# Patient Record
Sex: MALE | Race: WHITE | NOT HISPANIC OR LATINO | Employment: OTHER | URBAN - METROPOLITAN AREA
[De-identification: names, ages, dates, MRNs, and addresses within clinical notes are randomized per-mention and may not be internally consistent; named-entity substitution may affect disease eponyms.]

---

## 2017-01-03 ENCOUNTER — OFFICE VISIT (OUTPATIENT)
Dept: INTERNAL MEDICINE | Facility: CLINIC | Age: 72
End: 2017-01-03
Payer: MEDICARE

## 2017-01-03 VITALS
DIASTOLIC BLOOD PRESSURE: 80 MMHG | BODY MASS INDEX: 31.75 KG/M2 | HEIGHT: 74 IN | OXYGEN SATURATION: 97 % | TEMPERATURE: 98 F | HEART RATE: 48 BPM | SYSTOLIC BLOOD PRESSURE: 132 MMHG | WEIGHT: 247.38 LBS

## 2017-01-03 DIAGNOSIS — M10.9 GOUT, UNSPECIFIED CAUSE, UNSPECIFIED CHRONICITY, UNSPECIFIED SITE: Primary | ICD-10-CM

## 2017-01-03 DIAGNOSIS — R00.1 BRADYCARDIA: ICD-10-CM

## 2017-01-03 PROCEDURE — 99213 OFFICE O/P EST LOW 20 MIN: CPT | Mod: S$PBB,,, | Performed by: FAMILY MEDICINE

## 2017-01-03 PROCEDURE — 99213 OFFICE O/P EST LOW 20 MIN: CPT | Mod: PBBFAC,PO | Performed by: FAMILY MEDICINE

## 2017-01-03 PROCEDURE — 99999 PR PBB SHADOW E&M-EST. PATIENT-LVL III: CPT | Mod: PBBFAC,,, | Performed by: FAMILY MEDICINE

## 2017-01-03 RX ORDER — INDOMETHACIN 50 MG/1
50 CAPSULE ORAL 3 TIMES DAILY PRN
Qty: 30 CAPSULE | Refills: 0 | Status: SHIPPED | OUTPATIENT
Start: 2017-01-03 | End: 2018-06-06 | Stop reason: SDUPTHER

## 2017-01-03 NOTE — MR AVS SNAPSHOT
[facility]   [place]  [place] [address]  Phone: [phone]  Fax: [phone]                  Shannan Norman   1/3/2017 7:00 PM   Office Visit    Description:  Male : 1945   Provider:  Kathie Sauceda MD   Department:  [facility]           Reason for Visit     Pain           Diagnoses this Visit        Comments    Gout, unspecified cause, unspecified chronicity, unspecified site    -  Primary            To Do List           Future Appointments        Provider Department Dept Phone    1/3/2017 7:00 PM Kathie Sauceda MD [facility] [phone]      Goals (5 Years of Data)     None       These Medications        Disp Refills Start End    indomethacin (INDOCIN) 50 MG capsule 30 capsule 0 1/3/2017     Take 1 capsule (50 mg total) by mouth 3 (three) times daily as needed. - Oral    Pharmacy: [facility] [address] [place] [address] [place] [address] AT [facility] Ph #: [phone]         [facility]     [facility] -  Assistance  Registered nurses in the [facility] provide clinical advisement, health education, appointment booking, and other advisory services.  Call for this free service at [phone].             Medications           Message regarding Medications     Verify the changes and/or additions to your medication regime listed below are the same as discussed with your clinician today.  If any of these changes or additions are incorrect, please notify your healthcare provider.        START taking these NEW medications        Refills    indomethacin (INDOCIN) 50 MG capsule 0    Sig: Take 1 capsule (50 mg total) by mouth 3 (three) times daily as needed.    Class: Normal    Route: Oral           Verify that the below list of medications is an accurate representation of the medications you are currently taking.  If none reported, the list may be  "blank. If incorrect, please contact your healthcare provider. Carry this list with you in case of emergency.           Current Medications     ascorbic acid (VITAMIN C) 500 MG tablet Take 500 mg by mouth once daily.    aspirin (ECOTRIN) 81 MG EC tablet Take 81 mg by mouth once daily.    atorvastatin (LIPITOR) 80 MG tablet Take 1 tablet (80 mg total) by mouth once daily.    benazepril (LOTENSIN) 40 MG tablet Take 1 tablet (40 mg total) by mouth once daily.    chlorthalidone (HYGROTEN) 25 MG Tab Take 1 tablet (25 mg total) by mouth once daily.    clotrimazole-betamethasone 1-0.05% (LOTRISONE) cream Apply topically 2 (two) times daily.    econazole nitrate 1 % cream Apply topically 2 (two) times daily. Apply to affected toenail and surrounding skin twice daily.    GLUCOSAMINE HCL/CHONDR VASQUEZ A NA (GLUCOSAMINE-CHONDROITIN) 750-600 mg Tab Take 2 tablets by mouth Daily.    meloxicam (MOBIC) 15 MG tablet Take 1 tablet (15 mg total) by mouth once daily.    multivitamin capsule Take 1 capsule by mouth once daily.    RESTASIS 0.05 % ophthalmic emulsion INSTILL 1 DROP IN BOTH EYES TWICE DAILY    indomethacin (INDOCIN) 50 MG capsule Take 1 capsule (50 mg total) by mouth 3 (three) times daily as needed.           Clinical Reference Information           Vital Signs - Last Recorded  Most recent update: 1/3/2017  6:27 PM by Amna Schafer LPN    BP Pulse Temp Ht Wt SpO2    132/80 (BP Location: Right arm, Patient Position: Sitting, BP Method: Manual) (!) 48 97.7 °F (36.5 °C) (Oral) 6' 2" (1.88 m) 112.2 kg (247 lb 5.7 oz) 97%    BMI                31.76 kg/m2          Blood Pressure          Most Recent Value    BP  132/80      Allergies as of 1/3/2017     No Known Allergies      Immunizations Administered on Date of Encounter - 1/3/2017     None      Instructions      Treating Gout Attacks  Gout is a disease that affects the joints. It is caused by excess uric acid in your blood stream that may lead to crystals forming in your " joints. Left untreated, it can lead to painful foot and joint deformities and even kidney problems. But, by treating gout early, you can relieve pain and help prevent future problems. Gout can usually be treated with medication and proper diet. In severe cases, surgery may be needed.  Gout attacks are painful and often happen more than once. Taking medications may reduce pain and prevent attacks in the future. There are also some things you can do at home to relieve symptoms.    Medications for gout  Your healthcare provider may prescribe a daily medication to reduce levels of uric acid. Reducing your uric acid levels may help prevent gout attacks. Allopurinol is one commonly used medication taken daily to reduce uric acid levels. Other medications can help relieve pain and swelling during an acute attack. Medicines such as NSAIDs (nonsteroidal anti-inflammatory medicines), steroids, and colchicine may be prescribed for intermittent use to relieve an acute gout attack. Be sure to take your medication as directed.  What you can do  Below are some things you can do at home to relieve gout symptoms. Your healthcare provider may have other tips.  · Rest the painful joint as much as you can.  · Raise the painful joint so it is at a level higher than your heart.  · Use ice for 10 minutes every 1-2 hours as possible.  How can I prevent gout?  With a little effort, you may be able to prevent gout attacks in the future. Here are some things you can do:  · Avoid foods high in purines  ¨ Certain meats (red meat, processed meat, turkey)  ¨ Organ meats (kidney, liver, sweetbread)  ¨ Shellfish (lobster, crab, shrimp, scallop, mussel)  ¨ Certain fish (anchovy, sardine, herring, mackerel)  · Take any medications prescribed by your healthcare provider.  · Lose weight if you need to.  · Reduce high fructose corn syrup in meals and drinks.  · Reduce or eliminate consumption of alcohol, particularly beer, but also red wine and  spirits.  · Control blood pressure, diabetes, and cholesterol.  · Drink plenty of water to help flush uric acid from your body.  © 9903-0138 The "[employer]. [address] [place] [address], [place] [address]. All rights reserved. This information is not intended as a substitute for professional medical care. Always follow your healthcare professional's instructions.        Gout Diet  Gout is a painful condition caused by an excess of uric acid, a waste product made by the body. Uric acid forms crystals that collect in the joints. The immune response to these crystals brings on symptoms of joint pain and swelling. This is called a gout attack. Often, medications and diet changes are combined to manage gout. Below are some guidelines for changing your diet to help you manage gout and prevent attacks. Your health care provider will help you determine the best eating plan for you.     Eating to manage gout  Weight loss for those who are overweight may help reduce gout attacks.  Eat less of these foods  Eating too many foods containing purines may raise the levels of uric acid in your body. This raises your risk for a gout attack. Try to limit these foods and drinks:  · Alcohol, such as beer and red wine. You may be told to avoid alcohol completely.  · Soft drinks that contain sugar or high fructose corn syrup  · Certain fish, including anchovies, sardines, fish eggs, and herring  · Shellfish  · Certain meats, such as red meat, hot dogs, luncheon meats, and turkey  · Organ meats, such as liver, kidneys, and sweetbreads  · Legumes, such as dried beans and peas  · Other high fat foods such as gravy, whole milk, and high fat cheeses  · Vegetables such as asparagus, cauliflower, spinach, and mushrooms used to be thought to contribute to an increased risk for a gout attack, but recent studies show that high purine vegetables don't increase the risk for a gout attack.  Eat more of these foods  Other foods may be helpful for  people with gout. Add some of these foods to your diet:  · Cherries contain chemicals that may lower uric acid.  · Omega fatty acids. These are found in some fatty fish such as salmon, certain oils (flax, olive, or nut), and nuts themselves. Omega fatty acids may help prevent inflammation due to gout.  · Dairy products that are low-fat or fat-free, such as cheese and yogurt  · Complex carbohydrate foods, including whole grains, brown rice, oats, and beans  · Coffee, in moderation  · Water, approximately 64 ounces per day  Follow-up care  Follow up with your healthcare provider as advised.  When to seek medical advice  Call your healthcare provider right away if any of these occur:  · Return of gout symptoms, usually at night:  · Severe pain, swelling, and heat in a joint, especially the base of the big toe  · Affected joint is hard to move  · Skin of the affected joint is purple or red  · Fever of 100.4°F (38°C) or higher  · Pain that doesn't get better even with prescribed medicine   © 2884-9065 [employer]. [address] [place] [address]. All rights reserved. This information is not intended as a substitute for professional medical care. Always follow your healthcare professional's instructions.        Eating to Prevent Gout  Gout is a painful form of arthritis caused by an excess of uric acid. This is a waste product made by the body. It builds up in the body and forms crystals that collect in the joints, bringing on a gout attack. Alcohol and certain foods can trigger a gout attack. Below are some guidelines for changing your diet to help you manage gout. Your healthcare provider can work with you to determine the best eating plan for you. Know that diet is only one part of managing gout. Take your medicines as prescribed and follow the other guidelines your healthcare provider has given you.  Foods to limit  Eating too many foods containing purines may increase the levels of uric acid  in your body and increase your risk for a gout attack. It may be best to limit these high-purine foods:  · Alcohol (beer, red wine). You may be told to avoid alcohol completely.  · Certain fish (anchovies, sardines, fish roes, herring, tuna, mussels, codfish, scallops, trout, and sanjiv)  · Certain meats (red meat, processed meat, clemons, turkey, wild game, and goose)  · Sauces and gravies made with meat  · Organ meats (such as liver, kidneys, sweetbreads, and tripe)  · Legumes (such as dried beans, peas)  · Mushrooms, spinach, asparagus, and cauliflower  · Yeast and yeast extract supplements  Foods to try  Some foods may be helpful for people with gout. You may want to try adding some of the following foods to your diet:  · Dark berries: These include blueberries, blackberries, and cherries. These berries contain chemicals that may lower uric acid.  · Tofu: Tofu, which is made from soy, is a good source of protein. Studies have shown that it may be a better choice than meat for people with gout.  · Omega fatty acids: These acids are found in fatty fish (such as salmon), certain oils (such as flax, olive, or nut oils), or nuts. They may help prevent inflammation due to gout.  The following guidelines are recommended by the American Medical Association for people with gout. Your diet should be:  · High in fiber, whole grains, fruits, and vegetables.  · Low in protein (15% of calories should come from protein. Choose lean sources such as soy, lean meats, and poultry).  · Low in fat (no more than 30% of calories should come from fat, with only 10% coming from animal fat).   © 1079-8551 [employer]. [address] [place] [address], [place] [address]. All rights reserved. This information is not intended as a substitute for professional medical care. Always follow your healthcare professional's instructions.

## 2017-01-04 NOTE — PATIENT INSTRUCTIONS
Treating Gout Attacks  Gout is a disease that affects the joints. It is caused by excess uric acid in your blood stream that may lead to crystals forming in your joints. Left untreated, it can lead to painful foot and joint deformities and even kidney problems. But, by treating gout early, you can relieve pain and help prevent future problems. Gout can usually be treated with medication and proper diet. In severe cases, surgery may be needed.  Gout attacks are painful and often happen more than once. Taking medications may reduce pain and prevent attacks in the future. There are also some things you can do at home to relieve symptoms.    Medications for gout  Your healthcare provider may prescribe a daily medication to reduce levels of uric acid. Reducing your uric acid levels may help prevent gout attacks. Allopurinol is one commonly used medication taken daily to reduce uric acid levels. Other medications can help relieve pain and swelling during an acute attack. Medicines such as NSAIDs (nonsteroidal anti-inflammatory medicines), steroids, and colchicine may be prescribed for intermittent use to relieve an acute gout attack. Be sure to take your medication as directed.  What you can do  Below are some things you can do at home to relieve gout symptoms. Your healthcare provider may have other tips.  · Rest the painful joint as much as you can.  · Raise the painful joint so it is at a level higher than your heart.  · Use ice for 10 minutes every 1-2 hours as possible.  How can I prevent gout?  With a little effort, you may be able to prevent gout attacks in the future. Here are some things you can do:  · Avoid foods high in purines  ¨ Certain meats (red meat, processed meat, turkey)  ¨ Organ meats (kidney, liver, sweetbread)  ¨ Shellfish (lobster, crab, shrimp, scallop, mussel)  ¨ Certain fish (anchovy, sardine, herring, mackerel)  · Take any medications prescribed by your healthcare provider.  · Lose weight if  you need to.  · Reduce high fructose corn syrup in meals and drinks.  · Reduce or eliminate consumption of alcohol, particularly beer, but also red wine and spirits.  · Control blood pressure, diabetes, and cholesterol.  · Drink plenty of water to help flush uric acid from your body.  © 6901-8993 [employer]. [address] [place] [address], [place] [address]. All rights reserved. This information is not intended as a substitute for professional medical care. Always follow your healthcare professional's instructions.        Gout Diet  Gout is a painful condition caused by an excess of uric acid, a waste product made by the body. Uric acid forms crystals that collect in the joints. The immune response to these crystals brings on symptoms of joint pain and swelling. This is called a gout attack. Often, medications and diet changes are combined to manage gout. Below are some guidelines for changing your diet to help you manage gout and prevent attacks. Your health care provider will help you determine the best eating plan for you.     Eating to manage gout  Weight loss for those who are overweight may help reduce gout attacks.  Eat less of these foods  Eating too many foods containing purines may raise the levels of uric acid in your body. This raises your risk for a gout attack. Try to limit these foods and drinks:  · Alcohol, such as beer and red wine. You may be told to avoid alcohol completely.  · Soft drinks that contain sugar or high fructose corn syrup  · Certain fish, including anchovies, sardines, fish eggs, and herring  · Shellfish  · Certain meats, such as red meat, hot dogs, luncheon meats, and turkey  · Organ meats, such as liver, kidneys, and sweetbreads  · Legumes, such as dried beans and peas  · Other high fat foods such as gravy, whole milk, and high fat cheeses  · Vegetables such as asparagus, cauliflower, spinach, and mushrooms used to be thought to contribute to an increased risk for a gout  attack, but recent studies show that high purine vegetables don't increase the risk for a gout attack.  Eat more of these foods  Other foods may be helpful for people with gout. Add some of these foods to your diet:  · Cherries contain chemicals that may lower uric acid.  · Omega fatty acids. These are found in some fatty fish such as salmon, certain oils (flax, olive, or nut), and nuts themselves. Omega fatty acids may help prevent inflammation due to gout.  · Dairy products that are low-fat or fat-free, such as cheese and yogurt  · Complex carbohydrate foods, including whole grains, brown rice, oats, and beans  · Coffee, in moderation  · Water, approximately 64 ounces per day  Follow-up care  Follow up with your healthcare provider as advised.  When to seek medical advice  Call your healthcare provider right away if any of these occur:  · Return of gout symptoms, usually at night:  · Severe pain, swelling, and heat in a joint, especially the base of the big toe  · Affected joint is hard to move  · Skin of the affected joint is purple or red  · Fever of 100.4°F (38°C) or higher  · Pain that doesn't get better even with prescribed medicine   © 8815-0958 [employer]. [address] [place] [address], [place] [address]. All rights reserved. This information is not intended as a substitute for professional medical care. Always follow your healthcare professional's instructions.        Eating to Prevent Gout  Gout is a painful form of arthritis caused by an excess of uric acid. This is a waste product made by the body. It builds up in the body and forms crystals that collect in the joints, bringing on a gout attack. Alcohol and certain foods can trigger a gout attack. Below are some guidelines for changing your diet to help you manage gout. Your healthcare provider can work with you to determine the best eating plan for you. Know that diet is only one part of managing gout. Take your medicines as prescribed and  follow the other guidelines your healthcare provider has given you.  Foods to limit  Eating too many foods containing purines may increase the levels of uric acid in your body and increase your risk for a gout attack. It may be best to limit these high-purine foods:  · Alcohol (beer, red wine). You may be told to avoid alcohol completely.  · Certain fish (anchovies, sardines, fish roes, herring, tuna, mussels, codfish, scallops, trout, and sanjiv)  · Certain meats (red meat, processed meat, clemons, turkey, wild game, and goose)  · Sauces and gravies made with meat  · Organ meats (such as liver, kidneys, sweetbreads, and tripe)  · Legumes (such as dried beans, peas)  · Mushrooms, spinach, asparagus, and cauliflower  · Yeast and yeast extract supplements  Foods to try  Some foods may be helpful for people with gout. You may want to try adding some of the following foods to your diet:  · Dark berries: These include blueberries, blackberries, and cherries. These berries contain chemicals that may lower uric acid.  · Tofu: Tofu, which is made from soy, is a good source of protein. Studies have shown that it may be a better choice than meat for people with gout.  · Omega fatty acids: These acids are found in fatty fish (such as salmon), certain oils (such as flax, olive, or nut oils), or nuts. They may help prevent inflammation due to gout.  The following guidelines are recommended by the American Medical Association for people with gout. Your diet should be:  · High in fiber, whole grains, fruits, and vegetables.  · Low in protein (15% of calories should come from protein. Choose lean sources such as soy, lean meats, and poultry).  · Low in fat (no more than 30% of calories should come from fat, with only 10% coming from animal fat).   © 2087-4385 [employer] [facility]. [address] [place] [address], [place] [address]. All rights reserved. This information is not intended as a substitute for professional medical care.  Always follow your healthcare professional's instructions.

## 2017-01-04 NOTE — PROGRESS NOTES
"Subjective:      Patient ID: Shannan Norman is a 71 y.o. male.    Chief Complaint: Pain (right foot)    HPI Comments: Last night he noticed pain in right  first toe. The pain is there when there is something touching on the toe and ibuprofen today has helped.  He did a lot of pork and turkey/duck prior to onset. He has been drinking about a quart of water daily. He has also been drinking green tea and coffee. He does drink red wine about 10 oz nightly and sometimes it can be more. He is able to walk.     Review of Systems   Constitutional: Negative.    Respiratory: Negative.    Cardiovascular: Negative.      I personally reviewed Past Medical History, Past Surgical history,  Past Social History and Family History    Objective:     Visit Vitals    /80 (BP Location: Right arm, Patient Position: Sitting, BP Method: Manual)    Pulse (!) 48    Temp 97.7 °F (36.5 °C) (Oral)    Ht 6' 2" (1.88 m)    Wt 112.2 kg (247 lb 5.7 oz)    SpO2 97%    BMI 31.76 kg/m2       Physical Exam   Constitutional: He appears well-developed and well-nourished. No distress.   Cardiovascular: Normal rate, regular rhythm, normal heart sounds and intact distal pulses.  Exam reveals no gallop and no friction rub.    No murmur heard.  Pulses:       Dorsalis pedis pulses are 2+ on the right side, and 2+ on the left side.        Posterior tibial pulses are 2+ on the right side, and 2+ on the left side.   Pulmonary/Chest: Breath sounds normal. No respiratory distress. He has no wheezes. He has no rales. He exhibits no tenderness.   Musculoskeletal:        Right foot: There is normal range of motion and no deformity.        Left foot: There is normal range of motion and no deformity.   Right toe: 1 mtp TTP, warmth   Feet:   Right Foot:   Protective Sensation: 6 sites tested. 6 sites sensed.   Left Foot:   Protective Sensation: 6 sites tested. 6 sites sensed.   Skin: He is not diaphoretic.       Shannan was seen today for pain.    Diagnoses and " all orders for this visit:    Gout, unspecified cause, unspecified chronicity, unspecified site  -handout given on diet modification he will call if no improvement of symptoms     Bradycardia  -patient is asymptomatic, denies headaches, dizziness or lightheadedness  -he ranges in the 48s-50s  -no intervention at this time       -     indomethacin (INDOCIN) 50 MG capsule; Take 1 capsule (50 mg total) by mouth 3 (three) times daily as needed.

## 2017-01-05 DIAGNOSIS — Z23 NEED FOR INFLUENZA VACCINATION: Primary | ICD-10-CM

## 2017-01-05 NOTE — PROGRESS NOTES
Patient tolerated vaccine(s) injection without difficulty.  Instructed to wait 15 minutes.  No side effects noted.  VIS statement given.  Exited department in stable condition.

## 2017-01-09 ENCOUNTER — PATIENT MESSAGE (OUTPATIENT)
Dept: FAMILY MEDICINE | Facility: CLINIC | Age: 72
End: 2017-01-09

## 2017-02-23 ENCOUNTER — OFFICE VISIT (OUTPATIENT)
Dept: FAMILY MEDICINE | Facility: CLINIC | Age: 72
End: 2017-02-23
Payer: MEDICARE

## 2017-02-23 VITALS
DIASTOLIC BLOOD PRESSURE: 78 MMHG | SYSTOLIC BLOOD PRESSURE: 130 MMHG | HEART RATE: 66 BPM | WEIGHT: 250.25 LBS | HEIGHT: 74 IN | BODY MASS INDEX: 32.12 KG/M2

## 2017-02-23 DIAGNOSIS — J40 BRONCHITIS: Primary | ICD-10-CM

## 2017-02-23 PROCEDURE — 99213 OFFICE O/P EST LOW 20 MIN: CPT | Mod: S$PBB,,, | Performed by: NURSE PRACTITIONER

## 2017-02-23 PROCEDURE — 99999 PR PBB SHADOW E&M-EST. PATIENT-LVL III: CPT | Mod: PBBFAC,,, | Performed by: NURSE PRACTITIONER

## 2017-02-23 PROCEDURE — 99213 OFFICE O/P EST LOW 20 MIN: CPT | Mod: PBBFAC,PO | Performed by: NURSE PRACTITIONER

## 2017-02-23 RX ORDER — DOXYCYCLINE 100 MG/1
100 CAPSULE ORAL EVERY 12 HOURS
Qty: 20 CAPSULE | Refills: 0 | Status: SHIPPED | OUTPATIENT
Start: 2017-02-23 | End: 2017-03-05

## 2017-02-23 RX ORDER — GUAIFENESIN 600 MG/1
1200 TABLET, EXTENDED RELEASE ORAL 2 TIMES DAILY
Qty: 40 TABLET | Refills: 0 | COMMUNITY
Start: 2017-02-23 | End: 2017-03-05

## 2017-02-23 RX ORDER — BENZONATATE 200 MG/1
200 CAPSULE ORAL 2 TIMES DAILY PRN
Qty: 60 CAPSULE | Refills: 0 | Status: SHIPPED | OUTPATIENT
Start: 2017-02-23 | End: 2017-03-25

## 2017-02-23 NOTE — MR AVS SNAPSHOT
[facility]   [address]  [place] [address]  Phone: [phone]  Fax: [phone]                  Shannan [place]   2017 7:00 PM   Office Visit    Description:  Male : 1945   Provider:  Corazon Isbell NP   Department:  [facility]           Reason for Visit     Cough     Chest Congestion           Diagnoses this Visit        Comments    Bronchitis    -  Primary            To Do List           Future Appointments        Provider Department Dept Phone    2017 7:00 PM Corazon Isbell NP [facility] [phone]    3/15/2017 2:00 PM Onofre Paulson MD [facility] [phone]      Goals (5 Years of Data)     None      Follow-Up and Disposition     Return if symptoms worsen or fail to improve.       These Medications        Disp Refills Start End    doxycycline (MONODOX) 100 MG capsule 20 capsule 0 2017 3/5/2017    Take 1 capsule (100 mg total) by mouth every 12 (twelve) hours. - Oral    Pharmacy: [facility] [address] [place] [address] [address] AT [facility] Ph #: [phone]       benzonatate (TESSALON) 200 MG capsule 60 capsule 0 2017 3/25/2017    Take 1 capsule (200 mg total) by mouth 2 (two) times daily as needed. - Oral    Pharmacy: [facility] [address] [place] [address] [address] AT [facility] Ph #: [phone]         PURCHASE these Medications (No prescription required)        Start End    guaifenesin (MUCINEX) 600 mg 12 hr tablet 2017 3/5/2017    Sig - Route: Take 2 tablets (1,200 mg total) by mouth 2 (two) times daily. - Oral    Class: OTC      [facility]     [facility] On Call Nurse Care Line -  Assistance  Registered nurses in the [facility] On Call Center provide clinical advisement, health education, appointment booking, and other advisory services.  Call for this free service at [phone].             Medications            Message regarding Medications     Verify the changes and/or additions to your medication regime listed below are the same as discussed with your clinician today.  If any of these changes or additions are incorrect, please notify your healthcare provider.        START taking these NEW medications        Refills    doxycycline (MONODOX) 100 MG capsule 0    Sig: Take 1 capsule (100 mg total) by mouth every 12 (twelve) hours.    Class: Normal    Route: Oral    guaifenesin (MUCINEX) 600 mg 12 hr tablet 0    Sig: Take 2 tablets (1,200 mg total) by mouth 2 (two) times daily.    Class: OTC    Route: Oral    benzonatate (TESSALON) 200 MG capsule 0    Sig: Take 1 capsule (200 mg total) by mouth 2 (two) times daily as needed.    Class: Normal    Route: Oral           Verify that the below list of medications is an accurate representation of the medications you are currently taking.  If none reported, the list may be blank. If incorrect, please contact your healthcare provider. Carry this list with you in case of emergency.           Current Medications     ascorbic acid (VITAMIN C) 500 MG tablet Take 500 mg by mouth once daily.    aspirin (ECOTRIN) 81 MG EC tablet Take 81 mg by mouth once daily.    atorvastatin (LIPITOR) 80 MG tablet Take 1 tablet (80 mg total) by mouth once daily.    benazepril (LOTENSIN) 40 MG tablet Take 1 tablet (40 mg total) by mouth once daily.    chlorthalidone (HYGROTEN) 25 MG Tab Take 1 tablet (25 mg total) by mouth once daily.    clotrimazole-betamethasone 1-0.05% (LOTRISONE) cream Apply topically 2 (two) times daily.    econazole nitrate 1 % cream Apply topically 2 (two) times daily. Apply to affected toenail and surrounding skin twice daily.    GLUCOSAMINE HCL/CHONDR VASQUEZ A NA (GLUCOSAMINE-CHONDROITIN) 750-600 mg Tab Take 2 tablets by mouth Daily.    indomethacin (INDOCIN) 50 MG capsule Take 1 capsule (50 mg total) by mouth 3 (three) times daily as needed.    meloxicam (MOBIC) 15 MG tablet  "Take 1 tablet (15 mg total) by mouth once daily.    multivitamin capsule Take 1 capsule by mouth once daily.    RESTASIS 0.05 % ophthalmic emulsion INSTILL 1 DROP IN BOTH EYES TWICE DAILY    benzonatate (TESSALON) 200 MG capsule Take 1 capsule (200 mg total) by mouth 2 (two) times daily as needed.    doxycycline (MONODOX) 100 MG capsule Take 1 capsule (100 mg total) by mouth every 12 (twelve) hours.    guaifenesin (MUCINEX) 600 mg 12 hr tablet Take 2 tablets (1,200 mg total) by mouth 2 (two) times daily.           Clinical Reference Information           Your Vitals Were     BP Pulse Height Weight BMI    130/78 (BP Location: Left arm, Patient Position: Sitting, BP Method: Manual) 66 6' 2" (1.88 m) 113.5 kg (250 lb 3.6 oz) 32.13 kg/m2      Blood Pressure          Most Recent Value    BP  130/78      Allergies as of 2/23/2017     No Known Allergies      Immunizations Administered on Date of Encounter - 2/23/2017     None      Language Assistance Services     ATTENTION: Language assistance services are available, free of charge. Please call [phone].      ATENCIÓN: Si habla alondra, tiene a garvin disposición servicios gratuitos de asistencia lingüística. Llame al [phone].     MARCOS Ý: N?u b?n nói Ti?ng Vi?t, có các d?ch v? h? tr? ngôn ng? mi?n phí dành cho b?n. G?i s? [phone].         [facility] complies with applicable Federal civil rights laws and does not discriminate on the basis of race, color, national origin, age, disability, or sex.        "

## 2017-02-23 NOTE — PROGRESS NOTES
Subjective:       Patient ID: Shannan Norman is a 71 y.o. male.    Chief Complaint: Cough and Chest Congestion    Cough   This is a new problem. Episode onset: over 2 weeks. The problem has been gradually worsening. The cough is productive of sputum (green sputum+++++++++++). Associated symptoms include myalgias and wheezing. Pertinent negatives include no chest pain, chills, eye redness, fever, headaches, postnasal drip, rash, rhinorrhea, sore throat or shortness of breath. Treatments tried: cough syrup Otc. The treatment provided no relief. There is no history of environmental allergies.     Review of Systems   Constitutional: Positive for fatigue. Negative for activity change, appetite change, chills, fever and unexpected weight change.   HENT: Positive for congestion. Negative for facial swelling, postnasal drip, rhinorrhea, sinus pressure, sore throat and trouble swallowing.         Nose bleeds for the past 6 months  Was better, but started to bleed again when he started with this cold. Is being followed by ENT at [facility] / Dr. Arroyo   Eyes: Negative for pain, redness and visual disturbance.   Respiratory: Positive for cough, chest tightness and wheezing. Negative for shortness of breath.    Cardiovascular: Negative.  Negative for chest pain, palpitations and leg swelling.   Gastrointestinal: Negative for abdominal pain, diarrhea, nausea and vomiting.   Genitourinary: Negative for dysuria, flank pain and urgency.   Musculoskeletal: Positive for myalgias. Negative for gait problem, neck pain and neck stiffness.   Skin: Negative for rash.   Allergic/Immunologic: Negative for environmental allergies, food allergies and immunocompromised state.   Neurological: Negative for dizziness, weakness, light-headedness and headaches.   Psychiatric/Behavioral: Negative for agitation and confusion. The patient is not nervous/anxious.        Objective:       Visit Vitals    /78 (BP Location: Left arm, Patient Position:  "Sitting, BP Method: Manual)    Pulse 66    Ht 6' 2" (1.88 m)    Wt 113.5 kg (250 lb 3.6 oz)    BMI 32.13 kg/m2     Past Medical History   Diagnosis Date    Atrial flutter 2011     ablation    Cataract     Diabetes mellitus     Dry eye syndrome     Gout, unspecified     High cholesterol     History of shingles     Hypertension        Physical Exam   Constitutional: He is oriented to person, place, and time. He appears well-developed and well-nourished.   HENT:   Head: Normocephalic.   Nose: Mucosal edema present.       Mouth/Throat: Posterior oropharyngeal erythema present. No oropharyngeal exudate.       Eyes: Conjunctivae and EOM are normal. Pupils are equal, round, and reactive to light. No scleral icterus.   Neck: Normal range of motion. Neck supple.   Cardiovascular: Normal rate, regular rhythm and normal heart sounds.    Pulmonary/Chest: Effort normal. He has wheezes.   Abdominal: Soft. Normal appearance and bowel sounds are normal. He exhibits no distension and no mass. There is no splenomegaly or hepatomegaly. There is no tenderness. There is no rebound, no guarding, no CVA tenderness, no tenderness at McBurney's point and negative Mcconnell's sign.   Musculoskeletal: Normal range of motion. He exhibits no edema.   Lymphadenopathy:     He has no cervical adenopathy.   Neurological: He is alert and oriented to person, place, and time. He exhibits normal muscle tone. Coordination normal.   Skin: Skin is warm and dry.   Psychiatric: He has a normal mood and affect. His behavior is normal.   Vitals reviewed.      Assessment:       1. Bronchitis        Plan:     Shannan was seen today for cough and chest congestion.    Diagnoses and all orders for this visit:    Bronchitis  -     doxycycline (MONODOX) 100 MG capsule; Take 1 capsule (100 mg total) by mouth every 12 (twelve) hours.  -     guaifenesin (MUCINEX) 600 mg 12 hr tablet; Take 2 tablets (1,200 mg total) by mouth 2 (two) times daily.  -     benzonatate " (TESSALON) 200 MG capsule; Take 1 capsule (200 mg total) by mouth 2 (two) times daily as needed.    F/u with ENT c/o nose bleeds as scheduled    F/U withh Dr. Paulson if symptoms persist or worsen.

## 2017-03-15 ENCOUNTER — OFFICE VISIT (OUTPATIENT)
Dept: FAMILY MEDICINE | Facility: CLINIC | Age: 72
End: 2017-03-15
Payer: MEDICARE

## 2017-03-15 ENCOUNTER — LAB VISIT (OUTPATIENT)
Dept: LAB | Facility: HOSPITAL | Age: 72
End: 2017-03-15
Attending: FAMILY MEDICINE
Payer: MEDICARE

## 2017-03-15 VITALS
HEART RATE: 60 BPM | BODY MASS INDEX: 31.83 KG/M2 | DIASTOLIC BLOOD PRESSURE: 69 MMHG | OXYGEN SATURATION: 96 % | HEIGHT: 74 IN | SYSTOLIC BLOOD PRESSURE: 121 MMHG | WEIGHT: 248 LBS

## 2017-03-15 DIAGNOSIS — R35.0 URINARY FREQUENCY: ICD-10-CM

## 2017-03-15 DIAGNOSIS — E08.21 DIABETES MELLITUS DUE TO UNDERLYING CONDITION WITH DIABETIC NEPHROPATHY, WITHOUT LONG-TERM CURRENT USE OF INSULIN: Primary | ICD-10-CM

## 2017-03-15 DIAGNOSIS — E08.21 DIABETES MELLITUS DUE TO UNDERLYING CONDITION WITH DIABETIC NEPHROPATHY, WITHOUT LONG-TERM CURRENT USE OF INSULIN: ICD-10-CM

## 2017-03-15 DIAGNOSIS — N40.1 BENIGN PROSTATIC HYPERPLASIA WITH LOWER URINARY TRACT SYMPTOMS, UNSPECIFIED MORPHOLOGY: ICD-10-CM

## 2017-03-15 PROCEDURE — 83036 HEMOGLOBIN GLYCOSYLATED A1C: CPT

## 2017-03-15 PROCEDURE — 36415 COLL VENOUS BLD VENIPUNCTURE: CPT

## 2017-03-15 PROCEDURE — 99213 OFFICE O/P EST LOW 20 MIN: CPT | Mod: PBBFAC,PO | Performed by: FAMILY MEDICINE

## 2017-03-15 PROCEDURE — 99214 OFFICE O/P EST MOD 30 MIN: CPT | Mod: S$PBB,,, | Performed by: FAMILY MEDICINE

## 2017-03-15 PROCEDURE — 99999 PR PBB SHADOW E&M-EST. PATIENT-LVL III: CPT | Mod: PBBFAC,,, | Performed by: FAMILY MEDICINE

## 2017-03-15 RX ORDER — TAMSULOSIN HYDROCHLORIDE 0.4 MG/1
0.4 CAPSULE ORAL DAILY
Qty: 30 CAPSULE | Refills: 11 | Status: SHIPPED | OUTPATIENT
Start: 2017-03-15 | End: 2018-03-07 | Stop reason: SDUPTHER

## 2017-03-15 NOTE — MR AVS SNAPSHOT
[address]  [place] [address]  Phone: [phone]  Fax: [phone]                  Shannan Norman   3/15/2017 2:00 PM   Office Visit    Description:  Male : 1945   Provider:  Onofre Paulson MD   Department:  [facility]           Reason for Visit     Urinary Frequency           Diagnoses this Visit        Comments    Diabetes mellitus due to underlying condition with diabetic nephropathy, without long-term current use of insulin    -  Primary     Urinary frequency         Benign prostatic hyperplasia with lower urinary tract symptoms, unspecified morphology                To Do List           Goals (5 Years of Data)     None      Follow-Up and Disposition     Return in about 4 months (around 7/15/2017).       These Medications        Disp Refills Start End    tamsulosin (FLOMAX) 0.4 mg Cp24 30 capsule 11 3/15/2017 3/15/2018    Take 1 capsule (0.4 mg total) by mouth once daily. - Oral    Pharmacy: [facility] [address] [place] [address] [address] [place] AT [facility] #: [phone]         [facility]     [facility] Nurse Care Line -  Assistance  Registered nurses in the [facility] Center provide clinical advisement, health education, appointment booking, and other advisory services.  Call for this free service at [phone].             Medications           Message regarding Medications     Verify the changes and/or additions to your medication regime listed below are the same as discussed with your clinician today.  If any of these changes or additions are incorrect, please notify your healthcare provider.        START taking these NEW medications        Refills    tamsulosin (FLOMAX) 0.4 mg Cp24 11    Sig: Take 1 capsule (0.4 mg total) by mouth once daily.    Class: Normal    Route: Oral           Verify that the below list of medications is an accurate representation of the  "medications you are currently taking.  If none reported, the list may be blank. If incorrect, please contact your healthcare provider. Carry this list with you in case of emergency.           Current Medications     ascorbic acid (VITAMIN C) 500 MG tablet Take 1,000 mg by mouth once daily.     aspirin (ECOTRIN) 81 MG EC tablet Take 81 mg by mouth once daily.    atorvastatin (LIPITOR) 80 MG tablet Take 1 tablet (80 mg total) by mouth once daily.    benazepril (LOTENSIN) 40 MG tablet Take 1 tablet (40 mg total) by mouth once daily.    chlorthalidone (HYGROTEN) 25 MG Tab Take 1 tablet (25 mg total) by mouth once daily.    clotrimazole-betamethasone 1-0.05% (LOTRISONE) cream Apply topically 2 (two) times daily.    GLUCOSAMINE HCL/CHONDR VASQUEZ A NA (GLUCOSAMINE-CHONDROITIN) 750-600 mg Tab Take 2 tablets by mouth Daily.    indomethacin (INDOCIN) 50 MG capsule Take 1 capsule (50 mg total) by mouth 3 (three) times daily as needed.    LORATADINE ORAL Take 10 mg by mouth as needed.    meloxicam (MOBIC) 15 MG tablet Take 1 tablet (15 mg total) by mouth once daily.    multivitamin capsule Take 1 capsule by mouth once daily.    RESTASIS 0.05 % ophthalmic emulsion INSTILL 1 DROP IN BOTH EYES TWICE DAILY    benzonatate (TESSALON) 200 MG capsule Take 1 capsule (200 mg total) by mouth 2 (two) times daily as needed.    econazole nitrate 1 % cream Apply topically 2 (two) times daily. Apply to affected toenail and surrounding skin twice daily.    tamsulosin (FLOMAX) 0.4 mg Cp24 Take 1 capsule (0.4 mg total) by mouth once daily.           Clinical Reference Information           Your Vitals Were     BP Pulse Height Weight SpO2 BMI    121/69 60 6' 2" (1.88 m) 112.5 kg (248 lb 0.3 oz) 96% 31.84 kg/m2      Blood Pressure          Most Recent Value    BP  121/69      Allergies as of 3/15/2017     No Known Allergies      Immunizations Administered on Date of Encounter - 3/15/2017     None      Orders Placed During Today's Visit     Future " Labs/Procedures Expected by Expires    Hemoglobin A1c  3/15/2017 5/14/2018    Urinalysis  3/15/2017 5/14/2018    Urine culture  3/15/2017 5/14/2018      Language Assistance Services     ATTENTION: Language assistance services are available, free of charge. Please call [phone].      ATENCIÓN: Si habla español, tiene a garvin disposición servicios gratuitos de asistencia lingüística. Llame al [phone].     CHÚ Ý: N?u b?n nói Ti?ng Vi?t, có các d?ch v? h? tr? ngôn ng? mi?n phí dành cho b?n. G?i s? [phone].         [facility] complies with applicable Federal civil rights laws and does not discriminate on the basis of race, color, national origin, age, disability, or sex.

## 2017-03-15 NOTE — PROGRESS NOTES
Subjective:       Patient ID: Shannan Norman is a 71 y.o. male.    Chief Complaint: Urinary Frequency    HPI Comments: 71 yr old pleasant white male with DM II controlled, HTN, HLD, obesity, presents today for a follow up and c/o urine frequency and BPH symptoms.      Frequency and BPH symptoms - onset many years ago and he was maintaining with lifestyle modifications and decreasing fluid intake. He has frequency, nocturia, hesitancy, dribbling urine - no dysuria      DM II - controlled - diet control -  HGBA1C                   6.3 (H)             07/08/2016          - on ACE and ASA - UTD with eye and foot screen        History as below - reviewed         Diabetes   He presents for his follow-up diabetic visit. He has type 2 diabetes mellitus. His disease course has been stable. Pertinent negatives for hypoglycemia include no confusion, dizziness, headaches, hunger, mood changes, nervousness/anxiousness, seizures, sleepiness, speech difficulty, sweats or tremors. Pertinent negatives for diabetes include no chest pain, no foot ulcerations, no polydipsia, no polyuria, no visual change, no weakness and no weight loss. Symptoms are stable. Pertinent negatives for diabetic complications include no CVA, PVD or retinopathy. Risk factors for coronary artery disease include diabetes mellitus, dyslipidemia, hypertension and male sex. He is compliant with treatment all of the time. He is following a generally healthy diet. Meal planning includes avoidance of concentrated sweets. He rarely participates in exercise. An ACE inhibitor/angiotensin II receptor blocker is being taken. He does not see a podiatrist.Eye exam is current.   Hypertension   This is a chronic problem. The current episode started more than 1 month ago. The problem has been gradually improving since onset. The problem is controlled. Pertinent negatives include no chest pain, headaches, malaise/fatigue, neck pain, palpitations, peripheral edema, PND or  sweats. There are no associated agents to hypertension. Risk factors for coronary artery disease include diabetes mellitus, dyslipidemia, male gender and obesity. Past treatments include ACE inhibitors. The current treatment provides significant improvement. There are no compliance problems.  There is no history of angina, CAD/MI, CVA, left ventricular hypertrophy, PVD, renovascular disease, retinopathy or a thyroid problem. There is no history of chronic renal disease, coarctation of the aorta, hypercortisolism, hyperparathyroidism or pheochromocytoma.   Hyperlipidemia   This is a chronic problem. The current episode started more than 1 year ago. The problem is controlled. Recent lipid tests were reviewed and are normal. Exacerbating diseases include diabetes and obesity. He has no history of chronic renal disease. Pertinent negatives include no chest pain, leg pain or myalgias. The current treatment provides significant improvement of lipids. There are no compliance problems.  Risk factors for coronary artery disease include diabetes mellitus, dyslipidemia, hypertension, male sex and obesity.   Benign Prostatic Hypertrophy   This is a chronic problem. The current episode started more than 1 year ago. The problem has been gradually worsening since onset. Irritative symptoms include frequency, nocturia and urgency. Obstructive symptoms include dribbling, incomplete emptying, an intermittent stream, a slower stream and straining. Pertinent negatives include no vomiting. AUA score is 8-19. He is sexually active. Nothing aggravates the symptoms. Past treatments include nothing. The treatment provided no relief.     Review of Systems   Constitutional: Negative.  Negative for activity change, diaphoresis, malaise/fatigue, unexpected weight change and weight loss.   HENT: Negative.  Negative for congestion, ear pain, mouth sores, rhinorrhea and voice change.    Eyes: Negative.  Negative for pain, discharge and visual  disturbance.   Respiratory: Negative.  Negative for apnea, cough and wheezing.    Cardiovascular: Negative.  Negative for chest pain, palpitations and PND.   Gastrointestinal: Negative.  Negative for abdominal distention, anal bleeding, diarrhea and vomiting.   Endocrine: Negative.  Negative for cold intolerance, polydipsia and polyuria.   Genitourinary: Positive for frequency, incomplete emptying, nocturia and urgency. Negative for decreased urine volume, difficulty urinating, discharge and scrotal swelling.   Musculoskeletal: Negative.  Negative for back pain, myalgias, neck pain and neck stiffness.   Skin: Negative.  Negative for color change and rash.   Allergic/Immunologic: Negative.  Negative for environmental allergies and immunocompromised state.   Neurological: Negative.  Negative for dizziness, tremors, seizures, speech difficulty, weakness, light-headedness and headaches.   Hematological: Negative.    Psychiatric/Behavioral: Negative.  Negative for agitation, confusion, dysphoric mood and suicidal ideas. The patient is not nervous/anxious.        PMH/PSH/FH/SH/MED/ALLERGY reviewed    Objective:       Vitals:    03/15/17 1409   BP: 121/69   Pulse: 60       Physical Exam   Constitutional: He is oriented to person, place, and time. He appears well-developed and well-nourished.   HENT:   Head: Normocephalic and atraumatic.   Right Ear: External ear normal.   Left Ear: External ear normal.   Nose: Nose normal.   Mouth/Throat: Oropharynx is clear and moist. No oropharyngeal exudate.   Eyes: Conjunctivae and EOM are normal. Pupils are equal, round, and reactive to light. Right eye exhibits no discharge. Left eye exhibits no discharge. No scleral icterus.   Neck: Normal range of motion. Neck supple. No JVD present. No tracheal deviation present. No thyromegaly present.   Cardiovascular: Normal rate, regular rhythm, normal heart sounds and intact distal pulses.  Exam reveals no gallop and no friction rub.    No  murmur heard.  Pulmonary/Chest: Effort normal and breath sounds normal. No stridor. No respiratory distress. He has no wheezes. He has no rales. He exhibits no tenderness.   Abdominal: Soft. Bowel sounds are normal. He exhibits no distension and no mass. There is no tenderness. There is no rebound and no guarding. No hernia.   Musculoskeletal: Normal range of motion. He exhibits no edema or tenderness.   Lymphadenopathy:     He has no cervical adenopathy.   Neurological: He is alert and oriented to person, place, and time. He has normal reflexes. He displays normal reflexes. No cranial nerve deficit. He exhibits normal muscle tone. Coordination normal.   Skin: Skin is warm and dry. No rash noted. No erythema. No pallor.   Psychiatric: He has a normal mood and affect. His behavior is normal. Judgment and thought content normal.       Assessment:       1. Diabetes mellitus due to underlying condition with diabetic nephropathy, without long-term current use of insulin    2. Urinary frequency    3. Benign prostatic hyperplasia with lower urinary tract symptoms, unspecified morphology        Plan:       Shannan was seen today for urinary frequency.    Diagnoses and all orders for this visit:    Diabetes mellitus due to underlying condition with diabetic nephropathy, without long-term current use of insulin  -     Urinalysis; Future  -     Hemoglobin A1c; Future    Urinary frequency  -     Urinalysis; Future  -     Urine culture; Future  -     Hemoglobin A1c; Future    Benign prostatic hyperplasia with lower urinary tract symptoms, unspecified morphology  -     tamsulosin (FLOMAX) 0.4 mg Cp24; Take 1 capsule (0.4 mg total) by mouth once daily.      DM II  -controlled  -labs    Urine frequency/BPH symptoms  -UA and culture  -trial of Flomax. Side effects of medications have been discussed and patient agreed to proceed with treatment and understands the risks and benefits.    Spent adequate time in obtaining history and  explaining differentials    40 minutes spent during this visit of which greater than 50% devoted to face-face counseling and coordination of care regarding diagnosis and management plan    Return in about 4 months (around 7/15/2017).

## 2017-03-16 LAB
ESTIMATED AVG GLUCOSE: 143 MG/DL
HBA1C MFR BLD HPLC: 6.6 %

## 2017-03-27 ENCOUNTER — OFFICE VISIT (OUTPATIENT)
Dept: OPTOMETRY | Facility: CLINIC | Age: 72
End: 2017-03-27
Payer: MEDICARE

## 2017-03-27 DIAGNOSIS — H25.13 NUCLEAR SCLEROSIS, BILATERAL: ICD-10-CM

## 2017-03-27 DIAGNOSIS — E11.9 TYPE 2 DIABETES MELLITUS WITHOUT RETINOPATHY: Primary | ICD-10-CM

## 2017-03-27 PROCEDURE — 99212 OFFICE O/P EST SF 10 MIN: CPT | Mod: PBBFAC,PO | Performed by: OPTOMETRIST

## 2017-03-27 PROCEDURE — 99999 PR PBB SHADOW E&M-EST. PATIENT-LVL II: CPT | Mod: PBBFAC,,, | Performed by: OPTOMETRIST

## 2017-03-27 PROCEDURE — 92014 COMPRE OPH EXAM EST PT 1/>: CPT | Mod: S$PBB,,, | Performed by: OPTOMETRIST

## 2017-03-27 NOTE — PROGRESS NOTES
HPI     Diabetic eye exam  Wears prescription reading glasses about 9 y.o. Seem   to be working fine. Here for diabetic eye exam. Eyes feel dry using   restasis 2x day. Allergic to horses uses optic A. Blood sugar 109 this Am.   A1C 2 wks ago 6.6       Last edited by Andrés Borrero, OD on 3/27/2017 10:00 AM.     ROS     Negative for: Constitutional, Gastrointestinal, Neurological, Skin,   Genitourinary, Musculoskeletal, HENT, Endocrine, Cardiovascular, Eyes,   Respiratory, Psychiatric, Allergic/Imm, Heme/Lymph    Last edited by Andrés Borrero, OD on 3/27/2017  9:42 AM. (History)        Assessment /Plan     For exam results, see Encounter Report.    Type 2 diabetes mellitus without retinopathy    Nuclear sclerosis, bilateral      1. No diabetic retinopathy, no csme. Return in 1 year for dilated eye exam.  2. Educated pt on presence of cataracts and effects on vision. No surgery at this time. Recheck in one year.

## 2017-04-10 RX ORDER — MELOXICAM 15 MG/1
TABLET ORAL
Qty: 90 TABLET | Refills: 0 | Status: SHIPPED | OUTPATIENT
Start: 2017-04-10 | End: 2017-08-13 | Stop reason: SDUPTHER

## 2017-04-10 RX ORDER — ATORVASTATIN CALCIUM 80 MG/1
TABLET, FILM COATED ORAL
Qty: 90 TABLET | Refills: 0 | Status: SHIPPED | OUTPATIENT
Start: 2017-04-10 | End: 2017-04-12 | Stop reason: SDUPTHER

## 2017-04-10 RX ORDER — BENAZEPRIL HYDROCHLORIDE 40 MG/1
TABLET ORAL
Qty: 90 TABLET | Refills: 0 | Status: SHIPPED | OUTPATIENT
Start: 2017-04-10 | End: 2017-04-12 | Stop reason: SDUPTHER

## 2017-04-12 ENCOUNTER — TELEPHONE (OUTPATIENT)
Dept: FAMILY MEDICINE | Facility: CLINIC | Age: 72
End: 2017-04-12

## 2017-04-12 ENCOUNTER — PATIENT MESSAGE (OUTPATIENT)
Dept: FAMILY MEDICINE | Facility: CLINIC | Age: 72
End: 2017-04-12

## 2017-04-12 DIAGNOSIS — I10 ESSENTIAL HYPERTENSION: ICD-10-CM

## 2017-04-12 DIAGNOSIS — E78.5 HYPERLIPIDEMIA, UNSPECIFIED HYPERLIPIDEMIA TYPE: ICD-10-CM

## 2017-04-12 DIAGNOSIS — N40.0 BENIGN PROSTATIC HYPERPLASIA, PRESENCE OF LOWER URINARY TRACT SYMPTOMS UNSPECIFIED, UNSPECIFIED MORPHOLOGY: Primary | ICD-10-CM

## 2017-04-12 RX ORDER — CHLORTHALIDONE 25 MG/1
25 TABLET ORAL DAILY
Qty: 90 TABLET | Refills: 3 | Status: SHIPPED | OUTPATIENT
Start: 2017-04-12 | End: 2018-02-28 | Stop reason: SDUPTHER

## 2017-04-12 RX ORDER — ATORVASTATIN CALCIUM 80 MG/1
80 TABLET, FILM COATED ORAL DAILY
Qty: 90 TABLET | Refills: 3 | Status: SHIPPED | OUTPATIENT
Start: 2017-04-12 | End: 2018-03-07 | Stop reason: SDUPTHER

## 2017-04-12 RX ORDER — BENAZEPRIL HYDROCHLORIDE 40 MG/1
40 TABLET ORAL DAILY
Qty: 90 TABLET | Refills: 3 | Status: SHIPPED | OUTPATIENT
Start: 2017-04-12 | End: 2018-02-28 | Stop reason: SDUPTHER

## 2017-04-12 RX ORDER — FINASTERIDE 5 MG/1
5 TABLET, FILM COATED ORAL DAILY
Qty: 30 TABLET | Refills: 2 | Status: SHIPPED | OUTPATIENT
Start: 2017-04-12 | End: 2017-07-14 | Stop reason: SDUPTHER

## 2017-07-14 DIAGNOSIS — N40.0 BENIGN PROSTATIC HYPERPLASIA: ICD-10-CM

## 2017-07-14 RX ORDER — FINASTERIDE 5 MG/1
TABLET, FILM COATED ORAL
Qty: 30 TABLET | Refills: 0 | Status: SHIPPED | OUTPATIENT
Start: 2017-07-14 | End: 2017-08-07 | Stop reason: SDUPTHER

## 2017-08-04 ENCOUNTER — PATIENT MESSAGE (OUTPATIENT)
Dept: FAMILY MEDICINE | Facility: CLINIC | Age: 72
End: 2017-08-04

## 2017-08-04 ENCOUNTER — OFFICE VISIT (OUTPATIENT)
Dept: FAMILY MEDICINE | Facility: CLINIC | Age: 72
End: 2017-08-04
Attending: FAMILY MEDICINE
Payer: MEDICARE

## 2017-08-04 VITALS
WEIGHT: 240.75 LBS | BODY MASS INDEX: 30.9 KG/M2 | HEIGHT: 74 IN | SYSTOLIC BLOOD PRESSURE: 130 MMHG | DIASTOLIC BLOOD PRESSURE: 66 MMHG | OXYGEN SATURATION: 95 % | HEART RATE: 54 BPM

## 2017-08-04 DIAGNOSIS — E66.9 OBESITY (BMI 30.0-34.9): ICD-10-CM

## 2017-08-04 DIAGNOSIS — E08.21 DIABETES MELLITUS DUE TO UNDERLYING CONDITION WITH DIABETIC NEPHROPATHY, WITHOUT LONG-TERM CURRENT USE OF INSULIN: ICD-10-CM

## 2017-08-04 DIAGNOSIS — E11.9 TYPE 2 DIABETES MELLITUS WITHOUT RETINOPATHY: ICD-10-CM

## 2017-08-04 DIAGNOSIS — I49.5 SSS (SICK SINUS SYNDROME): ICD-10-CM

## 2017-08-04 DIAGNOSIS — I10 ESSENTIAL HYPERTENSION: ICD-10-CM

## 2017-08-04 DIAGNOSIS — Z00.00 ROUTINE GENERAL MEDICAL EXAMINATION AT HEALTH CARE FACILITY: Primary | ICD-10-CM

## 2017-08-04 DIAGNOSIS — Z12.5 ENCOUNTER FOR SCREENING FOR MALIGNANT NEOPLASM OF PROSTATE: ICD-10-CM

## 2017-08-04 DIAGNOSIS — N40.0 BENIGN PROSTATIC HYPERPLASIA, PRESENCE OF LOWER URINARY TRACT SYMPTOMS UNSPECIFIED: ICD-10-CM

## 2017-08-04 DIAGNOSIS — E78.01 HYPERLIPIDEMIA TYPE II: ICD-10-CM

## 2017-08-04 PROCEDURE — 99213 OFFICE O/P EST LOW 20 MIN: CPT | Mod: PBBFAC,PO | Performed by: FAMILY MEDICINE

## 2017-08-04 PROCEDURE — 99999 PR PBB SHADOW E&M-EST. PATIENT-LVL III: CPT | Mod: PBBFAC,,, | Performed by: FAMILY MEDICINE

## 2017-08-04 PROCEDURE — 99397 PER PM REEVAL EST PAT 65+ YR: CPT | Mod: S$PBB,,, | Performed by: FAMILY MEDICINE

## 2017-08-04 NOTE — PROGRESS NOTES
Subjective:       Patient ID: Shannan Norman is a 72 y.o. male.    Chief Complaint: Annual Exam and Diabetes    72 yr old pleasant white male with DM II controlled, HTN, HLD, obesity, presents today for his annual wellness check, lab work and follow up and c/o urine frequency and BPH symptoms.      Frequency and BPH symptoms - onset many years ago and he was maintaining with lifestyle modifications and decreasing fluid intake. He has frequency, nocturia, hesitancy, dribbling urine - no dysuria - tried flomax and proscar and nothing helps       DM II - controlled - diet control - HGBA1C                   6.6 (H)             03/15/2017                  - on ACE and ASA - UTD with eye and foot screen      HTN - controlled - oN ACE - compliant - no side effects      HLd - controlled - on statin - compliant - no side effects      History as below - reviewed and no changes    HM  -labs due  -PSA due  -adacel UTD            Diabetes   He presents for his follow-up diabetic visit. He has type 2 diabetes mellitus. His disease course has been stable. Pertinent negatives for hypoglycemia include no confusion, dizziness, headaches, hunger, mood changes, nervousness/anxiousness, seizures, sleepiness, speech difficulty, sweats or tremors. Pertinent negatives for diabetes include no chest pain, no foot ulcerations, no polydipsia, no polyuria, no visual change, no weakness and no weight loss. Symptoms are stable. Pertinent negatives for diabetic complications include no CVA, PVD or retinopathy. Risk factors for coronary artery disease include diabetes mellitus, dyslipidemia, hypertension and male sex. He is compliant with treatment all of the time. He is following a generally healthy diet. Meal planning includes avoidance of concentrated sweets. He rarely participates in exercise. An ACE inhibitor/angiotensin II receptor blocker is being taken. He does not see a podiatrist.Eye exam is current.   Hypertension   This is a chronic  problem. The current episode started more than 1 month ago. The problem has been gradually improving since onset. The problem is controlled. Pertinent negatives include no chest pain, headaches, malaise/fatigue, neck pain, palpitations, peripheral edema, PND or sweats. There are no associated agents to hypertension. Risk factors for coronary artery disease include diabetes mellitus, dyslipidemia, male gender and obesity. Past treatments include ACE inhibitors. The current treatment provides significant improvement. There are no compliance problems.  There is no history of angina, CAD/MI, CVA, left ventricular hypertrophy, PVD, renovascular disease, retinopathy or a thyroid problem. There is no history of chronic renal disease, coarctation of the aorta, hypercortisolism, hyperparathyroidism or pheochromocytoma.   Hyperlipidemia   This is a chronic problem. The current episode started more than 1 year ago. The problem is controlled. Recent lipid tests were reviewed and are normal. Exacerbating diseases include diabetes and obesity. He has no history of chronic renal disease. Pertinent negatives include no chest pain, leg pain or myalgias. The current treatment provides significant improvement of lipids. There are no compliance problems.  Risk factors for coronary artery disease include diabetes mellitus, dyslipidemia, hypertension, male sex and obesity.   Benign Prostatic Hypertrophy   This is a chronic problem. The current episode started more than 1 year ago. The problem has been gradually worsening since onset. Irritative symptoms include frequency, nocturia and urgency. Obstructive symptoms include dribbling, incomplete emptying, an intermittent stream, a slower stream and straining. Pertinent negatives include no vomiting. AUA score is 8-19. He is sexually active. Nothing aggravates the symptoms. Past treatments include nothing. The treatment provided no relief.     Review of Systems   Constitutional: Negative.   Negative for activity change, diaphoresis, malaise/fatigue, unexpected weight change and weight loss.   HENT: Negative.  Negative for congestion, ear pain, mouth sores, rhinorrhea and voice change.    Eyes: Negative.  Negative for pain, discharge and visual disturbance.   Respiratory: Negative.  Negative for apnea, cough and wheezing.    Cardiovascular: Negative.  Negative for chest pain, palpitations and PND.   Gastrointestinal: Negative.  Negative for abdominal distention, anal bleeding, diarrhea and vomiting.   Endocrine: Negative.  Negative for cold intolerance, polydipsia and polyuria.   Genitourinary: Positive for frequency, incomplete emptying, nocturia and urgency. Negative for decreased urine volume, difficulty urinating, discharge and scrotal swelling.   Musculoskeletal: Negative.  Negative for back pain, myalgias, neck pain and neck stiffness.   Skin: Negative.  Negative for color change and rash.   Allergic/Immunologic: Negative.  Negative for environmental allergies and immunocompromised state.   Neurological: Negative.  Negative for dizziness, tremors, seizures, speech difficulty, weakness, light-headedness and headaches.   Hematological: Negative.    Psychiatric/Behavioral: Negative.  Negative for agitation, confusion, dysphoric mood and suicidal ideas. The patient is not nervous/anxious.        PMH/PSH/FH/SH/MED/ALLERGY reviewed    Objective:       Vitals:    08/04/17 0839   BP: 130/66   Pulse: (!) 54       Physical Exam   Constitutional: He is oriented to person, place, and time. He appears well-developed and well-nourished.   HENT:   Head: Normocephalic and atraumatic.   Right Ear: External ear normal.   Left Ear: External ear normal.   Nose: Nose normal.   Mouth/Throat: Oropharynx is clear and moist. No oropharyngeal exudate.   Eyes: Conjunctivae and EOM are normal. Pupils are equal, round, and reactive to light. Right eye exhibits no discharge. Left eye exhibits no discharge. No scleral icterus.    Neck: Normal range of motion. Neck supple. No JVD present. No tracheal deviation present. No thyromegaly present.   Cardiovascular: Normal rate, regular rhythm, normal heart sounds and intact distal pulses.  Exam reveals no gallop and no friction rub.    No murmur heard.  Pulmonary/Chest: Effort normal and breath sounds normal. No stridor. No respiratory distress. He has no wheezes. He has no rales. He exhibits no tenderness.   Abdominal: Soft. Bowel sounds are normal. He exhibits no distension and no mass. There is no tenderness. There is no rebound and no guarding. No hernia.   Musculoskeletal: Normal range of motion. He exhibits no edema or tenderness.   Lymphadenopathy:     He has no cervical adenopathy.   Neurological: He is alert and oriented to person, place, and time. He has normal reflexes. He displays normal reflexes. No cranial nerve deficit. He exhibits normal muscle tone. Coordination normal.   Skin: Skin is warm and dry. No rash noted. No erythema. No pallor.   Psychiatric: He has a normal mood and affect. His behavior is normal. Judgment and thought content normal.       Assessment:       1. Routine general medical examination at health care facility    2. Hyperlipidemia type II    3. Diabetes mellitus due to underlying condition with diabetic nephropathy, without long-term current use of insulin    4. Type 2 diabetes mellitus without retinopathy    5. Obesity (BMI 30.0-34.9)    6. Essential hypertension    7. SSS (sick sinus syndrome)    8. Encounter for screening for malignant neoplasm of prostate        Plan:       Shannan was seen today for annual exam and diabetes.    Diagnoses and all orders for this visit:    Routine general medical examination at [facility] care facility  -     CBC auto differential; Future  -     Comprehensive metabolic panel; Future  -     Lipid panel; Future  -     PSA, Screening; Future    Hyperlipidemia type II  -     CBC auto differential; Future  -     Comprehensive metabolic  panel; Future  -     Lipid panel; Future    Diabetes mellitus due to underlying condition with diabetic nephropathy, without long-term current use of insulin  -     CBC auto differential; Future  -     Comprehensive metabolic panel; Future  -     Lipid panel; Future  -     Hemoglobin A1c; Future  -     Microalbumin/creatinine urine ratio; Future    Type 2 diabetes mellitus without retinopathy  -     CBC auto differential; Future  -     Comprehensive metabolic panel; Future  -     Lipid panel; Future  -     Hemoglobin A1c; Future  -     Microalbumin/creatinine urine ratio; Future    Obesity (BMI 30.0-34.9)    Essential hypertension  -     CBC auto differential; Future  -     Comprehensive metabolic panel; Future  -     Lipid panel; Future    SSS (sick sinus syndrome)    Encounter for screening for malignant neoplasm of prostate  -     PSA, Screening; Future    Benign prostatic hyperplasia, presence of lower urinary tract symptoms unspecified  -     Ambulatory referral to Urology      wellness check  -normal exam  -labs    DM II  -controlled  -labs    HTN  -controlled    HLd  -stable    BPH  -uncontrolled  -refer urology    Spent adequate time in obtaining history and explaining differentials    40 minutes spent during this visit of which greater than 50% devoted to face-face counseling and coordination of care regarding diagnosis and management plan    Return in about 6 months (around 2/4/2018), or if symptoms worsen or fail to improve.

## 2017-08-05 ENCOUNTER — LAB VISIT (OUTPATIENT)
Dept: LAB | Facility: HOSPITAL | Age: 72
End: 2017-08-05
Attending: FAMILY MEDICINE
Payer: MEDICARE

## 2017-08-05 DIAGNOSIS — E78.01 HYPERLIPIDEMIA TYPE II: ICD-10-CM

## 2017-08-05 DIAGNOSIS — I10 ESSENTIAL HYPERTENSION: ICD-10-CM

## 2017-08-05 DIAGNOSIS — E08.21 DIABETES MELLITUS DUE TO UNDERLYING CONDITION WITH DIABETIC NEPHROPATHY, WITHOUT LONG-TERM CURRENT USE OF INSULIN: ICD-10-CM

## 2017-08-05 DIAGNOSIS — Z00.00 ROUTINE GENERAL MEDICAL EXAMINATION AT HEALTH CARE FACILITY: ICD-10-CM

## 2017-08-05 DIAGNOSIS — E11.9 TYPE 2 DIABETES MELLITUS WITHOUT RETINOPATHY: ICD-10-CM

## 2017-08-05 DIAGNOSIS — Z12.5 ENCOUNTER FOR SCREENING FOR MALIGNANT NEOPLASM OF PROSTATE: ICD-10-CM

## 2017-08-05 LAB
ALBUMIN SERPL BCP-MCNC: 3.9 G/DL
ALP SERPL-CCNC: 78 U/L
ALT SERPL W/O P-5'-P-CCNC: 21 U/L
ANION GAP SERPL CALC-SCNC: 9 MMOL/L
AST SERPL-CCNC: 18 U/L
BASOPHILS # BLD AUTO: 0.04 K/UL
BASOPHILS NFR BLD: 0.7 %
BILIRUB SERPL-MCNC: 0.9 MG/DL
BUN SERPL-MCNC: 21 MG/DL
CALCIUM SERPL-MCNC: 9.6 MG/DL
CHLORIDE SERPL-SCNC: 103 MMOL/L
CHOLEST/HDLC SERPL: 2.7 {RATIO}
CO2 SERPL-SCNC: 26 MMOL/L
COMPLEXED PSA SERPL-MCNC: 0.43 NG/ML
CREAT SERPL-MCNC: 1.3 MG/DL
DIFFERENTIAL METHOD: ABNORMAL
EOSINOPHIL # BLD AUTO: 0.2 K/UL
EOSINOPHIL NFR BLD: 3.6 %
ERYTHROCYTE [DISTWIDTH] IN BLOOD BY AUTOMATED COUNT: 13.5 %
EST. GFR  (AFRICAN AMERICAN): >60 ML/MIN/1.73 M^2
EST. GFR  (NON AFRICAN AMERICAN): 54.1 ML/MIN/1.73 M^2
ESTIMATED AVG GLUCOSE: 128 MG/DL
GLUCOSE SERPL-MCNC: 103 MG/DL
HBA1C MFR BLD HPLC: 6.1 %
HCT VFR BLD AUTO: 49.2 %
HDL/CHOLESTEROL RATIO: 36.7 %
HDLC SERPL-MCNC: 139 MG/DL
HDLC SERPL-MCNC: 51 MG/DL
HGB BLD-MCNC: 16.3 G/DL
LDLC SERPL CALC-MCNC: 66 MG/DL
LYMPHOCYTES # BLD AUTO: 1.8 K/UL
LYMPHOCYTES NFR BLD: 30.9 %
MCH RBC QN AUTO: 31.5 PG
MCHC RBC AUTO-ENTMCNC: 33.1 G/DL
MCV RBC AUTO: 95 FL
MONOCYTES # BLD AUTO: 0.7 K/UL
MONOCYTES NFR BLD: 11.2 %
NEUTROPHILS # BLD AUTO: 3.1 K/UL
NEUTROPHILS NFR BLD: 52.9 %
NONHDLC SERPL-MCNC: 88 MG/DL
PLATELET # BLD AUTO: 159 K/UL
PMV BLD AUTO: 12.4 FL
POTASSIUM SERPL-SCNC: 4.6 MMOL/L
PROT SERPL-MCNC: 6.7 G/DL
RBC # BLD AUTO: 5.18 M/UL
SODIUM SERPL-SCNC: 138 MMOL/L
TRIGL SERPL-MCNC: 110 MG/DL
WBC # BLD AUTO: 5.79 K/UL

## 2017-08-05 PROCEDURE — 36415 COLL VENOUS BLD VENIPUNCTURE: CPT | Mod: PO

## 2017-08-05 PROCEDURE — 84153 ASSAY OF PSA TOTAL: CPT

## 2017-08-05 PROCEDURE — 85025 COMPLETE CBC W/AUTO DIFF WBC: CPT

## 2017-08-05 PROCEDURE — 80053 COMPREHEN METABOLIC PANEL: CPT

## 2017-08-05 PROCEDURE — 83036 HEMOGLOBIN GLYCOSYLATED A1C: CPT

## 2017-08-05 PROCEDURE — 80061 LIPID PANEL: CPT

## 2017-08-07 DIAGNOSIS — I10 ESSENTIAL HYPERTENSION: ICD-10-CM

## 2017-08-07 DIAGNOSIS — N40.0 BENIGN PROSTATIC HYPERPLASIA: ICD-10-CM

## 2017-08-07 RX ORDER — CHLORTHALIDONE 25 MG/1
25 TABLET ORAL DAILY
Qty: 90 TABLET | Refills: 3 | Status: CANCELLED | OUTPATIENT
Start: 2017-08-07 | End: 2018-08-07

## 2017-08-08 ENCOUNTER — TELEPHONE (OUTPATIENT)
Dept: INTERNAL MEDICINE | Facility: CLINIC | Age: 72
End: 2017-08-08

## 2017-08-08 DIAGNOSIS — N40.0 BENIGN PROSTATIC HYPERPLASIA: ICD-10-CM

## 2017-08-08 RX ORDER — FINASTERIDE 5 MG/1
TABLET, FILM COATED ORAL
Qty: 30 TABLET | Refills: 0 | Status: SHIPPED | OUTPATIENT
Start: 2017-08-08 | End: 2017-08-08 | Stop reason: SDUPTHER

## 2017-08-08 RX ORDER — FINASTERIDE 5 MG/1
TABLET, FILM COATED ORAL
Qty: 90 TABLET | Refills: 0 | Status: SHIPPED | OUTPATIENT
Start: 2017-08-08 | End: 2017-11-14 | Stop reason: SDUPTHER

## 2017-08-08 NOTE — TELEPHONE ENCOUNTER
----- Message from Candi Medina sent at 8/8/2017  9:38 AM CDT -----  Contact: hillary  X_  1st Request  _  2nd Request  _  3rd Request    Who:Hillary Lake's     Why: Hillary Lake's states she would like a call back with refill authorization on the patients RX meloxicam (MOBIC) 15 MG tablet.... Please contact to further discuss and advise     What Number to Call Back: [phone]    When to Expect a call back: (Before the end of the day)   -- if call after 3:00 call back will be tomorrow.

## 2017-08-11 ENCOUNTER — OFFICE VISIT (OUTPATIENT)
Dept: UROLOGY | Facility: CLINIC | Age: 72
End: 2017-08-11
Attending: FAMILY MEDICINE
Payer: MEDICARE

## 2017-08-11 VITALS
HEIGHT: 74 IN | DIASTOLIC BLOOD PRESSURE: 64 MMHG | SYSTOLIC BLOOD PRESSURE: 118 MMHG | WEIGHT: 240 LBS | BODY MASS INDEX: 30.8 KG/M2 | HEART RATE: 51 BPM

## 2017-08-11 DIAGNOSIS — R39.15 URGENCY OF URINATION: ICD-10-CM

## 2017-08-11 DIAGNOSIS — N40.1 BPH NOS W UR OBS/LUTS: Primary | ICD-10-CM

## 2017-08-11 DIAGNOSIS — N32.81 OAB (OVERACTIVE BLADDER): ICD-10-CM

## 2017-08-11 LAB — POC RESIDUAL URINE VOLUME: 70 ML (ref 0–100)

## 2017-08-11 PROCEDURE — 51798 US URINE CAPACITY MEASURE: CPT | Mod: PBBFAC,PO | Performed by: UROLOGY

## 2017-08-11 PROCEDURE — 99204 OFFICE O/P NEW MOD 45 MIN: CPT | Mod: S$PBB,,, | Performed by: UROLOGY

## 2017-08-11 PROCEDURE — 3074F SYST BP LT 130 MM HG: CPT | Mod: ,,, | Performed by: UROLOGY

## 2017-08-11 PROCEDURE — 3078F DIAST BP <80 MM HG: CPT | Mod: ,,, | Performed by: UROLOGY

## 2017-08-11 PROCEDURE — 99999 PR PBB SHADOW E&M-EST. PATIENT-LVL III: CPT | Mod: PBBFAC,,, | Performed by: UROLOGY

## 2017-08-11 PROCEDURE — 1126F AMNT PAIN NOTED NONE PRSNT: CPT | Mod: ,,, | Performed by: UROLOGY

## 2017-08-11 PROCEDURE — 99213 OFFICE O/P EST LOW 20 MIN: CPT | Mod: PBBFAC,PO | Performed by: UROLOGY

## 2017-08-11 PROCEDURE — 1159F MED LIST DOCD IN RCRD: CPT | Mod: ,,, | Performed by: UROLOGY

## 2017-08-11 PROCEDURE — 87086 URINE CULTURE/COLONY COUNT: CPT

## 2017-08-11 NOTE — PROGRESS NOTES
HPI:  Shannan Norman is a 72 y.o. year old male that  presents with No chief complaint on file.  .  This patient referred by his PCP for BPH.  Patient has been on finasteride and tamsulosin for approximately 4 months.  His chief complaint is urinary urgency with nocturia ×3-4.  He has a strong urinary stream with no dysuria gross hematuria or straining to void.  He just voided prior to coming to the office and bladder scan postvoid residual in in the office is okay at 70 cc.    There is no family history of prostate cancer and the patient has never had urologic surgery other than a vasectomy.  He gives no history kidney stones and there is no family history of urological cancer    Chart review shows no from his PCP from this month being a well male exam with high blood pressure and diabetes.  PSA this month was 0.43 with GFR 54.  In March of this year urine culture was negative and there are no urological related x-rays      Past Medical History:   Diagnosis Date    Atrial flutter 2011    ablation    Cataract     Diabetes mellitus     Dry eye syndrome     Gout, unspecified     High cholesterol     History of shingles     Hypertension      Social History     Social History    Marital status:      Spouse name: N/A    Number of children: N/A    Years of education: N/A     Occupational History     [employer]     Social History Main Topics    Smoking status: Never Smoker    Smokeless tobacco: Never Used    Alcohol use 4.2 oz/week     7 Glasses of wine per week    Drug use: No    Sexual activity: Yes     Partners: Female     Other Topics Concern    Not on file     Social History Narrative    No narrative on file     Past Surgical History:   Procedure Laterality Date    ABLATION OF DYSRHYTHMIC FOCUS      GANGLION CYST EXCISION      left neck    HERNIA REPAIR  2013    umbilical    TONSILLECTOMY      varicose veins      several sx  bilateral    VASECTOMY       Family History   Problem  "Relation Age of Onset    Diabetes Mother     COPD Father     No Known Problems Sister     Heart attack Brother     Heart disease Brother     No Known Problems Maternal Aunt     No Known Problems Maternal Uncle     No Known Problems Paternal Aunt     No Known Problems Paternal Uncle     No Known Problems Maternal Grandmother     No Known Problems Maternal Grandfather     No Known Problems Paternal Grandmother     No Known Problems Paternal Grandfather     Amblyopia Neg Hx     Blindness Neg Hx     Cancer Neg Hx     Cataracts Neg Hx     Glaucoma Neg Hx     Hypertension Neg Hx     Macular degeneration Neg Hx     Retinal detachment Neg Hx     Strabismus Neg Hx     Stroke Neg Hx     Thyroid disease Neg Hx     Prostate cancer Neg Hx     Kidney disease Neg Hx            Review of Systems  The patient has no chest pains.  The patient has no shortness of breath  Patient wears        glasses.  All other review of systems are negative.      Physical Exam:  /64   Pulse (!) 51   Ht 6' 2" (1.88 m)   Wt 108.9 kg (240 lb)   BMI 30.81 kg/m²   General appearance: alert, cooperative, no distress  Constitutional:Oriented to person, place, and time.appears well-developed and well-nourished.   HEENT: Normocephalic, atraumatic, neck symmetrical, no nasal discharge   Eyes: conjunctivae/corneas clear, PERRL, EOM's intact  Lungs: clear to auscultation bilaterally, no dullness to percussion bilaterally  Heart: regular rate and rhythm without rub; no displacement of the PMI   Abdomen: soft, non-tender; bowel sounds normoactive; no organomegaly  :Penis/perineum without lesions, scrotum without rash/cysts, epididymis nontender bilaterally, urethral meatus in normal location normal size, no penile plaques palpated, prostate:     Smooth enlarged and benign feeling                  seminal vesicles not palpated.  No rectal masses, sphincter tone normal.  Testes equal in size without masses  Extremities: " extremities symmetric; no clubbing, cyanosis, or edema  Integument: Skin color, texture, turgor normal; no rashes; hair distrubution normal  Neurologic: Alert and oriented X 3, normal strength, normal coordination and gait  Psychiatric: no pressured speech; normal affect; no evidence of impaired cognition     LABS:    Complete Blood Count  Lab Results   Component Value Date    RBC 5.18 08/05/2017    HGB 16.3 08/05/2017    HCT 49.2 08/05/2017    MCV 95 08/05/2017    MCH 31.5 (H) 08/05/2017    MCHC 33.1 08/05/2017    RDW 13.5 08/05/2017     08/05/2017    MPV 12.4 08/05/2017    GRAN 3.1 08/05/2017    GRAN 52.9 08/05/2017    LYMPH 1.8 08/05/2017    LYMPH 30.9 08/05/2017    MONO 0.7 08/05/2017    MONO 11.2 08/05/2017    EOS 0.2 08/05/2017    BASO 0.04 08/05/2017    EOSINOPHIL 3.6 08/05/2017    BASOPHIL 0.7 08/05/2017    DIFFMETHOD Automated 08/05/2017       Comprehensive Metabolic Panel  Lab Results   Component Value Date     08/05/2017    BUN 21 08/05/2017    CREATININE 1.3 08/05/2017     08/05/2017    K 4.6 08/05/2017     08/05/2017    PROT 6.7 08/05/2017    ALBUMIN 3.9 08/05/2017    BILITOT 0.9 08/05/2017    AST 18 08/05/2017    ALKPHOS 78 08/05/2017    CO2 26 08/05/2017    ALT 21 08/05/2017    ANIONGAP 9 08/05/2017    EGFRNONAA 54.1 (A) 08/05/2017    ESTGFRAFRICA >60.0 08/05/2017       PSA  Lab Results   Component Value Date    PSA 0.43 08/05/2017         Assessment:    ICD-10-CM ICD-9-CM    1. BPH NOS w ur obs/LUTS N40.1 600.91    2. OAB (overactive bladder) N32.81 596.51    3. Urgency of urination R39.15 788.63 Urine culture     The primary encounter diagnosis was BPH NOS w ur obs/LUTS. Diagnoses of OAB (overactive bladder) and Urgency of urination were also pertinent to this visit.      Plan: 1 and 2 and 3.  BPH and overactive bladder and urinary urgency.  Plan.  I discussed with the patient that it takes a full 6 months to get maximum shrinkage from the finasteride.  I recommend that he  continue both the tamsulosin and finasteride for that time and reevaluate whether or not the Flomax can be stopped.  I discussed risks and benefits of adding an anticholinergic to help with his urinary urgency and nocturia.  Since he has a strong stream, I think this would be reasonable.  Patient states that he wants to avoid taking more medications and would rather wait a full 6 months to let the finasteride work at that time assess whether not to add another medication.  We will therefore see him back in 2 months for reevaluation  Orders Placed This Encounter   Procedures    Urine culture           Berny Treadwell MD    PLEASE NOTE:  Please be advised that portions of this note were dictated using voice recognition software and may contain dictation related errors in spelling/grammar/appropriate pronouns/syntax or other errors that might have not been found and or corrected on text review.

## 2017-08-11 NOTE — LETTER
August 11, 2017      Onofre Paulson MD  [address]  [address]  [facility] [address]           [place] [facility]  [address] [facility] [address]  Phone: [phone]          Patient: Shannan Norman   MR Number: [id number]   YOB: 1945   Date of Visit: 8/11/2017       Dear Dr. Onofre Paulson:    Thank you for referring Shannan Norman to me for evaluation. Attached you will find relevant portions of my assessment and plan of care.    If you have questions, please do not hesitate to call me. I look forward to following Shannan Norman along with you.    Sincerely,    Berny Treadwell MD    Enclosure  CC:  No Recipients    If you would like to receive this communication electronically, please contact [email or web address] or [phone] to request more information on [employer] Link access.    For providers and/or their staff who would like to refer a patient to [facility], please contact us through our one-stop-shop provider referral line, [facility], at [phone].    If you feel you have received this communication in error or would no longer like to receive these types of communications, please e-mail [email or web address]

## 2017-08-12 LAB — BACTERIA UR CULT: NO GROWTH

## 2017-08-13 ENCOUNTER — PATIENT MESSAGE (OUTPATIENT)
Dept: FAMILY MEDICINE | Facility: CLINIC | Age: 72
End: 2017-08-13

## 2017-08-13 RX ORDER — MELOXICAM 15 MG/1
15 TABLET ORAL DAILY
Qty: 90 TABLET | Refills: 0 | Status: SHIPPED | OUTPATIENT
Start: 2017-08-13 | End: 2017-11-14 | Stop reason: SDUPTHER

## 2017-08-25 ENCOUNTER — PATIENT MESSAGE (OUTPATIENT)
Dept: FAMILY MEDICINE | Facility: CLINIC | Age: 72
End: 2017-08-25

## 2017-08-27 ENCOUNTER — PATIENT MESSAGE (OUTPATIENT)
Dept: FAMILY MEDICINE | Facility: CLINIC | Age: 72
End: 2017-08-27

## 2017-08-27 DIAGNOSIS — M25.562 CHRONIC PAIN OF LEFT KNEE: Primary | ICD-10-CM

## 2017-08-27 DIAGNOSIS — M17.12 PRIMARY OSTEOARTHRITIS OF LEFT KNEE: ICD-10-CM

## 2017-08-27 DIAGNOSIS — G89.29 CHRONIC PAIN OF LEFT KNEE: Primary | ICD-10-CM

## 2017-08-29 ENCOUNTER — PATIENT MESSAGE (OUTPATIENT)
Dept: ADMINISTRATIVE | Facility: OTHER | Age: 72
End: 2017-08-29

## 2017-10-10 ENCOUNTER — OFFICE VISIT (OUTPATIENT)
Dept: UROLOGY | Facility: CLINIC | Age: 72
End: 2017-10-10
Payer: MEDICARE

## 2017-10-10 VITALS
BODY MASS INDEX: 30.8 KG/M2 | SYSTOLIC BLOOD PRESSURE: 114 MMHG | WEIGHT: 240 LBS | HEIGHT: 74 IN | HEART RATE: 51 BPM | DIASTOLIC BLOOD PRESSURE: 66 MMHG

## 2017-10-10 DIAGNOSIS — N40.1 BPH WITH OBSTRUCTION/LOWER URINARY TRACT SYMPTOMS: Primary | ICD-10-CM

## 2017-10-10 DIAGNOSIS — N13.8 BPH WITH OBSTRUCTION/LOWER URINARY TRACT SYMPTOMS: Primary | ICD-10-CM

## 2017-10-10 DIAGNOSIS — N32.81 OAB (OVERACTIVE BLADDER): ICD-10-CM

## 2017-10-10 LAB
BILIRUB SERPL-MCNC: ABNORMAL MG/DL
BLOOD URINE, POC: ABNORMAL
COLOR, POC UA: YELLOW
GLUCOSE UR QL STRIP: ABNORMAL
KETONES UR QL STRIP: ABNORMAL
LEUKOCYTE ESTERASE URINE, POC: ABNORMAL
NITRITE, POC UA: ABNORMAL
PH, POC UA: 7
PROTEIN, POC: ABNORMAL
SPECIFIC GRAVITY, POC UA: 1
UROBILINOGEN, POC UA: ABNORMAL

## 2017-10-10 PROCEDURE — 99999 PR PBB SHADOW E&M-EST. PATIENT-LVL III: CPT | Mod: PBBFAC,,, | Performed by: UROLOGY

## 2017-10-10 PROCEDURE — 99214 OFFICE O/P EST MOD 30 MIN: CPT | Mod: S$PBB,,, | Performed by: UROLOGY

## 2017-10-10 PROCEDURE — 99213 OFFICE O/P EST LOW 20 MIN: CPT | Mod: PBBFAC,PO | Performed by: UROLOGY

## 2017-10-10 PROCEDURE — 81001 URINALYSIS AUTO W/SCOPE: CPT | Mod: PBBFAC,PO | Performed by: UROLOGY

## 2017-10-10 RX ORDER — OXYBUTYNIN CHLORIDE 5 MG/1
5 TABLET ORAL 3 TIMES DAILY
Qty: 270 TABLET | Refills: 3 | Status: SHIPPED | OUTPATIENT
Start: 2017-10-10 | End: 2019-01-02

## 2017-10-10 NOTE — PROGRESS NOTES
"This patient was last seen by me August 11, 2017 for evaluation of BPH at which time he was continued on his tamsulosin and also the finasteride which had recently been started by his PCP    Now comes in follow-up and has noticed no real effect with the addition of the finasteride.      Bothers him the most in terms of his voiding pattern is occasional urgency with urge incontinence of a few drops of urine.  The rates his urinary stream is strong.  He has nocturia ×3 with no straining to void and he has no dysuria    Physical exam reveals reveals a well-developed well-nourished patient  in no acute distress.  Patient is alert and oriented ×3 with normal mood and affect.  Respiratory effort is normal and there is no peripheral edema.  Skin is normal to inspection and palpation.Penis/perineum without lesions, scrotum without rash/cysts, epididymis nontender bilaterally, urethral meatus in normal location normal size, no penile plaques palpated, prostate:       Smooth enlarged and benign feeling                seminal vesicles not palpated.  No rectal masses, sphincter tone normal.  Testes equal in size without masses    /66   Pulse (!) 51   Ht 6' 2" (1.88 m)   Wt 108.9 kg (240 lb)   BMI 30.81 kg/m²   Review of Systems  General ROS: negative for chills, fever or weight loss  Respiratory ROS: no cough, shortness of breath, or wheezing  Cardiovascular ROS: no chest pain or dyspnea on exertion  Musculoskeletal ROS: negative for gait disturbance or muscular weakness    Family History   Problem Relation Age of Onset    Diabetes Mother     COPD Father     No Known Problems Sister     Heart attack Brother     Heart disease Brother     No Known Problems Maternal Aunt     No Known Problems Maternal Uncle     No Known Problems Paternal Aunt     No Known Problems Paternal Uncle     No Known Problems Maternal Grandmother     No Known Problems Maternal Grandfather     No Known Problems Paternal Grandmother  "    No Known Problems Paternal Grandfather     Amblyopia Neg Hx     Blindness Neg Hx     Cancer Neg Hx     Cataracts Neg Hx     Glaucoma Neg Hx     Hypertension Neg Hx     Macular degeneration Neg Hx     Retinal detachment Neg Hx     Strabismus Neg Hx     Stroke Neg Hx     Thyroid disease Neg Hx     Prostate cancer Neg Hx     Kidney disease Neg Hx      Past Medical History:   Diagnosis Date    Atrial flutter 2011    ablation    Cataract     Diabetes mellitus     Dry eye syndrome     Gout, unspecified     High cholesterol     History of shingles     Hypertension      Family History   Problem Relation Age of Onset    Diabetes Mother     COPD Father     No Known Problems Sister     Heart attack Brother     Heart disease Brother     No Known Problems Maternal Aunt     No Known Problems Maternal Uncle     No Known Problems Paternal Aunt     No Known Problems Paternal Uncle     No Known Problems Maternal Grandmother     No Known Problems Maternal Grandfather     No Known Problems Paternal Grandmother     No Known Problems Paternal Grandfather     Amblyopia Neg Hx     Blindness Neg Hx     Cancer Neg Hx     Cataracts Neg Hx     Glaucoma Neg Hx     Hypertension Neg Hx     Macular degeneration Neg Hx     Retinal detachment Neg Hx     Strabismus Neg Hx     Stroke Neg Hx     Thyroid disease Neg Hx     Prostate cancer Neg Hx     Kidney disease Neg Hx      Social History   Substance Use Topics    Smoking status: Never Smoker    Smokeless tobacco: Never Used    Alcohol use 4.2 oz/week     7 Glasses of wine per week       Impression:      ICD-10-CM ICD-9-CM    1. BPH with obstruction/lower urinary tract symptoms N40.1 600.01 POCT urinalysis, dipstick or tablet reag    N13.8 599.69    2. OAB (overactive bladder) N32.81 596.51        Plan:    #1 and #2.  EPH overactive bladder.  Recommend that he continue the tamsulosin and also the finasteride.  I discussed significant take  finasteride up to 6 months to full shrinkage of prostate.  I again discussed at length in detail risks and benefits of addition of an anticholinergic to help with his urgency.  Patient voices understanding and wants to give it a try.Prescription for oxybutynin 5 mg is given to the patient.  Possible side effects including dry mouth, constipation, and difficulty voiding were discussed.  Patient will titrate to up to a maximum 5 mg 3 times a day and adjust downward as needed according to side effects.  Patient voices understanding area follow-up 6 months or sooner if needed    PLEASE NOTE:  Please be advised that portions of this note were dictated using voice recognition software and may contain dictation related errors in spelling/grammar/appropriate pronouns/syntax or other errors that might have not been found and or corrected on text review.

## 2017-10-16 ENCOUNTER — PATIENT MESSAGE (OUTPATIENT)
Dept: FAMILY MEDICINE | Facility: CLINIC | Age: 72
End: 2017-10-16

## 2017-11-08 ENCOUNTER — TELEPHONE (OUTPATIENT)
Dept: FAMILY MEDICINE | Facility: CLINIC | Age: 72
End: 2017-11-08

## 2017-11-08 ENCOUNTER — CLINICAL SUPPORT (OUTPATIENT)
Dept: FAMILY MEDICINE | Facility: CLINIC | Age: 72
End: 2017-11-08
Payer: MEDICARE

## 2017-11-08 DIAGNOSIS — M25.561 CHRONIC PAIN OF BOTH KNEES: ICD-10-CM

## 2017-11-08 DIAGNOSIS — M25.562 CHRONIC PAIN OF BOTH KNEES: ICD-10-CM

## 2017-11-08 DIAGNOSIS — G89.29 CHRONIC PAIN OF BOTH KNEES: ICD-10-CM

## 2017-11-08 DIAGNOSIS — Z23 IMMUNIZATION DUE: Primary | ICD-10-CM

## 2017-11-08 PROCEDURE — G0008 ADMIN INFLUENZA VIRUS VAC: HCPCS | Mod: PBBFAC,PO

## 2017-11-14 DIAGNOSIS — N40.0 BENIGN PROSTATIC HYPERPLASIA: ICD-10-CM

## 2017-11-14 RX ORDER — MELOXICAM 15 MG/1
TABLET ORAL
Qty: 90 TABLET | Refills: 0 | Status: SHIPPED | OUTPATIENT
Start: 2017-11-14 | End: 2018-02-02 | Stop reason: SDUPTHER

## 2017-11-14 RX ORDER — FINASTERIDE 5 MG/1
TABLET, FILM COATED ORAL
Qty: 90 TABLET | Refills: 0 | Status: SHIPPED | OUTPATIENT
Start: 2017-11-14 | End: 2018-01-15 | Stop reason: SDUPTHER

## 2017-12-08 DIAGNOSIS — M25.561 ACUTE PAIN OF BOTH KNEES: Primary | ICD-10-CM

## 2017-12-08 DIAGNOSIS — M25.562 ACUTE PAIN OF BOTH KNEES: Primary | ICD-10-CM

## 2017-12-11 ENCOUNTER — HOSPITAL ENCOUNTER (OUTPATIENT)
Dept: RADIOLOGY | Facility: HOSPITAL | Age: 72
Discharge: HOME | End: 2017-12-11
Attending: PHYSICIAN ASSISTANT
Payer: MEDICARE

## 2017-12-11 ENCOUNTER — OFFICE VISIT (OUTPATIENT)
Dept: DERMATOLOGY | Facility: CLINIC | Age: 72
End: 2017-12-11
Payer: MEDICARE

## 2017-12-11 ENCOUNTER — OFFICE VISIT (OUTPATIENT)
Dept: ORTHOPEDICS | Facility: CLINIC | Age: 72
End: 2017-12-11
Payer: MEDICARE

## 2017-12-11 DIAGNOSIS — L82.1 SEBORRHEIC KERATOSES: ICD-10-CM

## 2017-12-11 DIAGNOSIS — M25.561 ACUTE PAIN OF BOTH KNEES: ICD-10-CM

## 2017-12-11 DIAGNOSIS — D18.00 ANGIOMA: ICD-10-CM

## 2017-12-11 DIAGNOSIS — M25.562 ACUTE PAIN OF BOTH KNEES: ICD-10-CM

## 2017-12-11 DIAGNOSIS — L57.0 ACTINIC KERATOSIS: ICD-10-CM

## 2017-12-11 DIAGNOSIS — M17.11 OSTEOARTHRITIS OF RIGHT KNEE, UNSPECIFIED OSTEOARTHRITIS TYPE: Primary | ICD-10-CM

## 2017-12-11 DIAGNOSIS — D22.9 MULTIPLE BENIGN NEVI: Primary | ICD-10-CM

## 2017-12-11 DIAGNOSIS — S83.241A TEAR OF MEDIAL MENISCUS OF RIGHT KNEE, UNSPECIFIED TEAR TYPE, UNSPECIFIED WHETHER OLD OR CURRENT TEAR, INITIAL ENCOUNTER: ICD-10-CM

## 2017-12-11 PROCEDURE — 73562 X-RAY EXAM OF KNEE 3: CPT | Mod: TC,50

## 2017-12-11 PROCEDURE — 73562 X-RAY EXAM OF KNEE 3: CPT | Mod: 26,50,, | Performed by: RADIOLOGY

## 2017-12-11 PROCEDURE — 99212 OFFICE O/P EST SF 10 MIN: CPT | Mod: PBBFAC,25 | Performed by: DERMATOLOGY

## 2017-12-11 PROCEDURE — 99213 OFFICE O/P EST LOW 20 MIN: CPT | Mod: PBBFAC,25,27 | Performed by: PHYSICIAN ASSISTANT

## 2017-12-11 PROCEDURE — 99202 OFFICE O/P NEW SF 15 MIN: CPT | Mod: 25,S$PBB,, | Performed by: DERMATOLOGY

## 2017-12-11 PROCEDURE — 99999 PR PBB SHADOW E&M-EST. PATIENT-LVL II: CPT | Mod: PBBFAC,,, | Performed by: DERMATOLOGY

## 2017-12-11 PROCEDURE — 99203 OFFICE O/P NEW LOW 30 MIN: CPT | Mod: S$PBB,,, | Performed by: PHYSICIAN ASSISTANT

## 2017-12-11 PROCEDURE — 99999 PR PBB SHADOW E&M-EST. PATIENT-LVL III: CPT | Mod: PBBFAC,,, | Performed by: PHYSICIAN ASSISTANT

## 2017-12-11 PROCEDURE — 17000 DESTRUCT PREMALG LESION: CPT | Mod: PBBFAC | Performed by: DERMATOLOGY

## 2017-12-11 PROCEDURE — 17000 DESTRUCT PREMALG LESION: CPT | Mod: S$PBB,,, | Performed by: DERMATOLOGY

## 2017-12-11 NOTE — LETTER
December 11, 2017      Onofre Paulson MD  [address]  [address]  [facility] [address]           [facility]  [address]  [place] [address]  Phone: [phone]  Fax: [phone]          Patient: Shannan Norman   MR Number: [id number]   YOB: 1945   Date of Visit: 12/11/2017       Dear Dr. Onofre Paulson:    Thank you for referring Shannan Norman to me for evaluation. Attached you will find relevant portions of my assessment and plan of care.    If you have questions, please do not hesitate to call me. I look forward to following Shannan Norman along with you.    Sincerely,    Maura Winter MD    Enclosure  CC:  No Recipients    If you would like to receive this communication electronically, please contact [email or web address] or [phone] to request more information on [employer] Link access.    For providers and/or their staff who would like to refer a patient to [facility], please contact us through our one-stop-shop provider referral line, [facility], at [phone].    If you feel you have received this communication in error or would no longer like to receive these types of communications, please e-mail [email or web address]

## 2017-12-11 NOTE — LETTER
December 13, 2017      Onofre Paulson MD  [address]  [address]  [facility] [address]           [facility]  [address], 5th [address]  [facility] [address]  Phone: [phone]          Patient: Shannan Norman   MR Number: [id number]   YOB: 1945   Date of Visit: 12/11/2017       Dear Dr. Onofre Paulson:    Thank you for referring Shannan Norman to me for evaluation. Attached you will find relevant portions of my assessment and plan of care.    If you have questions, please do not hesitate to call me. I look forward to following Shannan Norman along with you.    Sincerely,    Keely Soliz PA-C    Enclosure  CC:  No Recipients    If you would like to receive this communication electronically, please contact [email or web address] or [phone] to request more information on [employer] Link access.    For providers and/or their staff who would like to refer a patient to [facility], please contact us through our one-stop-shop provider referral line, [facility], at [phone].    If you feel you have received this communication in error or would no longer like to receive these types of communications, please e-mail [email or web address]

## 2017-12-11 NOTE — PROGRESS NOTES
Subjective:       Patient ID:  Shannan Norman is a 72 y.o. male who presents for   Chief Complaint   Patient presents with    Skin Check     ubse     HPI  71 yo M presents for upper body skin check.  He has not noticed any new or changing spots on himself since his last skin check  Denies personal or family h/o skin cancer    Past Medical History:   Diagnosis Date    Atrial flutter 2011    ablation    Cataract     Diabetes mellitus     Dry eye syndrome     Gout, unspecified     High cholesterol     History of shingles     Hypertension     Seizures      Review of Systems   Constitutional: Negative for fever, chills and fatigue.   Skin: Positive for activity-related sunscreen use and wears hat. Negative for daily sunscreen use and recent sunburn.   Hematologic/Lymphatic: Does not bruise/bleed easily.        Objective:    Physical Exam   Constitutional: He appears well-developed and well-nourished. No distress.   Neurological: He is alert and oriented to person, place, and time. He is not disoriented.   Psychiatric: He has a normal mood and affect.   Skin:   Areas Examined (abnormalities noted in diagram):   Scalp / Hair Palpated and Inspected  Head / Face Inspection Performed  Neck Inspection Performed  Chest / Axilla Inspection Performed  Abdomen Inspection Performed  Back Inspection Performed  RUE Inspected  LUE Inspection Performed                   Diagram Legend     Erythematous scaling macule/papule c/w actinic keratosis       Vascular papule c/w angioma      Pigmented verrucoid papule/plaque c/w seborrheic keratosis      Yellow umbilicated papule c/w sebaceous hyperplasia      Irregularly shaped tan macule c/w lentigo     1-2 mm smooth white papules consistent with Milia      Movable subcutaneous cyst with punctum c/w epidermal inclusion cyst      Subcutaneous movable cyst c/w pilar cyst      Firm pink to brown papule c/w dermatofibroma      Pedunculated fleshy papule(s) c/w skin tag(s)      Evenly  pigmented macule c/w junctional nevus     Mildly variegated pigmented, slightly irregular-bordered macule c/w mildly atypical nevus      Flesh colored to evenly pigmented papule c/w intradermal nevus       Pink pearly papule/plaque c/w basal cell carcinoma      Erythematous hyperkeratotic cursted plaque c/w SCC      Surgical scar with no sign of skin cancer recurrence      Open and closed comedones      Inflammatory papules and pustules      Verrucoid papule consistent consistent with wart     Erythematous eczematous patches and plaques     Dystrophic onycholytic nail with subungual debris c/w onychomycosis     Umbilicated papule    Erythematous-base heme-crusted tan verrucoid plaque consistent with inflamed seborrheic keratosis     Erythematous Silvery Scaling Plaque c/w Psoriasis     See annotation      Assessment / Plan:        Multiple benign nevi  Reassurance that his nevi appear benign with regular and consistent pigment pattern on dermoscopy  Discussed daily sun protection while outdoors    Seborrheic keratoses  These are benign inherited growths without a malignant potential. Reassurance given to patient. No treatment is necessary.     Angioma  This is a benign vascular lesion. Reassurance given. No treatment required.     Actinic keratosis  Cryosurgery Procedure Note    Verbal consent from the patient is obtained and the patient is aware of the precancerous quality and need for treatment of these lesions. Liquid nitrogen cryosurgery is applied to the 1 actinic keratosis, as detailed in the physical exam, to produce a freeze injury. The patient is aware that blisters may form and is instructed on wound care with gentle cleansing and use of vaseline ointment to keep moist until healed. The patient is supplied a handout on cryosurgery and is instructed to call if lesions do not completely resolve.           Return in about 1 year (around 12/11/2018).

## 2017-12-13 NOTE — PROGRESS NOTES
Subjective:      Patient ID: Shannan Norman is a 72 y.o. male.    Chief Complaint: No chief complaint on file.    HPI    Patient is a 72 year old male who presents to clinic with chief complaint of right knee pain. Patient stated that his pain was constant and associated with swelling. At that time he was seen by an orthopedist at [facility] who diagnosed his with a medial meniscal tear. Patient has treated it conservatively. He stated that the pain has now become intermittent, but he does get a locking and catching in the knee. His pain is increased when he goes to bed at night. He has taken glucosamine chondroitin over many years, but stated that it is not helping as much.     Review of Systems   Constitution: Negative for chills and fever.   Cardiovascular: Negative for chest pain.   Respiratory: Negative for cough and shortness of breath.    Skin: Negative for color change, dry skin, itching, nail changes, poor wound healing and rash.   Musculoskeletal:        Right knee pain   Neurological: Negative for dizziness.   Psychiatric/Behavioral: Negative for altered mental status. The patient is not nervous/anxious.    All other systems reviewed and are negative.        Objective:            General    Constitutional: He is oriented to person, place, and time. He appears well-developed and well-nourished. No distress.   HENT:   Head: Atraumatic.   Eyes: Conjunctivae are normal.   Cardiovascular: Normal rate.    Pulmonary/Chest: Effort normal.   Neurological: He is alert and oriented to person, place, and time.   Psychiatric: He has a normal mood and affect. His behavior is normal.           Right Knee Exam     Inspection   Swelling: absent  Deformity: deformity  Bruising: absent    Tenderness   The patient is tender to palpation of the medial joint line.    Range of Motion   The patient has normal right knee ROM.    Tests   Meniscus   Luisito:  Medial - positive Lateral - negative  Ligament Examination Lachman:  normal (-1 to 2mm) PCL-Posterior Drawer: normal (0 to 2mm)     MCL - Valgus: normal (0 to 2mm)  LCL - Varus: normal    Other   Sensation: normal    Muscle Strength   Right Lower Extremity   Quadriceps:  5/5   Hamstrin/5     RADS: no fracture or dislocation, degenerative changes noted.     Assessment:       Encounter Diagnoses   Name Primary?    Osteoarthritis of right knee, unspecified osteoarthritis type Yes    Tear of medial meniscus of right knee, unspecified tear type, unspecified whether old or current tear, initial encounter           Plan:       Discussed treatment options with patient including oral medication , injection, PT and further images with possible surgical intervention. At this time patient would like to rest ice NSAID and do at home PT. Exercise sheet given. Patient declined out patient formal PT, he will call of needed. He is to return to clinic as needed.

## 2017-12-18 ENCOUNTER — OFFICE VISIT (OUTPATIENT)
Dept: CARDIOLOGY | Facility: CLINIC | Age: 72
End: 2017-12-18
Payer: MEDICARE

## 2017-12-18 VITALS
SYSTOLIC BLOOD PRESSURE: 126 MMHG | BODY MASS INDEX: 31.69 KG/M2 | HEIGHT: 72 IN | HEART RATE: 40 BPM | DIASTOLIC BLOOD PRESSURE: 76 MMHG | WEIGHT: 234 LBS

## 2017-12-18 DIAGNOSIS — I83.10 VARICOSE VEINS OF LOWER EXTREMITY WITH INFLAMMATION, UNSPECIFIED LATERALITY: ICD-10-CM

## 2017-12-18 DIAGNOSIS — I10 ESSENTIAL HYPERTENSION: Primary | ICD-10-CM

## 2017-12-18 DIAGNOSIS — E78.01 HYPERLIPIDEMIA TYPE II: ICD-10-CM

## 2017-12-18 DIAGNOSIS — I49.49 JUNCTIONAL ESCAPE RHYTHM: ICD-10-CM

## 2017-12-18 DIAGNOSIS — I49.5 SSS (SICK SINUS SYNDROME): ICD-10-CM

## 2017-12-18 DIAGNOSIS — I48.92 ATRIAL FLUTTER, UNSPECIFIED TYPE: ICD-10-CM

## 2017-12-18 DIAGNOSIS — Q21.12 PFO (PATENT FORAMEN OVALE): ICD-10-CM

## 2017-12-18 PROCEDURE — 93005 ELECTROCARDIOGRAM TRACING: CPT | Mod: PBBFAC,PO | Performed by: INTERNAL MEDICINE

## 2017-12-18 PROCEDURE — 93010 ELECTROCARDIOGRAM REPORT: CPT | Mod: S$GLB,,, | Performed by: INTERNAL MEDICINE

## 2017-12-18 PROCEDURE — 99999 PR PBB SHADOW E&M-EST. PATIENT-LVL III: CPT | Mod: PBBFAC,,, | Performed by: INTERNAL MEDICINE

## 2017-12-18 PROCEDURE — 99214 OFFICE O/P EST MOD 30 MIN: CPT | Mod: S$PBB,,, | Performed by: INTERNAL MEDICINE

## 2017-12-18 PROCEDURE — 99213 OFFICE O/P EST LOW 20 MIN: CPT | Mod: PBBFAC,PO | Performed by: INTERNAL MEDICINE

## 2017-12-18 NOTE — PATIENT INSTRUCTIONS
"  Discharge Instructions: Taking Blood Pressure Medications  You have been diagnosed with high blood pressure (hypertension). Diet and exercise help control high blood pressure. Many people also need the help of one or more medicines. Here are the main types of blood pressure medicines and how they work.  Diuretics  Diuretics are sometimes called "water pills" because they work in the kidney and flush excess water and sodium (salt) from the body. These are one of the most common and  important medicines for the management of blood pressure.  Beta-blockers  Beta-blockers reduce nerve impulses to the heart and blood vessels. This makes the heart beat slower and with less force. Your blood pressure drops, and your heart does not have to work as hard, which may improve pumping function.  ACE inhibitors  ACE inhibitors cause the vessels to relax. This helps the blood flow better and lowers blood pressure.  Angiotensin antagonists  Angiotensin antagonists shield blood vessels from a hormone that causes the blood vessels to get stiff and narrow. Your vessels become wider, and your blood pressure goes down.  Calcium channel blockers (CCBs)  CCBs keep calcium from entering the muscle cells of the heart and blood vessels. This causes blood vessels to relax, and your blood pressure goes down.  Alpha-blockers  Alpha-blockers reduce nerve impulses to blood vessels. This allows blood to pass easily, causing blood pressure to go down.  Alpha-beta blockers  Alpha-beta blockers work the same way as alpha-blockers but also slow your heartbeat. As a result, less blood is pumped through your blood vessels and your blood pressure goes down.  Vasodilators  Vasodilators directly open blood vessels by relaxing the muscle in the vessel walls, causing blood pressure to go down.  Date Last Reviewed: 4/27/2016  © 8817-1677 [employer]. [address] [place] [address], [place] [address]. All rights reserved. This information is not " intended as a substitute for professional medical care. Always follow your healthcare professional's instructions.        Bradycardia    When your heart rate is slow, less than 60 beats per minute, it is called bradycardia. Bradycardia can be normal, caused by medicines, or a sign of a disease. The slow heart rate may not be constant; it can come and go. It is a concern when it is very low, or you have symptoms.  Signs and symptoms  The following are signs and symptoms of bradycardia:  · Heart rate less than 60 per minute  · Dizziness or feeling lightheaded  · Weakness  · Trouble breathing  · Fainting  · Sleepiness  · More trouble exercising than usual because of fatigue  · Confusion or trouble concentrating  Causes  There are many causes of bradycardia. Some can be related to your heart, but some may be related to other factors.  Non-heart-related causes:  · Advanced age  · Side effect of certain medicines (such as beta-blockers, calcium channel blockers, digitalis, antiarrhythmic medicines like amiodarone, clonidine, lithium)  · Medical conditions such as hypoglycemia (low blood sugar), hypothyroidism (low thyroid), electrolyte disorder,  hypothermia, sleep apnea  · Athletes, especially long-distance runners, may have a slow heart rate. This can be normal.  · Sleep apnea  · Brain injury such as stroke or bleeding inside the brain  Heart-related causes:  · Coronary artery disease (angina or prior heart attack, also known as acute myocardial infarction, or AMI)  · Heart valve disease  · Heart muscle disease (cardiomyopathy)  · Congestive heart failure  · Sick sinus syndrome, which is when your heart's natural pacemaker is no longer working properly  · Diseases that infiltrate the heart such as sarcoid  · Heart infections  Sometimes the cause for the arrhythmia cannot be found.  Bradycardia that causes symptoms is sometimes reversible, and can be treated with medicines. When more severe bradycardia persists, a  pacemaker is generally recommended. When the bradycardia does not cause symptoms, your doctor may decide to evaluate it in his or her office.  Home care  The following will help you care for yourself at home:  · Resume your usual activities when you are feeling back to normal.  · If you develop any of the symptoms below during exertion, then you should not exert yourself until evaluated further by your doctor.  · Work with your doctor on any needed lifestyle changes, such as changing your diet, stopping smoking if you are a smoker, and a planned exercise program.  Follow-up care  Follow up with your doctor, or as advised.  Call 911  Call 911 if any of the following occur:  · Chest pain  · Trouble breathing  · Slow heart rate with dizziness or lightheadedness  · Fainting or loss of consciousness  · Chest, shoulder, arm, neck, or back pain  · Slow heart rate (under 50 beats per minute) if associated with symptoms  When to seek medical advice  Get prompt medical attention if any of the following occur:  · Occasional weakness, dizziness, or lightheadedness  Date Last Reviewed: 4/25/2016 © 2000-2017 [employer]. [address] [place] [id number]. All rights reserved. This information is not intended as a substitute for professional medical care. Always follow your healthcare professional's instructions.

## 2017-12-18 NOTE — PROGRESS NOTES
Subjective:   Patient ID:  Shannan Norman is a 72 y.o. male who presents for follow  of Hypertension; SSS; and Abnormal ECG      HPI:       Shannan Norman 72 y.o. male is here follow up and feeling well without any new complaints. He is s/p atrial flutter RFA in 3/2010 by Dr. Copeland. He has known history of sinus bradycardia with HR in th 40-50. He denies any symptoms. He exercises 3 to 4 times weekly without any limitations. Normal EF with diastolic dysfunction, biAE, PASP 25 mmHg, and trivial AI and mild TR on echo in 10/2016.     He has varicose veins s/p surgical stripping and EVLTs by Dr. Lund. He saw Dr. Toth but did not follow up with the recommendation of either stockings or EVLTs. No h/o DVTs      ECG today: ectopic rhythm with HR 39-40        Patient Active Problem List    Diagnosis Date Noted    Benign prostatic hyperplasia 2017    Nonrheumatic aortic valve insufficiency 2016     Trivial      SSS (sick sinus syndrome) 10/24/2016    Junctional escape rhythm 2016    Obesity (BMI 30.0-34.9) 2016    Type 2 diabetes mellitus without retinopathy 10/13/2015    Nuclear sclerosis of both eyes 10/13/2015    Left epiretinal membrane 10/13/2015    Varicose veins of lower extremities with inflammation 2015         S/p bilateral GSV EVLT [id number]        Post-operative state 02/10/2014     PROCEDURES 2014:   1. ECU right side stabilization with retinacular sling.   2. Synovectomy, right wrist.   3. Splint application.        Tendon injury 2014    Umbilical hernia 2013    PFO (patent foramen ovale) 2013     On asa 325mg. No hx of CVA        HTN (hypertension) 2013    Hyperlipidemia type II 2013    Gout, arthritis 2013    Atrial flutter 2013     EF 60%, s/p RF ablation in  (Dr. Grady Copeland)      DM (diabetes mellitus) 2013           Right Arm BP - Sittin/74  Left Arm BP - Sitting:  126/76        LABS  BNP      LAST HbA1c  Lab Results   Component Value Date    HGBA1C 6.1 (H) 08/05/2017       Lipid panel  Lab Results   Component Value Date    CHOL 139 08/05/2017    CHOL 133 07/08/2016    CHOL 160 05/25/2015     Lab Results   Component Value Date    HDL 51 08/05/2017    HDL 48 07/08/2016    HDL 58 05/25/2015     Lab Results   Component Value Date    LDLCALC 66.0 08/05/2017    LDLCALC 74.2 07/08/2016    LDLCALC 87.8 05/25/2015     Lab Results   Component Value Date    TRIG 110 08/05/2017    TRIG 54 07/08/2016    TRIG 71 05/25/2015     Lab Results   Component Value Date    CHOLHDL 36.7 08/05/2017    CHOLHDL 36.1 07/08/2016    CHOLHDL 36.3 05/25/2015            Review of Systems   Constitution: Negative for diaphoresis, weakness, night sweats, weight gain and weight loss.   HENT: Negative for congestion.    Eyes: Negative for blurred vision, discharge and double vision.   Cardiovascular: Negative for chest pain, claudication, cyanosis, dyspnea on exertion, irregular heartbeat, leg swelling, near-syncope, orthopnea, palpitations, paroxysmal nocturnal dyspnea and syncope.   Respiratory: Negative for cough, shortness of breath and wheezing.    Endocrine: Negative for cold intolerance, heat intolerance and polyphagia.   Hematologic/Lymphatic: Negative for adenopathy and bleeding problem. Does not bruise/bleed easily.   Skin: Negative for dry skin and nail changes.   Musculoskeletal: Negative for arthritis, back pain, falls, joint pain, myalgias and neck pain.   Gastrointestinal: Negative for bloating, abdominal pain, change in bowel habit and constipation.   Genitourinary: Negative for bladder incontinence, dysuria, flank pain, genital sores and missed menses.   Neurological: Negative for aphonia, brief paralysis, difficulty with concentration and dizziness.   Psychiatric/Behavioral: Negative for altered mental status and memory loss. The patient does not have insomnia.    Allergic/Immunologic: Negative  for environmental allergies.       Objective:   Physical Exam   Constitutional: He is oriented to person, place, and time. He appears well-developed and well-nourished. He is not intubated.   HENT:   Head: Normocephalic and atraumatic.   Right Ear: External ear normal.   Left Ear: External ear normal.   Mouth/Throat: Oropharynx is clear and moist.   Eyes: Conjunctivae and EOM are normal. Pupils are equal, round, and reactive to light. Right eye exhibits no discharge. Left eye exhibits no discharge. No scleral icterus.   Neck: Normal range of motion. Neck supple. Normal carotid pulses, no hepatojugular reflux and no JVD present. Carotid bruit is not present. No tracheal deviation present. No thyromegaly present.   Cardiovascular: Regular rhythm, S1 normal and S2 normal.   No extrasystoles are present. Bradycardia present.  PMI is not displaced.  Exam reveals no gallop, no S3, no distant heart sounds, no friction rub and no midsystolic click.    No murmur heard.  Pulses:       Carotid pulses are 2+ on the right side, and 2+ on the left side.       Radial pulses are 2+ on the right side, and 2+ on the left side.        Femoral pulses are 2+ on the right side, and 2+ on the left side.       Popliteal pulses are 2+ on the right side, and 2+ on the left side.        Dorsalis pedis pulses are 2+ on the right side, and 2+ on the left side.        Posterior tibial pulses are 2+ on the right side, and 2+ on the left side.   Pulmonary/Chest: Effort normal and breath sounds normal. No accessory muscle usage or stridor. No apnea, no tachypnea and no bradypnea. He is not intubated. No respiratory distress. He has no decreased breath sounds. He has no wheezes. He has no rales. He exhibits no tenderness and no bony tenderness.   Abdominal: He exhibits no distension, no pulsatile liver, no abdominal bruit, no ascites, no pulsatile midline mass and no mass. There is no tenderness. There is no rebound and no guarding.    Musculoskeletal: Normal range of motion. He exhibits no edema or tenderness.   Lymphadenopathy:     He has no cervical adenopathy.   Neurological: He is alert and oriented to person, place, and time. He has normal reflexes. No cranial nerve deficit. Coordination normal.   Skin: Skin is warm. No rash noted. No erythema. No pallor.   Indurated varicose veins bilaterally      No ulcers      Non tender     Psychiatric: He has a normal mood and affect. His behavior is normal. Judgment and thought content normal.       Assessment:     1. Essential hypertension    2. Hyperlipidemia type II    3. Atrial flutter, unspecified type    4. PFO (patent foramen ovale)    5. Varicose veins of lower extremity with inflammation, unspecified laterality    6. SSS (sick sinus syndrome)    7. Junctional escape rhythm            In the past he was in sinus bradycardia, junctional rhythm, and today ectopic rhythm  He is asymptomatic. He has seen both Dr. Sanchez and Dr. Rojas.         Plan:         Continue with close monitoring of SSS   Asymptomatic    Follow with Dr. Sanchez with holter monitor        Stockings 20-30 mmHg for venous insufficiency  If he is ever interested he can have EVLTs, sclerotherapy, and/or phlebectomy in the future       Continue with current medical plan and lifestyle changes.  Return sooner for concerns or questions. If symptoms persist go to the ED  I have reviewed all pertinent data on this patient       I have reviewed the patient's medical history in detail and updated the computerized patient record.    Orders Placed This Encounter   Procedures    IN OFFICE EKG 12-LEAD (to Muse)     Order Specific Question:   Diagnosis     Answer:   Bradycardia [[id number]]       Follow up as scheduled. Return sooner for concerns or questions  If no intervention indicated he will continue to follow up yearly              He expressed verbal understanding and agreed with the plan        Patient's Medications   New Prescriptions     No medications on file   Previous Medications    ASCORBIC ACID (VITAMIN C) 500 MG TABLET    Take 1,000 mg by mouth once daily.     ASPIRIN (ECOTRIN) 81 MG EC TABLET    Take 81 mg by mouth once daily.    ATORVASTATIN (LIPITOR) 80 MG TABLET    Take 1 tablet (80 mg total) by mouth once daily.    BENAZEPRIL (LOTENSIN) 40 MG TABLET    Take 1 tablet (40 mg total) by mouth once daily.    CHLORTHALIDONE (HYGROTEN) 25 MG TAB    Take 1 tablet (25 mg total) by mouth once daily.    CLOTRIMAZOLE-BETAMETHASONE 1-0.05% (LOTRISONE) CREAM    Apply topically 2 (two) times daily.    ECONAZOLE NITRATE 1 % CREAM    Apply topically 2 (two) times daily. Apply to affected toenail and surrounding skin twice daily.    FINASTERIDE (PROSCAR) 5 MG TABLET    TAKE 1 TABLET BY MOUTH EVERY DAY    GLUCOSAMINE HCL/CHONDR VASQUEZ A NA (GLUCOSAMINE-CHONDROITIN) 750-600 MG TAB    Take 2 tablets by mouth Daily.    INDOMETHACIN (INDOCIN) 50 MG CAPSULE    Take 1 capsule (50 mg total) by mouth 3 (three) times daily as needed.    LORATADINE ORAL    Take 10 mg by mouth as needed.    MELOXICAM (MOBIC) 15 MG TABLET    TAKE 1 TABLET(15 MG) BY MOUTH EVERY DAY    MULTIVITAMIN CAPSULE    Take 1 capsule by mouth once daily.    OXYBUTYNIN (DITROPAN) 5 MG TAB    Take 1 tablet (5 mg total) by mouth 3 (three) times daily.    RESTASIS 0.05 % OPHTHALMIC EMULSION    INSTILL 1 DROP IN BOTH EYES TWICE DAILY    TAMSULOSIN (FLOMAX) 0.4 MG CP24    Take 1 capsule (0.4 mg total) by mouth once daily.   Modified Medications    No medications on file   Discontinued Medications    No medications on file

## 2018-01-05 DIAGNOSIS — N40.0 BENIGN PROSTATIC HYPERPLASIA: ICD-10-CM

## 2018-01-05 RX ORDER — FINASTERIDE 5 MG/1
TABLET, FILM COATED ORAL
Qty: 30 TABLET | Refills: 2 | Status: SHIPPED | OUTPATIENT
Start: 2018-01-05 | End: 2018-03-29 | Stop reason: SDUPTHER

## 2018-01-15 ENCOUNTER — OFFICE VISIT (OUTPATIENT)
Dept: CARDIOLOGY | Facility: CLINIC | Age: 73
End: 2018-01-15
Payer: MEDICARE

## 2018-01-15 VITALS
BODY MASS INDEX: 31.56 KG/M2 | HEART RATE: 55 BPM | SYSTOLIC BLOOD PRESSURE: 133 MMHG | DIASTOLIC BLOOD PRESSURE: 71 MMHG | HEIGHT: 72 IN | WEIGHT: 233 LBS

## 2018-01-15 DIAGNOSIS — E78.01 HYPERLIPIDEMIA TYPE II: ICD-10-CM

## 2018-01-15 DIAGNOSIS — I48.92 ATRIAL FLUTTER, UNSPECIFIED TYPE: ICD-10-CM

## 2018-01-15 DIAGNOSIS — I10 ESSENTIAL HYPERTENSION: Primary | ICD-10-CM

## 2018-01-15 DIAGNOSIS — I49.5 SSS (SICK SINUS SYNDROME): ICD-10-CM

## 2018-01-15 DIAGNOSIS — I49.49 JUNCTIONAL ESCAPE RHYTHM: ICD-10-CM

## 2018-01-15 PROCEDURE — 93010 ELECTROCARDIOGRAM REPORT: CPT | Mod: S$PBB,,, | Performed by: INTERNAL MEDICINE

## 2018-01-15 PROCEDURE — 99999 PR PBB SHADOW E&M-EST. PATIENT-LVL III: CPT | Mod: PBBFAC,,, | Performed by: INTERNAL MEDICINE

## 2018-01-15 PROCEDURE — 99214 OFFICE O/P EST MOD 30 MIN: CPT | Mod: S$PBB,,, | Performed by: INTERNAL MEDICINE

## 2018-01-15 PROCEDURE — 99213 OFFICE O/P EST LOW 20 MIN: CPT | Mod: PBBFAC,PO | Performed by: INTERNAL MEDICINE

## 2018-01-15 PROCEDURE — 93005 ELECTROCARDIOGRAM TRACING: CPT | Mod: PBBFAC,PO | Performed by: INTERNAL MEDICINE

## 2018-01-15 NOTE — PROGRESS NOTES
"Subjective:    Patient ID:  Shannan Norman is a 72 y.o. male who presents for evaluation of bradycardia    HPI   72 y.o. 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 Of note, HV then 65 ms.  long hx SB  DM HTN    Feels well. Exercises 4-5 days/week, but at "4 out of 10" intensity. Bikes 6 miles  On exercise bike, his HR rarely reaches 100 bpm.   We previously placed a 48-hour Holter to follow HR curve. This showed SR/SB, with HR range 29-99 bpm (including during exertion). Had some junctional rhythm, including HR during usual waking hours (though may have been taking a nap) at 37 bpm.     Since last seen, feels about the same... except on occasion, while sitting quietly, he gets episodes of LH. Doesn't happen during exertion or other activity.    6/2012: 60% LVEF  9/12/16 ecg: junctional at 48 bpm. Previously, chronic SB in 40s, for years.  12/18/18: SR at 39 bpm.    My interpretation of today's ECG is junctional rhythm at 55 bpm.    Review of Systems   Constitution: Negative. Negative for weakness and malaise/fatigue.   HENT: Negative.  Negative for ear pain and tinnitus.    Eyes: Negative for blurred vision.   Cardiovascular: Negative.  Negative for chest pain, dyspnea on exertion, near-syncope, palpitations and syncope.   Respiratory: Negative.  Negative for shortness of breath.    Endocrine: Negative.  Negative for polyuria.   Hematologic/Lymphatic: Does not bruise/bleed easily.   Skin: Negative.  Negative for rash.   Musculoskeletal: Negative.  Negative for joint pain and muscle weakness.   Gastrointestinal: Negative.  Negative for abdominal pain and change in bowel habit.   Genitourinary: Negative for frequency.   Neurological: Negative.  Negative for dizziness.   Psychiatric/Behavioral: Negative.  Negative for depression. The patient is not nervous/anxious.    Allergic/Immunologic: Negative for environmental allergies.        Objective:    Physical Exam   Constitutional: He is oriented to person, place, and time. " He appears well-developed and well-nourished.   HENT:   Head: Normocephalic and atraumatic.   Eyes: Conjunctivae, EOM and lids are normal. No scleral icterus.   Neck: Normal range of motion. No JVD present. No tracheal deviation present. No thyromegaly present.   Cardiovascular: Regular rhythm, normal heart sounds and intact distal pulses.   No extrasystoles are present. Bradycardia present.  PMI is not displaced.  Exam reveals no gallop and no friction rub.    No murmur heard.  Pulses:       Radial pulses are 2+ on the right side, and 2+ on the left side.   Pulmonary/Chest: Effort normal and breath sounds normal. No accessory muscle usage. No tachypnea. No respiratory distress. He has no wheezes. He has no rales.   Abdominal: Soft. Bowel sounds are normal. He exhibits no distension. There is no hepatosplenomegaly. There is no tenderness.   Musculoskeletal: Normal range of motion. He exhibits no edema.   Neurological: He is alert and oriented to person, place, and time. He has normal reflexes. He exhibits normal muscle tone.   Skin: Skin is warm and dry. No rash noted.   Psychiatric: He has a normal mood and affect. His behavior is normal.   Nursing note and vitals reviewed.        Assessment:       1. Essential hypertension    2. SSS (sick sinus syndrome)    3. Hyperlipidemia type II    4. Junctional escape rhythm         Plan:       Chronic sinus bradycardia, including junctional rhythm with profound (<40 bpm) bradycardia (though unclear whether awake or napping at that time).  Discussed that if he were able to mount a higher HR he may have better exertion tolerance. Overall he feels OK, but clearly there's some aspect of SSS. I think he may have significant symptomatic benefit from PPM implantation.  New finding/complaint is episodic LH while at rest, which may indicate profound bradycardia and which may also be alleviated with PPM.    He'll discuss with Dr Onofre Paulson in the near future, +/- Dr Licona as  well.  We'll schedule a 48-hour Holter; he requests to do it in 2 wk. f/u thereafter.    Alternatively: Following those discussions, he'd call/write to schedule PPM placement.     I spent about a half hour discussing the risks and benefits of PPM placement. Our discussion of risks included (but was not limited to) the possibility of infection, death, stroke, MI, pneumothorax, embolism, cardiac perforation, cardiac tamponade, renal injury, and bleeding.

## 2018-01-19 ENCOUNTER — PATIENT MESSAGE (OUTPATIENT)
Dept: ELECTROPHYSIOLOGY | Facility: CLINIC | Age: 73
End: 2018-01-19

## 2018-01-19 DIAGNOSIS — I48.92 ATRIAL FLUTTER, UNSPECIFIED TYPE: Primary | ICD-10-CM

## 2018-01-23 ENCOUNTER — TELEPHONE (OUTPATIENT)
Dept: FAMILY MEDICINE | Facility: CLINIC | Age: 73
End: 2018-01-23

## 2018-01-23 RX ORDER — CICLOPIROX 80 MG/ML
SOLUTION TOPICAL NIGHTLY
Qty: 6.6 ML | Refills: 2 | Status: SHIPPED | OUTPATIENT
Start: 2018-01-23 | End: 2018-07-30

## 2018-01-26 ENCOUNTER — PATIENT MESSAGE (OUTPATIENT)
Dept: ELECTROPHYSIOLOGY | Facility: CLINIC | Age: 73
End: 2018-01-26

## 2018-01-26 DIAGNOSIS — I49.5 SSS (SICK SINUS SYNDROME): Primary | ICD-10-CM

## 2018-01-26 DIAGNOSIS — I49.9 CARDIAC ARRHYTHMIA, UNSPECIFIED CARDIAC ARRHYTHMIA TYPE: ICD-10-CM

## 2018-01-26 NOTE — PROGRESS NOTES
Post-Procedure Patient Discharge Instructions  Pacemaker/Defibrillator  Wound Care   If you are discharged with a standard dressing over the incision, you may remove the dressing after 24 hours.    If you are discharged with an AQUACEL dressing, you should keep it on until your follow-up appointment in 1-2 weeks.   If there are Steri-strips (strips of tape) over your incision, leave them on until your follow-up appointment. They may begin to fall off on their own, which is normal. If there is Dermabond (clear glue) over your incision, do not scrub it off. It acts as a barrier and will eventually disappear.   You will be discharged with 5 days of oral antibiotics. Please take the full prescription until it is gone.   Do not get the incision wet for 48 hours following the procedure. You may sponge bath during this period, working around the incision. After 48 hours, you may shower, but you should still try to keep this area as dry as possible, and avoid direct water contact to the incision (allow the water to hit back of your shoulder rather than directly on the incision). Gently pat the incision dry if it does get wet.   You may take regular showers after 2 weeks, unless otherwise indicated at your follow-up visit.   Do not submerge the incision in a tub, pool, or body of water for 6 weeks.   Avoid using deodorants, powders, creams, lotions, etc. on your incision for 6 weeks.   If you notice unusual swelling, redness, drainage, have more device site pain, chest pain, shortness of breath, or have a fever greater than 100 degrees, call our device clinic immediately: [phone] or [phone] during normal office hours. You may call [phone] after-hours or on weekends and ask for the electrophysiologist on call.  Activity    If you only had a battery/generator change performed, there are no postoperative activity restrictions.    If this is your first device or if you had new wires added to your  existing device, then you will be discharged in a sling which you should wear continuously for 48 hours. After that, the sling should remain off during the day but should be worn at night for another 2-4 weeks (depending on how active a sleeper you are).   Do not raise your device-side arm above your shoulder for 6 weeks. Do not lift more than 5-10 lbs with your device-side arm for 6 weeks.    If you were driving prior to the procedure, you may resume driving after your first follow-up appointment (1-2 weeks). If you have a history of passing out or a history of certain arrhythmias, there may be driving restrictions unrelated to the procedure. Please clarify with your physician if this is the case.   No heavy activity with the affected arm for 6 weeks (eg. tennis, golf, bowling, aerobics, mowing the lawn, etc.).   Avoid rough contact at the device site for 6 weeks.   You may participate in sexual activity unless otherwise instructed.   You may return to work within 3-5 days unless told otherwise, provided you adhere to the above activity restrictions.  Long-Term Instructions   Keep your pacemaker or defibrillator identification card with you at all times.   If you have a defibrillator and you get shocked by the device: If you receive one shock and you feel ok, you may call [phone] or [phone] during normal office hours. You may call [phone] after-hours. If you receive one shock and you do not feel well, call Emergency Medical Services. They will take you to the nearest emergency room.   If you have a defibrillator and you get more than one shock from the device or multiple shocks in a short period of time: Call Emergency Medical Services. They will take you to the nearest emergency room.   Appliances: You may operate any electrical device in your home, including microwaves.   Security Systems: Electromagnetic security systems can be located in the workplace, courthouses, or other  high-security areas. Exposure to this type of security system has been shown to cause interference in some cases. Interference may be related to the duration of exposure and/or the distance between the device and the security device. You should be aware of the location of security systems, move through them at a normal pace, and avoid leaning or standing too close.   Metal Detectors at Airports: Metal detectors at airports can potentially interfere with pacemakers or defibrillators, although this is unlikely. Metal detectors will likely be triggered by your device and therefore at places such as airports  it will be important for you to carry your identification card for the pacemaker/defibrillator. Airport personnel will likely prefer to do a manual search.   Cellular Phones: It is unlikely that using a cellular phone will interfere with your device. It should be used with the hand opposite to the side where your device was implanted. The phone should not be carried in the shirt pocket on the same side as the device.   Specific Work Conditions: Patients who work near high-voltage lines, transmitting towers, large motors, welding equipment, or powerful magnets should discuss their specific situation with their physician. In general, remain at least two feet from external electrical equipment, verify that the equipment is properly grounded, and wear insulated gloves when using electrical devices. Leave the immediate location if lightheadedness or other symptoms develop.   MRI: Some pacemakers and defibrillators are safe in MRI scanners, while others are not. Please consult with your physician to see if you have an MRI-compatible device.   Surgery: Should you require surgery in the future, some electrosurgical devices can interfere with your device function. You should discuss this with your surgeon before any operation.   Radiation Therapy: If you ever require radiation therapy in the future, care must be taken  to avoid irradiating the device.  Long-Term Follow-Up   Your device has the ability to transmit device information from home to the doctors office using a home monitoring system.   This remote system takes the place of a doctors visit. Your device will be checked from home every 3-6 months. Every 6-12 months, you will be asked to come into the office for an in-office check.   Your device should last in the range of 6-12 years. This depends on many factors including how often it paces the heart.   When the battery is low, a generator change will be performed. This is a same-day procedure with no post-op activity restrictions, unless one of the pacemaker or defibrillator leads needs to be replaced at that time, or a new lead is added to your existing system.

## 2018-01-26 NOTE — PROGRESS NOTES
EXTRACTION/ IMPLANTABLE DEVICE EDUCATION CHECKLIST    2-02-18  PRE - PROCEDURE LABS HAVE BEEN ORDERED FOR YOU @ [facility]  BE SURE TO ARRIVE AT YOUR SCHEDULED TIME FOR THIS LAB WORK!  (YOU DO NOT HAVE TO FAST FOR THIS LABWORK!!!!)    2-06-18 @ 6 AM - PACEMAKER  REPORT TO CARDIOLOGY WAITING ROOM ON 3RD FLOOR OF HOSPITAL (DO NOT REPORT TO CLINIC)  Directions for Reporting to Cardiology Waiting Area in the Hospital  If you park in the Parking Garage:  Take elevators to the 2nd floor  Walk up ramp and turn right by Gold Elevators  Take elevator to the 3rd floor  Upon exiting the elevator, turn away from the clinic areas  Walk long jacques around to front of hospital to area with windows overlooking [facility]  Check in at Reception Desk  OR  If family is dropping you off:  Have them drop you off at the front of the Hospital  (Near the ER, where all the flags are hung).  Take the E elevators to the 3rd floor.  Check in at the Reception Desk in the waiting room.        WASH YOUR CHEST WITH HIBICLENS OR AN ANTIBACTERIAL SOAP (SUCH AS DIAL) ON THE NIGHT BEFORE AND THE MORNING OF YOUR PROCEDURE.    DO NOT EAT OR DRINK ANYTHING AFTER: 12 MN ON THE NIGHT BEFORE YOUR PROCEDURE    MEDICATIONS  YOU MAY TAKE OTHER USUAL MORNING MEDICATIONS WITH A SIP OF WATER    YOU WILL BE SPENDING THE NIGHT AFTER YOUR PROCEDURE  YOU WILL NEED SOMEONE TO DRIVE YOU HOME THE DAY AFTER YOUR PROCEDURE    YOUR PAIN DURING YOUR PROCEDURE WILL BE MANAGED BY THE ANESTHESIA TEAM    THE ABOVE INSTRUCTIONS WERE GIVEN TO THE PATIENT VERBALLY AND THEY VERBALIZED UNDERSTANDING. THEY DO NOT REQUIRE ANY SPECIAL NEEDS AND DO NOT HAVE ANY LEARNING BARRIERS.     Any need to reschedule or cancel procedures, or any questions regarding your procedures should be addressed directly with the Arrhythmia Department Nurses at the following phone number: [phone]

## 2018-02-02 ENCOUNTER — LAB VISIT (OUTPATIENT)
Dept: LAB | Facility: HOSPITAL | Age: 73
End: 2018-02-02
Attending: INTERNAL MEDICINE
Payer: MEDICARE

## 2018-02-02 DIAGNOSIS — I49.5 SSS (SICK SINUS SYNDROME): ICD-10-CM

## 2018-02-02 DIAGNOSIS — I49.9 CARDIAC ARRHYTHMIA, UNSPECIFIED CARDIAC ARRHYTHMIA TYPE: ICD-10-CM

## 2018-02-02 LAB
ANION GAP SERPL CALC-SCNC: 6 MMOL/L
APTT BLDCRRT: 25.4 SEC
BASOPHILS # BLD AUTO: 0.03 K/UL
BASOPHILS NFR BLD: 0.6 %
BUN SERPL-MCNC: 31 MG/DL
CALCIUM SERPL-MCNC: 9.6 MG/DL
CHLORIDE SERPL-SCNC: 100 MMOL/L
CO2 SERPL-SCNC: 29 MMOL/L
CREAT SERPL-MCNC: 1.3 MG/DL
DIFFERENTIAL METHOD: ABNORMAL
EOSINOPHIL # BLD AUTO: 0.2 K/UL
EOSINOPHIL NFR BLD: 3.4 %
ERYTHROCYTE [DISTWIDTH] IN BLOOD BY AUTOMATED COUNT: 13.1 %
EST. GFR  (AFRICAN AMERICAN): >60 ML/MIN/1.73 M^2
EST. GFR  (NON AFRICAN AMERICAN): 54.1 ML/MIN/1.73 M^2
GLUCOSE SERPL-MCNC: 92 MG/DL
HCT VFR BLD AUTO: 48.1 %
HGB BLD-MCNC: 15.5 G/DL
IMM GRANULOCYTES # BLD AUTO: 0.02 K/UL
IMM GRANULOCYTES NFR BLD AUTO: 0.4 %
INR PPP: 1.1
LYMPHOCYTES # BLD AUTO: 1.4 K/UL
LYMPHOCYTES NFR BLD: 26.9 %
MCH RBC QN AUTO: 31.1 PG
MCHC RBC AUTO-ENTMCNC: 32.2 G/DL
MCV RBC AUTO: 97 FL
MONOCYTES # BLD AUTO: 0.6 K/UL
MONOCYTES NFR BLD: 11.2 %
NEUTROPHILS # BLD AUTO: 3 K/UL
NEUTROPHILS NFR BLD: 57.5 %
NRBC BLD-RTO: 0 /100 WBC
PLATELET # BLD AUTO: 133 K/UL
PMV BLD AUTO: 12.5 FL
POTASSIUM SERPL-SCNC: 4.4 MMOL/L
PROTHROMBIN TIME: 11.3 SEC
RBC # BLD AUTO: 4.98 M/UL
SODIUM SERPL-SCNC: 135 MMOL/L
WBC # BLD AUTO: 5.28 K/UL

## 2018-02-02 PROCEDURE — 85025 COMPLETE CBC W/AUTO DIFF WBC: CPT

## 2018-02-02 PROCEDURE — 85730 THROMBOPLASTIN TIME PARTIAL: CPT

## 2018-02-02 PROCEDURE — 80048 BASIC METABOLIC PNL TOTAL CA: CPT

## 2018-02-02 PROCEDURE — 36415 COLL VENOUS BLD VENIPUNCTURE: CPT | Mod: PO

## 2018-02-02 PROCEDURE — 85610 PROTHROMBIN TIME: CPT

## 2018-02-03 RX ORDER — MELOXICAM 15 MG/1
TABLET ORAL
Qty: 90 TABLET | Refills: 0 | Status: SHIPPED | OUTPATIENT
Start: 2018-02-03 | End: 2018-08-06 | Stop reason: SDUPTHER

## 2018-02-05 ENCOUNTER — TELEPHONE (OUTPATIENT)
Dept: ELECTROPHYSIOLOGY | Facility: CLINIC | Age: 73
End: 2018-02-05

## 2018-02-05 RX ORDER — SODIUM CHLORIDE 9 MG/ML
INJECTION, SOLUTION INTRAVENOUS CONTINUOUS
Status: CANCELLED | OUTPATIENT
Start: 2018-02-05

## 2018-02-05 RX ORDER — CEFAZOLIN SODIUM 1 G/3ML
2 INJECTION, POWDER, FOR SOLUTION INTRAMUSCULAR; INTRAVENOUS
Status: CANCELLED | OUTPATIENT
Start: 2018-02-05

## 2018-02-05 NOTE — HPI
"72 y.o. 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 Of note, HV then 65 ms.  long hx SB  DM HTN     Feels well. Exercises 4-5 days/week, but at "4 out of 10" intensity. Bikes 6 miles  On exercise bike, his HR rarely reaches 100 bpm.   We previously placed a 48-hour Holter to follow HR curve. This showed SR/SB, with HR range 29-99 bpm (including during exertion). Had some junctional rhythm, including HR during usual waking hours (though may have been taking a nap) at 37 bpm.      Since last seen, feels about the same... except on occasion, while sitting quietly, he gets episodes of LH. Doesn't happen during exertion or other activity.  "

## 2018-02-05 NOTE — TELEPHONE ENCOUNTER
Contacted Pt to confirm procedure for tomorrow. Reviewed instructions and time with Pt. Pt voiced understanding and stated he would be here.

## 2018-02-06 ENCOUNTER — ANESTHESIA EVENT (OUTPATIENT)
Dept: MEDSURG UNIT | Facility: HOSPITAL | Age: 73
End: 2018-02-06
Payer: MEDICARE

## 2018-02-06 ENCOUNTER — HOSPITAL ENCOUNTER (OUTPATIENT)
Facility: HOSPITAL | Age: 73
Discharge: HOME | End: 2018-02-07
Attending: INTERNAL MEDICINE | Admitting: INTERNAL MEDICINE
Payer: MEDICARE

## 2018-02-06 ENCOUNTER — ANESTHESIA (OUTPATIENT)
Dept: MEDSURG UNIT | Facility: HOSPITAL | Age: 73
End: 2018-02-06
Payer: MEDICARE

## 2018-02-06 DIAGNOSIS — I49.5 SSS (SICK SINUS SYNDROME): ICD-10-CM

## 2018-02-06 DIAGNOSIS — R00.1 JUNCTIONAL BRADYCARDIA: ICD-10-CM

## 2018-02-06 DIAGNOSIS — I42.8 NICM (NONISCHEMIC CARDIOMYOPATHY): Primary | ICD-10-CM

## 2018-02-06 DIAGNOSIS — I10 ESSENTIAL (PRIMARY) HYPERTENSION: ICD-10-CM

## 2018-02-06 LAB
POCT GLUCOSE: 125 MG/DL (ref 70–110)
POCT GLUCOSE: 131 MG/DL (ref 70–110)
POCT GLUCOSE: 83 MG/DL (ref 70–110)

## 2018-02-06 PROCEDURE — 25000003 PHARM REV CODE 250: Performed by: NURSE ANESTHETIST, CERTIFIED REGISTERED

## 2018-02-06 PROCEDURE — 93005 ELECTROCARDIOGRAM TRACING: CPT

## 2018-02-06 PROCEDURE — 93010 ELECTROCARDIOGRAM REPORT: CPT | Mod: 76,,, | Performed by: INTERNAL MEDICINE

## 2018-02-06 PROCEDURE — 37000008 HC ANESTHESIA 1ST 15 MINUTES: Performed by: INTERNAL MEDICINE

## 2018-02-06 PROCEDURE — 63600175 PHARM REV CODE 636 W HCPCS

## 2018-02-06 PROCEDURE — D9220A PRA ANESTHESIA: Mod: CRNA,,, | Performed by: NURSE ANESTHETIST, CERTIFIED REGISTERED

## 2018-02-06 PROCEDURE — 63600175 PHARM REV CODE 636 W HCPCS: Performed by: NURSE PRACTITIONER

## 2018-02-06 PROCEDURE — 82962 GLUCOSE BLOOD TEST: CPT | Performed by: INTERNAL MEDICINE

## 2018-02-06 PROCEDURE — 33208 INSRT HEART PM ATRIAL & VENT: CPT | Mod: KX,,, | Performed by: INTERNAL MEDICINE

## 2018-02-06 PROCEDURE — 25000003 PHARM REV CODE 250: Performed by: NURSE PRACTITIONER

## 2018-02-06 PROCEDURE — 93010 ELECTROCARDIOGRAM REPORT: CPT | Mod: ,,, | Performed by: INTERNAL MEDICINE

## 2018-02-06 PROCEDURE — D9220A PRA ANESTHESIA: Mod: ANES,,, | Performed by: ANESTHESIOLOGY

## 2018-02-06 PROCEDURE — 82962 GLUCOSE BLOOD TEST: CPT | Mod: 91

## 2018-02-06 PROCEDURE — 37000009 HC ANESTHESIA EA ADD 15 MINS: Performed by: INTERNAL MEDICINE

## 2018-02-06 PROCEDURE — 63600175 PHARM REV CODE 636 W HCPCS: Performed by: NURSE ANESTHETIST, CERTIFIED REGISTERED

## 2018-02-06 PROCEDURE — 99220 PR INITIAL OBSERVATION CARE,LEVL III: CPT | Mod: ,,, | Performed by: INTERNAL MEDICINE

## 2018-02-06 PROCEDURE — 25000003 PHARM REV CODE 250

## 2018-02-06 PROCEDURE — C1898 LEAD, PMKR, OTHER THAN TRANS: HCPCS

## 2018-02-06 RX ORDER — FINASTERIDE 5 MG/1
5 TABLET, FILM COATED ORAL NIGHTLY
Status: DISCONTINUED | OUTPATIENT
Start: 2018-02-06 | End: 2018-02-07 | Stop reason: HOSPADM

## 2018-02-06 RX ORDER — ACETAMINOPHEN 325 MG/1
650 TABLET ORAL EVERY 6 HOURS PRN
Status: DISCONTINUED | OUTPATIENT
Start: 2018-02-06 | End: 2018-02-07 | Stop reason: HOSPADM

## 2018-02-06 RX ORDER — OXYBUTYNIN CHLORIDE 5 MG/1
5 TABLET ORAL NIGHTLY
Status: DISCONTINUED | OUTPATIENT
Start: 2018-02-06 | End: 2018-02-07 | Stop reason: HOSPADM

## 2018-02-06 RX ORDER — MIDAZOLAM HYDROCHLORIDE 1 MG/ML
INJECTION, SOLUTION INTRAMUSCULAR; INTRAVENOUS
Status: DISCONTINUED | OUTPATIENT
Start: 2018-02-06 | End: 2018-02-06

## 2018-02-06 RX ORDER — FENTANYL CITRATE 50 UG/ML
INJECTION, SOLUTION INTRAMUSCULAR; INTRAVENOUS
Status: DISCONTINUED | OUTPATIENT
Start: 2018-02-06 | End: 2018-02-06

## 2018-02-06 RX ORDER — ATORVASTATIN CALCIUM 20 MG/1
80 TABLET, FILM COATED ORAL NIGHTLY
Status: DISCONTINUED | OUTPATIENT
Start: 2018-02-06 | End: 2018-02-07 | Stop reason: HOSPADM

## 2018-02-06 RX ORDER — CEFAZOLIN SODIUM 1 G/3ML
2 INJECTION, POWDER, FOR SOLUTION INTRAMUSCULAR; INTRAVENOUS
Status: DISCONTINUED | OUTPATIENT
Start: 2018-02-06 | End: 2018-02-06

## 2018-02-06 RX ORDER — TAMSULOSIN HYDROCHLORIDE 0.4 MG/1
0.4 CAPSULE ORAL NIGHTLY
Status: DISCONTINUED | OUTPATIENT
Start: 2018-02-06 | End: 2018-02-07 | Stop reason: HOSPADM

## 2018-02-06 RX ORDER — HYDROCODONE BITARTRATE AND ACETAMINOPHEN 5; 325 MG/1; MG/1
1 TABLET ORAL EVERY 4 HOURS PRN
Status: DISCONTINUED | OUTPATIENT
Start: 2018-02-06 | End: 2018-02-07 | Stop reason: HOSPADM

## 2018-02-06 RX ORDER — ASPIRIN 81 MG/1
81 TABLET ORAL NIGHTLY
Status: DISCONTINUED | OUTPATIENT
Start: 2018-02-06 | End: 2018-02-07 | Stop reason: HOSPADM

## 2018-02-06 RX ORDER — BENAZEPRIL HYDROCHLORIDE 10 MG/1
40 TABLET ORAL NIGHTLY
Status: DISCONTINUED | OUTPATIENT
Start: 2018-02-06 | End: 2018-02-07 | Stop reason: HOSPADM

## 2018-02-06 RX ORDER — SODIUM CHLORIDE 0.9 % (FLUSH) 0.9 %
3 SYRINGE (ML) INJECTION
Status: DISCONTINUED | OUTPATIENT
Start: 2018-02-06 | End: 2018-02-07 | Stop reason: HOSPADM

## 2018-02-06 RX ORDER — CEFAZOLIN SODIUM 1 G/3ML
2 INJECTION, POWDER, FOR SOLUTION INTRAMUSCULAR; INTRAVENOUS
Status: COMPLETED | OUTPATIENT
Start: 2018-02-06 | End: 2018-02-06

## 2018-02-06 RX ORDER — SODIUM CHLORIDE 9 MG/ML
INJECTION, SOLUTION INTRAVENOUS CONTINUOUS
Status: DISCONTINUED | OUTPATIENT
Start: 2018-02-06 | End: 2018-02-07 | Stop reason: HOSPADM

## 2018-02-06 RX ADMIN — CHLORTHALIDONE 12.5 MG: 25 TABLET ORAL at 09:02

## 2018-02-06 RX ADMIN — ATORVASTATIN CALCIUM 80 MG: 20 TABLET, FILM COATED ORAL at 09:02

## 2018-02-06 RX ADMIN — OXYBUTYNIN CHLORIDE 5 MG: 5 TABLET ORAL at 09:02

## 2018-02-06 RX ADMIN — ACETAMINOPHEN 650 MG: 325 TABLET, FILM COATED ORAL at 09:02

## 2018-02-06 RX ADMIN — TAMSULOSIN HYDROCHLORIDE 0.4 MG: 0.4 CAPSULE ORAL at 09:02

## 2018-02-06 RX ADMIN — FENTANYL CITRATE 25 MCG: 50 INJECTION, SOLUTION INTRAMUSCULAR; INTRAVENOUS at 08:02

## 2018-02-06 RX ADMIN — ACETAMINOPHEN 650 MG: 325 TABLET, FILM COATED ORAL at 02:02

## 2018-02-06 RX ADMIN — CEFAZOLIN 2 G: 1 INJECTION, POWDER, FOR SOLUTION INTRAMUSCULAR; INTRAVENOUS at 02:02

## 2018-02-06 RX ADMIN — BENAZEPRIL HYDROCHLORIDE 40 MG: 10 TABLET, FILM COATED ORAL at 09:02

## 2018-02-06 RX ADMIN — ASPIRIN 81 MG: 81 TABLET, COATED ORAL at 09:02

## 2018-02-06 RX ADMIN — MIDAZOLAM 2 MG: 1 INJECTION INTRAMUSCULAR; INTRAVENOUS at 07:02

## 2018-02-06 RX ADMIN — SODIUM CHLORIDE, SODIUM GLUCONATE, SODIUM ACETATE, POTASSIUM CHLORIDE, MAGNESIUM CHLORIDE, SODIUM PHOSPHATE, DIBASIC, AND POTASSIUM PHOSPHATE: .53; .5; .37; .037; .03; .012; .00082 INJECTION, SOLUTION INTRAVENOUS at 08:02

## 2018-02-06 RX ADMIN — CEFAZOLIN 2 G: 1 INJECTION, POWDER, FOR SOLUTION INTRAMUSCULAR; INTRAVENOUS at 11:02

## 2018-02-06 RX ADMIN — FINASTERIDE 5 MG: 5 TABLET, FILM COATED ORAL at 09:02

## 2018-02-06 RX ADMIN — FENTANYL CITRATE 50 MCG: 50 INJECTION, SOLUTION INTRAMUSCULAR; INTRAVENOUS at 07:02

## 2018-02-06 RX ADMIN — SODIUM CHLORIDE: 0.9 INJECTION, SOLUTION INTRAVENOUS at 07:02

## 2018-02-06 NOTE — INTERVAL H&P NOTE
The patient has been examined and the H&P has been reviewed:    I concur with the findings and no changes have occurred since H&P was written. Pt. Presents for PPM placement.  Denies fever, chills, SOB, CP, or cough.   EKG: Junctional rhythm; 42 bpm  Labs: wnl except GFR: 54; BUN/Crt: 31/1.3    Plan PPM implantation   Anesthesia for sedation     Prior to procedure, Dr. Sanchez and NP discussed with patient; wife; and son the alternatives, benefits and risks of the procedure including pain, infection, bleeding, injury to lung causing pneumothorax requiring tube placement, injury to heart valves, puncture of the heart leading to pericardial effusion or tamponade requiring tube drainage, heart attack, stroke and death.The patient voices understanding and all questions have been answered. No further questions/concerns voiced at this time.      Malaika Le, MN, APRN, FNP-BC  Nurse Practitioner  Cardiac Electrophysiology    Active Hospital Problems    Diagnosis  POA    SSS (sick sinus syndrome) [I49.5]  Yes      Resolved Hospital Problems    Diagnosis Date Resolved POA   No resolved problems to display.

## 2018-02-06 NOTE — NURSING TRANSFER
Nursing Transfer Note      2/6/2018     Transfer To: 315    Transfer via stretcher    Transfer with cardiac monitoring    Transported by rn    Medicines sent: none    Chart send with patient: Yes    Notified: son    Patient reassessed at: see [facility] (date, lorenza

## 2018-02-06 NOTE — ANESTHESIA POSTPROCEDURE EVALUATION
Anesthesia Post Evaluation    Patient: Shannan Norman    Procedure(s) Performed: Procedure(s) (LRB):  INSERTION-PACEMAKER-DUAL (Left)    Final Anesthesia Type: MAC  Patient location during evaluation: PACU  Patient participation: Yes- Able to Participate  Level of consciousness: awake and alert  Post-procedure vital signs: reviewed and stable  Pain management: adequate  Airway patency: patent  PONV status at discharge: No PONV  Anesthetic complications: no      Cardiovascular status: blood pressure returned to baseline  Respiratory status: unassisted  Hydration status: euvolemic  Follow-up not needed.        Visit Vitals  BP (!) 146/85 (BP Location: Right arm, Patient Position: Lying)   Pulse 60   Temp 35.9 °C (96.7 °F) (Oral)   Resp 18   SpO2 96%       Pain/Delmar Score: Pain Assessment Performed: Yes (2/6/2018 10:30 AM)  Presence of Pain: denies (2/6/2018 10:30 AM)  Delmar Score: 10 (2/6/2018 10:30 AM)

## 2018-02-06 NOTE — PROGRESS NOTES
Patient admitted to recovery see [facility] for complete assessment pacu bcg's maintained safety measures verified patient instructed on pain scale and patient verbalized understanding. ekg done and placed in patient's chart. Also called patient's son and updated on patient location with the permission of patient. Also called for patient's chest xray.

## 2018-02-06 NOTE — TRANSFER OF CARE
Anesthesia Transfer of Care Note    Patient: Shannan Norman    Procedure(s) Performed: Procedure(s) (LRB):  INSERTION-PACEMAKER-DUAL (Left)    Patient location: PACU    Anesthesia Type: MAC    Transport from OR: Transported from OR on room air with adequate spontaneous ventilation    Post pain: adequate analgesia    Post assessment: no apparent anesthetic complications and tolerated procedure well    Post vital signs: stable    Level of consciousness: awake, alert and oriented    Nausea/Vomiting: no nausea/vomiting    Complications: none    Transfer of care protocol was followed      Last vitals: There were no vitals taken for this visit.

## 2018-02-06 NOTE — PLAN OF CARE
Problem: Patient Care Overview  Goal: Plan of Care Review  Outcome: Ongoing (interventions implemented as appropriate)  Received report from Charleen. Patient s/p PPM, AAOx3. VSS, no c/o pain or discomfort at this time, resp even and unlabored. Mepilex/pressure dressing to Left upper chest wall is CDI, sling in place. No active bleeding. No hematoma noted. Post procedure protocol reviewed with patient and patient's family. Understanding verbalized. Family members at bedside. Nurse call bell within reach. Will continue to monitor per post procedure protocol.

## 2018-02-07 VITALS
HEART RATE: 59 BPM | RESPIRATION RATE: 18 BRPM | DIASTOLIC BLOOD PRESSURE: 78 MMHG | TEMPERATURE: 98 F | SYSTOLIC BLOOD PRESSURE: 132 MMHG | OXYGEN SATURATION: 97 %

## 2018-02-07 LAB — POCT GLUCOSE: 113 MG/DL (ref 70–110)

## 2018-02-07 PROCEDURE — 82962 GLUCOSE BLOOD TEST: CPT

## 2018-02-07 PROCEDURE — 93005 ELECTROCARDIOGRAM TRACING: CPT

## 2018-02-07 PROCEDURE — 93010 ELECTROCARDIOGRAM REPORT: CPT | Mod: ,,, | Performed by: INTERNAL MEDICINE

## 2018-02-07 PROCEDURE — 25000003 PHARM REV CODE 250: Performed by: NURSE PRACTITIONER

## 2018-02-07 RX ORDER — CEPHALEXIN 500 MG/1
500 CAPSULE ORAL EVERY 12 HOURS
Qty: 10 CAPSULE | Refills: 0 | Status: SHIPPED | OUTPATIENT
Start: 2018-02-07 | End: 2018-02-28

## 2018-02-07 RX ORDER — CEPHALEXIN 500 MG/1
500 CAPSULE ORAL EVERY 12 HOURS
Status: DISCONTINUED | OUTPATIENT
Start: 2018-02-07 | End: 2018-02-07 | Stop reason: HOSPADM

## 2018-02-07 RX ADMIN — CEPHALEXIN 500 MG: 500 CAPSULE ORAL at 09:02

## 2018-02-07 NOTE — PLAN OF CARE
Problem: Patient Care Overview  Goal: Plan of Care Review  Outcome: Ongoing (interventions implemented as appropriate)  L Chest dressing remained CDI, no bleeding or hematoma noted overnight.  Pt ambulated without difficulty.  Will cont to monitor.

## 2018-02-07 NOTE — PROGRESS NOTES
Pt is AAOx3 and in no apparent distress.  L chest dressing c/d/i.  Helped pt get dressed and applied sling and swaddle while demonstrating application to wife and son.  Provided a copy of discharge instructions.  Teaching performed.  Pt verbalized understanding and denied any questions.  PIV d/c catheter tip intact.  2x2 applied and no active bleeding noted.  Pt refused wheelchair and is walking out with family for transport home.

## 2018-02-08 NOTE — DISCHARGE SUMMARY
"[facility]  Cardiac Electrophysiology  Discharge Summary      Patient Name: Shannan Norman  MRN: [id number]  Admission Date: 2/6/2018  Hospital Length of Stay: 0 days  Discharge Date and Time: 2/7/2018  9:46 AM  Attending Physician: SHERYL Sanchez MD   Discharging Provider: Malaika Le NP  Primary Care Physician: Onofre Paulson MD    HPI:   72 y.o. 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 Of note, HV then 65 ms.  long hx SB  DM HTN     Feels well. Exercises 4-5 days/week, but at "4 out of 10" intensity. Bikes 6 miles  On exercise bike, his HR rarely reaches 100 bpm.   We previously placed a 48-hour Holter to follow HR curve. This showed SR/SB, with HR range 29-99 bpm (including during exertion). Had some junctional rhythm, including HR during usual waking hours (though may have been taking a nap) at 37 bpm.      Since last seen, feels about the same... except on occasion, while sitting quietly, he gets episodes of LH. Doesn't happen during exertion or other activity.    Procedure(s) (LRB):  INSERTION-PACEMAKER-DUAL (Left)     Hospital Course: S/p PPM placement on 2/6/18.  Tolerated procedure. Without complaints. No acute issues overnight.  Surgical incision open to air clean and dry. Telemetry reviewed. No bleeding. No hematoma at site. Tolerating diet, ambulating, pain well controlled. D/c instructions provided to patient, wife, and son regarding post op care including specific arm restrictions and duration of sling usage.  Patient and family verbalized understanding.    Consults: Anesthesia    Significant Diagnostic Studies: Labs: wnl except GFR: 54; BUN/Crt: 31/1.3    Pending Diagnostic Studies:     None          Final Active Diagnoses:    Diagnosis Date Noted POA    PRINCIPAL PROBLEM:  SSS (sick sinus syndrome) [I49.5] 10/24/2016 Yes    Junctional bradycardia [R00.1] 02/06/2018 Yes    NICM (nonischemic cardiomyopathy) [I42.8] 02/06/2018 Yes      Problems Resolved During this Admission:    " Diagnosis Date Noted Date Resolved POA       Discharged Condition: good    Disposition: Home or Self Care    Follow Up:  Follow-up Information     PACEMAKER In 1 week.    Specialty:  Cardiology  Why:  For wound re-check           Hany aSnchez MD In 3 months.    Specialties:  Cardiology, Electrophysiology  Why:  post PPM placement  Contact information:  [address]  [facility] [id number]  [phone]                 Patient Instructions:     Diet Cardiac   Order Comments: Resume pre procedure diet     Ice to affected area   Order Comments: X 24-48 hours as tolerated     Lifting restrictions   Order Comments: No lifting more than 5-10 pounds x 4 weeks     Other restrictions (specify):   Order Comments: Wear sling/swath to Left arm continuously x 48 hours; then wear sling and swath at bedtime only x 6 weeks  Do not lift/extend Left arm x 6 weeks  Do not lift shoulder or raise Left arm above head x 6 weeks     No driving until:   Order Comments: No driving x 2 weeks.  May drive after 2 weeks if you do not need to use the left arm to steer or make turns.     Call MD for:  temperature >100.4     Call MD for:  persistent nausea and vomiting or diarrhea     Call MD for:  severe uncontrolled pain     Call MD for:  redness, tenderness, or signs of infection (pain, swelling, redness, odor or green/yellow discharge around incision site)     Call MD for:  difficulty breathing or increased cough     Call MD for:  severe persistent headache     Call MD for:  worsening rash     Call MD for:  persistent dizziness, light-headedness, or visual disturbances     Call MD for:  increased confusion or weakness     Call MD for:   Order Comments: For any concerns     No dressing needed   Order Comments: May shower tonight with back to water spray.  No tub baths or soaking wound in water x 6 weeks.     Leave dressing on - Keep it clean, dry, and intact until clinic visit   Order Comments: Clinic visit in 1 week for wound check        Medications:  Reconciled Home Medications:   Discharge Medication List as of 2/7/2018  9:27 AM      START taking these medications    Details   cephALEXin (KEFLEX) 500 MG capsule Take 1 capsule (500 mg total) by mouth every 12 (twelve) hours., Starting Wed 2/7/2018, Normal         CONTINUE these medications which have NOT CHANGED    Details   ascorbic acid (VITAMIN C) 500 MG tablet Take 1,000 mg by mouth nightly. , Historical Med      aspirin (ECOTRIN) 81 MG EC tablet Take 81 mg by mouth nightly. , Historical Med      atorvastatin (LIPITOR) 80 MG tablet Take 1 tablet (80 mg total) by mouth once daily., Starting Wed 4/12/2017, Normal      benazepril (LOTENSIN) 40 MG tablet Take 1 tablet (40 mg total) by mouth once daily., Starting Wed 4/12/2017, Normal      chlorthalidone (HYGROTEN) 25 MG Tab Take 1 tablet (25 mg total) by mouth once daily., Starting Wed 4/12/2017, Until Thu 4/12/2018, Normal      finasteride (PROSCAR) 5 mg tablet TAKE 1 TABLET(5 MG) BY MOUTH EVERY DAY, Normal      GLUCOSAMINE HCL/CHONDR VASQUEZ A NA (GLUCOSAMINE-CHONDROITIN) 750-600 mg Tab Take 2 tablets by mouth Daily., Until Discontinued, Historical Med      meloxicam (MOBIC) 15 MG tablet TAKE 1 TABLET(15 MG) BY MOUTH EVERY DAY, Normal      multivitamin capsule Take 1 capsule by mouth nightly. , Historical Med      RESTASIS 0.05 % ophthalmic emulsion INSTILL 1 DROP IN BOTH EYES TWICE DAILY, Normal      tamsulosin (FLOMAX) 0.4 mg Cp24 Take 1 capsule (0.4 mg total) by mouth once daily., Starting 3/15/2017, Until Thu 3/15/18, Normal      ciclopirox (PENLAC) 8 % Soln Apply topically nightly., Starting Tue 1/23/2018, Normal      clotrimazole-betamethasone 1-0.05% (LOTRISONE) cream Apply topically 2 (two) times daily., Starting 2/4/2016, Until Discontinued, Normal      econazole nitrate 1 % cream Apply topically 2 (two) times daily. Apply to affected toenail and surrounding skin twice daily., Starting 5/20/2013, Until Discontinued, Normal       indomethacin (INDOCIN) 50 MG capsule Take 1 capsule (50 mg total) by mouth 3 (three) times daily as needed., Starting 1/3/2017, Until Discontinued, Normal      LORATADINE ORAL Take 10 mg by mouth as needed., Until Discontinued, Historical Med      oxybutynin (DITROPAN) 5 MG Tab Take 1 tablet (5 mg total) by mouth 3 (three) times daily., Starting Tue 10/10/2017, Until Mon 1/15/2018, Normal           Malaika Le NP  Cardiac Electrophysiology  [facility]

## 2018-02-14 ENCOUNTER — PATIENT MESSAGE (OUTPATIENT)
Dept: ELECTROPHYSIOLOGY | Facility: CLINIC | Age: 73
End: 2018-02-14

## 2018-02-20 ENCOUNTER — CLINICAL SUPPORT (OUTPATIENT)
Dept: ELECTROPHYSIOLOGY | Facility: CLINIC | Age: 73
End: 2018-02-20
Attending: INTERNAL MEDICINE
Payer: MEDICARE

## 2018-02-20 ENCOUNTER — RESEARCH ENCOUNTER (OUTPATIENT)
Dept: RESEARCH | Facility: HOSPITAL | Age: 73
End: 2018-02-20

## 2018-02-20 DIAGNOSIS — I49.9 CARDIAC ARRHYTHMIA, UNSPECIFIED CARDIAC ARRHYTHMIA TYPE: ICD-10-CM

## 2018-02-20 DIAGNOSIS — I49.5 SSS (SICK SINUS SYNDROME): ICD-10-CM

## 2018-02-20 PROCEDURE — 93280 PM DEVICE PROGR EVAL DUAL: CPT | Mod: PBBFAC | Performed by: INTERNAL MEDICINE

## 2018-02-20 NOTE — PROGRESS NOTES
Date Consent signed: 2/20/2018    Sponsor: [employer]    Study Title/IRB Number: [id number]    Principle Investigator: Ankush Sánchez MD    Present for Discussion: Patient & CRC     Is LAR Consenting for Subject: No    Prior to the Informed Consent (IC) being signed, or any study protocol required data collection, testing, procedure, or intervention being performed, the following was done and/or discussed:   Patient was given a copy of the IC for review    Purpose of the study and qualifications to participate    Study design, Follow up schedule, and tests or procedures done at each visit   Confidentiality and HIPAA Authorization for Release of Medical Records for the research trial/ subject's rights/research related injury   Risk, Benefits, Alternative Treatments, Compensation and Costs   Participation in the research trial is voluntary and patient may withdraw at anytime   Contact information for study related questions    Patient verbalizes understanding of the above: Yes  Contact information for CRC and PI given to patient: Yes  Patient able to adequately summarize: the purpose of the study, the risks associated with the study, and all procedures, testing, and follow-ups associated with the study: Yes    He signed the informed consent form for the Luis [facility] MRI research study with an IRB approval date of 05/10/2017.  Each page of the consent form was reviewed with him and all questions answered satisfactorily. He signed the consent form and received a copy of same. The original consent was scanned into electronic medical records (EPIC) and filed into the subject's research study binder.

## 2018-02-28 ENCOUNTER — HOSPITAL ENCOUNTER (OUTPATIENT)
Dept: CARDIOLOGY | Facility: CLINIC | Age: 73
Discharge: HOME | End: 2018-02-28
Payer: MEDICARE

## 2018-02-28 ENCOUNTER — PATIENT MESSAGE (OUTPATIENT)
Dept: ELECTROPHYSIOLOGY | Facility: CLINIC | Age: 73
End: 2018-02-28

## 2018-02-28 ENCOUNTER — OFFICE VISIT (OUTPATIENT)
Dept: ELECTROPHYSIOLOGY | Facility: CLINIC | Age: 73
End: 2018-02-28
Payer: MEDICARE

## 2018-02-28 VITALS
SYSTOLIC BLOOD PRESSURE: 127 MMHG | DIASTOLIC BLOOD PRESSURE: 78 MMHG | HEIGHT: 72 IN | WEIGHT: 231 LBS | BODY MASS INDEX: 31.29 KG/M2 | HEART RATE: 65 BPM

## 2018-02-28 DIAGNOSIS — I49.5 SSS (SICK SINUS SYNDROME): ICD-10-CM

## 2018-02-28 DIAGNOSIS — I48.92 ATRIAL FLUTTER, UNSPECIFIED TYPE: ICD-10-CM

## 2018-02-28 DIAGNOSIS — E11.9 TYPE 2 DIABETES MELLITUS WITHOUT RETINOPATHY: ICD-10-CM

## 2018-02-28 DIAGNOSIS — I10 ESSENTIAL HYPERTENSION: ICD-10-CM

## 2018-02-28 DIAGNOSIS — I49.49 JUNCTIONAL ESCAPE RHYTHM: ICD-10-CM

## 2018-02-28 DIAGNOSIS — E78.01 HYPERLIPIDEMIA TYPE II: Primary | ICD-10-CM

## 2018-02-28 PROCEDURE — 93005 ELECTROCARDIOGRAM TRACING: CPT | Mod: PBBFAC | Performed by: INTERNAL MEDICINE

## 2018-02-28 PROCEDURE — 99999 PR PBB SHADOW E&M-EST. PATIENT-LVL III: CPT | Mod: PBBFAC,,, | Performed by: INTERNAL MEDICINE

## 2018-02-28 PROCEDURE — 99024 POSTOP FOLLOW-UP VISIT: CPT | Mod: S$PBB,,, | Performed by: INTERNAL MEDICINE

## 2018-02-28 PROCEDURE — 99213 OFFICE O/P EST LOW 20 MIN: CPT | Mod: PBBFAC,25 | Performed by: INTERNAL MEDICINE

## 2018-02-28 PROCEDURE — 93010 ELECTROCARDIOGRAM REPORT: CPT | Mod: S$PBB,,, | Performed by: INTERNAL MEDICINE

## 2018-02-28 RX ORDER — BENAZEPRIL HYDROCHLORIDE 20 MG/1
40 TABLET ORAL DAILY
Qty: 90 TABLET | Refills: 3 | Status: SHIPPED | OUTPATIENT
Start: 2018-02-28 | End: 2018-03-19

## 2018-02-28 RX ORDER — CHLORTHALIDONE 25 MG/1
25 TABLET ORAL DAILY
Qty: 90 TABLET | Refills: 3 | Status: SHIPPED | OUTPATIENT
Start: 2018-02-28 | End: 2019-02-05

## 2018-02-28 NOTE — PROGRESS NOTES
"Subjective:    Patient ID:  Shannan Norman is a 72 y.o. male who presents for evaluation of bradycardia    HPI   72 y.o. 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 Of note, HV then 65 ms.  long hx SB  DM   HTN on meds   SSS, s/p PPM    Feels well. Exercises 4-5 days/week, but at "4 out of 10" intensity. Bikes 6 miles  On exercise bike, his HR rarely reaches 100 bpm.   We previously placed a 48-hour Holter to follow HR curve. This showed SR/SB, with HR range 29-99 bpm (including during exertion). Had some junctional rhythm, including HR during usual waking hours (though may have been taking a nap) at 37 bpm.     I placed a PPM 2/2018. Feeling well. Healed well.  He's leaving on a trip for 2-4 months; wanted to stop in today.  PPM at wound check: AP 79%,  2.9%.    6/2012: 60% LVEF  9/12/16 ecg: junctional at 48 bpm. Previously, chronic SB in 40s, for years.  12/18/18: SR at 39 bpm.    My interpretation of today's ECG is APVS at 65 bpm    Review of Systems   Constitution: Negative. Negative for weakness and malaise/fatigue.   HENT: Negative.  Negative for ear pain and tinnitus.    Eyes: Negative for blurred vision.   Cardiovascular: Negative.  Negative for chest pain, dyspnea on exertion, near-syncope, palpitations and syncope.   Respiratory: Negative.  Negative for shortness of breath.    Endocrine: Negative.  Negative for polyuria.   Hematologic/Lymphatic: Does not bruise/bleed easily.   Skin: Negative.  Negative for rash.   Musculoskeletal: Negative.  Negative for joint pain and muscle weakness.   Gastrointestinal: Negative.  Negative for abdominal pain and change in bowel habit.   Genitourinary: Negative for frequency.   Neurological: Negative.  Negative for dizziness.   Psychiatric/Behavioral: Negative.  Negative for depression. The patient is not nervous/anxious.    Allergic/Immunologic: Negative for environmental allergies.        Objective:    Physical Exam   Constitutional: He is oriented to person, place, and " time. He appears well-developed and well-nourished.   HENT:   Head: Normocephalic and atraumatic.   Eyes: Conjunctivae, EOM and lids are normal. No scleral icterus.   Neck: Normal range of motion. No JVD present. No tracheal deviation present. No thyromegaly present.   Cardiovascular: Regular rhythm, normal heart sounds and intact distal pulses.   No extrasystoles are present. PMI is not displaced.  Exam reveals no gallop and no friction rub.    No murmur heard.  Pulses:       Radial pulses are 2+ on the right side, and 2+ on the left side.   Pulmonary/Chest: Effort normal and breath sounds normal. No accessory muscle usage. No tachypnea. No respiratory distress. He has no wheezes. He has no rales.   Abdominal: Soft. Bowel sounds are normal. He exhibits no distension. There is no hepatosplenomegaly. There is no tenderness.   Musculoskeletal: Normal range of motion. He exhibits no edema.   Neurological: He is alert and oriented to person, place, and time. He has normal reflexes. He exhibits normal muscle tone.   Skin: Skin is warm and dry. No rash noted.   Psychiatric: He has a normal mood and affect. His behavior is normal.   Nursing note and vitals reviewed.        Assessment:       1. Hyperlipidemia type II    2. Essential hypertension    3. Atrial flutter, unspecified type    4. SSS (sick sinus syndrome)    5. Junctional escape rhythm    6. Type 2 diabetes mellitus without retinopathy         Plan:       Some LH on rising. Therefore decrease ARB, increase chlorthalidone.  Continue f/u in device clinic.  Return in 1 year with echo, or earlier prn.                Subjective:    Patient ID:  Shannan Norman is a 72 y.o. male who presents for follow-up of Atrial Flutter      HPI    ROS     Objective:    Physical Exam      Assessment:       No diagnosis found.     Plan:

## 2018-03-01 ENCOUNTER — OFFICE VISIT (OUTPATIENT)
Dept: UROLOGY | Facility: CLINIC | Age: 73
End: 2018-03-01
Payer: MEDICARE

## 2018-03-01 VITALS
BODY MASS INDEX: 31.29 KG/M2 | DIASTOLIC BLOOD PRESSURE: 78 MMHG | WEIGHT: 231 LBS | HEART RATE: 66 BPM | SYSTOLIC BLOOD PRESSURE: 134 MMHG | HEIGHT: 72 IN

## 2018-03-01 DIAGNOSIS — N32.81 OAB (OVERACTIVE BLADDER): ICD-10-CM

## 2018-03-01 DIAGNOSIS — N40.0 BENIGN PROSTATIC HYPERPLASIA, UNSPECIFIED WHETHER LOWER URINARY TRACT SYMPTOMS PRESENT: Primary | ICD-10-CM

## 2018-03-01 PROCEDURE — 99999 PR PBB SHADOW E&M-EST. PATIENT-LVL III: CPT | Mod: PBBFAC,,, | Performed by: UROLOGY

## 2018-03-01 PROCEDURE — 99213 OFFICE O/P EST LOW 20 MIN: CPT | Mod: PBBFAC,PO | Performed by: UROLOGY

## 2018-03-01 PROCEDURE — 99214 OFFICE O/P EST MOD 30 MIN: CPT | Mod: S$PBB,,, | Performed by: UROLOGY

## 2018-03-01 PROCEDURE — 81002 URINALYSIS NONAUTO W/O SCOPE: CPT | Mod: PBBFAC,PO | Performed by: UROLOGY

## 2018-03-01 NOTE — PROGRESS NOTES
This patient was last seen by me October last year follow-up for BPH and overactive bladder.  At that time oxybutynin was added to his finasteride and tamsulosin    Patient now comes in follow-up stating he takes 1 oxybutynin today but that he still has some urgency especially when driving    He has some decreased force of stream with nocturia ×3 and no dysuria    Physical exam reveals reveals a well-developed well-nourished patient  in no acute distress.  Patient is alert and oriented ×3 with normal mood and affect.  Respiratory effort is normal and there is no peripheral edema.  Skin is normal to inspection and palpation.Penis/perineum without lesions, scrotum without rash/cysts, epididymis nontender bilaterally, urethral meatus in normal location normal size, no penile plaques palpated, prostate:      Smooth enlarged and benign feeling                 seminal vesicles not palpated.  No rectal masses, sphincter tone normal.  Testes equal in size without masses    /78   Pulse 66   Ht 6' (1.829 m)   Wt 104.8 kg (231 lb)   BMI 31.33 kg/m²   Review of Systems  General ROS: negative for chills, fever or weight loss  Respiratory ROS: no cough, shortness of breath, or wheezing  Cardiovascular ROS: no chest pain or dyspnea on exertion  Musculoskeletal ROS: negative for gait disturbance or muscular weakness    Family History   Problem Relation Age of Onset    Diabetes Mother     COPD Father     No Known Problems Sister     Heart attack Brother     Heart disease Brother     No Known Problems Maternal Aunt     No Known Problems Maternal Uncle     No Known Problems Paternal Aunt     No Known Problems Paternal Uncle     No Known Problems Maternal Grandmother     No Known Problems Maternal Grandfather     No Known Problems Paternal Grandmother     No Known Problems Paternal Grandfather     Amblyopia Neg Hx     Blindness Neg Hx     Cancer Neg Hx     Cataracts Neg Hx     Glaucoma Neg Hx      Hypertension Neg Hx     Macular degeneration Neg Hx     Retinal detachment Neg Hx     Strabismus Neg Hx     Stroke Neg Hx     Thyroid disease Neg Hx     Prostate cancer Neg Hx     Kidney disease Neg Hx     Melanoma Neg Hx      Past Medical History:   Diagnosis Date    Atrial flutter 2011    ablation    Cataract     Diabetes mellitus     Dry eye syndrome     Gout, unspecified     High cholesterol     History of shingles     Hypertension     Seizures      Family History   Problem Relation Age of Onset    Diabetes Mother     COPD Father     No Known Problems Sister     Heart attack Brother     Heart disease Brother     No Known Problems Maternal Aunt     No Known Problems Maternal Uncle     No Known Problems Paternal Aunt     No Known Problems Paternal Uncle     No Known Problems Maternal Grandmother     No Known Problems Maternal Grandfather     No Known Problems Paternal Grandmother     No Known Problems Paternal Grandfather     Amblyopia Neg Hx     Blindness Neg Hx     Cancer Neg Hx     Cataracts Neg Hx     Glaucoma Neg Hx     Hypertension Neg Hx     Macular degeneration Neg Hx     Retinal detachment Neg Hx     Strabismus Neg Hx     Stroke Neg Hx     Thyroid disease Neg Hx     Prostate cancer Neg Hx     Kidney disease Neg Hx     Melanoma Neg Hx      Social History   Substance Use Topics    Smoking status: Never Smoker    Smokeless tobacco: Never Used    Alcohol use 4.2 oz/week     7 Glasses of wine per week       Impression:      ICD-10-CM ICD-9-CM    1. Benign prostatic hyperplasia, unspecified whether lower urinary tract symptoms present N40.0 600.00 POCT URINE DIPSTICK WITHOUT MICROSCOPE   2. OAB (overactive bladder) N32.81 596.51        Plan:    #1 and 2.  BPH and overactive bladder.  Plan.  I recommend to the patient that he try increasing the oxybutynin she try to help with his urgency.  Patient warned about urinary retention.  Discussed that he should continue  with the finasteride and tamsulosin.  I also discussed surgical intervention for voiding symptoms.  At this point patient wants to continue medical therapy.    Patient will follow-up with me in approximate 6 months when he returns from an upcoming trip    PLEASE NOTE:  Please be advised that portions of this note were dictated using voice recognition software and may contain dictation related errors in spelling/grammar/appropriate pronouns/syntax or other errors that might have not been found and or corrected on text review.

## 2018-03-02 LAB
BILIRUB SERPL-MCNC: NORMAL MG/DL
BLOOD URINE, POC: NORMAL
COLOR, POC UA: YELLOW
GLUCOSE UR QL STRIP: NORMAL
KETONES UR QL STRIP: NORMAL
LEUKOCYTE ESTERASE URINE, POC: NORMAL
NITRITE, POC UA: NORMAL
PH, POC UA: 5
PROTEIN, POC: NORMAL
SPECIFIC GRAVITY, POC UA: 1.02
UROBILINOGEN, POC UA: NORMAL

## 2018-03-05 ENCOUNTER — OFFICE VISIT (OUTPATIENT)
Dept: OPTOMETRY | Facility: CLINIC | Age: 73
End: 2018-03-05
Payer: MEDICARE

## 2018-03-05 DIAGNOSIS — H25.13 NUCLEAR SCLEROSIS, BILATERAL: ICD-10-CM

## 2018-03-05 DIAGNOSIS — H04.123 BILATERAL DRY EYES: ICD-10-CM

## 2018-03-05 DIAGNOSIS — E11.9 TYPE 2 DIABETES MELLITUS WITHOUT RETINOPATHY: Primary | ICD-10-CM

## 2018-03-05 PROCEDURE — 99999 PR PBB SHADOW E&M-EST. PATIENT-LVL II: CPT | Mod: PBBFAC,,, | Performed by: OPTOMETRIST

## 2018-03-05 PROCEDURE — 92014 COMPRE OPH EXAM EST PT 1/>: CPT | Mod: S$PBB,,, | Performed by: OPTOMETRIST

## 2018-03-05 PROCEDURE — 99212 OFFICE O/P EST SF 10 MIN: CPT | Mod: PBBFAC,PO | Performed by: OPTOMETRIST

## 2018-03-05 RX ORDER — CYCLOSPORINE 0.5 MG/ML
1 EMULSION OPHTHALMIC 2 TIMES DAILY
Qty: 60 VIAL | Refills: 12 | Status: SHIPPED | OUTPATIENT
Start: 2018-03-05 | End: 2018-12-18 | Stop reason: SDUPTHER

## 2018-03-05 NOTE — PROGRESS NOTES
NICO KNOTT 03/2017  Diabetic  this morning.  Hasn't noticed any vision   changes.  Has prescription reading glasses, only wears occassionally.   Patient defers refraction today.Using restasis BID OU.    Hemoglobin A1C       Date                     Value               Ref Range             Status                08/05/2017               6.1 (H)             4.0 - 5.6 %           Final               03/15/2017               6.6 (H)             4.5 - 6.2 %           Final             07/08/2016               6.3 (H)             4.5 - 6.2 %           Final            Last edited by Bhavna Giron on 3/5/2018  9:03 AM. (History)            Assessment /Plan     For exam results, see Encounter Report.    Type 2 diabetes mellitus without retinopathy    Nuclear sclerosis, bilateral    Bilateral dry eyes  -     cycloSPORINE (RESTASIS) 0.05 % ophthalmic emulsion; Place 0.4 mLs (1 drop total) into both eyes 2 (two) times daily.  Dispense: 60 vial; Refill: 12      1. No diabetic retinopathy, no csme. Return in 1 year for dilated eye exam.  2. Educated pt on presence of cataracts and effects on vision. No surgery at this time. Recheck in one year.  3. Cont Restasis bid OU.

## 2018-03-07 ENCOUNTER — OFFICE VISIT (OUTPATIENT)
Dept: FAMILY MEDICINE | Facility: CLINIC | Age: 73
End: 2018-03-07
Attending: FAMILY MEDICINE
Payer: MEDICARE

## 2018-03-07 ENCOUNTER — LAB VISIT (OUTPATIENT)
Dept: LAB | Facility: HOSPITAL | Age: 73
End: 2018-03-07
Attending: FAMILY MEDICINE
Payer: MEDICARE

## 2018-03-07 VITALS
WEIGHT: 230.81 LBS | HEART RATE: 60 BPM | DIASTOLIC BLOOD PRESSURE: 72 MMHG | OXYGEN SATURATION: 96 % | TEMPERATURE: 98 F | SYSTOLIC BLOOD PRESSURE: 120 MMHG | HEIGHT: 72 IN | BODY MASS INDEX: 31.26 KG/M2

## 2018-03-07 DIAGNOSIS — I15.2 HYPERTENSION ASSOCIATED WITH DIABETES: ICD-10-CM

## 2018-03-07 DIAGNOSIS — E08.21 DIABETES MELLITUS DUE TO UNDERLYING CONDITION WITH DIABETIC NEPHROPATHY, WITHOUT LONG-TERM CURRENT USE OF INSULIN: Primary | ICD-10-CM

## 2018-03-07 DIAGNOSIS — E11.9 TYPE 2 DIABETES MELLITUS WITHOUT RETINOPATHY: ICD-10-CM

## 2018-03-07 DIAGNOSIS — E78.5 DYSLIPIDEMIA ASSOCIATED WITH TYPE 2 DIABETES MELLITUS: ICD-10-CM

## 2018-03-07 DIAGNOSIS — E66.9 OBESITY (BMI 30.0-34.9): ICD-10-CM

## 2018-03-07 DIAGNOSIS — E78.5 HYPERLIPIDEMIA, UNSPECIFIED HYPERLIPIDEMIA TYPE: ICD-10-CM

## 2018-03-07 DIAGNOSIS — B35.4 TINEA CORPORIS: ICD-10-CM

## 2018-03-07 DIAGNOSIS — E11.59 HYPERTENSION ASSOCIATED WITH DIABETES: ICD-10-CM

## 2018-03-07 DIAGNOSIS — I49.5 SSS (SICK SINUS SYNDROME): ICD-10-CM

## 2018-03-07 DIAGNOSIS — N40.0 BENIGN PROSTATIC HYPERPLASIA, UNSPECIFIED WHETHER LOWER URINARY TRACT SYMPTOMS PRESENT: ICD-10-CM

## 2018-03-07 DIAGNOSIS — I42.8 NICM (NONISCHEMIC CARDIOMYOPATHY): ICD-10-CM

## 2018-03-07 DIAGNOSIS — E11.69 DYSLIPIDEMIA ASSOCIATED WITH TYPE 2 DIABETES MELLITUS: ICD-10-CM

## 2018-03-07 DIAGNOSIS — E08.21 DIABETES MELLITUS DUE TO UNDERLYING CONDITION WITH DIABETIC NEPHROPATHY, WITHOUT LONG-TERM CURRENT USE OF INSULIN: ICD-10-CM

## 2018-03-07 DIAGNOSIS — I48.92 ATRIAL FLUTTER, UNSPECIFIED TYPE: ICD-10-CM

## 2018-03-07 LAB
ALBUMIN SERPL BCP-MCNC: 4.2 G/DL
ALP SERPL-CCNC: 86 U/L
ALT SERPL W/O P-5'-P-CCNC: 21 U/L
ANION GAP SERPL CALC-SCNC: 9 MMOL/L
AST SERPL-CCNC: 19 U/L
BILIRUB SERPL-MCNC: 0.6 MG/DL
BUN SERPL-MCNC: 24 MG/DL
CALCIUM SERPL-MCNC: 9.8 MG/DL
CHLORIDE SERPL-SCNC: 102 MMOL/L
CO2 SERPL-SCNC: 28 MMOL/L
CREAT SERPL-MCNC: 1.7 MG/DL
EST. GFR  (AFRICAN AMERICAN): 45 ML/MIN/1.73 M^2
EST. GFR  (NON AFRICAN AMERICAN): 39 ML/MIN/1.73 M^2
ESTIMATED AVG GLUCOSE: 120 MG/DL
GLUCOSE SERPL-MCNC: 96 MG/DL
HBA1C MFR BLD HPLC: 5.8 %
POTASSIUM SERPL-SCNC: 4.1 MMOL/L
PROT SERPL-MCNC: 7.1 G/DL
SODIUM SERPL-SCNC: 139 MMOL/L

## 2018-03-07 PROCEDURE — 36415 COLL VENOUS BLD VENIPUNCTURE: CPT

## 2018-03-07 PROCEDURE — 80053 COMPREHEN METABOLIC PANEL: CPT

## 2018-03-07 PROCEDURE — 99999 PR PBB SHADOW E&M-EST. PATIENT-LVL III: CPT | Mod: PBBFAC,,, | Performed by: FAMILY MEDICINE

## 2018-03-07 PROCEDURE — 99214 OFFICE O/P EST MOD 30 MIN: CPT | Mod: S$PBB,,, | Performed by: FAMILY MEDICINE

## 2018-03-07 PROCEDURE — 99213 OFFICE O/P EST LOW 20 MIN: CPT | Mod: PBBFAC,PO | Performed by: FAMILY MEDICINE

## 2018-03-07 PROCEDURE — 83036 HEMOGLOBIN GLYCOSYLATED A1C: CPT

## 2018-03-07 RX ORDER — ATORVASTATIN CALCIUM 80 MG/1
80 TABLET, FILM COATED ORAL NIGHTLY
Qty: 90 TABLET | Refills: 3 | Status: SHIPPED | OUTPATIENT
Start: 2018-03-07 | End: 2018-04-30 | Stop reason: SDUPTHER

## 2018-03-07 RX ORDER — TAMSULOSIN HYDROCHLORIDE 0.4 MG/1
0.4 CAPSULE ORAL NIGHTLY
Qty: 30 CAPSULE | Refills: 11 | Status: SHIPPED | OUTPATIENT
Start: 2018-03-07 | End: 2018-03-29 | Stop reason: SDUPTHER

## 2018-03-07 RX ORDER — CLOTRIMAZOLE AND BETAMETHASONE DIPROPIONATE 10; .64 MG/G; MG/G
CREAM TOPICAL
Qty: 45 G | Refills: 2 | Status: SHIPPED | OUTPATIENT
Start: 2018-03-07 | End: 2021-06-18

## 2018-03-07 NOTE — PROGRESS NOTES
Subjective:       Patient ID: Shannan Norman is a 72 y.o. male.    Chief Complaint: Follow-up (6 month)    72 yr old pleasant white male with DM II controlled, HTN, HLD, obesity, presents today for his 6 month follow up and emd refills. No new concerns today.    BPH - much better  - no dysuria - tried flomax and proscar and nothing helps       DM II - controlled -  diet control - A1C                   6.1 (H)             08/05/2017            - on ACE and ASA - UTD with eye and foot screen      HTN - controlled - oN ACE - compliant - no side effects      HLd - controlled - on statin - compliant - no side effects - LDLCALC                  66.0                08/05/2017                History as below - reviewed and no changes    HM  -labs due  -PSA due  -adacel UTD            Diabetes   He presents for his follow-up diabetic visit. He has type 2 diabetes mellitus. His disease course has been stable. Pertinent negatives for hypoglycemia include no confusion, dizziness, headaches, hunger, mood changes, nervousness/anxiousness, seizures, sleepiness, speech difficulty, sweats or tremors. Pertinent negatives for diabetes include no chest pain, no foot ulcerations, no polydipsia, no polyuria, no visual change, no weakness and no weight loss. Symptoms are stable. Pertinent negatives for diabetic complications include no CVA, PVD or retinopathy. Risk factors for coronary artery disease include diabetes mellitus, dyslipidemia, hypertension and male sex. He is compliant with treatment all of the time. He is following a generally healthy diet. Meal planning includes avoidance of concentrated sweets. He rarely participates in exercise. An ACE inhibitor/angiotensin II receptor blocker is being taken. He does not see a podiatrist.Eye exam is current.   Hypertension   This is a chronic problem. The current episode started more than 1 month ago. The problem has been gradually improving since onset. The problem is controlled.  Pertinent negatives include no chest pain, headaches, malaise/fatigue, neck pain, palpitations, peripheral edema, PND or sweats. There are no associated agents to hypertension. Risk factors for coronary artery disease include diabetes mellitus, dyslipidemia, male gender and obesity. Past treatments include ACE inhibitors. The current treatment provides significant improvement. There are no compliance problems.  There is no history of angina, CAD/MI, CVA, left ventricular hypertrophy, PVD, renovascular disease or retinopathy. There is no history of chronic renal disease, coarctation of the aorta, hypercortisolism, hyperparathyroidism, pheochromocytoma or a thyroid problem.   Hyperlipidemia   This is a chronic problem. The current episode started more than 1 year ago. The problem is controlled. Recent lipid tests were reviewed and are normal. Exacerbating diseases include diabetes and obesity. He has no history of chronic renal disease. Pertinent negatives include no chest pain, leg pain or myalgias. The current treatment provides significant improvement of lipids. There are no compliance problems.  Risk factors for coronary artery disease include diabetes mellitus, dyslipidemia, hypertension, male sex and obesity.   Benign Prostatic Hypertrophy   This is a chronic problem. The current episode started more than 1 year ago. The problem has been gradually worsening since onset. Irritative symptoms include nocturia. Irritative symptoms do not include frequency or urgency. Obstructive symptoms include dribbling, incomplete emptying, an intermittent stream, a slower stream and straining. Pertinent negatives include no vomiting. AUA score is 8-19. He is sexually active. Nothing aggravates the symptoms. Past treatments include tamsulosin. The treatment provided significant relief.     Review of Systems   Constitutional: Negative.  Negative for activity change, diaphoresis, malaise/fatigue, unexpected weight change and weight  loss.   HENT: Negative.  Negative for congestion, ear pain, mouth sores, rhinorrhea and voice change.    Eyes: Negative.  Negative for pain, discharge and visual disturbance.   Respiratory: Negative.  Negative for apnea, cough and wheezing.    Cardiovascular: Negative.  Negative for chest pain, palpitations and PND.   Gastrointestinal: Negative.  Negative for abdominal distention, anal bleeding, diarrhea and vomiting.   Endocrine: Negative.  Negative for cold intolerance, polydipsia and polyuria.   Genitourinary: Positive for incomplete emptying and nocturia. Negative for decreased urine volume, difficulty urinating, discharge, frequency, scrotal swelling and urgency.   Musculoskeletal: Negative.  Negative for back pain, myalgias, neck pain and neck stiffness.   Skin: Negative.  Negative for color change and rash.   Allergic/Immunologic: Negative.  Negative for environmental allergies and immunocompromised state.   Neurological: Negative.  Negative for dizziness, tremors, seizures, speech difficulty, weakness, light-headedness and headaches.   Hematological: Negative.    Psychiatric/Behavioral: Negative.  Negative for agitation, confusion, dysphoric mood and suicidal ideas. The patient is not nervous/anxious.        PMH/PSH/FH/SH/MED/ALLERGY reviewed    Objective:       Vitals:    03/07/18 1334   BP: 120/72   Pulse: 60   Temp: 97.6 °F (36.4 °C)       Physical Exam   Constitutional: He is oriented to person, place, and time. He appears well-developed and well-nourished.   HENT:   Head: Normocephalic and atraumatic.   Right Ear: External ear normal.   Left Ear: External ear normal.   Nose: Nose normal.   Mouth/Throat: Oropharynx is clear and moist. No oropharyngeal exudate.   Eyes: Conjunctivae and EOM are normal. Pupils are equal, round, and reactive to light. Right eye exhibits no discharge. Left eye exhibits no discharge. No scleral icterus.   Neck: Normal range of motion. Neck supple. No JVD present. No tracheal  deviation present. No thyromegaly present.   Cardiovascular: Normal rate, regular rhythm, normal heart sounds and intact distal pulses.  Exam reveals no gallop and no friction rub.    No murmur heard.  Pulmonary/Chest: Effort normal and breath sounds normal. No stridor. No respiratory distress. He has no wheezes. He has no rales. He exhibits no tenderness.   Abdominal: Soft. Bowel sounds are normal. He exhibits no distension and no mass. There is no tenderness. There is no rebound and no guarding. No hernia.   Musculoskeletal: Normal range of motion. He exhibits no edema or tenderness.   Lymphadenopathy:     He has no cervical adenopathy.   Neurological: He is alert and oriented to person, place, and time. He has normal reflexes. He displays normal reflexes. No cranial nerve deficit. He exhibits normal muscle tone. Coordination normal.   Skin: Skin is warm and dry. No rash noted. No erythema. No pallor.   Psychiatric: He has a normal mood and affect. His behavior is normal. Judgment and thought content normal.        Assessment:       1. Diabetes mellitus due to underlying condition with diabetic nephropathy, without long-term current use of insulin    2. Type 2 diabetes mellitus without retinopathy    3. SSS (sick sinus syndrome)    4. Obesity (BMI 30.0-34.9)    5. NICM (nonischemic cardiomyopathy)    6. Benign prostatic hyperplasia, unspecified whether lower urinary tract symptoms present    7. Atrial flutter, unspecified type    8. Dyslipidemia associated with type 2 diabetes mellitus    9. Hypertension associated with diabetes        Plan:       Shannan was seen today for follow-up.    Diagnoses and all orders for this visit:    Diabetes mellitus due to underlying condition with diabetic nephropathy, without long-term current use of insulin  -     Comprehensive metabolic panel; Future  -     Hemoglobin A1c; Future    Type 2 diabetes mellitus without retinopathy  -     Comprehensive metabolic panel; Future  -      Hemoglobin A1c; Future    SSS (sick sinus syndrome)    Obesity (BMI 30.0-34.9)    NICM (nonischemic cardiomyopathy)    Benign prostatic hyperplasia, unspecified whether lower urinary tract symptoms present  -     tamsulosin (FLOMAX) 0.4 mg Cp24; Take 1 capsule (0.4 mg total) by mouth nightly.    Atrial flutter, unspecified type    Dyslipidemia associated with type 2 diabetes mellitus  -     Comprehensive metabolic panel; Future  -     atorvastatin (LIPITOR) 80 MG tablet; Take 1 tablet (80 mg total) by mouth nightly.    Hypertension associated with diabetes  -     Comprehensive metabolic panel; Future    Hyperlipidemia, unspecified hyperlipidemia type    Tinea corporis  -     clotrimazole-betamethasone 1-0.05% (LOTRISONE) cream; Apply topically as needed.      DM II  -controlled  -labs    HTN  -controlled    HLd  -stable    BPH  -uncontrolled  -refer urology    Spent adequate time in obtaining history and explaining differentials    40 minutes spent during this visit of which greater than 50% devoted to face-face counseling and coordination of care regarding diagnosis and management plan    Follow-up in about 5 months (around 8/7/2018), or if symptoms worsen or fail to improve.

## 2018-03-16 ENCOUNTER — PATIENT MESSAGE (OUTPATIENT)
Dept: ELECTROPHYSIOLOGY | Facility: CLINIC | Age: 73
End: 2018-03-16

## 2018-03-19 DIAGNOSIS — I42.8 NICM (NONISCHEMIC CARDIOMYOPATHY): Primary | ICD-10-CM

## 2018-03-19 RX ORDER — BENAZEPRIL HYDROCHLORIDE 20 MG/1
20 TABLET ORAL DAILY
Qty: 90 TABLET | Refills: 3
Start: 2018-03-19 | End: 2019-02-11 | Stop reason: SDUPTHER

## 2018-03-28 ENCOUNTER — PATIENT MESSAGE (OUTPATIENT)
Dept: UROLOGY | Facility: CLINIC | Age: 73
End: 2018-03-28

## 2018-03-29 DIAGNOSIS — N40.0 BENIGN PROSTATIC HYPERPLASIA, UNSPECIFIED WHETHER LOWER URINARY TRACT SYMPTOMS PRESENT: ICD-10-CM

## 2018-03-29 DIAGNOSIS — N40.1 BENIGN PROSTATIC HYPERPLASIA (BPH) WITH STRAINING ON URINATION: ICD-10-CM

## 2018-03-29 DIAGNOSIS — R39.16 BENIGN PROSTATIC HYPERPLASIA (BPH) WITH STRAINING ON URINATION: ICD-10-CM

## 2018-03-29 RX ORDER — TAMSULOSIN HYDROCHLORIDE 0.4 MG/1
0.4 CAPSULE ORAL NIGHTLY
Qty: 90 CAPSULE | Refills: 3 | Status: SHIPPED | OUTPATIENT
Start: 2018-03-29 | End: 2018-05-25 | Stop reason: SDUPTHER

## 2018-03-29 RX ORDER — FINASTERIDE 5 MG/1
TABLET, FILM COATED ORAL
Qty: 90 TABLET | Refills: 3 | Status: SHIPPED | OUTPATIENT
Start: 2018-03-29 | End: 2019-02-11 | Stop reason: SDUPTHER

## 2018-04-26 ENCOUNTER — PATIENT MESSAGE (OUTPATIENT)
Dept: UROLOGY | Facility: CLINIC | Age: 73
End: 2018-04-26

## 2018-04-26 DIAGNOSIS — N40.0 BENIGN PROSTATIC HYPERPLASIA, UNSPECIFIED WHETHER LOWER URINARY TRACT SYMPTOMS PRESENT: ICD-10-CM

## 2018-04-26 RX ORDER — TAMSULOSIN HYDROCHLORIDE 0.4 MG/1
0.4 CAPSULE ORAL NIGHTLY
Qty: 90 CAPSULE | Refills: 0 | Status: CANCELLED | OUTPATIENT
Start: 2018-04-26 | End: 2019-04-26

## 2018-04-30 ENCOUNTER — PATIENT MESSAGE (OUTPATIENT)
Dept: FAMILY MEDICINE | Facility: CLINIC | Age: 73
End: 2018-04-30

## 2018-04-30 DIAGNOSIS — E11.69 DYSLIPIDEMIA ASSOCIATED WITH TYPE 2 DIABETES MELLITUS: ICD-10-CM

## 2018-04-30 DIAGNOSIS — E78.5 DYSLIPIDEMIA ASSOCIATED WITH TYPE 2 DIABETES MELLITUS: ICD-10-CM

## 2018-04-30 RX ORDER — ATORVASTATIN CALCIUM 80 MG/1
80 TABLET, FILM COATED ORAL NIGHTLY
Qty: 90 TABLET | Refills: 0 | Status: SHIPPED | OUTPATIENT
Start: 2018-04-30 | End: 2019-02-05

## 2018-04-30 RX ORDER — TAMSULOSIN HYDROCHLORIDE 0.4 MG/1
0.4 CAPSULE ORAL
Qty: 90 CAPSULE | Refills: 3 | Status: SHIPPED | OUTPATIENT
Start: 2018-04-30 | End: 2019-01-02

## 2018-04-30 NOTE — TELEPHONE ENCOUNTER
----- Message from Neda Blake sent at 4/30/2018  8:33 AM CDT -----  Contact: [phone]/ [facility]  Pharmacy would like to speak with you about refilling lipitor 80 mg. Please advise.

## 2018-05-04 ENCOUNTER — TELEPHONE (OUTPATIENT)
Dept: FAMILY MEDICINE | Facility: CLINIC | Age: 73
End: 2018-05-04

## 2018-05-04 NOTE — TELEPHONE ENCOUNTER
----- Message from Mario Segura sent at 5/4/2018  9:56 AM CDT -----  Contact: [phone]/self  Pt states he's returning your call  Please call and advise

## 2018-05-04 NOTE — TELEPHONE ENCOUNTER
Spoke to pt and states that he was returning a phone call, but did not know who called him. Informed pt that there was no notes in the system regarding an initial call.

## 2018-05-13 ENCOUNTER — PATIENT MESSAGE (OUTPATIENT)
Dept: FAMILY MEDICINE | Facility: CLINIC | Age: 73
End: 2018-05-13

## 2018-05-14 ENCOUNTER — TELEPHONE (OUTPATIENT)
Dept: FAMILY MEDICINE | Facility: CLINIC | Age: 73
End: 2018-05-14

## 2018-05-14 NOTE — TELEPHONE ENCOUNTER
Spoke to pt and states not to submit a medication request. Pt spoke to [facility] in [place] and informed him that it was too early for a refill.

## 2018-05-14 NOTE — TELEPHONE ENCOUNTER
----- Message from Nidhi Maya sent at 5/14/2018 12:51 PM CDT -----  Contact: [phone]/self  Patient called in returning your call. [facility]  Was only for 30 pills. Would appreciate a new rx sent to [place].  Please advise.

## 2018-05-14 NOTE — TELEPHONE ENCOUNTER
Spoke to pt and states that he did not receive his prescription from [facility] on 3/29. Pt can not remember if he picked up the prescription for the Tamsulosin in [place] on 4/30. Pt states he will check for the prescription and send another [facility] message.

## 2018-05-17 ENCOUNTER — TELEPHONE (OUTPATIENT)
Dept: ELECTROPHYSIOLOGY | Facility: CLINIC | Age: 73
End: 2018-05-17

## 2018-05-17 NOTE — TELEPHONE ENCOUNTER
Episode reviewed with Dr. Sanchez (AFl vs AF).  Patient was called and is currently travelling in [place].  He was advised to go to an urgent care facility.  Dr. Sanchez recommends starting Eliquis and maintaining heart rate control.  Patient was given the phone/fax number to our clinic and is on his way to urgent care.  GABBY Ferreira RN.      Remote Alert -High V rates during AT/AF, long AT/AF episode   Received: Today   Message Contents   Carine Sanchez MD   Cc: Rubén Sanchez,     Hx AFl, no OAC.  PPM implanted 2/6/18 and remote alert today for high V rates during AT/AF and long AT/AF burden (29% since 5/15/18).  5/16/18 was the first occurrence of AFl since implant, longest episode 7 hours 42 mins and currently ongoing as of 5/17/18 at 2:00 AM.  V rates during AMS 62.4% > 110 bpm.     Spoke with Mr. Norman and he reports being lightheaded and having pain back last night.  He feels good now.  Please advise.     Thanks,   Carine

## 2018-05-17 NOTE — TELEPHONE ENCOUNTER
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  Pt feels well. He's camping in [place].  He'll seek care at a local hospital, to include anticoagulation (Eliquis recommended) and rate control as tolerated by BP, as well as likely DCCV if remains in AF/AFL. Pt understands all of the above. We're providing clinic phone number and fax number (for ECG transmission) as well.      ----- Message from Carine Ferreira sent at 5/17/2018  9:58 AM CDT -----  Regarding: Remote Alert -High V rates during AT/AF, long AT/AF episode  Dr. Sanchez,    Hx AFl, no OAC.  PPM implanted 2/6/18 and remote alert today for high V rates during AT/AF and long AT/AF burden (29% since 5/15/18).  5/16/18 was the first occurrence of AFl since implant, longest episode 7 hours 42 mins and currently ongoing as of 5/17/18 at 2:00 AM.  V rates during AMS 62.4% > 110 bpm.    Spoke with Mr. Norman and he reports being lightheaded and having pain back last night.  He feels good now.  Please advise.    Thanks,  Carine

## 2018-05-18 ENCOUNTER — TELEPHONE (OUTPATIENT)
Dept: FAMILY MEDICINE | Facility: CLINIC | Age: 73
End: 2018-05-18

## 2018-05-18 NOTE — TELEPHONE ENCOUNTER
Spoke to pt's wife and states that he had surgery today and that the pt called prior to surgery. Wife stated that she will give pt the message and have pt call back.

## 2018-05-18 NOTE — TELEPHONE ENCOUNTER
----- Message from Jeannine Kraus sent at 5/18/2018 10:39 AM CDT -----  Contact: [phone]/self  Patient would like to be seen sooner for hospital follow up.  Please advise.

## 2018-05-18 NOTE — TELEPHONE ENCOUNTER
Spoke to pt and stated to disregard the call. Too early to fill his Finasteride. P luis call back when it's time to refill.

## 2018-05-19 ENCOUNTER — PATIENT MESSAGE (OUTPATIENT)
Dept: ELECTROPHYSIOLOGY | Facility: CLINIC | Age: 73
End: 2018-05-19

## 2018-05-21 NOTE — TELEPHONE ENCOUNTER
Med changes after coronary procedure with Dr Baker and Dr Cespedes      Atorvastatin reduce to 40mg  Chlorthalidone reduce to 12.5mg  Add--diltiazem  MG  Stop meloxicam for two months  add ruvarixabab 20 mg for two months

## 2018-05-25 ENCOUNTER — PATIENT MESSAGE (OUTPATIENT)
Dept: FAMILY MEDICINE | Facility: CLINIC | Age: 73
End: 2018-05-25

## 2018-05-25 DIAGNOSIS — N40.0 BENIGN PROSTATIC HYPERPLASIA, UNSPECIFIED WHETHER LOWER URINARY TRACT SYMPTOMS PRESENT: ICD-10-CM

## 2018-05-25 RX ORDER — TAMSULOSIN HYDROCHLORIDE 0.4 MG/1
0.4 CAPSULE ORAL NIGHTLY
Qty: 90 CAPSULE | Refills: 0 | Status: SHIPPED | OUTPATIENT
Start: 2018-05-25 | End: 2018-07-30 | Stop reason: SDUPTHER

## 2018-05-25 NOTE — TELEPHONE ENCOUNTER
----- Message from Jeannine Kraus sent at 5/25/2018  7:46 AM CDT -----  Contact: [phone]/self  Patient requesting to speak with you regarding getting a refill of tamsulosin (FLOMAX) 0.4 mg Cp24 for 90 day supply. Please advise.

## 2018-05-29 ENCOUNTER — PATIENT MESSAGE (OUTPATIENT)
Dept: CARDIOLOGY | Facility: CLINIC | Age: 73
End: 2018-05-29

## 2018-06-06 ENCOUNTER — TELEPHONE (OUTPATIENT)
Dept: FAMILY MEDICINE | Facility: CLINIC | Age: 73
End: 2018-06-06

## 2018-06-06 RX ORDER — INDOMETHACIN 50 MG/1
50 CAPSULE ORAL 3 TIMES DAILY PRN
Qty: 30 CAPSULE | Refills: 0 | Status: SHIPPED | OUTPATIENT
Start: 2018-06-06 | End: 2018-08-01

## 2018-06-06 NOTE — TELEPHONE ENCOUNTER
Spoke to pt and states that he has Gout in his big toe on right foot. Pt requesting a refill on his Indomethacin to [facility] in [place]. Pls advise.

## 2018-06-06 NOTE — TELEPHONE ENCOUNTER
----- Message from Giselle Landrum sent at 6/6/2018  8:41 AM CDT -----  No. [phone]    Patient is in [place] today.  He is having a gout flare up.   Patient would like Indomethacin called in.    [facility]  No. [phone]     Before you call in medication, please call patient.

## 2018-07-30 ENCOUNTER — OFFICE VISIT (OUTPATIENT)
Dept: CARDIOLOGY | Facility: CLINIC | Age: 73
End: 2018-07-30
Payer: MEDICARE

## 2018-07-30 ENCOUNTER — PATIENT MESSAGE (OUTPATIENT)
Dept: CARDIOLOGY | Facility: CLINIC | Age: 73
End: 2018-07-30

## 2018-07-30 VITALS
HEART RATE: 60 BPM | WEIGHT: 231 LBS | SYSTOLIC BLOOD PRESSURE: 126 MMHG | HEIGHT: 72 IN | BODY MASS INDEX: 31.29 KG/M2 | DIASTOLIC BLOOD PRESSURE: 66 MMHG

## 2018-07-30 DIAGNOSIS — I42.8 NICM (NONISCHEMIC CARDIOMYOPATHY): ICD-10-CM

## 2018-07-30 DIAGNOSIS — E11.9 TYPE 2 DIABETES MELLITUS WITHOUT RETINOPATHY: ICD-10-CM

## 2018-07-30 DIAGNOSIS — I10 ESSENTIAL HYPERTENSION: Primary | ICD-10-CM

## 2018-07-30 DIAGNOSIS — I49.9 CARDIAC ARRHYTHMIA, UNSPECIFIED CARDIAC ARRHYTHMIA TYPE: ICD-10-CM

## 2018-07-30 DIAGNOSIS — I49.5 SSS (SICK SINUS SYNDROME): ICD-10-CM

## 2018-07-30 DIAGNOSIS — I48.92 ATRIAL FLUTTER, UNSPECIFIED TYPE: ICD-10-CM

## 2018-07-30 PROCEDURE — 99213 OFFICE O/P EST LOW 20 MIN: CPT | Mod: PBBFAC,PO,25 | Performed by: INTERNAL MEDICINE

## 2018-07-30 PROCEDURE — 93005 ELECTROCARDIOGRAM TRACING: CPT | Mod: PBBFAC,PO | Performed by: INTERNAL MEDICINE

## 2018-07-30 PROCEDURE — 93010 ELECTROCARDIOGRAM REPORT: CPT | Mod: S$PBB,,, | Performed by: INTERNAL MEDICINE

## 2018-07-30 PROCEDURE — 99999 PR PBB SHADOW E&M-EST. PATIENT-LVL III: CPT | Mod: PBBFAC,,, | Performed by: INTERNAL MEDICINE

## 2018-07-30 PROCEDURE — 99215 OFFICE O/P EST HI 40 MIN: CPT | Mod: S$PBB,,, | Performed by: INTERNAL MEDICINE

## 2018-07-30 NOTE — PROGRESS NOTES
"HPI    73 y.o. 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 Of note, HV then 65 ms.  long hx SB  DM   HTN on meds   SSS, s/p PPM    Feels well. Exercises 4-5 days/week, but at "4 out of 10" intensity. Bikes 6 miles  On exercise bike, his HR rarely reaches 100 bpm. We previously placed a 48-hour Holter to follow HR curve. This showed SR/SB, with HR range 29-99 bpm (including during exertion). Had some junctional rhythm, including HR during usual waking hours (though may have been taking a nap) at 37 bpm.   I placed a PPM 2/2018. Feeling well. Healed well.    During an extended trip, was camping in [place] when developed tachypalpitations. Seen at ER; ECG (I reviewed) c/w typical AFL. He was transferred to a [facility] and underwent ISABELLE/DCCV, and was given xarelto x 2 months.  No AMS on PPM since then.    6/2012: 60% LVEF  9/12/16 ecg: junctional at 48 bpm. Previously, chronic SB in 40s, for years.  12/18/18: SR at 39 bpm.    My interpretation of today's ECG is NSR 62 bpm  Review of Systems   Constitution: Negative. Negative for weakness and malaise/fatigue.   HENT: Negative.  Negative for ear pain and tinnitus.    Eyes: Negative for blurred vision.   Cardiovascular: Negative.  Negative for chest pain, dyspnea on exertion, near-syncope, palpitations and syncope.   Respiratory: Negative.  Negative for shortness of breath.    Endocrine: Negative.  Negative for polyuria.   Hematologic/Lymphatic: Does not bruise/bleed easily.   Skin: Negative.  Negative for rash.   Musculoskeletal: Negative.  Negative for joint pain and muscle weakness.   Gastrointestinal: Negative.  Negative for abdominal pain and change in bowel habit.   Genitourinary: Negative for frequency.   Neurological: Negative.  Negative for dizziness.   Psychiatric/Behavioral: Negative.  Negative for depression. The patient is not nervous/anxious.    Allergic/Immunologic: Negative for environmental allergies.        Objective:    Physical Exam "   Constitutional: He is oriented to person, place, and time. He appears well-developed and well-nourished.   HENT:   Head: Normocephalic and atraumatic.   Eyes: Conjunctivae, EOM and lids are normal. No scleral icterus.   Neck: Normal range of motion. No JVD present. No tracheal deviation present. No thyromegaly present.   Cardiovascular: Normal rate, regular rhythm, normal heart sounds and intact distal pulses.   No extrasystoles are present. PMI is not displaced.  Exam reveals no gallop and no friction rub.    No murmur heard.  Pulses:       Radial pulses are 2+ on the right side, and 2+ on the left side.   Pulmonary/Chest: Effort normal and breath sounds normal. No accessory muscle usage. No tachypnea. No respiratory distress. He has no wheezes. He has no rales.   Abdominal: Soft. Bowel sounds are normal. He exhibits no distension. There is no hepatosplenomegaly. There is no tenderness.   Musculoskeletal: Normal range of motion. He exhibits no edema.   Neurological: He is alert and oriented to person, place, and time. He has normal reflexes. He exhibits normal muscle tone.   Skin: Skin is warm and dry. No rash noted.   Psychiatric: He has a normal mood and affect. His behavior is normal.   Nursing note and vitals reviewed.        Assessment:       1. Essential hypertension    2. Atrial flutter, unspecified type    3. SSS (sick sinus syndrome)    4. Cardiac arrhythmia, unspecified cardiac arrhythmia type     5. NICM (nonischemic cardiomyopathy)    6. Type 2 diabetes mellitus without retinopathy      Subjective:    Patient ID:  Shannan Norman is a 73 y.o. male who presents for evaluation of bradycardia                  Subjective:    Patient ID:  Shannan Norman is a 73 y.o. male who presents for follow-up of Pacemaker Check      HPI    ROS     Objective:    Physical Exam      Assessment:       1. Essential hypertension    2. Atrial flutter, unspecified type    3. SSS (sick sinus syndrome)    4. Cardiac  arrhythmia, unspecified cardiac arrhythmia type     5. NICM (nonischemic cardiomyopathy)    6. 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 Review of ablation report from 2008 shows that there was difficulty achieving block at the CTI, including having to use cryoablation for stability due to respiratory variation.  Eliquis (except for before ablation).  Return to EP lab to check for CTI block and to try to stimulate other arrhythmias. Ablate prn.  Will use anesthesia to help to facilitate stability.     I spent about a half hour discussing the nature of EP study and ablation, including possible transseptal puncture. We discused risks and benefits at length. Our discussion included, but was not limited to the risk of death, infection, bleeding, stroke, MI, cardiac perforation, embolism, cardiac tamponade, skin burns, and other organic injury including the possibility for need for surgery or pacemaker implantation.  I discussed with patient risks, indications, benefits, and alternatives of the planned procedure. All questions were answered. Patient understands and wishes to proceed.

## 2018-08-01 ENCOUNTER — OFFICE VISIT (OUTPATIENT)
Dept: FAMILY MEDICINE | Facility: CLINIC | Age: 73
End: 2018-08-01
Attending: FAMILY MEDICINE
Payer: MEDICARE

## 2018-08-01 ENCOUNTER — TELEPHONE (OUTPATIENT)
Dept: ELECTROPHYSIOLOGY | Facility: CLINIC | Age: 73
End: 2018-08-01

## 2018-08-01 VITALS
OXYGEN SATURATION: 96 % | SYSTOLIC BLOOD PRESSURE: 106 MMHG | BODY MASS INDEX: 31.08 KG/M2 | HEART RATE: 60 BPM | DIASTOLIC BLOOD PRESSURE: 64 MMHG | HEIGHT: 72 IN | WEIGHT: 229.5 LBS

## 2018-08-01 DIAGNOSIS — E11.9 TYPE 2 DIABETES MELLITUS WITHOUT RETINOPATHY: ICD-10-CM

## 2018-08-01 DIAGNOSIS — N40.0 BENIGN PROSTATIC HYPERPLASIA, UNSPECIFIED WHETHER LOWER URINARY TRACT SYMPTOMS PRESENT: ICD-10-CM

## 2018-08-01 DIAGNOSIS — I15.2 HYPERTENSION ASSOCIATED WITH DIABETES: Primary | ICD-10-CM

## 2018-08-01 DIAGNOSIS — N28.9 RENAL INSUFFICIENCY: ICD-10-CM

## 2018-08-01 DIAGNOSIS — I42.8 NICM (NONISCHEMIC CARDIOMYOPATHY): ICD-10-CM

## 2018-08-01 DIAGNOSIS — E11.69 DYSLIPIDEMIA ASSOCIATED WITH TYPE 2 DIABETES MELLITUS: ICD-10-CM

## 2018-08-01 DIAGNOSIS — E11.59 HYPERTENSION ASSOCIATED WITH DIABETES: Primary | ICD-10-CM

## 2018-08-01 DIAGNOSIS — I83.11 VARICOSE VEINS OF BOTH LOWER EXTREMITIES WITH INFLAMMATION: ICD-10-CM

## 2018-08-01 DIAGNOSIS — I83.12 VARICOSE VEINS OF BOTH LOWER EXTREMITIES WITH INFLAMMATION: ICD-10-CM

## 2018-08-01 DIAGNOSIS — I49.5 SSS (SICK SINUS SYNDROME): ICD-10-CM

## 2018-08-01 DIAGNOSIS — E66.9 OBESITY (BMI 30.0-34.9): ICD-10-CM

## 2018-08-01 DIAGNOSIS — E78.5 DYSLIPIDEMIA ASSOCIATED WITH TYPE 2 DIABETES MELLITUS: ICD-10-CM

## 2018-08-01 DIAGNOSIS — I48.92 ATRIAL FLUTTER, UNSPECIFIED TYPE: ICD-10-CM

## 2018-08-01 PROCEDURE — 99213 OFFICE O/P EST LOW 20 MIN: CPT | Mod: PBBFAC,PO | Performed by: FAMILY MEDICINE

## 2018-08-01 PROCEDURE — 99214 OFFICE O/P EST MOD 30 MIN: CPT | Mod: S$PBB,,, | Performed by: FAMILY MEDICINE

## 2018-08-01 PROCEDURE — 99999 PR PBB SHADOW E&M-EST. PATIENT-LVL III: CPT | Mod: PBBFAC,,, | Performed by: FAMILY MEDICINE

## 2018-08-01 RX ORDER — DILTIAZEM HYDROCHLORIDE 120 MG/1
120 CAPSULE, COATED, EXTENDED RELEASE ORAL DAILY
Status: ON HOLD | COMMUNITY
End: 2018-09-12 | Stop reason: HOSPADM

## 2018-08-01 NOTE — PROGRESS NOTES
Subjective:       Patient ID: Shannan Norman is a 73 y.o. male.    Chief Complaint: Follow-up (3 month )    73 yr old pleasant white male with DM II controlled, HTN, HLD, obesity, presents today for his 6 month follow up and med refills. No new concerns today.    BPH - much better  - no dysuria - tried flomax and proscar and nothing helps       DM II - controlled -  diet control -A1C                   5.8 (H)             03/07/2018                    - on ACE and ASA - UTD with eye and foot screen      HTN - controlled - oN ACE - compliant - no side effects      HLD - controlled - on statin - compliant - no side effects - LDLCALC                  66.0                08/05/2017                          History as below - reviewed and no changes    HM  -labs due  -PSA UTD  -adacel UTD            Diabetes   He presents for his follow-up diabetic visit. He has type 2 diabetes mellitus. His disease course has been stable. Pertinent negatives for hypoglycemia include no confusion, dizziness, headaches, hunger, mood changes, nervousness/anxiousness, seizures, sleepiness, speech difficulty, sweats or tremors. Pertinent negatives for diabetes include no chest pain, no foot ulcerations, no polydipsia, no polyuria, no visual change, no weakness and no weight loss. Symptoms are stable. Pertinent negatives for diabetic complications include no CVA, PVD or retinopathy. Risk factors for coronary artery disease include diabetes mellitus, dyslipidemia, hypertension and male sex. He is compliant with treatment all of the time. He is following a generally healthy diet. Meal planning includes avoidance of concentrated sweets. He rarely participates in exercise. An ACE inhibitor/angiotensin II receptor blocker is being taken. He does not see a podiatrist.Eye exam is current.   Hypertension   This is a chronic problem. The current episode started more than 1 month ago. The problem has been gradually improving since onset. The problem is  controlled. Pertinent negatives include no chest pain, headaches, malaise/fatigue, neck pain, palpitations, peripheral edema, PND or sweats. There are no associated agents to hypertension. Risk factors for coronary artery disease include diabetes mellitus, dyslipidemia, male gender and obesity. Past treatments include ACE inhibitors. The current treatment provides significant improvement. There are no compliance problems.  There is no history of angina, CAD/MI, CVA, left ventricular hypertrophy, PVD or retinopathy. There is no history of chronic renal disease, coarctation of the aorta, hypercortisolism, hyperparathyroidism, pheochromocytoma, renovascular disease or a thyroid problem.   Hyperlipidemia   This is a chronic problem. The current episode started more than 1 year ago. The problem is controlled. Recent lipid tests were reviewed and are normal. Exacerbating diseases include diabetes and obesity. He has no history of chronic renal disease. Pertinent negatives include no chest pain, leg pain or myalgias. The current treatment provides significant improvement of lipids. There are no compliance problems.  Risk factors for coronary artery disease include diabetes mellitus, dyslipidemia, hypertension, male sex and obesity.   Benign Prostatic Hypertrophy   This is a chronic problem. The current episode started more than 1 year ago. The problem has been gradually worsening since onset. Irritative symptoms include nocturia. Irritative symptoms do not include frequency or urgency. Obstructive symptoms include dribbling, incomplete emptying, an intermittent stream, a slower stream and straining. Pertinent negatives include no hematuria or vomiting. AUA score is 8-19. He is sexually active. Nothing aggravates the symptoms. Past treatments include tamsulosin. The treatment provided significant relief.     Review of Systems   Constitutional: Negative.  Negative for activity change, diaphoresis, malaise/fatigue, unexpected  weight change and weight loss.   HENT: Negative.  Negative for congestion, ear pain, hearing loss, mouth sores, rhinorrhea, trouble swallowing and voice change.    Eyes: Negative.  Negative for pain, discharge and visual disturbance.   Respiratory: Negative.  Negative for apnea, cough, chest tightness and wheezing.    Cardiovascular: Negative.  Negative for chest pain, palpitations and PND.   Gastrointestinal: Negative.  Negative for abdominal distention, anal bleeding, blood in stool, constipation, diarrhea and vomiting.   Endocrine: Negative.  Negative for cold intolerance, polydipsia and polyuria.   Genitourinary: Positive for incomplete emptying and nocturia. Negative for decreased urine volume, difficulty urinating, discharge, frequency, hematuria, scrotal swelling and urgency.   Musculoskeletal: Positive for arthralgias. Negative for back pain, joint swelling, myalgias, neck pain and neck stiffness.   Skin: Negative.  Negative for color change and rash.   Allergic/Immunologic: Negative.  Negative for environmental allergies and immunocompromised state.   Neurological: Negative.  Negative for dizziness, tremors, seizures, speech difficulty, weakness, light-headedness and headaches.   Hematological: Negative.    Psychiatric/Behavioral: Negative.  Negative for agitation, confusion, dysphoric mood and suicidal ideas. The patient is not nervous/anxious.        PMH/PSH/FH/SH/MED/ALLERGY reviewed    Objective:       Vitals:    08/01/18 1337   BP: 106/64   Pulse: 60       Physical Exam   Constitutional: He is oriented to person, place, and time. He appears well-developed and well-nourished.   HENT:   Head: Normocephalic and atraumatic.   Right Ear: External ear normal.   Left Ear: External ear normal.   Nose: Nose normal.   Mouth/Throat: Oropharynx is clear and moist. No oropharyngeal exudate.   Eyes: Conjunctivae and EOM are normal. Pupils are equal, round, and reactive to light. Right eye exhibits no discharge. Left  eye exhibits no discharge. No scleral icterus.   Neck: Normal range of motion. Neck supple. No JVD present. No tracheal deviation present. No thyromegaly present.   Cardiovascular: Normal rate, regular rhythm, normal heart sounds and intact distal pulses.  Exam reveals no gallop and no friction rub.    No murmur heard.  Pulmonary/Chest: Effort normal and breath sounds normal. No stridor. No respiratory distress. He has no wheezes. He has no rales. He exhibits no tenderness.   Abdominal: Soft. Bowel sounds are normal. He exhibits no distension and no mass. There is no tenderness. There is no rebound and no guarding. No hernia.   Musculoskeletal: Normal range of motion. He exhibits no edema or tenderness.   Lymphadenopathy:     He has no cervical adenopathy.   Neurological: He is alert and oriented to person, place, and time. He has normal reflexes. He displays normal reflexes. No cranial nerve deficit. He exhibits normal muscle tone. Coordination normal.   Skin: Skin is warm and dry. No rash noted. No erythema. No pallor.   Psychiatric: He has a normal mood and affect. His behavior is normal. Judgment and thought content normal.       Assessment:       1. Hypertension associated with diabetes    2. Dyslipidemia associated with type 2 diabetes mellitus    3. Benign prostatic hyperplasia, unspecified whether lower urinary tract symptoms present    4. Atrial flutter, unspecified type    5. NICM (nonischemic cardiomyopathy)    6. Obesity (BMI 30.0-34.9)    7. SSS (sick sinus syndrome)    8. Type 2 diabetes mellitus without retinopathy    9. Varicose veins of both lower extremities with inflammation    10. Renal insufficiency        Plan:       Shannan was seen today for follow-up.    Diagnoses and all orders for this visit:    Hypertension associated with diabetes  -     Urinalysis; Future  -     Comprehensive metabolic panel; Future  -     Lipid panel; Future    Dyslipidemia associated with type 2 diabetes mellitus  -      Comprehensive metabolic panel; Future  -     Lipid panel; Future    Benign prostatic hyperplasia, unspecified whether lower urinary tract symptoms present    Atrial flutter, unspecified type    NICM (nonischemic cardiomyopathy)    Obesity (BMI 30.0-34.9)    SSS (sick sinus syndrome)    Type 2 diabetes mellitus without retinopathy  -     Urinalysis; Future  -     Microalbumin/creatinine urine ratio; Future  -     Comprehensive metabolic panel; Future  -     Hemoglobin A1c; Future    Varicose veins of both lower extremities with inflammation    Renal insufficiency  -     Urinalysis; Future  -     Microalbumin/creatinine urine ratio; Future  -     PTH, intact; Future  -     PHOSPHORUS; Future    DM II  -controlled  -labs    HTN  -controlled    HLD  -stable    BPH  -controlled    SSS  -on pacemaker      Spent adequate time in obtaining history and explaining differentials    40 minutes spent during this visit of which greater than 50% devoted to face-face counseling and coordination of care regarding diagnosis and management plan    Follow-up in about 6 months (around 2/1/2019), or if symptoms worsen or fail to improve.

## 2018-08-01 NOTE — TELEPHONE ENCOUNTER
Called Pt to schedule AFL ablation. Pt wanted to know what days Dr Sanchez is in the lab. Advised mostly Tuesday and Thursday. Pt voiced understanding and stated he would discuss with family and call back in the morning.

## 2018-08-02 ENCOUNTER — LAB VISIT (OUTPATIENT)
Dept: LAB | Facility: HOSPITAL | Age: 73
End: 2018-08-02
Attending: FAMILY MEDICINE
Payer: MEDICARE

## 2018-08-02 ENCOUNTER — PATIENT MESSAGE (OUTPATIENT)
Dept: FAMILY MEDICINE | Facility: CLINIC | Age: 73
End: 2018-08-02

## 2018-08-02 ENCOUNTER — TELEPHONE (OUTPATIENT)
Dept: FAMILY MEDICINE | Facility: CLINIC | Age: 73
End: 2018-08-02

## 2018-08-02 ENCOUNTER — PATIENT MESSAGE (OUTPATIENT)
Dept: CARDIOLOGY | Facility: CLINIC | Age: 73
End: 2018-08-02

## 2018-08-02 DIAGNOSIS — N28.9 RENAL INSUFFICIENCY: ICD-10-CM

## 2018-08-02 DIAGNOSIS — E11.59 HYPERTENSION ASSOCIATED WITH DIABETES: ICD-10-CM

## 2018-08-02 DIAGNOSIS — I15.2 HYPERTENSION ASSOCIATED WITH DIABETES: ICD-10-CM

## 2018-08-02 DIAGNOSIS — E11.9 TYPE 2 DIABETES MELLITUS WITHOUT RETINOPATHY: ICD-10-CM

## 2018-08-02 LAB
ALBUMIN/CREAT UR: 220.2 UG/MG
BACTERIA #/AREA URNS HPF: NORMAL /HPF
BILIRUB UR QL STRIP: NEGATIVE
CLARITY UR: CLEAR
COLOR UR: YELLOW
CREAT UR-MCNC: 163 MG/DL
GLUCOSE UR QL STRIP: NEGATIVE
HGB UR QL STRIP: NEGATIVE
HYALINE CASTS #/AREA URNS LPF: 0 /LPF
KETONES UR QL STRIP: NEGATIVE
LEUKOCYTE ESTERASE UR QL STRIP: NEGATIVE
MICROALBUMIN UR DL<=1MG/L-MCNC: 359 UG/ML
MICROSCOPIC COMMENT: NORMAL
NITRITE UR QL STRIP: NEGATIVE
PH UR STRIP: 6 [PH] (ref 5–8)
PROT UR QL STRIP: ABNORMAL
RBC #/AREA URNS HPF: 0 /HPF (ref 0–4)
SP GR UR STRIP: 1.02 (ref 1–1.03)
SQUAMOUS #/AREA URNS HPF: 0 /HPF
URN SPEC COLLECT METH UR: ABNORMAL
UROBILINOGEN UR STRIP-ACNC: NEGATIVE EU/DL
WBC #/AREA URNS HPF: 1 /HPF (ref 0–5)
YEAST URNS QL MICRO: NORMAL

## 2018-08-02 PROCEDURE — 82043 UR ALBUMIN QUANTITATIVE: CPT

## 2018-08-02 PROCEDURE — 81000 URINALYSIS NONAUTO W/SCOPE: CPT

## 2018-08-03 ENCOUNTER — TELEPHONE (OUTPATIENT)
Dept: ELECTROPHYSIOLOGY | Facility: CLINIC | Age: 73
End: 2018-08-03

## 2018-08-03 NOTE — TELEPHONE ENCOUNTER
Returned call to Pt. He was calling to ask about certain dates that are available to schedule procedure. He needs his children to fly in to take care of his wife so he can have procedure. Will call back on Monday with date.      ----- Message from Chau Kiser MA sent at 8/3/2018  2:14 PM CDT -----  Contact: Patient      ----- Message -----  From: Portia Willis  Sent: 8/3/2018   2:09 PM  To: Daniel Cooper, The Pt is calling to schedule his ablation. Please call him back @ [phone]. Thanks, Portia

## 2018-08-06 ENCOUNTER — TELEPHONE (OUTPATIENT)
Dept: ELECTROPHYSIOLOGY | Facility: CLINIC | Age: 73
End: 2018-08-06

## 2018-08-06 RX ORDER — MELOXICAM 15 MG/1
TABLET ORAL
Qty: 90 TABLET | Refills: 0 | Status: SHIPPED | OUTPATIENT
Start: 2018-08-06 | End: 2019-02-05

## 2018-08-06 RX ORDER — MELOXICAM 15 MG/1
TABLET ORAL
Qty: 90 TABLET | Refills: 0 | Status: ON HOLD | OUTPATIENT
Start: 2018-08-06 | End: 2018-11-16 | Stop reason: SDUPTHER

## 2018-08-06 NOTE — TELEPHONE ENCOUNTER
Returned call to Pt procedure scheduled.    ----- Message from Emiliana Savage MA sent at 8/6/2018  4:24 PM CDT -----  Contact: Patient  Allison pt returning your call.        ----- Message -----  From: Portia Willis  Sent: 8/6/2018   4:19 PM  To: Daniel Cooper, The Pt would like to schedule his procedure on 9/11 if possible. Please call him back @ [phone]. Thanks, Portia

## 2018-08-07 ENCOUNTER — PATIENT MESSAGE (OUTPATIENT)
Dept: FAMILY MEDICINE | Facility: CLINIC | Age: 73
End: 2018-08-07

## 2018-08-07 RX ORDER — CICLOPIROX 80 MG/ML
SOLUTION TOPICAL NIGHTLY
COMMUNITY
End: 2018-12-05

## 2018-08-08 ENCOUNTER — PATIENT MESSAGE (OUTPATIENT)
Dept: ELECTROPHYSIOLOGY | Facility: CLINIC | Age: 73
End: 2018-08-08

## 2018-08-08 DIAGNOSIS — I48.92 ATRIAL FLUTTER, UNSPECIFIED TYPE: Primary | ICD-10-CM

## 2018-08-08 DIAGNOSIS — I49.9 CARDIAC ARRHYTHMIA, UNSPECIFIED CARDIAC ARRHYTHMIA TYPE: ICD-10-CM

## 2018-08-08 NOTE — PROGRESS NOTES
ABLATION EDUCATION CHECKLIST    PRE-PROCEDURE TESTIN/07/18 @ 10:30 AM Laura WORK   Pre-Procedure labs have been ordered for you at:  [facility]   · Be sure to arrive at your scheduled time. YOU DO NOT HAVE TO FAST FOR THIS LAB WORK!    DAY OF PROCEDURE:    18 @ 5:45 AM CARDIAC ABLATION  Report to Cardiology Waiting Room on 3rd floor of the Hospital    · Do not eat or drink anything after: 12 mn on the night before your procedure  · Please do not wear makeup (especially mascara) when arriving for your procedure    Medications:   · HOLD ELIQUIS THE NIGHT PRIOR AND THE MORNING OF THE PROCEDURE. LAST DOSE MORNING OF 9/10/18  · You may take ALL other morning medications with a sip of water      Directions to the Cardiology Waiting Room  If you park in the Parking Garage:  Take elevators to the 2nd floor  Walk up ramp and turn right by Gold Elevators  Take elevator to the 3rd floor  Upon exiting the elevator, turn away from the clinic areas  Walk long jacques around to front of hospital to area with windows overlooking [facility]  Check in at Reception Desk  OR  If family is dropping you off:  Have them drop you off at the front of the Hospital  (Near the ER, where all the flags are hung).  Take the E elevators to the 3rd floor.  Check in at the Reception Desk in the waiting room.      · You will be spending the night after your procedure.  · You will need someone to drive you home the day after your procedure.  · Your pain during your procedure will be managed by the anesthesia team.     Any need to reschedule or cancel procedures, or any questions regarding your procedures should be addressed directly with the Arrhythmia Department Nurses at the following phone number: [phone]

## 2018-08-13 ENCOUNTER — PATIENT MESSAGE (OUTPATIENT)
Dept: MEDSURG UNIT | Facility: HOSPITAL | Age: 73
End: 2018-08-13

## 2018-09-07 ENCOUNTER — APPOINTMENT (OUTPATIENT)
Dept: LAB | Facility: HOSPITAL | Age: 73
End: 2018-09-07
Attending: INTERNAL MEDICINE
Payer: MEDICARE

## 2018-09-10 ENCOUNTER — TELEPHONE (OUTPATIENT)
Dept: ELECTROPHYSIOLOGY | Facility: CLINIC | Age: 73
End: 2018-09-10

## 2018-09-10 NOTE — TELEPHONE ENCOUNTER
Contacted Pt to confirm procedure for tomm. Spoke with Pt and reviewed pre op instructions including: arrival time of 5:45 am, NPO after MN and to hold eliquis tonight and allam morning. Pt voiced understanding and stated he would be here.

## 2018-09-11 ENCOUNTER — RESEARCH ENCOUNTER (OUTPATIENT)
Dept: RESEARCH | Facility: HOSPITAL | Age: 73
End: 2018-09-11

## 2018-09-11 ENCOUNTER — ANESTHESIA (OUTPATIENT)
Dept: MEDSURG UNIT | Facility: HOSPITAL | Age: 73
End: 2018-09-11
Payer: MEDICARE

## 2018-09-11 ENCOUNTER — ANESTHESIA EVENT (OUTPATIENT)
Dept: MEDSURG UNIT | Facility: HOSPITAL | Age: 73
End: 2018-09-11
Payer: MEDICARE

## 2018-09-11 ENCOUNTER — HOSPITAL ENCOUNTER (OUTPATIENT)
Facility: HOSPITAL | Age: 73
Discharge: HOME | End: 2018-09-12
Attending: INTERNAL MEDICINE | Admitting: INTERNAL MEDICINE
Payer: MEDICARE

## 2018-09-11 DIAGNOSIS — I48.92 ATRIAL FLUTTER: ICD-10-CM

## 2018-09-11 DIAGNOSIS — I10 ESSENTIAL (PRIMARY) HYPERTENSION: ICD-10-CM

## 2018-09-11 DIAGNOSIS — I48.92 ATRIAL FLUTTER, UNSPECIFIED TYPE: Primary | ICD-10-CM

## 2018-09-11 DIAGNOSIS — I48.3 TYPICAL ATRIAL FLUTTER: ICD-10-CM

## 2018-09-11 LAB
APTT BLDCRRT: 24.8 SEC
INR PPP: 1.1
POCT GLUCOSE: 108 MG/DL (ref 70–110)
POCT GLUCOSE: 117 MG/DL (ref 70–110)
POCT GLUCOSE: 127 MG/DL (ref 70–110)
PROTHROMBIN TIME: 11.1 SEC

## 2018-09-11 PROCEDURE — 93653 COMPRE EP EVAL TX SVT: CPT | Mod: ,,, | Performed by: INTERNAL MEDICINE

## 2018-09-11 PROCEDURE — 82962 GLUCOSE BLOOD TEST: CPT | Mod: 91

## 2018-09-11 PROCEDURE — 25000003 PHARM REV CODE 250: Performed by: NURSE PRACTITIONER

## 2018-09-11 PROCEDURE — 25000003 PHARM REV CODE 250

## 2018-09-11 PROCEDURE — 85730 THROMBOPLASTIN TIME PARTIAL: CPT

## 2018-09-11 PROCEDURE — 93005 ELECTROCARDIOGRAM TRACING: CPT | Mod: 59

## 2018-09-11 PROCEDURE — 93653 COMPRE EP EVAL TX SVT: CPT

## 2018-09-11 PROCEDURE — 82962 GLUCOSE BLOOD TEST: CPT | Mod: 91 | Performed by: INTERNAL MEDICINE

## 2018-09-11 PROCEDURE — 37000008 HC ANESTHESIA 1ST 15 MINUTES: Performed by: INTERNAL MEDICINE

## 2018-09-11 PROCEDURE — 63600175 PHARM REV CODE 636 W HCPCS: Performed by: NURSE ANESTHETIST, CERTIFIED REGISTERED

## 2018-09-11 PROCEDURE — 99220 PR INITIAL OBSERVATION CARE,LEVL III: CPT | Mod: ,,, | Performed by: INTERNAL MEDICINE

## 2018-09-11 PROCEDURE — 25000003 PHARM REV CODE 250: Performed by: INTERNAL MEDICINE

## 2018-09-11 PROCEDURE — 63600175 PHARM REV CODE 636 W HCPCS: Performed by: NURSE PRACTITIONER

## 2018-09-11 PROCEDURE — 37000009 HC ANESTHESIA EA ADD 15 MINS: Performed by: INTERNAL MEDICINE

## 2018-09-11 PROCEDURE — D9220A PRA ANESTHESIA: Mod: ANES,,, | Performed by: ANESTHESIOLOGY

## 2018-09-11 PROCEDURE — 63600175 PHARM REV CODE 636 W HCPCS

## 2018-09-11 PROCEDURE — 93010 ELECTROCARDIOGRAM REPORT: CPT | Mod: 76,59,, | Performed by: INTERNAL MEDICINE

## 2018-09-11 PROCEDURE — 27100006 EP LAB PROCEDURE

## 2018-09-11 PROCEDURE — 93286 PERI-PX EVAL PM/LDLS PM IP: CPT | Mod: 26,,, | Performed by: INTERNAL MEDICINE

## 2018-09-11 PROCEDURE — 93621 COMP EP EVL L PAC&REC C SINS: CPT | Mod: 26,,, | Performed by: INTERNAL MEDICINE

## 2018-09-11 PROCEDURE — 85610 PROTHROMBIN TIME: CPT

## 2018-09-11 PROCEDURE — D9220A PRA ANESTHESIA: Mod: CRNA,,, | Performed by: NURSE ANESTHETIST, CERTIFIED REGISTERED

## 2018-09-11 PROCEDURE — 93613 INTRACARDIAC EPHYS 3D MAPG: CPT | Mod: ,,, | Performed by: INTERNAL MEDICINE

## 2018-09-11 PROCEDURE — 93010 ELECTROCARDIOGRAM REPORT: CPT | Mod: 59,,, | Performed by: INTERNAL MEDICINE

## 2018-09-11 RX ORDER — FINASTERIDE 5 MG/1
5 TABLET, FILM COATED ORAL DAILY
Status: DISCONTINUED | OUTPATIENT
Start: 2018-09-11 | End: 2018-09-11

## 2018-09-11 RX ORDER — TAMSULOSIN HYDROCHLORIDE 0.4 MG/1
0.4 CAPSULE ORAL NIGHTLY
Status: DISCONTINUED | OUTPATIENT
Start: 2018-09-11 | End: 2018-09-12 | Stop reason: HOSPADM

## 2018-09-11 RX ORDER — FENTANYL CITRATE 50 UG/ML
25 INJECTION, SOLUTION INTRAMUSCULAR; INTRAVENOUS EVERY 5 MIN PRN
Status: DISCONTINUED | OUTPATIENT
Start: 2018-09-11 | End: 2018-09-12 | Stop reason: HOSPADM

## 2018-09-11 RX ORDER — PROPOFOL 10 MG/ML
VIAL (ML) INTRAVENOUS CONTINUOUS PRN
Status: DISCONTINUED | OUTPATIENT
Start: 2018-09-11 | End: 2018-09-11

## 2018-09-11 RX ORDER — FENTANYL CITRATE 50 UG/ML
INJECTION, SOLUTION INTRAMUSCULAR; INTRAVENOUS
Status: DISCONTINUED | OUTPATIENT
Start: 2018-09-11 | End: 2018-09-11

## 2018-09-11 RX ORDER — OXYBUTYNIN CHLORIDE 5 MG/1
5 TABLET ORAL NIGHTLY
Status: DISCONTINUED | OUTPATIENT
Start: 2018-09-11 | End: 2018-09-12 | Stop reason: HOSPADM

## 2018-09-11 RX ORDER — SODIUM CHLORIDE 9 MG/ML
INJECTION, SOLUTION INTRAVENOUS CONTINUOUS
Status: DISCONTINUED | OUTPATIENT
Start: 2018-09-11 | End: 2021-09-30

## 2018-09-11 RX ORDER — FINASTERIDE 5 MG/1
5 TABLET, FILM COATED ORAL NIGHTLY
Status: DISCONTINUED | OUTPATIENT
Start: 2018-09-11 | End: 2018-09-12 | Stop reason: HOSPADM

## 2018-09-11 RX ORDER — TAMSULOSIN HYDROCHLORIDE 0.4 MG/1
0.4 CAPSULE ORAL
Status: DISCONTINUED | OUTPATIENT
Start: 2018-09-11 | End: 2018-09-11

## 2018-09-11 RX ORDER — MIDAZOLAM HYDROCHLORIDE 1 MG/ML
INJECTION, SOLUTION INTRAMUSCULAR; INTRAVENOUS
Status: DISCONTINUED | OUTPATIENT
Start: 2018-09-11 | End: 2018-09-11

## 2018-09-11 RX ORDER — OXYBUTYNIN CHLORIDE 5 MG/1
5 TABLET ORAL 3 TIMES DAILY
Status: DISCONTINUED | OUTPATIENT
Start: 2018-09-11 | End: 2018-09-11

## 2018-09-11 RX ORDER — CLOTRIMAZOLE AND BETAMETHASONE DIPROPIONATE 10; .64 MG/G; MG/G
CREAM TOPICAL
Status: DISCONTINUED | OUTPATIENT
Start: 2018-09-11 | End: 2018-09-12 | Stop reason: HOSPADM

## 2018-09-11 RX ORDER — PROPOFOL 10 MG/ML
VIAL (ML) INTRAVENOUS
Status: DISCONTINUED | OUTPATIENT
Start: 2018-09-11 | End: 2018-09-11

## 2018-09-11 RX ORDER — DIPHENHYDRAMINE HYDROCHLORIDE 50 MG/ML
25 INJECTION INTRAMUSCULAR; INTRAVENOUS EVERY 6 HOURS PRN
Status: DISCONTINUED | OUTPATIENT
Start: 2018-09-11 | End: 2018-09-12 | Stop reason: HOSPADM

## 2018-09-11 RX ORDER — ECONAZOLE NITRATE 10 MG/G
CREAM TOPICAL 2 TIMES DAILY PRN
Status: DISCONTINUED | OUTPATIENT
Start: 2018-09-11 | End: 2018-09-12 | Stop reason: HOSPADM

## 2018-09-11 RX ORDER — CEFAZOLIN SODIUM 1 G/3ML
2 INJECTION, POWDER, FOR SOLUTION INTRAMUSCULAR; INTRAVENOUS
Status: COMPLETED | OUTPATIENT
Start: 2018-09-11 | End: 2018-09-11

## 2018-09-11 RX ADMIN — OXYBUTYNIN CHLORIDE 5 MG: 5 TABLET ORAL at 09:09

## 2018-09-11 RX ADMIN — SODIUM CHLORIDE: 0.9 INJECTION, SOLUTION INTRAVENOUS at 06:09

## 2018-09-11 RX ADMIN — FINASTERIDE 5 MG: 5 TABLET, FILM COATED ORAL at 09:09

## 2018-09-11 RX ADMIN — PROPOFOL 20 MG: 10 INJECTION, EMULSION INTRAVENOUS at 07:09

## 2018-09-11 RX ADMIN — MIDAZOLAM 2 MG: 1 INJECTION INTRAMUSCULAR; INTRAVENOUS at 07:09

## 2018-09-11 RX ADMIN — FENTANYL CITRATE 25 MCG: 50 INJECTION, SOLUTION INTRAMUSCULAR; INTRAVENOUS at 09:09

## 2018-09-11 RX ADMIN — APIXABAN 5 MG: 2.5 TABLET, FILM COATED ORAL at 03:09

## 2018-09-11 RX ADMIN — CEFAZOLIN 2 G: 330 INJECTION, POWDER, FOR SOLUTION INTRAMUSCULAR; INTRAVENOUS at 07:09

## 2018-09-11 RX ADMIN — TAMSULOSIN HYDROCHLORIDE 0.4 MG: 0.4 CAPSULE ORAL at 09:09

## 2018-09-11 RX ADMIN — PROPOFOL 75 MCG/KG/MIN: 10 INJECTION, EMULSION INTRAVENOUS at 07:09

## 2018-09-11 RX ADMIN — PROPOFOL 30 MG: 10 INJECTION, EMULSION INTRAVENOUS at 07:09

## 2018-09-11 NOTE — PLAN OF CARE
Vss. Atrial paced rhythm.  At times sb noted when pacer not sensing.  Pt denies pain. Tolerating po intake in ep pacu.  bowen groins with gauze/trans film noted. Palpable pulses noted.  See flowsheet for full assessment. Pt's son updated by ep pacu rn, given new room number.

## 2018-09-11 NOTE — HPI
Pt is 73 year old gentleman who is here for repeat RFA for CTI flutter in need of a ISABELLE prior     He has a hx of hypertension, diabetes, SSS s/p PPM AFL RFA in 2010 who had recurrence of flutter here for ablation today. Last echo 2016 biatrial enlargement, normal ef. On eliquis.

## 2018-09-11 NOTE — ANESTHESIA PREPROCEDURE EVALUATION
09/11/2018  Shannan Norman is a 73 y.o., male.  Patient Active Problem List   Diagnosis    HTN (hypertension)    Hyperlipidemia type II    Gout, arthritis    Atrial flutter    DM (diabetes mellitus)    PFO (patent foramen ovale)    Umbilical hernia    Tendon injury    Post-operative state    Varicose veins of lower extremities with inflammation    Type 2 diabetes mellitus without retinopathy    Nuclear sclerosis of both eyes    Left epiretinal membrane    Obesity (BMI 30.0-34.9)    Junctional escape rhythm    SSS (sick sinus syndrome)    Nonrheumatic aortic valve insufficiency    Benign prostatic hyperplasia    Junctional bradycardia    NICM (nonischemic cardiomyopathy)    Dyslipidemia associated with type 2 diabetes mellitus    Hypertension associated with diabetes         Anesthesia Evaluation         Review of Systems      Physical Exam  General:  Well nourished    Airway/Jaw/Neck:  Airway Findings: Mouth Opening: Normal Tongue: Normal  General Airway Assessment: Adult  Mallampati: II  Improves to II with phonation.  TM Distance: Normal, at least 6 cm      Dental:  Dental Findings: In tact   Chest/Lungs:  Chest/Lungs Findings: Clear to auscultation     Heart/Vascular:  Heart Findings: Rate: Normal  Rhythm: Regular Rhythm  Sounds: Normal        Mental Status:  Mental Status Findings:  Cooperative, Alert and Oriented         Anesthesia Plan  Type of Anesthesia, risks & benefits discussed:  Anesthesia Type:  general  Patient's Preference: General  Intra-op Monitoring Plan: standard ASA monitors  Intra-op Monitoring Plan Comments: Standard ASA monitors.   Post Op Pain Control Plan: per primary service following discharge from PACU  Post Op Pain Control Plan Comments: Per primary service.     Induction:   IV  Beta Blocker:  Patient is not currently on a Beta-Blocker (No further  documentation required).       Informed Consent: Patient understands risks and agrees with Anesthesia plan.  Questions answered. Anesthesia consent signed with patient.  ASA Score: 3     Day of Surgery Review of History & Physical:    H&P update referred to the surgeon.     Anesthesia Plan Notes: Chart reviewed, patient interviewed and examined.  The plan for general anesthesia was explained.  Questions were answered and the consent was signed.  Marija BENITEZ         Ready For Surgery From Anesthesia Perspective.

## 2018-09-11 NOTE — CONSULTS
[facility]  Cardiology  Consult Note    Patient Name: Shannan Norman  MRN: [id number]  Admission Date: 9/11/2018  Hospital Length of Stay: 0 days  Code Status: Prior   Attending Provider: Hany Sanchez MD   Consulting Provider: Rod Pimentel MD  Primary Care Physician: Onofre Paulson MD  Principal Problem:<principal problem not specified>    Patient information was obtained from patient and ER records.     Consults  Subjective:     Chief Complaint:  AFL ablation     HPI:   Pt is 73 year old gentleman who is here for repeat RFA for CTI flutter in need of a ISABELLE prior     He has a hx of hypertension, diabetes, SSS s/p PPM AFL RFA in 2010 who had recurrence of flutter here for ablation today. Last echo 2016 biatrial enlargement, normal ef. On eliquis.     Past Medical History:   Diagnosis Date    Arthritis     Atrial flutter 2011    ablation    Cataract     Diabetes mellitus     Dry eye syndrome     Gout, unspecified     High cholesterol     History of shingles     Hypertension     Seizures        Past Surgical History:   Procedure Laterality Date    ABLATION OF DYSRHYTHMIC FOCUS      GANGLION CYST EXCISION      left neck    HERNIA REPAIR  2013    umbilical    INSERTION-PACEMAKER-DUAL Left 2/6/2018    Performed by Hany Sanchez MD at [facility] CATH LAB    REPAIR, HERNIA, UMBILICAL, AGE 5 YEARS OR OLDER N/A 9/19/2013    Performed by Grady Broderick MD at [facility] OR 2ND FLR    REPAIR, TENDON, HAND Right 1/27/2014    Performed by [facility] at [facility] OR    TONSILLECTOMY      varicose veins      several sx  bilateral    VASECTOMY         Review of patient's allergies indicates:  No Known Allergies    No current facility-administered medications on file prior to encounter.      Current Outpatient Medications on File Prior to Encounter   Medication Sig    apixaban 5 mg Tab Take 1 tablet (5 mg total) by mouth 2 (two) times daily.    ascorbic acid (VITAMIN C) 500 MG tablet  Take 1,000 mg by mouth nightly.     atorvastatin (LIPITOR) 80 MG tablet Take 1 tablet (80 mg total) by mouth nightly. (Patient taking differently: Take 40 mg by mouth nightly. )    benazepril (LOTENSIN) 20 MG tablet Take 1 tablet (20 mg total) by mouth once daily.    chlorthalidone (HYGROTEN) 25 MG Tab Take 1 tablet (25 mg total) by mouth once daily. (Patient taking differently: Take 25 mg by mouth once daily. Pt takes 12.5mg QD)    cycloSPORINE (RESTASIS) 0.05 % ophthalmic emulsion Place 0.4 mLs (1 drop total) into both eyes 2 (two) times daily.    finasteride (PROSCAR) 5 mg tablet TAKE 1 TABLET(5 MG) BY MOUTH EVERY DAY    GLUCOSAMINE HCL/CHONDR VASQUEZ A NA (GLUCOSAMINE-CHONDROITIN) 750-600 mg Tab Take 2 tablets by mouth Daily.    multivitamin capsule Take 1 capsule by mouth nightly.     oxybutynin (DITROPAN) 5 MG Tab Take 1 tablet (5 mg total) by mouth 3 (three) times daily. (Patient taking differently: Take 5 mg by mouth 3 (three) times daily. Patient only takes once daily in PM.)    tamsulosin (FLOMAX) 0.4 mg Cp24 Take 1 capsule (0.4 mg total) by mouth after dinner.    clotrimazole-betamethasone 1-0.05% (LOTRISONE) cream Apply topically as needed.    diltiaZEM (CARDIZEM CD) 120 MG Cp24 Take 120 mg by mouth once daily.     econazole nitrate 1 % cream Apply topically 2 (two) times daily. Apply to affected toenail and surrounding skin twice daily.    meloxicam (MOBIC) 15 MG tablet TAKE 1 TABLET(15 MG) BY MOUTH EVERY DAY    meloxicam (MOBIC) 15 MG tablet TAKE 1 TABLET(15 MG) BY MOUTH EVERY DAY    rivaroxaban (XARELTO) 20 mg Tab Take 20 mg by mouth daily with dinner or evening meal.     Family History     Problem Relation (Age of Onset)    COPD Father    Diabetes Mother    Heart attack Brother    Heart disease Brother    No Known Problems Sister, Maternal Aunt, Maternal Uncle, Paternal Aunt, Paternal Uncle, Maternal Grandmother, Maternal Grandfather, Paternal Grandmother, Paternal Grandfather         Tobacco Use    Smoking status: Never Smoker    Smokeless tobacco: Never Used   Substance and Sexual Activity    Alcohol use: Yes     Alcohol/week: 4.2 oz     Types: 7 Glasses of wine per week    Drug use: No    Sexual activity: Yes     Partners: Female     Review of Systems   All other systems reviewed and are negative.    Objective:     Vital Signs (Most Recent):  Temp: 98 °F (36.7 °C) (09/11/18 0626)  Pulse: 60 (09/11/18 0626)  Resp: 20 (09/11/18 0626)  BP: 128/74 (09/11/18 0630)  SpO2: 96 % (09/11/18 0626) Vital Signs (24h Range):  Temp:  [98 °F (36.7 °C)] 98 °F (36.7 °C)  Pulse:  [60] 60  Resp:  [20] 20  SpO2:  [96 %] 96 %  BP: (121-128)/(72-74) 128/74     Weight: 101.2 kg (223 lb)  Body mass index is 30.24 kg/m².    SpO2: 96 %  O2 Device (Oxygen Therapy): room air    No intake or output data in the 24 hours ending 09/11/18 0706    Lines/Drains/Airways     Airway                 Airway - Non-Surgical Nasal Cannula -- days          Epidural Line                 Perineural Analgesia/Anesthesia Assessment (using dermatomes) Epidural 01/27/14 1156 1687 days                Physical Exam  GEN: Alert and oriented in NAD  NECK: no JVD appreciated   CVS: RRR, s1/s2, no MRG  PULM: CTAB no rales  ABD: NT/ND BS +  Extremities: warm and dry, palpable pulses, no edema  NEURO: Alert and oriented x 3  PSYCH: appropriate affect.         Significant Labs:   Recent Lab Results       09/11/18  0610      aPTT 24.8  Comment:  aPTT therapeutic range = 39-69 seconds     Coumadin Monitoring INR 1.1  Comment:  Coumadin Therapy:  2.0 - 3.0 for INR for all indicators except mechanical heart valves  and antiphospholipid syndromes which should use 2.5 - 3.5.       Protime 11.1           Significant Imaging: EKG: Paced    Assessment and Plan:     Atrial flutter      Assessment & Plan:     PLAN:  1. No ISABELLE as is in Sinus today.    -The risks, benefits & alternatives of the procedure were explained to the patient.    -The risks of  transesophageal echo include but are not limited to:  Dental trauma, esophageal trauma/perforation, bleeding, laryngospasm/brochospasm, aspiration, sore throat/hoarseness, & dislodgement of the endotracheal tube/nasogastric tube (where applicable).    -The risks of moderate sedation include hypotension, respiratory depression, arrhythmias, bronchospasm, & death.    -Informed consent was obtained & the patient is agreeable to proceed with the procedure.    I will discuss with the attending physician. Attending addendum is to follow.     Further recommendations per attending addendum    Rod Pimentel MD  Cardiology Fellow                  VTE Risk Mitigation (From admission, onward)    None          Thank you for your consult.     Rod Pimentel MD  Cardiology   [facility]

## 2018-09-11 NOTE — PROGRESS NOTES
Study: Luis M MRI  Sponsor: [employer]  Follow-up Visit: 6 month f/u  Date of Visit: 9/11/2018     Patient wishes to continue in study: Yes  All study protocol required CRFs completed: Yes     Pt was seen in SSCU for device interrogation post ablation procedure. No protocol related adverse events reported.  All questions answered to his satisfaction and he will contact research staff if he has any questions or concerns. Next follow up visit is in 6 months.

## 2018-09-11 NOTE — PROGRESS NOTES
Patient ambulated in jacques with staff. No complaints. bilat groin sites cdi, soft. Will monitor.

## 2018-09-11 NOTE — PLAN OF CARE
Problem: Patient Care Overview  Goal: Plan of Care Review  Outcome: Ongoing (interventions implemented as appropriate)  Report received from JACKSON Waddell. Patient s/p RFA. bilat groin sites cdi, soft. + pulses. Post procedure protocol discussed with patient. Call light In reach. Will monitor.

## 2018-09-11 NOTE — NURSING TRANSFER
Nursing Transfer Note      9/11/2018     Transfer To: ep pacu 3 to sscu 315    Transfer via stretcher    Transfer with cardiac monitoring tele box 315    Transported by cody mendoza rn    Medicines sent: none    Chart send with patient: Yes    Notified: son    Patient reassessed at: 9/11/18 1145 next due at 1215    Upon arrival to floor: cardiac monitor applied, patient oriented to room, call bell in reach and bed in lowest position

## 2018-09-11 NOTE — ANESTHESIA POSTPROCEDURE EVALUATION
Anesthesia Post Evaluation    Patient: Shannan Norman    Procedure(s) Performed: Procedure(s) (LRB):  ABLATION (N/A)  ECHOCARDIOGRAM,TRANSESOPHAGEAL (N/A)    Final Anesthesia Type: general  Patient location during evaluation: PACU  Patient participation: Yes- Able to Participate  Level of consciousness: awake and alert  Post-procedure vital signs: reviewed and stable  Pain management: adequate  Airway patency: patent  PONV status at discharge: No PONV  Anesthetic complications: no      Cardiovascular status: hemodynamically stable  Respiratory status: unassisted  Hydration status: euvolemic  Follow-up not needed.        Visit Vitals  /71 (BP Location: Left arm, Patient Position: Lying)   Pulse 60   Temp 36.8 °C (98.3 °F) (Oral)   Resp 18   Ht 6' (1.829 m)   Wt 101.2 kg (223 lb)   SpO2 95%   BMI 30.24 kg/m²       Pain/Delmar Score: Pain Assessment Performed: Yes (9/11/2018 12:00 PM)  Presence of Pain: denies (9/11/2018 12:30 PM)  Pain Rating Prior to Med Admin: 0 (9/11/2018 11:32 AM)  Delmar Score: 9 (9/11/2018 11:45 AM)

## 2018-09-11 NOTE — BRIEF OP NOTE
Patient is s/p CTI Flutter ablation.  Patient will need the following:   Tolerated procedure well. No complication noted.  Post op care per protocol.  Will monitor in recovery on tele overnight  Hold diltiazem resume apixiban 6 hr after hemoostasis.   Cont to follow

## 2018-09-11 NOTE — H&P
[facility]  Cardiac Electrophysiology  History and Physical     Admission Date: 9/11/2018  Code Status: Prior   Attending Provider: Hany Sanchez MD   Principal Problem:<principal problem not specified>    Subjective:     Chief Complaint:  Palpitation      HPI:   73 y.o. 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 Of note, HV then 65 ms. S/p ISABELLE DCCV   long hx SB  DM   HTN on meds   SSS, s/p PPM    He has been having palpitation. Likely recurrence of AFL.Review of ablation report from 2008 shows that there was difficulty achieving block at the CTI, including having to use cryoablation for stability due to respiratory variation.  He is here for a EPS and flutter ablation.           Past Medical History:   Diagnosis Date    Arthritis     Atrial flutter 2011    ablation    Cataract     Diabetes mellitus     Dry eye syndrome     Gout, unspecified     High cholesterol     History of shingles     Hypertension     Seizures        Past Surgical History:   Procedure Laterality Date    ABLATION OF DYSRHYTHMIC FOCUS      GANGLION CYST EXCISION      left neck    HERNIA REPAIR  2013    umbilical    INSERTION-PACEMAKER-DUAL Left 2/6/2018    Performed by [facility] MD at [facility] CATH LAB    REPAIR, HERNIA, UMBILICAL, AGE 5 YEARS OR OLDER N/A 9/19/2013    Performed by Grady Broderick MD at [facility] OR 2ND FLR    REPAIR, TENDON, HAND Right 1/27/2014    [facility] OR    TONSILLECTOMY      varicose veins      several sx  bilateral    VASECTOMY         Review of patient's allergies indicates:  No Known Allergies    No current facility-administered medications on file prior to encounter.      Current Outpatient Medications on File Prior to Encounter   Medication Sig    apixaban 5 mg Tab Take 1 tablet (5 mg total) by mouth 2 (two) times daily.    ascorbic acid (VITAMIN C) 500 MG tablet Take 1,000 mg by mouth nightly.     atorvastatin (LIPITOR) 80 MG tablet  Take 1 tablet (80 mg total) by mouth nightly. (Patient taking differently: Take 40 mg by mouth nightly. )    benazepril (LOTENSIN) 20 MG tablet Take 1 tablet (20 mg total) by mouth once daily.    chlorthalidone (HYGROTEN) 25 MG Tab Take 1 tablet (25 mg total) by mouth once daily. (Patient taking differently: Take 25 mg by mouth once daily. Pt takes 12.5mg QD)    cycloSPORINE (RESTASIS) 0.05 % ophthalmic emulsion Place 0.4 mLs (1 drop total) into both eyes 2 (two) times daily.    finasteride (PROSCAR) 5 mg tablet TAKE 1 TABLET(5 MG) BY MOUTH EVERY DAY    GLUCOSAMINE HCL/CHONDR VASQUEZ A NA (GLUCOSAMINE-CHONDROITIN) 750-600 mg Tab Take 2 tablets by mouth Daily.    multivitamin capsule Take 1 capsule by mouth nightly.     oxybutynin (DITROPAN) 5 MG Tab Take 1 tablet (5 mg total) by mouth 3 (three) times daily. (Patient taking differently: Take 5 mg by mouth 3 (three) times daily. Patient only takes once daily in PM.)    tamsulosin (FLOMAX) 0.4 mg Cp24 Take 1 capsule (0.4 mg total) by mouth after dinner.    clotrimazole-betamethasone 1-0.05% (LOTRISONE) cream Apply topically as needed.    diltiaZEM (CARDIZEM CD) 120 MG Cp24 Take 120 mg by mouth once daily.     econazole nitrate 1 % cream Apply topically 2 (two) times daily. Apply to affected toenail and surrounding skin twice daily.    meloxicam (MOBIC) 15 MG tablet TAKE 1 TABLET(15 MG) BY MOUTH EVERY DAY    meloxicam (MOBIC) 15 MG tablet TAKE 1 TABLET(15 MG) BY MOUTH EVERY DAY    rivaroxaban (XARELTO) 20 mg Tab Take 20 mg by mouth daily with dinner or evening meal.     Family History     Problem Relation (Age of Onset)    COPD Father    Diabetes Mother    Heart attack Brother    Heart disease Brother    No Known Problems Sister, Maternal Aunt, Maternal Uncle, Paternal Aunt, Paternal Uncle, Maternal Grandmother, Maternal Grandfather, Paternal Grandmother, Paternal Grandfather        Tobacco Use    Smoking status: Never Smoker    Smokeless tobacco: Never Used    Substance and Sexual Activity    Alcohol use: Yes     Alcohol/week: 4.2 oz     Types: 7 Glasses of wine per week    Drug use: No    Sexual activity: Yes     Partners: Female     ROS   Constitution: Negative. Negative for weakness and malaise/fatigue.   HENT: Negative.  Negative for ear pain and tinnitus.    Eyes: Negative for blurred vision.   Cardiovascular: Negative.  Negative for chest pain, dyspnea on exertion, near-syncope, palpitations and syncope.   Respiratory: Negative.  Negative for shortness of breath.    Endocrine: Negative.  Negative for polyuria.   Hematologic/Lymphatic: Does not bruise/bleed easily.   Skin: Negative.  Negative for rash.   Musculoskeletal: Negative.  Negative for joint pain and muscle weakness.   Gastrointestinal: Negative.  Negative for abdominal pain and change in bowel habit.   Genitourinary: Negative for frequency.   Neurological: Negative.  Negative for dizziness.   Psychiatric/Behavioral: Negative.  Negative for depression. The patient is not nervous/anxious.    Allergic/Immunologic: Negative for environmental allergies.       Objective:     Vital Signs (Most Recent):  Temp: 98 °F (36.7 °C) (09/11/18 0626)  Pulse: 60 (09/11/18 0626)  Resp: 20 (09/11/18 0626)  BP: 128/74 (09/11/18 0630)  SpO2: 96 % (09/11/18 0626) Vital Signs (24h Range):  Temp:  [98 °F (36.7 °C)] 98 °F (36.7 °C)  Pulse:  [60] 60  Resp:  [20] 20  SpO2:  [96 %] 96 %  BP: (121-128)/(72-74) 128/74       Weight: 101.2 kg (223 lb)  Body mass index is 30.24 kg/m².    SpO2: 96 %  O2 Device (Oxygen Therapy): room air    Physical Exam  Constitutional: He is oriented to person, place, and time. He appears well-developed and well-nourished.   HENT:   Head: Normocephalic and atraumatic.   Eyes: Conjunctivae, EOM and lids are normal. No scleral icterus.   Neck: Normal range of motion. No JVD present. No tracheal deviation present. No thyromegaly present.   Cardiovascular: Normal rate, regular rhythm, normal heart sounds  and intact distal pulses.   No extrasystoles are present. PMI is not displaced.  Exam reveals no gallop and no friction rub.    No murmur heard.  Pulmonary/Chest: Effort normal and breath sounds normal. No accessory muscle usage. No tachypnea. No respiratory distress. He has no wheezes. He has no rales.   Abdominal: Soft. Bowel sounds are normal. He exhibits no distension. There is no hepatosplenomegaly. There is no tenderness.   Musculoskeletal: Normal range of motion. He exhibits no edema.   Neurological: He is alert and oriented to person, place, and time. H  Skin: Skin is warm and dry. No rash noted.   Psychiatric: He has a normal mood and affect. His behavior is normal.       Significant Labs: BMP: No results for input(s): GLU, NA, K, CL, CO2, BUN, CREATININE, CALCIUM, MG in the last 48 hours., CMP: No results for input(s): NA, K, CL, CO2, GLU, BUN, CREATININE, CALCIUM, PROT, ALBUMIN, BILITOT, ALKPHOS, AST, ALT, ANIONGAP, ESTGFRAFRICA, EGFRNONAA in the last 48 hours., CBC: No results for input(s): WBC, HGB, HCT, PLT in the last 48 hours. and INR: No results for input(s): INR, PROTIME in the last 48 hours.      Assessment and Plan:     Active Diagnoses:    Diagnosis Date Noted POA    Atrial flutter [I48.92] 04/18/2013 Yes      Problems Resolved During this Admission:     We discussed the procedural details, risks and benefits of EPS + ablation with the patient and family.     In addition to the procedural details, we also discussed that while unlikely, catheter ablation is associated with several potential complications including, but not limited to, infection, bleeding , vascular complications, myocardial infarction, stroke, pericardial effusion/tamponade and, rarely, death. In case of atypical flutter findings we may have to go transseptal and do left sided ablation. Risks and benefits of that procedure discussed separately.   Patient appeared to understand the whole discussion and verbalized that all  questions were answered to satisfaction. After deliberating over the options, explanation, and risks/ benefits of the ablation procedure, patient agreed to proceed with the procedure.     John Corcoran MD  Cardiac Electrophysiology  [facility]

## 2018-09-11 NOTE — SUBJECTIVE & OBJECTIVE
Past Medical History:   Diagnosis Date    Arthritis     Atrial flutter 2011    ablation    Cataract     Diabetes mellitus     Dry eye syndrome     Gout, unspecified     High cholesterol     History of shingles     Hypertension     Seizures        Past Surgical History:   Procedure Laterality Date    ABLATION OF DYSRHYTHMIC FOCUS      GANGLION CYST EXCISION      left neck    HERNIA REPAIR  2013    umbilical    INSERTION-PACEMAKER-DUAL Left 2/6/2018    Performed by [facility] Sanchez MD at [facility] CATH LAB    REPAIR, HERNIA, UMBILICAL, AGE 5 YEARS OR OLDER N/A 9/19/2013    Performed by Grady Broderick MD at [facility] OR 2ND FLR    REPAIR, TENDON, HAND Right 1/27/2014    Performed by Isabel [facility]el MD at [facility] OR    TONSILLECTOMY      varicose veins      several sx  bilateral    VASECTOMY         Review of patient's allergies indicates:  No Known Allergies    No current facility-administered medications on file prior to encounter.      Current Outpatient Medications on File Prior to Encounter   Medication Sig    apixaban 5 mg Tab Take 1 tablet (5 mg total) by mouth 2 (two) times daily.    ascorbic acid (VITAMIN C) 500 MG tablet Take 1,000 mg by mouth nightly.     atorvastatin (LIPITOR) 80 MG tablet Take 1 tablet (80 mg total) by mouth nightly. (Patient taking differently: Take 40 mg by mouth nightly. )    benazepril (LOTENSIN) 20 MG tablet Take 1 tablet (20 mg total) by mouth once daily.    chlorthalidone (HYGROTEN) 25 MG Tab Take 1 tablet (25 mg total) by mouth once daily. (Patient taking differently: Take 25 mg by mouth once daily. Pt takes 12.5mg QD)    cycloSPORINE (RESTASIS) 0.05 % ophthalmic emulsion Place 0.4 mLs (1 drop total) into both eyes 2 (two) times daily.    finasteride (PROSCAR) 5 mg tablet TAKE 1 TABLET(5 MG) BY MOUTH EVERY DAY    GLUCOSAMINE HCL/CHONDR VASQUEZ A NA (GLUCOSAMINE-CHONDROITIN) 750-600 mg Tab Take 2 tablets by mouth Daily.    multivitamin capsule Take 1 capsule by  mouth nightly.     oxybutynin (DITROPAN) 5 MG Tab Take 1 tablet (5 mg total) by mouth 3 (three) times daily. (Patient taking differently: Take 5 mg by mouth 3 (three) times daily. Patient only takes once daily in PM.)    tamsulosin (FLOMAX) 0.4 mg Cp24 Take 1 capsule (0.4 mg total) by mouth after dinner.    clotrimazole-betamethasone 1-0.05% (LOTRISONE) cream Apply topically as needed.    diltiaZEM (CARDIZEM CD) 120 MG Cp24 Take 120 mg by mouth once daily.     econazole nitrate 1 % cream Apply topically 2 (two) times daily. Apply to affected toenail and surrounding skin twice daily.    meloxicam (MOBIC) 15 MG tablet TAKE 1 TABLET(15 MG) BY MOUTH EVERY DAY    meloxicam (MOBIC) 15 MG tablet TAKE 1 TABLET(15 MG) BY MOUTH EVERY DAY    rivaroxaban (XARELTO) 20 mg Tab Take 20 mg by mouth daily with dinner or evening meal.     Family History     Problem Relation (Age of Onset)    COPD Father    Diabetes Mother    Heart attack Brother    Heart disease Brother    No Known Problems Sister, Maternal Aunt, Maternal Uncle, Paternal Aunt, Paternal Uncle, Maternal Grandmother, Maternal Grandfather, Paternal Grandmother, Paternal Grandfather        Tobacco Use    Smoking status: Never Smoker    Smokeless tobacco: Never Used   Substance and Sexual Activity    Alcohol use: Yes     Alcohol/week: 4.2 oz     Types: 7 Glasses of wine per week    Drug use: No    Sexual activity: Yes     Partners: Female     Review of Systems   All other systems reviewed and are negative.    Objective:     Vital Signs (Most Recent):  Temp: 98 °F (36.7 °C) (09/11/18 0626)  Pulse: 60 (09/11/18 0626)  Resp: 20 (09/11/18 0626)  BP: 128/74 (09/11/18 0630)  SpO2: 96 % (09/11/18 0626) Vital Signs (24h Range):  Temp:  [98 °F (36.7 °C)] 98 °F (36.7 °C)  Pulse:  [60] 60  Resp:  [20] 20  SpO2:  [96 %] 96 %  BP: (121-128)/(72-74) 128/74     Weight: 101.2 kg (223 lb)  Body mass index is 30.24 kg/m².    SpO2: 96 %  O2 Device (Oxygen Therapy): room air    No  intake or output data in the 24 hours ending 09/11/18 0706    Lines/Drains/Airways     Airway                 Airway - Non-Surgical Nasal Cannula -- days          Epidural Line                 Perineural Analgesia/Anesthesia Assessment (using dermatomes) Epidural 01/27/14 1156 1687 days                Physical Exam  GEN: Alert and oriented in NAD  NECK: no JVD appreciated   CVS: RRR, s1/s2, no MRG  PULM: CTAB no rales  ABD: NT/ND BS +  Extremities: warm and dry, palpable pulses, no edema  NEURO: Alert and oriented x 3  PSYCH: appropriate affect.         Significant Labs:   Recent Lab Results       09/11/18  0610      aPTT 24.8  Comment:  aPTT therapeutic range = 39-69 seconds     Coumadin Monitoring INR 1.1  Comment:  Coumadin Therapy:  2.0 - 3.0 for INR for all indicators except mechanical heart valves  and antiphospholipid syndromes which should use 2.5 - 3.5.       Protime 11.1           Significant Imaging: EKG: Paced

## 2018-09-11 NOTE — ASSESSMENT & PLAN NOTE
Assessment & Plan:     PLAN:  1. No ISABELLE as is in Sinus today.    -The risks, benefits & alternatives of the procedure were explained to the patient.    -The risks of transesophageal echo include but are not limited to:  Dental trauma, esophageal trauma/perforation, bleeding, laryngospasm/brochospasm, aspiration, sore throat/hoarseness, & dislodgement of the endotracheal tube/nasogastric tube (where applicable).    -The risks of moderate sedation include hypotension, respiratory depression, arrhythmias, bronchospasm, & death.    -Informed consent was obtained & the patient is agreeable to proceed with the procedure.    I will discuss with the attending physician. Attending addendum is to follow.     Further recommendations per attending addendum    Rod Pimentel MD  Cardiology Fellow

## 2018-09-12 VITALS
WEIGHT: 223 LBS | HEIGHT: 72 IN | SYSTOLIC BLOOD PRESSURE: 149 MMHG | TEMPERATURE: 98 F | BODY MASS INDEX: 30.2 KG/M2 | RESPIRATION RATE: 18 BRPM | HEART RATE: 59 BPM | DIASTOLIC BLOOD PRESSURE: 74 MMHG | OXYGEN SATURATION: 95 %

## 2018-09-12 LAB — POCT GLUCOSE: 108 MG/DL (ref 70–110)

## 2018-09-12 PROCEDURE — 25000003 PHARM REV CODE 250: Performed by: INTERNAL MEDICINE

## 2018-09-12 PROCEDURE — 82962 GLUCOSE BLOOD TEST: CPT | Mod: 91

## 2018-09-12 RX ADMIN — APIXABAN 5 MG: 2.5 TABLET, FILM COATED ORAL at 09:09

## 2018-09-12 NOTE — PLAN OF CARE
Problem: Patient Care Overview  Goal: Plan of Care Review  Outcome: Ongoing (interventions implemented as appropriate)  Bilateral groin sites remain CDI, no bleeding or hematoma noted.  Will cont to monitor.

## 2018-09-12 NOTE — NURSING
Discharge instructions and medlist given.  Patient verbalized understanding.  Denies need for escort or wheelchair off unit.  Off unit walking to meet son outside front of hospital who will drive him home.

## 2018-09-12 NOTE — DISCHARGE SUMMARY
[facility]  Discharge Summary      Admit Date: 9/11/2018    Discharge Date and Time:  09/12/2018 8:21 AM    Attending Physician: Hany Sanchez MD     Reason for Admission: Atrial flutter ablation.     Procedures Performed: Atrial flutter ablation.     Hospital Course   Patient is s/p CTI Flutter ablation.    No new complains overnight .   Tolerated procedure well. No complication noted.  Stop  Diltiazem(has not taken in last few wks) and cont  apixiban for stroke px.  Apart from that no changes to home meds.     Final Diagnoses:    Principal Problem: <principal problem not specified>   Secondary Diagnoses:   Active Hospital Problems    Diagnosis  POA    Atrial flutter [I48.92]  Yes     EF 60%, s/p RF ablation in 2010 (Dr. Grady Copeland)        Resolved Hospital Problems   No resolved problems to display.       Discharged Condition: stable    Disposition: Home or Self Care    Follow Up/Patient Instructions:     Medications:  Reconciled Home Medications:      Medication List      CHANGE how you take these medications    atorvastatin 80 MG tablet  Commonly known as:  LIPITOR  Take 1 tablet (80 mg total) by mouth nightly.  What changed:  how much to take     chlorthalidone 25 MG Tab  Commonly known as:  HYGROTEN  Take 1 tablet (25 mg total) by mouth once daily.  What changed:  additional instructions     meloxicam 15 MG tablet  Commonly known as:  MOBIC  TAKE 1 TABLET(15 MG) BY MOUTH EVERY DAY  What changed:  Another medication with the same name was removed. Continue taking this medication, and follow the directions you see here.     oxybutynin 5 MG Tab  Commonly known as:  DITROPAN  Take 1 tablet (5 mg total) by mouth 3 (three) times daily.  What changed:  additional instructions        CONTINUE taking these medications    apixaban 5 mg Tab  Take 1 tablet (5 mg total) by mouth 2 (two) times daily.     benazepril 20 MG tablet  Commonly known as:  LOTENSIN  Take 1 tablet (20 mg total) by mouth  once daily.     ciclopirox 8 % Soln  Commonly known as:  PENLAC  Apply topically nightly.     clotrimazole-betamethasone 1-0.05% cream  Commonly known as:  LOTRISONE  Apply topically as needed.     cycloSPORINE 0.05 % ophthalmic emulsion  Commonly known as:  RESTASIS  Place 0.4 mLs (1 drop total) into both eyes 2 (two) times daily.     econazole nitrate 1 % cream  Apply topically 2 (two) times daily. Apply to affected toenail and surrounding skin twice daily.     finasteride 5 mg tablet  Commonly known as:  PROSCAR  TAKE 1 TABLET(5 MG) BY MOUTH EVERY DAY     glucosamine-chondroitin 750-600 mg Tab  Take 2 tablets by mouth Daily.     multivitamin capsule  Take 1 capsule by mouth nightly.     tamsulosin 0.4 mg Cap  Commonly known as:  FLOMAX  Take 1 capsule (0.4 mg total) by mouth after dinner.     VITAMIN C 500 MG tablet  Generic drug:  ascorbic acid (vitamin C)  Take 1,000 mg by mouth nightly.        STOP taking these medications    diltiaZEM 120 MG Cp24  Commonly known as:  CARDIZEM CD     rivaroxaban 20 mg Tab  Commonly known as:  XARELTO          Discharge Procedure Orders   Diet Cardiac     Lifting restrictions   Scheduling Instructions: 5lbs for 7 days     Notify your health care provider if you experience any of the following:  temperature >100.4     Notify your health care provider if you experience any of the following:  severe uncontrolled pain     Notify your health care provider if you experience any of the following:  redness, tenderness, or signs of infection (pain, swelling, redness, odor or green/yellow discharge around incision site)     Notify your health care provider if you experience any of the following:  severe persistent headache     Notify your health care provider if you experience any of the following:  persistent dizziness, light-headedness, or visual disturbances     Notify your health care provider if you experience any of the following:  increased confusion or weakness     No dressing  needed     Follow-up Information     Hany Sanchez MD In 6 weeks.    Specialties:  Cardiology, Electrophysiology  Contact information:  [facility]Price[facility] [address] [phone]

## 2018-09-13 ENCOUNTER — PATIENT MESSAGE (OUTPATIENT)
Dept: CARDIOLOGY | Facility: CLINIC | Age: 73
End: 2018-09-13

## 2018-09-15 ENCOUNTER — PATIENT MESSAGE (OUTPATIENT)
Dept: FAMILY MEDICINE | Facility: CLINIC | Age: 73
End: 2018-09-15

## 2018-10-18 DIAGNOSIS — I49.8 OTHER SPECIFIED CARDIAC ARRHYTHMIAS: Primary | ICD-10-CM

## 2018-10-22 ENCOUNTER — OFFICE VISIT (OUTPATIENT)
Dept: CARDIOLOGY | Facility: CLINIC | Age: 73
End: 2018-10-22
Payer: MEDICARE

## 2018-10-22 VITALS
WEIGHT: 220 LBS | BODY MASS INDEX: 29.8 KG/M2 | HEIGHT: 72 IN | SYSTOLIC BLOOD PRESSURE: 136 MMHG | HEART RATE: 60 BPM | DIASTOLIC BLOOD PRESSURE: 74 MMHG

## 2018-10-22 DIAGNOSIS — I49.5 SSS (SICK SINUS SYNDROME): ICD-10-CM

## 2018-10-22 DIAGNOSIS — I48.3 TYPICAL ATRIAL FLUTTER: Primary | ICD-10-CM

## 2018-10-22 DIAGNOSIS — E08.21 DIABETES MELLITUS DUE TO UNDERLYING CONDITION WITH DIABETIC NEPHROPATHY, WITHOUT LONG-TERM CURRENT USE OF INSULIN: ICD-10-CM

## 2018-10-22 DIAGNOSIS — I49.8 OTHER SPECIFIED CARDIAC ARRHYTHMIAS: ICD-10-CM

## 2018-10-22 DIAGNOSIS — I10 ESSENTIAL HYPERTENSION: ICD-10-CM

## 2018-10-22 DIAGNOSIS — I35.1 NONRHEUMATIC AORTIC VALVE INSUFFICIENCY: ICD-10-CM

## 2018-10-22 DIAGNOSIS — I42.8 NICM (NONISCHEMIC CARDIOMYOPATHY): ICD-10-CM

## 2018-10-22 DIAGNOSIS — E78.01 HYPERLIPIDEMIA TYPE II: ICD-10-CM

## 2018-10-22 PROCEDURE — 93010 ELECTROCARDIOGRAM REPORT: CPT | Mod: S$PBB,,, | Performed by: INTERNAL MEDICINE

## 2018-10-22 PROCEDURE — 99214 OFFICE O/P EST MOD 30 MIN: CPT | Mod: S$PBB,,, | Performed by: INTERNAL MEDICINE

## 2018-10-22 PROCEDURE — 99999 PR PBB SHADOW E&M-EST. PATIENT-LVL III: CPT | Mod: PBBFAC,,, | Performed by: INTERNAL MEDICINE

## 2018-10-22 PROCEDURE — 99213 OFFICE O/P EST LOW 20 MIN: CPT | Mod: PBBFAC,PO,25 | Performed by: INTERNAL MEDICINE

## 2018-10-22 PROCEDURE — 93005 ELECTROCARDIOGRAM TRACING: CPT | Mod: PBBFAC,PO | Performed by: INTERNAL MEDICINE

## 2018-10-22 NOTE — PROGRESS NOTES
"HPI    73 y.o. 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 Of note, HV then 65 ms. Redo CTI 9/2018 (Dr Sanchez)  long hx SB  DM   HTN on meds   SSS, s/p PPM    Feels well. Exercises 4-5 days/week, but at "4 out of 10" intensity. Bikes 6 miles  On exercise bike, his HR rarely reaches 100 bpm. We previously placed a 48-hour Holter to follow HR curve. This showed SR/SB, with HR range 29-99 bpm (including during exertion). Had some junctional rhythm, including HR during usual waking hours (though may have been taking a nap) at 37 bpm.   I placed a PPM 2/2018. Feeling well. Healed well.    During an extended trip, was camping in [place] when developed tachypalpitations. Seen at ER; ECG (I reviewed) c/w typical AFL. He was transferred to a [facility] and underwent ISABELLE/DCCV, and was given xarelto x 2 months.  In EP lab 9/11/18, we found that the previously blocked CTI line had regained conduction. 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  Per PPM, he had two episodes of AMS (AF/AFL) on 9/18: 1 min, and 1q46etr.     6/2012: 60% LVEF  9/12/16 ecg: junctional at 48 bpm. Previously, chronic SB in 40s, for years.  12/18/18: SR at 39 bpm.    My interpretation of today's ECG is NSR 60 bpm    Review of Systems   Constitution: Negative. Negative for weakness and malaise/fatigue.   HENT: Negative.  Negative for ear pain and tinnitus.    Eyes: Negative for blurred vision.   Cardiovascular: Negative.  Negative for chest pain, dyspnea on exertion, near-syncope, palpitations and syncope.   Respiratory: Negative.  Negative for shortness of breath.    Endocrine: Negative.  Negative for polyuria.   Hematologic/Lymphatic: Does not bruise/bleed easily.   Skin: Negative.  Negative for rash.   Musculoskeletal: Negative.  Negative for joint pain and muscle weakness.   Gastrointestinal: Negative.  Negative for abdominal pain and change in bowel habit.   Genitourinary: Negative for frequency.   Neurological: Negative.  Negative for " dizziness.   Psychiatric/Behavioral: Negative.  Negative for depression. The patient is not nervous/anxious.    Allergic/Immunologic: Negative for environmental allergies.        Objective:    Physical Exam   Constitutional: He is oriented to person, place, and time. He appears well-developed and well-nourished.   HENT:   Head: Normocephalic and atraumatic.   Eyes: Conjunctivae, EOM and lids are normal. No scleral icterus.   Neck: Normal range of motion. No JVD present. No tracheal deviation present. No thyromegaly present.   Cardiovascular: Normal rate, regular rhythm, normal heart sounds and intact distal pulses.  No extrasystoles are present. PMI is not displaced. Exam reveals no gallop and no friction rub.   No murmur heard.  Pulses:       Radial pulses are 2+ on the right side, and 2+ on the left side.   Pulmonary/Chest: Effort normal and breath sounds normal. No accessory muscle usage. No tachypnea. No respiratory distress. He has no wheezes. He has no rales.   Abdominal: Soft. Bowel sounds are normal. He exhibits no distension. There is no hepatosplenomegaly. There is no tenderness.   Musculoskeletal: Normal range of motion. He exhibits no edema.   Neurological: He is alert and oriented to person, place, and time. He has normal reflexes. He exhibits normal muscle tone.   Skin: Skin is warm and dry. No rash noted.   Psychiatric: He has a normal mood and affect. His behavior is normal.   Nursing note and vitals reviewed.        Assessment:       1. Typical atrial flutter    2. Other specified cardiac arrhythmias    3. Essential hypertension    4. Hyperlipidemia type II    5. Nonrheumatic aortic valve insufficiency    6. NICM (nonischemic cardiomyopathy)    7. SSS (sick sinus syndrome)    8. Diabetes mellitus due to underlying condition with diabetic nephropathy, without long-term current use of insulin      Subjective:    Patient ID:  Shannan Norman is a 73 y.o. male who presents for evaluation of  bradycardia                  Subjective:    Patient ID:  Shannan Norman is a 73 y.o. male who presents for follow-up of essential hypertension      HPI    ROS     Objective:    Physical Exam      Assessment:       1. Typical atrial flutter    2. Other specified cardiac arrhythmias    3. Essential hypertension    4. Hyperlipidemia type II    5. Nonrheumatic aortic valve insufficiency    6. NICM (nonischemic cardiomyopathy)    7. SSS (sick sinus syndrome)    8. Diabetes mellitus due to underlying condition with diabetic nephropathy, without long-term current use of insulin      Doing well s/p redo CTI line for typical AFL.  Continue eliquis given sm amt AMS in the week following RFA (though none since). We discussed that if no AF/AFL is seen over the next year, we'll consider d/c of anticoagulant at follow-up.    Return in 1 year with echo, or earlier prn.

## 2018-10-31 ENCOUNTER — CLINICAL SUPPORT (OUTPATIENT)
Dept: ELECTROPHYSIOLOGY | Facility: CLINIC | Age: 73
End: 2018-10-31
Attending: INTERNAL MEDICINE
Payer: MEDICARE

## 2018-10-31 ENCOUNTER — PATIENT MESSAGE (OUTPATIENT)
Dept: FAMILY MEDICINE | Facility: CLINIC | Age: 73
End: 2018-10-31

## 2018-10-31 DIAGNOSIS — I49.5 SSS (SICK SINUS SYNDROME): ICD-10-CM

## 2018-10-31 DIAGNOSIS — I49.9 CARDIAC ARRHYTHMIA, UNSPECIFIED CARDIAC ARRHYTHMIA TYPE: ICD-10-CM

## 2018-10-31 PROCEDURE — 93296 REM INTERROG EVL PM/IDS: CPT | Mod: PBBFAC | Performed by: INTERNAL MEDICINE

## 2018-10-31 PROCEDURE — 93294 REM INTERROG EVL PM/LDLS PM: CPT | Mod: ,,, | Performed by: INTERNAL MEDICINE

## 2018-11-09 ENCOUNTER — PATIENT MESSAGE (OUTPATIENT)
Dept: ADMINISTRATIVE | Facility: OTHER | Age: 73
End: 2018-11-09

## 2018-11-09 DIAGNOSIS — E11.9 TYPE 2 DIABETES MELLITUS: ICD-10-CM

## 2018-11-12 ENCOUNTER — OFFICE VISIT (OUTPATIENT)
Dept: FAMILY MEDICINE | Facility: CLINIC | Age: 73
End: 2018-11-12
Attending: FAMILY MEDICINE
Payer: MEDICARE

## 2018-11-12 VITALS
HEIGHT: 72 IN | HEART RATE: 90 BPM | BODY MASS INDEX: 30.79 KG/M2 | DIASTOLIC BLOOD PRESSURE: 80 MMHG | WEIGHT: 227.31 LBS | SYSTOLIC BLOOD PRESSURE: 130 MMHG | OXYGEN SATURATION: 96 %

## 2018-11-12 DIAGNOSIS — E08.21 DIABETES MELLITUS DUE TO UNDERLYING CONDITION WITH DIABETIC NEPHROPATHY, WITHOUT LONG-TERM CURRENT USE OF INSULIN: Primary | ICD-10-CM

## 2018-11-12 DIAGNOSIS — Z12.11 COLON CANCER SCREENING: ICD-10-CM

## 2018-11-12 DIAGNOSIS — I42.8 NICM (NONISCHEMIC CARDIOMYOPATHY): ICD-10-CM

## 2018-11-12 DIAGNOSIS — E78.5 DYSLIPIDEMIA ASSOCIATED WITH TYPE 2 DIABETES MELLITUS: ICD-10-CM

## 2018-11-12 DIAGNOSIS — I15.2 HYPERTENSION ASSOCIATED WITH DIABETES: ICD-10-CM

## 2018-11-12 DIAGNOSIS — I35.1 NONRHEUMATIC AORTIC VALVE INSUFFICIENCY: ICD-10-CM

## 2018-11-12 DIAGNOSIS — I49.5 SSS (SICK SINUS SYNDROME): ICD-10-CM

## 2018-11-12 DIAGNOSIS — E66.9 OBESITY (BMI 30.0-34.9): ICD-10-CM

## 2018-11-12 DIAGNOSIS — E11.59 HYPERTENSION ASSOCIATED WITH DIABETES: ICD-10-CM

## 2018-11-12 DIAGNOSIS — I83.12 VARICOSE VEINS OF BOTH LOWER EXTREMITIES WITH INFLAMMATION: ICD-10-CM

## 2018-11-12 DIAGNOSIS — E11.69 DYSLIPIDEMIA ASSOCIATED WITH TYPE 2 DIABETES MELLITUS: ICD-10-CM

## 2018-11-12 DIAGNOSIS — I83.11 VARICOSE VEINS OF BOTH LOWER EXTREMITIES WITH INFLAMMATION: ICD-10-CM

## 2018-11-12 PROCEDURE — 99213 OFFICE O/P EST LOW 20 MIN: CPT | Mod: PBBFAC,PO | Performed by: FAMILY MEDICINE

## 2018-11-12 PROCEDURE — 99214 OFFICE O/P EST MOD 30 MIN: CPT | Mod: S$PBB,,, | Performed by: FAMILY MEDICINE

## 2018-11-12 PROCEDURE — 99999 PR PBB SHADOW E&M-EST. PATIENT-LVL III: CPT | Mod: PBBFAC,,, | Performed by: FAMILY MEDICINE

## 2018-11-12 NOTE — PROGRESS NOTES
Subjective:       Patient ID: Shannan Norman is a 73 y.o. male.    Chief Complaint: Follow-up; Diabetic Foot Exam; and Knee Pain    73 yr old pleasant white male with DM II controlled, HTN, HLD, obesity, presents today for his 6 month follow up and med refills. No new concerns today.    BPH - much better  - no dysuria - tried flomax and proscar and nothing helps       DM II - controlled -  diet control -A1C                   6.0 (H)             08/02/2018                     - on ACE and ASA - UTD with eye and foot screen      HTN - controlled - oN ACE - compliant - no side effects      HLD - controlled - on statin - compliant - no side effects -  LDLCALC                  89.8                08/02/2018                                 History as below - reviewed and no changes    HM  -labs due  -PSA UTD  -adacel UTD            Diabetes   He presents for his follow-up diabetic visit. He has type 2 diabetes mellitus. His disease course has been stable. Pertinent negatives for hypoglycemia include no confusion, dizziness, headaches, hunger, mood changes, nervousness/anxiousness, seizures, sleepiness, speech difficulty, sweats or tremors. Pertinent negatives for diabetes include no chest pain, no foot ulcerations, no polydipsia, no polyuria, no visual change, no weakness and no weight loss. Symptoms are stable. Pertinent negatives for diabetic complications include no CVA, PVD or retinopathy. Risk factors for coronary artery disease include diabetes mellitus, dyslipidemia, hypertension and male sex. He is compliant with treatment all of the time. He is following a generally healthy diet. Meal planning includes avoidance of concentrated sweets. He rarely participates in exercise. An ACE inhibitor/angiotensin II receptor blocker is being taken. He does not see a podiatrist.Eye exam is current.   Hypertension   This is a chronic problem. The current episode started more than 1 month ago. The problem has been gradually  improving since onset. The problem is controlled. Pertinent negatives include no chest pain, headaches, malaise/fatigue, neck pain, palpitations, peripheral edema, PND or sweats. There are no associated agents to hypertension. Risk factors for coronary artery disease include diabetes mellitus, dyslipidemia, male gender and obesity. Past treatments include ACE inhibitors. The current treatment provides significant improvement. There are no compliance problems.  There is no history of angina, CAD/MI, CVA, left ventricular hypertrophy, PVD or retinopathy. There is no history of chronic renal disease, coarctation of the aorta, hypercortisolism, hyperparathyroidism, pheochromocytoma, renovascular disease or a thyroid problem.   Hyperlipidemia   This is a chronic problem. The current episode started more than 1 year ago. The problem is controlled. Recent lipid tests were reviewed and are normal. Exacerbating diseases include diabetes and obesity. He has no history of chronic renal disease. Pertinent negatives include no chest pain, leg pain or myalgias. The current treatment provides significant improvement of lipids. There are no compliance problems.  Risk factors for coronary artery disease include diabetes mellitus, dyslipidemia, hypertension, male sex and obesity.   Benign Prostatic Hypertrophy   This is a chronic problem. The current episode started more than 1 year ago. The problem has been gradually worsening since onset. Irritative symptoms include nocturia. Irritative symptoms do not include frequency or urgency. Obstructive symptoms include dribbling, incomplete emptying, an intermittent stream, a slower stream and straining. Pertinent negatives include no hematuria or vomiting. AUA score is 8-19. He is sexually active. Nothing aggravates the symptoms. Past treatments include tamsulosin. The treatment provided significant relief.     Review of Systems   Constitutional: Negative.  Negative for activity change,  diaphoresis, malaise/fatigue, unexpected weight change and weight loss.   HENT: Negative.  Negative for congestion, ear pain, hearing loss, mouth sores, rhinorrhea, trouble swallowing and voice change.    Eyes: Negative.  Negative for pain, discharge and visual disturbance.   Respiratory: Negative.  Negative for apnea, cough, chest tightness and wheezing.    Cardiovascular: Negative.  Negative for chest pain, palpitations and PND.   Gastrointestinal: Negative.  Negative for abdominal distention, anal bleeding, blood in stool, constipation, diarrhea and vomiting.   Endocrine: Negative.  Negative for cold intolerance, polydipsia and polyuria.   Genitourinary: Positive for incomplete emptying and nocturia. Negative for decreased urine volume, difficulty urinating, discharge, frequency, hematuria, scrotal swelling and urgency.   Musculoskeletal: Positive for arthralgias. Negative for back pain, joint swelling, myalgias, neck pain and neck stiffness.   Skin: Negative.  Negative for color change and rash.   Allergic/Immunologic: Negative.  Negative for environmental allergies and immunocompromised state.   Neurological: Negative.  Negative for dizziness, tremors, seizures, speech difficulty, weakness, light-headedness and headaches.   Hematological: Negative.    Psychiatric/Behavioral: Negative.  Negative for agitation, confusion, dysphoric mood and suicidal ideas. The patient is not nervous/anxious.        PMH/PSH/FH/SH/MED/ALLERGY reviewed    Objective:       Vitals:    11/12/18 1004   BP: 130/80   Pulse: 90       Physical Exam   Constitutional: He is oriented to person, place, and time. He appears well-developed and well-nourished.   HENT:   Head: Normocephalic and atraumatic.   Right Ear: External ear normal.   Left Ear: External ear normal.   Nose: Nose normal.   Mouth/Throat: Oropharynx is clear and moist. No oropharyngeal exudate.   Eyes: Conjunctivae and EOM are normal. Pupils are equal, round, and reactive to  light. Right eye exhibits no discharge. Left eye exhibits no discharge. No scleral icterus.   Neck: Normal range of motion. Neck supple. No JVD present. No tracheal deviation present. No thyromegaly present.   Cardiovascular: Normal rate, regular rhythm, normal heart sounds and intact distal pulses. Exam reveals no gallop and no friction rub.   No murmur heard.  Pulmonary/Chest: Effort normal and breath sounds normal. No stridor. No respiratory distress. He has no wheezes. He has no rales. He exhibits no tenderness.   Abdominal: Soft. Bowel sounds are normal. He exhibits no distension and no mass. There is no tenderness. There is no rebound and no guarding. No hernia.   Musculoskeletal: Normal range of motion. He exhibits no edema or tenderness.   Lymphadenopathy:     He has no cervical adenopathy.   Neurological: He is alert and oriented to person, place, and time. He has normal reflexes. He displays normal reflexes. No cranial nerve deficit. He exhibits normal muscle tone. Coordination normal.   Skin: Skin is warm and dry. No rash noted. No erythema. No pallor.   Psychiatric: He has a normal mood and affect. His behavior is normal. Judgment and thought content normal.       Protective Sensation (w/ 10 gram monofilament):  Right: Intact  Left: Intact    Visual Inspection:  Normal -  Bilateral and Nails Intact - with Evidence of Foot Deformity- Bilateral    Pedal Pulses:   Right: Present  Left: Present    Posterior tibialis:   Right:Present  Left: Present      Assessment:       1. Diabetes mellitus due to underlying condition with diabetic nephropathy, without long-term current use of insulin    2. Dyslipidemia associated with type 2 diabetes mellitus    3. Hypertension associated with diabetes    4. NICM (nonischemic cardiomyopathy)    5. Nonrheumatic aortic valve insufficiency    6. Obesity (BMI 30.0-34.9)    7. SSS (sick sinus syndrome)    8. Varicose veins of both lower extremities with inflammation    9. Colon  cancer screening        Plan:       Shannan was seen today for follow-up, diabetic foot exam and knee pain.    Diagnoses and all orders for this visit:    Diabetes mellitus due to underlying condition with diabetic nephropathy, without long-term current use of insulin  -     Comprehensive metabolic panel; Future  -     Hemoglobin A1c; Future    Dyslipidemia associated with type 2 diabetes mellitus  -     Comprehensive metabolic panel; Future    Hypertension associated with diabetes  -     Comprehensive metabolic panel; Future    NICM (nonischemic cardiomyopathy)    Nonrheumatic aortic valve insufficiency    Obesity (BMI 30.0-34.9)    SSS (sick sinus syndrome)    Varicose veins of both lower extremities with inflammation    Colon cancer screening  -     Case request GI: COLONOSCOPY      DM II  -controlled  -labs    HTN  -controlled    HLD  -stable    BPH  -controlled    SSS  -on pacemaker      Spent adequate time in obtaining history and explaining differentials    40 minutes spent during this visit of which greater than 50% devoted to face-face counseling and coordination of care regarding diagnosis and management plan    Follow-up in about 3 months (around 2/12/2019), or if symptoms worsen or fail to improve.

## 2018-11-13 ENCOUNTER — TELEPHONE (OUTPATIENT)
Dept: GASTROENTEROLOGY | Facility: CLINIC | Age: 73
End: 2018-11-13

## 2018-11-13 NOTE — TELEPHONE ENCOUNTER
Spoke with patient about getting scheduled for an office visit before scheduling his Colonoscopy.Patient did not understand why he needed an office visit. Staff informed patient that any patient over the age of 70 years old need to come in for an office visit. Patient hung up the phone.

## 2018-11-16 RX ORDER — MELOXICAM 15 MG/1
TABLET ORAL
Qty: 90 TABLET | Refills: 0 | Status: SHIPPED | OUTPATIENT
Start: 2018-11-16 | End: 2019-02-05 | Stop reason: SDUPTHER

## 2018-11-28 ENCOUNTER — TELEPHONE (OUTPATIENT)
Dept: GASTROENTEROLOGY | Facility: CLINIC | Age: 73
End: 2018-11-28

## 2018-11-28 NOTE — TELEPHONE ENCOUNTER
Spoke with patient about scheduling a Colonoscopy. Staff informed patient that he would have to make an office visit first before scheduling his procedure. Patient scheduled an appointment with Jaylyn Wick on 11/30/2018.

## 2018-11-29 ENCOUNTER — PATIENT MESSAGE (OUTPATIENT)
Dept: FAMILY MEDICINE | Facility: CLINIC | Age: 73
End: 2018-11-29

## 2018-11-30 ENCOUNTER — TELEPHONE (OUTPATIENT)
Dept: GASTROENTEROLOGY | Facility: CLINIC | Age: 73
End: 2018-11-30

## 2018-11-30 ENCOUNTER — OFFICE VISIT (OUTPATIENT)
Dept: GASTROENTEROLOGY | Facility: CLINIC | Age: 73
End: 2018-11-30
Payer: MEDICARE

## 2018-11-30 VITALS
HEIGHT: 72 IN | WEIGHT: 226.44 LBS | DIASTOLIC BLOOD PRESSURE: 60 MMHG | BODY MASS INDEX: 30.67 KG/M2 | SYSTOLIC BLOOD PRESSURE: 127 MMHG

## 2018-11-30 DIAGNOSIS — Z79.01 LONG TERM (CURRENT) USE OF ANTICOAGULANTS: ICD-10-CM

## 2018-11-30 DIAGNOSIS — I48.3 TYPICAL ATRIAL FLUTTER: Primary | ICD-10-CM

## 2018-11-30 DIAGNOSIS — Z12.11 COLON CANCER SCREENING: ICD-10-CM

## 2018-11-30 PROCEDURE — 99999 PR PBB SHADOW E&M-EST. PATIENT-LVL III: CPT | Mod: PBBFAC,,, | Performed by: NURSE PRACTITIONER

## 2018-11-30 PROCEDURE — 99213 OFFICE O/P EST LOW 20 MIN: CPT | Mod: PBBFAC,PO | Performed by: NURSE PRACTITIONER

## 2018-11-30 PROCEDURE — 99214 OFFICE O/P EST MOD 30 MIN: CPT | Mod: S$PBB,ICN,, | Performed by: NURSE PRACTITIONER

## 2018-11-30 RX ORDER — SODIUM, POTASSIUM,MAG SULFATES 17.5-3.13G
SOLUTION, RECONSTITUTED, ORAL ORAL
Qty: 1 KIT | Refills: 0 | Status: SHIPPED | OUTPATIENT
Start: 2018-11-30 | End: 2019-02-11

## 2018-11-30 NOTE — PATIENT INSTRUCTIONS
SUPREP Instructions    You are scheduled for a colonoscopy with Dr. Solorzano on 1-2-19 at [facility] located at [address] [place] [address].  Check in at the admit desk, first floor of the hospital (which is the building on the left).     You will receive a call 2-3 days before your colonoscopy to tell you the time to arrive.  If you have not received a call by the day before your procedure, call the Endoscopy Lab at [phone].    To ensure that your test is accurate and complete, you MUST follow these instructions listed below.  If you have any questions, please call our office at [phone].  Plan on being at the hospital for your procedure for 3-4 hours.    1.  Follow a CLEAR LIQUID DIET for the entire day before your scheduled colonoscopy.  This means no solid food the entire day starting when you wake.  You may have as much of the clear liquids as you want throughout the day.   CLEAR LIQUID DIET:   - Avoid Red, Orange, Purple, and/or Blue food coloring   - NO DAIRY   - You can have:  Coffee with sugar (no creamer), tea, water, soda, apple or white grape juice, chicken or beef broth/bouillon (no meat, noodles, or veggies), green/yellow popsicles, green/yellow Jell-O, lemonade.    2.  AT 5 pm the evening before your colonoscopy, POUR ONE (1) BOTTLE OF SUPREP INTO THE MIXING CONTAINER, PROVIDED INSIDE THE BOX.  ADD WATER TO THE LINE ON THE CONTAINER AND MIX IT WELL.  DRINK THE ENTIRE CONTAINER AND THEN DRINK TWO (2) MORE CONTAINERS OF WATER OVER THE NEXT 1 HOUR.  This is sometimes easier to drink if this solution is cold, so you can mix the solution a few hours ahead of time and place in the refrigerator prior to drinking.  You have to drink the solution within 24 hours of mixing it.  Do NOT put this solution over ice.  It IS ok to drink with a straw.    3.  The endoscopy department will call you 2 days before your colonoscopy to tell you the exact time to arrive, AND to tell you the exact time to  drink the 2nd portion of your prep (which will be FIVE HOURS BEFORE YOUR ARRIVAL TIME).  At this time given to you, POUR ONE (1) BOTTLE OF SUPREP INTO THE MIXING CONTAINER, PROVIDED INSIDE THE BOX.  ADD WATER TO THE LINE ON THE CONTAINER AND MIX IT WELL.  DRINK THE ENTIRE CONTAINER AND THEN DRINK TWO (2) MORE CONTAINERS OF WATER OVER THE NEXT 1 HOUR.  This is sometimes easier to drink if this solution is cold, so you can mix the solution a few hours ahead of time and place in the refrigerator prior to drinking.  You have to drink the solution within 24 hours of mixing it.  Do NOT put this solution over ice.  It IS ok to drink with a straw. Once this is complete, you may not have ANYTHING else by mouth!    4.  You must have someone with you to DRIVE YOU HOME since you will be receiving IV sedation for the colonoscopy.    5.  It is ok to take your heart, blood pressure, and seizure medications in the morning of your test with a SIP of water.  Hold other medications until after your procedure.  Do NOT have anything else to eat or drink the morning of your colonoscopy.  It is ok to brush your teeth.    6.  If you are on blood thinners THAT YOU HAVE BEEN INSTRUCTED TO HOLD BY YOUR DOCTOR FOR THIS PROCEDURE, then do NOT take this the morning of your colonoscopy.  Do NOT stop these medications on your own, they must be approved to be held by your doctor.  Your colonoscopy can NOT be done if you are on these medications.  Examples of blood thinners include: Coumadin, Aggrenox, Plavix, Pradaxa, Reapro, Pletal, Xarelto, Ticagrelor, Brilinta, Eliquis, and high dose aspirin (325 mg).  You do not have to stop baby aspirin 81 mg.    7.  IF YOU ARE DIABETIC:  NO INSULIN OR ORAL MEDICATIONS THE MORNING OF THE COLONOSCOPY.  TAKE ONLY HALF THE DOSE OF YOUR INSULIN THE DAY BEFORE THE COLONOSCOPY.  DO NOT TAKE ANY ORAL DIABETIC MEDICATIONS THE DAY BEFORE THE COLONOSCOPY.  IF YOU ARE AN INSULIN DEPENDENT DIABETIC WITH UNSTABLE BLOOD  LESLEY, NOTIFY YOUR PRIMARY CARE PHYSICIAN FOR INSTRUCTIONS.

## 2018-11-30 NOTE — PROGRESS NOTES
Subjective:       Patient ID: Shannan Norman is a 73 y.o. male.    Chief Complaint: Colonoscopy    HPI  Presents to discuss colonoscopy.  He last had colonoscopy in 2005.  No personal or family history of colon cancer or colon polyps.  Denies blood with bowel movements or black stools.  Has noted some change in bowel habit that he has related to his medications.  Denies abdominal pain, nausea, vomiting, gerd.  Intentional weight loss with diet.    He is on elliquis BID for atrial flutter and fibrillation.  Has has recent cardiology visit.    Review of Systems   Constitutional: Negative.  Negative for activity change, appetite change, fatigue, fever and unexpected weight change.   Respiratory: Negative for shortness of breath.    Cardiovascular: Negative for chest pain.   Gastrointestinal: Negative for abdominal pain, blood in stool, nausea and rectal pain.   Skin: Negative.    Neurological: Negative.        Objective:      Physical Exam   Constitutional: He is oriented to person, place, and time. He appears well-developed and well-nourished. No distress.   Cardiovascular: Normal rate and regular rhythm.   Pulmonary/Chest: Effort normal and breath sounds normal. No respiratory distress.   Abdominal: Soft. Bowel sounds are normal. He exhibits no distension and no mass. There is no tenderness. There is no guarding.   Neurological: He is alert and oriented to person, place, and time.   Skin: Skin is warm and dry. He is not diaphoretic.   Psychiatric: He has a normal mood and affect. His behavior is normal. Judgment and thought content normal.   Vitals reviewed.      Assessment:       1. Typical atrial flutter    2. Colon cancer screening    3. Long term (current) use of anticoagulants        Plan:         Shannan was seen today for colonoscopy.    Diagnoses and all orders for this visit:    Typical atrial flutter    Colon cancer screening    Long term (current) use of anticoagulants    Other orders  -     Case request GI:  COLONOSCOPY         I have explained the planned procedures to the patient.The risks, benefits and alternatives of the procedure were also explained in detail. Patient verbalized understanding, all questions were answered. The patient agrees to proceed as planned    Will schedule colonoscopy with clearance to hold eliquis .

## 2018-12-03 ENCOUNTER — PATIENT MESSAGE (OUTPATIENT)
Dept: ORTHOPEDICS | Facility: CLINIC | Age: 73
End: 2018-12-03

## 2018-12-04 ENCOUNTER — HOSPITAL ENCOUNTER (OUTPATIENT)
Dept: RADIOLOGY | Facility: HOSPITAL | Age: 73
Discharge: HOME | End: 2018-12-04
Attending: FAMILY MEDICINE
Payer: MEDICARE

## 2018-12-04 ENCOUNTER — OFFICE VISIT (OUTPATIENT)
Dept: SPORTS MEDICINE | Facility: CLINIC | Age: 73
End: 2018-12-04
Payer: MEDICARE

## 2018-12-04 ENCOUNTER — PATIENT MESSAGE (OUTPATIENT)
Dept: ORTHOPEDICS | Facility: CLINIC | Age: 73
End: 2018-12-04

## 2018-12-04 VITALS — HEIGHT: 72 IN | BODY MASS INDEX: 30.61 KG/M2 | WEIGHT: 226 LBS

## 2018-12-04 DIAGNOSIS — M25.561 PAIN IN BOTH KNEES, UNSPECIFIED CHRONICITY: ICD-10-CM

## 2018-12-04 DIAGNOSIS — M25.562 PAIN IN BOTH KNEES, UNSPECIFIED CHRONICITY: ICD-10-CM

## 2018-12-04 DIAGNOSIS — M17.0 PRIMARY OSTEOARTHRITIS OF KNEES, BILATERAL: Primary | ICD-10-CM

## 2018-12-04 DIAGNOSIS — R53.81 PHYSICAL DECONDITIONING: ICD-10-CM

## 2018-12-04 PROCEDURE — 99999 PR PBB SHADOW E&M-EST. PATIENT-LVL III: CPT | Mod: PBBFAC,,, | Performed by: FAMILY MEDICINE

## 2018-12-04 PROCEDURE — 99204 OFFICE O/P NEW MOD 45 MIN: CPT | Mod: 25,S$PBB,, | Performed by: FAMILY MEDICINE

## 2018-12-04 PROCEDURE — 99213 OFFICE O/P EST LOW 20 MIN: CPT | Mod: PBBFAC,25,PO | Performed by: FAMILY MEDICINE

## 2018-12-04 PROCEDURE — 73564 X-RAY EXAM KNEE 4 OR MORE: CPT | Mod: TC,50,FY,PO

## 2018-12-04 PROCEDURE — 73564 X-RAY EXAM KNEE 4 OR MORE: CPT | Mod: 26,50,, | Performed by: RADIOLOGY

## 2018-12-04 PROCEDURE — 20611 DRAIN/INJ JOINT/BURSA W/US: CPT | Mod: 50,PBBFAC,PO | Performed by: FAMILY MEDICINE

## 2018-12-04 RX ORDER — TRIAMCINOLONE ACETONIDE 40 MG/ML
40 INJECTION, SUSPENSION INTRA-ARTICULAR; INTRAMUSCULAR
Status: DISCONTINUED | OUTPATIENT
Start: 2018-12-04 | End: 2018-12-04 | Stop reason: HOSPADM

## 2018-12-04 RX ADMIN — TRIAMCINOLONE ACETONIDE 40 MG: 40 INJECTION, SUSPENSION INTRA-ARTICULAR; INTRAMUSCULAR at 08:12

## 2018-12-04 NOTE — PROGRESS NOTES
Shannan Norman, a 73 y.o. male, presents today for evaluation of his right and left knee.      History of Present Illness (HPI)  Location: anterior knee, R>L  Onset: Chronic, > 1 month  Palliative:    Relative rest   Oral analgesics   HgPT, after visit c Dr. Charissa JEAN BAPTISTE     Provocative:    ADLS   Prolonged ambulation   Standing after sitting for prolonged duration  Prior: none  Progression: worsening discomfort  Quality:    sharp pain  Radiation: none  Severity: per nursing documentation  Timing: intermittent w/ use  Trauma: none    Review of Systems (ROS)  A 10+ review of systems was performed with pertinent positives and negatives noted above in the history of present illness. Other systems were negative unless otherwise specified.    Physical Examination (PE)  General:  The patient is alert and oriented x 3. Mood is pleasant. Observation of ears, eyes and nose reveal no gross abnormalities. HEENT: NCAT, sclera anicteric.   Lungs: Respirations are equal and unlabored.  Gait is coordinated. Patient can toe walk and heel walk without difficulty.    RIGHT and LEFT KNEE EXAMINATION    Observation/Inspection  Gait:   Nonantalgic   Alignment:  Neutral   Scars:   None   Muscle atrophy: Mild  Effusion:  None   Warmth:  None   Discoloration:   none     Tenderness / Crepitus (T / C):         T / C      T / C  Patella   - / -   Lateral joint line   - / -     Peripatellar medial  -  Medial joint line    + / -  Peripatellar lateral -  Medial plica   - / -  Patellar tendon -   Popliteal fossa   - / -  Quad tendon   -   Gastrocnemius   -  Prepatellar Bursa - / -   Quadricep   -  Tibial tubercle  -  Thigh/hamstring  -  Pes anserine/HS -  Fibula    -  ITB   - / -  Tibia     -  Tib/fib joint  - / -  LCL    -    MFC   + / -   MCL: Proximal  -    LFC   - / -   Distal    -          ROM: (* = pain)  PASSIVE   ACTIVE    Left :   5 / 0 / 145   5 / 0 / 145     Right :    5 / 0 / 145   5 / 0 / 145    Patellofemoral examination:  See  above noted areas of tenderness.   Patella position    Subluxation / dislocation: Centered        Sup. / Inf;   Normal   Crepitus (PF):    Absent   Patellar Mobility:       Medial-lateral:   Normal    Superior-inferior:  Normal    Inferior tilt   Normal    Patellar tendon:  Normal   Lateral tilt:    Normal   J-sign:     None   Patellofemoral grind:   No pain     Meniscal Signs:     Pain on terminal extension:  +  Pain on terminal flexion:  +  Luisitos maneuver:  +*  Squat     +*    Ligament Examination:  ACL / Lachman:  WNL  PCL-Post.  drawer: normal 0 to 2mm  MCL- Valgus:  normal 0 to 2mm  LCL- Varus:    normal 0 to 2mm  Pivot shift:  guarding   Dial Test:   difference c/w other side   At 30° flexion: normal (< 5°)    At 90° flexion: normal (< 5°)   Reverse Pivot Shift:   normal (Equal)     Strength: (* = with pain) Painful Side  Quadriceps   4/5  Hamstrin/5    Extremity Neuro-vascular Examination:   Sensation:  Grossly intact to light touch all dermatomal regions.   Motor Function:  Fully intact motor function at hip, knee, foot and ankle    DTRs;  quadriceps and  achilles 2+.  No clonus and downgoing Babinski.    Vascular status:  DP and PT pulses 2+, brisk capillary refill, symmetric.     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, verona ant compartment, bilat  Knee pain right > left     No evidence of neurologic pathology  No evidence of vascular pathology    Imaging studies reviewed:   X-ray knee, bilateral 18.11    Plan:    We discussed the importance of appropriate diet, weight, and regular exercise including quadriceps strengthening     We discussed options including:  #1 watchful waiting  #2 physical therapy aimed at:   Core stability   RoM knee   Strengthening quadriceps   Gait training   #3 injection therapy:   CSI iaknee     Right,     Left,    VSI iaknee    Right,     Left,    Orthobiologics   #4 consultation      The patient  chooses #2 and #3 csi iaknee bilat    Pain management: handout given  Bracing:   Physical therapy: fPT, @ [facility], begin as above   Activity (e.g. sports, work) restrictions: as tolerated   school/vocation: [employer]    Wife w/ dementia    Enjoy long vacations in his RV    cb re: glucosamine / chondroitin    Follow up in 12 w  A/e fPT, a/e csi sofy   Ineffective-->consider vsi  Should symptoms worsen or fail to resolve, consider:  Revisiting the above options

## 2018-12-04 NOTE — LETTER
December 4, 2018      Keely Soliz PA-C  [address]  [facility] [address]           [facility]  [address]  [facility] [address]  Phone: [phone]          Patient: Shannan Norman   MR Number: [id number]   YOB: 1945   Date of Visit: 12/4/2018       Dear Keely Soliz:    Thank you for referring Shannan Norman to me for evaluation. Attached you will find relevant portions of my assessment and plan of care.    If you have questions, please do not hesitate to call me. I look forward to following Shannan Norman along with you.    Sincerely,    Kit Potter MD    Enclosure  CC:  No Recipients    If you would like to receive this communication electronically, please contact [email or web address] or [phone] to request more information on [facility] Link access.    For providers and/or their staff who would like to refer a patient to [facility], please contact us through our one-stop-shop provider referral line, [facility], at [phone].    If you feel you have received this communication in error or would no longer like to receive these types of communications, please e-mail [email or web address]

## 2018-12-04 NOTE — PROCEDURES
"Large Joint Aspiration/Injection: R knee, L knee  Date/Time: 12/4/2018 8:48 AM  Performed by: Kit Potter MD  Authorized by: Kit Potter MD     Consent Done?:  Yes (Verbal)  Indications:  Pain  Procedure site marked: Yes    Timeout: Prior to procedure the correct patient, procedure, and site was verified      Location:  Knee  Site:  R knee and L knee  Prep: Patient was prepped and draped in usual sterile fashion    Ultrasonic Guidance for needle placement: Yes  Images are saved and documented.  Needle size:  20 G  Approach:  Lateral  Medications:  40 mg triamcinolone acetonide 40 mg/mL; 40 mg triamcinolone acetonide 40 mg/mL  Patient tolerance:  Patient tolerated the procedure well with no immediate complications    Additional Comments: Description of ultrasound utilization for needle guidance:   Ultrasound guidance used for needle localization. Images saved and stored for documentation. The knee joint was visualized. Dynamic visualization of the 20g x 1.5" needle was continuous throughout the procedure.      "

## 2018-12-05 ENCOUNTER — OFFICE VISIT (OUTPATIENT)
Dept: PODIATRY | Facility: CLINIC | Age: 73
End: 2018-12-05
Payer: MEDICARE

## 2018-12-05 VITALS
BODY MASS INDEX: 30.61 KG/M2 | HEART RATE: 60 BPM | WEIGHT: 226 LBS | DIASTOLIC BLOOD PRESSURE: 74 MMHG | SYSTOLIC BLOOD PRESSURE: 152 MMHG | HEIGHT: 72 IN

## 2018-12-05 DIAGNOSIS — B35.3 TINEA PEDIS OF BOTH FEET: ICD-10-CM

## 2018-12-05 DIAGNOSIS — B35.1 ONYCHOMYCOSIS DUE TO DERMATOPHYTE: Primary | ICD-10-CM

## 2018-12-05 PROCEDURE — 99203 OFFICE O/P NEW LOW 30 MIN: CPT | Mod: S$PBB,,, | Performed by: PODIATRIST

## 2018-12-05 PROCEDURE — 99999 PR PBB SHADOW E&M-EST. PATIENT-LVL III: CPT | Mod: PBBFAC,,, | Performed by: PODIATRIST

## 2018-12-05 PROCEDURE — 99213 OFFICE O/P EST LOW 20 MIN: CPT | Mod: PBBFAC | Performed by: PODIATRIST

## 2018-12-05 RX ORDER — CICLOPIROX 80 MG/ML
SOLUTION TOPICAL NIGHTLY
Qty: 6.6 ML | Refills: 11 | Status: SHIPPED | OUTPATIENT
Start: 2018-12-05 | End: 2019-03-20

## 2018-12-05 RX ORDER — ECONAZOLE NITRATE 10 MG/G
CREAM TOPICAL DAILY
Qty: 85 G | Refills: 3 | Status: SHIPPED | OUTPATIENT
Start: 2018-12-05 | End: 2020-02-11 | Stop reason: SDUPTHER

## 2018-12-05 NOTE — PROGRESS NOTES
Subjective:      Patient ID: Shannan Norman is a 73 y.o. male.    Chief Complaint: Diabetic Foot Exam (dr jackson1/12/2018) and Diabetes Mellitus    Thick discolored nails all toes that are misshapen with material beneath.  Gradual onset, worsening over past several weeks, aggravated by increased weight bearing, shoe gear, pressure.  Topical econazole helped in the past.  OTC pain med not helping.     Cc2:  Diabetes, increased risk amputation needing evaluation/management/optomization of foot care.    Chief Complaint   Patient presents with    Diabetic Foot Exam     dr jackson1/12/2018    Diabetes Mellitus        Review of Systems   Constitution: Negative for chills, diaphoresis, fever, malaise/fatigue and night sweats.   Cardiovascular: Negative for claudication, cyanosis, leg swelling and syncope.   Skin: Positive for nail changes. Negative for color change, dry skin, rash, suspicious lesions and unusual hair distribution.   Musculoskeletal: Negative for falls, joint pain, joint swelling, muscle cramps, muscle weakness and stiffness.   Gastrointestinal: Negative for constipation, diarrhea, nausea and vomiting.   Neurological: Negative for brief paralysis, disturbances in coordination, focal weakness, numbness, paresthesias, sensory change and tremors.           Objective:      Physical Exam   Constitutional: He is oriented to person, place, and time. He appears well-developed and well-nourished. He is cooperative. No distress.   Cardiovascular:   Pulses:       Popliteal pulses are 2+ on the right side, and 2+ on the left side.        Dorsalis pedis pulses are 2+ on the right side, and 2+ on the left side.        Posterior tibial pulses are 2+ on the right side, and 2+ on the left side.   Capillary refill 3 seconds all toes/distal feet, all toes/both feet warm to touch.      Negative lymphadenopathy bilateral popliteal fossa and tarsal tunnel.      Negavie lower extremity edema bilateral.     Musculoskeletal:         Right ankle: He exhibits normal range of motion, no swelling, no ecchymosis, no deformity, no laceration and normal pulse. Achilles tendon normal. Achilles tendon exhibits no pain, no defect and normal Maynard's test results.   Normal angle, base, station of gait. All ten toes without clubbing, cyanosis, or signs of ischemia.  No pain to palpation bilateral lower extremities.  Range of motion, stability, muscle strength, and muscle tone normal bilateral feet and legs.     Lymphadenopathy: No inguinal adenopathy noted on the right or left side.   Negative lymphadenopathy bilateral popliteal fossa and tarsal tunnel.    Negative lymphangitic streaking bilateral feet/ankles/legs.   Neurological: He is alert and oriented to person, place, and time. He has normal strength. He displays no atrophy and no tremor. No sensory deficit. He exhibits normal muscle tone. Gait normal.   Reflex Scores:       Patellar reflexes are 2+ on the right side and 2+ on the left side.       Achilles reflexes are 2+ on the right side and 2+ on the left side.  Negative tinel sign to percussion sural, superficial peroneal, deep peroneal, saphenous, and posterior tibial nerves right and left ankles and feet.     Skin: Skin is warm, dry and intact. Capillary refill takes 2 to 3 seconds. No abrasion, no bruising, no burn, no ecchymosis, no laceration, no lesion and no rash noted. He is not diaphoretic. No cyanosis or erythema. No pallor. Nails show no clubbing.     Skin is normal age and health appropriate color, turgor, texture, and temperature bilateral lower extremities without ulceration, hyperpigmentation, discoloration, masses nodules or cords palpated.  No ecchymosis, erythema, edema, or cardinal signs of infection bilateral lower extremities.    Toenails 1st, 2nd, 3rd, 4th, 5th  bilateral are hypertrophic thickened 2-3 mm, dystrophic, discolored tanish brown with tan, gray crumbly subungual debris.  Not tender to distal nail plate pressure,  without periungual skin abnormality of each.     Psychiatric: He has a normal mood and affect.             Assessment:       Encounter Diagnoses   Name Primary?    Onychomycosis due to dermatophyte Yes    Tinea pedis of both feet          Plan:       Shannan was seen today for diabetic foot exam and diabetes mellitus.    Diagnoses and all orders for this visit:    Onychomycosis due to dermatophyte    Tinea pedis of both feet    Other orders  -     econazole nitrate 1 % cream; Apply topically once daily.  -     ciclopirox (PENLAC) 8 % Soln; Apply topically nightly.      I counseled the patient on his conditions, their implications and medical management.        - Shoe inspection. Diabetic Foot Education. Patient reminded of the importance of good nutrition and blood sugar control to help prevent podiatric complications of diabetes. Patient instructed on proper foot hygeine. We discussed wearing proper shoe gear, daily foot inspections, never walking without protective shoe gear, never putting sharp instruments to feet, routine podiatric visits at least annually.      Rx econazole, penlac.    Discussed conservative treatment with shoes of adequate dimensions, material, and style to alleviate symptoms and delay or prevent surgical intervention.      Follow-up if symptoms worsen or fail to improve.

## 2018-12-10 ENCOUNTER — PATIENT MESSAGE (OUTPATIENT)
Dept: SPORTS MEDICINE | Facility: CLINIC | Age: 73
End: 2018-12-10

## 2018-12-18 DIAGNOSIS — H04.123 BILATERAL DRY EYES: ICD-10-CM

## 2018-12-18 RX ORDER — CYCLOSPORINE 0.5 MG/ML
1 EMULSION OPHTHALMIC 2 TIMES DAILY
Qty: 60 VIAL | Refills: 12 | Status: SHIPPED | OUTPATIENT
Start: 2018-12-18 | End: 2019-11-22 | Stop reason: SDUPTHER

## 2018-12-21 ENCOUNTER — CLINICAL SUPPORT (OUTPATIENT)
Dept: REHABILITATION | Facility: HOSPITAL | Age: 73
End: 2018-12-21
Payer: MEDICARE

## 2018-12-21 DIAGNOSIS — M25.561 ACUTE PAIN OF RIGHT KNEE: ICD-10-CM

## 2018-12-21 DIAGNOSIS — R29.898 DECREASED STRENGTH OF LOWER EXTREMITY: ICD-10-CM

## 2018-12-21 DIAGNOSIS — R26.2 DIFFICULTY WALKING ON UNEVEN SURFACE: ICD-10-CM

## 2018-12-21 PROCEDURE — G8978 MOBILITY CURRENT STATUS: HCPCS | Mod: CJ,PN

## 2018-12-21 PROCEDURE — 97161 PT EVAL LOW COMPLEX 20 MIN: CPT | Mod: PN

## 2018-12-21 PROCEDURE — G8979 MOBILITY GOAL STATUS: HCPCS | Mod: CI,PN

## 2018-12-21 NOTE — PLAN OF CARE
[facility]  Physical Therapy Initial Evaluation    Name: Shannan Norman  Clinic Number: [id number]    Therapy Diagnosis:   Encounter Diagnoses   Name Primary?    Acute pain of right knee     Difficulty walking on uneven surface     Decreased strength of lower extremity      Physician: Kit Potter, *    Physician Orders: PT Eval and Treat   Medical Diagnosis: M17.0 (ICD-10-CM) - Primary osteoarthritis of knees, bilateral  Evaluation Date: 12/21/2018  Authorization Period Expiration: 12/4/2019  Plan of Care Certification Period: 12/31/2018 to 02/1/2018  Visit # / Visits authorized: 1/ -    Time In: 1415  Time Out: 1500  Total Billable Time: 45 minutes (1 LCE)    Precautions: Standard and Diabetes, Heart arrhythmia    Subjective   Mr. Johnson presents to PT with L knee pain.  His first episode was while he was on a 4 month trip in his motor .  He states that he has a habit of driving with his R foot turned out and this seems to put pressure on the medial side of his knee. This episode of knee pain resolved.  He then went to play golf about 1 month ago and this exacerbated his L knee pain. Went to Ortho; received an injection that improved hi s pain.  He now reports the pain in intermittent and activity dependent. Exacerbated by walking on uneven ground, lying prone. Denies joint swelling and mechanical symptoms such as locking. Participates in Yoga 4-5 days/week, recumbent bike 4 days/week, golf.       Past Medical History:   Diagnosis Date    Arthritis     Atrial fibrillation     Atrial flutter 2011    ablation    Cataract     Diabetes mellitus     Dry eye syndrome     Gout, unspecified     High cholesterol     History of shingles     Hypertension     Seizures      Shannan Norman  has a past surgical history that includes Tonsillectomy; varicose veins; Ablation of dysrhythmic focus; Ganglion cyst excision; Hernia repair (2013); Vasectomy; and ablation (N/A,  9/11/2018).    Shannan has a current medication list which includes the following prescription(s): apixaban, ascorbic acid (vitamin c), atorvastatin, benazepril, chlorthalidone, ciclopirox, clotrimazole-betamethasone 1-0.05%, cyclosporine, econazole nitrate, finasteride, glucosamine-chondroitin, meloxicam, meloxicam, multivitamin, oxybutynin, sodium,potassium,mag sulfates, and tamsulosin, and the following Facility-Administered Medications: sodium chloride 0.9%.    Review of patient's allergies indicates:  No Known Allergies     Imaging: recent B knee imaging; see chart.   Prior Therapy: none  Social History: Lives at home with his wife.  She has dementia.  He is the primary caregiver.    Occupation: Retired  Prior Level of Function: see above.  Current Level of Function: has since stopped playing golf due to knee pain.     Pain:  Current 2/10, worst 7/10, best 0/10   Location: left knee   Description: Aching    Pts goals: To be able to walk and play golf without pain.     Objective     Palpation: TTP along his medial joint line R knee.  TTP along his fat pad L knee.  Noted varicose veins BLE throughout.  No joint effusion appreciated. Noted venous insufficiency skin changes to B legs.   Posture: Stands in B hip ER, ankle pronation and slight knee flexion B.     Gross Movement Analysis:  - Gait:  Increased ankle PF during swing phase with increased steppage pattern B during swing phase.   - Squats: non-painful    Range of Motion:   LE Right Left   Hip flexion WNL WNL   Hip abduction WNL WNL   Hip ER 30 30   Hip IR  25 25   Hip extension 5  5   Knee (-) 5 - 135 (-)3  - 135   Ankle DF 5 degrees knee flexed 5 degrees knee flexed     Lower Extremity Strength                 LE           Right           Left   Hip flexion: 4/5 4/5   Hip abduction 4-/5 4-/5   Hip extension 4-/5 4-/5   Hip ER 4/5 4/5   Knee flexion 5/5 5/5   Knee extension 5/5 5/5   Ankle dorsiflexion: 5/5 5/5   Ankle plantarflexion: 2/5 2/5     Special  Tests:   Left Right   Valgus Stress Test - -   Varus Stress test - -   Lachman's test - 1+ laxity   Posterior drawer - -   Anterior drawer - 1+ laxity   Luisito's Test - -   Apley's Compression - + with IR   Apley's Distraction - +   Singh's compression test - -   Thessaly's Test - -   Patellar Grind Test - -     Joint Mobility: crepitus with R patellar mobs    Sensation: intact light touch sensation to BLE throughout L2-S2 dermatomal pattern  Flexibility:    Ely's test: (+) for quad tightness   Popliteal Angle: <20 degrees B   Yeyo test:mild hip flexor stiffness   P/SLR test: <70 degrees B        CMS Impairment/Limitation/Restriction for FOTO Knee Survey    Therapist reviewed FOTO scores for Shannan Norman on 12/21/2018.   FOTO documents entered into [employer] - see Media section.    Limitation Score: 23%  Category: Mobility    Current : CJ = at least 20% but < 40% impaired, limited or restricted  Goal: CI = at least 1% but < 20% impaired, limited or restricted         TREATMENT   Treatment Time In: -  Treatment Time Out: -  Total Treatment time separate from Evaluation time:15'    Shannan received therapeutic exercises to develop strength, endurance, ROM and flexibility for - minutes including:  - Next visit:  Fitter, heel raises, HS stretching, single leg balance, leg press     Shannan received the following manual therapy techniques: NEXT  - patellar mobs    Home Exercises and Patient Education Provided  Education provided re:   - PT diagnosis and recommended treatment course.     Written Home Exercises Provided: not today. .    Assessment   Shannan is a 73 y.o. male referred to outpatient Physical Therapy with a medical diagnosis of primary osteoarthritis of R knee. Pt presents with R medial knee joint line pain, fat pad irritation, mild ACL laxity, loss of B hip ROM with capsular tightness, and general BLE stiffness.  Also demonstrates poor gastrocnemius strength and subsequent impaired single-leg stance balance. Cannot  r/o meniscal dysfunction; (+) 2/5 meniscal cluster testing. Appears to be a medial compartmental OA component to his R knee pain. Due to R knee dysfunction, having difficult walking on uneven surfaces and halted his recreation activities.      Pt prognosis is Excellent.   Pt will benefit from skilled outpatient Physical Therapy to address the deficits stated above and in the chart below, provide pt/family education, and to maximize pt's level of independence.     Plan of care discussed with patient: Yes  Pt's spiritual, cultural and educational needs considered and patient is agreeable to the plan of care and goals as stated below:     Anticipated Barriers for therapy: none    Medical Necessity is demonstrated by the following  History  Co-morbidities and personal factors that may impact the plan of care Co-morbidities:   Arthritis, Back pain, Diabetes Type I or II, High Blood Pressure, Kidney, Bladder,  Prostate or Urination Problems, Pacemaker    Personal Factors:   age     moderate   Examination  Body Structures and Functions, activity limitations and participation restrictions that may impact the plan of care Body Regions:   lower extremities    Body Systems:    gross symmetry  ROM  strength  gross coordinated movement  balance  gait  transitions  motor control  motor learning    Participation Restrictions:   none    Activity limitations:   Learning and applying knowledge  no deficits    General Tasks and Commands  no deficits    Communication  no deficits    Mobility  walking  driving (bike, car, motorcycle)    Self care  dressing    Domestic Life  shopping  cooking  doing house work (cleaning house, washing dishes, laundry)  assisting others    Interactions/Relationships  family relationships    Life Areas  no deficits    Community and Social Life  recreation and leisure         low   Clinical Presentation stable and uncomplicated low   Decision Making/ Complexity Score: low     Goals:  Long Term Goals: 4-6  weeks   1.  Patient will demonstrate 3/5 B gastrocnemius strength with standing MMT.  2.  Patient will demonstrate SLS x 15 seconds on R/LLE on foam pad without BUE support needs for correction.  3.  Patient will report no R knee pain with driving   4.  Patient will demonstrate to PT comprehensive understanding of each HEP component without verbal cueing.   5.  Patient will report at least 1% but < 20% impaired, limited or restricted function on Knee FOTO survey.    Plan   Certification Period/Plan of care expiration: 12/21/2018 to 02/01/2019    Outpatient Physical Therapy 2 times weekly for 6 weeks to include the following interventions: Electrical Stimulation IFC, Gait Training, Manual Therapy, Moist Heat/ Ice, Neuromuscular Re-ed, Orthotic Management and Training, Patient Education, Self Care, Therapeutic Activites and Therapeutic Exercise.     Sam Gonzáles, PT

## 2018-12-27 ENCOUNTER — PATIENT MESSAGE (OUTPATIENT)
Dept: FAMILY MEDICINE | Facility: CLINIC | Age: 73
End: 2018-12-27

## 2018-12-27 ENCOUNTER — CLINICAL SUPPORT (OUTPATIENT)
Dept: REHABILITATION | Facility: HOSPITAL | Age: 73
End: 2018-12-27
Payer: MEDICARE

## 2018-12-27 DIAGNOSIS — M25.561 ACUTE PAIN OF RIGHT KNEE: ICD-10-CM

## 2018-12-27 DIAGNOSIS — R26.2 DIFFICULTY WALKING ON UNEVEN SURFACE: ICD-10-CM

## 2018-12-27 DIAGNOSIS — R29.898 DECREASED STRENGTH OF LOWER EXTREMITY: ICD-10-CM

## 2018-12-27 PROCEDURE — 97110 THERAPEUTIC EXERCISES: CPT | Mod: PN

## 2018-12-27 PROCEDURE — 97140 MANUAL THERAPY 1/> REGIONS: CPT | Mod: PN

## 2018-12-27 NOTE — PROGRESS NOTES
Physical Therapy Daily Treatment Note     Name: Shannan Norman  Clinic Number: [id number]    Therapy Diagnosis:   Encounter Diagnoses   Name Primary?    Acute pain of right knee     Difficulty walking on uneven surface     Decreased strength of lower extremity      Physician: Kit Potter, *    Visit Date: 12/27/2018    Physician Orders: PT Eval and Treat   Medical Diagnosis: M17.0 (ICD-10-CM) - Primary osteoarthritis of knees, bilateral  Evaluation Date: 12/21/2018  Authorization Period Expiration: 12/31/2019  Plan of Care Certification Period: 12/31/2018 to 02/1/2018  Visit # / Visits authorized: 2/50  FOTO: 2/5     Time In: 1400  Time Out: 1455  Total Billable Time: 55 minutes (2 TE, 2 MT)  Precautions: Standard and Diabetes, Heart arrhythmia    Subjective   Mr. Campbell reports to PT today with no complaints of R knee pain.     He was compliant with home exercise program.  Response to previous treatment: eval only  Functional change: -  Pain: 0/10 Location: R knee     Objective   O: R knee clearing test: pain along medial joint line with knee flexion overpressure       Loss of R hip IR as compared to L.     Shannan received therapeutic exercises to develop strength, endurance, ROM, flexibility, posture and core stabilization for 30 minutes with PT 1:1 and - minutes under supervision  Including:  - Nu-step     5' at L2  - Fitter      DL 5x30 seconds and SL 3 x 30 seconds B  - heel raises     2x20 on bottom step  - prone quad stretch with strap  5x30 seconds B  - leg press     DL at 11 plates; 3x10 and SL at 7 plates 2x10    Shannan received the following manual therapy techniques for R knee complex for a total of 25 minutes  - grade III/IV passive physiological knee flexion and extension  - grade III/IV multi-directional patellar mobs  - grade III/IV passive physiological hip IR on the R.      Home Exercises Provided and Patient Education Provided   Education provided:   - prone quad strap stretching at home in  addition to his normal daily Yoga stretches     Written Home Exercises Provided: not today   -     Assessment   A:Shannan is a 73 y.o. male referred to outpatient Physical Therapy with a medical diagnosis of primary osteoarthritis of R knee.  Cannot r/o meniscal dysfunction; (+) 2/5 meniscal cluster testing. Appears to be a medial compartmental OA component to his R knee pain. Loss of B hip IR B but greater on the L. Mild medial knee pain with stressing MCL     Shannan is progressing well towards his goals.   Pt prognosis is Excellent.     Pt will continue to benefit from skilled outpatient physical therapy to address the deficits listed in the problem list box on initial evaluation, provide pt/family education and to maximize pt's level of independence in the home and community environment.   Pt's spiritual, cultural and educational needs considered and pt agreeable to plan of care and goals.    Anticipated barriers to physical therapy: none    Goals:   Long Term Goals: 4-6 weeks   1.  Patient will demonstrate 3/5 B gastrocnemius strength with standing MMT.  2.  Patient will demonstrate SLS x 15 seconds on R/LLE on foam pad without BUE support needs for correction.  3.  Patient will report no R knee pain with driving   4.  Patient will demonstrate to PT comprehensive understanding of each HEP component without verbal cueing.   5.  Patient will report at least 1% but < 20% impaired, limited or restricted function on Knee FOTO survey.    Plan   Continue plan of care.  Monitor post-session knee pain and overall muscle DOMS.    Sam Gonzáles, PT

## 2018-12-28 ENCOUNTER — TELEPHONE (OUTPATIENT)
Dept: ENDOSCOPY | Facility: HOSPITAL | Age: 73
End: 2018-12-28

## 2018-12-28 NOTE — TELEPHONE ENCOUNTER
Spoke with patient about arrival time @. 700    Prep instructions reviewed: the day before the procedure, follow a clear liquid diet all day, then start the first 1/2 of prep at 5pm and take 2nd 1/2 of prep @.0200  Pt must be completely NPO when prep completed @. 0200             Medications: Do not take Insulin or oral diabetic medications the day of the procedure.  Take as prescribed: heart, seizure and blood pressure medication in the morning with a sip of water (less than an ounce).  Take any breathing medications and bring inhalers to hospital with you Leave all valuables and jewelry at home.     Wear comfortable clothes to procedure to change into hospital gown You cannot drive for 24 hours after your procedure because you will receive sedation for your procedure to make you comfortable.  A ride must be provided at discharge.     Pt states he was instructed to stop blood thinners on 12/31/18

## 2018-12-31 ENCOUNTER — EXTERNAL CHRONIC CARE MANAGEMENT (OUTPATIENT)
Dept: PRIMARY CARE CLINIC | Facility: CLINIC | Age: 73
End: 2018-12-31
Payer: MEDICARE

## 2018-12-31 PROCEDURE — 99490 CHRNC CARE MGMT STAFF 1ST 20: CPT | Mod: S$PBB,,, | Performed by: FAMILY MEDICINE

## 2018-12-31 PROCEDURE — 99490 CHRNC CARE MGMT STAFF 1ST 20: CPT | Mod: PBBFAC,PO | Performed by: FAMILY MEDICINE

## 2018-12-31 PROCEDURE — 99490 PR CHRONIC CARE MGMT, 1ST 20 MIN: ICD-10-PCS | Mod: S$PBB,,, | Performed by: FAMILY MEDICINE

## 2019-01-02 ENCOUNTER — ANESTHESIA EVENT (OUTPATIENT)
Dept: ENDOSCOPY | Facility: HOSPITAL | Age: 74
End: 2019-01-02
Payer: MEDICARE

## 2019-01-02 ENCOUNTER — HOSPITAL ENCOUNTER (OUTPATIENT)
Facility: HOSPITAL | Age: 74
Discharge: HOME | End: 2019-01-02
Attending: INTERNAL MEDICINE | Admitting: INTERNAL MEDICINE
Payer: MEDICARE

## 2019-01-02 ENCOUNTER — ANESTHESIA (OUTPATIENT)
Dept: ENDOSCOPY | Facility: HOSPITAL | Age: 74
End: 2019-01-02
Payer: MEDICARE

## 2019-01-02 VITALS
HEIGHT: 72 IN | DIASTOLIC BLOOD PRESSURE: 78 MMHG | WEIGHT: 217 LBS | HEART RATE: 60 BPM | TEMPERATURE: 98 F | BODY MASS INDEX: 29.39 KG/M2 | SYSTOLIC BLOOD PRESSURE: 127 MMHG | OXYGEN SATURATION: 94 % | RESPIRATION RATE: 16 BRPM

## 2019-01-02 DIAGNOSIS — Z12.11 SCREEN FOR COLON CANCER: Primary | ICD-10-CM

## 2019-01-02 LAB — GLUCOSE SERPL-MCNC: 105 MG/DL (ref 70–110)

## 2019-01-02 PROCEDURE — G0121 COLON CA SCRN NOT HI RSK IND: ICD-10-PCS | Mod: ,,, | Performed by: INTERNAL MEDICINE

## 2019-01-02 PROCEDURE — 37000008 HC ANESTHESIA 1ST 15 MINUTES: Performed by: INTERNAL MEDICINE

## 2019-01-02 PROCEDURE — G0121 COLON CA SCRN NOT HI RSK IND: HCPCS | Mod: ,,, | Performed by: INTERNAL MEDICINE

## 2019-01-02 PROCEDURE — 25000003 PHARM REV CODE 250: Performed by: INTERNAL MEDICINE

## 2019-01-02 PROCEDURE — 37000009 HC ANESTHESIA EA ADD 15 MINS: Performed by: INTERNAL MEDICINE

## 2019-01-02 PROCEDURE — 82962 GLUCOSE BLOOD TEST: CPT | Performed by: INTERNAL MEDICINE

## 2019-01-02 PROCEDURE — 63600175 PHARM REV CODE 636 W HCPCS: Performed by: NURSE ANESTHETIST, CERTIFIED REGISTERED

## 2019-01-02 PROCEDURE — G0121 COLON CA SCRN NOT HI RSK IND: HCPCS | Performed by: INTERNAL MEDICINE

## 2019-01-02 RX ORDER — PROPOFOL 10 MG/ML
VIAL (ML) INTRAVENOUS
Status: DISCONTINUED | OUTPATIENT
Start: 2019-01-02 | End: 2019-01-02

## 2019-01-02 RX ORDER — SODIUM CHLORIDE 0.9 % (FLUSH) 0.9 %
3 SYRINGE (ML) INJECTION
Status: DISCONTINUED | OUTPATIENT
Start: 2019-01-02 | End: 2019-01-02 | Stop reason: HOSPADM

## 2019-01-02 RX ORDER — PROPOFOL 10 MG/ML
VIAL (ML) INTRAVENOUS CONTINUOUS PRN
Status: DISCONTINUED | OUTPATIENT
Start: 2019-01-02 | End: 2019-01-02

## 2019-01-02 RX ORDER — LIDOCAINE HCL/PF 100 MG/5ML
SYRINGE (ML) INTRAVENOUS
Status: DISCONTINUED | OUTPATIENT
Start: 2019-01-02 | End: 2019-01-02

## 2019-01-02 RX ORDER — SODIUM CHLORIDE 9 MG/ML
INJECTION, SOLUTION INTRAVENOUS CONTINUOUS
Status: DISCONTINUED | OUTPATIENT
Start: 2019-01-02 | End: 2019-01-02 | Stop reason: HOSPADM

## 2019-01-02 RX ADMIN — PROPOFOL 80 MG: 10 INJECTION, EMULSION INTRAVENOUS at 08:01

## 2019-01-02 RX ADMIN — PROPOFOL 150 MCG/KG/MIN: 10 INJECTION, EMULSION INTRAVENOUS at 08:01

## 2019-01-02 RX ADMIN — LIDOCAINE HYDROCHLORIDE 100 MG: 20 INJECTION, SOLUTION INTRAVENOUS at 08:01

## 2019-01-02 RX ADMIN — SODIUM CHLORIDE: 0.9 INJECTION, SOLUTION INTRAVENOUS at 07:01

## 2019-01-02 NOTE — H&P
Cc:  Screening colonoscopy    74 yo M here for screening colonoscopy.  He denies FH of colon cancer, change in bowel habits, rectal bleeding, or weight loss.    ROS:  No headache, no fever/chills, no chest pain/SOB, no nausea/vomiting/diarrhea/constipation/GI bleeding/abdominal pain, no dysuria/hematuria.      Alert and oriented, NAD, well appearing  RRR, no murmurs  CTA bilaterally, no crackles  Soft, NT/ND, normal BS  Normal distal pulses, no edema    Assessment:  Screening colonoscopy    Plan:  Proceed with endoscopy, further recommendations after endoscopy complete

## 2019-01-02 NOTE — TRANSFER OF CARE
Anesthesia Transfer of Care Note    Patient: Shannan Norman    Procedure(s) Performed: Procedure(s) (LRB):  COLONOSCOPY Suprep (N/A)    Patient location: GI    Anesthesia Type: MAC    Transport from OR: Transported from OR on room air with adequate spontaneous ventilation    Post pain: adequate analgesia    Post assessment: no apparent anesthetic complications    Post vital signs: stable    Level of consciousness: awake    Nausea/Vomiting: no nausea/vomiting    Complications: none    Transfer of care protocol was followed      Last vitals:   Visit Vitals  BP (!) 143/76 (BP Location: Left arm, Patient Position: Lying)   Pulse 71   Temp 37.1 °C (98.7 °F) (Tympanic)   Resp 18   Ht 6' (1.829 m)   Wt 98.4 kg (217 lb)   SpO2 100%   BMI 29.43 kg/m²

## 2019-01-02 NOTE — ANESTHESIA PREPROCEDURE EVALUATION
01/02/2019  Shannan Norman is a 73 y.o., male.  Patient Active Problem List   Diagnosis    HTN (hypertension)    Hyperlipidemia type II    Gout, arthritis    Atrial flutter    DM (diabetes mellitus)    PFO (patent foramen ovale)    Umbilical hernia    Tendon injury    Post-operative state    Varicose veins of lower extremities with inflammation    Type 2 diabetes mellitus without retinopathy    Nuclear sclerosis of both eyes    Left epiretinal membrane    Obesity (BMI 30.0-34.9)    Junctional escape rhythm    SSS (sick sinus syndrome)    Nonrheumatic aortic valve insufficiency    Benign prostatic hyperplasia    Junctional bradycardia    NICM (nonischemic cardiomyopathy)    Dyslipidemia associated with type 2 diabetes mellitus    Hypertension associated with diabetes    Acute pain of right knee    Difficulty walking on uneven surface    Decreased strength of lower extremity         Pre-op Assessment         Review of Systems    Physical Exam  General:  Well nourished    Airway/Jaw/Neck:  Airway Findings: Mouth Opening: Normal Tongue: Normal  General Airway Assessment: Adult  Mallampati: II  Improves to II with phonation.  TM Distance: Normal, at least 6 cm      Dental:  Dental Findings: In tact   Chest/Lungs:  Chest/Lungs Findings: Clear to auscultation     Heart/Vascular:  Heart Findings: Rate: Normal  Rhythm: Regular Rhythm  Sounds: Normal        Mental Status:  Mental Status Findings:  Cooperative, Alert and Oriented         Anesthesia Plan  Type of Anesthesia, risks & benefits discussed:  Anesthesia Type:  general  Patient's Preference: General  Intra-op Monitoring Plan: standard ASA monitors  Intra-op Monitoring Plan Comments: Standard ASA monitors.   Post Op Pain Control Plan: per primary service following discharge from PACU  Post Op Pain Control Plan Comments: Per primary  service.     Induction:   IV  Beta Blocker:  Patient is not currently on a Beta-Blocker (No further documentation required).       Informed Consent: Patient understands risks and agrees with Anesthesia plan.  Questions answered. Anesthesia consent signed with patient.  ASA Score: 3     Day of Surgery Review of History & Physical:    H&P update referred to the surgeon.     Anesthesia Plan Notes: Chart reviewed, patient interviewed and examined.  The plan for general anesthesia was explained.  Questions were answered and the consent was signed.  Marija BENITEZ         Ready For Surgery From Anesthesia Perspective.

## 2019-01-02 NOTE — ANESTHESIA POSTPROCEDURE EVALUATION
Anesthesia Post Evaluation    Patient: Shannan Norman    Procedure(s) Performed: Procedure(s) (LRB):  COLONOSCOPY Suprep (N/A)    Final Anesthesia Type: MAC  Patient location during evaluation: GI PACU  Patient participation: Yes- Able to Participate  Level of consciousness: awake and alert  Post-procedure vital signs: reviewed and stable  Pain management: adequate  Airway patency: patent    Anesthetic complications: no      Cardiovascular status: blood pressure returned to baseline  Respiratory status: unassisted, spontaneous ventilation and room air  Hydration status: euvolemic  Follow-up not needed.        Visit Vitals  BP (!) 143/76 (BP Location: Left arm, Patient Position: Lying)   Pulse 71   Temp 37.1 °C (98.7 °F) (Tympanic)   Resp 18   Ht 6' (1.829 m)   Wt 98.4 kg (217 lb)   SpO2 100%   BMI 29.43 kg/m²       Pain/Delmar Score: No Data Recorded

## 2019-01-02 NOTE — [employer]ATION PATIENT INSTRUCTIONS
Discharge Summary/Instructions after an Endoscopic Procedure  Patient Name: Shannan Norman  Patient MRN: [id number]  Patient YOB: 1945  Wednesday, January 02, 2019  Cindy Solorzano MD  RESTRICTIONS:  During your procedure today, you received medications for sedation.  These   medications may affect your judgment, balance and coordination.  Therefore,   for 24 hours, you have the following restrictions:   - DO NOT drive a car, operate machinery, make legal/financial decisions,   sign important papers or drink alcohol.    ACTIVITY:  Today: no heavy lifting, straining or running due to procedural   sedation/anesthesia.  The following day: return to full activity including work.  DIET:  Eat and drink normally unless instructed otherwise.     TREATMENT FOR COMMON SIDE EFFECTS:  - Mild abdominal pain, nausea, belching, bloating or excessive gas:  rest,   eat lightly and use a heating pad.  - Sore Throat: treat with throat lozenges and/or gargle with warm salt   water.  - Because air was used during the procedure, expelling large amounts of air   from your rectum or belching is normal.  - If a bowel prep was taken, you may not have a bowel movement for 1-3 days.    This is normal.  SYMPTOMS TO WATCH FOR AND REPORT TO YOUR PHYSICIAN:  1. Abdominal pain or bloating, other than gas cramps.  2. Chest pain.  3. Back pain.  4. Signs of infection such as: chills or fever occurring within 24 hours   after the procedure.  5. Rectal bleeding, which would show as bright red, maroon, or black stools.   (A tablespoon of blood from the rectum is not serious, especially if   hemorrhoids are present.)  6. Vomiting.  7. Weakness or dizziness.  GO DIRECTLY TO THE NEAREST EMERGENCY ROOM IF YOU HAVE ANY OF THE FOLLOWING:      Difficulty breathing              Chills and/or fever over 101 F   Persistent vomiting and/or vomiting blood   Severe abdominal pain   Severe chest pain   Black, tarry stools   Bleeding- more than one  tablespoon   Any other symptom or condition that you feel may need urgent attention  Your doctor recommends these additional instructions:  If any biopsies were taken, your doctors clinic will contact you in 1 to 2   weeks with any results.  - Discharge patient to home.   - Patient has a contact number available for emergencies.  The signs and   symptoms of potential delayed complications were discussed with the   patient.  Return to normal activities tomorrow.  Written discharge   instructions were provided to the patient.   - Resume previous diet.   - Continue present medications.   - No repeat colonoscopy due to current age (66 years or older).   - Return to GI clinic PRN.  For questions, problems or results please call your physician - Cindy Solorzano MD at Work:  [phone].  EMERGENCY PHONE NUMBER: [phone],  LAB RESULTS: [phone]  IF A COMPLICATION OR EMERGENCY SITUATION ARISES AND YOU ARE UNABLE TO REACH   YOUR PHYSICIAN - GO DIRECTLY TO THE EMERGENCY ROOM.  MD Cindy Nuñez MD  1/2/2019 8:29:04 AM  This report has been verified and signed electronically.  [employer]

## 2019-01-03 ENCOUNTER — CLINICAL SUPPORT (OUTPATIENT)
Dept: REHABILITATION | Facility: HOSPITAL | Age: 74
End: 2019-01-03
Payer: MEDICARE

## 2019-01-03 DIAGNOSIS — R26.2 DIFFICULTY WALKING ON UNEVEN SURFACE: ICD-10-CM

## 2019-01-03 DIAGNOSIS — R29.898 DECREASED STRENGTH OF LOWER EXTREMITY: ICD-10-CM

## 2019-01-03 DIAGNOSIS — M25.561 ACUTE PAIN OF RIGHT KNEE: ICD-10-CM

## 2019-01-03 PROCEDURE — 97110 THERAPEUTIC EXERCISES: CPT | Mod: PN

## 2019-01-03 NOTE — PROGRESS NOTES
Physical Therapy Daily Treatment Note     Name: Shannan Norman  Clinic Number: [id number]    Therapy Diagnosis:   No diagnosis found.  Physician: Kit Potter, *    Visit Date: 1/3/2019    Physician Orders: PT Eval and Treat   Medical Diagnosis: M17.0 (ICD-10-CM) - Primary osteoarthritis of knees, bilateral  Evaluation Date: 12/21/2018  Authorization Period Expiration: 12/31/2019  Plan of Care Certification Period: 12/31/2018 to 02/1/2018  Visit # / Visits authorized: 3/50  FOTO: 3/5     Time In: 1:05  Time Out: 2:00  Total Billable Time: 25 minutes (2 TE)  Precautions: Standard and Diabetes, Heart arrhythmia    Subjective   Mr. Campbell reports no R knee pain and only that one time when playing golf so far. Does a lot of yoga stretching and abdominal exercises at home along with leg press on days off from PT.    He was compliant with home exercise program.  Response to previous treatment: eval only  Functional change: -  Pain: 0/10 Location: R knee     Objective   O: R knee clearing test: negative at neutral, Luisito's test negative on R, decreased R hip IR, positive R knee Apley's test c/ tibial ER with compression and distraction, negative R knee varus/valgus test, negative R knee Thessaly's test    Shannan received therapeutic exercises to develop strength, endurance, ROM, flexibility, posture and core stabilization for 25 minutes with PT 1:1 and 35 minutes under supervision including:  - Nu-step     5' at L2  - Fitter      DL 5x30 seconds and SL 3 x 30 seconds B  - heel raises     2x20 on bottom step  - prone quad stretch with strap  5x30 seconds B  - leg press     DL at 11 plates; 3x10 and SL at 7 plates 2x10  -SLS on foam     4x20'' B    Shannan received the following manual therapy techniques for R knee complex for a total of 0 minutes  - grade III/IV passive physiological knee flexion and extension  - grade III/IV multi-directional patellar mobs  - grade III/IV passive physiological hip IR on the R.    Home  Exercises Provided and Patient Education Provided   Education provided:   - prone quad strap stretching at home in addition to his normal daily Yoga stretches     Written Home Exercises Provided: not today     Assessment   A: potential R knee MCL strain, and only provacative test positive was Apley's. Reported no pain throughout TE, and mild difficulty with SL balance activities.    Shannan is progressing well towards his goals.   Pt prognosis is Excellent.     Pt will continue to benefit from skilled outpatient physical therapy to address the deficits listed in the problem list box on initial evaluation, provide pt/family education and to maximize pt's level of independence in the home and community environment.   Pt's spiritual, cultural and educational needs considered and pt agreeable to plan of care and goals.    Anticipated barriers to physical therapy: none    Goals:   Long Term Goals: 4-6 weeks   1.  Patient will demonstrate 3/5 B gastrocnemius strength with standing MMT.  2.  Patient will demonstrate SLS x 15 seconds on R/LLE on foam pad without BUE support needs for correction.  3.  Patient will report no R knee pain with driving   4.  Patient will demonstrate to PT comprehensive understanding of each HEP component without verbal cueing.   5.  Patient will report at least 1% but < 20% impaired, limited or restricted function on Knee FOTO survey.    Plan   Continue plan of care.  Monitor post-session knee pain and overall muscle DOMS.    Slade Rosen, PT

## 2019-01-04 DIAGNOSIS — Z95.0 CARDIAC PACEMAKER IN SITU: Primary | ICD-10-CM

## 2019-01-04 DIAGNOSIS — I49.5 SSS (SICK SINUS SYNDROME): ICD-10-CM

## 2019-01-04 DIAGNOSIS — I42.8 NICM (NONISCHEMIC CARDIOMYOPATHY): ICD-10-CM

## 2019-01-07 ENCOUNTER — CLINICAL SUPPORT (OUTPATIENT)
Dept: REHABILITATION | Facility: HOSPITAL | Age: 74
End: 2019-01-07
Payer: MEDICARE

## 2019-01-07 DIAGNOSIS — R26.2 DIFFICULTY WALKING ON UNEVEN SURFACE: ICD-10-CM

## 2019-01-07 DIAGNOSIS — R29.898 DECREASED STRENGTH OF LOWER EXTREMITY: ICD-10-CM

## 2019-01-07 DIAGNOSIS — M25.561 ACUTE PAIN OF RIGHT KNEE: ICD-10-CM

## 2019-01-07 PROCEDURE — 97110 THERAPEUTIC EXERCISES: CPT | Mod: PN

## 2019-01-07 NOTE — PROGRESS NOTES
"  Physical Therapy Daily Treatment Note     Name: Shannan Norman  Clinic Number: [id number]    Therapy Diagnosis:   Encounter Diagnoses   Name Primary?    Acute pain of right knee     Difficulty walking on uneven surface     Decreased strength of lower extremity      Physician: Kit Potter, *    Visit Date: 1/7/2019    Physician Orders: PT Eval and Treat   Medical Diagnosis: M17.0 (ICD-10-CM) - Primary osteoarthritis of knees, bilateral  Evaluation Date: 12/21/2018  Authorization Period Expiration: 12/31/2019  Plan of Care Certification Period: 12/31/2018 to 02/1/2018  Visit # / Visits authorized: 4/50  FOTO: 4/5     Time In: 1500  Time Out: 1553  Total Billable Time: 53 minutes (TE-4)    Precautions: Standard and Diabetes, Heart arrhythmia    Subjective     Pt reports: he almost had a relapse yesterday.increased pain up to 6-7/10.  Feels like the sneakers he was wearing contributed to it. Stopped yoga for 3 days. Did his HEP at the gym ,but feels that should not have caused the pain   He was compliant with home exercise program.  Response to previous treatment: no adverse reactions. And no adverse reactions to the gym exercises over the weekend   Functional change: none reported     Pain: 0/10  "maybe a twinge"  Location: right knee      Objective       Shannan received therapeutic exercises to develop strength, endurance, ROM, flexibility, posture and core stabilization for 48 minutes including:  - Nu-step     8' at L2  - Fitter      DL 5x30 seconds and SL 3 x 30 seconds B  - heel raises     2x20 on bottom step  - prone quad stretch with strap  5x30 seconds B  - leg press     DL at 11 plates 1x3 (R knee pain), 8 plates   -SLS on foam     3x30" B  -steamboats 3 ways                                      GTB 2x10 B  -Standing R TKE                                           P[employer] Cook Band 3x10  -CYBEX Knee Extension                             50# 2x10      Shannan received the following manual therapy " techniques for R knee complex for a total of 5 minutes    - grade III/IV multi-directional patellar mobs    Home Exercises Provided and Patient Education Provided     Education provided:   - Continuing with HEP    Written Home Exercises Provided: Patient instructed to cont prior HEP.  Exercises were reviewed and Shannan was able to demonstrate them prior to the end of the session.  Shannan demonstrated good  understanding of the education provided.     See EMR under Patient Instructions issued on 12/27/18      Assessment     Decreased intensity on leg press due to R knee symptoms after 3 reps on set wieght as previous session. Patient required cueing for technique with newly added hip strengthening therex.     Shannan is progressing well towards his goals.   Pt prognosis is Excellent.     Pt will continue to benefit from skilled outpatient physical therapy to address the deficits listed in the problem list box on initial evaluation, provide pt/family education and to maximize pt's level of independence in the home and community environment.   Pt's spiritual, cultural and educational needs considered and pt agreeable to plan of care and goals.    Anticipated barriers to physical therapy: none    Goals:   Long Term Goals: 4-6 weeks   1.  Patient will demonstrate 3/5 B gastrocnemius strength with standing MMT.  (PROGRESSING, NOT MET)  2.  Patient will demonstrate SLS x 15 seconds on R/LLE on foam pad without BUE support needs for correction.  (PROGRESSING, NOT MET)  3.  Patient will report no R knee pain with driving   (PROGRESSING, NOT MET)  4.  Patient will demonstrate to PT comprehensive understanding of each HEP component without verbal cueing.  (PROGRESSING, NOT MET)  5.  Patient will report at least 1% but < 20% impaired, limited or restricted function on Knee FOTO survey.  (PROGRESSING, NOT MET)    Plan     Continue with PT POC focusing on hip and knee mobility and strengthening to decrease R knee symptoms    Berny  Betty Degroot, PT

## 2019-01-09 ENCOUNTER — CLINICAL SUPPORT (OUTPATIENT)
Dept: REHABILITATION | Facility: HOSPITAL | Age: 74
End: 2019-01-09
Payer: MEDICARE

## 2019-01-09 DIAGNOSIS — R26.2 DIFFICULTY WALKING ON UNEVEN SURFACE: ICD-10-CM

## 2019-01-09 DIAGNOSIS — R29.898 DECREASED STRENGTH OF LOWER EXTREMITY: ICD-10-CM

## 2019-01-09 DIAGNOSIS — M25.561 ACUTE PAIN OF RIGHT KNEE: ICD-10-CM

## 2019-01-09 PROCEDURE — 97110 THERAPEUTIC EXERCISES: CPT | Mod: PN

## 2019-01-09 NOTE — PROGRESS NOTES
Physical Therapy Daily Treatment Note     Name: Shannan Norman  Clinic Number: [id number]    Therapy Diagnosis:   No diagnosis found.  Physician: Kit Potter, *    Visit Date: 1/9/2019    Physician Orders: PT Eval and Treat   Medical Diagnosis: M17.0 (ICD-10-CM) - Primary osteoarthritis of knees, bilateral  Evaluation Date: 12/21/2018  Authorization Period Expiration: 12/31/2019  Plan of Care Certification Period: 12/31/2018 to 02/1/2018  Visit # / Visits authorized: 5/50  FOTO: 5/5 NEXT    Cap visit:  60.64  Cap total: 121.28     Time In: 0805  Time Out: 0900  Total Billable Time: 30  minutes (2 TE)  Precautions: Standard and Diabetes, Heart arrhythmia    Subjective   Mr. Campbell states that he has not had anymore pain in his R knee like a few days ago.  He again states that he thinks it was related to him wearing a set of tennis shoes for several days that he has worn in 2 years.       He was compliant with home exercise program.  Response to previous treatment: no adverse effects.   Functional change: none reported   Pain: 1-2/10 Location: right knee anteromedially     Objective   O: 1+ ACL laxity on the R.  Pain anteromedially with deep knee flexion during knee clearing test.      Shannan received therapeutic exercises to develop strength, endurance, ROM, flexibility, posture and core stabilization for 30 minutes with PT 1:1 and 20 minutes with   including:  - Nu-step     8' at L2  - Fitter      DL 5x30 seconds and SL 3 x 30 seconds B  - heel raises     2x20 on bottom step phase I and 2x10 phase II  - step-ups onto BoSu ball   Forward 3x10 and lateral 3x10 B/each    -SLS       10 x 10 seconds on Bosu ball  -steamboats 3 ways                                      GTB 3x10 B  - leg press      NT  -CYBEX Knee Extension                                NT      Shannan received the following manual therapy techniques for R knee complex for a total of 5 minutes:  - grade III/IV multi-directional patellar mobs    Home  Exercises Provided and Patient Education Provided   Education provided:   - Continuing with HEP    Written Home Exercises Provided: Patient instructed to cont prior HEP.  Exercises were reviewed and Shannan was able to demonstrate them prior to the end of the session.  Shannan demonstrated good  understanding of the education provided.   See EMR under Patient Instructions issued on 12/27/18      Assessment   A: R anterior knee pain.  R knee laxity as compared to L.      Shannan is progressing well towards his goals.   Pt prognosis is Excellent.     Pt will continue to benefit from skilled outpatient physical therapy to address the deficits listed in the problem list box on initial evaluation, provide pt/family education and to maximize pt's level of independence in the home and community environment.   Pt's spiritual, cultural and educational needs considered and pt agreeable to plan of care and goals.    Anticipated barriers to physical therapy: none    Goals:   Long Term Goals: 4-6 weeks   1.  Patient will demonstrate 3/5 B gastrocnemius strength with standing MMT.  (PROGRESSING, NOT MET)  2.  Patient will demonstrate SLS x 15 seconds on R/LLE on foam pad without BUE support needs for correction.  (PROGRESSING, NOT MET)  3.  Patient will report no R knee pain with driving   (PROGRESSING, NOT MET)  4.  Patient will demonstrate to PT comprehensive understanding of each HEP component without verbal cueing.  (PROGRESSING, NOT MET)  5.  Patient will report at least 1% but < 20% impaired, limited or restricted function on Knee FOTO survey.  (PROGRESSING, NOT MET)    Plan     Continue with PT POC focusing on hip and ankle strengthening.     Sam Gonzáles, PT

## 2019-01-14 ENCOUNTER — CLINICAL SUPPORT (OUTPATIENT)
Dept: REHABILITATION | Facility: HOSPITAL | Age: 74
End: 2019-01-14
Payer: MEDICARE

## 2019-01-14 DIAGNOSIS — M25.561 ACUTE PAIN OF RIGHT KNEE: ICD-10-CM

## 2019-01-14 DIAGNOSIS — R29.898 DECREASED STRENGTH OF LOWER EXTREMITY: ICD-10-CM

## 2019-01-14 DIAGNOSIS — R26.2 DIFFICULTY WALKING ON UNEVEN SURFACE: ICD-10-CM

## 2019-01-14 PROCEDURE — 97110 THERAPEUTIC EXERCISES: CPT | Mod: PN

## 2019-01-14 PROCEDURE — 97140 MANUAL THERAPY 1/> REGIONS: CPT | Mod: PN

## 2019-01-14 NOTE — PROGRESS NOTES
Physical Therapy Daily Treatment Note     Name: Shannan Norman  Clinic Number: [id number]    Therapy Diagnosis:   Encounter Diagnoses   Name Primary?    Acute pain of right knee     Difficulty walking on uneven surface     Decreased strength of lower extremity      Physician: Kit Potter, *    Visit Date: 1/14/2019    Physician Orders: PT Eval and Treat   Medical Diagnosis: M17.0 (ICD-10-CM) - Primary osteoarthritis of knees, bilateral  Evaluation Date: 12/21/2018  Authorization Period Expiration: 12/31/2019  Plan of Care Certification Period: 12/31/2018 to 02/1/2018  Visit # / Visits authorized: 6/50  FOTO:DONE today (6/10)    Cap visit: 118.42    Cap total: 239.7     Time In: 1400  Time Out: 1500  Total Billable Time: 55  minutes (3 TE, 1 MT)  Precautions: Standard and Diabetes, Heart arrhythmia    Subjective   Mr. Campbell presents to PT today with no new complaints.      He was compliant with home exercise program.  Response to previous treatment: no adverse effects.   Functional change: none reported   Pain: 1-2/10 Location: right knee anteromedially     Objective   O: 1+ ACL laxity on the R.  Pain anteromedially with deep knee flexion during knee clearing test.         Golf swing: significant lack of hip turn with compensatory upper trunk rotation    Shannan received therapeutic exercises to develop strength, endurance, ROM, flexibility, posture and core stabilization for 45 minutes with PT 1:1 including:  - Nu-step     8' at L2  - Fitter      DL 5x30 seconds and SL 3 x 30 seconds B  - heel raises     2x20 on bottom step phase I and 2x10 phase II  - step-ups onto BoSu ball   Forward 3x10 and lateral 3x10 B/each   -SLS       10 x 10 seconds on Bosu ball  - steamboats 3 ways                                      GTB 3x10 B  - supine ITB stretch with strap  5x30 seconds B  - prone quad stretch with strap  5x30 seconds B  - SL open books    20 reps B x 3 seconds    Shannan received the following manual therapy  techniques for R knee complex for a total of 10 minutes:  - grade III/IVpassive physiological hip IR in prone    Home Exercises Provided and Patient Education Provided   Education provided:   - Continuing with HEP    Written Home Exercises Provided: Patient instructed to cont prior HEP.  Exercises were reviewed and Shannan was able to demonstrate them prior to the end of the session.  Shannan demonstrated good  understanding of the education provided.   See EMR under Patient Instructions issued on 12/27/18    Assessment   A: R anterior knee pain.  R knee laxity as compared to L.  Loss of B hip IR.     Shannan is progressing well towards his goals.   Pt prognosis is Excellent.     Pt will continue to benefit from skilled outpatient physical therapy to address the deficits listed in the problem list box on initial evaluation, provide pt/family education and to maximize pt's level of independence in the home and community environment.   Pt's spiritual, cultural and educational needs considered and pt agreeable to plan of care and goals.    Anticipated barriers to physical therapy: none    Goals:   Long Term Goals: 4-6 weeks   1.  Patient will demonstrate 3/5 B gastrocnemius strength with standing MMT.  (PROGRESSING, NOT MET)  2.  Patient will demonstrate SLS x 15 seconds on R/LLE on foam pad without BUE support needs for correction.  (PROGRESSING, NOT MET)  3.  Patient will report no R knee pain with driving   (PROGRESSING, NOT MET)  4.  Patient will demonstrate to PT comprehensive understanding of each HEP component without verbal cueing.  (PROGRESSING, NOT MET)  5.  Patient will report at least 1% but < 20% impaired, limited or restricted function on Knee FOTO survey.  (PROGRESSING, NOT MET)    Plan     Continue with PT POC focusing on hip and ankle strengthening as well as improving hip transverse plane mobility.      Sam Gonzáles, PT

## 2019-01-17 ENCOUNTER — CLINICAL SUPPORT (OUTPATIENT)
Dept: REHABILITATION | Facility: HOSPITAL | Age: 74
End: 2019-01-17
Payer: MEDICARE

## 2019-01-17 DIAGNOSIS — R26.2 DIFFICULTY WALKING ON UNEVEN SURFACE: ICD-10-CM

## 2019-01-17 DIAGNOSIS — M25.561 ACUTE PAIN OF RIGHT KNEE: ICD-10-CM

## 2019-01-17 DIAGNOSIS — R29.898 DECREASED STRENGTH OF LOWER EXTREMITY: ICD-10-CM

## 2019-01-17 PROCEDURE — 97110 THERAPEUTIC EXERCISES: CPT | Mod: PN

## 2019-01-17 PROCEDURE — 97140 MANUAL THERAPY 1/> REGIONS: CPT | Mod: PN

## 2019-01-17 NOTE — PROGRESS NOTES
Physical Therapy Daily Treatment Note     Name: Shannan Norman  Clinic Number: [id number]    Therapy Diagnosis:   Encounter Diagnoses   Name Primary?    Acute pain of right knee     Difficulty walking on uneven surface     Decreased strength of lower extremity      Physician: Kit Potter, *    Visit Date: 1/17/2019    Physician Orders: PT Eval and Treat   Medical Diagnosis: M17.0 (ICD-10-CM) - Primary osteoarthritis of knees, bilateral  Evaluation Date: 12/21/2018  Authorization Period Expiration: 12/31/2019  Plan of Care Certification Period: 12/31/2018 to 02/1/2019  Visit # / Visits authorized: 7/50  FOTO: 7/10    Cap visit: 88.1   Cap total: 327.80     Time In: 1005  Time Out: 1100  Total Billable Time: 45 minutes (2 TE, 1 MT)  Precautions: Standard and Diabetes, Heart arrhythmia    Subjective   Mr. Campbell presents to PT today with no new complaints. States that he feels like his R knee is getting better, however he continues to have a mild, ache along the medial portion of his R knee.  Denies that it is activity specific.       He was compliant with home exercise program.  Response to previous treatment: no adverse effects.   Functional change: none reported   Pain: 1-2/10 Location: right knee anteromedially     Objective   O: 1+ ACL laxity on the R.  Pain anteromedially with deep knee flexion during knee clearing test.        Leg press: R medial knee pain with SL at 4.0 plates.         Golf swing: significant lack of hip turn with compensatory upper trunk rotation    Shannan received therapeutic exercises to develop strength, endurance, ROM, flexibility, posture and core stabilization for 35 minutes with PT 1:1 including:  - Nu-step     8' at L2  - Fitter      DL 5x30 seconds and SL 3 x 30 seconds B  - heel raises     2x20 on bottom step phase I and 2x10 phase II  - step-ups onto BoSu ball   Forward 3x10 and lateral 3x10 B/each   -SLS       NT  - steamboats 3 ways                                      GTB  3x10 B    - supine ITB stretch with strap  NT  - prone quad stretch with strap  NT  - SL open books    20 reps B x 3 seconds  - backward step-downs   2x20 B and 3x20 second holds B  - 3 way hip (mat)    3x10 with 3# ankle weight B  - leg press DL     8.5 plates 1 x 20 reps and 10x10 sec forward/backward chaining        Shannan received the following manual therapy techniques for R knee complex for a total of 10 minutes:  - grade III/IVpassive physiological hip IR in prone    Home Exercises Provided and Patient Education Provided   Education provided:   - Continuing with HEP    Written Home Exercises Provided: Patient instructed to cont prior HEP.  Exercises were reviewed and Shannan was able to demonstrate them prior to the end of the session.  Shannan demonstrated good  understanding of the education provided.   See EMR under Patient Instructions issued on 12/27/18    Assessment   A: R anterior knee pain.  R knee laxity as compared to L.  Loss of B hip IR.  Suspect B coxa valgus component to his lack of hip adduction ROM.     Shannan is progressing well towards his goals.   Pt prognosis is Excellent.     Pt will continue to benefit from skilled outpatient physical therapy to address the deficits listed in the problem list box on initial evaluation, provide pt/family education and to maximize pt's level of independence in the home and community environment.   Pt's spiritual, cultural and educational needs considered and pt agreeable to plan of care and goals.    Anticipated barriers to physical therapy: none    Goals:   Long Term Goals: 4-6 weeks   1.  Patient will demonstrate 3/5 B gastrocnemius strength with standing MMT.  MET  2.  Patient will demonstrate SLS x 15 seconds on R/LLE on foam pad without BUE support needs for correction.  (PROGRESSING, NOT MET)  3.  Patient will report no R knee pain with driving   (PROGRESSING, NOT MET)  4.  Patient will demonstrate to PT comprehensive understanding of each HEP component without  verbal cueing.  (PROGRESSING, NOT MET)  5.  Patient will report at least 1% but < 20% impaired, limited or restricted function on Knee FOTO survey.  (PROGRESSING, NOT MET)    Plan   Continue plan of care.  Ankle strengthening and dynamic stabilization as well as hip dynamic stabilization therex.      Sam Gonzáles, PT

## 2019-01-22 ENCOUNTER — TELEPHONE (OUTPATIENT)
Dept: CARDIOLOGY | Facility: HOSPITAL | Age: 74
End: 2019-01-22

## 2019-01-22 NOTE — TELEPHONE ENCOUNTER
----- Message from Ness Wilson MA sent at 1/22/2019  9:20 AM CST -----  Contact: patient called  Please call the patient at [phone] he need to talk to someone about his home monitor check he will not be home today. Thank you.

## 2019-01-25 ENCOUNTER — OFFICE VISIT (OUTPATIENT)
Dept: DERMATOLOGY | Facility: CLINIC | Age: 74
End: 2019-01-25
Payer: MEDICARE

## 2019-01-25 DIAGNOSIS — L57.0 ACTINIC KERATOSIS: ICD-10-CM

## 2019-01-25 DIAGNOSIS — D48.5 NEOPLASM OF UNCERTAIN BEHAVIOR OF SKIN: Primary | ICD-10-CM

## 2019-01-25 DIAGNOSIS — D22.39 FIBROUS PAPULE OF NOSE: ICD-10-CM

## 2019-01-25 DIAGNOSIS — T14.8XXA EXCORIATION: ICD-10-CM

## 2019-01-25 DIAGNOSIS — Z12.83 SCREENING EXAM FOR SKIN CANCER: ICD-10-CM

## 2019-01-25 PROCEDURE — 17000 PR DESTRUCTION(LASER SURGERY,CRYOSURGERY,CHEMOSURGERY),PREMALIGNANT LESIONS,FIRST LESION: ICD-10-PCS | Mod: 59,S$PBB,, | Performed by: DERMATOLOGY

## 2019-01-25 PROCEDURE — 99213 OFFICE O/P EST LOW 20 MIN: CPT | Mod: 25,S$PBB,, | Performed by: DERMATOLOGY

## 2019-01-25 PROCEDURE — 17003 DESTRUCTION, PREMALIGNANT LESIONS; SECOND THROUGH 14 LESIONS: ICD-10-PCS | Mod: S$PBB,,, | Performed by: DERMATOLOGY

## 2019-01-25 PROCEDURE — 88305 TISSUE SPECIMEN TO PATHOLOGY, DERMATOLOGY: ICD-10-PCS | Mod: 26,,, | Performed by: PATHOLOGY

## 2019-01-25 PROCEDURE — 99999 PR PBB SHADOW E&M-EST. PATIENT-LVL III: ICD-10-PCS | Mod: PBBFAC,,, | Performed by: DERMATOLOGY

## 2019-01-25 PROCEDURE — 17003 DESTRUCT PREMALG LES 2-14: CPT | Mod: 59,PBBFAC | Performed by: DERMATOLOGY

## 2019-01-25 PROCEDURE — 88305 TISSUE EXAM BY PATHOLOGIST: CPT | Performed by: PATHOLOGY

## 2019-01-25 PROCEDURE — 99213 PR OFFICE/OUTPT VISIT, EST, LEVL III, 20-29 MIN: ICD-10-PCS | Mod: 25,S$PBB,, | Performed by: DERMATOLOGY

## 2019-01-25 PROCEDURE — 99213 OFFICE O/P EST LOW 20 MIN: CPT | Mod: PBBFAC | Performed by: DERMATOLOGY

## 2019-01-25 PROCEDURE — 99999 PR PBB SHADOW E&M-EST. PATIENT-LVL III: CPT | Mod: PBBFAC,,, | Performed by: DERMATOLOGY

## 2019-01-25 PROCEDURE — 17000 DESTRUCT PREMALG LESION: CPT | Mod: 59,S$PBB,, | Performed by: DERMATOLOGY

## 2019-01-25 PROCEDURE — 17003 DESTRUCT PREMALG LES 2-14: CPT | Mod: S$PBB,,, | Performed by: DERMATOLOGY

## 2019-01-25 PROCEDURE — 17000 DESTRUCT PREMALG LESION: CPT | Mod: 59,PBBFAC | Performed by: DERMATOLOGY

## 2019-01-25 PROCEDURE — 11102 PR TANGENTIAL BIOPSY, SKIN, SINGLE LESION: ICD-10-PCS | Mod: S$PBB,,, | Performed by: DERMATOLOGY

## 2019-01-25 PROCEDURE — 11102 TANGNTL BX SKIN SINGLE LES: CPT | Mod: S$PBB,,, | Performed by: DERMATOLOGY

## 2019-01-25 PROCEDURE — 11102 TANGNTL BX SKIN SINGLE LES: CPT | Mod: PBBFAC,59 | Performed by: DERMATOLOGY

## 2019-01-25 NOTE — PROGRESS NOTES
Subjective:       Patient ID:  Shannan Norman is a 73 y.o. male who presents for   Chief Complaint   Patient presents with    Skin Check     TBSE     72 yo present today for a TBSE. No concerns.    No prior history of skin cancer, but has had spots frozen off in the past.  The patient denies any moles or growths of the skin that are rapidly growing, hurting, itching, bleeding, or changing colors.      Review of Systems   Skin: Positive for wears hat. Negative for daily sunscreen use, activity-related sunscreen use and recent sunburn.   Hematologic/Lymphatic: Does not bruise/bleed easily.        Objective:    Physical Exam   Constitutional: He appears well-developed and well-nourished. No distress.   Neurological: He is alert and oriented to person, place, and time. He is not disoriented.   Psychiatric: He has a normal mood and affect.   Skin:   Areas Examined (abnormalities noted in diagram):   Scalp / Hair Palpated and Inspected  Head / Face Inspection Performed  Neck Inspection Performed  Chest / Axilla Inspection Performed  Abdomen Inspection Performed  Genitals / Buttocks / Groin Inspection Performed  Back Inspection Performed  RUE Inspected  LUE Inspection Performed  RLE Inspected  LLE Inspection Performed  Nails and Digits Inspection Performed                       Diagram Legend     Erythematous scaling macule/papule c/w actinic keratosis       Vascular papule c/w angioma      Pigmented verrucoid papule/plaque c/w seborrheic keratosis      Yellow umbilicated papule c/w sebaceous hyperplasia      Irregularly shaped tan macule c/w lentigo     1-2 mm smooth white papules consistent with Milia      Movable subcutaneous cyst with punctum c/w epidermal inclusion cyst      Subcutaneous movable cyst c/w pilar cyst      Firm pink to brown papule c/w dermatofibroma      Pedunculated fleshy papule(s) c/w skin tag(s)      Evenly pigmented macule c/w junctional nevus     Mildly variegated pigmented, slightly  irregular-bordered macule c/w mildly atypical nevus      Flesh colored to evenly pigmented papule c/w intradermal nevus       Pink pearly papule/plaque c/w basal cell carcinoma      Erythematous hyperkeratotic cursted plaque c/w SCC      Surgical scar with no sign of skin cancer recurrence      Open and closed comedones      Inflammatory papules and pustules      Verrucoid papule consistent consistent with wart     Erythematous eczematous patches and plaques     Dystrophic onycholytic nail with subungual debris c/w onychomycosis     Umbilicated papule    Erythematous-base heme-crusted tan verrucoid plaque consistent with inflamed seborrheic keratosis     Erythematous Silvery Scaling Plaque c/w Psoriasis     See annotation      Nose - excoriation x 2-3 mo to monitor  Fibrous papule superior 1.5 mm to monitor        Right mid back biopsy site    Assessment / Plan:      Pathology Orders:     Normal Orders This Visit    Tissue Specimen To Pathology, Dermatology     Questions:    Directional Terms:  Other(comment)    Clinical information:  dark irregular 3 mm macule r/o dysplastic nevus    Specific Site:  right mid back        Neoplasm of uncertain behavior of skin - right midback  -     Tissue Specimen To Pathology, Dermatology  Shave biopsy procedure note:    Shave biopsy performed after verbal consent including risk of infection, scar, recurrence, need for additional treatment of site. Area prepped with alcohol, anesthetized with approximately 1.0cc of 1% lidocaine with epinephrine. Lesional tissue shaved with razor blade. Hemostasis achieved with application of aluminum chloride. No complications. Dressing applied. Wound care explained.      Actinic keratosis  Cryosurgery Procedure Note    Verbal consent from the patient is obtained including, but not limited to, risk of hypopigmentation/hyperpigmentation, scar, recurrence of lesion. The patient is aware of the precancerous quality and need for treatment of these  lesions. Liquid nitrogen cryosurgery is applied to the 7 actinic keratoses, as detailed in the physical exam, to produce a freeze injury. The patient is aware that blisters may form and is instructed on wound care with gentle cleansing and use of vaseline ointment to keep moist until healed. The patient is supplied a handout on cryosurgery and is instructed to call if lesions do not completely resolve.    Screening exam for skin cancer  Total body skin examination performed today including at least 12 points as noted in physical examination. Suspicious lesions noted.    Fibrous papule of nose   - stable and chronic  Monitor - photo taken   Recheck 6 mo    Excoriation - mid nose - I do not see any underlying or surrounding nodule or papule but if does not resolve will advise punch biopsy to rule out NMSC  Photo taken today    Xerosis upper back, lower back, lower legs - ?early psoriasis on trunk; definite stasis component on legs - pt with varicosities and does not wear compression stockings  -amlactin OTC  -lighter compression stockings for skin  -vaseline to legs           Follow-up in about 6 months (around 7/25/2019).

## 2019-01-28 ENCOUNTER — CLINICAL SUPPORT (OUTPATIENT)
Dept: REHABILITATION | Facility: HOSPITAL | Age: 74
End: 2019-01-28
Payer: MEDICARE

## 2019-01-28 DIAGNOSIS — R29.898 DECREASED STRENGTH OF LOWER EXTREMITY: ICD-10-CM

## 2019-01-28 DIAGNOSIS — R26.2 DIFFICULTY WALKING ON UNEVEN SURFACE: ICD-10-CM

## 2019-01-28 DIAGNOSIS — M25.561 ACUTE PAIN OF RIGHT KNEE: ICD-10-CM

## 2019-01-28 PROCEDURE — 97112 NEUROMUSCULAR REEDUCATION: CPT | Mod: PN

## 2019-01-28 PROCEDURE — 97110 THERAPEUTIC EXERCISES: CPT | Mod: PN

## 2019-01-28 PROCEDURE — 97140 MANUAL THERAPY 1/> REGIONS: CPT | Mod: PN

## 2019-01-28 NOTE — PROGRESS NOTES
Physical Therapy Daily Treatment Note     Name: Shannan Norman  Clinic Number: [id number]    Therapy Diagnosis:   Encounter Diagnoses   Name Primary?    Acute pain of right knee     Difficulty walking on uneven surface     Decreased strength of lower extremity      Physician: Kit Potter, *    Visit Date: 1/28/2019    Physician Orders: PT Eval and Treat   Medical Diagnosis: M17.0 (ICD-10-CM) - Primary osteoarthritis of knees, bilateral  Evaluation Date: 12/21/2018  Authorization Period Expiration: 12/31/2019  Plan of Care Certification Period: 12/31/2018 to 02/1/2019  Visit # / Visits authorized: 8/50  FOTO: 8/10    Cap visit: 122.57   Cap total: 450.37     Time In: 0900  Time Out: 0955  Total Billable Time: 55 minutes (2 TE, 1 MT, 1 NM)  Precautions: Standard and Diabetes, Heart arrhythmia    Subjective   Mr. Campbell presents to PT today with no new complaints. Denies R knee pain.      He was compliant with home exercise program.  Response to previous treatment: no adverse effects.   Functional change: none reported   Pain: 0/10 Location: right knee anteromedially     Objective   O: 1+ ACL laxity on the R.  Pain anteromedially with deep knee flexion during knee clearing test.        Leg press: R medial knee pain with SL at 4.0 plates.         Golf swing: significant lack of hip turn with compensatory upper trunk rotation    Shannan received therapeutic exercises to develop strength, endurance, ROM, flexibility, posture and core stabilization for 35 minutes with PT 1:1 including:  - Nu-step     NT  - Fitter      NT  - heel raises     2x20 on bottom step phase I and 2x10 phase II   -lateral monster walks   5 rounds with  BTB  - steamboats 3 ways                                      BTB 3x10 B    - supine ITB stretch with strap  NT  - prone quad stretch with strap  MT  - SL open books    NT  - backward step-downs   2x20 B and 3x20 second holds B  - 3 way hip (mat)    3x10 with 3# ankle weight B  - leg press  DL     9.0 plates 1 x 20 reps and 10x10 sec forward/backward chaining and 9.0 plates 1x20 reps    Shannan participated in neuromuscular re-education activities for dynamic stabilization and neuromuscular control in SLS for a total of 10 minutes:- step-ups onto BoSu ball   Forward 3x10 and lateral 3x10 B/each and 2 rounds of step-ups with ball toss         Shannan received the following manual therapy techniques for R knee complex for a total of 10 minutes:  - grade III/IVpassive physiological hip IR in prone    Home Exercises Provided and Patient Education Provided   Education provided:   - Continuing with HEP    Written Home Exercises Provided: Patient instructed to cont prior HEP.  Exercises were reviewed and Shannan was able to demonstrate them prior to the end of the session.  Shannan demonstrated good  understanding of the education provided.   See EMR under Patient Instructions issued on 12/27/18    Assessment   A: R anterior knee pain.  R knee laxity as compared to L.  Loss of B hip IR.  Suspect B coxa valgus component to his lack of hip adduction/IR ROM.     Shannan is progressing well towards his goals.   Pt prognosis is Excellent.     Pt will continue to benefit from skilled outpatient physical therapy to address the deficits listed in the problem list box on initial evaluation, provide pt/family education and to maximize pt's level of independence in the home and community environment.   Pt's spiritual, cultural and educational needs considered and pt agreeable to plan of care and goals.    Anticipated barriers to physical therapy: none    Goals:   Long Term Goals: 4-6 weeks   1.  Patient will demonstrate 3/5 B gastrocnemius strength with standing MMT.  MET  2.  Patient will demonstrate SLS x 15 seconds on R/LLE on foam pad without BUE support needs for correction.  (PROGRESSING, NOT MET)  3.  Patient will report no R knee pain with driving   MET  4.  Patient will demonstrate to PT comprehensive understanding of each HEP  component without verbal cueing.  (PROGRESSING, NOT MET)  5.  Patient will report at least 1% but < 20% impaired, limited or restricted function on Knee FOTO survey.  (PROGRESSING, NOT MET)    Plan   Continue plan of care.  Ankle strengthening and dynamic stabilization as well as hip dynamic stabilization therex.      Sam Gonzáles, PT

## 2019-01-31 ENCOUNTER — CLINICAL SUPPORT (OUTPATIENT)
Dept: REHABILITATION | Facility: HOSPITAL | Age: 74
End: 2019-01-31
Payer: MEDICARE

## 2019-01-31 ENCOUNTER — EXTERNAL CHRONIC CARE MANAGEMENT (OUTPATIENT)
Dept: PRIMARY CARE CLINIC | Facility: CLINIC | Age: 74
End: 2019-01-31
Payer: MEDICARE

## 2019-01-31 DIAGNOSIS — R26.2 DIFFICULTY WALKING ON UNEVEN SURFACE: ICD-10-CM

## 2019-01-31 DIAGNOSIS — R29.898 DECREASED STRENGTH OF LOWER EXTREMITY: ICD-10-CM

## 2019-01-31 DIAGNOSIS — M25.561 ACUTE PAIN OF RIGHT KNEE: ICD-10-CM

## 2019-01-31 PROCEDURE — 99490 PR CHRONIC CARE MGMT, 1ST 20 MIN: ICD-10-PCS | Mod: S$PBB,,, | Performed by: FAMILY MEDICINE

## 2019-01-31 PROCEDURE — 99490 CHRNC CARE MGMT STAFF 1ST 20: CPT | Mod: PBBFAC,PO | Performed by: FAMILY MEDICINE

## 2019-01-31 PROCEDURE — 97110 THERAPEUTIC EXERCISES: CPT | Mod: PN

## 2019-01-31 PROCEDURE — 99490 CHRNC CARE MGMT STAFF 1ST 20: CPT | Mod: S$PBB,,, | Performed by: FAMILY MEDICINE

## 2019-01-31 PROCEDURE — 97140 MANUAL THERAPY 1/> REGIONS: CPT | Mod: PN

## 2019-01-31 NOTE — PROGRESS NOTES
"  Physical Therapy Daily Treatment Note     Name: Shannan Norman  Clinic Number: [id number]    Therapy Diagnosis:   Encounter Diagnoses   Name Primary?    Acute pain of right knee     Difficulty walking on uneven surface     Decreased strength of lower extremity      Physician: Kit Potter, *    Visit Date: 1/31/2019    Physician Orders: PT Eval and Treat   Medical Diagnosis: M17.0 (ICD-10-CM) - Primary osteoarthritis of knees, bilateral  Evaluation Date: 12/21/2018  Authorization Period Expiration: 12/31/2019  Plan of Care Certification Period: 12/31/2018 to 02/1/2019  Visit # / Visits authorized: 9/50  FOTO: 9/10    Cap visit: 118.42   Cap total: 568.79     Time In: 1500  Time Out: 1600  Total Billable Time: 55 minutes (3 TE, 1 MT)  Precautions: Standard and Diabetes, Heart arrhythmia    Subjective   Mr. Campbell presents to PT today with no new complaints. Denies R knee pain.  States that he feels like he is 'getting better'.    He was compliant with home exercise program.  Response to previous treatment: no adverse effects. He really likes the Bosu ball activities.   Functional change: none reported   Pain: 0/10 Location: right knee anteromedially     Objective   O: 1+ ACL laxity on the R.  Pain anteromedially with deep knee flexion during knee clearing test.        Leg press: No medial knee pain with SL at 6.0 plates today.        Impaired B ankle proprioception.          Shannan received therapeutic exercises to develop strength, endurance, ROM, flexibility, posture and core stabilization for 45 minutes with PT 1:1 including re-assessment:  - Nu-step     5' at L5  - heel raises     2x20 on bottom step DL and 2x10 SL  - steamboats 3 ways                                      BTB 3x10 B on foam pad  - step-ups on Bosu ball   Forward/lateral; 2x20 each  - lateral step-downs    3" step; 2x20 B and 3x20 second holds B while tossing a ball with PT  - 3 way hip (mat)    3x10 with 3# ankle weight B  - leg press " DL     9.0 plates 1 x 20 reps and 10x10 sec forward/backward chaining and 9.0 plates 1x20 reps         SL at 6 plates; 2x10 B    Shannan received the following manual therapy techniques for R knee complex for a total of 10 minutes:  - grade III/IVpassive physiological hip IR in prone  - prone quad stretch with PT overpressure; 5 bouts x 30-60 seconds each per leg    Home Exercises Provided and Patient Education Provided   Education provided:   - Continuing with HEP  - updating POC    Written Home Exercises Provided: Patient instructed to cont prior HEP.  Exercises were reviewed and Shannan was able to demonstrate them prior to the end of the session.  Shannan demonstrated good  understanding of the education provided.   See EMR under Patient Instructions issued on 12/27/18    Assessment   A: R anterior knee pain; none today or over the last several sessions.  R knee laxity as compared to L.  Loss of B hip IR.  Suspect B coxa valgus component to his lack of hip adduction/IR ROM. Mild B quadriceps tightness. B ankle impaired proprioception (L worse than R).       Shannan is progressing well towards his goals.   Pt prognosis is Excellent.     Pt will continue to benefit from skilled outpatient physical therapy to address the deficits listed in the problem list box on initial evaluation, provide pt/family education and to maximize pt's level of independence in the home and community environment.   Pt's spiritual, cultural and educational needs considered and pt agreeable to plan of care and goals.    Anticipated barriers to physical therapy: none    Goals:   Long Term Goals: 4-6 weeks   1.  Patient will demonstrate 3/5 B gastrocnemius strength with standing MMT.  MET  2.  Patient will demonstrate SLS x 15 seconds on R/LLE on foam pad without BUE support needs for correction.  (PROGRESSING, NOT MET)  3.  Patient will report no R knee pain with driving   MET  4.  Patient will demonstrate to PT comprehensive understanding of each HEP  component without verbal cueing.  (PROGRESSING, NOT MET)  5.  Patient will report at least 1% but < 20% impaired, limited or restricted function on Knee FOTO survey.  (PROGRESSING, NOT MET)    Plan   Continue plan of care.  Ankle strengthening and dynamic stabilization as well as hip dynamic stabilization therex.  Update POC.    Sam Gonzáles, PT

## 2019-02-01 NOTE — PLAN OF CARE
Outpatient Therapy Updated Plan of Care     Visit Date: 1/31/2019  Name: Shannan Norman  Clinic Number: [id number]    Therapy Diagnosis:   Encounter Diagnoses   Name Primary?    Acute pain of right knee     Difficulty walking on uneven surface     Decreased strength of lower extremity      Physician: Kit Potter, *    Physician Orders: PT Eval and Treat   Medical Diagnosis: M17.0 (ICD-10-CM) - Primary osteoarthritis of knees, bilateral  Evaluation Date: 12/21/2018    Total Visits Received: 9  Cancelled Visits: none  No Show Visits: none    Current Certification Period:  12/31/2018 to 02/1/2019  Precautions:  none  Functional Level Prior to Evaluation:  R anterior medial knee pain intermittently.  Most noticeable with driving and sustained knee positioning but also with attempts to play golf; unable to finish a round of golf due to pain.     Subjective     Update:Mr. Campbell presents to PT today with no new complaints. Denies R knee pain.  States that he feels like he is 'getting better'.  He was compliant with home exercise program.  Response to previous treatment: no adverse effects. He really likes the [employer] activities.   Functional change: none reported   Pain: 0/10 Location: right knee anteromedially     Objective     Update: 1+ ACL laxity on the R.  Pain anteromedially with deep knee flexion during knee clearing test.        Leg press: No medial knee pain with SL at 6.0 plates today.        Impaired B ankle proprioception as demonstrate with Rhomberg and SLS testing on foam surface.       < 30 degrees of B hip IR.  Normal BLE strength t/o.     Assessment     Update: R anterior knee pain; none today or over the last several sessions.  R knee laxity as compared to L.  Loss of B hip IR.  Suspect B coxa valgus component to his lack of hip adduction/IR ROM. Mild B quadriceps tightness. B ankle impaired proprioception (L worse than R).     Shannan is progressing well towards his goals.   Pt prognosis is  Excellent.     Previous Short Term Goals Status:     Long Term Goals: 4-6 weeks   1.  Patient will demonstrate 3/5 B gastrocnemius strength with standing MMT.  MET  2.  Patient will demonstrate SLS x 15 seconds on R/LLE on foam pad without BUE support needs for correction.  (PROGRESSING, NOT MET)  3.  Patient will report no R knee pain with driving   MET  4.  Patient will demonstrate to PT comprehensive understanding of each HEP component without verbal cueing.  (PROGRESSING, NOT MET)  5.  Patient will report at least 1% but < 20% impaired, limited or restricted function on Knee FOTO survey.  (PROGRESSING, NOT MET)    New Short Term Goals Status:   none  Long Term Goal Status:   See above  Reasons for Recertification of Therapy:   Mrs     Plan     Updated Certification Period: 02/01/2019 to 02/25/2019  Recommended Treatment Plan: 2 times per week for 3 weeks: Cervical/Lumbar Traction, Electrical Stimulation IFC, Manual Therapy, Moist Heat/ Ice, Neuromuscular Re-ed, Patient Education, Self Care, Therapeutic Activites and Therapeutic Exercise  Other Recommendations: -    Sam Gonzáles, PT  1/31/2019      I CERTIFY THE NEED FOR THESE SERVICES FURNISHED UNDER THIS PLAN OF TREATMENT AND WHILE UNDER MY CARE    Physician's comments:        Physician's Signature: ___________________________________________________

## 2019-02-04 ENCOUNTER — CLINICAL SUPPORT (OUTPATIENT)
Dept: REHABILITATION | Facility: HOSPITAL | Age: 74
End: 2019-02-04
Payer: MEDICARE

## 2019-02-04 ENCOUNTER — LAB VISIT (OUTPATIENT)
Dept: LAB | Facility: HOSPITAL | Age: 74
End: 2019-02-04
Attending: FAMILY MEDICINE
Payer: MEDICARE

## 2019-02-04 DIAGNOSIS — I15.2 HYPERTENSION ASSOCIATED WITH DIABETES: ICD-10-CM

## 2019-02-04 DIAGNOSIS — E11.59 HYPERTENSION ASSOCIATED WITH DIABETES: ICD-10-CM

## 2019-02-04 DIAGNOSIS — R29.898 DECREASED STRENGTH OF LOWER EXTREMITY: ICD-10-CM

## 2019-02-04 DIAGNOSIS — E08.21 DIABETES MELLITUS DUE TO UNDERLYING CONDITION WITH DIABETIC NEPHROPATHY, WITHOUT LONG-TERM CURRENT USE OF INSULIN: ICD-10-CM

## 2019-02-04 DIAGNOSIS — M25.561 ACUTE PAIN OF RIGHT KNEE: ICD-10-CM

## 2019-02-04 DIAGNOSIS — R26.2 DIFFICULTY WALKING ON UNEVEN SURFACE: ICD-10-CM

## 2019-02-04 DIAGNOSIS — E11.69 DYSLIPIDEMIA ASSOCIATED WITH TYPE 2 DIABETES MELLITUS: ICD-10-CM

## 2019-02-04 DIAGNOSIS — E78.5 DYSLIPIDEMIA ASSOCIATED WITH TYPE 2 DIABETES MELLITUS: ICD-10-CM

## 2019-02-04 LAB
ALBUMIN SERPL BCP-MCNC: 3.8 G/DL
ALP SERPL-CCNC: 74 U/L
ALT SERPL W/O P-5'-P-CCNC: 18 U/L
ANION GAP SERPL CALC-SCNC: 6 MMOL/L
AST SERPL-CCNC: 16 U/L
BILIRUB SERPL-MCNC: 0.6 MG/DL
BUN SERPL-MCNC: 22 MG/DL
CALCIUM SERPL-MCNC: 9.7 MG/DL
CHLORIDE SERPL-SCNC: 101 MMOL/L
CO2 SERPL-SCNC: 30 MMOL/L
CREAT SERPL-MCNC: 1.2 MG/DL
EST. GFR  (AFRICAN AMERICAN): >60 ML/MIN/1.73 M^2
EST. GFR  (NON AFRICAN AMERICAN): 59 ML/MIN/1.73 M^2
ESTIMATED AVG GLUCOSE: 131 MG/DL
GLUCOSE SERPL-MCNC: 109 MG/DL
HBA1C MFR BLD HPLC: 6.2 %
POTASSIUM SERPL-SCNC: 4.3 MMOL/L
PROT SERPL-MCNC: 6.5 G/DL
SODIUM SERPL-SCNC: 137 MMOL/L

## 2019-02-04 PROCEDURE — 36415 COLL VENOUS BLD VENIPUNCTURE: CPT

## 2019-02-04 PROCEDURE — G8979 MOBILITY GOAL STATUS: HCPCS | Mod: CI,PN

## 2019-02-04 PROCEDURE — 80053 COMPREHEN METABOLIC PANEL: CPT

## 2019-02-04 PROCEDURE — G8978 MOBILITY CURRENT STATUS: HCPCS | Mod: CI,PN

## 2019-02-04 PROCEDURE — 97110 THERAPEUTIC EXERCISES: CPT | Mod: PN

## 2019-02-04 PROCEDURE — 83036 HEMOGLOBIN GLYCOSYLATED A1C: CPT

## 2019-02-05 ENCOUNTER — CLINICAL SUPPORT (OUTPATIENT)
Dept: CARDIOLOGY | Facility: HOSPITAL | Age: 74
End: 2019-02-05
Attending: INTERNAL MEDICINE
Payer: MEDICARE

## 2019-02-05 ENCOUNTER — TELEPHONE (OUTPATIENT)
Dept: DERMATOLOGY | Facility: CLINIC | Age: 74
End: 2019-02-05

## 2019-02-05 ENCOUNTER — OFFICE VISIT (OUTPATIENT)
Dept: UROLOGY | Facility: CLINIC | Age: 74
End: 2019-02-05
Payer: MEDICARE

## 2019-02-05 ENCOUNTER — LAB VISIT (OUTPATIENT)
Dept: LAB | Facility: HOSPITAL | Age: 74
End: 2019-02-05
Attending: UROLOGY
Payer: MEDICARE

## 2019-02-05 VITALS
BODY MASS INDEX: 29.39 KG/M2 | WEIGHT: 217 LBS | DIASTOLIC BLOOD PRESSURE: 70 MMHG | HEART RATE: 69 BPM | HEIGHT: 72 IN | TEMPERATURE: 99 F | SYSTOLIC BLOOD PRESSURE: 119 MMHG

## 2019-02-05 DIAGNOSIS — N32.81 OAB (OVERACTIVE BLADDER): ICD-10-CM

## 2019-02-05 DIAGNOSIS — Z12.5 PROSTATE CANCER SCREENING: ICD-10-CM

## 2019-02-05 DIAGNOSIS — I42.8 NICM (NONISCHEMIC CARDIOMYOPATHY): ICD-10-CM

## 2019-02-05 DIAGNOSIS — I49.5 SSS (SICK SINUS SYNDROME): ICD-10-CM

## 2019-02-05 DIAGNOSIS — Z95.0 CARDIAC PACEMAKER IN SITU: ICD-10-CM

## 2019-02-05 DIAGNOSIS — N40.0 BENIGN PROSTATIC HYPERPLASIA, UNSPECIFIED WHETHER LOWER URINARY TRACT SYMPTOMS PRESENT: Primary | ICD-10-CM

## 2019-02-05 LAB
BILIRUB SERPL-MCNC: ABNORMAL MG/DL
BLOOD URINE, POC: ABNORMAL
COLOR, POC UA: YELLOW
COMPLEXED PSA SERPL-MCNC: 0.28 NG/ML
GLUCOSE UR QL STRIP: ABNORMAL
KETONES UR QL STRIP: ABNORMAL
LEUKOCYTE ESTERASE URINE, POC: ABNORMAL
NITRITE, POC UA: ABNORMAL
PH, POC UA: 5
PROTEIN, POC: ABNORMAL
SPECIFIC GRAVITY, POC UA: 1.01
UROBILINOGEN, POC UA: ABNORMAL

## 2019-02-05 PROCEDURE — 93296 REM INTERROG EVL PM/IDS: CPT

## 2019-02-05 PROCEDURE — 36415 COLL VENOUS BLD VENIPUNCTURE: CPT

## 2019-02-05 PROCEDURE — 99999 PR PBB SHADOW E&M-EST. PATIENT-LVL III: ICD-10-PCS | Mod: PBBFAC,,, | Performed by: UROLOGY

## 2019-02-05 PROCEDURE — 99213 OFFICE O/P EST LOW 20 MIN: CPT | Mod: PBBFAC,25,PO | Performed by: UROLOGY

## 2019-02-05 PROCEDURE — 84153 ASSAY OF PSA TOTAL: CPT

## 2019-02-05 PROCEDURE — 99214 PR OFFICE/OUTPT VISIT, EST, LEVL IV, 30-39 MIN: ICD-10-PCS | Mod: S$PBB,,, | Performed by: UROLOGY

## 2019-02-05 PROCEDURE — 99999 PR PBB SHADOW E&M-EST. PATIENT-LVL III: CPT | Mod: PBBFAC,,, | Performed by: UROLOGY

## 2019-02-05 PROCEDURE — 99214 OFFICE O/P EST MOD 30 MIN: CPT | Mod: S$PBB,,, | Performed by: UROLOGY

## 2019-02-05 PROCEDURE — 51798 US URINE CAPACITY MEASURE: CPT | Mod: PBBFAC,PO | Performed by: UROLOGY

## 2019-02-05 PROCEDURE — 81002 URINALYSIS NONAUTO W/O SCOPE: CPT | Mod: PBBFAC,PO | Performed by: UROLOGY

## 2019-02-05 RX ORDER — TAMSULOSIN HYDROCHLORIDE 0.4 MG/1
0.4 CAPSULE ORAL DAILY
COMMUNITY
End: 2019-02-11 | Stop reason: SDUPTHER

## 2019-02-05 RX ORDER — OXYBUTYNIN CHLORIDE 5 MG/1
5 TABLET, EXTENDED RELEASE ORAL
COMMUNITY
End: 2019-02-11 | Stop reason: SDUPTHER

## 2019-02-05 RX ORDER — ATORVASTATIN CALCIUM 40 MG/1
40 TABLET, FILM COATED ORAL DAILY
COMMUNITY
End: 2019-02-11 | Stop reason: SDUPTHER

## 2019-02-05 NOTE — TELEPHONE ENCOUNTER
"I left a message for the patient to return my call.    ----- Message from Tiny Pelaez MD sent at 2/1/2019 10:28 AM CST -----  FINAL PATHOLOGIC DIAGNOSIS  1. 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  - MARGINS ARE NEGATIVE IN THE PLANES OF SECTION.  MICROSCOPIC DESCRIPTION: Sections show nests of melanocytes with scattered single cells located  predominantly along the dermoepidermal junction and extending along elongated rete ridges. There is architectural  disorder consisting of irregularity in the size, shape and distribution of the nests at the dermal-epidermal junction  and "bridging" between rete ridges. The melanocytes display slight cytologic atypia. Papillary dermal fibrosis, a mild  lymphocytic infiltrate and melanophages are present. There is overlying parakeratosis with pigment deposition,  indicative of trauma.    Please notify patient mole was slightly atypical but entirely removed and requires no further treatment.  Should recheck site as his skin exam in 1 year.    "

## 2019-02-05 NOTE — PROGRESS NOTES
Physical Therapy Daily Treatment Note     Name: Shannan Norman  Clinic Number: [id number]    Therapy Diagnosis:   Encounter Diagnoses   Name Primary?    Acute pain of right knee     Difficulty walking on uneven surface     Decreased strength of lower extremity      Physician: Kit Potter, *    Visit Date: 2/6/2019    Physician Orders: PT Eval and Treat   Medical Diagnosis: M17.0 (ICD-10-CM) - Primary osteoarthritis of knees, bilateral  Evaluation Date: 12/21/2018  Authorization Period Expiration: 12/31/2019  Plan of Care Certification Period:  02/01/2019 to 02/25/2019  Visit # / Visits authorized: 11/50     G-code: 11/20  FOTO: at DC    Cap visit: 60.64  Cap total: 720.39     Time In: 1000  Time Out: 1055  Total Billable Time: 30 minutes (2 TE)  Precautions: Standard and Diabetes, Heart arrhythmia      Subjective     Pt reports: he had difficulty tolerating the bike at the gym this morning, where he usually doesnt.  Currently no pain.  He was compliant with home exercise program.  Response to previous treatment: no adverse reaction  Functional change: none    Pain: 0/10  Location: bilateral knee      Objective       Shannan received therapeutic exercises to develop strength, ROM and flexibility for 55 minutes including:    - Nu-step                                                         5' at L5  - heel raises                                                    2x20 on bottom step DL and 2x10 SL  - steamboats 3 ways                                      BTB 3x10 B on foam pad  - lateral monster walks                                    GTB 5 rounds x 50'   - step-ups on Bosu ball                                   Held today  - lateral step-downs                                        B 3x10 blue step  - 3 way hip (mat)                                             3x10 with 3# ankle weight B  - leg press DL                                                  8 plates 1x20 double-leg and 2x10 at 5 plates 1x20  single-leg B  - prone quadriceps stretch                              Strap; 5 x 30 seconds B    Home Exercises Provided and Patient Education Provided     Education provided:   - cont HEP regularly    Written Home Exercises Provided: Patient instructed to cont prior HEP.  Exercises were reviewed and Shannan was able to demonstrate them prior to the end of the session.  Shannan demonstrated good  understanding of the education provided.     See EMR under Patient Instructions for exercises provided 1/2/19.    Assessment     Pt tolerates therapy interventions without difficulties or c/o increased pain  Shannan is progressing well towards his goals.   Pt prognosis is Good.     Pt will continue to benefit from skilled outpatient physical therapy to address the deficits listed in the problem list box on initial evaluation, provide pt/family education and to maximize pt's level of independence in the home and community environment.     Pt's spiritual, cultural and educational needs considered and pt agreeable to plan of care and goals.    Anticipated barriers to physical therapy: none    Goals:   Long Term Goals: 4-6 weeks   1.  Patient will demonstrate 3/5 B gastrocnemius strength with standing MMT.  MET  2.  Patient will demonstrate SLS x 15 seconds on R/LLE on foam pad without BUE support needs for correction.  (PROGRESSING, NOT MET)  3.  Patient will report no R knee pain with driving   MET  4.  Patient will demonstrate to PT comprehensive understanding of each HEP component without verbal cueing.  (PROGRESSING, NOT MET)  5.  Patient will report at least 1% but < 20% impaired, limited or restricted function on Knee FOTO survey.  MET    Plan     Cont POC to progress towards established goals    Zandra Ramires, WILLY

## 2019-02-05 NOTE — PROGRESS NOTES
This patient was last seen by me March 2018 follow-up for BPH and overactive bladder on finasteride tamsulosin and at which time his oxybutynin was increased    Patient now comes in follow-up for his voiding issues    He notices some decreased force of stream recently and some hesitancy.  He has nocturia x3 and no straining to void and no dysuria.  Bladder scan postvoid residual in the office today is okay and approximately 30 cc    Patient has an extended motor home road trip planned and his chief complaint is concerned about going into urinary retention    Physical exam reveals reveals a well-developed well-nourished patient  in no acute distress.  Patient is alert and oriented ×3 with normal mood and affect.  Respiratory effort is normal and there is no peripheral edema.  Skin is normal to inspection and palpation.  Penis/perineum without lesions, scrotum without rash/cysts, epididymis nontender bilaterally, urethral meatus in normal location normal size, no penile plaques palpated, prostate:    Smooth, enlarged, and benign-feeling                   seminal vesicles not palpated.  No rectal masses, sphincter tone normal.  Testes equal in size without masses    /70 (BP Location: Left arm, Patient Position: Sitting, BP Method: Medium (Automatic))   Pulse 69   Temp 98.6 °F (37 °C) (Oral)   Ht 6' (1.829 m)   Wt 98.4 kg (217 lb)   BMI 29.43 kg/m²   Review of Systems  General ROS: negative for chills, fever or weight loss  Respiratory ROS: no cough, shortness of breath, or wheezing  Cardiovascular ROS: no chest pain or dyspnea on exertion  Musculoskeletal ROS: negative for gait disturbance or muscular weakness    Family History   Problem Relation Age of Onset    Diabetes Mother     COPD Father     No Known Problems Sister     Heart attack Brother     Heart disease Brother     No Known Problems Maternal Aunt     No Known Problems Maternal Uncle     No Known Problems Paternal Aunt     No Known  Problems Paternal Uncle     No Known Problems Maternal Grandmother     No Known Problems Maternal Grandfather     No Known Problems Paternal Grandmother     No Known Problems Paternal Grandfather     Amblyopia Neg Hx     Blindness Neg Hx     Cancer Neg Hx     Cataracts Neg Hx     Glaucoma Neg Hx     Hypertension Neg Hx     Macular degeneration Neg Hx     Retinal detachment Neg Hx     Strabismus Neg Hx     Stroke Neg Hx     Thyroid disease Neg Hx     Prostate cancer Neg Hx     Kidney disease Neg Hx     Melanoma Neg Hx      Past Medical History:   Diagnosis Date    Allergy     Arthritis     Atrial fibrillation     Atrial flutter 2011    ablation    Cataract     Diabetes mellitus     Dry eye syndrome     Gout, unspecified     High cholesterol     History of shingles     Hypertension     Seizures      Family History   Problem Relation Age of Onset    Diabetes Mother     COPD Father     No Known Problems Sister     Heart attack Brother     Heart disease Brother     No Known Problems Maternal Aunt     No Known Problems Maternal Uncle     No Known Problems Paternal Aunt     No Known Problems Paternal Uncle     No Known Problems Maternal Grandmother     No Known Problems Maternal Grandfather     No Known Problems Paternal Grandmother     No Known Problems Paternal Grandfather     Amblyopia Neg Hx     Blindness Neg Hx     Cancer Neg Hx     Cataracts Neg Hx     Glaucoma Neg Hx     Hypertension Neg Hx     Macular degeneration Neg Hx     Retinal detachment Neg Hx     Strabismus Neg Hx     Stroke Neg Hx     Thyroid disease Neg Hx     Prostate cancer Neg Hx     Kidney disease Neg Hx     Melanoma Neg Hx      Social History     Tobacco Use    Smoking status: Never Smoker    Smokeless tobacco: Never Used   Substance Use Topics    Alcohol use: Yes     Alcohol/week: 4.2 oz     Types: 7 Glasses of wine per week       Impression:      ICD-10-CM ICD-9-CM    1. Benign prostatic  hyperplasia, unspecified whether lower urinary tract symptoms present N40.0 600.00 POCT URINE DIPSTICK WITHOUT MICROSCOPE      POCT Bladder Scan   2. OAB (overactive bladder) N32.81 596.51    3. Prostate cancer screening Z12.5 V76.44 PSA, Screening       Plan:    #1 And 2.  BPH and overactive bladder. I discussed with the patient that the best way to avoid the possibility of urinary tension would be to stop his oxybutynin.  I recommend continuing both his finasteride and tamsulosin.  I discussed in general terms further evaluation of his voiding symptoms and discussed in general terms minimally invasive therapy of prostate as well as TURP.    Patient voiced understanding at this point will follow up with [facility] on an as-needed basis    3.  Prostate cancer screening.  I discussed risks and benefits and controversy concerning prostate cancer screening.  Patient voices understanding and wants to have it done.  Order entered and he states he will have it drawn when he next has blood work done.  We will contact patient with any significant finding    PLEASE NOTE:  Please be advised that portions of this note were dictated using voice recognition software and may contain dictation related errors in spelling/grammar/appropriate pronouns/syntax or other errors that might have not been found and or corrected on text review.

## 2019-02-06 ENCOUNTER — CLINICAL SUPPORT (OUTPATIENT)
Dept: REHABILITATION | Facility: HOSPITAL | Age: 74
End: 2019-02-06
Payer: MEDICARE

## 2019-02-06 DIAGNOSIS — R29.898 DECREASED STRENGTH OF LOWER EXTREMITY: ICD-10-CM

## 2019-02-06 DIAGNOSIS — M25.561 ACUTE PAIN OF RIGHT KNEE: ICD-10-CM

## 2019-02-06 DIAGNOSIS — R26.2 DIFFICULTY WALKING ON UNEVEN SURFACE: ICD-10-CM

## 2019-02-06 PROCEDURE — 97110 THERAPEUTIC EXERCISES: CPT | Mod: PN

## 2019-02-07 ENCOUNTER — TELEPHONE (OUTPATIENT)
Dept: DERMATOLOGY | Facility: CLINIC | Age: 74
End: 2019-02-07

## 2019-02-07 NOTE — TELEPHONE ENCOUNTER
Spoke with pt, gave results as reason for return call. States provider wanted to see him back in 6 months because of the area on his nose. Reminder put in for 6 months for spot on nose.    ----- Message from Lorenza Price sent at 2/7/2019  9:05 AM CST -----  Contact: pt at [phone] pts cell  [facility]    Who Left Message for Patient:Anahy  Communication Preference:call  Additional Information:Pt missed your call yesterday in ref to his biopsy report.

## 2019-02-11 ENCOUNTER — PATIENT MESSAGE (OUTPATIENT)
Dept: DERMATOLOGY | Facility: CLINIC | Age: 74
End: 2019-02-11

## 2019-02-11 ENCOUNTER — OFFICE VISIT (OUTPATIENT)
Dept: FAMILY MEDICINE | Facility: CLINIC | Age: 74
End: 2019-02-11
Attending: FAMILY MEDICINE
Payer: MEDICARE

## 2019-02-11 VITALS
OXYGEN SATURATION: 98 % | WEIGHT: 223.75 LBS | HEART RATE: 60 BPM | TEMPERATURE: 99 F | HEIGHT: 72 IN | BODY MASS INDEX: 30.31 KG/M2 | DIASTOLIC BLOOD PRESSURE: 67 MMHG | SYSTOLIC BLOOD PRESSURE: 109 MMHG

## 2019-02-11 DIAGNOSIS — E11.69 DYSLIPIDEMIA ASSOCIATED WITH TYPE 2 DIABETES MELLITUS: ICD-10-CM

## 2019-02-11 DIAGNOSIS — E78.5 DYSLIPIDEMIA ASSOCIATED WITH TYPE 2 DIABETES MELLITUS: ICD-10-CM

## 2019-02-11 DIAGNOSIS — I42.8 NICM (NONISCHEMIC CARDIOMYOPATHY): ICD-10-CM

## 2019-02-11 DIAGNOSIS — E08.21 DIABETES MELLITUS DUE TO UNDERLYING CONDITION WITH DIABETIC NEPHROPATHY, WITHOUT LONG-TERM CURRENT USE OF INSULIN: ICD-10-CM

## 2019-02-11 DIAGNOSIS — E11.59 HYPERTENSION ASSOCIATED WITH DIABETES: ICD-10-CM

## 2019-02-11 DIAGNOSIS — E11.9 TYPE 2 DIABETES MELLITUS WITHOUT RETINOPATHY: ICD-10-CM

## 2019-02-11 DIAGNOSIS — I15.2 HYPERTENSION ASSOCIATED WITH DIABETES: ICD-10-CM

## 2019-02-11 DIAGNOSIS — E66.9 OBESITY (BMI 30.0-34.9): ICD-10-CM

## 2019-02-11 DIAGNOSIS — I49.5 SSS (SICK SINUS SYNDROME): ICD-10-CM

## 2019-02-11 DIAGNOSIS — R39.16 BENIGN PROSTATIC HYPERPLASIA (BPH) WITH STRAINING ON URINATION: ICD-10-CM

## 2019-02-11 DIAGNOSIS — Z00.00 ROUTINE GENERAL MEDICAL EXAMINATION AT A HEALTH CARE FACILITY: Primary | ICD-10-CM

## 2019-02-11 DIAGNOSIS — N40.1 BENIGN PROSTATIC HYPERPLASIA (BPH) WITH STRAINING ON URINATION: ICD-10-CM

## 2019-02-11 PROCEDURE — 99999 PR PBB SHADOW E&M-EST. PATIENT-LVL III: CPT | Mod: PBBFAC,,, | Performed by: FAMILY MEDICINE

## 2019-02-11 PROCEDURE — 99397 PR PREVENTIVE VISIT,EST,65 & OVER: ICD-10-PCS | Mod: S$PBB,,, | Performed by: FAMILY MEDICINE

## 2019-02-11 PROCEDURE — 99213 OFFICE O/P EST LOW 20 MIN: CPT | Mod: PBBFAC,PO | Performed by: FAMILY MEDICINE

## 2019-02-11 PROCEDURE — 99999 PR PBB SHADOW E&M-EST. PATIENT-LVL III: ICD-10-PCS | Mod: PBBFAC,,, | Performed by: FAMILY MEDICINE

## 2019-02-11 PROCEDURE — 99397 PER PM REEVAL EST PAT 65+ YR: CPT | Mod: S$PBB,,, | Performed by: FAMILY MEDICINE

## 2019-02-11 RX ORDER — TAMSULOSIN HYDROCHLORIDE 0.4 MG/1
0.4 CAPSULE ORAL DAILY
Qty: 90 CAPSULE | Refills: 3 | Status: SHIPPED | OUTPATIENT
Start: 2019-02-11 | End: 2019-05-28 | Stop reason: SDUPTHER

## 2019-02-11 RX ORDER — PHENYLEPHRINE HCL 10 MG
500 TABLET ORAL DAILY
COMMUNITY
End: 2020-01-08

## 2019-02-11 RX ORDER — BENAZEPRIL HYDROCHLORIDE 20 MG/1
20 TABLET ORAL DAILY
Qty: 90 TABLET | Refills: 3
Start: 2019-02-11 | End: 2019-09-20 | Stop reason: SDUPTHER

## 2019-02-11 RX ORDER — FINASTERIDE 5 MG/1
TABLET, FILM COATED ORAL
Qty: 90 TABLET | Refills: 3 | Status: SHIPPED | OUTPATIENT
Start: 2019-02-11 | End: 2019-08-05

## 2019-02-11 RX ORDER — ATORVASTATIN CALCIUM 40 MG/1
40 TABLET, FILM COATED ORAL DAILY
Qty: 90 TABLET | Refills: 3 | Status: SHIPPED | OUTPATIENT
Start: 2019-02-11 | End: 2019-08-05 | Stop reason: SDUPTHER

## 2019-02-11 RX ORDER — OXYBUTYNIN CHLORIDE 5 MG/1
5 TABLET, EXTENDED RELEASE ORAL DAILY
Qty: 90 TABLET | Refills: 3 | Status: SHIPPED | OUTPATIENT
Start: 2019-02-11 | End: 2019-08-05

## 2019-02-11 NOTE — PROGRESS NOTES
Subjective:       Patient ID: Shannan Norman is a 73 y.o. male.    Chief Complaint: Annual Exam (eye exam)    73 yr old pleasant white male with DM II controlled, HTN, HLD, obesity, presents today for his annual wellness check, lab work review and 6 month follow up and med refills. No new concerns today.    BPH - much better  - no dysuria - tried flomax and proscar and nothing helps       DM II - controlled -  diet control -A1C                   6.2 (H)             02/04/2019                          - on ACE and ASA - UTD with eye and foot screen      HTN - controlled - oN ACE - compliant - no side effects      HLD - controlled - on statin - compliant - no side effects -  LDLCALC                  89.8                08/02/2018                                 History as below - reviewed and no changes    HM  -labs UTD  -PSA UTD  -adacel UTD            Diabetes   He presents for his follow-up diabetic visit. He has type 2 diabetes mellitus. His disease course has been stable. Pertinent negatives for hypoglycemia include no confusion, dizziness, headaches, hunger, mood changes, nervousness/anxiousness, seizures, sleepiness, speech difficulty, sweats or tremors. Pertinent negatives for diabetes include no chest pain, no foot ulcerations, no polydipsia, no polyuria, no visual change, no weakness and no weight loss. Symptoms are stable. Pertinent negatives for diabetic complications include no CVA, PVD or retinopathy. Risk factors for coronary artery disease include diabetes mellitus, dyslipidemia, hypertension and male sex. He is compliant with treatment all of the time. He is following a generally healthy diet. Meal planning includes avoidance of concentrated sweets. He rarely participates in exercise. An ACE inhibitor/angiotensin II receptor blocker is being taken. He does not see a podiatrist.Eye exam is current.   Hypertension   This is a chronic problem. The current episode started more than 1 month ago. The  problem has been gradually improving since onset. The problem is controlled. Pertinent negatives include no chest pain, headaches, malaise/fatigue, neck pain, palpitations, peripheral edema, PND or sweats. There are no associated agents to hypertension. Risk factors for coronary artery disease include diabetes mellitus, dyslipidemia, male gender and obesity. Past treatments include ACE inhibitors. The current treatment provides significant improvement. There are no compliance problems.  There is no history of angina, CAD/MI, CVA, left ventricular hypertrophy, PVD or retinopathy. There is no history of chronic renal disease, coarctation of the aorta, hypercortisolism, hyperparathyroidism, pheochromocytoma, renovascular disease or a thyroid problem.   Hyperlipidemia   This is a chronic problem. The current episode started more than 1 year ago. The problem is controlled. Recent lipid tests were reviewed and are normal. Exacerbating diseases include diabetes and obesity. He has no history of chronic renal disease. Associated symptoms include myalgias. Pertinent negatives include no chest pain or leg pain. The current treatment provides significant improvement of lipids. There are no compliance problems.  Risk factors for coronary artery disease include diabetes mellitus, dyslipidemia, hypertension, male sex and obesity.   Benign Prostatic Hypertrophy   This is a chronic problem. The current episode started more than 1 year ago. The problem has been gradually worsening since onset. Irritative symptoms include nocturia. Irritative symptoms do not include frequency or urgency. Obstructive symptoms include dribbling, incomplete emptying, an intermittent stream, a slower stream and straining. Pertinent negatives include no hematuria or vomiting. AUA score is 8-19. He is sexually active. Nothing aggravates the symptoms. Past treatments include tamsulosin. The treatment provided significant relief.     Review of Systems    Constitutional: Negative.  Negative for activity change, diaphoresis, malaise/fatigue, unexpected weight change and weight loss.   HENT: Negative.  Negative for congestion, ear pain, mouth sores, rhinorrhea and voice change.    Eyes: Negative.  Negative for pain, discharge and visual disturbance.   Respiratory: Negative.  Negative for apnea, cough and wheezing.    Cardiovascular: Negative.  Negative for chest pain, palpitations and PND.   Gastrointestinal: Negative.  Negative for abdominal distention, anal bleeding, diarrhea and vomiting.   Endocrine: Negative.  Negative for cold intolerance, polydipsia and polyuria.   Genitourinary: Positive for incomplete emptying and nocturia. Negative for decreased urine volume, difficulty urinating, discharge, frequency, hematuria, scrotal swelling and urgency.   Musculoskeletal: Positive for arthralgias and myalgias. Negative for back pain, neck pain and neck stiffness.   Skin: Negative.  Negative for color change and rash.   Allergic/Immunologic: Negative.  Negative for environmental allergies and immunocompromised state.   Neurological: Negative.  Negative for dizziness, tremors, seizures, speech difficulty, weakness, light-headedness and headaches.   Hematological: Negative.    Psychiatric/Behavioral: Negative.  Negative for agitation, confusion, dysphoric mood and suicidal ideas. The patient is not nervous/anxious.        PMH/PSH/FH/SH/MED/ALLERGY reviewed    Objective:       Vitals:    02/11/19 0913   BP: 109/67   Pulse: 60   Temp: 98.5 °F (36.9 °C)       Physical Exam   Constitutional: He is oriented to person, place, and time. He appears well-developed and well-nourished.   HENT:   Head: Normocephalic and atraumatic.   Right Ear: External ear normal.   Left Ear: External ear normal.   Nose: Nose normal.   Mouth/Throat: Oropharynx is clear and moist. No oropharyngeal exudate.   Eyes: Conjunctivae and EOM are normal. Pupils are equal, round, and reactive to light. Right  eye exhibits no discharge. Left eye exhibits no discharge. No scleral icterus.   Neck: Normal range of motion. Neck supple. No JVD present. No tracheal deviation present. No thyromegaly present.   Cardiovascular: Normal rate, regular rhythm, normal heart sounds and intact distal pulses. Exam reveals no gallop and no friction rub.   No murmur heard.  Pulmonary/Chest: Effort normal and breath sounds normal. No stridor. No respiratory distress. He has no wheezes. He has no rales. He exhibits no tenderness.   Abdominal: Soft. Bowel sounds are normal. He exhibits no distension and no mass. There is no tenderness. There is no rebound and no guarding. No hernia.   Musculoskeletal: Normal range of motion. He exhibits no edema or tenderness.   Lymphadenopathy:     He has no cervical adenopathy.   Neurological: He is alert and oriented to person, place, and time. He has normal reflexes. He displays normal reflexes. No cranial nerve deficit. He exhibits normal muscle tone. Coordination normal.   Skin: Skin is warm and dry. No rash noted. No erythema. No pallor.   Psychiatric: He has a normal mood and affect. His behavior is normal. Judgment and thought content normal.       Protective Sensation (w/ 10 gram monofilament):  Right: Intact  Left: Intact    Visual Inspection:  Normal -  Bilateral    Pedal Pulses:   Right: Present  Left: Present    Posterior tibialis:   Right:Present  Left: Present      Assessment:       1. Routine general medical examination at a health care facility    2. Dyslipidemia associated with type 2 diabetes mellitus    3. Diabetes mellitus due to underlying condition with diabetic nephropathy, without long-term current use of insulin    4. NICM (nonischemic cardiomyopathy)    5. Hypertension associated with diabetes    6. Obesity (BMI 30.0-34.9)    7. SSS (sick sinus syndrome)    8. Type 2 diabetes mellitus without retinopathy    9. Benign prostatic hyperplasia (BPH) with straining on urination         Plan:           Shannan was seen today for annual exam.    Diagnoses and all orders for this visit:    Routine general medical examination at a health care facility    Dyslipidemia associated with type 2 diabetes mellitus  -     atorvastatin (LIPITOR) 40 MG tablet; Take 1 tablet (40 mg total) by mouth once daily.    Diabetes mellitus due to underlying condition with diabetic nephropathy, without long-term current use of insulin    NICM (nonischemic cardiomyopathy)  -     benazepril (LOTENSIN) 20 MG tablet; Take 1 tablet (20 mg total) by mouth once daily.    Hypertension associated with diabetes    Obesity (BMI 30.0-34.9)    SSS (sick sinus syndrome)    Type 2 diabetes mellitus without retinopathy    Benign prostatic hyperplasia (BPH) with straining on urination  -     finasteride (PROSCAR) 5 mg tablet; TAKE 1 TABLET(5 MG) BY MOUTH EVERY DAY  -     oxybutynin (DITROPAN-XL) 5 MG TR24; Take 1 tablet (5 mg total) by mouth once daily.  -     tamsulosin (FLOMAX) 0.4 mg Cap; Take 1 capsule (0.4 mg total) by mouth once daily.      Wellness check  -normal exam  -labs    DM II  -controlled  -labs    HTN  -controlled    HLD  -stable    BPH  -controlled    SSS  -on pacemaker      Spent adequate time in obtaining history and explaining differentials    40 minutes spent during this visit of which greater than 50% devoted to face-face counseling and coordination of care regarding diagnosis and management plan    RTC 6 months or prn

## 2019-02-12 ENCOUNTER — CLINICAL SUPPORT (OUTPATIENT)
Dept: REHABILITATION | Facility: HOSPITAL | Age: 74
End: 2019-02-12
Payer: MEDICARE

## 2019-02-12 DIAGNOSIS — R29.898 DECREASED STRENGTH OF LOWER EXTREMITY: ICD-10-CM

## 2019-02-12 DIAGNOSIS — M25.561 ACUTE PAIN OF RIGHT KNEE: ICD-10-CM

## 2019-02-12 DIAGNOSIS — R26.2 DIFFICULTY WALKING ON UNEVEN SURFACE: ICD-10-CM

## 2019-02-12 PROCEDURE — 97110 THERAPEUTIC EXERCISES: CPT | Mod: PN

## 2019-02-12 NOTE — PROGRESS NOTES
Physical Therapy Daily Treatment Note     Name: Shannan Norman  Clinic Number: [id number]    Therapy Diagnosis:   Encounter Diagnoses   Name Primary?    Acute pain of right knee     Difficulty walking on uneven surface     Decreased strength of lower extremity      Physician: Kit Potter, *    Visit Date: 2/12/2019    Physician Orders: PT Eval and Treat   Medical Diagnosis: M17.0 (ICD-10-CM) - Primary osteoarthritis of knees, bilateral  Evaluation Date: 12/21/2018  Authorization Period Expiration: 12/31/2019  Plan of Care Certification Period:  02/01/2019 to 02/25/2019  Visit # / Visits authorized: 12/50     G-code: 12/20  FOTO: at DC    Cap visit: 60.64  Cap total: 781.03     Time In: 1000  Time Out: 1055  Total Billable Time: 30 minutes (2 TE)  Precautions: Standard and Diabetes, Heart arrhythmia      Subjective   Mr. Campbell presents to PT today with reports of increased B knee pain but only with prolonged sitting.   He was compliant with home exercise program.  Response to previous treatment: no adverse reaction  Functional change: none  Pain: 0/10Location: bilateral knee       Objective   O: L knee clearing test: anterior knee pain with terminal flexion. Abolished following grade III/IV inferior patellar mobs        R knee clearing test: negative     Shannan received therapeutic exercises to develop strength, ROM and flexibility for 30 minutes with PT 1:1 including assessment and 20 minutes under supervision:  - Nu-step                                                         5' at L5  - heel raises                                                    2x20 on bottom step DL and 2x10 SL  - steamboats 3 ways                                      BTB 3x10 B on foam pad  - lateral monster walks                                    GTB 5 rounds x 50'   - step-ups on Bosu ball                                   Held today  - lateral step-downs                                        B 3x10 blue step  - 3 way hip (mat)                                              3x10 with 3# ankle weight B  - leg press DL                                                  8 plates 1x20 double-leg and 2x10 at 5 plates 1x20 single-leg B  - prone quadriceps stretch                              Strap; 5 x 30 seconds B    Home Exercises Provided and Patient Education Provided   Education provided:   - cont HEP regularly    Written Home Exercises Provided: Patient instructed to cont prior HEP.  Exercises were reviewed and Shannan was able to demonstrate them prior to the end of the session.  Shannan demonstrated good  understanding of the education provided.   See EMR under Patient Instructions for exercises provided 1/2/19.    Assessment   A: R anterior knee pain.  R knee laxity as compared to L.  Loss of B hip IR.  Suspect B coxa valgus component to his lack of hip adduction/IR ROM. Prolonged sitting pain consistent with patellofemoral OA changes.     Shannan is progressing well towards his goals.   Pt prognosis is Good.     Pt will continue to benefit from skilled outpatient physical therapy to address the deficits listed in the problem list box on initial evaluation, provide pt/family education and to maximize pt's level of independence in the home and community environment.   Pt's spiritual, cultural and educational needs considered and pt agreeable to plan of care and goals.    Anticipated barriers to physical therapy: none    Goals:   Long Term Goals: 4-6 weeks   1.  Patient will demonstrate 3/5 B gastrocnemius strength with standing MMT.  MET  2.  Patient will demonstrate SLS x 15 seconds on R/LLE on foam pad without BUE support needs for correction.  (PROGRESSING, NOT MET)  3.  Patient will report no R knee pain with driving   MET  4.  Patient will demonstrate to PT comprehensive understanding of each HEP component without verbal cueing.  MET  5.  Patient will report at least 1% but < 20% impaired, limited or restricted function on Knee FOTO survey.   MET    Plan   Cont POC to progress towards established goals. Nearing discharge.     Sam Gonzáles, PT

## 2019-02-13 ENCOUNTER — PATIENT MESSAGE (OUTPATIENT)
Dept: FAMILY MEDICINE | Facility: CLINIC | Age: 74
End: 2019-02-13

## 2019-02-14 ENCOUNTER — OFFICE VISIT (OUTPATIENT)
Dept: FAMILY MEDICINE | Facility: CLINIC | Age: 74
End: 2019-02-14
Attending: FAMILY MEDICINE
Payer: MEDICARE

## 2019-02-14 VITALS
HEIGHT: 72 IN | BODY MASS INDEX: 30.67 KG/M2 | SYSTOLIC BLOOD PRESSURE: 116 MMHG | WEIGHT: 226.44 LBS | OXYGEN SATURATION: 97 % | DIASTOLIC BLOOD PRESSURE: 70 MMHG | HEART RATE: 61 BPM

## 2019-02-14 DIAGNOSIS — K40.90 RIGHT INGUINAL HERNIA: Primary | ICD-10-CM

## 2019-02-14 PROCEDURE — 99214 PR OFFICE/OUTPT VISIT, EST, LEVL IV, 30-39 MIN: ICD-10-PCS | Mod: S$PBB,,, | Performed by: FAMILY MEDICINE

## 2019-02-14 PROCEDURE — 99214 OFFICE O/P EST MOD 30 MIN: CPT | Mod: PBBFAC,PO | Performed by: FAMILY MEDICINE

## 2019-02-14 PROCEDURE — 99214 OFFICE O/P EST MOD 30 MIN: CPT | Mod: S$PBB,,, | Performed by: FAMILY MEDICINE

## 2019-02-14 PROCEDURE — 99999 PR PBB SHADOW E&M-EST. PATIENT-LVL IV: ICD-10-PCS | Mod: PBBFAC,,, | Performed by: FAMILY MEDICINE

## 2019-02-14 PROCEDURE — 99999 PR PBB SHADOW E&M-EST. PATIENT-LVL IV: CPT | Mod: PBBFAC,,, | Performed by: FAMILY MEDICINE

## 2019-02-14 NOTE — PROGRESS NOTES
Subjective:       Patient ID: Shannan Norman is a 73 y.o. male.    Chief Complaint: Bulge in abdomen    73 yr old pleasant white male with DM II controlled, HTN, HLD, obesity, presents today for evaluation of right inguinal hernia. He reported he noticed it recently. No pain/difficulty with urination or bowel. He had umbilical hernia before. Details as follows -      History as below - reviewed       Mass   This is a new problem. The current episode started in the past 7 days. The problem occurs constantly. The problem has been gradually worsening. Pertinent negatives include no abdominal pain, anorexia, change in bowel habit, chest pain, chills, congestion, coughing, diaphoresis, fatigue, headaches, myalgias, neck pain, numbness, rash, urinary symptoms, visual change, vomiting or weakness. Nothing aggravates the symptoms. He has tried nothing for the symptoms. The treatment provided no relief.     Review of Systems   Constitutional: Negative.  Negative for activity change, chills, diaphoresis, fatigue and unexpected weight change.   HENT: Negative.  Negative for congestion, ear pain, mouth sores, rhinorrhea and voice change.    Eyes: Negative.  Negative for pain, discharge and visual disturbance.   Respiratory: Negative.  Negative for apnea, cough and wheezing.    Cardiovascular: Negative.  Negative for chest pain and palpitations.   Gastrointestinal: Negative.  Negative for abdominal distention, abdominal pain, anal bleeding, anorexia, change in bowel habit, diarrhea and vomiting.        Mass inguinal area right   Endocrine: Negative.  Negative for cold intolerance and polyuria.   Genitourinary: Negative.  Negative for decreased urine volume, difficulty urinating, discharge, frequency and scrotal swelling.   Musculoskeletal: Negative.  Negative for back pain, myalgias, neck pain and neck stiffness.   Skin: Negative.  Negative for color change and rash.   Allergic/Immunologic: Negative.  Negative for environmental  allergies and immunocompromised state.   Neurological: Negative.  Negative for dizziness, speech difficulty, weakness, light-headedness, numbness and headaches.   Hematological: Negative.    Psychiatric/Behavioral: Negative.  Negative for agitation, dysphoric mood and suicidal ideas. The patient is not nervous/anxious.        PMH/PSH/FH/SH/MED/ALLERGY reviewed    Objective:       Vitals:    02/14/19 0808   BP: 116/70   Pulse: 61       Physical Exam   Constitutional: He is oriented to person, place, and time. He appears well-developed and well-nourished.   HENT:   Head: Normocephalic and atraumatic.   Right Ear: External ear normal.   Left Ear: External ear normal.   Nose: Nose normal.   Mouth/Throat: Oropharynx is clear and moist. No oropharyngeal exudate.   Eyes: Conjunctivae and EOM are normal. Pupils are equal, round, and reactive to light. Right eye exhibits no discharge. Left eye exhibits no discharge. No scleral icterus.   Neck: Normal range of motion. Neck supple. No JVD present. No tracheal deviation present. No thyromegaly present.   Cardiovascular: Normal rate, regular rhythm, normal heart sounds and intact distal pulses. Exam reveals no gallop and no friction rub.   No murmur heard.  Pulmonary/Chest: Effort normal and breath sounds normal. No stridor. No respiratory distress. He has no wheezes. He has no rales. He exhibits no tenderness.   Abdominal: Soft. Bowel sounds are normal. He exhibits no distension and no mass. There is no tenderness. There is no rebound and no guarding. A hernia is present. Hernia confirmed positive in the right inguinal area.       Right inguinal hernia - not reducible   Musculoskeletal: Normal range of motion. He exhibits no edema or tenderness.   Lymphadenopathy:     He has no cervical adenopathy.   Neurological: He is alert and oriented to person, place, and time. He has normal reflexes. He displays normal reflexes. No cranial nerve deficit. He exhibits normal muscle tone.  Coordination normal.   Skin: Skin is warm and dry. No rash noted. No erythema. No pallor.   Psychiatric: He has a normal mood and affect. His behavior is normal. Judgment and thought content normal.       Assessment:       1. Right inguinal hernia        Plan:         Shannan was seen today for bulge in abdomen.    Diagnoses and all orders for this visit:    Right inguinal hernia  -     Ambulatory referral to General Surgery      RIGHT  INGUINAL HERNIA  - REFER GEN SURGERY  -AVOID HEAVY LIFTING, WATCH FOR BOWEL ISSUES  -ER PRECAUTIONS GIVEN    Spent adequate time in obtaining history and explaining differentials    40 minutes spent during this visit of which greater than 50% devoted to face-face counseling and coordination of care regarding diagnosis and management plan    Follow-up if symptoms worsen or fail to improve.

## 2019-02-14 NOTE — PATIENT INSTRUCTIONS
Hernia (Adult)    A hernia can happen when there is a weakness or defect in the wall of the abdomen or groin. Intestines or nearby tissues may move from their usual location and push through the weakness in the wall. This can cause a hernia (bulge) you may see or feel.  Causes and Risk Factors   A hernia may be present at birth. Or it may be caused by the wear and tear of daily living. Certain factors can make a hernia more likely. These can include:  · Heavy lifting  · Straining, whether from lifting, movement, or constipation  · Chronic cough  · Injury to the abdominal wall  · Excess weight  · Pregnancy  · Prior surgery  · Older age  · Family history of hernia  Symptoms  Symptoms of a hernia may come on suddenly. Or they may appear slowly over time. Some common symptoms include:  · Bulge in the groin area, around the navel, or in the scrotum (the bulge may get bigger when you stand and go away when you lie down)  · Pain or pressure around the bulge  · Pain during activities such as lifting, coughing, or sneezing  · A feeling of weakness or pressure in the groin  · Pain or swelling in the scrotum  Types of hernias  There are different types of hernia. The type you have depends on its location:  · Inguinal: This type is in the groin or scrotum. It is more common in men.  · Femoral: This type is in the groin, upper thigh (where the leg bends), or labia. It is more common in women.  · Ventral: This type is in the abdominal wall.  · Umbilical: This type occurs around the navel (belly button).  · Incisional: This type occurs at the site of a previous surgery.  The condition of the hernia can help determine how urgently it needs to be treated.  · Reducible: It goes back in by itself, or it can be pushed back in.  · Irreducible: It cant be pushed back in.  · Incarcerated/Strangulated: The intestine is trapped (incarcerated). If this happens, you wont be able to push the bulge back in. If the incarcerated hernia isnt  treated, it may become strangulated. This means the area loses blood supply and the tissue may die. This requires emergency surgery! Treatment is needed right away!  In most cases, a hernia will not heal on its own. Surgery is usually needed to repair the defect in the abdominal wall or groin. Youll be told more about surgery, if needed.  If your symptoms are not severe, treatment may sometimes be delayed. In such cases, regular follow-up visits with the provider will be needed. Youll be asked to keep track of your symptoms and to watch for signs of more serious problems. You may also be given guidelines similar to the home care instructions below.  Home Care  To help keep a hernia from getting worse, you may be advised to:  · Avoid heavy lifting and straining as directed.  · Take steps to prevent constipation, such as eating more fiber and drinking more water. This may help reduce straining that can occur when having a bowel movement. Reducing straining may help keep your symptoms from getting worse.  · Maintain a healthy weight or lose excess weight. This can help reduce strain on abdominal muscles and tissues.  · Stop smoking. This can help prevent coughing that may also strain abdominal muscles and tissues.  Follow-up care  Follow up with your healthcare provider, or as directed. If imaging tests were done, they will be reviewed a doctor. You will be told the results and any new findings that may affect your care.  When to seek medical advice  Call your healthcare provider right away if any of these occur:  · Hernia hardens, swells, or grows larger  · Hernia can no longer be pushed back in  · Pain moves to the lower right abdomen (just below the waistline), or spreads to the back  Call 911  Call 911 right away if any of these occur:  · Nausea and vomiting  · Severe pain, redness, or tenderness in the area near the hernia  · Pain worsens quickly and doesnt get better  · Inability to have a bowel movement or  pass gas  · Fever of 100.4°F (38°C) or higher  · Trouble breathing  · Fainting  · Rapid heart rate  · Vomiting blood  · Large amounts of blood in stool  Date Last Reviewed: 6/9/2015 © 2000-2017 [employer]. [address] [place] [address], [place] [address]. All rights reserved. This information is not intended as a substitute for professional medical care. Always follow your healthcare professional's instructions.        Inguinal Hernia (Infant)    Inside the mothers womb, the testicles first form in the belly (abdomen) of the male fetus. Just before birth, the testicle moves down through the groin into the scrotum. In some infants, a passage remains that connects the belly and scrotum. If the passage is wide enough, a loop of the intestine can move down from the belly through a tunnel in the groin and into the scrotum. This is called a hernia.  A hernia may appear as a bulge in the groin or scrotum. It may become more noticeable if your child is crying or struggling.  The condition of the hernia can help determine how urgently it needs to be treated:  · Reducible: It goes back in by itself, or it can be pushed back in.  · Irreducible: It cant be pushed back in.  · Incarcerated/Strangulated: The intestine is trapped (incarcerated). If this happens, you wont be able to push the hernia back in. If the trapped hernia isnt treated, it may become strangulated. This means the area loses blood supply and the tissue may die. This requires emergency surgery! Treatment is needed right away!  To reduce the risk of a simple hernia becoming trapped and becoming serious, surgery is advised for all infants with a hernia.  Home Care  Until treatment is scheduled, you may be advised to check your childs hernia regularly to make sure it doesnt worsen.  As long as the hernia can move back into the belly, its usually not a problem.  If the hernia gets larger and you cant push it flat, this is a serious problem. Try to  reduce it right away as follows:  · Lay your child down on its back. Place a few towels under the buttocks to raise the lower body higher than the upper body.  · Apply gentle steady pressure to the bulge with your flattened fingers or the palm of your hand for up to 10 minutes. If your child appears to be in pain, youre pressing too hard.  · The hernia should slide back into the belly.  · If the hernia does not go down after 10 minutes of steady pressure, go to the Emergency Department.  Follow-up care  Follow up with the healthcare provider, or as directed. Your child may need to been by a surgeon. If imaging tests were done, they will be reviewed a doctor. You will be told the results and any new findings that might affect treatment.  When to seek medical advice  Call the healthcare provider right away if your child has any of these symptoms:  · Hernia hardens, swells, or grows larger  · Hernia can no longer be pushed back into place  · Crying that cant be consoled  · Crying or fussing when you touch the hernia  Call 911  Call 911 right away if your child has any of these symptoms:  · Nausea and vomiting  · Red or blue color in or around the groin, scrotum, or navel (belly button)  · Swollen belly  · Severe pain, redness, or tenderness in the area near the hernia  · Pain worsens quickly and doesnt get better  · Inability to have a bowel movement or pass gas  · Trouble breathing  · Fainting  · Unusually fast heart rate  · Vomiting blood  · Large amounts of blood in stool  · Confusion  · Stiff neck  · Seizure  Also call if your child is:  · 3 months old or younger and has a fever of 100.4°F (38°C) or higher. (Get medical care right away. Fever in a young baby can be a sign of a dangerous infection.)  · Younger than 2 years of age and has a fever of 100.4°F (38°C) that lasts for more than 1 day.  · Any age and has repeated fevers above 104°F (40°C).  Date Last Reviewed: 6/19/2015  © 9660-5199 [employer] [address] [place] [address]. All rights reserved. This information is not intended as a substitute for professional medical care. Always follow your healthcare professional's instructions.

## 2019-02-15 ENCOUNTER — OFFICE VISIT (OUTPATIENT)
Dept: SURGERY | Facility: CLINIC | Age: 74
End: 2019-02-15
Attending: FAMILY MEDICINE
Payer: MEDICARE

## 2019-02-15 ENCOUNTER — CLINICAL SUPPORT (OUTPATIENT)
Dept: LAB | Facility: HOSPITAL | Age: 74
End: 2019-02-15
Attending: SURGERY
Payer: MEDICARE

## 2019-02-15 VITALS
HEART RATE: 62 BPM | BODY MASS INDEX: 30.48 KG/M2 | WEIGHT: 225.06 LBS | TEMPERATURE: 98 F | DIASTOLIC BLOOD PRESSURE: 68 MMHG | HEIGHT: 72 IN | SYSTOLIC BLOOD PRESSURE: 109 MMHG

## 2019-02-15 DIAGNOSIS — K40.90 RIGHT INGUINAL HERNIA: ICD-10-CM

## 2019-02-15 DIAGNOSIS — K40.90 RIGHT INGUINAL HERNIA: Primary | ICD-10-CM

## 2019-02-15 PROCEDURE — 99999 PR PBB SHADOW E&M-EST. PATIENT-LVL IV: CPT | Mod: PBBFAC,,, | Performed by: SURGERY

## 2019-02-15 PROCEDURE — 99214 PR OFFICE/OUTPT VISIT, EST, LEVL IV, 30-39 MIN: ICD-10-PCS | Mod: S$PBB,,, | Performed by: SURGERY

## 2019-02-15 PROCEDURE — 99214 OFFICE O/P EST MOD 30 MIN: CPT | Mod: S$PBB,,, | Performed by: SURGERY

## 2019-02-15 PROCEDURE — 99214 OFFICE O/P EST MOD 30 MIN: CPT | Mod: PBBFAC,PO | Performed by: SURGERY

## 2019-02-15 PROCEDURE — 99999 PR PBB SHADOW E&M-EST. PATIENT-LVL IV: ICD-10-PCS | Mod: PBBFAC,,, | Performed by: SURGERY

## 2019-02-15 NOTE — H&P (VIEW-ONLY)
[facility]  OUTPATIENT H&P    REASON FOR VISIT/CC:  Right inguinal hernia    HPI: Shannan Norman is a 73 y.o. male no acute onset of right groin pain and bulging approximately 2 days prior to presentation.  He denies any significant pain, obstructive symptoms, difficulty urinating.  He does not have a chronic cough for strain to have bowel movement or urinate.  He has noticed a bulge both at rest and after standing.  Nothing appears to alleviate the bulging.  It may be worsened with prolonged standing or straining.    I have reviewed the patient's chart including prior progress notes, procedures and testing. The patient has a history of sick sinus syndrome and atrial flutter for which he underwent RFA x2.  Developed post RFA AFib and is currently on anticoagulation with Arixtra.  Also has a pacer in place for bradycardia.  He has a history of diet controlled diabetes, hypertension hyperlipidemia.    The patient is active and utilizes the recumbent bicycle at the gym approximately 4 times a week and also participates in yoga frequently to help alleviate chronic joint pain. He is planning a 3-4 month trip this summer beginning in mid April and is hoping to have his surgery and recovery completed before embarking.    ROS:   Review of Systems   Constitutional: Negative for activity change, chills and fever.   HENT: Negative for congestion, nosebleeds and trouble swallowing.    Eyes: Negative for photophobia, discharge and visual disturbance.   Respiratory: Negative for apnea, chest tightness and shortness of breath.    Cardiovascular: Negative for chest pain, palpitations and leg swelling.   Gastrointestinal: Negative for abdominal distention, abdominal pain, nausea and vomiting.        Noticeable bulge in right groin   Genitourinary: Negative for difficulty urinating, dysuria and hematuria.   Musculoskeletal: Negative for arthralgias, gait problem, neck pain and neck stiffness.   Skin: Negative for  color change, pallor, rash and wound.   Neurological: Negative for seizures, syncope and light-headedness.   Psychiatric/Behavioral: Negative for agitation, behavioral problems and confusion.       PROBLEM LIST:  Patient Active Problem List   Diagnosis    HTN (hypertension)    Hyperlipidemia type II    Gout, arthritis    Atrial flutter    DM (diabetes mellitus)    PFO (patent foramen ovale)    Umbilical hernia    Tendon injury    Post-operative state    Varicose veins of lower extremities with inflammation    Type 2 diabetes mellitus without retinopathy    Nuclear sclerosis of both eyes    Left epiretinal membrane    Obesity (BMI 30.0-34.9)    Junctional escape rhythm    SSS (sick sinus syndrome)    Nonrheumatic aortic valve insufficiency    Benign prostatic hyperplasia    Junctional bradycardia    NICM (nonischemic cardiomyopathy)    Dyslipidemia associated with type 2 diabetes mellitus    Hypertension associated with diabetes    Acute pain of right knee    Difficulty walking on uneven surface    Decreased strength of lower extremity    Screen for colon cancer         HISTORY  Past Medical History:   Diagnosis Date    Allergy     Arthritis     Atrial fibrillation     Atrial flutter 2011    ablation    Cataract     Diabetes mellitus     Dry eye syndrome     Gout, unspecified     High cholesterol     History of shingles     Hypertension     Seizures        Past Surgical History:   Procedure Laterality Date    ABLATION N/A 9/11/2018    [facility]    ABLATION OF DYSRHYTHMIC FOCUS      COLONOSCOPY Suprep N/A 1/2/2019    Performed by [facility] at [facility] ENDO    ECHOCARDIOGRAM,TRANSESOPHAGEAL N/A 9/11/2018    [facility] CATH [facility]    GANGLION CYST EXCISION      left neck    HERNIA REPAIR  2013    umbilical    INSERTION-PACEMAKER-DUAL Left 2/6/2018    Per[facility] at [facility] CATH LAB     REPAIR, HERNIA, UMBILICAL, AGE 5 YEARS OR OLDER N/A 9/19/2013    Performed by [facility] MD at [facility] OR 2ND FLR    REPAIR, TENDON, HAND Right 1/27/2014    [facility]    TONSILLECTOMY      varicose veins      several sx  bilateral    VASECTOMY         Social History     Tobacco Use    Smoking status: Never Smoker    Smokeless tobacco: Never Used   Substance Use Topics    Alcohol use: Yes     Alcohol/week: 4.2 oz     Types: 7 Glasses of wine per week    Drug use: No       Family History   Problem Relation Age of Onset    Diabetes Mother     COPD Father     No Known Problems Sister     Heart attack Brother     Heart disease Brother     No Known Problems Maternal Aunt     No Known Problems Maternal Uncle     No Known Problems Paternal Aunt     No Known Problems Paternal Uncle     No Known Problems Maternal Grandmother     No Known Problems Maternal Grandfather     No Known Problems Paternal Grandmother     No Known Problems Paternal Grandfather     Amblyopia Neg Hx     Blindness Neg Hx     Cancer Neg Hx     Cataracts Neg Hx     Glaucoma Neg Hx     Hypertension Neg Hx     Macular degeneration Neg Hx     Retinal detachment Neg Hx     Strabismus Neg Hx     Stroke Neg Hx     Thyroid disease Neg Hx     Prostate cancer Neg Hx     Kidney disease Neg Hx     Melanoma Neg Hx          MEDS:  Current Outpatient Medications on File Prior to Visit   Medication Sig Dispense Refill    apixaban 5 mg Tab Take 1 tablet (5 mg total) by mouth 2 (two) times daily. 180 tablet 3    ascorbic acid (VITAMIN C) 500 MG tablet Take 1,000 mg by mouth nightly.       atorvastatin (LIPITOR) 40 MG tablet Take 1 tablet (40 mg total) by mouth once daily. 90 tablet 3    benazepril (LOTENSIN) 20 MG tablet Take 1 tablet (20 mg total) by mouth once daily. 90 tablet 3    ciclopirox (PENLAC) 8 % Soln Apply topically nightly. 6.6 mL 11    cinnamon bark (CINNAMON) 500 mg  capsule Take 500 mg by mouth once daily.      clotrimazole-betamethasone 1-0.05% (LOTRISONE) cream Apply topically as needed. 45 g 2    cycloSPORINE (RESTASIS) 0.05 % ophthalmic emulsion Place 0.4 mLs (1 drop total) into both eyes 2 (two) times daily. 60 vial 12    econazole nitrate 1 % cream Apply topically once daily. 85 g 3    finasteride (PROSCAR) 5 mg tablet TAKE 1 TABLET(5 MG) BY MOUTH EVERY DAY 90 tablet 3    GLUCOSAMINE HCL/CHONDR VASQUEZ A NA (GLUCOSAMINE-CHONDROITIN) 750-600 mg Tab Take 2 tablets by mouth Daily.      multivitamin capsule Take 1 capsule by mouth nightly.       oxybutynin (DITROPAN-XL) 5 MG TR24 Take 1 tablet (5 mg total) by mouth once daily. 90 tablet 3    tamsulosin (FLOMAX) 0.4 mg Cap Take 1 capsule (0.4 mg total) by mouth once daily. 90 capsule 3     Current Facility-Administered Medications on File Prior to Visit   Medication Dose Route Frequency Provider Last Rate Last Dose    0.9%  NaCl infusion   Intravenous Continuous Candi Madison NP           ALLERGIES:  Review of patient's allergies indicates:  No Known Allergies      VITALS:  Vitals:    02/15/19 1011   BP: 109/68   Pulse: 62   Temp: 97.9 °F (36.6 °C)         PHYSICAL EXAM:  Physical Exam   Constitutional: He is oriented to person, place, and time. He appears well-developed and well-nourished. No distress.   HENT:   Head: Normocephalic and atraumatic.   Nose: Nose normal.   Eyes: Conjunctivae and EOM are normal. No scleral icterus.   Neck: Normal range of motion. Neck supple. No tracheal deviation present.   Cardiovascular: Normal rate, regular rhythm and intact distal pulses.   Pulmonary/Chest: Effort normal and breath sounds normal. No stridor. No respiratory distress.   Abdominal: Soft. He exhibits no distension and no ascites. There is no tenderness. A hernia (Reducible right inguinal hernia, no scrotal component) is present. Hernia confirmed negative in the ventral area, confirmed negative in the right inguinal  area and confirmed negative in the left inguinal area.   Genitourinary: Testes normal. Cremasteric reflex is present.   Musculoskeletal: Normal range of motion. He exhibits no edema or deformity.   Lymphadenopathy: No inguinal adenopathy noted on the right or left side.        Right: No inguinal adenopathy present.        Left: No inguinal adenopathy present.   Neurological: He is alert and oriented to person, place, and time. He is not disoriented. He exhibits normal muscle tone.   Skin: Skin is warm and dry. He is not diaphoretic. No erythema.   Psychiatric: He has a normal mood and affect. His behavior is normal. Judgment and thought content normal.   Vitals reviewed.        LABS:  Lab Results   Component Value Date    WBC 5.22 09/07/2018    RBC 5.00 09/07/2018    HGB 16.1 09/07/2018    HCT 48.0 09/07/2018     09/07/2018     Lab Results   Component Value Date     02/04/2019     02/04/2019    K 4.3 02/04/2019     02/04/2019    CO2 30 (H) 02/04/2019    BUN 22 02/04/2019    CREATININE 1.2 02/04/2019    CALCIUM 9.7 02/04/2019     Lab Results   Component Value Date    ALT 18 02/04/2019    AST 16 02/04/2019    ALKPHOS 74 02/04/2019    BILITOT 0.6 02/04/2019     Lab Results   Component Value Date    PHOS 3.4 08/02/2018         ASSESSMENT & PLAN:  73 y.o. male with a flutter status post ablation on anticoagulation with pacer in place, hypertension, hyperlipidemia diet controlled diabetes now with a reducible right inguinal hernia  - discuss the surgical options including open laparoscopic hernia repair with mesh.  Specifically we discussed risks of pain, bleeding, scarring, infection, recurrence, injury to the testicle or vas deferens or blood supply causing atrophy or ischemia of the testicle, chronic groin pain, seroma, etc.  Patient expressed understanding of these risk.   - this is a unilateral primary hernia have offered open right inguinal hernia repair with mesh  - will plan for open  right inguinal hernia repair with mesh on 02/27/2019  - patient will need to stop anticoagulation 3 days prior to surgery with plans restarted within 24-48 hours after surgery, this has been confirmed with his arrhythmia cardiologist  - EKG to be obtained

## 2019-02-15 NOTE — H&P
[facility]  OUTPATIENT H&P    REASON FOR VISIT/CC:  Right inguinal hernia    HPI: Shannan Norman is a 73 y.o. male no acute onset of right groin pain and bulging approximately 2 days prior to presentation.  He denies any significant pain, obstructive symptoms, difficulty urinating.  He does not have a chronic cough for strain to have bowel movement or urinate.  He has noticed a bulge both at rest and after standing.  Nothing appears to alleviate the bulging.  It may be worsened with prolonged standing or straining.    I have reviewed the patient's chart including prior progress notes, procedures and testing. The patient has a history of sick sinus syndrome and atrial flutter for which he underwent RFA x2.  Developed post RFA AFib and is currently on anticoagulation with Arixtra.  Also has a pacer in place for bradycardia.  He has a history of diet controlled diabetes, hypertension hyperlipidemia.    The patient is active and utilizes the recumbent bicycle at the gym approximately 4 times a week and also participates in yoga frequently to help alleviate chronic joint pain. He is planning a 3-4 month trip this summer beginning in mid April and is hoping to have his surgery and recovery completed before embarking.    ROS:   Review of Systems   Constitutional: Negative for activity change, chills and fever.   HENT: Negative for congestion, nosebleeds and trouble swallowing.    Eyes: Negative for photophobia, discharge and visual disturbance.   Respiratory: Negative for apnea, chest tightness and shortness of breath.    Cardiovascular: Negative for chest pain, palpitations and leg swelling.   Gastrointestinal: Negative for abdominal distention, abdominal pain, nausea and vomiting.        Noticeable bulge in right groin   Genitourinary: Negative for difficulty urinating, dysuria and hematuria.   Musculoskeletal: Negative for arthralgias, gait problem, neck pain and neck stiffness.   Skin: Negative for  color change, pallor, rash and wound.   Neurological: Negative for seizures, syncope and light-headedness.   Psychiatric/Behavioral: Negative for agitation, behavioral problems and confusion.       PROBLEM LIST:  Patient Active Problem List   Diagnosis    HTN (hypertension)    Hyperlipidemia type II    Gout, arthritis    Atrial flutter    DM (diabetes mellitus)    PFO (patent foramen ovale)    Umbilical hernia    Tendon injury    Post-operative state    Varicose veins of lower extremities with inflammation    Type 2 diabetes mellitus without retinopathy    Nuclear sclerosis of both eyes    Left epiretinal membrane    Obesity (BMI 30.0-34.9)    Junctional escape rhythm    SSS (sick sinus syndrome)    Nonrheumatic aortic valve insufficiency    Benign prostatic hyperplasia    Junctional bradycardia    NICM (nonischemic cardiomyopathy)    Dyslipidemia associated with type 2 diabetes mellitus    Hypertension associated with diabetes    Acute pain of right knee    Difficulty walking on uneven surface    Decreased strength of lower extremity    Screen for colon cancer         HISTORY  Past Medical History:   Diagnosis Date    Allergy     Arthritis     Atrial fibrillation     Atrial flutter 2011    ablation    Cataract     Diabetes mellitus     Dry eye syndrome     Gout, unspecified     High cholesterol     History of shingles     Hypertension     Seizures        Past Surgical History:   Procedure Laterality Date    ABLATION N/A 9/11/2018    [facility] CATH LAB    ABLATION OF DYSRHYTHMIC FOCUS      COLONOSCOPY Suprep N/A 1/2/2019    [facility] at [facility] ENDO    ECHOCARDIOGRAM,TRANSESOPHAGEAL N/A 9/11/2018    [facility]formed by [facility] at [facility] CATH LAB    GANGLION CYST EXCISION      left neck    HERNIA REPAIR  2013    umbilical    INSERTION-PACEMAKER-DUAL Left 2/6/2018    Performed by [facility] at [facility] CATH LAB     REPAIR, HERNIA, UMBILICAL, AGE 5 YEARS OR OLDER N/A 9/19/2013    [facility]formed by [facility] at [facility] OR 2ND FLR    REPAIR, TENDON, HAND Right 1/27/2014    [facility]formed [facility]    TONSILLECTOMY      varicose veins      several sx  bilateral    VASECTOMY         Social History     Tobacco Use    Smoking status: Never Smoker    Smokeless tobacco: Never Used   Substance Use Topics    Alcohol use: Yes     Alcohol/week: 4.2 oz     Types: 7 Glasses of wine per week    Drug use: No       Family History   Problem Relation Age of Onset    Diabetes Mother     COPD Father     No Known Problems Sister     Heart attack Brother     Heart disease Brother     No Known Problems Maternal Aunt     No Known Problems Maternal Uncle     No Known Problems Paternal Aunt     No Known Problems Paternal Uncle     No Known Problems Maternal Grandmother     No Known Problems Maternal Grandfather     No Known Problems Paternal Grandmother     No Known Problems Paternal Grandfather     Amblyopia Neg Hx     Blindness Neg Hx     Cancer Neg Hx     Cataracts Neg Hx     Glaucoma Neg Hx     Hypertension Neg Hx     Macular degeneration Neg Hx     Retinal detachment Neg Hx     Strabismus Neg Hx     Stroke Neg Hx     Thyroid disease Neg Hx     Prostate cancer Neg Hx     Kidney disease Neg Hx     Melanoma Neg Hx          MEDS:  Current Outpatient Medications on File Prior to Visit   Medication Sig Dispense Refill    apixaban 5 mg Tab Take 1 tablet (5 mg total) by mouth 2 (two) times daily. 180 tablet 3    ascorbic acid (VITAMIN C) 500 MG tablet Take 1,000 mg by mouth nightly.       atorvastatin (LIPITOR) 40 MG tablet Take 1 tablet (40 mg total) by mouth once daily. 90 tablet 3    benazepril (LOTENSIN) 20 MG tablet Take 1 tablet (20 mg total) by mouth once daily. 90 tablet 3    ciclopirox (PENLAC) 8 % Soln Apply topically nightly. 6.6 mL 11    cinnamon bark (CINNAMON) 500 mg  capsule Take 500 mg by mouth once daily.      clotrimazole-betamethasone 1-0.05% (LOTRISONE) cream Apply topically as needed. 45 g 2    cycloSPORINE (RESTASIS) 0.05 % ophthalmic emulsion Place 0.4 mLs (1 drop total) into both eyes 2 (two) times daily. 60 vial 12    econazole nitrate 1 % cream Apply topically once daily. 85 g 3    finasteride (PROSCAR) 5 mg tablet TAKE 1 TABLET(5 MG) BY MOUTH EVERY DAY 90 tablet 3    GLUCOSAMINE HCL/CHONDR VASQUEZ A NA (GLUCOSAMINE-CHONDROITIN) 750-600 mg Tab Take 2 tablets by mouth Daily.      multivitamin capsule Take 1 capsule by mouth nightly.       oxybutynin (DITROPAN-XL) 5 MG TR24 Take 1 tablet (5 mg total) by mouth once daily. 90 tablet 3    tamsulosin (FLOMAX) 0.4 mg Cap Take 1 capsule (0.4 mg total) by mouth once daily. 90 capsule 3     Current Facility-Administered Medications on File Prior to Visit   Medication Dose Route Frequency Provider Last Rate Last Dose    0.9%  NaCl infusion   Intravenous Continuous Candi Madison NP           ALLERGIES:  Review of patient's allergies indicates:  No Known Allergies      VITALS:  Vitals:    02/15/19 1011   BP: 109/68   Pulse: 62   Temp: 97.9 °F (36.6 °C)         PHYSICAL EXAM:  Physical Exam   Constitutional: He is oriented to person, place, and time. He appears well-developed and well-nourished. No distress.   HENT:   Head: Normocephalic and atraumatic.   Nose: Nose normal.   Eyes: Conjunctivae and EOM are normal. No scleral icterus.   Neck: Normal range of motion. Neck supple. No tracheal deviation present.   Cardiovascular: Normal rate, regular rhythm and intact distal pulses.   Pulmonary/Chest: Effort normal and breath sounds normal. No stridor. No respiratory distress.   Abdominal: Soft. He exhibits no distension and no ascites. There is no tenderness. A hernia (Reducible right inguinal hernia, no scrotal component) is present. Hernia confirmed negative in the ventral area, confirmed negative in the right inguinal  area and confirmed negative in the left inguinal area.   Genitourinary: Testes normal. Cremasteric reflex is present.   Musculoskeletal: Normal range of motion. He exhibits no edema or deformity.   Lymphadenopathy: No inguinal adenopathy noted on the right or left side.        Right: No inguinal adenopathy present.        Left: No inguinal adenopathy present.   Neurological: He is alert and oriented to person, place, and time. He is not disoriented. He exhibits normal muscle tone.   Skin: Skin is warm and dry. He is not diaphoretic. No erythema.   Psychiatric: He has a normal mood and affect. His behavior is normal. Judgment and thought content normal.   Vitals reviewed.        LABS:  Lab Results   Component Value Date    WBC 5.22 09/07/2018    RBC 5.00 09/07/2018    HGB 16.1 09/07/2018    HCT 48.0 09/07/2018     09/07/2018     Lab Results   Component Value Date     02/04/2019     02/04/2019    K 4.3 02/04/2019     02/04/2019    CO2 30 (H) 02/04/2019    BUN 22 02/04/2019    CREATININE 1.2 02/04/2019    CALCIUM 9.7 02/04/2019     Lab Results   Component Value Date    ALT 18 02/04/2019    AST 16 02/04/2019    ALKPHOS 74 02/04/2019    BILITOT 0.6 02/04/2019     Lab Results   Component Value Date    PHOS 3.4 08/02/2018         ASSESSMENT & PLAN:  73 y.o. male with a flutter status post ablation on anticoagulation with pacer in place, hypertension, hyperlipidemia diet controlled diabetes now with a reducible right inguinal hernia  - discuss the surgical options including open laparoscopic hernia repair with mesh.  Specifically we discussed risks of pain, bleeding, scarring, infection, recurrence, injury to the testicle or vas deferens or blood supply causing atrophy or ischemia of the testicle, chronic groin pain, seroma, etc.  Patient expressed understanding of these risk.   - this is a unilateral primary hernia have offered open right inguinal hernia repair with mesh  - will plan for open  right inguinal hernia repair with mesh on 02/27/2019  - patient will need to stop anticoagulation 3 days prior to surgery with plans restarted within 24-48 hours after surgery, this has been confirmed with his arrhythmia cardiologist  - EKG to be obtained

## 2019-02-15 NOTE — LETTER
February 15, 2019      Onofre Paulson MD  [address]  [address]  [place] [address]           [facility]  [address]  [address]  [facility] [address]  Phone: [phone]          Patient: Shannan Norman   MR Number: [id number]   YOB: 1945   Date of Visit: 2/15/2019       Dear Dr. Onofre Paulson:    Thank you for referring Shannan Norman to me for evaluation. Attached you will find relevant portions of my assessment and plan of care.    If you have questions, please do not hesitate to call me. I look forward to following Shannan Norman along with you.    Sincerely,    Vasile Azevedo Jr., MD    Enclosure  CC:  No Recipients    If you would like to receive this communication electronically, please contact [email or web address] or [phone] to request more information on [employer] Link access.    For providers and/or their staff who would like to refer a patient to [facility], please contact us through our one-stop-shop provider referral line, [facility], at [phone].    If you feel you have received this communication in error or would no longer like to receive these types of communications, please e-mail [email or web address]

## 2019-02-18 ENCOUNTER — PATIENT MESSAGE (OUTPATIENT)
Dept: SURGERY | Facility: HOSPITAL | Age: 74
End: 2019-02-18

## 2019-02-18 PROCEDURE — 93005 ELECTROCARDIOGRAM TRACING: CPT

## 2019-02-25 ENCOUNTER — HOSPITAL ENCOUNTER (OUTPATIENT)
Dept: PREADMISSION TESTING | Facility: HOSPITAL | Age: 74
Discharge: HOME | End: 2019-02-25
Attending: SURGERY
Payer: MEDICARE

## 2019-02-25 ENCOUNTER — ANESTHESIA EVENT (OUTPATIENT)
Dept: SURGERY | Facility: HOSPITAL | Age: 74
End: 2019-02-25
Payer: MEDICARE

## 2019-02-25 ENCOUNTER — OFFICE VISIT (OUTPATIENT)
Dept: SPORTS MEDICINE | Facility: CLINIC | Age: 74
End: 2019-02-25
Payer: MEDICARE

## 2019-02-25 VITALS — WEIGHT: 225 LBS | TEMPERATURE: 98 F | HEIGHT: 72 IN | BODY MASS INDEX: 30.48 KG/M2

## 2019-02-25 VITALS
TEMPERATURE: 97 F | BODY MASS INDEX: 30.58 KG/M2 | RESPIRATION RATE: 18 BRPM | DIASTOLIC BLOOD PRESSURE: 70 MMHG | OXYGEN SATURATION: 96 % | HEIGHT: 72 IN | WEIGHT: 225.75 LBS | HEART RATE: 60 BPM | SYSTOLIC BLOOD PRESSURE: 138 MMHG

## 2019-02-25 DIAGNOSIS — R53.81 PHYSICAL DECONDITIONING: ICD-10-CM

## 2019-02-25 DIAGNOSIS — M17.0 PRIMARY OSTEOARTHRITIS OF KNEES, BILATERAL: Primary | ICD-10-CM

## 2019-02-25 PROCEDURE — 99213 OFFICE O/P EST LOW 20 MIN: CPT | Mod: PBBFAC,PO | Performed by: FAMILY MEDICINE

## 2019-02-25 PROCEDURE — 99999 PR PBB SHADOW E&M-EST. PATIENT-LVL III: CPT | Mod: PBBFAC,,, | Performed by: FAMILY MEDICINE

## 2019-02-25 PROCEDURE — 99214 PR OFFICE/OUTPT VISIT, EST, LEVL IV, 30-39 MIN: ICD-10-PCS | Mod: S$PBB,,, | Performed by: FAMILY MEDICINE

## 2019-02-25 PROCEDURE — 99999 PR PBB SHADOW E&M-EST. PATIENT-LVL III: ICD-10-PCS | Mod: PBBFAC,,, | Performed by: FAMILY MEDICINE

## 2019-02-25 PROCEDURE — 99214 OFFICE O/P EST MOD 30 MIN: CPT | Mod: S$PBB,,, | Performed by: FAMILY MEDICINE

## 2019-02-25 RX ORDER — SODIUM CHLORIDE, SODIUM LACTATE, POTASSIUM CHLORIDE, CALCIUM CHLORIDE 600; 310; 30; 20 MG/100ML; MG/100ML; MG/100ML; MG/100ML
INJECTION, SOLUTION INTRAVENOUS CONTINUOUS
Status: CANCELLED | OUTPATIENT
Start: 2019-02-25

## 2019-02-25 RX ORDER — LIDOCAINE HYDROCHLORIDE 10 MG/ML
1 INJECTION, SOLUTION EPIDURAL; INFILTRATION; INTRACAUDAL; PERINEURAL ONCE
Status: CANCELLED | OUTPATIENT
Start: 2019-02-25 | End: 2019-02-25

## 2019-02-25 NOTE — DISCHARGE INSTRUCTIONS
Hernia (Adult)    A hernia can happen when there is a weakness or defect in the wall of the abdomen or groin. Intestines or nearby tissues may move from their usual location and push through the weakness in the wall. This can cause a hernia (bulge) you may see or feel.  Causes and Risk Factors   A hernia may be present at birth. Or it may be caused by the wear and tear of daily living. Certain factors can make a hernia more likely. These can include:  · Heavy lifting  · Straining, whether from lifting, movement, or constipation  · Chronic cough  · Injury to the abdominal wall  · Excess weight  · Pregnancy  · Prior surgery  · Older age  · Family history of hernia  Symptoms  Symptoms of a hernia may come on suddenly. Or they may appear slowly over time. Some common symptoms include:  · Bulge in the groin area, around the navel, or in the scrotum (the bulge may get bigger when you stand and go away when you lie down)  · Pain or pressure around the bulge  · Pain during activities such as lifting, coughing, or sneezing  · A feeling of weakness or pressure in the groin  · Pain or swelling in the scrotum  Types of hernias  There are different types of hernia. The type you have depends on its location:  · Inguinal: This type is in the groin or scrotum. It is more common in men.  · Femoral: This type is in the groin, upper thigh (where the leg bends), or labia. It is more common in women.  · Ventral: This type is in the abdominal wall.  · Umbilical: This type occurs around the navel (belly button).  · Incisional: This type occurs at the site of a previous surgery.  The condition of the hernia can help determine how urgently it needs to be treated.  · Reducible: It goes back in by itself, or it can be pushed back in.  · Irreducible: It cant be pushed back in.  · Incarcerated/Strangulated: The intestine is trapped (incarcerated). If this happens, you wont be able to push the bulge back in. If the incarcerated hernia isnt  treated, it may become strangulated. This means the area loses blood supply and the tissue may die. This requires emergency surgery! Treatment is needed right away!  In most cases, a hernia will not heal on its own. Surgery is usually needed to repair the defect in the abdominal wall or groin. Youll be told more about surgery, if needed.  If your symptoms are not severe, treatment may sometimes be delayed. In such cases, regular follow-up visits with the provider will be needed. Youll be asked to keep track of your symptoms and to watch for signs of more serious problems. You may also be given guidelines similar to the home care instructions below.  Home Care  To help keep a hernia from getting worse, you may be advised to:  · Avoid heavy lifting and straining as directed.  · Take steps to prevent constipation, such as eating more fiber and drinking more water. This may help reduce straining that can occur when having a bowel movement. Reducing straining may help keep your symptoms from getting worse.  · Maintain a healthy weight or lose excess weight. This can help reduce strain on abdominal muscles and tissues.  · Stop smoking. This can help prevent coughing that may also strain abdominal muscles and tissues.  Follow-up care  Follow up with your healthcare provider, or as directed. If imaging tests were done, they will be reviewed a doctor. You will be told the results and any new findings that may affect your care.  When to seek medical advice  Call your healthcare provider right away if any of these occur:  · Hernia hardens, swells, or grows larger  · Hernia can no longer be pushed back in  · Pain moves to the lower right abdomen (just below the waistline), or spreads to the back  Call 911  Call 911 right away if any of these occur:  · Nausea and vomiting  · Severe pain, redness, or tenderness in the area near the hernia  · Pain worsens quickly and doesnt get better  · Inability to have a bowel movement or  pass gas  · Fever of 100.4°F (38°C) or higher  · Trouble breathing  · Fainting  · Rapid heart rate  · Vomiting blood  · Large amounts of blood in stool  Date Last Reviewed: 6/9/2015  © 4325-9770 [employer]. [address] [place] [address], [place] [address]. All rights reserved. This information is not intended as a substitute for professional medical care. Always follow your healthcare professional's instructions.  ·   Arrive on 2/27/2019 at  08:00 a.m.  .  · Report to the 2nd floor Procedural Check In Room .   · Nothing to eat or drink after midnight the night before your procedure.  · Take the OXYBUTYININ medications the morning of surgery with a small sip of water.                                                         · Please remove all body piercings and leave all your jewelery and valuables at home .  · Please remove your contact lenses.   · Wear loose comfortable clothing.  · You will not be able to drive that day, please make arrangement for transportation to and from your procedure.  · You cannot be alone for 24 hours after anesthesia. Make arrangements as needed.  · Shower the night before your procedure and the morning of your procedure with Hibiclens antibacterial solution.  · No lotions, creams or powders  · Do not shave 3 days prior to procedure  · Report any signs or symptoms of an infection to your surgeon. Examples: urinary (bladder) infection, upper respiratory infection, skin boils.   · Practice good hand washing before, during, and after procedure.   · Stop Aspirin, Ibuprofen, Advil, Motrin, Aleve, Nuprin, Naprosyn (all NSAID Medication) or any other blood thinners 5 days before your procedure unless directed by your physician.  Also hold all over the counter vitamins and herbal supplements for 5 days prior to your procedure.  · You may take Tylenol or Acetaminophen up the day of surgery for any pain.        I have read or had read and explained to me, and understand the above  information.    Additional comments or instructions:  For additional questions call [phone]        Pre-Op Bathing Instructions    Before surgery, you can play an important role in your own health.    Because skin is not sterile, we need to be sure that your skin is as free of germs as possible. By following the instructions below, you can reduce the number of germs on your skin before surgery.    IMPORTANT: You will need to shower with a special soap called Hibiclens*, available at any pharmacy, over the counter usually in the first aid isle.  If you are allergic to Chlorhexidine (the antiseptic in Hibiclens), use an antibacterial soap such as Dial Soap for your preoperative showers.  You will shower with Hibiclens the night before and the morning of surgery. Both the night before your surgery and the morning of your surgery see steps 2 and 3.   Do not use Hibiclens on the head, face or genitals to avoid injury to those areas.    STEP #1  1.  Shower with Hibiclens solution night before and the morning of surgery.      STEP #2: THE NIGHT BEFORE YOUR SURGERY     1. Do not shave the area of your body where your surgery will be performed.  2. Wash your hair as usual with your normal  Shampoo. Wash body shoulder to toes with your normal soap.  3. Squeeze Hibiclens into hand apply to surgical site.   4. Wash the site gently for five (5) minutes. Do not scrub your skin too hard.   5. Do not wash with your regular soap after Hibiclens is used.  6. Rinse your body thoroughly.  7. Pat yourself dry with a clean, soft towel.  8. Do not use lotion, cream, or powder.  9. Wear clean clothes.    STEP #3: THE MORNING OF YOUR SURGERY     1. Repeat Step #2.    * Not to be used by people allergic to Chlorhexidine.          ANESTHESIA  -For the first 24 hours after surgery:  Do not drive, use heavy equipment, make important decisions, or drink alcohol  -It is normal to feel sleepy for several hours.  Rest until you are more  awake.  -Have someone stay with you, if needed.  They can watch for problems and help keep you safe.  -Some possible post anesthesia side effects include: nausea and vomiting, sore throat and hoarseness, sleepiness, and dizziness.  Pre-Op Bathing Instructions    Before surgery, you can play an important role in your own health.    Because skin is not sterile, we need to be sure that your skin is as free of germs as possible. By following the instructions below, you can reduce the number of germs on your skin before surgery.    IMPORTANT: You will need to shower with a special soap called Hibiclens*, available at any pharmacy, over the counter usually in the first aid isle.  If you are allergic to Chlorhexidine (the antiseptic in Hibiclens), use an antibacterial soap such as Dial Soap for your preoperative showers.  You will shower with Hibiclens the night before and the morning of surgery. Both the night before your surgery and the morning of your surgery see steps 2 and 3.   Do not use Hibiclens on the head, face or genitals to avoid injury to those areas.    STEP #1  1.  Shower with Hibiclens solution night before and the morning of surgery.      STEP #2: THE NIGHT BEFORE YOUR SURGERY     10. Do not shave the area of your body where your surgery will be performed.  11. Wash your hair as usual with your normal  Shampoo. Wash body shoulder to toes with your normal soap.  12. Squeeze Hibiclens into hand apply to surgical site.   13. Wash the site gently for five (5) minutes. Do not scrub your skin too hard.   14. Do not wash with your regular soap after Hibiclens is used.  15. Rinse your body thoroughly.  16. Pat yourself dry with a clean, soft towel.  17. Do not use lotion, cream, or powder.  18. Wear clean clothes.    STEP #3: THE MORNING OF YOUR SURGERY     2. Repeat Step #2.    * Not to be used by people allergic to Chlorhexidine.          Anesthesia: General Anesthesia     You are watched continuously during  your procedure by your anesthesia provider.     Youre due to have surgery. During surgery, youll be given medicine called anesthesia or anesthetic. This will keep you comfortable and pain-free. Your anesthesia provider will use general anesthesia.  What is general anesthesia?  General anesthesia puts you into a state like deep sleep. It goes into the bloodstream (IV anesthetics), into the lungs (gas anesthetics), or both. You feel nothing during the procedure. You will not remember it. During the procedure, the anesthesia provider monitors you continuously. He or she checks your heart rate and rhythm, blood pressure, breathing, and blood oxygen.  · IV anesthetics. IV anesthetics are given through an IV line in your arm. Theyre often given first. This is so you are asleep before a gas anesthetic is started. Some kinds of IV anesthetics relieve pain. Others relax you. Your doctor will decide which kind is best in your case.  · Gas anesthetics. Gas anesthetics are breathed into the lungs. They are often used to keep you asleep. They can be given through a facemask or a tube placed in your larynx or trachea (breathing tube).  ¨ If you have a facemask, your anesthesia provider will most likely place it over your nose and mouth while youre still awake. Youll breathe oxygen through the mask as your IV anesthetic is started. Gas anesthetic may be added through the mask.  ¨ If you have a tube in the larynx or trachea, it will be inserted into your throat after youre asleep.  Anesthesia tools and medicines  You will likely have:  · IV anesthetics. These are put into an IV line into your bloodstream.  · Gas anesthetics. You breathe these anesthetics into your lungs, where they pass into your bloodstream.  · Pulse oximeter. This is a small clip that is attached to the end of your finger. This measures your blood oxygen level.  · Electrocardiography leads (electrodes). These are small sticky pads that are placed on your  chest. They record your heart rate and rhythm.  · Blood pressure cuff. This reads your blood pressure.  Risks and possible complications  General anesthesia has some risks. These include:  · Breathing problems  · Nausea and vomiting  · Sore throat or hoarseness (usually temporary)  · Allergic reaction to the anesthetic  · Irregular heartbeat (rare)  · Cardiac arrest (rare)   Anesthesia safety  · Follow all instructions you are given for how long not to eat or drink before your procedure.  · Be sure your doctor knows what medicines and drugs you take. This includes over-the-counter medicines, herbs, supplements, alcohol or other drugs. You will be asked when those were last taken.  · Have an adult family member or friend drive you home after the procedure.  · For the first 24 hours after your surgery:  ¨ Do not drive or use heavy equipment.  ¨ Do not make important decisions or sign legal documents. If important decisions or signing legal documents is necessary during the first 24 hours after surgery, have a trusted family member or spouse act on your behalf.  ¨ Avoid alcohol.  ¨ Have a responsible adult stay with you. He or she can watch for problems and help keep you safe.  Date Last Reviewed: 12/1/2016  © 2226-4374 [employer]. [address] [place] [address], [place] [address]. All rights reserved. This information is not intended as a substitute for professional medical care. Always follow your healthcare professional's instructions.

## 2019-02-25 NOTE — PROGRESS NOTES
Shannan Norman, a 73 y.o. male, presents today for evaluation of his right and left knee.      History of Present Illness (HPI)  Location: anterior knee, R>L  Onset: Chronic, > 1 month  Palliative:    Relative rest   Oral analgesics - Tylenol prn > 6 weeks   HgPT, after visit c Dr. Charissa JEAN BAPTISTE   CSI, Karri, 18.12.04, mild improvement for 3-4 weeks   fPT, moderate improvement for 4 weeks     Provocative:    ADLS   Prolonged ambulation   Standing after sitting for prolonged duration  Prior: none  Progression: worsening discomfort  Quality:    sharp pain  Radiation: none  Severity: per nursing documentation  Timing: intermittent w/ use  Trauma: none    Review of Systems (ROS)  A 10+ review of systems was performed with pertinent positives and negatives noted above in the history of present illness. Other systems were negative unless otherwise specified.    Physical Examination (PE)  General:  The patient is alert and oriented x 3. Mood is pleasant. Observation of ears, eyes and nose reveal no gross abnormalities. HEENT: NCAT, sclera anicteric.   Lungs: Respirations are equal and unlabored.  Gait is coordinated. Patient can toe walk and heel walk without difficulty.    RIGHT and LEFT KNEE EXAMINATION    Observation/Inspection  Gait:   Nonantalgic   Alignment:  Neutral   Scars:   None   Muscle atrophy: Mild  Effusion:  None   Warmth:  None   Discoloration:   none     Tenderness / Crepitus (T / C):         T / C      T / C  Patella   - / -   Lateral joint line   - / -     Peripatellar medial  -  Medial joint line    + / -  Peripatellar lateral -  Medial plica   - / -  Patellar tendon -   Popliteal fossa   - / -  Quad tendon   -   Gastrocnemius   -  Prepatellar Bursa - / -   Quadricep   -  Tibial tubercle  -  Thigh/hamstring  -  Pes anserine/HS -  Fibula    -  ITB   - / -  Tibia     -  Tib/fib joint  - / -  LCL    -    MFC   + / -   MCL: Proximal  -    LFC   - / -   Distal    -          ROM: (* =  pain)  PASSIVE   ACTIVE    Left :   5 / 0 / 145   5 / 0 / 145     Right :    5 / 0 / 145   5 / 0 / 145    Patellofemoral examination:  See above noted areas of tenderness.   Patella position    Subluxation / dislocation: Centered        Sup. / Inf;   Normal   Crepitus (PF):    Absent   Patellar Mobility:       Medial-lateral:   Normal    Superior-inferior:  Normal    Inferior tilt   Normal    Patellar tendon:  Normal   Lateral tilt:    Normal   J-sign:     None   Patellofemoral grind:   No pain     Meniscal Signs:     Pain on terminal extension:  +  Pain on terminal flexion:  +  Luisitos maneuver:  +*  Squat     +*    Ligament Examination:  ACL / Lachman:  WNL  PCL-Post.  drawer: normal 0 to 2mm  MCL- Valgus:  normal 0 to 2mm  LCL- Varus:    normal 0 to 2mm  Pivot shift:  guarding   Dial Test:   difference c/w other side   At 30° flexion: normal (< 5°)    At 90° flexion: normal (< 5°)   Reverse Pivot Shift:   normal (Equal)     Strength: (* = with pain) Painful Side  Quadriceps   4/5  Hamstrin/5    Extremity Neuro-vascular Examination:   Sensation:  Grossly intact to light touch all dermatomal regions.   Motor Function:  Fully intact motor function at hip, knee, foot and ankle    DTRs;  quadriceps and  achilles 2+.  No clonus and downgoing Babinski.    Vascular status:  DP and PT pulses 2+, brisk capillary refill, symmetric.     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, verona ant > med compartment, bilat  Knee pain right > left     No evidence of neurologic pathology  No evidence of vascular pathology    Imaging studies reviewed:   X-ray knee, bilateral 18.12    Plan:    We discussed the importance of appropriate diet, weight, and regular exercise including quadriceps strengthening     We discussed options including:  #1 watchful waiting  #2 re start physical therapy aimed at:   Core stability   RoM knee   Strengthening quadriceps   Gait  training   #3 injection therapy:   CSI iaknee     Right, effective moderate%, repeat frequency    Left, effective moderate%, repeat frequency   VSI iaknee    Right,     Left,    Orthobiologics   #4 consultation      The patient chooses #3 vsi iaknee bilat    Pain management: handout given  Bracing:   Physical therapy: fPT, @ [facility], prior as above    nfpt  Activity (e.g. sports, work) restrictions: as tolerated   school/vocation: golfer, yoga    Wife w/ dementia    Enjoys long vacations in his RV    cb re: glucosamine / chondroitin    Baseball fan    Follow up for vsi iaknee bilat  How was RV trip [facility], [facility], [place], back to [facility]?  Should symptoms worsen or fail to resolve, consider:  Revisiting the above options

## 2019-02-25 NOTE — ANESTHESIA PREPROCEDURE EVALUATION
02/25/2019  Shannan Norman is a 73 y.o., male with history of sick sinus syndrome and a-flutter scheduled for hernia repair on 2/27/19.    PACEMAKER  [facility] [id number] [facility]URITY MRI   S/N:[id number]  Last interrogated 2/5/19    Past Medical History:   Diagnosis Date    Allergy     Arthritis     Atrial fibrillation     Atrial flutter 2011    ablation    Cataract     Diabetes mellitus     Dry eye syndrome     Gout, unspecified     High cholesterol     History of shingles     Hypertension     Seizures      Past Surgical History:   Procedure Laterality Date    ABLATION N/A 9/11/2018    [facility] at [facility] CATH LAB    ABLATION OF DYSRHYTHMIC FOCUS      COLONOSCOPY Suprep N/A 1/2/2019    [facility] ENDO    ECHOCARDIOGRAM,TRANSESOPHAGEAL N/A 9/11/2018    Performed by [facility] at [facility] CATH LAB    GANGLION CYST EXCISION      left neck    HERNIA REPAIR  2013    umbilical    INSERTION-PACEMAKER-DUAL Left 2/6/2018    Performed by [facility] at [facility] CATH LAB    REPAIR, HERNIA, UMBILICAL, AGE 5 YEARS OR OLDER N/A 9/19/2013    Performed by Grady Broderick MD at [facility] OR 2ND FLR    REPAIR, TENDON, HAND Right 1/27/2014    [facility] OR    TENDON REPAIR Right     wrist    TONSILLECTOMY      varicose veins      several sx  bilateral    VASECTOMY         Anesthesia Evaluation    I have reviewed the Patient Summary Reports.     I have reviewed the Medications.     Review of Systems  Anesthesia Hx:  No problems with previous Anesthesia    Social:  Non-Smoker, Social Alcohol Use    Hematology/Oncology:  Hematology Normal        Cardiovascular:   Pacemaker  Denies Angina. hyperlipidemia     ECG has been reviewed. Takes Eliquis, per cardiology will stop 3 days prior to surgery  Functional Capacity good / => 4 METS, bikes 6 miles 4-5 times a week  Disorder of Cardiac Rhythm, Atrial Flutter, Hx of Atrial Flutter, S/P surgical ablation    Pulmonary:  Pulmonary Normal  Denies Shortness of breath.    Renal/:  Renal/ Normal     Hepatic/GI:  Hepatic/GI Normal Denies Liver Disease.    Neurological:  Neurology Normal    Endocrine:  Diabetes, Type 2 Diabetes , controlled by diet. , most recent HgA1c value was 6.2 on 2/2019.        Physical Exam  General:  Well nourished    Airway/Jaw/Neck:  Airway Findings: Mouth Opening: Normal Tongue: Normal  General Airway Assessment: Adult  Mallampati: II  Improves to II with phonation.  TM Distance: Normal, at least 6 cm         Dental:  Dental Findings: In tact   Chest/Lungs:  Chest/Lungs Findings: Clear to auscultation, Normal Respiratory Rate     Heart/Vascular:  Heart Findings: Rate: Normal  Rhythm: Regular Rhythm  Sounds: Normal  Heart murmur: negative       Mental Status:  Mental Status Findings:  Cooperative, Alert and Oriented       EKG 2/18/19:  Electronic atrial pacemaker  When compared with ECG of 22-OCT-2018 14:22, Criteria for Septal infarct are no longer Present    Echo 10/19/16:  1 - Normal left ventricular systolic function (EF 60-65%).     2 - Left ventricular diastolic dysfunction.     3 - Biatrial enlargement.     4 - Normal right ventricular systolic function     5 - The estimated PA systolic pressure is 25 mmHg.     6 - Trivial aortic regurgitation.     7 - Mild tricuspid regurgitation.       Anesthesia Plan  Type of Anesthesia, risks & benefits discussed:  Anesthesia Type:  general  Patient's Preference:   Intra-op Monitoring Plan:   Intra-op Monitoring Plan Comments:   Post Op Pain Control Plan:   Post Op Pain Control Plan Comments:   Induction:   IV  Beta Blocker:  Patient is not currently on a Beta-Blocker (No further documentation required).       Informed Consent: Patient understands risks and agrees with Anesthesia plan.   Questions answered.   ASA Score: 3     Day of Surgery Review of History & Physical:        Anesthesia Plan Notes: Anesthesia consent to be signed prior to procedure on 2/27/19  Per cardiology, will stop Eliquis 3 days before surgery, see H&P 2/15/19          Ready For Surgery From Anesthesia Perspective.

## 2019-02-27 ENCOUNTER — ANESTHESIA (OUTPATIENT)
Dept: SURGERY | Facility: HOSPITAL | Age: 74
End: 2019-02-27
Payer: MEDICARE

## 2019-02-27 ENCOUNTER — HOSPITAL ENCOUNTER (OUTPATIENT)
Facility: HOSPITAL | Age: 74
Discharge: HOME | End: 2019-02-27
Attending: SURGERY | Admitting: SURGERY
Payer: MEDICARE

## 2019-02-27 VITALS
SYSTOLIC BLOOD PRESSURE: 135 MMHG | BODY MASS INDEX: 30.48 KG/M2 | TEMPERATURE: 98 F | OXYGEN SATURATION: 98 % | HEART RATE: 62 BPM | RESPIRATION RATE: 18 BRPM | WEIGHT: 225 LBS | DIASTOLIC BLOOD PRESSURE: 76 MMHG | HEIGHT: 72 IN

## 2019-02-27 DIAGNOSIS — K40.90 RIGHT INGUINAL HERNIA: Primary | ICD-10-CM

## 2019-02-27 PROCEDURE — 88304 TISSUE SPECIMEN TO PATHOLOGY - SURGERY: ICD-10-PCS | Mod: 26,,, | Performed by: PATHOLOGY

## 2019-02-27 PROCEDURE — 63600175 PHARM REV CODE 636 W HCPCS: Performed by: STUDENT IN AN ORGANIZED HEALTH CARE EDUCATION/TRAINING PROGRAM

## 2019-02-27 PROCEDURE — 25000003 PHARM REV CODE 250: Performed by: SURGERY

## 2019-02-27 PROCEDURE — C1781 MESH (IMPLANTABLE): HCPCS | Performed by: SURGERY

## 2019-02-27 PROCEDURE — 71000015 HC POSTOP RECOV 1ST HR: Performed by: SURGERY

## 2019-02-27 PROCEDURE — 71000016 HC POSTOP RECOV ADDL HR: Performed by: SURGERY

## 2019-02-27 PROCEDURE — S0020 INJECTION, BUPIVICAINE HYDRO: HCPCS | Performed by: SURGERY

## 2019-02-27 PROCEDURE — 49505 PRP I/HERN INIT REDUC >5 YR: CPT | Mod: RT,GC,, | Performed by: SURGERY

## 2019-02-27 PROCEDURE — 25000003 PHARM REV CODE 250: Performed by: NURSE PRACTITIONER

## 2019-02-27 PROCEDURE — 71000033 HC RECOVERY, INTIAL HOUR: Performed by: SURGERY

## 2019-02-27 PROCEDURE — 37000009 HC ANESTHESIA EA ADD 15 MINS: Performed by: SURGERY

## 2019-02-27 PROCEDURE — 49505 PR REPAIR ING HERNIA,5+Y/O,REDUCIBL: ICD-10-PCS | Mod: RT,GC,, | Performed by: SURGERY

## 2019-02-27 PROCEDURE — 88304 TISSUE EXAM BY PATHOLOGIST: CPT | Performed by: PATHOLOGY

## 2019-02-27 PROCEDURE — C1729 CATH, DRAINAGE: HCPCS | Performed by: SURGERY

## 2019-02-27 PROCEDURE — 36000706: Performed by: SURGERY

## 2019-02-27 PROCEDURE — 37000008 HC ANESTHESIA 1ST 15 MINUTES: Performed by: SURGERY

## 2019-02-27 PROCEDURE — 36000707: Performed by: SURGERY

## 2019-02-27 PROCEDURE — 25000003 PHARM REV CODE 250: Performed by: STUDENT IN AN ORGANIZED HEALTH CARE EDUCATION/TRAINING PROGRAM

## 2019-02-27 PROCEDURE — 88304 TISSUE EXAM BY PATHOLOGIST: CPT | Mod: 26,,, | Performed by: PATHOLOGY

## 2019-02-27 DEVICE — MESH PROGRIP RIGHT: Type: IMPLANTABLE DEVICE | Site: GROIN | Status: FUNCTIONAL

## 2019-02-27 RX ORDER — HYDROMORPHONE HYDROCHLORIDE 2 MG/ML
0.2 INJECTION, SOLUTION INTRAMUSCULAR; INTRAVENOUS; SUBCUTANEOUS EVERY 5 MIN PRN
Status: DISCONTINUED | OUTPATIENT
Start: 2019-02-27 | End: 2019-02-27 | Stop reason: HOSPADM

## 2019-02-27 RX ORDER — PHENYLEPHRINE HYDROCHLORIDE 10 MG/ML
INJECTION INTRAVENOUS
Status: DISCONTINUED | OUTPATIENT
Start: 2019-02-27 | End: 2019-02-27

## 2019-02-27 RX ORDER — TAMSULOSIN HYDROCHLORIDE 0.4 MG/1
0.4 CAPSULE ORAL DAILY
Status: COMPLETED | OUTPATIENT
Start: 2019-02-27 | End: 2019-02-27

## 2019-02-27 RX ORDER — GLYCOPYRROLATE 0.2 MG/ML
INJECTION INTRAMUSCULAR; INTRAVENOUS
Status: DISCONTINUED | OUTPATIENT
Start: 2019-02-27 | End: 2019-02-27

## 2019-02-27 RX ORDER — FENTANYL CITRATE 50 UG/ML
INJECTION, SOLUTION INTRAMUSCULAR; INTRAVENOUS
Status: DISCONTINUED | OUTPATIENT
Start: 2019-02-27 | End: 2019-02-27

## 2019-02-27 RX ORDER — HYDROMORPHONE HYDROCHLORIDE 2 MG/ML
0.5 INJECTION, SOLUTION INTRAMUSCULAR; INTRAVENOUS; SUBCUTANEOUS EVERY 5 MIN PRN
Status: DISCONTINUED | OUTPATIENT
Start: 2019-02-27 | End: 2019-02-27 | Stop reason: HOSPADM

## 2019-02-27 RX ORDER — ACETAMINOPHEN 10 MG/ML
INJECTION, SOLUTION INTRAVENOUS
Status: DISCONTINUED | OUTPATIENT
Start: 2019-02-27 | End: 2019-02-27

## 2019-02-27 RX ORDER — OXYCODONE HYDROCHLORIDE 5 MG/1
5 TABLET ORAL
Status: DISCONTINUED | OUTPATIENT
Start: 2019-02-27 | End: 2019-02-27 | Stop reason: HOSPADM

## 2019-02-27 RX ORDER — SUCCINYLCHOLINE CHLORIDE 20 MG/ML
INJECTION INTRAMUSCULAR; INTRAVENOUS
Status: DISCONTINUED | OUTPATIENT
Start: 2019-02-27 | End: 2019-02-27

## 2019-02-27 RX ORDER — SODIUM CHLORIDE 0.9 % (FLUSH) 0.9 %
3 SYRINGE (ML) INJECTION EVERY 8 HOURS
Status: DISCONTINUED | OUTPATIENT
Start: 2019-02-27 | End: 2019-02-27 | Stop reason: HOSPADM

## 2019-02-27 RX ORDER — SODIUM CHLORIDE, SODIUM LACTATE, POTASSIUM CHLORIDE, CALCIUM CHLORIDE 600; 310; 30; 20 MG/100ML; MG/100ML; MG/100ML; MG/100ML
INJECTION, SOLUTION INTRAVENOUS CONTINUOUS
Status: DISCONTINUED | OUTPATIENT
Start: 2019-02-27 | End: 2019-02-27 | Stop reason: HOSPADM

## 2019-02-27 RX ORDER — LIDOCAINE HCL/PF 100 MG/5ML
SYRINGE (ML) INTRAVENOUS
Status: DISCONTINUED | OUTPATIENT
Start: 2019-02-27 | End: 2019-02-27

## 2019-02-27 RX ORDER — CEFAZOLIN SODIUM 2 G/50ML
2 SOLUTION INTRAVENOUS
Status: DISCONTINUED | OUTPATIENT
Start: 2019-02-27 | End: 2019-02-27 | Stop reason: HOSPADM

## 2019-02-27 RX ORDER — DOCUSATE SODIUM 100 MG/1
100 CAPSULE, LIQUID FILLED ORAL 2 TIMES DAILY
Qty: 20 CAPSULE | Refills: 0 | Status: SHIPPED | OUTPATIENT
Start: 2019-02-27 | End: 2019-03-20

## 2019-02-27 RX ORDER — CEFAZOLIN SODIUM 1 G/3ML
INJECTION, POWDER, FOR SOLUTION INTRAMUSCULAR; INTRAVENOUS
Status: DISCONTINUED | OUTPATIENT
Start: 2019-02-27 | End: 2019-02-27

## 2019-02-27 RX ORDER — PROPOFOL 10 MG/ML
VIAL (ML) INTRAVENOUS
Status: DISCONTINUED | OUTPATIENT
Start: 2019-02-27 | End: 2019-02-27

## 2019-02-27 RX ORDER — BUPIVACAINE HYDROCHLORIDE 5 MG/ML
INJECTION, SOLUTION EPIDURAL; INTRACAUDAL
Status: DISCONTINUED | OUTPATIENT
Start: 2019-02-27 | End: 2019-02-27 | Stop reason: HOSPADM

## 2019-02-27 RX ORDER — LIDOCAINE HYDROCHLORIDE 10 MG/ML
1 INJECTION, SOLUTION EPIDURAL; INFILTRATION; INTRACAUDAL; PERINEURAL ONCE
Status: DISCONTINUED | OUTPATIENT
Start: 2019-02-27 | End: 2019-02-27 | Stop reason: HOSPADM

## 2019-02-27 RX ORDER — KETOROLAC TROMETHAMINE 30 MG/ML
INJECTION, SOLUTION INTRAMUSCULAR; INTRAVENOUS
Status: DISCONTINUED | OUTPATIENT
Start: 2019-02-27 | End: 2019-02-27

## 2019-02-27 RX ORDER — ONDANSETRON 2 MG/ML
INJECTION INTRAMUSCULAR; INTRAVENOUS
Status: DISCONTINUED | OUTPATIENT
Start: 2019-02-27 | End: 2019-02-27

## 2019-02-27 RX ORDER — ROCURONIUM BROMIDE 10 MG/ML
INJECTION, SOLUTION INTRAVENOUS
Status: DISCONTINUED | OUTPATIENT
Start: 2019-02-27 | End: 2019-02-27

## 2019-02-27 RX ORDER — OXYCODONE AND ACETAMINOPHEN 5; 325 MG/1; MG/1
1 TABLET ORAL EVERY 6 HOURS PRN
Qty: 28 TABLET | Refills: 0 | Status: SHIPPED | OUTPATIENT
Start: 2019-02-27 | End: 2019-03-06

## 2019-02-27 RX ORDER — NEOSTIGMINE METHYLSULFATE 1 MG/ML
INJECTION, SOLUTION INTRAVENOUS
Status: DISCONTINUED | OUTPATIENT
Start: 2019-02-27 | End: 2019-02-27

## 2019-02-27 RX ORDER — SODIUM CHLORIDE 0.9 % (FLUSH) 0.9 %
3 SYRINGE (ML) INJECTION
Status: DISCONTINUED | OUTPATIENT
Start: 2019-02-27 | End: 2019-02-27 | Stop reason: HOSPADM

## 2019-02-27 RX ADMIN — SODIUM CHLORIDE, SODIUM LACTATE, POTASSIUM CHLORIDE, AND CALCIUM CHLORIDE: .6; .31; .03; .02 INJECTION, SOLUTION INTRAVENOUS at 09:02

## 2019-02-27 RX ADMIN — ROCURONIUM BROMIDE 15 MG: 10 INJECTION, SOLUTION INTRAVENOUS at 10:02

## 2019-02-27 RX ADMIN — GLYCOPYRROLATE 0.8 MG: 0.2 INJECTION, SOLUTION INTRAMUSCULAR; INTRAVENOUS at 11:02

## 2019-02-27 RX ADMIN — ONDANSETRON 4 MG: 2 INJECTION, SOLUTION INTRAMUSCULAR; INTRAVENOUS at 11:02

## 2019-02-27 RX ADMIN — CEFAZOLIN 2 G: 330 INJECTION, POWDER, FOR SOLUTION INTRAMUSCULAR; INTRAVENOUS at 10:02

## 2019-02-27 RX ADMIN — PHENYLEPHRINE HYDROCHLORIDE 100 MCG: 10 INJECTION INTRAVENOUS at 11:02

## 2019-02-27 RX ADMIN — SUCCINYLCHOLINE CHLORIDE 120 MG: 20 INJECTION, SOLUTION INTRAMUSCULAR; INTRAVENOUS at 10:02

## 2019-02-27 RX ADMIN — NEOSTIGMINE METHYLSULFATE 4 MG: 1 INJECTION INTRAVENOUS at 11:02

## 2019-02-27 RX ADMIN — PHENYLEPHRINE HYDROCHLORIDE 100 MCG: 10 INJECTION INTRAVENOUS at 12:02

## 2019-02-27 RX ADMIN — PHENYLEPHRINE HYDROCHLORIDE 50 MCG: 10 INJECTION INTRAVENOUS at 11:02

## 2019-02-27 RX ADMIN — SODIUM CHLORIDE, SODIUM LACTATE, POTASSIUM CHLORIDE, AND CALCIUM CHLORIDE: .6; .31; .03; .02 INJECTION, SOLUTION INTRAVENOUS at 10:02

## 2019-02-27 RX ADMIN — HYDROMORPHONE HYDROCHLORIDE 0.5 MG: 2 INJECTION INTRAMUSCULAR; INTRAVENOUS; SUBCUTANEOUS at 12:02

## 2019-02-27 RX ADMIN — PHENYLEPHRINE HYDROCHLORIDE 50 MCG: 10 INJECTION INTRAVENOUS at 10:02

## 2019-02-27 RX ADMIN — FENTANYL CITRATE 100 MCG: 50 INJECTION, SOLUTION INTRAMUSCULAR; INTRAVENOUS at 10:02

## 2019-02-27 RX ADMIN — ROCURONIUM BROMIDE 15 MG: 10 INJECTION, SOLUTION INTRAVENOUS at 11:02

## 2019-02-27 RX ADMIN — PROPOFOL 100 MG: 10 INJECTION, EMULSION INTRAVENOUS at 10:02

## 2019-02-27 RX ADMIN — LIDOCAINE HYDROCHLORIDE 100 MG: 20 INJECTION, SOLUTION INTRAVENOUS at 10:02

## 2019-02-27 RX ADMIN — OXYCODONE HYDROCHLORIDE 5 MG: 5 TABLET ORAL at 12:02

## 2019-02-27 RX ADMIN — ACETAMINOPHEN 1000 MG: 10 INJECTION, SOLUTION INTRAVENOUS at 10:02

## 2019-02-27 RX ADMIN — KETOROLAC TROMETHAMINE 30 MG: 30 INJECTION, SOLUTION INTRAMUSCULAR; INTRAVENOUS at 11:02

## 2019-02-27 RX ADMIN — TAMSULOSIN HYDROCHLORIDE 0.4 MG: 0.4 CAPSULE ORAL at 02:02

## 2019-02-27 NOTE — TRANSFER OF CARE
Anesthesia Transfer of Care Note    Patient: Shannan Norman    Procedure(s) Performed: Procedure(s) (LRB):  RIGHT INGUINAL HERNIA REPAIR WITH MESH (Right)    Patient location: PACU    Anesthesia Type: general    Transport from OR: Transported from OR on 2-3 L/min O2 by NC with adequate spontaneous ventilation    Post pain: adequate analgesia    Post assessment: no apparent anesthetic complications    Post vital signs: stable    Level of consciousness: awake    Nausea/Vomiting: no nausea/vomiting    Complications: none    Transfer of care protocol was followed      Last vitals:   Visit Vitals  /71 (BP Location: Right arm, Patient Position: Lying)   Pulse 60   Resp 18   Ht 6' (1.829 m)   Wt 102.1 kg (225 lb)   SpO2 95%   BMI 30.52 kg/m²

## 2019-02-27 NOTE — PLAN OF CARE
Signed out from pacu per Dr Ortega. Report given to Satya Rn/ops. Transported to ops via stretcher,sr upx2.

## 2019-02-27 NOTE — OP NOTE
DATE OF PROCEDURE: 02/27/2019    PREOPERATIVE DIAGNOSIS:  Right inguinal hernia    POSTOPERATIVE DIAGNOSIS:  Right direct inguinal hernia, right cord lipoma    PROCEDURE:  Open right inguinal hernia repair with mesh    SURGEON: Vasile Azevedo M.D    ASSISTANT: Henna Perry MD PGY II  ANESTHESIA:  General    ESTIMATED BLOOD LOSS:  10 cc    SPECIMEN:  Right cord lipoma    CONDITION:  Stable    COMPLICATIONS: None    FINDINGS:   1.  Large right direct inguinal hernia present  2.  Right cord lipoma present but no indirect component  3.  Tension-free repair with mesh performed    INDICATIONS: The patient is a 73-year-old male presented with symptomatic bulge in tenderness in the right groin.  He is evaluated found have a right inguinal hernia which was reducible.  He was counseled on surgical options and agreed to proceed with an open hernia repair with mesh.    PROCEDURE IN DETAIL:  Patient taken the operating room placed in supine position where general tracheal intubation was achieved.  Time-out performed by all members of the operative team.  The patient received perioperative antibiotics.  His right groin was prepped and draped typical sterile fashion.  We injected local anesthetic over planned incision site in the right groin.  We then made a sharp incision through the skin and used Bovie electrocautery to continued down through the subcutaneous tissue and Jermaine's fascia.  We bridging vessels were coagulated along the way and hemostasis was adequate.  We encountered the external oblique aponeurosis and identified the external ring.  There is a large bulge from this area.  We were able to make a stab incision the superior lateral aspect of the external oblique aponeurosis elevated towards the ring with scissors then incised it. We then cleared away superior and inferior flaps underneath the external oblique.  We identified the pubic tubercle and gained circumferential control around the cord structures. A  Penrose was used for assistance retraction. We then began dissecting the cremasteric saw for the cord structures.  We identified the vas deferens and the vessels. The direct hernia was adherent to the cord and this was gently stripped away.  The cord was then explored and a cord lipoma was identified transected back to the internal opening and then transected it. We further explored the cord and no indirect component was identified.  We then turned our attention to the direct component.  The floor was very lax and a 2 0 Vicryl was used to close the floor loosely to prevent hernia from prolapsing.  Were then able to place a right Pro  open inguinal hernia mesh.  This was tacked to the pubic tubercle with an 0 Vicryl suture.  It was laid flat along the inferior superior shelving edges and wrapped around the cord structures. Additional tacking sutures were used in the inferior and superior shelving edge.  Hemostasis was adequate.  The wound was then irrigated. The external oblique was closed with a running 2 0 Vicryl suture. Jermaine's fascia was then closed with the interrupted 3 0 Vicryl sutures.  The dermis was reapproximated with interrupted 3 Vicryl sutures the skin was closed with running 4 Monocryl subcuticular stitch and Dermabond. All counts correct x2 at the end the case. I was present and scrubbed for the entire case except for closing of the skin. Patient was extubated taken recovery room in stable condition have suffered no complications.    DISPO:  To PACU then home

## 2019-02-27 NOTE — ANESTHESIA POSTPROCEDURE EVALUATION
Anesthesia Post Evaluation    Patient: Shannan Norman    Procedure(s) Performed: Procedure(s) (LRB):  RIGHT INGUINAL HERNIA REPAIR WITH MESH (Right)    Final Anesthesia Type: general  Patient location during evaluation: PACU  Patient participation: Yes- Able to Participate  Level of consciousness: awake and alert  Post-procedure vital signs: reviewed and stable  Pain management: adequate  Airway patency: patent  PONV status at discharge: No PONV  Anesthetic complications: no      Cardiovascular status: blood pressure returned to baseline and hemodynamically stable  Respiratory status: unassisted  Hydration status: euvolemic  Follow-up not needed.        Visit Vitals  /74   Pulse 63   Temp 36.8 °C (98.2 °F) (Skin)   Resp 17   Ht 6' (1.829 m)   Wt 102.1 kg (225 lb)   SpO2 98%   BMI 30.52 kg/m²       Pain/Delmar Score: Pain Rating Prior to Med Admin: 3 (2/27/2019 12:55 PM)  Pain Rating Post Med Admin: 3 (2/27/2019 12:50 PM)  Delmar Score: 10 (2/27/2019 12:50 PM)

## 2019-02-27 NOTE — DISCHARGE INSTRUCTIONS
DRESSING CARE AND ACTIVITY  - A surgical glue has been placed over your incisions. Please leave the glue in place and do not attempt to remove it.   - It is ok to shower using mild soap and water over the incisions the day after your procedure. Pat dry your incisions. Do not soak in a bath tub or other body of water for 2 weeks or until cleared by your surgeon.   - If you noticed redness, swelling, fever, increasing pain or significant drainage from your wound please call the office or the hospital  after hours.  - No Heavy lifting over 10 lbs until cleared by MD      Discharge Instructions for Open Hernia Repair  You had a procedure called open hernia repair. Also called a rupture, a hernia is a tear or weakness in the wall of the belly. This weakness may be present at birth. Or it can be caused by the wear and tear of daily living. Hernias may get worse with time or with physical stress. But surgery can help repair the weakness and eliminate symptoms.  Activity after surgery  Recommendations include the following:  · After surgery, take it easy for the rest of the day. If you had general anesthesia, dont use machinery or power tools, drink alcohol, or make any major decisions for at least the first 24 hours.  · Dont drive while you are still taking opioid pain medicine, and dont drive until you are able to step firmly on the brake pedal without hesitation.  · Ask others to help with chores and errands while you recover.  · Dont lift anything heavier than 10 pounds until your healthcare provider says its OK.  · Dont mow the lawn, use a vacuum , or do other strenuous activities until your healthcare provider says its OK.  · Walk as often as you feel able.  · Continue the coughing and deep breathing exercises that you learned in the hospital.  · Ask your healthcare provider when you can expect to return to work.  · Avoid constipation:  ¨ Eat fruits, vegetables, and whole grains.  ¨ Drink 6 to 8  glasses of water a day, unless otherwise instructed.  ¨ Use a laxative or a mild stool softener as instructed by your healthcare provider.  Bandage and incision care  Tips include the following:  · Do not get the bandage or wound wet for 48 hours.  · If strips of tape were used to close your incision, dont pull them off. Let them fall off on their own.  · Remove any gauze bandage in 48 hours.  · Wash your incision with mild soap and water. Pat it dry. Dont use oils, powders, or lotions on your incision. Do not soak your incision or take tub baths until cleared by your healthcare provider.  Follow-up care  Keep follow-up appointments during your recovery. These allow your healthcare provider to check your progress and make sure youre healing well. You may also need to have your stitches, staples, or bandage removed. During office visits, tell your healthcare provider if you have any new symptoms. And be sure to ask any questions you have.     When to call your healthcare provider  Call your healthcare provider immediately if you have any of the following:  · A large amount of swelling or bruising (some testicular swelling and bruising is common)  · Bleeding  · Increasing pain  · Increased redness or drainage of the incision  · Fever of 100.4°F (38.0°C) or higher, or as directed by your healthcare provider  · Trouble urinating  · Nausea or vomiting   Date Last Reviewed: 7/1/2016  © 1557-3725 [employer]. [address] [place] [address], [place] [address]. All rights reserved. This information is not intended as a substitute for professional medical care. Always follow your healthcare professional's instructions.    ANESTHESIA  -For the first 24 hours after surgery:  Do not drive, use heavy equipment, make important decisions, or drink alcohol  -It is normal to feel sleepy for several hours.  Rest until you are more awake.  -Have someone stay with you, if needed.  They can watch for problems and help keep you  safe.  -Some possible post anesthesia side effects include: nausea and vomiting, sore throat and hoarseness, sleepiness, and dizziness.    PAIN  -If you have pain after surgery, pain medicine will help you feel better.  Take it as directed, before pain becomes severe.  Most pain relievers taken by mouth need at least 20-30 minutes to start working.  -Do not drive or drink alcohol while taking pain medicine.  -Pain medication can upset your stomach.  Taking them with a little food may help.  -Other ways to help control pain: elevation, ice, and relaxation  -Call your surgeon if still having unmanageable pain an hour after taking pain medicine.  -Pain medicine can cause constipation.  Taking an over-the counter stool softener while on prescription pain medicine and drinking plenty of fluids can prevent this side effect.  -Call your surgeon if you have severe side effects like: breathing problems, trouble waking up, dizziness, confusion, or severe constipation.    NAUSEA  -Some people have nausea after surgery.  This is often because of anesthesia, pain, pain medicine, or the stress of surgery.  -Do not push yourself to eat.  Start off with clear liquids and soup.  Slowly move to solid foods.  Don't eat fatty, rich, spicy foods at first.  Eat smaller amounts.  -If you develop persistent nausea and vomiting please notify your surgeon immediately.    BLEEDING  -Different types of surgery require different types of care and dressing changes.  It is important to follow all instructions and advice from your surgeon.  Change dressing as directed.  Call your surgeon for any concerns regarding postop bleeding.    SIGNS OF INFECTION  -Signs of infection include: fever, swelling, drainage, and redness  -Notify your surgeon if you have a fever of 100.4 F (38.0 C) or higher.  -Notify your surgeon if you notice redness, swelling, increased pain, pus, or a foul smell at the incision site.    Docusate capsules  What is this  medicine?  DOCUSATE (doc CUE sayt) is stool softener. It helps prevent constipation and straining or discomfort associated with hard or dry stools.  How should I use this medicine?  Take this medicine by mouth with a glass of water. Follow the directions on the label. Take your doses at regular intervals. Do not take your medicine more often than directed.  Talk to your pediatrician regarding the use of this medicine in children. While this medicine may be prescribed for children as young as 2 years for selected conditions, precautions do apply.  What side effects may I notice from receiving this medicine?  Side effects that you should report to your doctor or health care professional as soon as possible:  · allergic reactions like skin rash, itching or hives, swelling of the face, lips, or tongue  Side effects that usually do not require medical attention (report to your doctor or health care professional if they continue or are bothersome):  · diarrhea  · stomach cramps  · throat irritation  What may interact with this medicine?  · mineral oil  What if I miss a dose?  If you miss a dose, take it as soon as you can. If it is almost time for your next dose, take only that dose. Do not take double or extra doses.  Where should I keep my medicine?  Keep out of the reach of children.  Store at room temperature between 15 and 30 degrees C (59 and 86 degrees F). Throw away any unused medicine after the expiration date.  What should I tell my health care provider before I take this medicine?  They need to know if you have any of these conditions:  · nausea or vomiting  · severe constipation  · stomach pain  · sudden change in bowel habit lasting more than 2 weeks  · an unusual or allergic reaction to docusate, other medicines, foods, dyes, or preservatives  · pregnant or trying to get pregnant  · breast-feeding  What should I watch for while using this medicine?  Do not use for more than one week without advice from your  doctor or health care professional. If your constipation returns, check with your doctor or health care professional.  Drink plenty of water while taking this medicine. Drinking water helps decrease constipation.  Stop using this medicine and contact your doctor or health care professional if you experience any rectal bleeding or do not have a bowel movement after use. These could be signs of a more serious condition.  NOTE:This sheet is a summary. It may not cover all possible information. If you have questions about this medicine, talk to your doctor, pharmacist, or health care provider. Copyright© 2017 [employer]    Acetaminophen; Oxycodone capsules  What is this medicine?  ACETAMINOPHEN; OXYCODONE (a set a PRANAV kaylee fen; ox i KOE done) is a pain reliever. It is used to treat moderate to severe pain.  How should I use this medicine?  Take this medicine by mouth with a full glass of water. Follow the directions on the prescription label. You can take it with or without food. If it upsets your stomach, take it with food. Take your medicine at regular intervals. Do not take it more often than directed.  A special MedGuide will be given to you by the pharmacist with each prescription and refill. Be sure to read this information carefully each time.  Talk to your pediatrician regarding the use of this medicine in children. Special care may be needed.  What side effects may I notice from receiving this medicine?  Side effects that you should report to your doctor or health care professional as soon as possible:  · allergic reactions like skin rash, itching or hives, swelling of the face, lips, or tongue  · breathing problems  · confusion  · redness, blistering, peeling or loosening of the skin, including inside the mouth  · signs and symptoms of liver injury like dark yellow or brown urine; general ill feeling or flu-like symptoms; light-colored stools; loss of appetite; nausea; right upper belly pain; unusually weak  or tired; yellowing of the eyes or skin  · signs and symptoms of low blood pressure like dizziness; feeling faint or lightheaded, falls; unusually weak or tired  · trouble passing urine or change in the amount of urine  Side effects that usually do not require medical attention (report to your doctor or health care professional if they continue or are bothersome):  · constipation  · dry mouth  · nausea, vomiting  · tiredness  What may interact with this medicine?  This medicine may interact with the following medications:  · alcohol  · antihistamines for allergy, cough and cold  · antiviral medicines for HIV or AIDS  · atropine  · certain antibiotics like clarithromycin, erythromycin, linezolid, rifampin  · certain medicines for anxiety or sleep  · certain medicines for bladder problems like oxybutynin, tolterodine  · certain medicines for depression like amitriptyline, fluoxetine, sertraline  · certain medicines for fungal infections like ketoconazole, itraconazole, voriconazole  · certain medicines for migraine headache like almotriptan, eletriptan, frovatriptan, naratriptan, rizatriptan, sumatriptan, zolmitriptan  · certain medicines for nausea or vomiting like dolasetron, ondansetron, palonosetron  · certain medicines for Parkinson's disease like benztropine, trihexyphenidyl  · certain medicines for seizures like phenobarbital, phenytoin, primidone  · certain medicines for stomach problems like dicyclomine, hyoscyamine  · certain medicines for travel sickness like scopolamine  · diuretics  · general anesthetics like halothane, isoflurane, methoxyflurane, propofol  · ipratropium  · local anesthetics like lidocaine, pramoxine, tetracaine  · MAOIs like Carbex, Eldepryl, Marplan, Nardil, and Parnate  · medicines that relax muscles for surgery  · methylene blue  · nilotinib  · other medicines with acetaminophen  · other narcotic medicines for pain or cough  · phenothiazines like chlorpromazine, mesoridazine,  prochlorperazine, thioridazine  What if I miss a dose?  If you miss a dose, take it as soon as you can. If it is almost time for your next dose, take only that dose. Do not take double or extra doses.  Where should I keep my medicine?  Keep out of the reach of children. This medicine can be abused. Keep your medicine in a safe place to protect it from theft. Do not share this medicine with anyone. Selling or giving away this medicine is dangerous and against the law.  This medicine may cause accidental overdose and death if it taken by other adults, children, or pets. Mix any unused medicine with a substance like cat litter or coffee grounds. Then throw the medicine away in a sealed container like a sealed bag or a coffee can with a lid. Do not use the medicine after the expiration date.  Store at room temperature between 15 and 30 degrees C (59 and 86 degrees F). Keep container tightly closed. Protect from light.  What should I tell my health care provider before I take this medicine?  They need to know if you have any of these conditions:  · brain tumor  · Crohn's disease, inflammatory bowel disease, or ulcerative colitis  · drug abuse or addiction  · head injury  · heart or circulation problems  · if you often drink alcohol  · kidney disease or problems going to the bathroom  · liver disease  · lung disease, asthma, or breathing problems  · an unusual or allergic reaction to salicylates, acetaminophen, oxycodone, other opioid analgesics, other medicines, foods, dyes, or preservatives  · pregnant or trying to get pregnant  · breast-feeding  What should I watch for while using this medicine?  Tell your doctor or health care professional if your pain does not go away, if it gets worse, or if you have new or a different type of pain. You may develop tolerance to the medicine. Tolerance means that you will need a higher dose of the medication for pain relief. Tolerance is normal and is expected if you take this  medicine for a long time.  Do not suddenly stop taking your medicine because you may develop a severe reaction. Your body becomes used to the medicine. This does NOT mean you are addicted. Addiction is a behavior related to getting and using a drug for a nonmedical reason. If you have pain, you have a medical reason to take pain medicine. Your doctor will tell you how much medicine to take. If your doctor wants you to stop the medicine, the dose will be slowly lowered over time to avoid any side effects.  There are different types of narcotic medicines (opiates). If you take more than one type at the same time or if you are taking another medicine that also causes drowsiness, you may have more side effects. Give your health care provider a list of all medicines you use. Your doctor will tell you how much medicine to take. Do not take more medicine than directed. Call emergency for help if you have problems breathing or unusual sleepiness.  Do not take other medicines that contain acetaminophen with this medicine. Always read labels carefully. If you have questions, ask your doctor or pharmacist.  If you take too much acetaminophen get medical help right away. Too much acetaminophen can be very dangerous and cause liver damage. Even if you do not have symptoms, it is important to get help right away.  You may get drowsy or dizzy. Do not drive, use machinery, or do anything that needs mental alertness until you know how this medicine affects you. Do not stand or sit up quickly, especially if you are an older patient. This reduces the risk of dizzy or fainting spells. Alcohol may interfere with the effect of this medicine. Avoid alcoholic drinks.  The medicine will cause constipation. Try to have a bowel movement at least every 2 to 3 days. If you do not have a bowel movement for 3 days, call your doctor or health care professional.  Your mouth may get dry. Chewing sugarless gum or sucking hard candy, and drinking  plenty of water may help. Contact your doctor if the problem does not go away or is severe.  NOTE:This sheet is a summary. It may not cover all possible information. If you have questions about this medicine, talk to your doctor, pharmacist, or health care provider. Copyright© 2017 [employer]

## 2019-02-27 NOTE — PLAN OF CARE
Patient oob and unable to urinate, bladder scan 200ml will give patient some coffee and fluids and try again

## 2019-02-27 NOTE — DISCHARGE SUMMARY
[facility]  Discharge Note  Short Stay    Admit Date: 2/27/2019    Discharge Date and Time: No discharge date for patient encounter.     Attending Physician: Vasile Azevedo Jr.*     Discharge Provider: Vasile Azevedo Jr    Diagnoses:  Active Hospital Problems    Diagnosis  POA    *Right inguinal hernia [K40.90]  Yes      Resolved Hospital Problems   No resolved problems to display.       Discharged Condition: good    Hospital Course: Patient was admitted for an outpatient procedure and tolerated the procedure well with no complications.    Final Diagnoses: Same as principal problem.    Disposition: Home or Self Care    Follow up/Patient Instructions:    Medications:  Reconciled Home Medications:      Medication List      START taking these medications    docusate sodium 100 mg capsule  Take 100 mg by mouth 2 (two) times daily.     oxyCODONE-acetaminophen 5-325 mg per tablet  Commonly known as:  PERCOCET  Take 1 tablet by mouth every 6 (six) hours as needed for Pain.        CONTINUE taking these medications    apixaban 5 mg Tab  Commonly known as:  ELIQUIS  Take 1 tablet (5 mg total) by mouth 2 (two) times daily.     atorvastatin 40 MG tablet  Commonly known as:  LIPITOR  Take 1 tablet (40 mg total) by mouth once daily.     benazepril 20 MG tablet  Commonly known as:  LOTENSIN  Take 1 tablet (20 mg total) by mouth once daily.     ciclopirox 8 % Soln  Commonly known as:  PENLAC  Apply topically nightly.     CINNAMON 500 mg capsule  Generic drug:  cinnamon bark  Take 500 mg by mouth once daily.     clotrimazole-betamethasone 1-0.05% cream  Commonly known as:  LOTRISONE  Apply topically as needed.     cycloSPORINE 0.05 % ophthalmic emulsion  Commonly known as:  RESTASIS  Place 0.4 mLs (1 drop total) into both eyes 2 (two) times daily.     econazole nitrate 1 % cream  Apply topically once daily.     finasteride 5 mg tablet  Commonly known as:  PROSCAR  TAKE 1 TABLET(5 MG) BY MOUTH EVERY DAY      glucosamine-chondroitin 750-600 mg Tab  Take 2 tablets by mouth Daily.     multivitamin capsule  Take 1 capsule by mouth nightly.     oxybutynin 5 MG Tr24  Commonly known as:  DITROPAN-XL  Take 1 tablet (5 mg total) by mouth once daily.     tamsulosin 0.4 mg Cap  Commonly known as:  FLOMAX  Take 1 capsule (0.4 mg total) by mouth once daily.     VITAMIN C 500 MG tablet  Generic drug:  ascorbic acid (vitamin C)  Take 1,000 mg by mouth nightly.          Discharge Procedure Orders   Diet Adult Regular     Lifting restrictions   Order Comments: No lifting greater than 20 lb, no stranding, no strenuous activity for 6 weeks     Notify your health care provider if you experience any of the following:  temperature >100.4     Notify your health care provider if you experience any of the following:  persistent nausea and vomiting or diarrhea     Notify your health care provider if you experience any of the following:  severe uncontrolled pain     Notify your health care provider if you experience any of the following:  redness, tenderness, or signs of infection (pain, swelling, redness, odor or green/yellow discharge around incision site)     Notify your health care provider if you experience any of the following:  difficulty breathing or increased cough     Notify your health care provider if you experience any of the following:  severe persistent headache     Notify your health care provider if you experience any of the following:  worsening rash     Notify your health care provider if you experience any of the following:  persistent dizziness, light-headedness, or visual disturbances     Notify your health care provider if you experience any of the following:  increased confusion or weakness     No dressing needed   Order Comments: - A surgical glue has been placed over your incisions. Please leave the glue in place and do not attempt to remove it.   - It is ok to shower using mild soap and water over the incisions the  day after your procedure. Pat dry your incisions. Do not soak in a bath tub or other body of water for 2 weeks or until cleared by your surgeon.   - If you noticed redness, swelling, fever, increasing pain or significant drainage from your wound please call the office or the hospital  after hours.     Shower on day dressing removed (No bath)         Discharge Procedure Orders (must include Diet, Follow-up, Activity):   Discharge Procedure Orders (must include Diet, Follow-up, Activity)   Diet Adult Regular     Lifting restrictions   Order Comments: No lifting greater than 20 lb, no stranding, no strenuous activity for 6 weeks     Notify your health care provider if you experience any of the following:  temperature >100.4     Notify your health care provider if you experience any of the following:  persistent nausea and vomiting or diarrhea     Notify your health care provider if you experience any of the following:  severe uncontrolled pain     Notify your health care provider if you experience any of the following:  redness, tenderness, or signs of infection (pain, swelling, redness, odor or green/yellow discharge around incision site)     Notify your health care provider if you experience any of the following:  difficulty breathing or increased cough     Notify your health care provider if you experience any of the following:  severe persistent headache     Notify your health care provider if you experience any of the following:  worsening rash     Notify your health care provider if you experience any of the following:  persistent dizziness, light-headedness, or visual disturbances     Notify your health care provider if you experience any of the following:  increased confusion or weakness     No dressing needed   Order Comments: - A surgical glue has been placed over your incisions. Please leave the glue in place and do not attempt to remove it.   - It is ok to shower using mild soap and water over the  incisions the day after your procedure. Pat dry your incisions. Do not soak in a bath tub or other body of water for 2 weeks or until cleared by your surgeon.   - If you noticed redness, swelling, fever, increasing pain or significant drainage from your wound please call the office or the hospital  after hours.     Shower on day dressing removed (No bath)

## 2019-02-28 ENCOUNTER — EXTERNAL CHRONIC CARE MANAGEMENT (OUTPATIENT)
Dept: PRIMARY CARE CLINIC | Facility: CLINIC | Age: 74
End: 2019-02-28
Payer: MEDICARE

## 2019-02-28 PROCEDURE — 99490 PR CHRONIC CARE MGMT, 1ST 20 MIN: ICD-10-PCS | Mod: S$PBB,,, | Performed by: FAMILY MEDICINE

## 2019-02-28 PROCEDURE — 99490 CHRNC CARE MGMT STAFF 1ST 20: CPT | Mod: PBBFAC,PO | Performed by: FAMILY MEDICINE

## 2019-02-28 PROCEDURE — 99490 CHRNC CARE MGMT STAFF 1ST 20: CPT | Mod: S$PBB,,, | Performed by: FAMILY MEDICINE

## 2019-03-05 ENCOUNTER — PATIENT MESSAGE (OUTPATIENT)
Dept: SPORTS MEDICINE | Facility: CLINIC | Age: 74
End: 2019-03-05

## 2019-03-06 ENCOUNTER — TELEPHONE (OUTPATIENT)
Dept: SPORTS MEDICINE | Facility: CLINIC | Age: 74
End: 2019-03-06

## 2019-03-06 NOTE — TELEPHONE ENCOUNTER
Replied to patient through portal message.    ----- Message from Michelle Amin sent at 3/6/2019  9:42 AM CST -----  Contact: self@  Patient states he left a message in my [facility] regarding a sooner appointment please call patient.

## 2019-03-07 ENCOUNTER — PATIENT MESSAGE (OUTPATIENT)
Dept: SURGERY | Facility: CLINIC | Age: 74
End: 2019-03-07

## 2019-03-07 ENCOUNTER — TELEPHONE (OUTPATIENT)
Dept: SPORTS MEDICINE | Facility: CLINIC | Age: 74
End: 2019-03-07

## 2019-03-07 NOTE — TELEPHONE ENCOUNTER
Called patient to clarify signs and symptoms:    Patient reports that the heat has given him significant improvement with his left knee pain. Patient reports that they will continue to use heat on his left knee and see us on the 18th for the Euflexxa injections.    Juventino Velarde   Sports Medicine Assistant   [facility] [place]

## 2019-03-08 ENCOUNTER — DOCUMENTATION ONLY (OUTPATIENT)
Dept: ELECTROPHYSIOLOGY | Facility: CLINIC | Age: 74
End: 2019-03-08

## 2019-03-08 ENCOUNTER — TELEPHONE (OUTPATIENT)
Dept: SPORTS MEDICINE | Facility: CLINIC | Age: 74
End: 2019-03-08

## 2019-03-08 ENCOUNTER — HOSPITAL ENCOUNTER (OUTPATIENT)
Dept: RADIOLOGY | Facility: HOSPITAL | Age: 74
Discharge: HOME | End: 2019-03-08
Attending: FAMILY MEDICINE
Payer: MEDICARE

## 2019-03-08 DIAGNOSIS — M25.562 MECHANICAL KNEE PAIN, LEFT: ICD-10-CM

## 2019-03-08 DIAGNOSIS — Z95.0 PACEMAKER: Primary | ICD-10-CM

## 2019-03-08 DIAGNOSIS — M25.562 MECHANICAL KNEE PAIN, LEFT: Primary | ICD-10-CM

## 2019-03-08 NOTE — TELEPHONE ENCOUNTER
----- Message from June Mcguire sent at 3/8/2019  4:12 PM CST -----  Hi Good afternoon  I am writing to you about our patient Shannan Norman we will need an order for PA and Lat   Chest xray and the reason is our protocol for pacemaker before MRI so that way our radiologist can check the leads are in place; and also patient will go to EP Lab for reprogramming pacemaker in safety mode before MRI.  Ep lab will need a order put in for that if any questions you can call Rubén in Ep lab [phone]  I will be here on Monday if any questions  Please advise   Best regards  [id number]  MRI

## 2019-03-08 NOTE — TELEPHONE ENCOUNTER
Called to make patient aware of the MRI appointment scheduled for today @ 3:00PM    Juventino Velarde   Sports Medicine Assistant   [facility] [place]

## 2019-03-08 NOTE — TELEPHONE ENCOUNTER
History of Present Illness (HPI):   #1  Location: medial knee, left  [Onset] duration: acute, lower extremity knee and ankle swelling - 19.03.04  [Palliative] modifying factors:     Relative rest              Oral analgesics - Tylenol prn > 6 weeks              HgPT, after visit c Dr. Charissa JEAN BAPTISTE              [facility], [facility], 18.12.04, mild improvement for 3-4 weeks              fPT, moderate improvement for 4 weeks  [Provocative] modifying factors: patient is NWB  Prior: none  Progression: worsening  Quality:    sharp   stabbing   unstable  [Radiation] associated signs and symptoms: none  Severity: per nursing documentation  Timing: constant  [Trauma] context: 19.03.04: developed pain in left knee that increased during the day--then unable to put any weight on the leg      AAFP/FPM: Pocket Guide Documentation Guidelines, (c)2014    (75245=2+, 95666=3+)      Patient reports an increase in pain since yesterday. Explained to patient Dr. Potter would like to get further imaging due to patient failing conservative treatment.       Juventino Velarde   Sports Medicine Assistant   [facility] [place]         ----- Message from Alina Love sent at 3/8/2019  9:27 AM CST -----  Contact: self  Needs Advice    Reason for call: Pt ask for a call in regards to left knee pain        Communication Preference: [phone]    Additional Information: n/a

## 2019-03-09 ENCOUNTER — PATIENT MESSAGE (OUTPATIENT)
Dept: SPORTS MEDICINE | Facility: CLINIC | Age: 74
End: 2019-03-09

## 2019-03-10 ENCOUNTER — PATIENT MESSAGE (OUTPATIENT)
Dept: FAMILY MEDICINE | Facility: CLINIC | Age: 74
End: 2019-03-10

## 2019-03-12 ENCOUNTER — TELEPHONE (OUTPATIENT)
Dept: ORTHOPEDICS | Facility: CLINIC | Age: 74
End: 2019-03-12

## 2019-03-12 NOTE — TELEPHONE ENCOUNTER
----- Message from Sneha Page sent at 3/12/2019 10:21 AM CDT -----  Contact: Pt request via [facility]   Message     ----- Message from [facility], System Message sent at 3/12/2019  9:23 AM CDT -----    Appointment Request From: Shannan Norman    With Provider: riaz abreu    Preferred Date Range: 3/12/2019 - 3/15/2019    Preferred Times: Any time    Reason for visit: Consult Kneee    Comments:  Consult on current knee issue

## 2019-03-14 ENCOUNTER — RESEARCH ENCOUNTER (OUTPATIENT)
Dept: RESEARCH | Facility: HOSPITAL | Age: 74
End: 2019-03-14

## 2019-03-14 ENCOUNTER — DOCUMENTATION ONLY (OUTPATIENT)
Dept: ELECTROPHYSIOLOGY | Facility: CLINIC | Age: 74
End: 2019-03-14

## 2019-03-14 ENCOUNTER — CLINICAL SUPPORT (OUTPATIENT)
Dept: CARDIOLOGY | Facility: HOSPITAL | Age: 74
End: 2019-03-14
Attending: FAMILY MEDICINE
Payer: MEDICARE

## 2019-03-14 ENCOUNTER — HOSPITAL ENCOUNTER (OUTPATIENT)
Dept: RADIOLOGY | Facility: HOSPITAL | Age: 74
Discharge: HOME | End: 2019-03-14
Attending: FAMILY MEDICINE
Payer: MEDICARE

## 2019-03-14 VITALS — HEART RATE: 58 BPM

## 2019-03-14 DIAGNOSIS — Z95.0 PACEMAKER: ICD-10-CM

## 2019-03-14 PROCEDURE — 73721 MRI KNEE WITHOUT CONTRAST LEFT: ICD-10-PCS | Mod: 26,LT,, | Performed by: RADIOLOGY

## 2019-03-14 PROCEDURE — 73721 MRI JNT OF LWR EXTRE W/O DYE: CPT | Mod: TC,LT

## 2019-03-14 PROCEDURE — 93280 PM DEVICE PROGR EVAL DUAL: CPT

## 2019-03-14 PROCEDURE — 73721 MRI JNT OF LWR EXTRE W/O DYE: CPT | Mod: 26,LT,, | Performed by: RADIOLOGY

## 2019-03-14 PROCEDURE — 71046 X-RAY EXAM CHEST 2 VIEWS: CPT | Mod: TC,FY

## 2019-03-14 PROCEDURE — 71046 XR CHEST PA AND LATERAL: ICD-10-PCS | Mod: 26,,, | Performed by: RADIOLOGY

## 2019-03-14 PROCEDURE — 71046 X-RAY EXAM CHEST 2 VIEWS: CPT | Mod: 26,,, | Performed by: RADIOLOGY

## 2019-03-14 NOTE — PROGRESS NOTES
Arrhythmia Clinic    Patient returned to arrhythmia clinic post-MRI. Device reprogrammed out of MRI safe mode and back to original settings, DDDR @ 60 bpm.

## 2019-03-14 NOTE — PROGRESS NOTES
An MRI has been ordered on this patient, an IMPLANTED CARDIAC DEVICE is present.    The patient's medical record has been reviewed for the following:    -the MRI conditional system has been implanted for a minimum of 6 weeks and is considered post the lead maturation period    -there are no known lead extenders, lead adaptors, or abandoned leads in place    -there are no known broken leads or leads with intermittent electrical contact as confirmed by the lead impedance history    -pacing capture thresholds are < 2.0 volts (V) at a pulse width of 0.40 milliseconds (ms)    -PACEMAKERS ONLY:  measured pacing lead impedances are >/= 200 Ohms and </= 1500 Ohms      -there is no noted diaphragmatic stimulation at a pacing output of 5.0V and at a pulse width of 1.0ms in patients whose device will be programmed to an asynchronous pacing mode    Parameters were reprogrammed to MRI safe mode as per the manufacturers scanning guidelines.     Device programmed to AOO @ 80 bpm.     Please have patient return to the arrhythmia clinic once MRI is completed to be re-programmed back to original settings.

## 2019-03-14 NOTE — PROGRESS NOTES
Pt arrived from [facility] and confirmation in chart cardiac device in MRI safe mode / pt to table and monitor

## 2019-03-15 ENCOUNTER — OFFICE VISIT (OUTPATIENT)
Dept: SURGERY | Facility: CLINIC | Age: 74
End: 2019-03-15
Payer: MEDICARE

## 2019-03-15 VITALS
HEIGHT: 72 IN | WEIGHT: 221.44 LBS | BODY MASS INDEX: 29.99 KG/M2 | HEART RATE: 56 BPM | SYSTOLIC BLOOD PRESSURE: 122 MMHG | DIASTOLIC BLOOD PRESSURE: 69 MMHG | TEMPERATURE: 99 F

## 2019-03-15 DIAGNOSIS — Z87.19 S/P RIGHT INGUINAL HERNIA REPAIR: Primary | ICD-10-CM

## 2019-03-15 DIAGNOSIS — Z98.890 S/P RIGHT INGUINAL HERNIA REPAIR: Primary | ICD-10-CM

## 2019-03-15 PROCEDURE — 99024 PR POST-OP FOLLOW-UP VISIT: ICD-10-PCS | Mod: POP,,, | Performed by: SURGERY

## 2019-03-15 PROCEDURE — 99999 PR PBB SHADOW E&M-EST. PATIENT-LVL III: CPT | Mod: PBBFAC,,, | Performed by: SURGERY

## 2019-03-15 PROCEDURE — 99213 OFFICE O/P EST LOW 20 MIN: CPT | Mod: PBBFAC,PO | Performed by: SURGERY

## 2019-03-15 PROCEDURE — 99024 POSTOP FOLLOW-UP VISIT: CPT | Mod: POP,,, | Performed by: SURGERY

## 2019-03-15 PROCEDURE — 99999 PR PBB SHADOW E&M-EST. PATIENT-LVL III: ICD-10-PCS | Mod: PBBFAC,,, | Performed by: SURGERY

## 2019-03-15 NOTE — PROGRESS NOTES
[facility]  POST-OP PROGRESS NOTE    HPI: Shannan Norman is a 73 y.o. male status post open right inguinal hernia repair with mesh on 02/27/2019 here for scheduled follow-up.  Initially the patient has significant swelling and bruising in his right groin however this resolved of the next 7 days.  His pain was well controlled any did not have to take any narcotic pain medication.  He denies fevers or chills.  He has no issues with urination.  He has had no issues with the incision. He did have for developed significant swelling of the left knee and has undergone an MRI.      VITALS:  Vitals:    03/15/19 1417   BP: 122/69   Pulse: (!) 56   Temp: 98.7 °F (37.1 °C)       PHYSICAL EXAM:  Right groin:  Inguinal incision well healed with minimal ecchymosis, no significant seroma or hematoma appreciated, no evidence of recurrence.    Left knee and lower extremity:  Edema is present from the knee down to the foot, there is limited range of motion secondary to discomfort    PATHOLOGY:  FINAL PATHOLOGIC DIAGNOSIS  FRAGMENT OF BENIGN ADIPOSE TISSUE      ASSESSMENT & PLAN:  73 y.o. male s/p right inguinal hernia repair  - incisions healed well  - no heavy lifting or strenuous activities for 6 weeks total  - return to clinic p.r.n.  - follow-up with Orthopedics for left knee

## 2019-03-18 ENCOUNTER — OFFICE VISIT (OUTPATIENT)
Dept: SPORTS MEDICINE | Facility: CLINIC | Age: 74
End: 2019-03-18
Payer: MEDICARE

## 2019-03-18 VITALS — BODY MASS INDEX: 29.93 KG/M2 | HEIGHT: 72 IN | TEMPERATURE: 97 F | WEIGHT: 221 LBS

## 2019-03-18 DIAGNOSIS — M17.0 PRIMARY OSTEOARTHRITIS OF KNEES, BILATERAL: Primary | ICD-10-CM

## 2019-03-18 PROCEDURE — 20611 DRAIN/INJ JOINT/BURSA W/US: CPT | Mod: 50,PBBFAC,PO | Performed by: FAMILY MEDICINE

## 2019-03-18 PROCEDURE — 99213 OFFICE O/P EST LOW 20 MIN: CPT | Mod: PBBFAC,PO | Performed by: FAMILY MEDICINE

## 2019-03-18 PROCEDURE — 99999 PR PBB SHADOW E&M-EST. PATIENT-LVL III: CPT | Mod: PBBFAC,,, | Performed by: FAMILY MEDICINE

## 2019-03-18 PROCEDURE — 99999 PR PBB SHADOW E&M-EST. PATIENT-LVL III: ICD-10-PCS | Mod: PBBFAC,,, | Performed by: FAMILY MEDICINE

## 2019-03-18 PROCEDURE — 99499 NO LOS: ICD-10-PCS | Mod: S$PBB,,, | Performed by: FAMILY MEDICINE

## 2019-03-18 PROCEDURE — 99499 UNLISTED E&M SERVICE: CPT | Mod: S$PBB,,, | Performed by: FAMILY MEDICINE

## 2019-03-18 PROCEDURE — 20611 LARGE JOINT ASPIRATION/INJECTION: R KNEE, L KNEE: ICD-10-PCS | Mod: 50,S$PBB,, | Performed by: FAMILY MEDICINE

## 2019-03-18 RX ADMIN — Medication 20 MG: at 01:03

## 2019-03-18 NOTE — PROCEDURES
"Large Joint Aspiration/Injection: R knee, L knee  Date/Time: 3/18/2019 1:01 PM  Performed by: Kit Potter MD  Authorized by: Kit Potter MD     Consent Done?:  Yes (Verbal)  Indications:  Pain  Procedure site marked: Yes    Timeout: Prior to procedure the correct patient, procedure, and site was verified      Location:  Knee  Site:  R knee and L knee  Prep: Patient was prepped and draped in usual sterile fashion    Ultrasonic Guidance for needle placement: Yes  Images are saved and documented.  Needle size:  20 G  Approach:  Lateral  Medications:  20 mg sodium hyaluronate (EUFLEXXA) 10 mg/mL(mw 2.4 -3.6 million); 20 mg sodium hyaluronate (EUFLEXXA) 10 mg/mL(mw 2.4 -3.6 million)  Patient tolerance:  Patient tolerated the procedure well with no immediate complications    Additional Comments: Description of ultrasound utilization for needle guidance:   Ultrasound guidance used for needle localization. Images saved and stored for documentation. The knee joint was visualized. Dynamic visualization of the 20g x 3.5" needle was continuous throughout the procedure.      "

## 2019-03-18 NOTE — PROGRESS NOTES
Euflexxa Rober Knee 1/3  LOT NO: [id number]  EXP DATE: 2020.03.02    *continued edema ankle / foot, left (he says it's better than it was)?  *did he f/u w/ his surgeon about it (see 19.03.15 surg f/u note)?

## 2019-03-19 ENCOUNTER — PATIENT MESSAGE (OUTPATIENT)
Dept: FAMILY MEDICINE | Facility: CLINIC | Age: 74
End: 2019-03-19

## 2019-03-20 ENCOUNTER — HOSPITAL ENCOUNTER (OUTPATIENT)
Dept: RADIOLOGY | Facility: HOSPITAL | Age: 74
Discharge: HOME | End: 2019-03-20
Attending: FAMILY MEDICINE
Payer: MEDICARE

## 2019-03-20 ENCOUNTER — OFFICE VISIT (OUTPATIENT)
Dept: FAMILY MEDICINE | Facility: CLINIC | Age: 74
End: 2019-03-20
Payer: MEDICARE

## 2019-03-20 VITALS
TEMPERATURE: 98 F | WEIGHT: 219.56 LBS | DIASTOLIC BLOOD PRESSURE: 78 MMHG | HEART RATE: 50 BPM | BODY MASS INDEX: 29.74 KG/M2 | HEIGHT: 72 IN | SYSTOLIC BLOOD PRESSURE: 130 MMHG | OXYGEN SATURATION: 99 %

## 2019-03-20 DIAGNOSIS — M25.472 LEFT ANKLE SWELLING: Primary | ICD-10-CM

## 2019-03-20 DIAGNOSIS — N40.0 BENIGN PROSTATIC HYPERPLASIA, UNSPECIFIED WHETHER LOWER URINARY TRACT SYMPTOMS PRESENT: ICD-10-CM

## 2019-03-20 DIAGNOSIS — I10 ESSENTIAL HYPERTENSION: ICD-10-CM

## 2019-03-20 DIAGNOSIS — E78.5 DYSLIPIDEMIA ASSOCIATED WITH TYPE 2 DIABETES MELLITUS: ICD-10-CM

## 2019-03-20 DIAGNOSIS — I49.5 SSS (SICK SINUS SYNDROME): ICD-10-CM

## 2019-03-20 DIAGNOSIS — M25.472 LEFT ANKLE SWELLING: ICD-10-CM

## 2019-03-20 DIAGNOSIS — I35.1 NONRHEUMATIC AORTIC VALVE INSUFFICIENCY: ICD-10-CM

## 2019-03-20 DIAGNOSIS — E11.9 TYPE 2 DIABETES MELLITUS WITHOUT RETINOPATHY: ICD-10-CM

## 2019-03-20 DIAGNOSIS — E11.69 DYSLIPIDEMIA ASSOCIATED WITH TYPE 2 DIABETES MELLITUS: ICD-10-CM

## 2019-03-20 DIAGNOSIS — I48.3 TYPICAL ATRIAL FLUTTER: ICD-10-CM

## 2019-03-20 PROCEDURE — 99213 OFFICE O/P EST LOW 20 MIN: CPT | Mod: PBBFAC,25,PO | Performed by: FAMILY MEDICINE

## 2019-03-20 PROCEDURE — 99214 PR OFFICE/OUTPT VISIT, EST, LEVL IV, 30-39 MIN: ICD-10-PCS | Mod: S$PBB,,, | Performed by: FAMILY MEDICINE

## 2019-03-20 PROCEDURE — 73610 X-RAY EXAM OF ANKLE: CPT | Mod: TC,FY,LT

## 2019-03-20 PROCEDURE — 99999 PR PBB SHADOW E&M-EST. PATIENT-LVL III: ICD-10-PCS | Mod: PBBFAC,,, | Performed by: FAMILY MEDICINE

## 2019-03-20 PROCEDURE — 99999 PR PBB SHADOW E&M-EST. PATIENT-LVL III: CPT | Mod: PBBFAC,,, | Performed by: FAMILY MEDICINE

## 2019-03-20 PROCEDURE — 73610 XR ANKLE COMPLETE 3 VIEW LEFT: ICD-10-PCS | Mod: 26,LT,, | Performed by: RADIOLOGY

## 2019-03-20 PROCEDURE — 99214 OFFICE O/P EST MOD 30 MIN: CPT | Mod: S$PBB,,, | Performed by: FAMILY MEDICINE

## 2019-03-20 PROCEDURE — 73610 X-RAY EXAM OF ANKLE: CPT | Mod: 26,LT,, | Performed by: RADIOLOGY

## 2019-03-20 RX ORDER — PREDNISONE 50 MG/1
50 TABLET ORAL DAILY
Qty: 5 TABLET | Refills: 0 | Status: SHIPPED | OUTPATIENT
Start: 2019-03-20 | End: 2019-03-25

## 2019-03-20 NOTE — PROGRESS NOTES
Subjective:       Patient ID: Shannan Norman is a 73 y.o. male.    Chief Complaint: Joint Swelling (x1 week; left ankle swelling. )    72 yo pt, new to me (regularly followed by Dr. Paulson) with PMH significant for DM-2, HLD, HTN, NICM, Aflutter, SSS, BPH, and Gout. Today her presents with c/o swelling to left ankle x 1.5 weeks. No trauma/accident/injury involving left ankle. + pain to left ankle--worst in the morning and at the end of the day. Pain is also worsened with prolonged standing. He's noticed redness to the ankle, but no warmth. Using tylenol prn--last dose 4-5 days ago. Also using ice compresses prn. He has chronic left knee pain which is not bothersome today.        Review of Systems   Constitutional: Negative for activity change, appetite change, chills, fever and unexpected weight change.   HENT: Negative for congestion, postnasal drip, rhinorrhea, sinus pressure and sore throat.    Eyes: Negative for redness, itching and visual disturbance.   Respiratory: Negative for cough, shortness of breath and wheezing.    Cardiovascular: Negative for chest pain.   Gastrointestinal: Negative for abdominal pain, constipation, diarrhea, nausea and vomiting.   Genitourinary: Negative for difficulty urinating, discharge, dysuria, frequency and urgency.   Musculoskeletal: Positive for arthralgias and joint swelling.   Skin: Negative for rash.   Neurological: Negative for dizziness, syncope, weakness and headaches.   Psychiatric/Behavioral: Negative for dysphoric mood and suicidal ideas. The patient is not nervous/anxious.          Past Medical History:   Diagnosis Date    Allergy     Arthritis     Atrial fibrillation     Atrial flutter 2011    ablation    Cataract     Diabetes mellitus     Dry eye syndrome     Gout, unspecified     High cholesterol     History of shingles     Hypertension     Seizures          Patient Active Problem List   Diagnosis    HTN (hypertension)    Hyperlipidemia type II     Gout, arthritis    Atrial flutter    DM (diabetes mellitus)    PFO (patent foramen ovale)    Umbilical hernia    Tendon injury    Post-operative state    Varicose veins of lower extremities with inflammation    Type 2 diabetes mellitus without retinopathy    Nuclear sclerosis of both eyes    Left epiretinal membrane    Obesity (BMI 30.0-34.9)    Junctional escape rhythm    SSS (sick sinus syndrome)    Nonrheumatic aortic valve insufficiency    Benign prostatic hyperplasia    Junctional bradycardia    NICM (nonischemic cardiomyopathy)    Dyslipidemia associated with type 2 diabetes mellitus    Hypertension associated with diabetes    Acute pain of right knee    Difficulty walking on uneven surface    Decreased strength of lower extremity    Screen for colon cancer    Right inguinal hernia    Left ankle swelling       Past Surgical History:   Procedure Laterality Date    ABLATION N/A 9/11/2018    [facility] at [facility] CATH [facility]    ABLATION OF DYSRHYTHMIC FOCUS      COLONOSCOPY Suprep N/A 1/2/2019    [facility] ENDO    ECHOCARDIOGRAM,TRANSESOPHAGEAL N/A 9/11/2018    [facility] at [facility] CATH LAB    GANGLION CYST EXCISION      left neck    HERNIA REPAIR  2013    umbilical    INSERTION-PACEMAKER-DUAL Left 2/6/2018    Performed [facility] CATH LAB    REPAIR, HERNIA, UMBILICAL, AGE 5 YEARS OR OLDER N/A 9/19/2013    Performed by Grady Broderick MD at [facility] OR 2ND FLR    REPAIR, TENDON, HAND Right 1/27/2014    [facility]    RIGHT INGUINAL HERNIA REPAIR WITH MESH Right 2/27/2019    [facility] OR    TENDON REPAIR Right     wrist    TONSILLECTOMY      varicose veins      several sx  bilateral    VASECTOMY         Family History   Problem Relation Age of Onset    Diabetes Mother     COPD Father     No Known Problems  Sister     Heart attack Brother     Heart disease Brother     No Known Problems Maternal Aunt     No Known Problems Maternal Uncle     No Known Problems Paternal Aunt     No Known Problems Paternal Uncle     No Known Problems Maternal Grandmother     No Known Problems Maternal Grandfather     No Known Problems Paternal Grandmother     No Known Problems Paternal Grandfather     Amblyopia Neg Hx     Blindness Neg Hx     Cancer Neg Hx     Cataracts Neg Hx     Glaucoma Neg Hx     Hypertension Neg Hx     Macular degeneration Neg Hx     Retinal detachment Neg Hx     Strabismus Neg Hx     Stroke Neg Hx     Thyroid disease Neg Hx     Prostate cancer Neg Hx     Kidney disease Neg Hx     Melanoma Neg Hx        Social History     Tobacco Use   Smoking Status Never Smoker   Smokeless Tobacco Never Used       Objective:        Vitals:    03/20/19 0942   BP: 130/78   Pulse: (!) 50   Temp: 97.6 °F (36.4 °C)   TempSrc: Oral   SpO2: 99%   Weight: 99.6 kg (219 lb 9.3 oz)   Height: 6' (1.829 m)       Physical Exam   Constitutional: He is oriented to person, place, and time. He appears well-developed and well-nourished. No distress.   HENT:   Head: Normocephalic and atraumatic.   Right Ear: External ear normal.   Left Ear: External ear normal.   Mouth/Throat: Oropharynx is clear and moist and mucous membranes are normal.   Eyes: Conjunctivae and EOM are normal. No scleral icterus.   Neck: Normal range of motion.   Cardiovascular: Normal rate, regular rhythm and normal heart sounds. Exam reveals no gallop and no friction rub.   No murmur heard.  Pulses:       Dorsalis pedis pulses are 2+ on the right side, and 2+ on the left side.   Pulmonary/Chest: Effort normal and breath sounds normal. No respiratory distress. He has no wheezes. He has no rales.   Musculoskeletal: Normal range of motion. He exhibits no edema, tenderness or deformity.        Feet:    + warmth and swelling to areas outlined in red.   Normal ROM  of left ankle. + mild tenderness elicited with passive inversion of the ankle   Neurological: He is alert and oriented to person, place, and time. No cranial nerve deficit. He exhibits normal muscle tone. Gait normal.   Skin: Skin is warm and dry. No rash noted. No erythema.   Psychiatric: He has a normal mood and affect. His behavior is normal.       Assessment:       1. Left ankle swelling    2. Type 2 diabetes mellitus without retinopathy    3. Dyslipidemia associated with type 2 diabetes mellitus    4. Essential hypertension    5. Nonrheumatic aortic valve insufficiency    6. SSS (sick sinus syndrome)    7. Typical atrial flutter    8. Benign prostatic hyperplasia, unspecified whether lower urinary tract symptoms present        Plan:       Shannan was seen today for joint swelling.    Diagnoses and all orders for this visit:    Left ankle swelling  -     CBC auto differential; Future  -     Comprehensive metabolic panel; Future  -     Uric acid; Future  -     X-Ray Ankle Complete Left; Future  -     predniSONE (DELTASONE) 50 MG Tab; Take 1 tablet (50 mg total) by mouth once daily. for 5 days    Type 2 diabetes mellitus without retinopathy    Dyslipidemia associated with type 2 diabetes mellitus    Essential hypertension    Nonrheumatic aortic valve insufficiency    SSS (sick sinus syndrome)    Typical atrial flutter    Benign prostatic hyperplasia, unspecified whether lower urinary tract symptoms present    Left Ankle Swelling  Pt with tenderness overlying lateral malleolus. Swelling to medial and lateral malleoli and midfoot  Etiology unclear, but symptoms are most concerning for gout  Will obtain labs above and plain film of left ankle   If plain film negative for fracture, will treat with prednisone 50 mg daily x 5 days    DM-2  Well controlled  A1c 6.2 2/2019  Diet-controlled    HLD  Lipid panel wnl 8/2018  Continue atorvastatin 40 mg qhs     HTN  Well controlled   Continue current drug regimen     NICM,  Manisha, SSS  Pt in NSR on exam today   Last 2D echo 10/2016 showed:   CONCLUSIONS     1 - Normal left ventricular systolic function (EF 60-65%).     2 - Left ventricular diastolic dysfunction.     3 - Biatrial enlargement.     4 - Normal right ventricular systolic function .     5 - The estimated PA systolic pressure is 25 mmHg.     6 - Trivial aortic regurgitation.     7 - Mild tricuspid regurgitation.   Continue apixaban 5 mg BID + Benazepril 20 mg daily    BPH  Continue Flomax 0.4 mg daily + Finasteride 5 mg daily    F/U prn if symptoms unimproved over next 1 week    Addenda--after visit  =================  Reviewed plain film of ankle which showed subcutaneous edema about ankle.  DJD changes anterior ankle joint mortise, chronic hypertrophic change tibia-fibular joint interosseous region.  Nondisplaced avulsion fracture distal medial malleolus question on AP view, oblique view suggest chronic hypertrophic posttraumatic change.  No dislocation.  Early heel spur deformity.  Faint calcified plaque posterior tibial artery. Progress study post therapy suggested 7-14 days.  Labs with elevated uric acid. CBC and CMP within acceptable ranges.    Will tx with prednisone as planned. Plan to repeat plain film if symptoms do not completely resolve.   Called pt at 4:50p on 3/20/19 with results above  MD Marsha

## 2019-03-21 ENCOUNTER — PATIENT MESSAGE (OUTPATIENT)
Dept: FAMILY MEDICINE | Facility: CLINIC | Age: 74
End: 2019-03-21

## 2019-03-22 ENCOUNTER — OFFICE VISIT (OUTPATIENT)
Dept: ORTHOPEDICS | Facility: CLINIC | Age: 74
End: 2019-03-22
Payer: MEDICARE

## 2019-03-22 VITALS — WEIGHT: 219 LBS | BODY MASS INDEX: 29.03 KG/M2 | HEIGHT: 73 IN

## 2019-03-22 DIAGNOSIS — I10 ESSENTIAL HYPERTENSION: ICD-10-CM

## 2019-03-22 DIAGNOSIS — M17.0 PRIMARY OSTEOARTHRITIS OF BOTH KNEES: Primary | ICD-10-CM

## 2019-03-22 DIAGNOSIS — M65.9 SYNOVITIS OF KNEE: ICD-10-CM

## 2019-03-22 PROCEDURE — 99213 OFFICE O/P EST LOW 20 MIN: CPT | Mod: PBBFAC,PN | Performed by: ORTHOPAEDIC SURGERY

## 2019-03-22 PROCEDURE — 99999 PR PBB SHADOW E&M-EST. PATIENT-LVL III: ICD-10-PCS | Mod: PBBFAC,,, | Performed by: ORTHOPAEDIC SURGERY

## 2019-03-22 PROCEDURE — 99213 PR OFFICE/OUTPT VISIT, EST, LEVL III, 20-29 MIN: ICD-10-PCS | Mod: S$PBB,,, | Performed by: ORTHOPAEDIC SURGERY

## 2019-03-22 PROCEDURE — 99213 OFFICE O/P EST LOW 20 MIN: CPT | Mod: S$PBB,,, | Performed by: ORTHOPAEDIC SURGERY

## 2019-03-22 PROCEDURE — 99999 PR PBB SHADOW E&M-EST. PATIENT-LVL III: CPT | Mod: PBBFAC,,, | Performed by: ORTHOPAEDIC SURGERY

## 2019-03-22 RX ORDER — BENAZEPRIL HYDROCHLORIDE 20 MG/1
TABLET ORAL
Qty: 90 TABLET | Refills: 3 | Status: SHIPPED | OUTPATIENT
Start: 2019-03-22 | End: 2019-07-31

## 2019-03-22 NOTE — PROGRESS NOTES
Subjective:      Patient ID: Shannan Norman is a 73 y.o. male.    Chief Complaint: Pain and Swelling of the Left Knee    HPI     They have experienced problems with their left knee over the past 6 months. The patient denies relevant history of injury/aggravation. Pain is located medially and  anteriorly Associated symptoms include gelling.  Symptoms are aggravated by no specific activity. They have been treated with NSAIDS.   Symptoms have recently waxed and waned. Ambulation reportedly has not been impaired. Self care ADLs are not painful.  Recent acute flare resolved after taking Naprosyn    Review of Systems   Constitution: Negative for fever and weight loss.   HENT: Negative for congestion.    Eyes: Negative for visual disturbance.   Cardiovascular: Negative for chest pain.   Respiratory: Negative for shortness of breath.    Hematologic/Lymphatic: Negative for bleeding problem. Does not bruise/bleed easily.   Skin: Negative for poor wound healing.   Musculoskeletal: Positive for joint pain.   Gastrointestinal: Negative for abdominal pain.   Genitourinary: Negative for dysuria.   Neurological: Negative for seizures.   Psychiatric/Behavioral: Negative for altered mental status.   Allergic/Immunologic: Negative for persistent infections.         Objective:            Ortho/SPM Exam    Right knee    The patient is not in acute distress.   Body habitus is normal.   Sclera appear normal  No respiratory distress  The patient walks without a limp.  Resisted SLR negative.   The skin over the knee is intact.  Knee effusion trace  Tendernes is located absent.  Range of motion- Flexion full, Extension full.   Ligament exam:   MCL intact   Lachman intact              Post sag intact    LCL intact  Patellar apprehension negative.  Popliteal cyst negative  Patellar crepitation present  Flexion/pinch negative.  Pulses DP present, PT present.  Motor normal 5/5 strength in all tested muscle groups.   Sensory normal.    Left knee  is identical    I reviewed the relevant radiographic images for the patient's condition:  I reviewed plain films and recent MRI.  There is moderate degenerative change with narrowing of the medial patellofemoral compartments bilaterally. Meniscal tearing was noted on the MRI          Assessment:       Encounter Diagnoses   Name Primary?    Primary osteoarthritis of both knees Yes    Synovitis of knee           The meniscal tearing as part of the arthritic process-I explained this.  The recent attack of increased pain in the left knee was likely due to synovitis, quite likely related to gout.  Plan:       Shannan was seen today for pain and swelling.    Diagnoses and all orders for this visit:    Primary osteoarthritis of both knees    Synovitis of knee      I explained my diagnostic impression and the reasoning behind it in detail, using layman's terms.     Considering the patient's excellent functional capacity, surgical intervention is not recommended in the foreseeable future    Occasional short courses of Naprosyn are permissible, although the patient is cautioned about this with current use of Eliquis    Appropriate home exercise program demonstrated    Activity modifications discussed

## 2019-03-22 NOTE — PROGRESS NOTES
Study: Luis M MRI  Sponsor: [employer]  Follow-up Visit: MRI   Date of Visit: 14Mar2019     Patient wishes to continue in study: Yes  All study protocol required CRFs completed: Yes    A MRI was performed on 3/14/2019. Pre-MRI scan device interrogations and Post-MRI scan device interrogations were collected and documented. Mr. Norman will be brought in for a 1-month Post-MRI device interrogation.

## 2019-03-25 ENCOUNTER — RESEARCH ENCOUNTER (OUTPATIENT)
Dept: RESEARCH | Facility: HOSPITAL | Age: 74
End: 2019-03-25

## 2019-03-25 ENCOUNTER — RESEARCH ENCOUNTER (OUTPATIENT)
Dept: RESEARCH | Facility: HOSPITAL | Age: 74
End: 2019-03-25
Payer: MEDICARE

## 2019-03-25 ENCOUNTER — OFFICE VISIT (OUTPATIENT)
Dept: SPORTS MEDICINE | Facility: CLINIC | Age: 74
End: 2019-03-25
Payer: MEDICARE

## 2019-03-25 VITALS — TEMPERATURE: 98 F | BODY MASS INDEX: 29.03 KG/M2 | HEIGHT: 73 IN | WEIGHT: 219 LBS

## 2019-03-25 DIAGNOSIS — M17.0 PRIMARY OSTEOARTHRITIS OF KNEES, BILATERAL: Primary | ICD-10-CM

## 2019-03-25 DIAGNOSIS — M1A.09X0 IDIOPATHIC CHRONIC GOUT OF MULTIPLE SITES WITHOUT TOPHUS: ICD-10-CM

## 2019-03-25 PROCEDURE — 99499 UNLISTED E&M SERVICE: CPT | Mod: S$PBB,,, | Performed by: FAMILY MEDICINE

## 2019-03-25 PROCEDURE — 99213 OFFICE O/P EST LOW 20 MIN: CPT | Mod: PBBFAC,PO,25 | Performed by: FAMILY MEDICINE

## 2019-03-25 PROCEDURE — 20611 DRAIN/INJ JOINT/BURSA W/US: CPT | Mod: 50,PBBFAC,PO | Performed by: FAMILY MEDICINE

## 2019-03-25 PROCEDURE — 99999 PR PBB SHADOW E&M-EST. PATIENT-LVL III: ICD-10-PCS | Mod: PBBFAC,,, | Performed by: FAMILY MEDICINE

## 2019-03-25 PROCEDURE — 99499 NO LOS: ICD-10-PCS | Mod: S$PBB,,, | Performed by: FAMILY MEDICINE

## 2019-03-25 PROCEDURE — 20611 LARGE JOINT ASPIRATION/INJECTION: R KNEE, L KNEE: ICD-10-PCS | Mod: 50,S$PBB,, | Performed by: FAMILY MEDICINE

## 2019-03-25 PROCEDURE — 99999 PR PBB SHADOW E&M-EST. PATIENT-LVL III: CPT | Mod: PBBFAC,,, | Performed by: FAMILY MEDICINE

## 2019-03-25 RX ADMIN — Medication 20 MG: at 01:03

## 2019-03-25 NOTE — PROGRESS NOTES
Study: Luis M MRI  Sponsor: [employer]  Follow-up Visit: 1-month post-MRI scan visit  Date of Visit: 25Mar2019     Patient wishes to continue in study: Yes  All study protocol required CRFs completed: Yes     Mr. Norman is here for the 1-month post-MRI scan visit. He denies any cardiovascular related hospitalizations, ED visits, or any other adverse events since previous device check. Radha Lennon from [employer] checked the patient's device and all questions were answered to his satisfaction; he will contact research staff if he has any questions or concerns.

## 2019-03-25 NOTE — PROGRESS NOTES
Study: Luis M MRI  Sponsor: [facility]/[employer]  Follow-up Visit: 12 month visit   Date of Visit: 25Mar2019     Patient wishes to continue in study: Yes  All study protocol required CRFs completed: Yes     Mr. Norman is here for the 12-month follow up visit. He denies any cardiovascular related hospitalizations, ED visits, or any other adverse events since previous device check. Radha Lennon from [employer] checked the patient's device and all questions were answered to his satisfaction; he will contact research staff if he has any questions or concerns. Next follow up visit is in six months.

## 2019-03-28 ENCOUNTER — TELEPHONE (OUTPATIENT)
Dept: ELECTROPHYSIOLOGY | Facility: CLINIC | Age: 74
End: 2019-03-28

## 2019-03-28 NOTE — TELEPHONE ENCOUNTER
----- Message from Hany Sanchez MD sent at 3/25/2019  4:30 PM CDT -----  no, thanks  Karlo    ----- Message -----  From: Hyun Millan  Sent: 3/25/2019   3:08 PM  To: Hany Sanchez MD    SJM DC PPM for SSS.    Device check in research today demonstrating smaller P waves @ 1.6mV (was 4.5mV); long term trend indicates this variation of P waves measurements for some time.  There is no indication of undersensing.     Any changes?    ThanksHyun

## 2019-03-29 ENCOUNTER — PATIENT MESSAGE (OUTPATIENT)
Dept: FAMILY MEDICINE | Facility: CLINIC | Age: 74
End: 2019-03-29

## 2019-03-31 ENCOUNTER — EXTERNAL CHRONIC CARE MANAGEMENT (OUTPATIENT)
Dept: PRIMARY CARE CLINIC | Facility: CLINIC | Age: 74
End: 2019-03-31
Payer: MEDICARE

## 2019-03-31 PROCEDURE — 99490 CHRNC CARE MGMT STAFF 1ST 20: CPT | Mod: S$PBB,,, | Performed by: FAMILY MEDICINE

## 2019-03-31 PROCEDURE — 99490 CHRNC CARE MGMT STAFF 1ST 20: CPT | Mod: PBBFAC,PO | Performed by: FAMILY MEDICINE

## 2019-03-31 PROCEDURE — 99490 PR CHRONIC CARE MGMT, 1ST 20 MIN: ICD-10-PCS | Mod: S$PBB,,, | Performed by: FAMILY MEDICINE

## 2019-04-01 ENCOUNTER — OFFICE VISIT (OUTPATIENT)
Dept: SPORTS MEDICINE | Facility: CLINIC | Age: 74
End: 2019-04-01
Payer: MEDICARE

## 2019-04-01 VITALS — WEIGHT: 219 LBS | BODY MASS INDEX: 29.03 KG/M2 | HEIGHT: 73 IN | TEMPERATURE: 98 F

## 2019-04-01 DIAGNOSIS — M17.0 PRIMARY OSTEOARTHRITIS OF KNEES, BILATERAL: Primary | ICD-10-CM

## 2019-04-01 PROCEDURE — 20611 LARGE JOINT ASPIRATION/INJECTION: R KNEE, L KNEE: ICD-10-PCS | Mod: 50,S$PBB,, | Performed by: FAMILY MEDICINE

## 2019-04-01 PROCEDURE — 99999 PR PBB SHADOW E&M-EST. PATIENT-LVL III: ICD-10-PCS | Mod: PBBFAC,,, | Performed by: FAMILY MEDICINE

## 2019-04-01 PROCEDURE — 99499 UNLISTED E&M SERVICE: CPT | Mod: S$PBB,,, | Performed by: FAMILY MEDICINE

## 2019-04-01 PROCEDURE — 99999 PR PBB SHADOW E&M-EST. PATIENT-LVL III: CPT | Mod: PBBFAC,,, | Performed by: FAMILY MEDICINE

## 2019-04-01 PROCEDURE — 20611 DRAIN/INJ JOINT/BURSA W/US: CPT | Mod: 50,PBBFAC,PO | Performed by: FAMILY MEDICINE

## 2019-04-01 PROCEDURE — 99213 OFFICE O/P EST LOW 20 MIN: CPT | Mod: PBBFAC,PO,25 | Performed by: FAMILY MEDICINE

## 2019-04-01 PROCEDURE — 99499 NO LOS: ICD-10-PCS | Mod: S$PBB,,, | Performed by: FAMILY MEDICINE

## 2019-04-01 RX ADMIN — Medication 20 MG: at 01:04

## 2019-04-01 NOTE — PROCEDURES
"Large Joint Aspiration/Injection: R knee, L knee  Date/Time: 4/1/2019 1:46 PM  Performed by: Kit Potter MD  Authorized by: Kit Potter MD     Consent Done?:  Yes (Verbal)  Indications:  Pain  Procedure site marked: Yes    Timeout: Prior to procedure the correct patient, procedure, and site was verified      Location:  Knee  Site:  R knee and L knee  Prep: Patient was prepped and draped in usual sterile fashion    Ultrasonic Guidance for needle placement: Yes  Images are saved and documented.  Needle size:  20 G  Approach:  Lateral  Medications:  20 mg sodium hyaluronate (EUFLEXXA) 10 mg/mL(mw 2.4 -3.6 million); 20 mg sodium hyaluronate (EUFLEXXA) 10 mg/mL(mw 2.4 -3.6 million)  Patient tolerance:  Patient tolerated the procedure well with no immediate complications    Additional Comments: Description of ultrasound utilization for needle guidance:   Ultrasound guidance used for needle localization. Images saved and stored for documentation. The knee joint was visualized. Dynamic visualization of the 20g x 3.5" needle was continuous throughout the procedure.      "

## 2019-04-08 ENCOUNTER — OFFICE VISIT (OUTPATIENT)
Dept: OPTOMETRY | Facility: CLINIC | Age: 74
End: 2019-04-08
Payer: MEDICARE

## 2019-04-08 DIAGNOSIS — E11.9 TYPE 2 DIABETES MELLITUS WITHOUT RETINOPATHY: Primary | ICD-10-CM

## 2019-04-08 DIAGNOSIS — H25.13 NUCLEAR SCLEROSIS, BILATERAL: ICD-10-CM

## 2019-04-08 DIAGNOSIS — H04.123 BILATERAL DRY EYES: ICD-10-CM

## 2019-04-08 DIAGNOSIS — H52.7 REFRACTIVE ERROR: ICD-10-CM

## 2019-04-08 PROCEDURE — 99999 PR PBB SHADOW E&M-EST. PATIENT-LVL II: ICD-10-PCS | Mod: PBBFAC,,, | Performed by: OPTOMETRIST

## 2019-04-08 PROCEDURE — 92014 PR EYE EXAM, EST PATIENT,COMPREHESV: ICD-10-PCS | Mod: S$PBB,,, | Performed by: OPTOMETRIST

## 2019-04-08 PROCEDURE — 92014 COMPRE OPH EXAM EST PT 1/>: CPT | Mod: S$PBB,,, | Performed by: OPTOMETRIST

## 2019-04-08 PROCEDURE — 99212 OFFICE O/P EST SF 10 MIN: CPT | Mod: PBBFAC,PO | Performed by: OPTOMETRIST

## 2019-04-08 PROCEDURE — 92015 DETERMINE REFRACTIVE STATE: CPT | Mod: ,,, | Performed by: OPTOMETRIST

## 2019-04-08 PROCEDURE — 99999 PR PBB SHADOW E&M-EST. PATIENT-LVL II: CPT | Mod: PBBFAC,,, | Performed by: OPTOMETRIST

## 2019-04-08 PROCEDURE — 92015 PR REFRACTION: ICD-10-PCS | Mod: ,,, | Performed by: OPTOMETRIST

## 2019-04-08 NOTE — PROGRESS NOTES
HPI     Uses Restasis 2x/day  Diabetic eye exam  Blur ou at dist, x mos, no assoc pain or red, no relief over time,   constant      Last edited by Andrés Borrero, OD on 4/8/2019  2:24 PM. (History)            Assessment /Plan     For exam results, see Encounter Report.    Type 2 diabetes mellitus without retinopathy    Nuclear sclerosis, bilateral    Bilateral dry eyes    Refractive error      1. No diabetic retinopathy, no csme. Return in 1 year for dilated eye exam.  2. Educated pt on presence of cataracts and effects on vision. No surgery at this time. Recheck in one year.  3. Recommend artificial tears. 1 drop 3-4x per day. Chronicity of disease and treatment discussed.       4. New Spec Rx given. Different lens options discussed with patient. RTC 1 year full exam.

## 2019-04-22 ENCOUNTER — PES CALL (OUTPATIENT)
Dept: ADMINISTRATIVE | Facility: CLINIC | Age: 74
End: 2019-04-22

## 2019-04-23 PROBLEM — M25.561 ACUTE PAIN OF RIGHT KNEE: Status: RESOLVED | Noted: 2018-12-21 | Resolved: 2019-04-23

## 2019-04-23 PROBLEM — R26.2: Status: RESOLVED | Noted: 2018-12-21 | Resolved: 2019-04-23

## 2019-04-23 PROBLEM — R29.898 DECREASED STRENGTH OF LOWER EXTREMITY: Status: RESOLVED | Noted: 2018-12-21 | Resolved: 2019-04-23

## 2019-04-30 ENCOUNTER — EXTERNAL CHRONIC CARE MANAGEMENT (OUTPATIENT)
Dept: PRIMARY CARE CLINIC | Facility: CLINIC | Age: 74
End: 2019-04-30
Payer: MEDICARE

## 2019-04-30 PROCEDURE — 99490 CHRNC CARE MGMT STAFF 1ST 20: CPT | Mod: S$PBB,,, | Performed by: FAMILY MEDICINE

## 2019-04-30 PROCEDURE — 99490 PR CHRONIC CARE MGMT, 1ST 20 MIN: ICD-10-PCS | Mod: S$PBB,,, | Performed by: FAMILY MEDICINE

## 2019-04-30 PROCEDURE — 99490 CHRNC CARE MGMT STAFF 1ST 20: CPT | Mod: PBBFAC,PO | Performed by: FAMILY MEDICINE

## 2019-05-27 ENCOUNTER — PATIENT MESSAGE (OUTPATIENT)
Dept: FAMILY MEDICINE | Facility: CLINIC | Age: 74
End: 2019-05-27

## 2019-05-27 DIAGNOSIS — R39.16 BENIGN PROSTATIC HYPERPLASIA (BPH) WITH STRAINING ON URINATION: ICD-10-CM

## 2019-05-27 DIAGNOSIS — N40.1 BENIGN PROSTATIC HYPERPLASIA (BPH) WITH STRAINING ON URINATION: ICD-10-CM

## 2019-05-29 RX ORDER — TAMSULOSIN HYDROCHLORIDE 0.4 MG/1
0.4 CAPSULE ORAL DAILY
Qty: 90 CAPSULE | Refills: 0 | Status: SHIPPED | OUTPATIENT
Start: 2019-05-29 | End: 2019-08-05

## 2019-05-31 ENCOUNTER — EXTERNAL CHRONIC CARE MANAGEMENT (OUTPATIENT)
Dept: PRIMARY CARE CLINIC | Facility: CLINIC | Age: 74
End: 2019-05-31
Payer: MEDICARE

## 2019-05-31 PROCEDURE — 99490 PR CHRONIC CARE MGMT, 1ST 20 MIN: ICD-10-PCS | Mod: S$PBB,,, | Performed by: FAMILY MEDICINE

## 2019-05-31 PROCEDURE — 99490 CHRNC CARE MGMT STAFF 1ST 20: CPT | Mod: S$PBB,,, | Performed by: FAMILY MEDICINE

## 2019-05-31 PROCEDURE — 99490 CHRNC CARE MGMT STAFF 1ST 20: CPT | Mod: PBBFAC,PO | Performed by: FAMILY MEDICINE

## 2019-06-13 ENCOUNTER — PATIENT MESSAGE (OUTPATIENT)
Dept: FAMILY MEDICINE | Facility: CLINIC | Age: 74
End: 2019-06-13

## 2019-06-13 RX ORDER — CHLORTHALIDONE 25 MG/1
TABLET ORAL
Refills: 1 | COMMUNITY
Start: 2019-03-22 | End: 2019-06-14 | Stop reason: SDUPTHER

## 2019-06-14 RX ORDER — CHLORTHALIDONE 25 MG/1
25 TABLET ORAL DAILY
Qty: 30 TABLET | Refills: 0 | Status: SHIPPED | OUTPATIENT
Start: 2019-06-14 | End: 2019-09-04

## 2019-06-30 ENCOUNTER — EXTERNAL CHRONIC CARE MANAGEMENT (OUTPATIENT)
Dept: PRIMARY CARE CLINIC | Facility: CLINIC | Age: 74
End: 2019-06-30
Payer: MEDICARE

## 2019-06-30 PROCEDURE — 99490 PR CHRONIC CARE MGMT, 1ST 20 MIN: ICD-10-PCS | Mod: S$PBB,,, | Performed by: FAMILY MEDICINE

## 2019-06-30 PROCEDURE — 99490 CHRNC CARE MGMT STAFF 1ST 20: CPT | Mod: PBBFAC,PO | Performed by: FAMILY MEDICINE

## 2019-06-30 PROCEDURE — 99490 CHRNC CARE MGMT STAFF 1ST 20: CPT | Mod: S$PBB,,, | Performed by: FAMILY MEDICINE

## 2019-07-31 ENCOUNTER — EXTERNAL CHRONIC CARE MANAGEMENT (OUTPATIENT)
Dept: PRIMARY CARE CLINIC | Facility: CLINIC | Age: 74
End: 2019-07-31
Payer: MEDICARE

## 2019-07-31 ENCOUNTER — OFFICE VISIT (OUTPATIENT)
Dept: UROLOGY | Facility: CLINIC | Age: 74
End: 2019-07-31
Payer: MEDICARE

## 2019-07-31 VITALS
RESPIRATION RATE: 15 BRPM | SYSTOLIC BLOOD PRESSURE: 133 MMHG | BODY MASS INDEX: 29.03 KG/M2 | HEART RATE: 65 BPM | HEIGHT: 73 IN | WEIGHT: 219 LBS | DIASTOLIC BLOOD PRESSURE: 78 MMHG

## 2019-07-31 DIAGNOSIS — N40.0 BENIGN PROSTATIC HYPERPLASIA, UNSPECIFIED WHETHER LOWER URINARY TRACT SYMPTOMS PRESENT: Primary | ICD-10-CM

## 2019-07-31 DIAGNOSIS — N32.81 OAB (OVERACTIVE BLADDER): ICD-10-CM

## 2019-07-31 PROCEDURE — 99214 OFFICE O/P EST MOD 30 MIN: CPT | Mod: S$PBB,,, | Performed by: UROLOGY

## 2019-07-31 PROCEDURE — 99999 PR PBB SHADOW E&M-EST. PATIENT-LVL III: ICD-10-PCS | Mod: PBBFAC,,, | Performed by: UROLOGY

## 2019-07-31 PROCEDURE — 99213 OFFICE O/P EST LOW 20 MIN: CPT | Mod: PBBFAC,25 | Performed by: UROLOGY

## 2019-07-31 PROCEDURE — 99999 PR PBB SHADOW E&M-EST. PATIENT-LVL III: CPT | Mod: PBBFAC,,, | Performed by: UROLOGY

## 2019-07-31 PROCEDURE — 99214 PR OFFICE/OUTPT VISIT, EST, LEVL IV, 30-39 MIN: ICD-10-PCS | Mod: S$PBB,,, | Performed by: UROLOGY

## 2019-07-31 PROCEDURE — 99490 CHRNC CARE MGMT STAFF 1ST 20: CPT | Mod: S$PBB,,, | Performed by: FAMILY MEDICINE

## 2019-07-31 PROCEDURE — 99490 PR CHRONIC CARE MGMT, 1ST 20 MIN: ICD-10-PCS | Mod: S$PBB,,, | Performed by: FAMILY MEDICINE

## 2019-07-31 PROCEDURE — 99490 CHRNC CARE MGMT STAFF 1ST 20: CPT | Mod: PBBFAC,PO | Performed by: FAMILY MEDICINE

## 2019-07-31 NOTE — PROGRESS NOTES
Subjective:       Patient ID: Shannan Norman is a 74 y.o. male.    Chief Complaint:  Urinary Urgency (prev pt of Dr Treadwell - urgency for 4 or 5 years)  Patient is a 74 y.o. male who is established to our clinic and was initially referred by their PCP, Dr. Paulson for evaluation of LUTs.    History of Present Illness  Benign Prostatic Hypertrophy   This is a chronic problem. The current episode started more than 1 year ago. The problem has been gradually improving since onset. Irritative symptoms include frequency, nocturia (5-6x/night) and urgency. Obstructive symptoms include incomplete emptying. Obstructive symptoms do not include an intermittent stream, a slower stream or a weak stream. Pertinent negatives include no chills, dysuria, nausea or vomiting. The symptoms are aggravated by caffeine (24oz coffee today, green tea in am, black tea in pm, occasional wine). Past treatments include finasteride and tamsulosin (oxybutynine). The treatment provided mild relief. He has been using treatment for 6 to 12 months.          Lab Results   Component Value Date    PSA 0.28 02/05/2019    PSA 0.43 08/05/2017    PSA 0.63 07/25/2016    PSA 0.67 03/25/2013    PSA 0.59 01/30/2012    PSA 0.62 01/21/2011    PSA 0.39 01/09/2010    PSA 0.47 12/08/2008    PSA 0.4 12/06/2007    PSA 0.9 11/20/2006    PSA 0.5 11/16/2005    PSA 0.7 08/23/2004           Review of Systems  Review of Systems   Constitutional: Negative for chills.   Gastrointestinal: Negative for nausea and vomiting.   Genitourinary: Positive for frequency, incomplete emptying, nocturia (5-6x/night) and urgency. Negative for dysuria.          Objective:     Physical Exam   Constitutional: He is oriented to person, place, and time. He appears well-developed and well-nourished. No distress.   HENT:   Head: Normocephalic and atraumatic.   Eyes: No scleral icterus.   Neck: No tracheal deviation present.   Pulmonary/Chest: Effort normal. No respiratory distress.    Neurological: He is alert and oriented to person, place, and time.   Psychiatric: He has a normal mood and affect. His behavior is normal. Judgment and thought content normal.       Lab Review  Lab Results   Component Value Date    COLORU yellow 02/05/2019    SPECGRAV 1.015 02/05/2019    PHUR 5 02/05/2019    WBCUR neg 02/05/2019    NITRITE neg 02/05/2019    PROTEINUR trace 02/05/2019    GLUCOSEUR norm 02/05/2019    KETONESU neg 02/05/2019    UROBILINOGEN norm 02/05/2019    BILIRUBINUR neg 02/05/2019    RBCUR neg 02/05/2019         Assessment:        1. Benign prostatic hyperplasia, unspecified whether lower urinary tract symptoms present    2. OAB (overactive bladder)            Plan:     Benign prostatic hyperplasia, unspecified whether lower urinary tract symptoms present    OAB (overactive bladder)      -Avoid bladder irritants including but not limited to caffeine, alcohol, smoking, spicy foods, acidic foods, tomato-based products, citrus, artificial sweeteners, chocolate, coffee or tea.  -A post void residual bladder scan was performed immediately after voiding. The patient's PVR was noted to be minimal.  -psa reviewed===<1 consistently at his age, no further psa testing required.   I spent 25 minutes with the patient of which more than half was spent in direct consultation with the patient in regards to our treatment and plan.      F/u prn

## 2019-08-02 DIAGNOSIS — E11.9 TYPE 2 DIABETES MELLITUS WITHOUT COMPLICATION: ICD-10-CM

## 2019-08-05 ENCOUNTER — OFFICE VISIT (OUTPATIENT)
Dept: FAMILY MEDICINE | Facility: CLINIC | Age: 74
End: 2019-08-05
Payer: MEDICARE

## 2019-08-05 VITALS
OXYGEN SATURATION: 97 % | WEIGHT: 211.88 LBS | SYSTOLIC BLOOD PRESSURE: 118 MMHG | HEIGHT: 73 IN | DIASTOLIC BLOOD PRESSURE: 62 MMHG | BODY MASS INDEX: 28.08 KG/M2 | HEART RATE: 61 BPM

## 2019-08-05 DIAGNOSIS — E11.69 DYSLIPIDEMIA ASSOCIATED WITH TYPE 2 DIABETES MELLITUS: Primary | ICD-10-CM

## 2019-08-05 DIAGNOSIS — I10 ESSENTIAL HYPERTENSION: ICD-10-CM

## 2019-08-05 DIAGNOSIS — Z23 NEED FOR VACCINATION AGAINST STREPTOCOCCUS PNEUMONIAE: ICD-10-CM

## 2019-08-05 DIAGNOSIS — E11.69 DYSLIPIDEMIA ASSOCIATED WITH TYPE 2 DIABETES MELLITUS: ICD-10-CM

## 2019-08-05 DIAGNOSIS — E11.65 TYPE 2 DIABETES MELLITUS WITH HYPERGLYCEMIA, WITHOUT LONG-TERM CURRENT USE OF INSULIN: ICD-10-CM

## 2019-08-05 DIAGNOSIS — E78.5 DYSLIPIDEMIA ASSOCIATED WITH TYPE 2 DIABETES MELLITUS: Primary | ICD-10-CM

## 2019-08-05 DIAGNOSIS — E78.5 DYSLIPIDEMIA ASSOCIATED WITH TYPE 2 DIABETES MELLITUS: ICD-10-CM

## 2019-08-05 PROCEDURE — 99999 PR PBB SHADOW E&M-EST. PATIENT-LVL IV: ICD-10-PCS | Mod: PBBFAC,,, | Performed by: FAMILY MEDICINE

## 2019-08-05 PROCEDURE — G0009 ADMIN PNEUMOCOCCAL VACCINE: HCPCS | Mod: PBBFAC,PO

## 2019-08-05 PROCEDURE — 99214 OFFICE O/P EST MOD 30 MIN: CPT | Mod: PBBFAC,PO | Performed by: FAMILY MEDICINE

## 2019-08-05 PROCEDURE — 99999 PR PBB SHADOW E&M-EST. PATIENT-LVL IV: CPT | Mod: PBBFAC,,, | Performed by: FAMILY MEDICINE

## 2019-08-05 PROCEDURE — 99214 PR OFFICE/OUTPT VISIT, EST, LEVL IV, 30-39 MIN: ICD-10-PCS | Mod: S$PBB,25,, | Performed by: FAMILY MEDICINE

## 2019-08-05 PROCEDURE — 99214 OFFICE O/P EST MOD 30 MIN: CPT | Mod: S$PBB,25,, | Performed by: FAMILY MEDICINE

## 2019-08-05 NOTE — PROGRESS NOTES
Subjective:       Patient ID: Shannan Norman is a 74 y.o. male.    Chief Complaint: Annual Exam and Establish Care    74 years old male who came to the clinic to establish with a new primary care physician.  Blood pressure today stable.  No chest pain, palpitation orthopnea or PND.  Patient with diabetes currently diet controlled.  No polyuria, polydipsia or polyphagia.  No recent cholesterol check.  Patient with significant stress taking care of her wife with dementia.    Review of Systems   Constitutional: Negative.  Negative for activity change and unexpected weight change.   HENT: Negative.  Negative for hearing loss, rhinorrhea and trouble swallowing.    Eyes: Positive for visual disturbance. Negative for discharge.   Respiratory: Negative.  Negative for chest tightness and wheezing.    Cardiovascular: Negative.  Negative for chest pain, palpitations and leg swelling.   Gastrointestinal: Negative.  Negative for blood in stool, constipation, diarrhea and vomiting.   Endocrine: Negative for polydipsia, polyphagia and polyuria.   Genitourinary: Negative.  Negative for difficulty urinating and hematuria.   Musculoskeletal: Negative.  Negative for arthralgias, joint swelling and neck pain.   Skin: Negative.    Neurological: Negative.  Negative for weakness and headaches.   Psychiatric/Behavioral: Negative.  Negative for confusion and dysphoric mood.       Objective:      Physical Exam   Constitutional: He is oriented to person, place, and time. He appears well-developed and well-nourished. No distress.   HENT:   Head: Normocephalic and atraumatic.   Right Ear: External ear normal.   Left Ear: External ear normal.   Nose: Nose normal.   Mouth/Throat: Oropharynx is clear and moist. No oropharyngeal exudate.   Eyes: Pupils are equal, round, and reactive to light. Conjunctivae and EOM are normal. Right eye exhibits no discharge. Left eye exhibits no discharge. No scleral icterus.   Neck: Normal range of motion. Neck  supple. No JVD present. No tracheal deviation present. No thyromegaly present.   Cardiovascular: Normal rate, regular rhythm, normal heart sounds and intact distal pulses. Exam reveals no gallop and no friction rub.   No murmur heard.  Pulmonary/Chest: Effort normal and breath sounds normal. No stridor. No respiratory distress. He has no wheezes. He has no rales. He exhibits no tenderness.   Abdominal: Soft. Bowel sounds are normal. He exhibits no distension and no mass. There is no tenderness. There is no rebound and no guarding.   Musculoskeletal: Normal range of motion. He exhibits no edema or tenderness.   Lymphadenopathy:     He has no cervical adenopathy.   Neurological: He is alert and oriented to person, place, and time. He has normal reflexes. He displays normal reflexes. No cranial nerve deficit. He exhibits normal muscle tone. Coordination normal.   Skin: Skin is warm and dry. No rash noted. He is not diaphoretic. No erythema. No pallor.   Psychiatric: He has a normal mood and affect. His behavior is normal. Judgment and thought content normal.   Nursing note and vitals reviewed.      Assessment:       1. Dyslipidemia associated with type 2 diabetes mellitus    2. Essential hypertension    3. Type 2 diabetes mellitus with hyperglycemia, without long-term current use of insulin    4. Need for vaccination against Streptococcus pneumoniae        Plan:         Shannan was seen today for annual exam and establish care.    Diagnoses and all orders for this visit:    Dyslipidemia associated with type 2 diabetes mellitus  -     Comprehensive metabolic panel; Future  -     Lipid panel; Future  -     TSH; Future    Essential hypertension  -     Comprehensive metabolic panel; Future  -     Lipid panel; Future  -     TSH; Future  -     CBC auto differential; Future    Type 2 diabetes mellitus with hyperglycemia, without long-term current use of insulin  -     Comprehensive metabolic panel; Future  -     Hemoglobin A1c;  Future  -     Microalbumin/creatinine urine ratio; Future    Need for vaccination against Streptococcus pneumoniae  -     (In Office Administered) Pneumococcal Conjugate Vaccine (13 Valent) (IM)    Continue monitoring blood pressure at home, low sodium diet.  Continue monitoring blood sugar at home,ADA diet.

## 2019-08-05 NOTE — PATIENT INSTRUCTIONS
Diabetes: Activity Tips    Being more active can help you manage your diabetes. The tips on this sheet can help you get the most from your exercise. They can also help you stay safe.  Staying Active  Its important for adults to spend less time sitting and being inactive. This is especially true if you have type 2 diabetes. When you are sitting for long periods of time, get up for short sessions of light activity every 30 minutes.  You should aim for at least 150 minutes a week of exercise or physical activity. Dont let more than 2 days go by without being active.  Benefit from briskness  Brisk activity gets your heart beating faster. This can help you increase your fitness, lose extra weight, and manage your blood sugar level. Try brisk walking. Or, if you have foot or leg problems, you can try swimming or bike riding. You can break up your exercise into chunks throughout the day. Work up to at least 30 minutes of steady, brisk exercise on most days.  Warm up and cool down  Warming up and cooling down reduce your risk of injury. They also help limit muscle soreness. Do a mild version of your activity for 5 minutes before and after your routine. You can also learn stretches that will help keep your muscles loose. Your healthcare provider may show you good ways to warm up and stretch.  Do the talk-sing test  The talk-sing test is a simple way to tell how hard youre exercising. If you can talk while exercising, youre in a safe range. If youre out of breath, slow down. If you can carry a tune, its time to  the pace. Walk up a hill. Increase the resistance on your stationary bike. Or swim faster.  What about eating?  You may be told to plan your exercise for 1 to 2 hours after a meal. In most cases, you dont need to eat while being active. If you take insulin or medicine that can cause low blood sugar, test your blood sugar before exercising. And carry a fast-acting sugar that will raise your blood sugar  level quickly. This includes glucose tablets or hard candy. Use it if you feel low blood sugar symptoms.  Safety tips  These tips can help you stay safe as you become fit:  · Exercise with a friend or carry a cell phone if you have one.  · Carry or wear identification, such as a necklace or bracelet, that says you have diabetes.  · Use the proper footwear and safety equipment for your activity.  · Drink water before, during, and after exercise.  · Dress properly for the weather.  · Dont exercise in very hot or very cold weather.  · Dont exercise if you are sick.  · If you are instructed to do so, test your blood sugar before and after you exercise. Have a small carbohydrate snack if your blood sugar is low before you start exercising.   When to stop exercising and call your healthcare provider  Stop exercising and call your healthcare provider right away if you notice any of the following:  · Pain, pressure, tightness, or heaviness in the chest  · Pain or heaviness in the neck, shoulders, back, arms, legs, or feet  · Unusual shortness of breath  · Dizziness or lightheadedness  · Unusually rapid or slow pulse  · Increased joint or muscle pain  · Nausea or vomiting  Date Last Reviewed: 5/1/2016  © 1198-8745 [employer]. [address] [place] [address], [place] [address]. All rights reserved. This information is not intended as a substitute for professional medical care. Always follow your healthcare professional's instructions.

## 2019-08-06 RX ORDER — ATORVASTATIN CALCIUM 40 MG/1
40 TABLET, FILM COATED ORAL DAILY
Qty: 90 TABLET | Refills: 3 | Status: SHIPPED | OUTPATIENT
Start: 2019-08-06 | End: 2020-08-09 | Stop reason: SDUPTHER

## 2019-08-09 ENCOUNTER — LAB VISIT (OUTPATIENT)
Dept: LAB | Facility: HOSPITAL | Age: 74
End: 2019-08-09
Attending: FAMILY MEDICINE
Payer: MEDICARE

## 2019-08-09 DIAGNOSIS — I10 ESSENTIAL HYPERTENSION: ICD-10-CM

## 2019-08-09 DIAGNOSIS — E11.65 TYPE 2 DIABETES MELLITUS WITH HYPERGLYCEMIA, WITHOUT LONG-TERM CURRENT USE OF INSULIN: ICD-10-CM

## 2019-08-09 DIAGNOSIS — E11.69 DYSLIPIDEMIA ASSOCIATED WITH TYPE 2 DIABETES MELLITUS: ICD-10-CM

## 2019-08-09 DIAGNOSIS — E78.5 DYSLIPIDEMIA ASSOCIATED WITH TYPE 2 DIABETES MELLITUS: ICD-10-CM

## 2019-08-09 LAB
ALBUMIN SERPL BCP-MCNC: 4.2 G/DL (ref 3.5–5.2)
ALP SERPL-CCNC: 86 U/L (ref 55–135)
ALT SERPL W/O P-5'-P-CCNC: 19 U/L (ref 10–44)
ANION GAP SERPL CALC-SCNC: 10 MMOL/L (ref 8–16)
AST SERPL-CCNC: 16 U/L (ref 10–40)
BASOPHILS # BLD AUTO: 0.03 K/UL (ref 0–0.2)
BASOPHILS NFR BLD: 0.6 % (ref 0–1.9)
BILIRUB SERPL-MCNC: 0.7 MG/DL (ref 0.1–1)
BUN SERPL-MCNC: 23 MG/DL (ref 8–23)
CALCIUM SERPL-MCNC: 9.8 MG/DL (ref 8.7–10.5)
CHLORIDE SERPL-SCNC: 104 MMOL/L (ref 95–110)
CHOLEST SERPL-MCNC: 148 MG/DL (ref 120–199)
CHOLEST/HDLC SERPL: 3.1 {RATIO} (ref 2–5)
CO2 SERPL-SCNC: 27 MMOL/L (ref 23–29)
CREAT SERPL-MCNC: 1.2 MG/DL (ref 0.5–1.4)
DIFFERENTIAL METHOD: ABNORMAL
EOSINOPHIL # BLD AUTO: 0.1 K/UL (ref 0–0.5)
EOSINOPHIL NFR BLD: 1.6 % (ref 0–8)
ERYTHROCYTE [DISTWIDTH] IN BLOOD BY AUTOMATED COUNT: 12.7 % (ref 11.5–14.5)
EST. GFR  (AFRICAN AMERICAN): >60 ML/MIN/1.73 M^2
EST. GFR  (NON AFRICAN AMERICAN): 58.8 ML/MIN/1.73 M^2
ESTIMATED AVG GLUCOSE: 128 MG/DL (ref 68–131)
GLUCOSE SERPL-MCNC: 97 MG/DL (ref 70–110)
HBA1C MFR BLD HPLC: 6.1 % (ref 4–5.6)
HCT VFR BLD AUTO: 50.1 % (ref 40–54)
HDLC SERPL-MCNC: 48 MG/DL (ref 40–75)
HDLC SERPL: 32.4 % (ref 20–50)
HGB BLD-MCNC: 16.2 G/DL (ref 14–18)
IMM GRANULOCYTES # BLD AUTO: 0.03 K/UL (ref 0–0.04)
IMM GRANULOCYTES NFR BLD AUTO: 0.6 % (ref 0–0.5)
LDLC SERPL CALC-MCNC: 89.2 MG/DL (ref 63–159)
LYMPHOCYTES # BLD AUTO: 1.4 K/UL (ref 1–4.8)
LYMPHOCYTES NFR BLD: 26.4 % (ref 18–48)
MCH RBC QN AUTO: 30.7 PG (ref 27–31)
MCHC RBC AUTO-ENTMCNC: 32.3 G/DL (ref 32–36)
MCV RBC AUTO: 95 FL (ref 82–98)
MONOCYTES # BLD AUTO: 0.7 K/UL (ref 0.3–1)
MONOCYTES NFR BLD: 12.8 % (ref 4–15)
NEUTROPHILS # BLD AUTO: 3 K/UL (ref 1.8–7.7)
NEUTROPHILS NFR BLD: 58 % (ref 38–73)
NONHDLC SERPL-MCNC: 100 MG/DL
NRBC BLD-RTO: 0 /100 WBC
PLATELET # BLD AUTO: 161 K/UL (ref 150–350)
PMV BLD AUTO: 11.3 FL (ref 9.2–12.9)
POTASSIUM SERPL-SCNC: 4.3 MMOL/L (ref 3.5–5.1)
PROT SERPL-MCNC: 7 G/DL (ref 6–8.4)
RBC # BLD AUTO: 5.27 M/UL (ref 4.6–6.2)
SODIUM SERPL-SCNC: 141 MMOL/L (ref 136–145)
TRIGL SERPL-MCNC: 54 MG/DL (ref 30–150)
TSH SERPL DL<=0.005 MIU/L-ACNC: 2.26 UIU/ML (ref 0.4–4)
WBC # BLD AUTO: 5.15 K/UL (ref 3.9–12.7)

## 2019-08-09 PROCEDURE — 80053 COMPREHEN METABOLIC PANEL: CPT

## 2019-08-09 PROCEDURE — 83036 HEMOGLOBIN GLYCOSYLATED A1C: CPT

## 2019-08-09 PROCEDURE — 80061 LIPID PANEL: CPT

## 2019-08-09 PROCEDURE — 85025 COMPLETE CBC W/AUTO DIFF WBC: CPT

## 2019-08-09 PROCEDURE — 84443 ASSAY THYROID STIM HORMONE: CPT

## 2019-08-09 PROCEDURE — 36415 COLL VENOUS BLD VENIPUNCTURE: CPT | Mod: PO

## 2019-08-12 ENCOUNTER — PATIENT MESSAGE (OUTPATIENT)
Dept: FAMILY MEDICINE | Facility: CLINIC | Age: 74
End: 2019-08-12

## 2019-08-13 ENCOUNTER — OFFICE VISIT (OUTPATIENT)
Dept: FAMILY MEDICINE | Facility: CLINIC | Age: 74
End: 2019-08-13
Payer: MEDICARE

## 2019-08-13 VITALS
BODY MASS INDEX: 27.93 KG/M2 | HEIGHT: 73 IN | WEIGHT: 210.75 LBS | HEART RATE: 73 BPM | DIASTOLIC BLOOD PRESSURE: 70 MMHG | SYSTOLIC BLOOD PRESSURE: 130 MMHG | OXYGEN SATURATION: 97 %

## 2019-08-13 DIAGNOSIS — I49.5 SSS (SICK SINUS SYNDROME): ICD-10-CM

## 2019-08-13 DIAGNOSIS — M10.9 GOUT, ARTHRITIS: ICD-10-CM

## 2019-08-13 DIAGNOSIS — E11.59 HYPERTENSION ASSOCIATED WITH DIABETES: ICD-10-CM

## 2019-08-13 DIAGNOSIS — E66.3 OVERWEIGHT (BMI 25.0-29.9): ICD-10-CM

## 2019-08-13 DIAGNOSIS — I15.2 HYPERTENSION ASSOCIATED WITH DIABETES: ICD-10-CM

## 2019-08-13 DIAGNOSIS — E78.5 DYSLIPIDEMIA ASSOCIATED WITH TYPE 2 DIABETES MELLITUS: ICD-10-CM

## 2019-08-13 DIAGNOSIS — E11.69 DYSLIPIDEMIA ASSOCIATED WITH TYPE 2 DIABETES MELLITUS: ICD-10-CM

## 2019-08-13 DIAGNOSIS — Z00.00 ENCOUNTER FOR PREVENTIVE HEALTH EXAMINATION: Primary | ICD-10-CM

## 2019-08-13 DIAGNOSIS — N18.30 TYPE 2 DIABETES MELLITUS WITH STAGE 3 CHRONIC KIDNEY DISEASE, WITHOUT LONG-TERM CURRENT USE OF INSULIN: ICD-10-CM

## 2019-08-13 DIAGNOSIS — E11.22 TYPE 2 DIABETES MELLITUS WITH STAGE 3 CHRONIC KIDNEY DISEASE, WITHOUT LONG-TERM CURRENT USE OF INSULIN: ICD-10-CM

## 2019-08-13 PROBLEM — M25.472 LEFT ANKLE SWELLING: Status: RESOLVED | Noted: 2019-03-20 | Resolved: 2019-08-13

## 2019-08-13 PROCEDURE — 99214 OFFICE O/P EST MOD 30 MIN: CPT | Mod: PBBFAC,PO | Performed by: NURSE PRACTITIONER

## 2019-08-13 PROCEDURE — G0439 PR MEDICARE ANNUAL WELLNESS SUBSEQUENT VISIT: ICD-10-PCS | Mod: ,,, | Performed by: NURSE PRACTITIONER

## 2019-08-13 PROCEDURE — 99999 PR PBB SHADOW E&M-EST. PATIENT-LVL IV: ICD-10-PCS | Mod: PBBFAC,,, | Performed by: NURSE PRACTITIONER

## 2019-08-13 PROCEDURE — G0439 PPPS, SUBSEQ VISIT: HCPCS | Mod: ,,, | Performed by: NURSE PRACTITIONER

## 2019-08-13 PROCEDURE — 99999 PR PBB SHADOW E&M-EST. PATIENT-LVL IV: CPT | Mod: PBBFAC,,, | Performed by: NURSE PRACTITIONER

## 2019-08-13 NOTE — PROGRESS NOTES
"Shannan Norman presented for a  Medicare AWV and comprehensive Health Risk Assessment today. The following components were reviewed and updated:    · Medical history  · Family History  · Social history  · Allergies and Current Medications  · Health Risk Assessment  · Health Maintenance  · Care Team     ** See Completed Assessments for Annual Wellness Visit within the encounter summary.**       The following assessments were completed:  · Living Situation  · CAGE  · Depression Screening  · Timed Get Up and Go  · Whisper Test  · Cognitive Function Screening  · Nutrition Screening  · ADL Screening  · PAQ Screening    Vitals:    08/13/19 0952   BP: 130/70   BP Location: Right arm   Patient Position: Sitting   BP Method: Medium (Manual)   Pulse: 73   SpO2: 97%   Weight: 95.6 kg (210 lb 12.2 oz)   Height: 6' 1" (1.854 m)     Body mass index is 27.81 kg/m².     Physical Exam   Constitutional: He is oriented to person, place, and time. He appears well-developed and well-nourished. No distress.   HENT:   Head: Normocephalic and atraumatic.   Eyes: Pupils are equal, round, and reactive to light. EOM are normal.   Neck: Neck supple. No JVD present. No tracheal deviation present.   Cardiovascular: Normal rate, regular rhythm, normal heart sounds and intact distal pulses.   No murmur heard.  Pulmonary/Chest: Effort normal and breath sounds normal. No respiratory distress. He has no wheezes. He has no rales.   Abdominal: Soft. Bowel sounds are normal. He exhibits no distension and no mass. There is no tenderness.   Musculoskeletal: Normal range of motion. He exhibits no edema or tenderness.   Neurological: He is alert and oriented to person, place, and time. Coordination normal.   Skin: Skin is warm and dry. No erythema. No pallor.   Psychiatric: He has a normal mood and affect. His behavior is normal. Judgment and thought content normal. Cognition and memory are normal. He expresses no homicidal and no suicidal ideation.   Nursing " note and vitals reviewed.        Diagnoses and health risks identified today and associated recommendations/orders:    1. Encounter for preventive health examination    2. Type 2 diabetes mellitus with stage 3 chronic kidney disease, without long-term current use of insulin  Chronic; stable with lifestyle modifications.  Followed by PCP.    3. Hypertension associated with diabetes  Chronic; stable on medication.  Followed by PCP.    4. Dyslipidemia associated with type 2 diabetes mellitus  Chronic; stable on medication.  Followed by PCP.    5. SSS (sick sinus syndrome)  Chronic; stable.  S/P ablation and pacemaker placement.  Followed by PCP.    6. Gout, arthritis  Chronic; stable.  Followed by PCP.    7. Overweight (BMI 25.0-29.9)      Provided Shannan with a 5-10 year written screening schedule and personal prevention plan. Recommendations were developed using the USPSTF age appropriate recommendations. Education, counseling, and referrals were provided as needed. After Visit Summary printed and given to patient which includes a list of additional screenings\tests needed.    Follow up in 6 months (on 2/3/2020) for follow-up with PCP, Annual Wellness Visit in 1 year.    Vy Brewer NP         I offered to discuss end of life issues, including information on how to make advance directives that the patient could use to name someone who would make medical decisions on their behalf if they became too ill to make themselves.    ___Patient declined  _X_Patient is interested, I provided paper work and offered to discuss.

## 2019-08-13 NOTE — PATIENT INSTRUCTIONS
Counseling and Referral of Other Preventative  (Italic type indicates deductible and co-insurance are waived)    Patient Name: Shannan Norman  Today's Date: 8/13/2019    Health Maintenance       Date Due Completion Date    Shingles Vaccine (1 of 2) 07/10/1994 ---    Influenza Vaccine (1) 10/31/2019 (Originally 8/1/2019) 10/10/2018    Foot Exam 02/11/2020 2/11/2019    Override on 8/1/2018: Done    Override on 8/4/2017: Done    Override on 7/22/2016: Done    Eye Exam 04/08/2020 4/8/2019    Override on 3/15/2017: Done    Low Dose Statin 08/06/2020 8/6/2019    Lipid Panel 08/09/2020 8/9/2019    Hemoglobin A1c 08/09/2020 8/9/2019    TETANUS VACCINE 07/22/2026 7/22/2016    Colonoscopy 01/02/2029 1/2/2019    Override on 6/11/2008: Done        No orders of the defined types were placed in this encounter.    The following information is provided to all patients.  This information is to help you find resources for any of the problems found today that may be affecting your health:                Living healthy guide: www.[facility].[place].gov      Understanding Diabetes: www.diabetes.org      Eating healthy: www.cdc.gov/healthyweight      CDC home safety checklist: www.cdc.gov/steadi/patient.html      Agency on Aging: www.goea.[place].[facility]      Alcoholics anonymous (AA): www.aa.org      Physical Activity: www.sushil.nih.gov/ql2uaay      Tobacco use: www.quitwithusla.org

## 2019-08-21 ENCOUNTER — OFFICE VISIT (OUTPATIENT)
Dept: DERMATOLOGY | Facility: CLINIC | Age: 74
End: 2019-08-21
Payer: MEDICARE

## 2019-08-21 DIAGNOSIS — D48.5 NEOPLASM OF UNCERTAIN BEHAVIOR OF SKIN: Primary | ICD-10-CM

## 2019-08-21 DIAGNOSIS — L57.0 ACTINIC KERATOSIS: ICD-10-CM

## 2019-08-21 DIAGNOSIS — Z12.83 SCREENING EXAM FOR SKIN CANCER: ICD-10-CM

## 2019-08-21 PROCEDURE — 17000 DESTRUCT PREMALG LESION: CPT | Mod: 59,PBBFAC | Performed by: DERMATOLOGY

## 2019-08-21 PROCEDURE — 11104 PUNCH BX SKIN SINGLE LESION: CPT | Mod: S$PBB,,, | Performed by: DERMATOLOGY

## 2019-08-21 PROCEDURE — 17003 DESTRUCT PREMALG LES 2-14: CPT | Mod: 59,PBBFAC | Performed by: DERMATOLOGY

## 2019-08-21 PROCEDURE — 11104 PR PUNCH BIOPSY, SKIN, SINGLE LESION: ICD-10-PCS | Mod: S$PBB,,, | Performed by: DERMATOLOGY

## 2019-08-21 PROCEDURE — 99212 OFFICE O/P EST SF 10 MIN: CPT | Mod: PBBFAC | Performed by: DERMATOLOGY

## 2019-08-21 PROCEDURE — 11104 PUNCH BX SKIN SINGLE LESION: CPT | Mod: PBBFAC,59 | Performed by: DERMATOLOGY

## 2019-08-21 PROCEDURE — 88305 TISSUE SPECIMEN TO PATHOLOGY, DERMATOLOGY: ICD-10-PCS | Mod: 26,,, | Performed by: PATHOLOGY

## 2019-08-21 PROCEDURE — 99999 PR PBB SHADOW E&M-EST. PATIENT-LVL II: ICD-10-PCS | Mod: PBBFAC,,, | Performed by: DERMATOLOGY

## 2019-08-21 PROCEDURE — 17003 DESTRUCTION, PREMALIGNANT LESIONS; SECOND THROUGH 14 LESIONS: ICD-10-PCS | Mod: 59,S$PBB,, | Performed by: DERMATOLOGY

## 2019-08-21 PROCEDURE — 88305 TISSUE EXAM BY PATHOLOGIST: CPT | Performed by: PATHOLOGY

## 2019-08-21 PROCEDURE — 17003 DESTRUCT PREMALG LES 2-14: CPT | Mod: 59,S$PBB,, | Performed by: DERMATOLOGY

## 2019-08-21 PROCEDURE — 17000 PR DESTRUCTION(LASER SURGERY,CRYOSURGERY,CHEMOSURGERY),PREMALIGNANT LESIONS,FIRST LESION: ICD-10-PCS | Mod: 59,S$PBB,, | Performed by: DERMATOLOGY

## 2019-08-21 PROCEDURE — 88305 TISSUE EXAM BY PATHOLOGIST: CPT | Mod: 26,,, | Performed by: PATHOLOGY

## 2019-08-21 PROCEDURE — 99999 PR PBB SHADOW E&M-EST. PATIENT-LVL II: CPT | Mod: PBBFAC,,, | Performed by: DERMATOLOGY

## 2019-08-21 PROCEDURE — 17000 DESTRUCT PREMALG LESION: CPT | Mod: 59,S$PBB,, | Performed by: DERMATOLOGY

## 2019-08-21 PROCEDURE — 99213 PR OFFICE/OUTPT VISIT, EST, LEVL III, 20-29 MIN: ICD-10-PCS | Mod: 25,S$PBB,, | Performed by: DERMATOLOGY

## 2019-08-21 PROCEDURE — 99213 OFFICE O/P EST LOW 20 MIN: CPT | Mod: 25,S$PBB,, | Performed by: DERMATOLOGY

## 2019-08-21 NOTE — PROGRESS NOTES
Subjective:       Patient ID:  Shannan Norman is a 74 y.o. male who presents for   Chief Complaint   Patient presents with    Spot     nose     Spot     73 yo male here for biopsy of nonhealing lesion on nose >6 months. Has not been picking at the spot.    He also has a rash on upper and lower back that occurs after yoga; not bothered by it.  Otherwise the patient denies any moles or growths of the skin that are rapidly growing, hurting, itching, bleeding, or changing colors.    Review of Systems   Constitutional: Negative for fever.   Skin: Positive for wears hat. Negative for daily sunscreen use, activity-related sunscreen use and tendency to form keloidal scars.   Hematologic/Lymphatic: Bruises/bleeds easily.        Objective:    Physical Exam   Constitutional: He appears well-developed and well-nourished. No distress.   Neurological: He is alert and oriented to person, place, and time. He is not disoriented.   Psychiatric: He has a normal mood and affect.   Skin:   Areas Examined (abnormalities noted in diagram):   Scalp / Hair Palpated and Inspected  Head / Face Inspection Performed  Neck Inspection Performed  Chest / Axilla Inspection Performed  Abdomen Inspection Performed  Back Inspection Performed  RUE Inspected  LUE Inspection Performed                   Diagram Legend     Erythematous scaling macule/papule c/w actinic keratosis       Vascular papule c/w angioma      Pigmented verrucoid papule/plaque c/w seborrheic keratosis      Yellow umbilicated papule c/w sebaceous hyperplasia      Irregularly shaped tan macule c/w lentigo     1-2 mm smooth white papules consistent with Milia      Movable subcutaneous cyst with punctum c/w epidermal inclusion cyst      Subcutaneous movable cyst c/w pilar cyst      Firm pink to brown papule c/w dermatofibroma      Pedunculated fleshy papule(s) c/w skin tag(s)      Evenly pigmented macule c/w junctional nevus     Mildly variegated pigmented, slightly  irregular-bordered macule c/w mildly atypical nevus      Flesh colored to evenly pigmented papule c/w intradermal nevus       Pink pearly papule/plaque c/w basal cell carcinoma      Erythematous hyperkeratotic cursted plaque c/w SCC      Surgical scar with no sign of skin cancer recurrence      Open and closed comedones      Inflammatory papules and pustules      Verrucoid papule consistent consistent with wart     Erythematous eczematous patches and plaques     Dystrophic onycholytic nail with subungual debris c/w onychomycosis     Umbilicated papule    Erythematous-base heme-crusted tan verrucoid plaque consistent with inflamed seborrheic keratosis     Erythematous Silvery Scaling Plaque c/w Psoriasis     See annotation        Left mid nose    Assessment / Plan:      Pathology Orders:     Normal Orders This Visit    Tissue Specimen To Pathology, Dermatology     Questions:    Directional Terms:  Other(comment)    Clinical Information:  nonhealing excoriation x 6 months r/o NMSC    Specific Site:  left mid nose        Neoplasm of uncertain behavior of skin  -     Tissue Specimen To Pathology, Dermatology  Punch biopsy procedure note:  Punch biopsy performed after verbal consent obtained. Area marked and prepped with alcohol. Approximately 1cc of 1% lidocaine with epinephrine injected. 3 mm disposable punch used to remove lesion. Hemostasis obtained and biopsy site closed with 1 - 2 Prolene sutures. Wound care instructions reviewed with patient and handout given.    If biopsy positive for malignancy, refer to Dr. Cooper for Mohs surgery consultation.    Actinic keratosis  Cryosurgery Procedure Note    Verbal consent from the patient is obtained including, but not limited to, risk of hypopigmentation/hyperpigmentation, scar, recurrence of lesion. The patient is aware of the precancerous quality and need for treatment of these lesions. Liquid nitrogen cryosurgery is applied to the 3 actinic keratoses, as detailed in the  physical exam, to produce a freeze injury. The patient is aware that blisters may form and is instructed on wound care with gentle cleansing and use of vaseline ointment to keep moist until healed. The patient is supplied a handout on cryosurgery and is instructed to call if lesions do not completely resolve.    UBSE  Upper body skin examination performed today including at least 6 points as noted in physical examination. No lesions suspicious for malignancy noted.           Follow up in about 1 week (around 8/28/2019).

## 2019-08-24 ENCOUNTER — NURSE TRIAGE (OUTPATIENT)
Dept: ADMINISTRATIVE | Facility: CLINIC | Age: 74
End: 2019-08-24

## 2019-08-24 NOTE — TELEPHONE ENCOUNTER
Left wrist pain began a day or two ago, began gradually and has gotten worse.  Rates it 6/10 and constant, and moderate swelling, no redness.  Pain radiates into the hand and he is having a difficult time making a fist, and when he does this it causes some numbness in his fingers, which goes away immediately when he releases the fist.  Declines RR, would like to see md on Monday at pcp's office.  appt made    Reason for Disposition   MILD OR MODERATE joint swelling (e.g., feels or looks mildy swollen or puffy)    Additional Information   Negative: Followed a hand or wrist injury   Negative: Wrist pain is main symptom   Negative: Small area of SKIN ONLY swelling (localized) and followed a bee, wasp, or yellow jacket sting to the area   Negative: Small area of SKIN ONLY swelling (localized) followed an insect bite to the area   Negative: Arm swelling is main symptom   Negative: Hand swelling is main symptom   Negative: Cast problems or questions   Negative: Pain, redness, swelling, or pus at IV Site   Negative: Sounds like a life-threatening emergency to the triager   Negative: [1] Wrist pain AND [2] fever   Negative: [1] Wrist redness AND [2] fever   Negative: [1] Red area or streak AND [2] fever   Negative: Patient sounds very sick or weak to the triager   Negative: [1] SEVERE pain (e.g., excruciating, unable to use hand or wrist at all) AND [2] not improved after 2 hours of pain medicine   Negative: [1] Red area or streak [2] large (> 2 in. or 5 cm)   Negative: [1] Looks infected (spreading redness, pus) AND [2] large red area (> 2 in. or 5 cm)   Negative: SEVERE joint swelling (e.g., can barely bend or move wrist joint)   Negative: Looks like a boil, infected sore, deep ulcer or other infected rash (spreading redness, pus)    Protocols used: WRIST SWELLING-A-

## 2019-08-26 ENCOUNTER — HOSPITAL ENCOUNTER (OUTPATIENT)
Dept: RADIOLOGY | Facility: HOSPITAL | Age: 74
Discharge: HOME | End: 2019-08-26
Attending: FAMILY MEDICINE
Payer: MEDICARE

## 2019-08-26 ENCOUNTER — OFFICE VISIT (OUTPATIENT)
Dept: FAMILY MEDICINE | Facility: CLINIC | Age: 74
End: 2019-08-26
Payer: MEDICARE

## 2019-08-26 VITALS
DIASTOLIC BLOOD PRESSURE: 62 MMHG | TEMPERATURE: 98 F | HEART RATE: 73 BPM | SYSTOLIC BLOOD PRESSURE: 112 MMHG | OXYGEN SATURATION: 98 %

## 2019-08-26 DIAGNOSIS — M10.9 ACUTE GOUT OF MULTIPLE SITES, UNSPECIFIED CAUSE: ICD-10-CM

## 2019-08-26 DIAGNOSIS — M10.9 ACUTE GOUT OF MULTIPLE SITES, UNSPECIFIED CAUSE: Primary | ICD-10-CM

## 2019-08-26 PROCEDURE — 73100 X-RAY EXAM OF WRIST: CPT | Mod: TC,FY,PO,LT

## 2019-08-26 PROCEDURE — 99213 OFFICE O/P EST LOW 20 MIN: CPT | Mod: PBBFAC,25,PO | Performed by: FAMILY MEDICINE

## 2019-08-26 PROCEDURE — 99214 OFFICE O/P EST MOD 30 MIN: CPT | Mod: S$PBB,,, | Performed by: FAMILY MEDICINE

## 2019-08-26 PROCEDURE — 73560 X-RAY EXAM OF KNEE 1 OR 2: CPT | Mod: 26,LT,, | Performed by: RADIOLOGY

## 2019-08-26 PROCEDURE — 73100 X-RAY EXAM OF WRIST: CPT | Mod: 26,LT,, | Performed by: RADIOLOGY

## 2019-08-26 PROCEDURE — 73560 XR KNEE 1 OR 2 VIEW LEFT: ICD-10-PCS | Mod: 26,LT,, | Performed by: RADIOLOGY

## 2019-08-26 PROCEDURE — 99214 PR OFFICE/OUTPT VISIT, EST, LEVL IV, 30-39 MIN: ICD-10-PCS | Mod: S$PBB,,, | Performed by: FAMILY MEDICINE

## 2019-08-26 PROCEDURE — 73560 X-RAY EXAM OF KNEE 1 OR 2: CPT | Mod: TC,FY,PO,LT

## 2019-08-26 PROCEDURE — 73100 XR WRIST 2 VIEW LEFT: ICD-10-PCS | Mod: 26,LT,, | Performed by: RADIOLOGY

## 2019-08-26 PROCEDURE — 99999 PR PBB SHADOW E&M-EST. PATIENT-LVL III: ICD-10-PCS | Mod: PBBFAC,,, | Performed by: FAMILY MEDICINE

## 2019-08-26 PROCEDURE — 99999 PR PBB SHADOW E&M-EST. PATIENT-LVL III: CPT | Mod: PBBFAC,,, | Performed by: FAMILY MEDICINE

## 2019-08-26 RX ORDER — PREDNISONE 50 MG/1
50 TABLET ORAL DAILY
Qty: 5 TABLET | Refills: 0 | Status: SHIPPED | OUTPATIENT
Start: 2019-08-26 | End: 2019-08-31

## 2019-08-26 NOTE — PROGRESS NOTES
Subjective:       Patient ID: Shannan Norman is a 74 y.o. male.    Chief Complaint: No chief complaint on file.    Shannan is a 74 y.o. male who presents today for an urgent care visit. He is normally followed by Dr. Carpenter. He was 12 minutes late for his appointment today. He is having wrist pain. He is having knee pain. He has a history of gout; last gout attack was march. Labs in early august showed a mildly elevated uric acid. His current pain has been ongoing x 5 days. No fever, no chills. It feels like his previous gout attacks. He has tried tylenol and cold compresses; this has helped minimally. He doesn't like to take NSAIDS since he is on eliquis. No trauma. No falls. Reports no change in diet.     Wrist Pain    The pain is present in the left wrist and left knee. This is a new problem. The current episode started in the past 7 days. The problem occurs daily. The problem has been unchanged. Associated symptoms include a limited range of motion. Pertinent negatives include no fever, inability to bear weight, itching, joint locking, joint swelling, numbness, stiffness or tingling. He has tried acetaminophen for the symptoms. The treatment provided mild relief.     Review of Systems   Constitutional: Negative for chills and fever.   Respiratory: Negative for cough and shortness of breath.    Gastrointestinal: Negative for constipation, diarrhea, nausea and vomiting.   Genitourinary: Negative for difficulty urinating.   Musculoskeletal: Positive for gait problem and joint swelling. Negative for stiffness.   Skin: Negative for itching and rash.   Neurological: Negative for tingling and numbness.         Objective:     Vitals:    08/26/19 1352   BP: 112/62   Pulse: 73   Temp: 97.5 °F (36.4 °C)        Physical Exam   Constitutional: He is oriented to person, place, and time. He appears well-developed and well-nourished. No distress.   HENT:   Head: Normocephalic and atraumatic.   Eyes: Conjunctivae are normal.    Cardiovascular: Normal rate and regular rhythm.   Pulmonary/Chest: Effort normal and breath sounds normal. No stridor. No respiratory distress.   Musculoskeletal: He exhibits edema and tenderness.   Left wrist limited ROM  Left knee limited ROM  Swelling noted on the knee and the wrist  Other joints appear normal  No shoulder issues or hip issues  No drainage  Knee appears mildly erythematous but not hot to the touch  See pictures below     Neurological: He is alert and oriented to person, place, and time.   Skin: Skin is warm. He is not diaphoretic.   Psychiatric: He has a normal mood and affect. His behavior is normal. Thought content normal.   Nursing note and vitals reviewed.                  Assessment:       1. Acute gout of multiple sites, unspecified cause        Plan:       Education about gout causes and treatment discussed.  No labs at this time; Consider stopping chlorthalidone in the future? Defer to PCP  Prednisone per medication orders.  Consider starting Allopurinol or other gout prevention medications if this occurs again.   Avoid NSAID  Contact me or PCP with fevers or worsening symptoms  Follow up in 1 week if symptoms do not improve; may need intraarticular injection   Handout Given  F/u with PCP      Acute gout of multiple sites, unspecified cause  -     predniSONE (DELTASONE) 50 MG Tab; Take 1 tablet (50 mg total) by mouth once daily. for 5 days  Dispense: 5 tablet; Refill: 0  -     X-Ray Wrist 2 View Left; Future; Expected date: 08/26/2019  -     Cancel: X-Ray Knee Complete 4 or More Views Left; Future; Expected date: 08/26/2019        Warning signs discussed, patient to call with any further issues or worsening of symptoms.

## 2019-08-26 NOTE — PATIENT INSTRUCTIONS
Education about gout causes and treatment discussed.  No labs at this time;   Consider stopping chlorthalidone?  Prednisone per medication orders.  Contact me with fevers or worsening symptoms  Follow up in 1 week if symptoms do not improve.  Handout Given  F/u with PCP      Eating to Prevent Gout  Gout is a painful form of arthritis caused by an excess of uric acid. This is a waste product made by the body. It builds up in the body and forms crystals that collect in the joints, bringing on a gout attack. Alcohol and certain foods can trigger a gout attack. Below are some guidelines for changing your diet to help you manage gout. Your healthcare provider can work with you to determine the best eating plan for you. Know that diet is only one part of managing gout. Take your medicines as prescribed and follow the other guidelines your healthcare provider has given you.  Foods to limit  Eating too many foods containing purines may increase the levels of uric acid in your body and increase your risk for a gout attack. It may be best to limit these high-purine foods:  · Alcohol (beer, red wine). You may be told to avoid alcohol completely.  · Certain fish (anchovies, sardines, fish roes, herring, tuna, mussels, codfish, scallops, trout, and sanjiv)  · Certain meats (red meat, processed meat, clemons, turkey, wild game, and goose)  · Sauces and gravies made with meat  · Organ meats (such as liver, kidneys, sweetbreads, and tripe)  · Legumes (such as dried beans, peas)  · Mushrooms, spinach, asparagus, and cauliflower  · Yeast and yeast extract supplements  Foods to try  Some foods may be helpful for people with gout. You may want to try adding some of the following foods to your diet:  · Dark berries: These include blueberries, blackberries, and cherries. These berries contain chemicals that may lower uric acid.  · Tofu: Tofu, which is made from soy, is a good source of protein. Studies have shown that it may be a better  choice than meat for people with gout.  · Omega fatty acids: These acids are found in fatty fish (such as salmon), certain oils (such as flax, olive, or nut oils), or nuts. They may help prevent inflammation due to gout.  The following guidelines are recommended by the American Medical Association for people with gout. Your diet should be:  · High in fiber, whole grains, fruits, and vegetables.  · Low in protein (15% of calories should come from protein. Choose lean sources such as soy, lean meats, and poultry).  · Low in fat (no more than 30% of calories should come from fat, with only 10% coming from animal fat).   Date Last Reviewed: 6/17/2015 © 2000-2017 [employer]. [address] [place] [address], [place] [address]. All rights reserved. This information is not intended as a substitute for professional medical care. Always follow your healthcare professional's instructions.

## 2019-08-27 ENCOUNTER — PATIENT MESSAGE (OUTPATIENT)
Dept: DERMATOLOGY | Facility: CLINIC | Age: 74
End: 2019-08-27

## 2019-08-28 ENCOUNTER — TELEPHONE (OUTPATIENT)
Dept: DERMATOLOGY | Facility: CLINIC | Age: 74
End: 2019-08-28

## 2019-08-28 NOTE — TELEPHONE ENCOUNTER
----- Message from Anahy Mitchell LPN sent at 8/28/2019  9:34 AM CDT -----  Contact: Shannan   tel:   [phone]   Mariano,    This was sent to [facility] staff when he asked to speak with someone in [facility] staff. I did call him but he has questions for one of you guys. He will also want his sutures removed because he don't want to come in today if he have to come in tomorrow. Can someone do this for him? Appreciate you guys!     ----- Message -----  From: Mona Garcia  Sent: 8/28/2019   8:54 AM  To: [facility] Staff    Wants someone from [facility] to call him .   Has some questions about the cancer and the removal of it.   Pls call  Today.

## 2019-08-28 NOTE — TELEPHONE ENCOUNTER
Spoke with pt, informed him that he doesn't need to come in for the suture removal today since he is coming in tomorrow to see [facility]. Also let him know that will send his message to [facility] staff so that they can call him back regarding his concerns. Sent a message to [facility] staff to ask them to remove the suture on his visit tomorrow. And sent a message to them and forward this message to them letting them know that this message was sent incorrectly and pt has questions for [facility] staff regarding his appointment and about the removal of the cancer that [facility] will perform.      ----- Message from Mona Garcia sent at 8/28/2019  8:54 AM CDT -----  Contact: Shannan   tel:   [phone]   Wants someone from [facility] to call him .   Has some questions about the cancer and the removal of it.   Pls call  Today.

## 2019-08-28 NOTE — TELEPHONE ENCOUNTER
Spoke to pt and informed him that he will be here a few hours and do not stop taking his medication (Eliquis). Pt was told once again that he will be able to drive himself and the anesthesia is local and will not be put to sleep. Pt verbally stated that he understood the information given to him.

## 2019-08-29 ENCOUNTER — PROCEDURE VISIT (OUTPATIENT)
Dept: DERMATOLOGY | Facility: CLINIC | Age: 74
End: 2019-08-29
Payer: MEDICARE

## 2019-08-29 VITALS
BODY MASS INDEX: 27.83 KG/M2 | DIASTOLIC BLOOD PRESSURE: 82 MMHG | HEIGHT: 73 IN | HEART RATE: 59 BPM | SYSTOLIC BLOOD PRESSURE: 139 MMHG | WEIGHT: 210 LBS

## 2019-08-29 DIAGNOSIS — C44.311 BASAL CELL CARCINOMA OF LEFT SIDE OF NOSE: Primary | ICD-10-CM

## 2019-08-29 PROCEDURE — 14060 PR ADJ TISS XFER LID,NOS,EAR <10 SQCM: ICD-10-PCS | Mod: S$PBB,79,, | Performed by: DERMATOLOGY

## 2019-08-29 PROCEDURE — 99499 NO LOS: ICD-10-PCS | Mod: S$PBB,,, | Performed by: DERMATOLOGY

## 2019-08-29 PROCEDURE — 17312 MOHS ADDL STAGE: CPT | Mod: PBBFAC | Performed by: DERMATOLOGY

## 2019-08-29 PROCEDURE — 17312: ICD-10-PCS | Mod: S$PBB,,, | Performed by: DERMATOLOGY

## 2019-08-29 PROCEDURE — 14060 TIS TRNFR E/N/E/L 10 SQ CM/<: CPT | Mod: S$PBB,79,, | Performed by: DERMATOLOGY

## 2019-08-29 PROCEDURE — 14060 TIS TRNFR E/N/E/L 10 SQ CM/<: CPT | Mod: PBBFAC | Performed by: DERMATOLOGY

## 2019-08-29 PROCEDURE — 17311 MOHS 1 STAGE H/N/HF/G: CPT | Mod: S$PBB,59,79, | Performed by: DERMATOLOGY

## 2019-08-29 PROCEDURE — 17311 MOHS 1 STAGE H/N/HF/G: CPT | Mod: PBBFAC | Performed by: DERMATOLOGY

## 2019-08-29 PROCEDURE — 17311: ICD-10-PCS | Mod: S$PBB,59,79, | Performed by: DERMATOLOGY

## 2019-08-29 PROCEDURE — 17312 MOHS ADDL STAGE: CPT | Mod: S$PBB,,, | Performed by: DERMATOLOGY

## 2019-08-29 PROCEDURE — 99499 UNLISTED E&M SERVICE: CPT | Mod: S$PBB,,, | Performed by: DERMATOLOGY

## 2019-08-29 RX ORDER — CEPHALEXIN 500 MG/1
500 CAPSULE ORAL 3 TIMES DAILY
Qty: 21 CAPSULE | Refills: 0 | Status: SHIPPED | OUTPATIENT
Start: 2019-08-29 | End: 2019-09-05

## 2019-08-29 NOTE — PROGRESS NOTES
PROCEDURE: Mohs' Micrographic Surgery    INDICATION: Location in mask areas of face including central face, nose, eyelids, eyebrows, lips, chin, preauricular, temple, and ear. Biopsy-proven skin cancer of cosmetically and functionally important areas, including head, neck, genital, hand, foot, or areas known for having difficulty in healing, such as the lower anterior legs. Tumor with ill-defined borders. Aggressive histopathology including sclerosing, morpheaform/infiltrating, micronodular, superficial multicentric, poorly differentiated, basosquamous, or perineural invasion.    REFERRING MD: Tiny Pelaez MD    CASE NUMBER:     ANESTHETIC: 5 cc 0.5% Lidocaine with Epi 1:200,000 mixed 1:1 with 0.5% Bupivacaine    SURGICAL PREP: Hibiclens    SURGEON: Olivia Cooper MD    ASSISTANTS: Kamryn Cordero PA-C and Bindu Paige, Surg Tech    PREOPERATIVE DIAGNOSIS: basal cell carcinoma    POSTOPERATIVE DIAGNOSIS: basal cell carcinoma    PATHOLOGIC DIAGNOSIS: basal cell carcinoma- nodular, infiltrating    HISTOLOGY OF SPECIMENS IN FIRST STAGE:   Tumor Type: Tumor seen. Infiltrative basal cell carcinoma: Basaloid tumor in dermis characterized by thin elongated strands of basaloid cells infiltrating between dermal collagen bundles.   Depth of Invasion: epidermis, dermis and subcutaneous tissue  Perineural Invasion: No    HISTOLOGY OF SPECIMENS IN SUBSEQUENT STAGES:  · Tumor Type: No tumor seen.    STAGES OF MOHS' SURGERY PERFORMED: 2    TUMOR-FREE PLANE ACHIEVED: Yes - with deep tissue removal    HEMOSTASIS: bipolar forceps     SPECIMENS: 4 (2 in stage A and 2 in stage B)    LOCATION: left mid nose (dorsum). Patient verified location with hand held mirror.    INITIAL LESION SIZE: 0.4 x 0.4 cm    FINAL DEFECT SIZE: 1.1 x 1.2 cm    WOUND REPAIR/DISPOSITION: The patient tolerated Mohs' Micrographic Surgery for a basal cell carcinoma very well. When the tumor was completely removed, a repair of the surgical defect was  undertaken.      PROCEDURE: Burow's Advancement Flap (Adjacent Tissue Transfer)    INDICATION: Status post Mohs' Micrographic Surgery for basal cell carcinoma.    CASE NUMBER:     SURGEON: Olivia Cooper MD    ASSISTANTS: Kamryn Cordero PA-C and Arnold Gonzales     ANESTHETIC: 2 cc 1% Lidocaine with Epinephrine 1:100,000    SURGICAL PREP: Hibiclens, prepped by Bindu Paige Surg Randal    LOCATION: left mid nose (dorsum)    DEFECT SIZE: 1.1 x 1.2 cm    WOUND REPAIR/DISPOSITION:  After the patient's carcinoma had been completely removed with Mohs' Micrographic Surgery, a repair of the surgical defect was undertaken. The patient was returned to the operating suite where the area of left mid nasal dorsum was prepped, draped, and anesthetized in the usual sterile fashion. A Burow's advancement flap was designed in the inferior surrounding tissue of the nasal tip. A Burow's triangle was drawn in superiorly to help with tissue movement. Then with a #15 blade the flap was incised and the Burow's triangle was excised, the area was widely undermined in the submuscular tissue plane. Then, bipolar forceps were used to obtain meticulous hemostasis. A 4-0 Vicryl pexing suture was performed by attaching the underside of the most medial aspect of the flap to the left mid nasal dorsum. The flap was then advanced in by a complex closure. Then, 4-0 Vicryl buried vertical mattress sutures were placed into the subcutaneous and dermal plane to close the wound and rosalie the cutaneous wound edge. The flap was then trimmed to fit the defect. The cutaneous wound edges were closed using interrupted 5-0 Prolene sutures.    The patient tolerated the procedure well.    The area was cleaned and dressed appropriately and the patient was given wound care instructions, as well as an appointment for follow-up evaluation. Patient already has pain medication at home and was placed on Keflex 500 mg TID x 7 days.    SIZE OF FLAP: 6 cm  "squared    Vitals:    08/29/19 0729 08/29/19 1048   BP: 125/79 139/82   BP Location: Right arm Left arm   Patient Position: Sitting Sitting   BP Method: Large (Automatic) Small (Automatic)   Pulse: 64 (!) 59   Weight: 95.3 kg (210 lb)    Height: 6' 1" (1.854 m)        "

## 2019-08-31 ENCOUNTER — EXTERNAL CHRONIC CARE MANAGEMENT (OUTPATIENT)
Dept: PRIMARY CARE CLINIC | Facility: CLINIC | Age: 74
End: 2019-08-31
Payer: MEDICARE

## 2019-08-31 PROCEDURE — 99490 CHRNC CARE MGMT STAFF 1ST 20: CPT | Mod: PBBFAC,PO | Performed by: FAMILY MEDICINE

## 2019-08-31 PROCEDURE — 99490 CHRNC CARE MGMT STAFF 1ST 20: CPT | Mod: S$PBB,,, | Performed by: FAMILY MEDICINE

## 2019-08-31 PROCEDURE — 99490 PR CHRONIC CARE MGMT, 1ST 20 MIN: ICD-10-PCS | Mod: S$PBB,,, | Performed by: FAMILY MEDICINE

## 2019-09-03 ENCOUNTER — PATIENT MESSAGE (OUTPATIENT)
Dept: CARDIOLOGY | Facility: CLINIC | Age: 74
End: 2019-09-03

## 2019-09-04 RX ORDER — CHLORTHALIDONE 25 MG/1
25 TABLET ORAL DAILY
Qty: 30 TABLET | Refills: 0 | Status: SHIPPED | OUTPATIENT
Start: 2019-09-04 | End: 2019-10-21 | Stop reason: SDUPTHER

## 2019-09-05 ENCOUNTER — OFFICE VISIT (OUTPATIENT)
Dept: DERMATOLOGY | Facility: CLINIC | Age: 74
End: 2019-09-05
Payer: MEDICARE

## 2019-09-05 DIAGNOSIS — Z09 POSTOP CHECK: Primary | ICD-10-CM

## 2019-09-05 PROCEDURE — 99999 PR PBB SHADOW E&M-EST. PATIENT-LVL III: CPT | Mod: PBBFAC,,, | Performed by: DERMATOLOGY

## 2019-09-05 PROCEDURE — 99999 PR PBB SHADOW E&M-EST. PATIENT-LVL III: ICD-10-PCS | Mod: PBBFAC,,, | Performed by: DERMATOLOGY

## 2019-09-05 PROCEDURE — 99024 POSTOP FOLLOW-UP VISIT: CPT | Mod: POP,,, | Performed by: DERMATOLOGY

## 2019-09-05 PROCEDURE — 99024 PR POST-OP FOLLOW-UP VISIT: ICD-10-PCS | Mod: POP,,, | Performed by: DERMATOLOGY

## 2019-09-05 PROCEDURE — 99213 OFFICE O/P EST LOW 20 MIN: CPT | Mod: PBBFAC | Performed by: DERMATOLOGY

## 2019-09-05 NOTE — PROGRESS NOTES
74 y.o. male patient is here for suture removal following Mohs' surgery.    Patient reports no problems left mid nose (dorsum).    WOUND PE:  The left mid nose (dorsum) sutures intact. Wound healing well. Good skin edges. No signs or symptoms of infection. Flap is pink and viable.    IMPRESSION:  Healing operative site from Mohs' surgery BCC, left mid nose (dorsum) s/p Mohs with Burow's Advancement Flap, postop day # 7.    PLAN:  Sutures removed today. Steri-strips applied.  Continue wound care.  Keep moist with Aquaphor.    RTC:  In 1 month.

## 2019-09-12 ENCOUNTER — PATIENT MESSAGE (OUTPATIENT)
Dept: DERMATOLOGY | Facility: CLINIC | Age: 74
End: 2019-09-12

## 2019-09-17 ENCOUNTER — OFFICE VISIT (OUTPATIENT)
Dept: UROLOGY | Facility: CLINIC | Age: 74
End: 2019-09-17
Payer: MEDICARE

## 2019-09-17 VITALS
SYSTOLIC BLOOD PRESSURE: 122 MMHG | DIASTOLIC BLOOD PRESSURE: 61 MMHG | WEIGHT: 207 LBS | HEIGHT: 73 IN | HEART RATE: 67 BPM | BODY MASS INDEX: 27.43 KG/M2

## 2019-09-17 DIAGNOSIS — I10 ESSENTIAL HYPERTENSION: ICD-10-CM

## 2019-09-17 DIAGNOSIS — N40.0 BENIGN PROSTATIC HYPERPLASIA, UNSPECIFIED WHETHER LOWER URINARY TRACT SYMPTOMS PRESENT: Primary | ICD-10-CM

## 2019-09-17 DIAGNOSIS — N18.30 CKD (CHRONIC KIDNEY DISEASE) STAGE 3, GFR 30-59 ML/MIN: ICD-10-CM

## 2019-09-17 DIAGNOSIS — R35.1 NOCTURIA: ICD-10-CM

## 2019-09-17 PROCEDURE — 99999 PR PBB SHADOW E&M-EST. PATIENT-LVL III: ICD-10-PCS | Mod: PBBFAC,,, | Performed by: UROLOGY

## 2019-09-17 PROCEDURE — 99999 PR PBB SHADOW E&M-EST. PATIENT-LVL III: CPT | Mod: PBBFAC,,, | Performed by: UROLOGY

## 2019-09-17 PROCEDURE — 99213 OFFICE O/P EST LOW 20 MIN: CPT | Mod: PBBFAC | Performed by: UROLOGY

## 2019-09-17 PROCEDURE — 99214 PR OFFICE/OUTPT VISIT, EST, LEVL IV, 30-39 MIN: ICD-10-PCS | Mod: S$PBB,,, | Performed by: UROLOGY

## 2019-09-17 PROCEDURE — 99214 OFFICE O/P EST MOD 30 MIN: CPT | Mod: S$PBB,,, | Performed by: UROLOGY

## 2019-09-17 NOTE — PROGRESS NOTES
Subjective:       Patient ID: Shannan Norman is a 74 y.o. male.    Chief Complaint:  LUTs.    Patient is a 74 y.o. male who is established to our clinic and was initially referred by their PCP, Dr. Paulson for evaluation of LUTs.  He was recently seen in July of this year.    History of Present Illness  Benign Prostatic Hypertrophy   This is a chronic problem. The current episode started more than 1 year ago. The problem has been gradually improving since onset. Irritative symptoms include nocturia (5-6x/night--->2-3x/night) and urgency. Obstructive symptoms include incomplete emptying and a weak stream (mild). Obstructive symptoms do not include an intermittent stream or a slower stream. Pertinent negatives include no chills, dysuria, nausea or vomiting. The symptoms are aggravated by caffeine (has not had coffee since his last visit. ). Past treatments include finasteride and tamsulosin (oxybutynine). The treatment provided mild relief. He has been using treatment for 6 to 12 months.          Lab Results   Component Value Date    PSA 0.28 02/05/2019    PSA 0.43 08/05/2017    PSA 0.63 07/25/2016    PSA 0.67 03/25/2013    PSA 0.59 01/30/2012    PSA 0.62 01/21/2011    PSA 0.39 01/09/2010    PSA 0.47 12/08/2008    PSA 0.4 12/06/2007    PSA 0.9 11/20/2006    PSA 0.5 11/16/2005    PSA 0.7 08/23/2004           Review of Systems  Review of Systems   Constitutional: Negative for chills.   Gastrointestinal: Negative for nausea and vomiting.   Genitourinary: Positive for incomplete emptying, nocturia (5-6x/night--->2-3x/night) and urgency. Negative for dysuria.          Objective:     Physical Exam   Constitutional: He is oriented to person, place, and time. He appears well-developed and well-nourished. No distress.   HENT:   Head: Normocephalic and atraumatic.   Eyes: No scleral icterus.   Neck: No tracheal deviation present.   Pulmonary/Chest: Effort normal. No respiratory distress.   Neurological: He is alert and oriented  to person, place, and time.   Psychiatric: He has a normal mood and affect. His behavior is normal. Judgment and thought content normal.       Lab Review  Lab Results   Component Value Date    COLORU yellow 02/05/2019    SPECGRAV 1.015 02/05/2019    PHUR 5 02/05/2019    WBCUR neg 02/05/2019    NITRITE neg 02/05/2019    PROTEINUR trace 02/05/2019    GLUCOSEUR norm 02/05/2019    KETONESU neg 02/05/2019    UROBILINOGEN norm 02/05/2019    BILIRUBINUR neg 02/05/2019    RBCUR neg 02/05/2019         Assessment:        1. Benign prostatic hyperplasia, unspecified whether lower urinary tract symptoms present    2. CKD (chronic kidney disease) stage 3, GFR 30-59 ml/min    3. Nocturia    4. Essential hypertension            Plan:     Benign prostatic hyperplasia, unspecified whether lower urinary tract symptoms present    CKD (chronic kidney disease) stage 3, GFR 30-59 ml/min    Nocturia    Essential hypertension      -Avoid bladder irritants including but not limited to caffeine, alcohol, smoking, spicy foods, acidic foods, tomato-based products, citrus, artificial sweeteners, chocolate, coffee or tea.  -previous post void residuals have been performed immediately after voiding. The patient's PVR was noted to be minimal.  -psa reviewed===<1 consistently at his age, no further psa testing required.     --BP reviewed  -stable, continue meds and f/u with PCP    -serum Cr reviewed and normal at 1.2 on 8/9/19.    -Limit fluids 2 hours before bedtime.  Elevated legs 2 hours before bedtime.  No caffeine, cokes, teas after 3 pm in the afternoon.    F/U PRN with sharona

## 2019-09-20 ENCOUNTER — PATIENT MESSAGE (OUTPATIENT)
Dept: FAMILY MEDICINE | Facility: CLINIC | Age: 74
End: 2019-09-20

## 2019-09-20 DIAGNOSIS — I42.8 NICM (NONISCHEMIC CARDIOMYOPATHY): ICD-10-CM

## 2019-09-20 RX ORDER — BENAZEPRIL HYDROCHLORIDE 20 MG/1
20 TABLET ORAL DAILY
Qty: 90 TABLET | Refills: 3
Start: 2019-09-20 | End: 2019-09-23 | Stop reason: SDUPTHER

## 2019-09-23 DIAGNOSIS — I42.8 NICM (NONISCHEMIC CARDIOMYOPATHY): ICD-10-CM

## 2019-09-23 RX ORDER — BENAZEPRIL HYDROCHLORIDE 20 MG/1
20 TABLET ORAL DAILY
Qty: 90 TABLET | Refills: 3 | Status: SHIPPED | OUTPATIENT
Start: 2019-09-23 | End: 2020-09-21

## 2019-09-30 ENCOUNTER — EXTERNAL CHRONIC CARE MANAGEMENT (OUTPATIENT)
Dept: PRIMARY CARE CLINIC | Facility: CLINIC | Age: 74
End: 2019-09-30
Payer: MEDICARE

## 2019-09-30 PROCEDURE — 99490 PR CHRONIC CARE MGMT, 1ST 20 MIN: ICD-10-PCS | Mod: S$PBB,,, | Performed by: FAMILY MEDICINE

## 2019-09-30 PROCEDURE — 99490 CHRNC CARE MGMT STAFF 1ST 20: CPT | Mod: S$PBB,,, | Performed by: FAMILY MEDICINE

## 2019-09-30 PROCEDURE — 99490 CHRNC CARE MGMT STAFF 1ST 20: CPT | Mod: PBBFAC,PO | Performed by: FAMILY MEDICINE

## 2019-10-01 ENCOUNTER — CLINICAL SUPPORT (OUTPATIENT)
Dept: CARDIOLOGY | Facility: HOSPITAL | Age: 74
End: 2019-10-01
Payer: MEDICARE

## 2019-10-01 PROCEDURE — 93294 REM INTERROG EVL PM/LDLS PM: CPT | Mod: ,,, | Performed by: INTERNAL MEDICINE

## 2019-10-01 PROCEDURE — 93294 CARDIAC DEVICE CHECK - REMOTE: ICD-10-PCS | Mod: ,,, | Performed by: INTERNAL MEDICINE

## 2019-10-01 PROCEDURE — 93296 REM INTERROG EVL PM/IDS: CPT | Performed by: INTERNAL MEDICINE

## 2019-10-15 ENCOUNTER — OFFICE VISIT (OUTPATIENT)
Dept: DERMATOLOGY | Facility: CLINIC | Age: 74
End: 2019-10-15
Payer: MEDICARE

## 2019-10-15 ENCOUNTER — RESEARCH ENCOUNTER (OUTPATIENT)
Dept: RESEARCH | Facility: HOSPITAL | Age: 74
End: 2019-10-15
Payer: MEDICARE

## 2019-10-15 DIAGNOSIS — Z09 POSTOP CHECK: Primary | ICD-10-CM

## 2019-10-15 PROCEDURE — 99999 PR PBB SHADOW E&M-EST. PATIENT-LVL III: CPT | Mod: PBBFAC,,, | Performed by: DERMATOLOGY

## 2019-10-15 PROCEDURE — 99213 OFFICE O/P EST LOW 20 MIN: CPT | Mod: PBBFAC | Performed by: DERMATOLOGY

## 2019-10-15 PROCEDURE — 99999 PR PBB SHADOW E&M-EST. PATIENT-LVL III: ICD-10-PCS | Mod: PBBFAC,,, | Performed by: DERMATOLOGY

## 2019-10-15 PROCEDURE — 99024 PR POST-OP FOLLOW-UP VISIT: ICD-10-PCS | Mod: POP,,, | Performed by: DERMATOLOGY

## 2019-10-15 PROCEDURE — 99024 POSTOP FOLLOW-UP VISIT: CPT | Mod: POP,,, | Performed by: DERMATOLOGY

## 2019-10-15 NOTE — PROGRESS NOTES
74 y.o. male patient is here for wound check after surgery.    Patient reports no problems.    WOUND PE:  The L mid nose flap is well healed. Mild firmness and erythema of scar. No nodularity.      IMPRESSION:  Healing operative site from Mohs' surgery, BCC L mid nose s/p Mohs with Burow's Advancement Flap, postop week #6.    PLAN:  Some firmness and textural irregularity to scar, disc dermabrasion, pt not interested.   Recommended massage tid.  Reassured patient that redness and firmness will fade with time.  Daily SPF.  Regular skin checks.    RTC:  In 3-6 months with Tiny Pelaez MD for skin check or sooner if new concern arises.

## 2019-10-15 NOTE — PROGRESS NOTES
Study: Luis M MRI  Sponsor: [facility]  Follow-up Visit: 18 month visit  Date of Visit: 15Oct2019     Patient wishes to continue in study: Yes  All study protocol required CRFs completed: Yes     Mr. Norman is here for the 18 month follow up visit. He denies any cardiovascular related  hospitalizations or any other adverse events since the previous device check. Radha Lennon from [employer] checked the patient's device and all questions were answered to his satisfaction; he will contact research staff if he has any questions or concerns. Next follow up visit is in six months.

## 2019-10-21 ENCOUNTER — PATIENT MESSAGE (OUTPATIENT)
Dept: FAMILY MEDICINE | Facility: CLINIC | Age: 74
End: 2019-10-21

## 2019-10-21 ENCOUNTER — PATIENT MESSAGE (OUTPATIENT)
Dept: DERMATOLOGY | Facility: CLINIC | Age: 74
End: 2019-10-21

## 2019-10-21 ENCOUNTER — PATIENT MESSAGE (OUTPATIENT)
Dept: UROLOGY | Facility: CLINIC | Age: 74
End: 2019-10-21

## 2019-10-21 RX ORDER — TAMSULOSIN HYDROCHLORIDE 0.4 MG/1
0.4 CAPSULE ORAL
Qty: 30 CAPSULE | Refills: 12 | Status: SHIPPED | OUTPATIENT
Start: 2019-10-21 | End: 2020-11-03 | Stop reason: SDUPTHER

## 2019-10-22 RX ORDER — CHLORTHALIDONE 25 MG/1
25 TABLET ORAL DAILY
Qty: 90 TABLET | Refills: 3 | Status: SHIPPED | OUTPATIENT
Start: 2019-10-22 | End: 2020-01-29

## 2019-10-22 NOTE — TELEPHONE ENCOUNTER
Spoke w/ pharmacy tech at [facility]. Take 1 tablet daily. Patient takes 1/2 tablet.      ----- Message from Penny Zhao sent at 10/22/2019 11:25 AM CDT -----  Contact: Pt Eduardo Cosme [phone]  Need to ask a question about his Medication chlorthalidone (HYGROTEN) 25 MG Tab    Please call Hang the jaret at [facility] [phone]

## 2019-10-31 ENCOUNTER — EXTERNAL CHRONIC CARE MANAGEMENT (OUTPATIENT)
Dept: PRIMARY CARE CLINIC | Facility: CLINIC | Age: 74
End: 2019-10-31
Payer: MEDICARE

## 2019-10-31 PROCEDURE — 99490 PR CHRONIC CARE MGMT, 1ST 20 MIN: ICD-10-PCS | Mod: S$PBB,,, | Performed by: FAMILY MEDICINE

## 2019-10-31 PROCEDURE — 99490 CHRNC CARE MGMT STAFF 1ST 20: CPT | Mod: S$PBB,,, | Performed by: FAMILY MEDICINE

## 2019-10-31 PROCEDURE — 99490 CHRNC CARE MGMT STAFF 1ST 20: CPT | Mod: PBBFAC,PO | Performed by: FAMILY MEDICINE

## 2019-11-19 ENCOUNTER — PATIENT MESSAGE (OUTPATIENT)
Dept: FAMILY MEDICINE | Facility: CLINIC | Age: 74
End: 2019-11-19

## 2019-11-20 ENCOUNTER — OFFICE VISIT (OUTPATIENT)
Dept: INTERNAL MEDICINE | Facility: CLINIC | Age: 74
End: 2019-11-20
Payer: MEDICARE

## 2019-11-20 VITALS
HEART RATE: 74 BPM | WEIGHT: 209.44 LBS | BODY MASS INDEX: 27.76 KG/M2 | HEIGHT: 73 IN | OXYGEN SATURATION: 98 % | DIASTOLIC BLOOD PRESSURE: 68 MMHG | SYSTOLIC BLOOD PRESSURE: 120 MMHG

## 2019-11-20 DIAGNOSIS — M10.9 ACUTE GOUT OF RIGHT KNEE, UNSPECIFIED CAUSE: Primary | ICD-10-CM

## 2019-11-20 DIAGNOSIS — M25.569 KNEE PAIN, UNSPECIFIED CHRONICITY, UNSPECIFIED LATERALITY: ICD-10-CM

## 2019-11-20 PROCEDURE — 99213 OFFICE O/P EST LOW 20 MIN: CPT | Mod: PBBFAC,PO | Performed by: INTERNAL MEDICINE

## 2019-11-20 PROCEDURE — 1126F PR PAIN SEVERITY QUANTIFIED, NO PAIN PRESENT: ICD-10-PCS | Mod: ,,, | Performed by: INTERNAL MEDICINE

## 2019-11-20 PROCEDURE — 99999 PR PBB SHADOW E&M-EST. PATIENT-LVL III: CPT | Mod: PBBFAC,,, | Performed by: INTERNAL MEDICINE

## 2019-11-20 PROCEDURE — 99999 PR PBB SHADOW E&M-EST. PATIENT-LVL III: ICD-10-PCS | Mod: PBBFAC,,, | Performed by: INTERNAL MEDICINE

## 2019-11-20 PROCEDURE — 1159F PR MEDICATION LIST DOCUMENTED IN MEDICAL RECORD: ICD-10-PCS | Mod: ,,, | Performed by: INTERNAL MEDICINE

## 2019-11-20 PROCEDURE — 1159F MED LIST DOCD IN RCRD: CPT | Mod: ,,, | Performed by: INTERNAL MEDICINE

## 2019-11-20 PROCEDURE — 1126F AMNT PAIN NOTED NONE PRSNT: CPT | Mod: ,,, | Performed by: INTERNAL MEDICINE

## 2019-11-20 PROCEDURE — 99213 PR OFFICE/OUTPT VISIT, EST, LEVL III, 20-29 MIN: ICD-10-PCS | Mod: S$PBB,,, | Performed by: INTERNAL MEDICINE

## 2019-11-20 PROCEDURE — 99213 OFFICE O/P EST LOW 20 MIN: CPT | Mod: S$PBB,,, | Performed by: INTERNAL MEDICINE

## 2019-11-20 RX ORDER — PREDNISONE 20 MG/1
40 TABLET ORAL DAILY
Qty: 10 TABLET | Refills: 0 | Status: SHIPPED | OUTPATIENT
Start: 2019-11-20 | End: 2019-11-25

## 2019-11-20 NOTE — PATIENT INSTRUCTIONS
Treating Gout Attacks     Raising the joint above the level of your heart can help reduce gout symptoms.     Gout is a disease that affects the joints. It is caused by excess uric acid in your blood stream that may lead to crystals forming in your joints. Left untreated, it can lead to painful foot and joint deformities and even kidney problems. But, by treating gout early, you can relieve pain and help prevent future problems. Gout can usually be treated with medication and proper diet. In severe cases, surgery may be needed.  Gout attacks are painful and often happen more than once. Taking medications may reduce pain and prevent attacks in the future. There are also some things you can do at home to relieve symptoms.  Medications for gout  Your healthcare provider may prescribe a daily medication to reduce levels of uric acid. Reducing your uric acid levels may help prevent gout attacks. Allopurinol is one commonly used medication taken daily to reduce uric acid levels. Other medications can help relieve pain and swelling during an acute attack. Medicines such as NSAIDs (nonsteroidal anti-inflammatory medicines), steroids, and colchicine may be prescribed for intermittent use to relieve an acute gout attack. Be sure to take your medication as directed.  What you can do  Below are some things you can do at home to relieve gout symptoms. Your healthcare provider may have other tips.  · Rest the painful joint as much as you can.  · Raise the painful joint so it is at a level higher than your heart.  · Use ice for 10 minutes every 1-2 hours as possible.  How can I prevent gout?  With a little effort, you may be able to prevent gout attacks in the future. Here are some things you can do:  · Avoid foods high in purines  ¨ Certain meats (red meat, processed meat, turkey)  ¨ Organ meats (kidney, liver, sweetbread)  ¨ Shellfish (lobster, crab, shrimp, scallop, mussel)  ¨ Certain fish (anchovy, sardine, herring,  mackerel)  · Take any medications prescribed by your healthcare provider.  · Lose weight if you need to.  · Reduce high fructose corn syrup in meals and drinks.  · Reduce or eliminate consumption of alcohol, particularly beer, but also red wine and spirits.  · Control blood pressure, diabetes, and cholesterol.  · Drink plenty of water to help flush uric acid from your body.  Date Last Reviewed: 2/1/2016  © 2008-2525 [employer]. [address] [place] [address], [place] [address]. All rights reserved. This information is not intended as a substitute for professional medical care. Always follow your healthcare professional's instructions.

## 2019-11-20 NOTE — PROGRESS NOTES
"Subjective:       Patient ID: Shannan Norman is a 74 y.o. male.    Chief Complaint: Knee Pain  knee pain  This patient is new to me        Knee Pain  Onset; yesterday 10 am worsening . Started to swelling at the same time.   Location; Right knee  Duration; constant  Scale;  9/10  Description; knife cutting  Alleviation; not taking any current medication   Assoicated; warmth , redness, swelling  Denies  - able to walk and bend the knee   - fevers, chills, night sweats  Previous epidsoes  R and L knee injection 4/2019   Gout flare in 3/2019, 8/2019   Prior imaging  MRI knee R  - complex tear of posterior horn medial meniscius -3/2019 , chondromalacia  Meds; patient is on eliquis for afib  Surgery; none. No truama            HPI  Review of Systems   Constitutional:        See hpi       Objective:       /68 (BP Location: Right arm, Patient Position: Sitting, BP Method: Medium (Manual))   Pulse 74   Ht 6' 1" (1.854 m)   Wt 95 kg (209 lb 7 oz)   SpO2 98%   BMI 27.63 kg/m²     Physical Exam   Constitutional: He appears well-developed and well-nourished. No distress.   HENT:   Head: Normocephalic.   Mouth/Throat: Oropharynx is clear and moist. No oropharyngeal exudate.   Eyes: Pupils are equal, round, and reactive to light. Conjunctivae are normal. Right eye exhibits no discharge. Left eye exhibits no discharge.   Cardiovascular: Normal rate, regular rhythm and normal heart sounds. Exam reveals no gallop and no friction rub.   No murmur heard.  Pulmonary/Chest: Effort normal. No respiratory distress.   Abdominal: Soft. He exhibits no distension.   Musculoskeletal:   Swollen erythematous right knee   No superficial cellulitis or fluctuance  Normal ROM but elicits pain  Limping gait   Neurological: He is alert.   Skin: Skin is warm. He is not diaphoretic.   Psychiatric: He has a normal mood and affect.   Nursing note and vitals reviewed.        Lab Results   Component Value Date    URICACID 7.3 (H) 03/20/2019 "     BMP  Lab Results   Component Value Date     08/09/2019    K 4.3 08/09/2019     08/09/2019    CO2 27 08/09/2019    BUN 23 08/09/2019    CREATININE 1.2 08/09/2019    CALCIUM 9.8 08/09/2019    ANIONGAP 10 08/09/2019    ESTGFRAFRICA >60.0 08/09/2019    EGFRNONAA 58.8 (A) 08/09/2019         Assessment:       1. Acute gout of right knee, unspecified cause    2. Knee pain, unspecified chronicity, unspecified laterality            Plan:       Shannan was seen today for knee pain.    Diagnoses and all orders for this visit:    Acute gout of right knee, unspecified cause    Knee pain, unspecified chronicity, unspecified lateralityNew : worsening  Signs, symptoms consistent with acute gout flare  Nothing on history or pyhsical exam to suggest bacterial infection osteomyelitis or septic joint. No signs or history of ligamentous injury  I provided instruction on supportive care measures  Including restriction of shellfish which he still eats  Sent rx for steroid to pharmacy and instructed to follow up with PCP would recommend starting ppx allopurinol given this is 3rd episode this year   reviewed signs and symptoms that should prompt return to provider or evaluation in the ED  -     predniSONE (DELTASONE) 20 MG tablet; Take 2 tablets (40 mg total) by mouth once daily. for 5 days

## 2019-11-22 DIAGNOSIS — H04.123 BILATERAL DRY EYES: ICD-10-CM

## 2019-11-22 RX ORDER — CYCLOSPORINE 0.5 MG/ML
1 EMULSION OPHTHALMIC 2 TIMES DAILY
Qty: 60 VIAL | Refills: 12 | Status: SHIPPED | OUTPATIENT
Start: 2019-11-22 | End: 2020-12-14 | Stop reason: SDUPTHER

## 2019-11-24 ENCOUNTER — RESEARCH ENCOUNTER (OUTPATIENT)
Dept: RESEARCH | Facility: HOSPITAL | Age: 74
End: 2019-11-24

## 2019-11-27 NOTE — PROGRESS NOTES
Study: Luis M MRI  Sponsor: [facility]  Follow-up Visit: 24 month visit  Date of Visit: 24Nov2019     Patient wishes to continue in study: Yes  All study protocol required CRFs completed: Yes     A remote transmission was completed by patient for his 24-month follow-up visit; no episodes reported.    EMR reviewed: no adverse events or other protocol related events.  Next follow-up in clinic for ppm interrogation will be in approximately 6 months.

## 2019-12-07 ENCOUNTER — TELEPHONE (OUTPATIENT)
Dept: FAMILY MEDICINE | Facility: CLINIC | Age: 74
End: 2019-12-07

## 2019-12-07 NOTE — TELEPHONE ENCOUNTER
Discussed with patient that during a routine audit we discovered a variation in the temperature of our medication refrigerator which may have affected the potency and effectiveness of our vaccines. The vaccine was not harmful but may have been ineffective so we recommend revaccination. Appointment set up for revaccination. He verbalized understanding and all questions were answered.

## 2019-12-30 ENCOUNTER — CLINICAL SUPPORT (OUTPATIENT)
Dept: CARDIOLOGY | Facility: HOSPITAL | Age: 74
End: 2019-12-30
Attending: INTERNAL MEDICINE
Payer: MEDICARE

## 2019-12-30 DIAGNOSIS — Z95.0 CARDIAC PACEMAKER IN SITU: ICD-10-CM

## 2019-12-30 DIAGNOSIS — I42.8 NICM (NONISCHEMIC CARDIOMYOPATHY): ICD-10-CM

## 2019-12-30 DIAGNOSIS — I49.5 SSS (SICK SINUS SYNDROME): ICD-10-CM

## 2019-12-30 PROCEDURE — 93294 CARDIAC DEVICE CHECK - REMOTE: ICD-10-PCS | Mod: ,,, | Performed by: INTERNAL MEDICINE

## 2019-12-30 PROCEDURE — 93296 REM INTERROG EVL PM/IDS: CPT | Performed by: INTERNAL MEDICINE

## 2019-12-30 PROCEDURE — 93294 REM INTERROG EVL PM/LDLS PM: CPT | Mod: ,,, | Performed by: INTERNAL MEDICINE

## 2020-01-02 ENCOUNTER — TELEPHONE (OUTPATIENT)
Dept: FAMILY MEDICINE | Facility: CLINIC | Age: 75
End: 2020-01-02

## 2020-01-02 ENCOUNTER — PATIENT MESSAGE (OUTPATIENT)
Dept: FAMILY MEDICINE | Facility: CLINIC | Age: 75
End: 2020-01-02

## 2020-01-02 DIAGNOSIS — E79.0 HYPERURICEMIA: Primary | ICD-10-CM

## 2020-01-02 RX ORDER — ALLOPURINOL 100 MG/1
100 TABLET ORAL DAILY
Qty: 90 TABLET | Refills: 3 | Status: SHIPPED | OUTPATIENT
Start: 2020-01-02 | End: 2020-12-28

## 2020-01-02 NOTE — TELEPHONE ENCOUNTER
I ordered allopurinol 100 mg once a day for gout prevention.    Prescription was sent to the pharmacy.    Please notify the patient that he cannot use this medicine  during a flare up of gout because can worsening the episode.     He can use allopurinol daily to reduce the gout recurrence.

## 2020-01-07 ENCOUNTER — PATIENT OUTREACH (OUTPATIENT)
Dept: ADMINISTRATIVE | Facility: OTHER | Age: 75
End: 2020-01-07

## 2020-01-07 ENCOUNTER — TELEPHONE (OUTPATIENT)
Dept: FAMILY MEDICINE | Facility: CLINIC | Age: 75
End: 2020-01-07

## 2020-01-07 ENCOUNTER — DOCUMENTATION ONLY (OUTPATIENT)
Dept: FAMILY MEDICINE | Facility: CLINIC | Age: 75
End: 2020-01-07

## 2020-01-07 DIAGNOSIS — M10.9 GOUT OF FOOT, UNSPECIFIED CAUSE, UNSPECIFIED CHRONICITY, UNSPECIFIED LATERALITY: Primary | ICD-10-CM

## 2020-01-07 RX ORDER — INDOMETHACIN 50 MG/1
50 CAPSULE ORAL 2 TIMES DAILY WITH MEALS
Qty: 14 CAPSULE | Refills: 0 | Status: SHIPPED | OUTPATIENT
Start: 2020-01-07 | End: 2020-01-14

## 2020-01-07 RX ORDER — COLCHICINE 0.6 MG/1
0.6 TABLET ORAL DAILY
Qty: 7 TABLET | Refills: 0 | Status: SHIPPED | OUTPATIENT
Start: 2020-01-07 | End: 2020-01-29 | Stop reason: SDUPTHER

## 2020-01-07 NOTE — TELEPHONE ENCOUNTER
Medicine for gout was sent to the pharmacy for 1 week.    Is patient continue with the symptoms he needs to come for urgent care appointment.     Please advise do not use allopurinol for now.    Please notify the patient.

## 2020-01-08 ENCOUNTER — DOCUMENTATION ONLY (OUTPATIENT)
Dept: FAMILY MEDICINE | Facility: CLINIC | Age: 75
End: 2020-01-08

## 2020-01-08 ENCOUNTER — OFFICE VISIT (OUTPATIENT)
Dept: OPTOMETRY | Facility: CLINIC | Age: 75
End: 2020-01-08
Payer: MEDICARE

## 2020-01-08 DIAGNOSIS — H04.123 BILATERAL DRY EYES: Primary | ICD-10-CM

## 2020-01-08 PROCEDURE — 92012 PR EYE EXAM, EST PATIENT,INTERMED: ICD-10-PCS | Mod: S$PBB,,, | Performed by: OPTOMETRIST

## 2020-01-08 PROCEDURE — 99999 PR PBB SHADOW E&M-EST. PATIENT-LVL II: CPT | Mod: PBBFAC,,, | Performed by: OPTOMETRIST

## 2020-01-08 PROCEDURE — 99999 PR PBB SHADOW E&M-EST. PATIENT-LVL II: ICD-10-PCS | Mod: PBBFAC,,, | Performed by: OPTOMETRIST

## 2020-01-08 PROCEDURE — 99212 OFFICE O/P EST SF 10 MIN: CPT | Mod: PBBFAC,PO | Performed by: OPTOMETRIST

## 2020-01-08 PROCEDURE — 92012 INTRM OPH EXAM EST PATIENT: CPT | Mod: S$PBB,,, | Performed by: OPTOMETRIST

## 2020-01-08 RX ORDER — FLUOROMETHOLONE 1 MG/ML
1 SUSPENSION/ DROPS OPHTHALMIC 4 TIMES DAILY
Qty: 5 ML | Refills: 0 | Status: SHIPPED | OUTPATIENT
Start: 2020-01-08 | End: 2020-01-22

## 2020-01-08 RX ORDER — MELOXICAM 15 MG/1
TABLET ORAL
COMMUNITY
Start: 2018-05-18 | End: 2020-02-18

## 2020-01-08 NOTE — PROGRESS NOTES
NICO KNOTT 04/2019  Diabetic  this morning.  Patient has noticed a change   in vision since the last exam.  Patient states clarity is an issue but   doesn't wear glasses most of the time.  Glasses about 25 yrs. Old and are   distance only.  Eyes feel dry and scratchy.  Using Restasis BID OU.    Patient does not want a refraction and states he will not wear glasses.    Hemoglobin A1C       Date                     Value               Ref Range             Status                08/09/2019               6.1 (H)             4.0 - 5.6 %           Final                02/04/2019               6.2 (H)             4.0 - 5.6 %           Final                08/02/2018               6.0 (H)             4.0 - 5.6 %           Final                  Last edited by Bhavna Giron on 1/8/2020  9:14 AM. (History)            Assessment /Plan     For exam results, see Encounter Report.    Bilateral dry eyes  -     fluorometholone 0.1% (FML) 0.1 % DrpS; Place 1 drop into both eyes 4 (four) times daily. for 14 days  Dispense: 5 mL; Refill: 0      1. Flare up, start FML drops 4x/day x 2 weeks, if better can restart Restasis, if no better can call or RTC.

## 2020-01-16 ENCOUNTER — PATIENT OUTREACH (OUTPATIENT)
Dept: ADMINISTRATIVE | Facility: OTHER | Age: 75
End: 2020-01-16

## 2020-01-17 ENCOUNTER — PES CALL (OUTPATIENT)
Dept: ADMINISTRATIVE | Facility: CLINIC | Age: 75
End: 2020-01-17

## 2020-01-20 ENCOUNTER — OFFICE VISIT (OUTPATIENT)
Dept: DERMATOLOGY | Facility: CLINIC | Age: 75
End: 2020-01-20
Payer: MEDICARE

## 2020-01-20 DIAGNOSIS — D48.5 NEOPLASM OF UNCERTAIN BEHAVIOR OF SKIN: Primary | ICD-10-CM

## 2020-01-20 DIAGNOSIS — L82.1 SEBORRHEIC KERATOSES: ICD-10-CM

## 2020-01-20 DIAGNOSIS — Z12.83 SCREENING EXAM FOR SKIN CANCER: ICD-10-CM

## 2020-01-20 DIAGNOSIS — L57.0 ACTINIC KERATOSIS: ICD-10-CM

## 2020-01-20 DIAGNOSIS — C44.311 BASAL CELL CARCINOMA OF LEFT SIDE OF NOSE: ICD-10-CM

## 2020-01-20 PROCEDURE — 1126F PR PAIN SEVERITY QUANTIFIED, NO PAIN PRESENT: ICD-10-PCS | Mod: ,,, | Performed by: DERMATOLOGY

## 2020-01-20 PROCEDURE — 17003 DESTRUCT PREMALG LES 2-14: CPT | Mod: 59,S$PBB,, | Performed by: DERMATOLOGY

## 2020-01-20 PROCEDURE — 1159F PR MEDICATION LIST DOCUMENTED IN MEDICAL RECORD: ICD-10-PCS | Mod: ,,, | Performed by: DERMATOLOGY

## 2020-01-20 PROCEDURE — 1126F AMNT PAIN NOTED NONE PRSNT: CPT | Mod: ,,, | Performed by: DERMATOLOGY

## 2020-01-20 PROCEDURE — 17000 PR DESTRUCTION(LASER SURGERY,CRYOSURGERY,CHEMOSURGERY),PREMALIGNANT LESIONS,FIRST LESION: ICD-10-PCS | Mod: 59,S$PBB,, | Performed by: DERMATOLOGY

## 2020-01-20 PROCEDURE — 17000 DESTRUCT PREMALG LESION: CPT | Mod: 59,PBBFAC | Performed by: DERMATOLOGY

## 2020-01-20 PROCEDURE — 17000 DESTRUCT PREMALG LESION: CPT | Mod: 59,S$PBB,, | Performed by: DERMATOLOGY

## 2020-01-20 PROCEDURE — 11102 PR TANGENTIAL BIOPSY, SKIN, SINGLE LESION: ICD-10-PCS | Mod: S$PBB,,, | Performed by: DERMATOLOGY

## 2020-01-20 PROCEDURE — 99213 PR OFFICE/OUTPT VISIT, EST, LEVL III, 20-29 MIN: ICD-10-PCS | Mod: 25,S$PBB,, | Performed by: DERMATOLOGY

## 2020-01-20 PROCEDURE — 1159F MED LIST DOCD IN RCRD: CPT | Mod: ,,, | Performed by: DERMATOLOGY

## 2020-01-20 PROCEDURE — 99212 OFFICE O/P EST SF 10 MIN: CPT | Mod: PBBFAC | Performed by: DERMATOLOGY

## 2020-01-20 PROCEDURE — 11102 TANGNTL BX SKIN SINGLE LES: CPT | Mod: PBBFAC | Performed by: DERMATOLOGY

## 2020-01-20 PROCEDURE — 11102 TANGNTL BX SKIN SINGLE LES: CPT | Mod: S$PBB,,, | Performed by: DERMATOLOGY

## 2020-01-20 PROCEDURE — 99213 OFFICE O/P EST LOW 20 MIN: CPT | Mod: 25,S$PBB,, | Performed by: DERMATOLOGY

## 2020-01-20 PROCEDURE — 88305 TISSUE EXAM BY PATHOLOGIST: CPT | Performed by: PATHOLOGY

## 2020-01-20 PROCEDURE — 88305 TISSUE EXAM BY PATHOLOGIST: CPT | Mod: 26,,, | Performed by: PATHOLOGY

## 2020-01-20 PROCEDURE — 17003 DESTRUCT PREMALG LES 2-14: CPT | Mod: 59,PBBFAC | Performed by: DERMATOLOGY

## 2020-01-20 PROCEDURE — 17003 DESTRUCTION, PREMALIGNANT LESIONS; SECOND THROUGH 14 LESIONS: ICD-10-PCS | Mod: 59,S$PBB,, | Performed by: DERMATOLOGY

## 2020-01-20 PROCEDURE — 99999 PR PBB SHADOW E&M-EST. PATIENT-LVL II: ICD-10-PCS | Mod: PBBFAC,,, | Performed by: DERMATOLOGY

## 2020-01-20 PROCEDURE — 88305 TISSUE EXAM BY PATHOLOGIST: ICD-10-PCS | Mod: 26,,, | Performed by: PATHOLOGY

## 2020-01-20 PROCEDURE — 99999 PR PBB SHADOW E&M-EST. PATIENT-LVL II: CPT | Mod: PBBFAC,,, | Performed by: DERMATOLOGY

## 2020-01-20 NOTE — PROGRESS NOTES
Subjective:       Patient ID:  Shannan Norman is a 74 y.o. male who presents for   Chief Complaint   Patient presents with    Skin Check     ubse     Patient is a 73 yo male present for ubse. Patient has no new concerns. Patient has a history of infiltrative BCC nose diagnosed at last visit with me, subsequently treated with Mohs and flap.  No issues with the healing of the site. Today, The patient denies any moles or growths of the skin that are rapidly growing, hurting, itching, bleeding, or changing colors.          Review of Systems   Skin: Positive for daily sunscreen use, activity-related sunscreen use and wears hat. Negative for recent sunburn.   Hematologic/Lymphatic: Bruises/bleeds easily.        Objective:    Physical Exam   Constitutional: He appears well-developed and well-nourished.   Neurological: He is alert and oriented to person, place, and time.   Psychiatric: He has a normal mood and affect.   Skin:   Areas Examined (abnormalities noted in diagram):   Scalp / Hair Palpated and Inspected  Head / Face Inspection Performed  Neck Inspection Performed  Chest / Axilla Inspection Performed  Abdomen Inspection Performed  Back Inspection Performed  RUE Inspected  LUE Inspection Performed                   Diagram Legend     Erythematous scaling macule/papule c/w actinic keratosis       Vascular papule c/w angioma      Pigmented verrucoid papule/plaque c/w seborrheic keratosis      Yellow umbilicated papule c/w sebaceous hyperplasia      Irregularly shaped tan macule c/w lentigo     1-2 mm smooth white papules consistent with Milia      Movable subcutaneous cyst with punctum c/w epidermal inclusion cyst      Subcutaneous movable cyst c/w pilar cyst      Firm pink to brown papule c/w dermatofibroma      Pedunculated fleshy papule(s) c/w skin tag(s)      Evenly pigmented macule c/w junctional nevus     Mildly variegated pigmented, slightly irregular-bordered macule c/w mildly atypical nevus      Flesh  colored to evenly pigmented papule c/w intradermal nevus       Pink pearly papule/plaque c/w basal cell carcinoma      Erythematous hyperkeratotic cursted plaque c/w SCC      Surgical scar with no sign of skin cancer recurrence      Open and closed comedones      Inflammatory papules and pustules      Verrucoid papule consistent consistent with wart     Erythematous eczematous patches and plaques     Dystrophic onycholytic nail with subungual debris c/w onychomycosis     Umbilicated papule    Erythematous-base heme-crusted tan verrucoid plaque consistent with inflamed seborrheic keratosis     Erythematous Silvery Scaling Plaque c/w Psoriasis     See annotation          Assessment / Plan:      Pathology Orders:     Normal Orders This Visit    Specimen to Pathology, Dermatology     Comments:    Number of Specimens:->1  ------------------------->-------------------------  Spec 1 Procedure:->Biopsy  Spec 1 Clinical Impression:->shiny pink pigmented papule 2 mm  r/o pigmented BCC  Spec 1 Source:->left neck    Questions:    Procedure Type:  Dermatology and skin neoplasms    Number of Specimens:  1    ------------------------:  -------------------------    Spec 1 Procedure:  Biopsy    Spec 1 Clinical Impression:  shiny pink pigmented papule 2 mm r/o pigmented BCC    Spec 1 Source:  left neck        Neoplasm of uncertain behavior of skin  -     Specimen to Pathology, Dermatology  LEFT NECK r/o pigmented BCC  Shave biopsy procedure note:    Shave biopsy performed after verbal consent including risk of infection, scar, recurrence, need for additional treatment of site. Area prepped with alcohol, anesthetized with approximately 1.0cc of 1% lidocaine with epinephrine. Lesional tissue shaved with razor blade. Hemostasis achieved with application of aluminum chloride. No complications. Dressing applied. Wound care explained.      Screening exam for skin cancer  Area(s) of previous NMSC evaluated with no signs of  recurrence.    Upper body skin examination performed today including at least 6 points as noted in physical examination. Suspicious lesions noted.    Basal cell carcinoma of left side of nose  Well healed, no recurrence    Actinic keratosis  Cryosurgery Procedure Note    Verbal consent from the patient is obtained including, but not limited to, risk of hypopigmentation/hyperpigmentation, scar, recurrence of lesion. The patient is aware of the precancerous quality and need for treatment of these lesions. Liquid nitrogen cryosurgery is applied to the 2 actinic keratoses, as detailed in the physical exam, to produce a freeze injury. The patient is aware that blisters may form and is instructed on wound care with gentle cleansing and use of vaseline ointment to keep moist until healed. The patient is supplied a handout on cryosurgery and is instructed to call if lesions do not completely resolve.             Follow up in about 1 year (around 1/20/2021) for for TBSE.

## 2020-01-24 LAB
FINAL PATHOLOGIC DIAGNOSIS: NORMAL
GROSS: NORMAL
MICROSCOPIC EXAM: NORMAL

## 2020-01-28 ENCOUNTER — PATIENT MESSAGE (OUTPATIENT)
Dept: FAMILY MEDICINE | Facility: CLINIC | Age: 75
End: 2020-01-28

## 2020-01-29 ENCOUNTER — OFFICE VISIT (OUTPATIENT)
Dept: INTERNAL MEDICINE | Facility: CLINIC | Age: 75
End: 2020-01-29
Payer: MEDICARE

## 2020-01-29 VITALS
DIASTOLIC BLOOD PRESSURE: 64 MMHG | HEART RATE: 60 BPM | WEIGHT: 215.63 LBS | HEIGHT: 73 IN | OXYGEN SATURATION: 97 % | BODY MASS INDEX: 28.58 KG/M2 | SYSTOLIC BLOOD PRESSURE: 118 MMHG

## 2020-01-29 DIAGNOSIS — M10.9 GOUT OF FOOT, UNSPECIFIED CAUSE, UNSPECIFIED CHRONICITY, UNSPECIFIED LATERALITY: ICD-10-CM

## 2020-01-29 PROCEDURE — 99214 OFFICE O/P EST MOD 30 MIN: CPT | Mod: PBBFAC,PO | Performed by: INTERNAL MEDICINE

## 2020-01-29 PROCEDURE — 1125F PR PAIN SEVERITY QUANTIFIED, PAIN PRESENT: ICD-10-PCS | Mod: ,,, | Performed by: INTERNAL MEDICINE

## 2020-01-29 PROCEDURE — 1125F AMNT PAIN NOTED PAIN PRSNT: CPT | Mod: ,,, | Performed by: INTERNAL MEDICINE

## 2020-01-29 PROCEDURE — 99999 PR PBB SHADOW E&M-EST. PATIENT-LVL IV: ICD-10-PCS | Mod: PBBFAC,,, | Performed by: INTERNAL MEDICINE

## 2020-01-29 PROCEDURE — 99999 PR PBB SHADOW E&M-EST. PATIENT-LVL IV: CPT | Mod: PBBFAC,,, | Performed by: INTERNAL MEDICINE

## 2020-01-29 PROCEDURE — 99214 PR OFFICE/OUTPT VISIT, EST, LEVL IV, 30-39 MIN: ICD-10-PCS | Mod: S$PBB,,, | Performed by: INTERNAL MEDICINE

## 2020-01-29 PROCEDURE — 1159F PR MEDICATION LIST DOCUMENTED IN MEDICAL RECORD: ICD-10-PCS | Mod: ,,, | Performed by: INTERNAL MEDICINE

## 2020-01-29 PROCEDURE — 99214 OFFICE O/P EST MOD 30 MIN: CPT | Mod: S$PBB,,, | Performed by: INTERNAL MEDICINE

## 2020-01-29 PROCEDURE — 1159F MED LIST DOCD IN RCRD: CPT | Mod: ,,, | Performed by: INTERNAL MEDICINE

## 2020-01-29 RX ORDER — INDOMETHACIN 50 MG/1
50 CAPSULE ORAL 2 TIMES DAILY WITH MEALS
Qty: 14 CAPSULE | Refills: 0 | Status: SHIPPED | OUTPATIENT
Start: 2020-01-29 | End: 2020-02-05

## 2020-01-29 RX ORDER — INDOMETHACIN 50 MG/1
50 CAPSULE ORAL 2 TIMES DAILY WITH MEALS
Qty: 14 CAPSULE | Refills: 0 | Status: SHIPPED | OUTPATIENT
Start: 2020-01-29 | End: 2020-01-29

## 2020-01-29 RX ORDER — COLCHICINE 0.6 MG/1
0.6 TABLET ORAL DAILY
Qty: 7 TABLET | Refills: 0 | Status: SHIPPED | OUTPATIENT
Start: 2020-01-29 | End: 2020-06-05

## 2020-01-29 NOTE — PATIENT INSTRUCTIONS
Treating Gout Attacks     Raising the joint above the level of your heart can help reduce gout symptoms.     Gout is a disease that affects the joints. It is caused by excess uric acid in your blood stream that may lead to crystals forming in your joints. Left untreated, it can lead to painful foot and joint deformities and even kidney problems. But, by treating gout early, you can relieve pain and help prevent future problems. Gout can usually be treated with medication and proper diet. In severe cases, surgery may be needed.  Gout attacks are painful and often happen more than once. Taking medications may reduce pain and prevent attacks in the future. There are also some things you can do at home to relieve symptoms.  Medications for gout  Your healthcare provider may prescribe a daily medication to reduce levels of uric acid. Reducing your uric acid levels may help prevent gout attacks. Allopurinol is one commonly used medication taken daily to reduce uric acid levels. Other medications can help relieve pain and swelling during an acute attack. Medicines such as NSAIDs (nonsteroidal anti-inflammatory medicines), steroids, and colchicine may be prescribed for intermittent use to relieve an acute gout attack. Be sure to take your medication as directed.  What you can do  Below are some things you can do at home to relieve gout symptoms. Your healthcare provider may have other tips.  · Rest the painful joint as much as you can.  · Raise the painful joint so it is at a level higher than your heart.  · Use ice for 10 minutes every 1-2 hours as possible.  How can I prevent gout?  With a little effort, you may be able to prevent gout attacks in the future. Here are some things you can do:  · Avoid foods high in purines  ¨ Certain meats (red meat, processed meat, turkey)  ¨ Organ meats (kidney, liver, sweetbread)  ¨ Shellfish (lobster, crab, shrimp, scallop, mussel)  ¨ Certain fish (anchovy, sardine, herring,  mackerel)  · Take any medications prescribed by your healthcare provider.  · Lose weight if you need to.  · Reduce high fructose corn syrup in meals and drinks.  · Reduce or eliminate consumption of alcohol, particularly beer, but also red wine and spirits.  · Control blood pressure, diabetes, and cholesterol.  · Drink plenty of water to help flush uric acid from your body.  Date Last Reviewed: 2/1/2016  © 4558-5630 [employer]. [address] [place] [address], [place] [address]. All rights reserved. This information is not intended as a substitute for professional medical care. Always follow your healthcare professional's instructions.

## 2020-01-29 NOTE — PROGRESS NOTES
"Subjective:       Patient ID: Shannan Norman is a 74 y.o. male.    Chief Complaint: Gout      Right big toe   Onset ; 2 days prior to presentation   Location; right big toe   Duration; daily worsening, progressive. Stopped eating all shellfish   Scale; 7/10   Description ; stabbing   Alleviation; still taking allopurinol ,   Assoicated; warmth , erythema   Denies; fevers, chills, sweating,vomiting   Previous epidsoes  - 3 /2019 -   - 8/2019 - steroid 5 days  - 11/20/2019 - steroid 5 days   1/7/2020 - colchicine/indomethacin x7 days. Started allo urinol 2 weeks ago         HPI  Review of Systems   Constitutional: Negative for activity change, appetite change, fatigue and fever.   Respiratory: Negative for shortness of breath.    Cardiovascular: Negative for chest pain.   Gastrointestinal: Negative for abdominal pain.   Genitourinary: Negative for difficulty urinating.   Neurological: Negative for syncope and headaches.       Objective:       /64 (BP Location: Right arm, Patient Position: Sitting, BP Method: Large (Manual))   Pulse 60   Ht 6' 1" (1.854 m)   Wt 97.8 kg (215 lb 9.8 oz)   SpO2 97%   BMI 28.45 kg/m²     Physical Exam   Constitutional: He appears well-developed and well-nourished. No distress.   HENT:   Head: Normocephalic.   Mouth/Throat: Oropharynx is clear and moist. No oropharyngeal exudate.   Eyes: Pupils are equal, round, and reactive to light. Conjunctivae are normal. Right eye exhibits no discharge. Left eye exhibits no discharge.   Cardiovascular: Normal rate, regular rhythm and normal heart sounds. Exam reveals no gallop and no friction rub.   No murmur heard.  Pulmonary/Chest: Effort normal. No stridor. No respiratory distress. He has no wheezes. He has no rales. He exhibits no tenderness.   Abdominal: Soft. He exhibits no distension.   Musculoskeletal:   Warm erythematous R big toe  Mild ttp    Neurological: He is alert.   Skin: Skin is warm. He is not diaphoretic.   Psychiatric: He " has a normal mood and affect.   Nursing note and vitals reviewed.      BMP  Lab Results   Component Value Date     08/09/2019    K 4.3 08/09/2019     08/09/2019    CO2 27 08/09/2019    BUN 23 08/09/2019    CREATININE 1.2 08/09/2019    CALCIUM 9.8 08/09/2019    ANIONGAP 10 08/09/2019    ESTGFRAFRICA >60.0 08/09/2019    EGFRNONAA 58.8 (A) 08/09/2019       Lab Results   Component Value Date    URICACID 7.3 (H) 03/20/2019       Assessment:       1. Gout of foot, unspecified cause, unspecified chronicity, unspecified laterality        Plan:       Shannan was seen today for gout.    Diagnoses and all orders for this visit:    Gout of foot, unspecified cause, unspecified chronicity, unspecified laterality  Signs, symptoms consistent with acute gouty arthritis   Nothing on history or pyhsical exam to suggest osteomyelitis, septic joint   This is his 4 occurrence by my count in last 12 months   Plan to    - Start colchicine x7 days   - start indomethacin x7days   - cont allopurinol 100 mg    - stopped chlorthalidone   He has follow up with PCP next week and will message regarding the d/c of chlorthalidone  Could consider increasing allopurinol 300mg when flare subsided  I provided instruction on supportive care measures    reviewed signs and symptoms that should prompt return to provider or evaluation in the ED  -     colchicine (COLCRYS) 0.6 mg tablet; Take 1 tablet (0.6 mg total) by mouth once daily. for 7 days  -     indomethacin (INDOCIN) 50 MG capsule; Take 1 capsule (50 mg total) by mouth 2 (two) times daily with meals. for 7 days

## 2020-01-30 ENCOUNTER — TELEPHONE (OUTPATIENT)
Dept: FAMILY MEDICINE | Facility: CLINIC | Age: 75
End: 2020-01-30

## 2020-01-31 ENCOUNTER — DOCUMENTATION ONLY (OUTPATIENT)
Dept: CARDIOLOGY | Facility: HOSPITAL | Age: 75
End: 2020-01-31

## 2020-01-31 NOTE — PROGRESS NOTES
Message sent to MA(Mgr) to schedule annual check up appointments with an in clinic ICD/Pacemaker check on the same day @ [facility].

## 2020-01-31 NOTE — TELEPHONE ENCOUNTER
----- Message from Orion Gonzáles III, MD sent at 1/29/2020  2:53 PM CST -----  Regarding: f/u   Patient has follow up with you   Had another gout flare  Gave another week of colchicine and indomethacin  Stopped the chlorthalidone  Mentioned to check BP to make sure he does not go up   Could consider increasing allopurinol to 300 after flare resolves

## 2020-02-03 ENCOUNTER — OFFICE VISIT (OUTPATIENT)
Dept: FAMILY MEDICINE | Facility: CLINIC | Age: 75
End: 2020-02-03
Payer: MEDICARE

## 2020-02-03 ENCOUNTER — CLINICAL SUPPORT (OUTPATIENT)
Dept: FAMILY MEDICINE | Facility: CLINIC | Age: 75
End: 2020-02-03
Payer: MEDICARE

## 2020-02-03 VITALS
SYSTOLIC BLOOD PRESSURE: 126 MMHG | WEIGHT: 218.69 LBS | BODY MASS INDEX: 28.98 KG/M2 | OXYGEN SATURATION: 97 % | HEART RATE: 71 BPM | HEIGHT: 73 IN | DIASTOLIC BLOOD PRESSURE: 70 MMHG

## 2020-02-03 DIAGNOSIS — Z23 NEED FOR VACCINATION AGAINST STREPTOCOCCUS PNEUMONIAE USING PNEUMOCOCCAL CONJUGATE VACCINE 13: Primary | ICD-10-CM

## 2020-02-03 DIAGNOSIS — I10 ESSENTIAL HYPERTENSION: Primary | ICD-10-CM

## 2020-02-03 DIAGNOSIS — E11.65 TYPE 2 DIABETES MELLITUS WITH HYPERGLYCEMIA, WITHOUT LONG-TERM CURRENT USE OF INSULIN: ICD-10-CM

## 2020-02-03 PROCEDURE — 99214 OFFICE O/P EST MOD 30 MIN: CPT | Mod: S$PBB,,, | Performed by: FAMILY MEDICINE

## 2020-02-03 PROCEDURE — 99214 PR OFFICE/OUTPT VISIT, EST, LEVL IV, 30-39 MIN: ICD-10-PCS | Mod: S$PBB,,, | Performed by: FAMILY MEDICINE

## 2020-02-03 PROCEDURE — 99213 OFFICE O/P EST LOW 20 MIN: CPT | Mod: PBBFAC,PO,25 | Performed by: FAMILY MEDICINE

## 2020-02-03 PROCEDURE — 99999 PR PBB SHADOW E&M-EST. PATIENT-LVL III: ICD-10-PCS | Mod: PBBFAC,,, | Performed by: FAMILY MEDICINE

## 2020-02-03 PROCEDURE — 99999 PR PBB SHADOW E&M-EST. PATIENT-LVL III: CPT | Mod: PBBFAC,,, | Performed by: FAMILY MEDICINE

## 2020-02-03 PROCEDURE — G0009 ADMIN PNEUMOCOCCAL VACCINE: HCPCS | Mod: PBBFAC,PO

## 2020-02-03 NOTE — PROGRESS NOTES
Subjective:       Patient ID: Shannan Norman is a 74 y.o. male.    Chief Complaint: Follow-up and Hypertension    74 years old came to the clinic for blood pressure check.  Blood pressure today is stable.  No Chest pain, palpitation, orthopnea or PND.  Patient with chronic back pain for the last year.  Patient was doing exercises to improve his back with significant improvement.  Last A1c was stable.  Patient will with good compliance with diabetes diet.  No Polyuria, polydipsia or polyphagia.    Review of Systems   Constitutional: Negative.    HENT: Negative.    Eyes: Negative.    Respiratory: Negative.    Cardiovascular: Negative.  Negative for chest pain, palpitations and leg swelling.   Gastrointestinal: Negative.    Endocrine: Negative for polydipsia, polyphagia and polyuria.   Genitourinary: Negative.    Musculoskeletal: Positive for back pain.   Skin: Negative.    Neurological: Negative.    Psychiatric/Behavioral: Negative.        Objective:      Physical Exam   Constitutional: He is oriented to person, place, and time. He appears well-developed and well-nourished. No distress.   HENT:   Head: Normocephalic and atraumatic.   Right Ear: External ear normal.   Left Ear: External ear normal.   Nose: Nose normal.   Mouth/Throat: Oropharynx is clear and moist. No oropharyngeal exudate.   Eyes: Pupils are equal, round, and reactive to light. Conjunctivae and EOM are normal. Right eye exhibits no discharge. Left eye exhibits no discharge. No scleral icterus.   Neck: Normal range of motion. Neck supple. No JVD present. No tracheal deviation present. No thyromegaly present.   Cardiovascular: Normal rate, regular rhythm, normal heart sounds and intact distal pulses. Exam reveals no gallop and no friction rub.   No murmur heard.  Pulmonary/Chest: Effort normal and breath sounds normal. No stridor. No respiratory distress. He has no wheezes. He has no rales. He exhibits no tenderness.   Abdominal: Soft. Bowel sounds are  normal. He exhibits no distension and no mass. There is no tenderness. There is no rebound and no guarding.   Musculoskeletal: Normal range of motion. He exhibits no edema or tenderness.   Lymphadenopathy:     He has no cervical adenopathy.   Neurological: He is alert and oriented to person, place, and time. He has normal reflexes. He displays normal reflexes. No cranial nerve deficit. He exhibits normal muscle tone. Coordination normal.   Skin: Skin is warm and dry. No rash noted. He is not diaphoretic. No erythema. No pallor.   Psychiatric: He has a normal mood and affect. His behavior is normal. Judgment and thought content normal.   Nursing note and vitals reviewed.      Assessment:       1. Essential hypertension    2. Type 2 diabetes mellitus with hyperglycemia, without long-term current use of insulin        Plan:     Shannan was seen today for follow-up and hypertension.    Diagnoses and all orders for this visit:    Essential hypertension  -     Comprehensive metabolic panel; Future  -     Lipid panel; Future  -     TSH; Future  -     CBC auto differential; Future    Type 2 diabetes mellitus with hyperglycemia, without long-term current use of insulin  -     Hemoglobin A1c; Future  -     Microalbumin/creatinine urine ratio; Future    Continue monitoring blood pressure at home, low sodium diet.    Continue monitoring blood sugar at home,ADA diet.

## 2020-02-05 DIAGNOSIS — I49.9 CARDIAC ARRHYTHMIA, UNSPECIFIED CARDIAC ARRHYTHMIA TYPE: ICD-10-CM

## 2020-02-05 DIAGNOSIS — I48.3 TYPICAL ATRIAL FLUTTER: ICD-10-CM

## 2020-02-05 DIAGNOSIS — I42.8 NICM (NONISCHEMIC CARDIOMYOPATHY): Primary | ICD-10-CM

## 2020-02-05 DIAGNOSIS — I49.5 SSS (SICK SINUS SYNDROME): ICD-10-CM

## 2020-02-10 ENCOUNTER — LAB VISIT (OUTPATIENT)
Dept: LAB | Facility: HOSPITAL | Age: 75
End: 2020-02-10
Attending: FAMILY MEDICINE
Payer: MEDICARE

## 2020-02-10 DIAGNOSIS — E11.65 TYPE 2 DIABETES MELLITUS WITH HYPERGLYCEMIA, WITHOUT LONG-TERM CURRENT USE OF INSULIN: ICD-10-CM

## 2020-02-10 DIAGNOSIS — I10 ESSENTIAL HYPERTENSION: ICD-10-CM

## 2020-02-10 LAB
ALBUMIN SERPL BCP-MCNC: 3.9 G/DL (ref 3.5–5.2)
ALP SERPL-CCNC: 83 U/L (ref 55–135)
ALT SERPL W/O P-5'-P-CCNC: 23 U/L (ref 10–44)
ANION GAP SERPL CALC-SCNC: 7 MMOL/L (ref 8–16)
AST SERPL-CCNC: 20 U/L (ref 10–40)
BASOPHILS # BLD AUTO: 0.05 K/UL (ref 0–0.2)
BASOPHILS NFR BLD: 0.9 % (ref 0–1.9)
BILIRUB SERPL-MCNC: 0.8 MG/DL (ref 0.1–1)
BUN SERPL-MCNC: 20 MG/DL (ref 8–23)
CALCIUM SERPL-MCNC: 9.5 MG/DL (ref 8.7–10.5)
CHLORIDE SERPL-SCNC: 107 MMOL/L (ref 95–110)
CHOLEST SERPL-MCNC: 128 MG/DL (ref 120–199)
CHOLEST/HDLC SERPL: 2.6 {RATIO} (ref 2–5)
CO2 SERPL-SCNC: 28 MMOL/L (ref 23–29)
CREAT SERPL-MCNC: 1.3 MG/DL (ref 0.5–1.4)
DIFFERENTIAL METHOD: ABNORMAL
EOSINOPHIL # BLD AUTO: 0.2 K/UL (ref 0–0.5)
EOSINOPHIL NFR BLD: 3.5 % (ref 0–8)
ERYTHROCYTE [DISTWIDTH] IN BLOOD BY AUTOMATED COUNT: 12.8 % (ref 11.5–14.5)
EST. GFR  (AFRICAN AMERICAN): >60 ML/MIN/1.73 M^2
EST. GFR  (NON AFRICAN AMERICAN): 53.4 ML/MIN/1.73 M^2
ESTIMATED AVG GLUCOSE: 134 MG/DL (ref 68–131)
GLUCOSE SERPL-MCNC: 108 MG/DL (ref 70–110)
HBA1C MFR BLD HPLC: 6.3 % (ref 4–5.6)
HCT VFR BLD AUTO: 51.5 % (ref 40–54)
HDLC SERPL-MCNC: 50 MG/DL (ref 40–75)
HDLC SERPL: 39.1 % (ref 20–50)
HGB BLD-MCNC: 16 G/DL (ref 14–18)
IMM GRANULOCYTES # BLD AUTO: 0.05 K/UL (ref 0–0.04)
IMM GRANULOCYTES NFR BLD AUTO: 0.9 % (ref 0–0.5)
LDLC SERPL CALC-MCNC: 64.2 MG/DL (ref 63–159)
LYMPHOCYTES # BLD AUTO: 1.4 K/UL (ref 1–4.8)
LYMPHOCYTES NFR BLD: 26 % (ref 18–48)
MCH RBC QN AUTO: 30.7 PG (ref 27–31)
MCHC RBC AUTO-ENTMCNC: 31.1 G/DL (ref 32–36)
MCV RBC AUTO: 99 FL (ref 82–98)
MONOCYTES # BLD AUTO: 0.7 K/UL (ref 0.3–1)
MONOCYTES NFR BLD: 13.2 % (ref 4–15)
NEUTROPHILS # BLD AUTO: 3 K/UL (ref 1.8–7.7)
NEUTROPHILS NFR BLD: 55.5 % (ref 38–73)
NONHDLC SERPL-MCNC: 78 MG/DL
NRBC BLD-RTO: 0 /100 WBC
PLATELET # BLD AUTO: 168 K/UL (ref 150–350)
PMV BLD AUTO: 11.7 FL (ref 9.2–12.9)
POTASSIUM SERPL-SCNC: 4.4 MMOL/L (ref 3.5–5.1)
PROT SERPL-MCNC: 6.6 G/DL (ref 6–8.4)
RBC # BLD AUTO: 5.22 M/UL (ref 4.6–6.2)
SODIUM SERPL-SCNC: 142 MMOL/L (ref 136–145)
TRIGL SERPL-MCNC: 69 MG/DL (ref 30–150)
TSH SERPL DL<=0.005 MIU/L-ACNC: 2.43 UIU/ML (ref 0.4–4)
WBC # BLD AUTO: 5.39 K/UL (ref 3.9–12.7)

## 2020-02-10 PROCEDURE — 83036 HEMOGLOBIN GLYCOSYLATED A1C: CPT

## 2020-02-10 PROCEDURE — 84443 ASSAY THYROID STIM HORMONE: CPT

## 2020-02-10 PROCEDURE — 80053 COMPREHEN METABOLIC PANEL: CPT

## 2020-02-10 PROCEDURE — 85025 COMPLETE CBC W/AUTO DIFF WBC: CPT

## 2020-02-10 PROCEDURE — 36415 COLL VENOUS BLD VENIPUNCTURE: CPT | Mod: PO

## 2020-02-10 PROCEDURE — 80061 LIPID PANEL: CPT

## 2020-02-11 ENCOUNTER — TELEPHONE (OUTPATIENT)
Dept: FAMILY MEDICINE | Facility: CLINIC | Age: 75
End: 2020-02-11

## 2020-02-11 ENCOUNTER — PATIENT MESSAGE (OUTPATIENT)
Dept: FAMILY MEDICINE | Facility: CLINIC | Age: 75
End: 2020-02-11

## 2020-02-11 DIAGNOSIS — M54.50 CHRONIC BILATERAL LOW BACK PAIN WITHOUT SCIATICA: Primary | ICD-10-CM

## 2020-02-11 DIAGNOSIS — B35.1 ONYCHOMYCOSIS: ICD-10-CM

## 2020-02-11 DIAGNOSIS — G89.29 CHRONIC BILATERAL LOW BACK PAIN WITHOUT SCIATICA: Primary | ICD-10-CM

## 2020-02-11 RX ORDER — ECONAZOLE NITRATE 10 MG/G
CREAM TOPICAL DAILY
Qty: 85 G | Refills: 3 | Status: SHIPPED | OUTPATIENT
Start: 2020-02-11 | End: 2021-12-16 | Stop reason: SDUPTHER

## 2020-02-18 ENCOUNTER — PATIENT MESSAGE (OUTPATIENT)
Dept: SPINE | Facility: CLINIC | Age: 75
End: 2020-02-18

## 2020-02-18 ENCOUNTER — TELEPHONE (OUTPATIENT)
Dept: FAMILY MEDICINE | Facility: CLINIC | Age: 75
End: 2020-02-18

## 2020-02-18 ENCOUNTER — PATIENT MESSAGE (OUTPATIENT)
Dept: FAMILY MEDICINE | Facility: CLINIC | Age: 75
End: 2020-02-18

## 2020-02-18 DIAGNOSIS — M1A.9XX0 CHRONIC GOUT INVOLVING TOE WITHOUT TOPHUS, UNSPECIFIED CAUSE, UNSPECIFIED LATERALITY: Primary | ICD-10-CM

## 2020-02-18 RX ORDER — INDOMETHACIN 50 MG/1
50 CAPSULE ORAL 2 TIMES DAILY WITH MEALS
Qty: 60 CAPSULE | Refills: 0 | Status: SHIPPED | OUTPATIENT
Start: 2020-02-18 | End: 2020-03-19

## 2020-02-18 NOTE — PROGRESS NOTES
Subjective:      Patient ID: Shannan Norman is a 74 y.o. male.    Chief Complaint: Low-back Pain and Leg Pain    Mr Norman is a 75 yo male here for evaluation of low back pain.  He was seen by me in 2015 and he was going to exercise on his own.  For the past 2-3 months he has had achy butt pain.  The pain is gone in the morning.  The butt pain is when start walking after sitting and it will get better with moving but takes some time.  The pain is not with sitting.  Walking down an incline, turning over in bed.  It was worse 2 months ago.  He stopped yoga the past couple of weeks.  He stopped doing stretches and only doing the exer-cycle, because concern that hurting.  The left side is worse than the right.  He using a pillow behind his back when sits he feels better when he gets up.  He did get a new couch and feels like using recliner and spending more time watching TV.  The pain is an achy pain.  The pain is 0/10 now, worst 6/10 turning over in bed.  The best 0/10 getting up in the morning.  He does not feel as flexible as he has been.  He has not been to PT or chiropractor.  He has not take nsaids.  He is on eliquis    Past Medical History:  No date: Allergy  No date: Arthritis  No date: Atrial fibrillation  2011: Atrial flutter      Comment:  ablation  No date: Basal cell carcinoma  No date: Cataract  8/13/2019: CKD (chronic kidney disease) stage 3, GFR 30-59 ml/min  No date: Diabetes mellitus  No date: Dry eye syndrome  No date: Gout, unspecified  No date: High cholesterol  No date: History of shingles  No date: Hypertension  No date: Seizures    Past Surgical History:  9/11/2018: ABLATION; N/A      Comment:  Procedure: ABLATION;  Surgeon: Hany Sanchez MD;                 Location: [facility] CATH LAB;  Service: Cardiology;                 Laterality: N/A;  AFL, ISABELLE, RFA, ELODIA, MAC, DM, 3 PREP  No date: ABLATION OF DYSRHYTHMIC FOCUS  No date: ADENOIDECTOMY  1/2/2019: COLONOSCOPY; N/A      Comment:  Procedure:  COLONOSCOPY Suprep;  Surgeon: Cindy Solorzano MD;  Location: [facility];  Service: Endoscopy;                 Laterality: N/A;  No date: GANGLION CYST EXCISION      Comment:  left neck  2013: HERNIA REPAIR      Comment:  umbilical  No date: TENDON REPAIR; Right      Comment:  wrist  No date: TONSILLECTOMY  No date: varicose veins      Comment:  several sx  bilateral  No date: VASECTOMY  No date: VEIN SURGERY    Review of patient's family history indicates:  Problem: Diabetes      Relation: Mother          Age of Onset: (Not Specified)  Problem: COPD      Relation: Father          Age of Onset: (Not Specified)  Problem: No Known Problems      Relation: Sister          Age of Onset: (Not Specified)  Problem: Heart attack      Relation: Brother          Age of Onset: (Not Specified)  Problem: Heart disease      Relation: Brother          Age of Onset: (Not Specified)  Problem: No Known Problems      Relation: Maternal Aunt          Age of Onset: (Not Specified)  Problem: No Known Problems      Relation: Maternal Uncle          Age of Onset: (Not Specified)  Problem: No Known Problems      Relation: Paternal Aunt          Age of Onset: (Not Specified)  Problem: No Known Problems      Relation: Paternal Uncle          Age of Onset: (Not Specified)  Problem: No Known Problems      Relation: Maternal Grandmother          Age of Onset: (Not Specified)  Problem: No Known Problems      Relation: Maternal Grandfather          Age of Onset: (Not Specified)  Problem: No Known Problems      Relation: Paternal Grandmother          Age of Onset: (Not Specified)  Problem: No Known Problems      Relation: Paternal Grandfather          Age of Onset: (Not Specified)  Problem: No Known Problems      Relation: Son          Age of Onset: (Not Specified)  Problem: Amblyopia      Relation: Neg Hx          Age of Onset: (Not Specified)  Problem: Blindness      Relation: Neg Hx          Age of Onset: (Not  Specified)  Problem: Cancer      Relation: Neg Hx          Age of Onset: (Not Specified)  Problem: Cataracts      Relation: Neg Hx          Age of Onset: (Not Specified)  Problem: Glaucoma      Relation: Neg Hx          Age of Onset: (Not Specified)  Problem: Hypertension      Relation: Neg Hx          Age of Onset: (Not Specified)  Problem: Macular degeneration      Relation: Neg Hx          Age of Onset: (Not Specified)  Problem: Retinal detachment      Relation: Neg Hx          Age of Onset: (Not Specified)  Problem: Strabismus      Relation: Neg Hx          Age of Onset: (Not Specified)  Problem: Stroke      Relation: Neg Hx          Age of Onset: (Not Specified)  Problem: Thyroid disease      Relation: Neg Hx          Age of Onset: (Not Specified)  Problem: Prostate cancer      Relation: Neg Hx          Age of Onset: (Not Specified)  Problem: Kidney disease      Relation: Neg Hx          Age of Onset: (Not Specified)  Problem: Melanoma      Relation: Neg Hx          Age of Onset: (Not Specified)      Social History    Socioeconomic History      Marital status:       Spouse name: Not on file      Number of children: Not on file      Years of education: Not on file      Highest education level: Not on file    Occupational History        Employer: [employer]    Social Needs      Financial resource strain: Not hard at all      Food insecurity:        Worry: Never true        Inability: Never true      Transportation needs:        Medical: No        Non-medical: No    Tobacco Use      Smoking status: Never Smoker      Smokeless tobacco: Never Used    Substance and Sexual Activity      Alcohol use: Yes        Alcohol/week: 3.0 - 4.0 standard drinks        Types: 3 - 4 Glasses of wine per week        Frequency: 2-3 times a week        Drinks per session: 1 or 2        Binge frequency: Never      Drug use: No      Sexual activity: Yes        Partners: Female    Lifestyle      Physical activity:         Days per week: 6 days        Minutes per session: 60 min      Stress: Only a little    Relationships      Social connections:        Talks on phone: More than three times a week        Gets together: Three times a week        Attends Confucianist service: Not on file        Active member of club or organization: Yes        Attends meetings of clubs or organizations: More than 4 times per year        Relationship status:     Other Topics      Concerns:        Not on file    Social History Narrative      Not on file      Current Outpatient Medications:  allopurinol (ZYLOPRIM) 100 MG tablet, Take 1 tablet (100 mg total) by mouth once daily., Disp: 90 tablet, Rfl: 3  apixaban (ELIQUIS) 5 mg Tab, Take 1 tablet (5 mg total) by mouth 2 (two) times daily., Disp: 180 tablet, Rfl: 3  ascorbic acid (VITAMIN C) 500 MG tablet, Take 1,000 mg by mouth nightly. , Disp: , Rfl:   atorvastatin (LIPITOR) 40 MG tablet, Take 1 tablet (40 mg total) by mouth once daily., Disp: 90 tablet, Rfl: 3  benazepril (LOTENSIN) 20 MG tablet, Take 1 tablet (20 mg total) by mouth once daily., Disp: 90 tablet, Rfl: 3  clotrimazole-betamethasone 1-0.05% (LOTRISONE) cream, Apply topically as needed., Disp: 45 g, Rfl: 2  colchicine (COLCRYS) 0.6 mg tablet, Take 1 tablet (0.6 mg total) by mouth once daily. for 7 days, Disp: 7 tablet, Rfl: 0  cycloSPORINE (RESTASIS) 0.05 % ophthalmic emulsion, Place 0.4 mLs (1 drop total) into both eyes 2 (two) times daily., Disp: 60 vial, Rfl: 12  econazole nitrate 1 % cream, Apply topically once daily., Disp: 85 g, Rfl: 3   FLUZONE HIGH-DOSE 2019-20, PF, 180 mcg/0.5 mL Syrg, ADMINISTER 0.5ML IN THE MUSCLE AS DIRECTED, Disp: , Rfl: 0  GLUCOSAMINE HCL/CHONDR VASQUEZ A NA (GLUCOSAMINE-CHONDROITIN) 750-600 mg Tab, Take 2 tablets by mouth Daily., Disp: , Rfl:   meloxicam (MOBIC) 15 MG tablet, Hold medication until Xarelto prescription is completed., Disp: , Rfl:   multivitamin capsule, Take 1 capsule by mouth nightly. , Disp: ,  Rfl:   tamsulosin (FLOMAX) 0.4 mg Cap, Take 1 capsule (0.4 mg total) by mouth after dinner., Disp: 30 capsule, Rfl: 12    No current facility-administered medications for this visit.   Facility-Administered Medications Ordered in Other Visits:  0.9%  NaCl infusion, , Intravenous, Continuous, Candi Madison NP        Review of patient's allergies indicates:  No Known Allergies        Review of Systems   Constitution: Negative for weight gain and weight loss.   Cardiovascular: Negative for chest pain.   Respiratory: Negative for shortness of breath.    Musculoskeletal: Positive for back pain. Negative for joint pain and joint swelling.   Gastrointestinal: Negative for abdominal pain, bowel incontinence, nausea and vomiting.   Genitourinary: Negative for bladder incontinence.   Neurological: Negative for numbness and paresthesias.         Objective:        General: Shannan is well-developed, well-nourished, appears stated age, in no acute distress, alert and oriented to time, place and person.     General    Vitals reviewed.  Constitutional: He is oriented to person, place, and time. He appears well-developed and well-nourished.   HENT:   Head: Normocephalic and atraumatic.   Pulmonary/Chest: Effort normal.   Neurological: He is alert and oriented to person, place, and time.   Psychiatric: He has a normal mood and affect. His behavior is normal. Judgment and thought content normal.     General Musculoskeletal Exam   Gait: normal     Right Ankle/Foot Exam     Tests   Heel Walk: able to perform  Tiptoe Walk: able to perform    Left Ankle/Foot Exam     Tests   Heel Walk: able to perform  Tiptoe Walk: able to perform  Back (L-Spine & T-Spine) / Neck (C-Spine) Exam     Tenderness Right paramedian tenderness of the Sacrum. Left paramedian tenderness of the Sacrum.     Back (L-Spine & T-Spine) Range of Motion   Extension: 20   Flexion: 80   Lateral bend right: 20   Lateral bend left: 20   Rotation right: 40   Rotation left:  40     Spinal Sensation   Right Side Sensation  C-Spine Level: normal   L-Spine Level: normal  S-Spine Level: normal  Left Side Sensation  C-Spine Level: normal  L-Spine Level: normal  S-Spine Level: normal    Back (L-Spine & T-Spine) Tests   Right Side Tests  Straight leg raise:      Sitting SLR: > 70 degrees      Left Side Tests  Straight leg raise:     Sitting SLR: > 70 degrees          Other He has no scoliosis .  Spinal Kyphosis:  Absent    Comments:  Neg MARCIE bilaterally  Mild stretch with piriformis      Muscle Strength   Right Upper Extremity   Biceps: 5/5/5   Deltoid:  5/5  Triceps:  5/5  Wrist extension: 5/5/5   Finger Flexors:  5/5  Left Upper Extremity  Biceps: 5/5/5   Deltoid:  5/5  Triceps:  5/5  Wrist extension: 5/5/5   Finger Flexors:  5/5  Right Lower Extremity   Hip Flexion: 5/5   Quadriceps:  5/5   Anterior tibial:  5/5/5  EHL:  5/5  Left Lower Extremity   Hip Flexion: 5/5   Quadriceps:  5/5   Anterior tibial:  5/5/5   EHL:  5/5    Reflexes     Left Side  Biceps:  2+  Triceps:  2+  Brachioradialis:  2+  Quadriceps:  2+  Achilles:  2+  Left Hussein's Sign:  Absent  Babinski Sign:  absent    Right Side   Biceps:  2+  Triceps:  2+  Brachioradialis:  2+  Quadriceps:  2+  Achilles:  2+  Right Hussein's Sign:  absent  Babinski Sign:  absent    Vascular Exam     Right Pulses        Carotid:                  2+    Left Pulses        Carotid:                  2+              Assessment:       1. Chronic bilateral low back pain without sciatica    2. Lumbar spondylosis           Plan:       Orders Placed This Encounter    Ambulatory referral/consult to Physical/Occupational Therapy        1. We discussed back pain and the nature of back pain.  We discussed that it will likely improve and that it is not one thing that causes the pain but an accumulation of multiple things that we do.    2. We discussed posture sitting and the importance of trying to sit better.    3. We discussed the benefits of therapy  and exercise and continuing to move.  4. PT for back and core strengthening, and extension exercises and SI joint mobilization and HEP at [facility]  5. Tylenol 500mg may take 2 pills twice a day  6. We discussed getting back to yoga but not pushing past limitations, not stretching to far.  We did discuss healthy back ands trengthenign the back, however his wife has dementia and he does not want to be that far away from her  7. We discussed kelsi Iris treat your own back book  8. He cannot take nsaids with Eliquis but hoping to get off of it soon.  He has not had any afib  9. RTC 4 months    More than 50% of the total time  of 45 minutes was spent face to face in counseling on diagnosis and treatment options. I also counseled patient  on common and most usual side effect of prescribed medications.  I reviewed Primary care , and other specialty's notes to better coordinate patient's care. All questions were answered, and patient voiced understanding.         Follow-up: Follow up in about 4 months (around 6/19/2020). If there are any questions prior to this, the patient was instructed to contact the office.

## 2020-02-19 ENCOUNTER — OFFICE VISIT (OUTPATIENT)
Dept: SPINE | Facility: CLINIC | Age: 75
End: 2020-02-19
Attending: PHYSICAL MEDICINE & REHABILITATION
Payer: MEDICARE

## 2020-02-19 VITALS
WEIGHT: 216 LBS | BODY MASS INDEX: 28.63 KG/M2 | HEIGHT: 73 IN | SYSTOLIC BLOOD PRESSURE: 145 MMHG | HEART RATE: 60 BPM | DIASTOLIC BLOOD PRESSURE: 72 MMHG

## 2020-02-19 DIAGNOSIS — G89.29 CHRONIC BILATERAL LOW BACK PAIN WITHOUT SCIATICA: Primary | ICD-10-CM

## 2020-02-19 DIAGNOSIS — M47.816 LUMBAR SPONDYLOSIS: ICD-10-CM

## 2020-02-19 DIAGNOSIS — M54.50 CHRONIC BILATERAL LOW BACK PAIN WITHOUT SCIATICA: Primary | ICD-10-CM

## 2020-02-19 PROCEDURE — 99204 OFFICE O/P NEW MOD 45 MIN: CPT | Mod: S$PBB,,, | Performed by: PHYSICAL MEDICINE & REHABILITATION

## 2020-02-19 PROCEDURE — 99214 OFFICE O/P EST MOD 30 MIN: CPT | Mod: PBBFAC | Performed by: PHYSICAL MEDICINE & REHABILITATION

## 2020-02-19 PROCEDURE — 99204 PR OFFICE/OUTPT VISIT, NEW, LEVL IV, 45-59 MIN: ICD-10-PCS | Mod: S$PBB,,, | Performed by: PHYSICAL MEDICINE & REHABILITATION

## 2020-02-19 PROCEDURE — 99999 PR PBB SHADOW E&M-EST. PATIENT-LVL IV: ICD-10-PCS | Mod: PBBFAC,,, | Performed by: PHYSICAL MEDICINE & REHABILITATION

## 2020-02-19 PROCEDURE — 99999 PR PBB SHADOW E&M-EST. PATIENT-LVL IV: CPT | Mod: PBBFAC,,, | Performed by: PHYSICAL MEDICINE & REHABILITATION

## 2020-02-19 NOTE — LETTER
February 19, 2020      Nitin Banks MD  [address] [facility] [address]           [address]  [facility], [address]  [facility] [address]  Phone: [phone]  Fax: [phone]          Patient: Shannan Norman   MR Number: [id number]   YOB: 1945   Date of Visit: 2/19/2020       Dear Dr. Nitin Banks:    Thank you for referring Shannan Norman to me for evaluation. Attached you will find relevant portions of my assessment and plan of care.    If you have questions, please do not hesitate to call me. I look forward to following Shannan Norman along with you.    Sincerely,    Bhavna Ryan MD    Enclosure  CC:  No Recipients    If you would like to receive this communication electronically, please contact [email or web address] or [phone] to request more information on [facility] access.    For providers and/or their staff who would like to refer a patient to [facility], please contact us through our one-stop-shop provider referral line, [facility], at [phone].    If you feel you have received this communication in error or would no longer like to receive these types of communications, please e-mail [email or web address]

## 2020-03-06 ENCOUNTER — PATIENT MESSAGE (OUTPATIENT)
Dept: DERMATOLOGY | Facility: CLINIC | Age: 75
End: 2020-03-06

## 2020-03-06 ENCOUNTER — CLINICAL SUPPORT (OUTPATIENT)
Dept: REHABILITATION | Facility: HOSPITAL | Age: 75
End: 2020-03-06
Attending: PHYSICAL MEDICINE & REHABILITATION
Payer: MEDICARE

## 2020-03-06 DIAGNOSIS — R29.898 WEAKNESS OF BOTH LEGS: ICD-10-CM

## 2020-03-06 DIAGNOSIS — M79.18 GLUTEAL PAIN: ICD-10-CM

## 2020-03-06 DIAGNOSIS — M53.86 DECREASED RANGE OF MOTION OF LUMBAR SPINE: ICD-10-CM

## 2020-03-06 PROCEDURE — 97110 THERAPEUTIC EXERCISES: CPT | Mod: PN

## 2020-03-06 PROCEDURE — 97161 PT EVAL LOW COMPLEX 20 MIN: CPT | Mod: PN

## 2020-03-09 PROBLEM — M53.86 DECREASED RANGE OF MOTION OF LUMBAR SPINE: Status: ACTIVE | Noted: 2020-03-09

## 2020-03-09 PROBLEM — R29.898 WEAKNESS OF BOTH LEGS: Status: ACTIVE | Noted: 2020-03-09

## 2020-03-09 PROBLEM — M79.18 GLUTEAL PAIN: Status: ACTIVE | Noted: 2020-03-09

## 2020-03-09 NOTE — PLAN OF CARE
[facility]  Physical Therapy Initial Evaluation    Name: Shannan Norman  Clinic Number: [id number]    Therapy Diagnosis:   Encounter Diagnoses   Name Primary?    Gluteal pain     Decreased range of motion of lumbar spine     Weakness of both legs      Physician: Bhavna Ryan, *    Physician Orders: PT Eval and Treat: back and core strengthening, and extension exercises, sloppy pushups  and Si joint mobilization and HEP  Medical Diagnosis from Referral:   M54.5,G89.29 (ICD-10-CM) - Chronic bilateral low back pain without sciatica   M47.816 (ICD-10-CM) - Lumbar spondylosis     Evaluation Date: 3/6/2020  Authorization Period Expiration: 2/18/2021  Plan of Care Expiration: 3/6/2020 to 4/30/2020  Visit # / Visits authorized: 1/50    Time In: 10:25 AM  Time Out: 11:22 AM  Total Billable Time: 57 minutes (Low Complexity Evaluation, 1TE)    Precautions: Standard, Diabetes and HTN    Subjective     Date of onset: A few months ago    History of current condition - Shannan reports: that he's not really having pain, but more so soreness. He states that outside of the soreness, the pain has been decreasing to once a day or twice a day, and is usually a stabbing pain or an ache that feels like leg cramps. He began noticing about several months ago that sitting in the recliner or on the couch he had discomfort. Noticed that if he put 1 or 2 pillows behind his back it decreases the discomfort behind his back. States that he then began to have serious pain/discomfort when he got up form his recliner and had difficulty walking along with decreased balance. This prompted him to go to a back and spine specialist. He states he does a lot of traveling in his motor home, and as a result of being gone he exercises by performing back stretches about 6 days a week and goes to the gym about 4 times a week. Typically in the gym he will cycle and do band work if his shoulder is bothering him, but he also  performs machines such as leg press. Notices that in the morning he's pretty tight. States that the longer the day goes on, the discomfort increases around 5-6 pm. Patient demonstrated all yoga poses he performs at home which include numerous stretches for low back and legs.     Medical History:   Past Medical History:   Diagnosis Date    Allergy     Arthritis     Atrial fibrillation     Atrial flutter 2011    ablation    Basal cell carcinoma     Cataract     CKD (chronic kidney disease) stage 3, GFR 30-59 ml/min 8/13/2019    Diabetes mellitus     Dry eye syndrome     Gout, unspecified     High cholesterol     History of shingles     Hypertension     Seizures        Surgical History:   Shannan Norman  has a past surgical history that includes Tonsillectomy; varicose veins; Ablation of dysrhythmic focus; Ganglion cyst excision; Hernia repair (2013); Vasectomy; Ablation (N/A, 9/11/2018); Colonoscopy (N/A, 1/2/2019); Tendon repair (Right); Adenoidectomy; and Vein Surgery.    Medications:   Shannan has a current medication list which includes the following prescription(s): allopurinol, apixaban, ascorbic acid (vitamin c), atorvastatin, benazepril, clotrimazole-betamethasone 1-0.05%, colchicine, cyclosporine, econazole nitrate, fluzone high-dose 2019-20 (pf), glucosamine-chondroitin, indomethacin, multivitamin, and tamsulosin, and the following Facility-Administered Medications: sodium chloride 0.9%.    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    Pain:  Current 0/10, worst 6-7/10, best 0/10   Location: bilateral gluteus jim regions   Description: Aching and Throbbing and occasional  "shooting  Aggravating Factors: Sitting and Exercises that result in putting pressure over the area  Easing Factors: None    Pts goals: "To not have the discomfort."    Objective     Posture: Decreased thoracic kyphosis, increased space between trunk and RUE, left foot in more external rotation than right  Palpation: Tender to moderate palpation along left piriformis mm  Sensation: Intact    Range of Motion/Strength:     Thoracolumbar AROM Pain/Dysfunction with Movement   Flexion 80    Extension 15    Right side bending 20    Left side bending 15 Pain along left piriformis   Right rotation WFL    Left rotation WFL      Hip Right Left Pain/Dysfunction with Movement   AROM/PROM      flexion WFL WFL    extension WFL WFL    abduction WFL WFL    adduction WFL WFL    Internal rotation Moderately limited Moderately limited    External rotation WFL WFL      Knee Right Left Pain/Dysfunction with Movement   AROM/PROM      flexion WFL WFL    extension WFL WFL      Ankle Right Left Pain/Dysfunction with Movement   AROM/PROM      dorsiflexion WFL WFL    plantarflexion WFL WFL    inversion WFL WFL    eversion WFL WFL        L/E MMT Right Left Pain/Dysfunction with Movement   Hip Flexion 4+/5 4+/5    Hip Extension 4/5 4/5    Hip Abduction 5/5 5/5    Hip Adduction NT NT    Hip IR 5/5 5/5    Hip ER 4+/5 4+/5    Knee Flexion 5/5 5/5    Knee Extension 5/5 5/5    Ankle DF 5/5 5/5    Ankle PF 5/5 5/5    Ankle Inversion 5/5 5/5    Ankle Eversion 5/5 5/5        Flexibility:   Hamstring 90/90 Right: Min to Moderately limited  Hamstring 90/90 Left: Min to Moderately limited      CMS Impairment/Limitation/Restriction for FOTO Lumbar Spine Survey    Therapist reviewed FOTO scores for Shannan Norman on 3/6/2020.   FOTO documents entered into [employer] - see Media section.    Current Limitation Score: 41%  Predicted Limitation Score: 28%  Category: Mobility         TREATMENT     Treatment Time In: 10:55 AM  Treatment Time Out: 11:05 AM  Total " Treatment time separate from Evaluation: 10 minutes         - THERAPEUTIC EXERCISES to develop  strength, endurance, posture and core stabilization for 10 minutes including :.   Patient previously participate in therapy at [facility]. Reviewed previous exercises as well as new ones consisting of:  -Proper sitting posture with lumbar roll  - Piriformis Stretch with ADD & IR  - Supine HS Stretch  - Trunk Extension in Standing  - Prayer Stretch  - Cat/Camel  - Prone Plank  - 3-way hip (FLEX, ABD, EXT)  - Gluteal Squeezes  - Supine Hip ADD  - Sidelying Clams  - LTRs  - Bridges  - Standing Hip ABD/EXT  - Mini Squats  - Heel Raises   - Fwd Step-ups    Home Exercises and Patient Education Provided    Education provided:   - Importance and role of physical therapy  - Proper body mechanics when performing HEP  - Importance of continuing his performance of his daily workouts and stretches while adding HEP    Written Home Exercises Provided: yes.  Exercises were reviewed and Shannan was able to demonstrate them prior to the end of the session.  Shannan demonstrated good  understanding of the education provided.     See EMR under Patient Instructions for exercises provided prior visit.    Assessment     Shannan is a 74 y.o. male referred to outpatient Physical Therapy with a medical diagnosis of chronic bilateral low back pain without sciatica and lumbar spondylosis. Pt presents to PT with pain, decreased thoracolumbar ROM, decreased strength and flexibility in bilateral lower extremities, poor posture, impaired balance, and functional deficits with prolonged sitting, prolonged walking, actions that require forward bending. Patient is currently very active and states that the pain is slowly dissipating. Patient will continue his performance of yoga, stretches, and weight lifting in the gym, while incorporating newly issued exercises that were reviewed today.       Pt prognosis is Good.   Pt will benefit from skilled outpatient Physical  Therapy to address the deficits stated above and in the chart below, provide pt/family education, and to maximize pt's level of independence.     Plan of care discussed with patient: Yes  Pt's spiritual, cultural and educational needs considered and pt agreeable to plan of care and goals as stated below:     Anticipated Barriers for therapy: Co-morbidities      Medical Necessity is demonstrated by the following  History  Co-morbidities and personal factors that may impact the plan of care Co-morbidities:   Nuclear sclerosis of both eyes, left epiretinal membrane, HTN, atrial flutter, PFO, varicose veins of lower extremities with inflammation, junctional escape rhythm, sick sinus syndrome, non-rhematic aortic valve insufficiency, junctional bradycardia, NICM, dyslipidemia associated with DM II, HTN associated with DM, benign prostatic hyperplasia, CKD, DM II with stage 3 chronic kidney disease, umbilical hernia, right inguinal hernia, gout    Personal Factors:   age     high   Examination  Body Structures and Functions, activity limitations and participation restrictions that may impact the plan of care Body Regions:   back  lower extremities    Body Systems:    ROM  strength  gross coordinated movement  transfers  transitions    Participation Restrictions:   See co-morbidities     Activity limitations:   Learning and applying knowledge  no deficits    General Tasks and Commands  no deficits    Communication  no deficits    Mobility  lifting and carrying objects    Self care  no deficits    Domestic Life  no deficits    Interactions/Relationships  no deficits    Life Areas  no deficits    Community and Social Life  community life  recreation and leisure         low   Clinical Presentation evolving clinical presentation with changing clinical characteristics low   Decision Making/ Complexity Score: low       Goals:    Long Term Goals (4 Weeks):  1. Pt will be compliant with HEP to supplement PT in decreasing pain with  functional mobility.  2. Pt will improve FOTO score to </= 28% limited to decrease perceived limitation with maintaining/changing body position.   3. Pt will perform lifting and twisting motions with good control to demonstrate improved core strength.  4. Pt will improve impaired LE MMTs to >/=4+/5 to improve strength for functional tasks.  5. Pt will report no pain during lumbar ROM to promote functional mobility.       Plan     Plan of care Certification: 3/6/2020 to 4/30/2020.    Outpatient Physical Therapy 1 times weekly for 4 weeks to include the following interventions: Cervical/Lumbar Traction, Electrical Stimulation , FDN prn, Gait Training, Manual Therapy, Moist Heat/ Ice, Neuromuscular Re-ed, Patient Education, Therapeutic Activites, Therapeutic Exercise, Ultrasound and Kinesiotape prn..     Henna García, PT, DPT

## 2020-03-25 ENCOUNTER — PATIENT MESSAGE (OUTPATIENT)
Dept: DERMATOLOGY | Facility: CLINIC | Age: 75
End: 2020-03-25

## 2020-03-29 ENCOUNTER — CLINICAL SUPPORT (OUTPATIENT)
Dept: CARDIOLOGY | Facility: HOSPITAL | Age: 75
End: 2020-03-29
Attending: INTERNAL MEDICINE
Payer: MEDICARE

## 2020-03-29 DIAGNOSIS — I42.8 NICM (NONISCHEMIC CARDIOMYOPATHY): ICD-10-CM

## 2020-03-29 DIAGNOSIS — Z95.0 CARDIAC PACEMAKER IN SITU: ICD-10-CM

## 2020-03-29 DIAGNOSIS — I49.5 SSS (SICK SINUS SYNDROME): ICD-10-CM

## 2020-03-29 PROCEDURE — 93294 CARDIAC DEVICE CHECK - REMOTE: ICD-10-PCS | Mod: ,,, | Performed by: INTERNAL MEDICINE

## 2020-03-29 PROCEDURE — 93294 REM INTERROG EVL PM/LDLS PM: CPT | Mod: ,,, | Performed by: INTERNAL MEDICINE

## 2020-05-01 ENCOUNTER — TELEPHONE (OUTPATIENT)
Dept: OPTOMETRY | Facility: CLINIC | Age: 75
End: 2020-05-01

## 2020-06-05 ENCOUNTER — PROCEDURE VISIT (OUTPATIENT)
Dept: DERMATOLOGY | Facility: CLINIC | Age: 75
End: 2020-06-05
Payer: MEDICARE

## 2020-06-05 DIAGNOSIS — C44.91 BASAL CELL CARCINOMA (BCC), UNSPECIFIED SITE: ICD-10-CM

## 2020-06-05 DIAGNOSIS — B07.9 VERRUCA: Primary | ICD-10-CM

## 2020-06-05 PROCEDURE — 17110 PR DESTRUCTION BENIGN LESIONS UP TO 14: ICD-10-PCS | Mod: S$PBB,59,, | Performed by: DERMATOLOGY

## 2020-06-05 PROCEDURE — 17110 DESTRUCTION B9 LES UP TO 14: CPT | Mod: S$PBB,59,, | Performed by: DERMATOLOGY

## 2020-06-05 PROCEDURE — 17261 DSTRJ MAL LES T/A/L .6-1.0CM: CPT | Mod: PBBFAC | Performed by: DERMATOLOGY

## 2020-06-05 PROCEDURE — 99499 UNLISTED E&M SERVICE: CPT | Mod: S$PBB,,, | Performed by: DERMATOLOGY

## 2020-06-05 PROCEDURE — 17261 DSTRJ MAL LES T/A/L .6-1.0CM: CPT | Mod: S$PBB,,, | Performed by: DERMATOLOGY

## 2020-06-05 PROCEDURE — 17110 DESTRUCTION B9 LES UP TO 14: CPT | Mod: PBBFAC,59 | Performed by: DERMATOLOGY

## 2020-06-05 PROCEDURE — 17261 PR DESTR MALIG TRUNK,EXTREM 0.6-1 CM: ICD-10-PCS | Mod: S$PBB,,, | Performed by: DERMATOLOGY

## 2020-06-05 PROCEDURE — 99499 NO LOS: ICD-10-PCS | Mod: S$PBB,,, | Performed by: DERMATOLOGY

## 2020-06-05 NOTE — PROGRESS NOTES
Here for electrodesiccation and curettage of pigmented BCC on the left neck. bx done on 1/20/20:    Skin, left neck, shave biopsy:   -BASAL CELL CARCINOMA (PIGMENTED), NODULAR TYPE, NARROWLY EXCISED IN THE   PLANES OF SECTION EXAMINED     Electrodessication and Curettage Procedure note:    Verbal consent obtained. Lesional tissue marked and prepped with alcohol. Lesion anesthetized with 1% lidocaine with epinephrine. Curettage and Desiccation x 3 cycles to base. Aluminum chloride for hemostasis. Lesion size after primary curettage: 0.8 cm    Area bandaged and wound care explained.    F/u 6 months TBSE    In addition pt requests treatment of wart on left clavicle. He has not tried any OTC removers.    On exam there is a filiform exophytic fleshy 3 mm papule c/w verruca on left clavicle.    Cryosurgery procedure note:    Verbal consent from the patient is obtained including, but not limited to, risk of hypopigmentation/hyperpigmentation, scar, recurrence of lesion. Liquid nitrogen cryosurgery is applied to 1 lesions to produce a freeze injury. The patient is aware that blisters may form and is instructed on wound care with gentle cleansing and use of vaseline ointment to keep moist until healed. The patient is supplied a handout on cryosurgery and is instructed to call if lesions do not completely resolve.

## 2020-06-22 ENCOUNTER — PATIENT OUTREACH (OUTPATIENT)
Dept: ADMINISTRATIVE | Facility: OTHER | Age: 75
End: 2020-06-22

## 2020-06-23 ENCOUNTER — OFFICE VISIT (OUTPATIENT)
Dept: SPINE | Facility: CLINIC | Age: 75
End: 2020-06-23
Attending: PHYSICAL MEDICINE & REHABILITATION
Payer: MEDICARE

## 2020-06-23 VITALS
HEIGHT: 73 IN | SYSTOLIC BLOOD PRESSURE: 119 MMHG | WEIGHT: 211.63 LBS | BODY MASS INDEX: 28.05 KG/M2 | DIASTOLIC BLOOD PRESSURE: 60 MMHG | HEART RATE: 60 BPM

## 2020-06-23 DIAGNOSIS — M47.816 LUMBAR SPONDYLOSIS: Primary | ICD-10-CM

## 2020-06-23 PROCEDURE — 99999 PR PBB SHADOW E&M-EST. PATIENT-LVL III: CPT | Mod: PBBFAC,,, | Performed by: PHYSICAL MEDICINE & REHABILITATION

## 2020-06-23 PROCEDURE — 99213 OFFICE O/P EST LOW 20 MIN: CPT | Mod: PBBFAC | Performed by: PHYSICAL MEDICINE & REHABILITATION

## 2020-06-23 PROCEDURE — 99999 PR PBB SHADOW E&M-EST. PATIENT-LVL III: ICD-10-PCS | Mod: PBBFAC,,, | Performed by: PHYSICAL MEDICINE & REHABILITATION

## 2020-06-23 PROCEDURE — 99213 OFFICE O/P EST LOW 20 MIN: CPT | Mod: S$PBB,,, | Performed by: PHYSICAL MEDICINE & REHABILITATION

## 2020-06-23 PROCEDURE — 99213 PR OFFICE/OUTPT VISIT, EST, LEVL III, 20-29 MIN: ICD-10-PCS | Mod: S$PBB,,, | Performed by: PHYSICAL MEDICINE & REHABILITATION

## 2020-06-23 NOTE — PROGRESS NOTES
Chart reviewed.   Immunizations: Triggered Imm Registry     Orders placed: n/a  Upcoming appts to satisfy VITOR topics: n/a

## 2020-06-23 NOTE — PROGRESS NOTES
Subjective:      Patient ID: Shannan Norman is a 74 y.o. male.    Chief Complaint: Follow-up    Mr Norman is a 75 yo male here for follow up of low back pain.  He was seen by me in 2/19/20 with 2-3 months of achy butt pain.  He was going to try PT for his back.  He was able to go to one visit.  He did some of the exercises, and then went back to yoga.  He feels like the back feels good.  He is not having any pain.  He has been trying to watch posture and try some extension.  He feels like it is working well.  He was doing some work on a ladder and feels like that helped the back.  He also felt like the treat your own back book has helped.      Past Medical History:  No date: Allergy  No date: Arthritis  No date: Atrial fibrillation  2011: Atrial flutter      Comment:  ablation  No date: Basal cell carcinoma  No date: Cataract  8/13/2019: CKD (chronic kidney disease) stage 3, GFR 30-59 ml/min  No date: Diabetes mellitus  No date: Dry eye syndrome  No date: Gout, unspecified  No date: High cholesterol  No date: History of shingles  No date: Hypertension  No date: Seizures    Past Surgical History:  9/11/2018: ABLATION; N/A      Comment:  Procedure: ABLATION;  Surgeon: Hany Sanchez MD;                 Location: [facility] CATH LAB;  Service: Cardiology;                 Laterality: N/A;  AFL, ISABELLE, RFA, ELODIA, MAC, DM, 3 PREP  No date: ABLATION OF DYSRHYTHMIC FOCUS  No date: ADENOIDECTOMY  1/2/2019: COLONOSCOPY; N/A      Comment:  Procedure: COLONOSCOPY Suprep;  Surgeon: Cindy Solorzano MD;  Location: [facility] ENDO;  Service: Endoscopy;                 Laterality: N/A;  No date: GANGLION CYST EXCISION      Comment:  left neck  2013: HERNIA REPAIR      Comment:  umbilical  No date: TENDON REPAIR; Right      Comment:  wrist  No date: TONSILLECTOMY  No date: varicose veins      Comment:  several sx  bilateral  No date: VASECTOMY  No date: VEIN SURGERY    Review of patient's family history indicates:  Problem:  Diabetes      Relation: Mother          Age of Onset: (Not Specified)  Problem: COPD      Relation: Father          Age of Onset: (Not Specified)  Problem: No Known Problems      Relation: Sister          Age of Onset: (Not Specified)  Problem: Heart attack      Relation: Brother          Age of Onset: (Not Specified)  Problem: Heart disease      Relation: Brother          Age of Onset: (Not Specified)  Problem: No Known Problems      Relation: Maternal Aunt          Age of Onset: (Not Specified)  Problem: No Known Problems      Relation: Maternal Uncle          Age of Onset: (Not Specified)  Problem: No Known Problems      Relation: Paternal Aunt          Age of Onset: (Not Specified)  Problem: No Known Problems      Relation: Paternal Uncle          Age of Onset: (Not Specified)  Problem: No Known Problems      Relation: Maternal Grandmother          Age of Onset: (Not Specified)  Problem: No Known Problems      Relation: Maternal Grandfather          Age of Onset: (Not Specified)  Problem: No Known Problems      Relation: Paternal Grandmother          Age of Onset: (Not Specified)  Problem: No Known Problems      Relation: Paternal Grandfather          Age of Onset: (Not Specified)  Problem: No Known Problems      Relation: Son          Age of Onset: (Not Specified)  Problem: Amblyopia      Relation: Neg Hx          Age of Onset: (Not Specified)  Problem: Blindness      Relation: Neg Hx          Age of Onset: (Not Specified)  Problem: Cancer      Relation: Neg Hx          Age of Onset: (Not Specified)  Problem: Cataracts      Relation: Neg Hx          Age of Onset: (Not Specified)  Problem: Glaucoma      Relation: Neg Hx          Age of Onset: (Not Specified)  Problem: Hypertension      Relation: Neg Hx          Age of Onset: (Not Specified)  Problem: Macular degeneration      Relation: Neg Hx          Age of Onset: (Not Specified)  Problem: Retinal detachment      Relation: Neg Hx          Age of Onset: (Not  Specified)  Problem: Strabismus      Relation: Neg Hx          Age of Onset: (Not Specified)  Problem: Stroke      Relation: Neg Hx          Age of Onset: (Not Specified)  Problem: Thyroid disease      Relation: Neg Hx          Age of Onset: (Not Specified)  Problem: Prostate cancer      Relation: Neg Hx          Age of Onset: (Not Specified)  Problem: Kidney disease      Relation: Neg Hx          Age of Onset: (Not Specified)  Problem: Melanoma      Relation: Neg Hx          Age of Onset: (Not Specified)      Social History    Socioeconomic History      Marital status:       Spouse name: Not on file      Number of children: Not on file      Years of education: Not on file      Highest education level: Not on file    Occupational History        Employer: [employer]    Social Needs      Financial resource strain: Not hard at all      Food insecurity:        Worry: Never true        Inability: Never true      Transportation needs:        Medical: No        Non-medical: No    Tobacco Use      Smoking status: Never Smoker      Smokeless tobacco: Never Used    Substance and Sexual Activity      Alcohol use: Yes        Alcohol/week: 3.0 - 4.0 standard drinks        Types: 3 - 4 Glasses of wine per week        Frequency: 2-3 times a week        Drinks per session: 1 or 2        Binge frequency: Never      Drug use: No      Sexual activity: Yes        Partners: Female    Lifestyle      Physical activity:        Days per week: 6 days        Minutes per session: 60 min      Stress: Only a little    Relationships      Social connections:        Talks on phone: More than three times a week        Gets together: Three times a week        Attends Synagogue service: Not on file        Active member of club or organization: Yes        Attends meetings of clubs or organizations: More than 4 times per year        Relationship status:     Other Topics      Concerns:        Not on file    Social History Narrative       Not on file      Current Outpatient Medications:  allopurinol (ZYLOPRIM) 100 MG tablet, Take 1 tablet (100 mg total) by mouth once daily., Disp: 90 tablet, Rfl: 3  apixaban (ELIQUIS) 5 mg Tab, Take 1 tablet (5 mg total) by mouth 2 (two) times daily., Disp: 180 tablet, Rfl: 3  ascorbic acid (VITAMIN C) 500 MG tablet, Take 1,000 mg by mouth nightly. , Disp: , Rfl:   atorvastatin (LIPITOR) 40 MG tablet, Take 1 tablet (40 mg total) by mouth once daily., Disp: 90 tablet, Rfl: 3  benazepril (LOTENSIN) 20 MG tablet, Take 1 tablet (20 mg total) by mouth once daily., Disp: 90 tablet, Rfl: 3  clotrimazole-betamethasone 1-0.05% (LOTRISONE) cream, Apply topically as needed., Disp: 45 g, Rfl: 2  colchicine (COLCRYS) 0.6 mg tablet, Take 1 tablet (0.6 mg total) by mouth once daily. for 7 days, Disp: 7 tablet, Rfl: 0  cycloSPORINE (RESTASIS) 0.05 % ophthalmic emulsion, Place 0.4 mLs (1 drop total) into both eyes 2 (two) times daily., Disp: 60 vial, Rfl: 12  econazole nitrate 1 % cream, Apply topically once daily., Disp: 85 g, Rfl: 3   FLUZONE HIGH-DOSE 2019-20, PF, 180 mcg/0.5 mL Syrg, ADMINISTER 0.5ML IN THE MUSCLE AS DIRECTED, Disp: , Rfl: 0  GLUCOSAMINE HCL/CHONDR VASQUEZ A NA (GLUCOSAMINE-CHONDROITIN) 750-600 mg Tab, Take 2 tablets by mouth Daily., Disp: , Rfl:   meloxicam (MOBIC) 15 MG tablet, Hold medication until Xarelto prescription is completed., Disp: , Rfl:   multivitamin capsule, Take 1 capsule by mouth nightly. , Disp: , Rfl:   tamsulosin (FLOMAX) 0.4 mg Cap, Take 1 capsule (0.4 mg total) by mouth after dinner., Disp: 30 capsule, Rfl: 12    No current facility-administered medications for this visit.   Facility-Administered Medications Ordered in Other Visits:  0.9%  NaCl infusion, , Intravenous, Continuous, Candi Madison NP        Review of patient's allergies indicates:  No Known Allergies        Review of Systems   Constitution: Negative for weight gain and weight loss.   Cardiovascular: Negative for chest  pain.   Respiratory: Negative for shortness of breath.    Musculoskeletal: Negative for back pain, joint pain and joint swelling.   Gastrointestinal: Negative for abdominal pain, bowel incontinence, nausea and vomiting.   Genitourinary: Negative for bladder incontinence.   Neurological: Negative for numbness and paresthesias.         Objective:        General: Shannan is well-developed, well-nourished, appears stated age, in no acute distress, alert and oriented to time, place and person.     General    Vitals reviewed.  Constitutional: He is oriented to person, place, and time. He appears well-developed and well-nourished.   HENT:   Head: Normocephalic and atraumatic.   Pulmonary/Chest: Effort normal.   Neurological: He is alert and oriented to person, place, and time.   Psychiatric: He has a normal mood and affect. His behavior is normal. Judgment and thought content normal.     General Musculoskeletal Exam   Gait: normal     Right Ankle/Foot Exam     Tests   Heel Walk: able to perform  Tiptoe Walk: able to perform    Left Ankle/Foot Exam     Tests   Heel Walk: able to perform  Tiptoe Walk: able to perform  Back (L-Spine & T-Spine) / Neck (C-Spine) Exam     Back (L-Spine & T-Spine) Range of Motion   Extension: 20   Flexion: 80     Spinal Sensation   Right Side Sensation  C-Spine Level: normal   L-Spine Level: normal  S-Spine Level: normal  Left Side Sensation  C-Spine Level: normal  L-Spine Level: normal  S-Spine Level: normal    Back (L-Spine & T-Spine) Tests   Right Side Tests  Straight leg raise:      Sitting SLR: > 70 degrees      Left Side Tests  Straight leg raise:     Sitting SLR: > 70 degrees          Other He has no scoliosis .  Spinal Kyphosis:  Absent      Muscle Strength   Right Upper Extremity   Biceps: 5/5/5   Deltoid:  5/5  Triceps:  5/5  Wrist extension: 5/5/5   Finger Flexors:  5/5  Left Upper Extremity  Biceps: 5/5/5   Deltoid:  5/5  Triceps:  5/5  Wrist extension: 5/5/5   Finger Flexors:  5/5  Right  Lower Extremity   Hip Flexion: 5/5   Quadriceps:  5/5   Anterior tibial:  5/5/5  EHL:  5/5  Left Lower Extremity   Hip Flexion: 5/5   Quadriceps:  5/5   Anterior tibial:  5/5/5   EHL:  5/5    Reflexes     Left Side  Biceps:  2+  Triceps:  2+  Brachioradialis:  2+  Quadriceps:  2+  Achilles:  2+  Left Hussein's Sign:  Absent  Babinski Sign:  absent    Right Side   Biceps:  2+  Triceps:  2+  Brachioradialis:  2+  Quadriceps:  2+  Achilles:  2+  Right Hussein's Sign:  absent  Babinski Sign:  absent    Vascular Exam     Right Pulses        Carotid:                  2+    Left Pulses        Carotid:                  2+              Assessment:       1. Lumbar spondylosis           Plan:             1. His pain is better, and not having any pain  2. He has been working on posture and he feels like sleeping well.  He has read the treat your own back book   3. He is going to continue yoga  4. PT , he went to one visit and felt like the exercises were similar to yoga.  He fels like his yoga helps  5. Tylenol 500mg may take 2 pills twice a day as needed  6. He is caring for his wife with dementia which is challenging  7. RTC PRN        Follow-up: Follow up if symptoms worsen or fail to improve. If there are any questions prior to this, the patient was instructed to contact the office.

## 2020-06-24 ENCOUNTER — OFFICE VISIT (OUTPATIENT)
Dept: OPTOMETRY | Facility: CLINIC | Age: 75
End: 2020-06-24
Payer: MEDICARE

## 2020-06-24 DIAGNOSIS — H04.123 BILATERAL DRY EYES: ICD-10-CM

## 2020-06-24 DIAGNOSIS — E11.9 TYPE 2 DIABETES MELLITUS WITHOUT RETINOPATHY: ICD-10-CM

## 2020-06-24 DIAGNOSIS — H25.13 NUCLEAR SCLEROSIS OF BOTH EYES: Primary | ICD-10-CM

## 2020-06-24 DIAGNOSIS — H52.7 REFRACTIVE ERROR: Primary | ICD-10-CM

## 2020-06-24 PROCEDURE — 99999 PR PBB SHADOW E&M-EST. PATIENT-LVL III: CPT | Mod: PBBFAC,,, | Performed by: OPTOMETRIST

## 2020-06-24 PROCEDURE — 99212 OFFICE O/P EST SF 10 MIN: CPT | Mod: PBBFAC,27,PO | Performed by: OPTOMETRIST

## 2020-06-24 PROCEDURE — 99999 PR PBB SHADOW E&M-EST. PATIENT-LVL II: CPT | Mod: PBBFAC,,, | Performed by: OPTOMETRIST

## 2020-06-24 PROCEDURE — 99499 UNLISTED E&M SERVICE: CPT | Mod: S$PBB,,, | Performed by: OPTOMETRIST

## 2020-06-24 PROCEDURE — 92014 COMPRE OPH EXAM EST PT 1/>: CPT | Mod: S$PBB,,, | Performed by: OPTOMETRIST

## 2020-06-24 PROCEDURE — 99999 PR PBB SHADOW E&M-EST. PATIENT-LVL III: ICD-10-PCS | Mod: PBBFAC,,, | Performed by: OPTOMETRIST

## 2020-06-24 PROCEDURE — 99999 PR PBB SHADOW E&M-EST. PATIENT-LVL II: ICD-10-PCS | Mod: PBBFAC,,, | Performed by: OPTOMETRIST

## 2020-06-24 PROCEDURE — 92014 PR EYE EXAM, EST PATIENT,COMPREHESV: ICD-10-PCS | Mod: S$PBB,,, | Performed by: OPTOMETRIST

## 2020-06-24 PROCEDURE — 99213 OFFICE O/P EST LOW 20 MIN: CPT | Mod: PBBFAC,PO | Performed by: OPTOMETRIST

## 2020-06-24 PROCEDURE — 99499 NO LOS: ICD-10-PCS | Mod: S$PBB,,, | Performed by: OPTOMETRIST

## 2020-06-24 NOTE — PROGRESS NOTES
Assessment /Plan     For exam results, see Encounter Report.    Refractive error      1. NO clfit done.

## 2020-06-24 NOTE — PROGRESS NOTES
NICO KNOTT 01/20 Diabetic  this morning.  Patient has reading only glasses   and distance only glasses but recently lost his distance glasses.  Patient   may want to try monovision contacts and states  he has worn them in the   past. Using Restasis BID OU.  Eyes have been burning more recently.    Hemoglobin A1C       Date                     Value               Ref Range             Status                02/10/2020               6.3 (H)             4.0 - 5.6 %           Final            08/09/2019               6.1 (H)             4.0 - 5.6 %           Final               02/04/2019               6.2 (H)             4.0 - 5.6 %           Final                  Last edited by Bhavna Giron on 6/24/2020  9:26 AM. (History)            Assessment /Plan     For exam results, see Encounter Report.    Nuclear sclerosis of both eyes  -     Ambulatory referral/consult to Ophthalmology; Future; Expected date: 07/01/2020    Bilateral dry eyes    Type 2 diabetes mellitus without retinopathy      1. Refer to Dr. Morse for cataract evaluation and possible removal.   2. Cont Restasis drops and art tears.   3. No diabetic retinopathy, no csme. Return in 1 year for dilated eye exam.

## 2020-06-25 ENCOUNTER — RESEARCH ENCOUNTER (OUTPATIENT)
Dept: RESEARCH | Facility: HOSPITAL | Age: 75
End: 2020-06-25

## 2020-06-25 NOTE — PROGRESS NOTES
Study: Luis [facility] MRI  Sponsor: [employer]  Follow-up Visit: Study Closure  Date: 25June2020     As of June,9th 2020 the sponsor of the study has closed this project because they have collected all of the data that they need.   Patient was sent an End of Participation Letter in the mail on today, 6/25/2020, to be made aware of the study closure.   There are no more study follow-up visits; study complete.

## 2020-06-27 ENCOUNTER — CLINICAL SUPPORT (OUTPATIENT)
Dept: CARDIOLOGY | Facility: HOSPITAL | Age: 75
End: 2020-06-27
Attending: INTERNAL MEDICINE
Payer: MEDICARE

## 2020-06-27 DIAGNOSIS — I49.5 SSS (SICK SINUS SYNDROME): ICD-10-CM

## 2020-06-27 DIAGNOSIS — Z95.0 CARDIAC PACEMAKER IN SITU: ICD-10-CM

## 2020-06-27 DIAGNOSIS — I42.8 NICM (NONISCHEMIC CARDIOMYOPATHY): ICD-10-CM

## 2020-06-27 PROCEDURE — 93294 CARDIAC DEVICE CHECK - REMOTE: ICD-10-PCS | Mod: ,,, | Performed by: INTERNAL MEDICINE

## 2020-06-27 PROCEDURE — 93294 REM INTERROG EVL PM/LDLS PM: CPT | Mod: ,,, | Performed by: INTERNAL MEDICINE

## 2020-06-27 PROCEDURE — 93296 REM INTERROG EVL PM/IDS: CPT | Performed by: INTERNAL MEDICINE

## 2020-06-29 ENCOUNTER — OFFICE VISIT (OUTPATIENT)
Dept: OPHTHALMOLOGY | Facility: CLINIC | Age: 75
End: 2020-06-29
Payer: MEDICARE

## 2020-06-29 DIAGNOSIS — H25.043 POSTERIOR SUBCAPSULAR AGE-RELATED CATARACT OF BOTH EYES: ICD-10-CM

## 2020-06-29 DIAGNOSIS — H43.813 VITREOUS DETACHMENT OF BOTH EYES: ICD-10-CM

## 2020-06-29 DIAGNOSIS — E11.9 DM TYPE 2 WITHOUT RETINOPATHY: ICD-10-CM

## 2020-06-29 DIAGNOSIS — H25.13 NUCLEAR SCLEROSIS OF BOTH EYES: Primary | ICD-10-CM

## 2020-06-29 DIAGNOSIS — H52.7 REFRACTIVE ERROR: ICD-10-CM

## 2020-06-29 PROCEDURE — 92136 BIOMETRY: ICD-10-PCS | Mod: 26,S$PBB,RT, | Performed by: OPHTHALMOLOGY

## 2020-06-29 PROCEDURE — 99213 OFFICE O/P EST LOW 20 MIN: CPT | Mod: PBBFAC,PO | Performed by: OPHTHALMOLOGY

## 2020-06-29 PROCEDURE — 99999 PR PBB SHADOW E&M-EST. PATIENT-LVL III: CPT | Mod: PBBFAC,,, | Performed by: OPHTHALMOLOGY

## 2020-06-29 PROCEDURE — 99999 PR PBB SHADOW E&M-EST. PATIENT-LVL III: ICD-10-PCS | Mod: PBBFAC,,, | Performed by: OPHTHALMOLOGY

## 2020-06-29 PROCEDURE — 92014 COMPRE OPH EXAM EST PT 1/>: CPT | Mod: S$PBB,,, | Performed by: OPHTHALMOLOGY

## 2020-06-29 PROCEDURE — 92136 OPHTHALMIC BIOMETRY: CPT | Mod: PBBFAC,PO | Performed by: OPHTHALMOLOGY

## 2020-06-29 PROCEDURE — 92014 PR EYE EXAM, EST PATIENT,COMPREHESV: ICD-10-PCS | Mod: S$PBB,,, | Performed by: OPHTHALMOLOGY

## 2020-06-29 RX ORDER — NEPAFENAC 3 MG/ML
1 SUSPENSION/ DROPS OPHTHALMIC DAILY
Qty: 3 ML | Refills: 1 | Status: SHIPPED | OUTPATIENT
Start: 2020-07-25 | End: 2020-08-24

## 2020-06-29 RX ORDER — OFLOXACIN 3 MG/ML
1 SOLUTION/ DROPS OPHTHALMIC 4 TIMES DAILY
Qty: 5 ML | Refills: 1 | Status: SHIPPED | OUTPATIENT
Start: 2020-07-25 | End: 2020-08-04

## 2020-06-29 RX ORDER — DUREZOL 0.5 MG/ML
1 EMULSION OPHTHALMIC 4 TIMES DAILY
Qty: 5 ML | Refills: 1 | Status: SHIPPED | OUTPATIENT
Start: 2020-07-28 | End: 2020-07-09

## 2020-06-29 NOTE — LETTER
June 29, 2020      Andrés Borrero, OD  [address] [place] [address]           [place] - Ophthalmology  [address]  [place] [address]  Phone: [phone]  Fax: [phone]          Patient: Shannan Norman   MR Number: [id number]   YOB: 1945   Date of Visit: 6/29/2020       Dear Dr. Andrés Borrero:    Thank you for referring Shannan Norman to me for evaluation. Attached you will find relevant portions of my assessment and plan of care.    If you have questions, please do not hesitate to call me. I look forward to following Shannan Norman along with you.    Sincerely,    Andrés Morse MD    Enclosure  CC:  No Recipients    If you would like to receive this communication electronically, please contact [email or web address] or [phone] to request more information on [employer] access.    For providers and/or their staff who would like to refer a patient to [facility], please contact us through our one-stop-shop provider referral line, [facility], [place], at [phone].    If you feel you have received this communication in error or would no longer like to receive these types of communications, please e-mail [email or web address]

## 2020-06-29 NOTE — PROGRESS NOTES
Subjective:       Patient ID: Shannan Norman is a 74 y.o. male.    Chief Complaint: Concerns About Ocular Health    HPI     HEDY: 6/24/2020 with Dr. Borrero   Pt states harder to see in dim light. Pt denies f/f  Occasional dry eyes    Restasis ou BID     Diabetic  this morning.   Hemoglobin A1C       Date                     Value               Ref Range             Status                02/10/2020               6.3 (H)             4.0 - 5.6 %           Final                   08/09/2019               6.1 (H)             4.0 - 5.6 %           Final                   02/04/2019               6.2 (H)             4.0 - 5.6 %           Final             ----------      Last edited by Birgit Kyle on 6/29/2020  1:19 PM. (History)             Assessment:       1. Nuclear sclerosis of both eyes    2. Posterior subcapsular age-related cataract of both eyes    3. Vitreous detachment of both eyes    4. DM type 2 without retinopathy    5. Refractive error        Plan:       Visually significant cataract OU -Pt. Wants Sx.     PVD's OU-Stable.  DM-No NPDR OU.  RE-Pt wants CE for monovision.      Cataract Surgery Consent: Patient with a visually significant cataract with difficulties of ADLs, reading, driving, night vision, glare (any and all).  Discussed with Patient/Family/Caregiver: options, risks and benefits, expectations of cataract surgery, utilized an eye model with questions and answers to facilitate discussion.  Discussed lens options and patient understands that glasses may be required for optimal vision for distance and/or near vision after cataract surgery.  The Patient/Family/Caregiver  voice good understanding and patient wishes to proceed with surgery.  The patient will likely benefit from surgery and patient signed consent for Right Eye.  CE OD 7/28/2020 SN60WF 18.5 for distance,        OS 8/11/2020 SN60WF 19.0 for reading.  Control DM.

## 2020-06-30 ENCOUNTER — TELEPHONE (OUTPATIENT)
Dept: OPHTHALMOLOGY | Facility: CLINIC | Age: 75
End: 2020-06-30

## 2020-06-30 DIAGNOSIS — H25.11 NUCLEAR SENILE CATARACT OF RIGHT EYE: ICD-10-CM

## 2020-06-30 DIAGNOSIS — Z13.9 SCREENING PROCEDURE: ICD-10-CM

## 2020-06-30 DIAGNOSIS — H25.11 NS (NUCLEAR SCLEROSIS), RIGHT: Primary | ICD-10-CM

## 2020-07-09 DIAGNOSIS — H25.13 NUCLEAR SCLEROTIC CATARACT OF BOTH EYES: Primary | ICD-10-CM

## 2020-07-09 RX ORDER — PREDNISOLONE ACETATE 10 MG/ML
1 SUSPENSION/ DROPS OPHTHALMIC 4 TIMES DAILY
Qty: 5 ML | Refills: 1 | Status: SHIPPED | OUTPATIENT
Start: 2020-07-28 | End: 2020-08-27

## 2020-07-14 ENCOUNTER — TELEPHONE (OUTPATIENT)
Dept: OPHTHALMOLOGY | Facility: CLINIC | Age: 75
End: 2020-07-14

## 2020-07-14 DIAGNOSIS — H25.12 NS (NUCLEAR SCLEROSIS), LEFT: Primary | ICD-10-CM

## 2020-07-14 DIAGNOSIS — H25.12 NUCLEAR SCLEROTIC CATARACT OF LEFT EYE: ICD-10-CM

## 2020-07-14 DIAGNOSIS — Z13.9 SCREENING PROCEDURE: ICD-10-CM

## 2020-07-15 DIAGNOSIS — Z71.89 COMPLEX CARE COORDINATION: ICD-10-CM

## 2020-07-16 ENCOUNTER — OFFICE VISIT (OUTPATIENT)
Dept: FAMILY MEDICINE | Facility: CLINIC | Age: 75
End: 2020-07-16
Payer: MEDICARE

## 2020-07-16 VITALS
OXYGEN SATURATION: 96 % | HEIGHT: 73 IN | TEMPERATURE: 97 F | BODY MASS INDEX: 27.93 KG/M2 | DIASTOLIC BLOOD PRESSURE: 60 MMHG | HEART RATE: 60 BPM | SYSTOLIC BLOOD PRESSURE: 120 MMHG | WEIGHT: 210.75 LBS

## 2020-07-16 DIAGNOSIS — I10 ESSENTIAL HYPERTENSION: ICD-10-CM

## 2020-07-16 DIAGNOSIS — Z01.818 PREOP EXAMINATION: Primary | ICD-10-CM

## 2020-07-16 PROCEDURE — 99214 PR OFFICE/OUTPT VISIT, EST, LEVL IV, 30-39 MIN: ICD-10-PCS | Mod: S$PBB,,, | Performed by: FAMILY MEDICINE

## 2020-07-16 PROCEDURE — 99999 PR PBB SHADOW E&M-EST. PATIENT-LVL V: ICD-10-PCS | Mod: PBBFAC,,, | Performed by: FAMILY MEDICINE

## 2020-07-16 PROCEDURE — 93010 EKG 12-LEAD: ICD-10-PCS | Mod: S$PBB,,, | Performed by: INTERNAL MEDICINE

## 2020-07-16 PROCEDURE — 93005 ELECTROCARDIOGRAM TRACING: CPT | Mod: PBBFAC,PO | Performed by: INTERNAL MEDICINE

## 2020-07-16 PROCEDURE — 99215 OFFICE O/P EST HI 40 MIN: CPT | Mod: PBBFAC,PO | Performed by: FAMILY MEDICINE

## 2020-07-16 PROCEDURE — 93010 ELECTROCARDIOGRAM REPORT: CPT | Mod: S$PBB,,, | Performed by: INTERNAL MEDICINE

## 2020-07-16 PROCEDURE — 99214 OFFICE O/P EST MOD 30 MIN: CPT | Mod: S$PBB,,, | Performed by: FAMILY MEDICINE

## 2020-07-16 PROCEDURE — 99999 PR PBB SHADOW E&M-EST. PATIENT-LVL V: CPT | Mod: PBBFAC,,, | Performed by: FAMILY MEDICINE

## 2020-07-16 NOTE — PROGRESS NOTES
Subjective:       Patient ID: Shannan Norman is a 75 y.o. male.    Chief Complaint: Follow-up and Hypertension    75 years old male who came to the clinic for preoperative evaluation for cataract surgery.  No chest pain, palpitation, orthopnea or PND.  Blood pressure today stable.  Patient with good compliance with medical regimen.    Review of Systems   Constitutional: Negative.    HENT: Negative.    Eyes: Negative.    Respiratory: Negative.    Cardiovascular: Negative.  Negative for chest pain, palpitations, leg swelling and claudication.   Gastrointestinal: Negative.    Genitourinary: Negative.    Musculoskeletal: Negative.    Integumentary:  Negative.   Neurological: Negative.    Psychiatric/Behavioral: Negative.          Objective:      Physical Exam  Vitals signs and nursing note reviewed.   Constitutional:       General: He is not in acute distress.     Appearance: He is well-developed. He is not diaphoretic.   HENT:      Head: Normocephalic and atraumatic.      Right Ear: External ear normal.      Left Ear: External ear normal.      Nose: Nose normal.      Mouth/Throat:      Pharynx: No oropharyngeal exudate.   Eyes:      General: No scleral icterus.        Right eye: No discharge.         Left eye: No discharge.      Conjunctiva/sclera: Conjunctivae normal.      Pupils: Pupils are equal, round, and reactive to light.   Neck:      Musculoskeletal: Normal range of motion and neck supple.      Thyroid: No thyromegaly.      Vascular: No JVD.      Trachea: No tracheal deviation.   Cardiovascular:      Rate and Rhythm: Normal rate and regular rhythm.      Heart sounds: Normal heart sounds. No murmur. No friction rub. No gallop.    Pulmonary:      Effort: Pulmonary effort is normal. No respiratory distress.      Breath sounds: Normal breath sounds. No stridor. No wheezing or rales.   Chest:      Chest wall: No tenderness.   Abdominal:      General: Bowel sounds are normal. There is no distension.      Palpations:  Abdomen is soft. There is no mass.      Tenderness: There is no abdominal tenderness. There is no guarding or rebound.   Musculoskeletal: Normal range of motion.         General: No tenderness.   Lymphadenopathy:      Cervical: No cervical adenopathy.   Skin:     General: Skin is warm and dry.      Coloration: Skin is not pale.      Findings: No erythema or rash.   Neurological:      Mental Status: He is alert and oriented to person, place, and time.      Cranial Nerves: No cranial nerve deficit.      Motor: No abnormal muscle tone.      Coordination: Coordination normal.      Deep Tendon Reflexes: Reflexes are normal and symmetric. Reflexes normal.   Psychiatric:         Behavior: Behavior normal.         Thought Content: Thought content normal.         Judgment: Judgment normal.         Assessment:       1. Preop examination    2. Essential hypertension        Plan:       Shannan was seen today for follow-up and hypertension.    Diagnoses and all orders for this visit:    Preop examination  -     Urinalysis; Future  -     CBC auto differential; Future  -     Basic metabolic panel; Future  -     IN OFFICE EKG 12-LEAD (to Muse)    Essential hypertension  -     Urinalysis; Future  -     CBC auto differential; Future  -     Basic metabolic panel; Future  -     IN OFFICE EKG 12-LEAD (to Muse)         Continue monitoring blood pressure at home, low sodium diet.

## 2020-07-20 ENCOUNTER — TELEPHONE (OUTPATIENT)
Dept: FAMILY MEDICINE | Facility: CLINIC | Age: 75
End: 2020-07-20

## 2020-07-20 DIAGNOSIS — R94.31 ABNORMAL EKG: Primary | ICD-10-CM

## 2020-07-20 DIAGNOSIS — I21.29 SEPTAL INFARCTION: ICD-10-CM

## 2020-07-20 NOTE — TELEPHONE ENCOUNTER
Patient with abnormal EKG in comparison with previous reports .    I ordered a follow-up appointment with the cardiologist.    Please contact the patient with appointment.

## 2020-07-24 ENCOUNTER — TELEPHONE (OUTPATIENT)
Dept: OPHTHALMOLOGY | Facility: CLINIC | Age: 75
End: 2020-07-24

## 2020-07-25 ENCOUNTER — CLINICAL SUPPORT (OUTPATIENT)
Dept: URGENT CARE | Facility: CLINIC | Age: 75
End: 2020-07-25
Payer: MEDICARE

## 2020-07-25 DIAGNOSIS — Z13.9 SCREENING PROCEDURE: ICD-10-CM

## 2020-07-25 DIAGNOSIS — H25.12 NUCLEAR SCLEROTIC CATARACT OF LEFT EYE: ICD-10-CM

## 2020-07-25 PROCEDURE — U0003 INFECTIOUS AGENT DETECTION BY NUCLEIC ACID (DNA OR RNA); SEVERE ACUTE RESPIRATORY SYNDROME CORONAVIRUS 2 (SARS-COV-2) (CORONAVIRUS DISEASE [COVID-19]), AMPLIFIED PROBE TECHNIQUE, MAKING USE OF HIGH THROUGHPUT TECHNOLOGIES AS DESCRIBED BY CMS-2020-01-R: HCPCS

## 2020-07-25 NOTE — H&P
[facility]  History & Physical    Subjective:      Chief Complaint/Reason for Admission:     Shannan Norman is a 75 y.o. male.    Past Medical History:   Diagnosis Date    Allergy     Arthritis     Atrial fibrillation     Atrial flutter 2011    ablation    Cancer     Cataract     CKD (chronic kidney disease) stage 3, GFR 30-59 ml/min 8/13/2019    Diabetes mellitus     Dry eye syndrome     Gout, unspecified     High cholesterol     History of shingles     Hypertension     Seizures      Past Surgical History:   Procedure Laterality Date    ABLATION N/A 9/11/2018    Procedure: ABLATION;  Surgeon: Hany Sanchez MD;  Location[facility] LAB;  Service: Cardiology;  Laterality: N/A;  AFL, ISABELLE, RFA, ELODIA, MAC, DM, 3 PREP    ABLATION OF DYSRHYTHMIC FOCUS      ADENOIDECTOMY      CARDIAC PACEMAKER PLACEMENT      no defib    COLONOSCOPY N/A 1/2/2019    Procedure: COLONOSCOPY Suprep;  Surgeon: Cindy Solorzano MD;  Location: [facility] ENDO;  Service: Endoscopy;  Laterality: N/A;    GANGLION CYST EXCISION      left neck    HERNIA REPAIR  2013    umbilical    TENDON REPAIR Right     wrist    TONSILLECTOMY      varicose veins      several sx  bilateral    VASECTOMY      VEIN SURGERY       Family History   Problem Relation Age of Onset    Diabetes Mother     COPD Father     No Known Problems Sister     Heart attack Brother     Heart disease Brother     No Known Problems Maternal Aunt     No Known Problems Maternal Uncle     No Known Problems Paternal Aunt     No Known Problems Paternal Uncle     No Known Problems Maternal Grandmother     No Known Problems Maternal Grandfather     No Known Problems Paternal Grandmother     No Known Problems Paternal Grandfather     No Known Problems Son     Amblyopia Neg Hx     Blindness Neg Hx     Cancer Neg Hx     Cataracts Neg Hx     Glaucoma Neg Hx     Hypertension Neg Hx     Macular degeneration Neg Hx     Retinal detachment  Neg Hx     Strabismus Neg Hx     Stroke Neg Hx     Thyroid disease Neg Hx     Prostate cancer Neg Hx     Kidney disease Neg Hx     Melanoma Neg Hx      Social History     Tobacco Use    Smoking status: Never Smoker    Smokeless tobacco: Never Used   Substance Use Topics    Alcohol use: Yes     Alcohol/week: 3.0 - 4.0 standard drinks     Types: 3 - 4 Glasses of wine per week     Frequency: 2-3 times a week     Drinks per session: 1 or 2     Binge frequency: Never    Drug use: No       No medications prior to admission.     Review of patient's allergies indicates:  No Known Allergies     Review of Systems   Eyes: Positive for blurred vision.   All other systems reviewed and are negative.      Objective:      Vital Signs (Most Recent)       Vital Signs Range (Last 24H):       Physical Exam  Constitutional:       Appearance: He is well-developed.   HENT:      Head: Normocephalic.   Eyes:      Conjunctiva/sclera: Conjunctivae normal.      Pupils: Pupils are equal, round, and reactive to light.   Neck:      Musculoskeletal: Normal range of motion and neck supple.   Cardiovascular:      Rate and Rhythm: Normal rate.   Pulmonary:      Effort: Pulmonary effort is normal.      Breath sounds: Normal breath sounds.   Abdominal:      General: Bowel sounds are normal.      Palpations: Abdomen is soft.   Musculoskeletal: Normal range of motion.   Skin:     General: Skin is warm.   Neurological:      Mental Status: He is alert and oriented to person, place, and time.         Data Review:    ECG:     Assessment:      Cataract OD.    Plan:    CE OD.

## 2020-07-26 LAB — SARS-COV-2 RNA RESP QL NAA+PROBE: NOT DETECTED

## 2020-07-27 ENCOUNTER — TELEPHONE (OUTPATIENT)
Dept: OPHTHALMOLOGY | Facility: CLINIC | Age: 75
End: 2020-07-27

## 2020-07-28 ENCOUNTER — ANESTHESIA EVENT (OUTPATIENT)
Dept: SURGERY | Facility: OTHER | Age: 75
End: 2020-07-28
Payer: MEDICARE

## 2020-07-28 ENCOUNTER — ANESTHESIA (OUTPATIENT)
Dept: SURGERY | Facility: OTHER | Age: 75
End: 2020-07-28
Payer: MEDICARE

## 2020-07-28 ENCOUNTER — HOSPITAL ENCOUNTER (OUTPATIENT)
Facility: OTHER | Age: 75
Discharge: HOME | End: 2020-07-28
Attending: OPHTHALMOLOGY | Admitting: OPHTHALMOLOGY
Payer: MEDICARE

## 2020-07-28 VITALS
DIASTOLIC BLOOD PRESSURE: 72 MMHG | BODY MASS INDEX: 26.9 KG/M2 | WEIGHT: 203 LBS | HEART RATE: 61 BPM | SYSTOLIC BLOOD PRESSURE: 121 MMHG | RESPIRATION RATE: 18 BRPM | HEIGHT: 73 IN | TEMPERATURE: 98 F | OXYGEN SATURATION: 98 %

## 2020-07-28 DIAGNOSIS — H25.11 NUCLEAR SCLEROTIC CATARACT OF RIGHT EYE: Primary | ICD-10-CM

## 2020-07-28 LAB — POCT GLUCOSE: 92 MG/DL (ref 70–110)

## 2020-07-28 PROCEDURE — 25000003 PHARM REV CODE 250: Performed by: OPHTHALMOLOGY

## 2020-07-28 PROCEDURE — 63600175 PHARM REV CODE 636 W HCPCS: Performed by: NURSE ANESTHETIST, CERTIFIED REGISTERED

## 2020-07-28 PROCEDURE — 37000008 HC ANESTHESIA 1ST 15 MINUTES: Performed by: OPHTHALMOLOGY

## 2020-07-28 PROCEDURE — 66984 XCAPSL CTRC RMVL W/O ECP: CPT | Mod: RT,,, | Performed by: OPHTHALMOLOGY

## 2020-07-28 PROCEDURE — 63600175 PHARM REV CODE 636 W HCPCS: Performed by: OPHTHALMOLOGY

## 2020-07-28 PROCEDURE — 25000003 PHARM REV CODE 250

## 2020-07-28 PROCEDURE — 66984 PR REMOVAL, CATARACT, W/INSRT INTRAOC LENS, W/O ENDO CYCLO: ICD-10-PCS | Mod: RT,,, | Performed by: OPHTHALMOLOGY

## 2020-07-28 PROCEDURE — 71000016 HC POSTOP RECOV ADDL HR: Performed by: OPHTHALMOLOGY

## 2020-07-28 PROCEDURE — 36000707: Performed by: OPHTHALMOLOGY

## 2020-07-28 PROCEDURE — V2632 POST CHMBR INTRAOCULAR LENS: HCPCS | Performed by: OPHTHALMOLOGY

## 2020-07-28 PROCEDURE — 71000015 HC POSTOP RECOV 1ST HR: Performed by: OPHTHALMOLOGY

## 2020-07-28 PROCEDURE — 36000706: Performed by: OPHTHALMOLOGY

## 2020-07-28 PROCEDURE — 37000009 HC ANESTHESIA EA ADD 15 MINS: Performed by: OPHTHALMOLOGY

## 2020-07-28 DEVICE — LENS 18.5: Type: IMPLANTABLE DEVICE | Site: EYE | Status: FUNCTIONAL

## 2020-07-28 RX ORDER — HYDROCODONE BITARTRATE AND ACETAMINOPHEN 5; 325 MG/1; MG/1
1 TABLET ORAL EVERY 4 HOURS PRN
Status: DISCONTINUED | OUTPATIENT
Start: 2020-07-28 | End: 2020-07-28 | Stop reason: HOSPADM

## 2020-07-28 RX ORDER — OFLOXACIN 3 MG/ML
1 SOLUTION/ DROPS OPHTHALMIC
Status: DISCONTINUED | OUTPATIENT
Start: 2020-07-28 | End: 2021-09-30

## 2020-07-28 RX ORDER — CYCLOPENTOLATE HYDROCHLORIDE 10 MG/ML
1 SOLUTION/ DROPS OPHTHALMIC
Status: DISCONTINUED | OUTPATIENT
Start: 2020-07-28 | End: 2021-09-30

## 2020-07-28 RX ORDER — MIDAZOLAM HYDROCHLORIDE 1 MG/ML
INJECTION INTRAMUSCULAR; INTRAVENOUS
Status: DISCONTINUED | OUTPATIENT
Start: 2020-07-28 | End: 2020-07-28

## 2020-07-28 RX ORDER — SODIUM CHLORIDE 0.9 % (FLUSH) 0.9 %
10 SYRINGE (ML) INJECTION
Status: DISCONTINUED | OUTPATIENT
Start: 2020-07-28 | End: 2021-09-30

## 2020-07-28 RX ORDER — PREDNISOLONE ACETATE 10 MG/ML
SUSPENSION/ DROPS OPHTHALMIC
Status: DISCONTINUED | OUTPATIENT
Start: 2020-07-28 | End: 2020-07-28 | Stop reason: HOSPADM

## 2020-07-28 RX ORDER — TROPICAMIDE 10 MG/ML
1 SOLUTION/ DROPS OPHTHALMIC
Status: COMPLETED | OUTPATIENT
Start: 2020-07-28 | End: 2020-07-28

## 2020-07-28 RX ORDER — LIDOCAINE HYDROCHLORIDE 40 MG/ML
INJECTION, SOLUTION RETROBULBAR
Status: DISCONTINUED | OUTPATIENT
Start: 2020-07-28 | End: 2020-07-28 | Stop reason: HOSPADM

## 2020-07-28 RX ORDER — ACETAMINOPHEN 325 MG/1
650 TABLET ORAL EVERY 4 HOURS PRN
Status: DISCONTINUED | OUTPATIENT
Start: 2020-07-28 | End: 2020-07-28 | Stop reason: HOSPADM

## 2020-07-28 RX ORDER — TETRACAINE HYDROCHLORIDE 5 MG/ML
1 SOLUTION OPHTHALMIC
Status: COMPLETED | OUTPATIENT
Start: 2020-07-28 | End: 2020-07-28

## 2020-07-28 RX ORDER — EPINEPHRINE 1 MG/ML
INJECTION, SOLUTION INTRACARDIAC; INTRAMUSCULAR; INTRAVENOUS; SUBCUTANEOUS
Status: DISCONTINUED | OUTPATIENT
Start: 2020-07-28 | End: 2020-07-28 | Stop reason: HOSPADM

## 2020-07-28 RX ORDER — PHENYLEPHRINE HYDROCHLORIDE 25 MG/ML
1 SOLUTION/ DROPS OPHTHALMIC
Status: COMPLETED | OUTPATIENT
Start: 2020-07-28 | End: 2020-07-28

## 2020-07-28 RX ADMIN — MIDAZOLAM HYDROCHLORIDE 2 MG: 1 INJECTION, SOLUTION INTRAMUSCULAR; INTRAVENOUS at 07:07

## 2020-07-28 RX ADMIN — TROPICAMIDE 1 DROP: 10 SOLUTION/ DROPS OPHTHALMIC at 06:07

## 2020-07-28 RX ADMIN — PHENYLEPHRINE HYDROCHLORIDE 1 DROP: 25 SOLUTION/ DROPS OPHTHALMIC at 06:07

## 2020-07-28 RX ADMIN — CYCLOPENTOLATE HYDROCHLORIDE 1 DROP: 10 SOLUTION/ DROPS OPHTHALMIC at 06:07

## 2020-07-28 RX ADMIN — TETRACAINE HYDROCHLORIDE 1 DROP: 5 SOLUTION OPHTHALMIC at 06:07

## 2020-07-28 RX ADMIN — OFLOXACIN 1 DROP: 3 SOLUTION OPHTHALMIC at 06:07

## 2020-07-28 NOTE — ANESTHESIA POSTPROCEDURE EVALUATION
Anesthesia Post Evaluation    Patient: Shannan Norman    Procedure(s) Performed: Procedure(s) (LRB):  EXTRACTION, CATARACT, WITH IOL INSERTION (Right)    Final Anesthesia Type: MAC    Patient location during evaluation: [facility]  Patient participation: Yes- Able to Participate  Level of consciousness: awake and alert  Post-procedure vital signs: reviewed and stable  Pain management: adequate  Airway patency: patent    PONV status at discharge: No PONV  Anesthetic complications: no      Cardiovascular status: blood pressure returned to baseline  Respiratory status: unassisted  Hydration status: euvolemic  Follow-up not needed.          Vitals Value Taken Time   /77 07/28/20 0600   Temp 36.7 °C (98 °F) 07/28/20 0600   Pulse 57 07/28/20 0600   Resp 16 07/28/20 0600   SpO2 99 % 07/28/20 0600         No case tracking events are documented in the log.      Pain/Delmar Score: No data recorded

## 2020-07-28 NOTE — ANESTHESIA PREPROCEDURE EVALUATION
07/28/2020  Shannan Norman is a 75 y.o., male.    Anesthesia Evaluation    I have reviewed the Patient Summary Reports.    I have reviewed the Nursing Notes. I have reviewed the NPO Status.   I have reviewed the Medications.     Review of Systems  Anesthesia Hx:  No problems with previous Anesthesia  History of prior surgery of interest to airway management or planning: Previous anesthesia: General 2019 hernia with general anesthesia.  Airway issues documented on chart review include mask, easy, GETA, easy direct laryngoscopy , view on direct laryngoscopy Grade I  Denies Family Hx of Anesthesia complications.   Denies Personal Hx of Anesthesia complications.   Social:  Non-Smoker, Social Alcohol Use    Hematology/Oncology:  Hematology Normal   Oncology Normal     EENT/Dental:EENT/Dental Normal   Cardiovascular:   Exercise tolerance: good Pacemaker Hypertension hyperlipidemia ECG has been reviewed.  Functional Capacity good / => 4 METS, bikes 6 miles 4-5 times a week  Disorder of Cardiac Rhythm, Atrial Flutter, Hx of Atrial Flutter, S/P surgical ablation    Pulmonary:  Pulmonary Normal  Denies Shortness of breath.    Renal/:   Chronic Renal Disease, CRI    Hepatic/GI:  Hepatic/GI Normal Denies Liver Disease.    Musculoskeletal:  Musculoskeletal Normal    Neurological:   Denies Seizures. (single episode over 50 years ago, not tx)    Endocrine:   Diabetes  Diabetes, Type 2 Diabetes , controlled by diet. , most recent HgA1c value was 6.2 on 2/2019.    Dermatological:  Skin Normal    Psych:  Psychiatric Normal           Physical Exam  General:  Obesity    Airway/Jaw/Neck:  Airway Findings: Mouth Opening: Small, but > 3cm Mallampati: II  TM Distance: Normal, at least 6 cm      Dental:  Dental Findings: molar caps Upper left cantilever bridge        Mental Status:  Mental Status Findings:  Cooperative, Alert and  Oriented         Anesthesia Plan  Type of Anesthesia, risks & benefits discussed:  Anesthesia Type:  MAC  Patient's Preference:   Intra-op Monitoring Plan: standard ASA monitors  Intra-op Monitoring Plan Comments:   Post Op Pain Control Plan: per primary service following discharge from PACU  Post Op Pain Control Plan Comments:   Induction:    Beta Blocker:         Informed Consent: Patient understands risks and agrees with Anesthesia plan.  Questions answered. Anesthesia consent signed with patient.  ASA Score: 3     Day of Surgery Review of History & Physical:    H&P update referred to the surgeon.         Ready For Surgery From Anesthesia Perspective.

## 2020-07-28 NOTE — PLAN OF CARE
Shannan Norman has met all discharge criteria from Phase II. Vital Signs are stable, ambulating  without difficulty. Discharge instructions given, patient verbalized understanding. Discharged from facility via wheelchair in stable condition.

## 2020-07-28 NOTE — TRANSFER OF CARE
"Anesthesia Transfer of Care Note    Patient: Shannan Norman    Procedure(s) Performed: Procedure(s) (LRB):  EXTRACTION, CATARACT, WITH IOL INSERTION (Right)    Patient location: [facility]    Anesthesia Type: MAC    Transport from OR: Transported from OR on room air with adequate spontaneous ventilation    Post pain: adequate analgesia    Post assessment: no apparent anesthetic complications    Post vital signs: stable    Level of consciousness: awake    Nausea/Vomiting: no nausea/vomiting    Complications: none    Transfer of care protocol was followed      Last vitals:   Visit Vitals  /77 (BP Location: Left arm, Patient Position: Sitting)   Pulse (!) 57   Temp 36.7 °C (98 °F) (Skin)   Resp 16   Ht 6' 1" (1.854 m)   Wt 92.1 kg (203 lb)   SpO2 99%   BMI 26.78 kg/m²     "

## 2020-07-28 NOTE — BRIEF OP NOTE
[facility]  Brief Operative Note    Surgery Date: 7/28/2020     Surgeon(s) and Role:     * Andrés Morse MD - Primary    Assisting Surgeon: None    Pre-op Diagnosis:  NS (nuclear sclerosis), right [H25.11]    Post-op Diagnosis:  Post-Op Diagnosis Codes:     * NS (nuclear sclerosis), right [H25.11]    Procedure(s) (LRB):  EXTRACTION, CATARACT, WITH IOL INSERTION (Right)    Anesthesia: Local MAC    Description of the findings of the procedure(s):     Estimated Blood Loss: * No values recorded between 7/28/2020  7:12 AM and 7/28/2020  7:46 AM *         Specimens:   Specimen (12h ago, onward)    None            Discharge Note    OUTCOME: Patient tolerated treatment/procedure well without complication and is now ready for discharge.    DISPOSITION: Home or Self Care    FINAL DIAGNOSIS:  Nuclear sclerotic cataract of right eye    FOLLOWUP: In clinic    DISCHARGE INSTRUCTIONS:    Discharge Procedure Orders   Other restrictions (specify):   Order Comments: No heavy lifting or bending for 1 week.

## 2020-07-28 NOTE — OP NOTE
Operative Date:  07/28/2020    Discharge Date:  07/28/2020    Discharge Patient Home    Report Title: Operative Note      SURGEON: Andrés Morse MD     ASSISTANT:     PREOPERATIVE DIAGNOSIS: Visually significant NSC cataract,  Right Eye.    POSTOPERATIVE DIAGNOSIS: Visually-significant NSC cataract,  Right Eye.    PROCEDURE PERFORMED: Phacoemulsification of the cataract with posterior chamber intraocular lens Right Eye.    ANESTHESIA: Topical with MAC    COMPLICATIONS: None    ESTIMATED BLOOD LOSS: Minimal    INDICATIONS FOR PROCEDURE:   The patient is a pleasant 75 year old gentleman  with increasing difficulties with activities of daily living secondary to a dense visually significant cataract in the Right Eye.  Discussions have been carried out with this patient concerning the options to surgery, risks, benefits and expectations.  The patient voiced good understanding and wished to proceed with the above procedure.    PROCEDURE IN DETAIL: The patient was brought to the operating room and received topical anesthetic to the eye and then was prepped and draped in the usual sterile fashion.  Using the Childs ring and the guarded ruiz blade set at 0.37 mm, a partial thickness clear cornea incision was made temporally.  The paracentesis site was made at the twelve o'clock position.  Omidria was injected into the anterior chamber through the paracentesis.  Viscoat was then injected into the anterior chamber.  The eye was then reentered at the primary surgical site with a 2.4 mm keratome followed by continuous capsulotomy, hydrodissection, hydrodelineation and phacoemulsification of the cataract.  Residual cortical material was removed using automated irrigation-aspiration technique.  Healon was injected into the posterior chamber and an SN60WF 18.5 diopter lens was placed in the bag without difficulty. Residual viscoelastic was removed using automated irrigation-aspiration technique. The eye was  re-pressurized using BSS solution and both the paracentesis site and the primary surgical site were demonstrated to be watertight at the end of the case with Weck--Alexandra manipulation.  One drop of Ofloxacin and one drop of Pred acetate 1% was applied to the Right Eye .The eye was closed, patched and a Chaparro shield placed.  The patient was taken to the recovery room in good and stable condition.  The patient tolerated the procedure well.  The patient was instructed to refrain from any heavy lifting, bending, stooping or straining activities, discharged home  and to follow-up in the morning for routine postoperative care with Andrés Morse MD.

## 2020-07-28 NOTE — DISCHARGE INSTRUCTIONS
Home Care Instructions for Eye Surgeries    1. ACTIVITY:   Limit your activity today. Increase activity gradually.     2. DIET:   Drink plenty of fluids. Resume your normal diet unless instructed otherwise.  3. PAIN:   Expect a moderate amount of pain. You may take your commonly used pain reliever as directed. If this is not sufficient, call your physician. You may resume any other prescription medication unless otherwise directed by your physician.     4. DRESSING:   Keep your dressing dry and intact.  5. COMMENTS:   No driving, operating heavy machinery or signing legal documents for 24 hours.    No bending forward at the waist.   See attached schedule of eye drops.    Discuss any problem with your physician as soon as it arises. Do not Delay.      EMERGENCY- If you are unable to contact your physician, please go to the nearest Emergency Room.       Anesthesia: Monitored Anesthesia Care (MAC)    Anesthesia Safety  · Have an adult family member or friend drive you home after the procedure.  · For the first 24 hours after your surgery:  ¨ Do not drive or use heavy equipment.  ¨ Do not make important decisions or sign documents.  ¨ Avoid alcohol.  ¨ Have someone stay with you, if possible. They can watch for problems and help keep you safe.

## 2020-07-29 ENCOUNTER — OFFICE VISIT (OUTPATIENT)
Dept: OPHTHALMOLOGY | Facility: CLINIC | Age: 75
End: 2020-07-29
Payer: MEDICARE

## 2020-07-29 VITALS — HEART RATE: 61 BPM | SYSTOLIC BLOOD PRESSURE: 117 MMHG | DIASTOLIC BLOOD PRESSURE: 66 MMHG

## 2020-07-29 DIAGNOSIS — Z98.890 POST-OPERATIVE STATE: Primary | ICD-10-CM

## 2020-07-29 PROCEDURE — 99024 PR POST-OP FOLLOW-UP VISIT: ICD-10-PCS | Mod: POP,,, | Performed by: OPHTHALMOLOGY

## 2020-07-29 PROCEDURE — 99999 PR PBB SHADOW E&M-EST. PATIENT-LVL III: ICD-10-PCS | Mod: PBBFAC,,, | Performed by: OPHTHALMOLOGY

## 2020-07-29 PROCEDURE — 99024 POSTOP FOLLOW-UP VISIT: CPT | Mod: POP,,, | Performed by: OPHTHALMOLOGY

## 2020-07-29 PROCEDURE — 99213 OFFICE O/P EST LOW 20 MIN: CPT | Mod: PBBFAC,PO | Performed by: OPHTHALMOLOGY

## 2020-07-29 PROCEDURE — 99999 PR PBB SHADOW E&M-EST. PATIENT-LVL III: CPT | Mod: PBBFAC,,, | Performed by: OPHTHALMOLOGY

## 2020-07-29 NOTE — PROGRESS NOTES
Subjective:       Patient ID: Shannan Norman is a 75 y.o. male.    Chief Complaint: Post-op Evaluation (1 day po od)    HPI     Post-op Evaluation      Additional comments: 1 day po od              Comments     S/p Phaco w/IOL OD- 7/28/2020    76 y/o male is here for 1 day post op of the RT eye. Pt states sx went   well. Pt denies pain and discomfort.     Eyemeds  Ofloxacin QID OD  PF QID OD  Ilevro QD OD           Last edited by Lilibeth Zamora on 7/29/2020 10:06 AM. (History)             Assessment:       1. Post-operative state        Plan:       S/p CE OD- Doing well.         CPM OD.  RTC 1 wk.

## 2020-08-05 ENCOUNTER — OFFICE VISIT (OUTPATIENT)
Dept: OPHTHALMOLOGY | Facility: CLINIC | Age: 75
End: 2020-08-05
Payer: MEDICARE

## 2020-08-05 ENCOUNTER — TELEPHONE (OUTPATIENT)
Dept: CARDIOLOGY | Facility: HOSPITAL | Age: 75
End: 2020-08-05

## 2020-08-05 DIAGNOSIS — Z98.890 POST-OPERATIVE STATE: Primary | ICD-10-CM

## 2020-08-05 DIAGNOSIS — H25.12 NS (NUCLEAR SCLEROSIS), LEFT: ICD-10-CM

## 2020-08-05 PROCEDURE — 92136 OPHTHALMIC BIOMETRY: CPT | Mod: PBBFAC,PO | Performed by: OPHTHALMOLOGY

## 2020-08-05 PROCEDURE — 99999 PR PBB SHADOW E&M-EST. PATIENT-LVL III: CPT | Mod: PBBFAC,,, | Performed by: OPHTHALMOLOGY

## 2020-08-05 PROCEDURE — 92136 OPHTHALMIC BIOMETRY: CPT | Mod: 26,S$PBB,LT, | Performed by: OPHTHALMOLOGY

## 2020-08-05 PROCEDURE — 99213 OFFICE O/P EST LOW 20 MIN: CPT | Mod: PBBFAC,PO | Performed by: OPHTHALMOLOGY

## 2020-08-05 PROCEDURE — 92136 PR OPHTHAL BIOMETRY,INTRAOC LENS POW CALC: ICD-10-PCS | Mod: 26,S$PBB,LT, | Performed by: OPHTHALMOLOGY

## 2020-08-05 PROCEDURE — 99024 POSTOP FOLLOW-UP VISIT: CPT | Mod: POP,,, | Performed by: OPHTHALMOLOGY

## 2020-08-05 PROCEDURE — 99024 PR POST-OP FOLLOW-UP VISIT: ICD-10-PCS | Mod: POP,,, | Performed by: OPHTHALMOLOGY

## 2020-08-05 PROCEDURE — 99999 PR PBB SHADOW E&M-EST. PATIENT-LVL III: ICD-10-PCS | Mod: PBBFAC,,, | Performed by: OPHTHALMOLOGY

## 2020-08-05 NOTE — PROGRESS NOTES
Subjective:       Patient ID: Shannan Norman is a 75 y.o. male.    Chief Complaint: Post-op Evaluation    HPI     S/p Phaco w/IOL OD- 7/28/2020    76 y/o male is here for 1 week post op of the RT eye. Pt states sx went   well. Pt denies pain and discomfort.     Eyemeds  PF TID OD  Ilevro QD OD     Last edited by Birgit Kyle on 8/5/2020  9:28 AM. (History)             Assessment:       1. Post-operative state    2. NS (nuclear sclerosis), left        Plan:       S/p CE OD- Doing well.     Visually significant cataract OS -Pt. Wants Sx.        Taper gtts OD.  Cataract Surgery Consent: Patient with a visually significant cataract with difficulties of ADLs, reading, driving, night vision, glare (any and all).  Discussed with Patient/Family/Caregiver: options, risks and benefits, expectations of cataract surgery, utilized an eye model with questions and answers to facilitate discussion.  Discussed lens options and patient understands that glasses may be required for optimal vision for distance and/or near vision after cataract surgery.  The Patient/Family/Caregiver  voice good understanding and patient wishes to proceed with surgery.  The patient will likely benefit from surgery and patient signed consent for Left Eye.  CE OS 8/11/2020.

## 2020-08-05 NOTE — TELEPHONE ENCOUNTER
Called pt to arrange overdue follow up. Patient wishes for apt at [facility]. Arranged next available apt with echo prior. Patient verbalized understanding.

## 2020-08-07 ENCOUNTER — TELEPHONE (OUTPATIENT)
Dept: OPHTHALMOLOGY | Facility: CLINIC | Age: 75
End: 2020-08-07

## 2020-08-08 ENCOUNTER — CLINICAL SUPPORT (OUTPATIENT)
Dept: URGENT CARE | Facility: CLINIC | Age: 75
End: 2020-08-08
Payer: MEDICARE

## 2020-08-08 DIAGNOSIS — Z13.9 SCREENING PROCEDURE: ICD-10-CM

## 2020-08-08 DIAGNOSIS — H25.11 NUCLEAR SENILE CATARACT OF RIGHT EYE: ICD-10-CM

## 2020-08-08 PROCEDURE — U0003 INFECTIOUS AGENT DETECTION BY NUCLEIC ACID (DNA OR RNA); SEVERE ACUTE RESPIRATORY SYNDROME CORONAVIRUS 2 (SARS-COV-2) (CORONAVIRUS DISEASE [COVID-19]), AMPLIFIED PROBE TECHNIQUE, MAKING USE OF HIGH THROUGHPUT TECHNOLOGIES AS DESCRIBED BY CMS-2020-01-R: HCPCS

## 2020-08-09 DIAGNOSIS — E78.5 DYSLIPIDEMIA ASSOCIATED WITH TYPE 2 DIABETES MELLITUS: ICD-10-CM

## 2020-08-09 DIAGNOSIS — E11.69 DYSLIPIDEMIA ASSOCIATED WITH TYPE 2 DIABETES MELLITUS: ICD-10-CM

## 2020-08-09 LAB — SARS-COV-2 RNA RESP QL NAA+PROBE: NOT DETECTED

## 2020-08-09 NOTE — H&P
[facility]  History & Physical    Subjective:      Chief Complaint/Reason for Admission:     Shannan Norman is a 75 y.o. male.    Past Medical History:   Diagnosis Date    Allergy     Arthritis     Atrial fibrillation     Atrial flutter 2011    ablation    Cancer     Cataract     CKD (chronic kidney disease) stage 3, GFR 30-59 ml/min 8/13/2019    Diabetes mellitus     Dry eye syndrome     Gout, unspecified     High cholesterol     History of shingles     Hypertension     Seizures      Past Surgical History:   Procedure Laterality Date    ABLATION N/A 9/11/2018    Procedure: ABLATION;  Surgeon: Hany Sanchez MD;  Location: [facility] CATH LAB;  Service: Cardiology;  Laterality: N/A;  AFL, ISABELLE, RFA, ELODIA, MAC, DM, 3 PREP    ABLATION OF DYSRHYTHMIC FOCUS      ADENOIDECTOMY      CARDIAC PACEMAKER PLACEMENT      no defib    CATARACT EXTRACTION W/  INTRAOCULAR LENS IMPLANT Right 7/28/2020    Procedure: EXTRACTION, CATARACT, WITH IOL INSERTION;  Surgeon: Andrés Morse MD;  Location: [facility] OR;  Service: Ophthalmology;  Laterality: Right;    COLONOSCOPY N/A 1/2/2019    Procedure: COLONOSCOPY Suprep;  Surgeon: Cindy Solorzano MD;  Location: [facility] ENDO;  Service: Endoscopy;  Laterality: N/A;    GANGLION CYST EXCISION      left neck    HERNIA REPAIR  2013    umbilical    TENDON REPAIR Right     wrist    TONSILLECTOMY      varicose veins      several sx  bilateral    VASECTOMY      VEIN SURGERY       Family History   Problem Relation Age of Onset    Diabetes Mother     COPD Father     No Known Problems Sister     Heart attack Brother     Heart disease Brother     No Known Problems Maternal Aunt     No Known Problems Maternal Uncle     No Known Problems Paternal Aunt     No Known Problems Paternal Uncle     No Known Problems Maternal Grandmother     No Known Problems Maternal Grandfather     No Known Problems Paternal Grandmother     No Known Problems Paternal  Grandfather     No Known Problems Son     Amblyopia Neg Hx     Blindness Neg Hx     Cancer Neg Hx     Cataracts Neg Hx     Glaucoma Neg Hx     Hypertension Neg Hx     Macular degeneration Neg Hx     Retinal detachment Neg Hx     Strabismus Neg Hx     Stroke Neg Hx     Thyroid disease Neg Hx     Prostate cancer Neg Hx     Kidney disease Neg Hx     Melanoma Neg Hx      Social History     Tobacco Use    Smoking status: Never Smoker    Smokeless tobacco: Never Used   Substance Use Topics    Alcohol use: Yes     Alcohol/week: 3.0 - 4.0 standard drinks     Types: 3 - 4 Glasses of wine per week     Frequency: 2-3 times a week     Drinks per session: 1 or 2     Binge frequency: Never    Drug use: No       No medications prior to admission.     Review of patient's allergies indicates:  No Known Allergies     Review of Systems   Eyes: Positive for blurred vision.   All other systems reviewed and are negative.      Objective:      Vital Signs (Most Recent)       Vital Signs Range (Last 24H):       Physical Exam  Constitutional:       Appearance: He is well-developed.   HENT:      Head: Normocephalic.   Eyes:      Conjunctiva/sclera: Conjunctivae normal.      Pupils: Pupils are equal, round, and reactive to light.   Neck:      Musculoskeletal: Normal range of motion and neck supple.   Cardiovascular:      Rate and Rhythm: Normal rate.   Pulmonary:      Effort: Pulmonary effort is normal.      Breath sounds: Normal breath sounds.   Abdominal:      General: Bowel sounds are normal.      Palpations: Abdomen is soft.   Musculoskeletal: Normal range of motion.   Skin:     General: Skin is warm.   Neurological:      Mental Status: He is alert and oriented to person, place, and time.         Data Review:    ECG:     Assessment:      Cataract OS.    Plan:    CE OS.

## 2020-08-10 ENCOUNTER — TELEPHONE (OUTPATIENT)
Dept: OPHTHALMOLOGY | Facility: CLINIC | Age: 75
End: 2020-08-10

## 2020-08-10 RX ORDER — ATORVASTATIN CALCIUM 40 MG/1
40 TABLET, FILM COATED ORAL DAILY
Qty: 90 TABLET | Refills: 3 | Status: SHIPPED | OUTPATIENT
Start: 2020-08-10 | End: 2021-08-26 | Stop reason: SDUPTHER

## 2020-08-11 ENCOUNTER — ANESTHESIA EVENT (OUTPATIENT)
Dept: SURGERY | Facility: OTHER | Age: 75
End: 2020-08-11
Payer: MEDICARE

## 2020-08-11 ENCOUNTER — HOSPITAL ENCOUNTER (OUTPATIENT)
Facility: OTHER | Age: 75
Discharge: HOME | End: 2020-08-11
Attending: OPHTHALMOLOGY | Admitting: OPHTHALMOLOGY
Payer: MEDICARE

## 2020-08-11 ENCOUNTER — ANESTHESIA (OUTPATIENT)
Dept: SURGERY | Facility: OTHER | Age: 75
End: 2020-08-11
Payer: MEDICARE

## 2020-08-11 VITALS
OXYGEN SATURATION: 99 % | WEIGHT: 203 LBS | DIASTOLIC BLOOD PRESSURE: 79 MMHG | HEART RATE: 60 BPM | BODY MASS INDEX: 26.9 KG/M2 | TEMPERATURE: 98 F | RESPIRATION RATE: 16 BRPM | HEIGHT: 73 IN | SYSTOLIC BLOOD PRESSURE: 125 MMHG

## 2020-08-11 DIAGNOSIS — H25.12 NUCLEAR SCLEROTIC CATARACT OF LEFT EYE: Primary | ICD-10-CM

## 2020-08-11 LAB — POCT GLUCOSE: 113 MG/DL (ref 70–110)

## 2020-08-11 PROCEDURE — 37000008 HC ANESTHESIA 1ST 15 MINUTES: Performed by: OPHTHALMOLOGY

## 2020-08-11 PROCEDURE — V2632 POST CHMBR INTRAOCULAR LENS: HCPCS | Performed by: OPHTHALMOLOGY

## 2020-08-11 PROCEDURE — 66984 XCAPSL CTRC RMVL W/O ECP: CPT | Mod: 79,LT,, | Performed by: OPHTHALMOLOGY

## 2020-08-11 PROCEDURE — 63600175 PHARM REV CODE 636 W HCPCS: Performed by: NURSE ANESTHETIST, CERTIFIED REGISTERED

## 2020-08-11 PROCEDURE — 71000015 HC POSTOP RECOV 1ST HR: Performed by: OPHTHALMOLOGY

## 2020-08-11 PROCEDURE — 66984 PR REMOVAL, CATARACT, W/INSRT INTRAOC LENS, W/O ENDO CYCLO: ICD-10-PCS | Mod: 79,LT,, | Performed by: OPHTHALMOLOGY

## 2020-08-11 PROCEDURE — 25000003 PHARM REV CODE 250: Performed by: OPHTHALMOLOGY

## 2020-08-11 PROCEDURE — 63600175 PHARM REV CODE 636 W HCPCS: Performed by: OPHTHALMOLOGY

## 2020-08-11 PROCEDURE — 36000707: Performed by: OPHTHALMOLOGY

## 2020-08-11 PROCEDURE — 36000706: Performed by: OPHTHALMOLOGY

## 2020-08-11 PROCEDURE — 37000009 HC ANESTHESIA EA ADD 15 MINS: Performed by: OPHTHALMOLOGY

## 2020-08-11 DEVICE — LENS 19.0 ACRYSOF: Type: IMPLANTABLE DEVICE | Site: EYE | Status: FUNCTIONAL

## 2020-08-11 RX ORDER — PREDNISOLONE ACETATE 10 MG/ML
SUSPENSION/ DROPS OPHTHALMIC
Status: DISCONTINUED | OUTPATIENT
Start: 2020-08-11 | End: 2020-08-11 | Stop reason: HOSPADM

## 2020-08-11 RX ORDER — TETRACAINE HYDROCHLORIDE 5 MG/ML
1 SOLUTION OPHTHALMIC
Status: COMPLETED | OUTPATIENT
Start: 2020-08-11 | End: 2020-08-11

## 2020-08-11 RX ORDER — PHENYLEPHRINE HYDROCHLORIDE 25 MG/ML
1 SOLUTION/ DROPS OPHTHALMIC
Status: COMPLETED | OUTPATIENT
Start: 2020-08-11 | End: 2020-08-11

## 2020-08-11 RX ORDER — LIDOCAINE HYDROCHLORIDE 40 MG/ML
INJECTION, SOLUTION RETROBULBAR
Status: DISCONTINUED | OUTPATIENT
Start: 2020-08-11 | End: 2020-08-11 | Stop reason: HOSPADM

## 2020-08-11 RX ORDER — ACETAMINOPHEN 325 MG/1
650 TABLET ORAL EVERY 4 HOURS PRN
Status: DISCONTINUED | OUTPATIENT
Start: 2020-08-11 | End: 2020-08-11 | Stop reason: HOSPADM

## 2020-08-11 RX ORDER — EPINEPHRINE 1 MG/ML
INJECTION, SOLUTION INTRACARDIAC; INTRAMUSCULAR; INTRAVENOUS; SUBCUTANEOUS
Status: DISCONTINUED | OUTPATIENT
Start: 2020-08-11 | End: 2020-08-11 | Stop reason: HOSPADM

## 2020-08-11 RX ORDER — TROPICAMIDE 10 MG/ML
1 SOLUTION/ DROPS OPHTHALMIC
Status: COMPLETED | OUTPATIENT
Start: 2020-08-11 | End: 2020-08-11

## 2020-08-11 RX ORDER — MIDAZOLAM HYDROCHLORIDE 1 MG/ML
INJECTION INTRAMUSCULAR; INTRAVENOUS
Status: DISCONTINUED | OUTPATIENT
Start: 2020-08-11 | End: 2020-08-11

## 2020-08-11 RX ORDER — OFLOXACIN 3 MG/ML
1 SOLUTION/ DROPS OPHTHALMIC
Status: DISCONTINUED | OUTPATIENT
Start: 2020-08-11 | End: 2021-09-30

## 2020-08-11 RX ORDER — CYCLOPENTOLATE HYDROCHLORIDE 10 MG/ML
1 SOLUTION/ DROPS OPHTHALMIC
Status: DISCONTINUED | OUTPATIENT
Start: 2020-08-11 | End: 2021-09-30

## 2020-08-11 RX ORDER — HYDROCODONE BITARTRATE AND ACETAMINOPHEN 5; 325 MG/1; MG/1
1 TABLET ORAL EVERY 4 HOURS PRN
Status: DISCONTINUED | OUTPATIENT
Start: 2020-08-11 | End: 2020-08-11 | Stop reason: HOSPADM

## 2020-08-11 RX ORDER — TETRACAINE HYDROCHLORIDE 5 MG/ML
SOLUTION OPHTHALMIC
Status: DISCONTINUED | OUTPATIENT
Start: 2020-08-11 | End: 2020-08-11 | Stop reason: HOSPADM

## 2020-08-11 RX ORDER — SODIUM CHLORIDE 0.9 % (FLUSH) 0.9 %
10 SYRINGE (ML) INJECTION
Status: DISCONTINUED | OUTPATIENT
Start: 2020-08-11 | End: 2021-09-30

## 2020-08-11 RX ADMIN — TETRACAINE HYDROCHLORIDE 1 DROP: 5 SOLUTION OPHTHALMIC at 07:08

## 2020-08-11 RX ADMIN — PHENYLEPHRINE HYDROCHLORIDE 1 DROP: 25 SOLUTION/ DROPS OPHTHALMIC at 07:08

## 2020-08-11 RX ADMIN — OFLOXACIN 1 DROP: 3 SOLUTION OPHTHALMIC at 07:08

## 2020-08-11 RX ADMIN — MIDAZOLAM HYDROCHLORIDE 2 MG: 1 INJECTION, SOLUTION INTRAMUSCULAR; INTRAVENOUS at 08:08

## 2020-08-11 RX ADMIN — CYCLOPENTOLATE HYDROCHLORIDE 1 DROP: 10 SOLUTION/ DROPS OPHTHALMIC at 07:08

## 2020-08-11 RX ADMIN — TROPICAMIDE 1 DROP: 10 SOLUTION/ DROPS OPHTHALMIC at 07:08

## 2020-08-11 NOTE — ANESTHESIA PREPROCEDURE EVALUATION
08/11/2020  Shannan Norman is a 75 y.o., male.    Pre-op Assessment    I have reviewed the Patient Summary Reports.     I have reviewed the Nursing Notes. I have reviewed the NPO Status.   I have reviewed the Medications.     Review of Systems  Anesthesia Hx:  No problems with previous Anesthesia  History of prior surgery of interest to airway management or planning: Previous anesthesia: General 2019 hernia with general anesthesia.  Airway issues documented on chart review include mask, easy, GETA, easy direct laryngoscopy , view on direct laryngoscopy Grade I  Denies Family Hx of Anesthesia complications.   Denies Personal Hx of Anesthesia complications.   Social:  Non-Smoker, Social Alcohol Use    Hematology/Oncology:  Hematology Normal   Oncology Normal     EENT/Dental:EENT/Dental Normal   Cardiovascular:   Exercise tolerance: good Pacemaker Hypertension hyperlipidemia ECG has been reviewed.  Functional Capacity good / => 4 METS, bikes 6 miles 4-5 times a week  Disorder of Cardiac Rhythm, Atrial Flutter, Hx of Atrial Flutter, S/P surgical ablation    Pulmonary:  Pulmonary Normal  Denies Shortness of breath.    Renal/:   Chronic Renal Disease, CRI    Hepatic/GI:  Hepatic/GI Normal Denies Liver Disease.    Musculoskeletal:  Musculoskeletal Normal    Neurological:   Denies Seizures. (single episode over 50 years ago, not tx)    Endocrine:   Diabetes  Diabetes, Type 2 Diabetes , controlled by diet. , most recent HgA1c value was 6.2 on 2/2019.    Dermatological:  Skin Normal    Psych:  Psychiatric Normal           Physical Exam  General:  Obesity    Airway/Jaw/Neck:  Airway Findings: Mouth Opening: Small, but > 3cm Mallampati: II  TM Distance: Normal, at least 6 cm      Dental:  Dental Findings: molar caps Upper left cantilever bridge        Mental Status:  Mental Status Findings:  Cooperative, Alert and  Oriented         Anesthesia Plan  Type of Anesthesia, risks & benefits discussed:  Anesthesia Type:  MAC  Patient's Preference:   Intra-op Monitoring Plan: standard ASA monitors  Intra-op Monitoring Plan Comments:   Post Op Pain Control Plan: per primary service following discharge from PACU  Post Op Pain Control Plan Comments:   Induction:    Beta Blocker:         Informed Consent: Patient understands risks and agrees with Anesthesia plan.  Questions answered. Anesthesia consent signed with patient.  ASA Score: 3     Day of Surgery Review of History & Physical:    H&P update referred to the surgeon.         Ready For Surgery From Anesthesia Perspective.

## 2020-08-11 NOTE — BRIEF OP NOTE
[facility]  Brief Operative Note    Surgery Date: 8/11/2020     Surgeon(s) and Role:     * Andrés Morse MD - Primary    Assisting Surgeon: None    Pre-op Diagnosis:  NS (nuclear sclerosis), left [H25.12]    Post-op Diagnosis:  Post-Op Diagnosis Codes:     * NS (nuclear sclerosis), left [H25.12]    Procedure(s) (LRB):  EXTRACTION, CATARACT, WITH IOL INSERTION (Left)    Anesthesia: Local MAC    Description of the findings of the procedure(s):     Estimated Blood Loss: * No values recorded between 8/11/2020  8:12 AM and 8/11/2020  8:36 AM *         Specimens:   Specimen (12h ago, onward)    None            Discharge Note    OUTCOME: Patient tolerated treatment/procedure well without complication and is now ready for discharge.    DISPOSITION: Home or Self Care    FINAL DIAGNOSIS:  Nuclear sclerotic cataract of left eye    FOLLOWUP: In clinic    DISCHARGE INSTRUCTIONS:    Discharge Procedure Orders   Other restrictions (specify):   Order Comments: No heavy lifting or bending for 1 week.

## 2020-08-11 NOTE — ANESTHESIA POSTPROCEDURE EVALUATION
Anesthesia Post Evaluation    Patient: Shannan Norman    Procedure(s) Performed: Procedure(s) (LRB):  EXTRACTION, CATARACT, WITH IOL INSERTION (Left)    Final Anesthesia Type: MAC    Patient location during evaluation: [facility]  Patient participation: Yes- Able to Participate  Level of consciousness: awake and alert  Post-procedure vital signs: reviewed and stable  Pain management: adequate  Airway patency: patent    PONV status at discharge: No PONV  Anesthetic complications: no      Cardiovascular status: blood pressure returned to baseline  Respiratory status: unassisted, room air and spontaneous ventilation  Hydration status: euvolemic  Follow-up not needed.          Vitals Value Taken Time   /79 08/11/20 0708   Temp 36.6 °C (97.8 °F) 08/11/20 0708   Pulse 60 08/11/20 0708   Resp 16 08/11/20 0708   SpO2 98 % 08/11/20 0708         No case tracking events are documented in the log.      Pain/Delmar Score: No data recorded

## 2020-08-11 NOTE — INTERVAL H&P NOTE
The patient has been examined and the H&P has been reviewed:    I concur with the findings and no changes have occurred since H&P was written.              Active Hospital Problems    Diagnosis  POA    Nuclear sclerotic cataract of left eye [H25.12]  Yes      Resolved Hospital Problems   No resolved problems to display.

## 2020-08-11 NOTE — PLAN OF CARE
Patient states his son Shannan Norman will be available to pick him up after procedure. He can be contacted at [phone]

## 2020-08-11 NOTE — OP NOTE
Operative Date:  08/11/2020    Discharge Date:  08/11/2020    Discharge Patient Home    Report Title: Operative Note      SURGEON: Andrés Morse MD     ASSISTANT:     PREOPERATIVE DIAGNOSIS: Visually significant NSC cataract,  Left Eye.    POSTOPERATIVE DIAGNOSIS: Visually-significant NSC cataract,  Left Eye.    PROCEDURE PERFORMED: Phacoemulsification of the cataract with posterior chamber intraocular lens Left Eye.    ANESTHESIA: Topical with MAC     COMPLICATIONS: None    ESTIMATED BLOOD LOSS: Minimal    INDICATIONS FOR PROCEDURE:   The patient is a pleasant 75 year old gentleman with increasing difficulties with activities of daily living secondary to a dense visually significant cataract in the Left Eye.  Discussions have been carried out with this patient concerning the options to surgery, risks, benefits and expectations.  The patient voiced good understanding and wished to proceed with the above procedure.    PROCEDURE IN DETAIL: The patient was brought to the operating room and received topical anesthetic to the eye and then was prepped and draped in the usual sterile fashion.  Using the Childs ring and the guarded ruiz blade set at 0.37 mm, a partial thickness clear cornea incision was made temporally.  The paracentesis site was made at the six o'clock position.  Omidria was injected into the anterior chamber through the paracentesis.  Viscoat was then injected into the anterior chamber.  The eye was then reentered at the primary surgical site with a 2.4 mm keratome followed by continuous capsulotomy, hydrodissection, hydrodelineation and phacoemulsification of the cataract.  Residual cortical material was removed using automated irrigation-aspiration technique.  Healon was injected into the posterior chamber and an SN60WF 19.0 diopter lens was placed in the bag without difficulty. Residual viscoelastic was removed using automated irrigation-aspiration technique. The eye was re-pressurized  using BSS solution and both the paracentesis site and the primary surgical site were demonstrated to be watertight at the end of the case with Weck--Alexandra manipulation.  One drop of Ofloxacin and one drop of Pred acetate 1% was applied to the Left Eye .The eye was closed, patched and a Chaparro shield placed.  The patient was taken to the recovery room in good and stable condition.  The patient tolerated the procedure well.  The patient was instructed to refrain from any heavy lifting, bending, stooping or straining activities, discharged home  and to follow-up in the morning for routine postoperative care with Andrés Morse MD.

## 2020-08-12 ENCOUNTER — OFFICE VISIT (OUTPATIENT)
Dept: OPHTHALMOLOGY | Facility: CLINIC | Age: 75
End: 2020-08-12
Payer: MEDICARE

## 2020-08-12 VITALS — SYSTOLIC BLOOD PRESSURE: 99 MMHG | HEART RATE: 70 BPM | DIASTOLIC BLOOD PRESSURE: 66 MMHG

## 2020-08-12 DIAGNOSIS — Z98.890 POST-OPERATIVE STATE: Primary | ICD-10-CM

## 2020-08-12 PROCEDURE — 99999 PR PBB SHADOW E&M-EST. PATIENT-LVL III: CPT | Mod: PBBFAC,,, | Performed by: OPHTHALMOLOGY

## 2020-08-12 PROCEDURE — 99999 PR PBB SHADOW E&M-EST. PATIENT-LVL III: ICD-10-PCS | Mod: PBBFAC,,, | Performed by: OPHTHALMOLOGY

## 2020-08-12 PROCEDURE — 99024 POSTOP FOLLOW-UP VISIT: CPT | Mod: POP,,, | Performed by: OPHTHALMOLOGY

## 2020-08-12 PROCEDURE — 99213 OFFICE O/P EST LOW 20 MIN: CPT | Mod: PBBFAC,PO | Performed by: OPHTHALMOLOGY

## 2020-08-12 PROCEDURE — 99024 PR POST-OP FOLLOW-UP VISIT: ICD-10-PCS | Mod: POP,,, | Performed by: OPHTHALMOLOGY

## 2020-08-12 NOTE — PROGRESS NOTES
Subjective:       Patient ID: Shannan Norman is a 75 y.o. male.    Chief Complaint: Post-op Evaluation    HPI     S/p Phaco w/IOL OS---8/11/2020    Pt here today for 1 day PO OS. Pt states sx went well. Denies pain     Ofloxacin QID OS   Pred Forte QID OS   Ilevro QD OS      Last edited by Birgit Kyle on 8/12/2020  8:41 AM. (History)             Assessment:       1. Post-operative state        Plan:       S/p CE OU- Doing well.      CPM OS.  Taper gtts OD.  RTC 1 wk.

## 2020-08-13 ENCOUNTER — TELEPHONE (OUTPATIENT)
Dept: CARDIOLOGY | Facility: CLINIC | Age: 75
End: 2020-08-13

## 2020-08-19 ENCOUNTER — OFFICE VISIT (OUTPATIENT)
Dept: OPHTHALMOLOGY | Facility: CLINIC | Age: 75
End: 2020-08-19
Payer: MEDICARE

## 2020-08-19 DIAGNOSIS — Z98.890 POST-OPERATIVE STATE: Primary | ICD-10-CM

## 2020-08-19 PROCEDURE — 99999 PR PBB SHADOW E&M-EST. PATIENT-LVL III: ICD-10-PCS | Mod: PBBFAC,,, | Performed by: OPHTHALMOLOGY

## 2020-08-19 PROCEDURE — 99024 POSTOP FOLLOW-UP VISIT: CPT | Mod: POP,,, | Performed by: OPHTHALMOLOGY

## 2020-08-19 PROCEDURE — 99213 OFFICE O/P EST LOW 20 MIN: CPT | Mod: PBBFAC,PO | Performed by: OPHTHALMOLOGY

## 2020-08-19 PROCEDURE — 99999 PR PBB SHADOW E&M-EST. PATIENT-LVL III: CPT | Mod: PBBFAC,,, | Performed by: OPHTHALMOLOGY

## 2020-08-19 PROCEDURE — 99024 PR POST-OP FOLLOW-UP VISIT: ICD-10-PCS | Mod: POP,,, | Performed by: OPHTHALMOLOGY

## 2020-08-19 NOTE — PROGRESS NOTES
Subjective:       Patient ID: Shannan Norman is a 75 y.o. male.    Chief Complaint: Post-op Evaluation    HPI     S/p Phaco w/IOL OS---8/11/2020    Pt here today for 1 week PO OS. Pt states va is improving daily. Denies   pain       Pred Forte TID OS/ OD QD  Ilevro QD OU     Last edited by Birgit Kyle on 8/19/2020  8:19 AM. (History)             Assessment:       1. Post-operative state        Plan:       S/p CE OU- Doing well.      Taper gtts OU.  RTC 3 wks.

## 2020-08-28 ENCOUNTER — DOCUMENTATION ONLY (OUTPATIENT)
Dept: REHABILITATION | Facility: HOSPITAL | Age: 75
End: 2020-08-28

## 2020-08-28 DIAGNOSIS — M53.86 DECREASED RANGE OF MOTION OF LUMBAR SPINE: ICD-10-CM

## 2020-08-28 DIAGNOSIS — M79.18 GLUTEAL PAIN: Primary | ICD-10-CM

## 2020-08-28 DIAGNOSIS — R29.898 WEAKNESS OF BOTH LEGS: ICD-10-CM

## 2020-08-28 NOTE — PROGRESS NOTES
Pt was evaluated on 3/6/2020 and was seen 1 times for PT. Pt has not attended PT since 3/6/2020. Pt was given HEP. Current status is unknown. Pt to be d/c'd at this time.

## 2020-09-03 ENCOUNTER — PATIENT OUTREACH (OUTPATIENT)
Dept: ADMINISTRATIVE | Facility: OTHER | Age: 75
End: 2020-09-03

## 2020-09-04 ENCOUNTER — OFFICE VISIT (OUTPATIENT)
Dept: DERMATOLOGY | Facility: CLINIC | Age: 75
End: 2020-09-04
Payer: MEDICARE

## 2020-09-04 DIAGNOSIS — D18.01 CHERRY ANGIOMA: ICD-10-CM

## 2020-09-04 DIAGNOSIS — L81.4 LENTIGO: ICD-10-CM

## 2020-09-04 DIAGNOSIS — D48.5 NEOPLASM OF UNCERTAIN BEHAVIOR OF SKIN: Primary | ICD-10-CM

## 2020-09-04 DIAGNOSIS — L57.0 ACTINIC KERATOSIS: ICD-10-CM

## 2020-09-04 DIAGNOSIS — L82.1 SEBORRHEIC KERATOSES: ICD-10-CM

## 2020-09-04 PROCEDURE — 17000 DESTRUCT PREMALG LESION: CPT | Mod: 59,PBBFAC | Performed by: DERMATOLOGY

## 2020-09-04 PROCEDURE — 17003 DESTRUCTION, PREMALIGNANT LESIONS; SECOND THROUGH 14 LESIONS: ICD-10-PCS | Mod: S$PBB,,, | Performed by: DERMATOLOGY

## 2020-09-04 PROCEDURE — 88342 IMHCHEM/IMCYTCHM 1ST ANTB: CPT | Performed by: PATHOLOGY

## 2020-09-04 PROCEDURE — 99213 OFFICE O/P EST LOW 20 MIN: CPT | Mod: PBBFAC | Performed by: DERMATOLOGY

## 2020-09-04 PROCEDURE — 11102 TANGNTL BX SKIN SINGLE LES: CPT | Mod: S$PBB,,, | Performed by: DERMATOLOGY

## 2020-09-04 PROCEDURE — 17003 DESTRUCT PREMALG LES 2-14: CPT | Mod: 59,PBBFAC | Performed by: DERMATOLOGY

## 2020-09-04 PROCEDURE — 11102 TANGNTL BX SKIN SINGLE LES: CPT | Mod: PBBFAC | Performed by: DERMATOLOGY

## 2020-09-04 PROCEDURE — 88305 TISSUE EXAM BY PATHOLOGIST: CPT | Mod: 26,,, | Performed by: PATHOLOGY

## 2020-09-04 PROCEDURE — 99999 PR PBB SHADOW E&M-EST. PATIENT-LVL III: CPT | Mod: PBBFAC,,, | Performed by: DERMATOLOGY

## 2020-09-04 PROCEDURE — 17000 DESTRUCT PREMALG LESION: CPT | Mod: 59,S$PBB,, | Performed by: DERMATOLOGY

## 2020-09-04 PROCEDURE — 17000 PR DESTRUCTION(LASER SURGERY,CRYOSURGERY,CHEMOSURGERY),PREMALIGNANT LESIONS,FIRST LESION: ICD-10-PCS | Mod: 59,S$PBB,, | Performed by: DERMATOLOGY

## 2020-09-04 PROCEDURE — 99214 PR OFFICE/OUTPT VISIT, EST, LEVL IV, 30-39 MIN: ICD-10-PCS | Mod: S$PBB,25,, | Performed by: DERMATOLOGY

## 2020-09-04 PROCEDURE — 88305 TISSUE EXAM BY PATHOLOGIST: ICD-10-PCS | Mod: 26,,, | Performed by: PATHOLOGY

## 2020-09-04 PROCEDURE — 99999 PR PBB SHADOW E&M-EST. PATIENT-LVL III: ICD-10-PCS | Mod: PBBFAC,,, | Performed by: DERMATOLOGY

## 2020-09-04 PROCEDURE — 11102 PR TANGENTIAL BIOPSY, SKIN, SINGLE LESION: ICD-10-PCS | Mod: S$PBB,,, | Performed by: DERMATOLOGY

## 2020-09-04 PROCEDURE — 88305 TISSUE EXAM BY PATHOLOGIST: CPT | Performed by: PATHOLOGY

## 2020-09-04 PROCEDURE — 99214 OFFICE O/P EST MOD 30 MIN: CPT | Mod: S$PBB,25,, | Performed by: DERMATOLOGY

## 2020-09-04 PROCEDURE — 88342 IMHCHEM/IMCYTCHM 1ST ANTB: CPT | Mod: 26,,, | Performed by: PATHOLOGY

## 2020-09-04 PROCEDURE — 17003 DESTRUCT PREMALG LES 2-14: CPT | Mod: S$PBB,,, | Performed by: DERMATOLOGY

## 2020-09-04 PROCEDURE — 88342 CHG IMMUNOCYTOCHEMISTRY: ICD-10-PCS | Mod: 26,,, | Performed by: PATHOLOGY

## 2020-09-04 NOTE — PATIENT INSTRUCTIONS
" Shave Biopsy Wound Care    Your doctor has performed a shave biopsy today.  A band aid and vaseline ointment has been placed over the site.  This should remain in place for 24 hours.  It is recommended that you keep the area dry for the first 24 hours.  After 24 hours, you may remove the band aid and wash the area with warm soap and water and apply Vaseline jelly.  Many patients prefer to use Neosporin or Bacitracin ointment.  This is acceptable; however, know that you can develop an allergy to this medication even if you have used it safely for years.  It is important to keep the area moist.  Letting it dry out and get air slows healing time, and will worsen the scar.  Band aid is optional after first 24 hours.      If you notice increasing redness, tenderness, pain, or yellow drainage at the biopsy site, please notify your doctor.  These are signs of an infection.    If your biopsy site is bleeding, apply firm pressure for 15 minutes straight.  Repeat for another 15 minutes, if it is still bleeding.   If the surgical site continues to bleed, then please contact your doctor.      For [facility] users:   You will receive a [facility] notification after the pathologist has finished reviewing your biopsy specimen. Pathology results, however, will not be released online so you will see a "no content" message. Once your dermatologist reviews and clinically correlates your biopsy results, you will either receive a letter in the mail with the results of a phone call from your doctor's office if further explanation or treatment is warranted.       [address] [place] [address]/ [phone] FAX/ [email or web address]      CRYOSURGERY      Your doctor has used a method called cryosurgery to treat your skin condition. Cryosurgery refers to the use of very cold substances to treat a variety of skin conditions such as warts, pre-skin cancers, molluscum contagiosum, sun spots, and several benign " growths. The substance we use in cryosurgery is liquid nitrogen and is so cold (-195 degrees Celsius) that is burns when administered.     Following treatment in the office, the skin may immediately burn and become red. You may find the area around the lesion is affected as well. It is sometimes necessary to treat not only the lesion, but a small area of the surrounding normal skin to achieve a good response.     A blister, and even a blood filled blister, may form after treatment.   This is a normal response. If the blister is painful, it is acceptable to sterilize a needle and with rubbing alcohol and gently pop the blister. It is important that you gently wash the area with soap and warm water as the blister fluid may contain wart virus if a wart was treated. Do no remove the roof of the blister.     The area treated can take anywhere from 1-3 weeks to heal. Healing time depends on the kind skin lesion treated, the location, and how aggressively the lesion was treated. It is recommended that the areas treated are covered with Vaseline or bacitracin ointment and a band-aid. If a band-aid is not practical, just ointment applied several times per day will do. Keeping these areas moist will speed the healing time.    Treatment with liquid nitrogen can leave a scar. In dark skin, it may be a light or dark scar, in light skin it may be a white or pink scar. These will generally fade with time, but may never go away completely.     If you have any concerns after your treatment, please feel free to call the office.       [facility] [place] [address]/ [phone] FAX/ [email or web address]

## 2020-09-04 NOTE — PROGRESS NOTES
"  Subjective:       Patient ID:  Shannan Norman is a 75 y.o. male who presents for   Chief Complaint   Patient presents with    Skin Check     UBSE     Patient is here today for a "mole" check.   Pt has a history of  Lots of sun exposure in the past.   Pt recalls several blistering sunburns in the past- no  Pt has history of tanning bed use- no  Pt has  had moles removed in the past- yes, BCC  Pt has history of melanoma in first degree relatives-  no      Review of Systems   HENT: Positive for nosebleeds.    Skin: Positive for activity-related sunscreen use and wears hat. Negative for daily sunscreen use and recent sunburn.   Hematologic/Lymphatic: Does not bruise/bleed easily.        Objective:    Physical Exam   Constitutional: He appears well-developed and well-nourished.   Neurological: He is alert and oriented to person, place, and time.   Psychiatric: He has a normal mood and affect.   Skin:   Areas Examined (abnormalities noted in diagram):   Scalp / Hair Palpated and Inspected  Head / Face Inspection Performed  Neck Inspection Performed  Chest / Axilla Inspection Performed  Abdomen Inspection Performed  Back Inspection Performed  RUE Inspected  LUE Inspection Performed                   Diagram Legend     Erythematous scaling macule/papule c/w actinic keratosis       Vascular papule c/w angioma      Pigmented verrucoid papule/plaque c/w seborrheic keratosis      Yellow umbilicated papule c/w sebaceous hyperplasia      Irregularly shaped tan macule c/w lentigo     1-2 mm smooth white papules consistent with Milia      Movable subcutaneous cyst with punctum c/w epidermal inclusion cyst      Subcutaneous movable cyst c/w pilar cyst      Firm pink to brown papule c/w dermatofibroma      Pedunculated fleshy papule(s) c/w skin tag(s)      Evenly pigmented macule c/w junctional nevus     Mildly variegated pigmented, slightly irregular-bordered macule c/w mildly atypical nevus      Flesh colored to evenly " pigmented papule c/w intradermal nevus       Pink pearly papule/plaque c/w basal cell carcinoma      Erythematous hyperkeratotic cursted plaque c/w SCC      Surgical scar with no sign of skin cancer recurrence      Open and closed comedones      Inflammatory papules and pustules      Verrucoid papule consistent consistent with wart     Erythematous eczematous patches and plaques     Dystrophic onycholytic nail with subungual debris c/w onychomycosis     Umbilicated papule    Erythematous-base heme-crusted tan verrucoid plaque consistent with inflamed seborrheic keratosis     Erythematous Silvery Scaling Plaque c/w Psoriasis     See annotation          Assessment / Plan:      Pathology Orders:     Normal Orders This Visit    Specimen to Pathology, Dermatology     Comments:    Number of Specimens:->1  ------------------------->-------------------------  Spec 1 Procedure:->Biopsy  Spec 1 Clinical Impression:->r/o melanoma vs recurrent nevus-  new dark area of pigmentation in a previous tan color  recurrent nevus  Spec 1 Source:->left shoulder    Questions:    Procedure Type: Dermatology and skin neoplasms    Number of Specimens: 1    ------------------------: -------------------------    Spec 1 Procedure: Biopsy    Spec 1 Clinical Impression: r/o melanoma vs recurrent nevus- new dark area of pigmentation in a previous tan color recurrent nevus    Spec 1 Source: left shoulder        Neoplasm of uncertain behavior of skin  -     Specimen to Pathology, Dermatology  Shave biopsy procedure note:    Shave biopsy performed after verbal consent including risk of infection, scar, recurrence, need for additional treatment of site. Area prepped with alcohol, anesthetized with approximately 1.0cc of 1% lidocaine with epinephrine. Lesional tissue shaved with razor blade. Hemostasis achieved with application of aluminum chloride followed by hyfrecation. No complications. Dressing applied. Wound care explained.      Actinic  keratosis  Cryosurgery Procedure Note    Verbal consent from the patient is obtained including, but not limited to, risk of hypopigmentation/hyperpigmentation, scar, recurrence of lesion. The patient is aware of the precancerous quality and need for treatment of these lesions. Liquid nitrogen cryosurgery is applied to the 4 actinic keratoses, as detailed in the physical exam, to produce a freeze injury. The patient is aware that blisters may form and is instructed on wound care with gentle cleansing and use of vaseline ointment to keep moist until healed. The patient is supplied a handout on cryosurgery and is instructed to call if lesions do not completely resolve.    Seborrheic keratoses  These are benign inherited growths without a malignant potential. Reassurance given to patient. No treatment is necessary.     Cherry angioma  This is a benign vascular lesion. Reassurance given. No treatment required.     Lentigo  This is a benign hyperpigmented sun induced lesion. Daily sun protection will reduce the number of new lesions. Treatment of these benign lesions are considered cosmetic.    F/u 1/2021 for TBSE           No follow-ups on file.

## 2020-09-09 ENCOUNTER — TELEPHONE (OUTPATIENT)
Dept: DERMATOLOGY | Facility: CLINIC | Age: 75
End: 2020-09-09

## 2020-09-09 ENCOUNTER — OFFICE VISIT (OUTPATIENT)
Dept: OPHTHALMOLOGY | Facility: CLINIC | Age: 75
End: 2020-09-09
Payer: MEDICARE

## 2020-09-09 DIAGNOSIS — Z98.890 POST-OPERATIVE STATE: Primary | ICD-10-CM

## 2020-09-09 DIAGNOSIS — H52.7 REFRACTIVE ERROR: ICD-10-CM

## 2020-09-09 PROCEDURE — 99024 PR POST-OP FOLLOW-UP VISIT: ICD-10-PCS | Mod: POP,,, | Performed by: OPHTHALMOLOGY

## 2020-09-09 PROCEDURE — 99213 OFFICE O/P EST LOW 20 MIN: CPT | Mod: PBBFAC,PO | Performed by: OPHTHALMOLOGY

## 2020-09-09 PROCEDURE — 99999 PR PBB SHADOW E&M-EST. PATIENT-LVL III: ICD-10-PCS | Mod: PBBFAC,,, | Performed by: OPHTHALMOLOGY

## 2020-09-09 PROCEDURE — 99999 PR PBB SHADOW E&M-EST. PATIENT-LVL III: CPT | Mod: PBBFAC,,, | Performed by: OPHTHALMOLOGY

## 2020-09-09 PROCEDURE — 99024 POSTOP FOLLOW-UP VISIT: CPT | Mod: POP,,, | Performed by: OPHTHALMOLOGY

## 2020-09-09 NOTE — TELEPHONE ENCOUNTER
Spoke with pt about getting records release from previous biopsy from the past with another provider

## 2020-09-09 NOTE — TELEPHONE ENCOUNTER
----- Message from Niki Villa MD sent at 9/9/2020  8:27 AM CDT -----  Regarding: Luisa can you call patient about where he had mole previously biopsied  Luisa,  Can you call Mr. Johnson, I did a biopsy on a mole that he previous had biopsied over 5 years ago however we do not have the original pathology. Can you call him to find out where it was done and also to give us consent to pull these records. I have copied the pathologist Dr. Brandon Yoo who is working on the case.  Thanks,  Dr. Villa

## 2020-09-10 ENCOUNTER — OFFICE VISIT (OUTPATIENT)
Dept: CARDIOLOGY | Facility: CLINIC | Age: 75
End: 2020-09-10
Payer: MEDICARE

## 2020-09-10 VITALS
WEIGHT: 211.19 LBS | HEIGHT: 73 IN | OXYGEN SATURATION: 97 % | BODY MASS INDEX: 27.99 KG/M2 | HEART RATE: 58 BPM | SYSTOLIC BLOOD PRESSURE: 132 MMHG | DIASTOLIC BLOOD PRESSURE: 74 MMHG

## 2020-09-10 DIAGNOSIS — I21.29 SEPTAL INFARCTION: ICD-10-CM

## 2020-09-10 DIAGNOSIS — Q21.12 PFO (PATENT FORAMEN OVALE): ICD-10-CM

## 2020-09-10 DIAGNOSIS — I35.1 NONRHEUMATIC AORTIC VALVE INSUFFICIENCY: ICD-10-CM

## 2020-09-10 DIAGNOSIS — R94.31 ABNORMAL EKG: Primary | ICD-10-CM

## 2020-09-10 DIAGNOSIS — E78.5 DYSLIPIDEMIA ASSOCIATED WITH TYPE 2 DIABETES MELLITUS: ICD-10-CM

## 2020-09-10 DIAGNOSIS — I10 ESSENTIAL HYPERTENSION: ICD-10-CM

## 2020-09-10 DIAGNOSIS — I49.5 SSS (SICK SINUS SYNDROME): ICD-10-CM

## 2020-09-10 DIAGNOSIS — E11.59 HYPERTENSION ASSOCIATED WITH DIABETES: ICD-10-CM

## 2020-09-10 DIAGNOSIS — I48.3 TYPICAL ATRIAL FLUTTER: ICD-10-CM

## 2020-09-10 DIAGNOSIS — N18.30 CKD (CHRONIC KIDNEY DISEASE) STAGE 3, GFR 30-59 ML/MIN: ICD-10-CM

## 2020-09-10 DIAGNOSIS — I15.2 HYPERTENSION ASSOCIATED WITH DIABETES: ICD-10-CM

## 2020-09-10 DIAGNOSIS — E11.69 DYSLIPIDEMIA ASSOCIATED WITH TYPE 2 DIABETES MELLITUS: ICD-10-CM

## 2020-09-10 PROCEDURE — 99214 PR OFFICE/OUTPT VISIT, EST, LEVL IV, 30-39 MIN: ICD-10-PCS | Mod: S$PBB,,, | Performed by: INTERNAL MEDICINE

## 2020-09-10 PROCEDURE — 99214 OFFICE O/P EST MOD 30 MIN: CPT | Mod: S$PBB,,, | Performed by: INTERNAL MEDICINE

## 2020-09-10 PROCEDURE — 93010 ELECTROCARDIOGRAM REPORT: CPT | Mod: S$PBB,,, | Performed by: INTERNAL MEDICINE

## 2020-09-10 PROCEDURE — 93010 EKG 12-LEAD: ICD-10-PCS | Mod: S$PBB,,, | Performed by: INTERNAL MEDICINE

## 2020-09-10 PROCEDURE — 99999 PR PBB SHADOW E&M-EST. PATIENT-LVL IV: ICD-10-PCS | Mod: PBBFAC,,, | Performed by: INTERNAL MEDICINE

## 2020-09-10 PROCEDURE — 93005 ELECTROCARDIOGRAM TRACING: CPT | Mod: PBBFAC,PO | Performed by: INTERNAL MEDICINE

## 2020-09-10 PROCEDURE — 99214 OFFICE O/P EST MOD 30 MIN: CPT | Mod: PBBFAC,PO,25 | Performed by: INTERNAL MEDICINE

## 2020-09-10 PROCEDURE — 99999 PR PBB SHADOW E&M-EST. PATIENT-LVL IV: CPT | Mod: PBBFAC,,, | Performed by: INTERNAL MEDICINE

## 2020-09-10 NOTE — PROGRESS NOTES
Subjective:       Patient ID: Shannan Norman is a 75 y.o. male.    Chief Complaint: Post-op Evaluation    HPI     S/p Phaco w/IOL OD- 7/28/2020   S/p Phaco w/IOL OS---8/11/2020    Pt here today for 3 week PO OU. Pt states va is improving daily. Denies   pain     Restasis ou BID          Last edited by Birgit Kyle on 9/9/2020  9:08 AM. (History)             Assessment:       1. Post-operative state    2. Refractive error        Plan:       S/p CE OU- Doing well.  RE-Doing well with monovision.      RTC Dr Borrero in 6 mos.

## 2020-09-10 NOTE — LETTER
September 10, 2020      Nitin Banks MD  [address] [place] [address]           [place] - Cardiology  [address], [facility] [address]  [facility] [address]  Phone: [phone]          Patient: Shannan Norman   MR Number: [id number]   YOB: 1945   Date of Visit: 9/10/2020       Dear Dr. Nitin Banks:    Thank you for referring Shannan Norman to me for evaluation. Attached you will find relevant portions of my assessment and plan of care.    If you have questions, please do not hesitate to call me. I look forward to following Shannan Norman along with you.    Sincerely,    Brandon Cisneros MD    Enclosure  CC:  No Recipients    If you would like to receive this communication electronically, please contact [email or web address] or [phone] to request more information on [employer] Link access.    For providers and/or their staff who would like to refer a patient to [facility], please contact us through our one-stop-shop provider referral line, [facility], at [phone].    If you feel you have received this communication in error or would no longer like to receive these types of communications, please e-mail [email or web address]

## 2020-09-10 NOTE — PROGRESS NOTES
Subjective:   @Patient ID:  Shannan Norman is a 75 y.o. male who presents for evaluation of abnormal EKG .      HPI:   Patient is here for initial evaluation with me  He was referred by Dr. Carpenter.   He is very pleasant 75 years old gentleman.   He is is very active, and exercise on bike 5 times a week. Intense yoga > 2 hrs per week. He does all the house work and takes care of his wife who has dementia. No symptoms at all with activities.   He is on statin and compliant with it.   EKG showed A-paced  Rhythm. Septal infarct. When compared to EKGs in 2017 he had the same findings.     Historically: Hx of A. Flutter ablation in 2010, then redo CTI ablation in 2018. S/p PPM in 2018 for SSS and chronotropic incompetence.  He is on Eliquis for OAC and tolerating it well. He f/u with Dr. Sanchez. No bleeding issues. He had chronic VV varicose veins s/p surgical stripping and EVLTs by Dr. Lund. He saw Dr. Toth but did not follow up with the recommendation of either stockings or EVLTs.      Prior cardiovascular  Hx  --------------------------------       - ECHO 10/2016     1 - Normal left ventricular systolic function (EF 60-65%).     2 - Left ventricular diastolic dysfunction.     3 - Biatrial enlargement.     4 - Normal right ventricular systolic function .     5 - The estimated PA systolic pressure is 25 mmHg.     6 - Trivial aortic regurgitation.     7 - Mild tricuspid regurgitation.     - EKG 9/2020  A. Paced with old septal infarct             Patient Active Problem List    Diagnosis Date Noted    Nuclear sclerotic cataract of left eye 08/11/2020    Nuclear sclerotic cataract of right eye 07/28/2020    CKD (chronic kidney disease) stage 3, GFR 30-59 ml/min 08/13/2019    Overweight (BMI 25.0-29.9) 08/13/2019    Right inguinal hernia 02/27/2019    Dyslipidemia associated with type 2 diabetes mellitus 03/07/2018    Hypertension associated with diabetes 03/07/2018    Junctional bradycardia 02/06/2018    NICM  (nonischemic cardiomyopathy) 02/06/2018    Benign prostatic hyperplasia 08/04/2017    Nonrheumatic aortic valve insufficiency 12/01/2016     Trivial      SSS (sick sinus syndrome) 10/24/2016    Junctional escape rhythm 09/12/2016    Type 2 diabetes mellitus without retinopathy 10/13/2015    Nuclear sclerosis of both eyes 10/13/2015    Left epiretinal membrane 10/13/2015    Asymptomatic varicose veins of both lower extremities 07/06/2015         S/p bilateral GSV EVLT [id number]        Post-operative state 02/10/2014     PROCEDURES 01/27/2014:   1. ECU right side stabilization with retinacular sling.   2. Synovectomy, right wrist.   3. Splint application.        Umbilical hernia 09/19/2013    PFO (patent foramen ovale) 07/12/2013     On asa 325mg. No hx of CVA        HTN (hypertension) 04/18/2013    Gout, arthritis 04/18/2013    Atrial flutter 04/18/2013     EF 60%, s/p RF ablation in 2010 (Dr. Grady Copeland)      Type 2 diabetes mellitus with stage 3 chronic kidney disease, without long-term current use of insulin 04/18/2013                    LAST HbA1c  Lab Results   Component Value Date    HGBA1C 6.3 (H) 02/10/2020       Lipid panel  Lab Results   Component Value Date    CHOL 128 02/10/2020    CHOL 148 08/09/2019    CHOL 159 08/02/2018     Lab Results   Component Value Date    HDL 50 02/10/2020    HDL 48 08/09/2019    HDL 54 08/02/2018     Lab Results   Component Value Date    LDLCALC 64.2 02/10/2020    LDLCALC 89.2 08/09/2019    LDLCALC 89.8 08/02/2018     Lab Results   Component Value Date    TRIG 69 02/10/2020    TRIG 54 08/09/2019    TRIG 76 08/02/2018     Lab Results   Component Value Date    CHOLHDL 39.1 02/10/2020    CHOLHDL 32.4 08/09/2019    CHOLHDL 34.0 08/02/2018            Review of Systems   Constitution: Negative for chills and fever.   HENT: Negative for hearing loss and nosebleeds.    Eyes: Negative for blurred vision.   Cardiovascular: Negative for chest pain, leg swelling and  palpitations.   Respiratory: Negative for hemoptysis and shortness of breath.    Hematologic/Lymphatic: Negative for bleeding problem.   Skin: Negative for itching.   Musculoskeletal: Negative for falls.   Gastrointestinal: Negative for abdominal pain and hematochezia.   Genitourinary: Negative for hematuria.   Neurological: Negative for dizziness and loss of balance.   Psychiatric/Behavioral: Negative for altered mental status and depression.       Objective:   Physical Exam   Constitutional: He is oriented to person, place, and time. He appears well-developed and well-nourished.   HENT:   Head: Normocephalic and atraumatic.   Eyes: Conjunctivae are normal.   Neck: Neck supple. No JVD present. Carotid bruit is not present.   Cardiovascular: Normal rate, regular rhythm and normal heart sounds. Exam reveals no gallop and no friction rub.   No murmur heard.  Pulses:       Carotid pulses are 2+ on the right side and 2+ on the left side.       Radial pulses are 2+ on the right side and 2+ on the left side.   Pulmonary/Chest: Effort normal and breath sounds normal. No stridor. No respiratory distress. He has no wheezes.   Neurological: He is alert and oriented to person, place, and time.   Skin: Skin is warm and dry.   Psychiatric: He has a normal mood and affect. His behavior is normal.       Assessment:     1. Abnormal EKG    2. Septal infarction    3. SSS (sick sinus syndrome)    4. Essential hypertension    5. Typical atrial flutter    6. PFO (patent foramen ovale)    7. Nonrheumatic aortic valve insufficiency    8. Hypertension associated with diabetes    9. Dyslipidemia associated with type 2 diabetes mellitus    10. CKD (chronic kidney disease) stage 3, GFR 30-59 ml/min        Plan:     - EKG was reviewed. Septal infarct is old back to 2017 EKG. He is completely asymptomatic. Will check echo for EF.  If normal EF, no further cardiac work up is warranted.   - Continue statin and risk factors modification  -  Continue Eliquis  - F/U with Dr. Sanchez as scheduled   - Reported hx of PFO in prior notes  - Continue conservative ttt for VV       I spent 5-10 minutes asking, assessing, assisting, arranging and advising heart healthy diet improvements. This included low-salt meals, portion control and health food alternatives. I also encourage 30 minutes of moderate exercise 3-4x a week.     Pertinent cardiac images and EKG reviewed independently.    Continue with current medical plan and lifestyle changes.  Return sooner for concerns or questions. If symptoms persist go to the ED  I have reviewed all pertinent data including patient's medical history in detail and updated the computerized patient record.     Orders Placed This Encounter   Procedures    Echo Color Flow Doppler? Yes     Standing Status:   Future     Standing Expiration Date:   9/10/2021     Order Specific Question:   Color Flow Doppler?     Answer:   Yes       Follow up as scheduled.     He expressed verbal understanding and agreed with the plan    Patient's Medications   New Prescriptions    No medications on file   Previous Medications    ALLOPURINOL (ZYLOPRIM) 100 MG TABLET    Take 1 tablet (100 mg total) by mouth once daily.    APIXABAN (ELIQUIS) 5 MG TAB    Take 1 tablet (5 mg total) by mouth 2 (two) times daily.    ASCORBIC ACID (VITAMIN C) 500 MG TABLET    Take 1,000 mg by mouth nightly.     ATORVASTATIN (LIPITOR) 40 MG TABLET    Take 1 tablet (40 mg total) by mouth once daily.    BENAZEPRIL (LOTENSIN) 20 MG TABLET    Take 1 tablet (20 mg total) by mouth once daily.    CLOTRIMAZOLE-BETAMETHASONE 1-0.05% (LOTRISONE) CREAM    Apply topically as needed.    CYCLOSPORINE (RESTASIS) 0.05 % OPHTHALMIC EMULSION    Place 0.4 mLs (1 drop total) into both eyes 2 (two) times daily.    ECONAZOLE NITRATE 1 % CREAM    Apply topically once daily.    FLUZONE HIGH-DOSE 2019-20, PF, 180 MCG/0.5 ML SYRG    Patient not taking    GLUCOSAMINE HCL/CHONDR VASQUEZ A NA  (GLUCOSAMINE-CHONDROITIN) 750-600 MG TAB    Take 2 tablets by mouth Daily.    MULTIVITAMIN CAPSULE    Take 1 capsule by mouth nightly.     TAMSULOSIN (FLOMAX) 0.4 MG CAP    Take 1 capsule (0.4 mg total) by mouth after dinner.   Modified Medications    No medications on file   Discontinued Medications    No medications on file

## 2020-09-14 DIAGNOSIS — C43.62 MALIGNANT MELANOMA OF LEFT SHOULDER: Primary | ICD-10-CM

## 2020-09-14 LAB
COMMENT: NORMAL
FINAL PATHOLOGIC DIAGNOSIS: NORMAL
GROSS: NORMAL
MICROSCOPIC EXAM: NORMAL

## 2020-09-14 NOTE — PROGRESS NOTES
Called patient to let him know his results:  Skin, left shoulder, re-biopsy:   -MALIGNANT MELANOMA, NOT OTHERWISE CLASSIFIED, NON-ULCERATED, 0.15 MM IN   DEPTH (pT1a), see synoptic report below   SYNOPTIC REPORT   Procedure: biopsy, re-shave   Specimen Laterality: left   Tumor Site: shoulder   Macroscopic Satellite Nodule(s): Not identified   Histologic Type: not otherwise classified   Maximum Tumor (Breslow) Thickness:  0.15 mm   Ulceration: Not identified   Microsatellite(s): Not identified   Margins:   Peripheral:  Uninvolved by invasive melanoma or malignant melanoma in situ.   Deep:  Uninvolved by invasive melanoma or malignant melanoma in situ.   Mitotic rate: <1 mm2   Anatomic (Shay) level: II   Lympho-vascular invasion: Not identified   Neurotropism: Not identified   Tumor infiltrating lymphocytes: Present, non-brisk   Tumor regression: Not identified   Pathologic Stage Classification (pTNM, AJCC 8th Ed): pT1a     Explained results to the patient- order placed for consultation with Dr. Begum, if the patient does not here by Dr. Begum office by Wednesday he will message me and we will call to see if we can get him in soon for consultation.

## 2020-09-15 ENCOUNTER — TELEPHONE (OUTPATIENT)
Dept: SURGERY | Facility: CLINIC | Age: 75
End: 2020-09-15

## 2020-09-16 ENCOUNTER — HOSPITAL ENCOUNTER (OUTPATIENT)
Dept: CARDIOLOGY | Facility: HOSPITAL | Age: 75
Discharge: HOME | End: 2020-09-16
Attending: INTERNAL MEDICINE
Payer: MEDICARE

## 2020-09-16 ENCOUNTER — TELEPHONE (OUTPATIENT)
Dept: SURGERY | Facility: CLINIC | Age: 75
End: 2020-09-16

## 2020-09-16 VITALS — HEIGHT: 73 IN | BODY MASS INDEX: 27.96 KG/M2 | WEIGHT: 211 LBS

## 2020-09-16 DIAGNOSIS — R94.31 ABNORMAL EKG: ICD-10-CM

## 2020-09-16 DIAGNOSIS — I49.5 SSS (SICK SINUS SYNDROME): ICD-10-CM

## 2020-09-16 DIAGNOSIS — I21.29 SEPTAL INFARCTION: ICD-10-CM

## 2020-09-16 PROCEDURE — 93306 TTE W/DOPPLER COMPLETE: CPT | Mod: 26,,, | Performed by: INTERNAL MEDICINE

## 2020-09-16 PROCEDURE — 93306 ECHO (CUPID ONLY): ICD-10-PCS | Mod: 26,,, | Performed by: INTERNAL MEDICINE

## 2020-09-16 PROCEDURE — 93306 TTE W/DOPPLER COMPLETE: CPT

## 2020-09-17 ENCOUNTER — TELEPHONE (OUTPATIENT)
Dept: CARDIOLOGY | Facility: CLINIC | Age: 75
End: 2020-09-17

## 2020-09-17 ENCOUNTER — INITIAL CONSULT (OUTPATIENT)
Dept: SURGERY | Facility: CLINIC | Age: 75
End: 2020-09-17
Payer: MEDICARE

## 2020-09-17 VITALS
SYSTOLIC BLOOD PRESSURE: 131 MMHG | WEIGHT: 212.5 LBS | HEIGHT: 73 IN | DIASTOLIC BLOOD PRESSURE: 64 MMHG | HEART RATE: 60 BPM | BODY MASS INDEX: 28.16 KG/M2

## 2020-09-17 DIAGNOSIS — C43.62 MALIGNANT MELANOMA OF LEFT SHOULDER: Primary | ICD-10-CM

## 2020-09-17 LAB
AORTIC ROOT ANNULUS: 2.85 CM
AORTIC VALVE CUSP SEPERATION: 1.67 CM
AV INDEX (PROSTH): 0.73
AV MEAN GRADIENT: 5 MMHG
AV PEAK GRADIENT: 8 MMHG
AV VALVE AREA: 2.35 CM2
AV VELOCITY RATIO: 0.68
BSA FOR ECHO PROCEDURE: 2.22 M2
CV ECHO LV RWT: 0.37 CM
DOP CALC AO PEAK VEL: 1.42 M/S
DOP CALC AO VTI: 31.86 CM
DOP CALC LVOT AREA: 3.2 CM2
DOP CALC LVOT DIAMETER: 2.02 CM
DOP CALC LVOT PEAK VEL: 0.97 M/S
DOP CALC LVOT STROKE VOLUME: 74.98 CM3
DOP CALCLVOT PEAK VEL VTI: 23.41 CM
E WAVE DECELERATION TIME: 299.43 MSEC
E/A RATIO: 0.77
E/E' RATIO: 5.89 M/S
ECHO LV POSTERIOR WALL: 1 CM (ref 0.6–1.1)
FRACTIONAL SHORTENING: 32 % (ref 28–44)
INTERVENTRICULAR SEPTUM: 1.1 CM (ref 0.6–1.1)
IVRT: 79.92 MSEC
LA MAJOR: 5.85 CM
LA MINOR: 5.55 CM
LA WIDTH: 3.5 CM
LEFT ATRIUM SIZE: 3.08 CM
LEFT ATRIUM VOLUME INDEX: 23.7 ML/M2
LEFT ATRIUM VOLUME: 52.19 CM3
LEFT INTERNAL DIMENSION IN SYSTOLE: 3.67 CM (ref 2.1–4)
LEFT VENTRICLE DIASTOLIC VOLUME INDEX: 63.81 ML/M2
LEFT VENTRICLE DIASTOLIC VOLUME: 140.46 ML
LEFT VENTRICLE MASS INDEX: 100 G/M2
LEFT VENTRICLE SYSTOLIC VOLUME INDEX: 26 ML/M2
LEFT VENTRICLE SYSTOLIC VOLUME: 57.13 ML
LEFT VENTRICULAR INTERNAL DIMENSION IN DIASTOLE: 5.39 CM (ref 3.5–6)
LEFT VENTRICULAR MASS: 219.91 G
LV LATERAL E/E' RATIO: 5.3 M/S
LV SEPTAL E/E' RATIO: 6.63 M/S
MV PEAK A VEL: 0.69 M/S
MV PEAK E VEL: 0.53 M/S
MV STENOSIS PRESSURE HALF TIME: 86.83 MS
MV VALVE AREA P 1/2 METHOD: 2.53 CM2
PISA TR MAX VEL: 2.15 M/S
PULM VEIN S/D RATIO: 1.56
PV PEAK D VEL: 0.25 M/S
PV PEAK S VEL: 0.39 M/S
PV PEAK VELOCITY: 1.19 CM/S
RA MAJOR: 5.06 CM
RA PRESSURE: 3 MMHG
RA WIDTH: 2.58 CM
RIGHT VENTRICULAR END-DIASTOLIC DIMENSION: 2.61 CM
TDI LATERAL: 0.1 M/S
TDI SEPTAL: 0.08 M/S
TDI: 0.09 M/S
TR MAX PG: 18 MMHG
TV REST PULMONARY ARTERY PRESSURE: 21 MMHG

## 2020-09-17 PROCEDURE — 99214 OFFICE O/P EST MOD 30 MIN: CPT | Mod: S$PBB,,, | Performed by: SURGERY

## 2020-09-17 PROCEDURE — 99213 OFFICE O/P EST LOW 20 MIN: CPT | Mod: PBBFAC | Performed by: SURGERY

## 2020-09-17 PROCEDURE — 99999 PR PBB SHADOW E&M-EST. PATIENT-LVL III: CPT | Mod: PBBFAC,,, | Performed by: SURGERY

## 2020-09-17 PROCEDURE — 99214 PR OFFICE/OUTPT VISIT, EST, LEVL IV, 30-39 MIN: ICD-10-PCS | Mod: S$PBB,,, | Performed by: SURGERY

## 2020-09-17 PROCEDURE — 99999 PR PBB SHADOW E&M-EST. PATIENT-LVL III: ICD-10-PCS | Mod: PBBFAC,,, | Performed by: SURGERY

## 2020-09-17 NOTE — TELEPHONE ENCOUNTER
----- Message from Brandon Cisneros MD sent at 9/17/2020  8:11 AM CDT -----  Please let him know echo result is very good. No major abnormalities. No interventions needed. Thx

## 2020-09-17 NOTE — LETTER
September 22, 2020      Niki Villa MD  [address]  [facility] [address]           [facility]  [address]  [facility] [address]  Phone: [phone]          Patient: Shannan Norman   MR Number: [id number]   YOB: 1945   Date of Visit: 9/17/2020       Dear Dr. Niki Villa:    Thank you for referring Shannan Norman to me for evaluation. Attached you will find relevant portions of my assessment and plan of care.    If you have questions, please do not hesitate to call me. I look forward to following Shannan Norman along with you.    Sincerely,    Ramiro Begum MD    Enclosure  CC:  No Recipients    If you would like to receive this communication electronically, please contact [email or web address] or [phone] to request more information on [employer] Link access.    For providers and/or their staff who would like to refer a patient to [facility], please contact us through our one-stop-shop provider referral line, [facility] [place], at [phone].    If you feel you have received this communication in error or would no longer like to receive these types of communications, please e-mail [email or web address]

## 2020-09-17 NOTE — PROGRESS NOTES
GENERAL SURGERY CLINIC  HISTORY AND PHYSICAL    CC:  melanoma      HPI:  Shannan Norman is a 75 y.o.  male with Hx of atypical melanocytic nevi who presents to clinic for melanoma.   Patient reports he previously had a lesion in the same spot on his shoulder that was biopsied about 5 years ago. He developed a brown lesion over the spot over the past year that was biopsied 9/4/20 and found to be malignant melanoma.   He has a history of 4 removals of skin cancers - including basal cells. He follows with his dermatologist every 6 months.    Diagnosis Skin, left shoulder, re-biopsy:   -MALIGNANT MELANOMA, NOT OTHERWISE CLASSIFIED, NON-ULCERATED, 0.15 MM IN   DEPTH (pT1a), see synoptic report below   SYNOPTIC REPORT   Procedure: biopsy, re-shave   Specimen Laterality: left   Tumor Site: shoulder   Macroscopic Satellite Nodule(s): Not identified   Histologic Type: not otherwise classified   Maximum Tumor (Breslow) Thickness:  0.15 mm   Ulceration: Not identified   Microsatellite(s): Not identified   Margins:   Peripheral:  Uninvolved by invasive melanoma or malignant melanoma in situ.   Deep:  Uninvolved by invasive melanoma or malignant melanoma in situ.   Mitotic rate: <1 mm2   Anatomic (Shay) level: II   Lympho-vascular invasion: Not identified   Neurotropism: Not identified   Tumor infiltrating lymphocytes: Present, non-brisk   Tumor regression: Not identified   Pathologic Stage Classification (pTNM, AJCC 8th Ed): pT1a       ROS:   Review of Systems   Constitutional: Negative for chills, fever, malaise/fatigue and weight loss.   Eyes: Negative for blurred vision and double vision.   Respiratory: Negative for cough and shortness of breath.    Cardiovascular: Negative for chest pain and palpitations.   Gastrointestinal: Negative for abdominal pain, constipation, diarrhea, nausea and vomiting.   Genitourinary: Negative for dysuria and urgency.   Skin: Negative for itching and rash.   Neurological: Negative for  dizziness and headaches.        Past Medical History:   Diagnosis Date    Allergy     Arthritis     Atrial fibrillation     Atrial flutter 2011    ablation    Cancer     Cataract     CKD (chronic kidney disease) stage 3, GFR 30-59 ml/min 8/13/2019    Diabetes mellitus     Dry eye syndrome     Gout, unspecified     High cholesterol     History of shingles     Hypertension     Seizures        Past Surgical History:   Procedure Laterality Date    ABLATION N/A 9/11/2018    Procedure: ABLATION;  Surgeon: Hany Sanchez MD;  Location[facility] CATH LAB;  Service: Cardiology;  Laterality: N/A;  AFL, ISABELLE, RFA, ELODIA, MAC, DM, 3 PREP    ABLATION OF DYSRHYTHMIC FOCUS      ADENOIDECTOMY      CARDIAC PACEMAKER PLACEMENT      no defib    CATARACT EXTRACTION W/  INTRAOCULAR LENS IMPLANT Right 7/28/2020    Procedure: EXTRACTION, CATARACT, WITH IOL INSERTION;  Surgeon: Andrés Morse MD;  Location: [facility];  Service: Ophthalmology;  Laterality: Right;    CATARACT EXTRACTION W/  INTRAOCULAR LENS IMPLANT Left 8/11/2020    Procedure: EXTRACTION, CATARACT, WITH IOL INSERTION;  Surgeon: Andrés Morse MD;  Location: [facility];  Service: Ophthalmology;  Laterality: Left;    COLONOSCOPY N/A 1/2/2019    Procedure: COLONOSCOPY Suprep;  Surgeon: Cindy Solorzano MD;  Location: [facility] ENDO;  Service: Endoscopy;  Laterality: N/A;    GANGLION CYST EXCISION      left neck    HERNIA REPAIR  2013    umbilical    TENDON REPAIR Right     wrist    TONSILLECTOMY      varicose veins      several sx  bilateral    VASECTOMY      VEIN SURGERY         Social History     Socioeconomic History    Marital status:      Spouse name: Not on file    Number of children: Not on file    Years of education: Not on file    Highest education level: Not on file   Occupational History     Employer: [employer]   Social Needs    Financial resource strain: Not hard at all    Food insecurity     Worry: Never true      Inability: Never true    Transportation needs     Medical: No     Non-medical: No   Tobacco Use    Smoking status: Never Smoker    Smokeless tobacco: Never Used   Substance and Sexual Activity    Alcohol use: Yes     Alcohol/week: 3.0 - 4.0 standard drinks     Types: 3 - 4 Glasses of wine per week     Frequency: 2-3 times a week     Drinks per session: 1 or 2     Binge frequency: Never    Drug use: No    Sexual activity: Yes     Partners: Female   Lifestyle    Physical activity     Days per week: 6 days     Minutes per session: 60 min    Stress: Only a little   Relationships    Social connections     Talks on phone: More than three times a week     Gets together: Three times a week     Attends Sabianist service: More than 4 times per year     Active member of club or organization: Yes     Attends meetings of clubs or organizations: More than 4 times per year     Relationship status:    Other Topics Concern    Not on file   Social History Narrative    Not on file       Review of patient's allergies indicates:  No Known Allergies      PHYSICAL EXAM:  There were no vitals filed for this visit.    Physical Exam   Constitutional: He is oriented to person, place, and time and well-developed, well-nourished, and in no distress.   HENT:   Head: Normocephalic and atraumatic.   Eyes: Conjunctivae and EOM are normal.   Neck: Normal range of motion. Neck supple.   Pulmonary/Chest: Effort normal. No respiratory distress.       Abdominal: He exhibits no distension.   Neurological: He is alert and oriented to person, place, and time.   Skin: Skin is warm and dry.   Psychiatric: Affect and judgment normal.       PERTINENT LABS:  Reviewed.       PERTINENT IMAGING:  Reviewed.       ASSESSMENT/PLAN:  Shannan Norman is a 75 y.o. male with malignant melanoma of the left shoulder.   - Wide local excision       Renetta Webster MD  [facility] PGY1  Pager: [phone]      I have seen the patient,  reviewed the attached resident or NP's history and physical, assessment and plan. I have personally interviewed and examined the patient at bedside, reviewed the chart, relevant imaging/labs and agree with the findings.      9/17/2020 Left shoulder melanoma (initial bx depth=0.15 mm, non-ulcerated, margins neg).     Plan for WLE in minor procedure room. Risks and benefits of wide excision were reviewed including bleeding, infection, wound problems, pain/numbness, scar, cosmetic deformity, margin positivity, discovery of additional disease, need for additional procedures, and imponderables.  He was given the opportunity to ask questions, which were all addressed.  He voiced understanding and wishes to proceed.      Ramiro Begum MD, FACS  [facility] [place] [place]  Office: [phone]  Fax: [phone]

## 2020-09-17 NOTE — TELEPHONE ENCOUNTER
Reached out with Echo results per Dr Merlossef, verbalized understanding. No questions or concerns expressed.

## 2020-09-22 ENCOUNTER — PATIENT OUTREACH (OUTPATIENT)
Dept: OTHER | Facility: OTHER | Age: 75
End: 2020-09-22

## 2020-09-22 PROBLEM — C43.62 MALIGNANT MELANOMA OF LEFT SHOULDER: Status: ACTIVE | Noted: 2020-09-22

## 2020-09-22 NOTE — LETTER
November 4, 2020     Shannan Norman  [address]  [place] [address]       Dear Shannan,    Welcome to [facility]! Our goal is to make care effective, proactive and convenient by using data you send us from home to better treat your chronic conditions.                My name is Bernadette Canales, and I am your dedicated Digital Medicine clinician. As an expert in medication management, I will help ensure that the medications you are taking continue to provide the intended benefits and help you reach your goals. You can reach me directly at [phone] or by sending me a message directly through your [facility] account.      I am Ramiro Estrada and I will be your health . My job is to help you identify lifestyle changes to improve your disease control. We will talk about nutrition, exercise, and other ways you may be able to adjust your current habits to better your health. Additionally, we will help ensure you are completing the tests and screenings that are necessary to help manage your conditions. You can reach me directly at [phone] or by sending me a message directly through your [facility] account.    Most importantly, YOU are at the center of this team. Together, we will work to improve your overall health and encourage you to meet your goals for a healthier lifestyle.     What we expect from YOU:  · Please take frequent home blood pressure measurements. We ask that you take at least 1 blood pressure reading per week, but more information will better help us get you know you. Be sure you rest for a few minutes before taking the reading in a quiet, comfortable place.     Be available to receive phone calls or Mipsot messages, when appropriate, from your care team. Please let us know if there are any specific days or times that work best for us to reach you via phone.     Complete routine tests and screenings. Dont worry, we will help keep you on track!           What you  should expect from your Digital Medicine Care Team:   We will work with you to create a personalized plan of care and provide you with encouragement and education, including regarding lifestyle changes, that could help you manage your disease states.     We will adjust your current medications, if needed, and continue to monitor your long-term progress.     We will provide you and your physician with monthly progress reports after you have been in the program for more than 30 days.     We will send you reminders through [employer] [place] and text messages to help ensure you do not miss any testing deadlines to help manage your disease states.    You will be able to reach us by phone or through your [facility] account by clicking our names under Care Team on the right side of the home screen.    We look forward to working with you to help manage your health,    Sincerely,    Your Digital Medicine Team    Please visit our websites to learn more:   · Hypertension: [email or web address]      Remember, we are not available for emergencies. If you have an emergency, please contact your doctors office directly or call [facility] on-call ([phone] or [phone]) or 911.

## 2020-09-25 ENCOUNTER — CLINICAL SUPPORT (OUTPATIENT)
Dept: CARDIOLOGY | Facility: HOSPITAL | Age: 75
End: 2020-09-25
Payer: MEDICARE

## 2020-09-25 DIAGNOSIS — Z95.0 PRESENCE OF CARDIAC PACEMAKER: ICD-10-CM

## 2020-09-25 PROCEDURE — 93296 REM INTERROG EVL PM/IDS: CPT | Performed by: INTERNAL MEDICINE

## 2020-09-25 PROCEDURE — 93294 REM INTERROG EVL PM/LDLS PM: CPT | Mod: ,,, | Performed by: INTERNAL MEDICINE

## 2020-09-25 PROCEDURE — 93294 CARDIAC DEVICE CHECK - REMOTE: ICD-10-PCS | Mod: ,,, | Performed by: INTERNAL MEDICINE

## 2020-11-02 ENCOUNTER — TELEPHONE (OUTPATIENT)
Dept: UROLOGY | Facility: CLINIC | Age: 75
End: 2020-11-02

## 2020-11-02 ENCOUNTER — PATIENT MESSAGE (OUTPATIENT)
Dept: UROLOGY | Facility: CLINIC | Age: 75
End: 2020-11-02

## 2020-11-03 ENCOUNTER — PATIENT OUTREACH (OUTPATIENT)
Dept: ADMINISTRATIVE | Facility: OTHER | Age: 75
End: 2020-11-03

## 2020-11-03 RX ORDER — TAMSULOSIN HYDROCHLORIDE 0.4 MG/1
0.4 CAPSULE ORAL
Qty: 90 CAPSULE | Refills: 3 | Status: SHIPPED | OUTPATIENT
Start: 2020-11-03 | End: 2021-11-29

## 2020-11-03 NOTE — PROGRESS NOTES
Updates were requested from care everywhere.  Chart was reviewed for overdue Proactive [facility] Encounters (VITOR) topics (CRS, Breast Cancer Screening, Eye exam)  Health Maintenance has been updated.  LINKS not responding.

## 2020-11-04 ENCOUNTER — TELEPHONE (OUTPATIENT)
Dept: ELECTROPHYSIOLOGY | Facility: CLINIC | Age: 75
End: 2020-11-04

## 2020-11-04 ENCOUNTER — OFFICE VISIT (OUTPATIENT)
Dept: UROLOGY | Facility: CLINIC | Age: 75
End: 2020-11-04
Payer: MEDICARE

## 2020-11-04 VITALS
HEART RATE: 66 BPM | SYSTOLIC BLOOD PRESSURE: 111 MMHG | DIASTOLIC BLOOD PRESSURE: 64 MMHG | WEIGHT: 120.56 LBS | HEIGHT: 73 IN | BODY MASS INDEX: 15.98 KG/M2

## 2020-11-04 DIAGNOSIS — N40.0 BENIGN PROSTATIC HYPERPLASIA, UNSPECIFIED WHETHER LOWER URINARY TRACT SYMPTOMS PRESENT: Primary | ICD-10-CM

## 2020-11-04 DIAGNOSIS — R35.1 NOCTURIA: ICD-10-CM

## 2020-11-04 PROCEDURE — 99999 PR PBB SHADOW E&M-EST. PATIENT-LVL III: ICD-10-PCS | Mod: PBBFAC,,, | Performed by: UROLOGY

## 2020-11-04 PROCEDURE — 99999 PR PBB SHADOW E&M-EST. PATIENT-LVL III: CPT | Mod: PBBFAC,,, | Performed by: UROLOGY

## 2020-11-04 PROCEDURE — 99453 REM MNTR PHYSIOL PARAM SETUP: CPT | Mod: PBBFAC,PO | Performed by: FAMILY MEDICINE

## 2020-11-04 PROCEDURE — 99213 PR OFFICE/OUTPT VISIT, EST, LEVL III, 20-29 MIN: ICD-10-PCS | Mod: S$PBB,,, | Performed by: UROLOGY

## 2020-11-04 PROCEDURE — 99213 OFFICE O/P EST LOW 20 MIN: CPT | Mod: PBBFAC | Performed by: UROLOGY

## 2020-11-04 PROCEDURE — 99213 OFFICE O/P EST LOW 20 MIN: CPT | Mod: S$PBB,,, | Performed by: UROLOGY

## 2020-11-04 RX ORDER — INFLUENZA A VIRUS A/MICHIGAN/45/2015 X-275 (H1N1) ANTIGEN (FORMALDEHYDE INACTIVATED), INFLUENZA A VIRUS A/SINGAPORE/INFIMH-16-0019/2016 IVR-186 (H3N2) ANTIGEN (FORMALDEHYDE INACTIVATED), INFLUENZA B VIRUS B/PHUKET/3073/2013 ANTIGEN (FORMALDEHYDE INACTIVATED), AND INFLUENZA B VIRUS B/MARYLAND/15/2016 BX-69A ANTIGEN (FORMALDEHYDE INACTIVATED) 60; 60; 60; 60 UG/.7ML; UG/.7ML; UG/.7ML; UG/.7ML
INJECTION, SUSPENSION INTRAMUSCULAR
COMMUNITY
Start: 2020-09-24 | End: 2020-11-11

## 2020-11-04 RX ORDER — INDOMETHACIN 50 MG/1
50 CAPSULE ORAL 2 TIMES DAILY WITH MEALS
COMMUNITY
End: 2021-03-30

## 2020-11-04 NOTE — PROGRESS NOTES
Digital Medicine: Health  Follow-Up    The history is provided by the patient.           Additional Enrollment Details: Introduced patient to program and myself as new HC. Patient and I reviewed program goals/requirements, proper BP testing protocol, and contact information. Will review physical activity levels, daily diet, etc at next encounter.  Patient AVG BP is within goal and readings are trending down. Patient takes BP medication right before bed and usually takes BP readings in the evening before he takes BP medication. Will f/u in 4 weeks to check in.    HYPERTENSION  Explained hypertension digital medicine goals including BP goal less than or equal to 130/80mmHg, improved convenience of BP management and reduced risk of heart attack, kidney failure, stroke, eye disease, dementia, and death.      Explained that we expect patient to submit several blood pressure readings per week at random times of the day, but at least 30 minutes after taking blood pressure medications. Instructed patient not to allow anyone else to use their blood pressure monitor and phone as data submitted is directly entered into medical record. Reviewed and confirmed appropriate blood pressure monitoring technique.         Patient's BP goal is less than or equal to 130/80.Patient's BP average is 124/66 mmHg, which is at goal, per 2017 ACC/AHA Hypertension Guidelines.              Diet-Not assessed          Physical Activity-Not assessed    Medication Adherence-Medication Adherence not addressed.      Substance, Sleep, Stress-Not assessed      Continue current diet/physical activity routine.       Addressed patient questions and patient has my contact information if needed prior to next outreach. Patient verbalizes understanding.      Explained the importance of self-monitoring and medication adherence. Encouraged the patient to communicate with their health  for lifestyle modifications to help improve or maintain a healthy  lifestyle.               There are no preventive care reminders to display for this patient.      Last 5 Patient Entered Readings                                      Current 30 Day Average: 124/66     Recent Readings 10/30/2020 10/27/2020 10/26/2020 10/25/2020 10/24/2020    SBP (mmHg) 113 131 113 125 135    DBP (mmHg) 68 68 61 68 74    Pulse 60 60 60 60 60

## 2020-11-04 NOTE — LETTER
November 4, 2020      Nitin Banks MD  [address]  [place] [address]           [facility] - Urology 2nd Fl  [address]  [facility] [address]  Phone: [phone]          Patient: Shannan Norman   MR Number: [id number]   YOB: 1945   Date of Visit: 11/4/2020       Dear Dr. Nitin Banks:    Thank you for referring Shannan Norman to me for evaluation. Attached you will find relevant portions of my assessment and plan of care.    If you have questions, please do not hesitate to call me. I look forward to following Shannan Norman along with you.    Sincerely,    Grady Phipps MD    Enclosure  CC:  No Recipients    If you would like to receive this communication electronically, please contact [email or web address] or [phone] to request more information on [employer] Link access.    For providers and/or their staff who would like to refer a patient to [facility], please contact us through our one-stop-shop provider referral line, [facility], at [phone].    If you feel you have received this communication in error or would no longer like to receive these types of communications, please e-mail [email or web address]

## 2020-11-04 NOTE — PROGRESS NOTES
Subjective:       Patient ID: Shannan Norman is a 75 y.o. male.    Chief Complaint:  LUTs.    Patient is a 75 y.o. male who is established to our clinic and was initially referred by their PCP, Dr. Paulson for evaluation of LUTs.  He was last seen in September of 2019.    History of Present Illness  Benign Prostatic Hypertrophy  This is a chronic problem. The current episode started more than 1 year ago. The problem has been gradually improving since onset. Irritative symptoms include frequency and nocturia (3x/night). Irritative symptoms do not include urgency. Obstructive symptoms do not include incomplete emptying, an intermittent stream, a slower stream or a weak stream (mild). Pertinent negatives include no chills, dysuria, nausea or vomiting. AUA score is 8-19. Exacerbated by: has not had coffee since his last visit.  Past treatments include tamsulosin (oxybutynine). The treatment provided mild relief. He has been using treatment for 1 to 2 years.          Lab Results   Component Value Date    PSA 0.28 02/05/2019    PSA 0.43 08/05/2017    PSA 0.63 07/25/2016    PSA 0.67 03/25/2013    PSA 0.59 01/30/2012    PSA 0.62 01/21/2011    PSA 0.39 01/09/2010    PSA 0.47 12/08/2008    PSA 0.4 12/06/2007    PSA 0.9 11/20/2006    PSA 0.5 11/16/2005    PSA 0.7 08/23/2004           Review of Systems  Review of Systems   Constitutional: Negative for chills.   Gastrointestinal: Negative for nausea and vomiting.   Genitourinary: Positive for frequency and nocturia (3x/night). Negative for dysuria, incomplete emptying and urgency.          Objective:     Physical Exam  Constitutional:       General: He is not in acute distress.     Appearance: He is well-developed.   HENT:      Head: Normocephalic and atraumatic.   Eyes:      General: No scleral icterus.  Neck:      Trachea: No tracheal deviation.   Pulmonary:      Effort: Pulmonary effort is normal. No respiratory distress.   Neurological:      Mental Status: He is alert and  oriented to person, place, and time.   Psychiatric:         Behavior: Behavior normal.         Thought Content: Thought content normal.         Judgment: Judgment normal.         Lab Review  Lab Results   Component Value Date    COLORU Yellow 07/16/2020    SPECGRAV 1.020 07/16/2020    PHUR 5.0 07/16/2020    WBCUR neg 02/05/2019    NITRITE Negative 07/16/2020    PROTEINUR trace 02/05/2019    GLUCOSEUR norm 02/05/2019    KETONESU Negative 07/16/2020    UROBILINOGEN norm 02/05/2019    BILIRUBINUR neg 02/05/2019    RBCUR neg 02/05/2019         Assessment:        1. Benign prostatic hyperplasia, unspecified whether lower urinary tract symptoms present    2. Nocturia            Plan:     Benign prostatic hyperplasia, unspecified whether lower urinary tract symptoms present    Nocturia      -Avoid bladder irritants including but not limited to caffeine, alcohol, smoking, spicy foods, acidic foods, tomato-based products, citrus, artificial sweeteners, chocolate, coffee or tea.  -previous post void residuals have been performed immediately after voiding. The patient's PVR was noted to be minimal.  -psa reviewed===<1 consistently at his age, no further psa testing required.       -Limit fluids 2 hours before bedtime.  Elevated legs 2 hours before bedtime.  No caffeine, cokes, teas after 3 pm in the afternoon.    F/U PRN with sharona

## 2020-11-05 ENCOUNTER — OFFICE VISIT (OUTPATIENT)
Dept: CARDIOLOGY | Facility: CLINIC | Age: 75
End: 2020-11-05
Payer: MEDICARE

## 2020-11-05 VITALS
HEIGHT: 73 IN | SYSTOLIC BLOOD PRESSURE: 106 MMHG | HEART RATE: 60 BPM | WEIGHT: 203 LBS | DIASTOLIC BLOOD PRESSURE: 60 MMHG | BODY MASS INDEX: 26.9 KG/M2

## 2020-11-05 DIAGNOSIS — I49.5 SSS (SICK SINUS SYNDROME): ICD-10-CM

## 2020-11-05 DIAGNOSIS — I48.3 TYPICAL ATRIAL FLUTTER: Primary | ICD-10-CM

## 2020-11-05 DIAGNOSIS — R00.1 JUNCTIONAL BRADYCARDIA: ICD-10-CM

## 2020-11-05 DIAGNOSIS — I49.8 OTHER SPECIFIED CARDIAC ARRHYTHMIAS: ICD-10-CM

## 2020-11-05 DIAGNOSIS — E78.5 DYSLIPIDEMIA ASSOCIATED WITH TYPE 2 DIABETES MELLITUS: ICD-10-CM

## 2020-11-05 DIAGNOSIS — E11.69 DYSLIPIDEMIA ASSOCIATED WITH TYPE 2 DIABETES MELLITUS: ICD-10-CM

## 2020-11-05 DIAGNOSIS — I10 ESSENTIAL HYPERTENSION: ICD-10-CM

## 2020-11-05 PROCEDURE — 99999 PR PBB SHADOW E&M-EST. PATIENT-LVL IV: ICD-10-PCS | Mod: PBBFAC,,, | Performed by: INTERNAL MEDICINE

## 2020-11-05 PROCEDURE — 99999 PR PBB SHADOW E&M-EST. PATIENT-LVL IV: CPT | Mod: PBBFAC,,, | Performed by: INTERNAL MEDICINE

## 2020-11-05 PROCEDURE — 99214 PR OFFICE/OUTPT VISIT, EST, LEVL IV, 30-39 MIN: ICD-10-PCS | Mod: S$PBB,,, | Performed by: INTERNAL MEDICINE

## 2020-11-05 PROCEDURE — 99214 OFFICE O/P EST MOD 30 MIN: CPT | Mod: PBBFAC,PO | Performed by: INTERNAL MEDICINE

## 2020-11-05 PROCEDURE — 93010 ELECTROCARDIOGRAM REPORT: CPT | Mod: S$PBB,,, | Performed by: INTERNAL MEDICINE

## 2020-11-05 PROCEDURE — 93010 RHYTHM STRIP: ICD-10-PCS | Mod: S$PBB,,, | Performed by: INTERNAL MEDICINE

## 2020-11-05 PROCEDURE — 99214 OFFICE O/P EST MOD 30 MIN: CPT | Mod: S$PBB,,, | Performed by: INTERNAL MEDICINE

## 2020-11-05 PROCEDURE — 93005 ELECTROCARDIOGRAM TRACING: CPT | Mod: PBBFAC,PO | Performed by: INTERNAL MEDICINE

## 2020-11-05 NOTE — PROGRESS NOTES
HPI    75 y.o. 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 Of note, HV then 65 ms. Redo CTI 9/2018 (Dr Sanchez)  long hx SB  DM   HTN on meds   SSS, s/p PPM    For SSS, I placed a PPM 2/2018. Feeling well. Healed well.    During an extended trip, was camping in [place] when developed tachypalpitations. Seen at ER; ECG (I reviewed) c/w typical AFL. He was transferred to a [facility] and underwent ISABELLE/DCCV, and was given xarelto x 2 months.  In EP lab 9/11/18, we found that the previously blocked CTI line had regained conduction. 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    6/2012: 60% LVEF  9/12/16 ecg: junctional at 48 bpm. Previously, chronic SB in 40s, for years.  12/18/18: SR at 39 bpm.    PPM: 90% A pace, 4% V pace. No AMS. Short NSAT.    My interpretation of today's ECG is APVS 60 bpm    Review of Systems   Constitution: Negative. Negative for malaise/fatigue.   HENT: Negative.  Negative for ear pain and tinnitus.    Eyes: Negative for blurred vision.   Cardiovascular: Negative.  Negative for chest pain, dyspnea on exertion, near-syncope, palpitations and syncope.   Respiratory: Negative.  Negative for shortness of breath.    Endocrine: Negative.  Negative for polyuria.   Hematologic/Lymphatic: Does not bruise/bleed easily.   Skin: Negative.  Negative for rash.   Musculoskeletal: Negative.  Negative for joint pain and muscle weakness.   Gastrointestinal: Negative.  Negative for abdominal pain and change in bowel habit.   Genitourinary: Negative for frequency.   Neurological: Negative.  Negative for dizziness and weakness.   Psychiatric/Behavioral: Negative.  Negative for depression. The patient is not nervous/anxious.    Allergic/Immunologic: Negative for environmental allergies.        Objective:    Physical Exam   Constitutional: He is oriented to person, place, and time. He appears well-developed and well-nourished.   HENT:   Head: Normocephalic and atraumatic.   Eyes: Conjunctivae, EOM and lids  are normal. No scleral icterus.   Neck: Normal range of motion. No tracheal deviation present. No thyromegaly present.   Cardiovascular: Normal rate and regular rhythm.   Pulses:       Radial pulses are 2+ on the right side and 2+ on the left side.   Pulmonary/Chest: Effort normal and breath sounds normal. No accessory muscle usage. No tachypnea. No respiratory distress. He has no wheezes.   Abdominal: He exhibits no distension. There is no hepatosplenomegaly.   Musculoskeletal: Normal range of motion.         General: No edema.   Neurological: He is alert and oriented to person, place, and time. He has normal reflexes. He exhibits normal muscle tone.   Skin: Skin is warm and dry. No rash noted.   Psychiatric: He has a normal mood and affect. His behavior is normal.   Nursing note and vitals reviewed.        Assessment:       1. Typical atrial flutter    2. Essential hypertension    3. Dyslipidemia associated with type 2 diabetes mellitus    4. Junctional bradycardia    5. SSS (sick sinus syndrome)      Subjective:    Patient ID:  Shannan Norman is a 75 y.o. male who presents for evaluation of bradycardia                  Subjective:    Patient ID:  Shannan Norman is a 75 y.o. male who presents for follow-up of No chief complaint on file.      HPI    ROS     Objective:    Physical Exam      Assessment:       1. Typical atrial flutter    2. Essential hypertension    3. Dyslipidemia associated with type 2 diabetes mellitus    4. Junctional bradycardia    5. SSS (sick sinus syndrome)      Doing well.  If no AF/AFL is seen over the next year, we'll consider d/c of anticoagulant at follow-up.    Return in 1 year, or earlier prn.

## 2020-11-09 ENCOUNTER — PATIENT MESSAGE (OUTPATIENT)
Dept: DERMATOLOGY | Facility: CLINIC | Age: 75
End: 2020-11-09

## 2020-11-10 ENCOUNTER — PATIENT OUTREACH (OUTPATIENT)
Dept: OTHER | Facility: OTHER | Age: 75
End: 2020-11-10

## 2020-11-11 ENCOUNTER — TELEPHONE (OUTPATIENT)
Dept: SURGERY | Facility: CLINIC | Age: 75
End: 2020-11-11

## 2020-11-11 NOTE — PROGRESS NOTES
Digital Medicine: Clinician Introduction    Shannan Norman is a 75 y.o. male who is newly enrolled in the Digital Medicine Clinic.    Called patient regarding HDMP (Hypertension Digital Medicine Program)    HPI  Patients BP average is currently 122/65 mmHg.    Patient takes benazepril 20 mg every evening and checks his BP earlier the next day.    The history is provided by the patient.      Review of patient's allergies indicates:  No Known Allergies  Completed Medication Reconciliation  Verified pharmacy information.    HYPERTENSION  Explained hypertension digital medicine goals including BP goal less than or equal to 130/80mmHg, improved convenience of BP management and reduced risk of heart attack, kidney failure, stroke, eye disease, dementia, and death.     Explained non-pharmacologic therapies like low salt diet and physical activity can reduce blood pressure.       Explained that we expect patient to submit several blood pressure readings per week at random times of the day, but at least 30 minutes after taking blood pressure medications. Instructed patient not to allow anyone else to use their blood pressure monitor and phone as data submitted is directly entered into medical record. Reviewed and confirmed appropriate blood pressure monitoring technique.         Reviewed signs/symptoms of hypertension (headache, changes in vision, chest pain, shortness of breath)   Reviewed signs/symptoms of hypotension (lightheaded, dizziness, weakness)     Patient's BP goal is less than or equal to 130/80. Patients BP average is 122/65 mmHg, which is at goal, per 2017 ACC/AHA Hypertension Guidelines.         Last 5 Patient Entered Readings                                      Current 30 Day Average: 122/65     Recent Readings 11/6/2020 11/2/2020 10/30/2020 10/27/2020 10/26/2020    SBP (mmHg) 99 120 113 131 113    DBP (mmHg) 54 58 68 68 61    Pulse 60 60 60 60 60                Depression Screening  Shannan Norman  screened negative on the depression screening.     Sleep Apnea Screening  Patient not previously diagnosed with RENY       Medication Affordability Screening  Patient screened negative on the medication affordability questionnaires. Patient is currently not having problems affording medications    Medication Adherence-Medication adherence was assessed.  Patient continue taking medication as prescribed.            ASSESSMENT(S)  Patients BP average is 122/65 mmHg, which is at goal. Patient's BP goal is less than or equal to 130/80.    Hypertension Plan  Continue current therapy.  Continue current diet/physical activity routine.  Will call patient in a few months, sooner if needed. Patient knows to reach out at any time for any questions or concerns.        Addressed patient questions and patient has my contact information if needed prior to next outreach. Patient verbalizes understanding.      Explained the importance of self-monitoring and medication adherence. Encouraged the patient to communicate with their health  for lifestyle modifications to help improve or maintain a healthy lifestyle.        Sent link to [facility] webpages and my contact information via [facility] for future questions.        Explained to the patient that the Digital Medicine team is not available for emergencies. Advised patient call [facility] ([phone] or [phone]) or [phone] if needed.            There are no preventive care reminders to display for this patient.       Current Medication Regimen:  Hypertension Medications             benazepriL (LOTENSIN) 20 MG tablet Take 1 tablet by mouth once daily        Bernadette Canales, PharmD  Digital Medicine Clinician  [phone]

## 2020-11-12 ENCOUNTER — PROCEDURE VISIT (OUTPATIENT)
Dept: SURGERY | Facility: CLINIC | Age: 75
End: 2020-11-12
Payer: MEDICARE

## 2020-11-12 VITALS
HEART RATE: 60 BPM | TEMPERATURE: 97 F | BODY MASS INDEX: 27.95 KG/M2 | WEIGHT: 211.88 LBS | DIASTOLIC BLOOD PRESSURE: 74 MMHG | SYSTOLIC BLOOD PRESSURE: 141 MMHG | RESPIRATION RATE: 20 BRPM

## 2020-11-12 DIAGNOSIS — C43.62 MELANOMA OF SHOULDER, LEFT: Primary | ICD-10-CM

## 2020-11-12 PROCEDURE — 11604 EXC TR-EXT MAL+MARG 3.1-4 CM: CPT | Mod: 51,S$PBB,, | Performed by: SURGERY

## 2020-11-12 PROCEDURE — 13101 CMPLX RPR TRUNK 2.6-7.5 CM: CPT | Mod: S$PBB,,, | Performed by: SURGERY

## 2020-11-12 PROCEDURE — 88305 TISSUE EXAM BY PATHOLOGIST: ICD-10-PCS | Mod: 26,,, | Performed by: PATHOLOGY

## 2020-11-12 PROCEDURE — 11604 PR EXC SKIN MALIG 3.1-4 CM TRUNK,ARM,LEG: ICD-10-PCS | Mod: 51,S$PBB,, | Performed by: SURGERY

## 2020-11-12 PROCEDURE — 88305 TISSUE EXAM BY PATHOLOGIST: CPT | Performed by: PATHOLOGY

## 2020-11-12 PROCEDURE — 13101 CMPLX RPR TRUNK 2.6-7.5 CM: CPT | Mod: PBBFAC | Performed by: SURGERY

## 2020-11-12 PROCEDURE — 13101 PR RECMPL WND TRUNK 2.6-7.5 CM: ICD-10-PCS | Mod: S$PBB,,, | Performed by: SURGERY

## 2020-11-12 PROCEDURE — 88305 TISSUE EXAM BY PATHOLOGIST: CPT | Mod: 26,,, | Performed by: PATHOLOGY

## 2020-11-12 PROCEDURE — 11604 EXC TR-EXT MAL+MARG 3.1-4 CM: CPT | Mod: PBBFAC | Performed by: SURGERY

## 2020-11-12 NOTE — OP NOTE
[facility]  Surgery Department  Operative Note       Date of Procedure:  11/12/2020       Pre-Operative Diagnosis:   1. Melanoma left shoulder    Post-Operative Diagnosis:   1. Same    Anesthesia: Local, minor procedure room    Operative Findings:   1. 1 cm gross margin    Procedures:  1. Wide Local Excision of Melanoma (3.1 cm defect), left upper chest/shoulder  2. Complex Closure of 6.5 cm wound, left chest/shoulder    Estimated Blood Loss (EBL):  <10 mL           Indications:  Shannan Norman presents for the above procedures, risks and benefits including bleeding, infection, seroma, lymphedema, margin positivity, need for additional procedures and imponderables were reviewed.  He gave consent to proceed.      Details:  The patient was positioned extremities and pressure points padded and protected, and the field prepped in standard sterile fashion.  An appropriate timeout was confirmed.      Local anesthetic was injected and a 1 cm margin was measured around the left upper chest/shoulder biopsy site.  This was fashioned into an ellipse with a tumor defect / minor axis of 3 cm and a length/major axis of 6.5 cm to facilitate primary closure.  Incision made through the full thickness of the skin, through the adjacent soft tissue, down to the underlying muscular fascia, completing an oncologic resection.  The wound was undermined extensively at the level of the fascia.  Complex closure was performed in multiple layers using 2-0 vicryl and 4-0 monocryl suture.  Sterile Dressing was applied.     All needle, sponge and instrument counts were reported as correct.  I communicated the intraoperative findings with the family following the procedure.     Specimens:   Specimen (12h ago, onward)    None                  Condition: Stable    Disposition: PACU - hemodynamically stable.    Attestation: I was present and scrubbed for the entire procedure.

## 2020-11-16 DIAGNOSIS — I49.8 OTHER SPECIFIED CARDIAC ARRHYTHMIAS: Primary | ICD-10-CM

## 2020-11-18 LAB
COMMENT: NORMAL
FINAL PATHOLOGIC DIAGNOSIS: NORMAL
GROSS: NORMAL

## 2020-11-19 ENCOUNTER — OFFICE VISIT (OUTPATIENT)
Dept: SURGERY | Facility: CLINIC | Age: 75
End: 2020-11-19
Payer: MEDICARE

## 2020-11-19 DIAGNOSIS — C43.62 MALIGNANT MELANOMA OF LEFT SHOULDER: Primary | ICD-10-CM

## 2020-11-19 PROCEDURE — 99024 POSTOP FOLLOW-UP VISIT: CPT | Mod: 95,POP,, | Performed by: SURGERY

## 2020-11-19 PROCEDURE — 99024 PR POST-OP FOLLOW-UP VISIT: ICD-10-PCS | Mod: 95,POP,, | Performed by: SURGERY

## 2020-11-19 NOTE — PROGRESS NOTES
Post-Op Follow-up Visit:   11/19/2020  Patient ID: Shannan Norman is a 75 y.o. male, born 1945  Chief Complaint:  VV for post op, pt is traveling the country in an RV    Interval History: Mr. Norman is doing well, some swelling of wound but improving, no fever/chills.      There were no vitals taken for this visit.  Incision:  Well-approximated, no signs of infection, dehiscence or problems.    Data:      Reviewed pathology as below    No diagnosis found.Plan     9/17/2020 Left shoulder melanoma (initial bx depth=0.15 mm, non-ulcerated, margins neg).   11/19/2020 s/p WLE doing well (residual intraepidermal melanocytic hyperplasia, margins, neg)    Plan:  Doing well   Follow-up:  With dermatology, he will schedule when back in town       Advised as to need for continued surveillance, sun avoidance, skin protection and self-exams    Ramiro Begum MD, FACS  Surgical Oncology  [facility] [place], [place]  Office: [phone]  Fax: [phone]   Cell: [phone]

## 2020-12-06 ENCOUNTER — PATIENT OUTREACH (OUTPATIENT)
Dept: ADMINISTRATIVE | Facility: OTHER | Age: 75
End: 2020-12-06

## 2020-12-06 NOTE — PROGRESS NOTES
Requested updates within Care Everywhere.  Patient's chart was reviewed for overdue VITOR topics.  Immunizations reconciled.

## 2020-12-08 ENCOUNTER — OFFICE VISIT (OUTPATIENT)
Dept: DERMATOLOGY | Facility: CLINIC | Age: 75
End: 2020-12-08
Payer: MEDICARE

## 2020-12-08 DIAGNOSIS — L82.1 SEBORRHEIC KERATOSES: ICD-10-CM

## 2020-12-08 DIAGNOSIS — D18.01 CHERRY ANGIOMA: ICD-10-CM

## 2020-12-08 DIAGNOSIS — L57.0 AK (ACTINIC KERATOSIS): Primary | ICD-10-CM

## 2020-12-08 DIAGNOSIS — Z85.820 HISTORY OF MELANOMA: ICD-10-CM

## 2020-12-08 DIAGNOSIS — L90.5 SCAR: ICD-10-CM

## 2020-12-08 DIAGNOSIS — N48.1 BALANITIS: ICD-10-CM

## 2020-12-08 DIAGNOSIS — L81.4 LENTIGO: ICD-10-CM

## 2020-12-08 PROCEDURE — 99214 PR OFFICE/OUTPT VISIT, EST, LEVL IV, 30-39 MIN: ICD-10-PCS | Mod: 25,S$PBB,, | Performed by: DERMATOLOGY

## 2020-12-08 PROCEDURE — 99213 OFFICE O/P EST LOW 20 MIN: CPT | Mod: PBBFAC | Performed by: DERMATOLOGY

## 2020-12-08 PROCEDURE — 17004 DESTROY PREMAL LESIONS 15/>: CPT | Mod: S$PBB,,, | Performed by: DERMATOLOGY

## 2020-12-08 PROCEDURE — 17004 PR DESTRUCTION, PREMALIGNANT LESIONS; 15 OR MORE LESIONS: ICD-10-PCS | Mod: S$PBB,,, | Performed by: DERMATOLOGY

## 2020-12-08 PROCEDURE — 99999 PR PBB SHADOW E&M-EST. PATIENT-LVL III: CPT | Mod: PBBFAC,,, | Performed by: DERMATOLOGY

## 2020-12-08 PROCEDURE — 99214 OFFICE O/P EST MOD 30 MIN: CPT | Mod: 25,S$PBB,, | Performed by: DERMATOLOGY

## 2020-12-08 PROCEDURE — 99999 PR PBB SHADOW E&M-EST. PATIENT-LVL III: ICD-10-PCS | Mod: PBBFAC,,, | Performed by: DERMATOLOGY

## 2020-12-08 PROCEDURE — 17004 DESTROY PREMAL LESIONS 15/>: CPT | Mod: PBBFAC | Performed by: DERMATOLOGY

## 2020-12-08 NOTE — PATIENT INSTRUCTIONS
CRYOSURGERY      Your doctor has used a method called cryosurgery to treat your skin condition. Cryosurgery refers to the use of very cold substances to treat a variety of skin conditions such as warts, pre-skin cancers, molluscum contagiosum, sun spots, and several benign growths. The substance we use in cryosurgery is liquid nitrogen and is so cold (-195 degrees Celsius) that is burns when administered.     Following treatment in the office, the skin may immediately burn and become red. You may find the area around the lesion is affected as well. It is sometimes necessary to treat not only the lesion, but a small area of the surrounding normal skin to achieve a good response.     A blister, and even a blood filled blister, may form after treatment.   This is a normal response. If the blister is painful, it is acceptable to sterilize a needle and with rubbing alcohol and gently pop the blister. It is important that you gently wash the area with soap and warm water as the blister fluid may contain wart virus if a wart was treated. Do no remove the roof of the blister.     The area treated can take anywhere from 1-3 weeks to heal. Healing time depends on the kind skin lesion treated, the location, and how aggressively the lesion was treated. It is recommended that the areas treated are covered with Vaseline or bacitracin ointment and a band-aid. If a band-aid is not practical, just ointment applied several times per day will do. Keeping these areas moist will speed the healing time.    Treatment with liquid nitrogen can leave a scar. In dark skin, it may be a light or dark scar, in light skin it may be a white or pink scar. These will generally fade with time, but may never go away completely.     If you have any concerns after your treatment, please feel free to call the office.       [address] [place] [address]/ [phone] FAX/ [email or web address]    [facility] Prescription:   Online compounding pharmacy Instructions for chemo cream     Your prescription:  Your physician will send the prescription to [email or web address]    Your verification:  You will receive an e-mail from  [email or web address] where you will follow the instructions within the email to provide your billing & shipping information.    Your medication:  Your medication will be shipped directly to you (costs around 8$)    If you have any questions please email [email or web address] or call  [phone]    Please check your junk email as the email often goes to [employer] or [employer]  You can also contact our office     - Start [facility] compound of Chemo cream- 5-Florouracil 5%, Calcipotriene 0.0025%  Disp 30g  Tube with 1 refill, apply to affected area of the scalp  x 3 days, take 1 week break then do to face x 3 days as instructed on prescription  -Discussed this will be dispensed via compounding pharmacy, they will contact you through your email on file, it will be shipped in the mail  Cryosurgery Procedure Note      5-FLUOROURACIL CREAM (EFUDEX or CARAC)    What are the aims of this leaflet?  This leaflet has been written to help you understand more about 5-fluorouracil  (5-FU) cream. It tells you what it is, how it works, how it is used to treat skin  conditions, and where you can find out more about it.    What is 5-FU cream and how does it work?  5-FU cream is a treatment that selectively destroys sun-damaged cells in the  skin whilst retaining the normal healthy skin cells. It has been in use for more  than 50 years.  5-FU cream will induce inflammation in the skin. This will consist of redness,  soreness, oozing, crusts and scabs. After completing the treatment course  this reaction will settle over a few weeks. Sometimes your doctor may  recommend using a steroid cream to help settle the inflammation more  quickly.    What skin conditions are treated with 5-FU cream?  5-FU cream is prescribed for the  treatment of the pre-cancerous skin lesions,  actinic keratoses and Rosarios disease. It is also used for the treatment of  superficial basal cell carcinoma.  It is sometimes used to treat other skin conditions such as viral warts, and  rarer conditions such as disseminated superficial actinic porokeratosis.    Will 5-FU cream cure my skin condition?  Depending on your skin complaint, 5-FU cream may cure or improve it, but it  does not work for everybody. If you have more severe sun-damage, you may  require additional courses of treatment in the future.    How do I apply 5-FU cream?  Efudex® cream can be applied with a clean fingertip. Apply a thin layer of the  cream to the entire treatment area. The solution will dry to leave a film at the site. Remove the film by peeling just prior to the next application. Warm water can be used to help loosen the film, if needed. Specific instructions will be provided by your doctor.  You should wash your hands thoroughly after applying 5-FU cream.  After 20 minutes, you can apply moisturizers and/or make-up as part of your  usual skin care routine.  If you have widespread sun-damage it is usually advisable to divide the  affected area into smaller areas and to complete treatment in one area before  moving on to the next. This will help make the treatment reaction more  tolerable. Your doctor will provide further advice about this.  Only cover 5-FU cream with a plaster/plastic wrap if advised to do so by your doctor,  otherwise leave the treated area uncovered. Covering the treatment area with  a plaster is likely to induce a more severe skin reaction. When treating  superficial basal cell carcinomas, however, covering the area with a plaster is  often recommended.    How often should I use 5-FU cream?  How often and for how long you should use 5-FU cream will depend on your  skin condition and other factors, for example which part of your skin is  affected. Efudex cream is  usually applied once or twice a day, for 3-4 weeks  when treating actinic keratosis and Rosarios disease, and for 6 weeks when  treating superficial basal cell carcinoma. Occasionally, more prolonged  courses may be used. Your doctor will  recommend a specific treatment schedule for you.    At what time of day should I apply 5-FU cream?  If you are asked by your doctor to use 5-FU cream once a day, you should  apply it at night. If twice-daily application is recommended then it is best to  apply 5-FU cream in the morning and at night.    When should I not apply 5-FU cream?  5-FU cream should not be used around the eyes or lips, unless specifically  recommended by your doctor for use in that area.  Do not apply 5-FU cream if you are pregnant or breastfeeding.  Do not use 5-FU cream if you are allergic to any of its ingredients.    What are the common side effects of 5-FU cream?  As mentioned above, inflammation of the skin is expected, but if the skin  becomes very sore or uncomfortable stop using 5-FU cream. Bathe the area  with water, dab the skin dry and apply petroleum jelly daily. The petroleum  jelly that you apply should be newly opened and free from potential  contamination from fingertips. When the skin settles, you may be able to  continue 5-FU cream to complete the treatment course.  If you have any concerns about the severity of the treatment site reaction or  are uncertain about whether you should continue treatment, get in touch with  your doctor who may recommend a change to your treatment schedule. Your  doctor may also have additional treatment site advice, which may include  applying a steroid cream to settle the inflammation.  5-FU cream makes your skin more sensitive to sunlight at the site of  application, and therefore you should avoid significant sun exposure during  and for a while after treatment. This may include modifying activities to limit  sun exposure, wearing clothing and/or a hat to  cover the treatment site, and  the use of sunscreens.    What are the rare side effects of 5-FU cream?  If you have a severe reaction to 5-FU cream, there is a risk of prolonged  inflammation and delayed healing and the potential for the development of an  ulcer, particularly on the lower legs.  Following a severe reaction, there is a small risk of altered skin pigmentation  and scarring.  Skin infections at the site of application of 5-FU cream are rare but possible. If  you have concerns about the severity of the skin reaction, get in touch with  your doctor.  Very rarely, patients can be allergic to 5-FU cream. This usually causes a  severe localized skin reaction. If you have concerns about the severity of your  reaction to 5-FU cream, get in touch with your doctor.    What can I take for pain experienced?  - start out taking tylenol  over the counter dosage as seen on bottle if intolerable, alternate every 3-4 hours with ibuprofen dosage as seen on bottle. You may experience pain in as a little as one day depending on pain tolerance. It is advised to take tylenol as dosage indicates before going to sleep at night to help deter pain.       Where can I get more information about 5-FU cream?  Links to other Internet sites:  http://www.patient.co.[place]/medicine/Fluorouracil-Cream.htm  http://www.dermnetnz.org/treatments/5-fluorouracil.html  http://www.medicines.org.uk/emc/medicine/6219/SPC/Efudix+Cream  http://www.medicinenet.com/fluorouracil-topical/article.htm      For details of source materials used please contact the Clinical Standards  Unit (clinicalstandards@bad.org.uk).  This leaflet aims to provide accurate information about the subject and  is a consensus of the views held by representatives of the [place]  [employer]: individual patient circumstances may  differ, which might alter both the advice and course of therapy given to  you by your doctor.  This leaflet has been assessed for  readability by the [place] [employer]  [employer]atologists Patient Information Lay Review Panel  [employer] [employer]  PATIENT INFORMATION LEAFLET  PRODUCED APRIL 2011  UPDATED APRIL 2014, MARCH 2017  REVIEW DATE MARCH 2020     Balanitis  -     triamcinolone acetonide 0.1% (KENALOG) 0.1 % cream; AAA bid after cool blow dry  Dispense: 1 Bottle; Refill: 3  Recommend white vinegar: water 1:1 compresses 2x/day followed by cool blow dry and then application of prescription medication.     Cool blow dry after showering. Once clear, use Zeasorb AF powder for maintenance.

## 2020-12-08 NOTE — PROGRESS NOTES
Subjective:       Patient ID:  Shannan Norman is a 75 y.o. male who presents for   Chief Complaint   Patient presents with    Skin Check     ubse     History of Present Illness: The patient presents for follow up of skin check.    The patient was last seen on: 09/04/2020 for cryosurgery to actinic keratoses which have resolved.     Other skin complaints: Mohs surgery with Dr Begum 11/12/2020 from the melanoma         Review of Systems   Constitutional: Negative for fever, chills and fatigue.   Skin: Positive for daily sunscreen use and wears hat. Negative for recent sunburn.   Hematologic/Lymphatic: Bruises/bleeds easily.        Objective:    Physical Exam   Constitutional: He appears well-developed and well-nourished.   Neurological: He is alert and oriented to person, place, and time.   Psychiatric: He has a normal mood and affect.   Skin:   Areas Examined (abnormalities noted in diagram):   Scalp / Hair Palpated and Inspected  Head / Face Inspection Performed  Neck Inspection Performed  Chest / Axilla Inspection Performed  Abdomen Inspection Performed  Genitals / Buttocks / Groin Inspection Performed  Back Inspection Performed  RUE Inspected  LUE Inspection Performed  RLE Inspected  LLE Inspection Performed  Nails and Digits Inspection Performed                   Diagram Legend     Erythematous scaling macule/papule c/w actinic keratosis       Vascular papule c/w angioma      Pigmented verrucoid papule/plaque c/w seborrheic keratosis      Yellow umbilicated papule c/w sebaceous hyperplasia      Irregularly shaped tan macule c/w lentigo     1-2 mm smooth white papules consistent with Milia      Movable subcutaneous cyst with punctum c/w epidermal inclusion cyst      Subcutaneous movable cyst c/w pilar cyst      Firm pink to brown papule c/w dermatofibroma      Pedunculated fleshy papule(s) c/w skin tag(s)      Evenly pigmented macule c/w junctional nevus     Mildly variegated pigmented, slightly  irregular-bordered macule c/w mildly atypical nevus      Flesh colored to evenly pigmented papule c/w intradermal nevus       Pink pearly papule/plaque c/w basal cell carcinoma      Erythematous hyperkeratotic cursted plaque c/w SCC      Surgical scar with no sign of skin cancer recurrence      Open and closed comedones      Inflammatory papules and pustules      Verrucoid papule consistent consistent with wart     Erythematous eczematous patches and plaques     Dystrophic onycholytic nail with subungual debris c/w onychomycosis     Umbilicated papule    Erythematous-base heme-crusted tan verrucoid plaque consistent with inflamed seborrheic keratosis     Erythematous Silvery Scaling Plaque c/w Psoriasis     See annotation      Assessment / Plan:        AK (actinic keratosis)  - Start [facility] compound of Chemo cream- 5-Florouracil 5%, Calcipotriene 0.0025%  Disp 30g  Tube with 1 refill, apply to affected area of the scalp  x 3 days, take 1 week break then do to face x 3 days as instructed on prescription  -Discussed this will be dispensed via compounding pharmacy, they will contact you through your email on file, it will be shipped in the mail  Cryosurgery Procedure Note    Verbal consent from the patient is obtained including, but not limited to, risk of hypopigmentation/hyperpigmentation, scar, recurrence of lesion. The patient is aware of the precancerous quality and need for treatment of these lesions. Liquid nitrogen cryosurgery is applied to the 15 + actinic keratoses, as detailed in the physical exam, to produce a freeze injury. The patient is aware that blisters may form and is instructed on wound care with gentle cleansing and use of vaseline ointment to keep moist until healed. The patient is supplied a handout on cryosurgery and is instructed to call if lesions do not completely resolve.    Seborrheic keratoses  These are benign inherited growths without a malignant potential. Reassurance given to  patient. No treatment is necessary.     Cherry angioma  This is a benign vascular lesion. Reassurance given. No treatment required.     Lentigo  This is a benign hyperpigmented sun induced lesion. Daily sun protection will reduce the number of new lesions. Treatment of these benign lesions are considered cosmetic.    Scar  Hx of melanoma/Previous scar examined without signs of reoccurrence of malignancy. Continue to monitor.     Balanitis  -     triamcinolone acetonide 0.1% (KENALOG) 0.1 % cream; AAA bid after cool blow dry  Dispense: 1 Bottle; Refill: 3  Recommend white vinegar: water 1:1 compresses 2x/day followed by cool blow dry and then application of prescription medication.     Cool blow dry after showering. Once clear, use Zeasorb AF powder for maintenance.    F/u 3 mo for TBSE ( patient has hx of melanoma), and f/u of chemo cream to scalp/face         No follow-ups on file.

## 2020-12-10 ENCOUNTER — PATIENT MESSAGE (OUTPATIENT)
Dept: DERMATOLOGY | Facility: CLINIC | Age: 75
End: 2020-12-10

## 2020-12-12 ENCOUNTER — PATIENT MESSAGE (OUTPATIENT)
Dept: OPTOMETRY | Facility: CLINIC | Age: 75
End: 2020-12-12

## 2020-12-14 DIAGNOSIS — H04.123 BILATERAL DRY EYES: ICD-10-CM

## 2020-12-14 RX ORDER — CYCLOSPORINE 0.5 MG/ML
1 EMULSION OPHTHALMIC 2 TIMES DAILY
Qty: 60 VIAL | Refills: 12 | Status: SHIPPED | OUTPATIENT
Start: 2020-12-14 | End: 2022-02-09 | Stop reason: SDUPTHER

## 2020-12-16 ENCOUNTER — PES CALL (OUTPATIENT)
Dept: ADMINISTRATIVE | Facility: CLINIC | Age: 75
End: 2020-12-16

## 2020-12-24 ENCOUNTER — CLINICAL SUPPORT (OUTPATIENT)
Dept: CARDIOLOGY | Facility: HOSPITAL | Age: 75
End: 2020-12-24
Payer: MEDICARE

## 2020-12-24 DIAGNOSIS — Z95.0 PRESENCE OF CARDIAC PACEMAKER: ICD-10-CM

## 2020-12-24 DIAGNOSIS — I42.9 CARDIOMYOPATHY, UNSPECIFIED: ICD-10-CM

## 2020-12-24 DIAGNOSIS — I49.5 SICK SINUS SYNDROME: ICD-10-CM

## 2020-12-24 PROCEDURE — 93294 CARDIAC DEVICE CHECK - REMOTE: ICD-10-PCS | Mod: ,,, | Performed by: INTERNAL MEDICINE

## 2020-12-24 PROCEDURE — 93296 REM INTERROG EVL PM/IDS: CPT | Performed by: INTERNAL MEDICINE

## 2020-12-24 PROCEDURE — 93294 REM INTERROG EVL PM/LDLS PM: CPT | Mod: ,,, | Performed by: INTERNAL MEDICINE

## 2021-01-13 ENCOUNTER — IMMUNIZATION (OUTPATIENT)
Dept: INTERNAL MEDICINE | Facility: CLINIC | Age: 76
End: 2021-01-13
Payer: MEDICARE

## 2021-01-13 DIAGNOSIS — Z23 NEED FOR VACCINATION: ICD-10-CM

## 2021-01-13 PROCEDURE — 91300 COVID-19, MRNA, LNP-S, PF, 30 MCG/0.3 ML DOSE VACCINE: CPT | Mod: PBBFAC | Performed by: FAMILY MEDICINE

## 2021-01-21 ENCOUNTER — OFFICE VISIT (OUTPATIENT)
Dept: INTERNAL MEDICINE | Facility: CLINIC | Age: 76
End: 2021-01-21
Payer: MEDICARE

## 2021-01-21 DIAGNOSIS — I15.2 HYPERTENSION ASSOCIATED WITH DIABETES: ICD-10-CM

## 2021-01-21 DIAGNOSIS — Q21.12 PFO (PATENT FORAMEN OVALE): ICD-10-CM

## 2021-01-21 DIAGNOSIS — Z98.890 POST-OPERATIVE STATE: ICD-10-CM

## 2021-01-21 DIAGNOSIS — E11.9 TYPE 2 DIABETES MELLITUS WITHOUT RETINOPATHY: ICD-10-CM

## 2021-01-21 DIAGNOSIS — E66.3 OVERWEIGHT (BMI 25.0-29.9): ICD-10-CM

## 2021-01-21 DIAGNOSIS — E78.5 DYSLIPIDEMIA ASSOCIATED WITH TYPE 2 DIABETES MELLITUS: ICD-10-CM

## 2021-01-21 DIAGNOSIS — M10.9 GOUT, ARTHRITIS: ICD-10-CM

## 2021-01-21 DIAGNOSIS — K40.90 RIGHT INGUINAL HERNIA: ICD-10-CM

## 2021-01-21 DIAGNOSIS — H25.11 NUCLEAR SCLEROTIC CATARACT OF RIGHT EYE: ICD-10-CM

## 2021-01-21 DIAGNOSIS — I49.49 JUNCTIONAL ESCAPE RHYTHM: ICD-10-CM

## 2021-01-21 DIAGNOSIS — N40.0 BENIGN PROSTATIC HYPERPLASIA, UNSPECIFIED WHETHER LOWER URINARY TRACT SYMPTOMS PRESENT: ICD-10-CM

## 2021-01-21 DIAGNOSIS — C43.62 MALIGNANT MELANOMA OF LEFT SHOULDER: ICD-10-CM

## 2021-01-21 DIAGNOSIS — I42.8 NICM (NONISCHEMIC CARDIOMYOPATHY): ICD-10-CM

## 2021-01-21 DIAGNOSIS — Z00.00 ENCOUNTER FOR PREVENTIVE HEALTH EXAMINATION: Primary | ICD-10-CM

## 2021-01-21 DIAGNOSIS — I10 ESSENTIAL HYPERTENSION: ICD-10-CM

## 2021-01-21 DIAGNOSIS — H35.372 LEFT EPIRETINAL MEMBRANE: ICD-10-CM

## 2021-01-21 DIAGNOSIS — I49.5 SSS (SICK SINUS SYNDROME): ICD-10-CM

## 2021-01-21 DIAGNOSIS — R00.1 JUNCTIONAL BRADYCARDIA: ICD-10-CM

## 2021-01-21 DIAGNOSIS — H25.13 NUCLEAR SCLEROSIS OF BOTH EYES: ICD-10-CM

## 2021-01-21 DIAGNOSIS — E11.69 DYSLIPIDEMIA ASSOCIATED WITH TYPE 2 DIABETES MELLITUS: ICD-10-CM

## 2021-01-21 DIAGNOSIS — E11.59 HYPERTENSION ASSOCIATED WITH DIABETES: ICD-10-CM

## 2021-01-21 DIAGNOSIS — I83.93 ASYMPTOMATIC VARICOSE VEINS OF BOTH LOWER EXTREMITIES: ICD-10-CM

## 2021-01-21 DIAGNOSIS — I35.1 NONRHEUMATIC AORTIC VALVE INSUFFICIENCY: ICD-10-CM

## 2021-01-21 DIAGNOSIS — I48.3 TYPICAL ATRIAL FLUTTER: ICD-10-CM

## 2021-01-21 DIAGNOSIS — N18.30 TYPE 2 DIABETES MELLITUS WITH STAGE 3 CHRONIC KIDNEY DISEASE, WITHOUT LONG-TERM CURRENT USE OF INSULIN, UNSPECIFIED WHETHER STAGE 3A OR 3B CKD: ICD-10-CM

## 2021-01-21 DIAGNOSIS — D69.6 THROMBOCYTOPENIA: ICD-10-CM

## 2021-01-21 DIAGNOSIS — N18.30 STAGE 3 CHRONIC KIDNEY DISEASE, UNSPECIFIED WHETHER STAGE 3A OR 3B CKD: ICD-10-CM

## 2021-01-21 DIAGNOSIS — E11.22 TYPE 2 DIABETES MELLITUS WITH STAGE 3 CHRONIC KIDNEY DISEASE, WITHOUT LONG-TERM CURRENT USE OF INSULIN, UNSPECIFIED WHETHER STAGE 3A OR 3B CKD: ICD-10-CM

## 2021-01-21 DIAGNOSIS — H25.12 NUCLEAR SCLEROTIC CATARACT OF LEFT EYE: ICD-10-CM

## 2021-01-21 DIAGNOSIS — K42.9 UMBILICAL HERNIA WITHOUT OBSTRUCTION AND WITHOUT GANGRENE: ICD-10-CM

## 2021-01-21 PROCEDURE — G0439 PPPS, SUBSEQ VISIT: HCPCS | Mod: 95,,, | Performed by: NURSE PRACTITIONER

## 2021-01-21 PROCEDURE — G0439 PR MEDICARE ANNUAL WELLNESS SUBSEQUENT VISIT: ICD-10-PCS | Mod: 95,,, | Performed by: NURSE PRACTITIONER

## 2021-01-26 ENCOUNTER — PATIENT MESSAGE (OUTPATIENT)
Dept: FAMILY MEDICINE | Facility: CLINIC | Age: 76
End: 2021-01-26

## 2021-02-05 ENCOUNTER — PATIENT OUTREACH (OUTPATIENT)
Dept: ADMINISTRATIVE | Facility: OTHER | Age: 76
End: 2021-02-05

## 2021-02-05 ENCOUNTER — IMMUNIZATION (OUTPATIENT)
Dept: INTERNAL MEDICINE | Facility: CLINIC | Age: 76
End: 2021-02-05
Payer: MEDICARE

## 2021-02-05 DIAGNOSIS — E11.9 TYPE 2 DIABETES MELLITUS WITHOUT RETINOPATHY: Primary | ICD-10-CM

## 2021-02-05 DIAGNOSIS — Z23 NEED FOR VACCINATION: Primary | ICD-10-CM

## 2021-02-05 PROCEDURE — 91300 COVID-19, MRNA, LNP-S, PF, 30 MCG/0.3 ML DOSE VACCINE: CPT | Mod: PBBFAC | Performed by: FAMILY MEDICINE

## 2021-02-05 PROCEDURE — 0002A COVID-19, MRNA, LNP-S, PF, 30 MCG/0.3 ML DOSE VACCINE: CPT | Mod: PBBFAC | Performed by: FAMILY MEDICINE

## 2021-02-08 ENCOUNTER — OFFICE VISIT (OUTPATIENT)
Dept: PODIATRY | Facility: CLINIC | Age: 76
End: 2021-02-08
Payer: MEDICARE

## 2021-02-08 VITALS — BODY MASS INDEX: 27.95 KG/M2 | WEIGHT: 211.88 LBS

## 2021-02-08 DIAGNOSIS — E11.49 TYPE 2 DIABETES MELLITUS WITH NEUROLOGICAL MANIFESTATIONS: Primary | ICD-10-CM

## 2021-02-08 PROCEDURE — 99213 OFFICE O/P EST LOW 20 MIN: CPT | Mod: PBBFAC,PN | Performed by: PODIATRIST

## 2021-02-08 PROCEDURE — 99213 OFFICE O/P EST LOW 20 MIN: CPT | Mod: S$PBB,,, | Performed by: PODIATRIST

## 2021-02-08 PROCEDURE — 99213 PR OFFICE/OUTPT VISIT, EST, LEVL III, 20-29 MIN: ICD-10-PCS | Mod: S$PBB,,, | Performed by: PODIATRIST

## 2021-02-08 PROCEDURE — 99999 PR PBB SHADOW E&M-EST. PATIENT-LVL III: ICD-10-PCS | Mod: PBBFAC,,, | Performed by: PODIATRIST

## 2021-02-08 PROCEDURE — 99999 PR PBB SHADOW E&M-EST. PATIENT-LVL III: CPT | Mod: PBBFAC,,, | Performed by: PODIATRIST

## 2021-02-25 ENCOUNTER — OFFICE VISIT (OUTPATIENT)
Dept: CARDIOLOGY | Facility: CLINIC | Age: 76
End: 2021-02-25
Payer: MEDICARE

## 2021-02-25 VITALS
BODY MASS INDEX: 28.5 KG/M2 | OXYGEN SATURATION: 96 % | WEIGHT: 216.06 LBS | DIASTOLIC BLOOD PRESSURE: 71 MMHG | HEART RATE: 62 BPM | SYSTOLIC BLOOD PRESSURE: 117 MMHG

## 2021-02-25 DIAGNOSIS — E11.22 TYPE 2 DIABETES MELLITUS WITH STAGE 3 CHRONIC KIDNEY DISEASE, WITHOUT LONG-TERM CURRENT USE OF INSULIN, UNSPECIFIED WHETHER STAGE 3A OR 3B CKD: ICD-10-CM

## 2021-02-25 DIAGNOSIS — Q21.12 PFO (PATENT FORAMEN OVALE): ICD-10-CM

## 2021-02-25 DIAGNOSIS — I10 ESSENTIAL HYPERTENSION: Primary | ICD-10-CM

## 2021-02-25 DIAGNOSIS — I83.93 ASYMPTOMATIC VARICOSE VEINS OF BOTH LOWER EXTREMITIES: ICD-10-CM

## 2021-02-25 DIAGNOSIS — I35.1 NONRHEUMATIC AORTIC VALVE INSUFFICIENCY: ICD-10-CM

## 2021-02-25 DIAGNOSIS — N18.30 TYPE 2 DIABETES MELLITUS WITH STAGE 3 CHRONIC KIDNEY DISEASE, WITHOUT LONG-TERM CURRENT USE OF INSULIN, UNSPECIFIED WHETHER STAGE 3A OR 3B CKD: ICD-10-CM

## 2021-02-25 DIAGNOSIS — E11.59 HYPERTENSION ASSOCIATED WITH DIABETES: ICD-10-CM

## 2021-02-25 DIAGNOSIS — I49.5 SSS (SICK SINUS SYNDROME): ICD-10-CM

## 2021-02-25 DIAGNOSIS — I48.3 TYPICAL ATRIAL FLUTTER: ICD-10-CM

## 2021-02-25 DIAGNOSIS — I15.2 HYPERTENSION ASSOCIATED WITH DIABETES: ICD-10-CM

## 2021-02-25 PROBLEM — I42.8 NICM (NONISCHEMIC CARDIOMYOPATHY): Status: RESOLVED | Noted: 2018-02-06 | Resolved: 2021-02-25

## 2021-02-25 PROCEDURE — 99214 OFFICE O/P EST MOD 30 MIN: CPT | Mod: S$PBB,,, | Performed by: INTERNAL MEDICINE

## 2021-02-25 PROCEDURE — 99214 PR OFFICE/OUTPT VISIT, EST, LEVL IV, 30-39 MIN: ICD-10-PCS | Mod: S$PBB,,, | Performed by: INTERNAL MEDICINE

## 2021-02-25 PROCEDURE — 99999 PR PBB SHADOW E&M-EST. PATIENT-LVL III: ICD-10-PCS | Mod: PBBFAC,,, | Performed by: INTERNAL MEDICINE

## 2021-02-25 PROCEDURE — 99999 PR PBB SHADOW E&M-EST. PATIENT-LVL III: CPT | Mod: PBBFAC,,, | Performed by: INTERNAL MEDICINE

## 2021-02-25 PROCEDURE — 99213 OFFICE O/P EST LOW 20 MIN: CPT | Mod: PBBFAC,PO | Performed by: INTERNAL MEDICINE

## 2021-02-25 RX ORDER — VITAMIN B COMPLEX
1 CAPSULE ORAL DAILY
COMMUNITY
End: 2022-06-12

## 2021-02-26 PROCEDURE — 99454 PR REMOTE MNTR, PHYS PARAM, INITIAL, EA 30 DAYS: ICD-10-PCS | Mod: S$GLB,,, | Performed by: FAMILY MEDICINE

## 2021-02-26 PROCEDURE — 99454 REM MNTR PHYSIOL PARAM 16-30: CPT | Mod: S$GLB,,, | Performed by: FAMILY MEDICINE

## 2021-02-27 ENCOUNTER — PATIENT MESSAGE (OUTPATIENT)
Dept: CARDIOLOGY | Facility: CLINIC | Age: 76
End: 2021-02-27

## 2021-03-11 ENCOUNTER — OFFICE VISIT (OUTPATIENT)
Dept: DERMATOLOGY | Facility: CLINIC | Age: 76
End: 2021-03-11
Payer: MEDICARE

## 2021-03-11 DIAGNOSIS — Z85.820 HISTORY OF MELANOMA: ICD-10-CM

## 2021-03-11 DIAGNOSIS — L90.5 SCAR: ICD-10-CM

## 2021-03-11 DIAGNOSIS — L82.1 SEBORRHEIC KERATOSES: ICD-10-CM

## 2021-03-11 DIAGNOSIS — L81.4 LENTIGO: ICD-10-CM

## 2021-03-11 DIAGNOSIS — L57.0 AK (ACTINIC KERATOSIS): Primary | ICD-10-CM

## 2021-03-11 DIAGNOSIS — D22.9 MULTIPLE BENIGN NEVI: ICD-10-CM

## 2021-03-11 DIAGNOSIS — D18.01 CHERRY ANGIOMA: ICD-10-CM

## 2021-03-11 PROCEDURE — 99214 OFFICE O/P EST MOD 30 MIN: CPT | Mod: 25,S$PBB,, | Performed by: DERMATOLOGY

## 2021-03-11 PROCEDURE — 17003 DESTRUCT PREMALG LES 2-14: CPT | Mod: PBBFAC | Performed by: DERMATOLOGY

## 2021-03-11 PROCEDURE — 99214 PR OFFICE/OUTPT VISIT, EST, LEVL IV, 30-39 MIN: ICD-10-PCS | Mod: 25,S$PBB,, | Performed by: DERMATOLOGY

## 2021-03-11 PROCEDURE — 99213 OFFICE O/P EST LOW 20 MIN: CPT | Mod: PBBFAC,25 | Performed by: DERMATOLOGY

## 2021-03-11 PROCEDURE — 99999 PR PBB SHADOW E&M-EST. PATIENT-LVL III: ICD-10-PCS | Mod: PBBFAC,,, | Performed by: DERMATOLOGY

## 2021-03-11 PROCEDURE — 17000 DESTRUCT PREMALG LESION: CPT | Mod: PBBFAC | Performed by: DERMATOLOGY

## 2021-03-11 PROCEDURE — 17003 DESTRUCTION, PREMALIGNANT LESIONS; SECOND THROUGH 14 LESIONS: ICD-10-PCS | Mod: S$PBB,,, | Performed by: DERMATOLOGY

## 2021-03-11 PROCEDURE — 17003 DESTRUCT PREMALG LES 2-14: CPT | Mod: S$PBB,,, | Performed by: DERMATOLOGY

## 2021-03-11 PROCEDURE — 17000 DESTRUCT PREMALG LESION: CPT | Mod: S$PBB,,, | Performed by: DERMATOLOGY

## 2021-03-11 PROCEDURE — 99999 PR PBB SHADOW E&M-EST. PATIENT-LVL III: CPT | Mod: PBBFAC,,, | Performed by: DERMATOLOGY

## 2021-03-11 PROCEDURE — 17000 PR DESTRUCTION(LASER SURGERY,CRYOSURGERY,CHEMOSURGERY),PREMALIGNANT LESIONS,FIRST LESION: ICD-10-PCS | Mod: S$PBB,,, | Performed by: DERMATOLOGY

## 2021-03-12 PROBLEM — Z85.820 HISTORY OF MELANOMA: Status: ACTIVE | Noted: 2021-03-12

## 2021-03-12 PROBLEM — L57.0 AK (ACTINIC KERATOSIS): Status: ACTIVE | Noted: 2021-03-12

## 2021-03-15 ENCOUNTER — OFFICE VISIT (OUTPATIENT)
Dept: SPORTS MEDICINE | Facility: CLINIC | Age: 76
End: 2021-03-15
Payer: MEDICARE

## 2021-03-15 ENCOUNTER — HOSPITAL ENCOUNTER (OUTPATIENT)
Dept: RADIOLOGY | Facility: HOSPITAL | Age: 76
Discharge: HOME | End: 2021-03-15
Attending: FAMILY MEDICINE
Payer: MEDICARE

## 2021-03-15 VITALS — TEMPERATURE: 97 F | WEIGHT: 216 LBS | BODY MASS INDEX: 28.63 KG/M2 | HEIGHT: 73 IN

## 2021-03-15 DIAGNOSIS — M25.561 PAIN IN BOTH KNEES, UNSPECIFIED CHRONICITY: ICD-10-CM

## 2021-03-15 DIAGNOSIS — M25.562 BILATERAL CHRONIC KNEE PAIN: ICD-10-CM

## 2021-03-15 DIAGNOSIS — M17.0 PRIMARY OSTEOARTHRITIS OF BOTH KNEES: Primary | ICD-10-CM

## 2021-03-15 DIAGNOSIS — M22.42 CHONDROMALACIA PATELLAE, LEFT: ICD-10-CM

## 2021-03-15 DIAGNOSIS — G89.29 BILATERAL CHRONIC KNEE PAIN: ICD-10-CM

## 2021-03-15 DIAGNOSIS — M22.41 CHONDROMALACIA PATELLAE, RIGHT: ICD-10-CM

## 2021-03-15 DIAGNOSIS — M25.562 PAIN IN BOTH KNEES, UNSPECIFIED CHRONICITY: ICD-10-CM

## 2021-03-15 DIAGNOSIS — M25.561 BILATERAL CHRONIC KNEE PAIN: ICD-10-CM

## 2021-03-15 PROCEDURE — 73564 X-RAY EXAM KNEE 4 OR MORE: CPT | Mod: 26,LT,, | Performed by: RADIOLOGY

## 2021-03-15 PROCEDURE — 73564 X-RAY EXAM KNEE 4 OR MORE: CPT | Mod: TC,50

## 2021-03-15 PROCEDURE — 99999 PR PBB SHADOW E&M-EST. PATIENT-LVL III: CPT | Mod: PBBFAC,,, | Performed by: FAMILY MEDICINE

## 2021-03-15 PROCEDURE — 99214 PR OFFICE/OUTPT VISIT, EST, LEVL IV, 30-39 MIN: ICD-10-PCS | Mod: S$PBB,,, | Performed by: FAMILY MEDICINE

## 2021-03-15 PROCEDURE — 73564 PR  X-RAY KNEE 4+ VIEW: ICD-10-PCS | Mod: 26,LT,, | Performed by: RADIOLOGY

## 2021-03-15 PROCEDURE — 99213 OFFICE O/P EST LOW 20 MIN: CPT | Mod: PBBFAC,25 | Performed by: FAMILY MEDICINE

## 2021-03-15 PROCEDURE — 73564 X-RAY EXAM KNEE 4 OR MORE: CPT | Mod: 26,RT,, | Performed by: RADIOLOGY

## 2021-03-15 PROCEDURE — 99214 OFFICE O/P EST MOD 30 MIN: CPT | Mod: S$PBB,,, | Performed by: FAMILY MEDICINE

## 2021-03-15 PROCEDURE — 99999 PR PBB SHADOW E&M-EST. PATIENT-LVL III: ICD-10-PCS | Mod: PBBFAC,,, | Performed by: FAMILY MEDICINE

## 2021-03-16 ENCOUNTER — OFFICE VISIT (OUTPATIENT)
Dept: OPTOMETRY | Facility: CLINIC | Age: 76
End: 2021-03-16
Payer: MEDICARE

## 2021-03-16 DIAGNOSIS — H52.7 REFRACTIVE ERROR: Primary | ICD-10-CM

## 2021-03-16 DIAGNOSIS — E11.9 TYPE 2 DIABETES MELLITUS WITHOUT RETINOPATHY: ICD-10-CM

## 2021-03-16 PROCEDURE — 92014 COMPRE OPH EXAM EST PT 1/>: CPT | Mod: S$PBB,,, | Performed by: OPTOMETRIST

## 2021-03-16 PROCEDURE — 99499 NO LOS: ICD-10-PCS | Mod: S$PBB,,, | Performed by: OPTOMETRIST

## 2021-03-16 PROCEDURE — 99999 PR PBB SHADOW E&M-EST. PATIENT-LVL III: CPT | Mod: PBBFAC,,, | Performed by: OPTOMETRIST

## 2021-03-16 PROCEDURE — 99999 PR PBB SHADOW E&M-EST. PATIENT-LVL II: ICD-10-PCS | Mod: PBBFAC,,, | Performed by: OPTOMETRIST

## 2021-03-16 PROCEDURE — 99999 PR PBB SHADOW E&M-EST. PATIENT-LVL II: CPT | Mod: PBBFAC,,, | Performed by: OPTOMETRIST

## 2021-03-16 PROCEDURE — 99213 OFFICE O/P EST LOW 20 MIN: CPT | Mod: PBBFAC,27,PO | Performed by: OPTOMETRIST

## 2021-03-16 PROCEDURE — 99212 OFFICE O/P EST SF 10 MIN: CPT | Mod: PBBFAC,PO | Performed by: OPTOMETRIST

## 2021-03-16 PROCEDURE — 99999 PR PBB SHADOW E&M-EST. PATIENT-LVL III: ICD-10-PCS | Mod: PBBFAC,,, | Performed by: OPTOMETRIST

## 2021-03-16 PROCEDURE — 92014 PR EYE EXAM, EST PATIENT,COMPREHESV: ICD-10-PCS | Mod: S$PBB,,, | Performed by: OPTOMETRIST

## 2021-03-16 PROCEDURE — 99499 UNLISTED E&M SERVICE: CPT | Mod: S$PBB,,, | Performed by: OPTOMETRIST

## 2021-03-24 ENCOUNTER — CLINICAL SUPPORT (OUTPATIENT)
Dept: CARDIOLOGY | Facility: HOSPITAL | Age: 76
End: 2021-03-24
Payer: MEDICARE

## 2021-03-24 DIAGNOSIS — I49.5 SICK SINUS SYNDROME: ICD-10-CM

## 2021-03-24 DIAGNOSIS — Z95.0 PRESENCE OF CARDIAC PACEMAKER: ICD-10-CM

## 2021-03-24 PROCEDURE — 93294 REM INTERROG EVL PM/LDLS PM: CPT | Mod: ,,, | Performed by: INTERNAL MEDICINE

## 2021-03-24 PROCEDURE — 93294 CARDIAC DEVICE CHECK - REMOTE: ICD-10-PCS | Mod: ,,, | Performed by: INTERNAL MEDICINE

## 2021-03-30 ENCOUNTER — OFFICE VISIT (OUTPATIENT)
Dept: FAMILY MEDICINE | Facility: CLINIC | Age: 76
End: 2021-03-30
Payer: MEDICARE

## 2021-03-30 ENCOUNTER — LAB VISIT (OUTPATIENT)
Dept: LAB | Facility: HOSPITAL | Age: 76
End: 2021-03-30
Attending: FAMILY MEDICINE
Payer: MEDICARE

## 2021-03-30 VITALS
SYSTOLIC BLOOD PRESSURE: 124 MMHG | DIASTOLIC BLOOD PRESSURE: 72 MMHG | HEIGHT: 73 IN | HEART RATE: 72 BPM | OXYGEN SATURATION: 98 % | BODY MASS INDEX: 28.52 KG/M2 | WEIGHT: 215.19 LBS

## 2021-03-30 DIAGNOSIS — I10 ESSENTIAL HYPERTENSION: Primary | ICD-10-CM

## 2021-03-30 DIAGNOSIS — Z11.59 ENCOUNTER FOR HCV SCREENING TEST FOR LOW RISK PATIENT: ICD-10-CM

## 2021-03-30 DIAGNOSIS — N18.31 TYPE 2 DIABETES MELLITUS WITH STAGE 3A CHRONIC KIDNEY DISEASE, WITHOUT LONG-TERM CURRENT USE OF INSULIN: ICD-10-CM

## 2021-03-30 DIAGNOSIS — E78.5 DYSLIPIDEMIA: ICD-10-CM

## 2021-03-30 DIAGNOSIS — I10 ESSENTIAL HYPERTENSION: ICD-10-CM

## 2021-03-30 DIAGNOSIS — E11.22 TYPE 2 DIABETES MELLITUS WITH STAGE 3A CHRONIC KIDNEY DISEASE, WITHOUT LONG-TERM CURRENT USE OF INSULIN: ICD-10-CM

## 2021-03-30 LAB
ALBUMIN SERPL BCP-MCNC: 4 G/DL (ref 3.5–5.2)
ALP SERPL-CCNC: 74 U/L (ref 55–135)
ALT SERPL W/O P-5'-P-CCNC: 17 U/L (ref 10–44)
ANION GAP SERPL CALC-SCNC: 8 MMOL/L (ref 8–16)
AST SERPL-CCNC: 16 U/L (ref 10–40)
BASOPHILS # BLD AUTO: 0.04 K/UL (ref 0–0.2)
BASOPHILS NFR BLD: 0.9 % (ref 0–1.9)
BILIRUB SERPL-MCNC: 0.6 MG/DL (ref 0.1–1)
BUN SERPL-MCNC: 23 MG/DL (ref 8–23)
CALCIUM SERPL-MCNC: 9 MG/DL (ref 8.7–10.5)
CHLORIDE SERPL-SCNC: 105 MMOL/L (ref 95–110)
CHOLEST SERPL-MCNC: 141 MG/DL (ref 120–199)
CHOLEST/HDLC SERPL: 2.4 {RATIO} (ref 2–5)
CO2 SERPL-SCNC: 27 MMOL/L (ref 23–29)
CREAT SERPL-MCNC: 1.2 MG/DL (ref 0.5–1.4)
DIFFERENTIAL METHOD: ABNORMAL
EOSINOPHIL # BLD AUTO: 0.2 K/UL (ref 0–0.5)
EOSINOPHIL NFR BLD: 4.8 % (ref 0–8)
ERYTHROCYTE [DISTWIDTH] IN BLOOD BY AUTOMATED COUNT: 12.9 % (ref 11.5–14.5)
EST. GFR  (AFRICAN AMERICAN): >60 ML/MIN/1.73 M^2
EST. GFR  (NON AFRICAN AMERICAN): 58.4 ML/MIN/1.73 M^2
ESTIMATED AVG GLUCOSE: 120 MG/DL (ref 68–131)
GLUCOSE SERPL-MCNC: 107 MG/DL (ref 70–110)
HBA1C MFR BLD: 5.8 % (ref 4–5.6)
HCT VFR BLD AUTO: 51.1 % (ref 40–54)
HDLC SERPL-MCNC: 58 MG/DL (ref 40–75)
HDLC SERPL: 41.1 % (ref 20–50)
HGB BLD-MCNC: 16.5 G/DL (ref 14–18)
IMM GRANULOCYTES # BLD AUTO: 0.02 K/UL (ref 0–0.04)
IMM GRANULOCYTES NFR BLD AUTO: 0.5 % (ref 0–0.5)
LDLC SERPL CALC-MCNC: 75.4 MG/DL (ref 63–159)
LYMPHOCYTES # BLD AUTO: 1.3 K/UL (ref 1–4.8)
LYMPHOCYTES NFR BLD: 29.9 % (ref 18–48)
MCH RBC QN AUTO: 31.3 PG (ref 27–31)
MCHC RBC AUTO-ENTMCNC: 32.3 G/DL (ref 32–36)
MCV RBC AUTO: 97 FL (ref 82–98)
MONOCYTES # BLD AUTO: 0.5 K/UL (ref 0.3–1)
MONOCYTES NFR BLD: 11.1 % (ref 4–15)
NEUTROPHILS # BLD AUTO: 2.3 K/UL (ref 1.8–7.7)
NEUTROPHILS NFR BLD: 52.8 % (ref 38–73)
NONHDLC SERPL-MCNC: 83 MG/DL
NRBC BLD-RTO: 0 /100 WBC
PLATELET # BLD AUTO: 143 K/UL (ref 150–450)
PMV BLD AUTO: 11.6 FL (ref 9.2–12.9)
POTASSIUM SERPL-SCNC: 5 MMOL/L (ref 3.5–5.1)
PROT SERPL-MCNC: 6.8 G/DL (ref 6–8.4)
RBC # BLD AUTO: 5.28 M/UL (ref 4.6–6.2)
SODIUM SERPL-SCNC: 140 MMOL/L (ref 136–145)
TRIGL SERPL-MCNC: 38 MG/DL (ref 30–150)
TSH SERPL DL<=0.005 MIU/L-ACNC: 1.75 UIU/ML (ref 0.4–4)
WBC # BLD AUTO: 4.42 K/UL (ref 3.9–12.7)

## 2021-03-30 PROCEDURE — 99214 OFFICE O/P EST MOD 30 MIN: CPT | Mod: S$PBB,,, | Performed by: FAMILY MEDICINE

## 2021-03-30 PROCEDURE — 99999 PR PBB SHADOW E&M-EST. PATIENT-LVL IV: ICD-10-PCS | Mod: PBBFAC,,, | Performed by: FAMILY MEDICINE

## 2021-03-30 PROCEDURE — 36415 COLL VENOUS BLD VENIPUNCTURE: CPT | Mod: PO | Performed by: FAMILY MEDICINE

## 2021-03-30 PROCEDURE — 86803 HEPATITIS C AB TEST: CPT | Performed by: FAMILY MEDICINE

## 2021-03-30 PROCEDURE — 99214 PR OFFICE/OUTPT VISIT, EST, LEVL IV, 30-39 MIN: ICD-10-PCS | Mod: S$PBB,,, | Performed by: FAMILY MEDICINE

## 2021-03-30 PROCEDURE — 83036 HEMOGLOBIN GLYCOSYLATED A1C: CPT | Performed by: FAMILY MEDICINE

## 2021-03-30 PROCEDURE — 80061 LIPID PANEL: CPT | Performed by: FAMILY MEDICINE

## 2021-03-30 PROCEDURE — 99999 PR PBB SHADOW E&M-EST. PATIENT-LVL IV: CPT | Mod: PBBFAC,,, | Performed by: FAMILY MEDICINE

## 2021-03-30 PROCEDURE — 85025 COMPLETE CBC W/AUTO DIFF WBC: CPT | Performed by: FAMILY MEDICINE

## 2021-03-30 PROCEDURE — 84443 ASSAY THYROID STIM HORMONE: CPT | Performed by: FAMILY MEDICINE

## 2021-03-30 PROCEDURE — 99214 OFFICE O/P EST MOD 30 MIN: CPT | Mod: PBBFAC,PO | Performed by: FAMILY MEDICINE

## 2021-03-30 PROCEDURE — 80053 COMPREHEN METABOLIC PANEL: CPT | Performed by: FAMILY MEDICINE

## 2021-03-31 LAB — HCV AB SERPL QL IA: NEGATIVE

## 2021-04-02 DIAGNOSIS — E79.0 HYPERURICEMIA: ICD-10-CM

## 2021-04-05 RX ORDER — ALLOPURINOL 100 MG/1
TABLET ORAL
Qty: 90 TABLET | Refills: 0 | Status: SHIPPED | OUTPATIENT
Start: 2021-04-05 | End: 2021-07-02

## 2021-06-15 ENCOUNTER — OFFICE VISIT (OUTPATIENT)
Dept: DERMATOLOGY | Facility: CLINIC | Age: 76
End: 2021-06-15
Payer: MEDICARE

## 2021-06-15 DIAGNOSIS — Z85.820 HISTORY OF MELANOMA: ICD-10-CM

## 2021-06-15 DIAGNOSIS — L81.4 LENTIGO: ICD-10-CM

## 2021-06-15 DIAGNOSIS — D18.01 CHERRY ANGIOMA: ICD-10-CM

## 2021-06-15 DIAGNOSIS — L90.5 SCAR: ICD-10-CM

## 2021-06-15 DIAGNOSIS — D22.9 MULTIPLE BENIGN NEVI: ICD-10-CM

## 2021-06-15 DIAGNOSIS — L82.1 SEBORRHEIC KERATOSES: ICD-10-CM

## 2021-06-15 DIAGNOSIS — L57.0 AK (ACTINIC KERATOSIS): Primary | ICD-10-CM

## 2021-06-15 DIAGNOSIS — L08.9 SKIN PUSTULE: ICD-10-CM

## 2021-06-15 PROCEDURE — 99213 PR OFFICE/OUTPT VISIT, EST, LEVL III, 20-29 MIN: ICD-10-PCS | Mod: 25,S$PBB,, | Performed by: DERMATOLOGY

## 2021-06-15 PROCEDURE — 99213 OFFICE O/P EST LOW 20 MIN: CPT | Mod: 25,S$PBB,, | Performed by: DERMATOLOGY

## 2021-06-15 PROCEDURE — 17003 DESTRUCT PREMALG LES 2-14: CPT | Mod: PBBFAC | Performed by: DERMATOLOGY

## 2021-06-15 PROCEDURE — 17000 DESTRUCT PREMALG LESION: CPT | Mod: PBBFAC | Performed by: DERMATOLOGY

## 2021-06-15 PROCEDURE — 99999 PR PBB SHADOW E&M-EST. PATIENT-LVL III: ICD-10-PCS | Mod: PBBFAC,,, | Performed by: DERMATOLOGY

## 2021-06-15 PROCEDURE — 17000 DESTRUCT PREMALG LESION: CPT | Mod: S$PBB,,, | Performed by: DERMATOLOGY

## 2021-06-15 PROCEDURE — 17003 DESTRUCT PREMALG LES 2-14: CPT | Mod: S$PBB,,, | Performed by: DERMATOLOGY

## 2021-06-15 PROCEDURE — 99213 OFFICE O/P EST LOW 20 MIN: CPT | Mod: PBBFAC,25 | Performed by: DERMATOLOGY

## 2021-06-15 PROCEDURE — 17003 DESTRUCTION, PREMALIGNANT LESIONS; SECOND THROUGH 14 LESIONS: ICD-10-PCS | Mod: S$PBB,,, | Performed by: DERMATOLOGY

## 2021-06-15 PROCEDURE — 99999 PR PBB SHADOW E&M-EST. PATIENT-LVL III: CPT | Mod: PBBFAC,,, | Performed by: DERMATOLOGY

## 2021-06-15 PROCEDURE — 17000 PR DESTRUCTION(LASER SURGERY,CRYOSURGERY,CHEMOSURGERY),PREMALIGNANT LESIONS,FIRST LESION: ICD-10-PCS | Mod: S$PBB,,, | Performed by: DERMATOLOGY

## 2021-06-18 ENCOUNTER — OFFICE VISIT (OUTPATIENT)
Dept: OTOLARYNGOLOGY | Facility: CLINIC | Age: 76
End: 2021-06-18
Payer: MEDICARE

## 2021-06-18 ENCOUNTER — CLINICAL SUPPORT (OUTPATIENT)
Dept: AUDIOLOGY | Facility: CLINIC | Age: 76
End: 2021-06-18
Payer: MEDICARE

## 2021-06-18 DIAGNOSIS — H90.3 SENSORINEURAL HEARING LOSS (SNHL) OF BOTH EARS: Primary | ICD-10-CM

## 2021-06-18 DIAGNOSIS — H61.21 IMPACTED CERUMEN OF RIGHT EAR: ICD-10-CM

## 2021-06-18 DIAGNOSIS — H90.3 BILATERAL SENSORINEURAL HEARING LOSS: Primary | ICD-10-CM

## 2021-06-18 PROCEDURE — 99203 PR OFFICE/OUTPT VISIT, NEW, LEVL III, 30-44 MIN: ICD-10-PCS | Mod: 25,,, | Performed by: NURSE PRACTITIONER

## 2021-06-18 PROCEDURE — 69210 REMOVE IMPACTED EAR WAX UNI: CPT | Mod: ,,, | Performed by: NURSE PRACTITIONER

## 2021-06-18 PROCEDURE — 92557 COMPREHENSIVE HEARING TEST: CPT | Mod: PBBFAC | Performed by: AUDIOLOGIST

## 2021-06-18 PROCEDURE — 69210 EAR CERUMEN REMOVAL: ICD-10-PCS | Mod: ,,, | Performed by: NURSE PRACTITIONER

## 2021-06-18 PROCEDURE — 99203 OFFICE O/P NEW LOW 30 MIN: CPT | Mod: 25,,, | Performed by: NURSE PRACTITIONER

## 2021-06-18 PROCEDURE — 92567 TYMPANOMETRY: CPT | Mod: PBBFAC | Performed by: AUDIOLOGIST

## 2021-06-22 ENCOUNTER — CLINICAL SUPPORT (OUTPATIENT)
Dept: CARDIOLOGY | Facility: HOSPITAL | Age: 76
End: 2021-06-22
Payer: MEDICARE

## 2021-06-22 DIAGNOSIS — Z95.0 PRESENCE OF CARDIAC PACEMAKER: ICD-10-CM

## 2021-06-22 PROCEDURE — 93294 REM INTERROG EVL PM/LDLS PM: CPT | Mod: ,,, | Performed by: INTERNAL MEDICINE

## 2021-06-22 PROCEDURE — 93294 CARDIAC DEVICE CHECK - REMOTE: ICD-10-PCS | Mod: ,,, | Performed by: INTERNAL MEDICINE

## 2021-06-22 PROCEDURE — 93296 REM INTERROG EVL PM/IDS: CPT | Performed by: INTERNAL MEDICINE

## 2021-07-08 ENCOUNTER — PATIENT OUTREACH (OUTPATIENT)
Dept: ADMINISTRATIVE | Facility: OTHER | Age: 76
End: 2021-07-08

## 2021-07-09 ENCOUNTER — OFFICE VISIT (OUTPATIENT)
Dept: OTOLARYNGOLOGY | Facility: CLINIC | Age: 76
End: 2021-07-09
Payer: MEDICARE

## 2021-07-09 VITALS
HEART RATE: 60 BPM | SYSTOLIC BLOOD PRESSURE: 134 MMHG | WEIGHT: 213.06 LBS | DIASTOLIC BLOOD PRESSURE: 67 MMHG | HEIGHT: 73 IN | BODY MASS INDEX: 28.24 KG/M2

## 2021-07-09 DIAGNOSIS — H69.93 ETD (EUSTACHIAN TUBE DYSFUNCTION), BILATERAL: ICD-10-CM

## 2021-07-09 DIAGNOSIS — R04.0 EPISTAXIS: Primary | ICD-10-CM

## 2021-07-09 DIAGNOSIS — J31.0 CHRONIC RHINITIS: Chronic | ICD-10-CM

## 2021-07-09 DIAGNOSIS — H90.3 SENSORINEURAL HEARING LOSS (SNHL) OF BOTH EARS: Chronic | ICD-10-CM

## 2021-07-09 PROCEDURE — 30901 CONTROL OF NOSEBLEED: CPT | Mod: PBBFAC,PN | Performed by: OTOLARYNGOLOGY

## 2021-07-09 PROCEDURE — 30901 CONTROL OF NOSEBLEED: CPT | Mod: S$PBB,RT,, | Performed by: OTOLARYNGOLOGY

## 2021-07-09 PROCEDURE — 99999 PR PBB SHADOW E&M-EST. PATIENT-LVL III: CPT | Mod: PBBFAC,,, | Performed by: OTOLARYNGOLOGY

## 2021-07-09 PROCEDURE — 99214 OFFICE O/P EST MOD 30 MIN: CPT | Mod: 25,S$PBB,, | Performed by: OTOLARYNGOLOGY

## 2021-07-09 PROCEDURE — 30901 PR CTRL 2SEBLEED,ANTER,SIMPLE: ICD-10-PCS | Mod: S$PBB,RT,, | Performed by: OTOLARYNGOLOGY

## 2021-07-09 PROCEDURE — 99213 OFFICE O/P EST LOW 20 MIN: CPT | Mod: PBBFAC,PN | Performed by: OTOLARYNGOLOGY

## 2021-07-09 PROCEDURE — 99999 PR PBB SHADOW E&M-EST. PATIENT-LVL III: ICD-10-PCS | Mod: PBBFAC,,, | Performed by: OTOLARYNGOLOGY

## 2021-07-09 PROCEDURE — 99214 PR OFFICE/OUTPT VISIT, EST, LEVL IV, 30-39 MIN: ICD-10-PCS | Mod: 25,S$PBB,, | Performed by: OTOLARYNGOLOGY

## 2021-07-09 RX ORDER — MUPIROCIN 20 MG/G
OINTMENT TOPICAL 2 TIMES DAILY
Qty: 15 G | Refills: 0 | Status: SHIPPED | OUTPATIENT
Start: 2021-07-09 | End: 2021-07-19

## 2021-07-09 RX ORDER — LEVOCETIRIZINE DIHYDROCHLORIDE 5 MG/1
5 TABLET, FILM COATED ORAL NIGHTLY
Qty: 30 TABLET | Refills: 11 | Status: SHIPPED | OUTPATIENT
Start: 2021-07-09 | End: 2021-08-03

## 2021-07-09 RX ORDER — METHYLPREDNISOLONE 4 MG/1
TABLET ORAL
Qty: 1 PACKAGE | Refills: 0 | Status: SHIPPED | OUTPATIENT
Start: 2021-07-09 | End: 2021-09-30

## 2021-08-03 ENCOUNTER — OFFICE VISIT (OUTPATIENT)
Dept: OTOLARYNGOLOGY | Facility: CLINIC | Age: 76
End: 2021-08-03
Payer: MEDICARE

## 2021-08-03 VITALS
DIASTOLIC BLOOD PRESSURE: 77 MMHG | BODY MASS INDEX: 28.97 KG/M2 | SYSTOLIC BLOOD PRESSURE: 133 MMHG | HEART RATE: 60 BPM | HEIGHT: 73 IN | WEIGHT: 218.56 LBS

## 2021-08-03 DIAGNOSIS — J31.0 CHRONIC RHINITIS: Chronic | ICD-10-CM

## 2021-08-03 DIAGNOSIS — H69.93 ETD (EUSTACHIAN TUBE DYSFUNCTION), BILATERAL: Chronic | ICD-10-CM

## 2021-08-03 DIAGNOSIS — R04.0 EPISTAXIS: Primary | ICD-10-CM

## 2021-08-03 PROCEDURE — 99999 PR PBB SHADOW E&M-EST. PATIENT-LVL III: ICD-10-PCS | Mod: PBBFAC,,, | Performed by: OTOLARYNGOLOGY

## 2021-08-03 PROCEDURE — 99214 OFFICE O/P EST MOD 30 MIN: CPT | Mod: 25,S$PBB,, | Performed by: OTOLARYNGOLOGY

## 2021-08-03 PROCEDURE — 99213 OFFICE O/P EST LOW 20 MIN: CPT | Mod: PBBFAC,PN | Performed by: OTOLARYNGOLOGY

## 2021-08-03 PROCEDURE — 30901 CONTROL OF NOSEBLEED: CPT | Mod: S$PBB,RT,, | Performed by: OTOLARYNGOLOGY

## 2021-08-03 PROCEDURE — 99214 PR OFFICE/OUTPT VISIT, EST, LEVL IV, 30-39 MIN: ICD-10-PCS | Mod: 25,S$PBB,, | Performed by: OTOLARYNGOLOGY

## 2021-08-03 PROCEDURE — 30901 PR CTRL 2SEBLEED,ANTER,SIMPLE: ICD-10-PCS | Mod: S$PBB,RT,, | Performed by: OTOLARYNGOLOGY

## 2021-08-03 PROCEDURE — 30901 CONTROL OF NOSEBLEED: CPT | Mod: PBBFAC,PN | Performed by: OTOLARYNGOLOGY

## 2021-08-03 PROCEDURE — 99999 PR PBB SHADOW E&M-EST. PATIENT-LVL III: CPT | Mod: PBBFAC,,, | Performed by: OTOLARYNGOLOGY

## 2021-08-03 RX ORDER — LEVOCETIRIZINE DIHYDROCHLORIDE 5 MG/1
5 TABLET, FILM COATED ORAL NIGHTLY
Qty: 90 TABLET | Refills: 1 | Status: SHIPPED | OUTPATIENT
Start: 2021-08-03 | End: 2022-01-11 | Stop reason: SDUPTHER

## 2021-08-09 ENCOUNTER — PATIENT MESSAGE (OUTPATIENT)
Dept: CARDIOLOGY | Facility: CLINIC | Age: 76
End: 2021-08-09

## 2021-08-11 ENCOUNTER — PATIENT MESSAGE (OUTPATIENT)
Dept: OTOLARYNGOLOGY | Facility: CLINIC | Age: 76
End: 2021-08-11

## 2021-08-11 RX ORDER — MUPIROCIN 20 MG/G
OINTMENT TOPICAL 2 TIMES DAILY
COMMUNITY
End: 2021-08-11 | Stop reason: SDUPTHER

## 2021-08-11 RX ORDER — MUPIROCIN 20 MG/G
OINTMENT TOPICAL 2 TIMES DAILY
Qty: 15 G | Refills: 0 | Status: SHIPPED | OUTPATIENT
Start: 2021-08-11 | End: 2021-09-30

## 2021-08-19 ENCOUNTER — PATIENT OUTREACH (OUTPATIENT)
Dept: ADMINISTRATIVE | Facility: OTHER | Age: 76
End: 2021-08-19

## 2021-08-24 ENCOUNTER — OFFICE VISIT (OUTPATIENT)
Dept: OTOLARYNGOLOGY | Facility: CLINIC | Age: 76
End: 2021-08-24
Payer: MEDICARE

## 2021-08-24 VITALS
BODY MASS INDEX: 28.81 KG/M2 | SYSTOLIC BLOOD PRESSURE: 127 MMHG | DIASTOLIC BLOOD PRESSURE: 69 MMHG | WEIGHT: 217.38 LBS | HEIGHT: 73 IN | HEART RATE: 65 BPM

## 2021-08-24 DIAGNOSIS — R04.0 EPISTAXIS: Primary | ICD-10-CM

## 2021-08-24 DIAGNOSIS — J31.0 CHRONIC RHINITIS: Chronic | ICD-10-CM

## 2021-08-24 DIAGNOSIS — H69.93 ETD (EUSTACHIAN TUBE DYSFUNCTION), BILATERAL: Chronic | ICD-10-CM

## 2021-08-24 PROCEDURE — 99999 PR PBB SHADOW E&M-EST. PATIENT-LVL III: ICD-10-PCS | Mod: PBBFAC,,, | Performed by: OTOLARYNGOLOGY

## 2021-08-24 PROCEDURE — 99213 OFFICE O/P EST LOW 20 MIN: CPT | Mod: PBBFAC,PN | Performed by: OTOLARYNGOLOGY

## 2021-08-24 PROCEDURE — 99214 PR OFFICE/OUTPT VISIT, EST, LEVL IV, 30-39 MIN: ICD-10-PCS | Mod: 25,S$PBB,, | Performed by: OTOLARYNGOLOGY

## 2021-08-24 PROCEDURE — 30903 PR CTRL NOSEBLEED,ANTER,COMPLEX: ICD-10-PCS | Mod: S$PBB,RT,, | Performed by: OTOLARYNGOLOGY

## 2021-08-24 PROCEDURE — 30903 CONTROL OF NOSEBLEED: CPT | Mod: PBBFAC,PN | Performed by: OTOLARYNGOLOGY

## 2021-08-24 PROCEDURE — 99999 PR PBB SHADOW E&M-EST. PATIENT-LVL III: CPT | Mod: PBBFAC,,, | Performed by: OTOLARYNGOLOGY

## 2021-08-24 PROCEDURE — 99214 OFFICE O/P EST MOD 30 MIN: CPT | Mod: 25,S$PBB,, | Performed by: OTOLARYNGOLOGY

## 2021-08-24 PROCEDURE — 30903 CONTROL OF NOSEBLEED: CPT | Mod: S$PBB,RT,, | Performed by: OTOLARYNGOLOGY

## 2021-08-24 RX ORDER — SULFAMETHOXAZOLE AND TRIMETHOPRIM 800; 160 MG/1; MG/1
1 TABLET ORAL 2 TIMES DAILY
Qty: 14 TABLET | Refills: 0 | Status: SHIPPED | OUTPATIENT
Start: 2021-08-24 | End: 2021-08-31

## 2021-08-25 ENCOUNTER — PATIENT MESSAGE (OUTPATIENT)
Dept: ADMINISTRATIVE | Facility: OTHER | Age: 76
End: 2021-08-25

## 2021-08-26 ENCOUNTER — PATIENT MESSAGE (OUTPATIENT)
Dept: FAMILY MEDICINE | Facility: CLINIC | Age: 76
End: 2021-08-26

## 2021-08-26 DIAGNOSIS — E78.5 DYSLIPIDEMIA ASSOCIATED WITH TYPE 2 DIABETES MELLITUS: ICD-10-CM

## 2021-08-26 DIAGNOSIS — E11.69 DYSLIPIDEMIA ASSOCIATED WITH TYPE 2 DIABETES MELLITUS: ICD-10-CM

## 2021-08-26 RX ORDER — ATORVASTATIN CALCIUM 40 MG/1
40 TABLET, FILM COATED ORAL DAILY
Qty: 90 TABLET | Refills: 3 | Status: SHIPPED | OUTPATIENT
Start: 2021-08-26 | End: 2022-01-17 | Stop reason: SDUPTHER

## 2021-08-27 ENCOUNTER — PATIENT MESSAGE (OUTPATIENT)
Dept: OTOLARYNGOLOGY | Facility: CLINIC | Age: 76
End: 2021-08-27

## 2021-09-07 ENCOUNTER — TELEPHONE (OUTPATIENT)
Dept: OTOLARYNGOLOGY | Facility: CLINIC | Age: 76
End: 2021-09-07

## 2021-09-10 ENCOUNTER — PATIENT MESSAGE (OUTPATIENT)
Dept: OTOLARYNGOLOGY | Facility: CLINIC | Age: 76
End: 2021-09-10

## 2021-09-17 ENCOUNTER — OFFICE VISIT (OUTPATIENT)
Dept: OTOLARYNGOLOGY | Facility: CLINIC | Age: 76
End: 2021-09-17
Payer: MEDICARE

## 2021-09-17 ENCOUNTER — PATIENT MESSAGE (OUTPATIENT)
Dept: OTOLARYNGOLOGY | Facility: CLINIC | Age: 76
End: 2021-09-17

## 2021-09-17 VITALS
DIASTOLIC BLOOD PRESSURE: 75 MMHG | BODY MASS INDEX: 28.54 KG/M2 | HEART RATE: 67 BPM | SYSTOLIC BLOOD PRESSURE: 123 MMHG | HEIGHT: 73 IN | WEIGHT: 215.38 LBS

## 2021-09-17 DIAGNOSIS — J31.0 CHRONIC RHINITIS: Chronic | ICD-10-CM

## 2021-09-17 DIAGNOSIS — R04.0 EPISTAXIS: Primary | ICD-10-CM

## 2021-09-17 DIAGNOSIS — H69.93 ETD (EUSTACHIAN TUBE DYSFUNCTION), BILATERAL: Chronic | ICD-10-CM

## 2021-09-17 PROCEDURE — 30903 PR CTRL NOSEBLEED,ANTER,COMPLEX: ICD-10-PCS | Mod: S$PBB,RT,, | Performed by: OTOLARYNGOLOGY

## 2021-09-17 PROCEDURE — 30903 CONTROL OF NOSEBLEED: CPT | Mod: S$PBB,RT,, | Performed by: OTOLARYNGOLOGY

## 2021-09-17 PROCEDURE — 99214 OFFICE O/P EST MOD 30 MIN: CPT | Mod: PBBFAC,PN,25 | Performed by: OTOLARYNGOLOGY

## 2021-09-17 PROCEDURE — 99214 PR OFFICE/OUTPT VISIT, EST, LEVL IV, 30-39 MIN: ICD-10-PCS | Mod: 25,S$PBB,, | Performed by: OTOLARYNGOLOGY

## 2021-09-17 PROCEDURE — 30903 CONTROL OF NOSEBLEED: CPT | Mod: PBBFAC,PN | Performed by: OTOLARYNGOLOGY

## 2021-09-17 PROCEDURE — 99214 OFFICE O/P EST MOD 30 MIN: CPT | Mod: 25,S$PBB,, | Performed by: OTOLARYNGOLOGY

## 2021-09-17 PROCEDURE — 99999 PR PBB SHADOW E&M-EST. PATIENT-LVL IV: ICD-10-PCS | Mod: PBBFAC,,, | Performed by: OTOLARYNGOLOGY

## 2021-09-17 PROCEDURE — 99999 PR PBB SHADOW E&M-EST. PATIENT-LVL IV: CPT | Mod: PBBFAC,,, | Performed by: OTOLARYNGOLOGY

## 2021-09-20 ENCOUNTER — CLINICAL SUPPORT (OUTPATIENT)
Dept: CARDIOLOGY | Facility: HOSPITAL | Age: 76
End: 2021-09-20
Payer: MEDICARE

## 2021-09-20 DIAGNOSIS — I49.5 SICK SINUS SYNDROME: ICD-10-CM

## 2021-09-20 DIAGNOSIS — Z95.0 PRESENCE OF CARDIAC PACEMAKER: ICD-10-CM

## 2021-09-20 PROCEDURE — 93296 REM INTERROG EVL PM/IDS: CPT | Performed by: INTERNAL MEDICINE

## 2021-09-22 ENCOUNTER — OFFICE VISIT (OUTPATIENT)
Dept: OTOLARYNGOLOGY | Facility: CLINIC | Age: 76
End: 2021-09-22
Payer: MEDICARE

## 2021-09-22 ENCOUNTER — PATIENT MESSAGE (OUTPATIENT)
Dept: OTOLARYNGOLOGY | Facility: CLINIC | Age: 76
End: 2021-09-22

## 2021-09-22 VITALS
SYSTOLIC BLOOD PRESSURE: 116 MMHG | HEART RATE: 64 BPM | BODY MASS INDEX: 28.5 KG/M2 | WEIGHT: 215.06 LBS | DIASTOLIC BLOOD PRESSURE: 65 MMHG | HEIGHT: 73 IN

## 2021-09-22 DIAGNOSIS — R04.0 EPISTAXIS: Primary | ICD-10-CM

## 2021-09-22 DIAGNOSIS — H69.93 ETD (EUSTACHIAN TUBE DYSFUNCTION), BILATERAL: ICD-10-CM

## 2021-09-22 DIAGNOSIS — J31.0 CHRONIC RHINITIS: ICD-10-CM

## 2021-09-22 PROCEDURE — 30903 CONTROL OF NOSEBLEED: CPT | Mod: S$PBB,RT,, | Performed by: OTOLARYNGOLOGY

## 2021-09-22 PROCEDURE — 30903 PR CTRL NOSEBLEED,ANTER,COMPLEX: ICD-10-PCS | Mod: S$PBB,RT,, | Performed by: OTOLARYNGOLOGY

## 2021-09-22 PROCEDURE — 99213 OFFICE O/P EST LOW 20 MIN: CPT | Mod: 25,S$PBB,, | Performed by: OTOLARYNGOLOGY

## 2021-09-22 PROCEDURE — 99999 PR PBB SHADOW E&M-EST. PATIENT-LVL III: CPT | Mod: PBBFAC,,, | Performed by: OTOLARYNGOLOGY

## 2021-09-22 PROCEDURE — 99213 OFFICE O/P EST LOW 20 MIN: CPT | Mod: PBBFAC,PN,25 | Performed by: OTOLARYNGOLOGY

## 2021-09-22 PROCEDURE — 99213 PR OFFICE/OUTPT VISIT, EST, LEVL III, 20-29 MIN: ICD-10-PCS | Mod: 25,S$PBB,, | Performed by: OTOLARYNGOLOGY

## 2021-09-22 PROCEDURE — 30903 CONTROL OF NOSEBLEED: CPT | Mod: PBBFAC,PN | Performed by: OTOLARYNGOLOGY

## 2021-09-22 PROCEDURE — 99999 PR PBB SHADOW E&M-EST. PATIENT-LVL III: ICD-10-PCS | Mod: PBBFAC,,, | Performed by: OTOLARYNGOLOGY

## 2021-09-23 ENCOUNTER — OFFICE VISIT (OUTPATIENT)
Dept: DERMATOLOGY | Facility: CLINIC | Age: 76
End: 2021-09-23
Payer: MEDICARE

## 2021-09-23 DIAGNOSIS — D22.9 MULTIPLE BENIGN NEVI: ICD-10-CM

## 2021-09-23 DIAGNOSIS — D18.01 CHERRY ANGIOMA: ICD-10-CM

## 2021-09-23 DIAGNOSIS — L81.4 LENTIGO: ICD-10-CM

## 2021-09-23 DIAGNOSIS — L57.0 AK (ACTINIC KERATOSIS): Primary | ICD-10-CM

## 2021-09-23 DIAGNOSIS — Z12.83 SCREENING EXAM FOR SKIN CANCER: ICD-10-CM

## 2021-09-23 DIAGNOSIS — L82.1 SEBORRHEIC KERATOSES: ICD-10-CM

## 2021-09-23 DIAGNOSIS — L90.5 SCAR: ICD-10-CM

## 2021-09-23 PROCEDURE — 17003 DESTRUCTION, PREMALIGNANT LESIONS; SECOND THROUGH 14 LESIONS: ICD-10-PCS | Mod: S$PBB,,, | Performed by: DERMATOLOGY

## 2021-09-23 PROCEDURE — 99999 PR PBB SHADOW E&M-EST. PATIENT-LVL III: ICD-10-PCS | Mod: PBBFAC,,, | Performed by: DERMATOLOGY

## 2021-09-23 PROCEDURE — 17003 DESTRUCT PREMALG LES 2-14: CPT | Mod: PBBFAC | Performed by: DERMATOLOGY

## 2021-09-23 PROCEDURE — 99999 PR PBB SHADOW E&M-EST. PATIENT-LVL III: CPT | Mod: PBBFAC,,, | Performed by: DERMATOLOGY

## 2021-09-23 PROCEDURE — 17003 DESTRUCT PREMALG LES 2-14: CPT | Mod: S$PBB,,, | Performed by: DERMATOLOGY

## 2021-09-23 PROCEDURE — 99213 OFFICE O/P EST LOW 20 MIN: CPT | Mod: 25,S$PBB,, | Performed by: DERMATOLOGY

## 2021-09-23 PROCEDURE — 99213 PR OFFICE/OUTPT VISIT, EST, LEVL III, 20-29 MIN: ICD-10-PCS | Mod: 25,S$PBB,, | Performed by: DERMATOLOGY

## 2021-09-23 PROCEDURE — 17000 DESTRUCT PREMALG LESION: CPT | Mod: PBBFAC | Performed by: DERMATOLOGY

## 2021-09-23 PROCEDURE — 99213 OFFICE O/P EST LOW 20 MIN: CPT | Mod: PBBFAC | Performed by: DERMATOLOGY

## 2021-09-23 PROCEDURE — 17000 PR DESTRUCTION(LASER SURGERY,CRYOSURGERY,CHEMOSURGERY),PREMALIGNANT LESIONS,FIRST LESION: ICD-10-PCS | Mod: S$PBB,,, | Performed by: DERMATOLOGY

## 2021-09-23 PROCEDURE — 17000 DESTRUCT PREMALG LESION: CPT | Mod: S$PBB,,, | Performed by: DERMATOLOGY

## 2021-09-24 ENCOUNTER — PATIENT OUTREACH (OUTPATIENT)
Dept: ADMINISTRATIVE | Facility: OTHER | Age: 76
End: 2021-09-24

## 2021-09-27 ENCOUNTER — OFFICE VISIT (OUTPATIENT)
Dept: OTOLARYNGOLOGY | Facility: CLINIC | Age: 76
End: 2021-09-27
Payer: MEDICARE

## 2021-09-27 VITALS
BODY MASS INDEX: 28.94 KG/M2 | HEART RATE: 72 BPM | DIASTOLIC BLOOD PRESSURE: 67 MMHG | HEIGHT: 73 IN | SYSTOLIC BLOOD PRESSURE: 119 MMHG | WEIGHT: 218.38 LBS

## 2021-09-27 DIAGNOSIS — R04.0 EPISTAXIS: Primary | ICD-10-CM

## 2021-09-27 DIAGNOSIS — H69.93 ETD (EUSTACHIAN TUBE DYSFUNCTION), BILATERAL: Chronic | ICD-10-CM

## 2021-09-27 DIAGNOSIS — J31.0 CHRONIC RHINITIS: Chronic | ICD-10-CM

## 2021-09-27 PROCEDURE — 99213 OFFICE O/P EST LOW 20 MIN: CPT | Mod: S$PBB,,, | Performed by: OTOLARYNGOLOGY

## 2021-09-27 PROCEDURE — 99213 PR OFFICE/OUTPT VISIT, EST, LEVL III, 20-29 MIN: ICD-10-PCS | Mod: S$PBB,,, | Performed by: OTOLARYNGOLOGY

## 2021-09-27 PROCEDURE — 99999 PR PBB SHADOW E&M-EST. PATIENT-LVL III: ICD-10-PCS | Mod: PBBFAC,,, | Performed by: OTOLARYNGOLOGY

## 2021-09-27 PROCEDURE — 99213 OFFICE O/P EST LOW 20 MIN: CPT | Mod: PBBFAC,PN | Performed by: OTOLARYNGOLOGY

## 2021-09-27 PROCEDURE — 99999 PR PBB SHADOW E&M-EST. PATIENT-LVL III: CPT | Mod: PBBFAC,,, | Performed by: OTOLARYNGOLOGY

## 2021-09-27 RX ORDER — MUPIROCIN 20 MG/G
OINTMENT TOPICAL 2 TIMES DAILY
Qty: 15 G | Refills: 0 | Status: SHIPPED | OUTPATIENT
Start: 2021-09-27 | End: 2021-10-07

## 2021-09-30 ENCOUNTER — PATIENT MESSAGE (OUTPATIENT)
Dept: OTOLARYNGOLOGY | Facility: CLINIC | Age: 76
End: 2021-09-30

## 2021-09-30 ENCOUNTER — OFFICE VISIT (OUTPATIENT)
Dept: FAMILY MEDICINE | Facility: CLINIC | Age: 76
End: 2021-09-30
Payer: MEDICARE

## 2021-09-30 VITALS
OXYGEN SATURATION: 96 % | SYSTOLIC BLOOD PRESSURE: 102 MMHG | HEIGHT: 73 IN | BODY MASS INDEX: 28.98 KG/M2 | HEART RATE: 79 BPM | DIASTOLIC BLOOD PRESSURE: 76 MMHG | WEIGHT: 218.69 LBS

## 2021-09-30 DIAGNOSIS — I10 ESSENTIAL HYPERTENSION: Primary | ICD-10-CM

## 2021-09-30 DIAGNOSIS — E11.22 TYPE 2 DIABETES MELLITUS WITH STAGE 3A CHRONIC KIDNEY DISEASE, WITHOUT LONG-TERM CURRENT USE OF INSULIN: ICD-10-CM

## 2021-09-30 DIAGNOSIS — Z23 NEED FOR VACCINATION AGAINST STREPTOCOCCUS PNEUMONIAE: ICD-10-CM

## 2021-09-30 DIAGNOSIS — N18.31 TYPE 2 DIABETES MELLITUS WITH STAGE 3A CHRONIC KIDNEY DISEASE, WITHOUT LONG-TERM CURRENT USE OF INSULIN: ICD-10-CM

## 2021-09-30 PROCEDURE — 99999 PR PBB SHADOW E&M-EST. PATIENT-LVL III: ICD-10-PCS | Mod: PBBFAC,,, | Performed by: FAMILY MEDICINE

## 2021-09-30 PROCEDURE — 99999 PR PBB SHADOW E&M-EST. PATIENT-LVL III: CPT | Mod: PBBFAC,,, | Performed by: FAMILY MEDICINE

## 2021-09-30 PROCEDURE — 99213 OFFICE O/P EST LOW 20 MIN: CPT | Mod: PBBFAC,PO,25 | Performed by: FAMILY MEDICINE

## 2021-09-30 PROCEDURE — 99214 OFFICE O/P EST MOD 30 MIN: CPT | Mod: S$PBB,,, | Performed by: FAMILY MEDICINE

## 2021-09-30 PROCEDURE — 99214 PR OFFICE/OUTPT VISIT, EST, LEVL IV, 30-39 MIN: ICD-10-PCS | Mod: S$PBB,,, | Performed by: FAMILY MEDICINE

## 2021-09-30 PROCEDURE — G0009 ADMIN PNEUMOCOCCAL VACCINE: HCPCS | Mod: PBBFAC,PO

## 2021-09-30 PROCEDURE — 90732 PPSV23 VACC 2 YRS+ SUBQ/IM: CPT | Mod: PBBFAC,PO

## 2021-10-04 ENCOUNTER — LAB VISIT (OUTPATIENT)
Dept: LAB | Facility: HOSPITAL | Age: 76
End: 2021-10-04
Attending: FAMILY MEDICINE
Payer: MEDICARE

## 2021-10-04 DIAGNOSIS — N18.31 TYPE 2 DIABETES MELLITUS WITH STAGE 3A CHRONIC KIDNEY DISEASE, WITHOUT LONG-TERM CURRENT USE OF INSULIN: ICD-10-CM

## 2021-10-04 DIAGNOSIS — I10 ESSENTIAL HYPERTENSION: ICD-10-CM

## 2021-10-04 DIAGNOSIS — E11.22 TYPE 2 DIABETES MELLITUS WITH STAGE 3A CHRONIC KIDNEY DISEASE, WITHOUT LONG-TERM CURRENT USE OF INSULIN: ICD-10-CM

## 2021-10-04 LAB
ALBUMIN SERPL BCP-MCNC: 3.9 G/DL (ref 3.5–5.2)
ALP SERPL-CCNC: 74 U/L (ref 55–135)
ALT SERPL W/O P-5'-P-CCNC: 20 U/L (ref 10–44)
ANION GAP SERPL CALC-SCNC: 10 MMOL/L (ref 8–16)
AST SERPL-CCNC: 14 U/L (ref 10–40)
BASOPHILS # BLD AUTO: 0.04 K/UL (ref 0–0.2)
BASOPHILS NFR BLD: 0.9 % (ref 0–1.9)
BILIRUB SERPL-MCNC: 0.8 MG/DL (ref 0.1–1)
BUN SERPL-MCNC: 18 MG/DL (ref 8–23)
CALCIUM SERPL-MCNC: 9.5 MG/DL (ref 8.7–10.5)
CHLORIDE SERPL-SCNC: 105 MMOL/L (ref 95–110)
CHOLEST SERPL-MCNC: 142 MG/DL (ref 120–199)
CHOLEST/HDLC SERPL: 2.5 {RATIO} (ref 2–5)
CO2 SERPL-SCNC: 24 MMOL/L (ref 23–29)
CREAT SERPL-MCNC: 1 MG/DL (ref 0.5–1.4)
DIFFERENTIAL METHOD: ABNORMAL
EOSINOPHIL # BLD AUTO: 0.2 K/UL (ref 0–0.5)
EOSINOPHIL NFR BLD: 3.8 % (ref 0–8)
ERYTHROCYTE [DISTWIDTH] IN BLOOD BY AUTOMATED COUNT: 12.8 % (ref 11.5–14.5)
EST. GFR  (AFRICAN AMERICAN): >60 ML/MIN/1.73 M^2
EST. GFR  (NON AFRICAN AMERICAN): >60 ML/MIN/1.73 M^2
ESTIMATED AVG GLUCOSE: 123 MG/DL (ref 68–131)
GLUCOSE SERPL-MCNC: 110 MG/DL (ref 70–110)
HBA1C MFR BLD: 5.9 % (ref 4–5.6)
HCT VFR BLD AUTO: 48.4 % (ref 40–54)
HDLC SERPL-MCNC: 56 MG/DL (ref 40–75)
HDLC SERPL: 39.4 % (ref 20–50)
HGB BLD-MCNC: 16.3 G/DL (ref 14–18)
IMM GRANULOCYTES # BLD AUTO: 0.03 K/UL (ref 0–0.04)
IMM GRANULOCYTES NFR BLD AUTO: 0.7 % (ref 0–0.5)
LDLC SERPL CALC-MCNC: 71.6 MG/DL (ref 63–159)
LYMPHOCYTES # BLD AUTO: 1.3 K/UL (ref 1–4.8)
LYMPHOCYTES NFR BLD: 29.3 % (ref 18–48)
MCH RBC QN AUTO: 31.7 PG (ref 27–31)
MCHC RBC AUTO-ENTMCNC: 33.7 G/DL (ref 32–36)
MCV RBC AUTO: 94 FL (ref 82–98)
MONOCYTES # BLD AUTO: 0.5 K/UL (ref 0.3–1)
MONOCYTES NFR BLD: 11.3 % (ref 4–15)
NEUTROPHILS # BLD AUTO: 2.4 K/UL (ref 1.8–7.7)
NEUTROPHILS NFR BLD: 54 % (ref 38–73)
NONHDLC SERPL-MCNC: 86 MG/DL
NRBC BLD-RTO: 0 /100 WBC
PLATELET # BLD AUTO: 132 K/UL (ref 150–450)
PMV BLD AUTO: 11.7 FL (ref 9.2–12.9)
POTASSIUM SERPL-SCNC: 4.7 MMOL/L (ref 3.5–5.1)
PROT SERPL-MCNC: 6.4 G/DL (ref 6–8.4)
RBC # BLD AUTO: 5.15 M/UL (ref 4.6–6.2)
SODIUM SERPL-SCNC: 139 MMOL/L (ref 136–145)
TRIGL SERPL-MCNC: 72 MG/DL (ref 30–150)
WBC # BLD AUTO: 4.43 K/UL (ref 3.9–12.7)

## 2021-10-04 PROCEDURE — 80061 LIPID PANEL: CPT | Performed by: FAMILY MEDICINE

## 2021-10-04 PROCEDURE — 36415 COLL VENOUS BLD VENIPUNCTURE: CPT | Mod: PO | Performed by: FAMILY MEDICINE

## 2021-10-04 PROCEDURE — 85025 COMPLETE CBC W/AUTO DIFF WBC: CPT | Performed by: FAMILY MEDICINE

## 2021-10-04 PROCEDURE — 80053 COMPREHEN METABOLIC PANEL: CPT | Performed by: FAMILY MEDICINE

## 2021-10-04 PROCEDURE — 83036 HEMOGLOBIN GLYCOSYLATED A1C: CPT | Performed by: FAMILY MEDICINE

## 2021-10-13 ENCOUNTER — OFFICE VISIT (OUTPATIENT)
Dept: OTOLARYNGOLOGY | Facility: CLINIC | Age: 76
End: 2021-10-13
Payer: MEDICARE

## 2021-10-13 VITALS
SYSTOLIC BLOOD PRESSURE: 126 MMHG | DIASTOLIC BLOOD PRESSURE: 70 MMHG | WEIGHT: 219.81 LBS | HEART RATE: 62 BPM | HEIGHT: 73 IN | BODY MASS INDEX: 29.13 KG/M2

## 2021-10-13 DIAGNOSIS — H69.93 ETD (EUSTACHIAN TUBE DYSFUNCTION), BILATERAL: Chronic | ICD-10-CM

## 2021-10-13 DIAGNOSIS — J31.0 CHRONIC RHINITIS: Chronic | ICD-10-CM

## 2021-10-13 DIAGNOSIS — R04.0 EPISTAXIS: Primary | ICD-10-CM

## 2021-10-13 PROCEDURE — 99214 OFFICE O/P EST MOD 30 MIN: CPT | Mod: S$PBB,,, | Performed by: OTOLARYNGOLOGY

## 2021-10-13 PROCEDURE — 99214 PR OFFICE/OUTPT VISIT, EST, LEVL IV, 30-39 MIN: ICD-10-PCS | Mod: S$PBB,,, | Performed by: OTOLARYNGOLOGY

## 2021-10-13 PROCEDURE — 99213 OFFICE O/P EST LOW 20 MIN: CPT | Mod: PBBFAC,PN | Performed by: OTOLARYNGOLOGY

## 2021-10-13 PROCEDURE — 99999 PR PBB SHADOW E&M-EST. PATIENT-LVL III: ICD-10-PCS | Mod: PBBFAC,,, | Performed by: OTOLARYNGOLOGY

## 2021-10-13 PROCEDURE — 99999 PR PBB SHADOW E&M-EST. PATIENT-LVL III: CPT | Mod: PBBFAC,,, | Performed by: OTOLARYNGOLOGY

## 2021-10-13 RX ORDER — AZELASTINE 1 MG/ML
1 SPRAY, METERED NASAL 2 TIMES DAILY
Qty: 30 ML | Refills: 0 | Status: SHIPPED | OUTPATIENT
Start: 2021-10-13 | End: 2022-04-06

## 2021-11-04 ENCOUNTER — IMMUNIZATION (OUTPATIENT)
Dept: INTERNAL MEDICINE | Facility: CLINIC | Age: 76
End: 2021-11-04
Payer: MEDICARE

## 2021-11-04 DIAGNOSIS — Z23 NEED FOR VACCINATION: Primary | ICD-10-CM

## 2021-11-04 PROCEDURE — 91300 COVID-19, MRNA, LNP-S, PF, 30 MCG/0.3 ML DOSE VACCINE: CPT | Mod: PBBFAC,PO

## 2021-11-08 DIAGNOSIS — I48.3 TYPICAL ATRIAL FLUTTER: Primary | ICD-10-CM

## 2021-11-13 ENCOUNTER — PATIENT MESSAGE (OUTPATIENT)
Dept: FAMILY MEDICINE | Facility: CLINIC | Age: 76
End: 2021-11-13
Payer: COMMERCIAL

## 2021-11-15 ENCOUNTER — TELEPHONE (OUTPATIENT)
Dept: FAMILY MEDICINE | Facility: CLINIC | Age: 76
End: 2021-11-15
Payer: COMMERCIAL

## 2021-11-15 ENCOUNTER — OFFICE VISIT (OUTPATIENT)
Dept: CARDIOLOGY | Facility: CLINIC | Age: 76
End: 2021-11-15
Payer: MEDICARE

## 2021-11-15 VITALS
DIASTOLIC BLOOD PRESSURE: 72 MMHG | HEART RATE: 60 BPM | OXYGEN SATURATION: 95 % | WEIGHT: 222.69 LBS | SYSTOLIC BLOOD PRESSURE: 120 MMHG | BODY MASS INDEX: 29.38 KG/M2

## 2021-11-15 DIAGNOSIS — E78.5 DYSLIPIDEMIA ASSOCIATED WITH TYPE 2 DIABETES MELLITUS: ICD-10-CM

## 2021-11-15 DIAGNOSIS — E11.59 HYPERTENSION ASSOCIATED WITH DIABETES: ICD-10-CM

## 2021-11-15 DIAGNOSIS — E11.69 DYSLIPIDEMIA ASSOCIATED WITH TYPE 2 DIABETES MELLITUS: ICD-10-CM

## 2021-11-15 DIAGNOSIS — E11.9 TYPE 2 DIABETES MELLITUS WITHOUT RETINOPATHY: ICD-10-CM

## 2021-11-15 DIAGNOSIS — I35.1 NONRHEUMATIC AORTIC VALVE INSUFFICIENCY: ICD-10-CM

## 2021-11-15 DIAGNOSIS — I48.3 TYPICAL ATRIAL FLUTTER: ICD-10-CM

## 2021-11-15 DIAGNOSIS — I15.2 HYPERTENSION ASSOCIATED WITH DIABETES: ICD-10-CM

## 2021-11-15 DIAGNOSIS — I10 PRIMARY HYPERTENSION: ICD-10-CM

## 2021-11-15 DIAGNOSIS — Q21.12 PFO (PATENT FORAMEN OVALE): Primary | ICD-10-CM

## 2021-11-15 DIAGNOSIS — I49.5 SSS (SICK SINUS SYNDROME): ICD-10-CM

## 2021-11-15 DIAGNOSIS — I83.93 ASYMPTOMATIC VARICOSE VEINS OF BOTH LOWER EXTREMITIES: ICD-10-CM

## 2021-11-15 DIAGNOSIS — I42.8 NICM (NONISCHEMIC CARDIOMYOPATHY): ICD-10-CM

## 2021-11-15 PROCEDURE — 99999 PR PBB SHADOW E&M-EST. PATIENT-LVL III: ICD-10-PCS | Mod: PBBFAC,,, | Performed by: INTERNAL MEDICINE

## 2021-11-15 PROCEDURE — 99214 OFFICE O/P EST MOD 30 MIN: CPT | Mod: S$PBB,,, | Performed by: INTERNAL MEDICINE

## 2021-11-15 PROCEDURE — 99999 PR PBB SHADOW E&M-EST. PATIENT-LVL III: CPT | Mod: PBBFAC,,, | Performed by: INTERNAL MEDICINE

## 2021-11-15 PROCEDURE — 99214 PR OFFICE/OUTPT VISIT, EST, LEVL IV, 30-39 MIN: ICD-10-PCS | Mod: S$PBB,,, | Performed by: INTERNAL MEDICINE

## 2021-11-15 PROCEDURE — 99213 OFFICE O/P EST LOW 20 MIN: CPT | Mod: PBBFAC,PO | Performed by: INTERNAL MEDICINE

## 2021-11-15 RX ORDER — BENAZEPRIL HYDROCHLORIDE 20 MG/1
20 TABLET ORAL DAILY
Qty: 90 TABLET | Refills: 3 | Status: SHIPPED | OUTPATIENT
Start: 2021-11-15 | End: 2022-01-17 | Stop reason: SDUPTHER

## 2021-12-01 ENCOUNTER — PATIENT MESSAGE (OUTPATIENT)
Dept: ELECTROPHYSIOLOGY | Facility: CLINIC | Age: 76
End: 2021-12-01
Payer: COMMERCIAL

## 2021-12-02 ENCOUNTER — OFFICE VISIT (OUTPATIENT)
Dept: CARDIOLOGY | Facility: CLINIC | Age: 76
End: 2021-12-02
Payer: MEDICARE

## 2021-12-02 VITALS
HEIGHT: 73 IN | BODY MASS INDEX: 29.51 KG/M2 | HEART RATE: 60 BPM | SYSTOLIC BLOOD PRESSURE: 99 MMHG | WEIGHT: 222.69 LBS | DIASTOLIC BLOOD PRESSURE: 63 MMHG

## 2021-12-02 DIAGNOSIS — I49.5 SSS (SICK SINUS SYNDROME): ICD-10-CM

## 2021-12-02 DIAGNOSIS — I48.3 TYPICAL ATRIAL FLUTTER: Primary | ICD-10-CM

## 2021-12-02 DIAGNOSIS — E78.5 DYSLIPIDEMIA ASSOCIATED WITH TYPE 2 DIABETES MELLITUS: ICD-10-CM

## 2021-12-02 DIAGNOSIS — I49.8 OTHER SPECIFIED CARDIAC ARRHYTHMIAS: ICD-10-CM

## 2021-12-02 DIAGNOSIS — E11.69 DYSLIPIDEMIA ASSOCIATED WITH TYPE 2 DIABETES MELLITUS: ICD-10-CM

## 2021-12-02 DIAGNOSIS — I10 PRIMARY HYPERTENSION: ICD-10-CM

## 2021-12-02 PROCEDURE — 93010 ELECTROCARDIOGRAM REPORT: CPT | Mod: S$PBB,,, | Performed by: INTERNAL MEDICINE

## 2021-12-02 PROCEDURE — 99213 OFFICE O/P EST LOW 20 MIN: CPT | Mod: PBBFAC,PO | Performed by: INTERNAL MEDICINE

## 2021-12-02 PROCEDURE — 99999 PR PBB SHADOW E&M-EST. PATIENT-LVL III: CPT | Mod: PBBFAC,,, | Performed by: INTERNAL MEDICINE

## 2021-12-02 PROCEDURE — 99214 PR OFFICE/OUTPT VISIT, EST, LEVL IV, 30-39 MIN: ICD-10-PCS | Mod: S$PBB,,, | Performed by: INTERNAL MEDICINE

## 2021-12-02 PROCEDURE — 99999 PR PBB SHADOW E&M-EST. PATIENT-LVL III: ICD-10-PCS | Mod: PBBFAC,,, | Performed by: INTERNAL MEDICINE

## 2021-12-02 PROCEDURE — 93005 ELECTROCARDIOGRAM TRACING: CPT | Mod: PBBFAC,PO | Performed by: INTERNAL MEDICINE

## 2021-12-02 PROCEDURE — 93010 RHYTHM STRIP: ICD-10-PCS | Mod: S$PBB,,, | Performed by: INTERNAL MEDICINE

## 2021-12-02 PROCEDURE — 99214 OFFICE O/P EST MOD 30 MIN: CPT | Mod: S$PBB,,, | Performed by: INTERNAL MEDICINE

## 2021-12-16 ENCOUNTER — OFFICE VISIT (OUTPATIENT)
Dept: PODIATRY | Facility: CLINIC | Age: 76
End: 2021-12-16
Payer: MEDICARE

## 2021-12-16 VITALS
DIASTOLIC BLOOD PRESSURE: 64 MMHG | HEIGHT: 73 IN | HEART RATE: 60 BPM | SYSTOLIC BLOOD PRESSURE: 113 MMHG | BODY MASS INDEX: 29.42 KG/M2 | WEIGHT: 222 LBS

## 2021-12-16 DIAGNOSIS — B35.1 ONYCHOMYCOSIS: ICD-10-CM

## 2021-12-16 DIAGNOSIS — E11.49 TYPE 2 DIABETES MELLITUS WITH NEUROLOGICAL MANIFESTATIONS: Primary | ICD-10-CM

## 2021-12-16 PROCEDURE — 99999 PR PBB SHADOW E&M-EST. PATIENT-LVL III: ICD-10-PCS | Mod: PBBFAC,,, | Performed by: PODIATRIST

## 2021-12-16 PROCEDURE — 99213 PR OFFICE/OUTPT VISIT, EST, LEVL III, 20-29 MIN: ICD-10-PCS | Mod: S$PBB,,, | Performed by: PODIATRIST

## 2021-12-16 PROCEDURE — 99213 OFFICE O/P EST LOW 20 MIN: CPT | Mod: S$PBB,,, | Performed by: PODIATRIST

## 2021-12-16 PROCEDURE — 99999 PR PBB SHADOW E&M-EST. PATIENT-LVL III: CPT | Mod: PBBFAC,,, | Performed by: PODIATRIST

## 2021-12-16 PROCEDURE — 99213 OFFICE O/P EST LOW 20 MIN: CPT | Mod: PBBFAC,PN | Performed by: PODIATRIST

## 2021-12-16 RX ORDER — ECONAZOLE NITRATE 10 MG/G
CREAM TOPICAL DAILY
Qty: 85 G | Refills: 3 | Status: SHIPPED | OUTPATIENT
Start: 2021-12-16 | End: 2022-07-06

## 2021-12-19 ENCOUNTER — CLINICAL SUPPORT (OUTPATIENT)
Dept: CARDIOLOGY | Facility: HOSPITAL | Age: 76
End: 2021-12-19
Payer: COMMERCIAL

## 2021-12-19 DIAGNOSIS — R00.1 BRADYCARDIA, UNSPECIFIED: ICD-10-CM

## 2021-12-19 DIAGNOSIS — I49.5 SICK SINUS SYNDROME: ICD-10-CM

## 2021-12-19 DIAGNOSIS — Z95.0 PRESENCE OF CARDIAC PACEMAKER: ICD-10-CM

## 2021-12-19 PROCEDURE — 93296 REM INTERROG EVL PM/IDS: CPT | Performed by: INTERNAL MEDICINE

## 2021-12-23 ENCOUNTER — PATIENT MESSAGE (OUTPATIENT)
Dept: FAMILY MEDICINE | Facility: CLINIC | Age: 76
End: 2021-12-23
Payer: COMMERCIAL

## 2021-12-30 ENCOUNTER — PATIENT MESSAGE (OUTPATIENT)
Dept: OTOLARYNGOLOGY | Facility: CLINIC | Age: 76
End: 2021-12-30
Payer: COMMERCIAL

## 2022-01-05 ENCOUNTER — PES CALL (OUTPATIENT)
Dept: ADMINISTRATIVE | Facility: CLINIC | Age: 77
End: 2022-01-05
Payer: MEDICARE

## 2022-01-10 ENCOUNTER — TELEPHONE (OUTPATIENT)
Dept: FAMILY MEDICINE | Facility: CLINIC | Age: 77
End: 2022-01-10
Payer: MEDICARE

## 2022-01-10 ENCOUNTER — PATIENT MESSAGE (OUTPATIENT)
Dept: FAMILY MEDICINE | Facility: CLINIC | Age: 77
End: 2022-01-10
Payer: MEDICARE

## 2022-01-11 ENCOUNTER — OFFICE VISIT (OUTPATIENT)
Dept: OTOLARYNGOLOGY | Facility: CLINIC | Age: 77
End: 2022-01-11
Payer: MEDICARE

## 2022-01-11 VITALS
BODY MASS INDEX: 30.14 KG/M2 | HEIGHT: 73 IN | DIASTOLIC BLOOD PRESSURE: 65 MMHG | WEIGHT: 227.38 LBS | SYSTOLIC BLOOD PRESSURE: 113 MMHG | HEART RATE: 60 BPM

## 2022-01-11 DIAGNOSIS — H90.3 SENSORINEURAL HEARING LOSS (SNHL) OF BOTH EARS: Chronic | ICD-10-CM

## 2022-01-11 DIAGNOSIS — J31.0 CHRONIC RHINITIS: Chronic | ICD-10-CM

## 2022-01-11 DIAGNOSIS — R04.0 EPISTAXIS: Primary | ICD-10-CM

## 2022-01-11 DIAGNOSIS — H69.93 ETD (EUSTACHIAN TUBE DYSFUNCTION), BILATERAL: Chronic | ICD-10-CM

## 2022-01-11 PROCEDURE — 99213 PR OFFICE/OUTPT VISIT, EST, LEVL III, 20-29 MIN: ICD-10-PCS | Mod: S$PBB,,, | Performed by: OTOLARYNGOLOGY

## 2022-01-11 PROCEDURE — 99213 OFFICE O/P EST LOW 20 MIN: CPT | Mod: S$PBB,,, | Performed by: OTOLARYNGOLOGY

## 2022-01-11 PROCEDURE — 99999 PR PBB SHADOW E&M-EST. PATIENT-LVL III: CPT | Mod: PBBFAC,,, | Performed by: OTOLARYNGOLOGY

## 2022-01-11 PROCEDURE — 99213 OFFICE O/P EST LOW 20 MIN: CPT | Mod: PBBFAC,PN | Performed by: OTOLARYNGOLOGY

## 2022-01-11 PROCEDURE — 99999 PR PBB SHADOW E&M-EST. PATIENT-LVL III: ICD-10-PCS | Mod: PBBFAC,,, | Performed by: OTOLARYNGOLOGY

## 2022-01-11 RX ORDER — LEVOCETIRIZINE DIHYDROCHLORIDE 5 MG/1
5 TABLET, FILM COATED ORAL NIGHTLY
Qty: 90 TABLET | Refills: 1 | Status: SHIPPED | OUTPATIENT
Start: 2022-01-11 | End: 2022-01-11

## 2022-01-11 RX ORDER — LEVOCETIRIZINE DIHYDROCHLORIDE 5 MG/1
5 TABLET, FILM COATED ORAL NIGHTLY
Qty: 90 TABLET | Refills: 1 | Status: SHIPPED | OUTPATIENT
Start: 2022-01-11 | End: 2022-01-13 | Stop reason: SDUPTHER

## 2022-01-11 NOTE — PROGRESS NOTES
Chief Complaint   Patient presents with    Follow-up     No nose bleeds since last visit   .     HPI: Shannan Norman is a 76 y.o. male who presents for evaluation of right aural fullness and nosebleeds. He notes that the aural fullness has been present in both ears for several weeks.  He saw Vanessa Bernard NP at [facility]- cerumen removal was performed which did not seem to improve his symptoms. He does report that it might be slightly improvement since then. He did have audiogram at that visit. See results below.  Additionally,  he reports that he has had nosebleeds for several years.  He states that they are primarily the right side and occur intermittently.  The last episode was approximately 3 days ago.  He notes they last about 5 minutes and stop with pressure.  He is on chronic anticoagulation Eliquis for AFib. Has had nasal cautery in the past about 5 or 6 years ago.     Interval HPI 1/11/2022:   Follow up visit.  Denies any further episodes of epistaxis since last visit.   He has been using nasal saline rinses when he feels congested. He has been consistent with using his levocetrizine nightly. He does think this has been beneficial for nasal congestion and aural fullness. He did see his cardiologist and was taken off of eliquis.       Past Medical History:   Diagnosis Date    Allergy     Arthritis     Atrial fibrillation     Atrial flutter 2011    ablation    Basal cell carcinoma     Cancer     Cataract     CKD (chronic kidney disease) stage 3, GFR 30-59 ml/min 8/13/2019    Diabetes mellitus     Dry eye syndrome     Gout, unspecified     High cholesterol     History of shingles     Hypertension     Melanoma     Seizures      Social History     Socioeconomic History    Marital status:    Occupational History     Employer: [employer]   Tobacco Use    Smoking status: Never Smoker    Smokeless tobacco: Never Used   Substance and Sexual Activity    Alcohol use: Yes      Alcohol/week: 7.0 - 14.0 standard drinks     Types: 7 - 14 Glasses of wine per week    Drug use: No    Sexual activity: Yes     Partners: Female     Social Determinants of Health     Financial Resource Strain: Low Risk     Difficulty of Paying Living Expenses: Not hard at all   Food Insecurity: No Food Insecurity    Worried About Running Out of Food in the Last Year: Never true    Ran Out of Food in the Last Year: Never true   Transportation Needs: No Transportation Needs    Lack of Transportation (Medical): No    Lack of Transportation (Non-Medical): No   Physical Activity: Sufficiently Active    Days of Exercise per Week: 6 days    Minutes of Exercise per Session: 30 min   Stress: No Stress Concern Present    Feeling of Stress : Only a little   Social Connections: Unknown    Frequency of Communication with Friends and Family: Three times a week    Frequency of Social Gatherings with Friends and Family: More than three times a week    Active Member of Clubs or Organizations: Yes    Attends Club or Organization Meetings: More than 4 times per year    Marital Status:    Housing Stability: Low Risk     Unable to Pay for Housing in the Last Year: No    Number of Places Lived in the Last Year: 1    Unstable Housing in the Last Year: No     Past Surgical History:   Procedure Laterality Date    ABLATION N/A 9/11/2018    Procedure: ABLATION;  Surgeon: Hany Sanchez MD;  Location: [facility] CATH LAB;  Service: Cardiology;  Laterality: N/A;  AFL, ISABELLE, RFA, ELODIA, MAC, DM, 3 PREP    ABLATION OF DYSRHYTHMIC FOCUS      ADENOIDECTOMY      CARDIAC PACEMAKER PLACEMENT      no defib    CATARACT EXTRACTION W/  INTRAOCULAR LENS IMPLANT Right 7/28/2020    Procedure: EXTRACTION, CATARACT, WITH IOL INSERTION;  Surgeon: Andrés Morse MD;  Location: [facility] OR;  Service: Ophthalmology;  Laterality: Right;    CATARACT EXTRACTION W/  INTRAOCULAR LENS IMPLANT Left 8/11/2020    Procedure: EXTRACTION, CATARACT,  WITH IOL INSERTION;  Surgeon: Andrés Morse MD;  Location: [facility] OR;  Service: Ophthalmology;  Laterality: Left;    COLONOSCOPY N/A 1/2/2019    Procedure: COLONOSCOPY Suprep;  Surgeon: Cindy Solorzano MD;  Location: [facility] ENDO;  Service: Endoscopy;  Laterality: N/A;    GANGLION CYST EXCISION      left neck    HERNIA REPAIR  2013    umbilical    TENDON REPAIR Right     wrist    TONSILLECTOMY      varicose veins      several sx  bilateral    VASECTOMY      VEIN SURGERY       Family History   Problem Relation Age of Onset    Diabetes Mother     COPD Father     Heart disease Father     Arthritis Sister     Heart attack Brother     Heart disease Brother     Diabetes Brother     No Known Problems Maternal Aunt     No Known Problems Maternal Uncle     No Known Problems Paternal Aunt     No Known Problems Paternal Uncle     No Known Problems Maternal Grandmother     No Known Problems Maternal Grandfather     No Known Problems Paternal Grandmother     No Known Problems Paternal Grandfather     No Known Problems Son     Cancer Sister         lymph node, breast    No Known Problems Sister     No Known Problems Son     Amblyopia Neg Hx     Blindness Neg Hx     Cataracts Neg Hx     Glaucoma Neg Hx     Hypertension Neg Hx     Macular degeneration Neg Hx     Retinal detachment Neg Hx     Strabismus Neg Hx     Stroke Neg Hx     Thyroid disease Neg Hx     Prostate cancer Neg Hx     Kidney disease Neg Hx     Melanoma Neg Hx            Review of Systems  General: negative for chills, fever or weight loss  Psychological: negative for mood changes or depression  Ophthalmic: negative for blurry vision, photophobia or eye pain  ENT: see HPI  Respiratory: no cough, shortness of breath, or wheezing  Cardiovascular: no chest pain or dyspnea on exertion  Gastrointestinal: no abdominal pain, change in bowel habits, or black/ bloody stools  Musculoskeletal: negative for gait disturbance or  muscular weakness  Neurological: no syncope or seizures; no ataxia  Dermatological: negative for puritis,  rash and jaundice  Hematologic/lymphatic: no easy bruising, no new lumps or bumps      Physical Exam:    Vitals:    01/11/22 1442   BP: 113/65   Pulse: 60       Constitutional: Well appearing / communicating without difficutly.  NAD.  Eyes: EOM I Bilaterally  Head/Face: Normocephalic.  Negative paranasal sinus pressure/tenderness.  Salivary glands WNL.  House Brackmann I Bilaterally.    Right Ear: Auricle normal appearance. External Auditory Canal within normal limits no lesions or masses,TM retracted with limited mobility.   Left Ear: Auricle normal appearance. External Auditory Canal within normal limits no lesions or masses,TM retracted with limited mobility  Nose: +healing scab at right caudal nasal septum.    No gross nasal septal deviation. Inferior Turbinates 3+ bilaterally. No septal perforation. No masses/lesions. External nasal skin appears normal without masses/lesions.  Oral Cavity: Gingiva/lips within normal limits.  Dentition/gingiva healthy appearing. Mucus membranes moist. Floor of mouth soft, no masses palpated. Oral Tongue mobile. Hard Palate appears normal.    Oropharynx: Base of tongue appears normal. No masses/lesions noted. Tonsillar fossa/pharyngeal wall without lesions. Posterior oropharynx WNL.  Soft palate without masses. Midline uvula.   Neck/Lymphatic: No LAD I-VI bilaterally.  No thyromegaly.  No masses noted on exam.        Assessment:    ICD-10-CM ICD-9-CM    1. Epistaxis  R04.0 784.7    2. Chronic rhinitis  J31.0 472.0    3. ETD (Eustachian tube dysfunction), bilateral  H69.83 381.81    4. Sensorineural hearing loss (SNHL) of both ears  H90.3 389.18      The primary encounter diagnosis was Epistaxis. Diagnoses of Chronic rhinitis, ETD (Eustachian tube dysfunction), bilateral, and Sensorineural hearing loss (SNHL) of both ears were also pertinent to this visit.      Plan:  No  orders of the defined types were placed in this encounter.    Epistaxis doing better  Continue saline rinses daily  Continue Xyzal.   Follow up approximately 6 months with audiogram.      Maria Del Rosario Ybarra MD

## 2022-01-12 ENCOUNTER — PATIENT MESSAGE (OUTPATIENT)
Dept: OTOLARYNGOLOGY | Facility: CLINIC | Age: 77
End: 2022-01-12
Payer: MEDICARE

## 2022-01-14 RX ORDER — LEVOCETIRIZINE DIHYDROCHLORIDE 5 MG/1
5 TABLET, FILM COATED ORAL NIGHTLY
Qty: 90 TABLET | Refills: 1 | Status: SHIPPED | OUTPATIENT
Start: 2022-01-14 | End: 2022-02-07 | Stop reason: SDUPTHER

## 2022-01-17 ENCOUNTER — PATIENT MESSAGE (OUTPATIENT)
Dept: FAMILY MEDICINE | Facility: CLINIC | Age: 77
End: 2022-01-17
Payer: MEDICARE

## 2022-01-17 ENCOUNTER — TELEPHONE (OUTPATIENT)
Dept: FAMILY MEDICINE | Facility: CLINIC | Age: 77
End: 2022-01-17
Payer: MEDICARE

## 2022-01-17 ENCOUNTER — PATIENT MESSAGE (OUTPATIENT)
Dept: ADMINISTRATIVE | Facility: OTHER | Age: 77
End: 2022-01-17
Payer: MEDICARE

## 2022-01-17 DIAGNOSIS — E11.69 DYSLIPIDEMIA ASSOCIATED WITH TYPE 2 DIABETES MELLITUS: ICD-10-CM

## 2022-01-17 DIAGNOSIS — E78.5 DYSLIPIDEMIA ASSOCIATED WITH TYPE 2 DIABETES MELLITUS: ICD-10-CM

## 2022-01-17 DIAGNOSIS — N40.1 BENIGN PROSTATIC HYPERPLASIA WITH LOWER URINARY TRACT SYMPTOMS, SYMPTOM DETAILS UNSPECIFIED: Primary | ICD-10-CM

## 2022-01-17 DIAGNOSIS — E79.0 HYPERURICEMIA: ICD-10-CM

## 2022-01-17 DIAGNOSIS — I42.8 NICM (NONISCHEMIC CARDIOMYOPATHY): ICD-10-CM

## 2022-01-17 RX ORDER — TAMSULOSIN HYDROCHLORIDE 0.4 MG/1
CAPSULE ORAL
Qty: 90 CAPSULE | Refills: 3 | Status: SHIPPED | OUTPATIENT
Start: 2022-01-17 | End: 2022-02-15 | Stop reason: SDUPTHER

## 2022-01-17 RX ORDER — ALLOPURINOL 100 MG/1
100 TABLET ORAL DAILY
Qty: 90 TABLET | Refills: 3 | Status: SHIPPED | OUTPATIENT
Start: 2022-01-17 | End: 2022-02-08 | Stop reason: SDUPTHER

## 2022-01-17 RX ORDER — ATORVASTATIN CALCIUM 40 MG/1
40 TABLET, FILM COATED ORAL DAILY
Qty: 90 TABLET | Refills: 3 | Status: SHIPPED | OUTPATIENT
Start: 2022-01-17 | End: 2022-02-23 | Stop reason: SDUPTHER

## 2022-01-17 RX ORDER — BENAZEPRIL HYDROCHLORIDE 20 MG/1
20 TABLET ORAL DAILY
Qty: 90 TABLET | Refills: 3 | Status: SHIPPED | OUTPATIENT
Start: 2022-01-17 | End: 2022-02-23 | Stop reason: SDUPTHER

## 2022-02-07 RX ORDER — LEVOCETIRIZINE DIHYDROCHLORIDE 5 MG/1
5 TABLET, FILM COATED ORAL NIGHTLY
Qty: 90 TABLET | Refills: 1 | Status: SHIPPED | OUTPATIENT
Start: 2022-02-07 | End: 2022-04-13 | Stop reason: SDUPTHER

## 2022-02-07 NOTE — TELEPHONE ENCOUNTER
Returned call. Pt is requesting just a 30 day supply of Levocetirizine be sent to the [facility] at [facility] since he is having trouble getting his prescriptions from [facility]. Informed that I would notify Dr. Montalvo. Pt thanked me and verbalized understanding,.     ----- Message from Fadumo Esteves sent at 2/7/2022 11:39 AM CST -----  Type:  Patient Requesting Call Back    Who Called:Shannan Norman  Would the patient rather a call back or a response via [facility]? Call back  Best Call Back Number:[phone]  Additional Information: Patient Requesting Call Back regarding RX refills for levocetirizine (XYZAL) 5 MG tablet and for 30 day supply to be sent to [facility]/pharmacy #[id number] - [place], [place] - [address] AT [facility]

## 2022-02-08 DIAGNOSIS — E79.0 HYPERURICEMIA: ICD-10-CM

## 2022-02-08 RX ORDER — ALLOPURINOL 100 MG/1
100 TABLET ORAL DAILY
Qty: 90 TABLET | Refills: 3 | Status: SHIPPED | OUTPATIENT
Start: 2022-02-08 | End: 2023-01-10

## 2022-02-08 NOTE — TELEPHONE ENCOUNTER
Care Due:                  Date            Visit Type   Department     Provider  --------------------------------------------------------------------------------                                EP -                              PRIMARY      [facility]  Last Visit: 09-      CARE ([facility])   JENNY Banks                              EP -                              PRIMARY      [facility]  Next Visit: 03-      CARE ([facility])   JENNY Banks                                                            Last  Test          Frequency    Reason                     Performed    Due Date  --------------------------------------------------------------------------------    Uric Acid...  12 months..  allopurinoL..............  Not Found    Overdue    Powered by [employer]. Reference number: [id number].   2/08/2022 11:35:20 AM CST

## 2022-02-09 ENCOUNTER — TELEPHONE (OUTPATIENT)
Dept: OPTOMETRY | Facility: CLINIC | Age: 77
End: 2022-02-09
Payer: MEDICARE

## 2022-02-09 DIAGNOSIS — H04.123 BILATERAL DRY EYES: ICD-10-CM

## 2022-02-09 RX ORDER — CYCLOSPORINE 0.5 MG/ML
1 EMULSION OPHTHALMIC 2 TIMES DAILY
Qty: 60 EACH | Refills: 11 | Status: SHIPPED | OUTPATIENT
Start: 2022-02-09 | End: 2023-03-23 | Stop reason: SDUPTHER

## 2022-02-10 ENCOUNTER — PATIENT MESSAGE (OUTPATIENT)
Dept: OTOLARYNGOLOGY | Facility: CLINIC | Age: 77
End: 2022-02-10
Payer: MEDICARE

## 2022-02-15 ENCOUNTER — OFFICE VISIT (OUTPATIENT)
Dept: UROLOGY | Facility: CLINIC | Age: 77
End: 2022-02-15
Payer: MEDICARE

## 2022-02-15 ENCOUNTER — PATIENT MESSAGE (OUTPATIENT)
Dept: FAMILY MEDICINE | Facility: CLINIC | Age: 77
End: 2022-02-15
Payer: MEDICARE

## 2022-02-15 VITALS
HEIGHT: 73 IN | SYSTOLIC BLOOD PRESSURE: 102 MMHG | WEIGHT: 221.81 LBS | DIASTOLIC BLOOD PRESSURE: 61 MMHG | BODY MASS INDEX: 29.4 KG/M2 | HEART RATE: 64 BPM

## 2022-02-15 DIAGNOSIS — R39.15 URINARY URGENCY: Primary | ICD-10-CM

## 2022-02-15 DIAGNOSIS — R35.0 URINARY FREQUENCY: ICD-10-CM

## 2022-02-15 DIAGNOSIS — N40.1 BPH WITH OBSTRUCTION/LOWER URINARY TRACT SYMPTOMS: ICD-10-CM

## 2022-02-15 DIAGNOSIS — N40.1 BENIGN PROSTATIC HYPERPLASIA WITH LOWER URINARY TRACT SYMPTOMS, SYMPTOM DETAILS UNSPECIFIED: ICD-10-CM

## 2022-02-15 DIAGNOSIS — N13.8 BPH WITH OBSTRUCTION/LOWER URINARY TRACT SYMPTOMS: ICD-10-CM

## 2022-02-15 PROCEDURE — 81002 URINALYSIS NONAUTO W/O SCOPE: CPT | Mod: PBBFAC,PO | Performed by: STUDENT IN AN ORGANIZED HEALTH CARE EDUCATION/TRAINING PROGRAM

## 2022-02-15 PROCEDURE — 3072F LOW RISK FOR RETINOPATHY: CPT | Mod: CPTII,S$GLB,, | Performed by: STUDENT IN AN ORGANIZED HEALTH CARE EDUCATION/TRAINING PROGRAM

## 2022-02-15 PROCEDURE — 99214 OFFICE O/P EST MOD 30 MIN: CPT | Mod: PBBFAC,PO | Performed by: STUDENT IN AN ORGANIZED HEALTH CARE EDUCATION/TRAINING PROGRAM

## 2022-02-15 PROCEDURE — 99215 PR OFFICE/OUTPT VISIT, EST, LEVL V, 40-54 MIN: ICD-10-PCS | Mod: S$GLB,,, | Performed by: STUDENT IN AN ORGANIZED HEALTH CARE EDUCATION/TRAINING PROGRAM

## 2022-02-15 PROCEDURE — 99215 OFFICE O/P EST HI 40 MIN: CPT | Mod: S$GLB,,, | Performed by: STUDENT IN AN ORGANIZED HEALTH CARE EDUCATION/TRAINING PROGRAM

## 2022-02-15 PROCEDURE — 99999 PR PBB SHADOW E&M-EST. PATIENT-LVL IV: ICD-10-PCS | Mod: PBBFAC,,, | Performed by: STUDENT IN AN ORGANIZED HEALTH CARE EDUCATION/TRAINING PROGRAM

## 2022-02-15 PROCEDURE — 51798 US URINE CAPACITY MEASURE: CPT | Mod: PBBFAC,PO | Performed by: STUDENT IN AN ORGANIZED HEALTH CARE EDUCATION/TRAINING PROGRAM

## 2022-02-15 PROCEDURE — 99999 PR PBB SHADOW E&M-EST. PATIENT-LVL IV: CPT | Mod: PBBFAC,,, | Performed by: STUDENT IN AN ORGANIZED HEALTH CARE EDUCATION/TRAINING PROGRAM

## 2022-02-15 PROCEDURE — 3072F PR LOW RISK FOR RETINOPATHY: ICD-10-PCS | Mod: CPTII,S$GLB,, | Performed by: STUDENT IN AN ORGANIZED HEALTH CARE EDUCATION/TRAINING PROGRAM

## 2022-02-15 RX ORDER — TAMSULOSIN HYDROCHLORIDE 0.4 MG/1
CAPSULE ORAL
Qty: 90 CAPSULE | Refills: 3 | Status: SHIPPED | OUTPATIENT
Start: 2022-02-15 | End: 2023-02-14 | Stop reason: SDUPTHER

## 2022-02-15 NOTE — PROGRESS NOTES
"Subjective:       Patient ID: Shannan Norman is a 76 y.o. male.    Chief Complaint: Urinary Frequency  This is a 76 y.o.  male patient that is new to me but not new to the system.  The patient was self referred to me for bothersome urinary sx. I was taking care of his wife Mattie Norman. He was seeing Dr. Treadwell from 2017 - 2019. Was already on flomax/finasteride prior to seeing dr. Treadwell. Started on oxybutynin.     He saw Dr. Phipps from 2019 - 2020.   "Irritative symptoms include frequency, nocturia (5-6x/night) and urgency. Obstructive symptoms include incomplete emptying. Obstructive symptoms do not include an intermittent stream, a slower stream or a weak stream." The symptoms are aggravated by caffeine (24oz coffee today, green tea in am, black tea in pm, occasional wine). Past treatments include finasteride and tamsulosin and oxybutynin. Dr. Phipps recommended limit fluids before bedtime, elevate legs, avoiding bladder irritants.      2/15/22   He is here today as a new pt to me.  He is increasing fruit intake. Watching diet because of diabetes.  Nocturia - usually 3-4x/night. Can usually control urgency and make it to bathroom on time. When he made appt a few months ago was having issues with urgency, dribbling on the way to the bathroom. That resolved without any issue. Incidentally noted that he is able to urinate "better" after masturbation.   Pt has stopped finasteride and oxybutynin years ago. Currently still taking flomax.    LAST PSA  Lab Results   Component Value Date    PSA 0.28 02/05/2019    PSA 0.43 08/05/2017    PSA 0.63 07/25/2016    PSA 0.67 03/25/2013    PSA 0.59 01/30/2012    PSA 0.62 01/21/2011    PSA 0.39 01/09/2010    PSA 0.47 12/08/2008    PSA 0.4 12/06/2007    PSA 0.9 11/20/2006    PSA 0.5 11/16/2005    PSA 0.7 08/23/2004       Lab Results   Component Value Date    CREATININE 1.0 10/04/2021       ---  Past Medical History:   Diagnosis Date    Allergy     Arthritis     Atrial " fibrillation     Atrial flutter 2011    ablation    Basal cell carcinoma     Cancer     Cataract     CKD (chronic kidney disease) stage 3, GFR 30-59 ml/min 8/13/2019    Diabetes mellitus     Dry eye syndrome     Gout, unspecified     High cholesterol     History of shingles     Hypertension     Melanoma     Seizures        Past Surgical History:   Procedure Laterality Date    ABLATION N/A 9/11/2018    Procedure: ABLATION;  Surgeon: Hany Sanchez MD;  Location: [facility] CATH LAB;  Service: Cardiology;  Laterality: N/A;  AFL, ISABELLE, RFA, ELODIA, MAC, DM, 3 PREP    ABLATION OF DYSRHYTHMIC FOCUS      ADENOIDECTOMY      CARDIAC PACEMAKER PLACEMENT      no defib    CATARACT EXTRACTION W/  INTRAOCULAR LENS IMPLANT Right 7/28/2020    Procedure: EXTRACTION, CATARACT, WITH IOL INSERTION;  Surgeon: Andrés Morse MD;  Location: [facility] OR;  Service: Ophthalmology;  Laterality: Right;    CATARACT EXTRACTION W/  INTRAOCULAR LENS IMPLANT Left 8/11/2020    Procedure: EXTRACTION, CATARACT, WITH IOL INSERTION;  Surgeon: Andrés Morse MD;  Location: [facility];  Service: Ophthalmology;  Laterality: Left;    COLONOSCOPY N/A 1/2/2019    Procedure: COLONOSCOPY Suprep;  Surgeon: Cindy Solorzano MD;  Location: [facility] ENDO;  Service: Endoscopy;  Laterality: N/A;    GANGLION CYST EXCISION      left neck    HERNIA REPAIR  2013    umbilical    TENDON REPAIR Right     wrist    TONSILLECTOMY      varicose veins      several sx  bilateral    VASECTOMY      VEIN SURGERY         Family History   Problem Relation Age of Onset    Diabetes Mother     COPD Father     Heart disease Father     Arthritis Sister     Heart attack Brother     Heart disease Brother     Diabetes Brother     No Known Problems Maternal Aunt     No Known Problems Maternal Uncle     No Known Problems Paternal Aunt     No Known Problems Paternal Uncle     No Known Problems Maternal Grandmother     No Known Problems Maternal  Grandfather     No Known Problems Paternal Grandmother     No Known Problems Paternal Grandfather     No Known Problems Son     Cancer Sister         lymph node, breast    No Known Problems Sister     No Known Problems Son     Amblyopia Neg Hx     Blindness Neg Hx     Cataracts Neg Hx     Glaucoma Neg Hx     Hypertension Neg Hx     Macular degeneration Neg Hx     Retinal detachment Neg Hx     Strabismus Neg Hx     Stroke Neg Hx     Thyroid disease Neg Hx     Prostate cancer Neg Hx     Kidney disease Neg Hx     Melanoma Neg Hx        Social History     Tobacco Use    Smoking status: Never Smoker    Smokeless tobacco: Never Used   Substance Use Topics    Alcohol use: Yes     Alcohol/week: 7.0 - 14.0 standard drinks     Types: 7 - 14 Glasses of wine per week    Drug use: No       Current Outpatient Medications on File Prior to Visit   Medication Sig Dispense Refill    allopurinoL (ZYLOPRIM) 100 MG tablet Take 1 tablet (100 mg total) by mouth once daily. 90 tablet 3    ascorbic acid, vitamin C, (VITAMIN C) 500 MG tablet Take 1,000 mg by mouth nightly.       atorvastatin (LIPITOR) 40 MG tablet Take 1 tablet (40 mg total) by mouth once daily. 90 tablet 3    b complex vitamins capsule Take 1 capsule by mouth once daily.      benazepriL (LOTENSIN) 20 MG tablet Take 1 tablet (20 mg total) by mouth once daily. 90 tablet 3    cycloSPORINE (RESTASIS) 0.05 % ophthalmic emulsion Place 1 drop into both eyes 2 (two) times daily. 60 each 11    econazole nitrate 1 % cream Apply topically once daily. 85 g 3    GLUCOSAMINE HCL/CHONDR VASQUEZ A NA (GLUCOSAMINE-CHONDROITIN) 750-600 mg Tab Take 2 tablets by mouth Daily.      levocetirizine (XYZAL) 5 MG tablet Take 1 tablet (5 mg total) by mouth every evening. 90 tablet 1    multivitamin capsule Take 1 capsule by mouth nightly.       [DISCONTINUED] tamsulosin (FLOMAX) 0.4 mg Cap TAKE 1 CAPSULE BY MOUTH AFTER DINNER 90 capsule 3    azelastine (ASTELIN) 137 mcg  (0.1 %) nasal spray 1 spray (137 mcg total) by Nasal route 2 (two) times daily. (Patient not taking: No sig reported) 30 mL 0     No current facility-administered medications on file prior to visit.       Review of patient's allergies indicates:  No Known Allergies    Review of Systems   Constitutional: Negative for chills.   HENT: Negative for congestion.    Eyes: Negative for visual disturbance.   Respiratory: Negative for shortness of breath.    Cardiovascular: Negative for chest pain.   Gastrointestinal: Negative for abdominal distention.   Musculoskeletal: Negative for gait problem.   Skin: Negative for color change.   Neurological: Negative for dizziness.   Psychiatric/Behavioral: Negative for agitation.       Objective:      Physical Exam  Constitutional:       Appearance: He is well-developed and well-nourished.   HENT:      Head: Normocephalic.   Eyes:      Pupils: Pupils are equal, round, and reactive to light.   Cardiovascular:      Pulses: Intact distal pulses.   Pulmonary:      Effort: Pulmonary effort is normal.   Abdominal:      Palpations: Abdomen is soft.   Musculoskeletal:         General: Normal range of motion.      Cervical back: Normal range of motion.   Skin:     General: Skin is warm and dry.   Neurological:      Mental Status: He is alert.   Psychiatric:         Mood and Affect: Mood and affect normal.         Assessment:       1. Urinary urgency    2. Urinary frequency    3. BPH with obstruction/lower urinary tract symptoms    4. Benign prostatic hyperplasia with lower urinary tract symptoms, symptom details unspecified        Plan:         Plan:  1. Continue flomax for now. Refilled.  2. Pelvic floor physical therapy could be a good avenue for this pt. With his incidental observations and also his interest in trying to avoid starting new medications unnecessarily, I believe this is a good avenue to explore.  3. He can come see me sometime later and we can consider stopping flomax.          Urinary urgency  -     Ambulatory referral/consult to Physical/Occupational Therapy; Future; Expected date: 02/22/2022    Urinary frequency  -     Ambulatory referral/consult to Physical/Occupational Therapy; Future; Expected date: 02/22/2022    BPH with obstruction/lower urinary tract symptoms    Benign prostatic hyperplasia with lower urinary tract symptoms, symptom details unspecified  -     tamsulosin (FLOMAX) 0.4 mg Cap; TAKE 1 CAPSULE BY MOUTH AFTER DINNER  Dispense: 90 capsule; Refill: 3

## 2022-02-18 ENCOUNTER — PATIENT MESSAGE (OUTPATIENT)
Dept: FAMILY MEDICINE | Facility: CLINIC | Age: 77
End: 2022-02-18
Payer: MEDICARE

## 2022-02-18 ENCOUNTER — TELEPHONE (OUTPATIENT)
Dept: FAMILY MEDICINE | Facility: CLINIC | Age: 77
End: 2022-02-18
Payer: MEDICARE

## 2022-02-18 ENCOUNTER — LAB VISIT (OUTPATIENT)
Dept: LAB | Facility: HOSPITAL | Age: 77
End: 2022-02-18
Attending: FAMILY MEDICINE
Payer: MEDICARE

## 2022-02-18 DIAGNOSIS — E79.0 HYPERURICEMIA: Primary | ICD-10-CM

## 2022-02-18 DIAGNOSIS — E79.0 HYPERURICEMIA: ICD-10-CM

## 2022-02-18 LAB — URATE SERPL-MCNC: 5.3 MG/DL (ref 3.4–7)

## 2022-02-18 PROCEDURE — 36415 COLL VENOUS BLD VENIPUNCTURE: CPT | Mod: PO | Performed by: FAMILY MEDICINE

## 2022-02-18 PROCEDURE — 84550 ASSAY OF BLOOD/URIC ACID: CPT | Performed by: FAMILY MEDICINE

## 2022-02-21 ENCOUNTER — PATIENT MESSAGE (OUTPATIENT)
Dept: FAMILY MEDICINE | Facility: CLINIC | Age: 77
End: 2022-02-21
Payer: MEDICARE

## 2022-02-21 ENCOUNTER — TELEPHONE (OUTPATIENT)
Dept: SPORTS MEDICINE | Facility: CLINIC | Age: 77
End: 2022-02-21
Payer: MEDICARE

## 2022-02-21 NOTE — TELEPHONE ENCOUNTER
Spoke with patient, scheduled appointment.     [facility]  Clinical Assistant to Dr. Kit Potter

## 2022-02-23 ENCOUNTER — HOSPITAL ENCOUNTER (OUTPATIENT)
Dept: RADIOLOGY | Facility: HOSPITAL | Age: 77
Discharge: HOME | End: 2022-02-23
Attending: FAMILY MEDICINE
Payer: MEDICARE

## 2022-02-23 ENCOUNTER — PATIENT MESSAGE (OUTPATIENT)
Dept: FAMILY MEDICINE | Facility: CLINIC | Age: 77
End: 2022-02-23
Payer: MEDICARE

## 2022-02-23 ENCOUNTER — OFFICE VISIT (OUTPATIENT)
Dept: SPORTS MEDICINE | Facility: CLINIC | Age: 77
End: 2022-02-23
Payer: MEDICARE

## 2022-02-23 VITALS — HEIGHT: 73 IN | TEMPERATURE: 98 F | BODY MASS INDEX: 28.76 KG/M2 | WEIGHT: 217 LBS

## 2022-02-23 DIAGNOSIS — M25.552 BILATERAL HIP PAIN: Primary | ICD-10-CM

## 2022-02-23 DIAGNOSIS — M25.552 BILATERAL HIP PAIN: ICD-10-CM

## 2022-02-23 DIAGNOSIS — E78.5 DYSLIPIDEMIA ASSOCIATED WITH TYPE 2 DIABETES MELLITUS: ICD-10-CM

## 2022-02-23 DIAGNOSIS — M16.0 PRIMARY OSTEOARTHRITIS OF BOTH HIPS: ICD-10-CM

## 2022-02-23 DIAGNOSIS — M25.551 BILATERAL HIP PAIN: ICD-10-CM

## 2022-02-23 DIAGNOSIS — M25.551 BILATERAL HIP PAIN: Primary | ICD-10-CM

## 2022-02-23 DIAGNOSIS — M47.816 LUMBAR SPONDYLOSIS: ICD-10-CM

## 2022-02-23 DIAGNOSIS — I42.8 NICM (NONISCHEMIC CARDIOMYOPATHY): ICD-10-CM

## 2022-02-23 DIAGNOSIS — R26.89 ANTALGIC GAIT: ICD-10-CM

## 2022-02-23 DIAGNOSIS — E11.69 DYSLIPIDEMIA ASSOCIATED WITH TYPE 2 DIABETES MELLITUS: ICD-10-CM

## 2022-02-23 PROCEDURE — 1126F PR PAIN SEVERITY QUANTIFIED, NO PAIN PRESENT: ICD-10-PCS | Mod: CPTII,S$GLB,, | Performed by: FAMILY MEDICINE

## 2022-02-23 PROCEDURE — 73521 X-RAY EXAM HIPS BI 2 VIEWS: CPT | Mod: 26,,, | Performed by: RADIOLOGY

## 2022-02-23 PROCEDURE — 73521 X-RAY EXAM HIPS BI 2 VIEWS: CPT | Mod: TC

## 2022-02-23 PROCEDURE — 1159F PR MEDICATION LIST DOCUMENTED IN MEDICAL RECORD: ICD-10-PCS | Mod: CPTII,S$GLB,, | Performed by: FAMILY MEDICINE

## 2022-02-23 PROCEDURE — 1159F MED LIST DOCD IN RCRD: CPT | Mod: CPTII,S$GLB,, | Performed by: FAMILY MEDICINE

## 2022-02-23 PROCEDURE — 3288F FALL RISK ASSESSMENT DOCD: CPT | Mod: CPTII,S$GLB,, | Performed by: FAMILY MEDICINE

## 2022-02-23 PROCEDURE — 1101F PR PT FALLS ASSESS DOC 0-1 FALLS W/OUT INJ PAST YR: ICD-10-PCS | Mod: CPTII,S$GLB,, | Performed by: FAMILY MEDICINE

## 2022-02-23 PROCEDURE — 20611 DRAIN/INJ JOINT/BURSA W/US: CPT | Mod: 50,S$GLB,, | Performed by: FAMILY MEDICINE

## 2022-02-23 PROCEDURE — 73521 XR HIPS BILATERAL 2 VIEW INCL AP PELVIS: ICD-10-PCS | Mod: 26,,, | Performed by: RADIOLOGY

## 2022-02-23 PROCEDURE — 99999 PR PBB SHADOW E&M-EST. PATIENT-LVL IV: ICD-10-PCS | Mod: PBBFAC,,, | Performed by: FAMILY MEDICINE

## 2022-02-23 PROCEDURE — 3288F PR FALLS RISK ASSESSMENT DOCUMENTED: ICD-10-PCS | Mod: CPTII,S$GLB,, | Performed by: FAMILY MEDICINE

## 2022-02-23 PROCEDURE — 3072F PR LOW RISK FOR RETINOPATHY: ICD-10-PCS | Mod: CPTII,S$GLB,, | Performed by: FAMILY MEDICINE

## 2022-02-23 PROCEDURE — 1101F PT FALLS ASSESS-DOCD LE1/YR: CPT | Mod: CPTII,S$GLB,, | Performed by: FAMILY MEDICINE

## 2022-02-23 PROCEDURE — 1160F PR REVIEW ALL MEDS BY PRESCRIBER/CLIN PHARMACIST DOCUMENTED: ICD-10-PCS | Mod: CPTII,S$GLB,, | Performed by: FAMILY MEDICINE

## 2022-02-23 PROCEDURE — 1126F AMNT PAIN NOTED NONE PRSNT: CPT | Mod: CPTII,S$GLB,, | Performed by: FAMILY MEDICINE

## 2022-02-23 PROCEDURE — 99214 PR OFFICE/OUTPT VISIT, EST, LEVL IV, 30-39 MIN: ICD-10-PCS | Mod: 25,S$GLB,, | Performed by: FAMILY MEDICINE

## 2022-02-23 PROCEDURE — 3072F LOW RISK FOR RETINOPATHY: CPT | Mod: CPTII,S$GLB,, | Performed by: FAMILY MEDICINE

## 2022-02-23 PROCEDURE — 99214 OFFICE O/P EST MOD 30 MIN: CPT | Mod: 25,S$GLB,, | Performed by: FAMILY MEDICINE

## 2022-02-23 PROCEDURE — 1160F RVW MEDS BY RX/DR IN RCRD: CPT | Mod: CPTII,S$GLB,, | Performed by: FAMILY MEDICINE

## 2022-02-23 PROCEDURE — 20611 LARGE JOINT ASPIRATION/INJECTION: BILATERAL HIP JOINT: ICD-10-PCS | Mod: 50,S$GLB,, | Performed by: FAMILY MEDICINE

## 2022-02-23 PROCEDURE — 99999 PR PBB SHADOW E&M-EST. PATIENT-LVL IV: CPT | Mod: PBBFAC,,, | Performed by: FAMILY MEDICINE

## 2022-02-23 RX ORDER — BENAZEPRIL HYDROCHLORIDE 20 MG/1
20 TABLET ORAL DAILY
Qty: 90 TABLET | Refills: 3 | Status: SHIPPED | OUTPATIENT
Start: 2022-02-23 | End: 2023-01-30 | Stop reason: SDUPTHER

## 2022-02-23 RX ORDER — ATORVASTATIN CALCIUM 40 MG/1
40 TABLET, FILM COATED ORAL DAILY
Qty: 90 TABLET | Refills: 3 | Status: SHIPPED | OUTPATIENT
Start: 2022-02-23 | End: 2023-02-14 | Stop reason: SDUPTHER

## 2022-02-23 RX ORDER — TRIAMCINOLONE ACETONIDE 40 MG/ML
40 INJECTION, SUSPENSION INTRA-ARTICULAR; INTRAMUSCULAR
Status: DISCONTINUED | OUTPATIENT
Start: 2022-02-23 | End: 2022-02-23 | Stop reason: HOSPADM

## 2022-02-23 RX ADMIN — TRIAMCINOLONE ACETONIDE 40 MG: 40 INJECTION, SUSPENSION INTRA-ARTICULAR; INTRAMUSCULAR at 09:02

## 2022-02-23 NOTE — PROGRESS NOTES
Shannan Norman, a 76 y.o. male, is here for evaluation of bilateral hip. R>L    HISTORY OF PRESENT ILLNESS   Location: proximal thigh   Onset: chronic, insidious  Palliative:    relative rest   oral analgesics, OTC    Provocative: prolonged ambulation  Prior: none  Progression: plateau discomfort   Quality: sharp  Radiation: none  Severity: per nursing documentation  Timing: intermittent with use  Trauma: none    Review of systems (ROS):  A 10+ review of systems was performed with pertinent positives and negatives noted above in the history of present illness. Other systems were negative unless otherwise specified.    PHYSICAL EXAMINATION  General:  The patient is alert and oriented x 3.  Mood is pleasant.  Observation of ears, eyes and nose reveal no gross abnormalities.  HEENT: NCAT, sclera nonicteric  Lungs: Respirations are equal and unlabored.   Gait is coordinated. Patient can toe walk and heel walk without difficulty.    HIP/PELVIS EXAMINATION    Observation/Inspection  Gait:   Nonantalgic   Alignment:  Neutral   Scars:   None   Muscle atrophy: None   Effusion:  None   Warmth:  None   Discoloration:   None   Leg lengths:   Equal   Pelvis:    Level     Tenderness/Crepitus (T/C):      T / C  Trochanteric bursa   - / -  Piriformis    - / -  SI joint    - / -  Psoas tendon   - / -  Rectus insertion  - / -  Adductor insertion  - / -  Pubic symphysis  - / -    ROM: (* = pain)    Flexion:      120 degrees  External rotation:   40 degrees  Internal rotation with axial load:  30 degrees  Internal rotation without axial load:  40 degrees  Abduction:    45 degrees  Adduction:     20 degrees    Special Tests:  Pain w/ forced internal rotation (FADIR):  -   Pain w/ forced external rotation (MARCIE):  -   Circumduction test:     -  Stinchfield test:     -   Log roll:       -   Snapping hip (internal):    -   Sit-up pain:      -   Resisted sit-up pain:     -   Resisted sit-up with adductor contraction pain:  -   Step-down  test:     +  Trendelenburg test:     -  Bridge test      +     Extremity Neuro-vascular Examination:   Sensation:  Grossly intact to light touch all dermatomal regions.   Motor Function:  Fully intact motor function at hip, knee, foot and ankle    DTRs;  quadriceps and  achilles 2+.  No clonus and downgoing Babinski.    Vascular status:  DP and PT pulses 2+, brisk capillary refill, symmetric.    Skin:  intact, compartments soft.    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  #2  Tonnis Grade III osteoarthritis of hip, right   Tonnis Grade II osteoarthritis of hip, left    No evidence of neurologic pathology  No evidence of vascular pathology    Imaging studies reviewed:  X-ray pelvis and hip, bilat 22.02    Plan  We discussed the importance of appropriate diet, weight, and regular exercise    We discussed options including    Watchful waiting / relative rest    Physical therapy x   Injection therapy CSI bilateral iahip   Consultation    The patient chooses As above   x = prescribed  CSI = corticosteroid injection  VSI = viscosupplement injection  PRPI = platelet rich plasma injection  ia = intra articular  R = right  L = left  B = bilateral   nfSx = surgical consultation was recommended, but patient is not interested in consultation at this time    Physical Therapy        Formal (fPT), @ [facility] facility b   Formal (fPT), @ [facility] facility        Homegoing (hgPT), per concurrent fPT recommendations    Homegoing (hgPT), per prior fPT recommendations    Homegoing (hgPT), handout provided        w/  (atPT)    [blank] = not prescribed  x = prescribed  b = prescribed, and begin as indicated  t = continue as indicated  r = prescribed, and restart as indicated  p = completed prior as indicated  hs = prescribed, and with high school   col = prescribed, and with college or university   nfPT = physical  therapy was recommended, but patient is not interested in PT at this time    Activity (e.g. sports, work) restrictions    [blank] = as tolerated  pt = per physical therapist  at = per   NWB = non weight bearing on affected lower extremity, with crutches assistance for ambulation    Bracing    [blank] = not prescribed  r = recommended, but not fit with at todays visit  f = prescribed and fit with at todays visit  t = continue as indicated  d = d/c  p = as needed  rare = use on rare, as-needed basis; advised against chronic use    Pain management Has taken m in the past   [blank] = No prescription necessary. A handout detailing dosing of appropriate   over-the-counter musculoskeletal analgesics was made available to the patient.   m = meloxicam x 14 days  mp = 14 day course of meloxicam prescribed prior    Follow up 12   [blank] = as needed  [number] = in [number] weeks  CSI = for corticosteroid injection  VSI = for viscosupplement injection or injection series  PRP = for platelet rich plasma injection or injection series  MRI = after MRI imaging  ns = should surgical options be deferred (no surgery)  o = appointment offered, deferred by patient    Should symptoms worsen or fail to resolve, consider    Revisiting the above options and / or TKA right     Vocation:   ++yoga  former golfer  wife w/ advanced dementia  enjoyed long vacations in his RV

## 2022-02-23 NOTE — PROCEDURES
"Large Joint Aspiration/Injection: bilateral hip joint    Date/Time: 2/23/2022 9:15 AM  Performed by: Kit Potter MD  Authorized by: Kit Potter MD     Consent Done?:  Yes (Verbal)  Indications:  Pain  Site marked: the procedure site was marked    Timeout: prior to procedure the correct patient, procedure, and site was verified    Prep: patient was prepped and draped in usual sterile fashion    Local anesthesia used?: No      Details:  Needle Size:  20 G  Ultrasonic Guidance for needle placement?: Yes    Images are saved and documented.  Approach:  Anterior  Location:  Hip  Laterality:  Bilateral  Site:  Bilateral hip joint  Medications (Right):  40 mg triamcinolone acetonide 40 mg/mL  Medications (Left):  40 mg triamcinolone acetonide 40 mg/mL  Patient tolerance:  Patient tolerated the procedure well with no immediate complications     Description of ultrasound utilization for needle guidance:   Ultrasound guidance used for needle localization. Images saved and stored for documentation. The hip joint was visualized. Dynamic visualization of the 20g x 6.0" needle was continuous throughout the procedure.    Precautionary:  The patient's femoral artery, vein, and nerve were identified, marked with a skin marker, and visualized throughout the procedure, so as to avoid needle contact with those structures.       "

## 2022-02-23 NOTE — TELEPHONE ENCOUNTER
No new care gaps identified.  Powered by [employer]. Reference number: [id number].   2/23/2022 2:05:50 PM CST

## 2022-02-25 ENCOUNTER — CLINICAL SUPPORT (OUTPATIENT)
Dept: REHABILITATION | Facility: HOSPITAL | Age: 77
End: 2022-02-25
Attending: STUDENT IN AN ORGANIZED HEALTH CARE EDUCATION/TRAINING PROGRAM
Payer: MEDICARE

## 2022-02-25 DIAGNOSIS — M16.0 PRIMARY OSTEOARTHRITIS OF BOTH HIPS: ICD-10-CM

## 2022-02-25 DIAGNOSIS — M47.816 LUMBAR SPONDYLOSIS: ICD-10-CM

## 2022-02-25 DIAGNOSIS — M25.651 DECREASED RANGE OF MOTION OF BOTH HIPS: ICD-10-CM

## 2022-02-25 DIAGNOSIS — R53.1 DECREASED STRENGTH: ICD-10-CM

## 2022-02-25 DIAGNOSIS — M25.652 DECREASED RANGE OF MOTION OF BOTH HIPS: ICD-10-CM

## 2022-02-25 PROBLEM — M25.659 DECREASED RANGE OF HIP MOVEMENT: Status: ACTIVE | Noted: 2022-02-25

## 2022-02-25 PROCEDURE — 97161 PT EVAL LOW COMPLEX 20 MIN: CPT | Mod: PN

## 2022-02-25 PROCEDURE — 97110 THERAPEUTIC EXERCISES: CPT | Mod: PN

## 2022-02-25 NOTE — PLAN OF CARE
[facility]  Physical Therapy Initial Evaluation    Name: Shannan Norman  Clinic Number: [id number]    Therapy Diagnosis:   Encounter Diagnoses   Name Primary?    Lumbar spondylosis     Primary osteoarthritis of both hips     Decreased range of motion of both hips     Decreased strength      Physician: Kit Potter, *    Physician Orders: PT Eval and Treat  Medical Diagnosis from Referral:   M47.816 (ICD-10-CM) - Lumbar spondylosis   M16.0 (ICD-10-CM) - Primary osteoarthritis of both hips     Evaluation Date: 2/25/2022  Authorization Period Expiration: 02/23/2023  Plan of Care Expiration: 4/22/2021  Visit # / Visits authorized: 1/1  FOTO: 1/5  PTA Visit: 0/6    Time In: 1:20 PM  Time Out: 2:0 PM  Total Billable Time: 40 minutes (1 TE + 1 LCE)    Precautions: Standard and Gout, A-fib, skin cancer, DM, HTN    Subjective   Date of onset: 1/2022  History of current condition - Shannan reports: has bee having Bilateral anterior hip pain and low back pain for the past few weeks. Increased hip pain with transitions to sitting to/from standing, and sitting improperly. He also has chronic low back pain for years that he controls by doing yoga. He has increased low back pain with sitting on wrong surfaces and standing for a long periods of time. Received injections for both hips anteriorly into his hip this past Tuesday and is on Celebrex that has greatly helped. History of Bilateral knee pain, physical therapy, and injections - no complaints of pain. Denies any numbness or tingling.      Medical History:   Past Medical History:   Diagnosis Date    Allergy     Arthritis     Atrial fibrillation     Atrial flutter 2011    ablation    Basal cell carcinoma     Cancer     Cataract     CKD (chronic kidney disease) stage 3, GFR 30-59 ml/min 8/13/2019    Diabetes mellitus     Dry eye syndrome     Gout, unspecified     High cholesterol     History of shingles     Hypertension      Melanoma     Seizures        Surgical History:   Shannan Norman  has a past surgical history that includes Tonsillectomy; varicose veins; Ablation of dysrhythmic focus; Ganglion cyst excision; Hernia repair (); Vasectomy; Ablation (N/A, 2018); Colonoscopy (N/A, 2019); Tendon repair (Right); Adenoidectomy; Vein Surgery; Cardiac pacemaker placement; Cataract extraction w/  intraocular lens implant (Right, 2020); and Cataract extraction w/  intraocular lens implant (Left, 2020).    Medications:   Shannan has a current medication list which includes the following prescription(s): allopurinol, ascorbic acid (vitamin c), atorvastatin, azelastine, b complex vitamins, benazepril, restasis, econazole nitrate, glucosamine-chondroitin, levocetirizine, multivitamin, and tamsulosin.    Allergies:   Review of patient's allergies indicates:  No Known Allergies     Imagin2022 - Hip OA  Right hip: There is DJD and impingement change.  Left hip: There is DJD and impingement change.     Impression: Degenerative change of the spine SI joints symphysis pubis and hip joints but no fracture dislocation bone destruction seen.    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   Occupation: retired, works for [employer] -    Prior Level of Function: going to the gym for the bike 2x a week and 3x a week for yoga  Current Level of Function Limitations: pain with lifting legs into bed, deep squatting, and prolonged walking/stanidng activities  Pts goals: to have less pain    Pain:  Current 0/10, worst 9/10, best 0/10   Location: Bilateral groin areas, Bilateral sides of low back  Description: Aching, Dull, Burning, Tight and Sharp  Aggravating Factors: see limitations noted above  Easing Factors: pain medication, injection and rest    Objective     Gait: Bilateral hip external rotation, decreased Bilateral hip extension, slight  increased forward lean  Posture: decreased lumbar lordosis, increased forward lean with mild thoracic kyphosis, mild b hip external rotation in stance  Sensation: WNL  Palpation: no tenderness to palpation today    A/PROM and MMT:  * = Bilateral groin pain with testing  NT = Not tested  AROM: (degrees) LUMBAR   Flexion (40-50) 100%   Extension (15-20) 100%   Right side bending (~20) 80%   Left side bending  (~20) 80%   Right rotation (5-10) 100%   Left rotation (5-10) 100%     Hip  Right   Left  Pain/Dysfunction with Movement    AROM PROM MMT AROM PROM MMT    Flexion (L2,120 deg) 80* 90* 3+/5 90 95* 4-/5    Extension (20 deg) 0 3 2+/5 2 5 2+/5    Abduction (L5, 45 deg) 20 25 3+/5 30 35 4-/5    Adduction (30 deg) WFL WFL 4/5 WFL WFL 4/5    IR (30 deg) WFL WFL 4-/5 WFL WFL 4/5    ER (45 deg) WFL WFL 3+/5 WFL WFL 4-/5       Knee  Right   Left  Pain/Dysfunction with Movement    AROM PROM MMT AROM PROM MMT    Flexion (S2, 140 deg) WFL WFL 5/5 WFL WFL 5/5    Extension (L3, 0-5 deg) WFL WFL 5/5 WFL WFL 5/5      Ankle  Right   Left  Pain/Dysfunction with Movement    AROM PROM MMT AROM PROM MMT    Dorsiflexion (L4, 20 deg) WFL WFL 5/5 WFL WFL 5/5    Plantarflexion (S1, 50 deg) WFL WFL 5/5 WFL WFL 5/5      Lumbar Tests:  SLR Test (L4-S3) = negative B  Ely's test (L2,L3,L4) = fair Bilateral, early increased lumbar extension Bilateral right>L    Hip Special Tests:  FADIR = positive B  MARCIE (+ if knee higher than other knee) = positive B  Scour Test = positive on right>L    CMS Impairment/Limitation/Restriction for FOTO Lumbar Spine Survey    Therapist reviewed FOTO scores for Shannan Norman on 2/25/2022.   FOTO documents entered into [employer] - see Media section.    Limitation Score: 27%  Category: Mobility  Predicted: 18%     TREATMENT   Treatment Time In: 1:50 PM  Treatment Time Out: 2:00 PM  Total Treatment time separate from Evaluation: 10 minutes    Shannan received therapeutic exercises to develop strength, endurance, ROM,  flexibility, posture and core stabilization for 10 minutes including:  Bridges: 10x double leg      Home Exercises and Patient Education Provided:  Education provided:   - abdominal bracing, lifting and carrying body mechanics, avoid activities that increase concordant pain  - course of therapy, prognosis  - importance of HEP    Written Home Exercises Provided: yes.  Exercises were reviewed and Shannan was able to demonstrate them prior to the end of the session.  Shannan demonstrated good  understanding of the education provided.     See EMR under Patient Instructions for exercises provided 2/25/2022.    Assessment   Shannan is a 76 y.o. male referred to outpatient Physical Therapy at [facility] with a medical diagnosis of Primary osteoarthritis of both hips and Lumbar spondylosis. Pt presents with decreased lumbar ROM, poor Bilateral hip range of motion, decreased BLE strength, impaired posture, impaired balance and gait, and functional deficits with prolonged walking and standing activities. Pt would benefit from skilled PT consisting of gait training, muscular skeletal stretching/strengthening, manual therapy, neuro muscular re-education, and modalities prn to address limitations and increase functional mobility.    Pt prognosis is Fair.   Pt will benefit from skilled outpatient Physical Therapy to address the deficits stated above and in the chart below, provide pt/family education, and to maximize pt's level of independence.     Plan of care discussed with patient: Yes  Pt's spiritual, cultural and educational needs considered and patient is agreeable to the plan of care and goals as stated below:     Anticipated Barriers for therapy: advanced Bilateral hip osteoarthritis, caretaker at home    Medical Necessity is demonstrated by the following  History  Co-morbidities and personal factors that may impact the plan of care Co-morbidities:   Gout, A-fib, skin cancer, DM, HTN    Personal Factors:   no deficits     moderate    Examination  Body Structures and Functions, activity limitations and participation restrictions that may impact the plan of care Body Regions:   back  lower extremities  trunk    Body Systems:    gross symmetry  ROM  strength  gross coordinated movement  balance  gait  transfers  transitions  motor control    Participation Restrictions:   None    Activity limitations:   Learning and applying knowledge  no deficits    General Tasks and Commands  no deficits    Communication  no deficits    Mobility  walking    Self care  no deficits    Domestic Life  doing house work (cleaning house, washing dishes, laundry)    Interactions/Relationships  no deficits    Life Areas  no deficits    Community and Social Life  no deficits         high   Clinical Presentation stable and uncomplicated low   Decision Making/ Complexity Score: low     GOALS: Short Term Goals: 4 weeks  1. Pt will demo good TA muscle contraction for improved deep abdominal strength and lumbar stability.  2. Increase lumbar ROM to 100% of WNL in order to improve functional mobility.    3. Pt will demo good sitting/standing posture and body mechanics for improved spine health and decreased risk of future injury.  4. Pt to tolerate HEP to improve ROM and independence with ADL's.    Long Term Goals: 8 weeks  1. Report decreased low back pain without radiculopathy to </= 2/10 with squatting and prolonged standing/sitting to increase tolerance for ADLs and increased QoL.  2. Increase strength to >/= 4/5 MMT grade for core and BLE to increase tolerance for ADL and work activities.  3. Pt will demonstrate negative MARCIE testing and mild positive FADIR testing for improve hip mobility and improved hip health.  4. Patient's goal: to have less pain.  5. Pt will report at </= 18% impaired on FOTO lumbar score for low back pain disability to demonstrate decrease in disability and improvement in back pain.    Plan   Plan of care Certification: 2/25/2022 to  4/22/2021.    Outpatient Physical Therapy 2 times weekly for 8 weeks to include the following interventions: Aquatic Therapy, Cervical/Lumbar Traction, Electrical Stimulation, Gait Training, Manual Therapy, Moist Heat/ Ice, Neuromuscular Re-ed, Orthotic Management and Training, Patient Education, Self Care, Therapeutic Activites and Therapeutic Exercise.     Slade Rosen, PT

## 2022-02-28 ENCOUNTER — PATIENT MESSAGE (OUTPATIENT)
Dept: SPORTS MEDICINE | Facility: CLINIC | Age: 77
End: 2022-02-28
Payer: MEDICARE

## 2022-03-02 ENCOUNTER — TELEPHONE (OUTPATIENT)
Dept: ORTHOPEDICS | Facility: CLINIC | Age: 77
End: 2022-03-02
Payer: MEDICARE

## 2022-03-02 NOTE — TELEPHONE ENCOUNTER
I called the patient today regarding the message below. I left a message for the patient to call me back. I left my name and phone number.    ----- Message from Adele Hathaway sent at 3/2/2022  8:40 AM CST -----  Regarding: AVERY  Hey all,   Would you all be able to see this patient for a AVERY consult?     Thank you,   Adele De Souza  Clinical Assistant to Dr. Kit Potter

## 2022-03-02 NOTE — TELEPHONE ENCOUNTER
I called the patient regarding his voice message. The patient is scheduled to see Dr. Bhatt on 3/15/2022. The patient verbalized understanding and has no further questions.       ----- Message from James Wong sent at 3/2/2022  9:03 AM CST -----  Contact: Pt  Pt returning a call to staff regarding scheduling. Pt would like first available appt.      Confirmed contact info below:  Contact Name: Shannan Valdiviaer  Phone Number: [phone]

## 2022-03-08 ENCOUNTER — CLINICAL SUPPORT (OUTPATIENT)
Dept: REHABILITATION | Facility: HOSPITAL | Age: 77
End: 2022-03-08
Payer: MEDICARE

## 2022-03-08 DIAGNOSIS — M25.659 DECREASED RANGE OF HIP MOVEMENT, UNSPECIFIED LATERALITY: Primary | ICD-10-CM

## 2022-03-08 DIAGNOSIS — R53.1 DECREASED STRENGTH: ICD-10-CM

## 2022-03-08 PROCEDURE — 97110 THERAPEUTIC EXERCISES: CPT | Mod: PN,CQ

## 2022-03-08 NOTE — PATIENT INSTRUCTIONS
Strengthening: Straight Leg Raise (Phase 1)        Tighten muscles on front of right thigh, then lift leg from surface, keeping knee locked.   Repeat 10 times per set. Do 1 sets per session. Do 2 sessions per day.     [email or web address]     Copyright © [employer]. All rights reserved.     Strengthening: Hip Adduction - Isometric    Sit back or lie down with ball or folded pillow between knees, and squeeze knees together. Hold 3 seconds.  Repeat 10 times per set. Do 1 sets per session. Do 2 sessions per day.    Strengthening: Hip Abductor - Resisted    Lie flat with band looped around both legs above knees, and push thighs apart, ensuring right and left move together.  Repeat 10 times per set. Do 1 sets per session. Do 2 sessions per day.    Knee  (LAQ)        Sit up tall, tighten abdominals. Straighten one leg and then relax it. Repeat with other leg.  Repeat 10 times. Do 2 sessions per day.     [email or web address]     Stretching: Calf - Towel        Sit with knee straight and towel looped around left foot. Gently pull on towel until stretch is felt in calf. Hold 10 seconds.  Repeat 10 times per set. Do 1 sets per session. Do 2 sessions per day.     [email or web address]       ABDUCTION: Standing (Active)        Stand, feet flat. Lift right leg out to side.   Complete 1 sets of 10 repetitions. Perform 2 sessions per day.     [email or web address]     EXTENSION: Standing (Active)        Stand, both feet flat. Draw right leg behind body as far as possible.   Complete 1 sets of 10 repetitions. Perform 2 sessions per day.     [email or web address]     Functional Quadriceps: Chair Squat        Keeping feet flat on floor, shoulder width apart, squat as low as is comfortable. Use support as necessary.  Repeat 10 times per set. Do 1 sets per session. Do 2 sessions per day.     [email or web address]     Copyright © [employer]. All rights reserved.

## 2022-03-08 NOTE — PROGRESS NOTES
"[facility]   Physical Therapy Treatment Note     Name: Shannan Norman  Clinic Number: [id number]    Therapy Diagnosis:   Encounter Diagnoses   Name Primary?    Decreased range of hip movement, unspecified laterality Yes    Decreased strength      Physician: Kit Potter,     Visit Date: 3/8/2022    Physician Orders: PT Eval and Treat  Medical Diagnosis from Referral:   M47.816 (ICD-10-CM) - Lumbar spondylosis   M16.0 (ICD-10-CM) - Primary osteoarthritis of both hips      Evaluation Date: 2/25/2022  Authorization Period Expiration: 02/23/2023  Plan of Care Expiration: 4/22/2021  Visit # / Visits authorized: 2/50  FOTO: 2/5  PTA Visit #: 1/5      Time In: 2:00 PM  Time Out: 2:55 PM  Total Billable Time: 55 minutes (TE-4)     Precautions: Standard and Gout, A-fib, skin cancer, DM, HTN    SUBJECTIVE     Pt reports: his hip pain is not too bad right now, 2/10 but it increases, 6/10, with transferring sit to stand and vice versa.  Patient reports his pain also increases if he sits too long.  He was compliant with home exercise program.  Response to previous treatment: no problems.  Functional change: not at this time.    Pain: 2/10  Location: right hip.     OBJECTIVE     Objective Measures updated at progress report unless specified.     Treatment     Shannan received the treatments listed below:      therapeutic exercises to develop strength, endurance, ROM, flexibility, posture and core stabilization for 55 minutes including:    Calf stretch: 10"x5  straight leg raise: x10 Bilateral   Bridges: 15x double leg  single leg bridge: x10 Bilateral   Supine hip abd: x15 green theraband   Supine hip add: 3"x15 with ball    Long arc quad: x15 Bilateral  Fitter calf strech: 3x20"  Mini squats: x10   Leg press: 6 plates 2x10 sled-3  Standing hip abd: 2x10  Standing hip extension: 2x10 Bilateral  Side stepping: 3 laps in //  Toe taps: x10 Bilateral on 2 oval pads  Step ups: x10 Bilateral level 1 "     Patient Education and Home Exercises     Home Exercises Provided and Patient Education Provided     Education provided:   - keep exercises in a pain free range.    Written Home Exercises Provided: yes. Exercises were reviewed and Shannan was able to demonstrate them prior to the end of the session.  Shannan demonstrated good  understanding of the education provided. See EMR under Patient Instructions for exercises provided during therapy sessions    ASSESSMENT     Shannan is a 76 y.o. male referred to outpatient Physical Therapy at [facility] with a medical diagnosis of Primary osteoarthritis of both hips and Lumbar spondylosis.  Patient was not able to perform supine nor sitting marching. Patient is not able to lie on his left side due to shoulder pain nor on his right due to hip pain.    Shannan Is progressing well towards his goals.   Pt prognosis is Fair.     Pt will continue to benefit from skilled outpatient physical therapy to address the deficits listed in the problem list box on initial evaluation, provide pt/family education and to maximize pt's level of independence in the home and community environment.     Pt's spiritual, cultural and educational needs considered and pt agreeable to plan of care and goals.     Anticipated barriers to physical therapy: advanced Bilateral hip osteoarthritis, caretaker at home.    Goals:   Short Term Goals: 4 weeks  1. Pt will demo good TA muscle contraction for improved deep abdominal strength and lumbar stability.  2. Increase lumbar ROM to 100% of WNL in order to improve functional mobility.    3. Pt will demo good sitting/standing posture and body mechanics for improved spine health and decreased risk of future injury.  4. Pt to tolerate HEP to improve ROM and independence with ADL's.     Long Term Goals: 8 weeks  1. Report decreased low back pain without radiculopathy to </= 2/10 with squatting and prolonged standing/sitting to increase tolerance for ADLs and increased  QoL.  2. Increase strength to >/= 4/5 MMT grade for core and BLE to increase tolerance for ADL and work activities.  3. Pt will demonstrate negative MARCIE testing and mild positive FADIR testing for improve hip mobility and improved hip health.  4. Patient's goal: to have less pain.  5. Pt will report at </= 18% impaired on FOTO lumbar score for low back pain disability to demonstrate decrease in disability and improvement in back pain.    PLAN     Continue with Plan Of Care and progress toward PT goals.    Garry Pemberton, PTA

## 2022-03-09 ENCOUNTER — OFFICE VISIT (OUTPATIENT)
Dept: FAMILY MEDICINE | Facility: CLINIC | Age: 77
End: 2022-03-09
Payer: MEDICARE

## 2022-03-09 VITALS
SYSTOLIC BLOOD PRESSURE: 138 MMHG | HEART RATE: 68 BPM | BODY MASS INDEX: 26.77 KG/M2 | DIASTOLIC BLOOD PRESSURE: 71 MMHG | WEIGHT: 202 LBS | HEIGHT: 73 IN | OXYGEN SATURATION: 98 %

## 2022-03-09 DIAGNOSIS — I10 PRIMARY HYPERTENSION: Primary | ICD-10-CM

## 2022-03-09 DIAGNOSIS — M79.602 LEFT ARM PAIN: ICD-10-CM

## 2022-03-09 PROCEDURE — 99443 PR PHYSICIAN TELEPHONE EVALUATION 21-30 MIN: CPT | Mod: 95,,, | Performed by: FAMILY MEDICINE

## 2022-03-09 PROCEDURE — 1125F AMNT PAIN NOTED PAIN PRSNT: CPT | Mod: CPTII,95,, | Performed by: FAMILY MEDICINE

## 2022-03-09 PROCEDURE — 3078F PR MOST RECENT DIASTOLIC BLOOD PRESSURE < 80 MM HG: ICD-10-PCS | Mod: CPTII,95,, | Performed by: FAMILY MEDICINE

## 2022-03-09 PROCEDURE — 3072F LOW RISK FOR RETINOPATHY: CPT | Mod: CPTII,95,, | Performed by: FAMILY MEDICINE

## 2022-03-09 PROCEDURE — 1101F PT FALLS ASSESS-DOCD LE1/YR: CPT | Mod: CPTII,95,, | Performed by: FAMILY MEDICINE

## 2022-03-09 PROCEDURE — 1101F PR PT FALLS ASSESS DOC 0-1 FALLS W/OUT INJ PAST YR: ICD-10-PCS | Mod: CPTII,95,, | Performed by: FAMILY MEDICINE

## 2022-03-09 PROCEDURE — 3075F SYST BP GE 130 - 139MM HG: CPT | Mod: CPTII,95,, | Performed by: FAMILY MEDICINE

## 2022-03-09 PROCEDURE — 99443 PR PHYSICIAN TELEPHONE EVALUATION 21-30 MIN: ICD-10-PCS | Mod: 95,,, | Performed by: FAMILY MEDICINE

## 2022-03-09 PROCEDURE — 1125F PR PAIN SEVERITY QUANTIFIED, PAIN PRESENT: ICD-10-PCS | Mod: CPTII,95,, | Performed by: FAMILY MEDICINE

## 2022-03-09 PROCEDURE — 3075F PR MOST RECENT SYSTOLIC BLOOD PRESS GE 130-139MM HG: ICD-10-PCS | Mod: CPTII,95,, | Performed by: FAMILY MEDICINE

## 2022-03-09 PROCEDURE — 3078F DIAST BP <80 MM HG: CPT | Mod: CPTII,95,, | Performed by: FAMILY MEDICINE

## 2022-03-09 PROCEDURE — 1159F PR MEDICATION LIST DOCUMENTED IN MEDICAL RECORD: ICD-10-PCS | Mod: CPTII,95,, | Performed by: FAMILY MEDICINE

## 2022-03-09 PROCEDURE — 3072F PR LOW RISK FOR RETINOPATHY: ICD-10-PCS | Mod: CPTII,95,, | Performed by: FAMILY MEDICINE

## 2022-03-09 PROCEDURE — 3288F FALL RISK ASSESSMENT DOCD: CPT | Mod: CPTII,95,, | Performed by: FAMILY MEDICINE

## 2022-03-09 PROCEDURE — 3288F PR FALLS RISK ASSESSMENT DOCUMENTED: ICD-10-PCS | Mod: CPTII,95,, | Performed by: FAMILY MEDICINE

## 2022-03-09 PROCEDURE — 1159F MED LIST DOCD IN RCRD: CPT | Mod: CPTII,95,, | Performed by: FAMILY MEDICINE

## 2022-03-09 RX ORDER — CELECOXIB 200 MG/1
200 CAPSULE ORAL DAILY PRN
Qty: 90 CAPSULE | Refills: 0 | Status: SHIPPED | OUTPATIENT
Start: 2022-03-09 | End: 2022-05-09

## 2022-03-09 NOTE — PROGRESS NOTES
The patient location is: [place]  The chief complaint leading to consultation is:  Right arm pain    Visit type: audiovisual    Face to Face time with patient: 21 minutes of total time spent on the encounter, which includes face to face time and non-face to face time preparing to see the patient (eg, review of tests), Obtaining and/or reviewing separately obtained history, Documenting clinical information in the electronic or other health record, Independently interpreting results (not separately reported) and communicating results to the patient/family/caregiver, or Care coordination (not separately reported).         Each patient to whom he or she provides medical services by telemedicine is:  (1) informed of the relationship between the physician and patient and the respective role of any other health care provider with respect to management of the patient; and (2) notified that he or she may decline to receive medical services by telemedicine and may withdraw from such care at any time.    76 years old male who was evaluated by telemedicine.  Patient with significant left arm pain.  Patient with pain for the last month.  The pain is 9/10 of intensity on and off aggravated with activity better with rest.  Patient is requesting occupational therapy to help with this pain.  Patient was using Celebrex with partial improvement of the symptoms.  Patient already on physical therapy for hip pain..        Notes: Diagnoses and all orders for this visit:    Primary hypertension    Left arm pain  -     OT evaluation; Future  -     celecoxib (CELEBREX) 200 MG capsule; Take 1 capsule (200 mg total) by mouth daily as needed for Pain.    Continue monitoring blood pressure at home, low sodium diet.      Answers for HPI/ROS submitted by the patient on 3/8/2022  Onset: 1 to 4 weeks ago  Frequency: constantly  Progression since onset: waxing and waning  Pain location: gluteal  Pain quality: stabbing  Radiates to: does not  radiate  Pain - numeric: 9/10  Pain is: the same all the time  Aggravated by: bending, position, sitting  Pain severity: severe  Improvement on treatment: moderate

## 2022-03-11 ENCOUNTER — CLINICAL SUPPORT (OUTPATIENT)
Dept: REHABILITATION | Facility: HOSPITAL | Age: 77
End: 2022-03-11
Payer: MEDICARE

## 2022-03-11 DIAGNOSIS — R53.1 DECREASED STRENGTH: ICD-10-CM

## 2022-03-11 DIAGNOSIS — M25.659 DECREASED RANGE OF HIP MOVEMENT, UNSPECIFIED LATERALITY: Primary | ICD-10-CM

## 2022-03-11 PROCEDURE — 97110 THERAPEUTIC EXERCISES: CPT | Mod: PN,CQ

## 2022-03-11 NOTE — PROGRESS NOTES
"[facility]   Physical Therapy Treatment Note     Name: Shannan Norman  Clinic Number: [id number]    Therapy Diagnosis:   Encounter Diagnoses   Name Primary?    Decreased range of hip movement, unspecified laterality Yes    Decreased strength      Physician: Kit Potter,     Visit Date: 3/11/2022    Physician Orders: PT Eval and Treat  Medical Diagnosis from Referral:   M47.816 (ICD-10-CM) - Lumbar spondylosis   M16.0 (ICD-10-CM) - Primary osteoarthritis of both hips      Evaluation Date: 2022  Authorization Period Expiration: 2023  Plan of Care Expiration: 2021  Visit # / Visits authorized: 3/50  FOTO: 3/5  PTA Visit #:       Time In: 2:00 PM  Time Out: 2:48 PM  Total Billable Time: 48 minutes (TE-3)     Precautions: Standard and Gout, A-fib, skin cancer, DM, HTN    SUBJECTIVE     Pt reports: he doesn't have any pain with sitting still as long as it is not for very long, but when he is moving around or doing things around his house his pain is 6-7.  He was compliant with home exercise program.  Response to previous treatment: no problems.  Functional change: not at this time.    Pain: 6-7/10  Location: right hip.     OBJECTIVE     Objective Measures updated at progress report unless specified.     Treatment     Shannan received the treatments listed below:      therapeutic exercises to develop strength, endurance, ROM, flexibility, posture and core stabilization for 48 minutes including:    Calf stretch: 10"x5  straight leg raise: x10 Bilateral   Bridges: 15x double leg  single leg bridge: x15 Bilateral   Supine hip abd: x20 green theraband   Supine hip add: 3"x20 with ball    Long arc quad: 2x10 Bilateral  Fitter calf strech: 3x20"  Mini squats: x15   Leg press: 6.5 plates 3x10 sled-3  Standing hip abd: 2x10 Bilateral   Standing hip extension: 2x10 Bilateral  Side steppin laps in //  Toe taps: x15 Bilateral on 4 inch step  Step ups: x10 Bilateral level " "1  Hurdles step-to: 5 hurdles in //  Tandem walk: 2 laps in //  Tandem stance: 2x15" Bilateral      Patient Education and Home Exercises     Home Exercises Provided and Patient Education Provided     Education provided:   - keep exercises in a pain free range.    Written Home Exercises Provided: yes. Exercises were reviewed and Shannan was able to demonstrate them prior to the end of the session.  Shannan demonstrated good  understanding of the education provided. See EMR under Patient Instructions for exercises provided during therapy sessions    ASSESSMENT     Shannan is a 76 y.o. male referred to outpatient Physical Therapy at [facility] with a medical diagnosis of Primary osteoarthritis of both hips and Lumbar spondylosis.  Patient requires minimal assistance with transfers on/off mat.  Patient attempted to perform hurdles with step through but was not able to.  Patient had some minor difficulty lifting his right leg to clear the hurdles with step-to pattern but after the third owen he was able to perform without circumducting right lower extremity.  Patient was fatigued following addition of new exercises along with today's progressions.    Shannan Is progressing well towards his goals.   Pt prognosis is Fair.     Pt will continue to benefit from skilled outpatient physical therapy to address the deficits listed in the problem list box on initial evaluation, provide pt/family education and to maximize pt's level of independence in the home and community environment.     Pt's spiritual, cultural and educational needs considered and pt agreeable to plan of care and goals.     Anticipated barriers to physical therapy: advanced Bilateral hip osteoarthritis, caretaker at home.    Goals:   Short Term Goals: 4 weeks  1. Pt will demo good TA muscle contraction for improved deep abdominal strength and lumbar stability.  2. Increase lumbar ROM to 100% of WNL in order to improve functional mobility.    3. Pt will demo good " sitting/standing posture and body mechanics for improved spine health and decreased risk of future injury.  4. Pt to tolerate HEP to improve ROM and independence with ADL's.     Long Term Goals: 8 weeks  1. Report decreased low back pain without radiculopathy to </= 2/10 with squatting and prolonged standing/sitting to increase tolerance for ADLs and increased QoL.  2. Increase strength to >/= 4/5 MMT grade for core and BLE to increase tolerance for ADL and work activities.  3. Pt will demonstrate negative MARCIE testing and mild positive FADIR testing for improve hip mobility and improved hip health.  4. Patient's goal: to have less pain.  5. Pt will report at </= 18% impaired on FOTO lumbar score for low back pain disability to demonstrate decrease in disability and improvement in back pain.    PLAN     Continue with Plan Of Care and progress toward PT goals.    Garry Pemberton, PTA

## 2022-03-15 ENCOUNTER — CLINICAL SUPPORT (OUTPATIENT)
Dept: REHABILITATION | Facility: HOSPITAL | Age: 77
End: 2022-03-15
Payer: MEDICARE

## 2022-03-15 ENCOUNTER — OFFICE VISIT (OUTPATIENT)
Dept: ORTHOPEDICS | Facility: CLINIC | Age: 77
End: 2022-03-15
Payer: MEDICARE

## 2022-03-15 VITALS — BODY MASS INDEX: 26.76 KG/M2 | WEIGHT: 201.94 LBS | HEIGHT: 73 IN

## 2022-03-15 DIAGNOSIS — M16.0 PRIMARY OSTEOARTHRITIS OF BOTH HIPS: Primary | ICD-10-CM

## 2022-03-15 DIAGNOSIS — M25.60 STIFFNESS IN JOINT: ICD-10-CM

## 2022-03-15 DIAGNOSIS — M21.372 LEFT FOOT DROP: ICD-10-CM

## 2022-03-15 DIAGNOSIS — M25.659 DECREASED RANGE OF HIP MOVEMENT, UNSPECIFIED LATERALITY: Primary | ICD-10-CM

## 2022-03-15 DIAGNOSIS — R53.1 DECREASED STRENGTH: ICD-10-CM

## 2022-03-15 PROCEDURE — 3072F PR LOW RISK FOR RETINOPATHY: ICD-10-PCS | Mod: CPTII,S$GLB,, | Performed by: ORTHOPAEDIC SURGERY

## 2022-03-15 PROCEDURE — 99999 PR PBB SHADOW E&M-EST. PATIENT-LVL IV: CPT | Mod: PBBFAC,,, | Performed by: ORTHOPAEDIC SURGERY

## 2022-03-15 PROCEDURE — 97110 THERAPEUTIC EXERCISES: CPT | Mod: PN,CQ

## 2022-03-15 PROCEDURE — 99214 OFFICE O/P EST MOD 30 MIN: CPT | Mod: S$GLB,,, | Performed by: ORTHOPAEDIC SURGERY

## 2022-03-15 PROCEDURE — 1125F AMNT PAIN NOTED PAIN PRSNT: CPT | Mod: CPTII,S$GLB,, | Performed by: ORTHOPAEDIC SURGERY

## 2022-03-15 PROCEDURE — 1125F PR PAIN SEVERITY QUANTIFIED, PAIN PRESENT: ICD-10-PCS | Mod: CPTII,S$GLB,, | Performed by: ORTHOPAEDIC SURGERY

## 2022-03-15 PROCEDURE — 99999 PR PBB SHADOW E&M-EST. PATIENT-LVL IV: ICD-10-PCS | Mod: PBBFAC,,, | Performed by: ORTHOPAEDIC SURGERY

## 2022-03-15 PROCEDURE — 3072F LOW RISK FOR RETINOPATHY: CPT | Mod: CPTII,S$GLB,, | Performed by: ORTHOPAEDIC SURGERY

## 2022-03-15 PROCEDURE — 99214 PR OFFICE/OUTPT VISIT, EST, LEVL IV, 30-39 MIN: ICD-10-PCS | Mod: S$GLB,,, | Performed by: ORTHOPAEDIC SURGERY

## 2022-03-15 PROCEDURE — 1101F PR PT FALLS ASSESS DOC 0-1 FALLS W/OUT INJ PAST YR: ICD-10-PCS | Mod: CPTII,S$GLB,, | Performed by: ORTHOPAEDIC SURGERY

## 2022-03-15 PROCEDURE — 1101F PT FALLS ASSESS-DOCD LE1/YR: CPT | Mod: CPTII,S$GLB,, | Performed by: ORTHOPAEDIC SURGERY

## 2022-03-15 PROCEDURE — 3288F PR FALLS RISK ASSESSMENT DOCUMENTED: ICD-10-PCS | Mod: CPTII,S$GLB,, | Performed by: ORTHOPAEDIC SURGERY

## 2022-03-15 PROCEDURE — 3288F FALL RISK ASSESSMENT DOCD: CPT | Mod: CPTII,S$GLB,, | Performed by: ORTHOPAEDIC SURGERY

## 2022-03-15 NOTE — PROGRESS NOTES
"[facility]   Physical Therapy Treatment Note     Name: Shannan Norman  Clinic Number: [id number]    Therapy Diagnosis:   Encounter Diagnoses   Name Primary?    Decreased range of hip movement, unspecified laterality Yes    Decreased strength      Physician: Kit Potter,     Visit Date: 3/15/2022    Physician Orders: PT Eval and Treat  Medical Diagnosis from Referral:   M47.816 (ICD-10-CM) - Lumbar spondylosis   M16.0 (ICD-10-CM) - Primary osteoarthritis of both hips      Evaluation Date: 2022  Authorization Period Expiration: 2023  Plan of Care Expiration: 2021  Visit # / Visits authorized:   FOTO:   PTA Visit #: 3/5      Time In: 11:00 AM  Time Out: 12:00 PM  Total Billable Time: 30 1:1 and 30 supervised minutes (TE-2)     Precautions: Standard and Gout, A-fib, skin cancer, DM, HTN    SUBJECTIVE     Pt reports: he doesn't have any pain with sitting still as long as it is not for very long, but when he is moving around or doing things around his house his pain is 6-7.  He was compliant with home exercise program.  Response to previous treatment: no problems.  Functional change: not at this time.    Pain: 6-7/10  Location: right hip.     OBJECTIVE     Objective Measures updated at progress report unless specified.     Treatment     Shannan received the treatments listed below:      therapeutic exercises to develop strength, endurance, ROM, flexibility, posture and core stabilization for 30 1:1 and 30 supervised minutes including:    Calf stretch: 10"x5  straight leg raise: x10 Bilateral   Bridges: 2x10 double leg  single leg bridge: x15 Bilateral   Supine hip abd: x30 green theraband   Supine hip add: 3"x30 with ball    Long arc quad: 3x10 Bilateral  Fitter calf strech: 3x30"  Mini squats: x15   Leg press: 6.5 plates 3x10 sled-3  Standing hip abd: 3x10 Bilateral   Standing hip extension: 3x10 Bilateral  Side steppin laps in //  Toe taps: x15 Bilateral on 4 " "inch step  Step ups: x10 Bilateral level 1  Hurdles step-to: 5 hurdles in //  Tandem walk: 2 laps in //  Tandem stance: 2x15" Bilateral      Patient Education and Home Exercises     Home Exercises Provided and Patient Education Provided     Education provided:   - keep exercises in a pain free range.    Written Home Exercises Provided: yes. Exercises were reviewed and Shannan was able to demonstrate them prior to the end of the session.  Shannan demonstrated good  understanding of the education provided. See EMR under Patient Instructions for exercises provided during therapy sessions    ASSESSMENT     Shannan is a 76 y.o. male referred to outpatient Physical Therapy at [facility] with a medical diagnosis of Primary osteoarthritis of both hips and Lumbar spondylosis.  Patient requires minimal assistance with transfers on/off mat.  Patient was able to perform hurdles with step through with minimal circumducting right lower extremity.  Patient was fatigued following today's progressions.    Shannan Is progressing well towards his goals.   Pt prognosis is Fair.     Pt will continue to benefit from skilled outpatient physical therapy to address the deficits listed in the problem list box on initial evaluation, provide pt/family education and to maximize pt's level of independence in the home and community environment.     Pt's spiritual, cultural and educational needs considered and pt agreeable to plan of care and goals.     Anticipated barriers to physical therapy: advanced Bilateral hip osteoarthritis, caretaker at home.    Goals:   Short Term Goals: 4 weeks  1. Pt will demo good TA muscle contraction for improved deep abdominal strength and lumbar stability.  2. Increase lumbar ROM to 100% of WNL in order to improve functional mobility.    3. Pt will demo good sitting/standing posture and body mechanics for improved spine health and decreased risk of future injury.  4. Pt to tolerate HEP to improve ROM and independence with " ADL's.     Long Term Goals: 8 weeks  1. Report decreased low back pain without radiculopathy to </= 2/10 with squatting and prolonged standing/sitting to increase tolerance for ADLs and increased QoL.  2. Increase strength to >/= 4/5 MMT grade for core and BLE to increase tolerance for ADL and work activities.  3. Pt will demonstrate negative MARCIE testing and mild positive FADIR testing for improve hip mobility and improved hip health.  4. Patient's goal: to have less pain.  5. Pt will report at </= 18% impaired on FOTO lumbar score for low back pain disability to demonstrate decrease in disability and improvement in back pain.    PLAN     Continue with Plan Of Care and progress toward PT goals.    Garry Pemberton, PTA

## 2022-03-15 NOTE — PROGRESS NOTES
Subjective:     HPI:   Shannan Norman is a 76 y.o. male who presents for eval B hip pain R>L   Was referred for R TKA by Dr Oates    Patient describes about 4 weeks of bilateral hip pain he says this centered around difficulty doing yoga exercises with lying on his back lifting his legs up in grabbing his toes.  He says he felt like he had gout in his hips but his uric acid was checked and was normal.  He had bilateral intra-articular hip injections on  and got about an hour to of minimal relief.    He predominantly complains of buttock pain today denies any groin anterior thigh radicular pain or paresthesias and denies any knee pain.  He does describe some new left arm numbness lately as well.    Medications: Celebrex has been on for 2-3 weeks and helps alot, Tylenol     Injections: 2022 Bilateral hip intra-articular CSI with Dr. Potter, a few weeks of relief;    Physical Therapy: Yes, the patient is currently enrolled in OP-PT @ [facility] for at least 3 weeks, the patient stated that the OP-PT helps      Assistive Devices: None     Walkin - 5 blocks    Limitations: General walking, difficulty going up/down steps, difficulty sleeping at night , difficulty rising from sitting , difficulty driving for long periods of time and difficulty standing for long periods of time        Occupation: Retired - The patient retired from working at [employer] in [place].     Social support: The patient stated that they live at home with their wife. The aptient stated that his wife currently has Alzheimers and is the primary care taker. The patient stated that he has 2 sons (live in [place] and [place]) and they may be able to come down and help after surgery.        ROS:  The updated medical history is in the chart and has been reviewed. A review of systems is updated and there is no reported vision changes, ear/nose/mouth/throat complaints,  chest pain, shortness of breath, abdominal pain,  urological complaints, fevers or chills, psychiatric complaints. Musculoskeletal and neurologcial symptoms are as documented. All other systems are negative.      Objective:   Exam:  There were no vitals filed for this visit.  Body mass index is 26.64 kg/m².    Physical examination included assessment of the patient's general appearance with particular attention to development, nutrition, body habitus, attention to grooming, and any evidence of distress.  Constitutional: The patient is a well-developed, well-nourished patient in no acute distress.   Cardiovascular: Vascular examination included warmth and capillary refill as well inspection for edema and assessment of pedal pulses. Pulses are palpable and regular.  Musculoskeletal: Gait was assessed as to whether it was steady, non-antalgic, and/or required the use of an assist device. The patient was also asked to walk independently and get onto the examination table.  Skin: The skin was examined for any obvious rashes or lesions in the extremity.  Neurologic: Sensation is intact to light touch in the extremity. The patient has good coordination without hyperreflexia and is alert and oriented to person, place and time and has normal mood and affect.     All of the above were examined and found to be within normal limits except for the following pertinent clinical findings:    He he has a slow gait but no significant antalgia or limp does look like he has got a very stiff spine and some low level shuffling, he is nontender palpation over the greater trochanters.  He has got some groin discomfort with active straight leg raise on the right.  Both hips 0-90 flexion 20 abduction 20 abduction 20 external 0 internal rotation he has some discomfort on the right more than left while supine but no pain with internal-external range of motion of his hip joints while seated.  Kind of hard to localize whether this is the hip joint or his back.  He has got bilateral 5 degree  knee flexion contractures but no pain with knee range of motion nontender palpation mediolateral patellofemoral joint lines no significant effusions knees are stable anterior-posterior varus and valgus stresses with flexion contractures but no extensor lags    He has got 4/5 left-sided tib ant and EHL strength.  He has some cogwheeling rigidity in his knees.  He has a very stiff almost frozen left shoulder      Imaging:    HIP R ARTHRITIS        Indication:  Right hip pain  Exam Ordered: Radiographs include an anteroposterior pelvis, an anteroposterior and lateral view of the proximal femur including the hip joint.  Details of Examination: Exam shows evidence of joint space narrowing, osteophyte formation, and subchondral sclerosis, all consistent with degenerative arthritis of the hip.  No other significant findings are noted.  Impression:  Degenerative Arthritis, Right Hip, HIP L ARTHRITIS         Indication:  Left hip pain  Exam Ordered: Radiographs include an anteroposterior pelvis, an anteroposterior and lateral view of the proximal femur including the hip joint.  Details of Examination: Exam shows evidence of joint space narrowing, osteophyte formation, and subchondral sclerosis, all consistent with degenerative arthritis of the hip.  No other significant findings are noted.  Impression:  Degenerative Arthritis, Left Hip, KNEE R ARTHRITIS    Indication:  Right knee pain  Exam Ordered: Radiographs of the right knee include a standing anteroposterior view, a standing posterioanterior view, a lateral view in full flexion, and a sunrise view  Details of Examination: Exam shows evidence of joint space narrowing, osteophyte formation, and subchondral sclerosis, all consistent with degenerative arthritis of the knee.  No other significant findings are noted.  Impression:  Degenerative Arthritis, Right Knee and KNEE L ARTHRITIS     Indication:  Left knee pain  Exam Ordered: Radiographs of the left knee include a  standing anteroposterior view, a standing posterioanterior view, a lateral view in full flexion, and a sunrise view  Details of Examination: Exam shows evidence of joint space narrowing, osteophyte formation, and subchondral sclerosis, all consistent with degenerative arthritis of the knee.  No other significant findings are noted.  Impression:  Degenerative Arthritis, Left Knee    He has got diffuse arthritic changes throughout his skeleton.    Grade 2/3 symmetric B hips  Grade 3 B varus knees  Significant lumbar degenerative changes      Assessment:       ICD-10-CM ICD-9-CM   1. Primary osteoarthritis of both hips  M16.0 715.15   2. Stiffness in joint  M25.60 719.50   3. Left foot drop  M21.372 736.79      DM 5.9  Chronic gout  Sz d/o   A fib, pacemaker, aortic valve insufficiency, ASA 81mg daily  CKD 1.0/>60  BPH on flomax  Varicose veins BLE      B hip and knee arthritis  degen spine with L TA and EHL weakness  L stiff ?frozen shoulder  ?movement disorder        Plan:       Certainly marko is a lot of positive musculoskeletal findings but I do not think he has got a lot of pain that localizes to his hip joints or knee joints to justify arthroplasty at this time.  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    Physical therapy has been ordered I very much agree with pursuing therapy for all of the above. Continue celebrex which is helping    Recommend back and spine evaluation for a concern about some spine issues and potential left footdrop.    Concerned about some movement disorder findings potentially some shuffling gait but difficult to tell due to some left-sided footdrop issues does have some stiffness rigidity in I think some cogwheeling with his knees so recommend neurology evaluation    In addition to  therapy could see Dr. Potter for his left shoulder    Who be happy to see him back in 6 weeks for follow-up and repeat exam with a standing AP pelvis x-ray    Orders Placed This Encounter   Procedures    Ambulatory referral/consult to [facility]     Standing Status:   Future     Number of Occurrences:   1     Standing Expiration Date:   4/15/2023     Referral Priority:   Routine     Referral Type:   Consultation     Referral Reason:   Specialty Services Required     Number of Visits Requested:   1    Ambulatory referral/consult to Neurology     Standing Status:   Future     Number of Occurrences:   1     Standing Expiration Date:   4/15/2023     Referral Priority:   Routine     Referral Type:   Consultation     Referral Reason:   Specialty Services Required     Requested Specialty:   Neurology     Number of Visits Requested:   1             Past Medical History:   Diagnosis Date    Allergy     Arthritis     Atrial fibrillation     Atrial flutter 2011    ablation    Basal cell carcinoma     Cancer     Cataract     CKD (chronic kidney disease) stage 3, GFR 30-59 ml/min 8/13/2019    Diabetes mellitus     Dry eye syndrome     Gout, unspecified     High cholesterol     History of shingles     Hypertension     Melanoma     Seizures        Past Surgical History:   Procedure Laterality Date    ABLATION N/A 9/11/2018    Procedure: ABLATION;  Surgeon: Hany Sanchez MD;  Location: [facility] CATH LAB;  Service: Cardiology;  Laterality: N/A;  AFL, ISABELLE, RFA, ELODIA, MAC, DM, 3 PREP    ABLATION OF DYSRHYTHMIC FOCUS      ADENOIDECTOMY      CARDIAC PACEMAKER PLACEMENT      no defib    CATARACT EXTRACTION W/  INTRAOCULAR LENS IMPLANT Right 7/28/2020    Procedure: EXTRACTION, CATARACT, WITH IOL INSERTION;  Surgeon: Andrés Morse MD;  Location: [facility] OR;  Service: Ophthalmology;  Laterality: Right;    CATARACT EXTRACTION W/  INTRAOCULAR LENS IMPLANT Left 8/11/2020    Procedure: EXTRACTION,  CATARACT, WITH IOL INSERTION;  Surgeon: Andrés Morse MD;  Location: [facility] OR;  Service: Ophthalmology;  Laterality: Left;    COLONOSCOPY N/A 1/2/2019    Procedure: COLONOSCOPY Suprep;  Surgeon: Cindy Solorzano MD;  Location: [facility] ENDO;  Service: Endoscopy;  Laterality: N/A;    GANGLION CYST EXCISION      left neck    HERNIA REPAIR  2013    umbilical    TENDON REPAIR Right     wrist    TONSILLECTOMY      varicose veins      several sx  bilateral    VASECTOMY      VEIN SURGERY         Family History   Problem Relation Age of Onset    Diabetes Mother     COPD Father     Heart disease Father     Arthritis Sister     Heart attack Brother     Heart disease Brother     Diabetes Brother     No Known Problems Maternal Aunt     No Known Problems Maternal Uncle     No Known Problems Paternal Aunt     No Known Problems Paternal Uncle     No Known Problems Maternal Grandmother     No Known Problems Maternal Grandfather     No Known Problems Paternal Grandmother     No Known Problems Paternal Grandfather     No Known Problems Son     Cancer Sister         lymph node, breast    No Known Problems Sister     No Known Problems Son     Amblyopia Neg Hx     Blindness Neg Hx     Cataracts Neg Hx     Glaucoma Neg Hx     Hypertension Neg Hx     Macular degeneration Neg Hx     Retinal detachment Neg Hx     Strabismus Neg Hx     Stroke Neg Hx     Thyroid disease Neg Hx     Prostate cancer Neg Hx     Kidney disease Neg Hx     Melanoma Neg Hx        Social History     Socioeconomic History    Marital status:    Occupational History     Employer: [employer]   Tobacco Use    Smoking status: Never Smoker    Smokeless tobacco: Never Used   Substance and Sexual Activity    Alcohol use: Yes     Alcohol/week: 7.0 - 14.0 standard drinks     Types: 7 - 14 Glasses of wine per week    Drug use: No    Sexual activity: Yes     Partners: Female     Social Determinants of Health      Financial Resource Strain: Low Risk     Difficulty of Paying Living Expenses: Not hard at all   Food Insecurity: No Food Insecurity    Worried About Running Out of Food in the Last Year: Never true    Ran Out of Food in the Last Year: Never true   Transportation Needs: No Transportation Needs    Lack of Transportation (Medical): No    Lack of Transportation (Non-Medical): No   Physical Activity: Inactive    Days of Exercise per Week: 0 days    Minutes of Exercise per Session: 0 min   Stress: Stress Concern Present    Feeling of Stress : To some extent   Social Connections: Unknown    Frequency of Communication with Friends and Family: More than three times a week    Frequency of Social Gatherings with Friends and Family: Three times a week    Active Member of Clubs or Organizations: Yes    Attends Club or Organization Meetings: More than 4 times per year    Marital Status:    Housing Stability: Low Risk     Unable to Pay for Housing in the Last Year: No    Number of Places Lived in the Last Year: 1    Unstable Housing in the Last Year: No

## 2022-03-17 ENCOUNTER — CLINICAL SUPPORT (OUTPATIENT)
Dept: REHABILITATION | Facility: HOSPITAL | Age: 77
End: 2022-03-17
Payer: MEDICARE

## 2022-03-17 DIAGNOSIS — R53.1 WEAKNESS: ICD-10-CM

## 2022-03-17 DIAGNOSIS — M25.60 STIFFNESS DUE TO IMMOBILITY: ICD-10-CM

## 2022-03-17 DIAGNOSIS — M79.602 LEFT ARM PAIN: ICD-10-CM

## 2022-03-17 DIAGNOSIS — Z74.09 STIFFNESS DUE TO IMMOBILITY: ICD-10-CM

## 2022-03-17 DIAGNOSIS — M79.602 PAIN OF LEFT UPPER EXTREMITY: ICD-10-CM

## 2022-03-17 PROCEDURE — 97165 OT EVAL LOW COMPLEX 30 MIN: CPT | Mod: PN

## 2022-03-17 NOTE — PLAN OF CARE
[facility] Occupational Therapy  Initial Evaluation     Date: 3/17/2022  Patient: Shannan Norman  Chart Number: [id number]    Therapy Diagnosis:   Encounter Diagnoses   Name Primary?    Left arm pain     Pain of left upper extremity     Weakness     Stiffness due to immobility      Physician: Nitin Banks*    Physician Orders: OT evaluate and treat  Medical Diagnosis: M79.602 (ICD-10-CM) - Left arm pain  Evaluation Date: 3/17/2022  Insurance Authorization period Expiration: 12/31/2022  Plan of Care Expiration Period: 5/13/2022  Next MD appointment: 3/28/2022    Visit # / Visits Authorized: 1 / 50  Time In:1:30  Time Out: 2:10  Total Billable Time: 40 minutes    Precautions: Standard and Fall    Subjective     Involved Side: Left  Dominant Side: Right  Date of Onset: 2 weeks  Mechanism of Injury: insidious onset with progressively worsening left arm pain  History of Current Condition: pt presents to clinic today with decreased range of motion, weakness and pain all of which limit him functionally  Surgical Procedure: none  Imaging: none   Previous Therapy: years ago    Patient's Goals for Therapy: to decrease pain and increase functional use     Pain:  Functional Pain Scale Rating 0-10:   4/10 on average  0/10 at best  9/10 at worst  Location: supraspinatus Left side  Description: Aching  Aggravating Factors: Laying, Night Time and Lifting  Easing Factors: pain medication    Occupation:  Retired    Functional Limitations/Social History:    Previous functional status includes: Independent with all ADLs.     Current FunctionalStatus   Home/Living environment : lives with their spouse-caregiver for wife who has alzheimer's       Limitation of Functional Status as follows:   ADLs/IADLs:     - Feeding: Independent    - Bathing: Independent    - Dressing/Grooming: Independent    - Driving: Independent     Leisure: difficulty with taking care of wife, and performing work tasks, difficulty  cooking      Past Medical History/Physical Systems Review:   Shannan Norman  has a past medical history of Allergy, Arthritis, Atrial fibrillation, Atrial flutter, Basal cell carcinoma, Cancer, Cataract, CKD (chronic kidney disease) stage 3, GFR 30-59 ml/min, Diabetes mellitus, Dry eye syndrome, Gout, unspecified, High cholesterol, History of shingles, Hypertension, Melanoma, and Seizures.    Shannan Norman  has a past surgical history that includes Tonsillectomy; varicose veins; Ablation of dysrhythmic focus; Ganglion cyst excision; Hernia repair (2013); Vasectomy; Ablation (N/A, 9/11/2018); Colonoscopy (N/A, 1/2/2019); Tendon repair (Right); Adenoidectomy; Vein Surgery; Cardiac pacemaker placement; Cataract extraction w/  intraocular lens implant (Right, 7/28/2020); and Cataract extraction w/  intraocular lens implant (Left, 8/11/2020).    Shannan has a current medication list which includes the following prescription(s): allopurinol, ascorbic acid (vitamin c), atorvastatin, azelastine, b complex vitamins, benazepril, celecoxib, restasis, econazole nitrate, glucosamine-chondroitin, levocetirizine, multivitamin, and tamsulosin.    Review of patient's allergies indicates:  No Known Allergies       Objective     Sensation Test: experiences tingling experiences numbness in bilateral ulnar nerve distribution    Observation/Inspection: rounded shoulders, forward head    Range of Motion/Strength:   Shoulder  Left   Right  Pain/Dysfunction with Movement    AROM PROM MMT AROM PROM MMT    Flexion 75 WNL 3-/5 WNL WNL WNL    Extension 60 WNL 4/5 WNL WNL WNL    Abduction 70 WNL 3-/5 WNL WNL WNL    HorizAdduction 25 WNL 4-/5 WNL WNL WNL    Internal rotation L4 75 4-/5 WNL WNL WNL    ER at 90° abd 55 WNL 3-/5 WNL WNL WNL    ER at 0° abd 80 WNL 4-/5 WNL WNL WNL    NT=Not tested  ROM Comments: Pain at end range    Painful Arc: noted    Tenderness upon Palpation:      Positive: Bicipital Groove and Supraspinatus Region    Special  Tests:  AC Joint Left Right   AC Joint Compression Test - +   Empty Can Test - +   Drop Arm test - +   Champagne Toast - +   Resisted External Rotation 45* Internal Roatation - -   Bear Hug - -   Raymon's - -   Hawkin's Kenndy - +   Neer's Test - +   Yergason's - -     Scapular Control/Dyskinesis:    Normal / Subtle / Obvious  Comments    Left  obvious -    Right  Normal -       CMS Impairment/Limitation/Restriction for FOTO Shoulder Survey    Therapist reviewed FOTO scores for Shannan Norman on 3/17/2022.   FOTO documents entered into [employer] - see Media section.    Limitation Score: *FOTO down at evaluation will issue at follow up  Category: Self Care         Treatment     Home Exercise Program/Education:  Issued HEP (see patient instructions in EMR) and educated on modality use for pain management . Exercises were reviewed and Shannan was able to demonstrate them prior to the end of the session.   Pt received a written copy of exercises to perform at home. Shannan demonstrated good  understanding of the education provided.  Pt was advised to perform these exercises free of pain, and to stop performing them if pain occurs.    Patient/Family Education: role of OT, goals for OT, scheduling/cancellations - pt verbalized understanding. Discussed insurance limitations with patient.    Assessment     Shannan Norman is a 76 y.o. male referred to outpatient occupational therapy and presents with a medical diagnosis of left shoulder pain, resulting in Decreased ROM, Decreased muscle strength, Increased pain and Joint Stiffness and demonstrates limitations as described in the chart below. Following medical record review it is determined that pt will benefit from occupational therapy services in order to maximize pain free and/or functional use of left shoulder. The following goals were discussed with the patient and patient is in agreement with them as to be addressed in the treatment plan. The patient's rehab potential is Good.      Anticipated barriers to occupational therapy: none  Pt has no cultural, educational or language barriers to learning provided.    Profile and History Assessment of Occupational Performance Level of Clinical Decision Making Complexity Score   Occupational Profile:   Shannan Norman is a 76 y.o. male who lives with their spouse and is currently retired. Shannan Norman has difficulty with  housework/household chores and gym routine and caring for wife  affecting his/her daily functional abilities. His/her main goal for therapy is to decrease pain and increase functional use.     Comorbidities:   anxiety, CAD, CKD stage 3, history of cancer and hypertension, hip pain    Medical and Therapy History Review:   Brief               Performance Deficits    Physical:  Joint Mobility  Joint Stability  Muscle Power/Strength  Muscle Endurance  Muscle Tone  Postural Control  Pain    Cognitive:  No Deficits    Psychosocial:    No Deficits     Clinical Decision Making:  low    Assessment Process:  Problem-Focused Assessments    Modification/Need for Assistance:  Not Necessary    Intervention Selection:  Several Treatment Options       low  Based on PMHX, co morbidities , data from assessments and functional level of assistance required with task and clinical presentation directly impacting function.       The following goals were discussed with the patient and patient is in agreement with them as to be addressed in the treatment plan.     Goals:   Short Term Goals to be met in 4 weeks: (4/16/2022)  1) Initiate Hep   2) Pt will increase Left shoulder AROM by 10 degrees grossly for improved performance with overhead ADL's  3) Pt will report 6/10 pain in (Left)shoulder at worst  4) Pt will demonstrate increased MMT to 4-/5 grossly Left shoulder  5) Patient will be able to achieve less than or equal to 50% on FOTO shoulder survey demonstrating overall improved functional ability with upper extremity.     Long Term Goals to be met  by discharge:  1) Independent with HEP  2) Pt will demonstrate (Left) shoulder AROM WNL grossly for [place] with ADL's  3) Pt will demonstrate (Left) shoulder MMT WNL grossly for [place] with functional activities  4) Independent and pain free with ADL's and IADL's  5) Patient will be able to achieve less than or equal to 25% on FOTO shoulder survey demonstrating overall improved functional ability with upper extremity.       Plan   Certification Period/Plan of care expiration: 3/17/2022 to 5/13/2022.    Outpatient Occupational Therapy 2 times weekly for 8 weeks to include the following interventions: Manual therapy/joint mobilizations, Modalities for pain management, Therapeutic exercises/activities., Strengthening, Joint Protection and Energy Conservation.      LIZ Correa

## 2022-03-18 ENCOUNTER — CLINICAL SUPPORT (OUTPATIENT)
Dept: REHABILITATION | Facility: HOSPITAL | Age: 77
End: 2022-03-18
Payer: MEDICARE

## 2022-03-18 DIAGNOSIS — M25.659 DECREASED RANGE OF HIP MOVEMENT, UNSPECIFIED LATERALITY: Primary | ICD-10-CM

## 2022-03-18 DIAGNOSIS — R53.1 DECREASED STRENGTH: ICD-10-CM

## 2022-03-18 PROCEDURE — 97110 THERAPEUTIC EXERCISES: CPT | Mod: PN,CQ

## 2022-03-18 NOTE — PROGRESS NOTES
"[facility]   Physical Therapy Treatment Note     Name: Shannan Norman  Clinic Number: [id number]    Therapy Diagnosis:   Encounter Diagnoses   Name Primary?    Decreased range of hip movement, unspecified laterality Yes    Decreased strength      Physician: Kit Potter,     Visit Date: 3/18/2022    Physician Orders: PT Eval and Treat  Medical Diagnosis from Referral:   M47.816 (ICD-10-CM) - Lumbar spondylosis   M16.0 (ICD-10-CM) - Primary osteoarthritis of both hips      Evaluation Date: 2022  Authorization Period Expiration: 2023  Plan of Care Expiration: 2021  Visit # / Visits authorized:   FOTO:  next visit  PTA Visit #:       Time In: 1:00 PM  Time Out: 1:47 PM  Total Billable Time: 47 minutes (TE-3)     Precautions: Standard and Gout, A-fib, skin cancer, DM, HTN    SUBJECTIVE     Pt reports: hurting a little today but not too bad, 4-5/10.  He was compliant with home exercise program.  Response to previous treatment: no problems.  Functional change: not at this time.    Pain: 4-5/10  Location: right hip.     OBJECTIVE     Objective Measures updated at progress report unless specified.     Treatment     Shannan received the treatments listed below:      therapeutic exercises to develop strength, endurance, ROM, flexibility, posture and core stabilization for 47 minutes including:    Calf stretch: 15"x5  Straight leg raise: 2x10 Bilateral    Bridges: 2x10 double leg  Single leg bridge: x15 Bilateral   Supine hip abd: x30 green theraband   Supine hip add: 3"x30 with ball    Long arc quad: 2.5# 2x10 Bilateral  Fitter calf strech: 3x30"  Mini squats: 2x10   Leg press: 6.5 plates 3x10 sled-3  Standing hip abd: 3x10 Bilateral   Standing hip extension: 3x10 Bilateral  Side steppin laps in //  Toe taps: x15 Bilateral on 6 inch step  Step ups: 2x10 Bilateral level 1  Hurdles: 5 hurdles in // 2 rounds   Tandem walk: 3 laps in //  Tandem stance: 2x20" " Bilateral      Patient Education and Home Exercises     Home Exercises Provided and Patient Education Provided     Education provided:   - keep exercises in a pain free range.    Written Home Exercises Provided: yes. Exercises were reviewed and Shannan was able to demonstrate them prior to the end of the session.  Shannan demonstrated good  understanding of the education provided. See EMR under Patient Instructions for exercises provided during therapy sessions    ASSESSMENT     Shannan is a 76 y.o. male referred to outpatient Physical Therapy at [facility] with a medical diagnosis of Primary osteoarthritis of both hips and Lumbar spondylosis.  Patient requires minimal assistance with transfers on/off mat and getting off of leg press.  Patient was able to perform hurdles with step through with minimal circumducting right lower extremity.  Patient completed his therapy along with today's progressions, noted in bold, with appropriate fatigue following reported at the end of today's session.    Shannan Is progressing well towards his goals.   Pt prognosis is Fair.     Pt will continue to benefit from skilled outpatient physical therapy to address the deficits listed in the problem list box on initial evaluation, provide pt/family education and to maximize pt's level of independence in the home and community environment.     Pt's spiritual, cultural and educational needs considered and pt agreeable to plan of care and goals.     Anticipated barriers to physical therapy: advanced Bilateral hip osteoarthritis, caretaker at home.    Goals:   Short Term Goals: 4 weeks  1. Pt will demo good TA muscle contraction for improved deep abdominal strength and lumbar stability.  2. Increase lumbar ROM to 100% of WNL in order to improve functional mobility.    3. Pt will demo good sitting/standing posture and body mechanics for improved spine health and decreased risk of future injury.  4. Pt to tolerate HEP to improve ROM and independence with  ADL's.     Long Term Goals: 8 weeks  1. Report decreased low back pain without radiculopathy to </= 2/10 with squatting and prolonged standing/sitting to increase tolerance for ADLs and increased QoL.  2. Increase strength to >/= 4/5 MMT grade for core and BLE to increase tolerance for ADL and work activities.  3. Pt will demonstrate negative MARCIE testing and mild positive FADIR testing for improve hip mobility and improved hip health.  4. Patient's goal: to have less pain.  5. Pt will report at </= 18% impaired on FOTO lumbar score for low back pain disability to demonstrate decrease in disability and improvement in back pain.    PLAN     Continue with Plan Of Care and progress toward PT goals.    Garry Pemberton, PTA

## 2022-03-19 ENCOUNTER — CLINICAL SUPPORT (OUTPATIENT)
Dept: CARDIOLOGY | Facility: HOSPITAL | Age: 77
End: 2022-03-19
Payer: MEDICARE

## 2022-03-19 DIAGNOSIS — Z95.0 PRESENCE OF CARDIAC PACEMAKER: ICD-10-CM

## 2022-03-19 DIAGNOSIS — I49.5 SICK SINUS SYNDROME: ICD-10-CM

## 2022-03-19 PROCEDURE — 93296 REM INTERROG EVL PM/IDS: CPT | Performed by: INTERNAL MEDICINE

## 2022-03-22 ENCOUNTER — CLINICAL SUPPORT (OUTPATIENT)
Dept: REHABILITATION | Facility: HOSPITAL | Age: 77
End: 2022-03-22
Payer: MEDICARE

## 2022-03-22 DIAGNOSIS — M79.602 PAIN OF LEFT UPPER EXTREMITY: Primary | ICD-10-CM

## 2022-03-22 DIAGNOSIS — Z74.09 STIFFNESS DUE TO IMMOBILITY: ICD-10-CM

## 2022-03-22 DIAGNOSIS — R53.1 WEAKNESS: ICD-10-CM

## 2022-03-22 DIAGNOSIS — M25.60 STIFFNESS DUE TO IMMOBILITY: ICD-10-CM

## 2022-03-22 DIAGNOSIS — R53.1 DECREASED STRENGTH: ICD-10-CM

## 2022-03-22 DIAGNOSIS — M25.659 DECREASED RANGE OF HIP MOVEMENT, UNSPECIFIED LATERALITY: ICD-10-CM

## 2022-03-22 PROCEDURE — 97140 MANUAL THERAPY 1/> REGIONS: CPT | Mod: PN

## 2022-03-22 PROCEDURE — 97110 THERAPEUTIC EXERCISES: CPT | Mod: PN | Performed by: PHYSICAL THERAPIST

## 2022-03-22 PROCEDURE — 97110 THERAPEUTIC EXERCISES: CPT | Mod: PN

## 2022-03-22 NOTE — PROGRESS NOTES
"  Occupational Therapy Daily Treatment Note     Name: Shannan Norman  Clinic Number: [id number]    Therapy Diagnosis:   Encounter Diagnoses   Name Primary?    Pain of left upper extremity Yes    Weakness     Stiffness due to immobility      Physician: Nitin Banks*    Visit Date: 3/22/2022    Physician Orders: OT evaluate and treat  Medical Diagnosis: M79.602 (ICD-10-CM) - Left arm pain  Evaluation Date: 3/17/2022  Insurance Authorization period Expiration: 12/31/2022  Plan of Care Expiration Period: 5/13/2022  Next MD appointment: 3/28/2022     Visit # / Visits Authorized: 2 / 50  Time In: 11:51 am  Time Out: 12:30 pm  Total Billable Time: 39 minutes     Precautions: Standard and Fall    Subjective     Pt reports: "My shoulder is really hurting. I can't raise it up at all. I have been doing the exercises and it felt good for about 2-3 days but then it started hurting again"  he was compliant with home exercise program given last session.   Response to previous treatment: first since eval  Functional change: increased pain upon arrival; decreased end of session     Pain: 7/10  Location: left shoulder      Objective   Sensation Test: experiences tingling experiences numbness in bilateral ulnar nerve distribution     Observation/Inspection: rounded shoulders, forward head     Range of Motion/Strength:   Shoulder   Left     Right   Pain/Dysfunction with Movement     AROM PROM MMT AROM PROM MMT     Flexion 75 WNL 3-/5 WNL WNL WNL     Extension 60 WNL 4/5 WNL WNL WNL     Abduction 70 WNL 3-/5 WNL WNL WNL     HorizAdduction 25 WNL 4-/5 WNL WNL WNL     Internal rotation L4 75 4-/5 WNL WNL WNL     ER at 90° abd 55 WNL 3-/5 WNL WNL WNL     ER at 0° abd 80 WNL 4-/5 WNL WNL WNL     NT=Not tested  ROM Comments: Pain at end range     Painful Arc: noted     Tenderness upon Palpation:      Positive: Bicipital Groove and Supraspinatus Region     Special Tests:  AC Joint Left Right   AC Joint Compression Test - +   Empty " Can Test - +   Drop Arm test - +   Champagne Toast - +   Resisted External Rotation 45* Internal Roatation - -   Bear Hug - -   Raymon's - -   Hawkin's Kenndy - +   Neer's Test - +   Yergason's - -      Scapular Control/Dyskinesis:     Normal / Subtle / Obvious  Comments    Left  obvious -    Right  Normal -        Shannan received the following manual therapy techniques for 10 minutes: PROM all planes of L shoulder while supine, TFM deltoid/posterior capsule/biceptial groove    Shannan received therapeutic exercises for 29 minutes including:  AROM   Supine  Abduction/adduction  2x10    internal rotation/ external rotation  2x10    scap retraction  x10    sidelying  shoulder flexion/extension  2x10   Pulleys  flexion x2 minutes    AAROM  Towel slides shoulder flexion/scaption/abduction/CW/CCW circles  x10 each direction        Home Exercises and Education Provided     Education provided:   - AAROM with towel slides   - isometrics issued on eval   - Progress towards goals     Written Home Exercises Provided: yes.  Exercises were reviewed and Shannan was able to demonstrate them prior to the end of the session.  Shannan demonstrated good  understanding of the HEP provided.   .   See EMR under Patient Instructions for exercises provided 3/22/2022.        Assessment     Shannan arrived with increased pain and guarding in L shoulder. Shannan was instructed on supine exercises and attempted using dowel, however, due to pain, exercises were graded to gravity eliminated. He was able to complete shoulder abduction, external rotation, internal rotation and shoulder flexion supine/sidelying with decreased pain and achieving ~90* abduction/flexion. Education provided about maintaining joint mobility with AAROM techniques - pulleys, towel slides - to prevent adhesive capsulitis. Shannan completed towel slides well without pain; verbalized understanding of all HEP movements.     Shannan is progressing well towards his goals and there are no updates to goals at  this time. Pt prognosis is Good.     Pt will continue to benefit from skilled outpatient occupational therapy to address the deficits listed in the problem list on initial evaluation provide pt/family education and to maximize pt's level of independence in the home and community environment.     Anticipated barriers to occupational therapy: none     Pt's spiritual, cultural and educational needs considered and pt agreeable to plan of care and goals.    Goals:   Short Term Goals to be met in 4 weeks: (4/16/2022)  1) Initiate Hep   2) Pt will increase Left shoulder AROM by 10 degrees grossly for improved performance with overhead ADL's  3) Pt will report 6/10 pain in (Left)shoulder at worst  4) Pt will demonstrate increased MMT to 4-/5 grossly Left shoulder  5) Patient will be able to achieve less than or equal to 50% on FOTO shoulder survey demonstrating overall improved functional ability with upper extremity.      Long Term Goals to be met by discharge:  1) Independent with HEP  2) Pt will demonstrate (Left) shoulder AROM WNL grossly for [place] with ADL's  3) Pt will demonstrate (Left) shoulder MMT WNL grossly for [place] with functional activities  4) Independent and pain free with ADL's and IADL's  5) Patient will be able to achieve less than or equal to 25% on FOTO shoulder survey demonstrating overall improved functional ability with upper extremity.         Plan   Certification Period/Plan of care expiration: 3/17/2022 to 5/13/2022.     Outpatient Occupational Therapy 2 times weekly for 8 weeks to include the following interventions: Manual therapy/joint mobilizations, Modalities for pain management, Therapeutic exercises/activities., Strengthening, Joint Protection and Energy Conservation.    Updates/Grading for next session: continue with POC Corinne Rapier, OT

## 2022-03-22 NOTE — PROGRESS NOTES
[facility]   Physical Therapy Treatment Note     Name: Shannan Norman  Clinic Number: [id number]    Therapy Diagnosis:   Encounter Diagnoses   Name Primary?    Pain of left upper extremity Yes    Weakness     Stiffness due to immobility     Decreased range of hip movement, unspecified laterality     Decreased strength      Physician: Kit Potter,     Visit Date: 3/22/2022    Physician Orders: PT Eval and Treat  Medical Diagnosis from Referral:   M47.816 (ICD-10-CM) - Lumbar spondylosis   M16.0 (ICD-10-CM) - Primary osteoarthritis of both hips      Evaluation Date: 2/25/2022  Authorization Period Expiration: 02/23/2023  Plan of Care Expiration: 4/22/2021  Visit # / Visits authorized: 6/50  FOTO: 6/5 next visit  PTA Visit #: 0/5      Time In: 12:32 PM  Time Out: 1:30 PM  Total Billable Time: 53 1:1 and 5 supervised minutes (TE-4)     Precautions: Standard and Gout, A-fib, skin cancer, DM, HTN    SUBJECTIVE     Pt reports: his most difficulty is with sit <> stand and walking.   He was compliant with home exercise program.  Response to previous treatment: no problems.  Functional change: not at this time.    Pain: 4/10  Location: right hip.     OBJECTIVE       All below testing performed in seated position. Gait belt used for quadriceps testing.    Lower extremity strength with [employer] handheld dynamometer Right Left Pain/dysfunction with movement   (approx 4 sec hold w/ max contraction)   Hip flexion 10.7 kg  10.4 kg     Hip abduction 10.6 kg  13.4 kg  right 79%   Quadriceps 14.6 kg  21.9 kg  right 66.6%   Hamstrings 8.8 kg  9.5 kg       Hamstring 90/90 test: right 33 degree, left 26 degree     Treatment     Shannan received the treatments listed below:      therapeutic exercises to develop strength, endurance, ROM, flexibility, posture and core stabilization for 47 minutes including testing:    Straight leg raise: 3x10 Bilateral   Side lying hip abduction: 2x10  "each  Clamshells: 20x5" each   Single leg bridge: x15 Bilateral   Long arc quad: 5# 3x10 Bilateral      Fitter calf strech: 3x30"  Stair hamstring stretch: 3x30" Bilateral   Mini squats: 2x10   Leg press: 4 plates 3x10 sled-3 single leg  Standing hip abd: 3x10 Bilateral   Standing hip extension: 3x10 Bilateral      Not today:  Side steppin laps in //  Toe taps: x15 Bilateral on 6 inch step  Step ups: 2x10 Bilateral level 1  Hurdles: 5 hurdles in // 2 rounds   Tandem walk: 3 laps in //  Tandem stance: 2x20" Bilateral      Patient Education and Home Exercises     Home Exercises Provided and Patient Education Provided     Education provided:   - keep exercises in a pain free range.    Written Home Exercises Provided: yes. Exercises were reviewed and Shannan was able to demonstrate them prior to the end of the session.  Shannan demonstrated good  understanding of the education provided. See EMR under Patient Instructions for exercises provided during therapy sessions    ASSESSMENT     Shannan is a 76 y.o. male referred to outpatient Physical Therapy at [facility] with a medical diagnosis of Primary osteoarthritis of both hips and Lumbar spondylosis.  Arthritic pain not present today with more of a presentation of muscle soreness/weakness in lateral hip and quads. right sided weakness present, particularly in quad, as seen via microFET. Dec'd stance time on right during gait and difficulty with sit <> stand transfers.     Shannan Is progressing well towards his goals.   Pt prognosis is Fair.     Pt will continue to benefit from skilled outpatient physical therapy to address the deficits listed in the problem list box on initial evaluation, provide pt/family education and to maximize pt's level of independence in the home and community environment.     Pt's spiritual, cultural and educational needs considered and pt agreeable to plan of care and goals.     Anticipated barriers to physical therapy: advanced Bilateral hip " osteoarthritis, caretaker at home.    Goals:   Short Term Goals: 4 weeks  1. Pt will demo good TA muscle contraction for improved deep abdominal strength and lumbar stability. PROGRESSING; NOT MET  2. Increase lumbar ROM to 100% of WNL in order to improve functional mobility. PROGRESSING; NOT MET    3. Pt will demo good sitting/standing posture and body mechanics for improved spine health and decreased risk of future injury. PROGRESSING; NOT MET  4. Pt to tolerate HEP to improve ROM and independence with ADL's. PROGRESSING; NOT MET     Long Term Goals: 8 weeks  1. Report decreased low back pain without radiculopathy to </= 2/10 with squatting and prolonged standing/sitting to increase tolerance for ADLs and increased QoL. PROGRESSING; NOT MET  2. Increase strength to >/= 4/5 MMT grade for core and BLE to increase tolerance for ADL and work activities. PROGRESSING; NOT MET  3. Pt will demonstrate negative MARCIE testing and mild positive FADIR testing for improve hip mobility and improved hip health. PROGRESSING; NOT MET  4. Patient's goal: to have less pain. PROGRESSING; NOT MET  5. Pt will report at </= 18% impaired on FOTO lumbar score for low back pain disability to demonstrate decrease in disability and improvement in back pain. PROGRESSING; NOT MET    PLAN     Continue with Plan Of Care and progress toward PT goals.  Cont to focus on quad and lateral hip strength, particularly on right     Grady Veliz, PT

## 2022-03-25 ENCOUNTER — CLINICAL SUPPORT (OUTPATIENT)
Dept: REHABILITATION | Facility: HOSPITAL | Age: 77
End: 2022-03-25
Payer: MEDICARE

## 2022-03-25 DIAGNOSIS — R53.1 WEAKNESS: ICD-10-CM

## 2022-03-25 DIAGNOSIS — R53.1 DECREASED STRENGTH: ICD-10-CM

## 2022-03-25 DIAGNOSIS — M25.659 DECREASED RANGE OF HIP MOVEMENT, UNSPECIFIED LATERALITY: Primary | ICD-10-CM

## 2022-03-25 DIAGNOSIS — M25.60 STIFFNESS DUE TO IMMOBILITY: ICD-10-CM

## 2022-03-25 DIAGNOSIS — Z74.09 STIFFNESS DUE TO IMMOBILITY: ICD-10-CM

## 2022-03-25 DIAGNOSIS — M79.602 PAIN OF LEFT UPPER EXTREMITY: Primary | ICD-10-CM

## 2022-03-25 PROCEDURE — 97110 THERAPEUTIC EXERCISES: CPT | Mod: PN,CQ

## 2022-03-25 PROCEDURE — 97110 THERAPEUTIC EXERCISES: CPT | Mod: PN

## 2022-03-25 NOTE — PROGRESS NOTES
"  Occupational Therapy Daily Treatment Note     Name: Shannan Norman  Clinic Number: [id number]    Therapy Diagnosis:   Encounter Diagnoses   Name Primary?    Pain of left upper extremity Yes    Weakness     Stiffness due to immobility      Physician: Nitin Banks    Visit Date: 3/25/2022    Physician Orders: OT evaluate and treat  Medical Diagnosis: M79.602 (ICD-10-CM) - Left arm pain  Evaluation Date: 3/17/2022  Insurance Authorization period Expiration: 12/31/2022  Plan of Care Expiration Period: 5/13/2022  Next MD appointment: 3/28/2022     Visit # / Visits Authorized: 3 / 50  Time In: 12:00 pm  Time Out: 12:45 pm  Total Billable Time: 45 minutes     Precautions: Standard and Fall    Subjective     Pt reports: "My shoulder back to normal pain level. I had a few days where it was hurting but it came down"  he was compliant with home exercise program given last session.   Response to previous treatment: good   Functional change: decreased pain upon arrival     Pain: 0/10  Location: left shoulder      Objective   Sensation Test: experiences tingling experiences numbness in bilateral ulnar nerve distribution     Observation/Inspection: rounded shoulders, forward head     Range of Motion/Strength:   Shoulder   Left     Right   Pain/Dysfunction with Movement     AROM PROM MMT AROM PROM MMT     Flexion 75 WNL 3-/5 WNL WNL WNL     Extension 60 WNL 4/5 WNL WNL WNL     Abduction 70 WNL 3-/5 WNL WNL WNL     HorizAdduction 25 WNL 4-/5 WNL WNL WNL     Internal rotation L4 75 4-/5 WNL WNL WNL     ER at 90° abd 55 WNL 3-/5 WNL WNL WNL     ER at 0° abd 80 WNL 4-/5 WNL WNL WNL     NT=Not tested  ROM Comments: Pain at end range     Painful Arc: noted     Tenderness upon Palpation:      Positive: Bicipital Groove and Supraspinatus Region     Special Tests:  AC Joint Left Right   AC Joint Compression Test - +   Empty Can Test - +   Drop Arm test - +   Champagne Toast - +   Resisted External Rotation 45* Internal " Roatation - -   Bear Hug - -   Raymon's - -   Hawkin's Kenndy - +   Neer's Test - +   Yergason's - -      Scapular Control/Dyskinesis:     Normal / Subtle / Obvious  Comments    Left  obvious -    Right  Normal -        Shannan received the following manual therapy techniques for 10 minutes: PROM all planes of L shoulder while supine, TFM deltoid/posterior capsule/biceptial groove    Shannan received therapeutic exercises for 29 minutes including:  UBE  L1 x6 minutes   3 minutes FW/BW    Supine  1# dowel shoulder flexion/extension, press, scaption, external rotation/ internal rotation  x10   AROM   Supine  Shoulder flexion  2x10    scap retraction  2x10     Snow angels supine  x10    sidelying   shoulder horizontal abduction/adduction , external rotation  2x10    Shoulder abduction  x10    Pulleys  Flexion/scaption  x2 minutes    AAROM  Towel slides shoulder flexion on wall   x10             Home Exercises and Education Provided     Education provided:   - AAROM with towel slides   - isometrics issued on eval   - Progress towards goals     Written Home Exercises Provided: yes.  Exercises were reviewed and Shannan was able to demonstrate them prior to the end of the session.  Shannan demonstrated good  understanding of the HEP provided.   .   See EMR under Patient Instructions for exercises provided 3/22/2022.        Assessment     Shannan tolerated today's session well. Interventions focused on AROM, strength and joint mobility in L upper extremity. Shannan was able to perform supine exercises with dowel and AG well, reporting mild pain in deltoid that resolved with continued reps. He did have difficulty and pain (4/10) with shoulder abduction when sidelying, movements graded to supine while performing snow angels. He reported decreased pain. Shannan also reported compliance with HEP.     Shannan is progressing well towards his goals and there are no updates to goals at this time. Pt prognosis is Good.     Pt will continue to benefit from skilled  outpatient occupational therapy to address the deficits listed in the problem list on initial evaluation provide pt/family education and to maximize pt's level of independence in the home and community environment.     Anticipated barriers to occupational therapy: none     Pt's spiritual, cultural and educational needs considered and pt agreeable to plan of care and goals.    Goals:   Short Term Goals to be met in 4 weeks: (4/16/2022)  1) Initiate Hep   2) Pt will increase Left shoulder AROM by 10 degrees grossly for improved performance with overhead ADL's  3) Pt will report 6/10 pain in (Left)shoulder at worst  4) Pt will demonstrate increased MMT to 4-/5 grossly Left shoulder  5) Patient will be able to achieve less than or equal to 50% on FOTO shoulder survey demonstrating overall improved functional ability with upper extremity.      Long Term Goals to be met by discharge:  1) Independent with HEP  2) Pt will demonstrate (Left) shoulder AROM WNL grossly for Phelps with ADL's  3) Pt will demonstrate (Left) shoulder MMT WNL grossly for Phelps with functional activities  4) Independent and pain free with ADL's and IADL's  5) Patient will be able to achieve less than or equal to 25% on FOTO shoulder survey demonstrating overall improved functional ability with upper extremity.         Plan   Certification Period/Plan of care expiration: 3/17/2022 to 5/13/2022.     Outpatient Occupational Therapy 2 times weekly for 8 weeks to include the following interventions: Manual therapy/joint mobilizations, Modalities for pain management, Therapeutic exercises/activities., Strengthening, Joint Protection and Energy Conservation.    Updates/Grading for next session: continue with POC Corinne Rapier, OT

## 2022-03-25 NOTE — PROGRESS NOTES
[facility]   Physical Therapy Treatment Note     Name: Shannan Norman  Clinic Number: [id number]    Therapy Diagnosis:   Encounter Diagnoses   Name Primary?    Decreased range of hip movement, unspecified laterality Yes    Decreased strength      Physician: Kit Potter,     Visit Date: 3/25/2022    Physician Orders: PT Eval and Treat  Medical Diagnosis from Referral:   M47.816 (ICD-10-CM) - Lumbar spondylosis   M16.0 (ICD-10-CM) - Primary osteoarthritis of both hips      Evaluation Date: 2/25/2022  Authorization Period Expiration: 02/23/2023  Plan of Care Expiration: 4/22/2021  Visit # / Visits authorized: 7/50  FOTO: 2/5 completed 3/25/22  PTA Visit #: 1/5      Time In: 1:00 PM  Time Out: 1:55 PM  Total Billable Time: 30 1:1 and 25 supervised minutes (TE-2)     Precautions: Standard and Gout, A-fib, skin cancer, DM, HTN    SUBJECTIVE     Pt reports: having no pain with just sitting but walking is at least 4/10 and continues with a lot of difficulty with sit <> stand.   He was compliant with home exercise program.  Response to previous treatment: no problems.  Functional change: not at this time.    Pain: 0/10  Location: right hip.     OBJECTIVE     All below testing performed in seated position. Gait belt used for quadriceps testing.    Lower extremity strength with [email or web address] handheld dynamometer Right Left Pain/dysfunction with movement   (approx 4 sec hold w/ max contraction)   Hip flexion 10.7 kg  10.4 kg     Hip abduction 10.6 kg  13.4 kg  right 79%   Quadriceps 14.6 kg  21.9 kg  right 66.6%   Hamstrings 8.8 kg  9.5 kg       Hamstring 90/90 test: right 33 degree, left 26 degree     Treatment     Shannan received the treatments listed below:      therapeutic exercises to develop strength, endurance, ROM, flexibility, posture and core stabilization for 30 1:1 and 25 supervised minutes including testing:    Straight leg raise: 3x10 Bilateral  Single leg bridge: x15 Bilateral    "  Side lying hip abduction: 2x10 each  Clamshells: 20x5" each   Long arc quad: 5# 3x10 Bilateral    Fitter calf strech: 3x30"  Stair hamstring stretch: 3x30" Bilateral   Mini squats: 2x10   Leg press: 4 plates 3x10 sled-3 single leg  Standing hip abd: 3x10 Bilateral   Standing hip extension: 3x10 Bilateral  Tandem walk: 3 laps in //  Toe taps: x15 Bilateral on 6 inch step  Step ups: 2x10 Bilateral level 1  Tandem stance: 3x30" Bilateral      Not today:  Side steppin laps in //  Hurdles: 5 hurdles in // 2 rounds     Patient Education and Home Exercises     Home Exercises Provided and Patient Education Provided     Education provided:   - keep exercises in a pain free range.    Written Home Exercises Provided: yes. Exercises were reviewed and Shannan was able to demonstrate them prior to the end of the session.  Shannan demonstrated good  understanding of the education provided. See EMR under Patient Instructions for exercises provided during therapy sessions    ASSESSMENT     Shannan is a 76 y.o. male referred to outpatient Physical Therapy at [facility] with a medical diagnosis of Primary osteoarthritis of both hips and Lumbar spondylosis.  Arthritic pain not present today with more of a presentation of muscle soreness/weakness in lateral hip and quads. Decreased stance time on right during gait, difficulty with sit <> stand transfers as well as supine <> sit. Patient had no difficulty with today's progressions noted in bold.    Shannan Is progressing well towards his goals.   Pt prognosis is Fair.     Pt will continue to benefit from skilled outpatient physical therapy to address the deficits listed in the problem list box on initial evaluation, provide pt/family education and to maximize pt's level of independence in the home and community environment.     Pt's spiritual, cultural and educational needs considered and pt agreeable to plan of care and goals.     Anticipated barriers to physical therapy: advanced Bilateral " hip osteoarthritis, caretaker at home.    Goals:   Short Term Goals: 4 weeks  1. Pt will demo good TA muscle contraction for improved deep abdominal strength and lumbar stability. PROGRESSING; NOT MET  2. Increase lumbar ROM to 100% of WNL in order to improve functional mobility. PROGRESSING; NOT MET    3. Pt will demo good sitting/standing posture and body mechanics for improved spine health and decreased risk of future injury. PROGRESSING; NOT MET  4. Pt to tolerate HEP to improve ROM and independence with ADL's. PROGRESSING; NOT MET     Long Term Goals: 8 weeks  1. Report decreased low back pain without radiculopathy to </= 2/10 with squatting and prolonged standing/sitting to increase tolerance for ADLs and increased QoL. PROGRESSING; NOT MET  2. Increase strength to >/= 4/5 MMT grade for core and BLE to increase tolerance for ADL and work activities. PROGRESSING; NOT MET  3. Pt will demonstrate negative MARCIE testing and mild positive FADIR testing for improve hip mobility and improved hip health. PROGRESSING; NOT MET  4. Patient's goal: to have less pain. PROGRESSING; NOT MET  5. Pt will report at </= 18% impaired on FOTO lumbar score for low back pain disability to demonstrate decrease in disability and improvement in back pain. PROGRESSING; NOT MET    PLAN     Continue with Plan Of Care and progress toward PT goals.  Cont to focus on quad and lateral hip strength, particularly on right     Garry Pemberton PTA

## 2022-03-28 ENCOUNTER — HOSPITAL ENCOUNTER (OUTPATIENT)
Dept: RADIOLOGY | Facility: HOSPITAL | Age: 77
Discharge: HOME | End: 2022-03-28
Attending: FAMILY MEDICINE
Payer: MEDICARE

## 2022-03-28 ENCOUNTER — PATIENT MESSAGE (OUTPATIENT)
Dept: SPORTS MEDICINE | Facility: CLINIC | Age: 77
End: 2022-03-28

## 2022-03-28 ENCOUNTER — TELEPHONE (OUTPATIENT)
Dept: SPORTS MEDICINE | Facility: CLINIC | Age: 77
End: 2022-03-28
Payer: MEDICARE

## 2022-03-28 ENCOUNTER — OFFICE VISIT (OUTPATIENT)
Dept: SPORTS MEDICINE | Facility: CLINIC | Age: 77
End: 2022-03-28
Payer: MEDICARE

## 2022-03-28 VITALS — TEMPERATURE: 98 F | WEIGHT: 205 LBS | HEIGHT: 73 IN | BODY MASS INDEX: 27.17 KG/M2

## 2022-03-28 DIAGNOSIS — M25.511 CHRONIC RIGHT SHOULDER PAIN: ICD-10-CM

## 2022-03-28 DIAGNOSIS — M25.511 BILATERAL SHOULDER PAIN, UNSPECIFIED CHRONICITY: ICD-10-CM

## 2022-03-28 DIAGNOSIS — R52 MECHANICAL PAIN: ICD-10-CM

## 2022-03-28 DIAGNOSIS — M25.512 BILATERAL SHOULDER PAIN, UNSPECIFIED CHRONICITY: ICD-10-CM

## 2022-03-28 DIAGNOSIS — M25.512 LEFT SHOULDER PAIN: Primary | ICD-10-CM

## 2022-03-28 DIAGNOSIS — S46.819S SPRAIN OF SUPRASPINATUS MUSCLE OR TENDON, UNSPECIFIED LATERALITY, SEQUELA: ICD-10-CM

## 2022-03-28 DIAGNOSIS — M67.911 DYSFUNCTION OF RIGHT ROTATOR CUFF: ICD-10-CM

## 2022-03-28 DIAGNOSIS — G89.29 CHRONIC RIGHT SHOULDER PAIN: ICD-10-CM

## 2022-03-28 DIAGNOSIS — M67.80 TENDINOSIS: ICD-10-CM

## 2022-03-28 PROCEDURE — 3072F LOW RISK FOR RETINOPATHY: CPT | Mod: CPTII,S$GLB,, | Performed by: FAMILY MEDICINE

## 2022-03-28 PROCEDURE — 73030 X-RAY EXAM OF SHOULDER: CPT | Mod: TC,50

## 2022-03-28 PROCEDURE — 1160F PR REVIEW ALL MEDS BY PRESCRIBER/CLIN PHARMACIST DOCUMENTED: ICD-10-PCS | Mod: CPTII,S$GLB,, | Performed by: FAMILY MEDICINE

## 2022-03-28 PROCEDURE — 20551 NJX 1 TENDON ORIGIN/INSJ: CPT | Mod: RT,S$GLB,, | Performed by: FAMILY MEDICINE

## 2022-03-28 PROCEDURE — 3072F PR LOW RISK FOR RETINOPATHY: ICD-10-PCS | Mod: CPTII,S$GLB,, | Performed by: FAMILY MEDICINE

## 2022-03-28 PROCEDURE — 1160F RVW MEDS BY RX/DR IN RCRD: CPT | Mod: CPTII,S$GLB,, | Performed by: FAMILY MEDICINE

## 2022-03-28 PROCEDURE — 99214 OFFICE O/P EST MOD 30 MIN: CPT | Mod: 25,S$GLB,, | Performed by: FAMILY MEDICINE

## 2022-03-28 PROCEDURE — 1125F AMNT PAIN NOTED PAIN PRSNT: CPT | Mod: CPTII,S$GLB,, | Performed by: FAMILY MEDICINE

## 2022-03-28 PROCEDURE — 1101F PT FALLS ASSESS-DOCD LE1/YR: CPT | Mod: CPTII,S$GLB,, | Performed by: FAMILY MEDICINE

## 2022-03-28 PROCEDURE — 99999 PR PBB SHADOW E&M-EST. PATIENT-LVL IV: CPT | Mod: PBBFAC,,, | Performed by: FAMILY MEDICINE

## 2022-03-28 PROCEDURE — 99214 PR OFFICE/OUTPT VISIT, EST, LEVL IV, 30-39 MIN: ICD-10-PCS | Mod: 25,S$GLB,, | Performed by: FAMILY MEDICINE

## 2022-03-28 PROCEDURE — 76942 TENDON ORIGIN: ICD-10-PCS | Mod: 26,S$GLB,, | Performed by: FAMILY MEDICINE

## 2022-03-28 PROCEDURE — 73030 XR SHOULDER COMPLETE 2 OR MORE VIEWS BILATERAL: ICD-10-PCS | Mod: 26,50,, | Performed by: RADIOLOGY

## 2022-03-28 PROCEDURE — 3288F FALL RISK ASSESSMENT DOCD: CPT | Mod: CPTII,S$GLB,, | Performed by: FAMILY MEDICINE

## 2022-03-28 PROCEDURE — 20551 TENDON ORIGIN: ICD-10-PCS | Mod: RT,S$GLB,, | Performed by: FAMILY MEDICINE

## 2022-03-28 PROCEDURE — 1101F PR PT FALLS ASSESS DOC 0-1 FALLS W/OUT INJ PAST YR: ICD-10-PCS | Mod: CPTII,S$GLB,, | Performed by: FAMILY MEDICINE

## 2022-03-28 PROCEDURE — 1159F MED LIST DOCD IN RCRD: CPT | Mod: CPTII,S$GLB,, | Performed by: FAMILY MEDICINE

## 2022-03-28 PROCEDURE — 76942 ECHO GUIDE FOR BIOPSY: CPT | Mod: 26,S$GLB,, | Performed by: FAMILY MEDICINE

## 2022-03-28 PROCEDURE — 1159F PR MEDICATION LIST DOCUMENTED IN MEDICAL RECORD: ICD-10-PCS | Mod: CPTII,S$GLB,, | Performed by: FAMILY MEDICINE

## 2022-03-28 PROCEDURE — 73030 X-RAY EXAM OF SHOULDER: CPT | Mod: 26,50,, | Performed by: RADIOLOGY

## 2022-03-28 PROCEDURE — 99999 PR PBB SHADOW E&M-EST. PATIENT-LVL IV: ICD-10-PCS | Mod: PBBFAC,,, | Performed by: FAMILY MEDICINE

## 2022-03-28 PROCEDURE — 1125F PR PAIN SEVERITY QUANTIFIED, PAIN PRESENT: ICD-10-PCS | Mod: CPTII,S$GLB,, | Performed by: FAMILY MEDICINE

## 2022-03-28 PROCEDURE — 3288F PR FALLS RISK ASSESSMENT DOCUMENTED: ICD-10-PCS | Mod: CPTII,S$GLB,, | Performed by: FAMILY MEDICINE

## 2022-03-28 RX ORDER — TRIAMCINOLONE ACETONIDE 40 MG/ML
40 INJECTION, SUSPENSION INTRA-ARTICULAR; INTRAMUSCULAR
Status: DISCONTINUED | OUTPATIENT
Start: 2022-03-28 | End: 2022-03-28 | Stop reason: HOSPADM

## 2022-03-28 RX ADMIN — TRIAMCINOLONE ACETONIDE 40 MG: 40 INJECTION, SUSPENSION INTRA-ARTICULAR; INTRAMUSCULAR at 10:03

## 2022-03-28 NOTE — PROCEDURES
"Tendon Origin    Date/Time: 3/28/2022 10:45 AM  Performed by: Kit Potter MD  Authorized by: Kit Potter MD     Consent Done?:  Yes (Verbal)  Timeout: prior to procedure the correct patient, procedure, and site was verified    Indications:  Pain  Site marked: the procedure site was marked    Timeout: prior to procedure the correct patient, procedure, and site was verified    Prep: patient was prepped and draped in usual sterile fashion    Ultrasonic Guidance for Needle Placement?: Yes    Needle size:  22 G  Approach:  Posterolateral  Medications:  40 mg triamcinolone acetonide 40 mg/mL  Patient tolerance:  Patient tolerated the procedure well with no immediate complications   Supraspinatus tendon and tendon insertion injection RIGHT    Description of ultrasound utilization for needle guidance:   Ultrasound guidance used for needle localization. Images saved and stored for documentation. The supraspinatus muscle, myotendinous junction, and tendon insertion on the greater tuberosity of the humeral head were visualized. Dynamic visualization of the 20g x 3.5" needle was continuous throughout the procedure.      "

## 2022-03-28 NOTE — PROGRESS NOTES
Shannan Norman, a 76 y.o. male, is here for evaluation of bilateral shoulder pain. L>R    HISTORY OF PRESENT ILLNESS  Hand dominance, right    Location:  anterior and lateral   Onset:  chronic, insidious  Palliative:     relative rest   oral analgesics, OTC  Provocative:    glenohumeral ABduction    Prior:  none  Progression:  plateau discomfort  Quality:  sharp  Radiation: none  Severity:  per nursing documentation  Timing:  intermittent with use  Trauma:  none    Review of systems (ROS):  A 10+ review of systems was performed with pertinent positives and negatives noted above in the history of present illness. Other systems were negative unless otherwise specified.    PHYSICAL EXAMINATION  General:  The patient is alert and oriented x 3. Mood is pleasant. Observation of ears, eyes and nose reveal no gross abnormalities. HEENT: NCAT, sclera anicteric. Lungs: Respirations are equal and unlabored.     SHOULDER EXAMINATION     OBSERVATION:     Swelling  none  Deformity  none   Discoloration  none   Scapular winging none   Scars   none  Atrophy  none    TENDERNESS / CREPITUS (T/C):          T/C      T/C   Clavicle   -/-  SUPRAspinatus    -/-     AC Jt.    -/-  INFRAspinatus  -/-    SC Jt.    -/-  Deltoid    -/-      G. Tuberosity  -/-  LH BICEP groove  -/-   Acromion:  -/-  Midline Neck   -/-     Scapular Spine -/-  Trapezium   -/-   SMA Scapula  -/-  GH jt. line - post  -/-     Scapulothoracic  -/-         ROM:     Right shoulder   Left shoulder        AROM (PROM)   AROM (PROM)   FE    170° (175°)     170° (175°)     ER at 0°    60°  (65°)    60°  (65°)   ER at 90° ABD  90°  (90°)    90°  (90°)   IR at 90°  ABD   NA  (40°)     NA  (40°)      IR (spine level)   T10     T10    STRENGTH: (* = with pain) RIGHT SHOULDER  LEFT SHOULDER   SCAPTION   5/5    5/5    IR    5/5    5/5   ER    5/5    5/5   BICEPS   5/5    5/5   Deltoid    5/5    5/5     SIGNS:  Painful side       LB MAYO    -   SPEEDS        -   DROP ARM   -   BELLY PRESS       -    X-Body ADD    -   LIFT-OFF        -   HORNBLOWERS      -              STABILITY TESTING   RIGHT SHOULDER  LEFT SHOULDER     Translation     Anterior up face    up face    Posterior up face   up face    Sulcus  < 10mm   < 10 mm     Signs   Apprehension   neg     neg       Relocation   no change    no change      Jerk test  neg    neg    EXTREMITY NEURO-VASCULAR EXAM    Sensation grossly intact to light touch all dermatomal regions.    DTR 2+ Biceps, Triceps, BR and Negative Marces sign   Grossly intact motor function at Elbow, Wrist and Hand   Distal pulses radial and ulnar 2+, brisk cap refill, symmetric.      NECK:  Painless FROM and spinous processes non-tender. Negative Spurlings sign.       Other Findings:    ASSESSMENT & PLAN   Assessment  #1 Osteoarthritis of glenohumeral joint, right /d   W/ supraspinatus tendinosis  #2 concern for supraspinatus tendon tearing, left /nd    No evidence of neurologic pathology  No evidence of vascular pathology    Imaging studies reviewed:   X-ray shoulder, bilat 22.03    Plan  Given extreme dysfunction and discomfort, coupled with physical examination suggesting suprior rotator cuff pathology, and with non-diagnostic x-ray imaging, we will obtain MRI imaging for further evaluation of the soft tissue structures of the shoulder.    Regarding recent spate of shoulder, hip and knee pain:  -Review spine consultation conclusions after appt on 03/31/22  -Attempt to get neurology consultation visit earlier  -Consider rheumatology consultation following spine and neuro     We discussed options including    Watchful waiting / relative rest    Physical therapy    Injection therapy CSI supraspinatus tendon r   Consultation    The patient chooses As above   x = prescribed  CSI = corticosteroid injection  VSI = viscosupplement injection  PRPI = platelet rich plasma injection  ia = intra articular  R = right  L = left  B =  bilateral   nfSx = surgical consultation was recommended, but patient is not interested in consultation at this time    Physical Therapy        Formal (fPT), @ [facility] facility    Formal (fPT), @ [facility] facility        Homegoing (hgPT), per concurrent fPT recommendations    Homegoing (hgPT), per prior fPT recommendations    Homegoing (hgPT), handout provided        w/  (atPT)    [blank] = not prescribed  x = prescribed  b = prescribed, and begin as indicated  t = continue as indicated  r = prescribed, and restart as indicated  p = completed prior as indicated  hs = prescribed, and with high school   col = prescribed, and with college or university   nfPT = physical therapy was recommended, but patient is not interested in PT at this time    Activity (e.g. sports, work) restrictions    [blank] = as tolerated  pt = per physical therapist  at = per   NWB = non weight bearing on affected lower extremity, with crutches assistance for ambulation    Bracing    [blank] = not prescribed  r = recommended, but not fit with at todays visit  f = prescribed and fit with at todays visit  t = continue as indicated  d = d/c  p = as needed  rare = use on rare, as-needed basis; advised against chronic use    Pain management    [blank] = No prescription necessary. A handout detailing dosing of appropriate   over-the-counter musculoskeletal analgesics was made available to the patient.   m = meloxicam x 14 days  mp = 14 day course of meloxicam prescribed prior    Follow up Mri left  How is right shoulder after csi?   [blank] = as needed  [number] = in [number] weeks  CSI = for corticosteroid injection  VSI = for viscosupplement injection or injection series  PRP = for platelet rich plasma injection or injection series  MRI = after MRI imaging  ns = should surgical options be deferred (no surgery)  o = appointment offered, deferred by patient    Should symptoms worsen or fail  to resolve, consider    Revisiting the above options and / or      Vocation:

## 2022-03-28 NOTE — TELEPHONE ENCOUNTER
Contacted patient to schedule neurology appointment to 3/28 at 3:00. Patient expressed understanding of new appointment date, time and location.    Candi Bahena MS, OTC  Clinical Assistant to Dr. Nakul Hall

## 2022-03-29 ENCOUNTER — CLINICAL SUPPORT (OUTPATIENT)
Dept: REHABILITATION | Facility: HOSPITAL | Age: 77
End: 2022-03-29
Payer: MEDICARE

## 2022-03-29 DIAGNOSIS — Z74.09 STIFFNESS DUE TO IMMOBILITY: ICD-10-CM

## 2022-03-29 DIAGNOSIS — M51.36 DDD (DEGENERATIVE DISC DISEASE), LUMBAR: Primary | ICD-10-CM

## 2022-03-29 DIAGNOSIS — R53.1 WEAKNESS: ICD-10-CM

## 2022-03-29 DIAGNOSIS — M25.60 STIFFNESS DUE TO IMMOBILITY: ICD-10-CM

## 2022-03-29 DIAGNOSIS — M79.602 PAIN OF LEFT UPPER EXTREMITY: Primary | ICD-10-CM

## 2022-03-29 PROCEDURE — 97110 THERAPEUTIC EXERCISES: CPT | Mod: PN | Performed by: PHYSICAL THERAPIST

## 2022-03-29 PROCEDURE — 97530 THERAPEUTIC ACTIVITIES: CPT | Mod: PN | Performed by: PHYSICAL THERAPIST

## 2022-03-29 NOTE — PROGRESS NOTES
" [facility]   Physical Therapy Treatment Note     Name: Shannan Norman  Clinic Number: [id number]    Therapy Diagnosis:   Encounter Diagnoses   Name Primary?    Pain of left upper extremity Yes    Weakness     Stiffness due to immobility      Physician: Kit Potter,     Visit Date: 3/29/2022    Physician Orders: PT Eval and Treat  Medical Diagnosis from Referral:   M47.816 (ICD-10-CM) - Lumbar spondylosis   M16.0 (ICD-10-CM) - Primary osteoarthritis of both hips      Evaluation Date: 2/25/2022  Authorization Period Expiration: 02/23/2023  Plan of Care Expiration: 4/22/2021  Visit # / Visits authorized: 8/50  FOTO: 2/5 completed 3/25/22  PTA Visit #: 0/5      Time In: 11:30 PM  Time Out: 12:25 PM  Total Billable Time: 55 minutes (TE-2, TA-2)     Precautions: Standard and Gout, A-fib, skin cancer, DM, HTN    SUBJECTIVE     Pt reports: "I'm feeling really good." He had a shot in his shoulders. He has pain in both shoulders, but worse on the left.   He was compliant with home exercise program.  Response to previous treatment: no problems.  Functional change: walking much better and put shoes on for first time    Pain: 0/10  Location: right hip.     OBJECTIVE     3/29/22:  All below testing performed in seated position. Gait belt used for quadriceps testing.    Lower extremity strength with LogicMonitor handheld dynamometer Right Left Pain/dysfunction with movement   (approx 4 sec hold w/ max contraction)   Hip flexion 10.7 kg  10.4 kg     Hip abduction 10.6 (18.5) kg  13.4 (21.2) kg  right 88%   Quadriceps 14.6 (30.5) kg  21.9 (25.6)  kg  right 100%   Hamstrings 8.8 (13.8) kg  9.5 (14.9) kg  Right 92.5%     6 minute walk test: 1,010 ft    Treatment     Shannan received the treatments listed below:      therapeutic exercises to develop strength, endurance, ROM, flexibility, posture and core stabilization for 25 1:1 minutes including testing:    Fitter calf strech: 3x30"  Stair hamstring " "stretch: 3x30" Bilateral  - discharge next visit  Standing hip abd: 3x10 Bilateral red theraband   Standing hip extension: 3x10 Bilateral red theraband   Step ups: 2x10 6" step with contralateral march  Split squat: 1x10 each    Not today:  Leg press: 4 plates 3x10 sled-3 single leg    therapeutic activities to improve functional performance for 30 minutes, including testing:  Education for vacuuming, laundry, box pickup  Mini squats: 2x10     Patient Education and Home Exercises     Home Exercises Provided and Patient Education Provided     Education provided:   - keep exercises in a pain free range.    Written Home Exercises Provided: yes. Exercises were reviewed and Shannan was able to demonstrate them prior to the end of the session.  Shannan demonstrated good  understanding of the education provided. See EMR under Patient Instructions for exercises provided during therapy sessions    ASSESSMENT     Shannan is a 76 y.o. male referred to outpatient Physical Therapy at [facility] with a medical diagnosis of Primary osteoarthritis of both hips and Lumbar spondylosis.  Patient presents asymptomatic for the last day and is very encouraged by how he feels. Lower extremity strength is now > 85% of being symmetrical and all WNL. Worked on correcting functional movement patterns with good demonstration today. Will assess his presentation next visit and he may be appropriate for discharge next visit.    Shannan Is progressing well towards his goals.   Pt prognosis is Fair.     Pt will continue to benefit from skilled outpatient physical therapy to address the deficits listed in the problem list box on initial evaluation, provide pt/family education and to maximize pt's level of independence in the home and community environment.     Pt's spiritual, cultural and educational needs considered and pt agreeable to plan of care and goals.     Anticipated barriers to physical therapy: advanced Bilateral hip osteoarthritis, caretaker at " home.    Goals:   Short Term Goals: 4 weeks  1. Pt will demo good TA muscle contraction for improved deep abdominal strength and lumbar stability. PROGRESSING; NOT MET  2. Increase lumbar ROM to 100% of WNL in order to improve functional mobility. PROGRESSING; NOT MET    3. Pt will demo good sitting/standing posture and body mechanics for improved spine health and decreased risk of future injury. PROGRESSING; NOT MET  4. Pt to tolerate HEP to improve ROM and independence with ADL's. MET     Long Term Goals: 8 weeks  1. Report decreased low back pain without radiculopathy to </= 2/10 with squatting and prolonged standing/sitting to increase tolerance for ADLs and increased QoL. PROGRESSING; NOT MET  2. Increase strength to >/= 4/5 MMT grade for core and BLE to increase tolerance for ADL and work activities. PROGRESSING; NOT MET  3. Pt will demonstrate negative MARCIE testing and mild positive FADIR testing for improve hip mobility and improved hip health. PROGRESSING; NOT MET  4. Patient's goal: to have less pain. MET  5. Pt will report at </= 18% impaired on FOTO lumbar score for low back pain disability to demonstrate decrease in disability and improvement in back pain. PROGRESSING; NOT MET    PLAN     Possible discharge next visit    Grady Veliz, PT

## 2022-03-30 ENCOUNTER — PATIENT MESSAGE (OUTPATIENT)
Dept: ADMINISTRATIVE | Facility: HOSPITAL | Age: 77
End: 2022-03-30
Payer: MEDICARE

## 2022-03-30 ENCOUNTER — PATIENT OUTREACH (OUTPATIENT)
Dept: ADMINISTRATIVE | Facility: HOSPITAL | Age: 77
End: 2022-03-30
Payer: MEDICARE

## 2022-03-30 ENCOUNTER — OFFICE VISIT (OUTPATIENT)
Dept: FAMILY MEDICINE | Facility: CLINIC | Age: 77
End: 2022-03-30
Payer: MEDICARE

## 2022-03-30 ENCOUNTER — LAB VISIT (OUTPATIENT)
Dept: LAB | Facility: HOSPITAL | Age: 77
End: 2022-03-30
Attending: FAMILY MEDICINE
Payer: MEDICARE

## 2022-03-30 VITALS
DIASTOLIC BLOOD PRESSURE: 58 MMHG | BODY MASS INDEX: 26.91 KG/M2 | WEIGHT: 203.06 LBS | HEIGHT: 73 IN | OXYGEN SATURATION: 96 % | SYSTOLIC BLOOD PRESSURE: 90 MMHG

## 2022-03-30 DIAGNOSIS — I15.2 HYPERTENSION ASSOCIATED WITH DIABETES: ICD-10-CM

## 2022-03-30 DIAGNOSIS — M25.50 ARTHRALGIA, UNSPECIFIED JOINT: Primary | ICD-10-CM

## 2022-03-30 DIAGNOSIS — M25.50 ARTHRALGIA, UNSPECIFIED JOINT: ICD-10-CM

## 2022-03-30 DIAGNOSIS — E11.59 HYPERTENSION ASSOCIATED WITH DIABETES: ICD-10-CM

## 2022-03-30 DIAGNOSIS — M1A.09X0 CHRONIC GOUT OF MULTIPLE SITES, UNSPECIFIED CAUSE: ICD-10-CM

## 2022-03-30 LAB
ALBUMIN SERPL BCP-MCNC: 2.8 G/DL (ref 3.5–5.2)
ALP SERPL-CCNC: 82 U/L (ref 55–135)
ALT SERPL W/O P-5'-P-CCNC: 39 U/L (ref 10–44)
ANION GAP SERPL CALC-SCNC: 10 MMOL/L (ref 8–16)
AST SERPL-CCNC: 29 U/L (ref 10–40)
BASOPHILS # BLD AUTO: 0.04 K/UL (ref 0–0.2)
BASOPHILS NFR BLD: 0.5 % (ref 0–1.9)
BILIRUB SERPL-MCNC: 0.3 MG/DL (ref 0.1–1)
BUN SERPL-MCNC: 24 MG/DL (ref 8–23)
CALCIUM SERPL-MCNC: 9.5 MG/DL (ref 8.7–10.5)
CHLORIDE SERPL-SCNC: 103 MMOL/L (ref 95–110)
CHOLEST SERPL-MCNC: 102 MG/DL (ref 120–199)
CHOLEST/HDLC SERPL: 3.3 {RATIO} (ref 2–5)
CO2 SERPL-SCNC: 26 MMOL/L (ref 23–29)
CREAT SERPL-MCNC: 1 MG/DL (ref 0.5–1.4)
CRP SERPL-MCNC: 42.5 MG/L (ref 0–8.2)
DIFFERENTIAL METHOD: ABNORMAL
EOSINOPHIL # BLD AUTO: 0.1 K/UL (ref 0–0.5)
EOSINOPHIL NFR BLD: 0.8 % (ref 0–8)
ERYTHROCYTE [DISTWIDTH] IN BLOOD BY AUTOMATED COUNT: 12.5 % (ref 11.5–14.5)
ERYTHROCYTE [SEDIMENTATION RATE] IN BLOOD BY WESTERGREN METHOD: 87 MM/HR (ref 0–23)
EST. GFR  (AFRICAN AMERICAN): >60 ML/MIN/1.73 M^2
EST. GFR  (NON AFRICAN AMERICAN): >60 ML/MIN/1.73 M^2
ESTIMATED AVG GLUCOSE: 146 MG/DL (ref 68–131)
GLUCOSE SERPL-MCNC: 135 MG/DL (ref 70–110)
HBA1C MFR BLD: 6.7 % (ref 4–5.6)
HCT VFR BLD AUTO: 40.7 % (ref 40–54)
HDLC SERPL-MCNC: 31 MG/DL (ref 40–75)
HDLC SERPL: 30.4 % (ref 20–50)
HGB BLD-MCNC: 12.7 G/DL (ref 14–18)
IMM GRANULOCYTES # BLD AUTO: 0.16 K/UL (ref 0–0.04)
IMM GRANULOCYTES NFR BLD AUTO: 2 % (ref 0–0.5)
LDLC SERPL CALC-MCNC: 56 MG/DL (ref 63–159)
LYMPHOCYTES # BLD AUTO: 1.2 K/UL (ref 1–4.8)
LYMPHOCYTES NFR BLD: 15 % (ref 18–48)
MCH RBC QN AUTO: 29 PG (ref 27–31)
MCHC RBC AUTO-ENTMCNC: 31.2 G/DL (ref 32–36)
MCV RBC AUTO: 93 FL (ref 82–98)
MONOCYTES # BLD AUTO: 0.6 K/UL (ref 0.3–1)
MONOCYTES NFR BLD: 8.2 % (ref 4–15)
NEUTROPHILS # BLD AUTO: 5.8 K/UL (ref 1.8–7.7)
NEUTROPHILS NFR BLD: 73.5 % (ref 38–73)
NONHDLC SERPL-MCNC: 71 MG/DL
NRBC BLD-RTO: 0 /100 WBC
PLATELET # BLD AUTO: 425 K/UL (ref 150–450)
PMV BLD AUTO: 10.3 FL (ref 9.2–12.9)
POTASSIUM SERPL-SCNC: 5 MMOL/L (ref 3.5–5.1)
PROT SERPL-MCNC: 7 G/DL (ref 6–8.4)
RBC # BLD AUTO: 4.38 M/UL (ref 4.6–6.2)
SODIUM SERPL-SCNC: 139 MMOL/L (ref 136–145)
TRIGL SERPL-MCNC: 75 MG/DL (ref 30–150)
URATE SERPL-MCNC: 5.9 MG/DL (ref 3.4–7)
WBC # BLD AUTO: 7.85 K/UL (ref 3.9–12.7)

## 2022-03-30 PROCEDURE — 83036 HEMOGLOBIN GLYCOSYLATED A1C: CPT | Performed by: FAMILY MEDICINE

## 2022-03-30 PROCEDURE — 99215 PR OFFICE/OUTPT VISIT, EST, LEVL V, 40-54 MIN: ICD-10-PCS | Mod: S$GLB,,, | Performed by: FAMILY MEDICINE

## 2022-03-30 PROCEDURE — 36415 COLL VENOUS BLD VENIPUNCTURE: CPT | Mod: PO | Performed by: FAMILY MEDICINE

## 2022-03-30 PROCEDURE — 1101F PT FALLS ASSESS-DOCD LE1/YR: CPT | Mod: CPTII,S$GLB,, | Performed by: FAMILY MEDICINE

## 2022-03-30 PROCEDURE — 99215 OFFICE O/P EST HI 40 MIN: CPT | Mod: S$GLB,,, | Performed by: FAMILY MEDICINE

## 2022-03-30 PROCEDURE — 1126F PR PAIN SEVERITY QUANTIFIED, NO PAIN PRESENT: ICD-10-PCS | Mod: CPTII,S$GLB,, | Performed by: FAMILY MEDICINE

## 2022-03-30 PROCEDURE — 86431 RHEUMATOID FACTOR QUANT: CPT | Performed by: FAMILY MEDICINE

## 2022-03-30 PROCEDURE — 80061 LIPID PANEL: CPT | Performed by: FAMILY MEDICINE

## 2022-03-30 PROCEDURE — 1101F PR PT FALLS ASSESS DOC 0-1 FALLS W/OUT INJ PAST YR: ICD-10-PCS | Mod: CPTII,S$GLB,, | Performed by: FAMILY MEDICINE

## 2022-03-30 PROCEDURE — 85652 RBC SED RATE AUTOMATED: CPT | Performed by: FAMILY MEDICINE

## 2022-03-30 PROCEDURE — 3072F PR LOW RISK FOR RETINOPATHY: ICD-10-PCS | Mod: CPTII,S$GLB,, | Performed by: FAMILY MEDICINE

## 2022-03-30 PROCEDURE — 1126F AMNT PAIN NOTED NONE PRSNT: CPT | Mod: CPTII,S$GLB,, | Performed by: FAMILY MEDICINE

## 2022-03-30 PROCEDURE — 3288F FALL RISK ASSESSMENT DOCD: CPT | Mod: CPTII,S$GLB,, | Performed by: FAMILY MEDICINE

## 2022-03-30 PROCEDURE — 86038 ANTINUCLEAR ANTIBODIES: CPT | Performed by: FAMILY MEDICINE

## 2022-03-30 PROCEDURE — 3072F LOW RISK FOR RETINOPATHY: CPT | Mod: CPTII,S$GLB,, | Performed by: FAMILY MEDICINE

## 2022-03-30 PROCEDURE — 3078F PR MOST RECENT DIASTOLIC BLOOD PRESSURE < 80 MM HG: ICD-10-PCS | Mod: CPTII,S$GLB,, | Performed by: FAMILY MEDICINE

## 2022-03-30 PROCEDURE — 99999 PR PBB SHADOW E&M-EST. PATIENT-LVL IV: ICD-10-PCS | Mod: PBBFAC,,, | Performed by: FAMILY MEDICINE

## 2022-03-30 PROCEDURE — 80053 COMPREHEN METABOLIC PANEL: CPT | Performed by: FAMILY MEDICINE

## 2022-03-30 PROCEDURE — 3288F PR FALLS RISK ASSESSMENT DOCUMENTED: ICD-10-PCS | Mod: CPTII,S$GLB,, | Performed by: FAMILY MEDICINE

## 2022-03-30 PROCEDURE — 86140 C-REACTIVE PROTEIN: CPT | Performed by: FAMILY MEDICINE

## 2022-03-30 PROCEDURE — 85025 COMPLETE CBC W/AUTO DIFF WBC: CPT | Performed by: FAMILY MEDICINE

## 2022-03-30 PROCEDURE — 3074F SYST BP LT 130 MM HG: CPT | Mod: CPTII,S$GLB,, | Performed by: FAMILY MEDICINE

## 2022-03-30 PROCEDURE — 3074F PR MOST RECENT SYSTOLIC BLOOD PRESSURE < 130 MM HG: ICD-10-PCS | Mod: CPTII,S$GLB,, | Performed by: FAMILY MEDICINE

## 2022-03-30 PROCEDURE — 3078F DIAST BP <80 MM HG: CPT | Mod: CPTII,S$GLB,, | Performed by: FAMILY MEDICINE

## 2022-03-30 PROCEDURE — 84550 ASSAY OF BLOOD/URIC ACID: CPT | Performed by: FAMILY MEDICINE

## 2022-03-30 PROCEDURE — 99999 PR PBB SHADOW E&M-EST. PATIENT-LVL IV: CPT | Mod: PBBFAC,,, | Performed by: FAMILY MEDICINE

## 2022-03-30 NOTE — PROGRESS NOTES
Subjective:       Patient ID: Shannan Norman is a 76 y.o. male.    Chief Complaint: Annual Exam    76 years old male came to the clinic for blood pressure check.  Blood pressure today stable .  No chest pain, palpitation, orthopnea or PND.  Patient reports multiple joints pain.  The pain was localized both shoulders and hip.  Patient feels that probably was a flare up of gout.  No recent A1c.  No polyuria, polydipsia or polyphagia.    Review of Systems   Constitutional: Negative.    HENT: Negative.    Eyes: Negative.    Respiratory: Negative.    Cardiovascular: Negative.  Negative for chest pain, palpitations, leg swelling and claudication.   Gastrointestinal: Negative.    Endocrine: Negative for polydipsia, polyphagia and polyuria.   Genitourinary: Negative.    Musculoskeletal: Positive for arthralgias.   Integumentary:  Negative.   Neurological: Negative.    Psychiatric/Behavioral: Negative.          Objective:      Physical Exam  Vitals and nursing note reviewed.   Constitutional:       General: He is not in acute distress.     Appearance: He is well-developed. He is not diaphoretic.   HENT:      Head: Normocephalic and atraumatic.      Right Ear: External ear normal.      Left Ear: External ear normal.      Nose: Nose normal.      Mouth/Throat:      Pharynx: No oropharyngeal exudate.   Eyes:      General: No scleral icterus.        Right eye: No discharge.         Left eye: No discharge.      Conjunctiva/sclera: Conjunctivae normal.      Pupils: Pupils are equal, round, and reactive to light.   Neck:      Thyroid: No thyromegaly.      Vascular: No JVD.      Trachea: No tracheal deviation.   Cardiovascular:      Rate and Rhythm: Normal rate and regular rhythm.      Heart sounds: Normal heart sounds. No murmur heard.    No friction rub. No gallop.   Pulmonary:      Effort: Pulmonary effort is normal. No respiratory distress.      Breath sounds: Normal breath sounds. No stridor. No wheezing or rales.   Chest:       Chest wall: No tenderness.   Abdominal:      General: Bowel sounds are normal. There is no distension.      Palpations: Abdomen is soft. There is no mass.      Tenderness: There is no abdominal tenderness. There is no guarding or rebound.   Musculoskeletal:         General: No tenderness. Normal range of motion.      Cervical back: Normal range of motion and neck supple.   Lymphadenopathy:      Cervical: No cervical adenopathy.   Skin:     General: Skin is warm and dry.      Coloration: Skin is not pale.      Findings: No erythema or rash.   Neurological:      Mental Status: He is alert and oriented to person, place, and time.      Cranial Nerves: No cranial nerve deficit.      Motor: No abnormal muscle tone.      Coordination: Coordination normal.      Deep Tendon Reflexes: Reflexes are normal and symmetric. Reflexes normal.   Psychiatric:         Behavior: Behavior normal.         Thought Content: Thought content normal.         Judgment: Judgment normal.         Assessment:       Problem List Items Addressed This Visit     Hypertension associated with diabetes    Relevant Orders    CBC Auto Differential (Completed)    Comprehensive Metabolic Panel (Completed)    Hemoglobin A1C (Completed)    Lipid Panel (Completed)      Other Visit Diagnoses     Arthralgia, unspecified joint    -  Primary    Relevant Orders    Ambulatory referral/consult to Rheumatology    AUGUSTINA Screen w/Reflex (Completed)    C-Reactive Protein (Completed)    Sedimentation rate (Completed)    Rheumatoid Factor (Completed)    Uric Acid (Completed)    Chronic gout of multiple sites, unspecified cause        Relevant Orders    Ambulatory referral/consult to Rheumatology          Plan:         Shannan was seen today for annual exam.    Diagnoses and all orders for this visit:    Arthralgia, unspecified joint  -     Ambulatory referral/consult to Rheumatology; Future  -     AUGUSTINA Screen w/Reflex; Future  -     C-Reactive Protein; Future  -     Sedimentation  rate; Future  -     Rheumatoid Factor; Future  -     Uric Acid; Future    Chronic gout of multiple sites, unspecified cause  -     Ambulatory referral/consult to Rheumatology; Future    Hypertension associated with diabetes  -     CBC Auto Differential; Future  -     Comprehensive Metabolic Panel; Future  -     Hemoglobin A1C; Future  -     Lipid Panel; Future    Continue monitoring blood pressure at home, low sodium diet.  Continue monitoring blood sugar at home,ADA diet.

## 2022-03-30 NOTE — PROGRESS NOTES
Chart audit performed Care everywhere triggered & updatedmLINKS triggered and updatedm Patient outreach regarding Health Maintenance- portal message sent as reminder HM Updated - immunizations

## 2022-03-31 ENCOUNTER — OFFICE VISIT (OUTPATIENT)
Dept: ORTHOPEDICS | Facility: CLINIC | Age: 77
End: 2022-03-31
Payer: MEDICARE

## 2022-03-31 ENCOUNTER — HOSPITAL ENCOUNTER (OUTPATIENT)
Dept: RADIOLOGY | Facility: HOSPITAL | Age: 77
Discharge: HOME | End: 2022-03-31
Attending: ORTHOPAEDIC SURGERY
Payer: MEDICARE

## 2022-03-31 ENCOUNTER — OFFICE VISIT (OUTPATIENT)
Dept: NEUROLOGY | Facility: CLINIC | Age: 77
End: 2022-03-31
Payer: MEDICARE

## 2022-03-31 VITALS
BODY MASS INDEX: 28.24 KG/M2 | HEIGHT: 71 IN | BODY MASS INDEX: 28.24 KG/M2 | HEART RATE: 60 BPM | SYSTOLIC BLOOD PRESSURE: 150 MMHG | WEIGHT: 201.75 LBS | DIASTOLIC BLOOD PRESSURE: 84 MMHG | WEIGHT: 201.75 LBS | HEIGHT: 71 IN

## 2022-03-31 DIAGNOSIS — M25.60 STIFFNESS IN JOINT: ICD-10-CM

## 2022-03-31 DIAGNOSIS — M79.10 MUSCLE PAIN: Primary | ICD-10-CM

## 2022-03-31 DIAGNOSIS — M47.816 LUMBAR SPONDYLOSIS: Primary | ICD-10-CM

## 2022-03-31 DIAGNOSIS — M51.36 DDD (DEGENERATIVE DISC DISEASE), LUMBAR: ICD-10-CM

## 2022-03-31 DIAGNOSIS — M21.372 LEFT FOOT DROP: ICD-10-CM

## 2022-03-31 LAB
ANA SER QL IF: NORMAL
RHEUMATOID FACT SERPL-ACNC: <13 IU/ML (ref 0–15)

## 2022-03-31 PROCEDURE — 1101F PR PT FALLS ASSESS DOC 0-1 FALLS W/OUT INJ PAST YR: ICD-10-PCS | Mod: CPTII,S$GLB,, | Performed by: PHYSICIAN ASSISTANT

## 2022-03-31 PROCEDURE — 1159F PR MEDICATION LIST DOCUMENTED IN MEDICAL RECORD: ICD-10-PCS | Mod: CPTII,S$GLB,, | Performed by: PHYSICIAN ASSISTANT

## 2022-03-31 PROCEDURE — 99204 PR OFFICE/OUTPT VISIT, NEW, LEVL IV, 45-59 MIN: ICD-10-PCS | Mod: GC,S$GLB,, | Performed by: ORTHOPAEDIC SURGERY

## 2022-03-31 PROCEDURE — 1160F PR REVIEW ALL MEDS BY PRESCRIBER/CLIN PHARMACIST DOCUMENTED: ICD-10-PCS | Mod: CPTII,S$GLB,, | Performed by: PHYSICIAN ASSISTANT

## 2022-03-31 PROCEDURE — 3288F PR FALLS RISK ASSESSMENT DOCUMENTED: ICD-10-PCS | Mod: CPTII,S$GLB,, | Performed by: PHYSICIAN ASSISTANT

## 2022-03-31 PROCEDURE — 1101F PT FALLS ASSESS-DOCD LE1/YR: CPT | Mod: CPTII,S$GLB,, | Performed by: PHYSICIAN ASSISTANT

## 2022-03-31 PROCEDURE — 1125F AMNT PAIN NOTED PAIN PRSNT: CPT | Mod: CPTII,S$GLB,, | Performed by: PHYSICIAN ASSISTANT

## 2022-03-31 PROCEDURE — 3072F LOW RISK FOR RETINOPATHY: CPT | Mod: CPTII,S$GLB,, | Performed by: ORTHOPAEDIC SURGERY

## 2022-03-31 PROCEDURE — 1125F PR PAIN SEVERITY QUANTIFIED, PAIN PRESENT: ICD-10-PCS | Mod: CPTII,S$GLB,, | Performed by: PHYSICIAN ASSISTANT

## 2022-03-31 PROCEDURE — 3288F FALL RISK ASSESSMENT DOCD: CPT | Mod: CPTII,S$GLB,, | Performed by: PHYSICIAN ASSISTANT

## 2022-03-31 PROCEDURE — 3072F PR LOW RISK FOR RETINOPATHY: ICD-10-PCS | Mod: CPTII,S$GLB,, | Performed by: PHYSICIAN ASSISTANT

## 2022-03-31 PROCEDURE — 99204 OFFICE O/P NEW MOD 45 MIN: CPT | Mod: S$GLB,,, | Performed by: PHYSICIAN ASSISTANT

## 2022-03-31 PROCEDURE — 3077F PR MOST RECENT SYSTOLIC BLOOD PRESSURE >= 140 MM HG: ICD-10-PCS | Mod: CPTII,S$GLB,, | Performed by: PHYSICIAN ASSISTANT

## 2022-03-31 PROCEDURE — 72110 X-RAY EXAM L-2 SPINE 4/>VWS: CPT | Mod: TC

## 2022-03-31 PROCEDURE — 1160F RVW MEDS BY RX/DR IN RCRD: CPT | Mod: CPTII,S$GLB,, | Performed by: PHYSICIAN ASSISTANT

## 2022-03-31 PROCEDURE — 1159F MED LIST DOCD IN RCRD: CPT | Mod: CPTII,S$GLB,, | Performed by: PHYSICIAN ASSISTANT

## 2022-03-31 PROCEDURE — 99999 PR PBB SHADOW E&M-EST. PATIENT-LVL III: ICD-10-PCS | Mod: PBBFAC,,, | Performed by: ORTHOPAEDIC SURGERY

## 2022-03-31 PROCEDURE — 3288F FALL RISK ASSESSMENT DOCD: CPT | Mod: CPTII,S$GLB,, | Performed by: ORTHOPAEDIC SURGERY

## 2022-03-31 PROCEDURE — 3072F LOW RISK FOR RETINOPATHY: CPT | Mod: CPTII,S$GLB,, | Performed by: PHYSICIAN ASSISTANT

## 2022-03-31 PROCEDURE — 1125F PR PAIN SEVERITY QUANTIFIED, PAIN PRESENT: ICD-10-PCS | Mod: CPTII,S$GLB,, | Performed by: ORTHOPAEDIC SURGERY

## 2022-03-31 PROCEDURE — 99999 PR PBB SHADOW E&M-EST. PATIENT-LVL IV: ICD-10-PCS | Mod: PBBFAC,,, | Performed by: PHYSICIAN ASSISTANT

## 2022-03-31 PROCEDURE — 3079F PR MOST RECENT DIASTOLIC BLOOD PRESSURE 80-89 MM HG: ICD-10-PCS | Mod: CPTII,S$GLB,, | Performed by: PHYSICIAN ASSISTANT

## 2022-03-31 PROCEDURE — 3288F PR FALLS RISK ASSESSMENT DOCUMENTED: ICD-10-PCS | Mod: CPTII,S$GLB,, | Performed by: ORTHOPAEDIC SURGERY

## 2022-03-31 PROCEDURE — 1125F AMNT PAIN NOTED PAIN PRSNT: CPT | Mod: CPTII,S$GLB,, | Performed by: ORTHOPAEDIC SURGERY

## 2022-03-31 PROCEDURE — 99999 PR PBB SHADOW E&M-EST. PATIENT-LVL III: CPT | Mod: PBBFAC,,, | Performed by: ORTHOPAEDIC SURGERY

## 2022-03-31 PROCEDURE — 72110 X-RAY EXAM L-2 SPINE 4/>VWS: CPT | Mod: 26,,, | Performed by: RADIOLOGY

## 2022-03-31 PROCEDURE — 3079F DIAST BP 80-89 MM HG: CPT | Mod: CPTII,S$GLB,, | Performed by: PHYSICIAN ASSISTANT

## 2022-03-31 PROCEDURE — 3072F PR LOW RISK FOR RETINOPATHY: ICD-10-PCS | Mod: CPTII,S$GLB,, | Performed by: ORTHOPAEDIC SURGERY

## 2022-03-31 PROCEDURE — 3077F SYST BP >= 140 MM HG: CPT | Mod: CPTII,S$GLB,, | Performed by: PHYSICIAN ASSISTANT

## 2022-03-31 PROCEDURE — 99204 OFFICE O/P NEW MOD 45 MIN: CPT | Mod: GC,S$GLB,, | Performed by: ORTHOPAEDIC SURGERY

## 2022-03-31 PROCEDURE — 72110 XR LUMBAR SPINE AP AND LAT WITH FLEX/EXT: ICD-10-PCS | Mod: 26,,, | Performed by: RADIOLOGY

## 2022-03-31 PROCEDURE — 99999 PR PBB SHADOW E&M-EST. PATIENT-LVL IV: CPT | Mod: PBBFAC,,, | Performed by: PHYSICIAN ASSISTANT

## 2022-03-31 PROCEDURE — 1101F PT FALLS ASSESS-DOCD LE1/YR: CPT | Mod: CPTII,S$GLB,, | Performed by: ORTHOPAEDIC SURGERY

## 2022-03-31 PROCEDURE — 99204 PR OFFICE/OUTPT VISIT, NEW, LEVL IV, 45-59 MIN: ICD-10-PCS | Mod: S$GLB,,, | Performed by: PHYSICIAN ASSISTANT

## 2022-03-31 PROCEDURE — 1101F PR PT FALLS ASSESS DOC 0-1 FALLS W/OUT INJ PAST YR: ICD-10-PCS | Mod: CPTII,S$GLB,, | Performed by: ORTHOPAEDIC SURGERY

## 2022-03-31 NOTE — PROGRESS NOTES
Name: Shannan Norman  MRN: [id number]   CSN: [id number]      Date: 03/31/2022    Referring physician:  Issa Bhatt III, MD  [address] [place] [address]    Chief Complaint: stiffness     History of Present Illness (HPI): Shannan Norman is a R HANDED 76 y.o. male with a medical issues significant for HTN, gout, DMII, CKDIII, BPH, SSS, and osteoarthritis of bilateral hips. Accompanied by son. Noticed stiffness in his bilateral hips. Not affecting his balance. Scheduled to see rheumatology in June. Has trouble lifting his arms above his shoulders, this started 2-3 weeks ago when he was having pain in his knees and hips. He was told that he needed to see neurology by orthopedic. Thought he had parkinson's. Some days he is able to lift his arms above his shoulders and some days he is not able to. Episodic pain in hips, lumbar spine and knees as well. No falls. Some shuffling according to son this past weekend but now has noticed that he is back to normal. He was not able to lift his arms above his waist on Sunday or Monday.   He denies tremors but son has noticed a tremor whenever he holds a plate -- he attributes to reduced strength. He only feels like he has slowed down whenever he has episodes of weakness. Intermittent tightness in his calves -- he said that this is not a good sign.     He was completely normal until about a month ago when he started having severe shoulder and hip pain.     Denies episodic eye pain or blurred vision. Denies sudden vision loss.     ESR and CRP recently elevated.   ESR 87 and CRP 42.5     Family History: none     Neuroleptic Exposure: none     Nonmotor/Premotor ROS:  Anosmia: normal   Dysarthria/Hypophonia: softening of his voice   Dysphagia/Sialorrhea: none   Depression: attributes to pain   Cognitive slowing: normal, son agrees   Urinary changes: sometimes forgets to go to the bathroom due to immense pain   Constipation: none   Falls: none   Freezing: none    Micrographia: none   Sleep issues:  -RBD: none       Review of Systems:   Review of Systems   Constitutional: Negative for chills, fever and malaise/fatigue.   HENT: Negative for hearing loss.    Eyes: Negative for blurred vision and double vision.   Respiratory: Negative for cough, shortness of breath and stridor.    Cardiovascular: Negative for chest pain and leg swelling.   Gastrointestinal: Negative for constipation, diarrhea and nausea.   Genitourinary: Negative for frequency and urgency.   Musculoskeletal: Positive for back pain and joint pain. Negative for falls.   Skin: Negative for itching and rash.   Neurological: Negative for dizziness, tremors, loss of consciousness and weakness.   Psychiatric/Behavioral: Negative for hallucinations and memory loss.           Past Medical History: The patient  has a past medical history of Allergy, Arthritis, Atrial fibrillation, Atrial flutter (2011), Basal cell carcinoma, Cancer, Cataract, CKD (chronic kidney disease) stage 3, GFR 30-59 ml/min (8/13/2019), Diabetes mellitus, Dry eye syndrome, Gout, unspecified, High cholesterol, History of shingles, Hypertension, Melanoma, and Seizures.    Social History: The patient  reports that he has never smoked. He has never used smokeless tobacco. He reports current alcohol use of about 7.0 - 14.0 standard drinks of alcohol per week. He reports that he does not use drugs.    Family History: Their family history includes Arthritis in his sister; COPD in his father; Cancer in his sister; Diabetes in his brother and mother; Heart attack in his brother; Heart disease in his brother and father; No Known Problems in his maternal aunt, maternal grandfather, maternal grandmother, maternal uncle, paternal aunt, paternal grandfather, paternal grandmother, paternal uncle, sister, son, and son.    Allergies: Patient has no known allergies.     Meds:   Current Outpatient Medications on File Prior to Visit   Medication Sig Dispense Refill  "   allopurinoL (ZYLOPRIM) 100 MG tablet Take 1 tablet (100 mg total) by mouth once daily. 90 tablet 3    ascorbic acid, vitamin C, (VITAMIN C) 500 MG tablet Take 1,000 mg by mouth nightly.       atorvastatin (LIPITOR) 40 MG tablet Take 1 tablet (40 mg total) by mouth once daily. 90 tablet 3    azelastine (ASTELIN) 137 mcg (0.1 %) nasal spray 1 spray (137 mcg total) by Nasal route 2 (two) times daily. 30 mL 0    b complex vitamins capsule Take 1 capsule by mouth once daily.      benazepriL (LOTENSIN) 20 MG tablet Take 1 tablet (20 mg total) by mouth once daily. 90 tablet 3    celecoxib (CELEBREX) 200 MG capsule Take 1 capsule (200 mg total) by mouth daily as needed for Pain. 90 capsule 0    cycloSPORINE (RESTASIS) 0.05 % ophthalmic emulsion Place 1 drop into both eyes 2 (two) times daily. 60 each 11    econazole nitrate 1 % cream Apply topically once daily. 85 g 3    GLUCOSAMINE HCL/CHONDR VASQUEZ A NA (GLUCOSAMINE-CHONDROITIN) 750-600 mg Tab Take 2 tablets by mouth Daily.      levocetirizine (XYZAL) 5 MG tablet Take 1 tablet (5 mg total) by mouth every evening. 90 tablet 1    multivitamin capsule Take 1 capsule by mouth nightly.       tamsulosin (FLOMAX) 0.4 mg Cap TAKE 1 CAPSULE BY MOUTH AFTER DINNER 90 capsule 3     No current facility-administered medications on file prior to visit.       Exam:  BP (!) 150/84   Pulse 60   Ht 5' 11" (1.803 m)   Wt 91.5 kg (201 lb 11.5 oz)   BMI 28.13 kg/m²     Constitutional  Well-developed, well-nourished, appears stated age   Ophthalmoscopic  No papilledema with no hemorrhages or exudates bilaterally   Cardiovascular  Radial pulses 2+ and symmetric, b/l varicose veins and discoloration to bilateral lower extremities    Neurological    * Mental status  MOCA =      - Orientation  Oriented to person, place, time, and situation     - Memory   Intact recent and remote     - Attention/concentration  Attentive, vigilant during exam     - Language  Naming & repetition " intact, +2-step commands     - Fund of knowledge  Aware of current events     - Executive  Well-organized thoughts     - Other     * Cranial nerves       - CN II  PERRL, visual fields full to confrontation     - CN III, IV, VI  Extraocular movements full, normal pursuits and saccades     - CN V  Sensation V1 - V3 intact     - CN VII  Face strong and symmetric bilaterally     - CN VIII  Hearing intact bilaterally     - CN IX, X  Palate raises midline and symmetric     - CN XI  SCM and trapezius 5/5 bilaterally     - CN XII  Tongue midline   * Motor  Muscle bulk normal, strength 5/5 throughout on exam today -- also not currently in an episode of pain where he perceives weakness    * Sensory   Intact to temperature   * Coordination  No dysmetria with finger-to-nose or heel-to-shin   * Gait  See below.   * Deep tendon reflexes  1+ and symmetric throughout   diminished patellar reflexes    1+ achilles    Babinski downgoing bilaterally   * Specialized movement exam  No hypophonic speech.    No facial masking.   No cogwheel rigidity.     mild dysrhythmia with finger flicks only    no tremor at rest, mild kinetic tremor L > R    No other dystonia, chorea, athetosis, myoclonus, or tics.   No motor impersistence.   Normal-based gait.   No shortened stride length.   No abnormal arm swing.     No postural instability.      Laboratory/Radiological:  - Results:  Lab Visit on 03/30/2022   Component Date Value Ref Range Status    WBC 03/30/2022 7.85  3.90 - 12.70 K/uL Final    RBC 03/30/2022 4.38 (A) 4.60 - 6.20 M/uL Final    Hemoglobin 03/30/2022 12.7 (A) 14.0 - 18.0 g/dL Final    Hematocrit 03/30/2022 40.7  40.0 - 54.0 % Final    MCV 03/30/2022 93  82 - 98 fL Final    MCH 03/30/2022 29.0  27.0 - 31.0 pg Final    MCHC 03/30/2022 31.2 (A) 32.0 - 36.0 g/dL Final    RDW 03/30/2022 12.5  11.5 - 14.5 % Final    Platelets 03/30/2022 425  150 - 450 K/uL Final    MPV 03/30/2022 10.3  9.2 - 12.9 fL Final    Immature Granulocytes  03/30/2022 2.0 (A) 0.0 - 0.5 % Final    Gran # (ANC) 03/30/2022 5.8  1.8 - 7.7 K/uL Final    Immature Grans (Abs) 03/30/2022 0.16 (A) 0.00 - 0.04 K/uL Final    Lymph # 03/30/2022 1.2  1.0 - 4.8 K/uL Final    Mono # 03/30/2022 0.6  0.3 - 1.0 K/uL Final    Eos # 03/30/2022 0.1  0.0 - 0.5 K/uL Final    Baso # 03/30/2022 0.04  0.00 - 0.20 K/uL Final    nRBC 03/30/2022 0  0 /100 WBC Final    Gran % 03/30/2022 73.5 (A) 38.0 - 73.0 % Final    Lymph % 03/30/2022 15.0 (A) 18.0 - 48.0 % Final    Mono % 03/30/2022 8.2  4.0 - 15.0 % Final    Eosinophil % 03/30/2022 0.8  0.0 - 8.0 % Final    Basophil % 03/30/2022 0.5  0.0 - 1.9 % Final    Differential Method 03/30/2022 Automated   Final    Sodium 03/30/2022 139  136 - 145 mmol/L Final    Potassium 03/30/2022 5.0  3.5 - 5.1 mmol/L Final    Chloride 03/30/2022 103  95 - 110 mmol/L Final    CO2 03/30/2022 26  23 - 29 mmol/L Final    Glucose 03/30/2022 135 (A) 70 - 110 mg/dL Final    BUN 03/30/2022 24 (A) 8 - 23 mg/dL Final    Creatinine 03/30/2022 1.0  0.5 - 1.4 mg/dL Final    Calcium 03/30/2022 9.5  8.7 - 10.5 mg/dL Final    Total Protein 03/30/2022 7.0  6.0 - 8.4 g/dL Final    Albumin 03/30/2022 2.8 (A) 3.5 - 5.2 g/dL Final    Total Bilirubin 03/30/2022 0.3  0.1 - 1.0 mg/dL Final    Alkaline Phosphatase 03/30/2022 82  55 - 135 U/L Final    AST 03/30/2022 29  10 - 40 U/L Final    ALT 03/30/2022 39  10 - 44 U/L Final    Anion Gap 03/30/2022 10  8 - 16 mmol/L Final    eGFR if African American 03/30/2022 >60.0  >60 mL/min/1.73 m^2 Final    eGFR if non African American 03/30/2022 >60.0  >60 mL/min/1.73 m^2 Final    Hemoglobin A1C 03/30/2022 6.7 (A) 4.0 - 5.6 % Final    Estimated Avg Glucose 03/30/2022 146 (A) 68 - 131 mg/dL Final    Cholesterol 03/30/2022 102 (A) 120 - 199 mg/dL Final    Triglycerides 03/30/2022 75  30 - 150 mg/dL Final    HDL 03/30/2022 31 (A) 40 - 75 mg/dL Final    LDL Cholesterol 03/30/2022 56.0 (A) 63.0 - 159.0 mg/dL Final     HDL/Cholesterol Ratio 03/30/2022 30.4  20.0 - 50.0 % Final    Total Cholesterol/HDL Ratio 03/30/2022 3.3  2.0 - 5.0 Final    Non-HDL Cholesterol 03/30/2022 71  mg/dL Final    CRP 03/30/2022 42.5 (A) 0.0 - 8.2 mg/L Final    Sed Rate 03/30/2022 87 (A) 0 - 23 mm/Hr Final    Rheumatoid Factor 03/30/2022 <13.0  0.0 - 15.0 IU/mL Final    Uric Acid 03/30/2022 5.9  3.4 - 7.0 mg/dL Final   Lab Visit on 02/18/2022   Component Date Value Ref Range Status    Uric Acid 02/18/2022 5.3  3.4 - 7.0 mg/dL Final       - Independent review of images:      - Independent review of consultant's notes:     ASSESSMENT/PLAN:  1. Muscle stiffness and pain  - history concerning for rheumatologic process --- possibly polymyalgia rheumatica in setting of pelvic and shoulder pain, episodic  - elevated CRP and ESR   - states that when this pain occurs he is also weak but unclear if this is true weakness or just immense pain   - denies history concerning for GCA   - scheduled to see rheumatology in June-- will see if there is something we can do to expedite appointment   - no parkinsonism on exam today -- stiffness if NOT episodic in PD, also unable to appreciate cogwheel rigidity even with diversion       Orders Placed This Encounter    CK    Anti-Neutrophilic Cytoplasmic Antibody    MyoMarker Panel 3    Calcium    Anti Sm/RNP Antibody    Sjogrens syndrome-A extractable nuclear antibody    ANTIMITOCHONDRIAL ANTIBODY    Sjogrens syndrome-B extractable nuclear antibody           Follow up: with Rheumatology -- if work up unremarkable, consider referral to neuromuscular     Collaborating Physician, Dr. Sandy, was available during today's encounter. Any change to plan along with cosign to appear in the EMR.       Total time spent with the patient: 50 minutes.  35 minutes of face-to-face consultation and 15 minutes of chart review and coordination of care, on the day of the visit. This includes face to face time and non-face to face  time preparing to see the patient (eg, review of tests), obtaining and/or reviewing separately obtained history, documenting clinical information in the electronic or other health record, independently interpreting resultsand communicating results to the patient/family/caregiver, or care coordination.         Neda Munoz PA-C   [facility]  Department of Neurology  Movement Disorders

## 2022-03-31 NOTE — PROGRESS NOTES
DATE: 3/31/2022  PATIENT: Shannan Norman    Attending Physician: Martell Bae M.D.    CHIEF COMPLAINT: low back pain    HISTORY:  Shannan Norman is a 76 y.o. male pmhx gout here for initial evaluation of low back and bilateral leg pain (Back - 5, Leg - 0). The pain has been present for 1 month, it has improved over this period. Around 4-5 days ago the pain largely resolved. He continues to have tightness in his calves. The patient describes the pain as dull, achy, tightness.  The pain is worse with standing from seated position and improved by rest or ambulation. There is no associated numbness and tingling. There is no subjective weakness. Prior treatments have included celebrex, tylenol, PT, but no ELODIA or surgery.    The Patient denies myelopathic symptoms such as handwriting changes or difficulty with buttons/coins/keys. Denies perineal paresthesias, bowel/bladder dysfunction.    The patient does not smoke, have DM or endorse IVDU. The patient is not on any blood thinners and does not take chronic narcotics.    PAST MEDICAL/SURGICAL HISTORY:  Past Medical History:   Diagnosis Date    Allergy     Arthritis     Atrial fibrillation     Atrial flutter 2011    ablation    Basal cell carcinoma     Cancer     Cataract     CKD (chronic kidney disease) stage 3, GFR 30-59 ml/min 8/13/2019    Diabetes mellitus     Dry eye syndrome     Gout, unspecified     High cholesterol     History of shingles     Hypertension     Melanoma     Seizures      Past Surgical History:   Procedure Laterality Date    ABLATION N/A 9/11/2018    Procedure: ABLATION;  Surgeon: Hany Sanchez MD;  Location: [facility] CATH LAB;  Service: Cardiology;  Laterality: N/A;  AFL, ISABELLE, RFA, ELODIA, MAC, DM, 3 PREP    ABLATION OF DYSRHYTHMIC FOCUS      ADENOIDECTOMY      CARDIAC PACEMAKER PLACEMENT      no defib    CATARACT EXTRACTION W/  INTRAOCULAR LENS IMPLANT Right 7/28/2020    Procedure: EXTRACTION, CATARACT, WITH IOL INSERTION;   Surgeon: Andrés Morse MD;  Location: [facility] OR;  Service: Ophthalmology;  Laterality: Right;    CATARACT EXTRACTION W/  INTRAOCULAR LENS IMPLANT Left 8/11/2020    Procedure: EXTRACTION, CATARACT, WITH IOL INSERTION;  Surgeon: Andrés Morse MD;  Location: [facility] OR;  Service: Ophthalmology;  Laterality: Left;    COLONOSCOPY N/A 1/2/2019    Procedure: COLONOSCOPY Suprep;  Surgeon: Cindy Solorzano MD;  Location: [facility];  Service: Endoscopy;  Laterality: N/A;    GANGLION CYST EXCISION      left neck    HERNIA REPAIR  2013    umbilical    TENDON REPAIR Right     wrist    TONSILLECTOMY      varicose veins      several sx  bilateral    VASECTOMY      VEIN SURGERY         Current Medications:   Current Outpatient Medications:     allopurinoL (ZYLOPRIM) 100 MG tablet, Take 1 tablet (100 mg total) by mouth once daily., Disp: 90 tablet, Rfl: 3    ascorbic acid, vitamin C, (VITAMIN C) 500 MG tablet, Take 1,000 mg by mouth nightly. , Disp: , Rfl:     atorvastatin (LIPITOR) 40 MG tablet, Take 1 tablet (40 mg total) by mouth once daily., Disp: 90 tablet, Rfl: 3    azelastine (ASTELIN) 137 mcg (0.1 %) nasal spray, 1 spray (137 mcg total) by Nasal route 2 (two) times daily., Disp: 30 mL, Rfl: 0    b complex vitamins capsule, Take 1 capsule by mouth once daily., Disp: , Rfl:     benazepriL (LOTENSIN) 20 MG tablet, Take 1 tablet (20 mg total) by mouth once daily., Disp: 90 tablet, Rfl: 3    celecoxib (CELEBREX) 200 MG capsule, Take 1 capsule (200 mg total) by mouth daily as needed for Pain., Disp: 90 capsule, Rfl: 0    cycloSPORINE (RESTASIS) 0.05 % ophthalmic emulsion, Place 1 drop into both eyes 2 (two) times daily., Disp: 60 each, Rfl: 11    econazole nitrate 1 % cream, Apply topically once daily., Disp: 85 g, Rfl: 3    GLUCOSAMINE HCL/CHONDR VASQUEZ A NA (GLUCOSAMINE-CHONDROITIN) 750-600 mg Tab, Take 2 tablets by mouth Daily., Disp: , Rfl:     levocetirizine (XYZAL) 5 MG tablet, Take 1 tablet  (5 mg total) by mouth every evening., Disp: 90 tablet, Rfl: 1    multivitamin capsule, Take 1 capsule by mouth nightly. , Disp: , Rfl:     tamsulosin (FLOMAX) 0.4 mg Cap, TAKE 1 CAPSULE BY MOUTH AFTER DINNER, Disp: 90 capsule, Rfl: 3    Social History:   Social History     Socioeconomic History    Marital status:    Occupational History     Employer: [employer]   Tobacco Use    Smoking status: Never Smoker    Smokeless tobacco: Never Used   Substance and Sexual Activity    Alcohol use: Yes     Alcohol/week: 7.0 - 14.0 standard drinks     Types: 7 - 14 Glasses of wine per week    Drug use: No    Sexual activity: Yes     Partners: Female     Social Determinants of Health     Financial Resource Strain: Low Risk     Difficulty of Paying Living Expenses: Not hard at all   Food Insecurity: No Food Insecurity    Worried About Running Out of Food in the Last Year: Never true    Ran Out of Food in the Last Year: Never true   Transportation Needs: No Transportation Needs    Lack of Transportation (Medical): No    Lack of Transportation (Non-Medical): No   Physical Activity: Inactive    Days of Exercise per Week: 0 days    Minutes of Exercise per Session: 0 min   Stress: Stress Concern Present    Feeling of Stress : To some extent   Social Connections: Unknown    Frequency of Communication with Friends and Family: More than three times a week    Frequency of Social Gatherings with Friends and Family: Three times a week    Active Member of Clubs or Organizations: Yes    Attends Club or Organization Meetings: More than 4 times per year    Marital Status:    Housing Stability: Low Risk     Unable to Pay for Housing in the Last Year: No    Number of Places Lived in the Last Year: 1    Unstable Housing in the Last Year: No       REVIEW OF SYSTEMS:  Constitution: Negative. Negative for chills, fever and night sweats.   Cardiovascular: Negative for chest pain and syncope.   Respiratory:  "Negative for cough and shortness of breath.   Gastrointestinal: See HPI. Negative for nausea/vomiting. Negative for abdominal pain.  Genitourinary: See HPI. Negative for discoloration or dysuria.  Hematologic/Lymphatic: no for bleeding/clotting disorders.   Musculoskeletal: Negative for falls and muscle weakness.   Neurological: See HPI. no history of seizures. no history of cranial surgery or shunts.  Neurological: See HPI. No seizures.   Endocrine: Negative for polydipsia, polyphagia and polyuria.   Allergic/Immunologic: Negative for hives and persistent infections.     EXAM:  Ht 5' 11" (1.803 m)   Wt 91.5 kg (201 lb 11.5 oz)   BMI 28.13 kg/m²     PHYSICAL EXAMINATION:    General: The patient is a 76 y.o. male in no apparent distress, the patient is orientatied to person, place and time.  Psych: Normal mood and affect  HEENT: Vision grossly intact, hearing intact to the spoken word.  Lungs: Respirations unlabored.  Gait: Normal station and gait, no difficulty with toe or heel walk.   Skin: Dorsal lumbar skin negative for rashes, lesions, hairy patches and surgical scars. There is no lumbar tenderness to palpation.  Range of motion: Lumbar range of motion is acceptable.  Spinal Balance: Global saggital and coronal spinal balance acceptable, no significant for scoliosis and kyphosis.  Musculoskeletal: No pain with the range of motion of the bilateral hips. No trochanteric tenderness to palpation.  Vascular: Bilateral lower extremities warm and well perfused, Dorsalis pedis pulses 2+ bilaterally.  Neurological: Normal strength and tone in all major motor groups in the bilateral lower extremities except for 4/5 in left EHL/DF. Normal sensation to light touch in the L2-S1 dermatomes bilaterally.  Deep tendon reflexes symmetric 2+ in the bilateral lower extremities.  Negative Babinski bilaterally. Straight leg raise negative bilaterally.    IMAGING:      Today I personally reviewed AP, Lat and Flex/Ex  upright L-spine " that demonstrate multi-level severe lumbar spondylosis. Anterolisthesis L4-5 measuring 4mm.    Body mass index is 28.13 kg/m².  Hemoglobin A1C   Date Value Ref Range Status   03/30/2022 6.7 (H) 4.0 - 5.6 % Final     Comment:     ADA Screening Guidelines:  5.7-6.4%  Consistent with prediabetes  >or=6.5%  Consistent with diabetes    High levels of fetal hemoglobin interfere with the HbA1C  assay. Heterozygous hemoglobin variants (HbS, HgC, etc)do  not significantly interfere with this assay.   However, presence of multiple variants may affect accuracy.     10/04/2021 5.9 (H) 4.0 - 5.6 % Final     Comment:     ADA Screening Guidelines:  5.7-6.4%  Consistent with prediabetes  >or=6.5%  Consistent with diabetes    High levels of fetal hemoglobin interfere with the HbA1C  assay. Heterozygous hemoglobin variants (HbS, HgC, etc)do  not significantly interfere with this assay.   However, presence of multiple variants may affect accuracy.     03/30/2021 5.8 (H) 4.0 - 5.6 % Final     Comment:     ADA Screening Guidelines:  5.7-6.4%  Consistent with prediabetes  >or=6.5%  Consistent with diabetes    High levels of fetal hemoglobin interfere with the HbA1C  assay. Heterozygous hemoglobin variants (HbS, HgC, etc)do  not significantly interfere with this assay.   However, presence of multiple variants may affect accuracy.         ASSESSMENT/PLAN:    Diagnoses and all orders for this visit:    Lumbar spondylosis    Left foot drop  -     [facility] referral[facility]    DDD (degenerative disc disease), lumbar      Follow up if symptoms worsen or fail to improve.    Patient has lumbar spondylosis, but the patient is quite asymptomatic. I discussed the natural history of their diagnoses as well as surgical and nonsurgical treatment options. I educated the patient on the importance of core/back strengthening, correct posture, bending/lifting ergonomics, and low-impact aerobic exercises (walking, elliptical, and  aquatherapy). Patient will follow up PRN. Next step is a lumbar MRI if patient develops LBP or leg pain.    I have personally examined the patient and agree with the above plan.    Martell Bae MD  Orthopaedic Spine Surgeon  Department of Orthopaedic Surgery  [phone]

## 2022-04-01 ENCOUNTER — CLINICAL SUPPORT (OUTPATIENT)
Dept: REHABILITATION | Facility: HOSPITAL | Age: 77
End: 2022-04-01
Payer: MEDICARE

## 2022-04-01 ENCOUNTER — TELEPHONE (OUTPATIENT)
Dept: FAMILY MEDICINE | Facility: CLINIC | Age: 77
End: 2022-04-01
Payer: MEDICARE

## 2022-04-01 DIAGNOSIS — M19.90 INFLAMMATORY ARTHRITIS: Primary | ICD-10-CM

## 2022-04-01 DIAGNOSIS — M25.659 DECREASED RANGE OF HIP MOVEMENT, UNSPECIFIED LATERALITY: Primary | ICD-10-CM

## 2022-04-01 DIAGNOSIS — R53.1 DECREASED STRENGTH: ICD-10-CM

## 2022-04-01 PROCEDURE — 97140 MANUAL THERAPY 1/> REGIONS: CPT | Mod: PN

## 2022-04-01 PROCEDURE — 97110 THERAPEUTIC EXERCISES: CPT | Mod: PN

## 2022-04-01 RX ORDER — PREDNISONE 10 MG/1
10 TABLET ORAL 2 TIMES DAILY
Qty: 14 TABLET | Refills: 0 | Status: SHIPPED | OUTPATIENT
Start: 2022-04-01 | End: 2022-04-08

## 2022-04-01 NOTE — PROGRESS NOTES
[facility]   Physical Therapy Treatment and Discharge Note     Name: Shannan Norman  Clinic Number: [id number]    Therapy Diagnosis:   Encounter Diagnoses   Name Primary?    Decreased range of hip movement, unspecified laterality Yes    Decreased strength      Physician: Kit Potter,     Visit Date: 4/1/2022    Physician Orders: PT Eval and Treat  Medical Diagnosis from Referral:   M47.816 (ICD-10-CM) - Lumbar spondylosis   M16.0 (ICD-10-CM) - Primary osteoarthritis of both hips      Evaluation Date: 2/25/2022  Authorization Period Expiration: 02/23/2023  Plan of Care Expiration: 4/22/2021  Visit # / Visits authorized: 9/50  FOTO: 2/5 completed 3/25/22  PTA Visit #: 0/5      Time In: 1:00 PM  Time Out: 1:25 PM  Total Billable Time: 25minutes (TE-1, MT-1)     Precautions: Standard and Gout, A-fib, skin cancer, DM, HTN    SUBJECTIVE     Pt reports: went to see some docstors yesterday that got him tired and feels very painful today. Went to MD yesterday to talk about the symptoms he is having today that was not present yesterday. He feels very achy in his whole body, and painful with some body movements in particular. Not sure what is happening but will see a rheumatologist in the near future for future assessment.  He was compliant with home exercise program.  Response to previous treatment: no problems.  Functional change: walking much better and put shoes on for first time    Pain: 0/10  Location: right hip.     OBJECTIVE     3/29/22:  All below testing performed in seated position. Gait belt used for quadriceps testing.    Lower extremity strength with Overwolf handheld dynamometer Right Left Pain/dysfunction with movement   (approx 4 sec hold w/ max contraction)   Hip flexion 10.7 kg  10.4 kg     Hip abduction 10.6 (18.5) kg  13.4 (21.2) kg  right 88%   Quadriceps 14.6 (30.5) kg  21.9 (25.6)  kg  right 100%   Hamstrings 8.8 (13.8) kg  9.5 (14.9) kg  Right 92.5%     6 minute  "walk test: 1,010 ft    Treatment     Shannan received the treatments listed below:      Manual therapy for 10 minutes consisting of:  Hooklying single leg traction, 3 rounds on each leg  BLE PROM    therapeutic exercises to develop strength, endurance, ROM, flexibility, posture and core stabilization for 15 minutes including testing: assessment    Not done today:  Elías calf strech: 3x30"  Stair hamstring stretch: 3x30" Bilateral  - discharge next visit  Standing hip abd: 3x10 Bilateral red theraband   Standing hip extension: 3x10 Bilateral red theraband   Step ups: 2x10 6" step with contralateral march  Split squat: 1x10 each  Leg press: 4 plates 3x10 sled-3 single leg    therapeutic activities to improve functional performance for 00 minutes, including testing:  Education for vacuuming, laundry, box pickup  Mini squats: 2x10     Patient Education and Home Exercises     Home Exercises Provided and Patient Education Provided   Education provided:   - keep exercises in a pain free range.    Written Home Exercises Provided: yes. Exercises were reviewed and Shannan was able to demonstrate them prior to the end of the session.  Shannan demonstrated good  understanding of the education provided. See EMR under Patient Instructions for exercises provided during therapy sessions    ASSESSMENT     Shannan is a 76 y.o. male referred to outpatient Physical Therapy at [facility] with a medical diagnosis of Primary osteoarthritis of both hips and Lumbar spondylosis. Pt presents with increased overall joint and lower leg pain, and he thinks he may of done too much yesterday seeing a bunch of doctors. Pt was progressing very well, but he complains of pain that appears less musculoskeletal and different from his original diagnosis of Bilateral hip and low back osteoarthritis. Poor BLE passive range of motion tolerance, muscle fasciculations and guarding with PROM with pain, and only mild relief with hooklying traction. Pt is scheduled to go " see a rheumatologist soon, and he wanted to put a hold on all PT and OT visits for now to be discharged.     Shannan Is progressing well towards his goals.   Pt prognosis is Fair.     Pt will continue to benefit from skilled outpatient physical therapy to address the deficits listed in the problem list box on initial evaluation, provide pt/family education and to maximize pt's level of independence in the home and community environment.     Pt's spiritual, cultural and educational needs considered and pt agreeable to plan of care and goals.     Anticipated barriers to physical therapy: advanced Bilateral hip osteoarthritis, caretaker at home.    Goals:   Short Term Goals: 4 weeks  1. Pt will demo good TA muscle contraction for improved deep abdominal strength and lumbar stability. PROGRESSING; NOT MET  2. Increase lumbar ROM to 100% of WNL in order to improve functional mobility. PROGRESSING; NOT MET    3. Pt will demo good sitting/standing posture and body mechanics for improved spine health and decreased risk of future injury. PROGRESSING; NOT MET  4. Pt to tolerate HEP to improve ROM and independence with ADL's. MET     Long Term Goals: 8 weeks  1. Report decreased low back pain without radiculopathy to </= 2/10 with squatting and prolonged standing/sitting to increase tolerance for ADLs and increased QoL. PROGRESSING; NOT MET  2. Increase strength to >/= 4/5 MMT grade for core and BLE to increase tolerance for ADL and work activities. PROGRESSING; NOT MET  3. Pt will demonstrate negative MARCIE testing and mild positive FADIR testing for improve hip mobility and improved hip health. PROGRESSING; NOT MET  4. Patient's goal: to have less pain. MET  5. Pt will report at </= 18% impaired on FOTO lumbar score for low back pain disability to demonstrate decrease in disability and improvement in back pain. PROGRESSING; NOT MET    PLAN     Pt discharged from PT due to other medical issues.     Slade Rosen, PT

## 2022-04-01 NOTE — TELEPHONE ENCOUNTER
Patient with elevated CRP and sed rate with evidence of inflammation.    Keep follow-up with rheumatologist.    Gout testing was normal.    Anemia treatment was suggested.    Steroids were ordered.

## 2022-04-02 NOTE — PROGRESS NOTES
Subjective:     Patient ID: Shannan Norman is a 76 y.o. male sent for consultation on arthralgia by her PCP Dr. Nitin Banks.     Chief Complaint: No chief complaint on file.       HPI     76 yr old with multiple morbidities that include gout, DM II, CKD 3, Atrial fib w sick sinus syndrome &  bilateral hip & LSpine OA;     On 2/15 developed stiffness; couldn't get out of bed in AM; had extreme bilateral shoulder pain; had hip pain; also really bad at night. No other joint of muscle pain except the muscles of the backs of his knees or tendons felt tight. Had lots of gelling. Lost 23 lbs since then. Also feels voice changed it tone a bit.     Celebrex 200 mg/d helped some, but not entirely.   Prednisone 10 mg bid was started on 4/4/22 and he had an immediate & dramatic response. Everything is improved.   No much in the way of shoulder or hip girdle stiffness.    Denies fever, chills, recent  visual loss, diplopia, scalp tenderness, jaw or other claudication, headache or head pain.        Has a hx of gout w podagra in the past with hyperuricemia and has been on allopurinol 100 mg/d since ~ 2019 or so. Had stopped it for a few days. Resumed it again.   Denies family hx of gout; denies renal calculi; denies moonshine use. Did have a diet of rich food in the past and ETOH use in past.  No longer.   Has mild CKD (2-3)         Current Outpatient Medications   Medication Sig Dispense Refill    allopurinoL (ZYLOPRIM) 100 MG tablet Take 1 tablet (100 mg total) by mouth once daily. 90 tablet 3    ascorbic acid, vitamin C, (VITAMIN C) 500 MG tablet Take 1,000 mg by mouth nightly.       atorvastatin (LIPITOR) 40 MG tablet Take 1 tablet (40 mg total) by mouth once daily. 90 tablet 3    benazepriL (LOTENSIN) 20 MG tablet Take 1 tablet (20 mg total) by mouth once daily. 90 tablet 3    celecoxib (CELEBREX) 200 MG capsule Take 1 capsule (200 mg total) by mouth daily as needed for Pain. 90 capsule 0    cycloSPORINE  (RESTASIS) 0.05 % ophthalmic emulsion Place 1 drop into both eyes 2 (two) times daily. 60 each 11    econazole nitrate 1 % cream Apply topically once daily. 85 g 3    ferrous sulfate (IRON) 325 mg (65 mg iron) Tab tablet Take 325 mg by mouth once daily.      GLUCOSAMINE HCL/CHONDR VASQUEZ A NA (GLUCOSAMINE-CHONDROITIN) 750-600 mg Tab Take 2 tablets by mouth Daily.      levocetirizine (XYZAL) 5 MG tablet Take 1 tablet (5 mg total) by mouth every evening. 90 tablet 1    multivitamin capsule Take 1 capsule by mouth nightly.       predniSONE (DELTASONE) 10 MG tablet Take 1 tablet (10 mg total) by mouth 2 (two) times daily. for 7 days 14 tablet 0    tamsulosin (FLOMAX) 0.4 mg Cap TAKE 1 CAPSULE BY MOUTH AFTER DINNER 90 capsule 3    b complex vitamins capsule Take 1 capsule by mouth once daily.       No current facility-administered medications for this visit.         Review of patient's allergies indicates:  No Known Allergies    Review of Systems   Constitutional: Positive for unexpected weight change. Negative for fever.   HENT: Negative for mouth sores and trouble swallowing.    Eyes: Negative for redness.        Bilateral dry eyes   Respiratory: Negative.  Negative for cough and shortness of breath.    Cardiovascular: Negative.  Negative for chest pain.   Gastrointestinal: Negative.  Negative for constipation and diarrhea.   Endocrine: Negative.  Negative for cold intolerance and heat intolerance.   Genitourinary: Positive for difficulty urinating, frequency and urgency. Negative for genital sores.   Musculoskeletal: Negative for back pain, joint swelling and myalgias.   Skin: Negative for rash.   Neurological: Negative for dizziness, syncope, weakness, light-headedness, numbness and headaches.   Hematological: Does not bruise/bleed easily.   Psychiatric/Behavioral: Positive for dysphoric mood and sleep disturbance.       Past Medical History:   Diagnosis Date    Allergy     Arthritis     Atrial fibrillation      Atrial flutter 2011    ablation    Basal cell carcinoma     Cancer     Cataract     CKD (chronic kidney disease) stage 3, GFR 30-59 ml/min 8/13/2019    Diabetes mellitus     Dry eye syndrome     Gout, unspecified     High cholesterol     History of shingles     Hypertension     Melanoma     Seizures        Past Surgical History:   Procedure Laterality Date    ABLATION N/A 9/11/2018    Procedure: ABLATION;  Surgeon: Hany Sanchez MD;  Location: [facility] CATH LAB;  Service: Cardiology;  Laterality: N/A;  AFL, ISABELLE, RFA, ELODIA, MAC, DM, 3 PREP    ABLATION OF DYSRHYTHMIC FOCUS      ADENOIDECTOMY      CARDIAC PACEMAKER PLACEMENT      no defib    CATARACT EXTRACTION W/  INTRAOCULAR LENS IMPLANT Right 7/28/2020    Procedure: EXTRACTION, CATARACT, WITH IOL INSERTION;  Surgeon: Andrés Morse MD;  Location: [facility] OR;  Service: Ophthalmology;  Laterality: Right;    CATARACT EXTRACTION W/  INTRAOCULAR LENS IMPLANT Left 8/11/2020    Procedure: EXTRACTION, CATARACT, WITH IOL INSERTION;  Surgeon: Andrés Morse MD;  Location: [facility];  Service: Ophthalmology;  Laterality: Left;    COLONOSCOPY N/A 1/2/2019    Procedure: COLONOSCOPY Suprep;  Surgeon: Cindy Solorzano MD;  Location: [facility] ENDO;  Service: Endoscopy;  Laterality: N/A;    GANGLION CYST EXCISION      left neck    HERNIA REPAIR  2013    umbilical    TENDON REPAIR Right     wrist    TONSILLECTOMY      varicose veins      several sx  bilateral    VASECTOMY      VEIN SURGERY         Family History   Problem Relation Age of Onset    Diabetes Mother     COPD Father     Heart disease Father     Arthritis Sister     Heart attack Brother     Heart disease Brother     Diabetes Brother     No Known Problems Maternal Aunt     No Known Problems Maternal Uncle     No Known Problems Paternal Aunt     No Known Problems Paternal Uncle     No Known Problems Maternal Grandmother     No Known Problems Maternal Grandfather     No  "Known Problems Paternal Grandmother     No Known Problems Paternal Grandfather     No Known Problems Son     Cancer Sister         lymph node, breast    No Known Problems Sister     No Known Problems Son     Amblyopia Neg Hx     Blindness Neg Hx     Cataracts Neg Hx     Glaucoma Neg Hx     Hypertension Neg Hx     Macular degeneration Neg Hx     Retinal detachment Neg Hx     Strabismus Neg Hx     Stroke Neg Hx     Thyroid disease Neg Hx     Prostate cancer Neg Hx     Kidney disease Neg Hx     Melanoma Neg Hx        Social History     Tobacco Use    Smoking status: Never Smoker    Smokeless tobacco: Never Used   Substance Use Topics    Alcohol use: Yes     Alcohol/week: 7.0 - 14.0 standard drinks     Types: 7 - 14 Glasses of wine per week    Drug use: No   Retired. Administrative business for [employer]  Wife malina Houser. He's taking care of her; He does the cooking.     Objective:     /74   Pulse 85   Ht 5' 11" (1.803 m)   Wt 90.9 kg (200 lb 6.4 oz)   BMI 27.95 kg/m²     Physical Exam   Constitutional: He is oriented to person, place, and time. No distress.   HENT:   Head: Normocephalic and atraumatic.   Mouth/Throat: Oropharynx is clear and moist. No oropharyngeal exudate.   No parotidomegaly;   Temporal arteries with good pulsations.   No temporal artery tenderness;   No scalp tenderness.  No oral ulcers;   Eyes: Pupils are equal, round, and reactive to light. Conjunctivae are normal. No scleral icterus.   Neck: No JVD present. No tracheal deviation present. No thyromegaly present.   Cardiovascular: Normal rate, regular rhythm and normal heart sounds. Exam reveals no gallop and no friction rub.   No murmur heard.  Pulmonary/Chest: Effort normal and breath sounds normal. No respiratory distress. He has no wheezes. He has no rales. He exhibits no tenderness.   Abdominal: Soft. Bowel sounds are normal. He exhibits no distension and no mass. There is no splenomegaly or hepatomegaly. There is " no abdominal tenderness. There is no rebound and no guarding.   Musculoskeletal:         General: No tenderness.      Right lower leg: Edema present.      Left lower leg: Edema present.      Comments: No synovitis     Lymphadenopathy:     He has no cervical adenopathy.   Neurological: He is alert and oriented to person, place, and time. He has normal reflexes. No cranial nerve deficit.   Motor strength: 5/5 prox & distal.   Skin: Skin is warm and dry. No rash noted.   Bilateral venous varicosities lower extremities  Thickened toe nails c/w onychomycosis   Psychiatric: His behavior is normal. Mood, memory, affect and judgment normal.   Vitals reviewed.          3/31/22: CPK 47;   3/30/22: ESR 87 (23); CRP 42.5; CBC Hg 12.7; CMP BUN 24; glu 135; Alb 2.8; RF neg;  UA 5.9;       3/31/22: LSpine: personally viewed: dextroconvex Lspine; DDD verona L3-4; lower facet arthropathy; gr 1 anterolisthesis L4-5; vascular calcifications.  2/23/22: Bilateral hips: personally viewed: bilateral OA; also read as impingement.   3/15/21: Bilateral knees: personally viewed: bowen mjsn; mild varus bowen  8/26/19: L wrist: personally viewed: mild OA;   Assessment:   PMR   Shoulder/hip pain/stiffness--No GCA signs/sxs   Elevated inflammatory parameters:     ESR 87 (23); CRP 42.5; Hg 12.7; alb 2.8;    Celebrex somewhat helpful.   Dramatic response to prednisone 10 mg bid begun 4/4/22    Gout by hx   Podagra by hx--several episodes.    Knee swelling   Hyperuricemia maxed 7.9; last 5.9;    Some ETOH; recently stopped   On allopurinol 100 mg/d since ~ 2 yrs    OA   Hips (w ZEYAD); knees; Lspine   L wrist minimal     CKD 2-3    DM II    HTN    HLD    Cardiac issues: SSS; cardiac arrythmia      Plan:   Discussed & educated on PMR & GCA.  Handouts provided on PMR & GCA (from [facility] & [facility])  Patient to inform us if he should develop and GCA sxs.  Discuss & educated on steroids and side effects and toxicities  Handout provided.   Discussed diet on  steroids: low carbs; hi dietary calcium; low salt.  Continue prednisone 10 mg bid  Will need DXA in near future.  Stop celebrex  Labs next week.  Also discussed gout and OA  Patient to contact us next week for possible dose adjustment.  RTC 3 months or prn    CC: Nitin Banks MD

## 2022-04-05 ENCOUNTER — PATIENT OUTREACH (OUTPATIENT)
Dept: ADMINISTRATIVE | Facility: OTHER | Age: 77
End: 2022-04-05
Payer: MEDICARE

## 2022-04-05 NOTE — PROGRESS NOTES
There are no preventive care reminders to display for this patient.  Updates were requested from care everywhere.  Chart was reviewed for overdue Proactive [facility] Encounters (VITOR) topics (CRS, Breast Cancer Screening, Eye exam)  Health Maintenance has been updated.  LINKS immunization registry triggered.  Immunizations were reconciled.  Optometry appt 05/3/22

## 2022-04-06 ENCOUNTER — PATIENT MESSAGE (OUTPATIENT)
Dept: FAMILY MEDICINE | Facility: CLINIC | Age: 77
End: 2022-04-06
Payer: MEDICARE

## 2022-04-06 ENCOUNTER — OFFICE VISIT (OUTPATIENT)
Dept: RHEUMATOLOGY | Facility: CLINIC | Age: 77
End: 2022-04-06
Payer: MEDICARE

## 2022-04-06 VITALS
SYSTOLIC BLOOD PRESSURE: 135 MMHG | HEIGHT: 71 IN | HEART RATE: 85 BPM | DIASTOLIC BLOOD PRESSURE: 74 MMHG | BODY MASS INDEX: 28.05 KG/M2 | WEIGHT: 200.38 LBS

## 2022-04-06 DIAGNOSIS — M16.0 PRIMARY OSTEOARTHRITIS OF BOTH HIPS: ICD-10-CM

## 2022-04-06 DIAGNOSIS — M25.50 ARTHRALGIA, UNSPECIFIED JOINT: ICD-10-CM

## 2022-04-06 DIAGNOSIS — M35.3 POLYMYALGIA RHEUMATICA: Primary | ICD-10-CM

## 2022-04-06 DIAGNOSIS — N18.31 STAGE 3A CHRONIC KIDNEY DISEASE: ICD-10-CM

## 2022-04-06 DIAGNOSIS — M1A.09X0 CHRONIC GOUT OF MULTIPLE SITES, UNSPECIFIED CAUSE: ICD-10-CM

## 2022-04-06 DIAGNOSIS — Z55.9 EDUCATIONAL CIRCUMSTANCE: ICD-10-CM

## 2022-04-06 PROCEDURE — 99999 PR PBB SHADOW E&M-EST. PATIENT-LVL V: CPT | Mod: PBBFAC,,, | Performed by: INTERNAL MEDICINE

## 2022-04-06 PROCEDURE — 99205 PR OFFICE/OUTPT VISIT, NEW, LEVL V, 60-74 MIN: ICD-10-PCS | Mod: S$GLB,,, | Performed by: INTERNAL MEDICINE

## 2022-04-06 PROCEDURE — 3075F PR MOST RECENT SYSTOLIC BLOOD PRESS GE 130-139MM HG: ICD-10-PCS | Mod: CPTII,S$GLB,, | Performed by: INTERNAL MEDICINE

## 2022-04-06 PROCEDURE — 1125F AMNT PAIN NOTED PAIN PRSNT: CPT | Mod: CPTII,S$GLB,, | Performed by: INTERNAL MEDICINE

## 2022-04-06 PROCEDURE — 99205 OFFICE O/P NEW HI 60 MIN: CPT | Mod: S$GLB,,, | Performed by: INTERNAL MEDICINE

## 2022-04-06 PROCEDURE — 3078F PR MOST RECENT DIASTOLIC BLOOD PRESSURE < 80 MM HG: ICD-10-PCS | Mod: CPTII,S$GLB,, | Performed by: INTERNAL MEDICINE

## 2022-04-06 PROCEDURE — 99999 PR PBB SHADOW E&M-EST. PATIENT-LVL V: ICD-10-PCS | Mod: PBBFAC,,, | Performed by: INTERNAL MEDICINE

## 2022-04-06 PROCEDURE — 3078F DIAST BP <80 MM HG: CPT | Mod: CPTII,S$GLB,, | Performed by: INTERNAL MEDICINE

## 2022-04-06 PROCEDURE — 3072F PR LOW RISK FOR RETINOPATHY: ICD-10-PCS | Mod: CPTII,S$GLB,, | Performed by: INTERNAL MEDICINE

## 2022-04-06 PROCEDURE — 1160F PR REVIEW ALL MEDS BY PRESCRIBER/CLIN PHARMACIST DOCUMENTED: ICD-10-PCS | Mod: CPTII,S$GLB,, | Performed by: INTERNAL MEDICINE

## 2022-04-06 PROCEDURE — 1159F MED LIST DOCD IN RCRD: CPT | Mod: CPTII,S$GLB,, | Performed by: INTERNAL MEDICINE

## 2022-04-06 PROCEDURE — 3075F SYST BP GE 130 - 139MM HG: CPT | Mod: CPTII,S$GLB,, | Performed by: INTERNAL MEDICINE

## 2022-04-06 PROCEDURE — 3072F LOW RISK FOR RETINOPATHY: CPT | Mod: CPTII,S$GLB,, | Performed by: INTERNAL MEDICINE

## 2022-04-06 PROCEDURE — 1160F RVW MEDS BY RX/DR IN RCRD: CPT | Mod: CPTII,S$GLB,, | Performed by: INTERNAL MEDICINE

## 2022-04-06 PROCEDURE — 1159F PR MEDICATION LIST DOCUMENTED IN MEDICAL RECORD: ICD-10-PCS | Mod: CPTII,S$GLB,, | Performed by: INTERNAL MEDICINE

## 2022-04-06 PROCEDURE — 1125F PR PAIN SEVERITY QUANTIFIED, PAIN PRESENT: ICD-10-PCS | Mod: CPTII,S$GLB,, | Performed by: INTERNAL MEDICINE

## 2022-04-06 RX ORDER — PREDNISONE 5 MG/1
10 TABLET ORAL 2 TIMES DAILY
Qty: 120 TABLET | Refills: 2 | Status: SHIPPED | OUTPATIENT
Start: 2022-04-06 | End: 2022-05-06

## 2022-04-06 RX ORDER — FERROUS SULFATE 325(65) MG
325 TABLET ORAL DAILY
COMMUNITY

## 2022-04-06 SDOH — SOCIAL DETERMINANTS OF HEALTH (SDOH): PROBLEMS RELATED TO EDUCATION AND LITERACY, UNSPECIFIED: Z55.9

## 2022-04-06 NOTE — PATIENT INSTRUCTIONS
Stop celebrex.    Avoid other NSAIDs.    Keep acetaminophen below 3,000 mg/d.    Continue prednisone 10 mg twice a day.    Take with food.  Keep carbs low.  Keep salt intake low.  Continue to exercise.    Read on prednisone.  Read on polymyalgia rheumatica.    Let us know if you develop: headache; visual disturbances; scalp pain; pain on chewing;

## 2022-04-08 ENCOUNTER — PATIENT MESSAGE (OUTPATIENT)
Dept: FAMILY MEDICINE | Facility: CLINIC | Age: 77
End: 2022-04-08
Payer: MEDICARE

## 2022-04-08 ENCOUNTER — OFFICE VISIT (OUTPATIENT)
Dept: SPORTS MEDICINE | Facility: CLINIC | Age: 77
End: 2022-04-08
Payer: MEDICARE

## 2022-04-08 ENCOUNTER — TELEPHONE (OUTPATIENT)
Dept: FAMILY MEDICINE | Facility: CLINIC | Age: 77
End: 2022-04-08
Payer: MEDICARE

## 2022-04-08 DIAGNOSIS — F41.9 ANXIETY: ICD-10-CM

## 2022-04-08 DIAGNOSIS — M35.3 POLYMYALGIA RHEUMATICA: Primary | ICD-10-CM

## 2022-04-08 DIAGNOSIS — M10.9 GOUT, UNSPECIFIED CAUSE, UNSPECIFIED CHRONICITY, UNSPECIFIED SITE: ICD-10-CM

## 2022-04-08 DIAGNOSIS — Z23 NEED FOR STREPTOCOCCUS PNEUMONIAE VACCINATION: Primary | ICD-10-CM

## 2022-04-08 PROCEDURE — 1159F PR MEDICATION LIST DOCUMENTED IN MEDICAL RECORD: ICD-10-PCS | Mod: CPTII,95,, | Performed by: FAMILY MEDICINE

## 2022-04-08 PROCEDURE — 99214 PR OFFICE/OUTPT VISIT, EST, LEVL IV, 30-39 MIN: ICD-10-PCS | Mod: 95,,, | Performed by: FAMILY MEDICINE

## 2022-04-08 PROCEDURE — 1159F MED LIST DOCD IN RCRD: CPT | Mod: CPTII,95,, | Performed by: FAMILY MEDICINE

## 2022-04-08 PROCEDURE — 1160F RVW MEDS BY RX/DR IN RCRD: CPT | Mod: CPTII,95,, | Performed by: FAMILY MEDICINE

## 2022-04-08 PROCEDURE — 3072F PR LOW RISK FOR RETINOPATHY: ICD-10-PCS | Mod: CPTII,95,, | Performed by: FAMILY MEDICINE

## 2022-04-08 PROCEDURE — 99214 OFFICE O/P EST MOD 30 MIN: CPT | Mod: 95,,, | Performed by: FAMILY MEDICINE

## 2022-04-08 PROCEDURE — 1160F PR REVIEW ALL MEDS BY PRESCRIBER/CLIN PHARMACIST DOCUMENTED: ICD-10-PCS | Mod: CPTII,95,, | Performed by: FAMILY MEDICINE

## 2022-04-08 PROCEDURE — 3072F LOW RISK FOR RETINOPATHY: CPT | Mod: CPTII,95,, | Performed by: FAMILY MEDICINE

## 2022-04-08 RX ORDER — ALPRAZOLAM 0.5 MG/1
TABLET ORAL
Qty: 1 TABLET | Refills: 0 | Status: SHIPPED | OUTPATIENT
Start: 2022-04-08 | End: 2022-06-12

## 2022-04-08 NOTE — PROGRESS NOTES
Telemedicine visit / virtual visit with synchronous audio and video    The patient location is: seated and safe  The chief complaint leading to todays visit is:   shoulder pain , left pain   Shoulder pain bilateral, post visits w/ PCP, neurosurgery, rheumatology    As far as I am able to assess, the patient is not in an unsafe location (e.g. driving a vehicle) for this appointment.    Face-to-face time with patient: 15 minutes  30 minutes of total time were spent on this encounter, including some or all of:  -engaging the patient face-to-face  -preparing to see the patient (e.g. reviewing the patient chart and patient imaging studies)  -documenting clinical information in the electronic or other health record  -coordinating care      Shannan Norman, a 76 y.o. male, is here for evaluation of bilateral shoulder pain. L>R    HISTORY OF PRESENT ILLNESS  Hand dominance, right    Location:  anterior and lateral   Onset:  chronic, insidious  Palliative:     relative rest   oral analgesics, OTC  Provocative:    glenohumeral ABduction    Prior:  none  Progression:  plateau discomfort  Quality:  sharp  Radiation: none  Severity:  per nursing documentation  Timing:  intermittent with use  Trauma:  none    Review of systems (ROS):  A 10+ review of systems was performed with pertinent positives and negatives noted above in the history of present illness. Other systems were negative unless otherwise specified.    PHYSICAL EXAMINATION  Physical Examination:  General: In no acute distress, well developed, well nourished, no diaphoresis  Eyes: EOM full and smooth, no eye redness or discharge  HENT: normocephalic and atraumatic, neck supple, trachea midline, no nasal discharge, no external ear redness or discharge  Lungs: respirations unlabored, no conversational dyspnea  Neuro: AAOx3, CN2-12 grossly intact  Skin: No obvious rashes on face or neck  Psychiatric: cooperative, pleasant, mood and affect appropriate for age     Other  Findings:    ASSESSMENT & PLAN   Assessment  #1 PMR on oral steroid therapy   Managed by [facility]  #2 gout  #3 shoulder pain, prior, bilateral    No evidence of neurologic pathology  No evidence of vascular pathology    Imaging studies reviewed:   X-ray shoulder, bilat 22.03    Plan  At this point, all of his (prior) discomforts seem to be mostly or entirely explained by #1 above, and are currently expertly being managed by [facility] colleagues.  As such I encouraged him to continue care under their guidance, and to contact my clinic should individual joint pain or dysfunction present in the future.    We discussed options including    Watchful waiting / relative rest    Physical therapy    Injection therapy    Consultation    The patient chooses As above   x = prescribed  CSI = corticosteroid injection  VSI = viscosupplement injection  PRPI = platelet rich plasma injection  ia = intra articular  R = right  L = left  B = bilateral   nfSx = surgical consultation was recommended, but patient is not interested in consultation at this time    Physical Therapy        Formal (fPT), @ [facility] facility    Formal (fPT), @ [facility] facility        Homegoing (hgPT), per concurrent fPT recommendations    Homegoing (hgPT), per prior fPT recommendations    Homegoing (hgPT), handout provided        w/  (atPT)    [blank] = not prescribed  x = prescribed  b = prescribed, and begin as indicated  t = continue as indicated  r = prescribed, and restart as indicated  p = completed prior as indicated  hs = prescribed, and with high school   col = prescribed, and with college or university   nfPT = physical therapy was recommended, but patient is not interested in PT at this time    Activity (e.g. sports, work) restrictions    [blank] = as tolerated  pt = per physical therapist  at = per   NWB = non weight bearing on affected lower extremity, with crutches  assistance for ambulation    Bracing    [blank] = not prescribed  r = recommended, but not fit with at todays visit  f = prescribed and fit with at todays visit  t = continue as indicated  d = d/c  p = as needed  rare = use on rare, as-needed basis; advised against chronic use    Pain management    [blank] = No prescription necessary. A handout detailing dosing of appropriate   over-the-counter musculoskeletal analgesics was made available to the patient.   m = meloxicam x 14 days  mp = 14 day course of meloxicam prescribed prior    Follow up    [blank] = as needed  [number] = in [number] weeks  CSI = for corticosteroid injection  VSI = for viscosupplement injection or injection series  PRP = for platelet rich plasma injection or injection series  MRI = after MRI imaging  ns = should surgical options be deferred (no surgery)  o = appointment offered, deferred by patient    Should symptoms worsen or fail to resolve, consider    Revisiting the above options and / or      Vocation:   ++yoga  former golfer  wife w/ advanced dementia  enjoyed long vacations in his RV

## 2022-04-13 ENCOUNTER — PATIENT MESSAGE (OUTPATIENT)
Dept: OTOLARYNGOLOGY | Facility: CLINIC | Age: 77
End: 2022-04-13
Payer: MEDICARE

## 2022-04-13 ENCOUNTER — LAB VISIT (OUTPATIENT)
Dept: LAB | Facility: HOSPITAL | Age: 77
End: 2022-04-13
Attending: INTERNAL MEDICINE
Payer: MEDICARE

## 2022-04-13 DIAGNOSIS — M35.3 POLYMYALGIA RHEUMATICA: ICD-10-CM

## 2022-04-13 DIAGNOSIS — M1A.09X0 CHRONIC GOUT OF MULTIPLE SITES, UNSPECIFIED CAUSE: ICD-10-CM

## 2022-04-13 LAB
ALBUMIN SERPL BCP-MCNC: 3.3 G/DL (ref 3.5–5.2)
ALP SERPL-CCNC: 83 U/L (ref 55–135)
ALT SERPL W/O P-5'-P-CCNC: 17 U/L (ref 10–44)
ANION GAP SERPL CALC-SCNC: 8 MMOL/L (ref 8–16)
ANISOCYTOSIS BLD QL SMEAR: SLIGHT
AST SERPL-CCNC: 12 U/L (ref 10–40)
BASOPHILS # BLD AUTO: 0.01 K/UL (ref 0–0.2)
BASOPHILS NFR BLD: 0.1 % (ref 0–1.9)
BILIRUB SERPL-MCNC: 0.5 MG/DL (ref 0.1–1)
BUN SERPL-MCNC: 24 MG/DL (ref 8–23)
CALCIUM SERPL-MCNC: 9.5 MG/DL (ref 8.7–10.5)
CHLORIDE SERPL-SCNC: 99 MMOL/L (ref 95–110)
CO2 SERPL-SCNC: 26 MMOL/L (ref 23–29)
CREAT SERPL-MCNC: 1 MG/DL (ref 0.5–1.4)
CRP SERPL-MCNC: 1.2 MG/L (ref 0–8.2)
DIFFERENTIAL METHOD: ABNORMAL
EOSINOPHIL # BLD AUTO: 0.1 K/UL (ref 0–0.5)
EOSINOPHIL NFR BLD: 0.9 % (ref 0–8)
ERYTHROCYTE [DISTWIDTH] IN BLOOD BY AUTOMATED COUNT: 13.9 % (ref 11.5–14.5)
ERYTHROCYTE [SEDIMENTATION RATE] IN BLOOD BY WESTERGREN METHOD: 20 MM/HR (ref 0–23)
EST. GFR  (AFRICAN AMERICAN): >60 ML/MIN/1.73 M^2
EST. GFR  (NON AFRICAN AMERICAN): >60 ML/MIN/1.73 M^2
GIANT PLATELETS BLD QL SMEAR: PRESENT
GLUCOSE SERPL-MCNC: 104 MG/DL (ref 70–110)
HCT VFR BLD AUTO: 46.2 % (ref 40–54)
HGB BLD-MCNC: 14.6 G/DL (ref 14–18)
IMM GRANULOCYTES # BLD AUTO: 0.47 K/UL (ref 0–0.04)
IMM GRANULOCYTES NFR BLD AUTO: 4.5 % (ref 0–0.5)
LYMPHOCYTES # BLD AUTO: 1 K/UL (ref 1–4.8)
LYMPHOCYTES NFR BLD: 9.1 % (ref 18–48)
MCH RBC QN AUTO: 28.7 PG (ref 27–31)
MCHC RBC AUTO-ENTMCNC: 31.6 G/DL (ref 32–36)
MCV RBC AUTO: 91 FL (ref 82–98)
MONOCYTES # BLD AUTO: 0.9 K/UL (ref 0.3–1)
MONOCYTES NFR BLD: 8.6 % (ref 4–15)
NEUTROPHILS # BLD AUTO: 8.1 K/UL (ref 1.8–7.7)
NEUTROPHILS NFR BLD: 76.8 % (ref 38–73)
NRBC BLD-RTO: 0 /100 WBC
OVALOCYTES BLD QL SMEAR: ABNORMAL
PLATELET # BLD AUTO: 272 K/UL (ref 150–450)
PLATELET BLD QL SMEAR: ABNORMAL
PMV BLD AUTO: 10.8 FL (ref 9.2–12.9)
POIKILOCYTOSIS BLD QL SMEAR: SLIGHT
POLYCHROMASIA BLD QL SMEAR: ABNORMAL
POTASSIUM SERPL-SCNC: 4.5 MMOL/L (ref 3.5–5.1)
PROT SERPL-MCNC: 6.5 G/DL (ref 6–8.4)
RBC # BLD AUTO: 5.09 M/UL (ref 4.6–6.2)
SODIUM SERPL-SCNC: 133 MMOL/L (ref 136–145)
SPHEROCYTES BLD QL SMEAR: ABNORMAL
URATE SERPL-MCNC: 5.4 MG/DL (ref 3.4–7)
WBC # BLD AUTO: 10.55 K/UL (ref 3.9–12.7)

## 2022-04-13 PROCEDURE — 86140 C-REACTIVE PROTEIN: CPT | Performed by: INTERNAL MEDICINE

## 2022-04-13 PROCEDURE — 85025 COMPLETE CBC W/AUTO DIFF WBC: CPT | Performed by: INTERNAL MEDICINE

## 2022-04-13 PROCEDURE — 80053 COMPREHEN METABOLIC PANEL: CPT | Performed by: INTERNAL MEDICINE

## 2022-04-13 PROCEDURE — 84550 ASSAY OF BLOOD/URIC ACID: CPT | Performed by: INTERNAL MEDICINE

## 2022-04-13 PROCEDURE — 85652 RBC SED RATE AUTOMATED: CPT | Performed by: INTERNAL MEDICINE

## 2022-04-13 PROCEDURE — 36415 COLL VENOUS BLD VENIPUNCTURE: CPT | Mod: PO | Performed by: INTERNAL MEDICINE

## 2022-04-14 ENCOUNTER — PATIENT MESSAGE (OUTPATIENT)
Dept: RHEUMATOLOGY | Facility: CLINIC | Age: 77
End: 2022-04-14
Payer: MEDICARE

## 2022-04-14 DIAGNOSIS — Z79.52 LONG TERM (CURRENT) USE OF SYSTEMIC STEROIDS: ICD-10-CM

## 2022-04-14 DIAGNOSIS — M35.3 POLYMYALGIA RHEUMATICA: Primary | ICD-10-CM

## 2022-04-14 RX ORDER — LEVOCETIRIZINE DIHYDROCHLORIDE 5 MG/1
5 TABLET, FILM COATED ORAL NIGHTLY
Qty: 90 TABLET | Refills: 1 | Status: SHIPPED | OUTPATIENT
Start: 2022-04-14 | End: 2022-07-16 | Stop reason: SDUPTHER

## 2022-04-18 ENCOUNTER — PATIENT MESSAGE (OUTPATIENT)
Dept: RHEUMATOLOGY | Facility: CLINIC | Age: 77
End: 2022-04-18
Payer: MEDICARE

## 2022-04-28 ENCOUNTER — TELEPHONE (OUTPATIENT)
Dept: ELECTROPHYSIOLOGY | Facility: CLINIC | Age: 77
End: 2022-04-28
Payer: MEDICARE

## 2022-04-28 NOTE — TELEPHONE ENCOUNTER
[facility] remote transmission shows on 4/7/22 pt had 8 hrs and 30 mins of AFL with 5 hrs noted of RVR on the same date.    Pt also had 12 min episode 2/24/22  Hx of Ablation and is no longer on Eliquis as of 12/2021    Called and left vm to assess symptoms and to see if there was something different going on that date, illness, dehydration etc.      Will forward report to Dr. Sanchez to see if he want to restart Eliquis                    _____

## 2022-04-29 NOTE — TELEPHONE ENCOUNTER
Discussed AFL events with Dr. Ledesma, she would like me to discuss with Dr. Sanchez next week if pt needs to resume OAC.  Pt called back and was asymptomatic to events  Reports recently started Prednisone on 4/6/22 for Polymyalgia rhuematica.        Will forward message to Dr. Sanchez to review.

## 2022-05-02 ENCOUNTER — OFFICE VISIT (OUTPATIENT)
Dept: URGENT CARE | Facility: CLINIC | Age: 77
End: 2022-05-02
Payer: MEDICARE

## 2022-05-02 VITALS
OXYGEN SATURATION: 95 % | BODY MASS INDEX: 28 KG/M2 | WEIGHT: 200 LBS | DIASTOLIC BLOOD PRESSURE: 77 MMHG | TEMPERATURE: 98 F | HEIGHT: 71 IN | RESPIRATION RATE: 16 BRPM | HEART RATE: 60 BPM | SYSTOLIC BLOOD PRESSURE: 123 MMHG

## 2022-05-02 DIAGNOSIS — R05.9 COUGH: Primary | ICD-10-CM

## 2022-05-02 LAB
CTP QC/QA: YES
SARS-COV-2 RDRP RESP QL NAA+PROBE: NEGATIVE

## 2022-05-02 PROCEDURE — 3078F PR MOST RECENT DIASTOLIC BLOOD PRESSURE < 80 MM HG: ICD-10-PCS | Mod: CPTII,S$GLB,,

## 2022-05-02 PROCEDURE — U0002: ICD-10-PCS | Mod: QW,S$GLB,,

## 2022-05-02 PROCEDURE — 1126F AMNT PAIN NOTED NONE PRSNT: CPT | Mod: CPTII,S$GLB,,

## 2022-05-02 PROCEDURE — 1101F PT FALLS ASSESS-DOCD LE1/YR: CPT | Mod: CPTII,S$GLB,,

## 2022-05-02 PROCEDURE — 1126F PR PAIN SEVERITY QUANTIFIED, NO PAIN PRESENT: ICD-10-PCS | Mod: CPTII,S$GLB,,

## 2022-05-02 PROCEDURE — 3072F LOW RISK FOR RETINOPATHY: CPT | Mod: CPTII,S$GLB,,

## 2022-05-02 PROCEDURE — 3074F PR MOST RECENT SYSTOLIC BLOOD PRESSURE < 130 MM HG: ICD-10-PCS | Mod: CPTII,S$GLB,,

## 2022-05-02 PROCEDURE — 3288F PR FALLS RISK ASSESSMENT DOCUMENTED: ICD-10-PCS | Mod: CPTII,S$GLB,,

## 2022-05-02 PROCEDURE — 1159F MED LIST DOCD IN RCRD: CPT | Mod: CPTII,S$GLB,,

## 2022-05-02 PROCEDURE — 3074F SYST BP LT 130 MM HG: CPT | Mod: CPTII,S$GLB,,

## 2022-05-02 PROCEDURE — 99213 OFFICE O/P EST LOW 20 MIN: CPT | Mod: S$GLB,,,

## 2022-05-02 PROCEDURE — 3078F DIAST BP <80 MM HG: CPT | Mod: CPTII,S$GLB,,

## 2022-05-02 PROCEDURE — 1160F RVW MEDS BY RX/DR IN RCRD: CPT | Mod: CPTII,S$GLB,,

## 2022-05-02 PROCEDURE — 3288F FALL RISK ASSESSMENT DOCD: CPT | Mod: CPTII,S$GLB,,

## 2022-05-02 PROCEDURE — 1160F PR REVIEW ALL MEDS BY PRESCRIBER/CLIN PHARMACIST DOCUMENTED: ICD-10-PCS | Mod: CPTII,S$GLB,,

## 2022-05-02 PROCEDURE — 99213 PR OFFICE/OUTPT VISIT, EST, LEVL III, 20-29 MIN: ICD-10-PCS | Mod: S$GLB,,,

## 2022-05-02 PROCEDURE — 1101F PR PT FALLS ASSESS DOC 0-1 FALLS W/OUT INJ PAST YR: ICD-10-PCS | Mod: CPTII,S$GLB,,

## 2022-05-02 PROCEDURE — 1159F PR MEDICATION LIST DOCUMENTED IN MEDICAL RECORD: ICD-10-PCS | Mod: CPTII,S$GLB,,

## 2022-05-02 PROCEDURE — 3072F PR LOW RISK FOR RETINOPATHY: ICD-10-PCS | Mod: CPTII,S$GLB,,

## 2022-05-02 PROCEDURE — U0002 COVID-19 LAB TEST NON-CDC: HCPCS | Mod: QW,S$GLB,,

## 2022-05-02 RX ORDER — BENZONATATE 100 MG/1
100 CAPSULE ORAL 3 TIMES DAILY PRN
Qty: 30 CAPSULE | Refills: 0 | Status: SHIPPED | OUTPATIENT
Start: 2022-05-02 | End: 2022-05-12

## 2022-05-02 NOTE — PATIENT INSTRUCTIONS
"Cough   If your condition worsens or fails to improve we recommend that you receive another evaluation at the ER immediately or contact your PCP to discuss your concerns or return here. You must understand that you've received an urgent care treatment only and that you may be released before all your medical problems are known or treated. You the patient will arrange for follouwp care as instructed. .  Rest and fluids are important  Can use honey with annabella to soothe your throat  Take prescription cough meds (pills) as prescribed; take prescription cough syrup at night as needed for cough.  Do not take both the prescribed cough pills and syrup at the same time or within 6 hours of each other.  Do not take the cough syrup with any other sedative medication as it can can cause drowsiness. Do not operate any heavy machinery, drink or drive while taking the cough syrup.   -  Flonase (fluticasone) is a nasal spray which is available over the counter and may help with your symptoms.   -  If you have hypertension avoid using the "D" which is the decongestant.  Instead you can use Coricidin HBP for cold and cough symptoms.    -  If you just have drainage you can take plain Zyrtec, Claritin or Allegra   -  Tylenol or ibuprofen can also be used as directed for pain unless you have an allergy to them or medical condition such as stomach ulcers, kidney or liver disease or blood thinners etc for which you should not be taking these type of medications.   Please follow up with your primary care doctor or specialist in the next 48-72hrs as needed and if no improvement  If you  smoke, please stop smoking.   "

## 2022-05-02 NOTE — PROGRESS NOTES
"Subjective:       Patient ID: Shannan Norman is a 76 y.o. male.    Vitals:  height is 5' 11" (1.803 m) and weight is 90.7 kg (200 lb). His temperature is 97.9 °F (36.6 °C). His blood pressure is 123/77 and his pulse is 60. His respiration is 16 and oxygen saturation is 95%.     Chief Complaint: Cough    Pt stated that he has had a cough since yesterday , Pt stated that he has not taken any otc meds . Pt stated that he would like to be covid tested . Pt spharmacy has been updated ,     Cough  This is a new problem. The current episode started yesterday. The problem has been unchanged. The problem occurs every few minutes. The cough is non-productive. Associated symptoms include a sore throat. Pertinent negatives include no chest pain, chills, ear congestion, ear pain, fever, headaches, heartburn, hemoptysis, myalgias, nasal congestion, postnasal drip, rash, rhinorrhea, shortness of breath, sweats, weight loss or wheezing. Nothing aggravates the symptoms. He has tried nothing for the symptoms. The treatment provided no relief.       Constitution: Negative for activity change, appetite change, chills, sweating, fatigue, fever and generalized weakness.   HENT: Positive for sore throat. Negative for ear pain, postnasal drip, sinus pain and sinus pressure.    Cardiovascular: Negative for chest pain, leg swelling, palpitations, sob on exertion and passing out.   Respiratory: Positive for cough. Negative for bloody sputum, shortness of breath and wheezing.    Gastrointestinal: Positive for constipation. Negative for abdominal pain, nausea, vomiting, diarrhea and heartburn.        Reports having constipation due to medication but has constipation under control .   Endocrine: hair loss.   Musculoskeletal: Negative for pain and muscle ache.   Skin: Negative for rash and hives.   Allergic/Immunologic: Negative for hives.   Neurological: Negative for dizziness, history of vertigo, light-headedness, passing out, headaches, " disorientation and altered mental status.   Psychiatric/Behavioral: Negative for altered mental status, disorientation and confusion.       Objective:      Physical Exam   Constitutional: He is oriented to person, place, and time. He appears well-developed. He is cooperative.  Non-toxic appearance. He does not appear ill. No distress.   HENT:   Head: Normocephalic and atraumatic.   Ears:   Right Ear: Hearing, tympanic membrane, external ear and ear canal normal.   Left Ear: Hearing, tympanic membrane, external ear and ear canal normal.   Nose: Nose normal. No mucosal edema, rhinorrhea or nasal deformity. No epistaxis. Right sinus exhibits no maxillary sinus tenderness and no frontal sinus tenderness. Left sinus exhibits no maxillary sinus tenderness and no frontal sinus tenderness.   Mouth/Throat: Uvula is midline and mucous membranes are normal. No trismus in the jaw. Normal dentition. No uvula swelling. Posterior oropharyngeal erythema present. No oropharyngeal exudate, posterior oropharyngeal edema or tonsillar abscesses. Tonsils are 1+ on the right. Tonsils are 1+ on the left. No tonsillar exudate.       Eyes: Conjunctivae and lids are normal. No scleral icterus.   Neck: Trachea normal and phonation normal. Neck supple. No edema present. No erythema present. No neck rigidity present.   Cardiovascular: Normal rate, regular rhythm, S1 normal, S2 normal, normal heart sounds and normal pulses.   Pulmonary/Chest: Effort normal and breath sounds normal. No accessory muscle usage or stridor. No apnea, no tachypnea and no bradypnea. No respiratory distress. He has no decreased breath sounds. He has no wheezes. He has no rhonchi. He has no rales.   Abdominal: Normal appearance.   Musculoskeletal: Normal range of motion.         General: No deformity. Normal range of motion.   Neurological: He is alert and oriented to person, place, and time. He exhibits normal muscle tone. Coordination normal.   Skin: Skin is warm, dry,  intact, not diaphoretic and not pale.   Psychiatric: His speech is normal and behavior is normal. Judgment and thought content normal.   Nursing note and vitals reviewed.        Results for orders placed or performed in visit on 05/02/22   POCT COVID-19 Rapid Screening   Result Value Ref Range    POC Rapid COVID Negative Negative     Acceptable Yes      *Note: Due to a large number of results and/or encounters for the requested time period, some results have not been displayed. A complete set of results can be found in Results Review.     Assessment:       1. Cough          Plan:       Discussed test results with PT. Reviewed if condition worsens or fails to improve that PT receive another evaluation at the ER immediately or contact your PCP to discuss your concerns or return here. Also discussed if there is any development of SOB, CP, confusion, or severe pain to go to emergency department. Also addressed is the use of Zyrtec, Claritin or Allegra for congestion and sinus pressure. Also reviewed was the use of Flonase and to use as directed by medication label directs. Also discussed was rest and fluids as well as Tylenol or ibuprofen can also be used as directed for pain or fever. Also discuss is the use of chloraseptic or cepacol for sore throat but to clear with pharmacist due to HTN. PT verbalized understanding and agreed with plan and diagnosis.  Cough  -     POCT COVID-19 Rapid Screening

## 2022-05-03 ENCOUNTER — OFFICE VISIT (OUTPATIENT)
Dept: OPTOMETRY | Facility: CLINIC | Age: 77
End: 2022-05-03
Payer: MEDICARE

## 2022-05-03 DIAGNOSIS — Z96.1 PSEUDOPHAKIA OF BOTH EYES: ICD-10-CM

## 2022-05-03 DIAGNOSIS — E11.9 TYPE 2 DIABETES MELLITUS WITHOUT RETINOPATHY: Primary | ICD-10-CM

## 2022-05-03 DIAGNOSIS — H04.123 BILATERAL DRY EYES: ICD-10-CM

## 2022-05-03 PROCEDURE — 92014 PR EYE EXAM, EST PATIENT,COMPREHESV: ICD-10-PCS | Mod: S$GLB,,, | Performed by: OPTOMETRIST

## 2022-05-03 PROCEDURE — 99999 PR PBB SHADOW E&M-EST. PATIENT-LVL III: CPT | Mod: PBBFAC,,, | Performed by: OPTOMETRIST

## 2022-05-03 PROCEDURE — 1101F PT FALLS ASSESS-DOCD LE1/YR: CPT | Mod: CPTII,S$GLB,, | Performed by: OPTOMETRIST

## 2022-05-03 PROCEDURE — 1126F PR PAIN SEVERITY QUANTIFIED, NO PAIN PRESENT: ICD-10-PCS | Mod: CPTII,S$GLB,, | Performed by: OPTOMETRIST

## 2022-05-03 PROCEDURE — 1101F PR PT FALLS ASSESS DOC 0-1 FALLS W/OUT INJ PAST YR: ICD-10-PCS | Mod: CPTII,S$GLB,, | Performed by: OPTOMETRIST

## 2022-05-03 PROCEDURE — 3288F PR FALLS RISK ASSESSMENT DOCUMENTED: ICD-10-PCS | Mod: CPTII,S$GLB,, | Performed by: OPTOMETRIST

## 2022-05-03 PROCEDURE — 1126F AMNT PAIN NOTED NONE PRSNT: CPT | Mod: CPTII,S$GLB,, | Performed by: OPTOMETRIST

## 2022-05-03 PROCEDURE — 99999 PR PBB SHADOW E&M-EST. PATIENT-LVL III: ICD-10-PCS | Mod: PBBFAC,,, | Performed by: OPTOMETRIST

## 2022-05-03 PROCEDURE — 3288F FALL RISK ASSESSMENT DOCD: CPT | Mod: CPTII,S$GLB,, | Performed by: OPTOMETRIST

## 2022-05-03 PROCEDURE — 2023F DILAT RTA XM W/O RTNOPTHY: CPT | Mod: CPTII,S$GLB,, | Performed by: OPTOMETRIST

## 2022-05-03 PROCEDURE — 92014 COMPRE OPH EXAM EST PT 1/>: CPT | Mod: S$GLB,,, | Performed by: OPTOMETRIST

## 2022-05-03 PROCEDURE — 1160F RVW MEDS BY RX/DR IN RCRD: CPT | Mod: CPTII,S$GLB,, | Performed by: OPTOMETRIST

## 2022-05-03 PROCEDURE — 1159F MED LIST DOCD IN RCRD: CPT | Mod: CPTII,S$GLB,, | Performed by: OPTOMETRIST

## 2022-05-03 PROCEDURE — 1160F PR REVIEW ALL MEDS BY PRESCRIBER/CLIN PHARMACIST DOCUMENTED: ICD-10-PCS | Mod: CPTII,S$GLB,, | Performed by: OPTOMETRIST

## 2022-05-03 PROCEDURE — 2023F PR DILATED RETINAL EXAM W/O EVID OF RETINOPATHY: ICD-10-PCS | Mod: CPTII,S$GLB,, | Performed by: OPTOMETRIST

## 2022-05-03 PROCEDURE — 1159F PR MEDICATION LIST DOCUMENTED IN MEDICAL RECORD: ICD-10-PCS | Mod: CPTII,S$GLB,, | Performed by: OPTOMETRIST

## 2022-05-03 NOTE — PROGRESS NOTES
HPI     DLS 03/16/2021 by Dr. ROSINA Borrero OD      Patient here for routine eye exam    Eye Meds : RESTASIS PRN OU      POHx:   ++Type II Diabetic     S/p PCIOL OD 07/28/2020  S/P PCIOL OS 08/17/2020      Last edited by Reema Ferrara MA on 5/3/2022  2:25 PM. (History)            Assessment /Plan     For exam results, see Encounter Report.    Type 2 diabetes mellitus without retinopathy    Pseudophakia of both eyes    Bilateral dry eyes      1. No diabetic retinopathy, no csme. Return in 1 year for dilated eye exam.  2. Monitor condition. Patient to report any changes. RTC 1 year recheck.  3. Cont Restasis for dry eyes.

## 2022-05-04 ENCOUNTER — TELEMEDICINE (OUTPATIENT)
Dept: ELECTROPHYSIOLOGY | Facility: CLINIC | Age: 77
End: 2022-05-04
Payer: MEDICARE

## 2022-05-04 NOTE — PROGRESS NOTES
The patient location is: [place]  The chief complaint leading to consultation is: AT/AFL  Visit type: audio only  Total time spent with patient: 5 min    PPM shows AT/AFL x hours. Asx from that (was having a PMR flare).  Now feels better.  Plan: restart a/c.  f/u as planned.

## 2022-05-12 ENCOUNTER — LAB VISIT (OUTPATIENT)
Dept: LAB | Facility: HOSPITAL | Age: 77
End: 2022-05-12
Attending: INTERNAL MEDICINE
Payer: MEDICARE

## 2022-05-12 DIAGNOSIS — Z79.52 LONG TERM (CURRENT) USE OF SYSTEMIC STEROIDS: ICD-10-CM

## 2022-05-12 DIAGNOSIS — M35.3 POLYMYALGIA RHEUMATICA: ICD-10-CM

## 2022-05-12 LAB
ALBUMIN SERPL BCP-MCNC: 3.6 G/DL (ref 3.5–5.2)
ALP SERPL-CCNC: 68 U/L (ref 55–135)
ALT SERPL W/O P-5'-P-CCNC: 20 U/L (ref 10–44)
ANION GAP SERPL CALC-SCNC: 9 MMOL/L (ref 8–16)
AST SERPL-CCNC: 16 U/L (ref 10–40)
BASOPHILS # BLD AUTO: 0.07 K/UL (ref 0–0.2)
BASOPHILS NFR BLD: 0.7 % (ref 0–1.9)
BILIRUB SERPL-MCNC: 0.7 MG/DL (ref 0.1–1)
BUN SERPL-MCNC: 20 MG/DL (ref 8–23)
CALCIUM SERPL-MCNC: 9.4 MG/DL (ref 8.7–10.5)
CHLORIDE SERPL-SCNC: 104 MMOL/L (ref 95–110)
CO2 SERPL-SCNC: 25 MMOL/L (ref 23–29)
CREAT SERPL-MCNC: 1 MG/DL (ref 0.5–1.4)
CRP SERPL-MCNC: 0.8 MG/L (ref 0–8.2)
DIFFERENTIAL METHOD: ABNORMAL
EOSINOPHIL # BLD AUTO: 0.1 K/UL (ref 0–0.5)
EOSINOPHIL NFR BLD: 0.6 % (ref 0–8)
ERYTHROCYTE [DISTWIDTH] IN BLOOD BY AUTOMATED COUNT: 16.9 % (ref 11.5–14.5)
ERYTHROCYTE [SEDIMENTATION RATE] IN BLOOD BY WESTERGREN METHOD: 15 MM/HR (ref 0–23)
EST. GFR  (AFRICAN AMERICAN): >60 ML/MIN/1.73 M^2
EST. GFR  (NON AFRICAN AMERICAN): >60 ML/MIN/1.73 M^2
GLUCOSE SERPL-MCNC: 128 MG/DL (ref 70–110)
HCT VFR BLD AUTO: 45.8 % (ref 40–54)
HGB BLD-MCNC: 14.5 G/DL (ref 14–18)
IMM GRANULOCYTES # BLD AUTO: 0.14 K/UL (ref 0–0.04)
IMM GRANULOCYTES NFR BLD AUTO: 1.4 % (ref 0–0.5)
LYMPHOCYTES # BLD AUTO: 1.6 K/UL (ref 1–4.8)
LYMPHOCYTES NFR BLD: 15.9 % (ref 18–48)
MCH RBC QN AUTO: 29.4 PG (ref 27–31)
MCHC RBC AUTO-ENTMCNC: 31.7 G/DL (ref 32–36)
MCV RBC AUTO: 93 FL (ref 82–98)
MONOCYTES # BLD AUTO: 0.9 K/UL (ref 0.3–1)
MONOCYTES NFR BLD: 8.7 % (ref 4–15)
NEUTROPHILS # BLD AUTO: 7.4 K/UL (ref 1.8–7.7)
NEUTROPHILS NFR BLD: 72.7 % (ref 38–73)
NRBC BLD-RTO: 0 /100 WBC
PLATELET # BLD AUTO: 234 K/UL (ref 150–450)
PMV BLD AUTO: 11.2 FL (ref 9.2–12.9)
POTASSIUM SERPL-SCNC: 4.5 MMOL/L (ref 3.5–5.1)
PROT SERPL-MCNC: 6.3 G/DL (ref 6–8.4)
RBC # BLD AUTO: 4.93 M/UL (ref 4.6–6.2)
SODIUM SERPL-SCNC: 138 MMOL/L (ref 136–145)
WBC # BLD AUTO: 10.22 K/UL (ref 3.9–12.7)

## 2022-05-12 PROCEDURE — 85652 RBC SED RATE AUTOMATED: CPT | Performed by: INTERNAL MEDICINE

## 2022-05-12 PROCEDURE — 80053 COMPREHEN METABOLIC PANEL: CPT | Performed by: INTERNAL MEDICINE

## 2022-05-12 PROCEDURE — 36415 COLL VENOUS BLD VENIPUNCTURE: CPT | Mod: PO | Performed by: INTERNAL MEDICINE

## 2022-05-12 PROCEDURE — 86140 C-REACTIVE PROTEIN: CPT | Performed by: INTERNAL MEDICINE

## 2022-05-12 PROCEDURE — 85025 COMPLETE CBC W/AUTO DIFF WBC: CPT | Performed by: INTERNAL MEDICINE

## 2022-05-13 ENCOUNTER — CLINICAL SUPPORT (OUTPATIENT)
Dept: REHABILITATION | Facility: HOSPITAL | Age: 77
End: 2022-05-13
Attending: STUDENT IN AN ORGANIZED HEALTH CARE EDUCATION/TRAINING PROGRAM
Payer: MEDICARE

## 2022-05-13 DIAGNOSIS — R39.15 URINARY URGENCY: ICD-10-CM

## 2022-05-13 DIAGNOSIS — M62.89 PELVIC FLOOR DYSFUNCTION: ICD-10-CM

## 2022-05-13 DIAGNOSIS — R35.0 URINARY FREQUENCY: ICD-10-CM

## 2022-05-13 PROCEDURE — 97110 THERAPEUTIC EXERCISES: CPT | Mod: PO

## 2022-05-13 PROCEDURE — 97162 PT EVAL MOD COMPLEX 30 MIN: CPT | Mod: PO

## 2022-05-13 NOTE — PATIENT INSTRUCTIONS
Home Program: 2022    Kegels in lying or sittin. Lie down or sit comfortably with legs and buttocks relaxed.  2. Squeeze and LIFT the muscles that stop the flow of urine and passage of gas.  Keep legs and buttock muscles quiet.  3. Hold lift for 10 seconds without holding breath.  4. Release and DROP for 10 seconds.  5. Repeat 10 times, 2 sets, 1-2 times per day.      No Kegels while urinating!       Abdominal sets (TA Sets)  In same position, draw in your lower belly as if zipping tight pants.    Hold for 5 sec without holding breath.  Release for 10 sec  Repeat 10 times, 1 set, 1-2 times per day.        Double Voiding: more than one urine stream per trip to the restroom    When your urine stream stops, perform the followin. Body Scan to be sure that your legs, buttocks, and belly are relaxed.    2. Diaphragmatic breathing (belly breathing) to re-drop the pelvic floor.  3. Bladder tapping (as shown in clinic)  4. Stand up and sit back down.  (Or if standing, shift your weight)    If no more urine comes in 1-2 minutes, you're done!

## 2022-05-13 NOTE — PROGRESS NOTES
[facility]  Pelvic Health Physical Therapy Initial Evaluation    Date: 5/13/2022   Name: Shannan Norman  Clinic Number: [id number]  Therapy Diagnosis:   Encounter Diagnosis   Name Primary?    Pelvic floor dysfunction      Physician: Maura Gunter MD    Physician Orders: PT Eval and Treat    Medical Diagnosis from Referral: Urinary urgency [R39.15], Urinary frequency [R35.0]  Evaluation Date: 5/13/2022  Authorization Period Expiration: 2/15/2023  Plan of Care Expiration: 8/13/2022  Visit # / Visits authorized: 1/ 1    Time In: 11:05  Time Out: 12:00  Total Appointment Time (timed & untimed codes): 55 minutes    Precautions: universal    Subjective     Date of onset: over a year ago    History of current condition - Shannan reports: that he doesn't want to take pills for this problem.  Sometimes it is difficult to get to the bathroom on time.  He reports bathroom mapping.  He has had some leakage with this but this is rare.      Bladder/Bowel History: slow stream, urinary incontinence and constipation/straining for movement; loose stools in the past but now taking iron   Frequency of urination:   Daytime: at least every hour           Nighttime: 4-5 times per night   Difficulty initiating urine stream: No   Urine stream: weak   Complete emptying: Yes   Bladder leakage: Yes   Frequency of incidents: rare   Amount leaked (urine): teaspoon(s)   Urinary Urgency: Yes- this happens when he gets to the bathroom door.     Frequency of bowel movements: once a day   Difficulty initiating BM: No   Quality/Shape of BM: [place] Stool Chart 4   Does Patient Feel Empty after BM? Yes   Fiber Supplements or Laxative Use? Yes; 2 Colace per day   Colon leakage: No   Form of protection: none      Pain:  none     Medical History: Shannan  has a past medical history of Allergy, Arthritis, Atrial fibrillation, Atrial flutter (2011), Basal cell carcinoma, Cancer, Cataract, CKD (chronic kidney disease) stage 3, GFR  30-59 ml/min (8/13/2019), Diabetes mellitus, Dry eye syndrome, Gout, unspecified, High cholesterol, History of shingles, Hypertension, Melanoma, and Seizures.     Surgical History: Shannan Norman  has a past surgical history that includes Tonsillectomy; varicose veins; Ablation of dysrhythmic focus; Ganglion cyst excision; Hernia repair (2013); Vasectomy; Ablation (N/A, 9/11/2018); Colonoscopy (N/A, 1/2/2019); Tendon repair (Right); Adenoidectomy; Vein Surgery; Cardiac pacemaker placement; Cataract extraction w/  intraocular lens implant (Right, 7/28/2020); and Cataract extraction w/  intraocular lens implant (Left, 8/11/2020).    Medications: Shannan has a current medication list which includes the following prescription(s): allopurinol, alprazolam, ascorbic acid (vitamin c), atorvastatin, b complex vitamins, benazepril, celecoxib, restasis, econazole nitrate, ferrous sulfate, glucosamine-chondroitin, levocetirizine, multivitamin, rivaroxaban, and tamsulosin.    Allergies: Review of patient's allergies indicates:  No Known Allergies     Prior Therapy/Previous treatment included: none  Social History:  lives with their spouse    Current exercise: yoga 3 days per week; cardio 3 days per week.    Occupation: Pt is retired from [employer]  Prior Level of Function: could control urinary urge in ADL's  Current Level of Function: has urgency and frequency during ADL's.     Types of fluid intake: tart cherry juice, green tea, water, flavored water.    Diet: avoiding sugar (diabetic)   Habitus: well developed, well nourished  Abuse/Neglect: No     Pts goals: to be able to control urinary urge during ADL's.      OBJECTIVE     See EMR under MEDIA for written consent provided 5/13/2022  Chaperone: declined    ORTHO SCREEN  Posture in sitting: slouched   Posture in standing: WNL  Pelvic alignment: no sign of deviations noted in supine     ABDOMINAL WALL ASSESSMENT  Abdominal strength: Rectus abdominus: 3/5     Transverse abdominus:  weak and poorly isolated from rectus  Scarring: none  Diastasis: absent        RECTAL PELVIC FLOOR EXAM    EXTERNAL ASSESSMENT  Anus: WNL  Skin condition: WNL   Scarring: none  Sensation: WNL   Pain: none  Voluntary contraction: visible lift  Voluntary relaxation: visible drop  Involuntary contraction: visible lift  Bearing down: bulge  Anal [place]: intact  Discharge: none       INTERNAL ASSESSMENT  EAS tone: WNL   Impaction: none   Pain: none  Sensation: unable to localize pressure appropriately   Muscle Bulk: atrophy   Muscle Power: 2+/5  Muscle Endurance: 4 sec      Quality of contraction: decreased hold   Specificity: patient contracts: gluts  Coordination: tends to hold breath during PFM contraction     TREATMENT     Treatment Time In: 11:50  Treatment Time Out: 12:00  Total Treatment time (time-based codes) separate from Evaluation: 10 minutes    Therapeutic Exercise to develop strength and endurance for 10 minutes including:  Kegels Endurance Holds and abdominal sets    Patient Education provided:   general anatomy/physiology of urinary/ bowel  system, benefits of treatment, risks of treatment and alternative methods of treatment were discussed with the pt. Additionally, double voiding techniques were reviewed.     Home Exercises provided:  Written Home Exercises provided: yes.  Exercises were reviewed and Shannan was able to demonstrate them prior to the end of the session.    Shannan demonstrated good  understanding of the education provided.     See EMR under Patient Instructions for exercises provided 5/13/2022.    Assessment     Shannan is a 76 y.o. male referred to outpatient Physical Therapy with a medical diagnosis of Urinary urgency [R39.15], Urinary frequency [R35.0]. Pt presents with poor knowledge of body mechanics and posture, poor trunk stability, decreased pelvic muscle strength, decreased endurance of the pelvic muscles, poor quality of pelvic muscle contraction, increased frequency of urination and  increased nocturia.       Pt prognosis is Good.   Pt will benefit from skilled outpatient Physical Therapy to address the deficits stated above and in the chart below, provide pt/family education, and to maximize pt's level of independence.     Plan of care discussed with patient: Yes  Pt's spiritual, cultural and educational needs considered and patient is agreeable to the plan of care and goals as stated below:     Anticipated Barriers for therapy: none    Medical Necessity is demonstrated by the following:    History  Co-morbidities and personal factors that may impact the plan of care Co-morbidities   diabetes, history of cancer, HTN and pacemaker    Personal Factors  no deficits     moderate   Examination  Body structures and functions, activity limitations and participation restrictions that may impact the plan of care Body Regions/Systems/Functions:  poor knowledge of body mechanics and posture, poor trunk stability, decreased pelvic muscle strength, decreased endurance of the pelvic muscles, poor quality of pelvic muscle contraction, increased frequency of urination and increased nocturia     Activity Limitations:  urgency , delaying urge to urinate and bathroom mapping    Participation Restrictions:  all ADLs/iADLs uninterrupted by urinary incontinence/urgency/frequency    Activity limitations:   Learning and applying knowledge  no deficits    General Tasks and Commands  no deficits    Communication  no deficits    Mobility  no deficits    Self care  no deficits    Domestic Life  no deficits    Interactions/Relationships  no deficits    Life Areas  no deficits    Community and Social Life  no deficits       moderate   Clinical Presentation evolving clinical presentation with changing clinical characteristics moderate   Decision Making/ Complexity Score: moderate       Goals:  Long Term Goals: 12 weeks   Pt will verbalize improved awareness of PFM activity as palpated by PT in order to improve activity  involvement with HEP.  Pt will report improved ability to manage bladder spasms appropriately 100% of the time for an improvement in urinary frequency/urgency.   Pt will report improved ability to sleep uninterrupted by excessive nocturia 5/7 days per week.   Pt will maintain a voiding interval of > or = 3 hours for improved activity tolerance (ie. prolonged driving, work meeting, watching a movie).   Pt will be independent with double voiding techniques 100% of the time to ensure full bladder emptying and decrease pt's risk of infection.   Pt/family will be independent with HEP for continued self-management of symptoms.    Plan     Plan of care Certification: 5/13/2022 to 8/13/2022.    Outpatient Physical Therapy 1 times per 2 week(s)  for 12 weeks to include the following interventions: therapeutic exercises, neuromuscular re-education, manual therapy, modalities PRN, patient/family education and self care/home management    Carmina Frias, PT, BCB-PMD

## 2022-05-18 ENCOUNTER — PATIENT MESSAGE (OUTPATIENT)
Dept: ADMINISTRATIVE | Facility: OTHER | Age: 77
End: 2022-05-18
Payer: MEDICARE

## 2022-05-18 ENCOUNTER — OFFICE VISIT (OUTPATIENT)
Dept: SPORTS MEDICINE | Facility: CLINIC | Age: 77
End: 2022-05-18
Payer: MEDICARE

## 2022-05-18 VITALS — TEMPERATURE: 98 F | WEIGHT: 196 LBS | BODY MASS INDEX: 27.44 KG/M2 | HEIGHT: 71 IN

## 2022-05-18 DIAGNOSIS — M25.552 BILATERAL HIP PAIN: ICD-10-CM

## 2022-05-18 DIAGNOSIS — G89.29 CHRONIC PAIN OF BOTH SHOULDERS: ICD-10-CM

## 2022-05-18 DIAGNOSIS — G89.29 CHRONIC HIP PAIN, BILATERAL: ICD-10-CM

## 2022-05-18 DIAGNOSIS — S43.431S GLENOID LABRAL TEAR, RIGHT, SEQUELA: Primary | ICD-10-CM

## 2022-05-18 DIAGNOSIS — M25.552 CHRONIC HIP PAIN, BILATERAL: ICD-10-CM

## 2022-05-18 DIAGNOSIS — M25.512 CHRONIC PAIN OF BOTH SHOULDERS: ICD-10-CM

## 2022-05-18 DIAGNOSIS — M25.551 BILATERAL HIP PAIN: ICD-10-CM

## 2022-05-18 DIAGNOSIS — M25.551 CHRONIC HIP PAIN, BILATERAL: ICD-10-CM

## 2022-05-18 DIAGNOSIS — M25.511 CHRONIC PAIN OF BOTH SHOULDERS: ICD-10-CM

## 2022-05-18 DIAGNOSIS — M35.3 POLYMYALGIA RHEUMATICA: ICD-10-CM

## 2022-05-18 PROCEDURE — 3288F FALL RISK ASSESSMENT DOCD: CPT | Mod: CPTII,S$GLB,, | Performed by: FAMILY MEDICINE

## 2022-05-18 PROCEDURE — 99999 PR PBB SHADOW E&M-EST. PATIENT-LVL III: CPT | Mod: PBBFAC,,, | Performed by: FAMILY MEDICINE

## 2022-05-18 PROCEDURE — 1159F MED LIST DOCD IN RCRD: CPT | Mod: CPTII,S$GLB,, | Performed by: FAMILY MEDICINE

## 2022-05-18 PROCEDURE — 1159F PR MEDICATION LIST DOCUMENTED IN MEDICAL RECORD: ICD-10-PCS | Mod: CPTII,S$GLB,, | Performed by: FAMILY MEDICINE

## 2022-05-18 PROCEDURE — 99999 PR PBB SHADOW E&M-EST. PATIENT-LVL III: ICD-10-PCS | Mod: PBBFAC,,, | Performed by: FAMILY MEDICINE

## 2022-05-18 PROCEDURE — 1101F PR PT FALLS ASSESS DOC 0-1 FALLS W/OUT INJ PAST YR: ICD-10-PCS | Mod: CPTII,S$GLB,, | Performed by: FAMILY MEDICINE

## 2022-05-18 PROCEDURE — 99214 PR OFFICE/OUTPT VISIT, EST, LEVL IV, 30-39 MIN: ICD-10-PCS | Mod: S$GLB,,, | Performed by: FAMILY MEDICINE

## 2022-05-18 PROCEDURE — 1101F PT FALLS ASSESS-DOCD LE1/YR: CPT | Mod: CPTII,S$GLB,, | Performed by: FAMILY MEDICINE

## 2022-05-18 PROCEDURE — 3288F PR FALLS RISK ASSESSMENT DOCUMENTED: ICD-10-PCS | Mod: CPTII,S$GLB,, | Performed by: FAMILY MEDICINE

## 2022-05-18 PROCEDURE — 1126F PR PAIN SEVERITY QUANTIFIED, NO PAIN PRESENT: ICD-10-PCS | Mod: CPTII,S$GLB,, | Performed by: FAMILY MEDICINE

## 2022-05-18 PROCEDURE — 1126F AMNT PAIN NOTED NONE PRSNT: CPT | Mod: CPTII,S$GLB,, | Performed by: FAMILY MEDICINE

## 2022-05-18 PROCEDURE — 3072F PR LOW RISK FOR RETINOPATHY: ICD-10-PCS | Mod: CPTII,S$GLB,, | Performed by: FAMILY MEDICINE

## 2022-05-18 PROCEDURE — 1160F PR REVIEW ALL MEDS BY PRESCRIBER/CLIN PHARMACIST DOCUMENTED: ICD-10-PCS | Mod: CPTII,S$GLB,, | Performed by: FAMILY MEDICINE

## 2022-05-18 PROCEDURE — 3072F LOW RISK FOR RETINOPATHY: CPT | Mod: CPTII,S$GLB,, | Performed by: FAMILY MEDICINE

## 2022-05-18 PROCEDURE — 1160F RVW MEDS BY RX/DR IN RCRD: CPT | Mod: CPTII,S$GLB,, | Performed by: FAMILY MEDICINE

## 2022-05-18 PROCEDURE — 99214 OFFICE O/P EST MOD 30 MIN: CPT | Mod: S$GLB,,, | Performed by: FAMILY MEDICINE

## 2022-05-18 NOTE — PROGRESS NOTES
Shannan Norman, a 76 y.o. male, is here for evaluation of bilateral hip. R>L    HISTORY OF PRESENT ILLNESS   Location: proximal thigh   Onset: chronic, insidious  Palliative:    relative rest   oral analgesics, OTC    Provocative: prolonged ambulation  Prior: none  Progression: plateau discomfort   Quality: sharp  Radiation: none  Severity: per nursing documentation  Timing: intermittent with use  Trauma: none    Review of systems (ROS):  A 10+ review of systems was performed with pertinent positives and negatives noted above in the history of present illness. Other systems were negative unless otherwise specified.    PHYSICAL EXAMINATION  General:  The patient is alert and oriented x 3.  Mood is pleasant.  Observation of ears, eyes and nose reveal no gross abnormalities.  HEENT: NCAT, sclera nonicteric  Lungs: Respirations are equal and unlabored.   Gait is coordinated. Patient can toe walk and heel walk without difficulty.    HIP/PELVIS EXAMINATION    Observation/Inspection  Gait:   Nonantalgic   Alignment:  Neutral   Scars:   None   Muscle atrophy: None   Effusion:  None   Warmth:  None   Discoloration:   None   Leg lengths:   Equal   Pelvis:    Level     Tenderness/Crepitus (T/C):      T / C  Trochanteric bursa   - / -  Piriformis    - / -  SI joint    - / -  Psoas tendon   - / -  Rectus insertion  - / -  Adductor insertion  - / -  Pubic symphysis  - / -    ROM: (* = pain)    Flexion:      120 degrees  External rotation:   40 degrees  Internal rotation with axial load:  30 degrees  Internal rotation without axial load:  40 degrees  Abduction:    45 degrees  Adduction:     20 degrees    Special Tests:  Pain w/ forced internal rotation (FADIR):  -   Pain w/ forced external rotation (MARCIE):  -   Circumduction test:     -  Stinchfield test:     -   Log roll:       -   Snapping hip (internal):    -   Sit-up pain:      -   Resisted sit-up pain:     -   Resisted sit-up with adductor contraction pain:  -   Step-down  test:     +  Trendelenburg test:     -  Bridge test      +     Extremity Neuro-vascular Examination:   Sensation:  Grossly intact to light touch all dermatomal regions.   Motor Function:  Fully intact motor function at hip, knee, foot and ankle    DTRs;  quadriceps and  achilles 2+.  No clonus and downgoing Babinski.    Vascular status:  DP and PT pulses 2+, brisk capillary refill, symmetric.    Skin:  intact, compartments soft.    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  #3  Tonnis Grade III osteoarthritis of hip, right   Tonnis Grade II osteoarthritis of hip, left    No evidence of neurologic pathology  No evidence of vascular pathology    Imaging studies reviewed:  X-ray shoulder bilat 22.03  X-ray pelvis and hip, bilat 22.02    Plan  Re: shoulder  Discussed ww vs intervention  W/out pain or dysfunction ssx, he chooses ww for now    We discussed options including    Watchful waiting / relative rest x   Physical therapy Discussed, deferred   Injection therapy    Consultation    The patient chooses As above   x = prescribed  CSI = corticosteroid injection  VSI = viscosupplement injection  PRPI = platelet rich plasma injection  ia = intra articular  R = right  L = left  B = bilateral   nfSx = surgical consultation was recommended, but patient is not interested in consultation at this time    Physical Therapy        Formal (fPT), @ [facility] facility    Formal (fPT), @ [facility] facility        Homegoing (hgPT), per concurrent fPT recommendations    Homegoing (hgPT), per prior fPT recommendations    Homegoing (hgPT), handout provided        w/  (atPT)    [blank] = not prescribed  x = prescribed  b = prescribed, and begin as indicated  t = continue as indicated  r = prescribed, and restart as indicated  p = completed prior as indicated  hs = prescribed, and with  high school   col = prescribed, and with college or university   nfPT = physical therapy was recommended, but patient is not interested in PT at this time    Activity (e.g. sports, work) restrictions    [blank] = as tolerated  pt = per physical therapist  at = per   NWB = non weight bearing on affected lower extremity, with crutches assistance for ambulation    Bracing    [blank] = not prescribed  r = recommended, but not fit with at todays visit  f = prescribed and fit with at todays visit  t = continue as indicated  d = d/c  p = as needed  rare = use on rare, as-needed basis; advised against chronic use    Pain management mp   [blank] = No prescription necessary. A handout detailing dosing of appropriate   over-the-counter musculoskeletal analgesics was made available to the patient.   m = meloxicam x 14 days  mp = 14 day course of meloxicam prescribed prior    Follow up o   [blank] = as needed  [number] = in [number] weeks  CSI = for corticosteroid injection  VSI = for viscosupplement injection or injection series  PRP = for platelet rich plasma injection or injection series  MRI = after MRI imaging  ns = should surgical options be deferred (no surgery)  o = appointment offered, deferred by patient    Should symptoms worsen or fail to resolve, consider    Revisiting the above options and / or fPT +/- csi     Vocation:   ++yoga  former golfer  wife w/ advanced dementia  enjoyed long vacations in his RV

## 2022-05-19 ENCOUNTER — PATIENT MESSAGE (OUTPATIENT)
Dept: RHEUMATOLOGY | Facility: CLINIC | Age: 77
End: 2022-05-19
Payer: MEDICARE

## 2022-05-31 ENCOUNTER — PATIENT MESSAGE (OUTPATIENT)
Dept: FAMILY MEDICINE | Facility: CLINIC | Age: 77
End: 2022-05-31
Payer: MEDICARE

## 2022-06-06 ENCOUNTER — PATIENT MESSAGE (OUTPATIENT)
Dept: FAMILY MEDICINE | Facility: CLINIC | Age: 77
End: 2022-06-06
Payer: MEDICARE

## 2022-06-07 ENCOUNTER — PATIENT MESSAGE (OUTPATIENT)
Dept: CARDIOLOGY | Facility: CLINIC | Age: 77
End: 2022-06-07
Payer: MEDICARE

## 2022-06-07 ENCOUNTER — TELEPHONE (OUTPATIENT)
Dept: ELECTROPHYSIOLOGY | Facility: CLINIC | Age: 77
End: 2022-06-07
Payer: MEDICARE

## 2022-06-07 NOTE — TELEPHONE ENCOUNTER
Atrial fibrillation noted during device remote check:    Overall burden:  <1%    At time of transmission at 11:15 today, was in AF for 3 hrs 24 min    Ventricular rates:    Needs improvement    Anticoagulation status:  Xarelto    Patient symptoms:  Asymptomatic.  Has noted an increase HR on BP monitor when checking today and sent message to clinic.  Today's -60,     Tested positive for Covid last night, minor cold symptoms/cough.  Taking Mucinex as needed.

## 2022-06-08 ENCOUNTER — PATIENT MESSAGE (OUTPATIENT)
Dept: ADMINISTRATIVE | Facility: OTHER | Age: 77
End: 2022-06-08
Payer: MEDICARE

## 2022-06-09 ENCOUNTER — PATIENT MESSAGE (OUTPATIENT)
Dept: ADMINISTRATIVE | Facility: OTHER | Age: 77
End: 2022-06-09
Payer: MEDICARE

## 2022-06-09 ENCOUNTER — PATIENT MESSAGE (OUTPATIENT)
Dept: REHABILITATION | Facility: HOSPITAL | Age: 77
End: 2022-06-09
Payer: MEDICARE

## 2022-06-10 ENCOUNTER — OFFICE VISIT (OUTPATIENT)
Dept: FAMILY MEDICINE | Facility: CLINIC | Age: 77
End: 2022-06-10
Payer: MEDICARE

## 2022-06-10 VITALS — DIASTOLIC BLOOD PRESSURE: 70 MMHG | SYSTOLIC BLOOD PRESSURE: 120 MMHG

## 2022-06-10 DIAGNOSIS — U07.1 COVID-19 VIRUS INFECTION: ICD-10-CM

## 2022-06-10 DIAGNOSIS — R73.01 IMPAIRED FASTING GLUCOSE: ICD-10-CM

## 2022-06-10 DIAGNOSIS — E03.9 HYPOTHYROIDISM, UNSPECIFIED TYPE: Primary | ICD-10-CM

## 2022-06-10 PROCEDURE — 1160F RVW MEDS BY RX/DR IN RCRD: CPT | Mod: CPTII,95,, | Performed by: FAMILY MEDICINE

## 2022-06-10 PROCEDURE — 3072F LOW RISK FOR RETINOPATHY: CPT | Mod: CPTII,95,, | Performed by: FAMILY MEDICINE

## 2022-06-10 PROCEDURE — 1159F MED LIST DOCD IN RCRD: CPT | Mod: CPTII,95,, | Performed by: FAMILY MEDICINE

## 2022-06-10 PROCEDURE — 3074F SYST BP LT 130 MM HG: CPT | Mod: CPTII,95,, | Performed by: FAMILY MEDICINE

## 2022-06-10 PROCEDURE — 3074F PR MOST RECENT SYSTOLIC BLOOD PRESSURE < 130 MM HG: ICD-10-PCS | Mod: CPTII,95,, | Performed by: FAMILY MEDICINE

## 2022-06-10 PROCEDURE — 3078F PR MOST RECENT DIASTOLIC BLOOD PRESSURE < 80 MM HG: ICD-10-PCS | Mod: CPTII,95,, | Performed by: FAMILY MEDICINE

## 2022-06-10 PROCEDURE — 99443 PR PHYSICIAN TELEPHONE EVALUATION 21-30 MIN: CPT | Mod: 95,,, | Performed by: FAMILY MEDICINE

## 2022-06-10 PROCEDURE — 3072F PR LOW RISK FOR RETINOPATHY: ICD-10-PCS | Mod: CPTII,95,, | Performed by: FAMILY MEDICINE

## 2022-06-10 PROCEDURE — 3078F DIAST BP <80 MM HG: CPT | Mod: CPTII,95,, | Performed by: FAMILY MEDICINE

## 2022-06-10 PROCEDURE — 1160F PR REVIEW ALL MEDS BY PRESCRIBER/CLIN PHARMACIST DOCUMENTED: ICD-10-PCS | Mod: CPTII,95,, | Performed by: FAMILY MEDICINE

## 2022-06-10 PROCEDURE — 1159F PR MEDICATION LIST DOCUMENTED IN MEDICAL RECORD: ICD-10-PCS | Mod: CPTII,95,, | Performed by: FAMILY MEDICINE

## 2022-06-10 PROCEDURE — 99443 PR PHYSICIAN TELEPHONE EVALUATION 21-30 MIN: ICD-10-PCS | Mod: 95,,, | Performed by: FAMILY MEDICINE

## 2022-06-10 NOTE — PROGRESS NOTES
The patient location is: [place]  The chief complaint leading to consultation is:  COVID infection.    Visit type: audiovisual    Face to Face time with patient: 21 minutes of total time spent on the encounter, which includes face to face time and non-face to face time preparing to see the patient (eg, review of tests), Obtaining and/or reviewing separately obtained history, Documenting clinical information in the electronic or other health record, Independently interpreting results (not separately reported) and communicating results to the patient/family/caregiver, or Care coordination (not separately reported).         Each patient to whom he or she provides medical services by telemedicine is:  (1) informed of the relationship between the physician and patient and the respective role of any other health care provider with respect to management of the patient; and (2) notified that he or she may decline to receive medical services by telemedicine and may withdraw from such care at any time.    Notes:  76 years old male came to the clinic after recent COVID infection.  Patient is doing significantly better only with dry cough.  No fever or chills.  Blood pressure at home was stable.  No chest pain, palpitation, orthopnea or PND.  Last blood sugar was slightly elevated.  He is currently taking steroids.  No polyuria, polydipsia or polyphagia.    Diagnoses and all orders for this visit:    Hypothyroidism, unspecified type  -     Comprehensive Metabolic Panel; Future  -     Lipid Panel; Future  -     TSH; Future    Impaired fasting glucose  -     Hemoglobin A1C; Future    COVID-19 virus infection          Answers for HPI/ROS submitted by the patient on 6/10/2022  Chronicity: new  Onset: 1 to 4 weeks ago  Progression since onset: unchanged  Frequency: hourly  Cough characteristics: non-productive  chest pain: No  chills: No  ear congestion: No  ear pain: No  fever: No  headaches: No  heartburn: No  hemoptysis:  No  myalgias: No  nasal congestion: No  postnasal drip: No  rash: No  rhinorrhea: No  shortness of breath: No  sore throat: No  weight loss: No  wheezing: No  Aggravated by: nothing  Risk factors for lung disease: animal exposure  asthma: No  bronchiectasis: No  bronchitis: No  COPD: No  emphysema: No  environmental allergies: No  pneumonia: No  Treatments tried: OTC cough suppressant  Improvement on treatment: moderate

## 2022-06-15 ENCOUNTER — CLINICAL SUPPORT (OUTPATIENT)
Dept: REHABILITATION | Facility: HOSPITAL | Age: 77
End: 2022-06-15
Attending: STUDENT IN AN ORGANIZED HEALTH CARE EDUCATION/TRAINING PROGRAM
Payer: MEDICARE

## 2022-06-15 DIAGNOSIS — M62.89 PELVIC FLOOR DYSFUNCTION: Primary | ICD-10-CM

## 2022-06-15 PROCEDURE — 97112 NEUROMUSCULAR REEDUCATION: CPT | Mod: PO

## 2022-06-15 NOTE — PROGRESS NOTES
Pelvic Health Physical Therapy   Treatment Note     Name: Shannan Norman  Clinic Number: [id number]    Therapy Diagnosis:   Encounter Diagnosis   Name Primary?    Pelvic floor dysfunction Yes     Physician: Maura Gunter MD    Visit Date: 6/15/2022    Physician Orders: PT Eval and Treat    Medical Diagnosis from Referral: Urinary urgency [R39.15], Urinary frequency [R35.0]  Evaluation Date: 5/13/2022  Authorization Period Expiration: 2/15/2023  Plan of Care Expiration: 8/13/2022  Visit # / Visits authorized: 2/pending  Cancelled Visits: 0  No Show Visits: 0    Time In: 1:08  Time Out: 2:08  Total Billable Time: 55 minutes    Precautions: Standard    Subjective     Pt reports: that he initially had very poor control after we last met and he started doing his Kegels.  Started back 2 weeks ago (has had Covid since last session) and he is doing better.  He is feeling like he may be losing lift in 10 sec contractions.  He tried that double voiding techniques.  Is still getting up frequently at night- this has been an issue for years.  He reports that he has been able to control urge during the day, but has not been able to delay urge during the night.    He was compliant with home exercise program.  Response to previous treatment: no adverse effects  Functional change: none noted yet.      Pain: 0/10    Objective     Shannan participated in neuromuscular re-education activities to develop Coordination and control for 55 minutes including: Kegels with assist of SEMG, abdominal sets and quick flicks  We worked in supine and standing with external lead wires.  He demonstrated normal baseline resting, good WR rise, and good holding in 10 sec Kegels, with no cues to refrain from breath holding.  Derecruitment was delayed but complete for the most part.  In TA sets, his WR rise was fair, holding good, with good PF overflow.  In standing Kegels his WR rise was fair, holding fair/good, derecruitment complete and timely.  We also  looked at quick flicks in this position.  He was instructed to use QF and other strategies per handout to control inappropriate urge, and he was instructed to come to next session with a full bladder for a pre and post void residual assessment.      Home Exercises Provided and Patient Education Provided     Education provided:   - bladder irritants, posture/body mechanices and urge control strategies  Discussed progression of plan of care with patient; educated pt in activity modification; reviewed HEP with pt. Pt demonstrated and verbalized understanding of all instruction and was provided with a handout of HEP (see Patient Instructions).    Written Home Exercises Provided: yes.  Exercises were reviewed and Shannan was able to demonstrate them prior to the end of the session.  Shannan demonstrated good  understanding of the education provided.     See EMR under Patient Instructions for exercises provided 6/15/2022.    Assessment     Pt performed well- having the monitor helped his performance.  Will look at bladder emptying next session.      Shannan Is progressing well towards his goals.   Pt prognosis is Good.     Pt will continue to benefit from skilled outpatient physical therapy to address the deficits listed in the problem list box on initial evaluation, provide pt/family education and to maximize pt's level of independence in the home and community environment.     Pt's spiritual, cultural and educational needs considered and pt agreeable to plan of care and goals.     Anticipated barriers to physical therapy: none    Goals: 12 weeks   Pt will verbalize improved awareness of PFM activity as palpated by PT in order to improve activity involvement with HEP.  Pt will report improved ability to manage bladder spasms appropriately 100% of the time for an improvement in urinary frequency/urgency.   Pt will report improved ability to sleep uninterrupted by excessive nocturia 5/7 days per week.   Pt will maintain a voiding  interval of > or = 3 hours for improved activity tolerance (ie. prolonged driving, work meeting, watching a movie).   Pt will be independent with double voiding techniques 100% of the time to ensure full bladder emptying and decrease pt's risk of infection.   Pt/family will be independent with HEP for continued self-management of symptoms.  ALL PROGRESSING    Plan     Plan of care Certification: 5/13/2022 to 8/13/2022.     Outpatient Physical Therapy 1 times per 2 week(s)  for 12 weeks to include the following interventions: therapeutic exercises, neuromuscular re-education, manual therapy, modalities PRN, patient/family education and self care/home management    Carmina Frias, PT, BCB-PMD

## 2022-06-17 ENCOUNTER — CLINICAL SUPPORT (OUTPATIENT)
Dept: CARDIOLOGY | Facility: HOSPITAL | Age: 77
End: 2022-06-17
Payer: MEDICARE

## 2022-06-17 DIAGNOSIS — Z95.0 PRESENCE OF CARDIAC PACEMAKER: ICD-10-CM

## 2022-06-17 DIAGNOSIS — I49.5 SICK SINUS SYNDROME: ICD-10-CM

## 2022-06-17 PROCEDURE — 93294 REM INTERROG EVL PM/LDLS PM: CPT | Mod: ,,, | Performed by: INTERNAL MEDICINE

## 2022-06-17 PROCEDURE — 93296 REM INTERROG EVL PM/IDS: CPT | Performed by: INTERNAL MEDICINE

## 2022-06-17 PROCEDURE — 93294 CARDIAC DEVICE CHECK - REMOTE: ICD-10-PCS | Mod: ,,, | Performed by: INTERNAL MEDICINE

## 2022-06-29 ENCOUNTER — CLINICAL SUPPORT (OUTPATIENT)
Dept: REHABILITATION | Facility: HOSPITAL | Age: 77
End: 2022-06-29
Attending: STUDENT IN AN ORGANIZED HEALTH CARE EDUCATION/TRAINING PROGRAM
Payer: MEDICARE

## 2022-06-29 DIAGNOSIS — M62.89 PELVIC FLOOR DYSFUNCTION: Primary | ICD-10-CM

## 2022-06-29 PROCEDURE — 97112 NEUROMUSCULAR REEDUCATION: CPT | Mod: PO

## 2022-06-29 NOTE — PROGRESS NOTES
Pelvic Health Physical Therapy   Treatment Note     Name: Shannan Norman  Clinic Number: [id number]    Therapy Diagnosis:   Encounter Diagnosis   Name Primary?    Pelvic floor dysfunction Yes     Physician: Maura Gunter MD    Visit Date: 6/29/2022    Physician Orders: PT Eval and Treat    Medical Diagnosis from Referral: Urinary urgency [R39.15], Urinary frequency [R35.0]  Evaluation Date: 5/13/2022  Authorization Period Expiration: 12/31/2022  Plan of Care Expiration: 8/13/2022  Visit # / Visits authorized: 3/1  Cancelled Visits: 0  No Show Visits: 0    Time In: 11:03  Time Out: 12:00  Total Billable Time: 55 minutes    Precautions: Standard    Subjective     Pt reports: that he has stopped drinking coffee and his urinary control is better.  Is asking about bladder irritants.    He was compliant with home exercise program.  Response to previous treatment: no adverse effects  Functional change: none noted yet.      Pain: 0/10    Objective     Shannan participated in neuromuscular re-education activities to develop Coordination and control for 55 minutes including: Kegels with assist of SEMG, abdominal sets and quick flicks; pre and post void residual assessment with RUSI.   We worked in standing with external lead wires.  He demonstrated normal baseline resting, good WR rise, and good holding in 10 sec Kegels, with no cues to refrain from breath holding.  Derecruitment was delayed but improved with reps.    We also looked at quick flicks in this position.  His pre void bladder volume was 163cc; post void residual was nil (bladder poorly visualized).      Home Exercises Provided and Patient Education Provided     Education provided:   - bladder irritants, posture/body mechanices and urge control strategies  Discussed progression of plan of care with patient; educated pt in activity modification; reviewed HEP with pt. Pt demonstrated and verbalized understanding of all instruction and was provided with a handout of  HEP (see Patient Instructions).    Written Home Exercises Provided: yes.  Exercises were reviewed and Shannan was able to demonstrate them prior to the end of the session.  Shannan demonstrated good  understanding of the education provided.     See EMR under Patient Instructions for exercises provided prior sessions.    Assessment     Pt continue to work on maximizing PFM strength, along with minimizing bladder irritants.  May ultimately need an anticholinergic.      Shannan Is progressing well towards his goals.     Pt prognosis is Good.     Pt will continue to benefit from skilled outpatient physical therapy to address the deficits listed in the problem list box on initial evaluation, provide pt/family education and to maximize pt's level of independence in the home and community environment.     Pt's spiritual, cultural and educational needs considered and pt agreeable to plan of care and goals.     Anticipated barriers to physical therapy: none    Goals: 12 weeks   Pt will verbalize improved awareness of PFM activity as palpated by PT in order to improve activity involvement with HEP.  Pt will report improved ability to manage bladder spasms appropriately 100% of the time for an improvement in urinary frequency/urgency.   Pt will report improved ability to sleep uninterrupted by excessive nocturia 5/7 days per week.   Pt will maintain a voiding interval of > or = 3 hours for improved activity tolerance (ie. prolonged driving, work meeting, watching a movie).   Pt will be independent with double voiding techniques 100% of the time to ensure full bladder emptying and decrease pt's risk of infection.   Pt/family will be independent with HEP for continued self-management of symptoms.  ALL PROGRESSING    Plan     Plan of care Certification: 5/13/2022 to 8/13/2022.     Outpatient Physical Therapy 1 times per 2 week(s)  for 12 weeks to include the following interventions: therapeutic exercises, neuromuscular re-education, manual  therapy, modalities PRN, patient/family education and self care/home management    Carmina Frias, PT, BCB-PMD

## 2022-07-03 NOTE — PROGRESS NOTES
Subjective:     Patient ID: Shannan Norman is a 76 y.o. male w a hx c/w PMR    Chief Complaint: No chief complaint on file.       HPI   LV 4/6/22    76 yr old with multiple morbidities that include gout, DM II, CKD 3, Atrial fib w sick sinus syndrome &  bilateral hip & LSpine OA;     On 2/15/22 developed stiffness; couldn't get out of bed in AM; had extreme bilateral shoulder pain; had hip pain (but denied stiffness); also really bad at night. No other joint of muscle pain except the muscles of the backs of his knees or tendons felt tight. Had lots of gelling. Lost 23 lbs. Also felt voice changed tone a bit.   Celebrex 200 mg/d helped some, but not entirely.   Prednisone 10 mg bid was started on 4/4/22 and he had an immediate & dramatic response. Everything improved.   We say him for the first time on 4/6/22 and tried to taper prednisone a bit. He was able to get down to 10 mg daily which he began ~ May 2022.  Currently does not feel as well as he did on the larger doses of prednisone. Has ~ 4 hrs of AM stiffness and has bilateral wrist pain worse in AM & improves with time..  No other joint sxs.  Denies fever, chills, recent  visual loss, diplopia, scalp tenderness, jaw or other claudication, headache or head pain.     Is also on allopurinol 100 mg/d w good control of his UA. He also is eating better. Did have a diet of rich food in the past and ETOH use in past.Denies family hx of gout; denies renal calculi; denies moonshine use.          Current Outpatient Medications   Medication Sig Dispense Refill    allopurinoL (ZYLOPRIM) 100 MG tablet Take 1 tablet (100 mg total) by mouth once daily. 90 tablet 3    ascorbic acid, vitamin C, (VITAMIN C) 500 MG tablet Take 1,000 mg by mouth nightly.       atorvastatin (LIPITOR) 40 MG tablet Take 1 tablet (40 mg total) by mouth once daily. 90 tablet 3    benazepriL (LOTENSIN) 20 MG tablet Take 1 tablet (20 mg total) by mouth once daily. 90 tablet 3    cycloSPORINE  (RESTASIS) 0.05 % ophthalmic emulsion Place 1 drop into both eyes 2 (two) times daily. 60 each 11    econazole nitrate 1 % cream Apply topically once daily. 85 g 3    ferrous sulfate (FEOSOL) 325 mg (65 mg iron) Tab tablet Take 325 mg by mouth once daily.      GLUCOSAMINE HCL/CHONDR VASQUEZ A NA (GLUCOSAMINE-CHONDROITIN) 750-600 mg Tab Take 2 tablets by mouth Daily.      levocetirizine (XYZAL) 5 MG tablet Take 1 tablet (5 mg total) by mouth every evening. 90 tablet 1    multivitamin capsule Take 1 capsule by mouth nightly.       rivaroxaban (XARELTO) 20 mg Tab Take 1 tablet (20 mg total) by mouth daily with dinner or evening meal. 90 tablet 3    tamsulosin (FLOMAX) 0.4 mg Cap TAKE 1 CAPSULE BY MOUTH AFTER DINNER 90 capsule 3     No current facility-administered medications for this visit.         Review of patient's allergies indicates:  No Known Allergies    Review of Systems   Constitutional: Positive for unexpected weight change. Negative for fever.   HENT: Negative for mouth sores and trouble swallowing.    Eyes: Negative for redness.        Bilateral dry eyes   Respiratory: Negative.  Negative for cough and shortness of breath.    Cardiovascular: Negative.  Negative for chest pain.   Gastrointestinal: Negative.  Negative for constipation and diarrhea.   Endocrine: Negative.  Negative for cold intolerance and heat intolerance.   Genitourinary: Positive for difficulty urinating, frequency and urgency. Negative for genital sores.   Musculoskeletal: Negative for back pain, joint swelling and myalgias.   Skin: Negative for rash.   Neurological: Negative for dizziness, syncope, weakness, light-headedness, numbness and headaches.   Hematological: Does not bruise/bleed easily.   Psychiatric/Behavioral: Positive for dysphoric mood and sleep disturbance.       Past Medical History:   Diagnosis Date    Allergy     Arthritis     Atrial fibrillation     Atrial flutter 2011    ablation    Basal cell carcinoma      Cancer     Cataract     CKD (chronic kidney disease) stage 3, GFR 30-59 ml/min 8/13/2019    Diabetes mellitus     Dry eye syndrome     Gout, unspecified     High cholesterol     History of shingles     Hypertension     Melanoma     Seizures        Past Surgical History:   Procedure Laterality Date    ABLATION N/A 9/11/2018    Procedure: ABLATION;  Surgeon: Hany Sanchez MD;  Location[facility] CATH LAB;  Service: Cardiology;  Laterality: N/A;  AFL, ISABELLE, RFA, ELODIA, MAC, DM, 3 PREP    ABLATION OF DYSRHYTHMIC FOCUS      ADENOIDECTOMY      CARDIAC PACEMAKER PLACEMENT      no defib    CATARACT EXTRACTION W/  INTRAOCULAR LENS IMPLANT Right 7/28/2020    Procedure: EXTRACTION, CATARACT, WITH IOL INSERTION;  Surgeon: Andrés Morse MD;  Location: [facility] OR;  Service: Ophthalmology;  Laterality: Right;    CATARACT EXTRACTION W/  INTRAOCULAR LENS IMPLANT Left 8/11/2020    Procedure: EXTRACTION, CATARACT, WITH IOL INSERTION;  Surgeon: Andrés Morse MD;  Location: [facility];  Service: Ophthalmology;  Laterality: Left;    COLONOSCOPY N/A 1/2/2019    Procedure: COLONOSCOPY Suprep;  Surgeon: Cindy Solorzano MD;  Location: [facility] ENDO;  Service: Endoscopy;  Laterality: N/A;    GANGLION CYST EXCISION      left neck    HERNIA REPAIR  2013    umbilical    TENDON REPAIR Right     wrist    TONSILLECTOMY      varicose veins      several sx  bilateral    VASECTOMY      VEIN SURGERY         Family History   Problem Relation Age of Onset    Diabetes Mother     COPD Father     Heart disease Father     Arthritis Sister     Heart attack Brother     Heart disease Brother     Diabetes Brother     No Known Problems Maternal Aunt     No Known Problems Maternal Uncle     No Known Problems Paternal Aunt     No Known Problems Paternal Uncle     No Known Problems Maternal Grandmother     No Known Problems Maternal Grandfather     No Known Problems Paternal Grandmother     No Known Problems Paternal  "Grandfather     No Known Problems Son     Cancer Sister         lymph node, breast    No Known Problems Sister     No Known Problems Son     Amblyopia Neg Hx     Blindness Neg Hx     Cataracts Neg Hx     Glaucoma Neg Hx     Hypertension Neg Hx     Macular degeneration Neg Hx     Retinal detachment Neg Hx     Strabismus Neg Hx     Stroke Neg Hx     Thyroid disease Neg Hx     Prostate cancer Neg Hx     Kidney disease Neg Hx     Melanoma Neg Hx        Social History     Tobacco Use    Smoking status: Never Smoker    Smokeless tobacco: Never Used   Substance Use Topics    Alcohol use: Yes     Alcohol/week: 7.0 - 14.0 standard drinks     Types: 7 - 14 Glasses of wine per week    Drug use: No   Retired. Administrative business for [employer]  Wife malina Houser. He's taking care of her; He does the cooking.     Objective:     /67   Pulse (!) 59   Ht 5' 11" (1.803 m)   Wt 91.9 kg (202 lb 9.6 oz)   BMI 28.26 kg/m²     Physical Exam   Constitutional: He is oriented to person, place, and time. No distress.   HENT:   Head: Normocephalic and atraumatic.   Mouth/Throat: Oropharynx is clear and moist. No oropharyngeal exudate.   No parotidomegaly;   Temporal arteries with good pulsations.   No temporal artery tenderness;   No scalp tenderness.  No oral ulcers;   Eyes: Pupils are equal, round, and reactive to light. Conjunctivae are normal. No scleral icterus.   Neck: No JVD present. No tracheal deviation present. No thyromegaly present.   Cardiovascular: Normal rate, regular rhythm and normal heart sounds. Exam reveals no gallop and no friction rub.   No murmur heard.  Pulmonary/Chest: Effort normal and breath sounds normal. No respiratory distress. He has no wheezes. He has no rales. He exhibits no tenderness.   Abdominal: Soft. Bowel sounds are normal. He exhibits no distension and no mass. There is no splenomegaly or hepatomegaly. There is no abdominal tenderness. There is no rebound and no guarding. "   Musculoskeletal:         General: No tenderness.      Right lower leg: Edema present.      Left lower leg: Edema present.      Comments: No synovitis  Exam of wrists w bilateral decrease in flexion and extension; No TTP; no visible or palpable SHT.   Lymphadenopathy:     He has no cervical adenopathy.   Neurological: He is alert and oriented to person, place, and time. He has normal reflexes. No cranial nerve deficit.   Motor strength: 5/5 prox & distal.   Skin: Skin is warm and dry. No rash noted.   Bilateral venous varicosities lower extremities  Thickened toe nails c/w onychomycosis   Psychiatric: His behavior is normal. Mood, memory, affect and judgment normal.   Vitals reviewed.      5/12/22: ESR: 15 (23); CRP 0.8; CBC ok; CMP glu 128  4/13/22: ESR 20 (23);UA 5.4  3/31/22: CPK 47;   3/30/22: ESR 87 (23); CRP 42.5; CBC Hg 12.7; CMP BUN 24; glu 135; Alb 2.8; RF neg;  UA 5.9;       3/31/22: LSpine: personally viewed: dextroconvex Lspine; DDD verona L3-4; lower facet arthropathy; gr 1 anterolisthesis L4-5; vascular calcifications.  2/23/22: Bilateral hips: personally viewed: bilateral OA; also read as impingement.   3/15/21: Bilateral knees: personally viewed: bowen mjsn; mild varus bowen  8/26/19: L wrist: personally viewed: mild OA;   Assessment:   PMR   Shoulder/hip pain/stiffness--No GCA signs/sxs   Elevated inflammatory parameters:     ESR 87 (23); CRP 42.5; Hg 12.7; alb 2.8;    Celebrex somewhat helpful.   Dramatic response to prednisone 10 mg bid begun 4/4/22   Last w wrist pain/stiffness improving as day progresses    Doubt sero neg RA--but always in differential    On prednisone 10 mg/d.    Gout by hx   Podagra by hx--several episodes.    Knee swelling   Hyperuricemia maxed 7.9; last 5.4;    Some ETOH; recently stopped   On allopurinol 100 mg/d since ~ 2 yrs    OA   Hips (w ZEYAD); knees; Lspine   L wrist minimal     CKD 2-3    DM II    HTN    HLD    Cardiac issues: SSS; cardiac arrythmia      Plan:   Discussed  necessity for labs to see if we will stay the course w prednisone 10 mg/ or go back up.  Reluctant to increase dose due to side effects and co-morbidities which we discussed.  Ordered DXA.  He will contact us after labs to determine is prednisone dose needs adjustment.    RTC 3 months or prn    CC: Nitin Banks MD    Addendum:  7/7/22: ESR 15 (23); CRP 15.2; CBC ok; CMP glu 126;   Ok to increase prednisone from 10 mg/d to 12.5 mg/d. He can cut a 5 mg tablet in 1/2 and see if he can get by with that.

## 2022-07-06 ENCOUNTER — OFFICE VISIT (OUTPATIENT)
Dept: RHEUMATOLOGY | Facility: CLINIC | Age: 77
End: 2022-07-06
Payer: MEDICARE

## 2022-07-06 VITALS
HEIGHT: 71 IN | HEART RATE: 59 BPM | DIASTOLIC BLOOD PRESSURE: 67 MMHG | BODY MASS INDEX: 28.37 KG/M2 | SYSTOLIC BLOOD PRESSURE: 117 MMHG | WEIGHT: 202.63 LBS

## 2022-07-06 DIAGNOSIS — M25.532 BILATERAL WRIST PAIN: ICD-10-CM

## 2022-07-06 DIAGNOSIS — M25.531 BILATERAL WRIST PAIN: ICD-10-CM

## 2022-07-06 DIAGNOSIS — M35.3 POLYMYALGIA RHEUMATICA: Primary | ICD-10-CM

## 2022-07-06 DIAGNOSIS — M10.9 GOUT, ARTHRITIS: ICD-10-CM

## 2022-07-06 DIAGNOSIS — Z79.52 LONG TERM (CURRENT) USE OF SYSTEMIC STEROIDS: ICD-10-CM

## 2022-07-06 PROCEDURE — 1125F PR PAIN SEVERITY QUANTIFIED, PAIN PRESENT: ICD-10-PCS | Mod: CPTII,S$GLB,, | Performed by: INTERNAL MEDICINE

## 2022-07-06 PROCEDURE — 1159F MED LIST DOCD IN RCRD: CPT | Mod: CPTII,S$GLB,, | Performed by: INTERNAL MEDICINE

## 2022-07-06 PROCEDURE — 99999 PR PBB SHADOW E&M-EST. PATIENT-LVL IV: CPT | Mod: PBBFAC,,, | Performed by: INTERNAL MEDICINE

## 2022-07-06 PROCEDURE — 99214 PR OFFICE/OUTPT VISIT, EST, LEVL IV, 30-39 MIN: ICD-10-PCS | Mod: S$GLB,,, | Performed by: INTERNAL MEDICINE

## 2022-07-06 PROCEDURE — 3078F PR MOST RECENT DIASTOLIC BLOOD PRESSURE < 80 MM HG: ICD-10-PCS | Mod: CPTII,S$GLB,, | Performed by: INTERNAL MEDICINE

## 2022-07-06 PROCEDURE — 1159F PR MEDICATION LIST DOCUMENTED IN MEDICAL RECORD: ICD-10-PCS | Mod: CPTII,S$GLB,, | Performed by: INTERNAL MEDICINE

## 2022-07-06 PROCEDURE — 99214 OFFICE O/P EST MOD 30 MIN: CPT | Mod: S$GLB,,, | Performed by: INTERNAL MEDICINE

## 2022-07-06 PROCEDURE — 3072F PR LOW RISK FOR RETINOPATHY: ICD-10-PCS | Mod: CPTII,S$GLB,, | Performed by: INTERNAL MEDICINE

## 2022-07-06 PROCEDURE — 3078F DIAST BP <80 MM HG: CPT | Mod: CPTII,S$GLB,, | Performed by: INTERNAL MEDICINE

## 2022-07-06 PROCEDURE — 3074F SYST BP LT 130 MM HG: CPT | Mod: CPTII,S$GLB,, | Performed by: INTERNAL MEDICINE

## 2022-07-06 PROCEDURE — 99999 PR PBB SHADOW E&M-EST. PATIENT-LVL IV: ICD-10-PCS | Mod: PBBFAC,,, | Performed by: INTERNAL MEDICINE

## 2022-07-06 PROCEDURE — 1160F PR REVIEW ALL MEDS BY PRESCRIBER/CLIN PHARMACIST DOCUMENTED: ICD-10-PCS | Mod: CPTII,S$GLB,, | Performed by: INTERNAL MEDICINE

## 2022-07-06 PROCEDURE — 3072F LOW RISK FOR RETINOPATHY: CPT | Mod: CPTII,S$GLB,, | Performed by: INTERNAL MEDICINE

## 2022-07-06 PROCEDURE — 1125F AMNT PAIN NOTED PAIN PRSNT: CPT | Mod: CPTII,S$GLB,, | Performed by: INTERNAL MEDICINE

## 2022-07-06 PROCEDURE — 3074F PR MOST RECENT SYSTOLIC BLOOD PRESSURE < 130 MM HG: ICD-10-PCS | Mod: CPTII,S$GLB,, | Performed by: INTERNAL MEDICINE

## 2022-07-06 PROCEDURE — 1160F RVW MEDS BY RX/DR IN RCRD: CPT | Mod: CPTII,S$GLB,, | Performed by: INTERNAL MEDICINE

## 2022-07-06 RX ORDER — PREDNISONE 5 MG/1
10 TABLET ORAL 2 TIMES DAILY
COMMUNITY
Start: 2022-05-20 | End: 2022-09-04 | Stop reason: SDUPTHER

## 2022-07-07 ENCOUNTER — LAB VISIT (OUTPATIENT)
Dept: LAB | Facility: HOSPITAL | Age: 77
End: 2022-07-07
Attending: INTERNAL MEDICINE
Payer: MEDICARE

## 2022-07-07 ENCOUNTER — PATIENT MESSAGE (OUTPATIENT)
Dept: RHEUMATOLOGY | Facility: CLINIC | Age: 77
End: 2022-07-07
Payer: MEDICARE

## 2022-07-07 DIAGNOSIS — M35.3 POLYMYALGIA RHEUMATICA: ICD-10-CM

## 2022-07-07 LAB
ALBUMIN SERPL BCP-MCNC: 3.5 G/DL (ref 3.5–5.2)
ALP SERPL-CCNC: 57 U/L (ref 55–135)
ALT SERPL W/O P-5'-P-CCNC: 11 U/L (ref 10–44)
ANION GAP SERPL CALC-SCNC: 8 MMOL/L (ref 8–16)
AST SERPL-CCNC: 8 U/L (ref 10–40)
BASOPHILS # BLD AUTO: 0.04 K/UL (ref 0–0.2)
BASOPHILS NFR BLD: 0.4 % (ref 0–1.9)
BILIRUB SERPL-MCNC: 0.5 MG/DL (ref 0.1–1)
BUN SERPL-MCNC: 23 MG/DL (ref 8–23)
CALCIUM SERPL-MCNC: 9.4 MG/DL (ref 8.7–10.5)
CHLORIDE SERPL-SCNC: 103 MMOL/L (ref 95–110)
CO2 SERPL-SCNC: 26 MMOL/L (ref 23–29)
CREAT SERPL-MCNC: 1 MG/DL (ref 0.5–1.4)
CRP SERPL-MCNC: 15.2 MG/L (ref 0–8.2)
DIFFERENTIAL METHOD: ABNORMAL
EOSINOPHIL # BLD AUTO: 0 K/UL (ref 0–0.5)
EOSINOPHIL NFR BLD: 0.2 % (ref 0–8)
ERYTHROCYTE [DISTWIDTH] IN BLOOD BY AUTOMATED COUNT: 15.1 % (ref 11.5–14.5)
ERYTHROCYTE [SEDIMENTATION RATE] IN BLOOD BY PHOTOMETRIC METHOD: 15 MM/HR (ref 0–23)
EST. GFR  (AFRICAN AMERICAN): >60 ML/MIN/1.73 M^2
EST. GFR  (NON AFRICAN AMERICAN): >60 ML/MIN/1.73 M^2
GLUCOSE SERPL-MCNC: 126 MG/DL (ref 70–110)
HCT VFR BLD AUTO: 47.2 % (ref 40–54)
HGB BLD-MCNC: 14.3 G/DL (ref 14–18)
IMM GRANULOCYTES # BLD AUTO: 0.06 K/UL (ref 0–0.04)
IMM GRANULOCYTES NFR BLD AUTO: 0.7 % (ref 0–0.5)
LYMPHOCYTES # BLD AUTO: 0.9 K/UL (ref 1–4.8)
LYMPHOCYTES NFR BLD: 10.1 % (ref 18–48)
MCH RBC QN AUTO: 29.7 PG (ref 27–31)
MCHC RBC AUTO-ENTMCNC: 30.3 G/DL (ref 32–36)
MCV RBC AUTO: 98 FL (ref 82–98)
MONOCYTES # BLD AUTO: 0.6 K/UL (ref 0.3–1)
MONOCYTES NFR BLD: 6.8 % (ref 4–15)
NEUTROPHILS # BLD AUTO: 7.4 K/UL (ref 1.8–7.7)
NEUTROPHILS NFR BLD: 81.8 % (ref 38–73)
NRBC BLD-RTO: 0 /100 WBC
PLATELET # BLD AUTO: 213 K/UL (ref 150–450)
PMV BLD AUTO: 11.4 FL (ref 9.2–12.9)
POTASSIUM SERPL-SCNC: 4.6 MMOL/L (ref 3.5–5.1)
PROT SERPL-MCNC: 6.1 G/DL (ref 6–8.4)
RBC # BLD AUTO: 4.81 M/UL (ref 4.6–6.2)
SODIUM SERPL-SCNC: 137 MMOL/L (ref 136–145)
WBC # BLD AUTO: 9.07 K/UL (ref 3.9–12.7)

## 2022-07-07 PROCEDURE — 36415 COLL VENOUS BLD VENIPUNCTURE: CPT | Mod: PO | Performed by: INTERNAL MEDICINE

## 2022-07-07 PROCEDURE — 86140 C-REACTIVE PROTEIN: CPT | Performed by: INTERNAL MEDICINE

## 2022-07-07 PROCEDURE — 85025 COMPLETE CBC W/AUTO DIFF WBC: CPT | Performed by: INTERNAL MEDICINE

## 2022-07-07 PROCEDURE — 85652 RBC SED RATE AUTOMATED: CPT | Performed by: INTERNAL MEDICINE

## 2022-07-07 PROCEDURE — 80053 COMPREHEN METABOLIC PANEL: CPT | Performed by: INTERNAL MEDICINE

## 2022-07-08 ENCOUNTER — CLINICAL SUPPORT (OUTPATIENT)
Dept: OTOLARYNGOLOGY | Facility: CLINIC | Age: 77
End: 2022-07-08
Payer: MEDICARE

## 2022-07-08 ENCOUNTER — OFFICE VISIT (OUTPATIENT)
Dept: OTOLARYNGOLOGY | Facility: CLINIC | Age: 77
End: 2022-07-08
Payer: MEDICARE

## 2022-07-08 VITALS
WEIGHT: 198.44 LBS | DIASTOLIC BLOOD PRESSURE: 70 MMHG | BODY MASS INDEX: 27.67 KG/M2 | SYSTOLIC BLOOD PRESSURE: 117 MMHG | HEART RATE: 60 BPM

## 2022-07-08 DIAGNOSIS — J31.0 CHRONIC RHINITIS: Primary | Chronic | ICD-10-CM

## 2022-07-08 DIAGNOSIS — H90.3 SENSORINEURAL HEARING LOSS (SNHL) OF BOTH EARS: Primary | ICD-10-CM

## 2022-07-08 DIAGNOSIS — H90.3 SENSORINEURAL HEARING LOSS (SNHL) OF BOTH EARS: Chronic | ICD-10-CM

## 2022-07-08 PROCEDURE — 3074F SYST BP LT 130 MM HG: CPT | Mod: CPTII,S$GLB,, | Performed by: OTOLARYNGOLOGY

## 2022-07-08 PROCEDURE — 1160F RVW MEDS BY RX/DR IN RCRD: CPT | Mod: CPTII,S$GLB,, | Performed by: OTOLARYNGOLOGY

## 2022-07-08 PROCEDURE — 99999 PR PBB SHADOW E&M-EST. PATIENT-LVL III: ICD-10-PCS | Mod: PBBFAC,,, | Performed by: OTOLARYNGOLOGY

## 2022-07-08 PROCEDURE — 1125F AMNT PAIN NOTED PAIN PRSNT: CPT | Mod: CPTII,S$GLB,, | Performed by: OTOLARYNGOLOGY

## 2022-07-08 PROCEDURE — 3074F PR MOST RECENT SYSTOLIC BLOOD PRESSURE < 130 MM HG: ICD-10-PCS | Mod: CPTII,S$GLB,, | Performed by: OTOLARYNGOLOGY

## 2022-07-08 PROCEDURE — 1125F PR PAIN SEVERITY QUANTIFIED, PAIN PRESENT: ICD-10-PCS | Mod: CPTII,S$GLB,, | Performed by: OTOLARYNGOLOGY

## 2022-07-08 PROCEDURE — 1101F PT FALLS ASSESS-DOCD LE1/YR: CPT | Mod: CPTII,S$GLB,, | Performed by: OTOLARYNGOLOGY

## 2022-07-08 PROCEDURE — 3288F PR FALLS RISK ASSESSMENT DOCUMENTED: ICD-10-PCS | Mod: CPTII,S$GLB,, | Performed by: OTOLARYNGOLOGY

## 2022-07-08 PROCEDURE — 3078F PR MOST RECENT DIASTOLIC BLOOD PRESSURE < 80 MM HG: ICD-10-PCS | Mod: CPTII,S$GLB,, | Performed by: OTOLARYNGOLOGY

## 2022-07-08 PROCEDURE — 92557 COMPREHENSIVE HEARING TEST: CPT | Mod: S$GLB,,, | Performed by: PHYSICIAN ASSISTANT

## 2022-07-08 PROCEDURE — 99999 PR PBB SHADOW E&M-EST. PATIENT-LVL III: CPT | Mod: PBBFAC,,, | Performed by: OTOLARYNGOLOGY

## 2022-07-08 PROCEDURE — 3072F PR LOW RISK FOR RETINOPATHY: ICD-10-PCS | Mod: CPTII,S$GLB,, | Performed by: OTOLARYNGOLOGY

## 2022-07-08 PROCEDURE — 92567 PR TYMPA2METRY: ICD-10-PCS | Mod: S$GLB,,, | Performed by: PHYSICIAN ASSISTANT

## 2022-07-08 PROCEDURE — 1160F PR REVIEW ALL MEDS BY PRESCRIBER/CLIN PHARMACIST DOCUMENTED: ICD-10-PCS | Mod: CPTII,S$GLB,, | Performed by: OTOLARYNGOLOGY

## 2022-07-08 PROCEDURE — 1159F MED LIST DOCD IN RCRD: CPT | Mod: CPTII,S$GLB,, | Performed by: OTOLARYNGOLOGY

## 2022-07-08 PROCEDURE — 1159F PR MEDICATION LIST DOCUMENTED IN MEDICAL RECORD: ICD-10-PCS | Mod: CPTII,S$GLB,, | Performed by: OTOLARYNGOLOGY

## 2022-07-08 PROCEDURE — 1101F PR PT FALLS ASSESS DOC 0-1 FALLS W/OUT INJ PAST YR: ICD-10-PCS | Mod: CPTII,S$GLB,, | Performed by: OTOLARYNGOLOGY

## 2022-07-08 PROCEDURE — 99999 PR PBB SHADOW E&M-EST. PATIENT-LVL I: CPT | Mod: PBBFAC,,, | Performed by: PHYSICIAN ASSISTANT

## 2022-07-08 PROCEDURE — 99213 PR OFFICE/OUTPT VISIT, EST, LEVL III, 20-29 MIN: ICD-10-PCS | Mod: S$GLB,,, | Performed by: OTOLARYNGOLOGY

## 2022-07-08 PROCEDURE — 99213 OFFICE O/P EST LOW 20 MIN: CPT | Mod: S$GLB,,, | Performed by: OTOLARYNGOLOGY

## 2022-07-08 PROCEDURE — 3288F FALL RISK ASSESSMENT DOCD: CPT | Mod: CPTII,S$GLB,, | Performed by: OTOLARYNGOLOGY

## 2022-07-08 PROCEDURE — 3072F LOW RISK FOR RETINOPATHY: CPT | Mod: CPTII,S$GLB,, | Performed by: OTOLARYNGOLOGY

## 2022-07-08 PROCEDURE — 92557 PR COMPREHENSIVE HEARING TEST: ICD-10-PCS | Mod: S$GLB,,, | Performed by: PHYSICIAN ASSISTANT

## 2022-07-08 PROCEDURE — 99999 PR PBB SHADOW E&M-EST. PATIENT-LVL I: ICD-10-PCS | Mod: PBBFAC,,, | Performed by: PHYSICIAN ASSISTANT

## 2022-07-08 PROCEDURE — 92567 TYMPANOMETRY: CPT | Mod: S$GLB,,, | Performed by: PHYSICIAN ASSISTANT

## 2022-07-08 PROCEDURE — 3078F DIAST BP <80 MM HG: CPT | Mod: CPTII,S$GLB,, | Performed by: OTOLARYNGOLOGY

## 2022-07-08 NOTE — PROGRESS NOTES
Shannan Norman, a 76 y.o. male, was seen today in the clinic for an annual audiologic evaluation.  He does not feel his hearing has changed over time and he reports he does not struggle to hear conversation.     Audiogram results revealed a mild to severe sensorineural hearing loss bilaterally.  Speech reception thresholds were noted at 15 dB in the right ear and 15 dB in the left ear.  Speech discrimination scores were 92% in the right ear and 92% in the left ear.  Tympanometry revealed Type Ad in the right ear and Type A in the left ear.     Recommendations:  1. Otologic evaluation  2. Annual audiogram  3. Hearing protection when in noise  4. Hearing aid consultation when patient is ready

## 2022-07-08 NOTE — PROGRESS NOTES
Chief Complaint   Patient presents with    Follow-up     No complaints    .     HPI: Shannan Norman is a 76 y.o. male who presents for evaluation of right aural fullness and nosebleeds. He notes that the aural fullness has been present in both ears for several weeks.  He saw Vanessa Bernard NP at [facility]- cerumen removal was performed which did not seem to improve his symptoms. He does report that it might be slightly improvement since then. He did have audiogram at that visit. See results below.  Additionally,  he reports that he has had nosebleeds for several years.  He states that they are primarily the right side and occur intermittently.  The last episode was approximately 3 days ago.  He notes they last about 5 minutes and stop with pressure.  He is on chronic anticoagulation Eliquis for AFib. Has had nasal cautery in the past about 5 or 6 years ago.     Interval HPI 7/8/2022:  Follow up visit.  Denies any further episodes of epistaxis since last visit.   He has been using nasal saline rinses and xyzal.  He feels his hearing is stable from prior audiogram about 1 year ago.       Past Medical History:   Diagnosis Date    Allergy     Arthritis     Atrial fibrillation     Atrial flutter 2011    ablation    Basal cell carcinoma     Cancer     Cataract     CKD (chronic kidney disease) stage 3, GFR 30-59 ml/min 8/13/2019    Diabetes mellitus     Dry eye syndrome     Gout, unspecified     High cholesterol     History of shingles     Hypertension     Melanoma     Seizures      Social History     Socioeconomic History    Marital status:    Occupational History     Employer: [employer]   Tobacco Use    Smoking status: Never Smoker    Smokeless tobacco: Never Used   Substance and Sexual Activity    Alcohol use: Yes     Alcohol/week: 7.0 - 14.0 standard drinks     Types: 7 - 14 Glasses of wine per week    Drug use: No    Sexual activity: Yes     Partners: Female     Social Determinants  of Health     Financial Resource Strain: Low Risk     Difficulty of Paying Living Expenses: Not hard at all   Food Insecurity: No Food Insecurity    Worried About Running Out of Food in the Last Year: Never true    Ran Out of Food in the Last Year: Never true   Transportation Needs: No Transportation Needs    Lack of Transportation (Medical): No    Lack of Transportation (Non-Medical): No   Physical Activity: Sufficiently Active    Days of Exercise per Week: 6 days    Minutes of Exercise per Session: 30 min   Stress: Stress Concern Present    Feeling of Stress : To some extent   Social Connections: Unknown    Frequency of Communication with Friends and Family: Twice a week    Frequency of Social Gatherings with Friends and Family: Once a week    Active Member of Clubs or Organizations: Yes    Attends Club or Organization Meetings: More than 4 times per year    Marital Status:    Housing Stability: Low Risk     Unable to Pay for Housing in the Last Year: No    Number of Places Lived in the Last Year: 1    Unstable Housing in the Last Year: No     Past Surgical History:   Procedure Laterality Date    ABLATION N/A 9/11/2018    Procedure: ABLATION;  Surgeon: Hany Sanchez MD;  Location: [facility] CATH LAB;  Service: Cardiology;  Laterality: N/A;  AFL, ISABELLE, RFA, ELODIA, MAC, DM, 3 PREP    ABLATION OF DYSRHYTHMIC FOCUS      ADENOIDECTOMY      CARDIAC PACEMAKER PLACEMENT      no defib    CATARACT EXTRACTION W/  INTRAOCULAR LENS IMPLANT Right 7/28/2020    Procedure: EXTRACTION, CATARACT, WITH IOL INSERTION;  Surgeon: Andrés Morse MD;  Location: [facility];  Service: Ophthalmology;  Laterality: Right;    CATARACT EXTRACTION W/  INTRAOCULAR LENS IMPLANT Left 8/11/2020    Procedure: EXTRACTION, CATARACT, WITH IOL INSERTION;  Surgeon: Andrés Morse MD;  Location: [facility];  Service: Ophthalmology;  Laterality: Left;    COLONOSCOPY N/A 1/2/2019    Procedure: COLONOSCOPY Suprep;  Surgeon:  Cindy Solorzano MD;  Location: [facility];  Service: Endoscopy;  Laterality: N/A;    GANGLION CYST EXCISION      left neck    HERNIA REPAIR  2013    umbilical    TENDON REPAIR Right     wrist    TONSILLECTOMY      varicose veins      several sx  bilateral    VASECTOMY      VEIN SURGERY       Family History   Problem Relation Age of Onset    Diabetes Mother     COPD Father     Heart disease Father     Arthritis Sister     Heart attack Brother     Heart disease Brother     Diabetes Brother     No Known Problems Maternal Aunt     No Known Problems Maternal Uncle     No Known Problems Paternal Aunt     No Known Problems Paternal Uncle     No Known Problems Maternal Grandmother     No Known Problems Maternal Grandfather     No Known Problems Paternal Grandmother     No Known Problems Paternal Grandfather     No Known Problems Son     Cancer Sister         lymph node, breast    No Known Problems Sister     No Known Problems Son     Amblyopia Neg Hx     Blindness Neg Hx     Cataracts Neg Hx     Glaucoma Neg Hx     Hypertension Neg Hx     Macular degeneration Neg Hx     Retinal detachment Neg Hx     Strabismus Neg Hx     Stroke Neg Hx     Thyroid disease Neg Hx     Prostate cancer Neg Hx     Kidney disease Neg Hx     Melanoma Neg Hx            Review of Systems  General: negative for chills, fever or weight loss  Psychological: negative for mood changes or depression  Ophthalmic: negative for blurry vision, photophobia or eye pain  ENT: see HPI  Respiratory: no cough, shortness of breath, or wheezing  Cardiovascular: no chest pain or dyspnea on exertion  Gastrointestinal: no abdominal pain, change in bowel habits, or black/ bloody stools  Musculoskeletal: negative for gait disturbance or muscular weakness  Neurological: no syncope or seizures; no ataxia  Dermatological: negative for puritis,  rash and jaundice  Hematologic/lymphatic: no easy bruising, no new lumps or  bumps      Physical Exam:    Vitals:    07/08/22 1105   BP: 117/70   Pulse: 60       Constitutional: Well appearing / communicating without difficutly.  NAD.  Eyes: EOM I Bilaterally  Head/Face: Normocephalic.  Negative paranasal sinus pressure/tenderness.  Salivary glands WNL.  House Brackmann I Bilaterally.    Right Ear: Auricle normal appearance. External Auditory Canal within normal limits no lesions or masses,TM retracted with limited mobility.   Left Ear: Auricle normal appearance. External Auditory Canal within normal limits no lesions or masses,TM retracted with limited mobility  Nose: +healing scab at right caudal nasal septum.    No gross nasal septal deviation. Inferior Turbinates 3+ bilaterally. No septal perforation. No masses/lesions. External nasal skin appears normal without masses/lesions.  Oral Cavity: Gingiva/lips within normal limits.  Dentition/gingiva healthy appearing. Mucus membranes moist. Floor of mouth soft, no masses palpated. Oral Tongue mobile. Hard Palate appears normal.    Oropharynx: Base of tongue appears normal. No masses/lesions noted. Tonsillar fossa/pharyngeal wall without lesions. Posterior oropharynx WNL.  Soft palate without masses. Midline uvula.   Neck/Lymphatic: No LAD I-VI bilaterally.  No thyromegaly.  No masses noted on exam.      Diagnostic testing:   Audiogram interpreted personally by me and discussed in detail with the patient today. Audiogram results revealed a mild to severe sensorineural hearing loss bilaterally.  Speech reception thresholds were noted at 15 dB in the right ear and 15 dB in the left ear.  Speech discrimination scores were 92% in the right ear and 92% in the left ear.  Tympanometry revealed Type Ad in the right ear and Type A in the left ear.                Assessment:    ICD-10-CM ICD-9-CM    1. Chronic rhinitis  J31.0 472.0    2. Sensorineural hearing loss (SNHL) of both ears  H90.3 389.18      The primary encounter diagnosis was Chronic  rhinitis. A diagnosis of Sensorineural hearing loss (SNHL) of both ears was also pertinent to this visit.      Plan:  No orders of the defined types were placed in this encounter.    Epistaxis improved.   Continue saline rinses daily  Continue Xyzal 5 mg PO daily  Recommend annual audiogram  Call or return to clinic prn if these symptoms worsen or fail to improve as anticipated.     Maria Del Rosario Ybarra MD

## 2022-07-13 ENCOUNTER — CLINICAL SUPPORT (OUTPATIENT)
Dept: REHABILITATION | Facility: HOSPITAL | Age: 77
End: 2022-07-13
Attending: STUDENT IN AN ORGANIZED HEALTH CARE EDUCATION/TRAINING PROGRAM
Payer: MEDICARE

## 2022-07-13 DIAGNOSIS — M62.89 PELVIC FLOOR DYSFUNCTION: Primary | ICD-10-CM

## 2022-07-13 PROCEDURE — 97112 NEUROMUSCULAR REEDUCATION: CPT | Mod: PO

## 2022-07-13 NOTE — PROGRESS NOTES
Pelvic Health Physical Therapy   Treatment Note     Name: Shannan Norman  Clinic Number: [id number]    Therapy Diagnosis:   Encounter Diagnosis   Name Primary?    Pelvic floor dysfunction Yes     Physician: Maura Gunter MD    Visit Date: 7/13/2022    Physician Orders: PT Eval and Treat    Medical Diagnosis from Referral: Urinary urgency [R39.15], Urinary frequency [R35.0]  Evaluation Date: 5/13/2022  Authorization Period Expiration: 12/31/2022  Plan of Care Expiration: 8/13/2022  Visit # / Visits authorized: 4/100  Cancelled Visits: 0  No Show Visits: 0    Time In: 1:03  Time Out:   Total Billable Time:  minutes    Precautions: Standard    Subjective     · Pt reports:Frequency of urination:   Daytime: every  minutes                                Nighttime: 3 times per night  · Difficulty initiating urine stream: No  · Urine stream: WNL  · Complete emptying: Yes  · Bladder leakage: not anitha leakage  · Frequency of incidents: stream starts right when he gets to the toilet  · Urinary Urgency: Yes but can perform urge control strategies and can put off voiding for 30 minutes.    · Frequency of bowel movements: once a day  · Difficulty initiating BM: No  · Quality/Shape of BM: Warren Stool Chart 4  · Does Patient Feel Empty after BM? Yes  · Fiber Supplements or Laxative Use? Yes; 2 Colace per day    Still working on eliminating bladder irritants.  Feels like he has more control over urinary urge.    He was compliant with home exercise program.  Response to previous treatment: no adverse effects  Functional change: no leakage; improved control of urinary urge    Pain: 0/10    Objective     Shannan participated in neuromuscular re-education activities to develop Coordination and control for 30 minutes including: Kegels in standing with assist of SEMG.    We worked in standing with external lead wires.  He demonstrated normal baseline resting, improved WR rise, and good holding in 10 sec Kegels, with no cues to  refrain from breath holding.  Derecruitment was complete and timely.    We reviewed urge control strategies, and the importance of eccentric PFM lengthening for complete bladder emptying. He was encouraged to follow up with Dr. Gunter as indicated.       Home Exercises Provided and Patient Education Provided     Education provided:   - bladder irritants, posture/body mechanices and urge control strategies  Discussed progression of plan of care with patient; educated pt in activity modification; reviewed HEP with pt. Pt demonstrated and verbalized understanding of all instruction and was provided with a handout of HEP (see Patient Instructions).    Written Home Exercises Provided: yes.  Exercises were reviewed and Shannan was able to demonstrate them prior to the end of the session.  Shannan demonstrated good  understanding of the education provided.     See EMR under Patient Instructions for exercises provided 7/13/2022.    Assessment     Pt now controlling urge with improve PFM strength, and better control of bladder irritants in his diet.  No further need for PT services at this time.      Goals: 12 weeks   Pt will verbalize improved awareness of PFM activity as palpated by PT in order to improve activity involvement with HEP.  Pt will report improved ability to manage bladder spasms appropriately 100% of the time for an improvement in urinary frequency/urgency.   Pt will report improved ability to sleep uninterrupted by excessive nocturia 5/7 days per week.   Pt will maintain a voiding interval of > or = 3 hours for improved activity tolerance (ie. prolonged driving, work meeting, watching a movie).   Pt will be independent with double voiding techniques 100% of the time to ensure full bladder emptying and decrease pt's risk of infection.   Pt/family will be independent with HEP for continued self-management of symptoms.  ALL MET    Plan     D/c PT    Carmina Frias, PT, BCB-PMD

## 2022-07-13 NOTE — PATIENT INSTRUCTIONS
"Maintenance:    1 set of 10 Kegels, holding 10 sec, letting go 10 sec.      Remember Urge Control Strategies:  mainly "quick flicks" to control your bladder's overactivity.      Limit bladder irritants as much as possible, but still live your life.      Remember to take an extra few seconds before leaving the bathroom- complete bladder emptying is important!    "

## 2022-08-25 ENCOUNTER — PES CALL (OUTPATIENT)
Dept: ADMINISTRATIVE | Facility: OTHER | Age: 77
End: 2022-08-25
Payer: MEDICARE

## 2022-09-04 ENCOUNTER — PATIENT MESSAGE (OUTPATIENT)
Dept: RHEUMATOLOGY | Facility: CLINIC | Age: 77
End: 2022-09-04
Payer: MEDICARE

## 2022-09-04 DIAGNOSIS — M35.3 POLYMYALGIA RHEUMATICA: Primary | ICD-10-CM

## 2022-09-04 RX ORDER — PREDNISONE 5 MG/1
10 TABLET ORAL 2 TIMES DAILY
Qty: 180 TABLET | Refills: 0 | Status: SHIPPED | OUTPATIENT
Start: 2022-09-04 | End: 2022-11-15 | Stop reason: SDUPTHER

## 2022-09-15 ENCOUNTER — CLINICAL SUPPORT (OUTPATIENT)
Dept: CARDIOLOGY | Facility: HOSPITAL | Age: 77
End: 2022-09-15
Payer: MEDICARE

## 2022-09-15 DIAGNOSIS — I48.91 UNSPECIFIED ATRIAL FIBRILLATION: ICD-10-CM

## 2022-09-15 DIAGNOSIS — I49.5 SICK SINUS SYNDROME: ICD-10-CM

## 2022-09-15 DIAGNOSIS — Z95.0 PRESENCE OF CARDIAC PACEMAKER: ICD-10-CM

## 2022-09-15 PROCEDURE — 93296 REM INTERROG EVL PM/IDS: CPT | Performed by: INTERNAL MEDICINE

## 2022-09-25 ENCOUNTER — PATIENT MESSAGE (OUTPATIENT)
Dept: DERMATOLOGY | Facility: CLINIC | Age: 77
End: 2022-09-25
Payer: MEDICARE

## 2022-09-26 ENCOUNTER — OFFICE VISIT (OUTPATIENT)
Dept: DERMATOLOGY | Facility: CLINIC | Age: 77
End: 2022-09-26
Payer: MEDICARE

## 2022-09-26 DIAGNOSIS — L82.1 SEBORRHEIC KERATOSES: ICD-10-CM

## 2022-09-26 DIAGNOSIS — D22.9 MULTIPLE BENIGN NEVI: ICD-10-CM

## 2022-09-26 DIAGNOSIS — Z85.820 HISTORY OF MELANOMA: ICD-10-CM

## 2022-09-26 DIAGNOSIS — Z12.83 SCREENING EXAM FOR SKIN CANCER: ICD-10-CM

## 2022-09-26 DIAGNOSIS — L57.0 AK (ACTINIC KERATOSIS): ICD-10-CM

## 2022-09-26 DIAGNOSIS — D18.01 CHERRY ANGIOMA: ICD-10-CM

## 2022-09-26 DIAGNOSIS — L81.4 LENTIGO: ICD-10-CM

## 2022-09-26 DIAGNOSIS — L90.5 SCAR: ICD-10-CM

## 2022-09-26 DIAGNOSIS — D48.5 NEOPLASM OF UNCERTAIN BEHAVIOR OF SKIN: Primary | ICD-10-CM

## 2022-09-26 PROCEDURE — 17000 DESTRUCT PREMALG LESION: CPT | Mod: 59,S$GLB,, | Performed by: DERMATOLOGY

## 2022-09-26 PROCEDURE — 3288F FALL RISK ASSESSMENT DOCD: CPT | Mod: CPTII,S$GLB,, | Performed by: DERMATOLOGY

## 2022-09-26 PROCEDURE — 17003 DESTRUCTION, PREMALIGNANT LESIONS; SECOND THROUGH 14 LESIONS: ICD-10-PCS | Mod: 59,S$GLB,, | Performed by: DERMATOLOGY

## 2022-09-26 PROCEDURE — 99999 PR PBB SHADOW E&M-EST. PATIENT-LVL III: CPT | Mod: PBBFAC,,, | Performed by: DERMATOLOGY

## 2022-09-26 PROCEDURE — 99213 OFFICE O/P EST LOW 20 MIN: CPT | Mod: 25,S$GLB,, | Performed by: DERMATOLOGY

## 2022-09-26 PROCEDURE — 88305 TISSUE EXAM BY PATHOLOGIST: CPT | Performed by: DERMATOLOGY

## 2022-09-26 PROCEDURE — 1126F AMNT PAIN NOTED NONE PRSNT: CPT | Mod: CPTII,S$GLB,, | Performed by: DERMATOLOGY

## 2022-09-26 PROCEDURE — 1159F MED LIST DOCD IN RCRD: CPT | Mod: CPTII,S$GLB,, | Performed by: DERMATOLOGY

## 2022-09-26 PROCEDURE — 11102 PR TANGENTIAL BIOPSY, SKIN, SINGLE LESION: ICD-10-PCS | Mod: S$GLB,,, | Performed by: DERMATOLOGY

## 2022-09-26 PROCEDURE — 11102 TANGNTL BX SKIN SINGLE LES: CPT | Mod: S$GLB,,, | Performed by: DERMATOLOGY

## 2022-09-26 PROCEDURE — 1126F PR PAIN SEVERITY QUANTIFIED, NO PAIN PRESENT: ICD-10-PCS | Mod: CPTII,S$GLB,, | Performed by: DERMATOLOGY

## 2022-09-26 PROCEDURE — 88305 TISSUE EXAM BY PATHOLOGIST: CPT | Mod: 26,,, | Performed by: DERMATOLOGY

## 2022-09-26 PROCEDURE — 99999 PR PBB SHADOW E&M-EST. PATIENT-LVL III: ICD-10-PCS | Mod: PBBFAC,,, | Performed by: DERMATOLOGY

## 2022-09-26 PROCEDURE — 99213 PR OFFICE/OUTPT VISIT, EST, LEVL III, 20-29 MIN: ICD-10-PCS | Mod: 25,S$GLB,, | Performed by: DERMATOLOGY

## 2022-09-26 PROCEDURE — 1101F PT FALLS ASSESS-DOCD LE1/YR: CPT | Mod: CPTII,S$GLB,, | Performed by: DERMATOLOGY

## 2022-09-26 PROCEDURE — 3072F PR LOW RISK FOR RETINOPATHY: ICD-10-PCS | Mod: CPTII,S$GLB,, | Performed by: DERMATOLOGY

## 2022-09-26 PROCEDURE — 88305 TISSUE EXAM BY PATHOLOGIST: ICD-10-PCS | Mod: 26,,, | Performed by: DERMATOLOGY

## 2022-09-26 PROCEDURE — 17000 PR DESTRUCTION(LASER SURGERY,CRYOSURGERY,CHEMOSURGERY),PREMALIGNANT LESIONS,FIRST LESION: ICD-10-PCS | Mod: 59,S$GLB,, | Performed by: DERMATOLOGY

## 2022-09-26 PROCEDURE — 17003 DESTRUCT PREMALG LES 2-14: CPT | Mod: 59,S$GLB,, | Performed by: DERMATOLOGY

## 2022-09-26 PROCEDURE — 1101F PR PT FALLS ASSESS DOC 0-1 FALLS W/OUT INJ PAST YR: ICD-10-PCS | Mod: CPTII,S$GLB,, | Performed by: DERMATOLOGY

## 2022-09-26 PROCEDURE — 3288F PR FALLS RISK ASSESSMENT DOCUMENTED: ICD-10-PCS | Mod: CPTII,S$GLB,, | Performed by: DERMATOLOGY

## 2022-09-26 PROCEDURE — 3072F LOW RISK FOR RETINOPATHY: CPT | Mod: CPTII,S$GLB,, | Performed by: DERMATOLOGY

## 2022-09-26 PROCEDURE — 1159F PR MEDICATION LIST DOCUMENTED IN MEDICAL RECORD: ICD-10-PCS | Mod: CPTII,S$GLB,, | Performed by: DERMATOLOGY

## 2022-09-26 NOTE — PATIENT INSTRUCTIONS
CRYOSURGERY      Your doctor has used a method called cryosurgery to treat your skin condition. Cryosurgery refers to the use of very cold substances to treat a variety of skin conditions such as warts, pre-skin cancers, molluscum contagiosum, sun spots, and several benign growths. The substance we use in cryosurgery is liquid nitrogen and is so cold (-195 degrees Celsius) that is burns when administered.     Following treatment in the office, the skin may immediately burn and become red. You may find the area around the lesion is affected as well. It is sometimes necessary to treat not only the lesion, but a small area of the surrounding normal skin to achieve a good response.     A blister, and even a blood filled blister, may form after treatment.   This is a normal response. If the blister is painful, it is acceptable to sterilize a needle and with rubbing alcohol and gently pop the blister. It is important that you gently wash the area with soap and warm water as the blister fluid may contain wart virus if a wart was treated. Do no remove the roof of the blister.     The area treated can take anywhere from 1-3 weeks to heal. Healing time depends on the kind skin lesion treated, the location, and how aggressively the lesion was treated. It is recommended that the areas treated are covered with Vaseline or bacitracin ointment and a band-aid. If a band-aid is not practical, just ointment applied several times per day will do. Keeping these areas moist will speed the healing time.    Treatment with liquid nitrogen can leave a scar. In dark skin, it may be a light or dark scar, in light skin it may be a white or pink scar. These will generally fade with time, but may never go away completely.     If you have any concerns after your treatment, please feel free to call the office.       [address] [facility] [address]/ [phone] FAX/ [email or web address]  Shave Biopsy Wound Care    Your  doctor has performed a shave biopsy today.  A band aid and vaseline ointment has been placed over the site.  This should remain in place for NO LONGER THAN 48 hours.  It is fine to remove the bandaid after 24 hours, if the area is no longer bleeding. It is recommended that you keep the area dry (do not wet)) for the first 24 hours.  After 24 hours, wash the area with warm soap and water and apply Vaseline jelly.  Many patients prefer to use Neosporin or Bacitracin ointment.  This is acceptable; however, know that you can develop an allergy to this medication even if you have used it safely for years.  It is important to keep the area moist.  Letting it dry out and get air slows healing time, and will worsen the scar.        If you notice increasing redness, tenderness, pain, or yellow drainage at the biopsy site, please notify your doctor.  These are signs of an infection.    If your biopsy site is bleeding, apply firm pressure for 15 minutes straight.  Repeat for another 15 minutes, if it is still bleeding.   If the surgical site continues to bleed, then please contact your doctor.      For [facility] users:   You will receive your biopsy results in [facility] as soon as they are available. Please be assured that your physician/provider will review your results and will then determine what further treatment, evaluation, or planning is required. You should be contacted by your physician's/provider's office within 5 business days of receiving your results; If not, please reach out to directly. This is one more way [facility] is putting you first.     [facility] [place] [address]/ [phone] FAX/ [email or web address]

## 2022-09-26 NOTE — PROGRESS NOTES
Subjective:       Patient ID:  Shannan Norman is a 77 y.o. male who presents for   Chief Complaint   Patient presents with    Skin Check     TBSE     History of Present Illness: The patient presents for follow up of skin check.    The patient was last seen on: 09/23/2021 for cryosurgery to actinic keratoses which have resolved.   Hx -melanoma  Other skin complaints: n/a      Review of Systems   Constitutional:  Negative for fever, chills and fatigue.   Skin:  Positive for wears hat. Negative for daily sunscreen use and recent sunburn.   Hematologic/Lymphatic: Bruises/bleeds easily.      Objective:    Physical Exam   Constitutional: He appears well-developed and well-nourished. No distress.   Genitourinary:         Lymphadenopathy:     He has no cervical adenopathy. No inguinal adenopathy noted on the right or left side.        Right: No supraclavicular adenopathy present.        Left: No supraclavicular adenopathy present.   Neurological: He is alert and oriented to person, place, and time. He is not disoriented.   Psychiatric: He has a normal mood and affect.   Skin:   Areas Examined (abnormalities noted in diagram):   Scalp / Hair Palpated and Inspected  Head / Face Inspection Performed  Neck Inspection Performed  Chest / Axilla Inspection Performed  Abdomen Inspection Performed  Genitals / Buttocks / Groin Inspection Performed  Back Inspection Performed  RUE Inspected  LUE Inspection Performed  RLE Inspected  LLE Inspection Performed  Nails and Digits Inspection Performed  Gland Inspection Performed                 Diagram Legend     Erythematous scaling macule/papule c/w actinic keratosis       Vascular papule c/w angioma      Pigmented verrucoid papule/plaque c/w seborrheic keratosis      Yellow umbilicated papule c/w sebaceous hyperplasia      Irregularly shaped tan macule c/w lentigo     1-2 mm smooth white papules consistent with Milia      Movable subcutaneous cyst with punctum c/w epidermal inclusion  cyst      Subcutaneous movable cyst c/w pilar cyst      Firm pink to brown papule c/w dermatofibroma      Pedunculated fleshy papule(s) c/w skin tag(s)      Evenly pigmented macule c/w junctional nevus     Mildly variegated pigmented, slightly irregular-bordered macule c/w mildly atypical nevus      Flesh colored to evenly pigmented papule c/w intradermal nevus       Pink pearly papule/plaque c/w basal cell carcinoma      Erythematous hyperkeratotic cursted plaque c/w SCC      Surgical scar with no sign of skin cancer recurrence      Open and closed comedones      Inflammatory papules and pustules      Verrucoid papule consistent consistent with wart     Erythematous eczematous patches and plaques     Dystrophic onycholytic nail with subungual debris c/w onychomycosis     Umbilicated papule    Erythematous-base heme-crusted tan verrucoid plaque consistent with inflamed seborrheic keratosis     Erythematous Silvery Scaling Plaque c/w Psoriasis     See annotation        Assessment / Plan:      Pathology Orders:       Normal Orders This Visit    Specimen to Pathology, Dermatology     Questions:    Procedure Type: Dermatology and skin neoplasms    Number of Specimens: 1    ------------------------: -------------------------    Spec 1 Procedure: Biopsy    Spec 1 Clinical Impression: r/o bcc    Spec 1 Source: left anterior lower leg    Release to patient: Immediate          Neoplasm of uncertain behavior of skin  -     Specimen to Pathology, Dermatology  Shave biopsy procedure note:    Shave biopsy performed after verbal consent including risk of infection, scar, recurrence, need for additional treatment of site. Area prepped with alcohol, anesthetized with approximately 1.0cc of 1% lidocaine with epinephrine. Lesional tissue shaved with razor blade. Hemostasis achieved with application of aluminum chloride followed by hyfrecation. No complications. Dressing applied. Wound care explained.    Scar  Previous scar examined  without signs of reoccurrence of malignancy. Continue to monitor.     Cherry angioma  This is a benign vascular lesion. Reassurance given. No treatment required.     Seborrheic keratoses  These are benign inherited growths without a malignant potential. Reassurance given to patient. No treatment is necessary.     Multiple benign nevi  TBSE body skin examination performed today including at least 12 points as noted in physical examination. No lesions suspicious for malignancy noted.  Reassurance provided.  Instructed patient to observe lesion(s) for changes and follow up in clinic if changes are noted. Discussed ABCDE's of moles and brochure provided.    Screening exam for skin cancer  Patient instructed in importance in daily sun protection of at least spf 30. Sun avoidance and topical protection discussed.     Recommend  for daily use on face and neck.    Patient encouraged to wear hat for all outdoor exposure.     Also discussed sun protective clothing. [employer] or [employer] are good brands, UPF 50 or above. Recommend long sleeves when out in the sun.     AK (actinic keratosis)  Cryosurgery Procedure Note    Verbal consent from the patient is obtained including, but not limited to, risk of hypopigmentation/hyperpigmentation, scar, recurrence of lesion. The patient is aware of the precancerous quality and need for treatment of these lesions. Liquid nitrogen cryosurgery is applied to the 4 actinic keratoses, as detailed in the physical exam, to produce a freeze injury. The patient is aware that blisters may form and is instructed on wound care with gentle cleansing and use of vaseline ointment to keep moist until healed. The patient is supplied a handout on cryosurgery and is instructed to call if lesions do not completely resolve.    Lentigo  This is a benign hyperpigmented sun induced lesion. Recommend daily sun protection/avoidance and use of at least SPF 30, broad spectrum sunscreen (OTC drug) will reduce the number  of new lesions. Treatment of these benign lesions are considered cosmetic.    History of melanoma  - to right shoulder s/p excision x 2 years  Previous scar examined without signs of reoccurrence of malignancy. Continue to monitor.        F/u 1 year TBSE    No follow-ups on file.

## 2022-09-27 ENCOUNTER — APPOINTMENT (OUTPATIENT)
Dept: RADIOLOGY | Facility: CLINIC | Age: 77
End: 2022-09-27
Attending: INTERNAL MEDICINE
Payer: MEDICARE

## 2022-09-27 DIAGNOSIS — Z79.52 LONG TERM (CURRENT) USE OF SYSTEMIC STEROIDS: ICD-10-CM

## 2022-09-27 PROCEDURE — 77080 DEXA BONE DENSITY SPINE HIP: ICD-10-PCS | Mod: 26,,, | Performed by: INTERNAL MEDICINE

## 2022-09-27 PROCEDURE — 77080 DXA BONE DENSITY AXIAL: CPT | Mod: TC,PO

## 2022-09-27 PROCEDURE — 77080 DXA BONE DENSITY AXIAL: CPT | Mod: 26,,, | Performed by: INTERNAL MEDICINE

## 2022-09-30 LAB
FINAL PATHOLOGIC DIAGNOSIS: NORMAL
GROSS: NORMAL
Lab: NORMAL
MICROSCOPIC EXAM: NORMAL

## 2022-10-03 ENCOUNTER — LAB VISIT (OUTPATIENT)
Dept: LAB | Facility: HOSPITAL | Age: 77
End: 2022-10-03
Attending: FAMILY MEDICINE
Payer: MEDICARE

## 2022-10-03 ENCOUNTER — OFFICE VISIT (OUTPATIENT)
Dept: FAMILY MEDICINE | Facility: CLINIC | Age: 77
End: 2022-10-03
Payer: MEDICARE

## 2022-10-03 VITALS
SYSTOLIC BLOOD PRESSURE: 120 MMHG | DIASTOLIC BLOOD PRESSURE: 66 MMHG | HEART RATE: 60 BPM | HEIGHT: 71 IN | WEIGHT: 203.94 LBS | BODY MASS INDEX: 28.55 KG/M2 | OXYGEN SATURATION: 97 %

## 2022-10-03 DIAGNOSIS — N18.31 TYPE 2 DIABETES MELLITUS WITH STAGE 3A CHRONIC KIDNEY DISEASE, WITHOUT LONG-TERM CURRENT USE OF INSULIN: ICD-10-CM

## 2022-10-03 DIAGNOSIS — E11.9 TYPE 2 DIABETES MELLITUS WITHOUT RETINOPATHY: ICD-10-CM

## 2022-10-03 DIAGNOSIS — I10 PRIMARY HYPERTENSION: Primary | ICD-10-CM

## 2022-10-03 DIAGNOSIS — E11.22 TYPE 2 DIABETES MELLITUS WITH STAGE 3A CHRONIC KIDNEY DISEASE, WITHOUT LONG-TERM CURRENT USE OF INSULIN: ICD-10-CM

## 2022-10-03 DIAGNOSIS — I10 PRIMARY HYPERTENSION: ICD-10-CM

## 2022-10-03 LAB
ALBUMIN SERPL BCP-MCNC: 4 G/DL (ref 3.5–5.2)
ALBUMIN/CREAT UR: 166.7 UG/MG (ref 0–30)
ALP SERPL-CCNC: 77 U/L (ref 55–135)
ALT SERPL W/O P-5'-P-CCNC: 15 U/L (ref 10–44)
ANION GAP SERPL CALC-SCNC: 8 MMOL/L (ref 8–16)
AST SERPL-CCNC: 13 U/L (ref 10–40)
BILIRUB SERPL-MCNC: 0.6 MG/DL (ref 0.1–1)
BUN SERPL-MCNC: 22 MG/DL (ref 8–23)
CALCIUM SERPL-MCNC: 9.3 MG/DL (ref 8.7–10.5)
CHLORIDE SERPL-SCNC: 107 MMOL/L (ref 95–110)
CHOLEST SERPL-MCNC: 136 MG/DL (ref 120–199)
CHOLEST/HDLC SERPL: 2.1 {RATIO} (ref 2–5)
CO2 SERPL-SCNC: 24 MMOL/L (ref 23–29)
CREAT SERPL-MCNC: 1.1 MG/DL (ref 0.5–1.4)
CREAT UR-MCNC: 69 MG/DL (ref 23–375)
EST. GFR  (NO RACE VARIABLE): >60 ML/MIN/1.73 M^2
ESTIMATED AVG GLUCOSE: 131 MG/DL (ref 68–131)
GLUCOSE SERPL-MCNC: 118 MG/DL (ref 70–110)
HBA1C MFR BLD: 6.2 % (ref 4–5.6)
HDLC SERPL-MCNC: 66 MG/DL (ref 40–75)
HDLC SERPL: 48.5 % (ref 20–50)
LDLC SERPL CALC-MCNC: 62.2 MG/DL (ref 63–159)
MICROALBUMIN UR DL<=1MG/L-MCNC: 115 UG/ML
NONHDLC SERPL-MCNC: 70 MG/DL
POTASSIUM SERPL-SCNC: 4.6 MMOL/L (ref 3.5–5.1)
PROT SERPL-MCNC: 6.1 G/DL (ref 6–8.4)
SODIUM SERPL-SCNC: 139 MMOL/L (ref 136–145)
TRIGL SERPL-MCNC: 39 MG/DL (ref 30–150)
TSH SERPL DL<=0.005 MIU/L-ACNC: 1.38 UIU/ML (ref 0.4–4)

## 2022-10-03 PROCEDURE — 3078F DIAST BP <80 MM HG: CPT | Mod: CPTII,S$GLB,, | Performed by: FAMILY MEDICINE

## 2022-10-03 PROCEDURE — 1126F PR PAIN SEVERITY QUANTIFIED, NO PAIN PRESENT: ICD-10-PCS | Mod: CPTII,S$GLB,, | Performed by: FAMILY MEDICINE

## 2022-10-03 PROCEDURE — 80061 LIPID PANEL: CPT | Performed by: FAMILY MEDICINE

## 2022-10-03 PROCEDURE — 99999 PR PBB SHADOW E&M-EST. PATIENT-LVL IV: CPT | Mod: PBBFAC,,, | Performed by: FAMILY MEDICINE

## 2022-10-03 PROCEDURE — 1159F MED LIST DOCD IN RCRD: CPT | Mod: CPTII,S$GLB,, | Performed by: FAMILY MEDICINE

## 2022-10-03 PROCEDURE — 36415 COLL VENOUS BLD VENIPUNCTURE: CPT | Mod: PO | Performed by: FAMILY MEDICINE

## 2022-10-03 PROCEDURE — 1159F PR MEDICATION LIST DOCUMENTED IN MEDICAL RECORD: ICD-10-PCS | Mod: CPTII,S$GLB,, | Performed by: FAMILY MEDICINE

## 2022-10-03 PROCEDURE — 3288F FALL RISK ASSESSMENT DOCD: CPT | Mod: CPTII,S$GLB,, | Performed by: FAMILY MEDICINE

## 2022-10-03 PROCEDURE — 1160F PR REVIEW ALL MEDS BY PRESCRIBER/CLIN PHARMACIST DOCUMENTED: ICD-10-PCS | Mod: CPTII,S$GLB,, | Performed by: FAMILY MEDICINE

## 2022-10-03 PROCEDURE — 3074F PR MOST RECENT SYSTOLIC BLOOD PRESSURE < 130 MM HG: ICD-10-PCS | Mod: CPTII,S$GLB,, | Performed by: FAMILY MEDICINE

## 2022-10-03 PROCEDURE — 1101F PR PT FALLS ASSESS DOC 0-1 FALLS W/OUT INJ PAST YR: ICD-10-PCS | Mod: CPTII,S$GLB,, | Performed by: FAMILY MEDICINE

## 2022-10-03 PROCEDURE — 84443 ASSAY THYROID STIM HORMONE: CPT | Performed by: FAMILY MEDICINE

## 2022-10-03 PROCEDURE — 82570 ASSAY OF URINE CREATININE: CPT | Performed by: FAMILY MEDICINE

## 2022-10-03 PROCEDURE — 99999 PR PBB SHADOW E&M-EST. PATIENT-LVL IV: ICD-10-PCS | Mod: PBBFAC,,, | Performed by: FAMILY MEDICINE

## 2022-10-03 PROCEDURE — 80053 COMPREHEN METABOLIC PANEL: CPT | Performed by: FAMILY MEDICINE

## 2022-10-03 PROCEDURE — 3072F LOW RISK FOR RETINOPATHY: CPT | Mod: CPTII,S$GLB,, | Performed by: FAMILY MEDICINE

## 2022-10-03 PROCEDURE — 3078F PR MOST RECENT DIASTOLIC BLOOD PRESSURE < 80 MM HG: ICD-10-PCS | Mod: CPTII,S$GLB,, | Performed by: FAMILY MEDICINE

## 2022-10-03 PROCEDURE — 1101F PT FALLS ASSESS-DOCD LE1/YR: CPT | Mod: CPTII,S$GLB,, | Performed by: FAMILY MEDICINE

## 2022-10-03 PROCEDURE — 82043 UR ALBUMIN QUANTITATIVE: CPT | Performed by: FAMILY MEDICINE

## 2022-10-03 PROCEDURE — 83036 HEMOGLOBIN GLYCOSYLATED A1C: CPT | Performed by: FAMILY MEDICINE

## 2022-10-03 PROCEDURE — 3074F SYST BP LT 130 MM HG: CPT | Mod: CPTII,S$GLB,, | Performed by: FAMILY MEDICINE

## 2022-10-03 PROCEDURE — 1160F RVW MEDS BY RX/DR IN RCRD: CPT | Mod: CPTII,S$GLB,, | Performed by: FAMILY MEDICINE

## 2022-10-03 PROCEDURE — 99215 PR OFFICE/OUTPT VISIT, EST, LEVL V, 40-54 MIN: ICD-10-PCS | Mod: S$GLB,,, | Performed by: FAMILY MEDICINE

## 2022-10-03 PROCEDURE — 3072F PR LOW RISK FOR RETINOPATHY: ICD-10-PCS | Mod: CPTII,S$GLB,, | Performed by: FAMILY MEDICINE

## 2022-10-03 PROCEDURE — 99215 OFFICE O/P EST HI 40 MIN: CPT | Mod: S$GLB,,, | Performed by: FAMILY MEDICINE

## 2022-10-03 PROCEDURE — 3288F PR FALLS RISK ASSESSMENT DOCUMENTED: ICD-10-PCS | Mod: CPTII,S$GLB,, | Performed by: FAMILY MEDICINE

## 2022-10-03 PROCEDURE — 1126F AMNT PAIN NOTED NONE PRSNT: CPT | Mod: CPTII,S$GLB,, | Performed by: FAMILY MEDICINE

## 2022-10-03 NOTE — PROGRESS NOTES
Subjective:       Patient ID: Shannan Norman is a 77 y.o. male.    Chief Complaint: Follow-up    77-year-old male came to the for blood pressure check , blood pressure today was stable.  No recent A1c.  No Polyuria, polydipsia or polyphagia.  Patient with decreased function was stable in comparison with previous reports.    Follow-up  Pertinent negatives include no chest pain.   Review of Systems   Constitutional: Negative.    HENT: Negative.     Eyes: Negative.    Respiratory: Negative.     Cardiovascular: Negative.  Negative for chest pain, palpitations, leg swelling and claudication.   Gastrointestinal: Negative.    Endocrine: Negative for polydipsia, polyphagia and polyuria.   Genitourinary: Negative.    Musculoskeletal: Negative.    Integumentary:  Negative.   Neurological: Negative.    Psychiatric/Behavioral: Negative.         Objective:      Physical Exam  Vitals and nursing note reviewed.   Constitutional:       General: He is not in acute distress.     Appearance: He is well-developed. He is not diaphoretic.   HENT:      Head: Normocephalic and atraumatic.      Right Ear: External ear normal.      Left Ear: External ear normal.      Nose: Nose normal.      Mouth/Throat:      Pharynx: No oropharyngeal exudate.   Eyes:      General: No scleral icterus.        Right eye: No discharge.         Left eye: No discharge.      Conjunctiva/sclera: Conjunctivae normal.      Pupils: Pupils are equal, round, and reactive to light.   Neck:      Thyroid: No thyromegaly.      Vascular: No JVD.      Trachea: No tracheal deviation.   Cardiovascular:      Rate and Rhythm: Normal rate and regular rhythm.      Heart sounds: Normal heart sounds. No murmur heard.    No friction rub. No gallop.   Pulmonary:      Effort: Pulmonary effort is normal. No respiratory distress.      Breath sounds: Normal breath sounds. No stridor. No wheezing or rales.   Chest:      Chest wall: No tenderness.   Abdominal:      General: Bowel sounds are  normal. There is no distension.      Palpations: Abdomen is soft. There is no mass.      Tenderness: There is no abdominal tenderness. There is no guarding or rebound.   Musculoskeletal:         General: No tenderness. Normal range of motion.      Cervical back: Normal range of motion and neck supple.   Lymphadenopathy:      Cervical: No cervical adenopathy.   Skin:     General: Skin is warm and dry.      Coloration: Skin is not pale.      Findings: No erythema or rash.   Neurological:      Mental Status: He is alert and oriented to person, place, and time.      Cranial Nerves: No cranial nerve deficit.      Motor: No abnormal muscle tone.      Coordination: Coordination normal.      Deep Tendon Reflexes: Reflexes are normal and symmetric. Reflexes normal.   Psychiatric:         Behavior: Behavior normal.         Thought Content: Thought content normal.         Judgment: Judgment normal.       Assessment:       Problem List Items Addressed This Visit       HTN (hypertension) - Primary    Relevant Orders    Comprehensive Metabolic Panel    Lipid Panel    TSH    Type 2 diabetes mellitus with stage 3 chronic kidney disease, without long-term current use of insulin    Relevant Orders    Comprehensive Metabolic Panel    Hemoglobin A1C    Microalbumin/Creatinine Ratio, Urine    Type 2 diabetes mellitus without retinopathy    Relevant Orders    Comprehensive Metabolic Panel    Hemoglobin A1C         Plan:           Shannan was seen today for follow-up.    Diagnoses and all orders for this visit:    Primary hypertension  -     Comprehensive Metabolic Panel; Future  -     Lipid Panel; Future  -     TSH; Future    Type 2 diabetes mellitus with stage 3a chronic kidney disease, without long-term current use of insulin  -     Comprehensive Metabolic Panel; Future  -     Hemoglobin A1C; Future  -     Microalbumin/Creatinine Ratio, Urine; Future    Type 2 diabetes mellitus without retinopathy  -     Comprehensive Metabolic Panel;  Future  -     Hemoglobin A1C; Future

## 2022-10-07 ENCOUNTER — CARE AT HOME (OUTPATIENT)
Dept: HOME HEALTH SERVICES | Facility: CLINIC | Age: 77
End: 2022-10-07
Payer: MEDICARE

## 2022-10-07 DIAGNOSIS — Z23 FLU VACCINE NEED: Primary | ICD-10-CM

## 2022-10-07 PROCEDURE — G0008 FLU VACCINE - QUADRIVALENT - ADJUVANTED: ICD-10-PCS | Mod: S$GLB,,, | Performed by: NURSE PRACTITIONER

## 2022-10-07 PROCEDURE — G0008 ADMIN INFLUENZA VIRUS VAC: HCPCS | Mod: S$GLB,,, | Performed by: NURSE PRACTITIONER

## 2022-10-07 PROCEDURE — 90694 VACC AIIV4 NO PRSRV 0.5ML IM: CPT | Mod: S$GLB,,, | Performed by: NURSE PRACTITIONER

## 2022-10-07 PROCEDURE — 99347 PR HOME VISIT,ESTAB PATIENT,LEVEL I: ICD-10-PCS | Mod: S$GLB,,, | Performed by: NURSE PRACTITIONER

## 2022-10-07 PROCEDURE — 99347 HOME/RES VST EST SF MDM 20: CPT | Mod: S$GLB,,, | Performed by: NURSE PRACTITIONER

## 2022-10-07 PROCEDURE — 90694 FLU VACCINE - QUADRIVALENT - ADJUVANTED: ICD-10-PCS | Mod: S$GLB,,, | Performed by: NURSE PRACTITIONER

## 2022-10-07 NOTE — PROGRESS NOTES
Bone Density good enough to avoid additional medication, at least for the present.   Keep up with exercise.

## 2022-10-10 NOTE — PROGRESS NOTES
10/7/2022    Flu vaccine given to Left Deltoid    Patient consents to receiving the flu vaccine.   Patient denies having fever in last 24-48 hours  No allergies to flu vaccine or eggs  Patient is not a transplant patient    Patient given handout on the flu vaccine  Instructed patient if she has low grade fever over next 24 hours to take Tylenol as needed    Given in Left Deltoid  Time:  11:52 am   LOT#  [id number]  Exp Date: 5/18/2023  NDC: 12038-125-92    NP stayed with patient for 15 minutes after vaccine given; tolerated well    Yoanna Choudhury, HEIDY, FNP-C  [facility]  [phone]

## 2022-10-16 NOTE — PROGRESS NOTES
Subjective:     Patient ID: Shannan Norman is a 77 y.o. male w a hx c/w PMR    Chief Complaint: No chief complaint on file.       HPI     77 yr old with multiple morbidities that include gout, DM II, CKD 3, Atrial fib w sick sinus syndrome &  bilateral hip & LSpine OA we are following for PMR.  He was initially seen on 4/4/22 & again on 7/6/22.  He returns for f/u; Doing very well on prednisone 10 mg/d. No pain. Had labs but no ESR or CRP.  Exercising & doing Yoga.  On allopurinol for hx of gout.    Had recent DXA & FRAX is acceptable.          From 7/6/22:  On 2/15/22 developed stiffness; couldn't get out of bed in AM; had extreme bilateral shoulder pain; had hip pain (but denied stiffness); also really bad at night. No other joint of muscle pain except the muscles of the backs of his knees or tendons felt tight. Had lots of gelling. Lost 23 lbs. Also felt voice changed tone a bit.   Celebrex 200 mg/d helped some, but not entirely.   Prednisone 10 mg bid was started on 4/4/22 and he had an immediate & dramatic response. Everything improved.   We say him for the first time on 4/6/22 and tried to taper prednisone a bit. He was able to get down to 10 mg daily which he began ~ May 2022.  Currently does not feel as well as he did on the larger doses of prednisone. Has ~ 4 hrs of AM stiffness and has bilateral wrist pain worse in AM & improves with time..  No other joint sxs.  Denies fever, chills, recent  visual loss, diplopia, scalp tenderness, jaw or other claudication, headache or head pain.     Is also on allopurinol 100 mg/d w good control of his UA. He also is eating better. Did have a diet of rich food in the past and ETOH use in past.Denies family hx of gout; denies renal calculi; denies moonshine use.          Current Outpatient Medications   Medication Sig Dispense Refill    allopurinoL (ZYLOPRIM) 100 MG tablet Take 1 tablet (100 mg total) by mouth once daily. 90 tablet 3    ascorbic acid, vitamin C,  (VITAMIN C) 500 MG tablet Take 1,000 mg by mouth nightly.       atorvastatin (LIPITOR) 40 MG tablet Take 1 tablet (40 mg total) by mouth once daily. 90 tablet 3    benazepriL (LOTENSIN) 20 MG tablet Take 1 tablet (20 mg total) by mouth once daily. 90 tablet 3    cycloSPORINE (RESTASIS) 0.05 % ophthalmic emulsion Place 1 drop into both eyes 2 (two) times daily. 60 each 11    ferrous sulfate (FEOSOL) 325 mg (65 mg iron) Tab tablet Take 325 mg by mouth once daily.      GLUCOSAMINE HCL/CHONDR VASQUEZ A NA (GLUCOSAMINE-CHONDROITIN) 750-600 mg Tab Take 2 tablets by mouth Daily.      levocetirizine (XYZAL) 5 MG tablet Take 1 tablet (5 mg total) by mouth every evening. 90 tablet 1    multivitamin capsule Take 1 capsule by mouth nightly.       predniSONE (DELTASONE) 5 MG tablet Take 2 tablets (10 mg total) by mouth 2 (two) times daily. 180 tablet 0    rivaroxaban (XARELTO) 20 mg Tab Take 1 tablet (20 mg total) by mouth daily with dinner or evening meal. 90 tablet 3    tamsulosin (FLOMAX) 0.4 mg Cap TAKE 1 CAPSULE BY MOUTH AFTER DINNER 90 capsule 3     No current facility-administered medications for this visit.         Review of patient's allergies indicates:  No Known Allergies    Review of Systems   Constitutional: Negative.  Negative for fever and unexpected weight change.   HENT:  Negative for mouth sores and trouble swallowing.    Eyes:  Negative for redness.        Bilateral dry eyes   Respiratory: Negative.  Negative for cough and shortness of breath.    Cardiovascular: Negative.  Negative for chest pain.   Gastrointestinal: Negative.  Negative for constipation and diarrhea.   Endocrine: Negative.  Negative for cold intolerance and heat intolerance.   Genitourinary:  Positive for difficulty urinating, frequency and urgency. Negative for genital sores.   Musculoskeletal:  Negative for back pain, joint swelling and myalgias.   Skin:  Negative for rash.   Neurological:  Negative for dizziness, syncope, weakness,  light-headedness, numbness and headaches.   Hematological:  Does not bruise/bleed easily.   Psychiatric/Behavioral:  Positive for sleep disturbance. Negative for dysphoric mood.      Past Medical History:   Diagnosis Date    Allergy     Arthritis     Atrial fibrillation     Atrial flutter 2011    ablation    Basal cell carcinoma     Cancer     Cataract     CKD (chronic kidney disease) stage 3, GFR 30-59 ml/min 8/13/2019    Diabetes mellitus     Dry eye syndrome     Gout, unspecified     High cholesterol     History of shingles     Hypertension     Melanoma     Seizures        Past Surgical History:   Procedure Laterality Date    ABLATION N/A 9/11/2018    Procedure: ABLATION;  Surgeon: Hany Sanchez MD;  Location: [facility] CATH LAB;  Service: Cardiology;  Laterality: N/A;  AFL, ISABELLE, RFA, ELODIA, MAC, DM, 3 PREP    ABLATION OF DYSRHYTHMIC FOCUS      ADENOIDECTOMY      CARDIAC PACEMAKER PLACEMENT      no defib    CATARACT EXTRACTION W/  INTRAOCULAR LENS IMPLANT Right 7/28/2020    Procedure: EXTRACTION, CATARACT, WITH IOL INSERTION;  Surgeon: Andrés Morse MD;  Location: [facility] OR;  Service: Ophthalmology;  Laterality: Right;    CATARACT EXTRACTION W/  INTRAOCULAR LENS IMPLANT Left 8/11/2020    Procedure: EXTRACTION, CATARACT, WITH IOL INSERTION;  Surgeon: Andrés Morse MD;  Location: [facility] OR;  Service: Ophthalmology;  Laterality: Left;    COLONOSCOPY N/A 1/2/2019    Procedure: COLONOSCOPY Suprep;  Surgeon: Cindy Solorzano MD;  Location: [facility] ENDO;  Service: Endoscopy;  Laterality: N/A;    GANGLION CYST EXCISION      left neck    HERNIA REPAIR  2013    umbilical    TENDON REPAIR Right     wrist    TONSILLECTOMY      varicose veins      several sx  bilateral    VASECTOMY      VEIN SURGERY         Family History   Problem Relation Age of Onset    Diabetes Mother     COPD Father     Heart disease Father     Arthritis Sister     Heart attack Brother     Heart disease  "Brother     Diabetes Brother     No Known Problems Maternal Aunt     No Known Problems Maternal Uncle     No Known Problems Paternal Aunt     No Known Problems Paternal Uncle     No Known Problems Maternal Grandmother     No Known Problems Maternal Grandfather     No Known Problems Paternal Grandmother     No Known Problems Paternal Grandfather     No Known Problems Son     Cancer Sister         lymph node, breast    No Known Problems Sister     No Known Problems Son     Amblyopia Neg Hx     Blindness Neg Hx     Cataracts Neg Hx     Glaucoma Neg Hx     Hypertension Neg Hx     Macular degeneration Neg Hx     Retinal detachment Neg Hx     Strabismus Neg Hx     Stroke Neg Hx     Thyroid disease Neg Hx     Prostate cancer Neg Hx     Kidney disease Neg Hx     Melanoma Neg Hx        Social History     Tobacco Use    Smoking status: Never    Smokeless tobacco: Never   Substance Use Topics    Alcohol use: Yes     Alcohol/week: 7.0 - 14.0 standard drinks     Types: 7 - 14 Glasses of wine per week    Drug use: No   Retired. Administrative business for [employer]  Wife malina Houser. He's taking care of her; He does the cooking.     Objective:       /73   Pulse 60   Ht 5' 11" (1.803 m)   Wt 94.4 kg (208 lb 1.8 oz)   BMI 29.03 kg/m²     Physical Exam   Constitutional: He is oriented to person, place, and time. No distress.   HENT:   Head: Normocephalic and atraumatic.   Mouth/Throat: Oropharynx is clear and moist. No oropharyngeal exudate.   No parotidomegaly;   Temporal arteries with good pulsations.   No temporal artery tenderness;   No scalp tenderness.  No oral ulcers;   Eyes: Pupils are equal, round, and reactive to light. Conjunctivae are normal. No scleral icterus.   Neck: No JVD present. No tracheal deviation present. No thyromegaly present.   Cardiovascular: Normal rate, regular rhythm and normal heart sounds. Exam reveals no gallop and no friction rub.   No murmur " heard.  Pulmonary/Chest: Effort normal and breath sounds normal. No respiratory distress. He has no wheezes. He has no rales. He exhibits no tenderness.   Pulmonary Comments: Bibasilar rales cleared with deep breathing.   Abdominal: Soft. Bowel sounds are normal. He exhibits no distension and no mass. There is no splenomegaly or hepatomegaly. There is no abdominal tenderness. There is no rebound and no guarding.   Musculoskeletal:         General: No tenderness.      Right lower leg: Edema present.      Left lower leg: Edema present.      Comments: No synovitis anywhere.  Shoulders w FROM.  Exam of wrists w bilateral decrease in flexion and extension; No TTP; no visible or palpable SHT.   Lymphadenopathy:     He has no cervical adenopathy.   Neurological: He is alert and oriented to person, place, and time. He has normal reflexes. No cranial nerve deficit.   Motor strength: 5/5 prox & distal.   Skin: Skin is warm and dry. No rash noted.   Bilateral impressive venous varicosities lower extremities  Thickened toe nails c/w onychomycosis   Psychiatric: His behavior is normal. Mood, memory, affect and judgment normal.   Vitals reviewed.      7/7/22: ESR 15 (23); CRP 15.2; CBC ok; CMP glu 126;   5/12/22: ESR: 15 (23); CRP 0.8; CBC ok; CMP glu 128  4/13/22: ESR 20 (23);UA 5.4  3/31/22: CPK 47;   3/30/22: ESR 87 (23); CRP 42.5; CBC Hg 12.7; CMP BUN 24; glu 135; Alb 2.8; RF neg;  UA 5.9;       3/31/22: LSpine: personally viewed: dextroconvex Lspine; DDD verona L3-4; lower facet arthropathy; gr 1 anterolisthesis L4-5; vascular calcifications.  2/23/22: Bilateral hips: personally viewed: bilateral OA; also read as impingement.   3/15/21: Bilateral knees: personally viewed: bowen mjsn; mild varus bowen  8/26/19: L wrist: personally viewed: mild OA;   Assessment:   PMR   Shoulder/hip pain/stiffness--No GCA signs/sxs   Elevated inflammatory parameters:     ESR 87 (23); CRP 42.5; Hg 12.7; alb 2.8;    Celebrex somewhat  helpful.   Dramatic response to prednisone 10 mg bid begun 4/4/22   Last w wrist pain/stiffness improving as day progresses    Doubt sero neg RA--but always in differential    On prednisone 10 mg/d.    Gout by hx   Podagra by hx--several episodes.    Knee swelling   Hyperuricemia maxed 7.9; last 5.4;    Some ETOH; recently stopped   On allopurinol 100 mg/d since ~ 2 yrs    OA   Hips (w ZEYAD); knees; Lspine   L wrist minimal     CKD 2-3    DM II    HTN    HLD    Cardiac issues: SSS; cardiac arrythmia      Plan:   Patient does not mind getting ESR & CRP today.  Will repeat labs in 2 months.  Will try to decrease prednisone further.   Try 7.5 mg of prednisone every day, but if you feel you are too symptomatic, take 10 mg alternating with 7.5 mg  x 2 weeks.  Stay on 7.5 mg/day for ~ 4 weeks & then go to 7.5 mg alternating with 5 mg/d x 2 weeks then 5 mg/day thereafter.  RTC 3 months or prn

## 2022-10-18 ENCOUNTER — LAB VISIT (OUTPATIENT)
Dept: LAB | Facility: HOSPITAL | Age: 77
End: 2022-10-18
Attending: INTERNAL MEDICINE
Payer: MEDICARE

## 2022-10-18 ENCOUNTER — OFFICE VISIT (OUTPATIENT)
Dept: RHEUMATOLOGY | Facility: CLINIC | Age: 77
End: 2022-10-18
Payer: MEDICARE

## 2022-10-18 VITALS
HEART RATE: 60 BPM | SYSTOLIC BLOOD PRESSURE: 120 MMHG | WEIGHT: 208.13 LBS | DIASTOLIC BLOOD PRESSURE: 73 MMHG | HEIGHT: 71 IN | BODY MASS INDEX: 29.14 KG/M2

## 2022-10-18 DIAGNOSIS — M35.3 POLYMYALGIA RHEUMATICA: Primary | ICD-10-CM

## 2022-10-18 DIAGNOSIS — N18.31 STAGE 3A CHRONIC KIDNEY DISEASE: ICD-10-CM

## 2022-10-18 DIAGNOSIS — Z79.52 LONG TERM (CURRENT) USE OF SYSTEMIC STEROIDS: ICD-10-CM

## 2022-10-18 DIAGNOSIS — M35.3 POLYMYALGIA RHEUMATICA: ICD-10-CM

## 2022-10-18 LAB
CRP SERPL-MCNC: 1.1 MG/L (ref 0–8.2)
ERYTHROCYTE [SEDIMENTATION RATE] IN BLOOD BY PHOTOMETRIC METHOD: 11 MM/HR (ref 0–23)

## 2022-10-18 PROCEDURE — 1159F PR MEDICATION LIST DOCUMENTED IN MEDICAL RECORD: ICD-10-PCS | Mod: CPTII,S$GLB,, | Performed by: INTERNAL MEDICINE

## 2022-10-18 PROCEDURE — 1160F PR REVIEW ALL MEDS BY PRESCRIBER/CLIN PHARMACIST DOCUMENTED: ICD-10-PCS | Mod: CPTII,S$GLB,, | Performed by: INTERNAL MEDICINE

## 2022-10-18 PROCEDURE — 1126F AMNT PAIN NOTED NONE PRSNT: CPT | Mod: CPTII,S$GLB,, | Performed by: INTERNAL MEDICINE

## 2022-10-18 PROCEDURE — 1126F PR PAIN SEVERITY QUANTIFIED, NO PAIN PRESENT: ICD-10-PCS | Mod: CPTII,S$GLB,, | Performed by: INTERNAL MEDICINE

## 2022-10-18 PROCEDURE — 36415 COLL VENOUS BLD VENIPUNCTURE: CPT | Performed by: INTERNAL MEDICINE

## 2022-10-18 PROCEDURE — 99214 PR OFFICE/OUTPT VISIT, EST, LEVL IV, 30-39 MIN: ICD-10-PCS | Mod: S$GLB,,, | Performed by: INTERNAL MEDICINE

## 2022-10-18 PROCEDURE — 1159F MED LIST DOCD IN RCRD: CPT | Mod: CPTII,S$GLB,, | Performed by: INTERNAL MEDICINE

## 2022-10-18 PROCEDURE — 3074F PR MOST RECENT SYSTOLIC BLOOD PRESSURE < 130 MM HG: ICD-10-PCS | Mod: CPTII,S$GLB,, | Performed by: INTERNAL MEDICINE

## 2022-10-18 PROCEDURE — 3072F LOW RISK FOR RETINOPATHY: CPT | Mod: CPTII,S$GLB,, | Performed by: INTERNAL MEDICINE

## 2022-10-18 PROCEDURE — 3072F PR LOW RISK FOR RETINOPATHY: ICD-10-PCS | Mod: CPTII,S$GLB,, | Performed by: INTERNAL MEDICINE

## 2022-10-18 PROCEDURE — 1160F RVW MEDS BY RX/DR IN RCRD: CPT | Mod: CPTII,S$GLB,, | Performed by: INTERNAL MEDICINE

## 2022-10-18 PROCEDURE — 99214 OFFICE O/P EST MOD 30 MIN: CPT | Mod: S$GLB,,, | Performed by: INTERNAL MEDICINE

## 2022-10-18 PROCEDURE — 99999 PR PBB SHADOW E&M-EST. PATIENT-LVL IV: CPT | Mod: PBBFAC,,, | Performed by: INTERNAL MEDICINE

## 2022-10-18 PROCEDURE — 3074F SYST BP LT 130 MM HG: CPT | Mod: CPTII,S$GLB,, | Performed by: INTERNAL MEDICINE

## 2022-10-18 PROCEDURE — 86140 C-REACTIVE PROTEIN: CPT | Performed by: INTERNAL MEDICINE

## 2022-10-18 PROCEDURE — 99999 PR PBB SHADOW E&M-EST. PATIENT-LVL IV: ICD-10-PCS | Mod: PBBFAC,,, | Performed by: INTERNAL MEDICINE

## 2022-10-18 PROCEDURE — 3078F DIAST BP <80 MM HG: CPT | Mod: CPTII,S$GLB,, | Performed by: INTERNAL MEDICINE

## 2022-10-18 PROCEDURE — 3078F PR MOST RECENT DIASTOLIC BLOOD PRESSURE < 80 MM HG: ICD-10-PCS | Mod: CPTII,S$GLB,, | Performed by: INTERNAL MEDICINE

## 2022-10-18 PROCEDURE — 85652 RBC SED RATE AUTOMATED: CPT | Performed by: INTERNAL MEDICINE

## 2022-10-18 NOTE — PROGRESS NOTES
Rapid3 Question Responses and Scores 10/15/2022   MDHAQ Score 0   Psychologic Score 1.1   Pain Score 0   When you awakened in the morning OVER THE LAST WEEK, did you feel stiff? No   If Yes, please indicate the number of hours until you are as limber as you will be for the day -   Fatigue Score 0   Global Health Score 2   RAPID3 Score 0.67     Answers submitted by the patient for this visit:  Rheumatology Questionnaire (Submitted on 10/15/2022)  fever: No  eye redness: No  mouth sores: No  headaches: No  shortness of breath: No  chest pain: No  trouble swallowing: No  diarrhea: No  constipation: No  unexpected weight change: No  genital sore: No  During the last 3 days, have you had a skin rash?: No  Bruises or bleeds easily: No  cough: No

## 2022-10-18 NOTE — PATIENT INSTRUCTIONS
Try 7.5 mg of prednisone every day, but if you feel you are too symptomatic, take 10 mg alternating with 7.5 mg  x 2 weeks.  Stay on 7.5 mg/day for ~ 4 weeks & then go to 7.5 mg alternating with 5 mg/d x 2 weeks then 5 mg/day thereafter.

## 2022-10-23 VITALS
HEART RATE: 62 BPM | TEMPERATURE: 98 F | OXYGEN SATURATION: 95 % | SYSTOLIC BLOOD PRESSURE: 120 MMHG | RESPIRATION RATE: 20 BRPM | DIASTOLIC BLOOD PRESSURE: 80 MMHG

## 2022-10-23 NOTE — PATIENT INSTRUCTIONS
FOLLOW-UP INSTRUCTIONS:    Patient received flu vaccine. If pain to arm or fever of 100F or greater, take Tylenol as needed  In emergency, call 911 or go to ED

## 2022-10-31 NOTE — PROGRESS NOTES
Subjective:   @Patient ID:  Shannan Norman is a 77 y.o. male who presents for evaluation of abnormal EKG .      HPI:   Patient is here for follow up   He is very pleasant 77 years old gentleman.  He has been doing very well since last visit  He stays very active, and exercise regularly   Intense yoga 3 times during the week and stationary bike 3 times for 30 mins   He is compliant with his medications  Epistaxis resolved after cauterization    EKG showed A-paced  Rhythm. Septal infarct. EKGs in 2017 he had the same findings.     Historically: Hx of A. Flutter ablation in 2010, then redo CTI ablation in 2018. S/p PPM in 2018 for SSS and chronotropic incompetence.  He is on Eliquis for OAC and tolerating it well. He f/u with Dr. Sanchez.  He had chronic VV varicose veins s/p surgical stripping and EVLTs by Dr. Lund. He saw Dr. Toth but did not follow up with the recommendation of either stockings or EVLTs.      Prior cardiovascular  Hx  --------------------------------       - Echo 9/16/2020  Normal left ventricular systolic function. The estimated ejection fraction is 55-60%.  Normal LV diastolic function.  The estimated PA systolic pressure is 21 mmHg.  No wall motion abnormalities.  Normal right ventricular systolic function.  Normal central venous pressure (3 mmHg    - ECHO 10/2016     1 - Normal left ventricular systolic function (EF 60-65%).     2 - Left ventricular diastolic dysfunction.     3 - Biatrial enlargement.     4 - Normal right ventricular systolic function .     5 - The estimated PA systolic pressure is 25 mmHg.     6 - Trivial aortic regurgitation.     7 - Mild tricuspid regurgitation.     - EKG 9/2020  A. Paced with old septal infarct             Patient Active Problem List    Diagnosis Date Noted    Pelvic floor dysfunction 05/13/2022    Pain of left upper extremity 03/17/2022    Weakness 03/17/2022    Stiffness due to immobility 03/17/2022    Lumbar spondylosis 02/25/2022    Primary  osteoarthritis of both hips 02/25/2022    Decreased range of hip movement 02/25/2022    Decreased strength 02/25/2022    AK (actinic keratosis) 03/12/2021    History of melanoma 03/12/2021    Thrombocytopenia 01/21/2021    Malignant melanoma of left shoulder 09/22/2020    Nuclear sclerotic cataract of left eye 08/11/2020    Nuclear sclerotic cataract of right eye 07/28/2020    CKD (chronic kidney disease) stage 3, GFR 30-59 ml/min 08/13/2019    Overweight (BMI 25.0-29.9) 08/13/2019    Right inguinal hernia 02/27/2019    Dyslipidemia associated with type 2 diabetes mellitus 03/07/2018    Hypertension associated with diabetes 03/07/2018    Junctional bradycardia 02/06/2018    Benign prostatic hyperplasia 08/04/2017    Nonrheumatic aortic valve insufficiency 12/01/2016     Trivial      SSS (sick sinus syndrome) 10/24/2016    Junctional escape rhythm 09/12/2016    Type 2 diabetes mellitus without retinopathy 10/13/2015    Nuclear sclerosis of both eyes 10/13/2015    Left epiretinal membrane 10/13/2015    Asymptomatic varicose veins of both lower extremities 07/06/2015         S/p bilateral GSV EVLT [id number]        Post-operative state 02/10/2014     PROCEDURES 01/27/2014:   1. ECU right side stabilization with retinacular sling.   2. Synovectomy, right wrist.   3. Splint application.        Umbilical hernia 09/19/2013    PFO (patent foramen ovale) 07/12/2013     On asa 325mg. No hx of CVA        HTN (hypertension) 04/18/2013    Gout, arthritis 04/18/2013    Atrial flutter 04/18/2013     EF 60%, s/p RF ablation in 2010 (Dr. Grady Copeland)      Type 2 diabetes mellitus with stage 3 chronic kidney disease, without long-term current use of insulin 04/18/2013                    LAST HbA1c  Lab Results   Component Value Date    HGBA1C 6.2 (H) 10/03/2022       Lipid panel  Lab Results   Component Value Date    CHOL 136 10/03/2022    CHOL 102 (L) 03/30/2022    CHOL 142 10/04/2021     Lab Results   Component Value Date    HDL 66  10/03/2022    HDL 31 (L) 03/30/2022    HDL 56 10/04/2021     Lab Results   Component Value Date    LDLCALC 62.2 (L) 10/03/2022    LDLCALC 56.0 (L) 03/30/2022    LDLCALC 71.6 10/04/2021     Lab Results   Component Value Date    TRIG 39 10/03/2022    TRIG 75 03/30/2022    TRIG 72 10/04/2021     Lab Results   Component Value Date    CHOLHDL 48.5 10/03/2022    CHOLHDL 30.4 03/30/2022    CHOLHDL 39.4 10/04/2021            Review of Systems   Constitutional: Negative for chills and fever.   HENT:  Positive for nosebleeds. Negative for hearing loss.    Eyes:  Negative for blurred vision.   Cardiovascular:  Negative for chest pain, leg swelling and palpitations.   Respiratory:  Negative for hemoptysis and shortness of breath.    Hematologic/Lymphatic: Negative for bleeding problem.   Skin:  Negative for itching.   Musculoskeletal:  Negative for falls.   Gastrointestinal:  Negative for abdominal pain and hematochezia.   Genitourinary:  Negative for hematuria.   Neurological:  Negative for dizziness and loss of balance.   Psychiatric/Behavioral:  Negative for altered mental status and depression.      Objective:   Physical Exam  Constitutional:       Appearance: He is well-developed.   HENT:      Head: Normocephalic and atraumatic.   Eyes:      Conjunctiva/sclera: Conjunctivae normal.   Neck:      Vascular: No carotid bruit or JVD.   Cardiovascular:      Rate and Rhythm: Normal rate and regular rhythm.      Pulses:           Carotid pulses are 2+ on the right side and 2+ on the left side.       Radial pulses are 2+ on the right side and 2+ on the left side.      Heart sounds: Normal heart sounds. No murmur heard.    No friction rub. No gallop.   Pulmonary:      Effort: Pulmonary effort is normal. No respiratory distress.      Breath sounds: No stridor. Rhonchi (left base) present. No wheezing.   Musculoskeletal:      Cervical back: Neck supple.   Skin:     General: Skin is warm and dry.   Neurological:      Mental Status: He  is alert and oriented to person, place, and time.   Psychiatric:         Behavior: Behavior normal.       Assessment:     1. Typical atrial flutter    2. PFO (patent foramen ovale)    3. Asymptomatic varicose veins of both lower extremities    4. SSS (sick sinus syndrome)    5. Nonrheumatic aortic valve insufficiency    6. Junctional escape rhythm    7. Primary hypertension    8. Dyslipidemia associated with type 2 diabetes mellitus    9. Hypertension associated with diabetes    10. Stage 3 chronic kidney disease, unspecified whether stage 3a or 3b CKD        Plan:   - BP well controlled  - Stable cardiac issues  - Continue statin and risk factors modification  - Continue Eliquis  - F/U with Dr. Sanchez as scheduled   - Reported hx of PFO in prior notes  - Continue conservative ttt for VV  - CXR for basilar ronchi.        I spent 5-10 minutes asking, assessing, assisting, arranging and advising heart healthy diet improvements. This included low-salt meals, portion control and health food alternatives. I also encourage 30 minutes of moderate exercise 3-4x a week.        1 year f/u     Pertinent cardiac images and EKG reviewed independently.    Continue with current medical plan and lifestyle changes.  Return sooner for concerns or questions. If symptoms persist go to the ED  I have reviewed all pertinent data including patient's medical history in detail and updated the computerized patient record.     No orders of the defined types were placed in this encounter.      Follow up as scheduled.     He expressed verbal understanding and agreed with the plan    Patient's Medications   New Prescriptions    No medications on file   Previous Medications    ALLOPURINOL (ZYLOPRIM) 100 MG TABLET    Take 1 tablet (100 mg total) by mouth once daily.    ASCORBIC ACID, VITAMIN C, (VITAMIN C) 500 MG TABLET    Take 1,000 mg by mouth nightly.     ATORVASTATIN (LIPITOR) 40 MG TABLET    Take 1 tablet (40 mg total) by mouth once daily.     BENAZEPRIL (LOTENSIN) 20 MG TABLET    Take 1 tablet (20 mg total) by mouth once daily.    CYCLOSPORINE (RESTASIS) 0.05 % OPHTHALMIC EMULSION    Place 1 drop into both eyes 2 (two) times daily.    FERROUS SULFATE (FEOSOL) 325 MG (65 MG IRON) TAB TABLET    Take 325 mg by mouth once daily.    GLUCOSAMINE HCL/CHONDR VASQUEZ A NA (GLUCOSAMINE-CHONDROITIN) 750-600 MG TAB    Take 2 tablets by mouth Daily.    LEVOCETIRIZINE (XYZAL) 5 MG TABLET    Take 1 tablet (5 mg total) by mouth every evening.    MULTIVITAMIN CAPSULE    Take 1 capsule by mouth nightly.     PREDNISONE (DELTASONE) 5 MG TABLET    Take 2 tablets (10 mg total) by mouth 2 (two) times daily.    RIVAROXABAN (XARELTO) 20 MG TAB    Take 1 tablet (20 mg total) by mouth daily with dinner or evening meal.    TAMSULOSIN (FLOMAX) 0.4 MG CAP    TAKE 1 CAPSULE BY MOUTH AFTER DINNER   Modified Medications    No medications on file   Discontinued Medications    No medications on file

## 2022-11-01 ENCOUNTER — OFFICE VISIT (OUTPATIENT)
Dept: CARDIOLOGY | Facility: CLINIC | Age: 77
End: 2022-11-01
Payer: MEDICARE

## 2022-11-01 VITALS
BODY MASS INDEX: 28.29 KG/M2 | OXYGEN SATURATION: 96 % | DIASTOLIC BLOOD PRESSURE: 67 MMHG | HEART RATE: 61 BPM | WEIGHT: 202.81 LBS | SYSTOLIC BLOOD PRESSURE: 111 MMHG

## 2022-11-01 DIAGNOSIS — I49.49 JUNCTIONAL ESCAPE RHYTHM: ICD-10-CM

## 2022-11-01 DIAGNOSIS — Q21.12 PFO (PATENT FORAMEN OVALE): ICD-10-CM

## 2022-11-01 DIAGNOSIS — I48.3 TYPICAL ATRIAL FLUTTER: Primary | ICD-10-CM

## 2022-11-01 DIAGNOSIS — I49.5 SSS (SICK SINUS SYNDROME): ICD-10-CM

## 2022-11-01 DIAGNOSIS — I35.1 NONRHEUMATIC AORTIC VALVE INSUFFICIENCY: ICD-10-CM

## 2022-11-01 DIAGNOSIS — E78.5 DYSLIPIDEMIA ASSOCIATED WITH TYPE 2 DIABETES MELLITUS: ICD-10-CM

## 2022-11-01 DIAGNOSIS — I10 PRIMARY HYPERTENSION: ICD-10-CM

## 2022-11-01 DIAGNOSIS — E11.59 HYPERTENSION ASSOCIATED WITH DIABETES: ICD-10-CM

## 2022-11-01 DIAGNOSIS — I83.93 ASYMPTOMATIC VARICOSE VEINS OF BOTH LOWER EXTREMITIES: ICD-10-CM

## 2022-11-01 DIAGNOSIS — I15.2 HYPERTENSION ASSOCIATED WITH DIABETES: ICD-10-CM

## 2022-11-01 DIAGNOSIS — R09.89 RHONCHI AT BOTH LUNG BASES: ICD-10-CM

## 2022-11-01 DIAGNOSIS — N18.30 STAGE 3 CHRONIC KIDNEY DISEASE, UNSPECIFIED WHETHER STAGE 3A OR 3B CKD: ICD-10-CM

## 2022-11-01 DIAGNOSIS — E11.69 DYSLIPIDEMIA ASSOCIATED WITH TYPE 2 DIABETES MELLITUS: ICD-10-CM

## 2022-11-01 PROCEDURE — 3074F PR MOST RECENT SYSTOLIC BLOOD PRESSURE < 130 MM HG: ICD-10-PCS | Mod: CPTII,S$GLB,, | Performed by: INTERNAL MEDICINE

## 2022-11-01 PROCEDURE — 1159F PR MEDICATION LIST DOCUMENTED IN MEDICAL RECORD: ICD-10-PCS | Mod: CPTII,S$GLB,, | Performed by: INTERNAL MEDICINE

## 2022-11-01 PROCEDURE — 3078F PR MOST RECENT DIASTOLIC BLOOD PRESSURE < 80 MM HG: ICD-10-PCS | Mod: CPTII,S$GLB,, | Performed by: INTERNAL MEDICINE

## 2022-11-01 PROCEDURE — 99214 OFFICE O/P EST MOD 30 MIN: CPT | Mod: S$GLB,,, | Performed by: INTERNAL MEDICINE

## 2022-11-01 PROCEDURE — 1160F PR REVIEW ALL MEDS BY PRESCRIBER/CLIN PHARMACIST DOCUMENTED: ICD-10-PCS | Mod: CPTII,S$GLB,, | Performed by: INTERNAL MEDICINE

## 2022-11-01 PROCEDURE — 99999 PR PBB SHADOW E&M-EST. PATIENT-LVL III: CPT | Mod: PBBFAC,,, | Performed by: INTERNAL MEDICINE

## 2022-11-01 PROCEDURE — 99214 PR OFFICE/OUTPT VISIT, EST, LEVL IV, 30-39 MIN: ICD-10-PCS | Mod: S$GLB,,, | Performed by: INTERNAL MEDICINE

## 2022-11-01 PROCEDURE — 3074F SYST BP LT 130 MM HG: CPT | Mod: CPTII,S$GLB,, | Performed by: INTERNAL MEDICINE

## 2022-11-01 PROCEDURE — 1160F RVW MEDS BY RX/DR IN RCRD: CPT | Mod: CPTII,S$GLB,, | Performed by: INTERNAL MEDICINE

## 2022-11-01 PROCEDURE — 3078F DIAST BP <80 MM HG: CPT | Mod: CPTII,S$GLB,, | Performed by: INTERNAL MEDICINE

## 2022-11-01 PROCEDURE — 1126F PR PAIN SEVERITY QUANTIFIED, NO PAIN PRESENT: ICD-10-PCS | Mod: CPTII,S$GLB,, | Performed by: INTERNAL MEDICINE

## 2022-11-01 PROCEDURE — 3072F PR LOW RISK FOR RETINOPATHY: ICD-10-PCS | Mod: CPTII,S$GLB,, | Performed by: INTERNAL MEDICINE

## 2022-11-01 PROCEDURE — 1126F AMNT PAIN NOTED NONE PRSNT: CPT | Mod: CPTII,S$GLB,, | Performed by: INTERNAL MEDICINE

## 2022-11-01 PROCEDURE — 1159F MED LIST DOCD IN RCRD: CPT | Mod: CPTII,S$GLB,, | Performed by: INTERNAL MEDICINE

## 2022-11-01 PROCEDURE — 3072F LOW RISK FOR RETINOPATHY: CPT | Mod: CPTII,S$GLB,, | Performed by: INTERNAL MEDICINE

## 2022-11-01 PROCEDURE — 99999 PR PBB SHADOW E&M-EST. PATIENT-LVL III: ICD-10-PCS | Mod: PBBFAC,,, | Performed by: INTERNAL MEDICINE

## 2022-11-02 ENCOUNTER — HOSPITAL ENCOUNTER (OUTPATIENT)
Dept: RADIOLOGY | Facility: HOSPITAL | Age: 77
Discharge: HOME | End: 2022-11-02
Attending: INTERNAL MEDICINE
Payer: MEDICARE

## 2022-11-02 DIAGNOSIS — R09.89 RHONCHI AT BOTH LUNG BASES: ICD-10-CM

## 2022-11-02 PROCEDURE — 71046 X-RAY EXAM CHEST 2 VIEWS: CPT | Mod: TC,FY,PO

## 2022-11-02 PROCEDURE — 71046 XR CHEST PA AND LATERAL: ICD-10-PCS | Mod: 26,,, | Performed by: RADIOLOGY

## 2022-11-02 PROCEDURE — 71046 X-RAY EXAM CHEST 2 VIEWS: CPT | Mod: 26,,, | Performed by: RADIOLOGY

## 2022-11-03 ENCOUNTER — PATIENT MESSAGE (OUTPATIENT)
Dept: CARDIOLOGY | Facility: CLINIC | Age: 77
End: 2022-11-03
Payer: MEDICARE

## 2022-11-08 ENCOUNTER — PATIENT MESSAGE (OUTPATIENT)
Dept: FAMILY MEDICINE | Facility: CLINIC | Age: 77
End: 2022-11-08
Payer: MEDICARE

## 2022-11-14 ENCOUNTER — PATIENT MESSAGE (OUTPATIENT)
Dept: DERMATOLOGY | Facility: CLINIC | Age: 77
End: 2022-11-14
Payer: MEDICARE

## 2022-11-15 ENCOUNTER — OFFICE VISIT (OUTPATIENT)
Dept: FAMILY MEDICINE | Facility: CLINIC | Age: 77
End: 2022-11-15
Payer: MEDICARE

## 2022-11-15 VITALS
WEIGHT: 204.38 LBS | HEART RATE: 66 BPM | DIASTOLIC BLOOD PRESSURE: 50 MMHG | BODY MASS INDEX: 28.61 KG/M2 | OXYGEN SATURATION: 98 % | HEIGHT: 71 IN | SYSTOLIC BLOOD PRESSURE: 108 MMHG

## 2022-11-15 DIAGNOSIS — M35.3 POLYMYALGIA RHEUMATICA: ICD-10-CM

## 2022-11-15 DIAGNOSIS — I15.2 HYPERTENSION ASSOCIATED WITH DIABETES: Primary | ICD-10-CM

## 2022-11-15 DIAGNOSIS — R41.3 MEMORY PROBLEM: ICD-10-CM

## 2022-11-15 DIAGNOSIS — E11.59 HYPERTENSION ASSOCIATED WITH DIABETES: Primary | ICD-10-CM

## 2022-11-15 PROCEDURE — 3288F PR FALLS RISK ASSESSMENT DOCUMENTED: ICD-10-PCS | Mod: CPTII,S$GLB,, | Performed by: FAMILY MEDICINE

## 2022-11-15 PROCEDURE — 3072F LOW RISK FOR RETINOPATHY: CPT | Mod: CPTII,S$GLB,, | Performed by: FAMILY MEDICINE

## 2022-11-15 PROCEDURE — 3074F PR MOST RECENT SYSTOLIC BLOOD PRESSURE < 130 MM HG: ICD-10-PCS | Mod: CPTII,S$GLB,, | Performed by: FAMILY MEDICINE

## 2022-11-15 PROCEDURE — 1101F PR PT FALLS ASSESS DOC 0-1 FALLS W/OUT INJ PAST YR: ICD-10-PCS | Mod: CPTII,S$GLB,, | Performed by: FAMILY MEDICINE

## 2022-11-15 PROCEDURE — 1126F PR PAIN SEVERITY QUANTIFIED, NO PAIN PRESENT: ICD-10-PCS | Mod: CPTII,S$GLB,, | Performed by: FAMILY MEDICINE

## 2022-11-15 PROCEDURE — 1159F MED LIST DOCD IN RCRD: CPT | Mod: CPTII,S$GLB,, | Performed by: FAMILY MEDICINE

## 2022-11-15 PROCEDURE — 1126F AMNT PAIN NOTED NONE PRSNT: CPT | Mod: CPTII,S$GLB,, | Performed by: FAMILY MEDICINE

## 2022-11-15 PROCEDURE — 1159F PR MEDICATION LIST DOCUMENTED IN MEDICAL RECORD: ICD-10-PCS | Mod: CPTII,S$GLB,, | Performed by: FAMILY MEDICINE

## 2022-11-15 PROCEDURE — 99999 PR PBB SHADOW E&M-EST. PATIENT-LVL V: CPT | Mod: PBBFAC,,, | Performed by: FAMILY MEDICINE

## 2022-11-15 PROCEDURE — 99215 OFFICE O/P EST HI 40 MIN: CPT | Mod: S$GLB,,, | Performed by: FAMILY MEDICINE

## 2022-11-15 PROCEDURE — 1160F RVW MEDS BY RX/DR IN RCRD: CPT | Mod: CPTII,S$GLB,, | Performed by: FAMILY MEDICINE

## 2022-11-15 PROCEDURE — 99215 PR OFFICE/OUTPT VISIT, EST, LEVL V, 40-54 MIN: ICD-10-PCS | Mod: S$GLB,,, | Performed by: FAMILY MEDICINE

## 2022-11-15 PROCEDURE — 3074F SYST BP LT 130 MM HG: CPT | Mod: CPTII,S$GLB,, | Performed by: FAMILY MEDICINE

## 2022-11-15 PROCEDURE — 3078F PR MOST RECENT DIASTOLIC BLOOD PRESSURE < 80 MM HG: ICD-10-PCS | Mod: CPTII,S$GLB,, | Performed by: FAMILY MEDICINE

## 2022-11-15 PROCEDURE — 3078F DIAST BP <80 MM HG: CPT | Mod: CPTII,S$GLB,, | Performed by: FAMILY MEDICINE

## 2022-11-15 PROCEDURE — 1101F PT FALLS ASSESS-DOCD LE1/YR: CPT | Mod: CPTII,S$GLB,, | Performed by: FAMILY MEDICINE

## 2022-11-15 PROCEDURE — 3072F PR LOW RISK FOR RETINOPATHY: ICD-10-PCS | Mod: CPTII,S$GLB,, | Performed by: FAMILY MEDICINE

## 2022-11-15 PROCEDURE — 1160F PR REVIEW ALL MEDS BY PRESCRIBER/CLIN PHARMACIST DOCUMENTED: ICD-10-PCS | Mod: CPTII,S$GLB,, | Performed by: FAMILY MEDICINE

## 2022-11-15 PROCEDURE — 99999 PR PBB SHADOW E&M-EST. PATIENT-LVL V: ICD-10-PCS | Mod: PBBFAC,,, | Performed by: FAMILY MEDICINE

## 2022-11-15 PROCEDURE — 3288F FALL RISK ASSESSMENT DOCD: CPT | Mod: CPTII,S$GLB,, | Performed by: FAMILY MEDICINE

## 2022-11-15 RX ORDER — PREDNISONE 5 MG/1
5 TABLET ORAL DAILY
Qty: 180 TABLET | Refills: 0 | Status: SHIPPED | OUTPATIENT
Start: 2022-11-15 | End: 2023-01-08 | Stop reason: SDUPTHER

## 2022-11-15 NOTE — PROGRESS NOTES
Subjective:       Patient ID: Shannan Norman is a 77 y.o. male.    Chief Complaint: Memory Loss    77 years old male came to the clinic for high blood pressure check.  Blood pressure today was stable.  No chest pain, palpitation, orthopnea or PND.  Patient previously diagnosed with PMR he is currently taking prednisone 5 mg daily.  Patient reports problems with concentration and memory sometimes.    Review of Systems   Constitutional: Negative.    HENT: Negative.     Eyes: Negative.    Respiratory: Negative.     Cardiovascular: Negative.  Negative for chest pain, palpitations, leg swelling and claudication.   Gastrointestinal: Negative.    Endocrine: Negative for polydipsia, polyphagia and polyuria.   Genitourinary: Negative.    Musculoskeletal: Negative.    Integumentary:  Negative.   Neurological: Negative.  Positive for memory loss.   Psychiatric/Behavioral: Negative.         Objective:      Physical Exam  Vitals and nursing note reviewed.   Constitutional:       General: He is not in acute distress.     Appearance: He is well-developed. He is not diaphoretic.   HENT:      Head: Normocephalic and atraumatic.      Right Ear: External ear normal.      Left Ear: External ear normal.      Nose: Nose normal.      Mouth/Throat:      Pharynx: No oropharyngeal exudate.   Eyes:      General: No scleral icterus.        Right eye: No discharge.         Left eye: No discharge.      Conjunctiva/sclera: Conjunctivae normal.      Pupils: Pupils are equal, round, and reactive to light.   Neck:      Thyroid: No thyromegaly.      Vascular: No JVD.      Trachea: No tracheal deviation.   Cardiovascular:      Rate and Rhythm: Normal rate and regular rhythm.      Heart sounds: Normal heart sounds. No murmur heard.    No friction rub. No gallop.   Pulmonary:      Effort: Pulmonary effort is normal. No respiratory distress.      Breath sounds: Normal breath sounds. No stridor. No wheezing or rales.   Chest:      Chest wall: No  tenderness.   Abdominal:      General: Bowel sounds are normal. There is no distension.      Palpations: Abdomen is soft. There is no mass.      Tenderness: There is no abdominal tenderness. There is no guarding or rebound.   Musculoskeletal:         General: No tenderness. Normal range of motion.      Cervical back: Normal range of motion and neck supple.   Lymphadenopathy:      Cervical: No cervical adenopathy.   Skin:     General: Skin is warm and dry.      Coloration: Skin is not pale.      Findings: No erythema or rash.   Neurological:      Mental Status: He is alert and oriented to person, place, and time.      Cranial Nerves: No cranial nerve deficit.      Motor: No abnormal muscle tone.      Coordination: Coordination normal.      Deep Tendon Reflexes: Reflexes are normal and symmetric. Reflexes normal.   Psychiatric:         Behavior: Behavior normal.         Thought Content: Thought content normal.         Judgment: Judgment normal.       Assessment:       Problem List Items Addressed This Visit       Hypertension associated with diabetes - Primary    Relevant Orders    CBC Auto Differential    Comprehensive Metabolic Panel    Hemoglobin A1C    Lipid Panel    Microalbumin/Creatinine Ratio, Urine     Other Visit Diagnoses       Polymyalgia rheumatica        Relevant Medications    predniSONE (DELTASONE) 5 MG tablet    Memory problem        Relevant Orders    Ambulatory referral/consult to Neurology              Plan:         Shannan was seen today for memory loss.    Diagnoses and all orders for this visit:    Hypertension associated with diabetes  -     CBC Auto Differential; Future  -     Comprehensive Metabolic Panel; Future  -     Hemoglobin A1C; Future  -     Lipid Panel; Future  -     Microalbumin/Creatinine Ratio, Urine; Future    Polymyalgia rheumatica  -     predniSONE (DELTASONE) 5 MG tablet; Take 1 tablet (5 mg total) by mouth once daily.    Memory problem  -     Ambulatory referral/consult to  Neurology; Future   Continue monitoring blood pressure at home, low sodium diet.   Continue monitoring blood sugar at home,ADA diet.

## 2022-11-17 ENCOUNTER — PROCEDURE VISIT (OUTPATIENT)
Dept: DERMATOLOGY | Facility: CLINIC | Age: 77
End: 2022-11-17
Payer: MEDICARE

## 2022-11-17 DIAGNOSIS — C44.719 BASAL CELL CARCINOMA (BCC) OF SKIN OF LEFT LOWER EXTREMITY INCLUDING HIP: Primary | ICD-10-CM

## 2022-11-17 PROCEDURE — 11601 EXC TR-EXT MAL+MARG 0.6-1 CM: CPT | Mod: S$GLB,,, | Performed by: DERMATOLOGY

## 2022-11-17 PROCEDURE — 99499 NO LOS: ICD-10-PCS | Mod: S$GLB,,, | Performed by: DERMATOLOGY

## 2022-11-17 PROCEDURE — 88305 TISSUE EXAM BY PATHOLOGIST: CPT | Performed by: PATHOLOGY

## 2022-11-17 PROCEDURE — 12031 PR LAYR CLOS WND TRUNK,ARM,LEG <2.5 CM: ICD-10-PCS | Mod: 51,S$GLB,, | Performed by: DERMATOLOGY

## 2022-11-17 PROCEDURE — 12031 INTMD RPR S/A/T/EXT 2.5 CM/<: CPT | Mod: 51,S$GLB,, | Performed by: DERMATOLOGY

## 2022-11-17 PROCEDURE — 99499 UNLISTED E&M SERVICE: CPT | Mod: S$GLB,,, | Performed by: DERMATOLOGY

## 2022-11-17 PROCEDURE — 88305 TISSUE EXAM BY PATHOLOGIST: ICD-10-PCS | Mod: 26,,, | Performed by: PATHOLOGY

## 2022-11-17 PROCEDURE — 88305 TISSUE EXAM BY PATHOLOGIST: CPT | Mod: 26,,, | Performed by: PATHOLOGY

## 2022-11-17 PROCEDURE — 11601 PR EXC SKIN MALIG 0.6-1 CM TRUNK,ARM,LEG: ICD-10-PCS | Mod: S$GLB,,, | Performed by: DERMATOLOGY

## 2022-11-17 RX ORDER — MUPIROCIN 20 MG/G
OINTMENT TOPICAL DAILY
Qty: 22 G | Refills: 1 | Status: SHIPPED | OUTPATIENT
Start: 2022-11-17 | End: 2022-12-01

## 2022-11-17 RX ORDER — DOXYCYCLINE 100 MG/1
1 TABLET ORAL 2 TIMES DAILY
Qty: 20 TABLET | Refills: 0 | Status: SHIPPED | OUTPATIENT
Start: 2022-11-17 | End: 2022-11-27

## 2022-11-17 NOTE — PROGRESS NOTES
EXCISION- (DERM-EXCISION)  PLAN NOTES:  CONSENT: The purpose of the excision as well as potential risks were explained to the patient and written informed consent was obtained.   INDICATION and LOCATION:  bcc left anterior lower leg   PRE-OP LESION SIZE: 0.6 cm  The site was prepped with chlorhexidine 4% and draped in a sterile fashion. Local anesthesia was achieved using lidocaine 1% with epinephrine. A fusiform figure was drawn around the lesion with minimal borders of 4 mm and oriented in a manner appropriate to skin tension lines. An incision was then made along the marked lines and carried down to subcutaneous tissue. The lesion was excised along this plane, the 12 o'clock position on the specimen marked with suture , notched at 3 pmand the specimen submitted for histologic examination of the surgical margins. Wound edges were undermined by sharp dissection. Hemostasis was achieved by electrosurgical cautery. Because of wound tension and the size, depth and location of the defect, a layered closure (intermediate repair) was required. This was accomplished with inverted, buried sutures of 3-0 Moncryl. Wound edges were approximated by a running suture of 3-0 Prolene. A sterile dressing was applied. The patient tolerated the procedure well and there were no complications. Blood loss was not significant.  POST-OP SIZE: 1.5 cm   DISCHARGE INSTRUCTIONS: The patient was instructed to keep the pressure bandage in place for 24 hours.  After 24 hours the area can be cleaned with soap and water.  Apply petrolatum-based ointment and bandage daily until suture removal visit.  The patient was instructed to call the clinic if any signs of infection develop, such as enlarging area of redness, swelling, increasing pain or purulent drainage. The patient was given an appointment to return to clinic for suture removal and pathology report.

## 2022-11-17 NOTE — PATIENT INSTRUCTIONS
Wound Care    A band aid and vaseline ointment has been placed over the site.  This should remain in place for 24 hours.  It is recommended that you keep the area dry for the first 24 hours.  After 24 hours, you may remove the band aid and wash the area with warm soap and water and apply Vaseline jelly.  Many patients prefer to use Neosporin or Bacitracin ointment.  This is acceptable; however, know that you can develop an allergy to this medication even if you have used it safely for years.  It is important to keep the area moist.  Letting it dry out and get air slows healing time, and will worsen the scar.  Band aid is optional after first 24 hours.      If you notice increasing redness, tenderness, pain, or yellow drainage at the site, please notify your doctor.  These are signs of an infection.    If your site is bleeding, apply firm pressure for 15 minutes straight.  Repeat for another 15 minutes, if it is still bleeding.   If the surgical site continues to bleed, then please contact your doctor.      [address] [place] [address]/ [phone] FAX/ [email or web address]    Doxycycline may cause GI discomfort, esophageal irritation/ulceration, and increased sun sensitivity. Patient was counseled to take medicine with meals and at least 1 hour before lying down.

## 2022-11-23 ENCOUNTER — TELEPHONE (OUTPATIENT)
Dept: NEUROLOGY | Facility: CLINIC | Age: 77
End: 2022-11-23
Payer: MEDICARE

## 2022-11-25 ENCOUNTER — PATIENT MESSAGE (OUTPATIENT)
Dept: PODIATRY | Facility: CLINIC | Age: 77
End: 2022-11-25
Payer: MEDICARE

## 2022-11-29 ENCOUNTER — TELEPHONE (OUTPATIENT)
Dept: PODIATRY | Facility: CLINIC | Age: 77
End: 2022-11-29
Payer: MEDICARE

## 2022-11-29 NOTE — TELEPHONE ENCOUNTER
Called pt back per  and offered him a virtual visit appointment for tomorrow at 1:30 pm. Pt accepted and I sched him

## 2022-11-30 ENCOUNTER — TELEPHONE (OUTPATIENT)
Dept: PODIATRY | Facility: CLINIC | Age: 77
End: 2022-11-30
Payer: MEDICARE

## 2022-11-30 LAB
FINAL PATHOLOGIC DIAGNOSIS: NORMAL
GROSS: NORMAL
Lab: NORMAL
MICROSCOPIC EXAM: NORMAL

## 2022-12-01 ENCOUNTER — CLINICAL SUPPORT (OUTPATIENT)
Dept: DERMATOLOGY | Facility: CLINIC | Age: 77
End: 2022-12-01
Payer: MEDICARE

## 2022-12-01 ENCOUNTER — OFFICE VISIT (OUTPATIENT)
Dept: PODIATRY | Facility: CLINIC | Age: 77
End: 2022-12-01
Payer: MEDICARE

## 2022-12-01 DIAGNOSIS — M79.672 LEFT FOOT PAIN: Primary | ICD-10-CM

## 2022-12-01 DIAGNOSIS — E11.49 TYPE 2 DIABETES MELLITUS WITH NEUROLOGICAL MANIFESTATIONS: ICD-10-CM

## 2022-12-01 PROCEDURE — 1160F PR REVIEW ALL MEDS BY PRESCRIBER/CLIN PHARMACIST DOCUMENTED: ICD-10-PCS | Mod: CPTII,95,, | Performed by: PODIATRIST

## 2022-12-01 PROCEDURE — 99999 PR PBB SHADOW E&M-EST. PATIENT-LVL III: CPT | Mod: PBBFAC,,,

## 2022-12-01 PROCEDURE — 3072F LOW RISK FOR RETINOPATHY: CPT | Mod: CPTII,95,, | Performed by: PODIATRIST

## 2022-12-01 PROCEDURE — 99213 PR OFFICE/OUTPT VISIT, EST, LEVL III, 20-29 MIN: ICD-10-PCS | Mod: 95,,, | Performed by: PODIATRIST

## 2022-12-01 PROCEDURE — 3072F PR LOW RISK FOR RETINOPATHY: ICD-10-PCS | Mod: CPTII,95,, | Performed by: PODIATRIST

## 2022-12-01 PROCEDURE — 99213 OFFICE O/P EST LOW 20 MIN: CPT | Mod: 95,,, | Performed by: PODIATRIST

## 2022-12-01 PROCEDURE — 99999 PR PBB SHADOW E&M-EST. PATIENT-LVL III: ICD-10-PCS | Mod: PBBFAC,,,

## 2022-12-01 PROCEDURE — 1159F PR MEDICATION LIST DOCUMENTED IN MEDICAL RECORD: ICD-10-PCS | Mod: CPTII,95,, | Performed by: PODIATRIST

## 2022-12-01 PROCEDURE — 1160F RVW MEDS BY RX/DR IN RCRD: CPT | Mod: CPTII,95,, | Performed by: PODIATRIST

## 2022-12-01 PROCEDURE — 99024 POSTOP FOLLOW-UP VISIT: CPT | Mod: S$GLB,,, | Performed by: DERMATOLOGY

## 2022-12-01 PROCEDURE — 1159F MED LIST DOCD IN RCRD: CPT | Mod: CPTII,95,, | Performed by: PODIATRIST

## 2022-12-01 PROCEDURE — 99024 PR POST-OP FOLLOW-UP VISIT: ICD-10-PCS | Mod: S$GLB,,, | Performed by: DERMATOLOGY

## 2022-12-01 NOTE — PROGRESS NOTES
The patient location is: home  The chief complaint leading to consultation is: acute onset left foot pain x 1 week    Visit type: audiovisual    Face to Face time with patient: 10 mins  15 minutes of total time spent on the encounter, which includes face to face time and non-face to face time preparing to see the patient (eg, review of tests), Obtaining and/or reviewing separately obtained history, Documenting clinical information in the electronic or other health record, Independently interpreting results (not separately reported) and communicating results to the patient/family/caregiver, or Care coordination (not separately reported).     Patient reports that he would have very mild intermittent pain to the left midfoot over the past couple months however a week ago he was performing a lot exercises during yoga that involved going up on the ball of the foot and experienced moderate pain and swelling localized to left foot.  He points to the area overlying the 5th metatarsal base.  He does relate some pain when he flexes the foot down and externally rotates however the pain is slightly worse when he dorsiflexes the foot and externally rotates.  On direct examination I confirmed that there was some localized edema some slight discoloration to the area.  Most likely this may be a 5th metatarsal base fracture.  Ordered weight-bearing left foot x-ray to be completed today.  Patient struck did rest, ice and elevate p.r.n..  We discussed activity modifications and restrictions prevent aggravating the pain.  If the x-ray confirms there is a fracture within the patient will need to be dispensed with a Cam boot.      Each patient to whom he or she provides medical services by telemedicine is:  (1) informed of the relationship between the physician and patient and the respective role of any other health care provider with respect to management of the patient; and (2) notified that he or she may decline to receive medical  services by telemedicine and may withdraw from such care at any time.    Notes:

## 2022-12-02 ENCOUNTER — HOSPITAL ENCOUNTER (OUTPATIENT)
Dept: RADIOLOGY | Facility: HOSPITAL | Age: 77
Discharge: HOME | End: 2022-12-02
Attending: PODIATRIST
Payer: MEDICARE

## 2022-12-02 DIAGNOSIS — M79.672 LEFT FOOT PAIN: ICD-10-CM

## 2022-12-02 PROCEDURE — 73630 XR FOOT COMPLETE 3 VIEW LEFT: ICD-10-PCS | Mod: 26,LT,, | Performed by: RADIOLOGY

## 2022-12-02 PROCEDURE — 73630 X-RAY EXAM OF FOOT: CPT | Mod: 26,LT,, | Performed by: RADIOLOGY

## 2022-12-02 PROCEDURE — 73630 X-RAY EXAM OF FOOT: CPT | Mod: TC,FY,LT

## 2022-12-06 ENCOUNTER — PATIENT MESSAGE (OUTPATIENT)
Dept: PODIATRY | Facility: CLINIC | Age: 77
End: 2022-12-06
Payer: MEDICARE

## 2022-12-06 ENCOUNTER — TELEPHONE (OUTPATIENT)
Dept: PODIATRY | Facility: CLINIC | Age: 77
End: 2022-12-06
Payer: MEDICARE

## 2022-12-06 DIAGNOSIS — S92.352A FRACTURE OF BASE OF FIFTH METATARSAL BONE OF LEFT FOOT, CLOSED, INITIAL ENCOUNTER: Primary | ICD-10-CM

## 2022-12-06 NOTE — TELEPHONE ENCOUNTER
Please have patient come in for a tall CAM boot. He'll need a follow up visit in 6 weeks with follow up x-ray at that time.

## 2022-12-06 NOTE — TELEPHONE ENCOUNTER
----- Message from Tricia Mills RN sent at 12/6/2022 12:49 PM CST -----  Please have patient come in for a tall CAM boot. He'll need a follow up visit in 6 weeks with follow up x-ray at that time.

## 2022-12-06 NOTE — TELEPHONE ENCOUNTER
Spoke with Mr Norman to confirm that he received the information regarding his 6 week f/u appts with Dr. Curry. Per Mr Emerson can accept the appt time 10 am and date of 1/19/23 with Dr Curry with xray prior 9:30 am. Will plan to come to Podiatry [facility] tomorrow morning around 9:30 am to get Tall Cam Walker Boot. Reports he wears a size 12. Will also send message to DME coordinator Noel to let her know. No other needs voiced at this time. Encouraged to call if further assistance is needed

## 2022-12-06 NOTE — TELEPHONE ENCOUNTER
Left message with Mr Norman inquiring when he can come to the Podiatry clinic for a tall cam walker. Will attempt to reach out tho him again during clinic hours.

## 2022-12-07 ENCOUNTER — PES CALL (OUTPATIENT)
Dept: ADMINISTRATIVE | Facility: OTHER | Age: 77
End: 2022-12-07
Payer: MEDICARE

## 2022-12-08 ENCOUNTER — TELEPHONE (OUTPATIENT)
Dept: PODIATRY | Facility: CLINIC | Age: 77
End: 2022-12-08
Payer: MEDICARE

## 2022-12-08 DIAGNOSIS — S92.352A CLOSED FRACTURE OF BASE OF FIFTH METATARSAL BONE, LEFT, INITIAL ENCOUNTER: Primary | ICD-10-CM

## 2022-12-08 NOTE — TELEPHONE ENCOUNTER
----- Message from Ada Dc sent at 12/7/2022 10:53 AM CST -----  Regarding: boot order  Can you place an order in epic for a boot for him. Thanks

## 2022-12-14 ENCOUNTER — CLINICAL SUPPORT (OUTPATIENT)
Dept: CARDIOLOGY | Facility: HOSPITAL | Age: 77
End: 2022-12-14
Payer: MEDICARE

## 2022-12-14 DIAGNOSIS — Z95.0 PRESENCE OF CARDIAC PACEMAKER: ICD-10-CM

## 2022-12-14 DIAGNOSIS — I48.91 UNSPECIFIED ATRIAL FIBRILLATION: ICD-10-CM

## 2022-12-14 DIAGNOSIS — I49.5 SICK SINUS SYNDROME: ICD-10-CM

## 2022-12-14 PROCEDURE — 93294 CARDIAC DEVICE CHECK - REMOTE: ICD-10-PCS | Mod: ,,, | Performed by: INTERNAL MEDICINE

## 2022-12-14 PROCEDURE — 93296 REM INTERROG EVL PM/IDS: CPT | Performed by: INTERNAL MEDICINE

## 2022-12-14 PROCEDURE — 93294 REM INTERROG EVL PM/LDLS PM: CPT | Mod: ,,, | Performed by: INTERNAL MEDICINE

## 2022-12-19 ENCOUNTER — LAB VISIT (OUTPATIENT)
Dept: LAB | Facility: HOSPITAL | Age: 77
End: 2022-12-19
Attending: INTERNAL MEDICINE
Payer: MEDICARE

## 2022-12-19 DIAGNOSIS — Z79.52 LONG TERM (CURRENT) USE OF SYSTEMIC STEROIDS: ICD-10-CM

## 2022-12-19 DIAGNOSIS — M35.3 POLYMYALGIA RHEUMATICA: ICD-10-CM

## 2022-12-19 DIAGNOSIS — N18.31 STAGE 3A CHRONIC KIDNEY DISEASE: ICD-10-CM

## 2022-12-19 LAB
ALBUMIN SERPL BCP-MCNC: 3.5 G/DL (ref 3.5–5.2)
ALP SERPL-CCNC: 73 U/L (ref 55–135)
ALT SERPL W/O P-5'-P-CCNC: 11 U/L (ref 10–44)
ANION GAP SERPL CALC-SCNC: 6 MMOL/L (ref 8–16)
AST SERPL-CCNC: 12 U/L (ref 10–40)
BASOPHILS # BLD AUTO: 0.03 K/UL (ref 0–0.2)
BASOPHILS NFR BLD: 0.6 % (ref 0–1.9)
BILIRUB SERPL-MCNC: 0.6 MG/DL (ref 0.1–1)
BUN SERPL-MCNC: 19 MG/DL (ref 8–23)
CALCIUM SERPL-MCNC: 9.2 MG/DL (ref 8.7–10.5)
CHLORIDE SERPL-SCNC: 106 MMOL/L (ref 95–110)
CO2 SERPL-SCNC: 30 MMOL/L (ref 23–29)
CREAT SERPL-MCNC: 1 MG/DL (ref 0.5–1.4)
CRP SERPL-MCNC: 9 MG/L (ref 0–8.2)
DIFFERENTIAL METHOD: ABNORMAL
EOSINOPHIL # BLD AUTO: 0.1 K/UL (ref 0–0.5)
EOSINOPHIL NFR BLD: 2 % (ref 0–8)
ERYTHROCYTE [DISTWIDTH] IN BLOOD BY AUTOMATED COUNT: 12.7 % (ref 11.5–14.5)
ERYTHROCYTE [SEDIMENTATION RATE] IN BLOOD BY PHOTOMETRIC METHOD: 5 MM/HR (ref 0–23)
EST. GFR  (NO RACE VARIABLE): >60 ML/MIN/1.73 M^2
GLUCOSE SERPL-MCNC: 99 MG/DL (ref 70–110)
HCT VFR BLD AUTO: 47.4 % (ref 40–54)
HGB BLD-MCNC: 15.4 G/DL (ref 14–18)
IMM GRANULOCYTES # BLD AUTO: 0.03 K/UL (ref 0–0.04)
IMM GRANULOCYTES NFR BLD AUTO: 0.6 % (ref 0–0.5)
LYMPHOCYTES # BLD AUTO: 1.5 K/UL (ref 1–4.8)
LYMPHOCYTES NFR BLD: 29.4 % (ref 18–48)
MCH RBC QN AUTO: 31.4 PG (ref 27–31)
MCHC RBC AUTO-ENTMCNC: 32.5 G/DL (ref 32–36)
MCV RBC AUTO: 97 FL (ref 82–98)
MONOCYTES # BLD AUTO: 0.6 K/UL (ref 0.3–1)
MONOCYTES NFR BLD: 12.2 % (ref 4–15)
NEUTROPHILS # BLD AUTO: 2.7 K/UL (ref 1.8–7.7)
NEUTROPHILS NFR BLD: 55.2 % (ref 38–73)
NRBC BLD-RTO: 0 /100 WBC
PLATELET # BLD AUTO: 182 K/UL (ref 150–450)
PMV BLD AUTO: 12.1 FL (ref 9.2–12.9)
POTASSIUM SERPL-SCNC: 4.3 MMOL/L (ref 3.5–5.1)
PROT SERPL-MCNC: 6.2 G/DL (ref 6–8.4)
RBC # BLD AUTO: 4.9 M/UL (ref 4.6–6.2)
SODIUM SERPL-SCNC: 142 MMOL/L (ref 136–145)
WBC # BLD AUTO: 4.93 K/UL (ref 3.9–12.7)

## 2022-12-19 PROCEDURE — 85025 COMPLETE CBC W/AUTO DIFF WBC: CPT | Performed by: INTERNAL MEDICINE

## 2022-12-19 PROCEDURE — 86140 C-REACTIVE PROTEIN: CPT | Performed by: INTERNAL MEDICINE

## 2022-12-19 PROCEDURE — 85652 RBC SED RATE AUTOMATED: CPT | Performed by: INTERNAL MEDICINE

## 2022-12-19 PROCEDURE — 80053 COMPREHEN METABOLIC PANEL: CPT | Performed by: INTERNAL MEDICINE

## 2022-12-19 PROCEDURE — 36415 COLL VENOUS BLD VENIPUNCTURE: CPT | Mod: PO | Performed by: INTERNAL MEDICINE

## 2023-01-12 ENCOUNTER — PATIENT MESSAGE (OUTPATIENT)
Dept: RHEUMATOLOGY | Facility: CLINIC | Age: 78
End: 2023-01-12
Payer: MEDICARE

## 2023-01-17 ENCOUNTER — OFFICE VISIT (OUTPATIENT)
Dept: RHEUMATOLOGY | Facility: CLINIC | Age: 78
End: 2023-01-17
Payer: MEDICARE

## 2023-01-17 VITALS
HEIGHT: 71 IN | HEART RATE: 61 BPM | BODY MASS INDEX: 29.29 KG/M2 | DIASTOLIC BLOOD PRESSURE: 72 MMHG | SYSTOLIC BLOOD PRESSURE: 124 MMHG | WEIGHT: 209.19 LBS

## 2023-01-17 DIAGNOSIS — Z55.9 EDUCATIONAL CIRCUMSTANCE: ICD-10-CM

## 2023-01-17 DIAGNOSIS — M35.3 POLYMYALGIA RHEUMATICA: Primary | ICD-10-CM

## 2023-01-17 DIAGNOSIS — M10.9 GOUT, ARTHRITIS: ICD-10-CM

## 2023-01-17 DIAGNOSIS — Z79.52 LONG TERM (CURRENT) USE OF SYSTEMIC STEROIDS: ICD-10-CM

## 2023-01-17 PROCEDURE — 99214 OFFICE O/P EST MOD 30 MIN: CPT | Mod: S$GLB,,, | Performed by: INTERNAL MEDICINE

## 2023-01-17 PROCEDURE — 3078F DIAST BP <80 MM HG: CPT | Mod: CPTII,S$GLB,, | Performed by: INTERNAL MEDICINE

## 2023-01-17 PROCEDURE — 3074F PR MOST RECENT SYSTOLIC BLOOD PRESSURE < 130 MM HG: ICD-10-PCS | Mod: CPTII,S$GLB,, | Performed by: INTERNAL MEDICINE

## 2023-01-17 PROCEDURE — 99214 PR OFFICE/OUTPT VISIT, EST, LEVL IV, 30-39 MIN: ICD-10-PCS | Mod: S$GLB,,, | Performed by: INTERNAL MEDICINE

## 2023-01-17 PROCEDURE — 1160F RVW MEDS BY RX/DR IN RCRD: CPT | Mod: CPTII,S$GLB,, | Performed by: INTERNAL MEDICINE

## 2023-01-17 PROCEDURE — 99999 PR PBB SHADOW E&M-EST. PATIENT-LVL IV: CPT | Mod: PBBFAC,,, | Performed by: INTERNAL MEDICINE

## 2023-01-17 PROCEDURE — 3072F PR LOW RISK FOR RETINOPATHY: ICD-10-PCS | Mod: CPTII,S$GLB,, | Performed by: INTERNAL MEDICINE

## 2023-01-17 PROCEDURE — 3078F PR MOST RECENT DIASTOLIC BLOOD PRESSURE < 80 MM HG: ICD-10-PCS | Mod: CPTII,S$GLB,, | Performed by: INTERNAL MEDICINE

## 2023-01-17 PROCEDURE — 99999 PR PBB SHADOW E&M-EST. PATIENT-LVL IV: ICD-10-PCS | Mod: PBBFAC,,, | Performed by: INTERNAL MEDICINE

## 2023-01-17 PROCEDURE — 1126F AMNT PAIN NOTED NONE PRSNT: CPT | Mod: CPTII,S$GLB,, | Performed by: INTERNAL MEDICINE

## 2023-01-17 PROCEDURE — 1159F PR MEDICATION LIST DOCUMENTED IN MEDICAL RECORD: ICD-10-PCS | Mod: CPTII,S$GLB,, | Performed by: INTERNAL MEDICINE

## 2023-01-17 PROCEDURE — 1126F PR PAIN SEVERITY QUANTIFIED, NO PAIN PRESENT: ICD-10-PCS | Mod: CPTII,S$GLB,, | Performed by: INTERNAL MEDICINE

## 2023-01-17 PROCEDURE — 3072F LOW RISK FOR RETINOPATHY: CPT | Mod: CPTII,S$GLB,, | Performed by: INTERNAL MEDICINE

## 2023-01-17 PROCEDURE — 3074F SYST BP LT 130 MM HG: CPT | Mod: CPTII,S$GLB,, | Performed by: INTERNAL MEDICINE

## 2023-01-17 PROCEDURE — 1160F PR REVIEW ALL MEDS BY PRESCRIBER/CLIN PHARMACIST DOCUMENTED: ICD-10-PCS | Mod: CPTII,S$GLB,, | Performed by: INTERNAL MEDICINE

## 2023-01-17 PROCEDURE — 1159F MED LIST DOCD IN RCRD: CPT | Mod: CPTII,S$GLB,, | Performed by: INTERNAL MEDICINE

## 2023-01-17 RX ORDER — PREDNISONE 2.5 MG/1
2.5 TABLET ORAL 2 TIMES DAILY
Qty: 60 TABLET | Refills: 1 | Status: SHIPPED | OUTPATIENT
Start: 2023-01-17 | End: 2023-03-29

## 2023-01-17 SDOH — SOCIAL DETERMINANTS OF HEALTH (SDOH): PROBLEMS RELATED TO EDUCATION AND LITERACY, UNSPECIFIED: Z55.9

## 2023-01-17 ASSESSMENT — ROUTINE ASSESSMENT OF PATIENT INDEX DATA (RAPID3)
TOTAL RAPID3 SCORE: 1
PAIN SCORE: 0
MDHAQ FUNCTION SCORE: 0.6
FATIGUE SCORE: 2.5
PATIENT GLOBAL ASSESSMENT SCORE: 1
PSYCHOLOGICAL DISTRESS SCORE: 1.1
AM STIFFNESS SCORE: 0, NO

## 2023-01-17 NOTE — PATIENT INSTRUCTIONS
Continue the 4 mg tablet of prednisone that you have for 2 more weeks.  Then drop to 2.5 mg alternating with 5 mg until labs & then we will adjust.    Contact us after labs are drawn.

## 2023-01-17 NOTE — PROGRESS NOTES
Answers submitted by the patient for this visit:  Rheumatology Questionnaire (Submitted on 1/16/2023)  fever: No  eye redness: No  mouth sores: No  headaches: No  shortness of breath: No  chest pain: No  trouble swallowing: No  diarrhea: No  constipation: No  unexpected weight change: No  genital sore: No  During the last 3 days, have you had a skin rash?: No  Bruises or bleeds easily: No  cough: No  Subjective:     Patient ID: Shannan Norman is a 77 y.o. male w a hx c/w PMR    Chief Complaint: Disease Management         HPI     77 yr old with multiple morbidities that include gout, DM II, CKD 3, Atrial fib w sick sinus syndrome &  bilateral hip & LSpine OA we are following for PMR.  He was initially seen on 4/4/22 & last on 10/18/22.    He returns for f/u; Doing very well on prednisone 4 mg for the last 2 days; prior to that on 5 mg/d. No pain. No stiffness. No PMR or GCA sxs.Denies fever, chills, recent or unexplained weight changes, visual loss, diplopia, scalp tenderness, jaw or other claudication, headache or head pain.  Denies AM stiffness, shoulder or hip girdle stiffness.   Last labs in December showed a mildly elevated CRP of 9, but were otherwise ok.     Also has hx of Gout and is on allopurinol 200 mg/d. Has not had a gout attack in very long time.         Had recent DXA & FRAX is acceptable.     5th metatarsal base fracture      From 7/6/22:  On 2/15/22 developed stiffness; couldn't get out of bed in AM; had extreme bilateral shoulder pain; had hip pain (but denied stiffness); also really bad at night. No other joint of muscle pain except the muscles of the backs of his knees or tendons felt tight. Had lots of gelling. Lost 23 lbs. Also felt voice changed tone a bit.   Celebrex 200 mg/d helped some, but not entirely.   Prednisone 10 mg bid was started on 4/4/22 and he had an immediate & dramatic response. Everything improved.   We say him for the first time on 4/6/22 and tried to taper prednisone a bit.  He was able to get down to 10 mg daily which he began ~ May 2022.  Currently does not feel as well as he did on the larger doses of prednisone. Has ~ 4 hrs of AM stiffness and has bilateral wrist pain worse in AM & improves with time..  No other joint sxs.  Denies fever, chills, recent  visual loss, diplopia, scalp tenderness, jaw or other claudication, headache or head pain.     Is also on allopurinol 100 mg/d w good control of his UA. He also is eating better. Did have a diet of rich food in the past and ETOH use in past.Denies family hx of gout; denies renal calculi; denies moonshine use.          Current Outpatient Medications   Medication Sig Dispense Refill    allopurinoL (ZYLOPRIM) 100 MG tablet Take 1 tablet (100 mg total) by mouth once daily. 90 tablet 0    atorvastatin (LIPITOR) 40 MG tablet Take 1 tablet (40 mg total) by mouth once daily. 90 tablet 3    benazepriL (LOTENSIN) 20 MG tablet Take 1 tablet (20 mg total) by mouth once daily. 90 tablet 3    cycloSPORINE (RESTASIS) 0.05 % ophthalmic emulsion Place 1 drop into both eyes 2 (two) times daily. 60 each 11    ferrous sulfate (FEOSOL) 325 mg (65 mg iron) Tab tablet Take 325 mg by mouth once daily.      GLUCOSAMINE HCL/CHONDR VASQUEZ A NA (GLUCOSAMINE-CHONDROITIN) 750-600 mg Tab Take 2 tablets by mouth Daily.      levocetirizine (XYZAL) 5 MG tablet Take 1 tablet (5 mg total) by mouth every evening. 90 tablet 1    multivitamin capsule Take 1 capsule by mouth nightly.       predniSONE (DELTASONE) 5 MG tablet Take 1 tablet (5 mg total) by mouth once daily. 180 tablet 0    rivaroxaban (XARELTO) 20 mg Tab Take 1 tablet (20 mg total) by mouth daily with dinner or evening meal. 90 tablet 3    tamsulosin (FLOMAX) 0.4 mg Cap TAKE 1 CAPSULE BY MOUTH AFTER DINNER 90 capsule 3     No current facility-administered medications for this visit.         Review of patient's allergies indicates:  No Known Allergies    Review of Systems   Constitutional: Negative.   Negative for fever and unexpected weight change.   HENT:  Negative for mouth sores and trouble swallowing.    Eyes:  Negative for redness.        Bilateral dry eyes   Respiratory: Negative.  Negative for cough and shortness of breath.    Cardiovascular: Negative.  Negative for chest pain.   Gastrointestinal: Negative.  Negative for constipation and diarrhea.   Endocrine: Negative.  Negative for cold intolerance and heat intolerance.   Genitourinary:  Positive for difficulty urinating, frequency and urgency. Negative for genital sores.   Musculoskeletal:  Negative for back pain, joint swelling and myalgias.   Skin:  Negative for rash.   Neurological:  Negative for dizziness, syncope, weakness, light-headedness, numbness and headaches.   Hematological:  Does not bruise/bleed easily.   Psychiatric/Behavioral:  Positive for sleep disturbance. Negative for dysphoric mood.      Past Medical History:   Diagnosis Date    Allergy     Arthritis     Atrial fibrillation     Atrial flutter 2011    ablation    Basal cell carcinoma     Cancer     Cataract     CKD (chronic kidney disease) stage 3, GFR 30-59 ml/min 8/13/2019    Diabetes mellitus     Dry eye syndrome     Gout, unspecified     High cholesterol     History of shingles     Hypertension     Melanoma     Seizures        Past Surgical History:   Procedure Laterality Date    ABLATION N/A 9/11/2018    Procedure: ABLATION;  Surgeon: Hany Sanchez MD;  Location: [facility] CATH LAB;  Service: Cardiology;  Laterality: N/A;  AFL, ISABELLE, RFA, ELODIA, MAC, DM, 3 PREP    ABLATION OF DYSRHYTHMIC FOCUS      ADENOIDECTOMY      CARDIAC PACEMAKER PLACEMENT      no defib    CATARACT EXTRACTION W/  INTRAOCULAR LENS IMPLANT Right 7/28/2020    Procedure: EXTRACTION, CATARACT, WITH IOL INSERTION;  Surgeon: Andrés Morse MD;  Location: [facility] OR;  Service: Ophthalmology;  Laterality: Right;    CATARACT EXTRACTION W/  INTRAOCULAR LENS IMPLANT Left 8/11/2020    Procedure:  "EXTRACTION, CATARACT, WITH IOL INSERTION;  Surgeon: Andrés Morse MD;  Location: [facility] OR;  Service: Ophthalmology;  Laterality: Left;    COLONOSCOPY N/A 1/2/2019    Procedure: COLONOSCOPY Suprep;  Surgeon: Cindy Solorzano MD;  Location: [facility] ENDO;  Service: Endoscopy;  Laterality: N/A;    GANGLION CYST EXCISION      left neck    HERNIA REPAIR  2013    umbilical    TENDON REPAIR Right     wrist    TONSILLECTOMY      varicose veins      several sx  bilateral    VASECTOMY      VEIN SURGERY         Family History   Problem Relation Age of Onset    Diabetes Mother     COPD Father     Heart disease Father     Arthritis Sister     Heart attack Brother     Heart disease Brother     Diabetes Brother     No Known Problems Maternal Aunt     No Known Problems Maternal Uncle     No Known Problems Paternal Aunt     No Known Problems Paternal Uncle     No Known Problems Maternal Grandmother     No Known Problems Maternal Grandfather     No Known Problems Paternal Grandmother     No Known Problems Paternal Grandfather     No Known Problems Son     Cancer Sister         lymph node, breast    No Known Problems Sister     No Known Problems Son     Amblyopia Neg Hx     Blindness Neg Hx     Cataracts Neg Hx     Glaucoma Neg Hx     Hypertension Neg Hx     Macular degeneration Neg Hx     Retinal detachment Neg Hx     Strabismus Neg Hx     Stroke Neg Hx     Thyroid disease Neg Hx     Prostate cancer Neg Hx     Kidney disease Neg Hx     Melanoma Neg Hx        Social History     Tobacco Use    Smoking status: Never    Smokeless tobacco: Never   Substance Use Topics    Alcohol use: Yes     Alcohol/week: 7.0 - 14.0 standard drinks     Types: 7 - 14 Glasses of wine per week    Drug use: No   Retired. Administrative business for [employer]  Wife malina Houser. He's taking care of her; He does the cooking.     Objective:       /72   Pulse 61   Ht 5' 11" (1.803 m)   Wt 94.9 kg (209 lb 3.5 " oz)   BMI 29.18 kg/m²   Was 208 lb 1.8 oz on 10/18/22  Physical Exam   Constitutional: He is oriented to person, place, and time. No distress.   HENT:   Head: Normocephalic and atraumatic.   Mouth/Throat: Oropharynx is clear and moist. No oropharyngeal exudate.   No parotidomegaly;   Temporal arteries with good pulsations.   No temporal artery tenderness;   No scalp tenderness.  No oral ulcers;   Head: TA ok wo TTP;  Eyes: Pupils are equal, round, and reactive to light. Conjunctivae are normal. No scleral icterus.   Neck: No JVD present. No tracheal deviation present. No thyromegaly present.   Cardiovascular: Normal rate, regular rhythm and normal heart sounds. Exam reveals no gallop and no friction rub.   No murmur heard.  Pulmonary/Chest: Effort normal and breath sounds normal. No respiratory distress. He has no wheezes. He has no rales. He exhibits no tenderness.   Pulmonary Comments: Bibasilar rales cleared with deep breathing.   Abdominal: Soft. Bowel sounds are normal. He exhibits no distension and no mass. There is no splenomegaly or hepatomegaly. There is no abdominal tenderness. There is no rebound and no guarding.   Musculoskeletal:         General: No tenderness.      Right lower leg: Edema present.      Left lower leg: Edema present.      Comments: No synovitis anywhere.  Shoulders w FROM.  Exam of wrists w bilateral decrease in flexion and extension; No TTP; no visible or palpable SHT.  Wearing L boot.   Lymphadenopathy:     He has no cervical adenopathy.   Neurological: He is alert and oriented to person, place, and time. He has normal reflexes. No cranial nerve deficit.   Motor strength: 5/5 prox & distal.   Skin: Skin is warm and dry. No rash noted.   Bilateral impressive venous varicosities lower extremities  Thickened toe nails c/w onychomycosis   Psychiatric: His behavior is normal. Mood, memory, affect and judgment normal.   Vitals reviewed.    12/19/22: ESR 5 (23); CRP 9.0; CBC ok; CMP ok;    7/7/22: ESR 15 (23); CRP 15.2; CBC ok; CMP glu 126;   5/12/22: ESR: 15 (23); CRP 0.8; CBC ok; CMP glu 128  4/13/22: ESR 20 (23);UA 5.4  3/31/22: CPK 47;   3/30/22: ESR 87 (23); CRP 42.5; CBC Hg 12.7; CMP BUN 24; glu 135; Alb 2.8; RF neg;  UA 5.9;     12/2/22: L foot: personally viewed: Minimally displaced fragment at the lateral base of the 5th proximal phalanx, age indeterminate and possibly remote; scattered mild foot degenerative change with small calcaneal enthesophyte at the distal Achilles insertion.  3/31/22: LSpine: personally viewed: dextroconvex Lspine; DDD verona L3-4; lower facet arthropathy; gr 1 anterolisthesis L4-5; vascular calcifications.  2/23/22: Bilateral hips: personally viewed: bilateral OA; also read as impingement.   3/15/21: Bilateral knees: personally viewed: bowen mjsn; mild varus bowen  8/26/19: L wrist: personally viewed: mild OA;   Assessment:   PMR   Shoulder/hip pain/stiffness--No GCA signs/sxs   Elevated inflammatory parameters:     ESR 87 (23); CRP 42.5; Hg 12.7; alb 2.8;    Celebrex somewhat helpful.   Dramatic response to prednisone 10 mg bid begun 4/4/22   Last w wrist pain/stiffness improving as day progresses    Doubt sero neg RA--but always in differential    On prednisone 10 mg/d.    Gout by hx   Podagra by hx--several episodes.    Knee swelling   Hyperuricemia maxed 7.9; last 5.4;    Some ETOH; recently stopped   On allopurinol 100 mg/d since ~ 2 yrs    OA   Hips (w ZEYAD); knees; Lspine   L wrist minimal     CKD 2-3    DM II    HTN    HLD    Cardiac issues: SSS; cardiac arrythmia    S/P L 5th metatarsal fx       Plan:   Stay on prednisone 4 mg/d x 2 weeks.  Then alternate 2.5 mg with 5 mg x 2 weeks  Labs in ~ 1 month  Contact us after that.  Stay on allopurinol 200 mg/d.   RTC 3 months.

## 2023-01-19 ENCOUNTER — PATIENT MESSAGE (OUTPATIENT)
Dept: PODIATRY | Facility: CLINIC | Age: 78
End: 2023-01-19

## 2023-01-19 ENCOUNTER — OFFICE VISIT (OUTPATIENT)
Dept: PODIATRY | Facility: CLINIC | Age: 78
End: 2023-01-19
Payer: MEDICARE

## 2023-01-19 ENCOUNTER — HOSPITAL ENCOUNTER (OUTPATIENT)
Dept: RADIOLOGY | Facility: HOSPITAL | Age: 78
Discharge: HOME | End: 2023-01-19
Attending: PODIATRIST
Payer: MEDICARE

## 2023-01-19 VITALS
HEART RATE: 61 BPM | SYSTOLIC BLOOD PRESSURE: 120 MMHG | BODY MASS INDEX: 29.26 KG/M2 | HEIGHT: 71 IN | WEIGHT: 209 LBS | DIASTOLIC BLOOD PRESSURE: 68 MMHG

## 2023-01-19 DIAGNOSIS — S92.352D CLOSED FRACTURE OF FIFTH METATARSAL BONE OF LEFT FOOT WITH ROUTINE HEALING, SUBSEQUENT ENCOUNTER: Primary | ICD-10-CM

## 2023-01-19 DIAGNOSIS — S92.352A FRACTURE OF BASE OF FIFTH METATARSAL BONE OF LEFT FOOT, CLOSED, INITIAL ENCOUNTER: ICD-10-CM

## 2023-01-19 PROCEDURE — 3078F DIAST BP <80 MM HG: CPT | Mod: CPTII,S$GLB,, | Performed by: PODIATRIST

## 2023-01-19 PROCEDURE — 73630 X-RAY EXAM OF FOOT: CPT | Mod: TC,PN,LT

## 2023-01-19 PROCEDURE — 73630 XR FOOT COMPLETE 3 VIEW LEFT: ICD-10-PCS | Mod: 26,LT,, | Performed by: RADIOLOGY

## 2023-01-19 PROCEDURE — 1160F RVW MEDS BY RX/DR IN RCRD: CPT | Mod: CPTII,S$GLB,, | Performed by: PODIATRIST

## 2023-01-19 PROCEDURE — 1159F MED LIST DOCD IN RCRD: CPT | Mod: CPTII,S$GLB,, | Performed by: PODIATRIST

## 2023-01-19 PROCEDURE — 99213 OFFICE O/P EST LOW 20 MIN: CPT | Mod: S$GLB,,, | Performed by: PODIATRIST

## 2023-01-19 PROCEDURE — 99213 PR OFFICE/OUTPT VISIT, EST, LEVL III, 20-29 MIN: ICD-10-PCS | Mod: S$GLB,,, | Performed by: PODIATRIST

## 2023-01-19 PROCEDURE — 1126F PR PAIN SEVERITY QUANTIFIED, NO PAIN PRESENT: ICD-10-PCS | Mod: CPTII,S$GLB,, | Performed by: PODIATRIST

## 2023-01-19 PROCEDURE — 1160F PR REVIEW ALL MEDS BY PRESCRIBER/CLIN PHARMACIST DOCUMENTED: ICD-10-PCS | Mod: CPTII,S$GLB,, | Performed by: PODIATRIST

## 2023-01-19 PROCEDURE — 3074F PR MOST RECENT SYSTOLIC BLOOD PRESSURE < 130 MM HG: ICD-10-PCS | Mod: CPTII,S$GLB,, | Performed by: PODIATRIST

## 2023-01-19 PROCEDURE — 1159F PR MEDICATION LIST DOCUMENTED IN MEDICAL RECORD: ICD-10-PCS | Mod: CPTII,S$GLB,, | Performed by: PODIATRIST

## 2023-01-19 PROCEDURE — 1101F PR PT FALLS ASSESS DOC 0-1 FALLS W/OUT INJ PAST YR: ICD-10-PCS | Mod: CPTII,S$GLB,, | Performed by: PODIATRIST

## 2023-01-19 PROCEDURE — 99999 PR PBB SHADOW E&M-EST. PATIENT-LVL III: CPT | Mod: PBBFAC,,, | Performed by: PODIATRIST

## 2023-01-19 PROCEDURE — 1126F AMNT PAIN NOTED NONE PRSNT: CPT | Mod: CPTII,S$GLB,, | Performed by: PODIATRIST

## 2023-01-19 PROCEDURE — 99999 PR PBB SHADOW E&M-EST. PATIENT-LVL III: ICD-10-PCS | Mod: PBBFAC,,, | Performed by: PODIATRIST

## 2023-01-19 PROCEDURE — 3078F PR MOST RECENT DIASTOLIC BLOOD PRESSURE < 80 MM HG: ICD-10-PCS | Mod: CPTII,S$GLB,, | Performed by: PODIATRIST

## 2023-01-19 PROCEDURE — 1101F PT FALLS ASSESS-DOCD LE1/YR: CPT | Mod: CPTII,S$GLB,, | Performed by: PODIATRIST

## 2023-01-19 PROCEDURE — 73630 X-RAY EXAM OF FOOT: CPT | Mod: 26,LT,, | Performed by: RADIOLOGY

## 2023-01-19 PROCEDURE — 3074F SYST BP LT 130 MM HG: CPT | Mod: CPTII,S$GLB,, | Performed by: PODIATRIST

## 2023-01-19 PROCEDURE — 3072F LOW RISK FOR RETINOPATHY: CPT | Mod: CPTII,S$GLB,, | Performed by: PODIATRIST

## 2023-01-19 PROCEDURE — 3288F PR FALLS RISK ASSESSMENT DOCUMENTED: ICD-10-PCS | Mod: CPTII,S$GLB,, | Performed by: PODIATRIST

## 2023-01-19 PROCEDURE — 3072F PR LOW RISK FOR RETINOPATHY: ICD-10-PCS | Mod: CPTII,S$GLB,, | Performed by: PODIATRIST

## 2023-01-19 PROCEDURE — 3288F FALL RISK ASSESSMENT DOCD: CPT | Mod: CPTII,S$GLB,, | Performed by: PODIATRIST

## 2023-01-19 NOTE — PROGRESS NOTES
"Subjective:      Patient ID: Shannan Norman is a 77 y.o. male.    Chief Complaint: Foot Pain (Left foot)    Follow-up for left 5th metatarsal base avulsion fracture x6 weeks treated with tall cam boot.  Patient has been compliant wearing the boot and reports no discomfort while wearing the boot and minimal discomfort when he takes steps without it at home.  He is avoided the previous activity consisting of yoga which initially caused the injury.  No other complaints noted today.    Vitals:    01/19/23 1038   BP: 120/68   Pulse: 61   Weight: 94.8 kg (209 lb)   Height: 5' 11" (1.803 m)   PainSc: 0-No pain      Past Medical History:   Diagnosis Date    Allergy     Arthritis     Atrial fibrillation     Atrial flutter 2011    ablation    Basal cell carcinoma     Cancer     Cataract     CKD (chronic kidney disease) stage 3, GFR 30-59 ml/min 8/13/2019    Diabetes mellitus     Dry eye syndrome     Gout, unspecified     High cholesterol     History of shingles     Hypertension     Melanoma     Seizures        Past Surgical History:   Procedure Laterality Date    ABLATION N/A 9/11/2018    Procedure: ABLATION;  Surgeon: Hany Sanchez MD;  Location: [facility] CATH LAB;  Service: Cardiology;  Laterality: N/A;  AFL, ISABELLE, RFA, ELODIA, MAC, DM, 3 PREP    ABLATION OF DYSRHYTHMIC FOCUS      ADENOIDECTOMY      CARDIAC PACEMAKER PLACEMENT      no defib    CATARACT EXTRACTION W/  INTRAOCULAR LENS IMPLANT Right 7/28/2020    Procedure: EXTRACTION, CATARACT, WITH IOL INSERTION;  Surgeon: Andrés Morse MD;  Location: [facility], [place] OR;  Service: Ophthalmology;  Laterality: Right;    CATARACT EXTRACTION W/  INTRAOCULAR LENS IMPLANT Left 8/11/2020    Procedure: EXTRACTION, CATARACT, WITH IOL INSERTION;  Surgeon: Andrés Morse MD;  Location: [facility], [place] OR;  Service: Ophthalmology;  Laterality: Left;    COLONOSCOPY N/A 1/2/2019    Procedure: COLONOSCOPY Suprep;  Surgeon: Cindy Solorzano MD;  Location: [facility] ENDO;  Service: Endoscopy;  " Laterality: N/A;    GANGLION CYST EXCISION      left neck    HERNIA REPAIR  2013    umbilical    TENDON REPAIR Right     wrist    TONSILLECTOMY      varicose veins      several sx  bilateral    VASECTOMY      VEIN SURGERY         Family History   Problem Relation Age of Onset    Diabetes Mother     COPD Father     Heart disease Father     Arthritis Sister     Heart attack Brother     Heart disease Brother     Diabetes Brother     No Known Problems Maternal Aunt     No Known Problems Maternal Uncle     No Known Problems Paternal Aunt     No Known Problems Paternal Uncle     No Known Problems Maternal Grandmother     No Known Problems Maternal Grandfather     No Known Problems Paternal Grandmother     No Known Problems Paternal Grandfather     No Known Problems Son     Cancer Sister         lymph node, breast    No Known Problems Sister     No Known Problems Son     Amblyopia Neg Hx     Blindness Neg Hx     Cataracts Neg Hx     Glaucoma Neg Hx     Hypertension Neg Hx     Macular degeneration Neg Hx     Retinal detachment Neg Hx     Strabismus Neg Hx     Stroke Neg Hx     Thyroid disease Neg Hx     Prostate cancer Neg Hx     Kidney disease Neg Hx     Melanoma Neg Hx        Social History     Socioeconomic History    Marital status:    Occupational History     Employer: [employer]   Tobacco Use    Smoking status: Never    Smokeless tobacco: Never   Substance and Sexual Activity    Alcohol use: Yes     Alcohol/week: 7.0 - 14.0 standard drinks     Types: 7 - 14 Glasses of wine per week    Drug use: No    Sexual activity: Yes     Partners: Female     Social Determinants of Health     Financial Resource Strain: Low Risk     Difficulty of Paying Living Expenses: Not hard at all   Food Insecurity: No Food Insecurity    Worried About Running Out of Food in the Last Year: Never true    Ran Out of Food in the Last Year: Never true   Transportation Needs: No Transportation Needs    Lack of Transportation (Medical):  No    Lack of Transportation (Non-Medical): No   Physical Activity: Sufficiently Active    Days of Exercise per Week: 6 days    Minutes of Exercise per Session: 30 min   Stress: No Stress Concern Present    Feeling of Stress : Only a little   Social Connections: Unknown    Frequency of Communication with Friends and Family: More than three times a week    Frequency of Social Gatherings with Friends and Family: More than three times a week    Active Member of Clubs or Organizations: Yes    Attends Club or Organization Meetings: More than 4 times per year    Marital Status:    Housing Stability: Low Risk     Unable to Pay for Housing in the Last Year: No    Number of Places Lived in the Last Year: 1    Unstable Housing in the Last Year: No       Current Outpatient Medications   Medication Sig Dispense Refill    allopurinoL (ZYLOPRIM) 100 MG tablet Take 1 tablet (100 mg total) by mouth once daily. 90 tablet 0    atorvastatin (LIPITOR) 40 MG tablet Take 1 tablet (40 mg total) by mouth once daily. 90 tablet 3    benazepriL (LOTENSIN) 20 MG tablet Take 1 tablet (20 mg total) by mouth once daily. 90 tablet 3    cycloSPORINE (RESTASIS) 0.05 % ophthalmic emulsion Place 1 drop into both eyes 2 (two) times daily. 60 each 11    ferrous sulfate (FEOSOL) 325 mg (65 mg iron) Tab tablet Take 325 mg by mouth once daily.      GLUCOSAMINE HCL/CHONDR VASQUEZ A NA (GLUCOSAMINE-CHONDROITIN) 750-600 mg Tab Take 2 tablets by mouth Daily.      levocetirizine (XYZAL) 5 MG tablet Take 1 tablet (5 mg total) by mouth every evening. 90 tablet 1    multivitamin capsule Take 1 capsule by mouth nightly.       predniSONE (DELTASONE) 2.5 MG tablet Take 1 tablet (2.5 mg total) by mouth 2 (two) times daily. 60 tablet 1    predniSONE (DELTASONE) 5 MG tablet Take 1 tablet (5 mg total) by mouth once daily. 180 tablet 0    rivaroxaban (XARELTO) 20 mg Tab Take 1 tablet (20 mg total) by mouth daily with dinner or evening meal. 90 tablet 3    tamsulosin  (FLOMAX) 0.4 mg Cap TAKE 1 CAPSULE BY MOUTH AFTER DINNER 90 capsule 3     No current facility-administered medications for this visit.       Review of patient's allergies indicates:  No Known Allergies      Review of Systems   Constitutional: Negative for chills, fever and malaise/fatigue.   Cardiovascular:  Negative for chest pain, claudication and leg swelling.   Respiratory:  Negative for cough and shortness of breath.    Musculoskeletal:  Negative for back pain, joint pain, muscle cramps and muscle weakness.   Gastrointestinal:  Negative for nausea and vomiting.   Neurological:  Negative for numbness, paresthesias and weakness.   Psychiatric/Behavioral:  Negative for altered mental status.          Objective:      Physical Exam  Constitutional:       General: He is not in acute distress.     Appearance: Normal appearance. He is not ill-appearing.   Cardiovascular:      Pulses:           Dorsalis pedis pulses are 2+ on the left side.        Posterior tibial pulses are 2+ on the left side.      Comments: Mild nonpitting edema to lower extremity bilateral multiple varicosities.  No rubor on dependency bilateral foot.  Skin temp is warm to foot bilateral.  Musculoskeletal:      Comments: Slight to mild pain on palpation overlying the base of 5th metatarsal in the region of the styloid process left foot.  No pain with active resisted eversion the neutral position or with midtarsal joint range of motion left foot.  No localized edema, warmth for discoloration overlying base of the 5th metatarsal left foot.    Flexible cavus foot structure bilateral foot.   Skin:     General: Skin is warm.      Capillary Refill: Capillary refill takes less than 2 seconds.      Findings: No ecchymosis or erythema.      Nails: There is no clubbing.   Neurological:      Mental Status: He is alert and oriented to person, place, and time.      Sensory: Sensation is intact.      Motor: Motor function is intact.             Assessment:        Encounter Diagnosis   Name Primary?    Closed fracture of fifth metatarsal bone of left foot with routine healing, subsequent encounter Yes         Plan:       Shannan was seen today for foot pain.    Diagnoses and all orders for this visit:    Closed fracture of fifth metatarsal bone of left foot with routine healing, subsequent encounter  -     X-Ray Foot Complete Left; Future      I counseled the patient on his conditions, their implications and medical management.    Reviewed left foot x-ray noting small avulsion fracture from the lateral base of the 5th metatarsal at the styloid process with maintained prior alignment and minimal trabeculation.  Less than 2 mm of displacement noted.    We discussed continued conservative care with the tall cam boot which restricts the use of the peroneus brevis and allow the area to heal.  Also we discussed vitamin D3 supplementation in addition to 500 mg of calcium to be taken daily.  States his wife has a supplement at home and will verify would it is.  Refused prescription today.    In addition we discussed augmenting bone healing with an exogen bone stimulator if there was insufficient bone healing noted at 12 weeks and the patient remained symptomatic.  Also we discussed surgical intervention consisting of excising the fragment in the future if it does not heal and he continues to be symptomatic.    RTC within 4 weeks or p.r.n. as discussed.    Assisted by Brady Golden DPM PGY 3    A portion of this note was generated by voice recognition software and may contain spelling and grammar errors.  .

## 2023-01-25 ENCOUNTER — OFFICE VISIT (OUTPATIENT)
Dept: NEUROLOGY | Facility: CLINIC | Age: 78
End: 2023-01-25
Payer: MEDICARE

## 2023-01-25 VITALS — BODY MASS INDEX: 27.55 KG/M2 | WEIGHT: 207.88 LBS | HEIGHT: 73 IN

## 2023-01-25 DIAGNOSIS — E11.49 TYPE 2 DIABETES MELLITUS WITH OTHER NEUROLOGIC COMPLICATION, WITHOUT LONG-TERM CURRENT USE OF INSULIN: ICD-10-CM

## 2023-01-25 DIAGNOSIS — R41.89 SUBJECTIVE COGNITIVE IMPAIRMENT: Primary | ICD-10-CM

## 2023-01-25 DIAGNOSIS — E46 PROTEIN-CALORIE MALNUTRITION, UNSPECIFIED SEVERITY: ICD-10-CM

## 2023-01-25 DIAGNOSIS — T88.7XXA MEDICATION SIDE EFFECT: ICD-10-CM

## 2023-01-25 DIAGNOSIS — G20.C PARKINSONISM, UNSPECIFIED PARKINSONISM TYPE: ICD-10-CM

## 2023-01-25 DIAGNOSIS — R41.840 ATTENTION AND CONCENTRATION DEFICIT: ICD-10-CM

## 2023-01-25 PROCEDURE — 96132 PR NEUROPSYCHOLOGIC TEST EVAL SVCS, 1ST HR: ICD-10-PCS | Mod: 59,S$GLB,, | Performed by: PSYCHIATRY & NEUROLOGY

## 2023-01-25 PROCEDURE — 1126F PR PAIN SEVERITY QUANTIFIED, NO PAIN PRESENT: ICD-10-PCS | Mod: CPTII,S$GLB,, | Performed by: PSYCHIATRY & NEUROLOGY

## 2023-01-25 PROCEDURE — 1101F PT FALLS ASSESS-DOCD LE1/YR: CPT | Mod: CPTII,S$GLB,, | Performed by: PSYCHIATRY & NEUROLOGY

## 2023-01-25 PROCEDURE — 99417 PROLNG OP E/M EACH 15 MIN: CPT | Mod: S$GLB,,, | Performed by: PSYCHIATRY & NEUROLOGY

## 2023-01-25 PROCEDURE — 3072F PR LOW RISK FOR RETINOPATHY: ICD-10-PCS | Mod: CPTII,S$GLB,, | Performed by: PSYCHIATRY & NEUROLOGY

## 2023-01-25 PROCEDURE — 99999 PR PBB SHADOW E&M-EST. PATIENT-LVL III: CPT | Mod: PBBFAC,,, | Performed by: PSYCHIATRY & NEUROLOGY

## 2023-01-25 PROCEDURE — 1160F RVW MEDS BY RX/DR IN RCRD: CPT | Mod: CPTII,S$GLB,, | Performed by: PSYCHIATRY & NEUROLOGY

## 2023-01-25 PROCEDURE — 1101F PR PT FALLS ASSESS DOC 0-1 FALLS W/OUT INJ PAST YR: ICD-10-PCS | Mod: CPTII,S$GLB,, | Performed by: PSYCHIATRY & NEUROLOGY

## 2023-01-25 PROCEDURE — 1159F PR MEDICATION LIST DOCUMENTED IN MEDICAL RECORD: ICD-10-PCS | Mod: CPTII,S$GLB,, | Performed by: PSYCHIATRY & NEUROLOGY

## 2023-01-25 PROCEDURE — 96116 PR NEUROBEHAVIORAL STATUS EXAM BY PSYCH/PHYS: ICD-10-PCS | Mod: 59,S$GLB,, | Performed by: PSYCHIATRY & NEUROLOGY

## 2023-01-25 PROCEDURE — 3072F LOW RISK FOR RETINOPATHY: CPT | Mod: CPTII,S$GLB,, | Performed by: PSYCHIATRY & NEUROLOGY

## 2023-01-25 PROCEDURE — 3288F PR FALLS RISK ASSESSMENT DOCUMENTED: ICD-10-PCS | Mod: CPTII,S$GLB,, | Performed by: PSYCHIATRY & NEUROLOGY

## 2023-01-25 PROCEDURE — 1160F PR REVIEW ALL MEDS BY PRESCRIBER/CLIN PHARMACIST DOCUMENTED: ICD-10-PCS | Mod: CPTII,S$GLB,, | Performed by: PSYCHIATRY & NEUROLOGY

## 2023-01-25 PROCEDURE — 99215 OFFICE O/P EST HI 40 MIN: CPT | Mod: S$GLB,,, | Performed by: PSYCHIATRY & NEUROLOGY

## 2023-01-25 PROCEDURE — 99999 PR PBB SHADOW E&M-EST. PATIENT-LVL III: ICD-10-PCS | Mod: PBBFAC,,, | Performed by: PSYCHIATRY & NEUROLOGY

## 2023-01-25 PROCEDURE — 1126F AMNT PAIN NOTED NONE PRSNT: CPT | Mod: CPTII,S$GLB,, | Performed by: PSYCHIATRY & NEUROLOGY

## 2023-01-25 PROCEDURE — 96132 NRPSYC TST EVAL PHYS/QHP 1ST: CPT | Mod: 59,S$GLB,, | Performed by: PSYCHIATRY & NEUROLOGY

## 2023-01-25 PROCEDURE — 99215 PR OFFICE/OUTPT VISIT, EST, LEVL V, 40-54 MIN: ICD-10-PCS | Mod: S$GLB,,, | Performed by: PSYCHIATRY & NEUROLOGY

## 2023-01-25 PROCEDURE — 99417 PR PROLONGED SVC, OUTPT, W/WO DIRECT PT CONTACT,  EA ADDTL 15 MIN: ICD-10-PCS | Mod: S$GLB,,, | Performed by: PSYCHIATRY & NEUROLOGY

## 2023-01-25 PROCEDURE — 96116 NUBHVL XM PHYS/QHP 1ST HR: CPT | Mod: 59,S$GLB,, | Performed by: PSYCHIATRY & NEUROLOGY

## 2023-01-25 PROCEDURE — 1159F MED LIST DOCD IN RCRD: CPT | Mod: CPTII,S$GLB,, | Performed by: PSYCHIATRY & NEUROLOGY

## 2023-01-25 PROCEDURE — 3288F FALL RISK ASSESSMENT DOCD: CPT | Mod: CPTII,S$GLB,, | Performed by: PSYCHIATRY & NEUROLOGY

## 2023-01-25 RX ORDER — LYSINE HCL 500 MG
1 TABLET ORAL ONCE
COMMUNITY
End: 2023-07-25

## 2023-01-25 NOTE — PROGRESS NOTES
"[facility]  [facility]     PATIENT: Shannan Norman  VISIT DATE: 2023  MRN: [id number]  PRIMARY PROVIDER: Nitin Banks MD  : 1945       Chief complaint: Progressive Cognitive Impairment     History of present illness:      Mr. Norman is a 77-year-old right-handed male who presents today to the [facility]'s [facility] due to concerns related to Progressive Cognitive Impairment.  Mr. Norman is accompanied by no one.  Additional information is obtained by reviewing available medical records.     Relevant Background/Context  Known Relevant Family history:  Mother -  at age 83 CAD s/p CABG  Father -  at age 72 CAD/MI/COPD/Tobacco use  Neurocognitive Disorder:  Father - MCI onset 70s  Mother - MCI onset 80s  Sister - DLB alive 87, onset early 80s  Movement Disorder:  Sister - DLB onset early 80s  Motorneuron Disorder:  The patient/family denies a history of ALS, MND, PLS.  Developmental Disorder:  The patient/family denies a history of Dyslexia, ADHD, ASD.  Psychiatric Disorder:  Sisters - "issues"  Known Relevant Genetics:  There is no known relevant genetic testing available.  Developmental Milestones:  The patient/family report no known birth complications or early life problems. The patient met all developmental milestones.  Education/Learning Capacity:  The patient/family report no signs or symptoms suggestive of developmental learning disorder.  HS  BA. + 5 years economics  Estimated Educational Experience: 17 years of formal education.  Social History:  Patient lives with his wife for whom he is the primary caregiver. She is stage 7 late onset amnestic dementia. Patient continues to volunteer in his community is involved in multiple social programs volunteering groups. Patient lives in a senior community adjacent to a gym for which patient gets 30 minutes of cardiovascular exercise 3 times week and 3 times week " he additionally gets 30-45 minutes of yoga.  Career/Skill Reserve:  Patient has a long productive career in various capacities. Patient started out his career as an  /  working in various large [employer]. 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 Since then patient has been volunteering for various nonprofit organizations  Retired/Quit: 2000     Neurocognitive Disorder Features  Onset/Duration:  Jun 2022 (~7-month)  First Symptom:  Attention impairment  Progression:  Step-wise Progressive  Clinical Course:  Neurologist (03/21/2022)  Type: Chart Review. Shannan Norman is a R HANDED 76 y. O. Male with a medical issues significant for HTN, gout, DMII, CKDIII, BPH, SSS, and osteoarthritis of bilateral hips. Accompanied by his son. Noticed stiffness in his bilateral hips. Not affecting his balance. Scheduled to see rheumatology in June. Has trouble lifting his arms above his shoulders, this started 2-3 weeks ago when he was having pain in his knees and hips. He was told that he needed to see neurology by orthopedic. Thought he had parkinson's. Some days he is able to lift his arms above his shoulders and some days he is not able to. Episodic pain in hips, lumbar spine and knees as well. No falls. Some shuffling according to his son this past weekend but now has noticed that he is back to normal. He was not able to lift his arms above his waist on Sunday or Monday. He denies tremors but his son has noticed a tremor whenever he holds a plate -- he attributes to reduced strength. He only feels like he has slowed down whenever he has episodes of weakness. Intermittent tightness in his calves -- he said that this is not a good sign. He was completely normal until about a month ago when he started having severe shoulder and hip pain. Denies episodic eye pain or blurred vision. Denies sudden vision loss. ESR and CRP  recently elevated. ESR 87 and CRP 42. 5. - history concerning for rheumatologic process --- possibly polymyalgia rheumatica in setting of pelvic and shoulder pain, episodic. - elevated CRP and ESR. - states that when this pain occurs he is also weak but unclear if this is true weakness or just immense pain. - denies history concerning for GCA. - scheduled to see rheumatology in June-- will see if there is something we can do to expedite appointment. - no parkinsonism on exam today -- stiffness if NOT episodic in PD, also unable to appreciate cogwheel rigidity even with diversion.  Primary Care Provider (11/15/2022)  Type: Chart Review. 77 years old male came to the clinic for high blood pressure check. Blood pressure today was stable. No chest pain, palpitation, orthopnea or PND. The patient previously diagnosed with PMR he is currently taking prednisone 5 mg daily. The patient reports problems with concentration and memory sometimes.     Current Presentation  Recent/Interim History:  The patient presents alone as such history is limited at this time. Additional history gathered from review of previous medical records. The patient reports being in her normal state health up until early 2022. During this time the patient began reporting painful stiffness having difficulty getting out of the bed with bilateral shoulder pain hip pain. Report this to worsened throughout the day was very bad at night. He was seen by a movement specialist. At this time the patient has lost 20 lb and mild change in voice was treating with over-the-counter anti-inflammatories prescribed Celebrex. Diagnosis was thought to be rheumatological. The patient was referred to Rheumatology. The patient was diagnosed with polymyalgia rheumatica in April of 2022 and started on steroids. Over the following 8 months patient's hemoglobin A1c would gradually rise the patient's resting fasting glucose during the daytime would state largely within normal  limits. During this same timeframe beginning in approximately June to July of 2022 the patient began reporting increasing concentration and attention deficits with prominent train of thought deficits. The patient described difficulty focusing concentrating on certain tasks including forgetting what he was doing mid task. This has gradually waxed and waned over the last 7 months however is not interfering with any instrumental activities of daily living. The patient denies any his family or friends have made comment on his memory or multitasking skills. The patient remains active in his community involved in multiple social groups is retired in his taking care of his wife with severe late onset amnestic dementia. The patient reports a relevant his family history of late onset Lewy body disease in his older sister. The patient denies any significant visual spatial problems, behavioral problems, language problems, or constitutional problems. The patient does report bothersome sleep disorder in part related to his need to care for his wife who is having difficulty maintaining restful sleep at in the evening. Primary questions posed by the patient today is measures to improve longevity given his need to care for his wife.  Unresolved Concern(s) reported by patient/family:  Atrial pacemaker - do not recommend donepezil  Family history of Lewy body dementia     Review of cognitive, visuospatial, motor, sensory, and behavioral systems:     Memory:   The patient's memory has worsened in the past few years.  He does repeat statements or asks the same question repeatedly.  He does not have difficulty remembering recent important conversations.  He does not have difficulty remembering recent events.  He does not forget information within minutes.  His recent retrograde memory is intact.  His remote memory is intact.  Attention:   The patient's attention and concentration are impaired.  He does not have attentional  fluctuations.  He does not have difficulty maintaining selective attention.  He does become easily distracted.  He does have difficulty with divided attention.  Executive:   The patient's cognitive processing speed is slower.  He does have difficulty with working memory.  He does not misplace personal items (e.g., keys, cell phone, wallet) more frequently.  He does not have difficulty keeping track of his medications.  He does not have difficulty with planning/organizing/completing multistep tasks.  He does have not difficulty with executive attention.  He does have difficulty with flexible thinking.  He does not have difficulty with response inhibition.  He denies new impulsivity or rash/careless actions.  His judgment is intact.  Language:   The patient's speech is not affected.  He does not forget people's names more frequently.  He does not have word-finding difficulties.  His speech is fluent and non-effortful.  His speech is grammatically intact.  He does not make word substitutions.  He does not have difficulty reading.  He does not appear to have impaired comprehension.  Visuospatial:   The patient does not have new visuospatial difficulty.  He does not become confused or disoriented in *new*, unfamiliar places.  He does not have trouble with navigation.  He does not get lost in familiar places.  He does not have visuospatial disorientation.  He does not have difficulty recognizing objects or faces.  He denies problems with driving or parking.  Motor/Coordination:   The patient does not have difficulty with walking.  He does not feel imbalanced.  He denies having fallen.  He does not appear to have new muscle weakness.  He does not have difficulty buttoning shirts, operating zippers, or manipulating tools/utensils.  His handwriting has not become micrographic.  He does not have a resting tremor.  He denies having any new involuntary movements and/or muscle jerking.  He does not have swallowing  difficulty.  He denies new muscle cramps and twitching.  Sensory:   The patient denies new numbness, tingling, paresthesias, or pain.  The patient denies a loss of vision, blurry vision, or double vision.  The patient denies new loss of hearing or worsening tinnitus.  The patient denies anosmia.  Sleep:   The patient denies difficulty sleeping.  The patient does not have difficulty going to sleep.  The patient denies difficulty staying asleep or frequently awakening at night.  The patient does not snore or have witnessed apneas while sleeping.  When he wakes up in the morning, he does feel well-rested.  He denies dream-enactment behavior.  He denies symptoms suggestive of restless leg syndrome.  Behavior:   The patient's personality has not changed.  He does not have symptoms of disinhibition and social inappropriateness.  He does not have symptoms to suggest a loss of manners or decorum.  He does not appear apathetic or has decreased motivation.  He does not appear to have a change in inertia.  There is no report that The patient has had a change in their emotional expression.  He does not have emotional blunting or lability.  He does not have symptoms of irritability and mood lability.  He does not have symptoms of agitation, aggression, or violent outbursts.  His insight into his health and situation is intact.  His personal hygiene is intact.  He is not exhibiting a diminished response to other people's needs and feelings.  He is not exhibiting a diminished social interest, interrelatedness, or personal warmth.  He denies restlessness.  He denies new and/or worsening simple repetitive behaviors.  His speech has not become simplified or become repetitive/stereotyped.  He denies new/worsening complex repetitive/ritualistic compulsions and behaviors.  He does not have symptoms of hyper-religiosity or dogmatism.  His interests/pleasures have not become restrictive, simplified, interrupting, or repetitive.  He denies  a change of self-stimulating behavior.  He denies any changes in eating behavior.  He denies increased consumption of food or substances.  He denies oral exploration or consumption of inedible objects.  Psychiatric:   He does not feel depressed.  He is not exhibiting symptoms of social withdrawal/indifference.  He denies anxiety.  He does not exhibit cycling behavior.  He does not exhibit hyperactive behavior.  He is not exhibited symptoms of paranoia.  He does not have delusions.  He does not have hallucinations.  He does not have a history of sensitivity to neuroleptic/psychotropic medications.  Medical Review of Systems:   The patient does not have constipation.  The patient does not have urinary incontinence.  The patient denies orthostatic lightheadedness.  The patient's weight is stable.  Functional status:  Difficulty performing the following Instrumental ADLs:  Housekeeping: No  Food Preparation: No  Shopping: No  Ability to Handle Finances: No  Transportation/Driving: No  Household Appliances/Stove: No  Laundry: No  Difficulty performing the following Basic ADLs:  Dressing: No  Bathing: No  Toileting: No  Personal hygiene and grooming: No  Feeding: No  Care Management:  Patient/Family Safety Concerns:  Medication Adherence: No  Home Safety: No  Wandered: No  Firearms: No  Fall Risk: No  Home Alone: No          Past Medical History:   Diagnosis Date    Allergy     Arthritis     Atrial fibrillation     Atrial flutter 2011    ablation    Basal cell carcinoma     Cancer     Cataract     CKD (chronic kidney disease) stage 3, GFR 30-59 ml/min 8/13/2019    Diabetes mellitus     Dry eye syndrome     Gout, unspecified     High cholesterol     History of shingles     Hypertension     Melanoma     Seizures        Past Surgical History:   Procedure Laterality Date    ABLATION N/A 9/11/2018    Procedure: ABLATION;  Surgeon: Hany Sanchez MD;  Location: [facility] CATH LAB;  Service: Cardiology;  Laterality: N/A;  AFL, ISABELLE,  RFA, ELODIA, MAC, DM, 3 PREP    ABLATION OF DYSRHYTHMIC FOCUS      ADENOIDECTOMY      CARDIAC PACEMAKER PLACEMENT      no defib    CATARACT EXTRACTION W/  INTRAOCULAR LENS IMPLANT Right 7/28/2020    Procedure: EXTRACTION, CATARACT, WITH IOL INSERTION;  Surgeon: Andrés Morse MD;  Location: [facility] OR;  Service: Ophthalmology;  Laterality: Right;    CATARACT EXTRACTION W/  INTRAOCULAR LENS IMPLANT Left 8/11/2020    Procedure: EXTRACTION, CATARACT, WITH IOL INSERTION;  Surgeon: Andrés Morse MD;  Location: [facility] OR;  Service: Ophthalmology;  Laterality: Left;    COLONOSCOPY N/A 1/2/2019    Procedure: COLONOSCOPY Suprep;  Surgeon: Cindy Solorzano MD;  Location: [facility] ENDO;  Service: Endoscopy;  Laterality: N/A;    GANGLION CYST EXCISION      left neck    HERNIA REPAIR  2013    umbilical    TENDON REPAIR Right     wrist    TONSILLECTOMY      varicose veins      several sx  bilateral    VASECTOMY      VEIN SURGERY         Family History   Problem Relation Age of Onset    Diabetes Mother     COPD Father     Heart disease Father     Arthritis Sister     Heart attack Brother     Heart disease Brother     Diabetes Brother     No Known Problems Maternal Aunt     No Known Problems Maternal Uncle     No Known Problems Paternal Aunt     No Known Problems Paternal Uncle     No Known Problems Maternal Grandmother     No Known Problems Maternal Grandfather     No Known Problems Paternal Grandmother     No Known Problems Paternal Grandfather     No Known Problems Son     Cancer Sister         lymph node, breast    No Known Problems Sister     No Known Problems Son     Amblyopia Neg Hx     Blindness Neg Hx     Cataracts Neg Hx     Glaucoma Neg Hx     Hypertension Neg Hx     Macular degeneration Neg Hx     Retinal detachment Neg Hx     Strabismus Neg Hx     Stroke Neg Hx     Thyroid disease Neg Hx     Prostate cancer Neg Hx     Kidney disease Neg Hx     Melanoma Neg Hx        Social History     Socioeconomic History     Marital status:    Occupational History     Employer: [employer]   Tobacco Use    Smoking status: Never    Smokeless tobacco: Never   Substance and Sexual Activity    Alcohol use: Yes     Alcohol/week: 7.0 - 14.0 standard drinks     Types: 7 - 14 Glasses of wine per week    Drug use: No    Sexual activity: Yes     Partners: Female     Social Determinants of Health     Financial Resource Strain: Low Risk     Difficulty of Paying Living Expenses: Not hard at all   Food Insecurity: No Food Insecurity    Worried About Running Out of Food in the Last Year: Never true    Ran Out of Food in the Last Year: Never true   Transportation Needs: No Transportation Needs    Lack of Transportation (Medical): No    Lack of Transportation (Non-Medical): No   Physical Activity: Sufficiently Active    Days of Exercise per Week: 6 days    Minutes of Exercise per Session: 30 min   Stress: No Stress Concern Present    Feeling of Stress : Only a little   Social Connections: Unknown    Frequency of Communication with Friends and Family: More than three times a week    Frequency of Social Gatherings with Friends and Family: More than three times a week    Active Member of Clubs or Organizations: Yes    Attends Club or Organization Meetings: More than 4 times per year    Marital Status:    Housing Stability: Low Risk     Unable to Pay for Housing in the Last Year: No    Number of Places Lived in the Last Year: 1    Unstable Housing in the Last Year: No       Medication:     Current Outpatient Medications on File Prior to Visit   Medication Sig Dispense Refill    calcium carbonate-vit D3-min 600 mg calcium- 400 unit Tab Take 1 tablet by mouth once.      allopurinoL (ZYLOPRIM) 100 MG tablet Take 1 tablet (100 mg total) by mouth once daily. 90 tablet 0    atorvastatin (LIPITOR) 40 MG tablet Take 1 tablet (40 mg total) by mouth once daily. 90 tablet 3    benazepriL (LOTENSIN) 20 MG tablet Take 1 tablet (20 mg total) by mouth  once daily. 90 tablet 3    cycloSPORINE (RESTASIS) 0.05 % ophthalmic emulsion Place 1 drop into both eyes 2 (two) times daily. 60 each 11    ferrous sulfate (FEOSOL) 325 mg (65 mg iron) Tab tablet Take 325 mg by mouth once daily.      GLUCOSAMINE HCL/CHONDR VASQUEZ A NA (GLUCOSAMINE-CHONDROITIN) 750-600 mg Tab Take 2 tablets by mouth Daily.      levocetirizine (XYZAL) 5 MG tablet Take 1 tablet (5 mg total) by mouth every evening. 90 tablet 1    multivitamin capsule Take 1 capsule by mouth nightly.       predniSONE (DELTASONE) 2.5 MG tablet Take 1 tablet (2.5 mg total) by mouth 2 (two) times daily. 60 tablet 1    predniSONE (DELTASONE) 5 MG tablet Take 1 tablet (5 mg total) by mouth once daily. 180 tablet 0    rivaroxaban (XARELTO) 20 mg Tab Take 1 tablet (20 mg total) by mouth daily with dinner or evening meal. 90 tablet 3    tamsulosin (FLOMAX) 0.4 mg Cap TAKE 1 CAPSULE BY MOUTH AFTER DINNER 90 capsule 3     No current facility-administered medications on file prior to visit.        Review of patient's allergies indicates:  No Known Allergies    Medications Reconciliation:   I have reconciled the patient's home medications and discharge medications with the patient/family. I have updated all changes.  Refer to After-Visit Medication List.    Objective:  Vital Signs:  There were no vitals filed for this visit.  Wt Readings from Last 3 Encounters:   01/25/23 1313 94.3 kg (207 lb 14.3 oz)   01/19/23 1038 94.8 kg (209 lb)   01/17/23 1130 94.9 kg (209 lb 3.5 oz)     Body mass index is 27.43 kg/m².     Neurological examination:    Mental Status:   His appearance is normal (hygiene is appropriate; attire is proper and clean).  Throughout the interview, he is cooperative, his eye contact is appropriate.  His behavior is appropriate to the clinical context without impropriety or improper language/conduct.  His behavior was not characterized by episodes of sudden uncontrollable and inappropriate laughing or crying.  The patient  is awake; His energy level appeared normal.  His orientation is normal; Spatial 5/5 (location, the floor of building, city, county, state) and temporal 5/5 (month, day, year, MILENA) dimensions are accurate.  He has appropriate attention/concentration ([place] forwards and backward).  He can complete three-step commands.  His fund of knowledge was appropriate for age, culture, and level of education.  His thought process is logical and goal-oriented.  He demonstrated appropriate insight based on actions, awareness of his illness, plans for the future.  He demonstrated good judgment based on actions and plans for the future.  He has no evidence of hallucinations (auditory, visual, olfactory).  He has no evidence of delusions (paranoid, grandiose, bizarre).  Cranial Nerves:   His pupils were normal.  His visual fields were full to confrontation in all quadrants.  His ocular pursuit in the horizontal and vertical plane was complete.  His saccadic initiation, velocity, and amplitude are normal.  His blink rate was normal.  His facial strength was normal.  His facial expression was symmetric and appropriate to the context.  His oropharynx and soft palate appeared abnormal.   Comment: mallampati 3;  His tongue showed no evidence of scalloping.  He can protrude their tongue beyond His lips for >10 sec.  Speech/Language:   The patient's speech was fluent, non-effortful, and his rate was appropriate to the context.  He has no articulation (segmental features) errors.  The patient's speech is not dysarthric.  The patient's speech was without evidence of anomia.  He makes no phonological loop errors.  He can comprehend commands that cross the midline (e.g., with your left thumb, touch your right ear).  He can comprehend commands that depend on syntax (e.g., point to the ceiling after you point to the floor).  Motor:   The patient's bilateral upper extremity muscle bulk is appropriate.  The patient's upper extremity muscle tone is  increased.   Comment: B/L, R>L, Mild;  The patient's bilateral upper extremity muscle tone does not suggest spasticity.  There is evidence of rigidity/cogwheeling.   Comment: Muscle tone is increased and there is evidence of rigidity/cogwheeling.; Muscle tone is increased and there is evidence of rigidity/cogwheeling.  The patient's bilateral upper extremity muscle tone has no evidence of paratonia.  Assessment of motor strength was symmetric and at minimal anti-gravity.  There is no pronator or downward drift.  There is no myoclonus observed in The patient's bilateral upper and lower extremities.   Comment: questionable LUE; questionable LUE  There are no fasciculations observed in The patient's bilateral upper and lower extremities.  Coordination:   He has no bilateral upper extremity limb dysmetria or past pointing on finger-nose-finger bilaterally.  He has no limb dysdiadochokinesia of the upper extremity on the pronation/supination test and screwing in a light bulb or lower extremity during tapping ball of each foot bilaterally.  He has a visible tremor.  He has no kinetic tremor bilaterally.  He has a postural tremor.   Comment: B/L, L>R, Mild;  He has no resting tremor bilaterally.  He has evidence of interhemispheric motor control deficits.  He demonstrates evidence of motor overflow.  He demonstrates no alien limb phenomena.  The patient's upper extremity fine motor coordination was abnormal.   Comment: B/L, R>L, Mild;  The patient's upper extremity fine motor coordination was not slow.   Comment: finger tapping, pronation/supination, and the open-close fist was slow.; finger tapping, pronation/supination, and the open-close fist was slow.  The patient's upper extremity fine motor coordination was not hypometric.   Comment: finger tapping, pronation/supination, and the open-close fist showed hypometria.; finger tapping, pronation/supination, and the open-close fist showed hypometria.  The patient's upper  extremity fine motor coordination was not dysrhythmic.   Comment: finger tapping, pronation/supination, and the open-close fist showed dysrhythmia.; finger tapping, pronation/supination, and the open-close fist showed dysrhythmia.  Higher Cortical Function:   The patient showed no evidence of simultanagnosia (Navon hierarchical letters).  He demonstrates no evidence of dorsal simultanagnosia (overlapping objects).  He demonstrates no evidence of ventral simultanagnosia (complex picture synthesis).  The patient showed no evidence of visuospatial constructional dysfunction.  He has no left-right confusion.  He has no evidence of dysgraphia.  The patient showed no evidence of apraxia.  He showed no dysexecutive behavior.  Sensory:   His cortical sensory assessment demonstrated no neglect bilaterally.  He he had no astereognosis (paper clip, ring, dime) bilaterally in the palms.  He he had no agraphesthesia (drawing numbers) in the palms.  His sensation was diminished to light touch, and vibratory sense.   Comment: in the bilateral upper and lower extremities in a length-dependant pattern.; in the bilateral upper and lower extremities in a length-dependant pattern.  Reflexes:   Reflexes were symmetric and 2+ at biceps, 2+ triceps, and 2+ brachioradialis, 2+ at the knees bilaterally, there was no cross-abductor sign, 2+ in the bilateral ankles.  Gait:   He has normal posture sitting unaided.  He can arise from a chair and sit back down without using their arms.  His gait was normal.  When attempting to walk abnormally (heels, tiptoes, tandem), he makes no errors.    Neuropsychological Evaluation Summary:  Prior Neurocognitive/Neurobehavioral Evaluation(s)  No Prior Testing Available  Neurocognitive/Neurobehavioral Evaluation completed on 2023-01-25    Memory    Registration-3 3/3 Within Normal Limits.   Recall-3 3/3 Within Normal Limits.   Recall-5 5/5 Within Normal Limits.   Registration-5 5/0     T1 3/9 Impairment:  -1.6 STDs below the average score based on age and education.   T2 5/9 Within Normal Limits.   T3 6/9 Within Normal Limits.   T4 6/9 Impairment: -1.6 STDs below the average score based on age and education.   T5 8/9 Within Normal Limits.   DR-30 Sec 7/9 Within Normal Limits.   DR-10 min 4/9 Within Normal Limits.   DR-Cued 6/9 Within Normal Limits.   Recognition 9/9 Within Normal Limits.   Executive    Three-step command 3/3 Within Normal Limits.   Trials-1 1/1 Within Normal Limits.   WORLD Backward 5/5 Within Normal Limits.   Digit Span - 2 2/2 Within Normal Limits.   Serial Sevens 3/3 Within Normal Limits.   Fluency 1/1 Within Normal Limits.   Digit Span Backwards 4 Within Normal Limits.   Lexical Fluency - D 16 Within Normal Limits.   Lexical Fluency - F 15 Within Normal Limits.   Lexical Fluency - A 17 Within Normal Limits.   Lexical Fluency - S 18 Within Normal Limits.   Semantic Fluency - Animals 20 Within Normal Limits.   Visuospatial    Intersecting Pentagons 1/1 Within Normal Limits.   3D Cube Copy 1/1 Within Normal Limits.   Clock Draw 3/3 Within Normal Limits.   Muñoz Copy 14/17 Impairment: Mild to Normal.   Overlapping Images - Update 12/12 Within Normal Limits.   Picture Synthesis 3/3 Within Normal Limits.   Noise Pareidolia Test 5/5 Within Normal Limits.   Object Decision 4/4 Within Normal Limits.   Attention    Orientation-10 10/10 Within Normal Limits.   Orientation-6 6/6 Within Normal Limits.   Alternating Sequence 1/1 Within Normal Limits.   Digit Span Forwards 6 Within Normal Limits.   Language    Repetition-1 1/1 Within Normal Limits.   Naming-2 2/2 Within Normal Limits.   Following written command 1/1 Within Normal Limits.   Writing a complete sentence 1/1 Within Normal Limits.   Naming-3 3/3 Within Normal Limits.   Repetition-2 2/2 Within Normal Limits.   Abstraction 2/2 Within Normal Limits.   15-Item BNT 15/15 Within Normal Limits.   Repetition of Phrases 5/5 Within Normal Limits.   Verbal  Agility 6/6 Within Normal Limits.   SYDBAT - Semantic Association 28/30 Impairment: Mild to Normal.   Repeat & Point - Nonfluent 10/10 Within Normal Limits.   Repeat & Point - Semantics 9/10 Impairment: Mild to Normal.   Neurocognitive Focused Evaluation Aggregate Score(s)    MMSE 30/30 MMSE Score suggestive of normal to questionable cognitive impairment.   MOCA 30/30 MOCA Score suggestive of normal to questionable cognitive impairment.   Neuropsychiatric/Behavioral Focused Evaluation Assessment    BEHAV5+ 0/6 See ROS section for a full description   Laboratories:     Lab Date Value [Reference]   Autoimmune/Paraneoplastic Screening           [facility]USTINA Screen 2022, Mar-30    Negative       CRP 2022, Dec-19  2022, Oct-18  2022, Jul-07    9.0 (H) [0.0 - 8.2 mg/L]  1.1 [0.0 - 8.2 mg/L]  15.2 (H) [0.0 - 8.2 mg/L]      Fibrillarin (U3 RNP) 2022, Mar-31    Negative      MI-2 2022, Mar-31    Negative      Sed Rate 2022, Dec-19  2022, Oct-18  2022, Jul-07    5 [0 - 23 mm/Hr]  11 [0 - 23 mm/Hr]  15 [0 - 23 mm/Hr]      SRP 2022, Mar-31    Negative      U2 snRNP 2022, Mar-31    Negative      Myopathy/Myalgia   CPK 2022, Mar-31    47 [20 - 200 U/L]      Metabolic Screening   Hemoglobin A1C External 10/03/2022  6.2 (H) [4.0 - 5.6 %]  6.7 (H) [4.0 - 5.6 %]      TSH 2022, Oct-03    1.381 [0.400 - 4.000 uIU/mL]      Cholesterol 2022, Oct-03    136 [120 - 199 mg/dL]      HDL 2022, Oct-03    66 [40 - 75 mg/dL]      Non-HDL Cholesterol 2022, Oct-03    70 [mg/dL]      Triglycerides 2022, Oct-03    39 [30 - 150 mg/dL]      Infectious Disease/Immunocompromised Screening   SARS-CoV-2 RNA, Amplification, Qual 2022, May-02    Negative      Chronic Inflammation Screening   Uric Acid 2022, Apr-13 2022, Mar-30  2022, Feb-18    5.4 [3.4 - 7.0 mg/dL]  5.9 [3.4 - 7.0 mg/dL]  5.3 [3.4 - 7.0 mg/dL]           Neuroimaging:  No brain imaging is currently available for review.     Procedures:    Electrocardiogram on 7/16/2020  Formal interpretation:  Vent[facility]ravis  Rate : 060 BPM     Atrial Rate : 060 BPM    P-R Int : 154 ms          QRS Dur : 104 ms     QT Int : 412 ms       P-R-T Axes : 000 051 034 degrees    QTc Int : 412 ms Electronic atrial pacemaker Septal infarct ,age undetermined Abnormal ECG  Independently reviewed Electrocardiogram by Nimesh Chatterjee MD. MPH. Behavioral Neurologist  Impression: : Received ECG has evidence of sinus node disease. HR (>=50-60). Prolonged VA interval (>0.22 s). Broad QRS complex (> 0.12 s).     Clinical Summary:     Mr. Norman is a 77-year-old right-handed male with a relevant past medical history of PMR, DM II, CKD 3, Atrial fib w sick sinus syndrome & bilateral hip & LSpine OA, who presents reporting a 7-month history of step-wise progressive neurocognitive impairment.       The clinical history is suggestive of:  Memory Impairment: STM encoding impairment  Attention Impairment: Attention, Sustained attention, Shifting attention  Executive Impairment: Energization, Working Memory  The neurological examination is significant for:  Cortical Transcallosal Disconnection: interhemispheric motor control (interhemispheric motor control ), motor efference (motor overflow)  Movement Disorder (Hyperkinetic): tremor (postural)  Movement Disorder (Hypokinetic): parkinsonism (tone, bradykinesia), dyskinesia (slowing, hypometria, dysrhythmia)  Sensory Dysfunction: peripheral (A? fibers)  The neurocognitive battery is significant (based on age and education) for:  Subjective Cognitive impairment with borderline mild attention/concentration and retrieval deficits   Very Mild Memory Impairment: He scored >1.5 standard deviations below the norm on at least one measure. He had difficulty with attention, encoding. He had a incomplete learning curve. His free recall was significantly impaired by time.  Very Mild Language Impairment: semantic association, Semantics.  MMSE 30/30: MMSE Score suggestive of normal to questionable cognitive impairment.  MOCA  30/30: MOCA Score suggestive of normal to questionable cognitive impairment.  BEHAV5+ 0/6: See ROS section for a full description  The neurologically relevant imaging is significant for  No imaging is available for secondary review        Assessment:     Mr. Ruff clinical presentation is attention predominant subjective cognitive impairment insufficient to interfere with activities of daily living (CDR-SOB: 0.5 - Questionable cognitive impairment)     Mr. Ruff clinical presentation does not meet criteria for any specific syndrome however there are prodromal signs suggestive of is lewy body disease     Spontaneous motor features of parkinsonism  Fluctuating cognitive deficits     The pathology underlying Mr. Ruff neurocognitive impairment is likely a mixture of pathologies (Lewy body disease/alpha-synucleinopathy, Vascular Contributions to Cognitive Impairment and Dementia) likely provoked by uncontrolled hyperglycemia in setting of steroid use for PMR.    The observations made above, were discussed with the patient and his family. We recommend screening for reversible causes of cognitive impairment with serum laboratories. We recommend an MRI brain for screening for anatomical lesions and other causes of cognitive impairment. We have discussed the MIND Diet and other lifestyle behavior that may help maintain brain health. We have discussed opportunities for biomarker testing (CSF [place] biomarkers, IDEAs Amyloid-PET, Syn-One skin biopsy) - the patient not interested at this time. We have discussed opportunities for genetic testing ([facility], GeneBlueTalon) - the patient not interested at this time.     Care Management Plan:     #Diagnostic Workup:   Laboratories: Hcy, Free T4, TSH, B1, B9, B12, MMA, HIV Ab/Ag, RPR, D  Procedures: MRI brain without contrast - Dementia Protocol  #Neurocognitive Disorder Treatment:  Recommend the MIND diet  Given atrial fibrillation do not recommend donepezil  We have discussed  "opportunities for further testing with CSF biomarkers or Amyloid-PET - patient not interested at this time  We have discussed opportunities for further testing with [employer] genetic testing - patient not interested at this time  #Behavioral Disorder Treatment:  No indication for memantine at this time  #Microvascular Disease Management:   recommend patient talked to his primary care regarding medications for diabetes.  Potentially consider Ozempic or Januvia given potential neurocognitive protective effect  #Behavioral/Environmental Treatment  We recommend engaging in activities that stimulate cognitively and socially while avoiding excessive stimulation and fatigue in overwhelmingly complex situations.  We recommend integrating routine and schedule into your daily life. https://www.alzheimersproject.org/news/the-importance-of-routine-and-familiarity-to-persons-with-dementia/  #Health Maintenance/Lifestyle Advice  We have discussed the value in aggressively controlling vascular risk factors like hypertension, hyperlipidemia, and Diabetes SBP<130, LDL<100, A1C<7.0.  We discussed the need to optimize lifestyle choices including a heart-healthy diet (e.g., Mediterranean or DASH), increased cardiovascular exercise (goal 150 minutes of moderate-intensity per week), and stay cognitively and socially active.  #Support  We all need support sometimes. Get easy access to local resources, community programs, and services. https://www.communityresourcefinder.org/  Learn more about Cognitive Impairment in [place]: https://www.alz.org/professionals/public-health/state-overview/[place]  #Safety  The Alzheimer's Association administers the nationwide "Safe Return" program with identification bracelets, necklaces, or clothing tags and 24-hour assistance. More information is available online at https://www.alz.org/help-support/caregiving/safety/medicalert-with-24-7-wandering-support  #Follow up:  Follow-up in 4 weeks (Feb " 2023).    Thank you for allowing us to participate in the care of your patient. Please do not hesitate to contact us with any questions or concerns.     It was a pleasure seeing Mr. Norman and we look forward to seeing them at their follow-up visit.     This note is dictated on M*Modal Fluency Direct word recognition program. There are word recognition mistakes that are occasionally missed on review.         Scheduled Follow-up :  Future Appointments   Date Time Provider Department Center   2/16/2023 11:00 AM Hawa Coppola MD [facility]   2/17/2023 10:20 AM APPOINTMENT LAB, [facility] LAB [place] [facility]   2/17/2023 10:30 AM [facility] ORTHO CLINIC LIMIT 350LBS [facility] ORXRAY [place] [facility]   2/17/2023 10:45 AM Eladio Curry DPM [facility] PODIAT [place] [facility]   3/14/2023 10:00 AM HOME MONITOR DEVICE CHECK, [facility] AR[facility]   4/3/2023  7:00 AM SPECIMEN, [facility] SPECLAB [place]   4/3/2023  7:15 AM LAB, [facility] LAB [place]   4/6/2023  2:00 PM Nitin Banks MD [facility]   4/26/2023 10:00 AM María Lo MD [facility] RHEUM [facility]       After Visit Medication List :     Medication List            Accurate as of January 25, 2023  1:31 PM. If you have any questions, ask your nurse or doctor.                CONTINUE taking these medications      allopurinoL 100 MG tablet  Commonly known as: ZYLOPRIM  Take 1 tablet (100 mg total) by mouth once daily.     atorvastatin 40 MG tablet  Commonly known as: LIPITOR  Take 1 tablet (40 mg total) by mouth once daily.     benazepriL 20 MG tablet  Commonly known as: LOTENSIN  Take 1 tablet (20 mg total) by mouth once daily.     calcium carbonate-vit D3-min 600 mg calcium- 400 unit Tab     ferrous sulfate 325 mg (65 mg iron) Tab tablet  Commonly known as: FEOSOL     glucosamine-chondroitin 750-600 mg Tab     levocetirizine 5 MG tablet  Commonly known as: XYZAL  Take 1 tablet (5 mg total) by mouth every evening.      multivitamin capsule     * predniSONE 2.5 MG tablet  Commonly known as: DELTASONE  Take 1 tablet (2.5 mg total) by mouth 2 (two) times daily.     * predniSONE 5 MG tablet  Commonly known as: DELTASONE  Take 1 tablet (5 mg total) by mouth once daily.     RESTASIS 0.05 % ophthalmic emulsion  Generic drug: cycloSPORINE  Place 1 drop into both eyes 2 (two) times daily.     tamsulosin 0.4 mg Cap  Commonly known as: FLOMAX  TAKE 1 CAPSULE BY MOUTH AFTER DINNER     XARELTO 20 mg Tab  Generic drug: rivaroxaban  Take 1 tablet (20 mg total) by mouth daily with dinner or evening meal.           * This list has 2 medication(s) that are the same as other medications prescribed for you. Read the directions carefully, and ask your doctor or other care provider to review them with you.                  Signing Physician:  Nimesh Adams MD    Billing:        -----------------------------------------------------------------------------    I spent a total of 105 minutes (time-in: 13:00 PM; time-out: 14:45 PM) on 2023-01-25, in person face-to-face with the patient and caregiver(s), >50% of that time was spent counseling regarding the symptoms, treatment plan, risks, therapeutic options, lifestyle modifications, and/or safety issues for the diagnoses above.    10/14 Review of Systems completed and is negative except as stated above in HPI (Systems reviewed: Const, Eyes, ENT, Resp, CV, GI, , MSK, Skin, Neuro)    I reviewed previous labs for a total of 5 minutes on 2023-01-25. This is directly related to the face-to-face encounter. Review of previous labs was performed all negative except as stated above in HPI    I reviewed previous diagnostic testing for a total of 5 minutes on 2023-01-25. This is directly related to the face-to-face encounter. A review of previous diagnostic testing was performed was noted to be within normal limits except as is stated above in HPI    I reviewed the summation of records from outside physicians for  a total of 15 minutes on 2023-01-25. Reviewed and summation of records from an outside physician was performed as summarized above in HPI    I performed a neurobehavioral status examination that included a clinical assessment of thinking, reasoning, and judgment. Please see above HPI and ROS for full details. This exam was performed on 2023-01-25 and included 12 minutes spent on direct face-to-face clinical observation and interview with the patient and 23 minutes spent interpreting test results and preparing the report. The total time of 35 minutes spent on the neurobehavioral status examination is not included in the time spent on evaluation and management coding.    I performed a neuropsychological evaluation that included the application of a series of standardized neurocognitive tests. Please see the informal neuropsychological assessment above for full details. This evaluation was performed on 2023-01-25 and included 13 minutes spent on direct face-to-face clinical standardized test administration with the patient and 18 minutes spent on interpreting standardized test results, integrating patient data into a treatment plan, and providing feedback to the patient and caregiver. The total time of 31 minutes spent on the neuropsychological evaluation is not included in the time spent on evaluation and management coding.    Total Billing time spent on encounter/documentation for this patient's evaluation and management, not including the neurobehavioral status examination and neuropsychological evaluation: 105 minutes.

## 2023-01-26 ENCOUNTER — PATIENT MESSAGE (OUTPATIENT)
Dept: NEUROLOGY | Facility: CLINIC | Age: 78
End: 2023-01-26
Payer: MEDICARE

## 2023-01-27 NOTE — TELEPHONE ENCOUNTER
Spoke with Shannan to update on MRI rescheduling to account for pacemaker protocol. I collected information and let him know I would initiate with radiology. We also debriefed on posted lab order and he will visit with his local [facility] to complete.

## 2023-02-08 ENCOUNTER — PATIENT MESSAGE (OUTPATIENT)
Dept: ADMINISTRATIVE | Facility: OTHER | Age: 78
End: 2023-02-08
Payer: MEDICARE

## 2023-02-17 ENCOUNTER — OFFICE VISIT (OUTPATIENT)
Dept: PODIATRY | Facility: CLINIC | Age: 78
End: 2023-02-17
Payer: MEDICARE

## 2023-02-17 ENCOUNTER — HOSPITAL ENCOUNTER (OUTPATIENT)
Dept: RADIOLOGY | Facility: HOSPITAL | Age: 78
Discharge: HOME | End: 2023-02-17
Attending: PODIATRIST
Payer: MEDICARE

## 2023-02-17 ENCOUNTER — PATIENT MESSAGE (OUTPATIENT)
Dept: RHEUMATOLOGY | Facility: CLINIC | Age: 78
End: 2023-02-17
Payer: MEDICARE

## 2023-02-17 VITALS
WEIGHT: 207 LBS | BODY MASS INDEX: 27.43 KG/M2 | DIASTOLIC BLOOD PRESSURE: 71 MMHG | HEIGHT: 73 IN | HEART RATE: 59 BPM | SYSTOLIC BLOOD PRESSURE: 118 MMHG

## 2023-02-17 DIAGNOSIS — M35.3 POLYMYALGIA RHEUMATICA: Primary | ICD-10-CM

## 2023-02-17 DIAGNOSIS — Z79.52 LONG TERM (CURRENT) USE OF SYSTEMIC STEROIDS: ICD-10-CM

## 2023-02-17 DIAGNOSIS — S92.352D CLOSED FRACTURE OF FIFTH METATARSAL BONE OF LEFT FOOT WITH ROUTINE HEALING, SUBSEQUENT ENCOUNTER: ICD-10-CM

## 2023-02-17 DIAGNOSIS — S92.352D CLOSED FRACTURE OF FIFTH METATARSAL BONE OF LEFT FOOT WITH ROUTINE HEALING, SUBSEQUENT ENCOUNTER: Primary | ICD-10-CM

## 2023-02-17 PROCEDURE — 73630 XR FOOT COMPLETE 3 VIEW LEFT: ICD-10-PCS | Mod: 26,LT,, | Performed by: RADIOLOGY

## 2023-02-17 PROCEDURE — 99213 OFFICE O/P EST LOW 20 MIN: CPT | Mod: S$GLB,,, | Performed by: PODIATRIST

## 2023-02-17 PROCEDURE — 73630 X-RAY EXAM OF FOOT: CPT | Mod: TC,PN,LT

## 2023-02-17 PROCEDURE — 3078F DIAST BP <80 MM HG: CPT | Mod: CPTII,S$GLB,, | Performed by: PODIATRIST

## 2023-02-17 PROCEDURE — 1126F AMNT PAIN NOTED NONE PRSNT: CPT | Mod: CPTII,S$GLB,, | Performed by: PODIATRIST

## 2023-02-17 PROCEDURE — 1160F PR REVIEW ALL MEDS BY PRESCRIBER/CLIN PHARMACIST DOCUMENTED: ICD-10-PCS | Mod: CPTII,S$GLB,, | Performed by: PODIATRIST

## 2023-02-17 PROCEDURE — 99213 PR OFFICE/OUTPT VISIT, EST, LEVL III, 20-29 MIN: ICD-10-PCS | Mod: S$GLB,,, | Performed by: PODIATRIST

## 2023-02-17 PROCEDURE — 3074F PR MOST RECENT SYSTOLIC BLOOD PRESSURE < 130 MM HG: ICD-10-PCS | Mod: CPTII,S$GLB,, | Performed by: PODIATRIST

## 2023-02-17 PROCEDURE — 1160F RVW MEDS BY RX/DR IN RCRD: CPT | Mod: CPTII,S$GLB,, | Performed by: PODIATRIST

## 2023-02-17 PROCEDURE — 1101F PT FALLS ASSESS-DOCD LE1/YR: CPT | Mod: CPTII,S$GLB,, | Performed by: PODIATRIST

## 2023-02-17 PROCEDURE — 3078F PR MOST RECENT DIASTOLIC BLOOD PRESSURE < 80 MM HG: ICD-10-PCS | Mod: CPTII,S$GLB,, | Performed by: PODIATRIST

## 2023-02-17 PROCEDURE — 3072F PR LOW RISK FOR RETINOPATHY: ICD-10-PCS | Mod: CPTII,S$GLB,, | Performed by: PODIATRIST

## 2023-02-17 PROCEDURE — 99999 PR PBB SHADOW E&M-EST. PATIENT-LVL III: CPT | Mod: PBBFAC,,, | Performed by: PODIATRIST

## 2023-02-17 PROCEDURE — 3288F PR FALLS RISK ASSESSMENT DOCUMENTED: ICD-10-PCS | Mod: CPTII,S$GLB,, | Performed by: PODIATRIST

## 2023-02-17 PROCEDURE — 3072F LOW RISK FOR RETINOPATHY: CPT | Mod: CPTII,S$GLB,, | Performed by: PODIATRIST

## 2023-02-17 PROCEDURE — 73630 X-RAY EXAM OF FOOT: CPT | Mod: 26,LT,, | Performed by: RADIOLOGY

## 2023-02-17 PROCEDURE — 3074F SYST BP LT 130 MM HG: CPT | Mod: CPTII,S$GLB,, | Performed by: PODIATRIST

## 2023-02-17 PROCEDURE — 1126F PR PAIN SEVERITY QUANTIFIED, NO PAIN PRESENT: ICD-10-PCS | Mod: CPTII,S$GLB,, | Performed by: PODIATRIST

## 2023-02-17 PROCEDURE — 99999 PR PBB SHADOW E&M-EST. PATIENT-LVL III: ICD-10-PCS | Mod: PBBFAC,,, | Performed by: PODIATRIST

## 2023-02-17 PROCEDURE — 1159F MED LIST DOCD IN RCRD: CPT | Mod: CPTII,S$GLB,, | Performed by: PODIATRIST

## 2023-02-17 PROCEDURE — 3288F FALL RISK ASSESSMENT DOCD: CPT | Mod: CPTII,S$GLB,, | Performed by: PODIATRIST

## 2023-02-17 PROCEDURE — 1159F PR MEDICATION LIST DOCUMENTED IN MEDICAL RECORD: ICD-10-PCS | Mod: CPTII,S$GLB,, | Performed by: PODIATRIST

## 2023-02-17 PROCEDURE — 1101F PR PT FALLS ASSESS DOC 0-1 FALLS W/OUT INJ PAST YR: ICD-10-PCS | Mod: CPTII,S$GLB,, | Performed by: PODIATRIST

## 2023-02-17 NOTE — PROGRESS NOTES
"Subjective:      Patient ID: Shannan Norman is a 77 y.o. male.    Chief Complaint: Foot Pain (Left)    Follow-up for left 5th metatarsal base avulsion fracture x6 weeks treated with tall cam boot.  Patient has been compliant wearing the boot and reports no discomfort while wearing the boot and minimal discomfort when he takes steps without it at home.  He is avoided the previous activity consisting of yoga which initially caused the injury.  No other complaints noted today.    02/17/2023:  Follow-up for avulsion fracture left 5th metatarsal base times 10 weeks.  Performing protective weight-bearing with tall cam boot.  Reports no pain.  Taking supplemental vitamin-D 3 weekly as prescribed.    Vitals:    02/17/23 1048   BP: 118/71   Pulse: (!) 59   Weight: 93.9 kg (207 lb)   Height: 6' 1" (1.854 m)   PainSc: 0-No pain      Past Medical History:   Diagnosis Date    Allergy     Arthritis     Atrial fibrillation     Atrial flutter 2011    ablation    Basal cell carcinoma     Cancer     Cataract     CKD (chronic kidney disease) stage 3, GFR 30-59 ml/min 8/13/2019    Diabetes mellitus     Dry eye syndrome     Gout, unspecified     High cholesterol     History of shingles     Hypertension     Melanoma     Seizures        Past Surgical History:   Procedure Laterality Date    ABLATION N/A 9/11/2018    Procedure: ABLATION;  Surgeon: Hany Sanchez MD;  Location: [facility] CATH LAB;  Service: Cardiology;  Laterality: N/A;  AFL, ISABELLE, RFA, ELODIA, MAC, DM, 3 PREP    ABLATION OF DYSRHYTHMIC FOCUS      ADENOIDECTOMY      CARDIAC PACEMAKER PLACEMENT      no defib    CATARACT EXTRACTION W/  INTRAOCULAR LENS IMPLANT Right 7/28/2020    Procedure: EXTRACTION, CATARACT, WITH IOL INSERTION;  Surgeon: Andrés Morse MD;  Location: [facility] OR;  Service: Ophthalmology;  Laterality: Right;    CATARACT EXTRACTION W/  INTRAOCULAR LENS IMPLANT Left 8/11/2020    Procedure: EXTRACTION, CATARACT, WITH IOL INSERTION;  Surgeon: Andrés MCCURDY" MD Lito;  Location: [facility] OR;  Service: Ophthalmology;  Laterality: Left;    COLONOSCOPY N/A 1/2/2019    Procedure: COLONOSCOPY Suprep;  Surgeon: Cindy Solorzano MD;  Location: [facility] ENDO;  Service: Endoscopy;  Laterality: N/A;    GANGLION CYST EXCISION      left neck    HERNIA REPAIR  2013    umbilical    TENDON REPAIR Right     wrist    TONSILLECTOMY      varicose veins      several sx  bilateral    VASECTOMY      VEIN SURGERY         Family History   Problem Relation Age of Onset    Diabetes Mother     COPD Father     Heart disease Father     Arthritis Sister     Heart attack Brother     Heart disease Brother     Diabetes Brother     No Known Problems Maternal Aunt     No Known Problems Maternal Uncle     No Known Problems Paternal Aunt     No Known Problems Paternal Uncle     No Known Problems Maternal Grandmother     No Known Problems Maternal Grandfather     No Known Problems Paternal Grandmother     No Known Problems Paternal Grandfather     No Known Problems Son     Cancer Sister         lymph node, breast    No Known Problems Sister     No Known Problems Son     Amblyopia Neg Hx     Blindness Neg Hx     Cataracts Neg Hx     Glaucoma Neg Hx     Hypertension Neg Hx     Macular degeneration Neg Hx     Retinal detachment Neg Hx     Strabismus Neg Hx     Stroke Neg Hx     Thyroid disease Neg Hx     Prostate cancer Neg Hx     Kidney disease Neg Hx     Melanoma Neg Hx        Social History     Socioeconomic History    Marital status:    Occupational History     Employer: [employer]   Tobacco Use    Smoking status: Never    Smokeless tobacco: Never   Substance and Sexual Activity    Alcohol use: Yes     Alcohol/week: 7.0 - 14.0 standard drinks     Types: 7 - 14 Glasses of wine per week    Drug use: No    Sexual activity: Yes     Partners: Female     Social Determinants of Health     Financial Resource Strain: Low Risk     Difficulty of Paying Living Expenses: Not hard at all   Food  Insecurity: No Food Insecurity    Worried About Running Out of Food in the Last Year: Never true    Ran Out of Food in the Last Year: Never true   Transportation Needs: No Transportation Needs    Lack of Transportation (Medical): No    Lack of Transportation (Non-Medical): No   Physical Activity: Sufficiently Active    Days of Exercise per Week: 6 days    Minutes of Exercise per Session: 30 min   Stress: No Stress Concern Present    Feeling of Stress : Only a little   Social Connections: Unknown    Frequency of Communication with Friends and Family: More than three times a week    Frequency of Social Gatherings with Friends and Family: More than three times a week    Active Member of Clubs or Organizations: Yes    Attends Club or Organization Meetings: More than 4 times per year    Marital Status:    Housing Stability: Low Risk     Unable to Pay for Housing in the Last Year: No    Number of Places Lived in the Last Year: 1    Unstable Housing in the Last Year: No       Current Outpatient Medications   Medication Sig Dispense Refill    allopurinoL (ZYLOPRIM) 100 MG tablet Take 1 tablet (100 mg total) by mouth once daily. 90 tablet 0    atorvastatin (LIPITOR) 40 MG tablet Take 1 tablet (40 mg total) by mouth once daily. 90 tablet 3    benazepriL (LOTENSIN) 20 MG tablet Take 1 tablet (20 mg total) by mouth once daily. 90 tablet 3    calcium carbonate-vit D3-min 600 mg calcium- 400 unit Tab Take 1 tablet by mouth once.      cycloSPORINE (RESTASIS) 0.05 % ophthalmic emulsion Place 1 drop into both eyes 2 (two) times daily. 60 each 11    ferrous sulfate (FEOSOL) 325 mg (65 mg iron) Tab tablet Take 325 mg by mouth once daily.      GLUCOSAMINE HCL/CHONDR VASQUEZ A NA (GLUCOSAMINE-CHONDROITIN) 750-600 mg Tab Take 2 tablets by mouth Daily.      levocetirizine (XYZAL) 5 MG tablet Take 1 tablet (5 mg total) by mouth every evening. 90 tablet 1    multivitamin capsule Take 1 capsule by mouth nightly.       predniSONE  (DELTASONE) 2.5 MG tablet Take 1 tablet (2.5 mg total) by mouth 2 (two) times daily. 60 tablet 1    predniSONE (DELTASONE) 5 MG tablet Take 1 tablet (5 mg total) by mouth once daily. 180 tablet 0    rivaroxaban (XARELTO) 20 mg Tab Take 1 tablet (20 mg total) by mouth daily with dinner or evening meal. 90 tablet 3    tamsulosin (FLOMAX) 0.4 mg Cap TAKE 1 CAPSULE BY MOUTH AFTER DINNER 90 capsule 3     No current facility-administered medications for this visit.       Review of patient's allergies indicates:  No Known Allergies      Review of Systems   Constitutional: Negative for chills, fever and malaise/fatigue.   Cardiovascular:  Negative for chest pain, claudication and leg swelling.   Respiratory:  Negative for cough and shortness of breath.    Musculoskeletal:  Negative for back pain, joint pain, muscle cramps and muscle weakness.   Gastrointestinal:  Negative for nausea and vomiting.   Neurological:  Negative for numbness, paresthesias and weakness.   Psychiatric/Behavioral:  Negative for altered mental status.          Objective:      Physical Exam  Constitutional:       General: He is not in acute distress.     Appearance: Normal appearance. He is not ill-appearing.   Cardiovascular:      Pulses:           Dorsalis pedis pulses are 2+ on the left side.        Posterior tibial pulses are 2+ on the left side.      Comments: Mild nonpitting edema to lower extremity bilateral multiple varicosities.  No rubor on dependency bilateral foot.  Skin temp is warm to foot bilateral.  Musculoskeletal:      Comments: No pain on palpation overlying the base of 5th metatarsal in the region of the styloid process left foot.  No pain with active resisted eversion the neutral position or with midtarsal joint range of motion left foot.  No localized edema, warmth or discoloration overlying base of the 5th metatarsal left foot.    Flexible cavus foot structure bilateral foot.   Skin:     General: Skin is warm.      Capillary  Refill: Capillary refill takes less than 2 seconds.      Findings: No ecchymosis or erythema.      Nails: There is no clubbing.   Neurological:      Mental Status: He is alert and oriented to person, place, and time.      Sensory: Sensation is intact.      Motor: Motor function is intact.             Assessment:       Encounter Diagnosis   Name Primary?    Closed fracture of fifth metatarsal bone of left foot with routine healing, subsequent encounter Yes         Plan:       Shannan was seen today for foot pain.    Diagnoses and all orders for this visit:    Closed fracture of fifth metatarsal bone of left foot with routine healing, subsequent encounter    I counseled the patient on his conditions, their implications and medical management.    Reviewed left foot x-ray noting small avulsion fracture from the lateral base of the 5th metatarsal at the styloid process with maintained prior alignment and no significant healing observed.  Less than 2 mm of displacement noted.    Patient has remained asymptomatic and no longer has any pain.  We discussed that the area may never fully heal however if he is asymptomatic and has no pain then there is no further course of action.  If he does become symptomatic then I would recommend excising the fragment and attaching the peroneus brevis directly onto the base of the 5th metatarsal.  Most likely this area has scarred down and he will remain asymptomatic.  We discussed gradual transition from the cam boot into a tennis shoe with 1 hour daily increments and a gradual increase in activity.    RTC p.r.n. as discussed.     Assisted per Moise Jeffers DPM PGY 2    A portion of this note was generated by voice recognition software and may contain spelling and grammar errors.  .

## 2023-02-27 ENCOUNTER — TELEPHONE (OUTPATIENT)
Dept: NEUROLOGY | Facility: CLINIC | Age: 78
End: 2023-02-27
Payer: MEDICARE

## 2023-02-28 ENCOUNTER — PATIENT MESSAGE (OUTPATIENT)
Dept: NEUROLOGY | Facility: CLINIC | Age: 78
End: 2023-02-28
Payer: MEDICARE

## 2023-03-09 DIAGNOSIS — Z95.0 PACEMAKER: Primary | ICD-10-CM

## 2023-03-10 ENCOUNTER — TELEPHONE (OUTPATIENT)
Dept: NEUROLOGY | Facility: CLINIC | Age: 78
End: 2023-03-10
Payer: MEDICARE

## 2023-03-10 ENCOUNTER — DOCUMENTATION ONLY (OUTPATIENT)
Dept: CARDIOLOGY | Facility: HOSPITAL | Age: 78
End: 2023-03-10
Payer: MEDICARE

## 2023-03-10 ENCOUNTER — DOCUMENTATION ONLY (OUTPATIENT)
Dept: ELECTROPHYSIOLOGY | Facility: CLINIC | Age: 78
End: 2023-03-10
Payer: MEDICARE

## 2023-03-10 ENCOUNTER — PATIENT MESSAGE (OUTPATIENT)
Dept: NEUROLOGY | Facility: CLINIC | Age: 78
End: 2023-03-10
Payer: MEDICARE

## 2023-03-10 NOTE — TELEPHONE ENCOUNTER
----- Message from Nimesh Adams MD sent at 3/9/2023  8:21 PM CST -----  Regarding: RE: Need Cardiac device check in clinic+hospital order per pacemaker protocol  Order placed - thanks for your help.  Nimesh    ----- Message -----  From: RT Doris  Sent: 3/9/2023   5:57 PM CST  To: Nimesh Adams MD, Angie MANN Staff  Subject: Need Cardiac device check in clinic+hospital#      Good Afternoon  I am writing in regards to our patient Shannan Norman we will scheduled him for March 17 at 11:00 a.m.  Inpatient MRI at [facility].  We are in need of a cardiac device check in clinic+hospital order code# () this  Order will help the [employer] Rep to be able to turn off device prior the MRI.  Can you assist with this order so we can go forward with his MRI appt.  Thank you in advance    Best regards  June/MRI  [id number]

## 2023-03-10 NOTE — TELEPHONE ENCOUNTER
----- Message from RT Doris sent at 3/9/2023  5:51 PM CST -----  Regarding: Need Cardiac device check in clinic+hospital order per pacemaker protocol    Good Afternoon  I am writing in regards to our patient Shannan Norman we will scheduled him for March 17 at 11:00 a.m.  Inpatient MRI at [facility].  We are in need of a cardiac device check in clinic+hospital order code# () this  Order will help the [employer] Rep to be able to turn off device prior the MRI.  Can you assist with this order so we can go forward with his MRI appt.  Thank you in advance    Best regards  June/MRI  [id number]

## 2023-03-10 NOTE — PROGRESS NOTES
[facility] MRI EP Checklist    An MRI has been ordered on this patient with a [employer] implanted cardiac device.    The device system, including pulse generator and leads, has been confirmed as MR conditional.    There are no known lead extenders, lead adaptors, or abandoned leads in place.    Lead impedance measurements are within the programmed lead impedance limits.    The pulse generator is implanted in the pectoral region.    The pulse generator has sufficient battery and is not deemed to be at end of life.    The pacing capture thresholds, when tested by the industry representative just prior to the MRI, should be < 2.5V at a pulse width of 0.50ms for RA and RV leads with devices programmed to an asynchronous pacing mode.    The LV pacing capture threshold, when tested by the industry representative just prior to the MRI, should be < 2.0V at a pulse width of 0.50ms.    Parameters should be programmed to MRI appropriate settings and pacing mode should programmed as below:    ___  DOO: (for dual chamber and CRT devices) program base rate + 10 bpm above the patients intrinsic sinus rate or +10 bpm above programmed base rate if dual pacing is noted    After the scan, the industry representative must perform reprogramming to original settings and verify that programmed pacing amplitudes provide adequate safety margins.

## 2023-03-10 NOTE — PROGRESS NOTES
Received a call/message from June in the MRI Department in relation to this patient needing to be scheduled for a MRI and has a [employer] Pacemaker.  Patient's Device is MRI compatible/conditional.  To meet protocol the Pacemaker/ICD must have been implanted no less than 6 weeks prior to scheduled date of Scan.  ICD/PPM and leads must be from same .  MRI informed ordering MD must input a Cardiac Device Check in clinic and hospital order, patient must have an xray within 6 months or less prior to MRI reprogramming.  Chest xray to be reviewed by Radiologist.         MRI is scheduled on 03/17/2023 @11:00 AM.  [employer] Rep has agreed to be available for the MRI.

## 2023-03-11 ENCOUNTER — HOSPITAL ENCOUNTER (EMERGENCY)
Facility: HOSPITAL | Age: 78
Discharge: HOME | End: 2023-03-11
Attending: EMERGENCY MEDICINE
Payer: MEDICARE

## 2023-03-11 VITALS
SYSTOLIC BLOOD PRESSURE: 124 MMHG | OXYGEN SATURATION: 93 % | TEMPERATURE: 98 F | HEIGHT: 73 IN | HEART RATE: 99 BPM | DIASTOLIC BLOOD PRESSURE: 77 MMHG | RESPIRATION RATE: 20 BRPM | BODY MASS INDEX: 26.24 KG/M2 | WEIGHT: 198 LBS

## 2023-03-11 DIAGNOSIS — I49.9 ARRHYTHMIA: ICD-10-CM

## 2023-03-11 LAB
ALBUMIN SERPL BCP-MCNC: 3.7 G/DL (ref 3.5–5.2)
ALP SERPL-CCNC: 77 U/L (ref 55–135)
ALT SERPL W/O P-5'-P-CCNC: 13 U/L (ref 10–44)
ANION GAP SERPL CALC-SCNC: 12 MMOL/L (ref 8–16)
AST SERPL-CCNC: 15 U/L (ref 10–40)
BASOPHILS # BLD AUTO: 0.04 K/UL (ref 0–0.2)
BASOPHILS NFR BLD: 0.6 % (ref 0–1.9)
BILIRUB SERPL-MCNC: 0.5 MG/DL (ref 0.1–1)
BNP SERPL-MCNC: 139 PG/ML (ref 0–99)
BUN SERPL-MCNC: 23 MG/DL (ref 8–23)
CALCIUM SERPL-MCNC: 9.4 MG/DL (ref 8.7–10.5)
CHLORIDE SERPL-SCNC: 105 MMOL/L (ref 95–110)
CO2 SERPL-SCNC: 24 MMOL/L (ref 23–29)
CREAT SERPL-MCNC: 1 MG/DL (ref 0.5–1.4)
DIFFERENTIAL METHOD: NORMAL
EOSINOPHIL # BLD AUTO: 0.1 K/UL (ref 0–0.5)
EOSINOPHIL NFR BLD: 1 % (ref 0–8)
ERYTHROCYTE [DISTWIDTH] IN BLOOD BY AUTOMATED COUNT: 12.8 % (ref 11.5–14.5)
EST. GFR  (NO RACE VARIABLE): >60 ML/MIN/1.73 M^2
GLUCOSE SERPL-MCNC: 109 MG/DL (ref 70–110)
HCT VFR BLD AUTO: 46.5 % (ref 40–54)
HGB BLD-MCNC: 15.6 G/DL (ref 14–18)
IMM GRANULOCYTES # BLD AUTO: 0.03 K/UL (ref 0–0.04)
IMM GRANULOCYTES NFR BLD AUTO: 0.4 % (ref 0–0.5)
INR PPP: 1.2 (ref 0.8–1.2)
LYMPHOCYTES # BLD AUTO: 1.3 K/UL (ref 1–4.8)
LYMPHOCYTES NFR BLD: 19.8 % (ref 18–48)
MAGNESIUM SERPL-MCNC: 2 MG/DL (ref 1.6–2.6)
MCH RBC QN AUTO: 30.7 PG (ref 27–31)
MCHC RBC AUTO-ENTMCNC: 33.5 G/DL (ref 32–36)
MCV RBC AUTO: 92 FL (ref 82–98)
MONOCYTES # BLD AUTO: 0.8 K/UL (ref 0.3–1)
MONOCYTES NFR BLD: 12.1 % (ref 4–15)
NEUTROPHILS # BLD AUTO: 4.4 K/UL (ref 1.8–7.7)
NEUTROPHILS NFR BLD: 66.1 % (ref 38–73)
NRBC BLD-RTO: 0 /100 WBC
PLATELET # BLD AUTO: 194 K/UL (ref 150–450)
PMV BLD AUTO: 12.1 FL (ref 9.2–12.9)
POTASSIUM SERPL-SCNC: 4.8 MMOL/L (ref 3.5–5.1)
PROT SERPL-MCNC: 6.7 G/DL (ref 6–8.4)
PROTHROMBIN TIME: 11.9 SEC (ref 9–12.5)
RBC # BLD AUTO: 5.08 M/UL (ref 4.6–6.2)
SODIUM SERPL-SCNC: 141 MMOL/L (ref 136–145)
TROPONIN I SERPL DL<=0.01 NG/ML-MCNC: 0.01 NG/ML (ref 0–0.03)
TROPONIN I SERPL DL<=0.01 NG/ML-MCNC: 0.02 NG/ML (ref 0–0.03)
WBC # BLD AUTO: 6.71 K/UL (ref 3.9–12.7)

## 2023-03-11 PROCEDURE — 83735 ASSAY OF MAGNESIUM: CPT | Performed by: EMERGENCY MEDICINE

## 2023-03-11 PROCEDURE — 84484 ASSAY OF TROPONIN QUANT: CPT | Mod: 91 | Performed by: EMERGENCY MEDICINE

## 2023-03-11 PROCEDURE — 85610 PROTHROMBIN TIME: CPT | Performed by: EMERGENCY MEDICINE

## 2023-03-11 PROCEDURE — 93010 ELECTROCARDIOGRAM REPORT: CPT | Mod: ,,, | Performed by: INTERNAL MEDICINE

## 2023-03-11 PROCEDURE — 80053 COMPREHEN METABOLIC PANEL: CPT | Performed by: EMERGENCY MEDICINE

## 2023-03-11 PROCEDURE — 85025 COMPLETE CBC W/AUTO DIFF WBC: CPT | Performed by: EMERGENCY MEDICINE

## 2023-03-11 PROCEDURE — 99285 EMERGENCY DEPT VISIT HI MDM: CPT | Mod: 25

## 2023-03-11 PROCEDURE — 96360 HYDRATION IV INFUSION INIT: CPT

## 2023-03-11 PROCEDURE — 93010 EKG 12-LEAD: ICD-10-PCS | Mod: ,,, | Performed by: INTERNAL MEDICINE

## 2023-03-11 PROCEDURE — 25000003 PHARM REV CODE 250: Performed by: EMERGENCY MEDICINE

## 2023-03-11 PROCEDURE — 84484 ASSAY OF TROPONIN QUANT: CPT | Performed by: EMERGENCY MEDICINE

## 2023-03-11 PROCEDURE — 93005 ELECTROCARDIOGRAM TRACING: CPT

## 2023-03-11 PROCEDURE — 83880 ASSAY OF NATRIURETIC PEPTIDE: CPT | Performed by: EMERGENCY MEDICINE

## 2023-03-11 RX ORDER — METOPROLOL TARTRATE 25 MG/1
25 TABLET, FILM COATED ORAL 2 TIMES DAILY PRN
Qty: 14 TABLET | Refills: 0 | Status: SHIPPED | OUTPATIENT
Start: 2023-03-11 | End: 2023-10-09 | Stop reason: SDUPTHER

## 2023-03-11 RX ORDER — NAPROXEN SODIUM 220 MG/1
324 TABLET, FILM COATED ORAL
Status: COMPLETED | OUTPATIENT
Start: 2023-03-11 | End: 2023-03-11

## 2023-03-11 RX ADMIN — ASPIRIN 81 MG CHEWABLE TABLET 324 MG: 81 TABLET CHEWABLE at 04:03

## 2023-03-11 RX ADMIN — SODIUM CHLORIDE 500 ML: 9 INJECTION, SOLUTION INTRAVENOUS at 04:03

## 2023-03-11 NOTE — ED PROVIDER NOTES
Encounter Date: 3/11/2023    SCRIBE #1 NOTE: I, Verito Fitzgerald, am scribing for, and in the presence of,  Bryan Rod MD. I have scribed the following portions of the note - Other sections scribed: HPI, ROS, PE, MDM.     History     Chief Complaint   Patient presents with    Dizziness     Pt. Had episode of lightheadedness today with alteration in heart rate on his monitor. Hx. Of a-fib.. denies symptoms presently.      The patient is a 77 year old male presenting to the ED for evaluation of elevated heart rate, onset last night. Patient has associated symptoms of light-headedness and chest tightness. He reports his heart rate after walking his dog was 109 bpm and states according to his Fitbit, his heart rate has been consistently high since last night. Patient reports his heart rate is normally 60 bpm. Patient also reports left lower leg edema due to fracture boot he had on for 10 weeks. He denies palpations, chest discomfort, vomiting, diarrhea or fever.     Review of patient's allergies indicates:  No Known Allergies  Past Medical History:   Diagnosis Date    Allergy     Arthritis     Atrial fibrillation     Atrial flutter 2011    ablation    Basal cell carcinoma     Cancer     Cataract     CKD (chronic kidney disease) stage 3, GFR 30-59 ml/min 8/13/2019    Diabetes mellitus     Dry eye syndrome     Gout, unspecified     High cholesterol     History of shingles     Hypertension     Melanoma     Seizures      Past Surgical History:   Procedure Laterality Date    ABLATION N/A 9/11/2018    Procedure: ABLATION;  Surgeon: Hany Sanchez MD;  Location: [facility] CATH LAB;  Service: Cardiology;  Laterality: N/A;  AFL, ISABELLE, RFA, ELODIA, MAC, DM, 3 PREP    ABLATION OF DYSRHYTHMIC FOCUS      ADENOIDECTOMY      CARDIAC PACEMAKER PLACEMENT      no defib    CATARACT EXTRACTION W/  INTRAOCULAR LENS IMPLANT Right 7/28/2020    Procedure: EXTRACTION, CATARACT, WITH IOL INSERTION;  Surgeon: Andrés Morse MD;  Location: [facility]  OR;  Service: Ophthalmology;  Laterality: Right;    CATARACT EXTRACTION W/  INTRAOCULAR LENS IMPLANT Left 8/11/2020    Procedure: EXTRACTION, CATARACT, WITH IOL INSERTION;  Surgeon: Andrés Morse MD;  Location: [facility] [place] OR;  Service: Ophthalmology;  Laterality: Left;    COLONOSCOPY N/A 1/2/2019    Procedure: COLONOSCOPY Suprep;  Surgeon: Cindy Solorzano MD;  Location: [facility] ENDO;  Service: Endoscopy;  Laterality: N/A;    GANGLION CYST EXCISION      left neck    HERNIA REPAIR  2013    umbilical    TENDON REPAIR Right     wrist    TONSILLECTOMY      varicose veins      several sx  bilateral    VASECTOMY      VEIN SURGERY       Family History   Problem Relation Age of Onset    Diabetes Mother     COPD Father     Heart disease Father     Arthritis Sister     Heart attack Brother     Heart disease Brother     Diabetes Brother     No Known Problems Maternal Aunt     No Known Problems Maternal Uncle     No Known Problems Paternal Aunt     No Known Problems Paternal Uncle     No Known Problems Maternal Grandmother     No Known Problems Maternal Grandfather     No Known Problems Paternal Grandmother     No Known Problems Paternal Grandfather     No Known Problems Son     Cancer Sister         lymph node, breast    No Known Problems Sister     No Known Problems Son     Amblyopia Neg Hx     Blindness Neg Hx     Cataracts Neg Hx     Glaucoma Neg Hx     Hypertension Neg Hx     Macular degeneration Neg Hx     Retinal detachment Neg Hx     Strabismus Neg Hx     Stroke Neg Hx     Thyroid disease Neg Hx     Prostate cancer Neg Hx     Kidney disease Neg Hx     Melanoma Neg Hx      Social History     Tobacco Use    Smoking status: Never    Smokeless tobacco: Never   Substance Use Topics    Alcohol use: Yes     Alcohol/week: 7.0 - 14.0 standard drinks     Types: 7 - 14 Glasses of wine per week    Drug use: No     Review of Systems   Constitutional:  Negative for chills and fever.   HENT:  Negative for sore throat.    Eyes:   Negative for redness.   Respiratory:  Positive for chest tightness. Negative for shortness of breath.    Cardiovascular:  Negative for chest pain and palpitations.   Gastrointestinal:  Negative for abdominal pain, diarrhea, nausea and vomiting.   Genitourinary:  Negative for dysuria and hematuria.   Musculoskeletal:  Negative for back pain.   Skin:  Negative for rash.   Neurological:  Positive for light-headedness. Negative for headaches.   All other systems reviewed and are negative.    Physical Exam     Initial Vitals [03/11/23 1412]   BP Pulse Resp Temp SpO2   106/67 110 20 98.3 °F (36.8 °C) (!) 93 %      MAP       --         Physical Exam    Nursing note and vitals reviewed.  Constitutional: No distress.   HENT:   Head: Normocephalic and atraumatic.   Eyes: EOM are normal. Pupils are equal, round, and reactive to light.   Neck: Neck supple.   Normal range of motion.  Cardiovascular:    Tachycardia present.         Pulmonary/Chest: Breath sounds normal.   Abdominal: Abdomen is soft. There is no abdominal tenderness.   Musculoskeletal:         General: Normal range of motion.      Cervical back: Normal range of motion and neck supple.      Right lower leg: No edema.      Left lower leg: Edema present.     Neurological: He is alert and oriented to person, place, and time. No cranial nerve deficit.   Skin: Skin is warm and dry.   Psychiatric: He has a normal mood and affect.       ED Course   Procedures  Labs Reviewed   B-TYPE NATRIURETIC PEPTIDE - Abnormal; Notable for the following components:       Result Value     (*)     All other components within normal limits   CBC W/ AUTO DIFFERENTIAL   COMPREHENSIVE METABOLIC PANEL   TROPONIN I   PROTIME-INR   MAGNESIUM   MAGNESIUM   TROPONIN I     EKG Readings: (Independently Interpreted)   Initial Reading: No STEMI. Rhythm: Sinus Tachycardia. Heart Rate: 110. Q Waves: AVL and V2.   ECG Results              EKG 12-lead (Final result)  Result time 03/14/23 07:12:31       Final result by Interface, Lab In [facility] (03/14/23 07:12:31)                   Narrative:    Test Reason : R07.9,    Vent. Rate : 110 BPM     Atrial Rate : 110 BPM     P-R Int : 198 ms          QRS Dur : 092 ms      QT Int : 324 ms       P-R-T Axes : 000 082 031 degrees     QTc Int : 438 ms    Sinus tachycardia  Septal infarct (cited on or before 22-OCT-2018)  Abnormal ECG  When compared with ECG of 02-DEC-2021 12:42,  Sinus rhythm has replaced Electronic atrial pacemaker  Vent. rate has increased BY  48 BPM  Confirmed by Dave Byrd MD ([id number]) on 3/14/2023 7:12:21 AM    Referred By: [email or web address]   SELF           Confirmed By:Dave Byrd MD                                  Imaging Results              X-Ray Chest PA And Lateral (Final result)  Result time 03/11/23 16:36:50      Final result by Eriberto Crowe MD (03/11/23 16:36:50)                   Impression:      No acute findings.      Electronically signed by: Eriberto Crowe MD  Date:    03/11/2023  Time:    16:36               Narrative:    EXAMINATION:  XR CHEST PA AND LATERAL    CLINICAL HISTORY:  Chest Pain;    TECHNIQUE:  PA and lateral views of the chest were performed.    COMPARISON:  11/02/2022    FINDINGS:  Left cardiac pacer leads projecting the right atrium and right ventricle, unchanged.The cardiomediastinal contour is within normal limits.  The lungs are clear.  No pneumothorax.  No pleural effusions.                                       Medications   aspirin chewable tablet 324 mg (324 mg Oral Given 3/11/23 1623)   sodium chloride 0.9% bolus 500 mL 500 mL (0 mLs Intravenous Stopped 3/11/23 1715)     Medical Decision Making:   Initial Assessment:   The patient is a 77 year old male presenting to the ED for evaluation of elevated heart rate, onset last night.   Differential Diagnosis:   Dehydration, electrolyte abnormality, ACS, pacemaker malfunction, atrial fibrillation with RVR, SVT.  Independently Interpreted Test(s):   I have ordered  and independently interpreted X-rays - see summary below.       <> Summary of X-Ray Reading(s): Chest x-ray without acute findings.  Clinical Tests:   Lab Tests: Ordered and Reviewed  The following lab test(s) were unremarkable: CBC, CMP and Troponin  ED Management:  Workup done here in the ED has been unremarkable.  Patient noted with sinus tachycardia here in the emergency department.  Pacemaker rep has been consulted as coming in the ED to see the patient.  Further care of the patient will be turned over to Dr. Boom Lane at shift change, pending re-evaluation and results of pacemaker interrogation.        Scribe Attestation:   Scribe #1: I performed the above scribed service and the documentation accurately describes the services I performed. I attest to the accuracy of the note.                   Clinical Impression:   Final diagnoses:  [I49.9] Arrhythmia        ED Disposition Condition    Discharge Stable        I, Dr. Bryan Rod, personally performed the services described in this documentation. All medical record entries made by the scribe were at my direction and in my presence. I have reviewed the chart and agree that the record reflects my personal performance and is accurate and complete. Bryan Rod MD.  3:58 PM 03/14/2023    ED Prescriptions       Medication Sig Dispense Start Date End Date Auth. Provider    metoprolol tartrate (LOPRESSOR) 25 MG tablet Take 1 tablet (25 mg total) by mouth 2 (two) times daily as needed (palpitations). 14 tablet 3/11/2023 3/18/2023 Boom Lane Jr., MD          Follow-up Information       Follow up With Specialties Details Why Contact Info    Brandon Cisneros MD Cardiology, Interventional Cardiology Schedule an appointment as soon as possible for a visit   [address]  [address]  [facility] [facility] [address]  [phone]               Bryan Rod MD  03/14/23 [id number]

## 2023-03-12 NOTE — PROVIDER PROGRESS NOTES - EMERGENCY DEPT.
Encounter Date: 3/11/2023    ED Physician Progress Notes        Physician Note:   Assumed care from Dr Rod at shift change with XR, repeat troponin and interrogation of patient's pacemaker pending    Per [employer] technician patient had several runs of what appeared to be an elevated ventricular rate and atrial flutter.  Patient has a history of atrial flutter, s/p ablation.      I discussed with Dr. Yoo who recommended metoprolol 25 mg b.i.d. as needed if he has elevated heart rates again.  She reviewed his EKG done today which is consistent with atrial flutter    His chest x-ray is without acute findings.    His labs demonstrated no significant abnormality, troponins were negative x2.  Patient has been observed in the ED without acute event and is asymptomatic.  Advised to take the metoprolol as needed for palpitations however his blood pressure before taking the metoprolol and to hold if his blood pressure readings are below 120/80.    He is advised to follow-up closely with his cardiologist.  Return precautions discussed for worsening symptoms or any other concerns.    After taking into careful account the historical factors and physical exam findings of the patient's presentation today, in conjunction with the empirical and objective data obtained on ED workup, no acute emergent medical condition has been identified. The patient appears to be low risk for an emergent medical condition and I feel it is safe and appropriate at this time for the patient to be discharged to follow-up as detailed in their discharge instructions for reevaluation and possible continued outpatient workup and management. I have discussed the specifics of the workup with the patient and the patient has verbalized understanding of the details of the workup, the diagnosis, the treatment plan, and the need for outpatient follow-up.  Although the patient has no emergent etiology today this does not preclude the development of an  emergent condition so in addition, I have advised the patient that they can return to the ED and/or activate EMS at any time with worsening of their symptoms, change of their symptoms, or with any other medical complaint.  The patient remained comfortable and stable during their visit in the ED.  Discharge and follow-up instructions discussed with the patient who expressed understanding and willingness to comply with my recommendations.

## 2023-03-12 NOTE — DISCHARGE INSTRUCTIONS
Please follow-up closely with your cardiologist.  Please take your metoprolol twice daily as needed.  Please check your blood pressure before and  do not take the metoprolol if your blood pressure is less than 120/80

## 2023-03-17 ENCOUNTER — HOSPITAL ENCOUNTER (OUTPATIENT)
Dept: RADIOLOGY | Facility: HOSPITAL | Age: 78
Discharge: HOME | End: 2023-03-17
Attending: PSYCHIATRY & NEUROLOGY
Payer: MEDICARE

## 2023-03-17 ENCOUNTER — TELEPHONE (OUTPATIENT)
Dept: NEUROLOGY | Facility: CLINIC | Age: 78
End: 2023-03-17
Payer: MEDICARE

## 2023-03-17 VITALS — OXYGEN SATURATION: 94 % | RESPIRATION RATE: 16 BRPM | HEART RATE: 75 BPM

## 2023-03-17 DIAGNOSIS — R41.840 ATTENTION AND CONCENTRATION DEFICIT: ICD-10-CM

## 2023-03-17 PROCEDURE — 70551 MRI BRAIN STEM W/O DYE: CPT | Mod: TC

## 2023-03-17 PROCEDURE — 70551 MRI BRAIN STEM W/O DYE: CPT | Mod: 26,,, | Performed by: RADIOLOGY

## 2023-03-17 PROCEDURE — 70551 MRI BRAIN WITHOUT CONTRAST: ICD-10-PCS | Mod: 26,,, | Performed by: RADIOLOGY

## 2023-03-17 NOTE — NURSING
Patient arrive to radiology for scheduled MRI.  Patient has pacemaker.  [employer] pacemaker rep at bedside to program pacemaker for MRI.  Pacemaker settings DOO 75  bpm.  Patient placed on cardiac and pulse ox monitor.  Preparing for MRI scan.

## 2023-03-17 NOTE — NURSING
Scan complete.  Patient tolerated well.  Pacemaker rep at bedside to re-program pacemaker for D/C.  Patient D/C ed ambulatory with Post-[employer] padmini PALACIOS

## 2023-03-17 NOTE — TELEPHONE ENCOUNTER
----- Message from Nimesh Adams MD sent at 3/17/2023 12:45 PM CDT -----  Hi,  Please schedule the patient for a video follow-up to discuss test results and care management.  Nimesh Lee

## 2023-03-22 ENCOUNTER — TELEPHONE (OUTPATIENT)
Dept: NEUROLOGY | Facility: CLINIC | Age: 78
End: 2023-03-22
Payer: MEDICARE

## 2023-03-29 ENCOUNTER — PATIENT MESSAGE (OUTPATIENT)
Dept: RHEUMATOLOGY | Facility: CLINIC | Age: 78
End: 2023-03-29
Payer: MEDICARE

## 2023-03-29 ENCOUNTER — OFFICE VISIT (OUTPATIENT)
Dept: CARDIOLOGY | Facility: CLINIC | Age: 78
End: 2023-03-29
Payer: MEDICARE

## 2023-03-29 ENCOUNTER — PATIENT MESSAGE (OUTPATIENT)
Dept: PODIATRY | Facility: CLINIC | Age: 78
End: 2023-03-29
Payer: MEDICARE

## 2023-03-29 VITALS
DIASTOLIC BLOOD PRESSURE: 65 MMHG | HEIGHT: 73 IN | WEIGHT: 209.44 LBS | BODY MASS INDEX: 27.76 KG/M2 | SYSTOLIC BLOOD PRESSURE: 123 MMHG | HEART RATE: 60 BPM | OXYGEN SATURATION: 99 %

## 2023-03-29 DIAGNOSIS — I83.93 ASYMPTOMATIC VARICOSE VEINS OF BOTH LOWER EXTREMITIES: ICD-10-CM

## 2023-03-29 DIAGNOSIS — N18.30 STAGE 3 CHRONIC KIDNEY DISEASE, UNSPECIFIED WHETHER STAGE 3A OR 3B CKD: ICD-10-CM

## 2023-03-29 DIAGNOSIS — I35.1 NONRHEUMATIC AORTIC VALVE INSUFFICIENCY: ICD-10-CM

## 2023-03-29 DIAGNOSIS — E11.59 HYPERTENSION ASSOCIATED WITH DIABETES: ICD-10-CM

## 2023-03-29 DIAGNOSIS — E11.69 DYSLIPIDEMIA ASSOCIATED WITH TYPE 2 DIABETES MELLITUS: ICD-10-CM

## 2023-03-29 DIAGNOSIS — I10 PRIMARY HYPERTENSION: Primary | ICD-10-CM

## 2023-03-29 DIAGNOSIS — M35.3 POLYMYALGIA RHEUMATICA: Primary | ICD-10-CM

## 2023-03-29 DIAGNOSIS — I48.3 TYPICAL ATRIAL FLUTTER: ICD-10-CM

## 2023-03-29 DIAGNOSIS — I49.5 SSS (SICK SINUS SYNDROME): ICD-10-CM

## 2023-03-29 DIAGNOSIS — E78.5 DYSLIPIDEMIA ASSOCIATED WITH TYPE 2 DIABETES MELLITUS: ICD-10-CM

## 2023-03-29 DIAGNOSIS — I15.2 HYPERTENSION ASSOCIATED WITH DIABETES: ICD-10-CM

## 2023-03-29 PROCEDURE — 3078F DIAST BP <80 MM HG: CPT | Mod: CPTII,S$GLB,, | Performed by: INTERNAL MEDICINE

## 2023-03-29 PROCEDURE — 3072F LOW RISK FOR RETINOPATHY: CPT | Mod: CPTII,S$GLB,, | Performed by: INTERNAL MEDICINE

## 2023-03-29 PROCEDURE — 1125F PR PAIN SEVERITY QUANTIFIED, PAIN PRESENT: ICD-10-PCS | Mod: CPTII,S$GLB,, | Performed by: INTERNAL MEDICINE

## 2023-03-29 PROCEDURE — 3078F PR MOST RECENT DIASTOLIC BLOOD PRESSURE < 80 MM HG: ICD-10-PCS | Mod: CPTII,S$GLB,, | Performed by: INTERNAL MEDICINE

## 2023-03-29 PROCEDURE — 99999 PR PBB SHADOW E&M-EST. PATIENT-LVL III: ICD-10-PCS | Mod: PBBFAC,,, | Performed by: INTERNAL MEDICINE

## 2023-03-29 PROCEDURE — 99214 OFFICE O/P EST MOD 30 MIN: CPT | Mod: S$GLB,,, | Performed by: INTERNAL MEDICINE

## 2023-03-29 PROCEDURE — 3072F PR LOW RISK FOR RETINOPATHY: ICD-10-PCS | Mod: CPTII,S$GLB,, | Performed by: INTERNAL MEDICINE

## 2023-03-29 PROCEDURE — 99999 PR PBB SHADOW E&M-EST. PATIENT-LVL III: CPT | Mod: PBBFAC,,, | Performed by: INTERNAL MEDICINE

## 2023-03-29 PROCEDURE — 1159F MED LIST DOCD IN RCRD: CPT | Mod: CPTII,S$GLB,, | Performed by: INTERNAL MEDICINE

## 2023-03-29 PROCEDURE — 1159F PR MEDICATION LIST DOCUMENTED IN MEDICAL RECORD: ICD-10-PCS | Mod: CPTII,S$GLB,, | Performed by: INTERNAL MEDICINE

## 2023-03-29 PROCEDURE — 1125F AMNT PAIN NOTED PAIN PRSNT: CPT | Mod: CPTII,S$GLB,, | Performed by: INTERNAL MEDICINE

## 2023-03-29 PROCEDURE — 99214 PR OFFICE/OUTPT VISIT, EST, LEVL IV, 30-39 MIN: ICD-10-PCS | Mod: S$GLB,,, | Performed by: INTERNAL MEDICINE

## 2023-03-29 PROCEDURE — 3074F SYST BP LT 130 MM HG: CPT | Mod: CPTII,S$GLB,, | Performed by: INTERNAL MEDICINE

## 2023-03-29 PROCEDURE — 3074F PR MOST RECENT SYSTOLIC BLOOD PRESSURE < 130 MM HG: ICD-10-PCS | Mod: CPTII,S$GLB,, | Performed by: INTERNAL MEDICINE

## 2023-03-29 RX ORDER — PREDNISONE 2.5 MG/1
2.5 TABLET ORAL 2 TIMES DAILY
Qty: 60 TABLET | Refills: 1 | Status: SHIPPED | OUTPATIENT
Start: 2023-03-29 | End: 2023-06-19 | Stop reason: SDUPTHER

## 2023-03-29 NOTE — PROGRESS NOTES
Subjective:   @Patient ID:  Shannan Norman is a 77 y.o. male who presents for evaluation of abnormal EKG .      HPI:   Patient is here for follow up   ER visit 3/11/2023 with palpitations. EKG with atrial flutter. He was rx lopressor prn.   He is in SR today.   He had to take the lopressor one time only over the last 3 weeks  Not as active as he was after he had stress LE fractures          EKG showed A-paced  Rhythm. Septal infarct. EKGs in 2017 he had the same findings.     Historically: Hx of A. Flutter ablation in 2010, then redo CTI ablation in 2018. S/p PPM in 2018 for SSS and chronotropic incompetence.  He is on Eliquis for OAC and tolerating it well. He f/u with Dr. Sanchez.  He had chronic VV varicose veins s/p surgical stripping and EVLTs by Dr. Lund. He saw Dr. Toth but did not follow up with the recommendation of either stockings or EVLTs.      Prior cardiovascular  Hx  --------------------------------       - Echo 9/16/2020  Normal left ventricular systolic function. The estimated ejection fraction is 55-60%.  Normal LV diastolic function.  The estimated PA systolic pressure is 21 mmHg.  No wall motion abnormalities.  Normal right ventricular systolic function.  Normal central venous pressure (3 mmHg    - ECHO 10/2016     1 - Normal left ventricular systolic function (EF 60-65%).     2 - Left ventricular diastolic dysfunction.     3 - Biatrial enlargement.     4 - Normal right ventricular systolic function .     5 - The estimated PA systolic pressure is 25 mmHg.     6 - Trivial aortic regurgitation.     7 - Mild tricuspid regurgitation.     - EKG 9/2020  A. Paced with old septal infarct             Patient Active Problem List    Diagnosis Date Noted    Basal cell carcinoma (BCC) of skin of left lower extremity including hip 11/17/2022     Treated with excision 11/17/ 2022      Pelvic floor dysfunction 05/13/2022    Pain of left upper extremity 03/17/2022    Weakness 03/17/2022    Stiffness due to  immobility 2022    Lumbar spondylosis 2022    Primary osteoarthritis of both hips 2022    Decreased range of hip movement 2022    Decreased strength 2022    AK (actinic keratosis) 2021    History of melanoma 2021    Thrombocytopenia 2021    Malignant melanoma of left shoulder 2020    Nuclear sclerotic cataract of left eye 2020    Nuclear sclerotic cataract of right eye 2020    CKD (chronic kidney disease) stage 3, GFR 30-59 ml/min 2019    Overweight (BMI 25.0-29.9) 2019    Right inguinal hernia 2019    Dyslipidemia associated with type 2 diabetes mellitus 2018    Hypertension associated with diabetes 2018    Junctional bradycardia 2018    Benign prostatic hyperplasia 2017    Nonrheumatic aortic valve insufficiency 2016     Trivial      SSS (sick sinus syndrome) 10/24/2016    Junctional escape rhythm 2016    Type 2 diabetes mellitus without retinopathy 10/13/2015    Nuclear sclerosis of both eyes 10/13/2015    Left epiretinal membrane 10/13/2015    Asymptomatic varicose veins of both lower extremities 2015         S/p bilateral GSV EVLT [id number]        Post-operative state 02/10/2014     PROCEDURES 2014:   1. ECU right side stabilization with retinacular sling.   2. Synovectomy, right wrist.   3. Splint application.        Umbilical hernia 2013    PFO (patent foramen ovale) 2013     On asa 325mg. No hx of CVA        HTN (hypertension) 2013    Gout, arthritis 2013    Atrial flutter 2013     EF 60%, s/p RF ablation in  (Dr. Grady Copeland)      Type 2 diabetes mellitus with stage 3 chronic kidney disease, without long-term current use of insulin 2013           Right Arm BP - Sittin/72  Left Arm BP - Sittin/65        LAST HbA1c  Lab Results   Component Value Date    HGBA1C 6.2 (H) 10/03/2022       Lipid panel  Lab Results   Component Value  Date    CHOL 136 10/03/2022    CHOL 102 (L) 03/30/2022    CHOL 142 10/04/2021     Lab Results   Component Value Date    HDL 66 10/03/2022    HDL 31 (L) 03/30/2022    HDL 56 10/04/2021     Lab Results   Component Value Date    LDLCALC 62.2 (L) 10/03/2022    LDLCALC 56.0 (L) 03/30/2022    LDLCALC 71.6 10/04/2021     Lab Results   Component Value Date    TRIG 39 10/03/2022    TRIG 75 03/30/2022    TRIG 72 10/04/2021     Lab Results   Component Value Date    CHOLHDL 48.5 10/03/2022    CHOLHDL 30.4 03/30/2022    CHOLHDL 39.4 10/04/2021            Review of Systems   Constitutional: Negative for chills and fever.   HENT:  Positive for nosebleeds. Negative for hearing loss.    Eyes:  Negative for blurred vision.   Cardiovascular:  Negative for chest pain, leg swelling and palpitations.   Respiratory:  Negative for hemoptysis and shortness of breath.    Hematologic/Lymphatic: Negative for bleeding problem.   Skin:  Negative for itching.   Musculoskeletal:  Negative for falls.   Gastrointestinal:  Negative for abdominal pain and hematochezia.   Genitourinary:  Negative for hematuria.   Neurological:  Negative for dizziness and loss of balance.   Psychiatric/Behavioral:  Negative for altered mental status and depression.      Objective:   Physical Exam  Constitutional:       Appearance: He is well-developed.   HENT:      Head: Normocephalic and atraumatic.   Eyes:      Conjunctiva/sclera: Conjunctivae normal.   Neck:      Vascular: No carotid bruit or JVD.   Cardiovascular:      Rate and Rhythm: Normal rate and regular rhythm.      Pulses:           Carotid pulses are 2+ on the right side and 2+ on the left side.       Radial pulses are 2+ on the right side and 2+ on the left side.      Heart sounds: Normal heart sounds. No murmur heard.    No friction rub. No gallop.   Pulmonary:      Effort: Pulmonary effort is normal. No respiratory distress.      Breath sounds: No stridor. No wheezing or rhonchi.   Musculoskeletal:       Cervical back: Neck supple.   Skin:     General: Skin is warm and dry.      Comments: Varicose veins    Neurological:      Mental Status: He is alert and oriented to person, place, and time.   Psychiatric:         Behavior: Behavior normal.       Assessment:     1. Primary hypertension    2. Typical atrial flutter    3. Asymptomatic varicose veins of both lower extremities    4. SSS (sick sinus syndrome)    5. Nonrheumatic aortic valve insufficiency    6. Dyslipidemia associated with type 2 diabetes mellitus    7. Hypertension associated with diabetes    8. Stage 3 chronic kidney disease, unspecified whether stage 3a or 3b CKD          Plan:   - BP well controlled  - Stable cardiac issues  - Paroxysmal atrial flutter. Had break through in 3/11/2023 and doing well since then. If recurrent worsening symptoms then will need to see Dr. Sanchez sooner. Repeat EKG today back to a.paced rhythm. Continue Lopressor as needed  - Continue DOAC  - F/U with Dr. Sanchez as scheduled   - Reported hx of PFO in prior notes  - Continue conservative ttt for VV         I spent 5-10 minutes asking, assessing, assisting, arranging and advising heart healthy diet improvements. This included low-salt meals, portion control and health food alternatives. I also encourage 30 minutes of moderate exercise 3-4x a week.       6 months f/u       Pertinent cardiac images and EKG reviewed independently.    Continue with current medical plan and lifestyle changes.  Return sooner for concerns or questions. If symptoms persist go to the ED  I have reviewed all pertinent data including patient's medical history in detail and updated the computerized patient record.     No orders of the defined types were placed in this encounter.        Follow up as scheduled.     He expressed verbal understanding and agreed with the plan    Patient's Medications   New Prescriptions    No medications on file   Previous Medications    ALLOPURINOL (ZYLOPRIM) 100 MG TABLET     Take 1 tablet (100 mg total) by mouth once daily.    ATORVASTATIN (LIPITOR) 40 MG TABLET    Take 1 tablet (40 mg total) by mouth once daily.    BENAZEPRIL (LOTENSIN) 20 MG TABLET    Take 1 tablet (20 mg total) by mouth once daily.    CALCIUM CARBONATE-VIT D3- MG CALCIUM- 400 UNIT TAB    Take 1 tablet by mouth once.    CYCLOSPORINE (RESTASIS) 0.05 % OPHTHALMIC EMULSION    Place 1 drop into both eyes 2 (two) times daily.    FERROUS SULFATE (FEOSOL) 325 MG (65 MG IRON) TAB TABLET    Take 325 mg by mouth once daily.    GLUCOSAMINE HCL/CHONDR VASQUEZ A NA (GLUCOSAMINE-CHONDROITIN) 750-600 MG TAB    Take 2 tablets by mouth Daily.    LEVOCETIRIZINE (XYZAL) 5 MG TABLET    Take 1 tablet (5 mg total) by mouth every evening.    METOPROLOL TARTRATE (LOPRESSOR) 25 MG TABLET    Take 1 tablet (25 mg total) by mouth 2 (two) times daily as needed (palpitations).    MULTIVITAMIN CAPSULE    Take 1 capsule by mouth nightly.     PREDNISONE (DELTASONE) 2.5 MG TABLET    Take 1 tablet (2.5 mg total) by mouth 2 (two) times daily.    PREDNISONE (DELTASONE) 5 MG TABLET    Take 1 tablet (5 mg total) by mouth once daily.    RIVAROXABAN (XARELTO) 20 MG TAB    Take 1 tablet (20 mg total) by mouth daily with dinner or evening meal.    TAMSULOSIN (FLOMAX) 0.4 MG CAP    TAKE 1 CAPSULE BY MOUTH AFTER DINNER   Modified Medications    No medications on file   Discontinued Medications    No medications on file

## 2023-03-30 ENCOUNTER — TELEPHONE (OUTPATIENT)
Dept: PODIATRY | Facility: CLINIC | Age: 78
End: 2023-03-30
Payer: MEDICARE

## 2023-03-30 ENCOUNTER — PATIENT MESSAGE (OUTPATIENT)
Dept: PODIATRY | Facility: CLINIC | Age: 78
End: 2023-03-30
Payer: MEDICARE

## 2023-03-30 NOTE — TELEPHONE ENCOUNTER
"Send a patient message to the patient with [employer] phone # to call regarding his bill.     Meaghan    I received in today's mail a bill for the "boot" that I wore.  I was surprised that  it came from [place], but my main concern was whether it had been submitted to my insurance company.  Since this problem might be unique to Podiatry I am sending this to you.   "

## 2023-04-01 ENCOUNTER — PATIENT MESSAGE (OUTPATIENT)
Dept: NEUROLOGY | Facility: CLINIC | Age: 78
End: 2023-04-01
Payer: MEDICARE

## 2023-04-03 ENCOUNTER — PATIENT MESSAGE (OUTPATIENT)
Dept: FAMILY MEDICINE | Facility: CLINIC | Age: 78
End: 2023-04-03
Payer: MEDICARE

## 2023-04-03 ENCOUNTER — LAB VISIT (OUTPATIENT)
Dept: LAB | Facility: HOSPITAL | Age: 78
End: 2023-04-03
Attending: FAMILY MEDICINE
Payer: MEDICARE

## 2023-04-03 ENCOUNTER — PATIENT MESSAGE (OUTPATIENT)
Dept: RHEUMATOLOGY | Facility: CLINIC | Age: 78
End: 2023-04-03
Payer: MEDICARE

## 2023-04-03 DIAGNOSIS — I15.2 HYPERTENSION ASSOCIATED WITH DIABETES: ICD-10-CM

## 2023-04-03 DIAGNOSIS — H04.123 BILATERAL DRY EYES: ICD-10-CM

## 2023-04-03 DIAGNOSIS — E11.59 HYPERTENSION ASSOCIATED WITH DIABETES: ICD-10-CM

## 2023-04-03 LAB
ALBUMIN/CREAT UR: 194.6 UG/MG (ref 0–30)
CREAT UR-MCNC: 92 MG/DL (ref 23–375)
MICROALBUMIN UR DL<=1MG/L-MCNC: 179 UG/ML

## 2023-04-03 PROCEDURE — 82570 ASSAY OF URINE CREATININE: CPT | Performed by: FAMILY MEDICINE

## 2023-04-03 RX ORDER — CYCLOSPORINE 0.5 MG/ML
1 EMULSION OPHTHALMIC 2 TIMES DAILY
Qty: 60 EACH | Refills: 11 | Status: SHIPPED | OUTPATIENT
Start: 2023-04-03

## 2023-04-03 NOTE — PROGRESS NOTES
"[facility]     PATIENT: Shannan Norman  VISIT DATE: 2023  MRN: [id number]  PRIMARY PROVIDER: Nitin Banks MD  : 1945       Chief complaint: Progressive Cognitive Impairment     History of present illness:      The patient is a 77-year-old right-handed male who presents today to the [facility] due to concerns related to Progressive Cognitive Impairment.  The patient is accompanied by no one.  Additional information is obtained by reviewing available medical records.     Relevant Background/Context  Known Relevant Family history:  Mother -  at age 83 CAD s/p CABG  Father -  at age 72 CAD/MI/COPD/Tobacco use  Neurocognitive Disorder:  Father - MCI onset 70s  Mother - MCI onset 80s  Sister - DLB alive 87, onset early 80s  Movement Disorder:  Sister - DLB onset early 80s  Motorneuron Disorder:  The patient/family denies a history of ALS, MND, PLS.  Developmental Disorder:  The patient/family denies a history of Dyslexia, ADHD, ASD.  Psychiatric Disorder:  Sisters - "issues"  Known Relevant Genetics:  There is no known relevant genetic testing available.  Developmental Milestones:  The patient/family report no known birth complications or early life problems. The patient met all developmental milestones.  Education/Learning Capacity:  The patient/family report no signs or symptoms suggestive of developmental learning disorder.  HS  BA. + 5 years economics  Estimated Educational Experience: 17 years of formal education.  Social History:  Patient lives with his wife for whom he is the primary caregiver. She is stage 7 late onset amnestic dementia. Patient continues to volunteer in his community is involved in multiple social programs volunteering groups. Patient lives in a senior community adjacent to a gym for which patient gets 30 minutes of cardiovascular exercise 3 times week and 3 times " week he additionally gets 30-45 minutes of yoga.  Career/Skill Reserve:  Patient has a long productive career in various capacities. Patient started out his career as an  /  working in various large [employer]. 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 Since then patient has been volunteering for various [employer] organizations  Retired/Quit: 2000     Neurocognitive Disorder Features  Onset/Duration:  Jun 2022 (~10-month)  First Symptom:  Attention impairment  Progression:  Step-wise Progressive  Clinical Course:  Neurologist (03/21/2022)  Type: Chart Review. Marie the patient is a R HANDED 76 y. O. Male with a medical issues significant for HTN, gout, DMII, CKDIII, BPH, SSS, and osteoarthritis of bilateral hips. Accompanied by his son. Noticed stiffness in his bilateral hips. Not affecting his balance. Scheduled to see rheumatology in June. Has trouble lifting his arms above his shoulders, this started 2-3 weeks ago when he was having pain in his knees and hips. He was told that he needed to see neurology by orthopedic. Thought he had parkinson's. Some days he is able to lift his arms above his shoulders and some days he is not able to. Episodic pain in hips, lumbar spine and knees as well. No falls. Some shuffling according to his son this past weekend but now has noticed that he is back to normal. He was not able to lift his arms above his waist on Sunday or Monday. He denies tremors but his son has noticed a tremor whenever he holds a plate -- he attributes to reduced strength. He only feels like he has slowed down whenever he has episodes of weakness. Intermittent tightness in his calves -- he said that this is not a good sign. He was completely normal until about a month ago when he started having severe shoulder and hip pain. Denies episodic eye pain or blurred vision. Denies sudden vision loss. ESR  and CRP recently elevated. ESR 87 and CRP 42. 5. - history concerning for rheumatologic process --- possibly polymyalgia rheumatica in setting of pelvic and shoulder pain, episodic. - elevated CRP and ESR. - states that when this pain occurs he is also weak but unclear if this is true weakness or just immense pain. - denies history concerning for GCA. - scheduled to see rheumatology in June-- will see if there is something we can do to expedite appointment. - no parkinsonism on exam today -- stiffness if NOT episodic in PD, also unable to appreciate cogwheel rigidity even with diversion.  Primary Care Provider (11/15/2022)  Type: Chart Review. 77 years old male came to the clinic for high blood pressure check. Blood pressure today was stable. No chest pain, palpitation, orthopnea or PND. The patient previously diagnosed with PMR he is currently taking prednisone 5 mg daily. The patient reports problems with concentration and memory sometimes.  [facility] - Nimesh Adams MD. Neurologist (01/25/2023)  Type: Chart Review. The patient presents alone as such history is limited at this time. Additional history gathered from review of previous medical records. The patient reports being in her normal state health up until early 2022. During this time the patient began reporting painful stiffness having difficulty getting out of the bed with bilateral shoulder pain hip pain. Report this to worsened throughout the day was very bad at night. He was seen by a movement specialist. At this time the patient has lost 20 lb and mild change in voice was treating with over-the-counter anti-inflammatories prescribed Celebrex. Diagnosis was thought to be rheumatological. The patient was referred to Rheumatology. The patient was diagnosed with polymyalgia rheumatica in April of 2022 and started on steroids. Over the following 8 months patient's hemoglobin A1c would gradually rise the patient's resting fasting glucose  during the daytime would state largely within normal limits. During this same timeframe beginning in approximately June to July of 2022 the patient began reporting increasing concentration and attention deficits with prominent train of thought deficits. The patient described difficulty focusing concentrating on certain tasks including forgetting what he was doing mid task. This has gradually waxed and waned over the last 7 months however is not interfering with any instrumental activities of daily living. The patient denies any his family or friends have made comment on his memory or multitasking skills. The patient remains active in his community involved in multiple social groups is retired in his taking care of his wife with severe late onset amnestic dementia. The patient reports a relevant his family history of late onset Lewy body disease in his older sister. The patient denies any significant visual spatial problems, behavioral problems, language problems, or constitutional problems. The patient does report bothersome sleep disorder in part related to his need to care for his wife who is having difficulty maintaining restful sleep at in the evening. Primary questions posed by the patient today is measures to improve longevity given his need to care for his wife. The observations made above, were discussed with the patient and his family. We recommend screening for reversible causes of cognitive impairment with serum laboratories. We recommend an MRI brain for screening for anatomical lesions and other causes of cognitive impairment. We have discussed the MIND Diet and other lifestyle behavior that may help maintain brain health. We have discussed opportunities for biomarker testing ([facility] [place] biomarkers, IDEAs Amyloid-PET, Syn-One skin biopsy) - the patient not interested at this time. We have discussed opportunities for genetic testing (Invitae, GeneSpire Sensibo) - the patient not interested at this time.     Current  Presentation  Recent/Interim History:  Since last time seen, serum laboratories are largely within normal limits for persistently elevated CRP and relatively normal ESR. MRI brain shows generalized cortical atrophy slightly more than expected for age with subcortical white matter changes consistent with inflammatory process and consistent with known history of polymyalgia rheumatica. On presentation the patient reports a mild improvement of subjective cognitive impairment following the passing of his wife. Patient's his wife passed in February. The patient is still in agreement. His insomnia has improvement day-to-day stress has improved with this improvement in stress his subjective cognitive impairment has also lessened. We discussed this in relevant to patient's documented objective subjective cognitive impairment. We discussed that his MRI brain shows no significant cortical atrophy. We discussed that his examination and his family history are concerning for early types of parkinsonism. We discussed additional diagnostic testing. The patient is not interested at this time. Discussed generalized means of improving long-term cognitive function. We expressed concern regarding patient's sleeping habits sleep behavior potentially concerning for sleep apnea. We discussed additional diagnostic testing. The patient would like to screen with iPhone 1st. We are in agreement. No additional changes of medications at this time.  Unresolved Concern(s) reported by patient/family:  Atrial pacemaker - do not recommend donepezil  Family history of Lewy body dementia     Review of cognitive, visuospatial, motor, sensory, and behavioral systems:     Memory:   The patient's memory has worsened in the past few years.  He does repeat statements or asks the same question repeatedly.  He does not have difficulty remembering recent important conversations.  He does not have difficulty remembering recent events.  He does not forget  information within minutes.  His recent retrograde memory is intact.  His remote memory is intact.  Attention:   The patient's attention and concentration are impaired.  He does not have attentional fluctuations.  He does not have difficulty maintaining selective attention.  He does become easily distracted.  He does have difficulty with divided attention.  Executive:   The patient's cognitive processing speed is slower.  He does have difficulty with working memory.  He does not misplace personal items (e.g., keys, cell phone, wallet) more frequently.  He does not have difficulty keeping track of his medications.  He does not have difficulty with planning/organizing/completing multistep tasks.  He does have not difficulty with executive attention.  He does have difficulty with flexible thinking.  He does not have difficulty with response inhibition.  He denies new impulsivity or rash/careless actions.  His judgment is intact.  Language:   The patient's speech is not affected.  He does not forget people's names more frequently.  He does not have word-finding difficulties.  His speech is fluent and non-effortful.  His speech is grammatically intact.  He does not make word substitutions.  He does not have difficulty reading.  He does not appear to have impaired comprehension.  Visuospatial:   The patient does not have new visuospatial difficulty.  He does not become confused or disoriented in *new*, unfamiliar places.  He does not have trouble with navigation.  He does not get lost in familiar places.  He does not have visuospatial disorientation.  He does not have difficulty recognizing objects or faces.  He denies problems with driving or parking.  Motor/Coordination:   The patient does not have difficulty with walking.  He does not feel imbalanced.  He denies having fallen.  He does not appear to have new muscle weakness.  He does not have difficulty buttoning shirts, operating zippers, or manipulating  tools/utensils.  His handwriting has not become micrographic.  He does not have a resting tremor.  He denies having any new involuntary movements and/or muscle jerking.  He does not have swallowing difficulty.  He denies new muscle cramps and twitching.  Sensory:   The patient denies new numbness, tingling, paresthesias, or pain.  The patient denies a loss of vision, blurry vision, or double vision.  The patient denies new loss of hearing or worsening tinnitus.  The patient denies anosmia.  Sleep:   The patient reports difficulty sleeping.  The patient does not have difficulty going to sleep.  The patient reports difficulty staying asleep and/or frequently awakening at night.  The patient does snore and/or have witnessed apneas while sleeping.  When he wakes up in the morning, he does feel well-rested.  He denies dream-enactment behavior.  He denies symptoms suggestive of restless leg syndrome.  Behavior:   The patient's personality has not changed.  He does not have symptoms of disinhibition and social inappropriateness.  He does not have symptoms to suggest a loss of manners or decorum.  He does not appear apathetic or has decreased motivation.  He does not appear to have a change in inertia.  There is no report that The patient has had a change in their emotional expression.  He does not have emotional blunting or lability.  He does not have symptoms of irritability and mood lability.  He does not have symptoms of agitation, aggression, or violent outbursts.  His insight into his health and situation is intact.  His personal hygiene is intact.  He is not exhibiting a diminished response to other people's needs and feelings.  He is not exhibiting a diminished social interest, interrelatedness, or personal warmth.  He denies restlessness.  He denies new and/or worsening simple repetitive behaviors.  His speech has not become simplified or become repetitive/stereotyped.  He denies new/worsening complex  repetitive/ritualistic compulsions and behaviors.  He does not have symptoms of hyper-religiosity or dogmatism.  His interests/pleasures have not become restrictive, simplified, interrupting, or repetitive.  He denies a change of self-stimulating behavior.  He denies any changes in eating behavior.  He denies increased consumption of food or substances.  He denies oral exploration or consumption of inedible objects.  Psychiatric:   He does not feel depressed.  He is not exhibiting symptoms of social withdrawal/indifference.  He denies anxiety.  He does not exhibit cycling behavior.  He does not exhibit hyperactive behavior.  He is not exhibited symptoms of paranoia.  He does not have delusions.  He does not have hallucinations.  He does not have a history of sensitivity to neuroleptic/psychotropic medications.  Medical Review of Systems:   The patient does not have constipation.  The patient does not have urinary incontinence.  The patient denies orthostatic lightheadedness.  The patient's weight is stable.  Functional status:  Difficulty performing the following Instrumental ADLs:  Housekeeping: No  Food Preparation: No  Shopping: No  Ability to Handle Finances: No  Transportation/Driving: No  Household Appliances/Stove: No  Laundry: No  Difficulty performing the following Basic ADLs:  Dressing: No  Bathing: No  Toileting: No  Personal hygiene and grooming: No  Feeding: No  Care Management:  Patient/Family Safety Concerns:  Medication Adherence: No  Home Safety: No  Wandered: No  Firearms: No  Fall Risk: No  Home Alone: No          Past Medical History:   Diagnosis Date    Allergy     Arthritis     Atrial fibrillation     Atrial flutter 2011    ablation    Basal cell carcinoma     Cancer     Cataract     CKD (chronic kidney disease) stage 3, GFR 30-59 ml/min 8/13/2019    Diabetes mellitus     Dry eye syndrome     Gout, unspecified     High cholesterol     History of shingles     Hypertension     Melanoma      Seizures        Past Surgical History:   Procedure Laterality Date    ABLATION N/A 9/11/2018    Procedure: ABLATION;  Surgeon: Hany Sanchez MD;  Location: [facility] CATH LAB;  Service: Cardiology;  Laterality: N/A;  AFL, ISABELLE, RFA, ELODIA, MAC, DM, 3 PREP    ABLATION OF DYSRHYTHMIC FOCUS      ADENOIDECTOMY      CARDIAC PACEMAKER PLACEMENT      no defib    CATARACT EXTRACTION W/  INTRAOCULAR LENS IMPLANT Right 7/28/2020    Procedure: EXTRACTION, CATARACT, WITH IOL INSERTION;  Surgeon: Andrés Morse MD;  Location: [facility] OR;  Service: Ophthalmology;  Laterality: Right;    CATARACT EXTRACTION W/  INTRAOCULAR LENS IMPLANT Left 8/11/2020    Procedure: EXTRACTION, CATARACT, WITH IOL INSERTION;  Surgeon: Andrés Morse MD;  Location[facility];  Service: Ophthalmology;  Laterality: Left;    COLONOSCOPY N/A 1/2/2019    Procedure: COLONOSCOPY Suprep;  Surgeon: Cindy Solorzano MD;  Location: [facility] ENDO;  Service: Endoscopy;  Laterality: N/A;    GANGLION CYST EXCISION      left neck    HERNIA REPAIR  2013    umbilical    TENDON REPAIR Right     wrist    TONSILLECTOMY      varicose veins      several sx  bilateral    VASECTOMY      VEIN SURGERY         Family History   Problem Relation Age of Onset    Diabetes Mother     COPD Father     Heart disease Father     Arthritis Sister     Heart attack Brother     Heart disease Brother     Diabetes Brother     No Known Problems Maternal Aunt     No Known Problems Maternal Uncle     No Known Problems Paternal Aunt     No Known Problems Paternal Uncle     No Known Problems Maternal Grandmother     No Known Problems Maternal Grandfather     No Known Problems Paternal Grandmother     No Known Problems Paternal Grandfather     No Known Problems Son     Cancer Sister         lymph node, breast    No Known Problems Sister     No Known Problems Son     Amblyopia Neg Hx     Blindness Neg Hx     Cataracts Neg Hx     Glaucoma Neg Hx     Hypertension Neg Hx     Macular degeneration Neg  Hx     Retinal detachment Neg Hx     Strabismus Neg Hx     Stroke Neg Hx     Thyroid disease Neg Hx     Prostate cancer Neg Hx     Kidney disease Neg Hx     Melanoma Neg Hx        Social History     Socioeconomic History    Marital status:    Occupational History     Employer: [employer]   Tobacco Use    Smoking status: Never    Smokeless tobacco: Never   Substance and Sexual Activity    Alcohol use: Yes     Alcohol/week: 7.0 - 14.0 standard drinks     Types: 7 - 14 Glasses of wine per week    Drug use: No    Sexual activity: Yes     Partners: Female     Social Determinants of Health     Financial Resource Strain: Low Risk     Difficulty of Paying Living Expenses: Not hard at all   Food Insecurity: No Food Insecurity    Worried About Running Out of Food in the Last Year: Never true    Ran Out of Food in the Last Year: Never true   Transportation Needs: No Transportation Needs    Lack of Transportation (Medical): No    Lack of Transportation (Non-Medical): No   Physical Activity: Inactive    Days of Exercise per Week: 0 days    Minutes of Exercise per Session: 40 min   Stress: No Stress Concern Present    Feeling of Stress : Only a little   Social Connections: Unknown    Frequency of Communication with Friends and Family: More than three times a week    Frequency of Social Gatherings with Friends and Family: More than three times a week    Active Member of Clubs or Organizations: Yes    Attends Club or Organization Meetings: More than 4 times per year    Marital Status:    Housing Stability: Low Risk     Unable to Pay for Housing in the Last Year: No    Number of Places Lived in the Last Year: 1    Unstable Housing in the Last Year: No       Medication:     Current Outpatient Medications on File Prior to Visit   Medication Sig Dispense Refill    allopurinoL (ZYLOPRIM) 100 MG tablet Take 1 tablet (100 mg total) by mouth once daily. 90 tablet 0    atorvastatin (LIPITOR) 40 MG tablet Take 1 tablet  (40 mg total) by mouth once daily. 90 tablet 3    benazepriL (LOTENSIN) 20 MG tablet Take 1 tablet (20 mg total) by mouth once daily. 90 tablet 3    calcium carbonate-vit D3-min 600 mg calcium- 400 unit Tab Take 1 tablet by mouth once.      cycloSPORINE (RESTASIS) 0.05 % ophthalmic emulsion Place 1 drop into both eyes 2 (two) times daily. 60 each 11    ferrous sulfate (FEOSOL) 325 mg (65 mg iron) Tab tablet Take 325 mg by mouth once daily.      GLUCOSAMINE HCL/CHONDR VASQUEZ A NA (GLUCOSAMINE-CHONDROITIN) 750-600 mg Tab Take 2 tablets by mouth Daily.      levocetirizine (XYZAL) 5 MG tablet Take 1 tablet (5 mg total) by mouth every evening. 90 tablet 1    metoprolol tartrate (LOPRESSOR) 25 MG tablet Take 1 tablet (25 mg total) by mouth 2 (two) times daily as needed (palpitations). 14 tablet 0    multivitamin capsule Take 1 capsule by mouth nightly.       predniSONE (DELTASONE) 2.5 MG tablet Take 1 tablet (2.5 mg total) by mouth 2 (two) times daily. 60 tablet 1    predniSONE (DELTASONE) 5 MG tablet Take 1 tablet (5 mg total) by mouth once daily. 180 tablet 0    rivaroxaban (XARELTO) 20 mg Tab Take 1 tablet (20 mg total) by mouth daily with dinner or evening meal. 90 tablet 3    tamsulosin (FLOMAX) 0.4 mg Cap TAKE 1 CAPSULE BY MOUTH AFTER DINNER 90 capsule 3     No current facility-administered medications on file prior to visit.        Review of patient's allergies indicates:  No Known Allergies    Medications Reconciliation:   I have reconciled the patient's home medications and discharge medications with the patient/family. I have updated all changes.  Refer to After-Visit Medication List.    Objective:  Vital Signs:  There were no vitals filed for this visit.  Wt Readings from Last 3 Encounters:   03/29/23 1338 95 kg (209 lb 7 oz)   03/11/23 1412 89.8 kg (198 lb)   02/17/23 1048 93.9 kg (207 lb)     There is no height or weight on file to calculate BMI.     Neurological examination:    Mental Status:   His appearance is  normal (hygiene is appropriate; attire is proper and clean).  Throughout the interview, he is cooperative, his eye contact is appropriate.  His behavior is appropriate to the clinical context without impropriety or improper language/conduct.  His behavior was not characterized by episodes of sudden uncontrollable and inappropriate laughing or crying.  The patient is awake; His energy level appeared normal.  His orientation is normal; Spatial 5/5 (location, the floor of building, city, county, state) and temporal 5/5 (month, day, year, MILENA) dimensions are accurate.  He has appropriate attention/concentration ([place] forwards and backward).  He can complete three-step commands.  His fund of knowledge was appropriate for age, culture, and level of education.  His thought process is logical and goal-oriented.  He demonstrated appropriate insight based on actions, awareness of his illness, plans for the future.  He demonstrated good judgment based on actions and plans for the future.  He has no evidence of hallucinations (auditory, visual, olfactory).  He has no evidence of delusions (paranoid, grandiose, bizarre).  Cranial Nerves:   His blink rate was normal.  His facial strength was normal.  His facial expression was symmetric and appropriate to the context.  His oropharynx and soft palate appeared abnormal.   Comment: mallampati 3;  His tongue showed no evidence of scalloping.  He tongue movement with normal.  Speech/Language:   The patient's speech was fluent, non-effortful, and his rate was appropriate to the context.  He has no articulation (segmental features) errors.  The patient's speech is not dysarthric.  The patient's speech was without evidence of anomia.  He makes no phonological loop errors.  He can comprehend commands that cross the midline (e.g., with your left thumb, touch your right ear).  He can comprehend commands that depend on syntax (e.g., point to the ceiling after you point to the  floor).  Motor:   Assessment of motor strength was symmetric and at minimal anti-gravity.  There is no pronator or downward drift.  There is no myoclonus observed in The patient's bilateral upper and lower extremities.   Comment: questionable LUE; questionable LUE  There are no fasciculations observed in The patient's bilateral upper and lower extremities.  Coordination:   He has no bilateral upper extremity limb dysmetria or past pointing on finger-nose-finger bilaterally.  He has no limb dysdiadochokinesia of the upper extremity on the pronation/supination test and screwing in a light bulb or lower extremity during tapping ball of each foot bilaterally.  He has a visible tremor.  He has no kinetic tremor bilaterally.  He has a postural tremor.   Comment: B/L, L>R, Mild;  He has no resting tremor bilaterally.  He has evidence of interhemispheric motor control deficits.  He demonstrates evidence of motor overflow.  He demonstrates no alien limb phenomena.  The patient's upper extremity fine motor coordination was abnormal.   Comment: B/L, R>L, Mild;  The patient's upper extremity fine motor coordination was not slow.   Comment: finger tapping, pronation/supination, and the open-close fist was slow.; finger tapping, pronation/supination, and the open-close fist was slow.  The patient's upper extremity fine motor coordination was not hypometric.   Comment: finger tapping, pronation/supination, and the open-close fist showed hypometria.; finger tapping, pronation/supination, and the open-close fist showed hypometria.  The patient's upper extremity fine motor coordination was not dysrhythmic.   Comment: finger tapping, pronation/supination, and the open-close fist showed dysrhythmia.; finger tapping, pronation/supination, and the open-close fist showed dysrhythmia.    Neuropsychological Evaluation Summary:  Prior Neurocognitive/Neurobehavioral Evaluation(s)  No Prior Testing Available  2023-01-25:  Subjective Cognitive  impairment with borderline mild attention/concentration and retrieval deficits   Very Mild Memory Impairment: He scored >1.5 standard deviations below the norm on at least one measure. He had difficulty with attention, encoding. He had a incomplete learning curve. His free recall was significantly impaired by time.  Very Mild Language Impairment: semantic association, Semantics.  MMSE 30/30: MMSE Score suggestive of normal to questionable cognitive impairment.  MOCA 30/30: MOCA Score suggestive of normal to questionable cognitive impairment.  Neurocognitive/Neurobehavioral Evaluation completed on 2023-04-04    Neuropsychiatric/Behavioral Focused Evaluation Assessment    BEHAV5+ 1/6 See ROS section for a full description   Laboratories:     Lab Date Value [Reference]   Autoimmune/Paraneoplastic Screening           CRP 2023, Apr-03 2023, Feb-17 2022, Dec-19    8.4 (H) [0.0 - 8.2 mg/L]  3.8 [0.0 - 8.2 mg/L]  9.0 (H) [0.0 - 8.2 mg/L]      Sed Rate 2023, Apr-03 2022, Dec-19  2022, Oct-18    10 [0 - 23 mm/Hr]  5 [0 - 23 mm/Hr]  11 [0 - 23 mm/Hr]      Metabolic Screening   Hemoglobin A1C External 02/17/2023  6.1 (H) [4.0 - 5.6 %]  6.2 (H) [4.0 - 5.6 %]      Methlymalonic Acid 02/17/2023  0.20      T4 Total 02/17/2023  4.9 [4.5 - 11.5 ug/dL]      TSH 10/03/81535875, Oct-03    2.081 [0.400 - 4.000 uIU/mL]  1.381 [0.400 - 4.000 uIU/mL]      Glucose 2023, Apr-03    99 [70 - 110 mg/dL]      Albumin 2023, Apr-03 2023, Mar-11  2023, Feb-17    3.7 [3.5 - 5.2 g/dL]  3.7 [3.5 - 5.2 g/dL]  4.0 [3.5 - 5.2 g/dL]      Alkaline Phosphatase 2023, Apr-03 2023, Mar-11  2023, Feb-17    80 [55 - 135 U/L]  77 [55 - 135 U/L]  75 [55 - 135 U/L]      ALT 2023, Apr-03 2023, Mar-11  2023, Feb-17    12 [10 - 44 U/L]  13 [10 - 44 U/L]  11 [10 - 44 U/L]      AST 2023, Apr-03 2023, Mar-11  2023, Feb-17    13 [10 - 40 U/L]  15 [10 - 40 U/L]  13 [10 - 40 U/L]      BILIRUBIN TOTAL 2023, Apr-03 2023, Mar-11  2023, Feb-17    0.6 [0.1 - 1.0  mg/dL]  0.5 [0.1 - 1.0 mg/dL]  0.6 [0.1 - 1.0 mg/dL]      PROTEIN TOTAL 2023, Apr-03 2023, Mar-11  2023, Feb-17    7.1 [6.0 - 8.4 g/dL]  6.7 [6.0 - 8.4 g/dL]  6.8 [6.0 - 8.4 g/dL]      Cholesterol 2023, Apr-03    129 [120 - 199 mg/dL]      HDL 2023, Apr-03    49 [40 - 75 mg/dL]      Non-HDL Cholesterol 2023, Apr-03    80 [mg/dL]      Triglycerides 02/17/2023  58 [30 - 150 mg/dL]      Folate 02/17/2023  16.5 [4.0 - 24.0 ng/mL]      Thiamine 02/17/2023  84 [38 - 122 ug/L]      Vitamin B-12 2023, Feb-17    559 [180 - 914 ng/L]      Cerebrospinal Fluid Assessment   IgG 2023, Feb-17    791 [650 - 1600 mg/dL]      Coagulopathy Screening   INR 2023, Mar-11    1.2 [0.8 - 1.2]      Protime 03/11/2023  11.9 [9.0 - 12.5 sec]      Neuroendocrine/Electrolyte Screening   Magnesium 02/17/20232023, Feb-17    2.0 [1.6 - 2.6 mg/dL]  2.0 [1.6 - 2.6 mg/dL]      BUN 2023, Apr-03    21 [8 - 23 mg/dL]      Chloride 2023, Apr-03    104 [95 - 110 mmol/L]      Creatinine 2023, Apr-03    1.2 [0.5 - 1.4 mg/dL]      Potassium 2023, Apr-03    4.3 [3.5 - 5.1 mmol/L]      Sodium 2023, Apr-03    141 [136 - 145 mmol/L]      Neurodegenerative Serum Fluid Assessment   NEUROFILAMENT LIGHT CHAIN, PLASMA 2023, Feb-17    19.5      Infectious Disease/Immunocompromised Screening   SARS-CoV-2 RNA, Amplification, Qual 02/17/2023  Negative      Syphilis Treponemal Ab 2023, Feb-17    Nonreactive [Nonreactive]      Chronic Inflammation Screening   Uric Acid 2022, Apr-13    5.4 [3.4 - 7.0 mg/dL]      Standard Hematology Screen   Hematocrit 2023, Apr-03    48.2 [40.0 - 54.0 %]      Hemoglobin 2023, Apr-03    15.2 [14.0 - 18.0 g/dL]      MCV 2023, Apr-03    94 [82 - 98 fL]      Platelets 2023, Apr-03    230 [150 - 450 K/uL]           Neuroimaging:    MRI brain/head without contrast on 3/17/2023  Formal interpretation by Radiology:  No acute abnormality  Independently reviewed radiological imaging by Nimesh Chatterjee MD. MPH. Behavioral Neurologist  T1: Mild  generalized cortical atrophy posterior>frontal, dorsal>ventral, lateral>medial; Regional atrophy is most well appreciated the anterior and posterior edge of the motor cortex. There secondary mild occipital atrophy however no significant orbital frontal atrophy temporal atrophy. Relative sparing of the posterior cingulate and precuneus cortices. Thinning of the posterior segment of the corpus callosum sparing the genu. Intact midbrain with normal midbrain pontine ratio. Cerebellum has mild verbal atrophy no significant sectional or lateralized atrophy. Hippocampi show very mild right greater than left volume loss however commensurate with advanced age.  T2/FLAIR: Age indeterminate left cerebellar hyperintensity suggestive of a microvascular ischemic event. Mild gliosis bilateral hippocampi. Very mild anterior periventricular capping. Right caudate adhesion.  DWI/ADC: No Significant DWI hyperintensities/hypointensities. No ADC correlation.  SWI/GRE: No Significant hypointensities to suggest cortical/subcortical hemosiderin deposition.  Impression: : Mild generalized cortical atrophy dorsal greater than ventral slight posterior to anterior gradient new sulci widening of the mid pre motor cortex and posterior somatosensory cortex. Secondary occipital atrophy. 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 No significant evidence of frontal temporal atrophy. No demonstrable evidence of Alzheimer's disease. No major evidence significant vascular disease     Procedures:    Electrocardiogram on 7/16/2020  Formal interpretation:  Vent. Rate : 060 BPM     Atrial Rate : 060 BPM    P-R Int : 154 ms          QRS Dur : 104 ms     QT Int : 412 ms       P-R-T Axes : 000 051 034 degrees    QTc Int : 412 ms Electronic atrial pacemaker Septal infarct ,age undetermined Abnormal ECG  Independently reviewed Electrocardiogram by Nimesh Chatterjee MD. MPH. Behavioral  Neurologist  Impression: : Received ECG has evidence of sinus node disease. HR (>=50-60). Prolonged MI interval (>0.22 s). Broad QRS complex (> 0.12 s).     Clinical Summary:     The patient is a 77-year-old right-handed male with a relevant past medical history of PMR, DM II, CKD 3, Atrial fib w sick sinus syndrome & bilateral hip & LSpine OA, who presents reporting a 10-month history of step-wise progressive neurocognitive impairment.       The clinical history is suggestive of:  Memory Impairment: STM encoding impairment  Attention Impairment: Attention, Sustained attention, Shifting attention  Executive Impairment: Energization, Working Memory  The neurological examination is significant for:  Cortical Transcallosal Disconnection: interhemispheric motor control (interhemispheric motor control ), motor efference (motor overflow)  Movement Disorder (Hyperkinetic): tremor (postural)  Movement Disorder (Hypokinetic): parkinsonism (tone, bradykinesia), dyskinesia (slowing, hypometria, dysrhythmia)  Sensory Dysfunction: peripheral (A? fibers)  The neurocognitive battery is significant (based on age and education) for:  Subjective Cognitive impairment with borderline mild attention/concentration and retrieval deficits   BEHAV5+ 1/6: See ROS section for a full description  The neurologically relevant imaging is significant for  MRI brain/head without contrast (3/17/2023): Mild generalized cortical atrophy dorsal greater than ventral slight posterior to anterior gradient new sulci widening of the mid pre motor cortex and posterior somatosensory cortex. Secondary occipital atrophy. 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 No significant evidence of frontal temporal atrophy. No demonstrable evidence of Alzheimer's disease. No major evidence significant vascular disease        Assessment:        The patient's clinical presentation is attention predominant  subjective cognitive impairment insufficient to interfere with activities of daily living (CDR-SOB: 0.5 - Questionable cognitive impairment).     The patient's clinical presentation does not meet criteria for any specific syndrome however there are prodromal signs suggestive of is lewy body disease     Spontaneous motor features of parkinsonism  Fluctuating cognitive deficits     The pathology underlying The patient's neurocognitive impairment is likely a mixture of pathologies (Lewy body disease/alpha-synucleinopathy, Vascular Contributions to Cognitive Impairment and Dementia) likely provoked by uncontrolled hyperglycemia in setting of steroid use for PMR. The observations made above, were discussed with the patient. We have discussed the additional diagnostic(s) and/or managenent below.     Care Management Plan:    #Optimize Neurocognitive Impairment and Quality  We have discussed the MIND Diet and other lifestyle behavior that may help maintain brain health.  We have provided written/digital reading material  No indication for donepezil at this time  #Optimize Behavioral Management and Quality.  No indication for memantine at this time  #Optimize Cerebrovascular Health.  The patient has a documented history of hyperlipidemia and/or hypercholesteremia with long-term complications such as cerebrovascular disease, peripheral vascular disease, and/or aortic atherosclerosis. Collectively these risk factors may contribute to cerebral atherosclerosis, and cerebral hypoperfusion compounded neurocognitive disorder. We discussed maximizing cerebrovascular-related medical therapy, including but not limited to cholesterol medications and antiplatelet agents. We have discussed the value of aggressively controlling vascular risk factors like hypertension, hyperlipidemia, and Diabetes SBP<130, LDL<100, and A1C<7.0. We discussed the need to optimize lifestyle choices, including a heart-healthy diet (e.g., Mediterranean or  "DASH), increased cardiovascular exercise (goal 150 minutes of moderate-intensity per week), and staying cognitively and socially active.  #Optimize Sleep Hygiene and Quality  We discussed and recommended additional diagnostic/management of sleep disorder to optimize brain health and longevity - Recommend referral to sleep Medicine - patient would like to wait - will gather more information via iPhone sharona  #Coordination of Care Management Planning.  We have recommended additional care management through social work support.  We have [facility] to [facility]  #Behavioral/Environmental Strategies  We recommend engaging in activities that stimulate cognitively and socially while avoiding excessive stimulation and fatigue in overwhelmingly complex situations.  We recommend integrating routine and schedule into your daily life. https://www.alzheimersproject.org/news/the-importance-of-routine-and-familiarity-to-persons-with-dementia/  #Health Maintenance/Lifestyle Advice  We have discussed the value in aggressively controlling vascular risk factors like hypertension, hyperlipidemia, and Diabetes SBP<130, LDL<100, A1C<7.0.  We discussed the need to optimize lifestyle choices including a heart-healthy diet (e.g., Mediterranean or DASH), increased cardiovascular exercise (goal 150 minutes of moderate-intensity per week), and stay cognitively and socially active.  #Support  We all need support sometimes. Get easy access to local resources, community programs, and services. https://www.communityresourcefinder.org/  Learn more about Cognitive Impairment in [place]: https://www.alz.org/professionals/public-health/state-overview[place]  #Safety  The Alzheimer's Association administers the nationwide "Safe Return" program with identification bracelets, necklaces, or clothing tags and 24-hour assistance. More information is available online at " https://www.alz.org/help-support/caregiving/safety/medicalert-with-24-7-wandering-support  #Follow up:  Follow-up as needed.    Thank you for allowing us to participate in the care of your patient. Please do not hesitate to contact us with any questions or concerns.     It was a pleasure seeing The patient and we look forward to seeing them at their follow-up visit.     This note is dictated on M*Modal Fluency Direct word recognition program. There are word recognition mistakes that are occasionally missed on review.         Scheduled Follow-up :  Future Appointments   Date Time Provider Department Center   4/4/2023  8:00 AM Nimesh Adams MD [facility] NEURO8 [facility]   4/6/2023  2:00 PM Nitin Banks MD [facility] MED [place]   4/26/2023 10:00 AM María oL MD [facility] RHEUM [facility]   6/12/2023 10:00 AM HOME MONITOR DEVICE CHECK, [facility] ARRHPRO [facility]   9/20/2023 10:40 AM Brandon Cisneros MD [facility] CARDIO [facility]       After Visit Medication List :     Medication List            Accurate as of April 4, 2023  7:59 AM. If you have any questions, ask your nurse or doctor.                CONTINUE taking these medications      allopurinoL 100 MG tablet  Commonly known as: ZYLOPRIM  Take 1 tablet (100 mg total) by mouth once daily.     atorvastatin 40 MG tablet  Commonly known as: LIPITOR  Take 1 tablet (40 mg total) by mouth once daily.     benazepriL 20 MG tablet  Commonly known as: LOTENSIN  Take 1 tablet (20 mg total) by mouth once daily.     calcium carbonate-vit D3-min 600 mg calcium- 400 unit Tab     cycloSPORINE 0.05 % ophthalmic emulsion  Commonly known as: RESTASIS  Place 1 drop into both eyes 2 (two) times daily.     ferrous sulfate 325 mg (65 mg iron) Tab tablet  Commonly known as: FEOSOL     glucosamine-chondroitin 750-600 mg Tab     levocetirizine 5 MG tablet  Commonly known as: XYZAL  Take 1 tablet (5 mg total) by mouth every evening.     metoprolol tartrate 25 MG tablet  Commonly  known as: LOPRESSOR  Take 1 tablet (25 mg total) by mouth 2 (two) times daily as needed (palpitations).     multivitamin capsule     * predniSONE 5 MG tablet  Commonly known as: DELTASONE  Take 1 tablet (5 mg total) by mouth once daily.     * predniSONE 2.5 MG tablet  Commonly known as: DELTASONE  Take 1 tablet (2.5 mg total) by mouth 2 (two) times daily.     rivaroxaban 20 mg Tab  Commonly known as: XARELTO  Take 1 tablet (20 mg total) by mouth daily with dinner or evening meal.     tamsulosin 0.4 mg Cap  Commonly known as: FLOMAX  TAKE 1 CAPSULE BY MOUTH AFTER DINNER           * This list has 2 medication(s) that are the same as other medications prescribed for you. Read the directions carefully, and ask your doctor or other care provider to review them with you.                  Signing Physician:  Nimesh Adams MD    Billing:        -----------------------------------------------------------------------------    I performed this consultation using real-time Telehealth tools, including a live video connection between my location and the patient's location (their home within the [facility]). Prior to initiating the consultation, I obtained informed verbal consent to perform this consultation using Telehealth tools and answered all the questions about the Telehealth interaction. The participants understand that only a limited neurological exam and limited neuropsychological testing can be performed using Telehealth tools.    I spent a total of 45 minutes (time-in: 08:00 AM; time-out: 08:45 AM) on 2023-04-04, virtually face-to-face with the patient and caregiver(s), >50% of that time was spent counseling regarding the symptoms, treatment plan, risks, therapeutic options, lifestyle modifications, and/or safety issues for the diagnoses above.    10/14 Review of Systems completed and is negative except as stated above in HPI (Systems reviewed: Const, Eyes, ENT, Resp, CV, GI, , MSK, Skin, Neuro)    I reviewed  previous labs for a total of 5 minutes on 2023-04-04. This is directly related to the face-to-face encounter. Review of previous labs was performed all negative except as stated above in HPI    I independently reviewed radiological imaging, specimen, and tracing for a total of 10 minutes on 2023-04-04. This is directly related to the face-to-face encounter. Independent review of radiological imaging, specimen, and tracing was noted to be within normal limits except as stated above in HPI    I performed a neurobehavioral status examination that included a clinical assessment of thinking, reasoning, and judgment. Please see above HPI and ROS for full details. This exam was performed on 2023-04-04 and included 12 minutes spent on direct face-to-face clinical observation and interview with the patient and 21 minutes spent interpreting test results and preparing the report. The total time of 33 minutes spent on the neurobehavioral status examination is not included in the time spent on evaluation and management coding.    Total Billing time spent on encounter/documentation for this patient's evaluation and management, not including the neurobehavioral status examination: 48 minutes.

## 2023-04-03 NOTE — TELEPHONE ENCOUNTER
Refill Routing Note   Medication(s) are not appropriate for processing by [facility] for the following reason(s):      Medication outside of protocol    [facility] action(s):  Route          Medication reconciliation completed: No     Appointments  past 12m or future 3m with PCP    Date Provider   Last Visit   11/15/2022 Nitin Banks MD   Next Visit   4/6/2023 Nitin Banks MD   ED visits in past 90 days: 1        Note composed:9:46 AM 04/03/2023

## 2023-04-04 ENCOUNTER — OFFICE VISIT (OUTPATIENT)
Dept: NEUROLOGY | Facility: CLINIC | Age: 78
End: 2023-04-04
Payer: MEDICARE

## 2023-04-04 DIAGNOSIS — G47.00 INSOMNIA, UNSPECIFIED TYPE: ICD-10-CM

## 2023-04-04 DIAGNOSIS — M35.3 POLYMYALGIA RHEUMATICA: ICD-10-CM

## 2023-04-04 DIAGNOSIS — F43.21 GRIEVING: ICD-10-CM

## 2023-04-04 DIAGNOSIS — Z82.0 FAMILY HISTORY OF PARKINSONISM: ICD-10-CM

## 2023-04-04 DIAGNOSIS — R06.83 SNORING: ICD-10-CM

## 2023-04-04 DIAGNOSIS — R41.89 SUBJECTIVE COGNITIVE IMPAIRMENT: Primary | ICD-10-CM

## 2023-04-04 DIAGNOSIS — G20.C PARKINSONISM, UNSPECIFIED PARKINSONISM TYPE: ICD-10-CM

## 2023-04-04 PROCEDURE — 1159F PR MEDICATION LIST DOCUMENTED IN MEDICAL RECORD: ICD-10-PCS | Mod: CPTII,95,, | Performed by: PSYCHIATRY & NEUROLOGY

## 2023-04-04 PROCEDURE — 3072F PR LOW RISK FOR RETINOPATHY: ICD-10-PCS | Mod: CPTII,95,, | Performed by: PSYCHIATRY & NEUROLOGY

## 2023-04-04 PROCEDURE — 96116 PR NEUROBEHAVIORAL STATUS EXAM BY PSYCH/PHYS: ICD-10-PCS | Mod: 95,59,, | Performed by: PSYCHIATRY & NEUROLOGY

## 2023-04-04 PROCEDURE — 96116 NUBHVL XM PHYS/QHP 1ST HR: CPT | Mod: 95,59,, | Performed by: PSYCHIATRY & NEUROLOGY

## 2023-04-04 PROCEDURE — 99215 OFFICE O/P EST HI 40 MIN: CPT | Mod: 95,,, | Performed by: PSYCHIATRY & NEUROLOGY

## 2023-04-04 PROCEDURE — 1159F MED LIST DOCD IN RCRD: CPT | Mod: CPTII,95,, | Performed by: PSYCHIATRY & NEUROLOGY

## 2023-04-04 PROCEDURE — 99215 PR OFFICE/OUTPT VISIT, EST, LEVL V, 40-54 MIN: ICD-10-PCS | Mod: 95,,, | Performed by: PSYCHIATRY & NEUROLOGY

## 2023-04-04 PROCEDURE — 3072F LOW RISK FOR RETINOPATHY: CPT | Mod: CPTII,95,, | Performed by: PSYCHIATRY & NEUROLOGY

## 2023-04-04 PROCEDURE — 1160F RVW MEDS BY RX/DR IN RCRD: CPT | Mod: CPTII,95,, | Performed by: PSYCHIATRY & NEUROLOGY

## 2023-04-04 PROCEDURE — 1160F PR REVIEW ALL MEDS BY PRESCRIBER/CLIN PHARMACIST DOCUMENTED: ICD-10-PCS | Mod: CPTII,95,, | Performed by: PSYCHIATRY & NEUROLOGY

## 2023-04-06 ENCOUNTER — OFFICE VISIT (OUTPATIENT)
Dept: FAMILY MEDICINE | Facility: CLINIC | Age: 78
End: 2023-04-06
Payer: MEDICARE

## 2023-04-06 VITALS
DIASTOLIC BLOOD PRESSURE: 64 MMHG | SYSTOLIC BLOOD PRESSURE: 116 MMHG | WEIGHT: 207.25 LBS | OXYGEN SATURATION: 97 % | BODY MASS INDEX: 27.47 KG/M2 | HEART RATE: 77 BPM | HEIGHT: 73 IN

## 2023-04-06 DIAGNOSIS — I10 ESSENTIAL HYPERTENSION: Primary | ICD-10-CM

## 2023-04-06 DIAGNOSIS — E11.69 TYPE 2 DIABETES MELLITUS WITH OTHER SPECIFIED COMPLICATION, WITHOUT LONG-TERM CURRENT USE OF INSULIN: ICD-10-CM

## 2023-04-06 DIAGNOSIS — R25.1 TREMOR OF LEFT HAND: ICD-10-CM

## 2023-04-06 PROBLEM — D69.6 THROMBOCYTOPENIA: Status: RESOLVED | Noted: 2021-01-21 | Resolved: 2023-04-06

## 2023-04-06 PROBLEM — C43.62 MALIGNANT MELANOMA OF LEFT SHOULDER: Status: RESOLVED | Noted: 2020-09-22 | Resolved: 2023-04-06

## 2023-04-06 PROCEDURE — 1159F PR MEDICATION LIST DOCUMENTED IN MEDICAL RECORD: ICD-10-PCS | Mod: CPTII,S$GLB,, | Performed by: FAMILY MEDICINE

## 2023-04-06 PROCEDURE — 3072F LOW RISK FOR RETINOPATHY: CPT | Mod: CPTII,S$GLB,, | Performed by: FAMILY MEDICINE

## 2023-04-06 PROCEDURE — 3072F PR LOW RISK FOR RETINOPATHY: ICD-10-PCS | Mod: CPTII,S$GLB,, | Performed by: FAMILY MEDICINE

## 2023-04-06 PROCEDURE — 1101F PR PT FALLS ASSESS DOC 0-1 FALLS W/OUT INJ PAST YR: ICD-10-PCS | Mod: CPTII,S$GLB,, | Performed by: FAMILY MEDICINE

## 2023-04-06 PROCEDURE — 3288F PR FALLS RISK ASSESSMENT DOCUMENTED: ICD-10-PCS | Mod: CPTII,S$GLB,, | Performed by: FAMILY MEDICINE

## 2023-04-06 PROCEDURE — 3288F FALL RISK ASSESSMENT DOCD: CPT | Mod: CPTII,S$GLB,, | Performed by: FAMILY MEDICINE

## 2023-04-06 PROCEDURE — 99215 PR OFFICE/OUTPT VISIT, EST, LEVL V, 40-54 MIN: ICD-10-PCS | Mod: S$GLB,,, | Performed by: FAMILY MEDICINE

## 2023-04-06 PROCEDURE — 3078F DIAST BP <80 MM HG: CPT | Mod: CPTII,S$GLB,, | Performed by: FAMILY MEDICINE

## 2023-04-06 PROCEDURE — 99999 PR PBB SHADOW E&M-EST. PATIENT-LVL IV: CPT | Mod: PBBFAC,,, | Performed by: FAMILY MEDICINE

## 2023-04-06 PROCEDURE — 1160F RVW MEDS BY RX/DR IN RCRD: CPT | Mod: CPTII,S$GLB,, | Performed by: FAMILY MEDICINE

## 2023-04-06 PROCEDURE — 99999 PR PBB SHADOW E&M-EST. PATIENT-LVL IV: ICD-10-PCS | Mod: PBBFAC,,, | Performed by: FAMILY MEDICINE

## 2023-04-06 PROCEDURE — 99215 OFFICE O/P EST HI 40 MIN: CPT | Mod: S$GLB,,, | Performed by: FAMILY MEDICINE

## 2023-04-06 PROCEDURE — 3074F SYST BP LT 130 MM HG: CPT | Mod: CPTII,S$GLB,, | Performed by: FAMILY MEDICINE

## 2023-04-06 PROCEDURE — 3078F PR MOST RECENT DIASTOLIC BLOOD PRESSURE < 80 MM HG: ICD-10-PCS | Mod: CPTII,S$GLB,, | Performed by: FAMILY MEDICINE

## 2023-04-06 PROCEDURE — 1160F PR REVIEW ALL MEDS BY PRESCRIBER/CLIN PHARMACIST DOCUMENTED: ICD-10-PCS | Mod: CPTII,S$GLB,, | Performed by: FAMILY MEDICINE

## 2023-04-06 PROCEDURE — 1159F MED LIST DOCD IN RCRD: CPT | Mod: CPTII,S$GLB,, | Performed by: FAMILY MEDICINE

## 2023-04-06 PROCEDURE — 1101F PT FALLS ASSESS-DOCD LE1/YR: CPT | Mod: CPTII,S$GLB,, | Performed by: FAMILY MEDICINE

## 2023-04-06 PROCEDURE — 3074F PR MOST RECENT SYSTOLIC BLOOD PRESSURE < 130 MM HG: ICD-10-PCS | Mod: CPTII,S$GLB,, | Performed by: FAMILY MEDICINE

## 2023-04-06 NOTE — PROGRESS NOTES
Subjective     Patient ID: Shannan Norman is a 77 y.o. male.    Chief Complaint: Follow-up    77 years old male who came to the clinic for blood pressure check.  Blood pressure today was stable.  No chest pain, palpitation, orthopnea or PND.  Last A1c was stable.  No polyuria, polydipsia or polyphagia.  He reports episodic tremor over the left hand.    Follow-up  Pertinent negatives include no chest pain.   Review of Systems   Constitutional: Negative.    HENT: Negative.     Eyes: Negative.    Respiratory: Negative.     Cardiovascular: Negative.  Negative for chest pain, palpitations, leg swelling and claudication.   Gastrointestinal: Negative.    Endocrine: Negative for polydipsia, polyphagia and polyuria.   Genitourinary: Negative.    Musculoskeletal: Negative.    Integumentary:  Negative.   Neurological: Negative.    Psychiatric/Behavioral: Negative.          Objective     Physical Exam  Vitals and nursing note reviewed.   Constitutional:       General: He is not in acute distress.     Appearance: He is well-developed. He is not diaphoretic.   HENT:      Head: Normocephalic and atraumatic.      Right Ear: External ear normal.      Left Ear: External ear normal.      Nose: Nose normal.      Mouth/Throat:      Pharynx: No oropharyngeal exudate.   Eyes:      General: No scleral icterus.        Right eye: No discharge.         Left eye: No discharge.      Conjunctiva/sclera: Conjunctivae normal.      Pupils: Pupils are equal, round, and reactive to light.   Neck:      Thyroid: No thyromegaly.      Vascular: No JVD.      Trachea: No tracheal deviation.   Cardiovascular:      Rate and Rhythm: Normal rate and regular rhythm.      Heart sounds: Normal heart sounds. No murmur heard.    No friction rub. No gallop.   Pulmonary:      Effort: Pulmonary effort is normal. No respiratory distress.      Breath sounds: Normal breath sounds. No stridor. No wheezing or rales.   Chest:      Chest wall: No tenderness.   Abdominal:       General: Bowel sounds are normal. There is no distension.      Palpations: Abdomen is soft. There is no mass.      Tenderness: There is no abdominal tenderness. There is no guarding or rebound.   Musculoskeletal:         General: No tenderness. Normal range of motion.      Cervical back: Normal range of motion and neck supple.   Lymphadenopathy:      Cervical: No cervical adenopathy.   Skin:     General: Skin is warm and dry.      Coloration: Skin is not pale.      Findings: No erythema or rash.   Neurological:      Mental Status: He is alert and oriented to person, place, and time.      Cranial Nerves: No cranial nerve deficit.      Motor: No abnormal muscle tone.      Coordination: Coordination normal.      Deep Tendon Reflexes: Reflexes are normal and symmetric. Reflexes normal.   Psychiatric:         Behavior: Behavior normal.         Thought Content: Thought content normal.         Judgment: Judgment normal.          Assessment and Plan     Problem List Items Addressed This Visit    None  Visit Diagnoses       Essential hypertension    -  Primary    Relevant Orders    CBC Auto Differential    Comprehensive Metabolic Panel    Lipid Panel    TSH    Type 2 diabetes mellitus with other specified complication, without long-term current use of insulin        Relevant Orders    Hemoglobin A1C    Microalbumin/Creatinine Ratio, Urine    Tremor of left hand                 Shannan was seen today for follow-up.    Diagnoses and all orders for this visit:    Essential hypertension  -     CBC Auto Differential; Future  -     Comprehensive Metabolic Panel; Future  -     Lipid Panel; Future  -     TSH; Future    Type 2 diabetes mellitus with other specified complication, without long-term current use of insulin  -     Hemoglobin A1C; Future  -     Microalbumin/Creatinine Ratio, Urine; Future    Tremor of left hand     Continue monitoring blood pressure at home, low sodium diet.   Continue monitoring blood sugar at home,ADA  diet.

## 2023-04-24 NOTE — PROGRESS NOTES
Answers submitted by the patient for this visit:  Rheumatology Questionnaire (Submitted on 4/20/2023)  fever: No  eye redness: No  mouth sores: No  headaches: No  shortness of breath: No  chest pain: No  trouble swallowing: No  diarrhea: No  constipation: No  unexpected weight change: No  genital sore: No  During the last 3 days, have you had a skin rash?: No  Bruises or bleeds easily: No  cough: No  Subjective:     Patient ID: Shannan Norman is a 77 y.o. male w a hx c/w PMR    Chief Complaint:  PMR on steroids       HPI     77 yr old with multiple morbidities that include gout, DM II, CKD 3, Atrial fib w sick sinus syndrome &  bilateral hip & LSpine OA we are following for PMR.  He was initially seen on 4/4/22 & last on 1/17/23. We have been tapering his prednisone but stopping it resulted in recurrence of sxs. He is currently on 3.75 mg/d and is doing well on this dose. No stiffness. No PMR or GCA sxs. No fever, chills, recent or unexplained weight changes, visual loss, diplopia, scalp tenderness, jaw or other claudication, headache or head pain.  Denies AM stiffness, shoulder or hip girdle stiffness.     Also has hx of Gout and is on allopurinol 100 mg/d. Has not had a gout attack in very long time.   Last DXA & FRAX is acceptable.  S/P traumatic L 5th metatarsal base fracture ~ 12/22 & had to wear a boot.     From 7/6/22:  On 2/15/22 developed stiffness; couldn't get out of bed in AM; had extreme bilateral shoulder pain; had hip pain (but denied stiffness); also really bad at night. No other joint of muscle pain except the muscles of the backs of his knees or tendons felt tight. Had lots of gelling. Lost 23 lbs. Also felt voice changed tone a bit.   Celebrex 200 mg/d helped some, but not entirely.   Prednisone 10 mg bid was started on 4/4/22 and he had an immediate & dramatic response. Everything improved.   We say him for the first time on 4/6/22 and tried to taper prednisone a bit. He was able to get down to  10 mg daily which he began ~ May 2022.  Currently does not feel as well as he did on the larger doses of prednisone. Has ~ 4 hrs of AM stiffness and has bilateral wrist pain worse in AM & improves with time..  No other joint sxs.  Denies fever, chills, recent  visual loss, diplopia, scalp tenderness, jaw or other claudication, headache or head pain.     Is also on allopurinol 100 mg/d w good control of his UA. He also is eating better. Did have a diet of rich food in the past and ETOH use in past.Denies family hx of gout; denies renal calculi; denies moonshine use.          Current Outpatient Medications   Medication Sig Dispense Refill    allopurinoL (ZYLOPRIM) 100 MG tablet Take 1 tablet (100 mg total) by mouth once daily. 90 tablet 0    atorvastatin (LIPITOR) 40 MG tablet Take 1 tablet (40 mg total) by mouth once daily. 90 tablet 3    benazepriL (LOTENSIN) 20 MG tablet Take 1 tablet (20 mg total) by mouth once daily. 90 tablet 3    calcium carbonate-vit D3-min 600 mg calcium- 400 unit Tab Take 1 tablet by mouth once.      cycloSPORINE (RESTASIS) 0.05 % ophthalmic emulsion Place 1 drop into both eyes 2 (two) times daily. 60 each 11    ferrous sulfate (FEOSOL) 325 mg (65 mg iron) Tab tablet Take 325 mg by mouth once daily.      GLUCOSAMINE HCL/CHONDR VASQUEZ A NA (GLUCOSAMINE-CHONDROITIN) 750-600 mg Tab Take 2 tablets by mouth Daily.      levocetirizine (XYZAL) 5 MG tablet Take 1 tablet (5 mg total) by mouth every evening. 90 tablet 1    metoprolol tartrate (LOPRESSOR) 25 MG tablet Take 1 tablet (25 mg total) by mouth 2 (two) times daily as needed (palpitations). 14 tablet 0    multivitamin capsule Take 1 capsule by mouth nightly.       predniSONE (DELTASONE) 2.5 MG tablet Take 1 tablet (2.5 mg total) by mouth 2 (two) times daily. 60 tablet 1    rivaroxaban (XARELTO) 20 mg Tab Take 1 tablet (20 mg total) by mouth daily with dinner or evening meal. 90 tablet 3    tamsulosin (FLOMAX) 0.4 mg Cap TAKE 1 CAPSULE BY MOUTH  AFTER DINNER 90 capsule 3     No current facility-administered medications for this visit.         Review of patient's allergies indicates:  No Known Allergies    Review of Systems   Constitutional: Negative.  Negative for fatigue, fever and unexpected weight change.   HENT:  Negative for mouth sores and trouble swallowing.    Eyes:  Negative for redness.        Bilateral dry eyes   Respiratory: Negative.  Negative for cough and shortness of breath.    Cardiovascular: Negative.  Negative for chest pain.   Gastrointestinal: Negative.  Negative for constipation and diarrhea.   Endocrine: Negative.  Negative for cold intolerance and heat intolerance.   Genitourinary:  Positive for difficulty urinating, frequency and urgency. Negative for genital sores.   Musculoskeletal:  Negative for back pain, joint swelling and myalgias.   Skin:  Negative for rash.   Neurological:  Negative for dizziness, syncope, weakness, light-headedness, numbness and headaches.   Hematological:  Does not bruise/bleed easily.   Psychiatric/Behavioral:  Negative for dysphoric mood and sleep disturbance.      Past Medical History:   Diagnosis Date    Allergy     Arthritis     Atrial fibrillation     Atrial flutter 2011    ablation    Basal cell carcinoma     Cancer     Cataract     CKD (chronic kidney disease) stage 3, GFR 30-59 ml/min 8/13/2019    Diabetes mellitus     Dry eye syndrome     Gout, unspecified     High cholesterol     History of shingles     Hypertension     Melanoma     Seizures        Past Surgical History:   Procedure Laterality Date    ABLATION N/A 9/11/2018    Procedure: ABLATION;  Surgeon: Hany Sanchez MD;  Location: [facility] CATH LAB;  Service: Cardiology;  Laterality: N/A;  AFL, ISABELLE, RFA, ELODIA, MAC, DM, 3 PREP    ABLATION OF DYSRHYTHMIC FOCUS      ADENOIDECTOMY      CARDIAC PACEMAKER PLACEMENT      no defib    CATARACT EXTRACTION W/  INTRAOCULAR LENS IMPLANT Right 7/28/2020    Procedure: EXTRACTION, CATARACT, WITH IOL  INSERTION;  Surgeon: Andrés Morse MD;  Location: [facility] OR;  Service: Ophthalmology;  Laterality: Right;    CATARACT EXTRACTION W/  INTRAOCULAR LENS IMPLANT Left 8/11/2020    Procedure: EXTRACTION, CATARACT, WITH IOL INSERTION;  Surgeon: Andrés Morse MD;  Location: [facility] OR;  Service: Ophthalmology;  Laterality: Left;    COLONOSCOPY N/A 1/2/2019    Procedure: COLONOSCOPY Suprep;  Surgeon: Cindy Solorzano MD;  Location: [facility];  Service: Endoscopy;  Laterality: N/A;    GANGLION CYST EXCISION      left neck    HERNIA REPAIR  2013    umbilical    TENDON REPAIR Right     wrist    TONSILLECTOMY      varicose veins      several sx  bilateral    VASECTOMY      VEIN SURGERY         Family History   Problem Relation Age of Onset    Diabetes Mother     COPD Father     Heart disease Father     Arthritis Sister     Heart attack Brother     Heart disease Brother     Diabetes Brother     No Known Problems Maternal Aunt     No Known Problems Maternal Uncle     No Known Problems Paternal Aunt     No Known Problems Paternal Uncle     No Known Problems Maternal Grandmother     No Known Problems Maternal Grandfather     No Known Problems Paternal Grandmother     No Known Problems Paternal Grandfather     No Known Problems Son     Cancer Sister         lymph node, breast    No Known Problems Sister     No Known Problems Son     Amblyopia Neg Hx     Blindness Neg Hx     Cataracts Neg Hx     Glaucoma Neg Hx     Hypertension Neg Hx     Macular degeneration Neg Hx     Retinal detachment Neg Hx     Strabismus Neg Hx     Stroke Neg Hx     Thyroid disease Neg Hx     Prostate cancer Neg Hx     Kidney disease Neg Hx     Melanoma Neg Hx        Social History     Tobacco Use    Smoking status: Never    Smokeless tobacco: Never   Substance Use Topics    Alcohol use: Yes     Alcohol/week: 7.0 - 14.0 standard drinks     Types: 7 - 14 Glasses of wine per week    Drug use: No   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   Belongs to a Neighborhood club and does Yoga w his buddies periodically    Objective:   /66   Pulse 60   Ht 6' 1" (1.854 m)   Wt 94.6 kg (208 lb 8.9 oz)   BMI 27.52 kg/m²     Was 208 lb 1.8 oz on 10/18/22  Physical Exam   Constitutional: He is oriented to person, place, and time. normal appearance. No distress.   HENT:   Head: Normocephalic and atraumatic.   Mouth/Throat: Oropharynx is clear and moist. No oropharyngeal exudate.   No parotidomegaly;   Temporal arteries with good pulsations.   No temporal artery tenderness;   No scalp tenderness.  No oral ulcers;   Head: TA ok wo TTP;  Eyes: Pupils are equal, round, and reactive to light. Conjunctivae are normal. No scleral icterus.   Neck: No JVD present. No tracheal deviation present. No thyromegaly present.   Cardiovascular: Normal rate, regular rhythm and normal heart sounds. Exam reveals no gallop and no friction rub.   No murmur heard.  Pulmonary/Chest: Effort normal and breath sounds normal. No respiratory distress. He has no wheezes. He has no rales. He exhibits no tenderness.   Pulmonary Comments: Bibasilar rales cleared with deep breathing.   Abdominal: Soft. Bowel sounds are normal. He exhibits no distension and no mass. There is no splenomegaly or hepatomegaly. There is no abdominal tenderness. There is no rebound and no guarding.   Musculoskeletal:         General: No tenderness.      Right lower leg: Edema present.      Left lower leg: Edema present.      Comments: No synovitis anywhere.  Makes tight fists.  Shoulders w FROM.  Exam of wrists w bilateral decrease in flexion and extension; No TTP; no visible or palpable SHT.  Bilateral LE varicosities     Lymphadenopathy:     He has no cervical adenopathy.   Neurological: He is alert and oriented to person, place, and time. He has normal reflexes. No cranial nerve deficit.   Motor strength: 5/5 prox & distal.   Skin: Skin is warm and dry. No rash noted. "   Bilateral impressive venous varicosities lower extremities  Thickened toe nails c/w onychomycosis   Psychiatric: His behavior is normal. Mood, memory, affect and judgment normal.   Vitals reviewed.    4/3/23: ESR 10 (23); CRP 8.4; CBC ok; CMP ok; Hg A1C 6.1;   12/19/22: ESR 5 (23); CRP 9.0; CBC ok; CMP ok;   7/7/22: ESR 15 (23); CRP 15.2; CBC ok; CMP glu 126;   5/12/22: ESR: 15 (23); CRP 0.8; CBC ok; CMP glu 128  4/13/22: ESR 20 (23);UA 5.4  3/31/22: CPK 47;   3/30/22: ESR 87 (23); CRP 42.5; CBC Hg 12.7; CMP BUN 24; glu 135; Alb 2.8; RF neg;  UA 5.9;     12/2/22: L foot: personally viewed: Minimally displaced fragment at the lateral base of the 5th proximal phalanx, age indeterminate and possibly remote; scattered mild foot degenerative change with small calcaneal enthesophyte at the distal Achilles insertion.  9/27/22: DXA; TLS 3.0; TTH -0.4; TFN -0.9; FRAX 8.7/2.6%  3/31/22: LSpine: personally viewed: dextroconvex Lspine; DDD verona L3-4; lower facet arthropathy; gr 1 anterolisthesis L4-5; vascular calcifications.  2/23/22: Bilateral hips: personally viewed: bilateral OA; also read as impingement.   3/15/21: Bilateral knees: personally viewed: bowen mjsn; mild varus bowen  8/26/19: L wrist: personally viewed: mild OA;   Assessment:   PMR   Shoulder/hip pain/stiffness--No GCA signs/sxs   Elevated inflammatory parameters:     ESR 87 (23); CRP 42.5; Hg 12.7; alb 2.8;    Celebrex somewhat helpful.   Dramatic response to prednisone 10 mg bid begun 4/4/22   Last w wrist pain/stiffness improving as day progresses    Doubt sero neg RA--but always in differential    On prednisone  3.75 mg/d--doing well    Gout by hx   Podagra by hx--several episodes.    Knee swelling   Hyperuricemia maxed 7.9; last 5.4;    Some ETOH; recently stopped   On allopurinol 100 mg/d started by PCP    OA   Hips (w ZEYAD); knees; Lspine   L wrist minimal     CKD 2-3    DM II    HTN    HLD    Cardiac issues: SSS; cardiac arrythmia    S/P L 5th metatarsal  fx       Plan:   Stay on prednisone 3.75 mg/d.  Ok to stay on allopurinol 100 mg/d.   Labs in 3 months  Encourage exercise & socialization  RTC 3 months.

## 2023-04-26 ENCOUNTER — OFFICE VISIT (OUTPATIENT)
Dept: RHEUMATOLOGY | Facility: CLINIC | Age: 78
End: 2023-04-26
Payer: MEDICARE

## 2023-04-26 VITALS
BODY MASS INDEX: 27.64 KG/M2 | HEIGHT: 73 IN | WEIGHT: 208.56 LBS | DIASTOLIC BLOOD PRESSURE: 66 MMHG | HEART RATE: 60 BPM | SYSTOLIC BLOOD PRESSURE: 115 MMHG

## 2023-04-26 DIAGNOSIS — Z79.52 LONG TERM (CURRENT) USE OF SYSTEMIC STEROIDS: ICD-10-CM

## 2023-04-26 DIAGNOSIS — M10.9 GOUT, ARTHRITIS: ICD-10-CM

## 2023-04-26 DIAGNOSIS — Z55.9 EDUCATIONAL CIRCUMSTANCE: ICD-10-CM

## 2023-04-26 DIAGNOSIS — M35.3 POLYMYALGIA RHEUMATICA: Primary | ICD-10-CM

## 2023-04-26 PROCEDURE — 3078F DIAST BP <80 MM HG: CPT | Mod: CPTII,S$GLB,, | Performed by: INTERNAL MEDICINE

## 2023-04-26 PROCEDURE — 3074F SYST BP LT 130 MM HG: CPT | Mod: CPTII,S$GLB,, | Performed by: INTERNAL MEDICINE

## 2023-04-26 PROCEDURE — 1160F PR REVIEW ALL MEDS BY PRESCRIBER/CLIN PHARMACIST DOCUMENTED: ICD-10-PCS | Mod: CPTII,S$GLB,, | Performed by: INTERNAL MEDICINE

## 2023-04-26 PROCEDURE — 1126F PR PAIN SEVERITY QUANTIFIED, NO PAIN PRESENT: ICD-10-PCS | Mod: CPTII,S$GLB,, | Performed by: INTERNAL MEDICINE

## 2023-04-26 PROCEDURE — 99214 PR OFFICE/OUTPT VISIT, EST, LEVL IV, 30-39 MIN: ICD-10-PCS | Mod: S$GLB,,, | Performed by: INTERNAL MEDICINE

## 2023-04-26 PROCEDURE — 3072F LOW RISK FOR RETINOPATHY: CPT | Mod: CPTII,S$GLB,, | Performed by: INTERNAL MEDICINE

## 2023-04-26 PROCEDURE — 1126F AMNT PAIN NOTED NONE PRSNT: CPT | Mod: CPTII,S$GLB,, | Performed by: INTERNAL MEDICINE

## 2023-04-26 PROCEDURE — 1159F MED LIST DOCD IN RCRD: CPT | Mod: CPTII,S$GLB,, | Performed by: INTERNAL MEDICINE

## 2023-04-26 PROCEDURE — 3078F PR MOST RECENT DIASTOLIC BLOOD PRESSURE < 80 MM HG: ICD-10-PCS | Mod: CPTII,S$GLB,, | Performed by: INTERNAL MEDICINE

## 2023-04-26 PROCEDURE — 99214 OFFICE O/P EST MOD 30 MIN: CPT | Mod: S$GLB,,, | Performed by: INTERNAL MEDICINE

## 2023-04-26 PROCEDURE — 1160F RVW MEDS BY RX/DR IN RCRD: CPT | Mod: CPTII,S$GLB,, | Performed by: INTERNAL MEDICINE

## 2023-04-26 PROCEDURE — 1159F PR MEDICATION LIST DOCUMENTED IN MEDICAL RECORD: ICD-10-PCS | Mod: CPTII,S$GLB,, | Performed by: INTERNAL MEDICINE

## 2023-04-26 PROCEDURE — 3074F PR MOST RECENT SYSTOLIC BLOOD PRESSURE < 130 MM HG: ICD-10-PCS | Mod: CPTII,S$GLB,, | Performed by: INTERNAL MEDICINE

## 2023-04-26 PROCEDURE — 99999 PR PBB SHADOW E&M-EST. PATIENT-LVL IV: ICD-10-PCS | Mod: PBBFAC,,, | Performed by: INTERNAL MEDICINE

## 2023-04-26 PROCEDURE — 99999 PR PBB SHADOW E&M-EST. PATIENT-LVL IV: CPT | Mod: PBBFAC,,, | Performed by: INTERNAL MEDICINE

## 2023-04-26 PROCEDURE — 3072F PR LOW RISK FOR RETINOPATHY: ICD-10-PCS | Mod: CPTII,S$GLB,, | Performed by: INTERNAL MEDICINE

## 2023-04-26 SDOH — SOCIAL DETERMINANTS OF HEALTH (SDOH): PROBLEMS RELATED TO EDUCATION AND LITERACY, UNSPECIFIED: Z55.9

## 2023-04-26 ASSESSMENT — ROUTINE ASSESSMENT OF PATIENT INDEX DATA (RAPID3)
PSYCHOLOGICAL DISTRESS SCORE: 0
WHEN YOU AWAKENED IN THE MORNING OVER THE LAST WEEK, PLEASE INDICATE THE AMOUNT OF TIME IT TAKES UNTIL YOU ARE AS LIMBER AS YOU WILL BE FOR THE DAY: 1
AM STIFFNESS SCORE: 1, YES
PAIN SCORE: 0
FATIGUE SCORE: 5
TOTAL RAPID3 SCORE: 0.11
PATIENT GLOBAL ASSESSMENT SCORE: 0
MDHAQ FUNCTION SCORE: 0.1

## 2023-04-26 NOTE — PROGRESS NOTES
Rapid3 Question Responses and Scores 4/20/2023   MDHAQ Score 0.1   Psychologic Score 0   Pain Score 0   When you awakened in the morning OVER THE LAST WEEK, did you feel stiff? Yes   If Yes, please indicate the number of hours until you are as limber as you will be for the day 1   Fatigue Score 5   Global Health Score 0   RAPID3 Score 0.11         KIMBERLY MARKS  71y  Male    Interval/overnight events/pt complaints:  Patient seen and examined at bedside this AM  Pt endorses no acute issues at this time  denies any fevers. chills, chest pain, palpitation, dizziness, abdominal pain  VSS    MEDICATIONS  (STANDING):  allopurinol 300 milliGRAM(s) Oral daily  ascorbic acid 500 milliGRAM(s) Oral two times a day  aspirin  chewable 81 milliGRAM(s) Oral daily  atorvastatin 20 milliGRAM(s) Oral at bedtime  cadexomer iodine 0.9% Gel 1 Application(s) Topical every 24 hours  calcium carbonate 1250 mG  + Vitamin D (OsCal 500 + D) 1 Tablet(s) Oral daily  dextrose 40% Gel 15 Gram(s) Oral once  dextrose 5%. 1000 milliLiter(s) (100 mL/Hr) IV Continuous <Continuous>  dextrose 5%. 1000 milliLiter(s) (50 mL/Hr) IV Continuous <Continuous>  dextrose 50% Injectable 25 Gram(s) IV Push once  dextrose 50% Injectable 12.5 Gram(s) IV Push once  dextrose 50% Injectable 25 Gram(s) IV Push once  epoetin liam-epbx (RETACRIT) Injectable 26982 Unit(s) SubCutaneous <User Schedule>  ferrous    sulfate 325 milliGRAM(s) Oral three times a day  finasteride 5 milliGRAM(s) Oral daily  folic acid 1 milliGRAM(s) Oral daily  glucagon  Injectable 1 milliGRAM(s) IntraMuscular once  insulin lispro (ADMELOG) corrective regimen sliding scale   SubCutaneous Before meals and at bedtime  lactulose Syrup 30 Gram(s) Oral two times a day  levothyroxine 25 MICROGram(s) Oral daily  midodrine. 5 milliGRAM(s) Oral three times a day  multivitamin 1 Tablet(s) Oral daily  nystatin Powder 1 Application(s) Topical three times a day  pantoprazole    Tablet 40 milliGRAM(s) Oral two times a day  polyethylene glycol 3350 17 Gram(s) Oral daily  saccharomyces boulardii 250 milliGRAM(s) Oral two times a day  senna 2 Tablet(s) Oral at bedtime  sodium chloride 0.45%. 1000 milliLiter(s) (75 mL/Hr) IV Continuous <Continuous>  tamsulosin 0.4 milliGRAM(s) Oral at bedtime    MEDICATIONS  (PRN):  acetaminophen  Suppository .. 650 milliGRAM(s) Rectal every 6 hours PRN Temp greater or equal to 38C (100.4F), Mild Pain (1 - 3), Moderate Pain (4 - 6)  bisacodyl Suppository 10 milliGRAM(s) Rectal every 24 hours PRN Constipation  haloperidol    Injectable 2 milliGRAM(s) IntraMuscular every 6 hours PRN Agitation  ondansetron Injectable 4 milliGRAM(s) IV Push every 6 hours PRN Nausea and/or Vomiting    AllergiesNo Known Allergies      Vital Signs Last 24 Hrs  T(C): 36.3 (28 Dec 2020 08:25), Max: 36.5 (27 Dec 2020 15:30)  T(F): 97.4 (28 Dec 2020 08:25), Max: 97.7 (27 Dec 2020 15:30)  HR: 74 (28 Dec 2020 08:25) (61 - 74)  BP: 99/58 (28 Dec 2020 05:10) (99/58 - 113/74)  BP(mean): --  RR: 20 (28 Dec 2020 08:25) (18 - 20)  SpO2: 100% (28 Dec 2020 08:25) (94% - 100%)  I&O's Summary    27 Dec 2020 07:01  -  28 Dec 2020 07:00  --------------------------------------------------------  IN: 0 mL / OUT: 500 mL / NET: -500 mL    PHYSICAL EXAM:  GENERAL: NAD, nontoxic appearing  NECK: Supple  CHEST/LUNG: Normal effort, RA, CTA b/l   HEART: +S1,S2, Regular rate and rhythm; No murmurs, rubs, or gallops  ABDOMEN: Soft, Nontender, Nondistended; Bowel sounds present  EXTREMITIES:  B/L LE  +chronic vascular changes with dressings. no edema, no calf tenderness  NEURO:  No Focal deficits, sensory and motor intact  SKIN: chronic vascular changes to b/l, otherwise warm and dry.    CAPILLARY BLOOD GLUCOSE  POCT Blood Glucose.: 103 mg/dL (28 Dec 2020 08:19)  POCT Blood Glucose.: 96 mg/dL (27 Dec 2020 20:52)  POCT Blood Glucose.: 97 mg/dL (27 Dec 2020 11:37)      LABS reviewed    IMAGING    Consultant(s) Notes Reviewed:  [x ] YES  [ ] NO  Care Discussed with Consultants/Other Providers [ x] YES  [ ] NO

## 2023-04-28 DIAGNOSIS — E79.0 HYPERURICEMIA: ICD-10-CM

## 2023-04-28 NOTE — TELEPHONE ENCOUNTER
No care due was identified.  [facility] Embedded Care Due Messages. Reference number: [id number].   4/28/2023 8:37:33 AM CDT

## 2023-05-02 RX ORDER — ALLOPURINOL 100 MG/1
TABLET ORAL
Qty: 90 TABLET | Refills: 0 | Status: SHIPPED | OUTPATIENT
Start: 2023-05-02 | End: 2023-10-30

## 2023-05-11 DIAGNOSIS — I48.3 TYPICAL ATRIAL FLUTTER: Primary | ICD-10-CM

## 2023-05-11 RX ORDER — RIVAROXABAN 20 MG/1
TABLET, FILM COATED ORAL
Qty: 90 TABLET | Refills: 3 | Status: SHIPPED | OUTPATIENT
Start: 2023-05-11

## 2023-05-17 ENCOUNTER — PATIENT MESSAGE (OUTPATIENT)
Dept: OTOLARYNGOLOGY | Facility: CLINIC | Age: 78
End: 2023-05-17
Payer: MEDICARE

## 2023-05-25 ENCOUNTER — PATIENT MESSAGE (OUTPATIENT)
Dept: PODIATRY | Facility: CLINIC | Age: 78
End: 2023-05-25
Payer: MEDICARE

## 2023-05-25 RX ORDER — ECONAZOLE NITRATE 10 MG/G
CREAM TOPICAL 2 TIMES DAILY
Qty: 85 G | Refills: 3 | Status: SHIPPED | OUTPATIENT
Start: 2023-05-25

## 2023-05-30 ENCOUNTER — PATIENT MESSAGE (OUTPATIENT)
Dept: CARDIOLOGY | Facility: HOSPITAL | Age: 78
End: 2023-05-30
Payer: MEDICARE

## 2023-06-05 ENCOUNTER — TELEPHONE (OUTPATIENT)
Dept: DERMATOLOGY | Facility: CLINIC | Age: 78
End: 2023-06-05
Payer: MEDICARE

## 2023-06-05 NOTE — TELEPHONE ENCOUNTER
I left a voice mail explaining the purpose of my call is to help assist with scheduling an appointment with Dr. Villa.  I offered the date of September 20 at 9:45 AM.  I suggested calling the clinic back or sending us a message to confirm the date provided or message us back if he need to find a date that better works for him.

## 2023-06-13 ENCOUNTER — TELEPHONE (OUTPATIENT)
Dept: DERMATOLOGY | Facility: CLINIC | Age: 78
End: 2023-06-13
Payer: MEDICARE

## 2023-06-13 NOTE — TELEPHONE ENCOUNTER
----- Message from Akhil Sawyer sent at 6/13/2023 11:02 AM CDT -----  Contact: [phone]  the patient is calling to get rescheduled for there appt.  the patient would like a call back at your earliest convenience.  the patient can be reached at.   [phone]

## 2023-06-14 ENCOUNTER — OFFICE VISIT (OUTPATIENT)
Dept: CARDIOLOGY | Facility: CLINIC | Age: 78
End: 2023-06-14
Payer: MEDICARE

## 2023-06-14 VITALS
HEART RATE: 60 BPM | BODY MASS INDEX: 27.76 KG/M2 | SYSTOLIC BLOOD PRESSURE: 126 MMHG | WEIGHT: 209.44 LBS | DIASTOLIC BLOOD PRESSURE: 73 MMHG | HEIGHT: 73 IN | OXYGEN SATURATION: 94 %

## 2023-06-14 DIAGNOSIS — Q21.12 PFO (PATENT FORAMEN OVALE): ICD-10-CM

## 2023-06-14 DIAGNOSIS — I48.3 TYPICAL ATRIAL FLUTTER: ICD-10-CM

## 2023-06-14 DIAGNOSIS — E11.22 TYPE 2 DIABETES MELLITUS WITH STAGE 3A CHRONIC KIDNEY DISEASE, WITHOUT LONG-TERM CURRENT USE OF INSULIN: ICD-10-CM

## 2023-06-14 DIAGNOSIS — I15.2 HYPERTENSION ASSOCIATED WITH DIABETES: ICD-10-CM

## 2023-06-14 DIAGNOSIS — E11.69 DYSLIPIDEMIA ASSOCIATED WITH TYPE 2 DIABETES MELLITUS: ICD-10-CM

## 2023-06-14 DIAGNOSIS — I83.93 ASYMPTOMATIC VARICOSE VEINS OF BOTH LOWER EXTREMITIES: ICD-10-CM

## 2023-06-14 DIAGNOSIS — I49.5 SSS (SICK SINUS SYNDROME): ICD-10-CM

## 2023-06-14 DIAGNOSIS — I35.1 NONRHEUMATIC AORTIC VALVE INSUFFICIENCY: ICD-10-CM

## 2023-06-14 DIAGNOSIS — I10 PRIMARY HYPERTENSION: Primary | ICD-10-CM

## 2023-06-14 DIAGNOSIS — N18.31 TYPE 2 DIABETES MELLITUS WITH STAGE 3A CHRONIC KIDNEY DISEASE, WITHOUT LONG-TERM CURRENT USE OF INSULIN: ICD-10-CM

## 2023-06-14 DIAGNOSIS — E11.59 HYPERTENSION ASSOCIATED WITH DIABETES: ICD-10-CM

## 2023-06-14 DIAGNOSIS — E78.5 DYSLIPIDEMIA ASSOCIATED WITH TYPE 2 DIABETES MELLITUS: ICD-10-CM

## 2023-06-14 PROCEDURE — 3074F PR MOST RECENT SYSTOLIC BLOOD PRESSURE < 130 MM HG: ICD-10-PCS | Mod: CPTII,S$GLB,, | Performed by: INTERNAL MEDICINE

## 2023-06-14 PROCEDURE — 1126F AMNT PAIN NOTED NONE PRSNT: CPT | Mod: CPTII,S$GLB,, | Performed by: INTERNAL MEDICINE

## 2023-06-14 PROCEDURE — 3078F DIAST BP <80 MM HG: CPT | Mod: CPTII,S$GLB,, | Performed by: INTERNAL MEDICINE

## 2023-06-14 PROCEDURE — 1159F PR MEDICATION LIST DOCUMENTED IN MEDICAL RECORD: ICD-10-PCS | Mod: CPTII,S$GLB,, | Performed by: INTERNAL MEDICINE

## 2023-06-14 PROCEDURE — 1159F MED LIST DOCD IN RCRD: CPT | Mod: CPTII,S$GLB,, | Performed by: INTERNAL MEDICINE

## 2023-06-14 PROCEDURE — 99999 PR PBB SHADOW E&M-EST. PATIENT-LVL III: CPT | Mod: PBBFAC,,, | Performed by: INTERNAL MEDICINE

## 2023-06-14 PROCEDURE — 99214 OFFICE O/P EST MOD 30 MIN: CPT | Mod: S$GLB,,, | Performed by: INTERNAL MEDICINE

## 2023-06-14 PROCEDURE — 3072F LOW RISK FOR RETINOPATHY: CPT | Mod: CPTII,S$GLB,, | Performed by: INTERNAL MEDICINE

## 2023-06-14 PROCEDURE — 3072F PR LOW RISK FOR RETINOPATHY: ICD-10-PCS | Mod: CPTII,S$GLB,, | Performed by: INTERNAL MEDICINE

## 2023-06-14 PROCEDURE — 99999 PR PBB SHADOW E&M-EST. PATIENT-LVL III: ICD-10-PCS | Mod: PBBFAC,,, | Performed by: INTERNAL MEDICINE

## 2023-06-14 PROCEDURE — 3074F SYST BP LT 130 MM HG: CPT | Mod: CPTII,S$GLB,, | Performed by: INTERNAL MEDICINE

## 2023-06-14 PROCEDURE — 99214 PR OFFICE/OUTPT VISIT, EST, LEVL IV, 30-39 MIN: ICD-10-PCS | Mod: S$GLB,,, | Performed by: INTERNAL MEDICINE

## 2023-06-14 PROCEDURE — 1126F PR PAIN SEVERITY QUANTIFIED, NO PAIN PRESENT: ICD-10-PCS | Mod: CPTII,S$GLB,, | Performed by: INTERNAL MEDICINE

## 2023-06-14 PROCEDURE — 3078F PR MOST RECENT DIASTOLIC BLOOD PRESSURE < 80 MM HG: ICD-10-PCS | Mod: CPTII,S$GLB,, | Performed by: INTERNAL MEDICINE

## 2023-06-14 NOTE — PROGRESS NOTES
Subjective:   @Patient ID:  Shannan Norman is a 77 y.o. male who presents for evaluation of abnormal EKG .      HPI:   6/14/2023:  Since last time he took the Lopressor for 5 times.  After 3-5 minutes his heart rate gets back to the normal rhythm and he feels fine.  He likes the prn regimen.  He is very active.  Works out 6-7 times a week without any chest pain or significant shortness of breath..    3/29/2023: Patient is here for follow up. ER visit 3/11/2023 with palpitations. EKG with atrial flutter. He was rx lopressor prn. He is in SR today. He had to take the lopressor one time only over the last 3 weeks. Not as active as he was after he had stress LE fractures      EKG showed A-paced  Rhythm. Septal infarct. EKGs in 2017 he had the same findings.     Historically: Hx of A. Flutter ablation in 2010, then redo CTI ablation in 2018. S/p PPM in 2018 for SSS and chronotropic incompetence.  He is on Eliquis for OAC and tolerating it well. He f/u with Dr. Sanchez.  He had chronic VV varicose veins s/p surgical stripping and EVLTs by Dr. Lund. He saw Dr. Toth but did not follow up with the recommendation of either stockings or EVLTs.      Prior cardiovascular  Hx  --------------------------------       - Echo 9/16/2020  Normal left ventricular systolic function. The estimated ejection fraction is 55-60%.  Normal LV diastolic function.  The estimated PA systolic pressure is 21 mmHg.  No wall motion abnormalities.  Normal right ventricular systolic function.  Normal central venous pressure (3 mmHg    - ECHO 10/2016     1 - Normal left ventricular systolic function (EF 60-65%).     2 - Left ventricular diastolic dysfunction.     3 - Biatrial enlargement.     4 - Normal right ventricular systolic function .     5 - The estimated PA systolic pressure is 25 mmHg.     6 - Trivial aortic regurgitation.     7 - Mild tricuspid regurgitation.     - EKG 9/2020  A. Paced with old septal infarct             Patient Active Problem  List    Diagnosis Date Noted    Basal cell carcinoma (BCC) of skin of left lower extremity including hip 11/17/2022     Treated with excision 11/17/ 2022      Pelvic floor dysfunction 05/13/2022    Pain of left upper extremity 03/17/2022    Weakness 03/17/2022    Stiffness due to immobility 03/17/2022    Lumbar spondylosis 02/25/2022    Primary osteoarthritis of both hips 02/25/2022    Decreased range of hip movement 02/25/2022    Decreased strength 02/25/2022    AK (actinic keratosis) 03/12/2021    History of melanoma 03/12/2021    Nuclear sclerotic cataract of left eye 08/11/2020    Nuclear sclerotic cataract of right eye 07/28/2020    CKD (chronic kidney disease) stage 3, GFR 30-59 ml/min 08/13/2019    Overweight (BMI 25.0-29.9) 08/13/2019    Right inguinal hernia 02/27/2019    Dyslipidemia associated with type 2 diabetes mellitus 03/07/2018    Hypertension associated with diabetes 03/07/2018    Junctional bradycardia 02/06/2018    Benign prostatic hyperplasia 08/04/2017    Nonrheumatic aortic valve insufficiency 12/01/2016     Trivial      SSS (sick sinus syndrome) 10/24/2016    Junctional escape rhythm 09/12/2016    Type 2 diabetes mellitus without retinopathy 10/13/2015    Nuclear sclerosis of both eyes 10/13/2015    Left epiretinal membrane 10/13/2015    Asymptomatic varicose veins of both lower extremities 07/06/2015         S/p bilateral GSV EVLT [id number]        Post-operative state 02/10/2014     PROCEDURES 01/27/2014:   1. ECU right side stabilization with retinacular sling.   2. Synovectomy, right wrist.   3. Splint application.        Umbilical hernia 09/19/2013    PFO (patent foramen ovale) 07/12/2013     On asa 325mg. No hx of CVA        HTN (hypertension) 04/18/2013    Gout, arthritis 04/18/2013    Atrial flutter 04/18/2013     EF 60%, s/p RF ablation in 2010 (Dr. Grady Copeland)      Type 2 diabetes mellitus with stage 3 chronic kidney disease, without long-term current use of insulin 04/18/2013            Right Arm BP - Sittin/71  Left Arm BP - Sittin/73        LAST HbA1c  Lab Results   Component Value Date    HGBA1C 6.1 (H) 2023       Lipid panel  Lab Results   Component Value Date    CHOL 129 2023    CHOL 136 10/03/2022    CHOL 102 (L) 2022     Lab Results   Component Value Date    HDL 49 2023    HDL 66 10/03/2022    HDL 31 (L) 2022     Lab Results   Component Value Date    LDLCALC 68.4 2023    LDLCALC 62.2 (L) 10/03/2022    LDLCALC 56.0 (L) 2022     Lab Results   Component Value Date    TRIG 58 2023    TRIG 39 10/03/2022    TRIG 75 2022     Lab Results   Component Value Date    CHOLHDL 38.0 2023    CHOLHDL 48.5 10/03/2022    CHOLHDL 30.4 2022            Review of Systems   Constitutional: Negative for chills and fever.   HENT:  Negative for hearing loss and nosebleeds.    Eyes:  Negative for blurred vision.   Cardiovascular:  Negative for chest pain, leg swelling and palpitations.   Respiratory:  Negative for hemoptysis and shortness of breath.    Hematologic/Lymphatic: Negative for bleeding problem.   Skin:  Negative for itching.   Musculoskeletal:  Negative for falls.   Gastrointestinal:  Negative for abdominal pain and hematochezia.   Genitourinary:  Negative for hematuria.   Neurological:  Negative for dizziness and loss of balance.   Psychiatric/Behavioral:  Negative for altered mental status and depression.      Objective:   Physical Exam  Constitutional:       Appearance: He is well-developed.   HENT:      Head: Normocephalic and atraumatic.   Eyes:      Conjunctiva/sclera: Conjunctivae normal.   Neck:      Vascular: No carotid bruit or JVD.   Cardiovascular:      Rate and Rhythm: Normal rate and regular rhythm.      Pulses:           Carotid pulses are 2+ on the right side and 2+ on the left side.       Radial pulses are 2+ on the right side and 2+ on the left side.      Heart sounds: Normal heart sounds. No murmur  heard.    No friction rub. No gallop.   Pulmonary:      Effort: Pulmonary effort is normal. No respiratory distress.      Breath sounds: No stridor. No wheezing or rhonchi.   Musculoskeletal:      Cervical back: Neck supple.   Skin:     General: Skin is warm and dry.      Comments: Varicose veins    Neurological:      Mental Status: He is alert and oriented to person, place, and time.   Psychiatric:         Behavior: Behavior normal.       Assessment:     1. Primary hypertension    2. Typical atrial flutter    3. PFO (patent foramen ovale)    4. Asymptomatic varicose veins of both lower extremities    5. SSS (sick sinus syndrome)    6. Nonrheumatic aortic valve insufficiency    7. Dyslipidemia associated with type 2 diabetes mellitus    8. Hypertension associated with diabetes    9. Type 2 diabetes mellitus with stage 3a chronic kidney disease, without long-term current use of insulin          Plan:   - BP well controlled  - Stable cardiac issues  - Paroxysmal atrial flutter. Had break through episodes that response to p.r.n. Lopressor he is happy with that regimen  - He has an appointment with Dr. Sanchez Continue Lopressor as needed  - Continue DOAC  - Reported hx of PFO in prior notes  - Continue conservative ttt for VV         I spent 5-10 minutes asking, assessing, assisting, arranging and advising heart healthy diet improvements. This included low-salt meals, portion control and health food alternatives. I also encourage 30 minutes of moderate exercise 3-4x a week.       One year follow-up      Pertinent cardiac images and EKG reviewed independently.    Continue with current medical plan and lifestyle changes.  Return sooner for concerns or questions. If symptoms persist go to the ED  I have reviewed all pertinent data including patient's medical history in detail and updated the computerized patient record.     No orders of the defined types were placed in this encounter.        Follow up as scheduled.     He  expressed verbal understanding and agreed with the plan    Patient's Medications   New Prescriptions    No medications on file   Previous Medications    ALLOPURINOL (ZYLOPRIM) 100 MG TABLET    Take 1 tablet (100 mg total) by mouth once daily.    ATORVASTATIN (LIPITOR) 40 MG TABLET    Take 1 tablet (40 mg total) by mouth once daily.    BENAZEPRIL (LOTENSIN) 20 MG TABLET    Take 1 tablet (20 mg total) by mouth once daily.    CALCIUM CARBONATE-VIT D3- MG CALCIUM- 400 UNIT TAB    Take 1 tablet by mouth once.    CYCLOSPORINE (RESTASIS) 0.05 % OPHTHALMIC EMULSION    Place 1 drop into both eyes 2 (two) times daily.    ECONAZOLE NITRATE 1 % CREAM    Apply topically 2 (two) times daily.    FERROUS SULFATE (FEOSOL) 325 MG (65 MG IRON) TAB TABLET    Take 325 mg by mouth once daily.    GLUCOSAMINE HCL/CHONDR VASQUEZ A NA (GLUCOSAMINE-CHONDROITIN) 750-600 MG TAB    Take 2 tablets by mouth Daily.    LEVOCETIRIZINE (XYZAL) 5 MG TABLET    Take 1 tablet (5 mg total) by mouth every evening.    METOPROLOL TARTRATE (LOPRESSOR) 25 MG TABLET    Take 1 tablet (25 mg total) by mouth 2 (two) times daily as needed (palpitations).    MULTIVITAMIN CAPSULE    Take 1 capsule by mouth nightly.     PREDNISONE (DELTASONE) 2.5 MG TABLET    Take 1 tablet (2.5 mg total) by mouth 2 (two) times daily.    TAMSULOSIN (FLOMAX) 0.4 MG CAP    TAKE 1 CAPSULE BY MOUTH AFTER DINNER    UNABLE TO FIND    6000 hydrolyzes collagen    XARELTO 20 MG TAB    TAKE 1 TABLET BY MOUTH ONCE DAILY WITH SUPPER OR  EVENING  MEAL   Modified Medications    No medications on file   Discontinued Medications    No medications on file

## 2023-06-16 ENCOUNTER — PATIENT MESSAGE (OUTPATIENT)
Dept: RHEUMATOLOGY | Facility: CLINIC | Age: 78
End: 2023-06-16
Payer: MEDICARE

## 2023-06-16 DIAGNOSIS — M35.3 POLYMYALGIA RHEUMATICA: ICD-10-CM

## 2023-06-18 ENCOUNTER — CLINICAL SUPPORT (OUTPATIENT)
Dept: CARDIOLOGY | Facility: HOSPITAL | Age: 78
End: 2023-06-18
Payer: MEDICARE

## 2023-06-18 DIAGNOSIS — Z95.0 PRESENCE OF CARDIAC PACEMAKER: ICD-10-CM

## 2023-06-18 PROCEDURE — 93294 CARDIAC DEVICE CHECK - REMOTE: ICD-10-PCS | Mod: ,,, | Performed by: INTERNAL MEDICINE

## 2023-06-18 PROCEDURE — 93294 REM INTERROG EVL PM/LDLS PM: CPT | Mod: ,,, | Performed by: INTERNAL MEDICINE

## 2023-06-18 PROCEDURE — 93296 REM INTERROG EVL PM/IDS: CPT | Performed by: INTERNAL MEDICINE

## 2023-06-19 RX ORDER — PREDNISONE 2.5 MG/1
2.5 TABLET ORAL 2 TIMES DAILY
Qty: 60 TABLET | Refills: 1 | Status: SHIPPED | OUTPATIENT
Start: 2023-06-19 | End: 2023-10-09

## 2023-07-17 ENCOUNTER — LAB VISIT (OUTPATIENT)
Dept: LAB | Facility: HOSPITAL | Age: 78
End: 2023-07-17
Attending: INTERNAL MEDICINE
Payer: MEDICARE

## 2023-07-17 DIAGNOSIS — M35.3 POLYMYALGIA RHEUMATICA: ICD-10-CM

## 2023-07-17 DIAGNOSIS — M10.9 GOUT, ARTHRITIS: ICD-10-CM

## 2023-07-17 LAB
ALBUMIN SERPL BCP-MCNC: 4 G/DL (ref 3.5–5.2)
ALP SERPL-CCNC: 76 U/L (ref 55–135)
ALT SERPL W/O P-5'-P-CCNC: 14 U/L (ref 10–44)
ANION GAP SERPL CALC-SCNC: 9 MMOL/L (ref 8–16)
AST SERPL-CCNC: 15 U/L (ref 10–40)
BASOPHILS # BLD AUTO: 0.04 K/UL (ref 0–0.2)
BASOPHILS NFR BLD: 0.5 % (ref 0–1.9)
BILIRUB SERPL-MCNC: 0.6 MG/DL (ref 0.1–1)
BUN SERPL-MCNC: 23 MG/DL (ref 8–23)
CALCIUM SERPL-MCNC: 9.5 MG/DL (ref 8.7–10.5)
CHLORIDE SERPL-SCNC: 105 MMOL/L (ref 95–110)
CO2 SERPL-SCNC: 25 MMOL/L (ref 23–29)
CREAT SERPL-MCNC: 1 MG/DL (ref 0.5–1.4)
CRP SERPL-MCNC: 1.2 MG/L (ref 0–8.2)
DIFFERENTIAL METHOD: ABNORMAL
EOSINOPHIL # BLD AUTO: 0.1 K/UL (ref 0–0.5)
EOSINOPHIL NFR BLD: 1.6 % (ref 0–8)
ERYTHROCYTE [DISTWIDTH] IN BLOOD BY AUTOMATED COUNT: 14.2 % (ref 11.5–14.5)
ERYTHROCYTE [SEDIMENTATION RATE] IN BLOOD BY PHOTOMETRIC METHOD: 11 MM/HR (ref 0–23)
EST. GFR  (NO RACE VARIABLE): >60 ML/MIN/1.73 M^2
GLUCOSE SERPL-MCNC: 98 MG/DL (ref 70–110)
HCT VFR BLD AUTO: 49.9 % (ref 40–54)
HGB BLD-MCNC: 15.8 G/DL (ref 14–18)
IMM GRANULOCYTES # BLD AUTO: 0.04 K/UL (ref 0–0.04)
IMM GRANULOCYTES NFR BLD AUTO: 0.5 % (ref 0–0.5)
LYMPHOCYTES # BLD AUTO: 1.4 K/UL (ref 1–4.8)
LYMPHOCYTES NFR BLD: 16.2 % (ref 18–48)
MCH RBC QN AUTO: 30.9 PG (ref 27–31)
MCHC RBC AUTO-ENTMCNC: 31.7 G/DL (ref 32–36)
MCV RBC AUTO: 98 FL (ref 82–98)
MONOCYTES # BLD AUTO: 0.9 K/UL (ref 0.3–1)
MONOCYTES NFR BLD: 10.2 % (ref 4–15)
NEUTROPHILS # BLD AUTO: 5.9 K/UL (ref 1.8–7.7)
NEUTROPHILS NFR BLD: 71 % (ref 38–73)
NRBC BLD-RTO: 0 /100 WBC
PLATELET # BLD AUTO: 163 K/UL (ref 150–450)
PMV BLD AUTO: 11.8 FL (ref 9.2–12.9)
POTASSIUM SERPL-SCNC: 4.6 MMOL/L (ref 3.5–5.1)
PROT SERPL-MCNC: 6.8 G/DL (ref 6–8.4)
RBC # BLD AUTO: 5.11 M/UL (ref 4.6–6.2)
SODIUM SERPL-SCNC: 139 MMOL/L (ref 136–145)
URATE SERPL-MCNC: 5.4 MG/DL (ref 3.4–7)
WBC # BLD AUTO: 8.32 K/UL (ref 3.9–12.7)

## 2023-07-17 PROCEDURE — 80053 COMPREHEN METABOLIC PANEL: CPT | Performed by: INTERNAL MEDICINE

## 2023-07-17 PROCEDURE — 85025 COMPLETE CBC W/AUTO DIFF WBC: CPT | Performed by: INTERNAL MEDICINE

## 2023-07-17 PROCEDURE — 86140 C-REACTIVE PROTEIN: CPT | Performed by: INTERNAL MEDICINE

## 2023-07-17 PROCEDURE — 85652 RBC SED RATE AUTOMATED: CPT | Performed by: INTERNAL MEDICINE

## 2023-07-17 PROCEDURE — 84550 ASSAY OF BLOOD/URIC ACID: CPT | Performed by: INTERNAL MEDICINE

## 2023-07-17 PROCEDURE — 36415 COLL VENOUS BLD VENIPUNCTURE: CPT | Mod: PO | Performed by: INTERNAL MEDICINE

## 2023-07-25 ENCOUNTER — OFFICE VISIT (OUTPATIENT)
Dept: RHEUMATOLOGY | Facility: CLINIC | Age: 78
End: 2023-07-25
Payer: MEDICARE

## 2023-07-25 VITALS
WEIGHT: 209.44 LBS | HEIGHT: 73 IN | DIASTOLIC BLOOD PRESSURE: 69 MMHG | BODY MASS INDEX: 27.76 KG/M2 | SYSTOLIC BLOOD PRESSURE: 116 MMHG | HEART RATE: 60 BPM

## 2023-07-25 DIAGNOSIS — M35.3 POLYMYALGIA RHEUMATICA: Primary | ICD-10-CM

## 2023-07-25 DIAGNOSIS — M10.9 GOUT, ARTHRITIS: ICD-10-CM

## 2023-07-25 DIAGNOSIS — Z79.52 LONG TERM (CURRENT) USE OF SYSTEMIC STEROIDS: ICD-10-CM

## 2023-07-25 DIAGNOSIS — M16.0 PRIMARY OSTEOARTHRITIS OF BOTH HIPS: ICD-10-CM

## 2023-07-25 PROCEDURE — 99999 PR PBB SHADOW E&M-EST. PATIENT-LVL III: CPT | Mod: PBBFAC,,, | Performed by: INTERNAL MEDICINE

## 2023-07-25 PROCEDURE — 99214 OFFICE O/P EST MOD 30 MIN: CPT | Mod: S$GLB,,, | Performed by: INTERNAL MEDICINE

## 2023-07-25 PROCEDURE — 1160F RVW MEDS BY RX/DR IN RCRD: CPT | Mod: CPTII,S$GLB,, | Performed by: INTERNAL MEDICINE

## 2023-07-25 PROCEDURE — 3078F PR MOST RECENT DIASTOLIC BLOOD PRESSURE < 80 MM HG: ICD-10-PCS | Mod: CPTII,S$GLB,, | Performed by: INTERNAL MEDICINE

## 2023-07-25 PROCEDURE — 3078F DIAST BP <80 MM HG: CPT | Mod: CPTII,S$GLB,, | Performed by: INTERNAL MEDICINE

## 2023-07-25 PROCEDURE — 1160F PR REVIEW ALL MEDS BY PRESCRIBER/CLIN PHARMACIST DOCUMENTED: ICD-10-PCS | Mod: CPTII,S$GLB,, | Performed by: INTERNAL MEDICINE

## 2023-07-25 PROCEDURE — 99999 PR PBB SHADOW E&M-EST. PATIENT-LVL III: ICD-10-PCS | Mod: PBBFAC,,, | Performed by: INTERNAL MEDICINE

## 2023-07-25 PROCEDURE — 1159F MED LIST DOCD IN RCRD: CPT | Mod: CPTII,S$GLB,, | Performed by: INTERNAL MEDICINE

## 2023-07-25 PROCEDURE — 3072F LOW RISK FOR RETINOPATHY: CPT | Mod: CPTII,S$GLB,, | Performed by: INTERNAL MEDICINE

## 2023-07-25 PROCEDURE — 3074F PR MOST RECENT SYSTOLIC BLOOD PRESSURE < 130 MM HG: ICD-10-PCS | Mod: CPTII,S$GLB,, | Performed by: INTERNAL MEDICINE

## 2023-07-25 PROCEDURE — 3074F SYST BP LT 130 MM HG: CPT | Mod: CPTII,S$GLB,, | Performed by: INTERNAL MEDICINE

## 2023-07-25 PROCEDURE — 3072F PR LOW RISK FOR RETINOPATHY: ICD-10-PCS | Mod: CPTII,S$GLB,, | Performed by: INTERNAL MEDICINE

## 2023-07-25 PROCEDURE — 1159F PR MEDICATION LIST DOCUMENTED IN MEDICAL RECORD: ICD-10-PCS | Mod: CPTII,S$GLB,, | Performed by: INTERNAL MEDICINE

## 2023-07-25 PROCEDURE — 99214 PR OFFICE/OUTPT VISIT, EST, LEVL IV, 30-39 MIN: ICD-10-PCS | Mod: S$GLB,,, | Performed by: INTERNAL MEDICINE

## 2023-07-25 ASSESSMENT — ROUTINE ASSESSMENT OF PATIENT INDEX DATA (RAPID3)
PSYCHOLOGICAL DISTRESS SCORE: 0
FATIGUE SCORE: 0
MDHAQ FUNCTION SCORE: 0.1
PAIN SCORE: 0
AM STIFFNESS SCORE: 0, NO
PATIENT GLOBAL ASSESSMENT SCORE: 0
TOTAL RAPID3 SCORE: 0.11

## 2023-07-25 NOTE — PATIENT INSTRUCTIONS
Begin tapering prednisone further. Take 2.5 mg alternating with 3.75 x 2 weeks then if ok, proceed to 2.5 mg/day x 4 weeks then alternate 2.5 mg with 1.25 mg the next 2 weeks then continue 1.25 mg/d x 4 weeks then stop.

## 2023-07-25 NOTE — PROGRESS NOTES
Answers submitted by the patient for this visit:  Rheumatology Questionnaire (Submitted on 7/24/2023)  fever: No  eye redness: No  mouth sores: No  headaches: No  shortness of breath: No  chest pain: No  trouble swallowing: No  diarrhea: No  constipation: No  unexpected weight change: No  genital sore: No  During the last 3 days, have you had a skin rash?: No  Bruises or bleeds easily: No  cough: No    Subjective:     Patient ID: Shannan Norman is a 78 y.o. male w a hx c/w PMR    Chief Complaint:  PMR on steroids       HPI     78 yr old with multiple morbidities that include gout, DM II, CKD 3, Atrial fib w sick sinus syndrome &  bilateral hip & LSpine OA we are following for PMR & gout.    He was initially seen on 4/4/22 & last on 4/26/23. We have been tapering his prednisone but stopping it resulted in recurrence of sxs.   He is currently still on 3.75 mg/d and is doing well on this dose. No stiffness. No PMR or GCA sxs. No fever, chills, recent or unexplained weight changes, visual loss, diplopia, scalp tenderness, jaw or other claudication, headache or head pain.  Denies AM stiffness, shoulder or hip girdle stiffness.     Also has hx of Gout and is on allopurinol 100 mg/d. Has not had a gout attack in very long time.   Last DXA & FRAX is acceptable.  S/P traumatic L 5th metatarsal base fracture ~ 12/22 & had to wear a boot.   Has bilateral hip OA w impingement & is a candidate for R AVERY by Dr. Bhatt who he is seeing in September.   Walking his dogs results in R hip pain.  He is practicing healthy lifestyle--eating properly & exercising aerobically & otherwise; does yoga;       From 7/6/22:  On 2/15/22 developed stiffness; couldn't get out of bed in AM; had extreme bilateral shoulder pain; had hip pain (but denied stiffness); also really bad at night. No other joint of muscle pain except the muscles of the backs of his knees or tendons felt tight. Had lots of gelling. Lost 23 lbs. Also felt voice changed tone a  bit.   Celebrex 200 mg/d helped some, but not entirely.   Prednisone 10 mg bid was started on 4/4/22 and he had an immediate & dramatic response. Everything improved.   We say him for the first time on 4/6/22 and tried to taper prednisone a bit. He was able to get down to 10 mg daily which he began ~ May 2022.  Currently does not feel as well as he did on the larger doses of prednisone. Has ~ 4 hrs of AM stiffness and has bilateral wrist pain worse in AM & improves with time..  No other joint sxs.  Denies fever, chills, recent  visual loss, diplopia, scalp tenderness, jaw or other claudication, headache or head pain.     Is also on allopurinol 100 mg/d w good control of his UA. He also is eating better. Did have a diet of rich food in the past and ETOH use in past.Denies family hx of gout; denies renal calculi; denies moonshine use.          Current Outpatient Medications   Medication Sig Dispense Refill    allopurinoL (ZYLOPRIM) 100 MG tablet Take 1 tablet (100 mg total) by mouth once daily. 90 tablet 0    atorvastatin (LIPITOR) 40 MG tablet Take 1 tablet (40 mg total) by mouth once daily. 90 tablet 3    benazepriL (LOTENSIN) 20 MG tablet Take 1 tablet (20 mg total) by mouth once daily. 90 tablet 3    cycloSPORINE (RESTASIS) 0.05 % ophthalmic emulsion Place 1 drop into both eyes 2 (two) times daily. 60 each 11    econazole nitrate 1 % cream Apply topically 2 (two) times daily. 85 g 3    ferrous sulfate (FEOSOL) 325 mg (65 mg iron) Tab tablet Take 325 mg by mouth once daily.      GLUCOSAMINE HCL/CHONDR VASQUEZ A NA (GLUCOSAMINE-CHONDROITIN) 750-600 mg Tab Take 2 tablets by mouth Daily.      levocetirizine (XYZAL) 5 MG tablet Take 1 tablet (5 mg total) by mouth every evening. 90 tablet 1    multivitamin capsule Take 1 capsule by mouth nightly.       predniSONE (DELTASONE) 2.5 MG tablet Take 1 tablet (2.5 mg total) by mouth 2 (two) times daily. 60 tablet 1    tamsulosin (FLOMAX) 0.4 mg Cap TAKE 1 CAPSULE BY MOUTH AFTER  DINNER 90 capsule 3    UNABLE TO FIND 6000 hydrolyzes collagen      XARELTO 20 mg Tab TAKE 1 TABLET BY MOUTH ONCE DAILY WITH SUPPER OR  EVENING  MEAL 90 tablet 3    metoprolol tartrate (LOPRESSOR) 25 MG tablet Take 1 tablet (25 mg total) by mouth 2 (two) times daily as needed (palpitations). 14 tablet 0     No current facility-administered medications for this visit.             Review of patient's allergies indicates:  No Known Allergies    Review of Systems   Constitutional: Negative.  Negative for fatigue, fever and unexpected weight change.   HENT:  Negative for mouth sores and trouble swallowing.    Eyes:  Negative for redness.        Bilateral dry eyes   Respiratory: Negative.  Negative for cough and shortness of breath.    Cardiovascular: Negative.  Negative for chest pain.   Gastrointestinal: Negative.  Negative for constipation and diarrhea.   Endocrine: Negative.  Negative for cold intolerance and heat intolerance.   Genitourinary:  Positive for difficulty urinating, frequency and urgency. Negative for genital sores.   Musculoskeletal:  Negative for back pain, joint swelling and myalgias.   Skin:  Negative for rash.   Neurological:  Negative for dizziness, syncope, weakness, light-headedness, numbness and headaches.   Hematological:  Does not bruise/bleed easily.   Psychiatric/Behavioral:  Negative for dysphoric mood and sleep disturbance.      Past Medical History:   Diagnosis Date    Allergy     Arthritis     Atrial fibrillation     Atrial flutter 2011    ablation    Basal cell carcinoma     Cancer     Cataract     CKD (chronic kidney disease) stage 3, GFR 30-59 ml/min 8/13/2019    Diabetes mellitus     Dry eye syndrome     Gout, unspecified     High cholesterol     History of shingles     Hypertension     Melanoma     Seizures        Past Surgical History:   Procedure Laterality Date    ABLATION N/A 9/11/2018    Procedure: ABLATION;  Surgeon: Hany Sanchez MD;  Location: [facility];  Service:  Cardiology;  Laterality: N/A;  AFL, ISABELLE, RFA, ELODIA, MAC, DM, 3 PREP    ABLATION OF DYSRHYTHMIC FOCUS      ADENOIDECTOMY      CARDIAC PACEMAKER PLACEMENT      no defib    CATARACT EXTRACTION W/  INTRAOCULAR LENS IMPLANT Right 7/28/2020    Procedure: EXTRACTION, CATARACT, WITH IOL INSERTION;  Surgeon: Andrés Morse MD;  Location: [facility];  Service: Ophthalmology;  Laterality: Right;    CATARACT EXTRACTION W/  INTRAOCULAR LENS IMPLANT Left 8/11/2020    Procedure: EXTRACTION, CATARACT, WITH IOL INSERTION;  Surgeon: Andrés Morse MD;  Location: [facility] OR;  Service: Ophthalmology;  Laterality: Left;    COLONOSCOPY N/A 1/2/2019    Procedure: COLONOSCOPY Suprep;  Surgeon: Cindy Solorzano MD;  Location: [facility];  Service: Endoscopy;  Laterality: N/A;    GANGLION CYST EXCISION      left neck    HERNIA REPAIR  2013    umbilical    TENDON REPAIR Right     wrist    TONSILLECTOMY      varicose veins      several sx  bilateral    VASECTOMY      VEIN SURGERY         Family History   Problem Relation Age of Onset    Diabetes Mother     COPD Father     Heart disease Father     Arthritis Sister     Heart attack Brother     Heart disease Brother     Diabetes Brother     No Known Problems Maternal Aunt     No Known Problems Maternal Uncle     No Known Problems Paternal Aunt     No Known Problems Paternal Uncle     No Known Problems Maternal Grandmother     No Known Problems Maternal Grandfather     No Known Problems Paternal Grandmother     No Known Problems Paternal Grandfather     No Known Problems Son     Cancer Sister         lymph node, breast    No Known Problems Sister     No Known Problems Son     Amblyopia Neg Hx     Blindness Neg Hx     Cataracts Neg Hx     Glaucoma Neg Hx     Hypertension Neg Hx     Macular degeneration Neg Hx     Retinal detachment Neg Hx     Strabismus Neg Hx     Stroke Neg Hx     Thyroid disease Neg Hx     Prostate cancer Neg Hx     Kidney disease Neg Hx     Melanoma Neg Hx   "      Social History     Tobacco Use    Smoking status: Never    Smokeless tobacco: Never   Substance Use Topics    Alcohol use: Yes     Alcohol/week: 7.0 - 14.0 standard drinks     Types: 7 - 14 Glasses of wine per week    Drug use: No   Retired. Administrative business for [employer]  Wife malina Houser  in 2023.   Belongs to a Neighborhood club and does Yoga w his buddies periodically    Objective:   /69   Pulse 60   Ht 6' 1" (1.854 m)   Wt 95 kg (209 lb 7 oz)   BMI 27.63 kg/m²   Was 208 lb 1.8 oz on 10/18/22  Physical Exam   Constitutional: He is oriented to person, place, and time. normal appearance. No distress.   HENT:   Head: Normocephalic and atraumatic.   Mouth/Throat: Oropharynx is clear and moist. No oropharyngeal exudate.   No parotidomegaly;   Temporal arteries with good pulsations.   No temporal artery tenderness;   No scalp tenderness.  No oral ulcers;   Head: TA ok wo TTP;  Eyes: Pupils are equal, round, and reactive to light. Conjunctivae are normal. No scleral icterus.   Neck: No JVD present. No tracheal deviation present. No thyromegaly present.   Cardiovascular: Normal rate, regular rhythm and normal heart sounds. Exam reveals no gallop and no friction rub.   No murmur heard.  Pulmonary/Chest: Effort normal and breath sounds normal. No respiratory distress. He has no wheezes. He has no rales. He exhibits no tenderness.   Pulmonary Comments: Bibasilar rales cleared with deep breathing.   Abdominal: Soft. Bowel sounds are normal. He exhibits no distension and no mass. There is no splenomegaly or hepatomegaly. There is no abdominal tenderness. There is no rebound and no guarding.   Musculoskeletal:         General: No tenderness.      Right lower leg: Edema present.      Left lower leg: Edema present.      Comments: No synovitis anywhere.  Makes tight fists.  Shoulders w FROM.  Exam of wrists w bilateral decrease in flexion and extension; No TTP; no visible or palpable " SHT.  Bilateral LE varicosities     Lymphadenopathy:     He has no cervical adenopathy.   Neurological: He is alert and oriented to person, place, and time. He has normal reflexes. No cranial nerve deficit.   Motor strength: 5/5 prox & distal.   Skin: Skin is warm and dry. No rash noted.   Bilateral impressive venous varicosities lower extremities  Thickened toe nails c/w onychomycosis   Psychiatric: His behavior is normal. Mood, memory, affect and judgment normal.   Vitals reviewed.    7/17/23: ESR 11 (23); CRP 2.3; CBC ok; CMP ok; UA 5.4  4/3/23: ESR 10 (23); CRP 8.4; CBC ok; CMP ok; Hg A1C 6.1;   12/19/22: ESR 5 (23); CRP 9.0; CBC ok; CMP ok;   7/7/22: ESR 15 (23); CRP 15.2; CBC ok; CMP glu 126;   5/12/22: ESR: 15 (23); CRP 0.8; CBC ok; CMP glu 128  4/13/22: ESR 20 (23);UA 5.4  3/31/22: CPK 47;   3/30/22: ESR 87 (23); CRP 42.5; CBC Hg 12.7; CMP BUN 24; glu 135; Alb 2.8; RF neg;  UA 5.9;     12/2/22: L foot: personally viewed: Minimally displaced fragment at the lateral base of the 5th proximal phalanx, age indeterminate and possibly remote; scattered mild foot degenerative change with small calcaneal enthesophyte at the distal Achilles insertion.  9/27/22: DXA; TLS 3.0; TTH -0.4; TFN -0.9; FRAX 8.7/2.6%  3/31/22: LSpine: personally viewed: dextroconvex Lspine; DDD verona L3-4; lower facet arthropathy; gr 1 anterolisthesis L4-5; vascular calcifications.  2/23/22: Bilateral hips: personally viewed: bilateral OA; also read as impingement.   3/15/21: Bilateral knees: personally viewed: bowen mjsn; mild varus bowen  8/26/19: L wrist: personally viewed: mild OA;   Assessment:   PMR   Shoulder/hip pain/stiffness--No GCA signs/sxs   Elevated inflammatory parameters:     ESR 87 (23); CRP 42.5; Hg 12.7; alb 2.8;    Celebrex somewhat helpful.   Dramatic response to prednisone 10 mg bid begun 4/4/22   Last w wrist pain/stiffness improving as day progresses    Doubt sero neg RA--but always in differential    On prednisone  3.75  mg/d--doing well    Gout by hx   Podagra by hx--several episodes.    Knee swelling   Hyperuricemia maxed 7.9; last 5.4;    Some ETOH in past;    On allopurinol 100 mg/d started by PCP    OA   Hips (w ZEYAD); knees; Lspine   L wrist minimal     CKD 2-3    DM II    HTN    HLD    Cardiac issues: SSS; cardiac arrythmia    S/P L 5th metatarsal fx       Plan:   Change prednisone 3.75 mg/d alternating 2.5 x 2 weeks then 2.5 mg/d x 4 weeks then 2.5 mg alt 1.25 x 2 weeks then 1.25 mg/d x 4 weeks then stop  Ok to stay on allopurinol 100 mg/d.   Labs in 3 months  Continue exercise & socialization  RTC 3 months.

## 2023-07-25 NOTE — PROGRESS NOTES
Rapid3 Question Responses and Scores 7/24/2023   MDHAQ Score 0.1   Psychologic Score 0   Pain Score 0   When you awakened in the morning OVER THE LAST WEEK, did you feel stiff? No   If Yes, please indicate the number of hours until you are as limber as you will be for the day -   Fatigue Score 0   Global Health Score 0   RAPID3 Score 0.11

## 2023-07-25 NOTE — PLAN OF CARE
Problem: Patient Care Overview  Goal: Plan of Care Review  See epic    Comments: See epic   None known

## 2023-07-26 NOTE — PATIENT INSTRUCTIONS
Posture - Sitting    Sit upright, head facing forward. Scoot your tailbone all the way to the back of your chair. Lean slightly forward and place a rolled up towel at your waist. Lean back so shoulder blades lightly touch the back of the chair. Keep shoulders relaxed, and avoid rounded back. Keep hips level with knees. Avoid crossing legs for long periods.     Piriformis Stretch with IR/Adduction    Pull right knee across body to opposite shoulder. Hold slight stretch for 30 seconds. Repeat with involved leg.  Repeat 3 times each side per set. Do 2 sets per session. Do 2 sessions per day.      Supine Hamstring Stretch    Straight right leg with belt around heel of foot. Raise leg until a stretch is felt in the back of the thigh. Keep knee straight. Hold 30 seconds.   Repeat 3 times each side per set. Do 2 sets per session. Do 2 sessions per day.        Prone Press-Up    Press upper body upward, keeping hips in contact with floor. Keep lower back and buttocks relaxed. Hold 1-2 seconds then slowly lower.  Repeat 10 times per set. Do 3 sets per session. Do 2 sessions per day.      Extension in Standing    Place hands on back of hips and lean back, pushing hips gently forward. Repeat slowly and continuously.  Repeat 10 times per set. Do 3 sets per session. Do 2 sessions per day.        Prayer Stretch    On hands and knees reach forward until a stretch is felt in the mid-back. Hold 30 seconds. Repeat reaching to right and to left.  Repeat 3 times per set. Do 2 sets per session. Do 2 sessions per day.      Cat-Camel Stretch    Tuck chin and tighten stomach, rounding your back. Then look up, let your belly drop, and arch your back.  Repeat 10 times per set. Do 3 sets per session. Do 2 sessions per day.       Prone Plank    On toes and elbows, rise up and pull abdomen in while stabilizing trunk by bringing front of hips and ribs closer together. Keep breathing and do not let your back arch. Hold 15-30 seconds.  Do 10 reps  per set, 3 sets per day.     [email or web address]         Straight Leg Raise     With right leg straight, other leg bent, raise straight leg until knees are even. Slowly lower. Roll on your side and repeat lifting top leg up.  Repeat 10 times each leg per set. Do 3 sets per session. Do 2 sessions per day.      Strengthening: Hip Extension (Prone)        Tighten muscles on front of left thigh, then lift leg 4 inches from surface, keeping knee locked.  Repeat 10 times per set. Do 3 sets per session. Do 2 sessions per day.          Glute Squeeze, supine    Lie face up and squeeze your rear end. Do not tighten your abdominals or hold your breath. Hold 5 seconds. Relax.  Repeat 10 times per set. Do 3 sets per session. Do 2 sessions per day.      Strengthening: Hip Adduction - Isometric    Sit back or lie down with ball or folded pillow between knees, and squeeze knees together. Hold 5 seconds.  Repeat 10 times per set. Do 3 sets per session. Do 2 sessions per day.      Clam Shells    Lie on side with knees bent. Raise top leg, keeping knee bent and ankles together. Do not let your hips roll back as your raise your leg.  Repeat 10  times each leg per set. Do 3 sets per session. Do 2 sessions per day.          Lumbar Rotation    Feet on floor, slowly rock knees from side to side in small, pain-free range of motion. Allow lower back to rotate slightly, but keep shoulders flat on ground. Hold 5 seconds.  Repeat 10 times per set. Do 3 sets per session. Do 2 sessions per day.        Bridging    Flatten back against floor then slowly raise buttocks from floor, keeping stomach tight. Hold 3-5 seconds.  Repeat 10 times per set. Do 3 sets per session. Do 2 sessions per day.      Strengthening: Hip Abduction - Resisted        With tubing around ankles, extend leg out from side.  Repeat 10 times per set. Do 3 sets per session. Do 1 sessions per day.        [email or web address]     Copyright © [employer]. All rights reserved.       Strengthening: Hip Extension - Resisted        With tubing around ankles, pull leg straight back.  Repeat 10 times per set. Do 3 sets per session. Do 1 sessions per day.    Mini Squats    Stand at countertop and bend knees for a mini squat. Remember to keep chest up and to hold on for support. Repeat for 3 sets of 10: a total of 30 repetitions.     Heel Raise: Bilateral (Standing)        Rise on balls of feet.  Repeat 10 times per set. Do 3 sets per session. Do 2 sessions per day.       Step-Down / Step-Up        Stand on stair step or 4 inch stool. Slowly bend left leg, lowering other foot to floor. Return by straightening front leg.  Repeat 10 times per set. Do 3 sets per session. Do 2 sessions per day.          Information: Selecting Yes will display possible errors in your note based on the variables you have selected. This validation is only offered as a suggestion for you. PLEASE NOTE THAT THE VALIDATION TEXT WILL BE REMOVED WHEN YOU FINALIZE YOUR NOTE. IF YOU WANT TO FAX A PRELIMINARY NOTE YOU WILL NEED TO TOGGLE THIS TO 'NO' IF YOU DO NOT WANT IT IN YOUR FAXED NOTE.

## 2023-08-08 ENCOUNTER — OFFICE VISIT (OUTPATIENT)
Dept: OTOLARYNGOLOGY | Facility: CLINIC | Age: 78
End: 2023-08-08
Payer: MEDICARE

## 2023-08-08 VITALS
HEART RATE: 65 BPM | BODY MASS INDEX: 27.34 KG/M2 | DIASTOLIC BLOOD PRESSURE: 64 MMHG | WEIGHT: 207.25 LBS | SYSTOLIC BLOOD PRESSURE: 96 MMHG

## 2023-08-08 DIAGNOSIS — H90.3 SENSORINEURAL HEARING LOSS (SNHL) OF BOTH EARS: Primary | Chronic | ICD-10-CM

## 2023-08-08 DIAGNOSIS — H69.93 ETD (EUSTACHIAN TUBE DYSFUNCTION), BILATERAL: Chronic | ICD-10-CM

## 2023-08-08 DIAGNOSIS — J31.0 CHRONIC RHINITIS: Chronic | ICD-10-CM

## 2023-08-08 PROCEDURE — 3078F DIAST BP <80 MM HG: CPT | Mod: CPTII,S$GLB,, | Performed by: OTOLARYNGOLOGY

## 2023-08-08 PROCEDURE — 1159F MED LIST DOCD IN RCRD: CPT | Mod: CPTII,S$GLB,, | Performed by: OTOLARYNGOLOGY

## 2023-08-08 PROCEDURE — 1101F PR PT FALLS ASSESS DOC 0-1 FALLS W/OUT INJ PAST YR: ICD-10-PCS | Mod: CPTII,S$GLB,, | Performed by: OTOLARYNGOLOGY

## 2023-08-08 PROCEDURE — 3072F PR LOW RISK FOR RETINOPATHY: ICD-10-PCS | Mod: CPTII,S$GLB,, | Performed by: OTOLARYNGOLOGY

## 2023-08-08 PROCEDURE — 3288F FALL RISK ASSESSMENT DOCD: CPT | Mod: CPTII,S$GLB,, | Performed by: OTOLARYNGOLOGY

## 2023-08-08 PROCEDURE — 99999 PR PBB SHADOW E&M-EST. PATIENT-LVL III: CPT | Mod: PBBFAC,,, | Performed by: OTOLARYNGOLOGY

## 2023-08-08 PROCEDURE — 1160F RVW MEDS BY RX/DR IN RCRD: CPT | Mod: CPTII,S$GLB,, | Performed by: OTOLARYNGOLOGY

## 2023-08-08 PROCEDURE — 1126F AMNT PAIN NOTED NONE PRSNT: CPT | Mod: CPTII,S$GLB,, | Performed by: OTOLARYNGOLOGY

## 2023-08-08 PROCEDURE — 1160F PR REVIEW ALL MEDS BY PRESCRIBER/CLIN PHARMACIST DOCUMENTED: ICD-10-PCS | Mod: CPTII,S$GLB,, | Performed by: OTOLARYNGOLOGY

## 2023-08-08 PROCEDURE — 3072F LOW RISK FOR RETINOPATHY: CPT | Mod: CPTII,S$GLB,, | Performed by: OTOLARYNGOLOGY

## 2023-08-08 PROCEDURE — 3288F PR FALLS RISK ASSESSMENT DOCUMENTED: ICD-10-PCS | Mod: CPTII,S$GLB,, | Performed by: OTOLARYNGOLOGY

## 2023-08-08 PROCEDURE — 3074F PR MOST RECENT SYSTOLIC BLOOD PRESSURE < 130 MM HG: ICD-10-PCS | Mod: CPTII,S$GLB,, | Performed by: OTOLARYNGOLOGY

## 2023-08-08 PROCEDURE — 3078F PR MOST RECENT DIASTOLIC BLOOD PRESSURE < 80 MM HG: ICD-10-PCS | Mod: CPTII,S$GLB,, | Performed by: OTOLARYNGOLOGY

## 2023-08-08 PROCEDURE — 99213 OFFICE O/P EST LOW 20 MIN: CPT | Mod: S$GLB,,, | Performed by: OTOLARYNGOLOGY

## 2023-08-08 PROCEDURE — 1159F PR MEDICATION LIST DOCUMENTED IN MEDICAL RECORD: ICD-10-PCS | Mod: CPTII,S$GLB,, | Performed by: OTOLARYNGOLOGY

## 2023-08-08 PROCEDURE — 99213 PR OFFICE/OUTPT VISIT, EST, LEVL III, 20-29 MIN: ICD-10-PCS | Mod: S$GLB,,, | Performed by: OTOLARYNGOLOGY

## 2023-08-08 PROCEDURE — 99999 PR PBB SHADOW E&M-EST. PATIENT-LVL III: ICD-10-PCS | Mod: PBBFAC,,, | Performed by: OTOLARYNGOLOGY

## 2023-08-08 PROCEDURE — 3074F SYST BP LT 130 MM HG: CPT | Mod: CPTII,S$GLB,, | Performed by: OTOLARYNGOLOGY

## 2023-08-08 PROCEDURE — 1126F PR PAIN SEVERITY QUANTIFIED, NO PAIN PRESENT: ICD-10-PCS | Mod: CPTII,S$GLB,, | Performed by: OTOLARYNGOLOGY

## 2023-08-08 PROCEDURE — 1101F PT FALLS ASSESS-DOCD LE1/YR: CPT | Mod: CPTII,S$GLB,, | Performed by: OTOLARYNGOLOGY

## 2023-08-08 RX ORDER — LEVOCETIRIZINE DIHYDROCHLORIDE 5 MG/1
5 TABLET, FILM COATED ORAL NIGHTLY
Qty: 90 TABLET | Refills: 1 | Status: SHIPPED | OUTPATIENT
Start: 2023-08-08 | End: 2024-08-07

## 2023-08-08 NOTE — PROGRESS NOTES
Chief Complaint   Patient presents with    Follow-up   .     HPI: Shannan Norman is a 78 y.o. male who presents for evaluation of right aural fullness and nosebleeds. He notes that the aural fullness has been present in both ears for several weeks.  He saw Vanessa Bernard NP at [facility] [place]- cerumen removal was performed which did not seem to improve his symptoms. He does report that it might be slightly improvement since then. He did have audiogram at that visit. See results below.  Additionally,  he reports that he has had nosebleeds for several years.  He states that they are primarily the right side and occur intermittently.  The last episode was approximately 3 days ago.  He notes they last about 5 minutes and stop with pressure.  He is on chronic anticoagulation Eliquis for AFib. Has had nasal cautery in the past about 5 or 6 years ago.     Interval HPI8/8/2023:  Follow up visit- annual check up.  He reports that his hearing has been stable and may be getting better. He has been using nasal saline rinses and xyzal.  Denies any further episodes of epistaxis since last visit.       Past Medical History:   Diagnosis Date    Allergy     Arthritis     Atrial fibrillation     Atrial flutter 2011    ablation    Basal cell carcinoma     Cancer     Cataract     CKD (chronic kidney disease) stage 3, GFR 30-59 ml/min 8/13/2019    Diabetes mellitus     Dry eye syndrome     Gout, unspecified     High cholesterol     History of shingles     Hypertension     Melanoma     Seizures      Social History     Socioeconomic History    Marital status:    Occupational History     Employer: [employer]   Tobacco Use    Smoking status: Never    Smokeless tobacco: Never   Substance and Sexual Activity    Alcohol use: Yes     Alcohol/week: 7.0 - 14.0 standard drinks of alcohol     Types: 7 - 14 Glasses of wine per week    Drug use: No    Sexual activity: Yes     Partners: Female     Social Determinants of Health     Financial  Resource Strain: Low Risk  (6/13/2023)    Overall Financial Resource Strain (CARDIA)     Difficulty of Paying Living Expenses: Not hard at all   Food Insecurity: No Food Insecurity (6/13/2023)    Hunger Vital Sign     Worried About Running Out of Food in the Last Year: Never true     Ran Out of Food in the Last Year: Never true   Transportation Needs: No Transportation Needs (6/13/2023)    PRAPARE - Transportation     Lack of Transportation (Medical): No     Lack of Transportation (Non-Medical): No   Physical Activity: Sufficiently Active (6/13/2023)    Exercise Vital Sign     Days of Exercise per Week: 7 days     Minutes of Exercise per Session: 30 min   Recent Concern: Physical Activity - Inactive (4/1/2023)    Exercise Vital Sign     Days of Exercise per Week: 0 days     Minutes of Exercise per Session: 40 min   Stress: No Stress Concern Present (6/13/2023)    [place] [employer] - Occupational Stress Questionnaire     Feeling of Stress : Not at all   Social Connections: Unknown (6/13/2023)    Social Connection and Isolation Panel [NHANES]     Frequency of Communication with Friends and Family: More than three times a week     Frequency of Social Gatherings with Friends and Family: More than three times a week     Active Member of Clubs or Organizations: Yes     Attends Club or Organization Meetings: More than 4 times per year     Marital Status:    Housing Stability: Low Risk  (6/13/2023)    Housing Stability Vital Sign     Unable to Pay for Housing in the Last Year: No     Number of Places Lived in the Last Year: 1     Unstable Housing in the Last Year: No     Past Surgical History:   Procedure Laterality Date    ABLATION N/A 9/11/2018    Procedure: ABLATION;  Surgeon: Hany Sanchez MD;  Location: [facility] CATH LAB;  Service: Cardiology;  Laterality: N/A;  AFL, ISABELLE, RFA, ELODIA, MAC, DM, 3 PREP    ABLATION OF DYSRHYTHMIC FOCUS      ADENOIDECTOMY      CARDIAC PACEMAKER PLACEMENT      no  defib    CATARACT EXTRACTION W/  INTRAOCULAR LENS IMPLANT Right 7/28/2020    Procedure: EXTRACTION, CATARACT, WITH IOL INSERTION;  Surgeon: Andrés Morse MD;  Location: [facility] OR;  Service: Ophthalmology;  Laterality: Right;    CATARACT EXTRACTION W/  INTRAOCULAR LENS IMPLANT Left 8/11/2020    Procedure: EXTRACTION, CATARACT, WITH IOL INSERTION;  Surgeon: Andrés Morse MD;  Location: [facility] OR;  Service: Ophthalmology;  Laterality: Left;    COLONOSCOPY N/A 1/2/2019    Procedure: COLONOSCOPY Suprep;  Surgeon: Cindy Solorzano MD;  Location: [facility] ENDO;  Service: Endoscopy;  Laterality: N/A;    GANGLION CYST EXCISION      left neck    HERNIA REPAIR  2013    umbilical    TENDON REPAIR Right     wrist    TONSILLECTOMY      varicose veins      several sx  bilateral    VASECTOMY      VEIN SURGERY       Family History   Problem Relation Age of Onset    Diabetes Mother     COPD Father     Heart disease Father     Arthritis Sister     Heart attack Brother     Heart disease Brother     Diabetes Brother     No Known Problems Maternal Aunt     No Known Problems Maternal Uncle     No Known Problems Paternal Aunt     No Known Problems Paternal Uncle     No Known Problems Maternal Grandmother     No Known Problems Maternal Grandfather     No Known Problems Paternal Grandmother     No Known Problems Paternal Grandfather     No Known Problems Son     Cancer Sister         lymph node, breast    No Known Problems Sister     No Known Problems Son     Amblyopia Neg Hx     Blindness Neg Hx     Cataracts Neg Hx     Glaucoma Neg Hx     Hypertension Neg Hx     Macular degeneration Neg Hx     Retinal detachment Neg Hx     Strabismus Neg Hx     Stroke Neg Hx     Thyroid disease Neg Hx     Prostate cancer Neg Hx     Kidney disease Neg Hx     Melanoma Neg Hx            Review of Systems  General: negative for chills, fever or weight loss  Psychological: negative for mood changes or depression  Ophthalmic: negative for blurry  vision, photophobia or eye pain  ENT: see HPI  Respiratory: no cough, shortness of breath, or wheezing  Cardiovascular: no chest pain or dyspnea on exertion  Gastrointestinal: no abdominal pain, change in bowel habits, or black/ bloody stools  Musculoskeletal: negative for gait disturbance or muscular weakness  Neurological: no syncope or seizures; no ataxia  Dermatological: negative for puritis,  rash and jaundice  Hematologic/lymphatic: no easy bruising, no new lumps or bumps      Physical Exam:    Vitals:    08/08/23 1003   BP: 96/64   Pulse: 65         Constitutional: Well appearing / communicating without difficutly.  NAD.  Eyes: EOM I Bilaterally  Head/Face: Normocephalic.  Negative paranasal sinus pressure/tenderness.  Salivary glands WNL.  House Brackmann I Bilaterally.    Right Ear: Auricle normal appearance. External Auditory Canal within normal limits no lesions or masses,TM retracted with limited mobility.   Left Ear: Auricle normal appearance. External Auditory Canal within normal limits no lesions or masses,TM retracted with limited mobility  Nose: +healing scab at right caudal nasal septum.    No gross nasal septal deviation. Inferior Turbinates 3+ bilaterally. No septal perforation. No masses/lesions. External nasal skin appears normal without masses/lesions.  Oral Cavity: Gingiva/lips within normal limits.  Dentition/gingiva healthy appearing. Mucus membranes moist. Floor of mouth soft, no masses palpated. Oral Tongue mobile. Hard Palate appears normal.    Oropharynx: Base of tongue appears normal. No masses/lesions noted. Tonsillar fossa/pharyngeal wall without lesions. Posterior oropharynx WNL.  Soft palate without masses. Midline uvula.   Neck/Lymphatic: No LAD I-VI bilaterally.  No thyromegaly.  No masses noted on exam.      Diagnostic testing:   Audiogram interpreted personally by me and discussed in detail with the patient today. Audiogram results revealed a mild to severe sensorineural  hearing loss bilaterally.  Speech reception thresholds were noted at 15 dB in the right ear and 15 dB in the left ear.  Speech discrimination scores were 92% in the right ear and 92% in the left ear.  Tympanometry revealed Type Ad in the right ear and Type A in the left ear.                Assessment:    ICD-10-CM ICD-9-CM    1. Sensorineural hearing loss (SNHL) of both ears  H90.3 389.18       2. Chronic rhinitis  J31.0 472.0       3. ETD (Eustachian tube dysfunction), bilateral  H69.83 381.81         The primary encounter diagnosis was Sensorineural hearing loss (SNHL) of both ears. Diagnoses of Chronic rhinitis and ETD (Eustachian tube dysfunction), bilateral were also pertinent to this visit.      Plan:  No orders of the defined types were placed in this encounter.    Continue saline rinses daily  Continue Xyzal 5 mg PO daily  SNHL: Patient declined audiogram today. Recommend amplification when motivated. Continue hearing protection when in noise. Audiogram in 1 year.   Call or return to clinic prn if these symptoms worsen or fail to improve as anticipated.     Maria Del Rosario Ybarra MD

## 2023-08-11 ENCOUNTER — PATIENT MESSAGE (OUTPATIENT)
Dept: RHEUMATOLOGY | Facility: CLINIC | Age: 78
End: 2023-08-11
Payer: MEDICARE

## 2023-08-15 ENCOUNTER — PATIENT MESSAGE (OUTPATIENT)
Dept: ADMINISTRATIVE | Facility: OTHER | Age: 78
End: 2023-08-15
Payer: MEDICARE

## 2023-08-18 ENCOUNTER — PATIENT MESSAGE (OUTPATIENT)
Dept: ORTHOPEDICS | Facility: CLINIC | Age: 78
End: 2023-08-18
Payer: MEDICARE

## 2023-08-18 DIAGNOSIS — M16.11 PRIMARY OSTEOARTHRITIS OF RIGHT HIP: Primary | ICD-10-CM

## 2023-08-25 NOTE — PROGRESS NOTES
HISTORY OF PRESENT ILLNESS  08/29/23  Shannan Norman, a 78 y.o. male, presents today for evaluation of his left SHOULDER. At last appointment, 03/28/22, patient underwent right supraspinatus tendon CSI & pain/symptoms did improve. New LEFT shoulder pain beginning 2 weeks ago, insidious onset. Pain is 0/10 at present & up to 6/10 with provacative activity including  upon waking, forward flexion, abduction . He takes prednisone daily for PMR & have follow up scheduled 10/19/23.     03/28/22  Hand dominance, right    Location:  anterior and lateral   Onset:  chronic, insidious  Palliative:     relative rest   oral analgesics, OTC  Provocative:    glenohumeral ABduction  Prior:  none  Progression:  plateau discomfort  Quality:  sharp  Radiation: none  Severity:  per nursing documentation  Timing:  intermittent with use  Trauma:  none    Review of systems (ROS):  A 10+ review of systems was performed with pertinent positives and negatives noted above in the history of present illness. Other systems were negative unless otherwise specified.    PHYSICAL EXAMINATION  General:  The patient is alert and oriented x 3. Mood is pleasant. Observation of ears, eyes and nose reveal no gross abnormalities. HEENT: NCAT, sclera anicteric. Lungs: Respirations are equal and unlabored.     SHOULDER EXAMINATION     OBSERVATION:     Swelling  none  Deformity  none   Discoloration  none   Scapular winging none   Scars   none  Atrophy  none    TENDERNESS / CREPITUS (T/C):          T/C      T/C   Clavicle   -/-  SUPRAspinatus    -/-     AC Jt.    -/-  INFRAspinatus  -/-    SC Jt.    -/-  Deltoid    -/-      G. Tuberosity  -/-  LH BICEP groove  +/-   Acromion:  -/-  Midline Neck   -/-     Scapular Spine -/-  Trapezium   -/-   SMA Scapula  -/-  GH jt. line - post  -/-     Scapulothoracic  -/-         ROM:     Right shoulder   Left shoulder        AROM (PROM)   AROM (PROM)   FE    170° (175°)     170° (175°) *    ER at 0°    60°  (65°)    60°   (65°)   ER at 90° ABD  90°  (90°)    90°  (90°)   IR at 90°  ABD   NA  (40°)     NA  (40°)      IR (spine level)   T10     Lumbar*    STRENGTH: (* = with pain) RIGHT SHOULDER  LEFT SHOULDER   SCAPTION   5/5    4/5*    IR    5/5    4/5*   ER    5/5    4/5*   BICEPS   5/5    5/5   Deltoid    5/5    5/5     SIGNS:  Painful side       NEER   +   ORODYS        -    MAYO   -   SPEEDS        +   DROP ARM   -   BELLY PRESS       -    X-Body ADD    +   LIFT-OFF        -   HORNBLOWERS      -              STABILITY TESTING   RIGHT SHOULDER  LEFT SHOULDER     Translation     Anterior up face    up face    Posterior up face   up face    Sulcus  < 10mm   < 10 mm     Signs   Apprehension   neg     neg       Relocation   no change    no change      Jerk test  neg    neg    EXTREMITY NEURO-VASCULAR EXAM    Sensation grossly intact to light touch all dermatomal regions.    DTR 2+ Biceps, Triceps, BR and Negative Marces sign   Grossly intact motor function at Elbow, Wrist and Hand   Distal pulses radial and ulnar 2+, brisk cap refill, symmetric.      NECK:  Painless FROM and spinous processes non-tender. Negative Spurlings sign.       Other Findings:    ASSESSMENT & PLAN   Assessment  #1 Osteoarthritis of glenohumeral joint, left /d   W/ supraspinatus tendinosis    No evidence of neurologic pathology  No evidence of vascular pathology    Imaging studies reviewed:   X-ray shoulder, bilat 22.03    Plan    Regarding recent spate of shoulder, hip and knee pain:  -Review spine consultation conclusions after appt on 03/31/22  -Attempt to get neurology consultation visit earlier  -Consider rheumatology consultation following spine and neuro     We discussed options including    Watchful waiting / relative rest    Physical therapy    Injection therapy supraspinatus tendon CSI, left   Consultation    The patient chooses As above   x = prescribed  CSI = corticosteroid injection  VSI = viscosupplement injection  PRPI = platelet  rich plasma injection  ia = intra articular  R = right  L = left  B = bilateral   nfSx = surgical consultation was recommended, but patient is not interested in consultation at this time    Physical Therapy        Formal (fPT), @ [facility] facility    Formal (fPT), @ [facility] facility        Homegoing (hgPT), per concurrent fPT recommendations    Homegoing (hgPT), per prior fPT recommendations    Homegoing (hgPT), handout provided        w/  (atPT)    [blank] = not prescribed  x = prescribed  b = prescribed, and begin as indicated  t = continue as indicated  r = prescribed, and restart as indicated  p = completed prior as indicated  hs = prescribed, and with high school   col = prescribed, and with college or university   nfPT = physical therapy was recommended, but patient is not interested in PT at this time    Activity (e.g. sports, work) restrictions    [blank] = as tolerated  pt = per physical therapist  at = per   NWB = non weight bearing on affected lower extremity, with crutches assistance for ambulation    Bracing    [blank] = not prescribed  r = recommended, but not fit with at todays visit  f = prescribed and fit with at todays visit  t = continue as indicated  d = d/c  p = as needed  rare = use on rare, as-needed basis; advised against chronic use    Pain management    [blank] = No prescription necessary. A handout detailing dosing of appropriate   over-the-counter musculoskeletal analgesics was made available to the patient.   m = meloxicam x 14 days  mp = 14 day course of meloxicam prescribed prior    Follow up 12 weeks   [blank] = as needed  [number] = in [number] weeks  CSI = for corticosteroid injection  VSI = for viscosupplement injection or injection series  PRP = for platelet rich plasma injection or injection series  MRI = after MRI imaging  ns = should surgical options be deferred (no surgery)  o = appointment offered, deferred by  patient    Should symptoms worsen or fail to resolve, consider    Revisiting the above options and / or      Vocation:

## 2023-08-29 ENCOUNTER — OFFICE VISIT (OUTPATIENT)
Dept: SPORTS MEDICINE | Facility: CLINIC | Age: 78
End: 2023-08-29
Payer: MEDICARE

## 2023-08-29 VITALS — HEIGHT: 73 IN | WEIGHT: 207 LBS | TEMPERATURE: 98 F | BODY MASS INDEX: 27.43 KG/M2

## 2023-08-29 DIAGNOSIS — M19.012 GLENOHUMERAL ARTHRITIS, LEFT: ICD-10-CM

## 2023-08-29 DIAGNOSIS — M67.912 ROTATOR CUFF DYSFUNCTION, LEFT: ICD-10-CM

## 2023-08-29 DIAGNOSIS — M25.512 LEFT SHOULDER PAIN: Primary | ICD-10-CM

## 2023-08-29 PROCEDURE — 3288F PR FALLS RISK ASSESSMENT DOCUMENTED: ICD-10-PCS | Mod: CPTII,S$GLB,, | Performed by: FAMILY MEDICINE

## 2023-08-29 PROCEDURE — 99999 PR PBB SHADOW E&M-EST. PATIENT-LVL III: ICD-10-PCS | Mod: PBBFAC,,, | Performed by: FAMILY MEDICINE

## 2023-08-29 PROCEDURE — 1125F PR PAIN SEVERITY QUANTIFIED, PAIN PRESENT: ICD-10-PCS | Mod: CPTII,S$GLB,, | Performed by: FAMILY MEDICINE

## 2023-08-29 PROCEDURE — 1101F PR PT FALLS ASSESS DOC 0-1 FALLS W/OUT INJ PAST YR: ICD-10-PCS | Mod: CPTII,S$GLB,, | Performed by: FAMILY MEDICINE

## 2023-08-29 PROCEDURE — 3288F FALL RISK ASSESSMENT DOCD: CPT | Mod: CPTII,S$GLB,, | Performed by: FAMILY MEDICINE

## 2023-08-29 PROCEDURE — 1125F AMNT PAIN NOTED PAIN PRSNT: CPT | Mod: CPTII,S$GLB,, | Performed by: FAMILY MEDICINE

## 2023-08-29 PROCEDURE — 99214 PR OFFICE/OUTPT VISIT, EST, LEVL IV, 30-39 MIN: ICD-10-PCS | Mod: 25,S$GLB,, | Performed by: FAMILY MEDICINE

## 2023-08-29 PROCEDURE — 20551 NJX 1 TENDON ORIGIN/INSJ: CPT | Mod: LT,S$GLB,, | Performed by: FAMILY MEDICINE

## 2023-08-29 PROCEDURE — 1101F PT FALLS ASSESS-DOCD LE1/YR: CPT | Mod: CPTII,S$GLB,, | Performed by: FAMILY MEDICINE

## 2023-08-29 PROCEDURE — 20551 TENDON ORIGIN: ICD-10-PCS | Mod: LT,S$GLB,, | Performed by: FAMILY MEDICINE

## 2023-08-29 PROCEDURE — 1159F PR MEDICATION LIST DOCUMENTED IN MEDICAL RECORD: ICD-10-PCS | Mod: CPTII,S$GLB,, | Performed by: FAMILY MEDICINE

## 2023-08-29 PROCEDURE — 99999 PR PBB SHADOW E&M-EST. PATIENT-LVL III: CPT | Mod: PBBFAC,,, | Performed by: FAMILY MEDICINE

## 2023-08-29 PROCEDURE — 3072F PR LOW RISK FOR RETINOPATHY: ICD-10-PCS | Mod: CPTII,S$GLB,, | Performed by: FAMILY MEDICINE

## 2023-08-29 PROCEDURE — 76942 TENDON ORIGIN: ICD-10-PCS | Mod: S$GLB,,, | Performed by: FAMILY MEDICINE

## 2023-08-29 PROCEDURE — 3072F LOW RISK FOR RETINOPATHY: CPT | Mod: CPTII,S$GLB,, | Performed by: FAMILY MEDICINE

## 2023-08-29 PROCEDURE — 76942 ECHO GUIDE FOR BIOPSY: CPT | Mod: S$GLB,,, | Performed by: FAMILY MEDICINE

## 2023-08-29 PROCEDURE — 99214 OFFICE O/P EST MOD 30 MIN: CPT | Mod: 25,S$GLB,, | Performed by: FAMILY MEDICINE

## 2023-08-29 PROCEDURE — 1159F MED LIST DOCD IN RCRD: CPT | Mod: CPTII,S$GLB,, | Performed by: FAMILY MEDICINE

## 2023-08-29 RX ORDER — TRIAMCINOLONE ACETONIDE 40 MG/ML
40 INJECTION, SUSPENSION INTRA-ARTICULAR; INTRAMUSCULAR
Status: DISCONTINUED | OUTPATIENT
Start: 2023-08-29 | End: 2023-08-29 | Stop reason: HOSPADM

## 2023-08-29 RX ADMIN — TRIAMCINOLONE ACETONIDE 40 MG: 40 INJECTION, SUSPENSION INTRA-ARTICULAR; INTRAMUSCULAR at 10:08

## 2023-08-29 NOTE — PROCEDURES
"Tendon Origin    Date/Time: 8/29/2023 10:45 AM    Performed by: Kit Potter MD  Authorized by: Kit Potter MD    Consent Done?:  Yes (Verbal)  Timeout: prior to procedure the correct patient, procedure, and site was verified    Indications:  Pain  Site marked: the procedure site was marked    Timeout: prior to procedure the correct patient, procedure, and site was verified    Location: shoulder.  Prep: patient was prepped and draped in usual sterile fashion    Ultrasonic Guidance for Needle Placement?: Yes    Needle size:  25 G  Approach:  Posterolateral  Medications:  40 mg triamcinolone acetonide 40 mg/mL  Patient tolerance:  Patient tolerated the procedure well with no immediate complications   Supraspinatus tendon and tendon insertion injection left    Description of ultrasound utilization for needle guidance:   Ultrasound guidance used for needle localization. Images saved and stored for documentation. The supraspinatus muscle, myotendinous junction, and tendon insertion on the greater tuberosity of the humeral head were visualized. Dynamic visualization of the 22g x 1.5" needle was continuous throughout the procedure.    "

## 2023-09-01 ENCOUNTER — CLINICAL SUPPORT (OUTPATIENT)
Dept: CARDIOLOGY | Facility: HOSPITAL | Age: 78
End: 2023-09-01
Attending: PSYCHIATRY & NEUROLOGY
Payer: MEDICARE

## 2023-09-01 ENCOUNTER — OFFICE VISIT (OUTPATIENT)
Dept: ELECTROPHYSIOLOGY | Facility: CLINIC | Age: 78
End: 2023-09-01
Payer: MEDICARE

## 2023-09-01 ENCOUNTER — HOSPITAL ENCOUNTER (OUTPATIENT)
Dept: CARDIOLOGY | Facility: CLINIC | Age: 78
Discharge: HOME | End: 2023-09-01
Payer: MEDICARE

## 2023-09-01 VITALS
BODY MASS INDEX: 27.67 KG/M2 | SYSTOLIC BLOOD PRESSURE: 144 MMHG | HEIGHT: 73 IN | WEIGHT: 208.75 LBS | HEART RATE: 61 BPM | DIASTOLIC BLOOD PRESSURE: 74 MMHG

## 2023-09-01 DIAGNOSIS — I15.2 HYPERTENSION ASSOCIATED WITH DIABETES: ICD-10-CM

## 2023-09-01 DIAGNOSIS — E11.59 HYPERTENSION ASSOCIATED WITH DIABETES: ICD-10-CM

## 2023-09-01 DIAGNOSIS — N18.30 STAGE 3 CHRONIC KIDNEY DISEASE, UNSPECIFIED WHETHER STAGE 3A OR 3B CKD: ICD-10-CM

## 2023-09-01 DIAGNOSIS — I48.3 TYPICAL ATRIAL FLUTTER: ICD-10-CM

## 2023-09-01 DIAGNOSIS — N18.31 TYPE 2 DIABETES MELLITUS WITH STAGE 3A CHRONIC KIDNEY DISEASE, WITHOUT LONG-TERM CURRENT USE OF INSULIN: ICD-10-CM

## 2023-09-01 DIAGNOSIS — Q21.12 PFO (PATENT FORAMEN OVALE): ICD-10-CM

## 2023-09-01 DIAGNOSIS — I48.0 PAF (PAROXYSMAL ATRIAL FIBRILLATION): ICD-10-CM

## 2023-09-01 DIAGNOSIS — Z95.0 PACEMAKER: ICD-10-CM

## 2023-09-01 DIAGNOSIS — I49.5 SSS (SICK SINUS SYNDROME): ICD-10-CM

## 2023-09-01 DIAGNOSIS — I48.3 TYPICAL ATRIAL FLUTTER: Primary | ICD-10-CM

## 2023-09-01 DIAGNOSIS — E78.5 DYSLIPIDEMIA ASSOCIATED WITH TYPE 2 DIABETES MELLITUS: ICD-10-CM

## 2023-09-01 DIAGNOSIS — I10 PRIMARY HYPERTENSION: ICD-10-CM

## 2023-09-01 DIAGNOSIS — E11.69 DYSLIPIDEMIA ASSOCIATED WITH TYPE 2 DIABETES MELLITUS: ICD-10-CM

## 2023-09-01 DIAGNOSIS — E11.22 TYPE 2 DIABETES MELLITUS WITH STAGE 3A CHRONIC KIDNEY DISEASE, WITHOUT LONG-TERM CURRENT USE OF INSULIN: ICD-10-CM

## 2023-09-01 PROCEDURE — 3078F PR MOST RECENT DIASTOLIC BLOOD PRESSURE < 80 MM HG: ICD-10-PCS | Mod: CPTII,S$GLB,, | Performed by: INTERNAL MEDICINE

## 2023-09-01 PROCEDURE — 3077F PR MOST RECENT SYSTOLIC BLOOD PRESSURE >= 140 MM HG: ICD-10-PCS | Mod: CPTII,S$GLB,, | Performed by: INTERNAL MEDICINE

## 2023-09-01 PROCEDURE — 3288F FALL RISK ASSESSMENT DOCD: CPT | Mod: CPTII,S$GLB,, | Performed by: INTERNAL MEDICINE

## 2023-09-01 PROCEDURE — 99999 PR PBB SHADOW E&M-EST. PATIENT-LVL III: ICD-10-PCS | Mod: PBBFAC,,, | Performed by: INTERNAL MEDICINE

## 2023-09-01 PROCEDURE — 1101F PT FALLS ASSESS-DOCD LE1/YR: CPT | Mod: CPTII,S$GLB,, | Performed by: INTERNAL MEDICINE

## 2023-09-01 PROCEDURE — 1160F PR REVIEW ALL MEDS BY PRESCRIBER/CLIN PHARMACIST DOCUMENTED: ICD-10-PCS | Mod: CPTII,S$GLB,, | Performed by: INTERNAL MEDICINE

## 2023-09-01 PROCEDURE — 1159F MED LIST DOCD IN RCRD: CPT | Mod: CPTII,S$GLB,, | Performed by: INTERNAL MEDICINE

## 2023-09-01 PROCEDURE — 93005 ELECTROCARDIOGRAM TRACING: CPT | Mod: S$GLB,,, | Performed by: INTERNAL MEDICINE

## 2023-09-01 PROCEDURE — 99214 PR OFFICE/OUTPT VISIT, EST, LEVL IV, 30-39 MIN: ICD-10-PCS | Mod: S$GLB,,, | Performed by: INTERNAL MEDICINE

## 2023-09-01 PROCEDURE — 93280 PM DEVICE PROGR EVAL DUAL: CPT | Mod: 26,,, | Performed by: PSYCHIATRY & NEUROLOGY

## 2023-09-01 PROCEDURE — 1101F PR PT FALLS ASSESS DOC 0-1 FALLS W/OUT INJ PAST YR: ICD-10-PCS | Mod: CPTII,S$GLB,, | Performed by: INTERNAL MEDICINE

## 2023-09-01 PROCEDURE — 1160F RVW MEDS BY RX/DR IN RCRD: CPT | Mod: CPTII,S$GLB,, | Performed by: INTERNAL MEDICINE

## 2023-09-01 PROCEDURE — 3288F PR FALLS RISK ASSESSMENT DOCUMENTED: ICD-10-PCS | Mod: CPTII,S$GLB,, | Performed by: INTERNAL MEDICINE

## 2023-09-01 PROCEDURE — 3072F PR LOW RISK FOR RETINOPATHY: ICD-10-PCS | Mod: CPTII,S$GLB,, | Performed by: INTERNAL MEDICINE

## 2023-09-01 PROCEDURE — 3072F LOW RISK FOR RETINOPATHY: CPT | Mod: CPTII,S$GLB,, | Performed by: INTERNAL MEDICINE

## 2023-09-01 PROCEDURE — 3077F SYST BP >= 140 MM HG: CPT | Mod: CPTII,S$GLB,, | Performed by: INTERNAL MEDICINE

## 2023-09-01 PROCEDURE — 93280 PM DEVICE PROGR EVAL DUAL: CPT

## 2023-09-01 PROCEDURE — 93005 RHYTHM STRIP: ICD-10-PCS | Mod: S$GLB,,, | Performed by: INTERNAL MEDICINE

## 2023-09-01 PROCEDURE — 3078F DIAST BP <80 MM HG: CPT | Mod: CPTII,S$GLB,, | Performed by: INTERNAL MEDICINE

## 2023-09-01 PROCEDURE — 1126F PR PAIN SEVERITY QUANTIFIED, NO PAIN PRESENT: ICD-10-PCS | Mod: CPTII,S$GLB,, | Performed by: INTERNAL MEDICINE

## 2023-09-01 PROCEDURE — 1159F PR MEDICATION LIST DOCUMENTED IN MEDICAL RECORD: ICD-10-PCS | Mod: CPTII,S$GLB,, | Performed by: INTERNAL MEDICINE

## 2023-09-01 PROCEDURE — 93010 RHYTHM STRIP: ICD-10-PCS | Mod: S$GLB,,, | Performed by: INTERNAL MEDICINE

## 2023-09-01 PROCEDURE — 99999 PR PBB SHADOW E&M-EST. PATIENT-LVL III: CPT | Mod: PBBFAC,,, | Performed by: INTERNAL MEDICINE

## 2023-09-01 PROCEDURE — 99214 OFFICE O/P EST MOD 30 MIN: CPT | Mod: S$GLB,,, | Performed by: INTERNAL MEDICINE

## 2023-09-01 PROCEDURE — 93010 ELECTROCARDIOGRAM REPORT: CPT | Mod: S$GLB,,, | Performed by: INTERNAL MEDICINE

## 2023-09-01 PROCEDURE — 1126F AMNT PAIN NOTED NONE PRSNT: CPT | Mod: CPTII,S$GLB,, | Performed by: INTERNAL MEDICINE

## 2023-09-01 PROCEDURE — 93280 CARDIAC DEVICE CHECK - IN CLINIC & HOSPITAL: ICD-10-PCS | Mod: 26,,, | Performed by: PSYCHIATRY & NEUROLOGY

## 2023-09-01 NOTE — PROGRESS NOTES
"HPI    78 y.o. 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 Of note, HV then 65 ms. Redo CTI 9/2018 (Dr Sanchez)  long hx SB  DM   HTN on meds   SSS, s/p PPM    For SSS, I placed a PPM 2/2018. Feeling well. Healed well.    During an extended trip, was camping in [place] when developed tachypalpitations. Seen at ER; ECG (I reviewed) c/w typical AFL. He was transferred to a [facility] and underwent ISABELLE/DCCV, and was given xarelto x 2 months.  In EP lab 9/11/18, we found that the previously blocked CTI line had regained conduction. 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    6/2012: 60% LVEF  9/12/16 ecg: junctional at 48 bpm. Previously, chronic SB in 40s, for years.    After AF/AFL detected on PPM last year, we restarted DOAC.  Feeling well. Only one episode of "AF" sx -- in [place], on 7/30, had 10-15 s of LH, sweaty... and Apple Watch said "probable AF."  Swims qd. Cardio 3x/wk.    PPM:  good function. 45 episodes AMS (most asx).    My interpretation of today's ECG is APVS 61    Review of Systems   Constitutional: Negative. Negative for malaise/fatigue.   HENT: Negative.  Negative for ear pain and tinnitus.    Eyes:  Negative for blurred vision.   Cardiovascular: Negative.  Negative for chest pain, dyspnea on exertion, near-syncope, palpitations and syncope.   Respiratory: Negative.  Negative for shortness of breath.    Endocrine: Negative.  Negative for polyuria.   Hematologic/Lymphatic: Does not bruise/bleed easily.   Skin: Negative.  Negative for rash.   Musculoskeletal: Negative.  Negative for joint pain and muscle weakness.   Gastrointestinal: Negative.  Negative for abdominal pain and change in bowel habit.   Genitourinary:  Negative for frequency.   Neurological: Negative.  Negative for dizziness and weakness.   Psychiatric/Behavioral: Negative.  Negative for depression. The patient is not nervous/anxious.    Allergic/Immunologic: Negative for environmental allergies.        Objective:    " Physical Exam  Vitals and nursing note reviewed.   Constitutional:       Appearance: He is well-developed.   HENT:      Head: Normocephalic and atraumatic.   Eyes:      General: Lids are normal. No scleral icterus.     Conjunctiva/sclera: Conjunctivae normal.   Neck:      Thyroid: No thyromegaly.      Vascular: No JVD.      Trachea: No tracheal deviation.   Cardiovascular:      Rate and Rhythm: Normal rate and regular rhythm.      Pulses:           Radial pulses are 2+ on the right side and 2+ on the left side.   Pulmonary:      Effort: Pulmonary effort is normal. No tachypnea, accessory muscle usage or respiratory distress.      Breath sounds: Normal breath sounds. No wheezing.   Abdominal:      General: There is no distension.   Musculoskeletal:         General: Normal range of motion.      Cervical back: Normal range of motion.   Skin:     General: Skin is warm and dry.      Findings: No rash.   Neurological:      Mental Status: He is alert and oriented to person, place, and time.      Motor: No abnormal muscle tone.      Deep Tendon Reflexes: Reflexes are normal and symmetric.   Psychiatric:         Behavior: Behavior normal.           Assessment:       1. Typical atrial flutter    2. PAF (paroxysmal atrial fibrillation)    3. Primary hypertension    4. Dyslipidemia associated with type 2 diabetes mellitus    5. Hypertension associated with diabetes    6. PFO (patent foramen ovale)    7. SSS (sick sinus syndrome)    8. Stage 3 chronic kidney disease, unspecified whether stage 3a or 3b CKD    9. Type 2 diabetes mellitus with stage 3a chronic kidney disease, without long-term current use of insulin      Doing well.  Continue DOAC.   Monitor PPM.    Return in 1 year, or earlier prn.

## 2023-09-07 ENCOUNTER — OFFICE VISIT (OUTPATIENT)
Dept: ORTHOPEDICS | Facility: CLINIC | Age: 78
End: 2023-09-07
Payer: MEDICARE

## 2023-09-07 ENCOUNTER — HOSPITAL ENCOUNTER (OUTPATIENT)
Dept: RADIOLOGY | Facility: HOSPITAL | Age: 78
Discharge: HOME | End: 2023-09-07
Attending: ORTHOPAEDIC SURGERY
Payer: MEDICARE

## 2023-09-07 VITALS — HEIGHT: 73 IN | WEIGHT: 206.56 LBS | BODY MASS INDEX: 27.37 KG/M2

## 2023-09-07 DIAGNOSIS — M70.61 GREATER TROCHANTERIC BURSITIS OF RIGHT HIP: Primary | ICD-10-CM

## 2023-09-07 DIAGNOSIS — M16.11 PRIMARY OSTEOARTHRITIS OF RIGHT HIP: ICD-10-CM

## 2023-09-07 PROCEDURE — 99999 PR PBB SHADOW E&M-EST. PATIENT-LVL III: ICD-10-PCS | Mod: PBBFAC,,, | Performed by: ORTHOPAEDIC SURGERY

## 2023-09-07 PROCEDURE — 1159F PR MEDICATION LIST DOCUMENTED IN MEDICAL RECORD: ICD-10-PCS | Mod: CPTII,S$GLB,, | Performed by: ORTHOPAEDIC SURGERY

## 2023-09-07 PROCEDURE — 1101F PR PT FALLS ASSESS DOC 0-1 FALLS W/OUT INJ PAST YR: ICD-10-PCS | Mod: CPTII,S$GLB,, | Performed by: ORTHOPAEDIC SURGERY

## 2023-09-07 PROCEDURE — 1126F AMNT PAIN NOTED NONE PRSNT: CPT | Mod: CPTII,S$GLB,, | Performed by: ORTHOPAEDIC SURGERY

## 2023-09-07 PROCEDURE — 99214 PR OFFICE/OUTPT VISIT, EST, LEVL IV, 30-39 MIN: ICD-10-PCS | Mod: 25,S$GLB,, | Performed by: ORTHOPAEDIC SURGERY

## 2023-09-07 PROCEDURE — 3288F FALL RISK ASSESSMENT DOCD: CPT | Mod: CPTII,S$GLB,, | Performed by: ORTHOPAEDIC SURGERY

## 2023-09-07 PROCEDURE — 3288F PR FALLS RISK ASSESSMENT DOCUMENTED: ICD-10-PCS | Mod: CPTII,S$GLB,, | Performed by: ORTHOPAEDIC SURGERY

## 2023-09-07 PROCEDURE — 1101F PT FALLS ASSESS-DOCD LE1/YR: CPT | Mod: CPTII,S$GLB,, | Performed by: ORTHOPAEDIC SURGERY

## 2023-09-07 PROCEDURE — 73502 X-RAY EXAM HIP UNI 2-3 VIEWS: CPT | Mod: 26,RT,, | Performed by: RADIOLOGY

## 2023-09-07 PROCEDURE — 1159F MED LIST DOCD IN RCRD: CPT | Mod: CPTII,S$GLB,, | Performed by: ORTHOPAEDIC SURGERY

## 2023-09-07 PROCEDURE — 99214 OFFICE O/P EST MOD 30 MIN: CPT | Mod: 25,S$GLB,, | Performed by: ORTHOPAEDIC SURGERY

## 2023-09-07 PROCEDURE — 1126F PR PAIN SEVERITY QUANTIFIED, NO PAIN PRESENT: ICD-10-PCS | Mod: CPTII,S$GLB,, | Performed by: ORTHOPAEDIC SURGERY

## 2023-09-07 PROCEDURE — 3072F PR LOW RISK FOR RETINOPATHY: ICD-10-PCS | Mod: CPTII,S$GLB,, | Performed by: ORTHOPAEDIC SURGERY

## 2023-09-07 PROCEDURE — 20610 LARGE JOINT ASPIRATION/INJECTION: R GREATER TROCHANTERIC BURSA: ICD-10-PCS | Mod: RT,S$GLB,, | Performed by: ORTHOPAEDIC SURGERY

## 2023-09-07 PROCEDURE — 20610 DRAIN/INJ JOINT/BURSA W/O US: CPT | Mod: RT,S$GLB,, | Performed by: ORTHOPAEDIC SURGERY

## 2023-09-07 PROCEDURE — 73502 XR HIP WITH PELVIS WHEN PERFORMED, 2 OR 3  VIEWS RIGHT: ICD-10-PCS | Mod: 26,RT,, | Performed by: RADIOLOGY

## 2023-09-07 PROCEDURE — 99999 PR PBB SHADOW E&M-EST. PATIENT-LVL III: CPT | Mod: PBBFAC,,, | Performed by: ORTHOPAEDIC SURGERY

## 2023-09-07 PROCEDURE — 3072F LOW RISK FOR RETINOPATHY: CPT | Mod: CPTII,S$GLB,, | Performed by: ORTHOPAEDIC SURGERY

## 2023-09-07 PROCEDURE — 73502 X-RAY EXAM HIP UNI 2-3 VIEWS: CPT | Mod: TC,RT

## 2023-09-07 RX ADMIN — TRIAMCINOLONE ACETONIDE 40 MG: 40 INJECTION, SUSPENSION INTRA-ARTICULAR; INTRAMUSCULAR at 10:09

## 2023-09-07 NOTE — PROGRESS NOTES
Injection Information    Triamcinolone Acetonide Injectable Suspension (40mg/mL)  Lot Number: [id number]  Expiration Date: 1/31/2025

## 2023-09-08 RX ORDER — TRIAMCINOLONE ACETONIDE 40 MG/ML
40 INJECTION, SUSPENSION INTRA-ARTICULAR; INTRAMUSCULAR
Status: DISCONTINUED | OUTPATIENT
Start: 2023-09-07 | End: 2023-09-08 | Stop reason: HOSPADM

## 2023-09-08 NOTE — PROGRESS NOTES
Subjective:     HPI:   Shannan Norman is a 78 y.o. male who presents for rpt eval R hip    Seen 3/15/22 ref by dr CHAN for B hips: R>L hip pain, Dx B hip OA, B knee OA, L-spine  Mult MSK findings, ?movement D/O, wife with alzheimer's (now passed)  Those concerns were real:   -Saw neurology 4/23: Dx Lewy body Dz/parkinsonism  -Dx'd PMR - last saw 7/23, now tapering prednisone, CRP and ESR now normalized    He says he is now on a good health trip.  He is had some increasing right hip pain as he has been walking with his dog 2 or 3 miles a day.    Points his posterior lateral hip over the greater trochanter some occasional anterior hip discomfort.  No anterior thigh no radicular pain or paresthesias.  He does also has some chronic low back pain but that is getting better as he had readjusted his office chair    S/p B IA hip CSI 2/32/22 Dr CHAN    Oldest son could help (lives in [facility] and [facility])       Objective:   Body mass index is 27.25 kg/m².  Exam:  He is walking much better no slow shuffling gait no limp nonantalgic gait negative Trendelenburg.  He is tender over the palpation over the right greater trochanter at the abductor insertion.  No groin pain with active straight leg raise 0-100 hip flexion 30 abduction 20 abduction 30 external 20 internal rotation with mild lateral discomfort no anterior hip groin pain.  No significant limb length discrepancy supine.  No significant stiffness or cogwheeling today upper or lower legs      Imaging:  He does have bilateral hip arthritic changes grade 2 3 worse on the right than left this is unchanged from x-rays in 2022      Assessment:       ICD-10-CM ICD-9-CM   1. Greater trochanteric bursitis of right hip  M70.61 726.5      CKD  Pacemaker, xarelto  PMR - tapering steroids  Lewy body Dz/parkinsonism     Plan:       At this point I feel the patient has a diagnosis of trochanteric bursitis.  We discussed all the treatment options including but not  limited to, the use of non-steroidal anti-inflammatory drugs (NSAIDs), physical therapy visits for stretching/strengthening and modalities treatment, as well as a consistent stretching program at home.  We further discussed the role of corticosteroid injections into the trochanteric bursa.  I explained that this is a short-term solution, however it does often allow for return to physical therapy and stretching and the ability to alleviate the acute pain.       Tylenol    Aleve    Voltaren Gel    AAHKS non-op arthritis info   x Bursitis info    Total Joint Info    HEP: [facility] [employer] home exercise conditioning program    x PT    CSI: intra-articular steroid injection   x CSI: greater trochanter bursitis    HA: hyaluronic acid injection    Brace:     Referral:      F/u 6 weeks for repeat exam    He does have some underlying hip and knee arthritis but appears minimally symptomatic today.  I think most of his symptoms are coming from right some right trochanteric bursitis.  He got some immediate relief with in steroid injection in that area today.      Six week follow-up repeat exam no x-rays    Orders Placed This Encounter   Procedures    Ambulatory referral/consult to Physical/Occupational Therapy     Standing Status:   Future     Standing Expiration Date:   10/7/2024     Referral Priority:   Routine     Referral Type:   Physical Medicine     Referral Reason:   Specialty Services Required     Number of Visits Requested:   1             Past Medical History:   Diagnosis Date    Allergy     Arthritis     Atrial fibrillation     Atrial flutter 2011    ablation    Basal cell carcinoma     Cancer     Cataract     CKD (chronic kidney disease) stage 3, GFR 30-59 ml/min 8/13/2019    Diabetes mellitus     Dry eye syndrome     Gout, unspecified     High cholesterol     History of shingles     Hypertension     Melanoma     Seizures        Past Surgical History:   Procedure Laterality Date    ABLATION N/A 9/11/2018    Procedure:  ABLATION;  Surgeon: Hany Sanchez MD;  [facility] CATH LAB;  Service: Cardiology;  Laterality: N/A;  AFL, ISABELLE, RFA, ELODIA, MAC, DM, 3 PREP    ABLATION OF DYSRHYTHMIC FOCUS      ADENOIDECTOMY      CARDIAC PACEMAKER PLACEMENT      no defib    CATARACT EXTRACTION W/  INTRAOCULAR LENS IMPLANT Right 7/28/2020    Procedure: EXTRACTION, CATARACT, WITH IOL INSERTION;  Surgeon: Andrés Morse MD;  Location: [facility] OR;  Service: Ophthalmology;  Laterality: Right;    CATARACT EXTRACTION W/  INTRAOCULAR LENS IMPLANT Left 8/11/2020    Procedure: EXTRACTION, CATARACT, WITH IOL INSERTION;  Surgeon: Andrés Morse MD;  Location: [facility] OR;  Service: Ophthalmology;  Laterality: Left;    COLONOSCOPY N/A 1/2/2019    Procedure: COLONOSCOPY Suprep;  Surgeon: Cindy Solorzano MD;  Location: [facility] ENDO;  Service: Endoscopy;  Laterality: N/A;    GANGLION CYST EXCISION      left neck    HERNIA REPAIR  2013    umbilical    TENDON REPAIR Right     wrist    TONSILLECTOMY      varicose veins      several sx  bilateral    VASECTOMY      VEIN SURGERY         Family History   Problem Relation Age of Onset    Diabetes Mother     COPD Father     Heart disease Father     Arthritis Sister     Heart attack Brother     Heart disease Brother     Diabetes Brother     No Known Problems Maternal Aunt     No Known Problems Maternal Uncle     No Known Problems Paternal Aunt     No Known Problems Paternal Uncle     No Known Problems Maternal Grandmother     No Known Problems Maternal Grandfather     No Known Problems Paternal Grandmother     No Known Problems Paternal Grandfather     No Known Problems Son     Cancer Sister         lymph node, breast    No Known Problems Sister     No Known Problems Son     Amblyopia Neg Hx     Blindness Neg Hx     Cataracts Neg Hx     Glaucoma Neg Hx     Hypertension Neg Hx     Macular degeneration Neg Hx     Retinal detachment Neg Hx     Strabismus Neg Hx     Stroke Neg Hx     Thyroid disease Neg Hx      Prostate cancer Neg Hx     Kidney disease Neg Hx     Melanoma Neg Hx        Social History     Socioeconomic History    Marital status:    Occupational History     Employer: [employer]   Tobacco Use    Smoking status: Never    Smokeless tobacco: Never   Substance and Sexual Activity    Alcohol use: Yes     Alcohol/week: 7.0 - 14.0 standard drinks of alcohol     Types: 7 - 14 Glasses of wine per week    Drug use: No    Sexual activity: Yes     Partners: Female     Social Determinants of Health     Financial Resource Strain: Low Risk  (8/23/2023)    Overall Financial Resource Strain (CARDIA)     Difficulty of Paying Living Expenses: Not hard at all   Food Insecurity: No Food Insecurity (8/23/2023)    Hunger Vital Sign     Worried About Running Out of Food in the Last Year: Never true     Ran Out of Food in the Last Year: Never true   Transportation Needs: No Transportation Needs (8/23/2023)    PRAPARE - Transportation     Lack of Transportation (Medical): No     Lack of Transportation (Non-Medical): No   Physical Activity: Sufficiently Active (8/23/2023)    Exercise Vital Sign     Days of Exercise per Week: 7 days     Minutes of Exercise per Session: 30 min   Stress: No Stress Concern Present (8/23/2023)    [employer] [place] [employer] - Occupational Stress Questionnaire     Feeling of Stress : Only a little   Social Connections: Unknown (8/23/2023)    Social Connection and Isolation Panel [NHANES]     Frequency of Communication with Friends and Family: More than three times a week     Frequency of Social Gatherings with Friends and Family: Three times a week     Active Member of Clubs or Organizations: Yes     Attends Club or Organization Meetings: More than 4 times per year     Marital Status:    Housing Stability: Low Risk  (8/23/2023)    Housing Stability Vital Sign     Unable to Pay for Housing in the Last Year: No     Number of Places Lived in the Last Year: 1     Unstable  Housing in the Last Year: No

## 2023-09-08 NOTE — PROCEDURES
Large Joint Aspiration/Injection: R greater trochanteric bursa    Date/Time: 9/7/2023 10:20 AM    Performed by: Issa Bhatt III, MD  Authorized by: Issa Bhatt III, MD    Consent Done?:  Yes (Verbal)  Indications:  Pain  Timeout: prior to procedure the correct patient, procedure, and site was verified    Prep: patient was prepped and draped in usual sterile fashion    Local anesthetic:  Lidocaine 1% without epinephrine  Anesthetic total (ml):  5      Details:  Needle Size:  21 G  Approach:  Lateral  Location:  Hip  Site:  R greater trochanteric bursa  Medications:  40 mg triamcinolone acetonide 40 mg/mL  Patient tolerance:  Patient tolerated the procedure well with no immediate complications

## 2023-09-16 ENCOUNTER — CLINICAL SUPPORT (OUTPATIENT)
Dept: CARDIOLOGY | Facility: HOSPITAL | Age: 78
End: 2023-09-16
Payer: MEDICARE

## 2023-09-16 DIAGNOSIS — Z95.0 PRESENCE OF CARDIAC PACEMAKER: ICD-10-CM

## 2023-09-16 PROCEDURE — 93296 REM INTERROG EVL PM/IDS: CPT | Performed by: INTERNAL MEDICINE

## 2023-09-25 ENCOUNTER — CLINICAL SUPPORT (OUTPATIENT)
Dept: REHABILITATION | Facility: HOSPITAL | Age: 78
End: 2023-09-25
Payer: MEDICARE

## 2023-09-25 DIAGNOSIS — M70.61 GREATER TROCHANTERIC BURSITIS OF RIGHT HIP: ICD-10-CM

## 2023-09-25 DIAGNOSIS — M25.551 RIGHT HIP PAIN: ICD-10-CM

## 2023-09-25 PROCEDURE — 97162 PT EVAL MOD COMPLEX 30 MIN: CPT | Mod: PN

## 2023-09-25 PROCEDURE — 97110 THERAPEUTIC EXERCISES: CPT | Mod: PN

## 2023-09-25 NOTE — TELEPHONE ENCOUNTER
Spoke with Mr Bluetrisha to inform him that we had to reschedule his virtual visit with Dr Curry for Thursday Dec 1 at 12 pm. Okay per Mr Norman. Encouraged him to please call if further assistance is needed. No other needs voiced at this time.  
Return to the ED if with worsening or new symptoms.  Follow up with PMD in 2-3 days.    Head Injury in Children    Your child was seen today in the Emergency Department for a head injury.    It has been determined that your child’s head injury is not serious or dangerous.    General tips for taking care of a child who had a head injury:  -If your child has a headache, you can give acetaminophen every 4 hours or ibuprofen every 6 hours as needed for pain.  Aspirin is not recommended for children.  -Have your child rest, avoid activities that are hard or tiring, and make sure your child gets enough sleep.  -Temporarily keep your child from activities that could cause another head injury  -Tell all of your child's teachers and other caregivers about your child's injury, symptoms, and activity restrictions. Have them report any problems that are new or getting worse.  -Most problems from a head injury come in the first 24 hours. However, your child may still have side effects up to 7–10 days after the injury. It is important to watch your child's condition for any changes.    Follow up with your pediatrician in 1-2 days to make sure that your child is doing better.    Return to the Emergency Department if your child has:  -A very bad (severe) headache that is not helped by medicine.  -Clear or bloody fluid coming from his or her nose or ears.  -Changes in his or her seeing (vision).  -Jerky movements that he or she cannot control (seizure).  -Your child's symptoms get worse.  -Your child throws up (vomits).  -Your child's dizziness gets worse.  -Your child cannot walk or does not have control over his or her arms or legs.  -Your child will not stop crying.  -Your child passes out.  -Your child is sleepier and has trouble staying awake.  -Your child will not eat or nurse.    These symptoms may be an emergency. Do not wait to see if the symptoms will go away. Get medical help right away. Call your local emergency services (911 in the [place]    Some tips to try to prevent head injury:  -Your child should wear a seatbelt or use the right-sized car seat or booster when he or she is in a moving vehicle.  -Wear a helmet when: riding a bicycle, skiing, or doing any other sport or activity that has a serious risk of head injury.  -You can childproof any dangerous parts of your home, install window guards and safety quigley, and make sure the playground that your child uses is safe.

## 2023-09-25 NOTE — PLAN OF CARE
[facility]  Physical Therapy Initial Evaluation    Date: 9/25/2023   Name: Shannan Norman  Clinic Number: [id number]    Therapy Diagnosis:   Encounter Diagnoses   Name Primary?    Greater trochanteric bursitis of right hip     Right hip pain      Physician: Issa Bhatt III, *    Physician Orders: PT Eval and Treat   Medical Diagnosis from Referral: M70.61 (ICD-10-CM) - Greater trochanteric bursitis of right hip  Evaluation Date: 9/25/2023  Authorization Period Expiration: 9/6/2024  Plan of Care Expiration: 10/23/2023  Visit # / Visits authorized: 1/1    Time In: 10:05 am  Time Out: 11:00 am  Total Appointment Time (timed & untimed codes): 55 minutes    Precautions: Standard; CKD, diabetes; history of seizures     Subjective   Date of onset: ~ 2 months ago  History of current condition - Shannan reports: he started having increased right hip pain about 2 months ago. Started walking his dog 2-3 miles per day, but right hip pain progressively worsened. Patient reports that he exercises about 5 days a week, participates in yoga about 3 days per week, and swims everyday typically for about 30 minutes. Patient is no longer experiencing any right hip pain from recent injection. Aggravating factors are typically increased walking; although, he is no longer experiencing pain since the recent injection. Easing factors typically included resting.      Medical History:   Past Medical History:   Diagnosis Date    Allergy     Arthritis     Atrial fibrillation     Atrial flutter 2011    ablation    Basal cell carcinoma     Cancer     Cataract     CKD (chronic kidney disease) stage 3, GFR 30-59 ml/min 8/13/2019    Diabetes mellitus     Dry eye syndrome     Gout, unspecified     High cholesterol     History of shingles     Hypertension     Melanoma     Seizures        Surgical History:   Shannan Norman  has a past surgical history that includes Tonsillectomy; varicose veins; Ablation of dysrhythmic  focus; Ganglion cyst excision; Hernia repair (2013); Vasectomy; Ablation (N/A, 9/11/2018); Colonoscopy (N/A, 1/2/2019); Tendon repair (Right); Adenoidectomy; Vein Surgery; Cardiac pacemaker placement; Cataract extraction w/  intraocular lens implant (Right, 7/28/2020); and Cataract extraction w/  intraocular lens implant (Left, 8/11/2020).    Medications:   Shannan has a current medication list which includes the following prescription(s): allopurinol, atorvastatin, benazepril, cyclosporine, econazole nitrate, ferrous sulfate, glucosamine-chondroitin, levocetirizine, metoprolol tartrate, multivitamin, prednisone, tamsulosin, UNABLE TO FIND, and xarelto.    Allergies:   Review of patient's allergies indicates:  No Known Allergies     Imaging: see EMR    Prior Therapy: yes, but not for his hip   Social History: lives alone   Occupation: retired   Prior Level of Function: independent   Current Level of Function: independent - active     Pain:  Current 0/10, worst 7/10, best 0/10   Location: right hip  Description:  aching/dull  Aggravating Factors: see above  Easing Factors: see above    Pt's goals:  Patient would like to manage right hip pain and improve level of function.     Objective     Observation/Gait: no abnormalities     Hip Range of Motion: PROM    Right Passive Left Passive   Flexion  125 degrees  125 degrees   Ext. Rotation  25 degrees  30 degrees   Int. Rotation  15 degrees   15 degrees      Flexibility: hip internal rotation/external rotation flexibility impairments      Lower Extremity Strength  Right LE   Left LE     Knee extension:  27.5 kg  Knee extension:  37.3 kg    Knee flexion:  19.8 kg Knee flexion:  20.9 kg    Hip flexion:  16.0 kg Hip flexion:  16.3 kg    Hip extension:   15.7 kg Hip extension:  14.5 kg   Hip abduction:  14.6 kg Hip abduction:  13.6 kg      Joint Mobility: femoroacetabular hypomobility     Palpation: no tenderness to palpation         Limitation/Restriction for FOTO Hip  Survey    Therapist reviewed FOTO scores for Shannankathie Norman on 9/25/2023.   FOTO documents entered into [employer] - see Media section.    Limitation Score: 1%  Predicted Limitation Score: 11%         TREATMENT   Treatment Time In: 10:45 am  Treatment Time Out: 11:00 am  Total Treatment time (time-based codes) separate from Evaluation: 15 minutes    Shannan received therapeutic exercises to develop strength, endurance, ROM, and flexibility for 15 minutes including:  Straight leg raise: 2x10 - right   Side lying clamshells: 2x10 - right   Side lying reverse clamshells: 2x10 - right   Home exercise program overview and printout       Home Exercises and Patient Education Provided    Education provided:   - home exercise program  - POC/Prognosis     Written Home Exercises Provided: yes.  Exercises were reviewed and Shannan was able to demonstrate them prior to the end of the session.  Shannan demonstrated good  understanding of the education provided.     See EMR under Patient Instructions for exercises provided 9/25/2023.    Assessment   Shannan is a 78 y.o. male referred to outpatient Physical Therapy with a medical diagnosis of greater trochanter bursitis of the right hip. Pt presents as a very active 78 year old male with signs and symptoms of a right gluteus tendinopathy. Patient displaying adequate strength of the right lower extremity in comparison to the left with hip internal rotation/external rotation strength deficits and hip internal rotation/external rotation flexibility and mobility impairments. Patient would benefit from skilled PT progressing right hip mobility/flexibility and improving strength/endurance of local hip musculature.     Pt prognosis is Excellent.   Pt will benefit from skilled outpatient Physical Therapy to address the deficits stated above and in the chart below, provide pt/family education, and to maximize pt's level of independence.     Plan of care discussed with patient: Yes  Pt's spiritual, cultural and  educational needs considered and patient is agreeable to the plan of care and goals as stated below:     Anticipated Barriers for therapy: age    Medical Necessity is demonstrated by the following  History  Co-morbidities and personal factors that may impact the plan of care Co-morbidities:   advanced age, CKD stage III, diabetes, and HTN    Personal Factors:   age     high   Examination  Body Structures and Functions, activity limitations and participation restrictions that may impact the plan of care Body Regions:   back  lower extremities    Body Systems:    gross symmetry  ROM  strength  gross coordinated movement  balance  gait  transfers  transitions  motor control  motor learning    Participation Restrictions:   Exercising     Activity limitations:   Learning and applying knowledge  no deficits    General Tasks and Commands  no deficits    Communication  no deficits    Mobility  lifting and carrying objects  walking    Self care  no deficits    Domestic Life  shopping  doing house work (cleaning house, washing dishes, laundry)  assisting others    Interactions/Relationships  no deficits    Life Areas  no deficits    Community and Social Life  no deficits         high   Clinical Presentation evolving clinical presentation with changing clinical characteristics moderate   Decision Making/ Complexity Score: moderate     Goals:  Short Term Goals: 4 weeks   1. Patient will maintain compliance with home exercise program   2. Patient will improve pain level of left/right hip to at least </=2/10  3. Patient will improve left/right hip range of motion at least 5 degrees with flexion, internal rotation, and external rotation.   4. Patient will improve left /right hip abduction strength >/= 4kg with microfit dynamometer and/or to 5/5 for adequate trunk stability.     Long Term Goals: 8 weeks   1. Patient will maintain compliance with updated home exercise program   2. Patient will improve pain level of left/right hip to  at least </=0/10  3. Patient will improve left/right hip range of motion at least 10 degrees with flexion, internal rotation, and external rotation.   4. Patient will improve left/right hip abduction strength >/= 8kg with microfit dynamometer and/or to 5/5 for adequate trunk stability.  5. Patient will display the ability to ambulate 2 miles without reproduction of hip pain.    Plan   Plan of care Certification: 9/25/2023 to 10/23/2023.    Outpatient Physical Therapy 1 times weekly for 4 weeks to include the following interventions: Gait Training, Manual Therapy, Moist Heat/ Ice, Neuromuscular Re-ed, Patient Education, Self Care, Therapeutic Activities, and Therapeutic Exercise.     Madan Loera, PT

## 2023-09-26 ENCOUNTER — OFFICE VISIT (OUTPATIENT)
Dept: DERMATOLOGY | Facility: CLINIC | Age: 78
End: 2023-09-26
Payer: MEDICARE

## 2023-09-26 DIAGNOSIS — D48.5 NEOPLASM OF UNCERTAIN BEHAVIOR OF SKIN: Primary | ICD-10-CM

## 2023-09-26 DIAGNOSIS — L81.4 LENTIGO: ICD-10-CM

## 2023-09-26 DIAGNOSIS — D18.01 CHERRY ANGIOMA: ICD-10-CM

## 2023-09-26 DIAGNOSIS — D22.9 MULTIPLE BENIGN NEVI: ICD-10-CM

## 2023-09-26 DIAGNOSIS — L82.1 SEBORRHEIC KERATOSES: ICD-10-CM

## 2023-09-26 DIAGNOSIS — Z12.83 SCREENING EXAM FOR SKIN CANCER: ICD-10-CM

## 2023-09-26 DIAGNOSIS — L57.0 AK (ACTINIC KERATOSIS): ICD-10-CM

## 2023-09-26 PROCEDURE — 88305 TISSUE EXAM BY PATHOLOGIST: CPT | Performed by: PATHOLOGY

## 2023-09-26 PROCEDURE — 88305 TISSUE EXAM BY PATHOLOGIST: ICD-10-PCS | Mod: 26,,, | Performed by: PATHOLOGY

## 2023-09-26 PROCEDURE — 11102 PR TANGENTIAL BIOPSY, SKIN, SINGLE LESION: ICD-10-PCS | Mod: S$GLB,,, | Performed by: DERMATOLOGY

## 2023-09-26 PROCEDURE — 88305 TISSUE EXAM BY PATHOLOGIST: CPT | Mod: 26,,, | Performed by: PATHOLOGY

## 2023-09-26 PROCEDURE — 17003 DESTRUCT PREMALG LES 2-14: CPT | Mod: S$GLB,,, | Performed by: DERMATOLOGY

## 2023-09-26 PROCEDURE — 3072F PR LOW RISK FOR RETINOPATHY: ICD-10-PCS | Mod: CPTII,S$GLB,, | Performed by: DERMATOLOGY

## 2023-09-26 PROCEDURE — 3288F PR FALLS RISK ASSESSMENT DOCUMENTED: ICD-10-PCS | Mod: CPTII,S$GLB,, | Performed by: DERMATOLOGY

## 2023-09-26 PROCEDURE — 3072F LOW RISK FOR RETINOPATHY: CPT | Mod: CPTII,S$GLB,, | Performed by: DERMATOLOGY

## 2023-09-26 PROCEDURE — 99213 PR OFFICE/OUTPT VISIT, EST, LEVL III, 20-29 MIN: ICD-10-PCS | Mod: 25,S$GLB,, | Performed by: DERMATOLOGY

## 2023-09-26 PROCEDURE — 11102 TANGNTL BX SKIN SINGLE LES: CPT | Mod: S$GLB,,, | Performed by: DERMATOLOGY

## 2023-09-26 PROCEDURE — 1101F PR PT FALLS ASSESS DOC 0-1 FALLS W/OUT INJ PAST YR: ICD-10-PCS | Mod: CPTII,S$GLB,, | Performed by: DERMATOLOGY

## 2023-09-26 PROCEDURE — 1125F PR PAIN SEVERITY QUANTIFIED, PAIN PRESENT: ICD-10-PCS | Mod: CPTII,S$GLB,, | Performed by: DERMATOLOGY

## 2023-09-26 PROCEDURE — 1125F AMNT PAIN NOTED PAIN PRSNT: CPT | Mod: CPTII,S$GLB,, | Performed by: DERMATOLOGY

## 2023-09-26 PROCEDURE — 1101F PT FALLS ASSESS-DOCD LE1/YR: CPT | Mod: CPTII,S$GLB,, | Performed by: DERMATOLOGY

## 2023-09-26 PROCEDURE — 17000 DESTRUCT PREMALG LESION: CPT | Mod: XS,S$GLB,, | Performed by: DERMATOLOGY

## 2023-09-26 PROCEDURE — 99999 PR PBB SHADOW E&M-EST. PATIENT-LVL II: ICD-10-PCS | Mod: PBBFAC,,, | Performed by: DERMATOLOGY

## 2023-09-26 PROCEDURE — 99999 PR PBB SHADOW E&M-EST. PATIENT-LVL II: CPT | Mod: PBBFAC,,, | Performed by: DERMATOLOGY

## 2023-09-26 PROCEDURE — 17000 PR DESTRUCTION(LASER SURGERY,CRYOSURGERY,CHEMOSURGERY),PREMALIGNANT LESIONS,FIRST LESION: ICD-10-PCS | Mod: XS,S$GLB,, | Performed by: DERMATOLOGY

## 2023-09-26 PROCEDURE — 99213 OFFICE O/P EST LOW 20 MIN: CPT | Mod: 25,S$GLB,, | Performed by: DERMATOLOGY

## 2023-09-26 PROCEDURE — 17003 DESTRUCTION, PREMALIGNANT LESIONS; SECOND THROUGH 14 LESIONS: ICD-10-PCS | Mod: S$GLB,,, | Performed by: DERMATOLOGY

## 2023-09-26 PROCEDURE — 3288F FALL RISK ASSESSMENT DOCD: CPT | Mod: CPTII,S$GLB,, | Performed by: DERMATOLOGY

## 2023-09-26 NOTE — PROGRESS NOTES
Subjective:      Patient ID:  Shannan Norman is a 78 y.o. male who presents for   Chief Complaint   Patient presents with    Skin Check     S/p BCC excision      History of Present Illness: The patient presents for follow up of skin check.    The patient was last seen on: 09/26/2022 for TBSE and bx to left anterior lower leg (residual basal cell carcinoma)     Hx -melanoma  Other skin complaints: n/a        Review of Systems   Constitutional:  Negative for fever, chills and fatigue.   Skin:  Positive for activity-related sunscreen use and wears hat. Negative for daily sunscreen use and recent sunburn.   Hematologic/Lymphatic: Does not bruise/bleed easily.       Objective:   Physical Exam   Constitutional: He appears well-developed and well-nourished. No distress.   Genitourinary:         Musculoskeletal: Lymphadenopathy:      Cervical: No cervical adenopathy.      Upper Body:      Right upper body: No supraclavicular adenopathy.      Left upper body: No supraclavicular adenopathy.      Lower Body: No right inguinal adenopathy. No left inguinal adenopathy.     Lymphadenopathy:     He has no cervical adenopathy. No inguinal adenopathy noted on the right or left side.        Right: No supraclavicular adenopathy present.        Left: No supraclavicular adenopathy present.   Neurological: He is alert and oriented to person, place, and time. He is not disoriented.   Psychiatric: He has a normal mood and affect.   Skin:   Areas Examined (abnormalities noted in diagram):   Scalp / Hair Palpated and Inspected  Head / Face Inspection Performed  Neck Inspection Performed  Chest / Axilla Inspection Performed  Abdomen Inspection Performed  Genitals / Buttocks / Groin Inspection Performed  Back Inspection Performed  RUE Inspected  LUE Inspection Performed  RLE Inspected  LLE Inspection Performed  Nails and Digits Inspection Performed  Gland Inspection Performed                 Diagram Legend     Erythematous scaling  macule/papule c/w actinic keratosis       Vascular papule c/w angioma      Pigmented verrucoid papule/plaque c/w seborrheic keratosis      Yellow umbilicated papule c/w sebaceous hyperplasia      Irregularly shaped tan macule c/w lentigo     1-2 mm smooth white papules consistent with Milia      Movable subcutaneous cyst with punctum c/w epidermal inclusion cyst      Subcutaneous movable cyst c/w pilar cyst      Firm pink to brown papule c/w dermatofibroma      Pedunculated fleshy papule(s) c/w skin tag(s)      Evenly pigmented macule c/w junctional nevus     Mildly variegated pigmented, slightly irregular-bordered macule c/w mildly atypical nevus      Flesh colored to evenly pigmented papule c/w intradermal nevus       Pink pearly papule/plaque c/w basal cell carcinoma      Erythematous hyperkeratotic cursted plaque c/w SCC      Surgical scar with no sign of skin cancer recurrence      Open and closed comedones      Inflammatory papules and pustules      Verrucoid papule consistent consistent with wart     Erythematous eczematous patches and plaques     Dystrophic onycholytic nail with subungual debris c/w onychomycosis     Umbilicated papule    Erythematous-base heme-crusted tan verrucoid plaque consistent with inflamed seborrheic keratosis     Erythematous Silvery Scaling Plaque c/w Psoriasis     See annotation            Assessment / Plan:      Pathology Orders:       Normal Orders This Visit    Specimen to Pathology, Dermatology     Comments:    Number of Specimens:->1  ------------------------->-------------------------  Spec 1 Procedure:->Biopsy  Spec 1 Clinical Impression:->r/o ka vs sk  Spec 1 Source:->left medial inferior knee    Questions:    Procedure Type: Dermatology and skin neoplasms    Number of Specimens: 1    ------------------------: -------------------------    Spec 1 Procedure: Biopsy    Spec 1 Clinical Impression: r/o ka vs sk    Spec 1 Source: left medial inferior knee    Release to patient:            Neoplasm of uncertain behavior of skin  -     Specimen to Pathology, Dermatology  Shave biopsy procedure note:    Shave biopsy performed after verbal consent including risk of infection, scar, recurrence, need for additional treatment of site. Area prepped with alcohol, anesthetized with approximately 1.0cc of 1% lidocaine with epinephrine. Lesional tissue shaved with razor blade. Hemostasis achieved with application of aluminum chloride followed by hyfrecation. No complications. Dressing applied. Wound care explained.    AK (actinic keratosis)  Cryosurgery Procedure Note    Verbal consent from the patient is obtained including, but not limited to, risk of hypopigmentation/hyperpigmentation, scar, recurrence of lesion. The patient is aware of the precancerous quality and need for treatment of these lesions. Liquid nitrogen cryosurgery is applied to the 5 actinic keratoses, as detailed in the physical exam, to produce a freeze injury. The patient is aware that blisters may form and is instructed on wound care with gentle cleansing and use of vaseline ointment to keep moist until healed. The patient is supplied a handout on cryosurgery and is instructed to call if lesions do not completely resolve.    Multiple benign nevi  TBSE body skin examination performed today including at least 12 points as noted in physical examination. No lesions suspicious for malignancy noted.  Reassurance provided.  Instructed patient to observe lesion(s) for changes and follow up in clinic if changes are noted. Discussed ABCDE's of moles and brochure provided.    Screening exam for skin cancer  Patient instructed in importance in daily sun protection of at least spf 30. Sun avoidance and topical protection discussed.     Recommend  for daily use on face and neck.    Patient encouraged to wear hat for all outdoor exposure.     Also discussed sun protective clothing. [employer] or NexPlanar are good brands, UPF 50 or above. Recommend long  sleeves when out in the sun.     Cherry angioma  This is a benign vascular lesion. Reassurance given. No treatment required.     Seborrheic keratoses  These are benign inherited growths without a malignant potential. Reassurance given to patient. No treatment is necessary.     Lentigo  This is a benign hyperpigmented sun induced lesion. Recommend daily sun protection/avoidance and use of at least SPF 30, broad spectrum sunscreen (OTC drug) will reduce the number of new lesions. Treatment of these benign lesions are considered cosmetic.        F/u 6 mo UBSE         No follow-ups on file.

## 2023-09-26 NOTE — PATIENT INSTRUCTIONS
CRYOSURGERY      Your doctor has used a method called cryosurgery to treat your skin condition. Cryosurgery refers to the use of very cold substances to treat a variety of skin conditions such as warts, pre-skin cancers, molluscum contagiosum, sun spots, and several benign growths. The substance we use in cryosurgery is liquid nitrogen and is so cold (-195 degrees Celsius) that is burns when administered.     Following treatment in the office, the skin may immediately burn and become red. You may find the area around the lesion is affected as well. It is sometimes necessary to treat not only the lesion, but a small area of the surrounding normal skin to achieve a good response.     A blister, and even a blood filled blister, may form after treatment.   This is a normal response. If the blister is painful, it is acceptable to sterilize a needle and with rubbing alcohol and gently pop the blister. It is important that you gently wash the area with soap and warm water as the blister fluid may contain wart virus if a wart was treated. Do no remove the roof of the blister.     The area treated can take anywhere from 1-3 weeks to heal. Healing time depends on the kind skin lesion treated, the location, and how aggressively the lesion was treated. It is recommended that the areas treated are covered with Vaseline or bacitracin ointment and a band-aid. If a band-aid is not practical, just ointment applied several times per day will do. Keeping these areas moist will speed the healing time.    Treatment with liquid nitrogen can leave a scar. In dark skin, it may be a light or dark scar, in light skin it may be a white or pink scar. These will generally fade with time, but may never go away completely.     If you have any concerns after your treatment, please feel free to call the office.       [address] [place] [address]/ [phone] FAX/ [email or web address]      Shave Biopsy Wound Care    Your  doctor has performed a shave biopsy today.  A band aid and vaseline ointment has been placed over the site.  This should remain in place for NO LONGER THAN 48 hours.  It is fine to remove the bandaid after 24 hours, if the area is no longer bleeding. It is recommended that you keep the area dry (do not wet)) for the first 24 hours.  After 24 hours, wash the area with warm soap and water and apply Vaseline jelly.  Many patients prefer to use Neosporin or Bacitracin ointment.  This is acceptable; however, know that you can develop an allergy to this medication even if you have used it safely for years.  It is important to keep the area moist.  Letting it dry out and get air slows healing time, and will worsen the scar.        If you notice increasing redness, tenderness, pain, or yellow drainage at the biopsy site, please notify your doctor.  These are signs of an infection.    If your biopsy site is bleeding, apply firm pressure for 15 minutes straight.  Repeat for another 15 minutes, if it is still bleeding.   If the surgical site continues to bleed, then please contact your doctor.      For [facility] users:   You will receive your biopsy results in [facility] as soon as they are available. Please be assured that your physician/provider will review your results and will then determine what further treatment, evaluation, or planning is required. You should be contacted by your physician's/provider's office within 5 business days of receiving your results; If not, please reach out to directly. This is one more way [facility] is putting you first.     [facility] [place] [address]/ [phone] FAX/ [email or web address]

## 2023-09-29 LAB
FINAL PATHOLOGIC DIAGNOSIS: NORMAL
GROSS: NORMAL
Lab: NORMAL
MICROSCOPIC EXAM: NORMAL

## 2023-10-03 ENCOUNTER — LAB VISIT (OUTPATIENT)
Dept: LAB | Facility: HOSPITAL | Age: 78
End: 2023-10-03
Attending: FAMILY MEDICINE
Payer: MEDICARE

## 2023-10-03 ENCOUNTER — PATIENT MESSAGE (OUTPATIENT)
Dept: RHEUMATOLOGY | Facility: CLINIC | Age: 78
End: 2023-10-03
Payer: MEDICARE

## 2023-10-03 DIAGNOSIS — M35.3 POLYMYALGIA RHEUMATICA: Primary | ICD-10-CM

## 2023-10-03 DIAGNOSIS — E11.69 TYPE 2 DIABETES MELLITUS WITH OTHER SPECIFIED COMPLICATION, WITHOUT LONG-TERM CURRENT USE OF INSULIN: ICD-10-CM

## 2023-10-03 DIAGNOSIS — I10 ESSENTIAL HYPERTENSION: ICD-10-CM

## 2023-10-03 LAB
ALBUMIN SERPL BCP-MCNC: 3.8 G/DL (ref 3.5–5.2)
ALP SERPL-CCNC: 73 U/L (ref 55–135)
ALT SERPL W/O P-5'-P-CCNC: 18 U/L (ref 10–44)
ANION GAP SERPL CALC-SCNC: 9 MMOL/L (ref 8–16)
AST SERPL-CCNC: 15 U/L (ref 10–40)
BASOPHILS # BLD AUTO: 0.03 K/UL (ref 0–0.2)
BASOPHILS NFR BLD: 0.7 % (ref 0–1.9)
BILIRUB SERPL-MCNC: 0.9 MG/DL (ref 0.1–1)
BUN SERPL-MCNC: 15 MG/DL (ref 8–23)
CALCIUM SERPL-MCNC: 9.2 MG/DL (ref 8.7–10.5)
CHLORIDE SERPL-SCNC: 106 MMOL/L (ref 95–110)
CHOLEST SERPL-MCNC: 136 MG/DL (ref 120–199)
CHOLEST/HDLC SERPL: 2.2 {RATIO} (ref 2–5)
CO2 SERPL-SCNC: 25 MMOL/L (ref 23–29)
CREAT SERPL-MCNC: 1 MG/DL (ref 0.5–1.4)
DIFFERENTIAL METHOD: ABNORMAL
EOSINOPHIL # BLD AUTO: 0.1 K/UL (ref 0–0.5)
EOSINOPHIL NFR BLD: 2.9 % (ref 0–8)
ERYTHROCYTE [DISTWIDTH] IN BLOOD BY AUTOMATED COUNT: 13.3 % (ref 11.5–14.5)
EST. GFR  (NO RACE VARIABLE): >60 ML/MIN/1.73 M^2
ESTIMATED AVG GLUCOSE: 126 MG/DL (ref 68–131)
GLUCOSE SERPL-MCNC: 111 MG/DL (ref 70–110)
HBA1C MFR BLD: 6 % (ref 4–5.6)
HCT VFR BLD AUTO: 50.6 % (ref 40–54)
HDLC SERPL-MCNC: 63 MG/DL (ref 40–75)
HDLC SERPL: 46.3 % (ref 20–50)
HGB BLD-MCNC: 16.2 G/DL (ref 14–18)
IMM GRANULOCYTES # BLD AUTO: 0.04 K/UL (ref 0–0.04)
IMM GRANULOCYTES NFR BLD AUTO: 0.9 % (ref 0–0.5)
LDLC SERPL CALC-MCNC: 61.4 MG/DL (ref 63–159)
LYMPHOCYTES # BLD AUTO: 1.3 K/UL (ref 1–4.8)
LYMPHOCYTES NFR BLD: 28.8 % (ref 18–48)
MCH RBC QN AUTO: 31.4 PG (ref 27–31)
MCHC RBC AUTO-ENTMCNC: 32 G/DL (ref 32–36)
MCV RBC AUTO: 98 FL (ref 82–98)
MONOCYTES # BLD AUTO: 0.6 K/UL (ref 0.3–1)
MONOCYTES NFR BLD: 12.2 % (ref 4–15)
NEUTROPHILS # BLD AUTO: 2.5 K/UL (ref 1.8–7.7)
NEUTROPHILS NFR BLD: 54.5 % (ref 38–73)
NONHDLC SERPL-MCNC: 73 MG/DL
NRBC BLD-RTO: 0 /100 WBC
PLATELET # BLD AUTO: 158 K/UL (ref 150–450)
PMV BLD AUTO: 11.5 FL (ref 9.2–12.9)
POTASSIUM SERPL-SCNC: 4.3 MMOL/L (ref 3.5–5.1)
PROT SERPL-MCNC: 6.3 G/DL (ref 6–8.4)
RBC # BLD AUTO: 5.16 M/UL (ref 4.6–6.2)
SODIUM SERPL-SCNC: 140 MMOL/L (ref 136–145)
TRIGL SERPL-MCNC: 58 MG/DL (ref 30–150)
TSH SERPL DL<=0.005 MIU/L-ACNC: 2.5 UIU/ML (ref 0.4–4)
WBC # BLD AUTO: 4.51 K/UL (ref 3.9–12.7)

## 2023-10-03 PROCEDURE — 83036 HEMOGLOBIN GLYCOSYLATED A1C: CPT | Performed by: FAMILY MEDICINE

## 2023-10-03 PROCEDURE — 84443 ASSAY THYROID STIM HORMONE: CPT | Performed by: FAMILY MEDICINE

## 2023-10-03 PROCEDURE — 80053 COMPREHEN METABOLIC PANEL: CPT | Performed by: FAMILY MEDICINE

## 2023-10-03 PROCEDURE — 36415 COLL VENOUS BLD VENIPUNCTURE: CPT | Mod: PO | Performed by: FAMILY MEDICINE

## 2023-10-03 PROCEDURE — 85025 COMPLETE CBC W/AUTO DIFF WBC: CPT | Performed by: FAMILY MEDICINE

## 2023-10-03 PROCEDURE — 80061 LIPID PANEL: CPT | Performed by: FAMILY MEDICINE

## 2023-10-05 ENCOUNTER — CLINICAL SUPPORT (OUTPATIENT)
Dept: REHABILITATION | Facility: HOSPITAL | Age: 78
End: 2023-10-05
Payer: MEDICARE

## 2023-10-05 DIAGNOSIS — M25.551 RIGHT HIP PAIN: Primary | ICD-10-CM

## 2023-10-05 PROCEDURE — 97530 THERAPEUTIC ACTIVITIES: CPT | Mod: PN,CQ

## 2023-10-05 PROCEDURE — 97110 THERAPEUTIC EXERCISES: CPT | Mod: PN,CQ

## 2023-10-05 NOTE — PROGRESS NOTES
[facility]   Physical Therapy Treatment Note      Name: Shannan Norman  Clinic Number: [id number]    Therapy Diagnosis:  Encounter Diagnosis   Name Primary?    Right hip pain Yes       Physician: Issa Bhatt III, *    Visit Date: 10/5/2023    Encounter Diagnoses   Name Primary?    Greater trochanteric bursitis of right hip      Right hip pain        Physician: Issa Bhatt III, *     Physician Orders: PT Eval and Treat   Medical Diagnosis from Referral: M70.61 (ICD-10-CM) - Greater trochanteric bursitis of right hip  Evaluation Date: 9/25/2023  Authorization Period Expiration: 9/6/2024  Plan of Care Expiration: 10/23/2023  Visit # / Visits authorized: 1/1     Time In: 2:00 pm  Time Out: 2:55 am  Total Appointment Time (timed & untimed codes): 30 minutes     Precautions: Standard; CKD, diabetes; history of seizures     PTA Visit #: 1/5       Subjective     Patient reports: pain is mostly gone.  He was compliant with home exercise program.  Response to previous treatment: no problem  Functional change: Ongoing    Pain: 0/10  Location: right hip      Objective      Objective Measures updated at progress report unless specified.     Treatment     Shannan received the treatments listed below:      therapeutic exercises to develop strength, endurance, ROM, flexibility, posture, and core stabilization for 15 minutes including: Additional time supervised   Straight leg raise: 2x10 - right   Side lying clamshells: 2x10 - right   Side lying reverse clamshells: 2x10 - right       manual therapy techniques: Myofacial release, Soft tissue Mobilization, and Friction Massage were applied to the: right IT band for 0 minutes, including:  NP    neuromuscular re-education activities to improve: Balance, Coordination, Kinesthetic, Sense, Proprioception, and Posture for 0 minutes. The following activities were included:  NP    therapeutic activities to improve functional performance for 15  minutes,  including: Additional time supervised   Cybex Leg press 2x10 with 6.5 plates DL  Cybex long arc quad 2x10 reps of 35# double leg  Cybex HSC 2x10 reps with 6 plates      Patient Education and Home Exercises       Education provided:   - home exercise program review    Written Home Exercises Provided: Patient instructed to cont prior HEP. Exercises were reviewed and Shannan was able to demonstrate them prior to the end of the session.  Shannan demonstrated good  understanding of the education provided. See Electronic Medical Record under Patient Instructions for exercises provided during therapy sessions    Assessment     Shannan tolerated his first follow up very well. He has experienced no pain since the injection. Has resumed swimming and yoga. Has not walked much for exercise lately. Plans to join a gym. We initiated some weight training this afternoon which he tolerate well.     Shannan Is progressing well towards his goals.   Patient prognosis is Excellent.     Patient will continue to benefit from skilled outpatient physical therapy to address the deficits listed in the problem list box on initial evaluation, provide pt/family education and to maximize pt's level of independence in the home and community environment.     Patient's spiritual, cultural and educational needs considered and pt agreeable to plan of care and goals.     Anticipated barriers to physical therapy:  age    Goals:   Short Term Goals: 4 weeks   1. Patient will maintain compliance with home exercise program   2. Patient will improve pain level of left/right hip to at least </=2/10  3. Patient will improve left/right hip range of motion at least 5 degrees with flexion, internal rotation, and external rotation.   4. Patient will improve left /right hip abduction strength >/= 4kg with microfit dynamometer and/or to 5/5 for adequate trunk stability.     Long Term Goals: 8 weeks   1. Patient will maintain compliance with updated home exercise program   2.  Patient will improve pain level of left/right hip to at least </=0/10  3. Patient will improve left/right hip range of motion at least 10 degrees with flexion, internal rotation, and external rotation.   4. Patient will improve left/right hip abduction strength >/= 8kg with microfit dynamometer and/or to 5/5 for adequate trunk stability.  5. Patient will display the ability to ambulate 2 miles without reproduction of hip pain.  Plan     Continue PT plan of care     Cristopher English, PTA

## 2023-10-06 ENCOUNTER — LAB VISIT (OUTPATIENT)
Dept: LAB | Facility: HOSPITAL | Age: 78
End: 2023-10-06
Attending: INTERNAL MEDICINE
Payer: MEDICARE

## 2023-10-06 DIAGNOSIS — M35.3 POLYMYALGIA RHEUMATICA: ICD-10-CM

## 2023-10-06 LAB
CRP SERPL-MCNC: 1.5 MG/L (ref 0–8.2)
ERYTHROCYTE [SEDIMENTATION RATE] IN BLOOD BY PHOTOMETRIC METHOD: 8 MM/HR (ref 0–23)

## 2023-10-06 PROCEDURE — 36415 COLL VENOUS BLD VENIPUNCTURE: CPT | Mod: PO | Performed by: INTERNAL MEDICINE

## 2023-10-06 PROCEDURE — 85652 RBC SED RATE AUTOMATED: CPT | Performed by: INTERNAL MEDICINE

## 2023-10-06 PROCEDURE — 86140 C-REACTIVE PROTEIN: CPT | Performed by: INTERNAL MEDICINE

## 2023-10-09 ENCOUNTER — OFFICE VISIT (OUTPATIENT)
Dept: FAMILY MEDICINE | Facility: CLINIC | Age: 78
End: 2023-10-09
Payer: MEDICARE

## 2023-10-09 VITALS
SYSTOLIC BLOOD PRESSURE: 118 MMHG | DIASTOLIC BLOOD PRESSURE: 72 MMHG | HEART RATE: 68 BPM | WEIGHT: 210.75 LBS | OXYGEN SATURATION: 97 % | HEIGHT: 73 IN | BODY MASS INDEX: 27.93 KG/M2

## 2023-10-09 DIAGNOSIS — Z23 NEEDS FLU SHOT: ICD-10-CM

## 2023-10-09 DIAGNOSIS — N52.9 ERECTILE DYSFUNCTION, UNSPECIFIED ERECTILE DYSFUNCTION TYPE: ICD-10-CM

## 2023-10-09 DIAGNOSIS — R41.3 MEMORY PROBLEM: Primary | ICD-10-CM

## 2023-10-09 DIAGNOSIS — E11.69 TYPE 2 DIABETES MELLITUS WITH OTHER SPECIFIED COMPLICATION, WITHOUT LONG-TERM CURRENT USE OF INSULIN: ICD-10-CM

## 2023-10-09 DIAGNOSIS — I42.8 NICM (NONISCHEMIC CARDIOMYOPATHY): ICD-10-CM

## 2023-10-09 DIAGNOSIS — I10 PRIMARY HYPERTENSION: ICD-10-CM

## 2023-10-09 PROCEDURE — 1101F PT FALLS ASSESS-DOCD LE1/YR: CPT | Mod: CPTII,S$GLB,, | Performed by: FAMILY MEDICINE

## 2023-10-09 PROCEDURE — 3074F SYST BP LT 130 MM HG: CPT | Mod: CPTII,S$GLB,, | Performed by: FAMILY MEDICINE

## 2023-10-09 PROCEDURE — G0008 ADMIN INFLUENZA VIRUS VAC: HCPCS | Mod: S$GLB,,, | Performed by: FAMILY MEDICINE

## 2023-10-09 PROCEDURE — 1159F MED LIST DOCD IN RCRD: CPT | Mod: CPTII,S$GLB,, | Performed by: FAMILY MEDICINE

## 2023-10-09 PROCEDURE — 3072F LOW RISK FOR RETINOPATHY: CPT | Mod: CPTII,S$GLB,, | Performed by: FAMILY MEDICINE

## 2023-10-09 PROCEDURE — G0008 FLU VACCINE - QUADRIVALENT - ADJUVANTED: ICD-10-PCS | Mod: S$GLB,,, | Performed by: FAMILY MEDICINE

## 2023-10-09 PROCEDURE — 3288F FALL RISK ASSESSMENT DOCD: CPT | Mod: CPTII,S$GLB,, | Performed by: FAMILY MEDICINE

## 2023-10-09 PROCEDURE — 3078F PR MOST RECENT DIASTOLIC BLOOD PRESSURE < 80 MM HG: ICD-10-PCS | Mod: CPTII,S$GLB,, | Performed by: FAMILY MEDICINE

## 2023-10-09 PROCEDURE — 3288F PR FALLS RISK ASSESSMENT DOCUMENTED: ICD-10-PCS | Mod: CPTII,S$GLB,, | Performed by: FAMILY MEDICINE

## 2023-10-09 PROCEDURE — 1160F RVW MEDS BY RX/DR IN RCRD: CPT | Mod: CPTII,S$GLB,, | Performed by: FAMILY MEDICINE

## 2023-10-09 PROCEDURE — 1159F PR MEDICATION LIST DOCUMENTED IN MEDICAL RECORD: ICD-10-PCS | Mod: CPTII,S$GLB,, | Performed by: FAMILY MEDICINE

## 2023-10-09 PROCEDURE — 1126F PR PAIN SEVERITY QUANTIFIED, NO PAIN PRESENT: ICD-10-PCS | Mod: CPTII,S$GLB,, | Performed by: FAMILY MEDICINE

## 2023-10-09 PROCEDURE — 1126F AMNT PAIN NOTED NONE PRSNT: CPT | Mod: CPTII,S$GLB,, | Performed by: FAMILY MEDICINE

## 2023-10-09 PROCEDURE — 3072F PR LOW RISK FOR RETINOPATHY: ICD-10-PCS | Mod: CPTII,S$GLB,, | Performed by: FAMILY MEDICINE

## 2023-10-09 PROCEDURE — 99215 OFFICE O/P EST HI 40 MIN: CPT | Mod: S$GLB,,, | Performed by: FAMILY MEDICINE

## 2023-10-09 PROCEDURE — 99999 PR PBB SHADOW E&M-EST. PATIENT-LVL V: CPT | Mod: PBBFAC,,, | Performed by: FAMILY MEDICINE

## 2023-10-09 PROCEDURE — 3074F PR MOST RECENT SYSTOLIC BLOOD PRESSURE < 130 MM HG: ICD-10-PCS | Mod: CPTII,S$GLB,, | Performed by: FAMILY MEDICINE

## 2023-10-09 PROCEDURE — 99999 PR PBB SHADOW E&M-EST. PATIENT-LVL V: ICD-10-PCS | Mod: PBBFAC,,, | Performed by: FAMILY MEDICINE

## 2023-10-09 PROCEDURE — 99215 PR OFFICE/OUTPT VISIT, EST, LEVL V, 40-54 MIN: ICD-10-PCS | Mod: S$GLB,,, | Performed by: FAMILY MEDICINE

## 2023-10-09 PROCEDURE — 3078F DIAST BP <80 MM HG: CPT | Mod: CPTII,S$GLB,, | Performed by: FAMILY MEDICINE

## 2023-10-09 PROCEDURE — 90694 VACC AIIV4 NO PRSRV 0.5ML IM: CPT | Mod: S$GLB,,, | Performed by: FAMILY MEDICINE

## 2023-10-09 PROCEDURE — 1101F PR PT FALLS ASSESS DOC 0-1 FALLS W/OUT INJ PAST YR: ICD-10-PCS | Mod: CPTII,S$GLB,, | Performed by: FAMILY MEDICINE

## 2023-10-09 PROCEDURE — 90694 FLU VACCINE - QUADRIVALENT - ADJUVANTED: ICD-10-PCS | Mod: S$GLB,,, | Performed by: FAMILY MEDICINE

## 2023-10-09 PROCEDURE — 1160F PR REVIEW ALL MEDS BY PRESCRIBER/CLIN PHARMACIST DOCUMENTED: ICD-10-PCS | Mod: CPTII,S$GLB,, | Performed by: FAMILY MEDICINE

## 2023-10-09 RX ORDER — METOPROLOL TARTRATE 25 MG/1
25 TABLET, FILM COATED ORAL 2 TIMES DAILY PRN
Qty: 45 TABLET | Refills: 0 | Status: SHIPPED | OUTPATIENT
Start: 2023-10-09 | End: 2024-03-04

## 2023-10-09 RX ORDER — SILDENAFIL 50 MG/1
50 TABLET, FILM COATED ORAL DAILY PRN
Qty: 30 TABLET | Refills: 6 | Status: SHIPPED | OUTPATIENT
Start: 2023-10-09 | End: 2024-01-26

## 2023-10-09 NOTE — PROGRESS NOTES
Subjective     Patient ID: Shannan Norman is a 78 y.o. male.    Chief Complaint: Hypertension    78 years old male came to the clinic for blood pressure check.  Blood pressure today was stable.  No chest pain, palpitation, orthopnea PND.  Patient still with problems with concentration and memory.  He is requesting follow-up appointment with the neurologist.  Last A1c was stable.  No polyuria, polydipsia or polyphagia.  He is requesting a medicine to help with erectile dysfunction sometimes.    Hypertension  Pertinent negatives include no chest pain or palpitations.     Review of Systems   Constitutional: Negative.    HENT: Negative.     Eyes: Negative.    Respiratory: Negative.     Cardiovascular: Negative.  Negative for chest pain, palpitations, leg swelling and claudication.   Gastrointestinal: Negative.    Genitourinary: Negative.    Musculoskeletal: Negative.    Integumentary:  Negative.   Neurological: Negative.    Psychiatric/Behavioral: Negative.            Objective     Physical Exam  Vitals and nursing note reviewed.   Constitutional:       General: He is not in acute distress.     Appearance: He is well-developed. He is not diaphoretic.   HENT:      Head: Normocephalic and atraumatic.      Right Ear: External ear normal.      Left Ear: External ear normal.      Nose: Nose normal.      Mouth/Throat:      Pharynx: No oropharyngeal exudate.   Eyes:      General: No scleral icterus.        Right eye: No discharge.         Left eye: No discharge.      Conjunctiva/sclera: Conjunctivae normal.      Pupils: Pupils are equal, round, and reactive to light.   Neck:      Thyroid: No thyromegaly.      Vascular: No JVD.      Trachea: No tracheal deviation.   Cardiovascular:      Rate and Rhythm: Normal rate and regular rhythm.      Heart sounds: Normal heart sounds. No murmur heard.     No friction rub. No gallop.   Pulmonary:      Effort: Pulmonary effort is normal. No respiratory distress.      Breath sounds: Normal  breath sounds. No stridor. No wheezing or rales.   Chest:      Chest wall: No tenderness.   Abdominal:      General: Bowel sounds are normal. There is no distension.      Palpations: Abdomen is soft. There is no mass.      Tenderness: There is no abdominal tenderness. There is no guarding or rebound.   Musculoskeletal:         General: No tenderness. Normal range of motion.      Cervical back: Normal range of motion and neck supple.   Lymphadenopathy:      Cervical: No cervical adenopathy.   Skin:     General: Skin is warm and dry.      Coloration: Skin is not pale.      Findings: No erythema or rash.   Neurological:      Mental Status: He is alert and oriented to person, place, and time.      Cranial Nerves: No cranial nerve deficit.      Motor: No abnormal muscle tone.      Coordination: Coordination normal.      Deep Tendon Reflexes: Reflexes are normal and symmetric. Reflexes normal.   Psychiatric:         Behavior: Behavior normal.         Thought Content: Thought content normal.         Judgment: Judgment normal.            Assessment and Plan     1. Memory problem  -     Ambulatory referral/consult to Neurology; Future; Expected date: 10/16/2023    2. Primary hypertension  -     Microalbumin/Creatinine Ratio, Urine; Future; Expected date: 10/09/2023  -     Lipid Panel; Future; Expected date: 10/09/2023  -     Comprehensive Metabolic Panel; Future; Expected date: 10/09/2023    3. Erectile dysfunction, unspecified erectile dysfunction type  -     sildenafiL (VIAGRA) 50 MG tablet; Take 1 tablet (50 mg total) by mouth daily as needed for Erectile Dysfunction.  Dispense: 30 tablet; Refill: 06    4. Type 2 diabetes mellitus with other specified complication, without long-term current use of insulin  -     Microalbumin/Creatinine Ratio, Urine; Future; Expected date: 10/09/2023  -     Lipid Panel; Future; Expected date: 10/09/2023  -     Hemoglobin A1C; Future; Expected date: 10/09/2023  -     Comprehensive Metabolic  Panel; Future; Expected date: 10/09/2023    5. Needs flu shot  -     Influenza (FLUAD) - Quadrivalent (Adjuvanted) *Preferred* (65+) (PF)        Continue monitoring blood sugar at home,ADA diet.   Continue monitoring blood pressure at home, low sodium diet.          Follow up in about 6 months (around 4/9/2024), or if symptoms worsen or fail to improve.

## 2023-10-10 ENCOUNTER — CLINICAL SUPPORT (OUTPATIENT)
Dept: REHABILITATION | Facility: HOSPITAL | Age: 78
End: 2023-10-10
Payer: MEDICARE

## 2023-10-10 ENCOUNTER — PATIENT MESSAGE (OUTPATIENT)
Dept: RHEUMATOLOGY | Facility: CLINIC | Age: 78
End: 2023-10-10
Payer: MEDICARE

## 2023-10-10 DIAGNOSIS — M25.551 RIGHT HIP PAIN: Primary | ICD-10-CM

## 2023-10-10 PROCEDURE — 97110 THERAPEUTIC EXERCISES: CPT | Mod: PN,CQ

## 2023-10-10 PROCEDURE — 97530 THERAPEUTIC ACTIVITIES: CPT | Mod: PN,CQ

## 2023-10-10 NOTE — PROGRESS NOTES
"[facility]   Physical Therapy Treatment Note      Name: Shannan Norman  Clinic Number: [id number]    Therapy Diagnosis:  Encounter Diagnosis   Name Primary?    Right hip pain Yes       Physician: Issa Bhatt III, *    Visit Date: 10/10/2023    Encounter Diagnoses   Name Primary?    Greater trochanteric bursitis of right hip      Right hip pain        Physician: Issa Bhatt III, *     Physician Orders: PT Eval and Treat   Medical Diagnosis from Referral: M70.61 (ICD-10-CM) - Greater trochanteric bursitis of right hip  Evaluation Date: 9/25/2023  Authorization Period Expiration: 9/6/2024  Plan of Care Expiration: 10/23/2023  Visit # / Visits authorized: 3/1     Time In: 2:00 pm  Time Out: 2:55 am  Total Appointment Time (timed & untimed codes): 30 minutes (1TE,1TA)     Precautions: Standard; CKD, diabetes; history of seizures     PTA Visit #: 2/5       Subjective     Patient reports: pain is mostly gone.  He was compliant with home exercise program.  Response to previous treatment: no problem  Functional change: Ongoing    Pain: 0/10  Location: right hip      Objective      Objective Measures updated at progress report unless specified.     Treatment     Shannan received the treatments listed below:      therapeutic exercises to develop strength, endurance, ROM, flexibility, posture, and core stabilization for 15 minutes including: Additional time supervised   Bridges 3x10 with 3" holds with green theraband   Straight leg raise: 2x10 - 1.5# right   Side lying hip abd 2x10 with 1.5# bilateral   Side lying clamshells: 2x10 -  right   Side lying reverse clamshells: 2x10 - right   Sit to stand from chair 3x10 reps with 10# KB  Lateral stepping in //Bars 4 laps with green theraband around ankles (place around feet next due to c/o ankle discomfort)  Right closed chain left heel taps form 6" step with single upper extremity support      manual therapy techniques: Myofacial release, Soft " tissue Mobilization, and Friction Massage were applied to the: right IT band for 0 minutes, including:  NP    neuromuscular re-education activities to improve: Balance, Coordination, Kinesthetic, Sense, Proprioception, and Posture for 0 minutes. The following activities were included:  NP    therapeutic activities to improve functional performance for 15  minutes, including: Additional time supervised   Cybex Leg press 2x10 with 8.0 plates DL  Cybex long arc quad 2x10 reps of 45# double leg  Cybex HSC 2x10 reps with 6 plates  Cybex HIp Extension 2x10 with 3 plates       Patient Education and Home Exercises       Education provided:   - home exercise program review    Written Home Exercises Provided: Patient instructed to cont prior HEP. Exercises were reviewed and Shannan was able to demonstrate them prior to the end of the session.  Shannan demonstrated good  understanding of the education provided. See Electronic Medical Record under Patient Instructions for exercises provided during therapy sessions    Assessment     Shannan tolerated his second follow up very well. He has experienced no pain since the injection. Has resumed swimming and yoga and joined [employer] this morning.  Has not walked much for exercise lately. We continued weight training this afternoon which he tolerate well.     Shannan Is progressing well towards his goals.   Patient prognosis is Excellent.     Patient will continue to benefit from skilled outpatient physical therapy to address the deficits listed in the problem list box on initial evaluation, provide pt/family education and to maximize pt's level of independence in the home and community environment.     Patient's spiritual, cultural and educational needs considered and pt agreeable to plan of care and goals.     Anticipated barriers to physical therapy:  age    Goals:   Short Term Goals: 4 weeks   1. Patient will maintain compliance with home exercise program   2. Patient will improve pain level of  left/right hip to at least </=2/10  3. Patient will improve left/right hip range of motion at least 5 degrees with flexion, internal rotation, and external rotation.   4. Patient will improve left /right hip abduction strength >/= 4kg with microfit dynamometer and/or to 5/5 for adequate trunk stability.     Long Term Goals: 8 weeks   1. Patient will maintain compliance with updated home exercise program   2. Patient will improve pain level of left/right hip to at least </=0/10  3. Patient will improve left/right hip range of motion at least 10 degrees with flexion, internal rotation, and external rotation.   4. Patient will improve left/right hip abduction strength >/= 8kg with microfit dynamometer and/or to 5/5 for adequate trunk stability.  5. Patient will display the ability to ambulate 2 miles without reproduction of hip pain.  Plan     Continue PT plan of care     Cristopher English PTA

## 2023-10-11 ENCOUNTER — TELEPHONE (OUTPATIENT)
Dept: NEUROLOGY | Facility: CLINIC | Age: 78
End: 2023-10-11
Payer: MEDICARE

## 2023-10-11 NOTE — TELEPHONE ENCOUNTER
"----- Message from Arina Aj sent at 10/11/2023 10:30 AM CDT -----  Good morning  Patient with a Referral to Neurology from Dr Nitin Carpenter.  Diagnosis  "memory problems"  Please assist scheduling patient  Thanks    Arina"

## 2023-10-11 NOTE — TELEPHONE ENCOUNTER
Called pt back  Scheduled appt  Pt verbalized understanding and had no further concerns or questions.

## 2023-10-11 NOTE — TELEPHONE ENCOUNTER
----- Message from Kelli Gracia sent at 10/11/2023  4:06 PM CDT -----  Regarding: follow up appt  Contact: pt [phone]  PATIENTCALL     Pt is calling to speak with someone in provider office regarding he want to schedule a follow up appt he is asking for a return call please call pt at  [phone]

## 2023-10-13 NOTE — PROGRESS NOTES
Subjective:     Patient ID: Shannan Norman is a 78 y.o. male w a hx c/w PMR    Chief Complaint:  PMR on steroids       HPI     78 yr old with multiple morbidities that include gout, DM II, CKD 3, Atrial fib w sick sinus syndrome &  bilateral hip & LSpine OA we are following for PMR & gout.    He was initially seen on 4/4/22 & last on 7/25/23. We are tapering his prednisone very slowly as when previously stopped he had a recurrence of sxs.  He has managed to taper off prednisone & has been totally off x 1 week.  No PMR or GCA sxs. No AM stiffness. No fever, chills, recent or unexplained weight changes, visual loss, diplopia, scalp tenderness, jaw or other claudication, headache or head pain.  Denies hip girdle stiffness.  Mild pain R shoulder intermittently.     Also has hx of Gout and is on allopurinol 100 mg/d. Has not had a gout attack in very long time.       Last DXA & FRAX is acceptable.  S/P traumatic L 5th metatarsal base fracture ~ 12/22 & had to wear a boot.     Has bilateral hip OA w impingement & was told he was a candidate for R AVERY but he responded to GTB injection by Dr. Bhatt.     He is practicing healthy lifestyle--eating properly & exercising aerobically & otherwise; does yoga;       From 7/6/22:  On 2/15/22 developed stiffness; couldn't get out of bed in AM; had extreme bilateral shoulder pain; had hip pain (but denied stiffness); also really bad at night. No other joint of muscle pain except the muscles of the backs of his knees or tendons felt tight. Had lots of gelling. Lost 23 lbs. Also felt voice changed tone a bit.   Celebrex 200 mg/d helped some, but not entirely.   Prednisone 10 mg bid was started on 4/4/22 and he had an immediate & dramatic response. Everything improved.   We say him for the first time on 4/6/22 and tried to taper prednisone a bit. He was able to get down to 10 mg daily which he began ~ May 2022.  Currently does not feel as well as he did on the larger doses of  prednisone. Has ~ 4 hrs of AM stiffness and has bilateral wrist pain worse in AM & improves with time..  No other joint sxs.  Denies fever, chills, recent  visual loss, diplopia, scalp tenderness, jaw or other claudication, headache or head pain.     Is also on allopurinol 100 mg/d w good control of his UA. He also is eating better. Did have a diet of rich food in the past and ETOH use in past.Denies family hx of gout; denies renal calculi; denies moonshine use.          Current Outpatient Medications   Medication Sig Dispense Refill    allopurinoL (ZYLOPRIM) 100 MG tablet Take 1 tablet (100 mg total) by mouth once daily. 90 tablet 0    atorvastatin (LIPITOR) 40 MG tablet Take 1 tablet (40 mg total) by mouth once daily. 90 tablet 3    benazepriL (LOTENSIN) 20 MG tablet Take 1 tablet (20 mg total) by mouth once daily. 90 tablet 3    cycloSPORINE (RESTASIS) 0.05 % ophthalmic emulsion Place 1 drop into both eyes 2 (two) times daily. 60 each 11    econazole nitrate 1 % cream Apply topically 2 (two) times daily. 85 g 3    ferrous sulfate (FEOSOL) 325 mg (65 mg iron) Tab tablet Take 325 mg by mouth once daily.      GLUCOSAMINE HCL/CHONDR VASQUEZ A NA (GLUCOSAMINE-CHONDROITIN) 750-600 mg Tab Take 2 tablets by mouth Daily.      levocetirizine (XYZAL) 5 MG tablet Take 1 tablet (5 mg total) by mouth every evening. 90 tablet 1    metoprolol tartrate (LOPRESSOR) 25 MG tablet Take 1 tablet (25 mg total) by mouth 2 (two) times daily as needed (palpitations). 45 tablet 0    multivitamin capsule Take 1 capsule by mouth nightly.       sildenafiL (VIAGRA) 50 MG tablet Take 1 tablet (50 mg total) by mouth daily as needed for Erectile Dysfunction. 30 tablet 06    tamsulosin (FLOMAX) 0.4 mg Cap TAKE 1 CAPSULE BY MOUTH AFTER DINNER 90 capsule 3    UNABLE TO FIND 6000 hydrolyzes collagen      XARELTO 20 mg Tab TAKE 1 TABLET BY MOUTH ONCE DAILY WITH SUPPER OR  EVENING  MEAL 90 tablet 3     No current facility-administered medications for this  visit.       Review of patient's allergies indicates:  No Known Allergies    Review of Systems   Constitutional: Negative.  Negative for fatigue, fever and unexpected weight change.   HENT:  Negative for mouth sores and trouble swallowing.    Eyes:  Negative for redness.        Bilateral dry eyes   Respiratory: Negative.  Negative for cough and shortness of breath.    Cardiovascular: Negative.  Negative for chest pain.   Gastrointestinal: Negative.  Negative for constipation and diarrhea.   Endocrine: Negative.  Negative for cold intolerance and heat intolerance.   Genitourinary:  Positive for difficulty urinating, frequency and urgency. Negative for genital sores.   Musculoskeletal:  Negative for back pain, joint swelling and myalgias.   Skin:  Negative for rash.   Neurological:  Negative for dizziness, syncope, weakness, light-headedness, numbness and headaches.   Hematological:  Does not bruise/bleed easily.   Psychiatric/Behavioral:  Negative for dysphoric mood and sleep disturbance.        Past Medical History:   Diagnosis Date    Allergy     Arthritis     Atrial fibrillation     Atrial flutter 2011    ablation    Basal cell carcinoma     Cancer     Cataract     CKD (chronic kidney disease) stage 3, GFR 30-59 ml/min 8/13/2019    Diabetes mellitus     Dry eye syndrome     Gout, unspecified     High cholesterol     History of shingles     Hypertension     Melanoma     Seizures        Past Surgical History:   Procedure Laterality Date    ABLATION N/A 9/11/2018    Procedure: ABLATION;  Surgeon: Hany Sanchez MD;  Location: [facility] CATH LAB;  Service: Cardiology;  Laterality: N/A;  AFL, ISABELLE, RFA, ELODIA, MAC, DM, 3 PREP    ABLATION OF DYSRHYTHMIC FOCUS      ADENOIDECTOMY      CARDIAC PACEMAKER PLACEMENT      no defib    CATARACT EXTRACTION W/  INTRAOCULAR LENS IMPLANT Right 7/28/2020    Procedure: EXTRACTION, CATARACT, WITH IOL INSERTION;  Surgeon: Andrés Morse MD;  Location: [facility] OR;  Service: Ophthalmology;   Laterality: Right;    CATARACT EXTRACTION W/  INTRAOCULAR LENS IMPLANT Left 2020    Procedure: EXTRACTION, CATARACT, WITH IOL INSERTION;  Surgeon: Andrés Morse MD;  Location: [facility] [place], operated by [facility] OR;  Service: Ophthalmology;  Laterality: Left;    COLONOSCOPY N/A 2019    Procedure: COLONOSCOPY Suprep;  Surgeon: Cindy Solorzano MD;  Location: [facility];  Service: Endoscopy;  Laterality: N/A;    GANGLION CYST EXCISION      left neck    HERNIA REPAIR      umbilical    TENDON REPAIR Right     wrist    TONSILLECTOMY      varicose veins      several sx  bilateral    VASECTOMY      VEIN SURGERY         Family History   Problem Relation Age of Onset    Diabetes Mother     COPD Father     Heart disease Father     Arthritis Sister     Heart attack Brother     Heart disease Brother     Diabetes Brother     No Known Problems Maternal Aunt     No Known Problems Maternal Uncle     No Known Problems Paternal Aunt     No Known Problems Paternal Uncle     No Known Problems Maternal Grandmother     No Known Problems Maternal Grandfather     No Known Problems Paternal Grandmother     No Known Problems Paternal Grandfather     No Known Problems Son     Cancer Sister         lymph node, breast    No Known Problems Sister     No Known Problems Son     Amblyopia Neg Hx     Blindness Neg Hx     Cataracts Neg Hx     Glaucoma Neg Hx     Hypertension Neg Hx     Macular degeneration Neg Hx     Retinal detachment Neg Hx     Strabismus Neg Hx     Stroke Neg Hx     Thyroid disease Neg Hx     Prostate cancer Neg Hx     Kidney disease Neg Hx     Melanoma Neg Hx        Social History     Tobacco Use    Smoking status: Never    Smokeless tobacco: Never   Substance Use Topics    Alcohol use: Yes     Alcohol/week: 7.0 - 14.0 standard drinks of alcohol     Types: 7 - 14 Glasses of wine per week    Drug use: No   Retired. Administrative business for [employer]  Wife w Alzheimers  in 2023.   Belongs to a Neighborhood club and does Yoga w  "his buddies periodically & swims often  Disciplined.  Objective:     /70   Pulse 60   Ht 6' 1" (1.854 m)   Wt 96.4 kg (212 lb 8.4 oz)   BMI 28.04 kg/m²     Was 208 lb 1.8 oz on 10/18/22  Physical Exam   Constitutional: He is oriented to person, place, and time. normal appearance. No distress.   HENT:   Head: Normocephalic and atraumatic.   Mouth/Throat: Oropharynx is clear and moist. No oropharyngeal exudate.   No parotidomegaly;   Temporal arteries with good pulsations.   No temporal artery tenderness;   No scalp tenderness.  No oral ulcers;   Head: TA ok wo TTP;  Eyes: Pupils are equal, round, and reactive to light. Conjunctivae are normal. No scleral icterus.   Neck: No JVD present. No tracheal deviation present. No thyromegaly present.   Cardiovascular: Normal rate, regular rhythm and normal heart sounds. Exam reveals no gallop and no friction rub.   No murmur heard.  Pulmonary/Chest: Effort normal and breath sounds normal. No respiratory distress. He has no wheezes. He has no rales. He exhibits no tenderness.   Pulmonary Comments: Bibasilar rales cleared with deep breathing.   Abdominal: Soft. Bowel sounds are normal. He exhibits no distension and no mass. There is no splenomegaly or hepatomegaly. There is no abdominal tenderness. There is no rebound and no guarding.   Musculoskeletal:         General: No tenderness.      Right lower leg: No edema.      Left lower leg: No edema.      Comments: No synovitis anywhere.  Makes tight fists.  Shoulders w bilateral decrease in abduction wo TTP;   Exam of wrists w bilateral decrease in flexion and extension; No TTP; no visible or palpable SHT.  Bilateral LE varicosities     Lymphadenopathy:     He has no cervical adenopathy.   Neurological: He is alert and oriented to person, place, and time. He has normal reflexes. No cranial nerve deficit.   Motor strength: 5/5 prox & distal.   Skin: Skin is warm and dry. No rash noted.   Bilateral impressive venous " varicosities lower extremities  Thickened toe nails c/w onychomycosis   Psychiatric: His behavior is normal. Mood, memory, affect, judgment and thought content normal.   Vitals reviewed.    10/3/23: ESR 8 (23); CRP 1.5; CBC ok; CMP glu 111;   7/17/23: ESR 11 (23); CRP 2.3; CBC ok; CMP ok; UA 5.4  4/3/23: ESR 10 (23); CRP 8.4; CBC ok; CMP ok; Hg A1C 6.1;   12/19/22: ESR 5 (23); CRP 9.0; CBC ok; CMP ok;   7/7/22: ESR 15 (23); CRP 15.2; CBC ok; CMP glu 126;   5/12/22: ESR: 15 (23); CRP 0.8; CBC ok; CMP glu 128  4/13/22: ESR 20 (23);UA 5.4  3/31/22: CPK 47;   3/30/22: ESR 87 (23); CRP 42.5; CBC Hg 12.7; CMP BUN 24; glu 135; Alb 2.8; RF neg;  UA 5.9;     12/2/22: L foot: personally viewed: Minimally displaced fragment at the lateral base of the 5th proximal phalanx, age indeterminate and possibly remote; scattered mild foot degenerative change with small calcaneal enthesophyte at the distal Achilles insertion.  9/27/22: DXA; TLS 3.0; TTH -0.4; TFN -0.9; FRAX 8.7/2.6%  3/31/22: LSpine: personally viewed: dextroconvex Lspine; DDD verona L3-4; lower facet arthropathy; gr 1 anterolisthesis L4-5; vascular calcifications.  2/23/22: Bilateral hips: personally viewed: bilateral OA; also read as impingement.   3/15/21: Bilateral knees: personally viewed: bowen mjsn; mild varus bowen  8/26/19: L wrist: personally viewed: mild OA;   Assessment:   PMR   Shoulder/hip pain/stiffness--No GCA signs/sxs   Elevated inflammatory parameters:     ESR 87 (23); CRP 42.5; Hg 12.7; alb 2.8;    Celebrex somewhat helpful.   Dramatic response to prednisone 10 mg bid begun 4/4/22   Last w wrist pain/stiffness improving as day progresses    Doubt sero neg RA--but always in differential    On prednisone  3.75 mg/d--doing well    Gout by hx   Podagra by hx--several episodes.    Knee swelling   Hyperuricemia maxed 7.9; last 5.4;    Some ETOH in past;    On allopurinol 100 mg/d started by PCP    Bilateral adhesive capsulitis   L>R   May be associated w DM  II    OA   Hips (w ZEYAD); knees; Lspine   L wrist minimal     CKD 2-3    DM II    HTN    HLD    Cardiac issues: SSS; cardiac arrythmia    S/P L 5th metatarsal fx       Plan:   Continue off prednisone.  Ok to stay on allopurinol 100 mg/d.   3 types of shoulder exercises shown for adhesive capsulitis.  Patient does not want OT.  Continue healthy lifestyle.  Continue f/u with Dr. Banks.  RTC prn

## 2023-10-17 ENCOUNTER — CLINICAL SUPPORT (OUTPATIENT)
Dept: REHABILITATION | Facility: HOSPITAL | Age: 78
End: 2023-10-17
Payer: MEDICARE

## 2023-10-17 DIAGNOSIS — M25.551 RIGHT HIP PAIN: Primary | ICD-10-CM

## 2023-10-17 PROCEDURE — 97530 THERAPEUTIC ACTIVITIES: CPT | Mod: PN

## 2023-10-17 NOTE — PROGRESS NOTES
[facility]   Physical Therapy Treatment Note / Discharge Note      Name: Shannan Norman  Clinic Number: [id number]    Therapy Diagnosis:  Encounter Diagnosis   Name Primary?    Right hip pain Yes       Physician: Issa Bhatt III, *    Visit Date: 10/17/2023     Physician Orders: PT Eval and Treat   Medical Diagnosis from Referral: M70.61 (ICD-10-CM) - Greater trochanteric bursitis of right hip  Evaluation Date: 9/25/2023  Authorization Period Expiration: 9/6/2024  Plan of Care Expiration: 10/23/2023  Visit # / Visits authorized: 3/20 (+1)     Time In:  9:05 am  Time Out: 9:35 am  Total Appointment Time (timed & untimed codes): 30 minutes (2 TA)     Precautions: Standard; CKD, diabetes; history of seizures     PTA Visit #: 2/5   Subjective     Patient reports: he had an issue with pain recently when he bought new sandals, but the pain has since subsided. Patient reports feeling ready for discharge today with a home exercise program focusing on resistance training.   He was compliant with home exercise program.  Response to previous treatment: no problem  Functional change: Ongoing    Pain: 0/10  Location: right hip      Objective    (10/17/2023):  Lower Extremity Strength  Right LE   Left LE     Knee extension:  27.5 --> 38.8 kg  Knee extension:  37.3 kg    Knee flexion:  19.8 --> 19.1  kg Knee flexion:  20.9 kg    Hip flexion:  16.0 kg Hip flexion:  16.3 kg    Hip extension:   15.7 kg Hip extension:  14.5 kg   Hip abduction:  14.6 kg Hip abduction:  13.6 kg        Treatment     Shannan received the treatments listed below:      Therapeutic activities to improve functional performance for 30 minutes, including:  FOTO  Objective tests and measures  Outcome measures  Home exercise program overview   Questions and answers   Discharge         Patient Education and Home Exercises       Education provided:   - home exercise program review    Written Home Exercises Provided: Patient instructed  to cont prior HEP. Exercises were reviewed and Shannan was able to demonstrate them prior to the end of the session.  Shannan demonstrated good  understanding of the education provided. See Electronic Medical Record under Patient Instructions for exercises provided during therapy sessions    Assessment   Shannan is a 78 y.o. male referred to outpatient Physical Therapy with a medical diagnosis of greater trochanter bursitis of the right hip. Pt presents as a very active 78 year old male with signs and symptoms of a right gluteus tendinopathy. Patient responded very well to physical therapy throughout plan of care. With objective tests and measures, patient improved right quadriceps strength from 27 kg to 38 displaying equal strength bilaterally. Patient presented with reports of feeling ready for discharge. Patient provided with updated home exercise program and discharged from physical therapy.     Shannan Is progressing well towards his goals.   Patient prognosis is Excellent.     Patient will continue to benefit from skilled outpatient physical therapy to address the deficits listed in the problem list box on initial evaluation, provide pt/family education and to maximize pt's level of independence in the home and community environment.     Patient's spiritual, cultural and educational needs considered and pt agreeable to plan of care and goals.     Anticipated barriers to physical therapy:  age    Goals:   Short Term Goals: 4 weeks   1. Patient will maintain compliance with home exercise program   2. Patient will improve pain level of left/right hip to at least </=2/10  3. Patient will improve left/right hip range of motion at least 5 degrees with flexion, internal rotation, and external rotation.   4. Patient will improve left /right hip abduction strength >/= 4kg with microfit dynamometer and/or to 5/5 for adequate trunk stability.     Long Term Goals: 8 weeks   1. Patient will maintain compliance with updated home exercise  program   2. Patient will improve pain level of left/right hip to at least </=0/10  3. Patient will improve left/right hip range of motion at least 10 degrees with flexion, internal rotation, and external rotation.   4. Patient will improve left/right hip abduction strength >/= 8kg with microfit dynamometer and/or to 5/5 for adequate trunk stability.  5. Patient will display the ability to ambulate 2 miles without reproduction of hip pain.  Plan   Discharged     Madan Loera, PT

## 2023-10-19 ENCOUNTER — OFFICE VISIT (OUTPATIENT)
Dept: RHEUMATOLOGY | Facility: CLINIC | Age: 78
End: 2023-10-19
Payer: MEDICARE

## 2023-10-19 VITALS
HEART RATE: 60 BPM | WEIGHT: 212.5 LBS | BODY MASS INDEX: 28.16 KG/M2 | DIASTOLIC BLOOD PRESSURE: 70 MMHG | HEIGHT: 73 IN | SYSTOLIC BLOOD PRESSURE: 115 MMHG

## 2023-10-19 DIAGNOSIS — M10.9 GOUT, ARTHRITIS: ICD-10-CM

## 2023-10-19 DIAGNOSIS — M35.3 POLYMYALGIA RHEUMATICA: Primary | ICD-10-CM

## 2023-10-19 DIAGNOSIS — M75.01 ADHESIVE CAPSULITIS OF BOTH SHOULDERS: ICD-10-CM

## 2023-10-19 DIAGNOSIS — M75.02 ADHESIVE CAPSULITIS OF BOTH SHOULDERS: ICD-10-CM

## 2023-10-19 DIAGNOSIS — Z55.9 EDUCATIONAL CIRCUMSTANCE: ICD-10-CM

## 2023-10-19 DIAGNOSIS — M16.0 PRIMARY OSTEOARTHRITIS OF BOTH HIPS: ICD-10-CM

## 2023-10-19 PROCEDURE — 1159F PR MEDICATION LIST DOCUMENTED IN MEDICAL RECORD: ICD-10-PCS | Mod: CPTII,S$GLB,, | Performed by: INTERNAL MEDICINE

## 2023-10-19 PROCEDURE — 3074F PR MOST RECENT SYSTOLIC BLOOD PRESSURE < 130 MM HG: ICD-10-PCS | Mod: CPTII,S$GLB,, | Performed by: INTERNAL MEDICINE

## 2023-10-19 PROCEDURE — 3078F PR MOST RECENT DIASTOLIC BLOOD PRESSURE < 80 MM HG: ICD-10-PCS | Mod: CPTII,S$GLB,, | Performed by: INTERNAL MEDICINE

## 2023-10-19 PROCEDURE — 1159F MED LIST DOCD IN RCRD: CPT | Mod: CPTII,S$GLB,, | Performed by: INTERNAL MEDICINE

## 2023-10-19 PROCEDURE — 1125F PR PAIN SEVERITY QUANTIFIED, PAIN PRESENT: ICD-10-PCS | Mod: CPTII,S$GLB,, | Performed by: INTERNAL MEDICINE

## 2023-10-19 PROCEDURE — 99214 PR OFFICE/OUTPT VISIT, EST, LEVL IV, 30-39 MIN: ICD-10-PCS | Mod: S$GLB,,, | Performed by: INTERNAL MEDICINE

## 2023-10-19 PROCEDURE — 3072F LOW RISK FOR RETINOPATHY: CPT | Mod: CPTII,S$GLB,, | Performed by: INTERNAL MEDICINE

## 2023-10-19 PROCEDURE — 1160F PR REVIEW ALL MEDS BY PRESCRIBER/CLIN PHARMACIST DOCUMENTED: ICD-10-PCS | Mod: CPTII,S$GLB,, | Performed by: INTERNAL MEDICINE

## 2023-10-19 PROCEDURE — 99214 OFFICE O/P EST MOD 30 MIN: CPT | Mod: S$GLB,,, | Performed by: INTERNAL MEDICINE

## 2023-10-19 PROCEDURE — 3072F PR LOW RISK FOR RETINOPATHY: ICD-10-PCS | Mod: CPTII,S$GLB,, | Performed by: INTERNAL MEDICINE

## 2023-10-19 PROCEDURE — 1160F RVW MEDS BY RX/DR IN RCRD: CPT | Mod: CPTII,S$GLB,, | Performed by: INTERNAL MEDICINE

## 2023-10-19 PROCEDURE — 99999 PR PBB SHADOW E&M-EST. PATIENT-LVL IV: ICD-10-PCS | Mod: PBBFAC,,, | Performed by: INTERNAL MEDICINE

## 2023-10-19 PROCEDURE — 99999 PR PBB SHADOW E&M-EST. PATIENT-LVL IV: CPT | Mod: PBBFAC,,, | Performed by: INTERNAL MEDICINE

## 2023-10-19 PROCEDURE — 1125F AMNT PAIN NOTED PAIN PRSNT: CPT | Mod: CPTII,S$GLB,, | Performed by: INTERNAL MEDICINE

## 2023-10-19 PROCEDURE — 3074F SYST BP LT 130 MM HG: CPT | Mod: CPTII,S$GLB,, | Performed by: INTERNAL MEDICINE

## 2023-10-19 PROCEDURE — 3078F DIAST BP <80 MM HG: CPT | Mod: CPTII,S$GLB,, | Performed by: INTERNAL MEDICINE

## 2023-10-19 SDOH — SOCIAL DETERMINANTS OF HEALTH (SDOH): PROBLEMS RELATED TO EDUCATION AND LITERACY, UNSPECIFIED: Z55.9

## 2023-10-19 ASSESSMENT — ROUTINE ASSESSMENT OF PATIENT INDEX DATA (RAPID3)
FATIGUE SCORE: 0
TOTAL RAPID3 SCORE: 0.11
PAIN SCORE: 0
AM STIFFNESS SCORE: 0, NO
PATIENT GLOBAL ASSESSMENT SCORE: 0
MDHAQ FUNCTION SCORE: 0.1
PSYCHOLOGICAL DISTRESS SCORE: 0

## 2023-10-19 NOTE — PROGRESS NOTES
7/24/2023     3:52 PM   Rapid3 Question Responses and Scores   MDHAQ Score 0.1   Psychologic Score 0   Pain Score 0   When you awakened in the morning OVER THE LAST WEEK, did you feel stiff? No   Fatigue Score 0   Global Health Score 0   RAPID3 Score 0.11

## 2023-10-24 ENCOUNTER — OFFICE VISIT (OUTPATIENT)
Dept: ORTHOPEDICS | Facility: CLINIC | Age: 78
End: 2023-10-24
Payer: MEDICARE

## 2023-10-24 VITALS — HEIGHT: 73 IN | WEIGHT: 209.44 LBS | BODY MASS INDEX: 27.76 KG/M2

## 2023-10-24 DIAGNOSIS — M70.61 GREATER TROCHANTERIC BURSITIS OF RIGHT HIP: Primary | ICD-10-CM

## 2023-10-24 PROCEDURE — 99999 PR PBB SHADOW E&M-EST. PATIENT-LVL III: CPT | Mod: PBBFAC,,, | Performed by: ORTHOPAEDIC SURGERY

## 2023-10-24 PROCEDURE — 1101F PR PT FALLS ASSESS DOC 0-1 FALLS W/OUT INJ PAST YR: ICD-10-PCS | Mod: CPTII,S$GLB,, | Performed by: ORTHOPAEDIC SURGERY

## 2023-10-24 PROCEDURE — 3072F LOW RISK FOR RETINOPATHY: CPT | Mod: CPTII,S$GLB,, | Performed by: ORTHOPAEDIC SURGERY

## 2023-10-24 PROCEDURE — 99213 OFFICE O/P EST LOW 20 MIN: CPT | Mod: S$GLB,,, | Performed by: ORTHOPAEDIC SURGERY

## 2023-10-24 PROCEDURE — 99999 PR PBB SHADOW E&M-EST. PATIENT-LVL III: ICD-10-PCS | Mod: PBBFAC,,, | Performed by: ORTHOPAEDIC SURGERY

## 2023-10-24 PROCEDURE — 3072F PR LOW RISK FOR RETINOPATHY: ICD-10-PCS | Mod: CPTII,S$GLB,, | Performed by: ORTHOPAEDIC SURGERY

## 2023-10-24 PROCEDURE — 1101F PT FALLS ASSESS-DOCD LE1/YR: CPT | Mod: CPTII,S$GLB,, | Performed by: ORTHOPAEDIC SURGERY

## 2023-10-24 PROCEDURE — 1159F MED LIST DOCD IN RCRD: CPT | Mod: CPTII,S$GLB,, | Performed by: ORTHOPAEDIC SURGERY

## 2023-10-24 PROCEDURE — 1159F PR MEDICATION LIST DOCUMENTED IN MEDICAL RECORD: ICD-10-PCS | Mod: CPTII,S$GLB,, | Performed by: ORTHOPAEDIC SURGERY

## 2023-10-24 PROCEDURE — 3288F PR FALLS RISK ASSESSMENT DOCUMENTED: ICD-10-PCS | Mod: CPTII,S$GLB,, | Performed by: ORTHOPAEDIC SURGERY

## 2023-10-24 PROCEDURE — 3288F FALL RISK ASSESSMENT DOCD: CPT | Mod: CPTII,S$GLB,, | Performed by: ORTHOPAEDIC SURGERY

## 2023-10-24 PROCEDURE — 99213 PR OFFICE/OUTPT VISIT, EST, LEVL III, 20-29 MIN: ICD-10-PCS | Mod: S$GLB,,, | Performed by: ORTHOPAEDIC SURGERY

## 2023-10-24 PROCEDURE — 1126F PR PAIN SEVERITY QUANTIFIED, NO PAIN PRESENT: ICD-10-PCS | Mod: CPTII,S$GLB,, | Performed by: ORTHOPAEDIC SURGERY

## 2023-10-24 PROCEDURE — 1126F AMNT PAIN NOTED NONE PRSNT: CPT | Mod: CPTII,S$GLB,, | Performed by: ORTHOPAEDIC SURGERY

## 2023-10-24 NOTE — PROGRESS NOTES
"Subjective:     HPI:   Shannan Norman is a 78 y.o. male who presents for f/u R hip 6 week f/u    R hip GTB CSI 9/7/23  PT x3 then HEP    Shot "solved the problem"  No pain now  Only time had pain since shot was when he wore a new pair of sandals - stopped wearing them and pain better    Taking tylenol last night for L shoulder RCT  No regular nsaids     Objective:   Body mass index is 27.63 kg/m².  Exam:  No limp/ant gait  Neg SLR  0-120  40-30  40-20 no pain with ROM      Imaging:  None today      Assessment:       ICD-10-CM ICD-9-CM   1. Greater trochanteric bursitis of right hip  M70.61 726.5           Plan:       At this point I feel the patient has a diagnosis of trochanteric bursitis.  We discussed all the treatment options including but not limited to, the use of non-steroidal anti-inflammatory drugs (NSAIDs), physical therapy visits for stretching/strengthening and modalities treatment, as well as a consistent stretching program at home.  We further discussed the role of corticosteroid injections into the trochanteric bursa.  I explained that this is a short-term solution, however it does often allow for return to physical therapy and stretching and the ability to alleviate the acute pain.      Continue HEP    F/u with Dr Potter for non op GTB and OA mgmt  F/u with me if fails or groin pain    No orders of the defined types were placed in this encounter.            Past Medical History:   Diagnosis Date    Allergy     Arthritis     Atrial fibrillation     Atrial flutter 2011    ablation    Basal cell carcinoma     Cancer     Cataract     CKD (chronic kidney disease) stage 3, GFR 30-59 ml/min 8/13/2019    Diabetes mellitus     Dry eye syndrome     Gout, unspecified     High cholesterol     History of shingles     Hypertension     Melanoma     Seizures        Past Surgical History:   Procedure Laterality Date    ABLATION N/A 9/11/2018    Procedure: ABLATION;  Surgeon: Hany Sanchez MD;  Location: [facility]" CATH LAB;  Service: Cardiology;  Laterality: N/A;  AFL, ISABELLE, RFA, ELODIA, MAC, DM, 3 PREP    ABLATION OF DYSRHYTHMIC FOCUS      ADENOIDECTOMY      CARDIAC PACEMAKER PLACEMENT      no defib    CATARACT EXTRACTION W/  INTRAOCULAR LENS IMPLANT Right 7/28/2020    Procedure: EXTRACTION, CATARACT, WITH IOL INSERTION;  Surgeon: Andrés Morse MD;  Location: [facility], [place], operated by [facility] OR;  Service: Ophthalmology;  Laterality: Right;    CATARACT EXTRACTION W/  INTRAOCULAR LENS IMPLANT Left 8/11/2020    Procedure: EXTRACTION, CATARACT, WITH IOL INSERTION;  Surgeon: Andrés Morse MD;  Location: [facility] [place], operated [facility] OR;  Service: Ophthalmology;  Laterality: Left;    COLONOSCOPY N/A 1/2/2019    Procedure: COLONOSCOPY Suprep;  Surgeon: Cindy Solorzano MD;  Location: [facility];  Service: Endoscopy;  Laterality: N/A;    GANGLION CYST EXCISION      left neck    HERNIA REPAIR  2013    umbilical    TENDON REPAIR Right     wrist    TONSILLECTOMY      varicose veins      several sx  bilateral    VASECTOMY      VEIN SURGERY         Family History   Problem Relation Age of Onset    Diabetes Mother     COPD Father     Heart disease Father     Arthritis Sister     Heart attack Brother     Heart disease Brother     Diabetes Brother     No Known Problems Maternal Aunt     No Known Problems Maternal Uncle     No Known Problems Paternal Aunt     No Known Problems Paternal Uncle     No Known Problems Maternal Grandmother     No Known Problems Maternal Grandfather     No Known Problems Paternal Grandmother     No Known Problems Paternal Grandfather     No Known Problems Son     Cancer Sister         lymph node, breast    No Known Problems Sister     No Known Problems Son     Amblyopia Neg Hx     Blindness Neg Hx     Cataracts Neg Hx     Glaucoma Neg Hx     Hypertension Neg Hx     Macular degeneration Neg Hx     Retinal detachment Neg Hx     Strabismus Neg Hx     Stroke Neg Hx     Thyroid disease Neg Hx     Prostate cancer Neg Hx     Kidney disease Neg Hx      Melanoma Neg Hx        Social History     Socioeconomic History    Marital status:    Occupational History     Employer: [employer]   Tobacco Use    Smoking status: Never    Smokeless tobacco: Never   Substance and Sexual Activity    Alcohol use: Yes     Alcohol/week: 7.0 - 14.0 standard drinks of alcohol     Types: 7 - 14 Glasses of wine per week    Drug use: No    Sexual activity: Yes     Partners: Female     Social Determinants of Health     Financial Resource Strain: Low Risk  (10/2/2023)    Overall Financial Resource Strain (CARDIA)     Difficulty of Paying Living Expenses: Not hard at all   Food Insecurity: No Food Insecurity (10/2/2023)    Hunger Vital Sign     Worried About Running Out of Food in the Last Year: Never true     Ran Out of Food in the Last Year: Never true   Transportation Needs: No Transportation Needs (10/2/2023)    PRAPARE - Transportation     Lack of Transportation (Medical): No     Lack of Transportation (Non-Medical): No   Physical Activity: Sufficiently Active (10/2/2023)    Exercise Vital Sign     Days of Exercise per Week: 7 days     Minutes of Exercise per Session: 30 min   Stress: No Stress Concern Present (10/2/2023)    [place] [employer] - Occupational Stress Questionnaire     Feeling of Stress : Only a little   Social Connections: Unknown (10/2/2023)    Social Connection and Isolation Panel [NHANES]     Frequency of Communication with Friends and Family: More than three times a week     Frequency of Social Gatherings with Friends and Family: More than three times a week     Active Member of Clubs or Organizations: Yes     Attends Club or Organization Meetings: More than 4 times per year     Marital Status:    Housing Stability: Low Risk  (10/2/2023)    Housing Stability Vital Sign     Unable to Pay for Housing in the Last Year: No     Number of Places Lived in the Last Year: 1     Unstable Housing in the Last Year: No

## 2023-10-25 DIAGNOSIS — E79.0 HYPERURICEMIA: ICD-10-CM

## 2023-10-30 RX ORDER — ALLOPURINOL 100 MG/1
TABLET ORAL
Qty: 90 TABLET | Refills: 3 | Status: SHIPPED | OUTPATIENT
Start: 2023-10-30 | End: 2024-02-19 | Stop reason: SDUPTHER

## 2023-11-06 ENCOUNTER — TELEPHONE (OUTPATIENT)
Dept: SPORTS MEDICINE | Facility: CLINIC | Age: 78
End: 2023-11-06
Payer: MEDICARE

## 2023-11-06 NOTE — TELEPHONE ENCOUNTER
Spoke c pt. Informed pt that Dr. Potter will no longer be seeing pts at scheduled appt time on 11/16/23. R/s appt to 0915. Confirmed appt date, time, location. Pt will call c additional questions/concerns in interim. Pt expressed understanding & was thankful.

## 2023-11-08 NOTE — PROGRESS NOTES
HISTORY OF PRESENT ILLNESS  11/16/23  Shannan Norman, a 78 y.o. male, presents today for follow up evaluation of his left SHOULDER. At last appointment, 8/29/2023, patient underwent supraspinatus tendon CSI & pain/symptoms did improve. Pain returned 2 weeks ago. Pain is 3/10 at present & up to 5/10 with provacative activity including ADLs and sleeping at night . He reports worsening pain recently with difficulty moving his arm.     08/29/23  Shannan Norman, a 78 y.o. male, presents today for evaluation of his left SHOULDER. At last appointment, 03/28/22, patient underwent right supraspinatus tendon CSI & pain/symptoms did improve. New LEFT shoulder pain beginning 2 weeks ago, insidious onset. Pain is 0/10 at present & up to 6/10 with provacative activity including  upon waking, forward flexion, abduction . He takes prednisone daily for PMR & have follow up scheduled 10/19/23.     03/28/22  Hand dominance, right    Location:  anterior and lateral   Onset:  chronic, insidious  Palliative:     relative rest   oral analgesics, OTC  Provocative:    glenohumeral ABduction  Prior:  none  Progression:  plateau discomfort  Quality:  sharp  Radiation: none  Severity:  per nursing documentation  Timing:  intermittent with use  Trauma:  none    Review of systems (ROS):  A 10+ review of systems was performed with pertinent positives and negatives noted above in the history of present illness. Other systems were negative unless otherwise specified.    PHYSICAL EXAMINATION  General:  The patient is alert and oriented x 3. Mood is pleasant. Observation of ears, eyes and nose reveal no gross abnormalities. HEENT: NCAT, sclera anicteric. Lungs: Respirations are equal and unlabored.     SHOULDER EXAMINATION     OBSERVATION:     Swelling  none  Deformity  none   Discoloration  none   Scapular winging none   Scars   none  Atrophy  none    TENDERNESS / CREPITUS (T/C):          T/C      T/C   Clavicle   -/-  SUPRAspinatus    -/-     AC  Jt.    -/-  INFRAspinatus  -/-    SC Jt.    -/-  Deltoid    -/-      G. Tuberosity  -/-  LH BICEP groove  +/-   Acromion:  -/-  Midline Neck   -/-     Scapular Spine -/-  Trapezium   -/-   SMA Scapula  -/-  GH jt. line - post  -/-     Scapulothoracic  -/-         ROM:     Right shoulder   Left shoulder        AROM (PROM)   AROM (PROM)   FE    170° (175°)     90° (175°) *    ER at 0°    60°  (65°)    60°  (65°)*   IR (spine level)   T10     Lumbar*    STRENGTH: (* = with pain) RIGHT SHOULDER  LEFT SHOULDER   SCAPTION   5/5    4/5*    IR    5/5    4/5*   ER    5/5    4/5*   BICEPS   5/5    5/5   Deltoid    5/5    5/5     SIGNS:  Painful side       NEER   +   ORODYS        -    MAYO   +   SPEEDS        +   DROP ARM   +   BELLY PRESS       -    X-Body ADD    +   LIFT-OFF        -   HORNBLOWERS      -              STABILITY TESTING   RIGHT SHOULDER  LEFT SHOULDER     Translation     Anterior up face    up face    Posterior up face   up face    Sulcus  < 10mm   < 10 mm     Signs   Apprehension   neg     neg       Relocation   no change    no change      Jerk test  neg    neg    EXTREMITY NEURO-VASCULAR EXAM    Sensation grossly intact to light touch all dermatomal regions.    DTR 2+ Biceps, Triceps, BR and Negative Marces sign   Grossly intact motor function at Elbow, Wrist and Hand   Distal pulses radial and ulnar 2+, brisk cap refill, symmetric.      NECK:  Painless FROM and spinous processes non-tender. Negative Spurlings sign.       Other Findings:    ASSESSMENT & PLAN   Assessment  #1 Osteoarthritis of glenohumeral joint, left /d   W/ supraspinatus tendinosis    No evidence of neurologic pathology  No evidence of vascular pathology    Imaging studies reviewed:   X-ray shoulder, bilat 22.03    Plan  Given extreme dysfunction and discomfort, coupled with history and physical examination suggesting rotator cuff pathology, and with non-diagnostic x-ray imaging, we will obtain MRI imaging for further  evaluation of the soft tissue structures of the shoulder.     We discussed options including    Watchful waiting / relative rest X   Physical therapy    Injection therapy    Consultation    The patient chooses As above   x = prescribed  CSI = corticosteroid injection  VSI = viscosupplement injection  PRPI = platelet rich plasma injection  ia = intra articular  R = right  L = left  B = bilateral   nfSx = surgical consultation was recommended, but patient is not interested in consultation at this time    Physical Therapy        Formal (fPT), @ [facility] facility    Formal (fPT), @ [facility] facility        Homegoing (hgPT), per concurrent fPT recommendations    Homegoing (hgPT), per prior fPT recommendations    Homegoing (hgPT), handout provided        w/  (atPT)    [blank] = not prescribed  x = prescribed  b = prescribed, and begin as indicated  t = continue as indicated  r = prescribed, and restart as indicated  p = completed prior as indicated  hs = prescribed, and with high school   col = prescribed, and with college or university   nfPT = physical therapy was recommended, but patient is not interested in PT at this time    Activity (e.g. sports, work) restrictions    [blank] = as tolerated  pt = per physical therapist  at = per   NWB = non weight bearing on affected lower extremity, with crutches assistance for ambulation    Bracing    [blank] = not prescribed  r = recommended, but not fit with at todays visit  f = prescribed and fit with at todays visit  t = continue as indicated  d = d/c  p = as needed  rare = use on rare, as-needed basis; advised against chronic use    Pain management    [blank] = No prescription necessary. A handout detailing dosing of appropriate   over-the-counter musculoskeletal analgesics was made available to the patient.   m = meloxicam x 14 days  mp = 14 day course of meloxicam prescribed prior    Follow up MRI   [blank] = as  needed  [number] = in [number] weeks  CSI = for corticosteroid injection  VSI = for viscosupplement injection or injection series  PRP = for platelet rich plasma injection or injection series  MRI = after MRI imaging  ns = should surgical options be deferred (no surgery)  o = appointment offered, deferred by patient    Should symptoms worsen or fail to resolve, consider    Revisiting the above options and / or      Vocation:   yoga  rec golfer  wife w/ advanced dementia

## 2023-11-16 ENCOUNTER — HOSPITAL ENCOUNTER (OUTPATIENT)
Dept: RADIOLOGY | Facility: HOSPITAL | Age: 78
Discharge: HOME | End: 2023-11-16
Attending: FAMILY MEDICINE
Payer: MEDICARE

## 2023-11-16 ENCOUNTER — OFFICE VISIT (OUTPATIENT)
Dept: SPORTS MEDICINE | Facility: CLINIC | Age: 78
End: 2023-11-16
Payer: MEDICARE

## 2023-11-16 ENCOUNTER — PATIENT MESSAGE (OUTPATIENT)
Dept: SPORTS MEDICINE | Facility: CLINIC | Age: 78
End: 2023-11-16

## 2023-11-16 ENCOUNTER — TELEPHONE (OUTPATIENT)
Dept: ELECTROPHYSIOLOGY | Facility: CLINIC | Age: 78
End: 2023-11-16
Payer: MEDICARE

## 2023-11-16 VITALS — HEIGHT: 73 IN | TEMPERATURE: 98 F | BODY MASS INDEX: 27.76 KG/M2 | WEIGHT: 209.44 LBS

## 2023-11-16 DIAGNOSIS — M19.012 GLENOHUMERAL ARTHRITIS, LEFT: ICD-10-CM

## 2023-11-16 DIAGNOSIS — M67.912 ROTATOR CUFF DYSFUNCTION, LEFT: ICD-10-CM

## 2023-11-16 DIAGNOSIS — M25.512 CHRONIC LEFT SHOULDER PAIN: Primary | ICD-10-CM

## 2023-11-16 DIAGNOSIS — G89.29 CHRONIC LEFT SHOULDER PAIN: Primary | ICD-10-CM

## 2023-11-16 DIAGNOSIS — Z95.0 PACEMAKER: ICD-10-CM

## 2023-11-16 PROCEDURE — 99214 OFFICE O/P EST MOD 30 MIN: CPT | Mod: 25,S$GLB,, | Performed by: FAMILY MEDICINE

## 2023-11-16 PROCEDURE — 99999 PR PBB SHADOW E&M-EST. PATIENT-LVL IV: CPT | Mod: PBBFAC,,, | Performed by: FAMILY MEDICINE

## 2023-11-16 PROCEDURE — 1125F AMNT PAIN NOTED PAIN PRSNT: CPT | Mod: CPTII,S$GLB,, | Performed by: FAMILY MEDICINE

## 2023-11-16 PROCEDURE — 1101F PR PT FALLS ASSESS DOC 0-1 FALLS W/OUT INJ PAST YR: ICD-10-PCS | Mod: CPTII,S$GLB,, | Performed by: FAMILY MEDICINE

## 2023-11-16 PROCEDURE — 3072F LOW RISK FOR RETINOPATHY: CPT | Mod: CPTII,S$GLB,, | Performed by: FAMILY MEDICINE

## 2023-11-16 PROCEDURE — 99214 PR OFFICE/OUTPT VISIT, EST, LEVL IV, 30-39 MIN: ICD-10-PCS | Mod: 25,S$GLB,, | Performed by: FAMILY MEDICINE

## 2023-11-16 PROCEDURE — 3288F PR FALLS RISK ASSESSMENT DOCUMENTED: ICD-10-PCS | Mod: CPTII,S$GLB,, | Performed by: FAMILY MEDICINE

## 2023-11-16 PROCEDURE — 1125F PR PAIN SEVERITY QUANTIFIED, PAIN PRESENT: ICD-10-PCS | Mod: CPTII,S$GLB,, | Performed by: FAMILY MEDICINE

## 2023-11-16 PROCEDURE — 3288F FALL RISK ASSESSMENT DOCD: CPT | Mod: CPTII,S$GLB,, | Performed by: FAMILY MEDICINE

## 2023-11-16 PROCEDURE — 99999 PR PBB SHADOW E&M-EST. PATIENT-LVL IV: ICD-10-PCS | Mod: PBBFAC,,, | Performed by: FAMILY MEDICINE

## 2023-11-16 PROCEDURE — 1159F MED LIST DOCD IN RCRD: CPT | Mod: CPTII,S$GLB,, | Performed by: FAMILY MEDICINE

## 2023-11-16 PROCEDURE — 3072F PR LOW RISK FOR RETINOPATHY: ICD-10-PCS | Mod: CPTII,S$GLB,, | Performed by: FAMILY MEDICINE

## 2023-11-16 PROCEDURE — 1101F PT FALLS ASSESS-DOCD LE1/YR: CPT | Mod: CPTII,S$GLB,, | Performed by: FAMILY MEDICINE

## 2023-11-16 PROCEDURE — 1159F PR MEDICATION LIST DOCUMENTED IN MEDICAL RECORD: ICD-10-PCS | Mod: CPTII,S$GLB,, | Performed by: FAMILY MEDICINE

## 2023-11-16 NOTE — TELEPHONE ENCOUNTER
Received a message from Leandra Haynes in relation to this patient needing to be scheduled for a MRI and has a [employer] Pacemaker.  Please see information below as it relates to patient's Device being MRI compatible/conditional.  To meet protocol the Pacemaker/ICD must have been implanted no less than 6 weeks prior to scheduled date of Scan.  ICD/PPM and leads must be from same .  MRI informed ordering MD must input a Cardiac Device Check in clinic and hospital order, patient must have an xray within 6 months or less prior to MRI reprogramming.  Chest xray to be reviewed by Radiologist.      MRI to be scheduled by June in the MRI dept who will then notify the Device Clinic to arrange for [employer] Rep to be available for reprogramming.

## 2023-11-16 NOTE — TELEPHONE ENCOUNTER
----- Message from Leandra Rivas sent at 11/16/2023 10:04 AM CST -----  Good morning -     Pt has a pacemaker & needs MRI.     MRI, chest XR, & cardiac orders have been placed.     Please reach out to pt for scheduling.     Thank you in advance -  ET

## 2023-11-21 ENCOUNTER — PATIENT MESSAGE (OUTPATIENT)
Dept: SPORTS MEDICINE | Facility: CLINIC | Age: 78
End: 2023-11-21
Payer: MEDICARE

## 2023-11-21 NOTE — PROGRESS NOTES
Spoke c pt. Advised that radiology scheduler & cardiac device team were notified of his need for MRI 11/16/23. Apologized that they have not yet reached out to him. Informed him Dr. Potter is out of the office this week & unable to see him in clinic before he leaves for trip. Advised that repeat high priority message has been sent to MRI  & cardiac device team. Pt will call c additional questions/concerns in interim. Pt expressed understanding & was thankful.

## 2023-11-29 ENCOUNTER — DOCUMENTATION ONLY (OUTPATIENT)
Dept: CARDIOLOGY | Facility: HOSPITAL | Age: 78
End: 2023-11-29
Payer: MEDICARE

## 2023-11-29 ENCOUNTER — DOCUMENTATION ONLY (OUTPATIENT)
Dept: ELECTROPHYSIOLOGY | Facility: CLINIC | Age: 78
End: 2023-11-29
Payer: MEDICARE

## 2023-11-29 NOTE — PROGRESS NOTES
Received a message from June in the MRI Department in relation to this patient needing to be scheduled for a MRI and has a [employer]  Pacemaker.  Please see information below as it relates to patient's Device being MRI compatible/conditional.  To meet protocol the Pacemaker/ICD must have been implanted no less than 6 weeks prior to scheduled date of Scan.  ICD/PPM and leads must be from same .  MRI informed ordering MD must input a Cardiac Device Check in clinic and hospital order, patient must have an xray within 6 months or less prior to MRI reprogramming.  Chest xray to be reviewed by Radiologist.    MRI scheduled on 12/4/23 @ Noon.  [employer] notified on (11/29/23).  Confirmation received from Rep.

## 2023-11-30 ENCOUNTER — TELEPHONE (OUTPATIENT)
Dept: NEUROLOGY | Facility: CLINIC | Age: 78
End: 2023-11-30
Payer: MEDICARE

## 2023-11-30 NOTE — TELEPHONE ENCOUNTER
Called and spoke to patient to reschedule his appt this afternoon with Dr. Adams. Stated dr. Adams was out sick this afternoon and could rescheduled for 12/5 at 1:00. Patient confirmed and asked this just be put in the portal. Patient was rescheduled and would reach out if anything changed for him in the meantime.

## 2023-12-04 ENCOUNTER — HOSPITAL ENCOUNTER (OUTPATIENT)
Dept: RADIOLOGY | Facility: HOSPITAL | Age: 78
Discharge: HOME | End: 2023-12-04
Attending: FAMILY MEDICINE
Payer: MEDICARE

## 2023-12-04 ENCOUNTER — PATIENT MESSAGE (OUTPATIENT)
Dept: SPORTS MEDICINE | Facility: CLINIC | Age: 78
End: 2023-12-04
Payer: MEDICARE

## 2023-12-04 VITALS — HEART RATE: 78 BPM

## 2023-12-04 DIAGNOSIS — M67.912 ROTATOR CUFF DYSFUNCTION, LEFT: ICD-10-CM

## 2023-12-04 DIAGNOSIS — G89.29 CHRONIC LEFT SHOULDER PAIN: ICD-10-CM

## 2023-12-04 DIAGNOSIS — M19.012 GLENOHUMERAL ARTHRITIS, LEFT: ICD-10-CM

## 2023-12-04 DIAGNOSIS — M25.512 CHRONIC LEFT SHOULDER PAIN: ICD-10-CM

## 2023-12-04 PROCEDURE — 73221 MRI JOINT UPR EXTREM W/O DYE: CPT | Mod: TC,LT

## 2023-12-04 PROCEDURE — 71046 X-RAY EXAM CHEST 2 VIEWS: CPT | Mod: 26,,, | Performed by: RADIOLOGY

## 2023-12-04 PROCEDURE — 71046 XR CHEST PA AND LATERAL: ICD-10-PCS | Mod: 26,,, | Performed by: RADIOLOGY

## 2023-12-04 PROCEDURE — 73221 MRI JOINT UPR EXTREM W/O DYE: CPT | Mod: 26,LT,, | Performed by: RADIOLOGY

## 2023-12-04 PROCEDURE — 73221 MRI SHOULDER WITHOUT CONTRAST LEFT: ICD-10-PCS | Mod: 26,LT,, | Performed by: RADIOLOGY

## 2023-12-04 PROCEDURE — 71046 X-RAY EXAM CHEST 2 VIEWS: CPT | Mod: TC,FY

## 2023-12-04 NOTE — PROGRESS NOTES
Pt arrived earlier and instructed to go to 2nd floor for chest films / pt understanding and upon completion MRI staff escorted pt back to MRI Zone 23 where [employer] Castro placed device to MRI safe mode / opt to table and placed to monitor

## 2023-12-05 ENCOUNTER — OFFICE VISIT (OUTPATIENT)
Dept: NEUROLOGY | Facility: CLINIC | Age: 78
End: 2023-12-05
Payer: MEDICARE

## 2023-12-05 DIAGNOSIS — G31.9 NEURODEGENERATIVE COGNITIVE IMPAIRMENT: ICD-10-CM

## 2023-12-05 DIAGNOSIS — E11.40 TYPE 2 DIABETES MELLITUS WITH DIABETIC NEUROPATHY, WITHOUT LONG-TERM CURRENT USE OF INSULIN: ICD-10-CM

## 2023-12-05 DIAGNOSIS — R41.89 SUBJECTIVE COGNITIVE IMPAIRMENT: Primary | ICD-10-CM

## 2023-12-05 DIAGNOSIS — G20.C PARKINSONISM, UNSPECIFIED PARKINSONISM TYPE: ICD-10-CM

## 2023-12-05 DIAGNOSIS — M35.3 POLYMYALGIA RHEUMATICA: ICD-10-CM

## 2023-12-05 DIAGNOSIS — R41.9 DEFICIT IN COMPREHENSION: ICD-10-CM

## 2023-12-05 DIAGNOSIS — R41.3 MEMORY PROBLEM: ICD-10-CM

## 2023-12-05 PROCEDURE — 99215 OFFICE O/P EST HI 40 MIN: CPT | Mod: 95,,, | Performed by: PSYCHIATRY & NEUROLOGY

## 2023-12-05 PROCEDURE — 96116 NUBHVL XM PHYS/QHP 1ST HR: CPT | Mod: 95,59,, | Performed by: PSYCHIATRY & NEUROLOGY

## 2023-12-05 PROCEDURE — 96116 PR NEUROBEHAVIORAL STATUS EXAM BY PSYCH/PHYS: ICD-10-PCS | Mod: 95,59,, | Performed by: PSYCHIATRY & NEUROLOGY

## 2023-12-05 PROCEDURE — 1159F MED LIST DOCD IN RCRD: CPT | Mod: CPTII,95,, | Performed by: PSYCHIATRY & NEUROLOGY

## 2023-12-05 PROCEDURE — 99215 PR OFFICE/OUTPT VISIT, EST, LEVL V, 40-54 MIN: ICD-10-PCS | Mod: 95,,, | Performed by: PSYCHIATRY & NEUROLOGY

## 2023-12-05 PROCEDURE — 3072F PR LOW RISK FOR RETINOPATHY: ICD-10-PCS | Mod: CPTII,95,, | Performed by: PSYCHIATRY & NEUROLOGY

## 2023-12-05 PROCEDURE — 1159F PR MEDICATION LIST DOCUMENTED IN MEDICAL RECORD: ICD-10-PCS | Mod: CPTII,95,, | Performed by: PSYCHIATRY & NEUROLOGY

## 2023-12-05 PROCEDURE — 3072F LOW RISK FOR RETINOPATHY: CPT | Mod: CPTII,95,, | Performed by: PSYCHIATRY & NEUROLOGY

## 2023-12-05 PROCEDURE — 1160F RVW MEDS BY RX/DR IN RCRD: CPT | Mod: CPTII,95,, | Performed by: PSYCHIATRY & NEUROLOGY

## 2023-12-05 PROCEDURE — 1160F PR REVIEW ALL MEDS BY PRESCRIBER/CLIN PHARMACIST DOCUMENTED: ICD-10-PCS | Mod: CPTII,95,, | Performed by: PSYCHIATRY & NEUROLOGY

## 2023-12-05 NOTE — PROGRESS NOTES
"[facility]     PATIENT: Shannan Norman  VISIT DATE: 2023  MRN: [id number]  PRIMARY PROVIDER: Nitin Banks MD  : 1945       Chief complaint: Progressive Cognitive Impairment     History of present illness:      The patient is a 78-year-old right-handed male who presents today to the [facility] due to concerns related to Progressive Cognitive Impairment.  The patient is accompanied by no one.  Additional information is obtained by reviewing available medical records.     Relevant Background/Context  Known Relevant Family history:  Mother -  at age 83 CAD s/p CABG  Father -  at age 72 CAD/MI/COPD/Tobacco use  Neurocognitive Disorder:  Father - MCI onset 70s  Mother - MCI onset 80s  Sister - DLB alive 87, onset early 80s  Movement Disorder:  Sister - DLB onset early 80s  Motorneuron Disorder:  The patient/family denies a history of ALS, MND, PLS.  Developmental Disorder:  The patient/family denies a history of Dyslexia, ADHD, ASD.  Psychiatric Disorder:  Sisters - "issues"  Known Relevant Genetics:  There is no known relevant genetic testing available.  Developmental Milestones:  The patient/family report no known birth complications or early life problems. The patient met all developmental milestones.  Education/Learning Capacity:  The patient/family report no signs or symptoms suggestive of developmental learning disorder.  HS  BA. + 5 years economics  Estimated Educational Experience: 17 years of formal education.  Social History:  Patient lives with his wife for whom he is the primary caregiver. She is stage 7 late onset amnestic dementia. Patient continues to volunteer in his community is involved in multiple social programs volunteering groups. Patient lives in a senior community adjacent to a gym for which patient gets 30 minutes of cardiovascular exercise 3 times week and 3 times " week he additionally gets 30-45 minutes of yoga.  Career/Skill Reserve:  Patient has a long productive career in various capacities. Patient started out his career as an  /  working in various large [employer]. 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 Since then patient has been volunteering for various [employer] organizations  Retired/Quit: 2000     Neurocognitive Disorder Features  Onset/Duration:  Jun 2022 (~1-year)  First Symptom:  Attention impairment  Progression:  Step-wise Progressive  Clinical Course:  Neurologist (03/21/2022)  Type: Chart Review. Marie the patient is a R HANDED 76 y. O. Male with a medical issues significant for HTN, gout, DMII, CKDIII, BPH, SSS, and osteoarthritis of bilateral hips. Accompanied by his son. Noticed stiffness in his bilateral hips. Not affecting his balance. Scheduled to see rheumatology in June. Has trouble lifting his arms above his shoulders, this started 2-3 weeks ago when he was having pain in his knees and hips. He was told that he needed to see neurology by orthopedic. Thought he had parkinson's. Some days he is able to lift his arms above his shoulders and some days he is not able to. Episodic pain in hips, lumbar spine and knees as well. No falls. Some shuffling according to his son this past weekend but now has noticed that he is back to normal. He was not able to lift his arms above his waist on Sunday or Monday. He denies tremors but his son has noticed a tremor whenever he holds a plate -- he attributes to reduced strength. He only feels like he has slowed down whenever he has episodes of weakness. Intermittent tightness in his calves -- he said that this is not a good sign. He was completely normal until about a month ago when he started having severe shoulder and hip pain. Denies episodic eye pain or blurred vision. Denies sudden vision loss. ESR and  CRP recently elevated. ESR 87 and CRP 42. 5. - history concerning for rheumatologic process --- possibly polymyalgia rheumatica in setting of pelvic and shoulder pain, episodic. - elevated CRP and ESR. - states that when this pain occurs he is also weak but unclear if this is true weakness or just immense pain. - denies history concerning for GCA. - scheduled to see rheumatology in June-- will see if there is something we can do to expedite appointment. - no parkinsonism on exam today -- stiffness if NOT episodic in PD, also unable to appreciate cogwheel rigidity even with diversion.  Primary Care Provider (11/15/2022)  Type: Chart Review. 77 years old male came to the clinic for high blood pressure check. Blood pressure today was stable. No chest pain, palpitation, orthopnea or PND. The patient previously diagnosed with PMR he is currently taking prednisone 5 mg daily. The patient reports problems with concentration and memory sometimes.  [facility] - Nimesh Adams MD. Neurologist (01/25/2023)  Type: Chart Review. The patient presents alone as such history is limited at this time. Additional history gathered from review of previous medical records. The patient reports being in her normal state health up until early 2022. During this time the patient began reporting painful stiffness having difficulty getting out of the bed with bilateral shoulder pain hip pain. Report this to worsened throughout the day was very bad at night. He was seen by a movement specialist. At this time the patient has lost 20 lb and mild change in voice was treating with over-the-counter anti-inflammatories prescribed Celebrex. Diagnosis was thought to be rheumatological. The patient was referred to Rheumatology. The patient was diagnosed with polymyalgia rheumatica in April of 2022 and started on steroids. Over the following 8 months patient's hemoglobin A1c would gradually rise the patient's resting fasting glucose during  the daytime would state largely within normal limits. During this same timeframe beginning in approximately June to July of 2022 the patient began reporting increasing concentration and attention deficits with prominent train of thought deficits. The patient described difficulty focusing concentrating on certain tasks including forgetting what he was doing mid task. This has gradually waxed and waned over the last 7 months however is not interfering with any instrumental activities of daily living. The patient denies any his family or friends have made comment on his memory or multitasking skills. The patient remains active in his community involved in multiple social groups is retired in his taking care of his wife with severe late onset amnestic dementia. The patient reports a relevant his family history of late onset Lewy body disease in his older sister. The patient denies any significant visual spatial problems, behavioral problems, language problems, or constitutional problems. The patient does report bothersome sleep disorder in part related to his need to care for his wife who is having difficulty maintaining restful sleep at in the evening. Primary questions posed by the patient today is measures to improve longevity given his need to care for his wife. The observations made above, were discussed with the patient and his family. We recommend screening for reversible causes of cognitive impairment with serum laboratories. We recommend an MRI brain for screening for anatomical lesions and other causes of cognitive impairment. We have discussed the MIND Diet and other lifestyle behavior that may help maintain brain health. We have discussed opportunities for biomarker testing ([facility] [place] biomarkers, IDEAs Amyloid-PET, Syn-One skin biopsy) - the patient not interested at this time. We have discussed opportunities for genetic testing (Invitae, GeneDx) - the patient not interested at this time.  [facility] - Nimesh Adams MD. Neurologist (04/04/2023)  Type: Chart Review. Since last time seen, serum laboratories are largely within normal limits for persistently elevated CRP and relatively normal ESR. MRI brain shows generalized cortical atrophy slightly more than expected for age with subcortical white matter changes consistent with inflammatory process and consistent with known history of polymyalgia rheumatica. On presentation the patient reports a mild improvement of subjective cognitive impairment following the passing of his wife. Patient's his wife passed in February. The patient is still in agreement. His insomnia has improvement day-to-day stress has improved with this improvement in stress his subjective cognitive impairment has also lessened. We discussed this in relevant to patient's documented objective subjective cognitive impairment. We discussed that his MRI brain shows no significant cortical atrophy. We discussed that his examination and his family history are concerning for early types of parkinsonism. We discussed additional diagnostic testing. The patient is not interested at this time. Discussed generalized means of improving long-term cognitive function. We expressed concern regarding patient's sleeping habits sleep behavior potentially concerning for sleep apnea. We discussed additional diagnostic testing. The patient would like to screen with [employer]. We are in agreement. No additional changes of medications at this time. The observations made above, were discussed with the patient and his family.  Primary Care Provider (10/09/2023)  Type: Chart Review. 78 years old male came to the clinic for blood pressure check. Blood pressure today was stable. No chest pain, palpitation, orthopnea PND. The patient still with problems with concentration and memory. He is requesting follow-up appointment with the neurologist. Last A1c was stable. No polyuria, polydipsia or polyphagia. He is  requesting a medicine to help with erectile dysfunction sometimes.     Current Presentation  Recent/Interim History:  Last time seen the patient was diagnosed with subjective cognitive impairment however there were multiple signs and symptoms concerning for early signs of an alpha synuclein in mediated disease potentially provoked by chronic inflammation related to polymyalgia rheumatica hyperglycemia. We discussed the value of diagnostic testing. The patient is not interested. The patient was subsequently lost to follow-up. On presentation today the patient denies any significant progression of symptoms over the last 2 years. However this can either be confirmed motor  as there is no historian. The patient came continues report bothersome orthopedic issues all largely related to polymyalgia rheumatica. The patient has recently underwent MRI brain with shoulder due to encapsulated tendonitis. We reviewed patient's medications. All medications are in order. The patient denies any new sleep issues movement problems walking problems balancing problems behavioral changes cognitive changes judgment changes. Review of systems otherwise negative. The patient declines any additional workup in the form serum biomarkers CSF testing or neurocognitive assessment. At this time the patient is no longer interested in pursuing additional care. Follow-up with primary care moving forward.  Unresolved Concern(s) reported by patient/family:  Atrial pacemaker - do not recommend donepezil  Family history of Lewy body dementia     Review of cognitive, visuospatial, motor, sensory, and behavioral systems:     Memory:   The patient's memory has worsened in the past few years.  He does repeat statements or asks the same question repeatedly.  He does not have difficulty remembering recent important conversations.  He does not have difficulty remembering recent events.  He does not forget information within minutes.  His recent retrograde  memory is intact.  His remote memory is intact.  Attention:   The patient's attention and concentration are impaired.  He does not have attentional fluctuations.  He does not have difficulty maintaining selective attention.  He does become easily distracted.  He does have difficulty with divided attention.  Executive:   The patient's cognitive processing speed is slower.  He does have difficulty with working memory.  He does not misplace personal items (e.g., keys, cell phone, wallet) more frequently.  He does not have difficulty keeping track of his medications.  He does not have difficulty with planning/organizing/completing multistep tasks.  He does have not difficulty with executive attention.  He does have difficulty with flexible thinking.  He does not have difficulty with response inhibition.  He denies new impulsivity or rash/careless actions.  His judgment is intact.  Language:   The patient's speech is not affected.  He does not forget people's names more frequently.  He does not have word-finding difficulties.  His speech is fluent and non-effortful.  His speech is grammatically intact.  He does not make word substitutions.  He does not have difficulty reading.  He does not appear to have impaired comprehension.  Visuospatial:   The patient does not have new visuospatial difficulty.  He does not become confused or disoriented in *new*, unfamiliar places.  He does not have trouble with navigation.  He does not get lost in familiar places.  He does not have visuospatial disorientation.  He does not have difficulty recognizing objects or faces.  He denies problems with driving or parking.  Motor/Coordination:   The patient does not have difficulty with walking.  He does not feel imbalanced.  He denies having fallen.  He does not appear to have new muscle weakness.  He does not have difficulty buttoning shirts, operating zippers, or manipulating tools/utensils.  His handwriting has not become  micrographic.  He does not have a resting tremor.  He denies having any new involuntary movements and/or muscle jerking.  He does not have swallowing difficulty.  He denies new muscle cramps and twitching.  Sensory:   The patient denies new numbness, tingling, paresthesias, or pain.  The patient denies a loss of vision, blurry vision, or double vision.  The patient denies new loss of hearing or worsening tinnitus.  The patient denies anosmia.  Sleep:   The patient reports difficulty sleeping.  The patient does not have difficulty going to sleep.  The patient reports difficulty staying asleep and/or frequently awakening at night.  The patient does snore and/or have witnessed apneas while sleeping.  When he wakes up in the morning, he does feel well-rested.  He denies dream-enactment behavior.  He denies symptoms suggestive of restless leg syndrome.  Behavior:   The patient's personality has not changed.  He does not have symptoms of disinhibition and social inappropriateness.  He does not have symptoms to suggest a loss of manners or decorum.  He does not appear apathetic or has decreased motivation.  He does not appear to have a change in inertia.  There is no report that The patient has had a change in their emotional expression.  He does not have emotional blunting or lability.  He does not have symptoms of irritability and mood lability.  He does not have symptoms of agitation, aggression, or violent outbursts.  His insight into his health and situation is intact.  His personal hygiene is intact.  He is not exhibiting a diminished response to other people's needs and feelings.  He is not exhibiting a diminished social interest, interrelatedness, or personal warmth.  He denies restlessness.  He denies new and/or worsening simple repetitive behaviors.  His speech has not become simplified or become repetitive/stereotyped.  He denies new/worsening complex repetitive/ritualistic compulsions and behaviors.  He does  not have symptoms of hyper-religiosity or dogmatism.  His interests/pleasures have not become restrictive, simplified, interrupting, or repetitive.  He denies a change of self-stimulating behavior.  He denies any changes in eating behavior.  He denies increased consumption of food or substances.  He denies oral exploration or consumption of inedible objects.  Psychiatric:   He does not feel depressed.  He is not exhibiting symptoms of social withdrawal/indifference.  He denies anxiety.  He does not exhibit cycling behavior.  He does not exhibit hyperactive behavior.  He is not exhibited symptoms of paranoia.  He does not have delusions.  He does not have hallucinations.  He does not have a history of sensitivity to neuroleptic/psychotropic medications.  Medical Review of Systems:   The patient does not have constipation.  The patient does not have urinary incontinence.  The patient denies orthostatic lightheadedness.  The patient's weight is stable.  Functional status:  Difficulty performing the following Instrumental ADLs:  Housekeeping: No  Food Preparation: No  Shopping: No  Ability to Handle Finances: No  Transportation/Driving: No  Household Appliances/Stove: No  Laundry: No  Difficulty performing the following Basic ADLs:  Dressing: No  Bathing: No  Toileting: No  Personal hygiene and grooming: No  Feeding: No  Care Management:  Patient/Family Safety Concerns:  Medication Adherence: No  Home Safety: No  Wandered: No  Firearms: No  Fall Risk: No  Home Alone: No       Past Medical History:   Diagnosis Date    Allergy     Arthritis     Atrial fibrillation     Atrial flutter 2011    ablation    Basal cell carcinoma     Cancer     Cataract     CKD (chronic kidney disease) stage 3, GFR 30-59 ml/min 8/13/2019    Diabetes mellitus     Dry eye syndrome     Gout, unspecified     High cholesterol     History of shingles     Hypertension     Melanoma     Seizures        Past Surgical History:   Procedure Laterality Date     ABLATION N/A 9/11/2018    Procedure: ABLATION;  Surgeon: Hany Sanchez MD;  Location: [facility] CATH LAB;  Service: Cardiology;  Laterality: N/A;  AFL, ISABELLE, RFA, ELODIA, MAC, DM, 3 PREP    ABLATION OF DYSRHYTHMIC FOCUS      ADENOIDECTOMY      CARDIAC PACEMAKER PLACEMENT      no defib    CATARACT EXTRACTION W/  INTRAOCULAR LENS IMPLANT Right 7/28/2020    Procedure: EXTRACTION, CATARACT, WITH IOL INSERTION;  Surgeon: Andrés Morse MD;  Location: [facility] OR;  Service: Ophthalmology;  Laterality: Right;    CATARACT EXTRACTION W/  INTRAOCULAR LENS IMPLANT Left 8/11/2020    Procedure: EXTRACTION, CATARACT, WITH IOL INSERTION;  Surgeon: Andrés Morse MD;  Location: [facility] OR;  Service: Ophthalmology;  Laterality: Left;    COLONOSCOPY N/A 1/2/2019    Procedure: COLONOSCOPY Suprep;  Surgeon: Cindy Solorzano MD;  Location: [facility] ENDO;  Service: Endoscopy;  Laterality: N/A;    GANGLION CYST EXCISION      left neck    HERNIA REPAIR  2013    umbilical    TENDON REPAIR Right     wrist    TONSILLECTOMY      varicose veins      several sx  bilateral    VASECTOMY      VEIN SURGERY         Family History   Problem Relation Age of Onset    Diabetes Mother     COPD Father     Heart disease Father     Arthritis Sister     Heart attack Brother     Heart disease Brother     Diabetes Brother     No Known Problems Maternal Aunt     No Known Problems Maternal Uncle     No Known Problems Paternal Aunt     No Known Problems Paternal Uncle     No Known Problems Maternal Grandmother     No Known Problems Maternal Grandfather     No Known Problems Paternal Grandmother     No Known Problems Paternal Grandfather     No Known Problems Son     Cancer Sister         lymph node, breast    No Known Problems Sister     No Known Problems Son     Amblyopia Neg Hx     Blindness Neg Hx     Cataracts Neg Hx     Glaucoma Neg Hx     Hypertension Neg Hx     Macular degeneration Neg Hx     Retinal detachment Neg Hx     Strabismus Neg Hx     Stroke  Neg Hx     Thyroid disease Neg Hx     Prostate cancer Neg Hx     Kidney disease Neg Hx     Melanoma Neg Hx        Social History     Socioeconomic History    Marital status:    Occupational History     Employer: [employer]   Tobacco Use    Smoking status: Never    Smokeless tobacco: Never   Substance and Sexual Activity    Alcohol use: Yes     Alcohol/week: 7.0 - 14.0 standard drinks of alcohol     Types: 7 - 14 Glasses of wine per week    Drug use: No    Sexual activity: Yes     Partners: Female     Social Determinants of Health     Financial Resource Strain: Low Risk  (12/5/2023)    Overall Financial Resource Strain (CARDIA)     Difficulty of Paying Living Expenses: Not hard at all   Food Insecurity: No Food Insecurity (12/5/2023)    Hunger Vital Sign     Worried About Running Out of Food in the Last Year: Never true     Ran Out of Food in the Last Year: Never true   Transportation Needs: No Transportation Needs (12/5/2023)    PRAPARE - Transportation     Lack of Transportation (Medical): No     Lack of Transportation (Non-Medical): No   Physical Activity: Inactive (12/5/2023)    Exercise Vital Sign     Days of Exercise per Week: 0 days     Minutes of Exercise per Session: 30 min   Stress: No Stress Concern Present (12/5/2023)    [place] [employer] - Occupational Stress Questionnaire     Feeling of Stress : Only a little   Social Connections: Unknown (12/5/2023)    Social Connection and Isolation Panel [NHANES]     Frequency of Communication with Friends and Family: More than three times a week     Frequency of Social Gatherings with Friends and Family: Three times a week     Active Member of Clubs or Organizations: Yes     Attends Club or Organization Meetings: More than 4 times per year     Marital Status:    Housing Stability: Low Risk  (12/5/2023)    Housing Stability Vital Sign     Unable to Pay for Housing in the Last Year: No     Number of Places Lived in the Last  Year: 1     Unstable Housing in the Last Year: No       Medication:     Current Outpatient Medications on File Prior to Visit   Medication Sig Dispense Refill    allopurinoL (ZYLOPRIM) 100 MG tablet Take 1 tablet by mouth once daily 90 tablet 3    atorvastatin (LIPITOR) 40 MG tablet Take 1 tablet (40 mg total) by mouth once daily. 90 tablet 3    benazepriL (LOTENSIN) 20 MG tablet Take 1 tablet (20 mg total) by mouth once daily. 90 tablet 3    cycloSPORINE (RESTASIS) 0.05 % ophthalmic emulsion Place 1 drop into both eyes 2 (two) times daily. 60 each 11    econazole nitrate 1 % cream Apply topically 2 (two) times daily. 85 g 3    ferrous sulfate (FEOSOL) 325 mg (65 mg iron) Tab tablet Take 325 mg by mouth once daily.      GLUCOSAMINE HCL/CHONDR VASQUEZ A NA (GLUCOSAMINE-CHONDROITIN) 750-600 mg Tab Take 2 tablets by mouth Daily.      levocetirizine (XYZAL) 5 MG tablet Take 1 tablet (5 mg total) by mouth every evening. 90 tablet 1    metoprolol tartrate (LOPRESSOR) 25 MG tablet Take 1 tablet (25 mg total) by mouth 2 (two) times daily as needed (palpitations). 45 tablet 0    multivitamin capsule Take 1 capsule by mouth nightly.       sildenafiL (VIAGRA) 50 MG tablet Take 1 tablet (50 mg total) by mouth daily as needed for Erectile Dysfunction. 30 tablet 06    tamsulosin (FLOMAX) 0.4 mg Cap TAKE 1 CAPSULE BY MOUTH AFTER DINNER 90 capsule 3    UNABLE TO FIND 6000 hydrolyzes collagen      XARELTO 20 mg Tab TAKE 1 TABLET BY MOUTH ONCE DAILY WITH SUPPER OR  EVENING  MEAL 90 tablet 3     No current facility-administered medications on file prior to visit.        Review of patient's allergies indicates:  No Known Allergies    Medications Reconciliation:   I have reconciled the patient's home medications and discharge medications with the patient/family. I have updated all changes.  Refer to After-Visit Medication List.    Objective:  Vital Signs:  There were no vitals filed for this visit.  Wt Readings from Last 3 Encounters:    11/16/23 0923 95 kg (209 lb 7 oz)   10/24/23 1100 95 kg (209 lb 7 oz)   10/19/23 1010 96.4 kg (212 lb 8.4 oz)     There is no height or weight on file to calculate BMI.           Neurological examination:  Mental Status:   His appearance is normal (hygiene is appropriate; attire is proper and clean).  Throughout the interview, he is cooperative, his eye contact is appropriate.  His behavior is appropriate to the clinical context without impropriety or improper language/conduct.  His behavior was not characterized by episodes of sudden uncontrollable and inappropriate laughing or crying.  The patient is awake; His energy level appeared normal.  His orientation is normal; Spatial 5/5 (location, the floor of building, city, county, state) and temporal 5/5 (month, day, year, MILENA) dimensions are accurate.  He has appropriate attention/concentration ([place] forwards and backward).  He can complete three-step commands.  His fund of knowledge was appropriate for age, culture, and level of education.  His thought process is logical and goal-oriented.  He demonstrated appropriate insight based on actions, awareness of his illness, plans for the future.  He demonstrated good judgment based on actions and plans for the future.  He has no evidence of hallucinations (auditory, visual, olfactory).  He has no evidence of delusions (paranoid, grandiose, bizarre).  Cranial Nerves:   His pupils were normal.  His visual fields were full to confrontation in all quadrants.  His ocular pursuit in the horizontal and vertical plane was complete.  His saccadic initiation, velocity, and amplitude are normal.  His blink rate was normal.  His facial strength was normal.  His facial expression was symmetric and appropriate to the context.  His oropharynx and soft palate appeared abnormal. Comment: mallampati 3;  His tongue showed no evidence of scalloping.  He tongue movement with normal.  Speech/Language:   The patient's speech was fluent,  non-effortful, and his rate was appropriate to the context.  He has no articulation (segmental features) errors.  The patient's speech is not dysarthric.  The patient's speech was without evidence of anomia.  He makes no phonological loop errors.  He can comprehend commands that cross the midline (e.g., with your left thumb, touch your right ear).  He can comprehend commands that depend on syntax (e.g., point to the ceiling after you point to the floor).  Motor:   The patient's bilateral upper extremity muscle bulk is appropriate.  The patient's upper extremity muscle tone is increased. Comment: B/L, R>L, Mild;  The patient's bilateral upper extremity muscle tone does not suggest spasticity.  There is evidence of rigidity/cogwheeling. Comment: Muscle tone is increased and there is evidence of rigidity/cogwheeling.; Muscle tone is increased and there is evidence of rigidity/cogwheeling.  The patient's bilateral upper extremity muscle tone has no evidence of paratonia.  Assessment of motor strength was symmetric and at minimal anti-gravity.  There is no pronator or downward drift.  There is no myoclonus observed in The patient's bilateral upper and lower extremities. Comment: questionable LUE; questionable LUE  There are no fasciculations observed in The patient's bilateral upper and lower extremities.  Coordination:   He has no bilateral upper extremity limb dysmetria or past pointing on finger-nose-finger bilaterally.  He has no limb dysdiadochokinesia of the upper extremity on the pronation/supination test and screwing in a light bulb or lower extremity during tapping ball of each foot bilaterally.  He has a visible tremor.  He has no kinetic tremor bilaterally.  He has a postural tremor. Comment: B/L, L>R, Mild;  He has no resting tremor bilaterally.  He has evidence of interhemispheric motor control deficits.  He demonstrates evidence of motor overflow.  He demonstrates no alien limb phenomena.  The patient's  upper extremity fine motor coordination was abnormal. Comment: B/L, R>L, Mild;  The patient's upper extremity fine motor coordination was not slow. Comment: finger tapping, pronation/supination, and the open-close fist was slow.; finger tapping, pronation/supination, and the open-close fist was slow.  The patient's upper extremity fine motor coordination was not hypometric. Comment: finger tapping, pronation/supination, and the open-close fist showed hypometria.; finger tapping, pronation/supination, and the open-close fist showed hypometria.  The patient's upper extremity fine motor coordination was not dysrhythmic. Comment: finger tapping, pronation/supination, and the open-close fist showed dysrhythmia.; finger tapping, pronation/supination, and the open-close fist showed dysrhythmia.  Higher Cortical Function:   The patient showed no evidence of simultanagnosia (Navon hierarchical letters).  He demonstrates no evidence of dorsal simultanagnosia (overlapping objects).  He demonstrates no evidence of ventral simultanagnosia (complex picture synthesis).  The patient showed no evidence of visuospatial constructional dysfunction.  He has no left-right confusion.  He has no evidence of dysgraphia.  The patient showed no evidence of apraxia.  He showed no dysexecutive behavior.  Sensory:   His cortical sensory assessment demonstrated no neglect bilaterally.  He he had no astereognosis (paper clip, ring, dime) bilaterally in the palms.  He he had no agraphesthesia (drawing numbers) in the palms.  His sensation was diminished to light touch, and vibratory sense. Comment: in the bilateral upper and lower extremities in a length-dependant pattern.; in the bilateral upper and lower extremities in a length-dependant pattern.  Reflexes:   Reflexes were symmetric and 2+ at biceps, 2+ triceps, and 2+ brachioradialis, 2+ at the knees bilaterally, there was no cross-abductor sign, 2+ in the bilateral ankles.  Gait:   He has  normal posture sitting unaided.  He can arise from a chair and sit back down without using their arms.  His gait was normal.  When attempting to walk abnormally (heels, tiptoes, tandem), he makes no errors.  Neuropsychological Evaluation Summary:  Prior Neurocognitive/Neurobehavioral Evaluation(s)  No Prior Testing Available  2023-01-25:  Subjective Cognitive impairment with borderline mild attention/concentration and retrieval deficits   Very Mild Memory Impairment: He scored >1.5 standard deviations below the norm on at least one measure. He had difficulty with attention, encoding. He had a incomplete learning curve. His free recall was significantly impaired by time.  Very Mild Language Impairment: semantic association, Semantics.  MMSE 30/30: MMSE Score suggestive of normal to questionable cognitive impairment.  MOCA 30/30: MOCA Score suggestive of normal to questionable cognitive impairment.  2023-04-04:  Subjective Cognitive impairment with borderline mild attention/concentration and retrieval deficits   Neurocognitive/Neurobehavioral Evaluation completed on 2023-12-05    Neuropsychiatric/Behavioral Focused Evaluation Assessment    BEHAV5+ 1/6 See ROS section for a full description   Laboratories:     Lab Date Value [Reference]   Autoimmune/Paraneoplastic Screening           AUGUSTINA Screen 2022, Mar-30    Negative       CRP 2023, Apr-03    8.4 (H) [0.0 - 8.2 mg/L]      Fibrillarin (U3 RNP) 2022, Mar-31    Negative      MI-2 2022, Mar-31    Negative      Sed Rate 2023, Apr-03    10 [0 - 23 mm/Hr]      SRP 2022, Mar-31    Negative      U2 snRNP 2022, Mar-31    Negative      Myopathy/Myalgia   CPK 2022, Mar-31    47 [20 - 200 U/L]      Metabolic Screening   Hemoglobin A1C External 02/17/2023  6.1 (H) [4.0 - 5.6 %]      Methlymalonic Acid 02/17/2023  0.20      T4 Total 02/17/2023  4.9 [4.5 - 11.5 ug/dL]      TSH 10/03/2022  2.081 [0.400 - 4.000 uIU/mL]      Glucose 2023, Apr-03    99 [70 - 110 mg/dL]      Albumin  2023, Apr-03    3.7 [3.5 - 5.2 g/dL]      Alkaline Phosphatase 2023, Apr-03    80 [55 - 135 U/L]      ALT 2023, Apr-03    12 [10 - 44 U/L]      AST 2023, Apr-03    13 [10 - 40 U/L]      BILIRUBIN TOTAL 2023, Apr-03    0.6 [0.1 - 1.0 mg/dL]      PROTEIN TOTAL 2023, Apr-03    7.1 [6.0 - 8.4 g/dL]      Cholesterol 2023, Apr-03    129 [120 - 199 mg/dL]      HDL 2023, Apr-03    49 [40 - 75 mg/dL]      Non-HDL Cholesterol 2023, Apr-03    80 [mg/dL]      Triglycerides 02/17/2023  58 [30 - 150 mg/dL]      Folate 02/17/2023  16.5 [4.0 - 24.0 ng/mL]      Thiamine 02/17/2023  84 [38 - 122 ug/L]      Vitamin B-12 2023, Feb-17    559 [180 - 914 ng/L]      Cerebrospinal Fluid Assessment   IgG 2023, Feb-17    791 [650 - 1600 mg/dL]      Coagulopathy Screening   INR 2023, Mar-11    1.2 [0.8 - 1.2]      Protime 03/11/2023  11.9 [9.0 - 12.5 sec]      Neuroendocrine/Electrolyte Screening   Magnesium 02/17/2023  2.0 [1.6 - 2.6 mg/dL]      BUN 2023, Apr-03    21 [8 - 23 mg/dL]      Chloride 2023, Apr-03    104 [95 - 110 mmol/L]      Creatinine 2023, Apr-03    1.2 [0.5 - 1.4 mg/dL]      Potassium 2023, Apr-03    4.3 [3.5 - 5.1 mmol/L]      Sodium 2023, Apr-03    141 [136 - 145 mmol/L]      Neurodegenerative Serum Fluid Assessment   NEUROFILAMENT LIGHT CHAIN, PLASMA 2023, Feb-17    19.5      Infectious Disease/Immunocompromised Screening   SARS-CoV-2 RNA, Amplification, Qual 02/17/2023  Negative      Syphilis Treponemal Ab 2023, Feb-17    Nonreactive [Nonreactive]      Chronic Inflammation Screening   Uric Acid 2022, Apr-13    5.4 [3.4 - 7.0 mg/dL]      Standard Hematology Screen   Hematocrit 2023, Apr-03    48.2 [40.0 - 54.0 %]      Hemoglobin 2023, Apr-03    15.2 [14.0 - 18.0 g/dL]      MCV 2023, Apr-03    94 [82 - 98 fL]      Platelets 2023, Apr-03    230 [150 - 450 K/uL]           Neuroimaging:    MRI brain/head without contrast on 3/17/2023  Formal interpretation by Radiology:  No acute abnormality  Independently reviewed  radiological imaging by Nimesh Chatterjee MD. MPH. Behavioral Neurologist  T1: Mild generalized cortical atrophy posterior>frontal, dorsal>ventral, lateral>medial; Regional atrophy is most well appreciated the anterior and posterior edge of the motor cortex. There secondary mild occipital atrophy however no significant orbital frontal atrophy temporal atrophy. Relative sparing of the posterior cingulate and precuneus cortices. Thinning of the posterior segment of the corpus callosum sparing the genu. Intact midbrain with normal midbrain pontine ratio. Cerebellum has mild verbal atrophy no significant sectional or lateralized atrophy. Hippocampi show very mild right greater than left volume loss however commensurate with advanced age.  T2/FLAIR: Age indeterminate left cerebellar hyperintensity suggestive of a microvascular ischemic event. Mild gliosis bilateral hippocampi. Very mild anterior periventricular capping. Right caudate adhesion.  DWI/ADC: No Significant DWI hyperintensities/hypointensities. No ADC correlation.  SWI/GRE: No Significant hypointensities to suggest cortical/subcortical hemosiderin deposition.  Impression: : Mild generalized cortical atrophy dorsal greater than ventral slight posterior to anterior gradient new sulci widening of the mid pre motor cortex and posterior somatosensory cortex. Secondary occipital atrophy. 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 No significant evidence of frontal temporal atrophy. No demonstrable evidence of Alzheimer's disease. No major evidence significant vascular disease     Procedures:    Electrocardiogram on 7/16/2020  Formal interpretation:  Vent. Rate : 060 BPM     Atrial Rate : 060 BPM    P-R Int : 154 ms          QRS Dur : 104 ms     QT Int : 412 ms       P-R-T Axes : 000 051 034 degrees    QTc Int : 412 ms Electronic atrial pacemaker Septal infarct ,age undetermined Abnormal ECG  Independently  reviewed Electrocardiogram by Nimesh Chatterjee MD. MPH. Behavioral Neurologist  Impression: : Received ECG has evidence of sinus node disease. HR (>=50-60). Prolonged MI interval (>0.22 s). Broad QRS complex (> 0.12 s).     Clinical Summary:     The patient is a 78-year-old right-handed male with a relevant past medical history of PMR, DM II, CKD 3, Atrial fib w sick sinus syndrome & bilateral hip & LSpine OA, who presents reporting a 1-year history of step-wise progressive neurocognitive impairment.       The clinical history is suggestive of:  Memory Impairment: STM encoding impairment  Attention Impairment: Attention, Sustained attention, Shifting attention  Executive Impairment: Energization, Working Memory  The neurological examination is significant for:  Cortical Transcallosal Disconnection: interhemispheric motor control (interhemispheric motor control ), motor efference (motor overflow)  Movement Disorder (Hyperkinetic): tremor (postural)  Movement Disorder (Hypokinetic): parkinsonism (tone, bradykinesia), dyskinesia (slowing, hypometria, dysrhythmia)  Sensory Dysfunction: peripheral (A? fibers)  The neurocognitive battery is significant (based on age and education) for:  Subjective Cognitive impairment with borderline mild attention/concentration and retrieval deficits   BEHAV5+ 1/6: See ROS section for a full description  The neurologically relevant imaging is significant for  MRI brain/head without contrast (3/17/2023): Mild generalized cortical atrophy dorsal greater than ventral slight posterior to anterior gradient new sulci widening of the mid pre motor cortex and posterior somatosensory cortex. Secondary occipital atrophy. 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 No significant evidence of frontal temporal atrophy. No demonstrable evidence of Alzheimer's disease. No major evidence significant vascular disease        Assessment:        The  patient's clinical presentation is attention predominant subjective cognitive impairment insufficient to interfere with activities of daily living (CDR-SOB: 0.5 - Questionable cognitive impairment).     The patient's clinical presentation does not meet criteria for any specific syndrome however there are prodromal signs suggestive of is lewy body disease     Spontaneous motor features of parkinsonism  Fluctuating cognitive deficits     The pathology underlying The patient's neurocognitive impairment is likely a mixture of pathologies (Lewy body disease/alpha-synucleinopathy, Vascular Contributions to Cognitive Impairment and Dementia) likely provoked by uncontrolled hyperglycemia in setting of steroid use for PMR. Patient presents with a 2 year history of fluctuating stepwise progressive subjective cognitive impairment.  Multiple signs and symptoms on history and examination concerning for early stages of a alpha synuclein disease.  Patient is not interested in additional diagnostic testing biomarker testing or treatment.  Follow-up with primary care moving forward     The observations made above, were discussed with the patient. We have discussed the additional diagnostic(s) and/or managenent below.     Care Management Plan:    #Optimize Neurocognitive Impairment and Quality  We have discussed the MIND Diet and other lifestyle behavior that may help maintain brain health.  We have provided written/digital reading material  do not recommend donepezil given atrial fibrillation  #Optimize Behavioral Management and Quality.  No indication for memantine at this time  #Optimize Cerebrovascular Health.  The patient has a documented history of hyperlipidemia and/or hypercholesteremia with long-term complications such as cerebrovascular disease, peripheral vascular disease, and/or aortic atherosclerosis. Collectively these risk factors may contribute to cerebral atherosclerosis, and cerebral hypoperfusion compounded  neurocognitive disorder. We discussed maximizing cerebrovascular-related medical therapy, including but not limited to cholesterol medications and antiplatelet agents. We have discussed the value of aggressively controlling vascular risk factors like hypertension, hyperlipidemia, and Diabetes SBP<130, LDL<100, and A1C<7.0. We discussed the need to optimize lifestyle choices, including a heart-healthy diet (e.g., Mediterranean or DASH), increased cardiovascular exercise (goal 150 minutes of moderate-intensity per week), and staying cognitively and socially active.  #Optimize Sleep Hygiene and Quality  We discussed and recommended additional diagnostic/management of sleep disorder to optimize brain health and longevity - Recommend referral to sleep Medicine - patient would like to wait - will gather more information via iPhone sharona  #Coordination of Care Management Planning.  We have recommended additional care management through social work support.  #Behavioral/Environmental Strategies  We recommend engaging in activities that stimulate cognitively and socially while avoiding excessive stimulation and fatigue in overwhelmingly complex situations.  We recommend integrating routine and schedule into your daily life. https://www.alzheimersproject.org/news/the-importance-of-routine-and-familiarity-to-persons-with-dementia/  #Health Maintenance/Lifestyle Advice  We have discussed the value in aggressively controlling vascular risk factors like hypertension, hyperlipidemia, and Diabetes SBP<130, LDL<100, A1C<7.0.  We discussed the need to optimize lifestyle choices including a heart-healthy diet (e.g., Mediterranean or DASH), increased cardiovascular exercise (goal 150 minutes of moderate-intensity per week), and stay cognitively and socially active.  #Support  We all need support sometimes. Get easy access to local resources, community programs, and services. https://www.communityresourcefinder.org/  Learn more about  "Cognitive Impairment in [place]: https://www.alz.org/professionals/public-health/state-overview/[place]  #Safety  The Alzheimer's Association administers the nationwide "Safe Return" program with identification bracelets, necklaces, or clothing tags and 24-hour assistance. More information is available online at https://www.alz.org/help-support/caregiving/safety/medicalert-with-24-7-wandering-support  #Follow up:  Follow-up as needed.    Thank you for allowing us to participate in the care of your patient. Please do not hesitate to contact us with any questions or concerns.     It was a pleasure seeing The patient and we look forward to seeing them at their follow-up visit.     This note is dictated on M*Modal Fluency Direct word recognition program. There are word recognition mistakes that are occasionally missed on review.         Scheduled Follow-up :  Future Appointments   Date Time Provider Department Center   12/15/2023 10:00 AM HOME MONITOR DEVICE CHECK, [facility] [place] [facility]   2/1/2024 10:00 AM Brandon Cisneros MD [facility]   3/4/2024  9:20 AM Debbie Bills, J[facility] [place]   4/2/2024  7:00 AM SPECIMEN, [facility] [place]   4/2/2024  7:30 AM [facility] [place]   4/9/2024  1:30 PM Nitin Banks MD [facility]       After Visit Medication List :     Medication List            Accurate as of December 5, 2023  1:05 PM. If you have any questions, ask your nurse or doctor.                CONTINUE taking these medications      allopurinoL 100 MG tablet  Commonly known as: ZYLOPRIM  Take 1 tablet by mouth once daily     atorvastatin 40 MG tablet  Commonly known as: LIPITOR  Take 1 tablet (40 mg total) by mouth once daily.     benazepriL 20 MG tablet  Commonly known as: LOTENSIN  Take 1 tablet (20 mg total) by mouth once daily.     cycloSPORINE 0.05 % ophthalmic emulsion  Commonly known as: RESTASIS  Place 1 drop into both eyes 2 " (two) times daily.     econazole nitrate 1 % cream  Apply topically 2 (two) times daily.     ferrous sulfate 325 mg (65 mg iron) Tab tablet  Commonly known as: FEOSOL     glucosamine-chondroitin 750-600 mg Tab     levocetirizine 5 MG tablet  Commonly known as: XYZAL  Take 1 tablet (5 mg total) by mouth every evening.     metoprolol tartrate 25 MG tablet  Commonly known as: LOPRESSOR  Take 1 tablet (25 mg total) by mouth 2 (two) times daily as needed (palpitations).     multivitamin capsule     sildenafiL 50 MG tablet  Commonly known as: VIAGRA  Take 1 tablet (50 mg total) by mouth daily as needed for Erectile Dysfunction.     tamsulosin 0.4 mg Cap  Commonly known as: FLOMAX  TAKE 1 CAPSULE BY MOUTH AFTER DINNER     UNABLE TO FIND     XARELTO 20 mg Tab  Generic drug: rivaroxaban  TAKE 1 TABLET BY MOUTH ONCE DAILY WITH SUPPER OR  EVENING  MEAL              Signing Physician:  Nimesh Adams MD    Billing:        -----------------------------------------------------------------------------    I performed this consultation using real-time Telehealth tools, including a live video connection between my location and the patient's location (their home within the [facility]). Prior to initiating the consultation, I obtained informed verbal consent to perform this consultation using Telehealth tools and answered all the questions about the Telehealth interaction. The participants understand that only a limited neurological exam and limited neuropsychological testing can be performed using Telehealth tools.    I spent a total of 45 minutes (time-in: 13:00 PM; time-out: 13:45 PM) on 2023-12-05, virtually face-to-face with the patient and caregiver(s), >50% of that time was spent counseling regarding the symptoms, treatment plan, risks, therapeutic options, lifestyle modifications, and/or safety issues for the diagnoses above.    10/14 Review of Systems completed and is negative except as stated above in HPI (Systems  reviewed: Const, Eyes, ENT, Resp, CV, GI, , MSK, Skin, Neuro)    I reviewed the summation of records from outside physicians for a total of 10 minutes on 2023-12-05. Reviewed and summation of records from an outside physician was performed as summarized above in HPI    I performed a neurobehavioral status examination that included a clinical assessment of thinking, reasoning, and judgment to ensure a comprehensive approach in managing the complex and evolving needs of the patient's neurocognitive condition. Please see above HPI and ROS for full details. This exam was performed on 2023-12-05 and included 15 minutes spent on direct face-to-face clinical observation and interview with the patient and 18 minutes spent interpreting test results and preparing the report. The total time of 33 minutes spent on the neurobehavioral status examination is not included in the time spent on evaluation and management coding.    Total Billing time spent on encounter/documentation for this patient's evaluation and management, not including the neurobehavioral status examination: 40 minutes.

## 2023-12-07 ENCOUNTER — TELEPHONE (OUTPATIENT)
Dept: DERMATOLOGY | Facility: CLINIC | Age: 78
End: 2023-12-07
Payer: MEDICARE

## 2023-12-07 NOTE — TELEPHONE ENCOUNTER
I spoke with Mr. Norman about a message in my in-basket that he would like to schedule an appointment for a skin check.  I offered him a spot on 12/12/2023 at 9:00 AM, and he accepted the appointment.

## 2023-12-08 ENCOUNTER — TELEPHONE (OUTPATIENT)
Dept: DERMATOLOGY | Facility: CLINIC | Age: 78
End: 2023-12-08
Payer: MEDICARE

## 2023-12-08 ENCOUNTER — TELEPHONE (OUTPATIENT)
Dept: SPORTS MEDICINE | Facility: CLINIC | Age: 78
End: 2023-12-08
Payer: MEDICARE

## 2023-12-08 ENCOUNTER — PATIENT MESSAGE (OUTPATIENT)
Dept: DERMATOLOGY | Facility: CLINIC | Age: 78
End: 2023-12-08
Payer: MEDICARE

## 2023-12-08 NOTE — TELEPHONE ENCOUNTER
Spoke c pt. Advised that Dr. Potter is referring him to surgerical colleague. Scheduled appt c Dr. Dominique. Scheudled appts c Dr. Potter for L knee & L hip. Confirmed appt dates, times, locations. Pt will call c additional questions/concerns in interim. Pt expressed understanding & was thankful.

## 2023-12-08 NOTE — TELEPHONE ENCOUNTER
Tried calling pt. Lm informing pt that Dr. Robles is out of office due to COVID and appt would be cancelled. Informed pt to call back to reschedule.

## 2023-12-11 NOTE — PROGRESS NOTES
"Spoke c pt. He reports pain along L tibia. He reports basal cell carcinoma excision along L anterior lower leg c Dr. Villa 11/17/2. He feels there may be an issue with "the muscle not being together any more", pain is localized to area of excision. Advised that he reach out to Dr. Bernard's office to inform her of above. Pt stated he will reach out. He did has an appt tomorrow c another [facility]atologist but it was cancelled due to provider having COVID. Pt will call c additional questions/concerns in interim. Pt expressed understanding & was thankful.     "

## 2023-12-15 ENCOUNTER — TELEPHONE (OUTPATIENT)
Dept: SPORTS MEDICINE | Facility: CLINIC | Age: 78
End: 2023-12-15
Payer: MEDICARE

## 2023-12-15 DIAGNOSIS — M25.512 LEFT SHOULDER PAIN, UNSPECIFIED CHRONICITY: Primary | ICD-10-CM

## 2023-12-16 ENCOUNTER — CLINICAL SUPPORT (OUTPATIENT)
Dept: CARDIOLOGY | Facility: HOSPITAL | Age: 78
End: 2023-12-16
Payer: MEDICARE

## 2023-12-16 ENCOUNTER — CLINICAL SUPPORT (OUTPATIENT)
Dept: CARDIOLOGY | Facility: HOSPITAL | Age: 78
End: 2023-12-16
Attending: INTERNAL MEDICINE
Payer: MEDICARE

## 2023-12-16 DIAGNOSIS — Z95.0 PRESENCE OF CARDIAC PACEMAKER: ICD-10-CM

## 2023-12-16 PROCEDURE — 93296 REM INTERROG EVL PM/IDS: CPT | Performed by: INTERNAL MEDICINE

## 2023-12-16 PROCEDURE — 93294 REM INTERROG EVL PM/LDLS PM: CPT | Mod: ,,, | Performed by: INTERNAL MEDICINE

## 2023-12-18 ENCOUNTER — OFFICE VISIT (OUTPATIENT)
Dept: SPORTS MEDICINE | Facility: CLINIC | Age: 78
End: 2023-12-18
Payer: MEDICARE

## 2023-12-18 VITALS — WEIGHT: 209 LBS | HEIGHT: 73 IN | BODY MASS INDEX: 27.7 KG/M2

## 2023-12-18 DIAGNOSIS — M67.912 ROTATOR CUFF DYSFUNCTION, LEFT: ICD-10-CM

## 2023-12-18 DIAGNOSIS — M19.012 GLENOHUMERAL ARTHRITIS, LEFT: Primary | ICD-10-CM

## 2023-12-18 DIAGNOSIS — G89.29 CHRONIC LEFT SHOULDER PAIN: ICD-10-CM

## 2023-12-18 DIAGNOSIS — M25.512 CHRONIC LEFT SHOULDER PAIN: ICD-10-CM

## 2023-12-18 PROCEDURE — 1101F PT FALLS ASSESS-DOCD LE1/YR: CPT | Mod: CPTII,S$GLB,, | Performed by: STUDENT IN AN ORGANIZED HEALTH CARE EDUCATION/TRAINING PROGRAM

## 2023-12-18 PROCEDURE — 99999 PR PBB SHADOW E&M-EST. PATIENT-LVL III: ICD-10-PCS | Mod: PBBFAC,,, | Performed by: STUDENT IN AN ORGANIZED HEALTH CARE EDUCATION/TRAINING PROGRAM

## 2023-12-18 PROCEDURE — 1101F PR PT FALLS ASSESS DOC 0-1 FALLS W/OUT INJ PAST YR: ICD-10-PCS | Mod: CPTII,S$GLB,, | Performed by: STUDENT IN AN ORGANIZED HEALTH CARE EDUCATION/TRAINING PROGRAM

## 2023-12-18 PROCEDURE — 3288F PR FALLS RISK ASSESSMENT DOCUMENTED: ICD-10-PCS | Mod: CPTII,S$GLB,, | Performed by: STUDENT IN AN ORGANIZED HEALTH CARE EDUCATION/TRAINING PROGRAM

## 2023-12-18 PROCEDURE — 1125F AMNT PAIN NOTED PAIN PRSNT: CPT | Mod: CPTII,S$GLB,, | Performed by: STUDENT IN AN ORGANIZED HEALTH CARE EDUCATION/TRAINING PROGRAM

## 2023-12-18 PROCEDURE — 99215 OFFICE O/P EST HI 40 MIN: CPT | Mod: S$GLB,,, | Performed by: STUDENT IN AN ORGANIZED HEALTH CARE EDUCATION/TRAINING PROGRAM

## 2023-12-18 PROCEDURE — 1160F RVW MEDS BY RX/DR IN RCRD: CPT | Mod: CPTII,S$GLB,, | Performed by: STUDENT IN AN ORGANIZED HEALTH CARE EDUCATION/TRAINING PROGRAM

## 2023-12-18 PROCEDURE — 1159F MED LIST DOCD IN RCRD: CPT | Mod: CPTII,S$GLB,, | Performed by: STUDENT IN AN ORGANIZED HEALTH CARE EDUCATION/TRAINING PROGRAM

## 2023-12-18 PROCEDURE — 1160F PR REVIEW ALL MEDS BY PRESCRIBER/CLIN PHARMACIST DOCUMENTED: ICD-10-PCS | Mod: CPTII,S$GLB,, | Performed by: STUDENT IN AN ORGANIZED HEALTH CARE EDUCATION/TRAINING PROGRAM

## 2023-12-18 PROCEDURE — 1159F PR MEDICATION LIST DOCUMENTED IN MEDICAL RECORD: ICD-10-PCS | Mod: CPTII,S$GLB,, | Performed by: STUDENT IN AN ORGANIZED HEALTH CARE EDUCATION/TRAINING PROGRAM

## 2023-12-18 PROCEDURE — 3072F PR LOW RISK FOR RETINOPATHY: ICD-10-PCS | Mod: CPTII,S$GLB,, | Performed by: STUDENT IN AN ORGANIZED HEALTH CARE EDUCATION/TRAINING PROGRAM

## 2023-12-18 PROCEDURE — 99999 PR PBB SHADOW E&M-EST. PATIENT-LVL III: CPT | Mod: PBBFAC,,, | Performed by: STUDENT IN AN ORGANIZED HEALTH CARE EDUCATION/TRAINING PROGRAM

## 2023-12-18 PROCEDURE — 3288F FALL RISK ASSESSMENT DOCD: CPT | Mod: CPTII,S$GLB,, | Performed by: STUDENT IN AN ORGANIZED HEALTH CARE EDUCATION/TRAINING PROGRAM

## 2023-12-18 PROCEDURE — 1125F PR PAIN SEVERITY QUANTIFIED, PAIN PRESENT: ICD-10-PCS | Mod: CPTII,S$GLB,, | Performed by: STUDENT IN AN ORGANIZED HEALTH CARE EDUCATION/TRAINING PROGRAM

## 2023-12-18 PROCEDURE — 99215 PR OFFICE/OUTPT VISIT, EST, LEVL V, 40-54 MIN: ICD-10-PCS | Mod: S$GLB,,, | Performed by: STUDENT IN AN ORGANIZED HEALTH CARE EDUCATION/TRAINING PROGRAM

## 2023-12-18 PROCEDURE — 3072F LOW RISK FOR RETINOPATHY: CPT | Mod: CPTII,S$GLB,, | Performed by: STUDENT IN AN ORGANIZED HEALTH CARE EDUCATION/TRAINING PROGRAM

## 2023-12-18 NOTE — PROGRESS NOTES
Subjective:          Chief Complaint: Shannan Norman is a 78 y.o. male who had concerns including Pain of the Left Shoulder.    CC:  left Shoulder Pain    HPI:    Shannan Norman is a 78 y.o. male retiree that presents for evaluation for his left shoulder pain. He is referred by Dr. Potter. He notes that his pain started months ago with no injury or trauma. He received a corticosteroid injection with Dr. Potter on 8/29/23 that he states gave him moderate relief that lasted about 2 months. He notes that about 1.5 months ago his pain returned with increased intensity. He has a completed MRI. He has completed a course of directed home exercise with little to no relief. He complains of decreased strength and motion. He notes the sensation of instability however has not experienced any true instability.  He states his biggest issue is lack of motion of the shoulder.  He can not raise his arm to shoulder level.  This makes it difficult for him to perform basic activities of daily living.      Dominant Hand: Right    Occupation: retiree    SSV: 20%    Night Pain? Yes    Interfere with ADLs? Yes      Past Medical History:   Diagnosis Date    Allergy     Arthritis     Atrial fibrillation     Atrial flutter 2011    ablation    Basal cell carcinoma     Cancer     Cataract     CKD (chronic kidney disease) stage 3, GFR 30-59 ml/min 8/13/2019    Diabetes mellitus     Dry eye syndrome     Gout, unspecified     High cholesterol     History of shingles     Hypertension     Melanoma     Seizures        Current Outpatient Medications on File Prior to Visit   Medication Sig Dispense Refill    allopurinoL (ZYLOPRIM) 100 MG tablet Take 1 tablet by mouth once daily 90 tablet 3    atorvastatin (LIPITOR) 40 MG tablet Take 1 tablet (40 mg total) by mouth once daily. 90 tablet 3    benazepriL (LOTENSIN) 20 MG tablet Take 1 tablet (20 mg total) by mouth once daily. 90 tablet 3    cycloSPORINE (RESTASIS) 0.05 %  ophthalmic emulsion Place 1 drop into both eyes 2 (two) times daily. 60 each 11    econazole nitrate 1 % cream Apply topically 2 (two) times daily. 85 g 3    ferrous sulfate (FEOSOL) 325 mg (65 mg iron) Tab tablet Take 325 mg by mouth once daily.      GLUCOSAMINE HCL/CHONDR VASQUEZ A NA (GLUCOSAMINE-CHONDROITIN) 750-600 mg Tab Take 2 tablets by mouth Daily.      levocetirizine (XYZAL) 5 MG tablet Take 1 tablet (5 mg total) by mouth every evening. 90 tablet 1    multivitamin capsule Take 1 capsule by mouth nightly.       tamsulosin (FLOMAX) 0.4 mg Cap TAKE 1 CAPSULE BY MOUTH AFTER DINNER 90 capsule 3    UNABLE TO FIND 6000 hydrolyzes collagen      XARELTO 20 mg Tab TAKE 1 TABLET BY MOUTH ONCE DAILY WITH SUPPER OR  EVENING  MEAL 90 tablet 3    metoprolol tartrate (LOPRESSOR) 25 MG tablet Take 1 tablet (25 mg total) by mouth 2 (two) times daily as needed (palpitations). 45 tablet 0    sildenafiL (VIAGRA) 50 MG tablet Take 1 tablet (50 mg total) by mouth daily as needed for Erectile Dysfunction. 30 tablet 06     No current facility-administered medications on file prior to visit.       Past Surgical History:   Procedure Laterality Date    ABLATION N/A 9/11/2018    Procedure: ABLATION;  Surgeon: Hany Sanchez MD;  Location: [facility] CATH LAB;  Service: Cardiology;  Laterality: N/A;  AFL, ISABELLE, RFA, ELODIA, MAC, DM, 3 PREP    ABLATION OF DYSRHYTHMIC FOCUS      ADENOIDECTOMY      CARDIAC PACEMAKER PLACEMENT      no defib    CATARACT EXTRACTION W/  INTRAOCULAR LENS IMPLANT Right 7/28/2020    Procedure: EXTRACTION, CATARACT, WITH IOL INSERTION;  Surgeon: Andrés Morse MD;  Location: [facility] OR;  Service: Ophthalmology;  Laterality: Right;    CATARACT EXTRACTION W/  INTRAOCULAR LENS IMPLANT Left 8/11/2020    Procedure: EXTRACTION, CATARACT, WITH IOL INSERTION;  Surgeon: Andrés Morse MD;  Location: [facility] OR;  Service: Ophthalmology;  Laterality: Left;    COLONOSCOPY N/A 1/2/2019    Procedure: COLONOSCOPY  Alexa;  Surgeon: Cindy Solorzano MD;  Location: [facility];  Service: Endoscopy;  Laterality: N/A;    GANGLION CYST EXCISION      left neck    HERNIA REPAIR  2013    umbilical    TENDON REPAIR Right     wrist    TONSILLECTOMY      varicose veins      several sx  bilateral    VASECTOMY      VEIN SURGERY         Family History   Problem Relation Age of Onset    Diabetes Mother     COPD Father     Heart disease Father     Arthritis Sister     Heart attack Brother     Heart disease Brother     Diabetes Brother     No Known Problems Maternal Aunt     No Known Problems Maternal Uncle     No Known Problems Paternal Aunt     No Known Problems Paternal Uncle     No Known Problems Maternal Grandmother     No Known Problems Maternal Grandfather     No Known Problems Paternal Grandmother     No Known Problems Paternal Grandfather     No Known Problems Son     Cancer Sister         lymph node, breast    No Known Problems Sister     No Known Problems Son     Amblyopia Neg Hx     Blindness Neg Hx     Cataracts Neg Hx     Glaucoma Neg Hx     Hypertension Neg Hx     Macular degeneration Neg Hx     Retinal detachment Neg Hx     Strabismus Neg Hx     Stroke Neg Hx     Thyroid disease Neg Hx     Prostate cancer Neg Hx     Kidney disease Neg Hx     Melanoma Neg Hx        Social History     Socioeconomic History    Marital status:    Occupational History     Employer: [employer]   Tobacco Use    Smoking status: Never    Smokeless tobacco: Never   Substance and Sexual Activity    Alcohol use: Yes     Alcohol/week: 7.0 - 14.0 standard drinks of alcohol     Types: 7 - 14 Glasses of wine per week    Drug use: No    Sexual activity: Yes     Partners: Female     Social Determinants of Health     Financial Resource Strain: Low Risk  (12/5/2023)    Overall Financial Resource Strain (CARDIA)     Difficulty of Paying Living Expenses: Not hard at all   Food Insecurity: No Food  Insecurity (12/5/2023)    Hunger Vital Sign     Worried About Running Out of Food in the Last Year: Never true     Ran Out of Food in the Last Year: Never true   Transportation Needs: No Transportation Needs (12/5/2023)    PRAPARE - Transportation     Lack of Transportation (Medical): No     Lack of Transportation (Non-Medical): No   Physical Activity: Inactive (12/5/2023)    Exercise Vital Sign     Days of Exercise per Week: 0 days     Minutes of Exercise per Session: 30 min   Stress: No Stress Concern Present (12/5/2023)    Citizen of [place] [employer] - Occupational Stress Questionnaire     Feeling of Stress : Only a little   Social Connections: Unknown (12/5/2023)    Social Connection and Isolation Panel [NHANES]     Frequency of Communication with Friends and Family: More than three times a week     Frequency of Social Gatherings with Friends and Family: Three times a week     Active Member of Clubs or Organizations: Yes     Attends Club or Organization Meetings: More than 4 times per year     Marital Status:    Housing Stability: Low Risk  (12/5/2023)    Housing Stability Vital Sign     Unable to Pay for Housing in the Last Year: No     Number of Places Lived in the Last Year: 1     Unstable Housing in the Last Year: No         Review of Systems   Constitutional: Negative.   HENT: Negative.     Eyes: Negative.    Cardiovascular: Negative.    Respiratory: Negative.     Endocrine: Negative.    Hematologic/Lymphatic: Negative.    Skin: Negative.    Musculoskeletal:  Positive for arthritis, joint pain (left shoulder), muscle weakness, myalgias and stiffness. Negative for falls, gout, joint swelling, muscle cramps and neck pain.   Neurological: Negative.    Psychiatric/Behavioral: Negative.     Allergic/Immunologic: Negative.                    Objective:        General: Shannan is well-developed, well-nourished, appears stated age, in no acute distress, alert and oriented to time,  place and person.     General    Nursing note and vitals reviewed.  Constitutional: He is oriented to person, place, and time. He appears well-developed and well-nourished. No distress.   HENT:   Head: Normocephalic and atraumatic.   Nose: Nose normal.   Eyes: EOM are normal.   Cardiovascular:  Intact distal pulses.            Pulmonary/Chest: Effort normal. No respiratory distress.   Neurological: He is alert and oriented to person, place, and time.   Psychiatric: He has a normal mood and affect. His behavior is normal. Judgment and thought content normal.         Right Shoulder Exam     Inspection/Observation   Swelling: absent  Bruising: absent  Scars: absent  Deformity: absent  Scapular Winging: absent  Scapular Dyskinesia: negative  Atrophy: absent    Tenderness   The patient is experiencing no tenderness.    Range of Motion   Active abduction:  170   Passive abduction:  170   Forward Flexion:  180   Forward Elevation: 180  External Rotation 0 degrees:  60   Internal rotation 0 degrees:  Mid thoracic     Other   Sensation: normal    Left Shoulder Exam     Inspection/Observation   Swelling: absent  Bruising: absent  Scars: absent  Deformity: absent  Scapular Winging: absent  Scapular Dyskinesia: negative  Atrophy: absent    Tenderness   The patient is tender to palpation of the greater tuberosity and biceps tendon.    Range of Motion   Active abduction:  90   Passive abduction:  150   Forward Flexion:  110   Forward Elevation: 120  External Rotation 0 degrees:  50   Internal rotation 0 degrees:  Sacrum     Tests & Signs   Cross arm: positive  Andrea test: positive  Impingement: positive  Lift Off Sign: positive  Belly Press: positive  Active Compression Test ([place]'s Sign): positive  Bear Hug: positive    Other   Sensation: normal       Muscle Strength   Right Upper Extremity   Shoulder Abduction: 5/5   Shoulder Internal Rotation: 5/5   Shoulder External Rotation: 5/5   Supraspinatus: 5/5   Subscapularis: 5/5    Biceps: 5/5   Left Upper Extremity  Shoulder Abduction: 3/5   Shoulder Internal Rotation: 3/5   Shoulder External Rotation: 3/5   Supraspinatus: 3/5   Subscapularis: 3/5   Biceps: 5/5     Vascular Exam     Right Pulses      Radial:                    2+      Left Pulses      Radial:                    2+      Capillary Refill  Right Hand: normal capillary refill  Left Hand: normal capillary refill        Imaging:  X-rays of the left shoulder from 12/18/2023 personally viewed by me on that day.  Include AP, Grashey, scapular Y, axillary.  Glenohumeral joint is reduced.  Moderate severe arthritic changes with decreased joint space.  There has no superior migration of the humeral head.      MRI of the left shoulder from 12/04/2023 personally viewed by me 12/18/2023.  There is moderate to severe arthritic changes with near complete cartilage loss of the glenoid and humeral head seen on the axial views.  Partial-thickness tearing of the supra and infraspinatus tendons.  No full-thickness tearing.  Joint effusion.        Assessment:     Shannan Norman is a 78 y.o. male with left glenohumeral osteoarthritis and partial rotator cuff tears  Encounter Diagnosis   Name Primary?    Glenohumeral arthritis, left Yes          Plan:       The diagnosis and treatment options were discussed with the patient all his questions were answered.  I showed him the x-rays and MRI and reviewed the findings with him.  He has had corticosteroid injections in the past with about 2 months of relief, however he is interested in more definitive treatment.  I explained from a surgical standpoint, this would be reverse shoulder arthroplasty.  The procedure risks benefits rehabilitation protocol were discussed at length all his questions were answered.  After discussion, he would like to proceed with surgery.  We will plan this for a date in the future.  I did state this will need to be done at [facility] given his pacemaker.  He will obtain  clearance from his primary care physician as well as cardiologist.  In the meantime, we will obtain a CT scan for preoperative planning.      The risks, benefits and alternatives to surgery were discussed with the patient at great length.  These include, but not limited to, bleeding, infection, vessel/nerve damage, pain, numbness, tingling, complex regional pain syndrome, hardware/surgical failure, need for further surgery, fracture, prosthetic instability, prosthetic joint infection, DVT, PE, arthritis and death.  Patient states an understanding and wishes to proceed with surgery.   All questions were answered.  No guarantees were implied or stated.      All of their questions were answered.  They will call the clinic with any questions or concerns in the interim.    Should the patient's symptoms worsen, persist, or fail to improve they should return for reevaluation and I would be happy to see them back anytime.        Quinten Dominique M.D.    Please be aware that this note has been generated with the assistance of [employer] voice-to-text.  Please excuse any spelling or grammatical errors.    Thank you for choosing Dr. Quinten Dominique for your sports medicine care. It is our goal to provide you with exceptional care that will help keep you healthy, active, and get you back in the game.     If you felt that you received exemplary care today, please consider leaving feedback for Dr. Dominique on [employer] at [email or web address].    Please do not hesitate to reach out to us via email, phone, or MyChart with any questions, concerns, or feedback.

## 2023-12-20 ENCOUNTER — PATIENT MESSAGE (OUTPATIENT)
Dept: SPORTS MEDICINE | Facility: CLINIC | Age: 78
End: 2023-12-20
Payer: MEDICARE

## 2023-12-20 ENCOUNTER — OFFICE VISIT (OUTPATIENT)
Dept: DERMATOLOGY | Facility: CLINIC | Age: 78
End: 2023-12-20
Payer: MEDICARE

## 2023-12-20 DIAGNOSIS — L81.4 LENTIGO: ICD-10-CM

## 2023-12-20 DIAGNOSIS — L82.1 SEBORRHEIC KERATOSES: ICD-10-CM

## 2023-12-20 DIAGNOSIS — D48.5 NEOPLASM OF UNCERTAIN BEHAVIOR OF SKIN: Primary | ICD-10-CM

## 2023-12-20 DIAGNOSIS — D22.9 MULTIPLE BENIGN NEVI: ICD-10-CM

## 2023-12-20 DIAGNOSIS — Z85.828 HISTORY OF SKIN CANCER: ICD-10-CM

## 2023-12-20 DIAGNOSIS — Z85.828 HISTORY OF NONMELANOMA SKIN CANCER: ICD-10-CM

## 2023-12-20 DIAGNOSIS — L57.0 ACTINIC KERATOSIS: ICD-10-CM

## 2023-12-20 DIAGNOSIS — D18.01 CHERRY ANGIOMA: ICD-10-CM

## 2023-12-20 DIAGNOSIS — Z12.83 SCREENING EXAM FOR SKIN CANCER: ICD-10-CM

## 2023-12-20 DIAGNOSIS — Z85.820 HISTORY OF MALIGNANT MELANOMA: ICD-10-CM

## 2023-12-20 PROCEDURE — 1125F AMNT PAIN NOTED PAIN PRSNT: CPT | Mod: CPTII,S$GLB,, | Performed by: STUDENT IN AN ORGANIZED HEALTH CARE EDUCATION/TRAINING PROGRAM

## 2023-12-20 PROCEDURE — 1160F RVW MEDS BY RX/DR IN RCRD: CPT | Mod: CPTII,S$GLB,, | Performed by: STUDENT IN AN ORGANIZED HEALTH CARE EDUCATION/TRAINING PROGRAM

## 2023-12-20 PROCEDURE — 1159F PR MEDICATION LIST DOCUMENTED IN MEDICAL RECORD: ICD-10-PCS | Mod: CPTII,S$GLB,, | Performed by: STUDENT IN AN ORGANIZED HEALTH CARE EDUCATION/TRAINING PROGRAM

## 2023-12-20 PROCEDURE — 99213 PR OFFICE/OUTPT VISIT, EST, LEVL III, 20-29 MIN: ICD-10-PCS | Mod: 25,S$GLB,, | Performed by: STUDENT IN AN ORGANIZED HEALTH CARE EDUCATION/TRAINING PROGRAM

## 2023-12-20 PROCEDURE — 11103 PR TANGENTIAL BIOPSY, SKIN, EA ADDTL LESION: ICD-10-PCS | Mod: S$GLB,,, | Performed by: STUDENT IN AN ORGANIZED HEALTH CARE EDUCATION/TRAINING PROGRAM

## 2023-12-20 PROCEDURE — 1125F PR PAIN SEVERITY QUANTIFIED, PAIN PRESENT: ICD-10-PCS | Mod: CPTII,S$GLB,, | Performed by: STUDENT IN AN ORGANIZED HEALTH CARE EDUCATION/TRAINING PROGRAM

## 2023-12-20 PROCEDURE — 1101F PR PT FALLS ASSESS DOC 0-1 FALLS W/OUT INJ PAST YR: ICD-10-PCS | Mod: CPTII,S$GLB,, | Performed by: STUDENT IN AN ORGANIZED HEALTH CARE EDUCATION/TRAINING PROGRAM

## 2023-12-20 PROCEDURE — 11102 TANGNTL BX SKIN SINGLE LES: CPT | Mod: S$GLB,,, | Performed by: STUDENT IN AN ORGANIZED HEALTH CARE EDUCATION/TRAINING PROGRAM

## 2023-12-20 PROCEDURE — 3288F FALL RISK ASSESSMENT DOCD: CPT | Mod: CPTII,S$GLB,, | Performed by: STUDENT IN AN ORGANIZED HEALTH CARE EDUCATION/TRAINING PROGRAM

## 2023-12-20 PROCEDURE — 88305 TISSUE EXAM BY PATHOLOGIST: CPT | Performed by: PATHOLOGY

## 2023-12-20 PROCEDURE — 11102 PR TANGENTIAL BIOPSY, SKIN, SINGLE LESION: ICD-10-PCS | Mod: S$GLB,,, | Performed by: STUDENT IN AN ORGANIZED HEALTH CARE EDUCATION/TRAINING PROGRAM

## 2023-12-20 PROCEDURE — 99213 OFFICE O/P EST LOW 20 MIN: CPT | Mod: 25,S$GLB,, | Performed by: STUDENT IN AN ORGANIZED HEALTH CARE EDUCATION/TRAINING PROGRAM

## 2023-12-20 PROCEDURE — 88305 TISSUE EXAM BY PATHOLOGIST: CPT | Mod: 26,,, | Performed by: PATHOLOGY

## 2023-12-20 PROCEDURE — 3288F PR FALLS RISK ASSESSMENT DOCUMENTED: ICD-10-PCS | Mod: CPTII,S$GLB,, | Performed by: STUDENT IN AN ORGANIZED HEALTH CARE EDUCATION/TRAINING PROGRAM

## 2023-12-20 PROCEDURE — 99999 PR PBB SHADOW E&M-EST. PATIENT-LVL III: ICD-10-PCS | Mod: PBBFAC,,, | Performed by: STUDENT IN AN ORGANIZED HEALTH CARE EDUCATION/TRAINING PROGRAM

## 2023-12-20 PROCEDURE — 17000 DESTRUCT PREMALG LESION: CPT | Mod: XS,S$GLB,, | Performed by: STUDENT IN AN ORGANIZED HEALTH CARE EDUCATION/TRAINING PROGRAM

## 2023-12-20 PROCEDURE — 17003 DESTRUCTION, PREMALIGNANT LESIONS; SECOND THROUGH 14 LESIONS: ICD-10-PCS | Mod: S$GLB,,, | Performed by: STUDENT IN AN ORGANIZED HEALTH CARE EDUCATION/TRAINING PROGRAM

## 2023-12-20 PROCEDURE — 1159F MED LIST DOCD IN RCRD: CPT | Mod: CPTII,S$GLB,, | Performed by: STUDENT IN AN ORGANIZED HEALTH CARE EDUCATION/TRAINING PROGRAM

## 2023-12-20 PROCEDURE — 3072F PR LOW RISK FOR RETINOPATHY: ICD-10-PCS | Mod: CPTII,S$GLB,, | Performed by: STUDENT IN AN ORGANIZED HEALTH CARE EDUCATION/TRAINING PROGRAM

## 2023-12-20 PROCEDURE — 1101F PT FALLS ASSESS-DOCD LE1/YR: CPT | Mod: CPTII,S$GLB,, | Performed by: STUDENT IN AN ORGANIZED HEALTH CARE EDUCATION/TRAINING PROGRAM

## 2023-12-20 PROCEDURE — 17003 DESTRUCT PREMALG LES 2-14: CPT | Mod: S$GLB,,, | Performed by: STUDENT IN AN ORGANIZED HEALTH CARE EDUCATION/TRAINING PROGRAM

## 2023-12-20 PROCEDURE — 1160F PR REVIEW ALL MEDS BY PRESCRIBER/CLIN PHARMACIST DOCUMENTED: ICD-10-PCS | Mod: CPTII,S$GLB,, | Performed by: STUDENT IN AN ORGANIZED HEALTH CARE EDUCATION/TRAINING PROGRAM

## 2023-12-20 PROCEDURE — 17000 PR DESTRUCTION(LASER SURGERY,CRYOSURGERY,CHEMOSURGERY),PREMALIGNANT LESIONS,FIRST LESION: ICD-10-PCS | Mod: XS,S$GLB,, | Performed by: STUDENT IN AN ORGANIZED HEALTH CARE EDUCATION/TRAINING PROGRAM

## 2023-12-20 PROCEDURE — 99999 PR PBB SHADOW E&M-EST. PATIENT-LVL III: CPT | Mod: PBBFAC,,, | Performed by: STUDENT IN AN ORGANIZED HEALTH CARE EDUCATION/TRAINING PROGRAM

## 2023-12-20 PROCEDURE — 88305 TISSUE EXAM BY PATHOLOGIST: ICD-10-PCS | Mod: 26,,, | Performed by: PATHOLOGY

## 2023-12-20 PROCEDURE — 3072F LOW RISK FOR RETINOPATHY: CPT | Mod: CPTII,S$GLB,, | Performed by: STUDENT IN AN ORGANIZED HEALTH CARE EDUCATION/TRAINING PROGRAM

## 2023-12-20 PROCEDURE — 11103 TANGNTL BX SKIN EA SEP/ADDL: CPT | Mod: S$GLB,,, | Performed by: STUDENT IN AN ORGANIZED HEALTH CARE EDUCATION/TRAINING PROGRAM

## 2023-12-20 NOTE — PATIENT INSTRUCTIONS
Shave Biopsy Wound Care    Your doctor has performed a shave biopsy today.  A band aid and vaseline ointment has been placed over the site.  This should remain in place for NO LONGER THAN 48 hours.  It is fine to remove the bandaid after 24 hours, if the area is no longer bleeding. It is recommended that you keep the area dry (do not wet)) for the first 24 hours.  After 24 hours, wash the area with warm soap and water and apply Vaseline jelly.  Many patients prefer to use Neosporin or Bacitracin ointment.  This is acceptable; however, know that you can develop an allergy to this medication even if you have used it safely for years.  It is important to keep the area moist.  Letting it dry out and get air slows healing time, and will worsen the scar.        If you notice increasing redness, tenderness, pain, or yellow drainage at the biopsy site, please notify your doctor.  These are signs of an infection.    If your biopsy site is bleeding, apply firm pressure for 15 minutes straight.  Repeat for another 15 minutes, if it is still bleeding.   If the surgical site continues to bleed, then please contact your doctor.      For [facility] users:   You will receive your biopsy results in [facility] as soon as they are available. Please be assured that your physician/provider will review your results and will then determine what further treatment, evaluation, or planning is required. You should be contacted by your physician's/provider's office within 5 business days of receiving your results; If not, please reach out to directly. This is one more way [email or web address] is putting you first.     [facility] [place] [address]/ [phone] FAX/ [email or web address]           CRYOSURGERY      Your doctor has used a method called cryosurgery to treat your skin condition. Cryosurgery refers to the use of very cold substances to treat a variety of skin conditions such as warts, pre-skin cancers, molluscum  contagiosum, sun spots, and several benign growths. The substance we use in cryosurgery is liquid nitrogen and is so cold (-195 degrees Celsius) that is burns when administered.     Following treatment in the office, the skin may immediately burn and become red. You may find the area around the lesion is affected as well. It is sometimes necessary to treat not only the lesion, but a small area of the surrounding normal skin to achieve a good response.     A blister, and even a blood filled blister, may form after treatment.   This is a normal response. If the blister is painful, it is acceptable to sterilize a needle and with rubbing alcohol and gently pop the blister. It is important that you gently wash the area with soap and warm water as the blister fluid may contain wart virus if a wart was treated. Do no remove the roof of the blister.     The area treated can take anywhere from 1-3 weeks to heal. Healing time depends on the kind skin lesion treated, the location, and how aggressively the lesion was treated. It is recommended that the areas treated are covered with Vaseline or bacitracin ointment and a band-aid. If a band-aid is not practical, just ointment applied several times per day will do. Keeping these areas moist will speed the healing time.    Treatment with liquid nitrogen can leave a scar. In dark skin, it may be a light or dark scar, in light skin it may be a white or pink scar. These will generally fade with time, but may never go away completely.     If you have any concerns after your treatment, please feel free to call the office.       [facility] [place] [address]/ [phone] FAX/ [email or web address]

## 2023-12-20 NOTE — PROGRESS NOTES
Subjective:      Patient ID:  Shannan Norman is a 78 y.o. male who presents for   Chief Complaint   Patient presents with    Skin Check     tbse     Patient with new complaint of lesion(s)  Location: front of left eart  Duration: 2-3 wks  Symptoms: none  Relieving factors/Previous treatments: none         Problem List Items Addressed This Visit          Oncology    History of skin cancer    Overview     Left shoulder, melanoma, 0.15 mm, pT1a s/p WLE 2020  Left anterior leg, BCC, 9/2022 s/p excision  Left neck, BCC, s/p excision 2020  Left mid nose, BCC, 2019          Other Visit Diagnoses       Neoplasm of uncertain behavior of skin    -  Primary    Relevant Orders    Specimen to Pathology, Dermatology    Actinic keratosis        Seborrheic keratoses        Multiple benign nevi        Lentigo        Powers angioma        History of nonmelanoma skin cancer        History of malignant melanoma        Screening exam for skin cancer                  Review of Systems   Constitutional:  Negative for fever, chills and fatigue.   Skin:  Positive for activity-related sunscreen use and wears hat. Negative for daily sunscreen use and recent sunburn.   Hematologic/Lymphatic: Does not bruise/bleed easily.       Objective:   Physical Exam   Constitutional: He appears well-developed and well-nourished. No distress.   Genitourinary:         Musculoskeletal: Lymphadenopathy:      Cervical: No cervical adenopathy.      Upper Body:      Right upper body: No supraclavicular adenopathy.      Left upper body: No supraclavicular adenopathy.      Lower Body: No right inguinal adenopathy. No left inguinal adenopathy.     Lymphadenopathy:     He has no cervical adenopathy. No inguinal adenopathy noted on the right or left side.        Right: No supraclavicular adenopathy present.        Left: No supraclavicular adenopathy present.   Neurological: He is alert and oriented to person, place, and time. He is not disoriented.   Psychiatric:  He has a normal mood and affect.   Skin:   Areas Examined (abnormalities noted in diagram):   Scalp / Hair Palpated and Inspected  Head / Face Inspection Performed  Neck Inspection Performed  Chest / Axilla Inspection Performed  Abdomen Inspection Performed  Genitals / Buttocks / Groin Inspection Performed  Back Inspection Performed  RUE Inspected  LUE Inspection Performed  RLE Inspected  LLE Inspection Performed  Nails and Digits Inspection Performed  Gland Inspection Performed                 Diagram Legend     Erythematous scaling macule/papule c/w actinic keratosis       Vascular papule c/w angioma      Pigmented verrucoid papule/plaque c/w seborrheic keratosis      Yellow umbilicated papule c/w sebaceous hyperplasia      Irregularly shaped tan macule c/w lentigo     1-2 mm smooth white papules consistent with Milia      Movable subcutaneous cyst with punctum c/w epidermal inclusion cyst      Subcutaneous movable cyst c/w pilar cyst      Firm pink to brown papule c/w dermatofibroma      Pedunculated fleshy papule(s) c/w skin tag(s)      Evenly pigmented macule c/w junctional nevus     Mildly variegated pigmented, slightly irregular-bordered macule c/w mildly atypical nevus      Flesh colored to evenly pigmented papule c/w intradermal nevus       Pink pearly papule/plaque c/w basal cell carcinoma      Erythematous hyperkeratotic cursted plaque c/w SCC      Surgical scar with no sign of skin cancer recurrence      Open and closed comedones      Inflammatory papules and pustules      Verrucoid papule consistent consistent with wart     Erythematous eczematous patches and plaques     Dystrophic onycholytic nail with subungual debris c/w onychomycosis     Umbilicated papule    Erythematous-base heme-crusted tan verrucoid plaque consistent with inflamed seborrheic keratosis     Erythematous Silvery Scaling Plaque c/w Psoriasis     See annotation                    Assessment / Plan:      Pathology Orders:       Normal  Orders This Visit    Specimen to Pathology, Dermatology     Questions:    Procedure Type: Dermatology and skin neoplasms    Number of Specimens: 2    ------------------------: -------------------------    Spec 1 Procedure: Biopsy    Spec 1 Clinical Impression: r/o NMSC    Spec 1 Source: right forearm    ------------------------: -------------------------    Spec 2 Procedure: Biopsy    Spec 2 Clinical Impression: BCC vs melanoma    Spec 2 Source: left anterior leg    Release to patient: Immediate          Neoplasm of uncertain behavior of skin  Shave biopsy procedure note:    Two shave biopsy performed after verbal consent including risk of infection, scar, recurrence, need for additional treatment of site. Area prepped with alcohol, anesthetized with approximately 1.0cc of 1% lidocaine with epinephrine. Lesional tissue shaved with razor blade. Hemostasis achieved with application of aluminum chloride followed by hyfrecation. No complications. Dressing applied. Wound care explained.    Actinic keratosis  Cryosurgery Procedure Note    Verbal consent from the patient is obtained including, but not limited to, risk of hypopigmentation/hyperpigmentation, scar, recurrence of lesion. The patient is aware of the precancerous quality and need for treatment of these lesions. Liquid nitrogen cryosurgery is applied to the 7 actinic keratoses, as detailed in the physical exam, to produce a freeze injury. The patient is aware that blisters may form and is instructed on wound care with gentle cleansing and use of vaseline ointment to keep moist until healed. The patient is supplied a handout on cryosurgery and is instructed to call if lesions do not completely resolve.    Seborrheic keratoses  Multiple benign nevi  Lentigo  Cherry angioma  Reassurance given to patient. No treatment is necessary.   Treatment of benign, asymptomatic lesions may be considered cosmetic.      History of nonmelanoma skin cancer  History of malignant  melanoma  Screening exam for skin cancer  Area of previous melanoma and NMSC examined. Site well healed with no signs of recurrence.    Total body skin examination performed today including at least 12 points as noted in physical examination. Suspicious lesions noted.    Recommend daily sun protection/avoidance, use of at least SPF 30, broad spectrum sunscreen (OTC drug), skin self examinations, and routine physician surveillance to optimize early detection             Follow up in about 6 months (around 6/20/2024) for TBSE.

## 2023-12-22 ENCOUNTER — PATIENT MESSAGE (OUTPATIENT)
Dept: DERMATOLOGY | Facility: CLINIC | Age: 78
End: 2023-12-22
Payer: MEDICARE

## 2023-12-22 DIAGNOSIS — C44.91 BASAL CELL CARCINOMA (BCC), UNSPECIFIED SITE: Primary | ICD-10-CM

## 2023-12-22 LAB
FINAL PATHOLOGIC DIAGNOSIS: NORMAL
GROSS: NORMAL
Lab: NORMAL
MICROSCOPIC EXAM: NORMAL

## 2023-12-22 RX ORDER — IMIQUIMOD 12.5 MG/.25G
CREAM TOPICAL
Qty: 24 PACKET | Refills: 3 | Status: SHIPPED | OUTPATIENT
Start: 2023-12-22

## 2023-12-22 NOTE — TELEPHONE ENCOUNTER
1. Skin, right forearm, shave biopsy:  -BASAL CELL CARCINOMA, NODULAR TYPE    Will schedule excision    2. Skin, left anterior leg, shave biopsy:  -BASAL CELL CARCINOMA (PIGMENTED    Given superficial type and significant venous insufficiency and leg edema, will treat with topical imiquimod (5x weekly x 6 weeks)

## 2023-12-22 NOTE — PROGRESS NOTES
1. Skin, right forearm, shave biopsy:  -BASAL CELL CARCINOMA, NODULAR TYPE, NARROWLY EXCISED IN THE PLANES OF SECTION EXAMINED    Staff: please schedule excision    2. Skin, left anterior leg, shave biopsy:  -BASAL CELL CARCINOMA (PIGMENTED), SUPERFICIAL TYPE    Will treat with imiquimod  Staff: schedule f/u 3 months  ____    Patient called and informed of diagnosis and plan

## 2023-12-27 ENCOUNTER — PATIENT MESSAGE (OUTPATIENT)
Dept: DERMATOLOGY | Facility: CLINIC | Age: 78
End: 2023-12-27
Payer: MEDICARE

## 2023-12-27 ENCOUNTER — HOSPITAL ENCOUNTER (OUTPATIENT)
Dept: RADIOLOGY | Facility: HOSPITAL | Age: 78
Discharge: HOME | End: 2023-12-27
Attending: STUDENT IN AN ORGANIZED HEALTH CARE EDUCATION/TRAINING PROGRAM
Payer: MEDICARE

## 2023-12-27 DIAGNOSIS — M19.012 GLENOHUMERAL ARTHRITIS, LEFT: ICD-10-CM

## 2023-12-27 DIAGNOSIS — M67.912 ROTATOR CUFF DYSFUNCTION, LEFT: ICD-10-CM

## 2023-12-27 PROCEDURE — 73200 CT UPPER EXTREMITY W/O DYE: CPT | Mod: TC,LT

## 2023-12-27 PROCEDURE — 73200 CT UPPER EXTREMITY W/O DYE: CPT | Mod: 26,LT,, | Performed by: RADIOLOGY

## 2023-12-27 PROCEDURE — 73200 CT SHOULDER WITHOUT CONTRAST LEFT: ICD-10-PCS | Mod: 26,LT,, | Performed by: RADIOLOGY

## 2024-01-01 LAB
OHS CV AF BURDEN PERCENT: < 1
OHS CV DC REMOTE DEVICE TYPE: NORMAL
OHS CV ICD SHOCK: NO
OHS CV RV PACING PERCENT: 6.9 %

## 2024-01-10 ENCOUNTER — TELEPHONE (OUTPATIENT)
Dept: NEUROLOGY | Facility: CLINIC | Age: 79
End: 2024-01-10
Payer: MEDICARE

## 2024-01-11 ENCOUNTER — TELEPHONE (OUTPATIENT)
Dept: FAMILY MEDICINE | Facility: CLINIC | Age: 79
End: 2024-01-11
Payer: MEDICARE

## 2024-01-11 NOTE — TELEPHONE ENCOUNTER
----- Message from Oneyda Mckeon sent at 1/11/2024  2:20 PM CST -----  Type:  Re Establish Care as PCP     Who Called: Pt   Would the patient rather a call back or a response via [facility]? Call back   Best Call Back Number: [phone]  Additional Information: Please be advised, pt states dr was his PCP before and would like to re establish care as PCP w/

## 2024-01-11 NOTE — TELEPHONE ENCOUNTER
----- Message from Aniket SPENCER Route sent at 1/10/2024  3:46 PM CST -----  Regarding: Meeting  Contact: pt.  [phone]  Pt is calling in ref to a meeting with provider a couple of weeks ago . He says he was a bit taken by surprise and would like to resume the meeting. Patient Requesting Call Back @  [phone]

## 2024-01-11 NOTE — TELEPHONE ENCOUNTER
"Called pt to get more information about what he is requesting    He said he had a VV recently w/ Dr Adams and didn't realize what was going on with him/was surprised and it "went poorly"    He said he did want to talk with about some stuff. He is going to have a total shoulder replacement and he is concerned bc he does have a certain amount of shaking in his left hand sometimes and he thinks the torn tendon could be influencing it. He said if he has parkinsons, he's not sure if should have the total shoulder replacement.        Scheduled in person f/u w/ Ana María  Routing to Dr Adams to advise also        "

## 2024-01-18 ENCOUNTER — OFFICE VISIT (OUTPATIENT)
Dept: SPORTS MEDICINE | Facility: CLINIC | Age: 79
End: 2024-01-18
Payer: MEDICARE

## 2024-01-18 VITALS
WEIGHT: 209 LBS | HEIGHT: 73 IN | HEART RATE: 60 BPM | BODY MASS INDEX: 27.7 KG/M2 | DIASTOLIC BLOOD PRESSURE: 66 MMHG | SYSTOLIC BLOOD PRESSURE: 125 MMHG

## 2024-01-18 DIAGNOSIS — M25.551 BILATERAL HIP PAIN: Primary | ICD-10-CM

## 2024-01-18 DIAGNOSIS — R26.89 ANTALGIC GAIT: ICD-10-CM

## 2024-01-18 DIAGNOSIS — M16.0 PRIMARY OSTEOARTHRITIS OF BOTH HIPS: ICD-10-CM

## 2024-01-18 DIAGNOSIS — M25.552 BILATERAL HIP PAIN: Primary | ICD-10-CM

## 2024-01-18 DIAGNOSIS — S76.019S TEAR OF GLUTEUS MINIMUS TENDON, UNSPECIFIED LATERALITY, SEQUELA: ICD-10-CM

## 2024-01-18 DIAGNOSIS — M47.816 LUMBAR SPONDYLOSIS: ICD-10-CM

## 2024-01-18 DIAGNOSIS — S76.019S TEAR OF GLUTEUS MEDIUS TENDON, UNSPECIFIED LATERALITY, SEQUELA: ICD-10-CM

## 2024-01-18 DIAGNOSIS — M67.80 TENDINOSIS: ICD-10-CM

## 2024-01-18 PROCEDURE — 3078F DIAST BP <80 MM HG: CPT | Mod: CPTII,S$GLB,, | Performed by: FAMILY MEDICINE

## 2024-01-18 PROCEDURE — 1101F PT FALLS ASSESS-DOCD LE1/YR: CPT | Mod: CPTII,S$GLB,, | Performed by: FAMILY MEDICINE

## 2024-01-18 PROCEDURE — 1125F AMNT PAIN NOTED PAIN PRSNT: CPT | Mod: CPTII,S$GLB,, | Performed by: FAMILY MEDICINE

## 2024-01-18 PROCEDURE — 3288F FALL RISK ASSESSMENT DOCD: CPT | Mod: CPTII,S$GLB,, | Performed by: FAMILY MEDICINE

## 2024-01-18 PROCEDURE — 99999 PR PBB SHADOW E&M-EST. PATIENT-LVL IV: CPT | Mod: PBBFAC,,, | Performed by: FAMILY MEDICINE

## 2024-01-18 PROCEDURE — 1159F MED LIST DOCD IN RCRD: CPT | Mod: CPTII,S$GLB,, | Performed by: FAMILY MEDICINE

## 2024-01-18 PROCEDURE — 1160F RVW MEDS BY RX/DR IN RCRD: CPT | Mod: CPTII,S$GLB,, | Performed by: FAMILY MEDICINE

## 2024-01-18 PROCEDURE — 3074F SYST BP LT 130 MM HG: CPT | Mod: CPTII,S$GLB,, | Performed by: FAMILY MEDICINE

## 2024-01-18 PROCEDURE — 20611 DRAIN/INJ JOINT/BURSA W/US: CPT | Mod: 50,S$GLB,, | Performed by: FAMILY MEDICINE

## 2024-01-18 PROCEDURE — 99214 OFFICE O/P EST MOD 30 MIN: CPT | Mod: 25,S$GLB,, | Performed by: FAMILY MEDICINE

## 2024-01-18 RX ORDER — TRIAMCINOLONE ACETONIDE 40 MG/ML
40 INJECTION, SUSPENSION INTRA-ARTICULAR; INTRAMUSCULAR
Status: DISCONTINUED | OUTPATIENT
Start: 2024-01-18 | End: 2024-01-18 | Stop reason: HOSPADM

## 2024-01-18 RX ADMIN — TRIAMCINOLONE ACETONIDE 40 MG: 40 INJECTION, SUSPENSION INTRA-ARTICULAR; INTRAMUSCULAR at 10:01

## 2024-01-18 NOTE — PROGRESS NOTES
Shannan Norman, a 78 y.o. male, is here for evaluation of bilateral hip. R>L    HISTORY OF PRESENT ILLNESS   Last csi worked very well  Requesting f/u evaluation and injection if appropriate  No new sx    ---prior  Location: proximal thigh   Onset: chronic, insidious  Palliative:    relative rest   oral analgesics, OTC    Provocative: prolonged ambulation  Prior: none  Progression: plateau discomfort   Quality: sharp  Radiation: none  Severity: per nursing documentation  Timing: intermittent with use  Trauma: none    Review of systems (ROS):  A 10+ review of systems was performed with pertinent positives and negatives noted above in the history of present illness. Other systems were negative unless otherwise specified.    PHYSICAL EXAMINATION  General:  The patient is alert and oriented x 3.  Mood is pleasant.  Observation of ears, eyes and nose reveal no gross abnormalities.  HEENT: NCAT, sclera nonicteric  Lungs: Respirations are equal and unlabored.   Gait is coordinated. Patient can toe walk and heel walk without difficulty.    HIP/PELVIS EXAMINATION    Observation/Inspection  Gait:   Nonantalgic   Alignment:  Neutral   Scars:   None   Muscle atrophy: None   Effusion:  None   Warmth:  None   Discoloration:   None   Leg lengths:   Equal   Pelvis:    Level     Tenderness/Crepitus (T/C):      T / C  Trochanteric bursa   - / -  Piriformis    - / -  SI joint    - / -  Psoas tendon   - / -  Rectus insertion  - / -  Adductor insertion  - / -  Pubic symphysis  - / -    ROM: (* = pain)    Flexion:      120 degrees  External rotation:   40 degrees  Internal rotation with axial load:  30 degrees  Internal rotation without axial load:  40 degrees  Abduction:    45 degrees  Adduction:     20 degrees    Special Tests:  Pain w/ forced internal rotation (FADIR):  -   Pain w/ forced external rotation (MARCIE):  -   Circumduction test:     -  Stinchfield test:     -   Log roll:       -   Snapping hip (internal):    -   Sit-up  pain:      -   Resisted sit-up pain:     -   Resisted sit-up with adductor contraction pain:  -   Step-down test:     +  Trendelenburg test:     -  Bridge test      +     Extremity Neuro-vascular Examination:   Sensation:  Grossly intact to light touch all dermatomal regions.   Motor Function:  Fully intact motor function at hip, knee, foot and ankle    DTRs;  quadriceps and  achilles 2+.  No clonus and downgoing Babinski.    Vascular status:  DP and PT pulses 2+, brisk capillary refill, symmetric.    Skin:  intact, compartments soft.    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  #2  Tonnis Grade III osteoarthritis of hip, bilat  #3 Concern for Parkinson's, followed by neurology    Imaging studies reviewed:  X-ray pelvis and hip, bilat 22.02    Plan    We discussed options including    Watchful waiting / relative rest    Physical therapy x   Injection therapy Csi iahip bilat  Csi lat glut tends bilat   Consultation    The patient chooses As above   x = prescribed  CSI = corticosteroid injection  VSI = viscosupplement injection  PRPI = platelet rich plasma injection  ia = intra articular  R = right  L = left  B = bilateral   nfSx = surgical consultation was recommended, but patient is not interested in consultation at this time    Physical Therapy        Formal (fPT), @ [facility] facility b   Formal (fPT), @ [facility]        Homegoing (hgPT), per concurrent fPT recommendations    Homegoing (hgPT), per prior fPT recommendations    Homegoing (hgPT), handout provided        w/  (atPT)    [blank] = not prescribed  x = prescribed  b = prescribed, and begin as indicated  t = continue as indicated  r = prescribed, and restart as indicated  p = completed prior as indicated  hs = prescribed, and with high school   col = prescribed, and with college or university   nfPT = physical therapy was  recommended, but patient is not interested in PT at this time    Activity (e.g. sports, work) restrictions    [blank] = as tolerated  pt = per physical therapist  at = per   NWB = non weight bearing on affected lower extremity, with crutches assistance for ambulation    Bracing    [blank] = not prescribed  r = recommended, but not fit with at todays visit  f = prescribed and fit with at todays visit  t = continue as indicated  d = d/c  p = as needed  rare = use on rare, as-needed basis; advised against chronic use    Pain management mp   [blank] = No prescription necessary. A handout detailing dosing of appropriate   over-the-counter musculoskeletal analgesics was made available to the patient.   m = meloxicam x 14 days  mp = 14 day course of meloxicam prescribed prior    Follow up 7d   [blank] = as needed  [number] = in [number] weeks  CSI = for corticosteroid injection  VSI = for viscosupplement injection or injection series  PRP = for platelet rich plasma injection or injection series  MRI = after MRI imaging  ns = should surgical options be deferred (no surgery)  o = appointment offered, deferred by patient    Should symptoms worsen or fail to resolve, consider    Revisiting the above options and / or Csi sofy almanza  X-ray spine lumbar  AVERY     Vocation:   ++yoga  former golfer  wife w/ advanced dementia  enjoyed long vacations in his RV

## 2024-01-18 NOTE — PROCEDURES
"Large Joint Aspiration/Injection: bilateral hip joint    Date/Time: 1/18/2024 10:00 AM    Performed by: Kit Potter MD  Authorized by: Kit Potter MD    Consent Done?:  Yes (Verbal)  Indications:  Pain  Site marked: the procedure site was marked    Timeout: prior to procedure the correct patient, procedure, and site was verified    Prep: patient was prepped and draped in usual sterile fashion    Local anesthesia used?: No      Details:  Needle Size:  22 G  Ultrasonic Guidance for needle placement?: Yes    Images are saved and documented.  Approach:  Anterior  Location:  Hip  Laterality:  Bilateral  Site:  Bilateral hip joint  Medications (Right):  40 mg triamcinolone acetonide 40 mg/mL  Medications (Left):  40 mg triamcinolone acetonide 40 mg/mL  Patient tolerance:  Patient tolerated the procedure well with no immediate complications     Description of ultrasound utilization for needle guidance:   Ultrasound guidance used for needle localization. Images saved and stored for documentation. The hip joint was visualized. Dynamic visualization of the 22g x 3.5" needle was continuous throughout the procedure.    Precautionary:  The patient's femoral artery, vein, and nerve were identified, and visualized throughout the procedure, so as to avoid needle contact with those structures.     "

## 2024-01-18 NOTE — PROCEDURES
"Tendon Origin: R hip joint, L hip joint    Date/Time: 1/18/2024 10:00 AM    Performed by: Kit Potter MD  Authorized by: Kit Potter MD    Consent Done?:  Yes (Verbal)  Timeout: prior to procedure the correct patient, procedure, and site was verified    Indications:  Pain  Site marked: the procedure site was marked    Timeout: prior to procedure the correct patient, procedure, and site was verified    Location:  Hip  Site:  R hip joint and L hip joint  Prep: patient was prepped and draped in usual sterile fashion    Ultrasonic Guidance for Needle Placement?: Yes    Needle size:  22 G  Approach:  Posterolateral  Medications:  40 mg triamcinolone acetonide 40 mg/mL; 40 mg triamcinolone acetonide 40 mg/mL  Patient tolerance:  Patient tolerated the procedure well with no immediate complications   Gluteus medius and gluteus minimus muscles, tendon sheaths, and tendon insertions injection    Description of ultrasound utilization for needle guidance:   Ultrasound guidance used for needle localization. Images saved and stored for documentation. The gluteus medius and minimus muscles and tendons were visualized at their insertions on the greater trochanter. Dynamic visualization of the 22g x 3.5" needle was continuous throughout the procedure.    "

## 2024-01-22 ENCOUNTER — HOSPITAL ENCOUNTER (OUTPATIENT)
Dept: RADIOLOGY | Facility: HOSPITAL | Age: 79
Discharge: HOME | End: 2024-01-22
Attending: FAMILY MEDICINE
Payer: MEDICARE

## 2024-01-22 ENCOUNTER — OFFICE VISIT (OUTPATIENT)
Dept: SPORTS MEDICINE | Facility: CLINIC | Age: 79
End: 2024-01-22
Payer: MEDICARE

## 2024-01-22 VITALS
HEIGHT: 73 IN | DIASTOLIC BLOOD PRESSURE: 77 MMHG | WEIGHT: 209 LBS | BODY MASS INDEX: 27.7 KG/M2 | HEART RATE: 74 BPM | SYSTOLIC BLOOD PRESSURE: 139 MMHG

## 2024-01-22 DIAGNOSIS — M25.561 PAIN IN BOTH KNEES, UNSPECIFIED CHRONICITY: ICD-10-CM

## 2024-01-22 DIAGNOSIS — M25.562 LEFT KNEE PAIN, UNSPECIFIED CHRONICITY: ICD-10-CM

## 2024-01-22 DIAGNOSIS — R26.89 ANTALGIC GAIT: ICD-10-CM

## 2024-01-22 DIAGNOSIS — M17.0 PRIMARY OSTEOARTHRITIS OF BOTH KNEES: Primary | ICD-10-CM

## 2024-01-22 DIAGNOSIS — M25.562 PAIN IN BOTH KNEES, UNSPECIFIED CHRONICITY: ICD-10-CM

## 2024-01-22 PROCEDURE — 99999 PR PBB SHADOW E&M-EST. PATIENT-LVL IV: CPT | Mod: PBBFAC,,, | Performed by: FAMILY MEDICINE

## 2024-01-22 PROCEDURE — 3075F SYST BP GE 130 - 139MM HG: CPT | Mod: CPTII,S$GLB,, | Performed by: FAMILY MEDICINE

## 2024-01-22 PROCEDURE — 1159F MED LIST DOCD IN RCRD: CPT | Mod: CPTII,S$GLB,, | Performed by: FAMILY MEDICINE

## 2024-01-22 PROCEDURE — 1101F PT FALLS ASSESS-DOCD LE1/YR: CPT | Mod: CPTII,S$GLB,, | Performed by: FAMILY MEDICINE

## 2024-01-22 PROCEDURE — 1125F AMNT PAIN NOTED PAIN PRSNT: CPT | Mod: CPTII,S$GLB,, | Performed by: FAMILY MEDICINE

## 2024-01-22 PROCEDURE — 3078F DIAST BP <80 MM HG: CPT | Mod: CPTII,S$GLB,, | Performed by: FAMILY MEDICINE

## 2024-01-22 PROCEDURE — 3288F FALL RISK ASSESSMENT DOCD: CPT | Mod: CPTII,S$GLB,, | Performed by: FAMILY MEDICINE

## 2024-01-22 PROCEDURE — 20611 DRAIN/INJ JOINT/BURSA W/US: CPT | Mod: LT,S$GLB,, | Performed by: FAMILY MEDICINE

## 2024-01-22 PROCEDURE — 1160F RVW MEDS BY RX/DR IN RCRD: CPT | Mod: CPTII,S$GLB,, | Performed by: FAMILY MEDICINE

## 2024-01-22 PROCEDURE — 73564 X-RAY EXAM KNEE 4 OR MORE: CPT | Mod: TC,50

## 2024-01-22 PROCEDURE — 99214 OFFICE O/P EST MOD 30 MIN: CPT | Mod: 25,S$GLB,, | Performed by: FAMILY MEDICINE

## 2024-01-22 PROCEDURE — 73564 X-RAY EXAM KNEE 4 OR MORE: CPT | Mod: 26,50,, | Performed by: RADIOLOGY

## 2024-01-22 RX ORDER — TRIAMCINOLONE ACETONIDE 40 MG/ML
40 INJECTION, SUSPENSION INTRA-ARTICULAR; INTRAMUSCULAR
Status: DISCONTINUED | OUTPATIENT
Start: 2024-01-22 | End: 2024-01-22 | Stop reason: HOSPADM

## 2024-01-22 RX ADMIN — TRIAMCINOLONE ACETONIDE 40 MG: 40 INJECTION, SUSPENSION INTRA-ARTICULAR; INTRAMUSCULAR at 10:01

## 2024-01-22 NOTE — PROGRESS NOTES
Shannan Norman, a 78 y.o. male, presents today for evaluation of his bilateral knee pain.      History of Present Illness (HPI)  24.01.22  Last csi+vsi (quite some time ago) worked very well  Requesting f/u evaluation and injection if appropriate  No new sx    ---prior  Location: anterior knee  Onset: Chronic   Palliative:    Relative rest   Oral analgesics   Ambulation Assistance -    Bracing: none   Injections   Euflexxa series- 3/18/2019, 3/25/19, 4/1/2019  Provocative:    ADLS   Prolonged ambulation     Prior: none  Progression: constant discomfort  Quality:    pain is a 0 /10 throbbing pain today.Pain is 6 /10 at its worst.   Radiation: Denies, numbness, tingling, and inability to bear weight.  Severity: per nursing documentation  Timing: intermittent w/ use  Trauma: golf    Review of Systems (ROS)  A 10+ review of systems was performed with pertinent positives and negatives noted above in the history of present illness. Other systems were negative unless otherwise specified.    Physical Examination (PE)  General:  The patient is alert and oriented x 3. Mood is pleasant. Observation of ears, eyes and nose reveal no gross abnormalities. HEENT: NCAT, sclera anicteric.   Lungs: Respirations are equal and unlabored.  Gait is coordinated. Patient can toe walk and heel walk without difficulty.    KNEE EXAMINATION    Observation/Inspection  Gait:   Nonantalgic   Alignment:  Neutral   Scars:   None   Muscle atrophy: Mild  Effusion:  None   Warmth:  None   Discoloration:   none     Tenderness / Crepitus (T / C):         T / C      T / C  Patella   - / -   Lateral joint line   - / -     Peripatellar medial  -  Medial joint line    + / -  Peripatellar lateral -  Medial plica   - / -  Patellar tendon -   Popliteal fossa   - / -  Quad tendon   -   Gastrocnemius   -  Prepatellar Bursa - / -   Quadricep   -  Tibial tubercle  -  Thigh/hamstring  -  Pes anserine/HS -  Fibula    -  ITB   - / -  Tibia     -  Tib/fib joint  - /  -  LCL    -    MFC   - / -   MCL: Proximal  -    LFC   - / -   Distal    -          ROM: (* = pain)  PASSIVE   ACTIVE    Left :   5 / 0 / 145   5 / 0 / 145     Right :    5 / 0 / 145   5 / 0 / 145    Patellofemoral examination:  See above noted areas of tenderness.   Patella position    Subluxation / dislocation: Centered        Sup. / Inf;   Normal   Crepitus (PF):    Absent   Patellar Mobility:       Medial-lateral:   Normal    Superior-inferior:  Normal    Inferior tilt   Normal    Patellar tendon:  Normal   Lateral tilt:    Normal   J-sign:     None   Patellofemoral grind:   No pain     Meniscal Signs:     Pain on terminal extension:  +  Pain on terminal flexion:  +  Luisitos maneuver:  +*  Squat     NT  Thesaly    NT    Ligament Examination:  ACL / Lachman:  WNL  PCL-Post.  drawer: normal 0 to 2mm  MCL- Valgus:  normal 0 to 2mm  LCL- Varus:    normal 0 to 2mm  Pivot shift:  guarding   Dial Test:   difference c/w other side   At 30° flexion: normal (< 5°)    At 90° flexion: normal (< 5°)   Reverse Pivot Shift:   normal (Equal)     Strength: (* = with pain) Painful Side  Quadriceps   5/5  Hamstrin/5    Extremity Neuro-vascular Examination:   Sensation:  Grossly intact to light touch all dermatomal regions.   Motor Function:  Fully intact motor function at hip, knee, foot and ankle    DTRs;  quadriceps and  achilles 2+.  No clonus and downgoing Babinski.    Vascular status:  DP and PT pulses 2+, brisk capillary refill, symmetric.     Other Findings:    ASSESSMENT & PLAN  Assessment  #1 Kellgren-Tony grade II going on III osteoarthritis of knee, bilat  Knee pain left >> right    No evidence of neurologic pathology  No evidence of vascular pathology    Imaging studies reviewed:   X-ray knee, bilateral 24.    Plan  We discussed the importance of appropriate diet, weight, and regular exercise    We discussed options including    Watchful waiting / relative rest    Physical therapy X - currently in  re: lumbar, hip, + knee   Injection therapy Csi iaknee left  Vsi iaknee left   Consultation    The patient chooses As above   x = prescribed  CSI = corticosteroid injection  VSI = viscosupplement injection  PRPI = platelet rich plasma injection  ia = intra articular  R = right  L = left  B = bilateral   nfSx = surgical consultation was recommended, but patient is not interested in consultation at this time    Physical Therapy        Formal (fPT), @ [facility] facility T - as above   Formal (fPT), @ [facility]        Homegoing (hgPT), per concurrent fPT recommendations    Homegoing (hgPT), per prior fPT recommendations    Homegoing (hgPT), handout provided        w/  (atPT)    [blank] = not prescribed  x = prescribed  b = prescribed, and begin as indicated  t = continue as indicated  r = prescribed, and restart as indicated  p = completed prior as indicated  hs = prescribed, and with high school   col = prescribed, and with college or university   nfPT = physical therapy was recommended, but patient is not interested in PT at this time    Activity (e.g. sports, work) restrictions    [blank] = as tolerated  pt = per physical therapist  at = per     Bracing    [blank] = not prescribed  r = recommended, but not fit with at todays visit  f = prescribed and fit with at todays visit  t = continue as indicated  p = prn use on rare, as-needed basis; advised against chronic use    Pain management    [blank] = No prescription necessary. A handout detailing dosing of appropriate   over-the-counter musculoskeletal analgesics was made available to the patient.   m = meloxicam x 14 days  mp = 14 day course of meloxicam prescribed prior    Follow up vsi   [blank] = as needed  [number] = in [number] weeks  CSI = for corticosteroid injection  VSI = for viscosupplement injection or injection series  PRP = for platelet rich plasma injection or injection series  MRI =  after MRI imaging  ns = should surgical options be deferred (no surgery)  o = appointment offered, deferred by patient    Should symptoms worsen or fail to resolve, consider    Revisiting the above options and / or      Vocation:   ++yoga  former golfer  wife w/ advanced dementia  enjoyed long vacations in his RV  cb re: glucosamine / chondroitin  baseball fan

## 2024-01-22 NOTE — PROCEDURES
"Large Joint Aspiration/Injection: L knee    Date/Time: 1/22/2024 10:00 AM    Performed by: Kit Potter MD  Authorized by: Kit Potter MD    Consent Done?:  Yes (Verbal)  Indications:  Pain  Site marked: the procedure site was marked    Timeout: prior to procedure the correct patient, procedure, and site was verified    Prep: patient was prepped and draped in usual sterile fashion      Details:  Needle Size:  25 G  Ultrasonic Guidance for needle placement?: Yes    Images are saved and documented.  Approach:  Lateral  Location:  Knee  Site:  L knee  Medications:  40 mg triamcinolone acetonide 40 mg/mL  Patient tolerance:  Patient tolerated the procedure well with no immediate complications     Description of ultrasound utilization for needle guidance:   Ultrasound guidance used for needle localization. Images saved and stored for documentation. The SUPRAPATELLAR BURSA / KNEE JOINT was visualized. Dynamic visualization of the 25g x 1.5" needle was continuous throughout the procedure.     "

## 2024-01-22 NOTE — PROGRESS NOTES
"[facility]     PATIENT: Shannan Norman  VISIT DATE: 2024  MRN: [id number]  PRIMARY PROVIDER: Nitin Banks MD  : 1945       Chief complaint: "I want to know if I have parkinson's"       History of present illness:    The patient is a 78 year-old right handed make accompanied here to discuss diagnostic testing for parkinsonian symptoms. His last virtual visit with Dr. Adams was 2023. His MOCA score was 30/30 as of 2023. Unfortunately, his wife passed away on 2023. He has a complete left shoulder replacement surgery scheduled for 2024, and plans to stay in a skilled nursing facility for a few weeks post-surgery, preferring not to have his children, who reside in [place] and [place], come to care for him. Recently, he received two injections in his knees. He reports feeling "sad" but notes an improvement in mood since discontinuing prednisone on , which was prescribed for polymyalgia rheumatica. He experienced The patient is a 78-year-old right-handed male accompanied by no one. increased bilateral knee pain up until the injections. Additionally, he expressed interest in alpha-synuclein testing.  - Additional information is obtained by reviewing available medical records.      Relevant Background/Context  Known Relevant Family history:  Mother -  at age 83 CAD s/p CABG  Father -  at age 72 CAD/MI/COPD/Tobacco use  Neurocognitive Disorder:  Father - MCI onset 70s  Mother - MCI onset 80s  Sister - DLB alive 87, onset early 80s  Movement Disorder:  Sister - DLB onset early 80s  Motorneuron Disorder:  The patient/family denies a history of ALS, MND, PLS.  Developmental Disorder:  The patient/family denies a history of Dyslexia, ADHD, ASD.  Psychiatric Disorder:  Sisters - "issues"  Known Relevant Genetics:  There is no known relevant genetic testing available.  Developmental " Milestones:  The patient/family report no known birth complications or early life problems. The patient met all developmental milestones.  Education/Learning Capacity:  The patient/family report no signs or symptoms suggestive of developmental learning disorder.  HS  BA. + 5 years economics  Estimated Educational Experience: 17 years of formal education.  Social History:   Patient continues to volunteer in his community is involved in multiple social programs volunteering groups. Patient lives in a senior community adjacent to a gym for which patient gets 30 minutes of cardiovascular exercise 3 times week and 3 times week he additionally gets 30-45 minutes of yoga.  Career/Skill Reserve:  Patient has a long productive career in various capacities. 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 Since then patient has been volunteering for various nonprofit organizations  Retired/Quit: 2000     Neurocognitive Disorder Features  Onset/Duration:  Jun 2022 (~10-month)  First Symptom:  Attention impairment  Progression:  Step-   Review of cognitive, visuospatial, motor, sensory, and behavioral systems:     Memory:   The patient's memory has worsened in the past few years.  He does repeat statements or asks the same question repeatedly.  He does not have difficulty remembering recent important conversations.  He does not have difficulty remembering recent events.  He does not forget information within minutes.  His recent retrograde memory is intact.  His remote memory is intact.  Attention:   The patient's attention and concentration are impaired- reports they have improved since stopping prednisone.   He does not have attentional fluctuations.  He does not have difficulty maintaining selective attention.  He does not become easily  distracted.  He does have difficulty with divided attention.  Executive:   The patient's cognitive processing speed is slower.  He does have difficulty with working memory.  He does not misplace personal items (e.g., keys, cell phone, wallet) more frequently.  He does not have difficulty keeping track of his medications.  He does not have difficulty with planning/organizing/completing multistep tasks.  He does have not difficulty with executive attention.  He does have difficulty with flexible thinking.  He does not have difficulty with response inhibition.  He denies new impulsivity or rash/careless actions.  His judgment is intact.  Language:   The patient's speech is not affected.  He does not forget people's names more frequently.  He does not have word-finding difficulties.  His speech is fluent and non-effortful.  His speech is grammatically intact.  He does not make word substitutions.  He does not have difficulty reading.  He does not appear to have impaired comprehension.  Visuospatial:   The patient does not have new visuospatial difficulty.  He does not become confused or disoriented in *new*, unfamiliar places.  He does not have trouble with navigation.  He does not get lost in familiar places.  He does not have visuospatial disorientation.  He does not have difficulty recognizing objects or faces.  He denies problems with driving or parking.  Motor/Coordination:   The patient does not have difficulty with walking.  He does feel imbalanced.  He denies having fallen.  He does not appear to have new muscle weakness.  He does not have difficulty buttoning shirts, operating zippers, or manipulating tools/utensils.  His handwriting has not become micrographic.  He does not have a resting tremor.  He denies having any new involuntary movements and/or muscle jerking.  He does not have swallowing difficulty.  He denies new muscle cramps and twitching.  Sensory:   The patient denies new numbness, tingling,  "paresthesias, or pain.  The patient denies a loss of vision, blurry vision, or double vision.  The patient denies new loss of hearing or worsening tinnitus.  The patient denies anosmia.  Sleep:   The patient reports difficulty sleeping- wakes up 5-6x to go to the bathroom.   The patient does not have difficulty going to sleep.  The patient reports difficulty staying asleep and/or frequently awakening at night.  The patient denies snoring and/or have witnessed apneas while sleeping.- denies tried "snore lab" as recommended per Dr. Adams and reports he was negative for snoring.   When he wakes up in the morning, he does feel well-rested.  He denies dream-enactment behavior.  He denies symptoms suggestive of restless leg syndrome.  Behavior:   The patient's personality has not changed.  He does not have symptoms of disinhibition and social inappropriateness.  He does not have symptoms to suggest a loss of manners or decorum.  He does not appear apathetic or has decreased motivation.  He does not appear to have a change in inertia.  There is no report that The patient has had a change in their emotional expression.  He does not have emotional blunting or lability.  He does not have symptoms of irritability and mood lability.  He does not have symptoms of agitation, aggression, or violent outbursts.  His insight into his health and situation is intact.  His personal hygiene is intact.  He is not exhibiting a diminished response to other people's needs and feelings.  He is not exhibiting a diminished social interest, interrelatedness, or personal warmth.  He denies restlessness.  He denies new and/or worsening simple repetitive behaviors.  His speech has not become simplified or become repetitive/stereotyped.  He denies new/worsening complex repetitive/ritualistic compulsions and behaviors.  He does not have symptoms of hyper-religiosity or dogmatism.  His interests/pleasures have not become restrictive, simplified, " interrupting, or repetitive.  He denies a change of self-stimulating behavior.  He denies any changes in eating behavior.  He denies increased consumption of food or substances.  He denies oral exploration or consumption of inedible objects.  Psychiatric:   He does not feel depressed.  He is not exhibiting symptoms of social withdrawal/indifference.  He denies anxiety.  He does not exhibit cycling behavior.  He does not exhibit hyperactive behavior.  He is not exhibited symptoms of paranoia.  He does not have delusions.  He does not have hallucinations.  He does not have a history of sensitivity to neuroleptic/psychotropic medications.  Medical Review of Systems:   The patient does not have constipation.  The patient does not have urinary incontinence.  The patient denies orthostatic lightheadedness.  The patient's weight is stable.  Functional status:  Difficulty performing the following Instrumental ADLs:  Housekeeping: No  Food Preparation: No  Shopping: No  Ability to Handle Finances: No  Transportation/Driving: No  Household Appliances/Stove: No  Laundry: No  Difficulty performing the following Basic ADLs:  Dressing: No  Bathing: No  Toileting: No  Personal hygiene and grooming: No  Feeding: No  Care Management:  Patient/Family Safety Concerns:  Medication Adherence: No  Home Safety: No  Wandered: No  Firearms: No  Fall Risk: No  Home Alone: No          Past Medical History:   Diagnosis Date    Allergy     Arthritis     Atrial fibrillation     Atrial flutter 2011    ablation    Basal cell carcinoma     Cancer     Cataract     CKD (chronic kidney disease) stage 3, GFR 30-59 ml/min 8/13/2019    Diabetes mellitus     Dry eye syndrome     Gout, unspecified     High cholesterol     History of shingles     Hypertension     Melanoma     Seizures        Past Surgical History:   Procedure Laterality Date    ABLATION N/A 9/11/2018    Procedure: ABLATION;  Surgeon: Hany Sanchez MD;  Location: [facility];  Service:  Cardiology;  Laterality: N/A;  AFL, ISABELLE, RFA, ELODIA, MAC, DM, 3 PREP    ABLATION OF DYSRHYTHMIC FOCUS      ADENOIDECTOMY      CARDIAC PACEMAKER PLACEMENT      no defib    CATARACT EXTRACTION W/  INTRAOCULAR LENS IMPLANT Right 7/28/2020    Procedure: EXTRACTION, CATARACT, WITH IOL INSERTION;  Surgeon: Andrés Morse MD;  Location: [facility];  Service: Ophthalmology;  Laterality: Right;    CATARACT EXTRACTION W/  INTRAOCULAR LENS IMPLANT Left 8/11/2020    Procedure: EXTRACTION, CATARACT, WITH IOL INSERTION;  Surgeon: Andrés Morse MD;  Location: [facility] OR;  Service: Ophthalmology;  Laterality: Left;    COLONOSCOPY N/A 1/2/2019    Procedure: COLONOSCOPY Suprep;  Surgeon: Cindy Solorzano MD;  Location: [facility];  Service: Endoscopy;  Laterality: N/A;    GANGLION CYST EXCISION      left neck    HERNIA REPAIR  2013    umbilical    TENDON REPAIR Right     wrist    TONSILLECTOMY      varicose veins      several sx  bilateral    VASECTOMY      VEIN SURGERY         Family History   Problem Relation Age of Onset    Diabetes Mother     COPD Father     Heart disease Father     Arthritis Sister     Heart attack Brother     Heart disease Brother     Diabetes Brother     No Known Problems Maternal Aunt     No Known Problems Maternal Uncle     No Known Problems Paternal Aunt     No Known Problems Paternal Uncle     No Known Problems Maternal Grandmother     No Known Problems Maternal Grandfather     No Known Problems Paternal Grandmother     No Known Problems Paternal Grandfather     No Known Problems Son     Cancer Sister         lymph node, breast    No Known Problems Sister     No Known Problems Son     Amblyopia Neg Hx     Blindness Neg Hx     Cataracts Neg Hx     Glaucoma Neg Hx     Hypertension Neg Hx     Macular degeneration Neg Hx     Retinal detachment Neg Hx     Strabismus Neg Hx     Stroke Neg Hx     Thyroid disease Neg Hx     Prostate cancer Neg Hx     Kidney disease Neg Hx     Melanoma Neg Hx         Social History     Socioeconomic History    Marital status:    Occupational History     Employer: [employer]   Tobacco Use    Smoking status: Never    Smokeless tobacco: Never   Substance and Sexual Activity    Alcohol use: Yes     Alcohol/week: 7.0 - 14.0 standard drinks of alcohol     Types: 7 - 14 Glasses of wine per week    Drug use: No    Sexual activity: Yes     Partners: Female     Social Determinants of Health     Financial Resource Strain: Low Risk  (12/5/2023)    Overall Financial Resource Strain (CARDIA)     Difficulty of Paying Living Expenses: Not hard at all   Food Insecurity: No Food Insecurity (12/5/2023)    Hunger Vital Sign     Worried About Running Out of Food in the Last Year: Never true     Ran Out of Food in the Last Year: Never true   Transportation Needs: No Transportation Needs (12/5/2023)    PRAPARE - Transportation     Lack of Transportation (Medical): No     Lack of Transportation (Non-Medical): No   Physical Activity: Inactive (12/5/2023)    Exercise Vital Sign     Days of Exercise per Week: 0 days     Minutes of Exercise per Session: 30 min   Stress: No Stress Concern Present (12/5/2023)    [place] [employer] - Occupational Stress Questionnaire     Feeling of Stress : Only a little   Social Connections: Unknown (12/5/2023)    Social Connection and Isolation Panel [NHANES]     Frequency of Communication with Friends and Family: More than three times a week     Frequency of Social Gatherings with Friends and Family: Three times a week     Active Member of Clubs or Organizations: Yes     Attends Club or Organization Meetings: More than 4 times per year     Marital Status:    Housing Stability: Low Risk  (12/5/2023)    Housing Stability Vital Sign     Unable to Pay for Housing in the Last Year: No     Number of Places Lived in the Last Year: 1     Unstable Housing in the Last Year: No       Medication:     Current Outpatient Medications on  File Prior to Visit   Medication Sig Dispense Refill    allopurinoL (ZYLOPRIM) 100 MG tablet Take 1 tablet by mouth once daily 90 tablet 3    atorvastatin (LIPITOR) 40 MG tablet Take 1 tablet (40 mg total) by mouth once daily. 90 tablet 3    benazepriL (LOTENSIN) 20 MG tablet Take 1 tablet (20 mg total) by mouth once daily. 90 tablet 3    cycloSPORINE (RESTASIS) 0.05 % ophthalmic emulsion Place 1 drop into both eyes 2 (two) times daily. 60 each 11    econazole nitrate 1 % cream Apply topically 2 (two) times daily. 85 g 3    ferrous sulfate (FEOSOL) 325 mg (65 mg iron) Tab tablet Take 325 mg by mouth once daily.      GLUCOSAMINE HCL/CHONDR VASQUEZ A NA (GLUCOSAMINE-CHONDROITIN) 750-600 mg Tab Take 2 tablets by mouth Daily.      imiquimod (ALDARA) 5 % cream Apply to skin cancer on left leg 5 times weekly for 6 weeks 24 packet 3    levocetirizine (XYZAL) 5 MG tablet Take 1 tablet (5 mg total) by mouth every evening. 90 tablet 1    metoprolol tartrate (LOPRESSOR) 25 MG tablet Take 1 tablet (25 mg total) by mouth 2 (two) times daily as needed (palpitations). 45 tablet 0    multivitamin capsule Take 1 capsule by mouth nightly.       sildenafiL (VIAGRA) 50 MG tablet Take 1 tablet (50 mg total) by mouth daily as needed for Erectile Dysfunction. 30 tablet 06    tamsulosin (FLOMAX) 0.4 mg Cap TAKE 1 CAPSULE BY MOUTH AFTER DINNER 90 capsule 3    UNABLE TO FIND 6000 hydrolyzes collagen      XARELTO 20 mg Tab TAKE 1 TABLET BY MOUTH ONCE DAILY WITH SUPPER OR  EVENING  MEAL 90 tablet 3     Current Facility-Administered Medications on File Prior to Visit   Medication Dose Route Frequency Provider Last Rate Last Admin    [DISCONTINUED] triamcinolone acetonide injection 40 mg  40 mg Intra-articular  Kit Potter MD   40 mg at 01/22/24 1000        Review of patient's allergies indicates:  No Known Allergies    Medications Reconciliation:   I have reconciled the patient's home medications and discharge medications with the  patient/family. I have updated all changes.  Refer to After-Visit Medication List.    Objective:  Vital Signs:  There were no vitals filed for this visit.  Wt Readings from Last 3 Encounters:   01/22/24 1003 94.8 kg (208 lb 15.9 oz)   01/18/24 0949 94.8 kg (208 lb 15.9 oz)   12/18/23 0822 94.8 kg (208 lb 15.9 oz)     There is no height or weight on file to calculate BMI.     Neurological examination:    Mental Status:   His appearance is normal (hygiene is appropriate; attire is proper and clean).  Throughout the interview, he is cooperative, his eye contact is appropriate.  His behavior is appropriate to the clinical context without impropriety or improper language/conduct.  His behavior was not characterized by episodes of sudden uncontrollable and inappropriate laughing or crying.  The patient is awake; His energy level appeared normal.  His orientation is normal  His fund of knowledge was appropriate for age, culture, and level of education.  His thought process is logical and goal-oriented.  He demonstrated appropriate insight based on actions, awareness of his illness, plans for the future.  He demonstrated good judgment based on actions and plans for the future.  He has no evidence of hallucinations (auditory, visual, olfactory).  He has no evidence of delusions (paranoid, grandiose, bizarre).  Cranial Nerves:   His blink rate was normal.  His facial strength was normal.  His facial expression was symmetric and appropriate to the context  He tongue movement with normal.  Speech/Language:   The patient's speech was fluent, non-effortful, and his rate was appropriate to the context.  He has no articulation (segmental features) errors.  The patient's speech is not dysarthric.  The patient's speech was without evidence of anomia.  He makes no phonological loop errors.  He can comprehend commands that cross the midline (e.g., with your left thumb, touch your right ear).  He can comprehend commands that depend on  syntax (e.g., point to the ceiling after you point to the floor).  Motor:   Assessment of motor strength was symmetric and at minimal anti-gravity.  There is no pronator or downward drift.  There is no myoclonus observed in the patient's bilateral upper and lower extremities.   There are no fasciculations observed in The patient's bilateral upper and lower extremities.  Coordination:   He has no visible tremor.  He has no kinetic tremor bilaterally.  He has no resting tremor bilaterally.    Laboratories:     Lab Date Value [Reference]   Autoimmune/Paraneoplastic Screening           CRP 2023, Apr-03 2023, Feb-17 2022, Dec-19    8.4 (H) [0.0 - 8.2 mg/L]  3.8 [0.0 - 8.2 mg/L]  9.0 (H) [0.0 - 8.2 mg/L]      Sed Rate 2023, Apr-03 2022, Dec-19  2022, Oct-18    10 [0 - 23 mm/Hr]  5 [0 - 23 mm/Hr]  11 [0 - 23 mm/Hr]      Metabolic Screening   Hemoglobin A1C External 02/17/2023  6.1 (H) [4.0 - 5.6 %]  6.2 (H) [4.0 - 5.6 %]      Methlymalonic Acid 02/17/2023  0.20      T4 Total 02/17/2023  4.9 [4.5 - 11.5 ug/dL]      TSH 10/03/02571106, Oct-03    2.081 [0.400 - 4.000 uIU/mL]  1.381 [0.400 - 4.000 uIU/mL]      Glucose 2023, Apr-03    99 [70 - 110 mg/dL]      Albumin 2023, Apr-03 2023, Mar-11  2023, Feb-17    3.7 [3.5 - 5.2 g/dL]  3.7 [3.5 - 5.2 g/dL]  4.0 [3.5 - 5.2 g/dL]      Alkaline Phosphatase 2023, Apr-03 2023, Mar-11  2023, Feb-17    80 [55 - 135 U/L]  77 [55 - 135 U/L]  75 [55 - 135 U/L]      ALT 2023, Apr-03 2023, Mar-11  2023, Feb-17    12 [10 - 44 U/L]  13 [10 - 44 U/L]  11 [10 - 44 U/L]      AST 2023, Apr-03 2023, Mar-11  2023, Feb-17    13 [10 - 40 U/L]  15 [10 - 40 U/L]  13 [10 - 40 U/L]      BILIRUBIN TOTAL 2023, Apr-03 2023, Mar-11  2023, Feb-17    0.6 [0.1 - 1.0 mg/dL]  0.5 [0.1 - 1.0 mg/dL]  0.6 [0.1 - 1.0 mg/dL]      PROTEIN TOTAL 2023, Apr-03 2023, Mar-11  2023, Feb-17    7.1 [6.0 - 8.4 g/dL]  6.7 [6.0 - 8.4 g/dL]  6.8 [6.0 - 8.4 g/dL]      Cholesterol 2023, Apr-03    129 [120 - 199 mg/dL]      HDL  2023, Apr-03    49 [40 - 75 mg/dL]      Non-HDL Cholesterol 2023, Apr-03    80 [mg/dL]      Triglycerides 02/17/2023  58 [30 - 150 mg/dL]      Folate 02/17/2023  16.5 [4.0 - 24.0 ng/mL]      Thiamine 02/17/2023  84 [38 - 122 ug/L]      Vitamin B-12 2023, Feb-17    559 [180 - 914 ng/L]      Cerebrospinal Fluid Assessment   IgG 2023, Feb-17    791 [650 - 1600 mg/dL]      Coagulopathy Screening   INR 2023, Mar-11    1.2 [0.8 - 1.2]      Protime 03/11/2023  11.9 [9.0 - 12.5 sec]      Neuroendocrine/Electrolyte Screening   Magnesium 02/17/20232023, Feb-17    2.0 [1.6 - 2.6 mg/dL]  2.0 [1.6 - 2.6 mg/dL]      BUN 2023, Apr-03    21 [8 - 23 mg/dL]      Chloride 2023, Apr-03    104 [95 - 110 mmol/L]      Creatinine 2023, Apr-03    1.2 [0.5 - 1.4 mg/dL]      Potassium 2023, Apr-03    4.3 [3.5 - 5.1 mmol/L]      Sodium 2023, Apr-03    141 [136 - 145 mmol/L]      Neurodegenerative Serum Fluid Assessment   NEUROFILAMENT LIGHT CHAIN, PLASMA 2023, Feb-17    19.5      Infectious Disease/Immunocompromised Screening   SARS-CoV-2 RNA, Amplification, Qual 02/17/2023  Negative      Syphilis Treponemal Ab 2023, Feb-17    Nonreactive [Nonreactive]      Chronic Inflammation Screening   Uric Acid 2022, Apr-13    5.4 [3.4 - 7.0 mg/dL]      Standard Hematology Screen   Hematocrit 2023, Apr-03    48.2 [40.0 - 54.0 %]      Hemoglobin 2023, Apr-03    15.2 [14.0 - 18.0 g/dL]      MCV 2023, Apr-03    94 [82 - 98 fL]      Platelets 2023, Apr-03    230 [150 - 450 K/uL]             Clinical Summary:     The patient is a 78-year-old right-handed male with a relevant past medical history of PMR, DM II, CKD 3, Atrial fib w sick sinus syndrome & bilateral hip & LSpine OA, who presents reporting a 10-month history of step-wise progressive neurocognitive impairment.       The clinical history is suggestive of:  Memory Impairment: STM encoding impairment  Attention Impairment: Attention, Sustained attention, Shifting attention  Executive Impairment:  Energization, Working Memory  The neurological examination is significant for:  Cortical Transcallosal Disconnection: interhemispheric motor control (interhemispheric motor control ), motor efference (motor overflow)  Movement Disorder (Hyperkinetic): tremor (postural)  Movement Disorder (Hypokinetic): parkinsonism (tone, bradykinesia), dyskinesia (slowing, hypometria, dysrhythmia)  Sensory Dysfunction: peripheral (A? fibers)  The neurocognitive battery is significant (based on age and education) for:  Subjective Cognitive impairment with borderline mild attention/concentration and retrieval deficits   BEHAV5+ 1/6: See ROS section for a full description  The neurologically relevant imaging is significant for  MRI brain/head without contrast (3/17/2023): Mild generalized cortical atrophy dorsal greater than ventral slight posterior to anterior gradient new sulci widening of the mid pre motor cortex and posterior somatosensory cortex. Secondary occipital atrophy. 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 No significant evidence of frontal temporal atrophy. No demonstrable evidence of Alzheimer's disease. No major evidence significant vascular disease        Assessment:        The patient's clinical presentation is attention predominant subjective cognitive impairment insufficient to interfere with activities of daily living (CDR-SOB: 0.5 - Questionable cognitive impairment).     The patient's clinical presentation does not meet criteria for any specific syndrome however there are prodromal signs suggestive of is lewy body disease     Spontaneous motor features of parkinsonism  Fluctuating cognitive deficits     The pathology underlying The patient's neurocognitive impairment is likely a mixture of pathologies (Lewy body disease/alpha-synucleinopathy, Vascular Contributions to Cognitive Impairment and Dementia) likely provoked by uncontrolled hyperglycemia in  setting of steroid use for PMR. The observations made above, were discussed with the patient. We have discussed the additional diagnostic(s) and/or managenent below.     Care Management Plan:    #Optimize Neurocognitive Impairment and Quality         -Will be schedule today for CND skin biopsy today. He is aware testing can yield a diagnoses of PD and designate appropriate treatment plan and management.   We have discussed the MIND Diet and continuing his current level of exercise and social interaction to maintain cognitively stimulated.   He is not interested in tauopathy or genetic testing at this time.              #Optimize Cerebrovascular Health.  The patient has a documented history of hyperlipidemia and/or hypercholesteremia with long-term complications such as cerebrovascular disease, peripheral vascular disease, and/or aortic atherosclerosis. Collectively these risk factors may contribute to cerebral atherosclerosis, and cerebral hypoperfusion compounded neurocognitive disorder. We discussed maximizing cerebrovascular-related medical therapy, including but not limited to cholesterol medications and antiplatelet agents. We have discussed the value of aggressively controlling vascular risk factors like hypertension, hyperlipidemia, and Diabetes SBP<130, LDL<100, and A1C<7.0. We discussed the need to optimize lifestyle choices, including a heart-healthy diet (e.g., Mediterranean or DASH), increased cardiovascular exercise (goal 150 minutes of moderate-intensity per week), and staying cognitively and socially active.  #Optimize Sleep Hygiene and Quality  We discussed and recommended additional diagnostic/management of sleep disorder to optimize brain health and longevity - is scheduled to see urology today. Medicine   Continue tracking sleep activity on iPhone application.   #Coordination of Care Management Planning.  We have recommended additional care management through social work support.  We have referred  "[facility]  He is not interested at this time in  at this time.   #Behavioral/Environmental Strategies  We recommend engaging in activities that stimulate cognitively and socially while avoiding excessive stimulation and fatigue in overwhelmingly complex situations.  We recommend integrating routine and schedule into your daily life. https://www.alzheimersproject.org/news/the-importance-of-routine-and-familiarity-to-persons-with-dementia/  #Health Maintenance/Lifestyle Advice  We have discussed the value in aggressively controlling vascular risk factors like hypertension, hyperlipidemia, and Diabetes SBP<130, LDL<100, A1C<7.0.  We discussed the need to optimize lifestyle choices including a heart-healthy diet (e.g., Mediterranean or DASH), increased cardiovascular exercise (goal 150 minutes of moderate-intensity per week), and stay cognitively and socially active.  #Support  We all need support sometimes. Get easy access to local resources, community programs, and services. https://www.communityresourcefinder.org/  Learn more about Cognitive Impairment in [place]: https://www.alz.org/professionals/public-health/state-overview/[place]  #Safety  The Alzheimer[employer] Association administers the nationwide "Safe Return" program with identification bracelets, necklaces, or clothing tags and 24-hour assistance. More information is available online at https://www.alz.org/help-support/caregiving/safety/medicalert-with-24-7-wandering-support  #Follow up:  Will be schedule today for CND skin biopsy today.      Thank you for allowing us to participate in the care of your patient. Please do not hesitate to contact us with any questions or concerns.     It was a pleasure seeing The patient and we look forward to seeing them at their follow-up visit.     Scheduled Follow-up :  Future Appointments   Date Time Provider Department Center   1/24/2024  9:00 AM " Madan Loera, PT [facility] OP RHB [facility]   1/26/2024  8:45 AM Ana María Palomo, ZOFIA [facility] NEURO[address]   2/1/2024 10:00 AM Brandon Cisneros MD [facility] CARDIO [facility]   3/4/2024  8:00 AM Onofre Paulson MD [facility] FAM MED [facility]   3/4/2024  9:20 AM Debbie Bills OD [facility] OPTO [place]   3/7/2024 10:00 AM Ben Robles MD [facility] DERM [facility]   4/2/2024  7:00 AM SPECIMEN, MIKA CADET [facility]   4/2/2024  7:30 AM LABBRYANNA LAB [facility]   4/9/2024  1:30 PM Nitin Banks MD [facility]       After Visit Medication List :     Medication List            Accurate as of January 22, 2024  2:19 PM. If you have any questions, ask your nurse or doctor.                CONTINUE taking these medications      allopurinoL 100 MG tablet  Commonly known as: ZYLOPRIM  Take 1 tablet by mouth once daily     atorvastatin 40 MG tablet  Commonly known as: LIPITOR  Take 1 tablet (40 mg total) by mouth once daily.     benazepriL 20 MG tablet  Commonly known as: LOTENSIN  Take 1 tablet (20 mg total) by mouth once daily.     cycloSPORINE 0.05 % ophthalmic emulsion  Commonly known as: RESTASIS  Place 1 drop into both eyes 2 (two) times daily.     econazole nitrate 1 % cream  Apply topically 2 (two) times daily.     ferrous sulfate 325 mg (65 mg iron) Tab tablet  Commonly known as: FEOSOL     glucosamine-chondroitin 750-600 mg Tab     imiquimod 5 % cream  Commonly known as: ALDARA  Apply to skin cancer on left leg 5 times weekly for 6 weeks     levocetirizine 5 MG tablet  Commonly known as: XYZAL  Take 1 tablet (5 mg total) by mouth every evening.     metoprolol tartrate 25 MG tablet  Commonly known as: LOPRESSOR  Take 1 tablet (25 mg total) by mouth 2 (two) times daily as needed (palpitations).     multivitamin capsule     sildenafiL 50 MG tablet  Commonly known as: VIAGRA  Take 1 tablet (50 mg total) by mouth daily as needed for Erectile Dysfunction.     tamsulosin 0.4 mg  Cap  Commonly known as: FLOMAX  TAKE 1 CAPSULE BY MOUTH AFTER DINNER     UNABLE TO FIND     XARELTO 20 mg Tab  Generic drug: rivaroxaban  TAKE 1 TABLET BY MOUTH ONCE DAILY WITH SUPPER OR  EVENING  MEAL              Signing Physician:  Ana María Palomo NP    Billing:        -----------------------------------------------------------------------------    II spent a total of 45 minutes (from 08:45 AM to 09:30 AM) on the day of clinical evaluation, engaging in person in a face-to-face consultation with . More than 50% of this time was devoted to counseling on symptoms, treatment plans, risks, therapeutic options, lifestyle modifications, and safety concerns related to the above diagnoses.  A Review of Systems was completed, encompassing 10 of the 14 systems. All findings were negative, with the exception of those noted in the History of Present Illness (HPI). The systems reviewed were Constitutional (Const), Eyes, Ear/Nose/Throat (ENT), Respiratory (Resp), Cardiovascular (CV), Gastrointestinal (GI), Genitourinary (), Musculoskeletal (MSK), Skin, and Neurological (Neuro).  I spent a total of 10 minutes reviewing and summarizing records from outside physicians on the day of the clinical evaluation. This review and summary were conducted as described in the History of Present Illness (HPI).  Total Billing time spent on encounter/documentation for this patient's evaluation and management: 55 minutes.

## 2024-01-23 ENCOUNTER — TELEPHONE (OUTPATIENT)
Dept: FAMILY MEDICINE | Facility: CLINIC | Age: 79
End: 2024-01-23
Payer: MEDICARE

## 2024-01-23 ENCOUNTER — PATIENT MESSAGE (OUTPATIENT)
Dept: FAMILY MEDICINE | Facility: CLINIC | Age: 79
End: 2024-01-23
Payer: MEDICARE

## 2024-01-23 NOTE — TELEPHONE ENCOUNTER
Tried contacting patient to schedule an appointment in regards to appointment request. Patient phone not ringing for some apparent reason. Tried calling multiple times. Sent patient a message through his portal.

## 2024-01-24 ENCOUNTER — CLINICAL SUPPORT (OUTPATIENT)
Dept: REHABILITATION | Facility: HOSPITAL | Age: 79
End: 2024-01-24
Payer: MEDICARE

## 2024-01-24 DIAGNOSIS — M47.816 LUMBAR SPONDYLOSIS: ICD-10-CM

## 2024-01-24 DIAGNOSIS — M16.0 PRIMARY OSTEOARTHRITIS OF BOTH HIPS: ICD-10-CM

## 2024-01-24 DIAGNOSIS — M62.81 QUADRICEPS WEAKNESS: Primary | ICD-10-CM

## 2024-01-24 PROCEDURE — 97110 THERAPEUTIC EXERCISES: CPT | Mod: PN

## 2024-01-24 PROCEDURE — 97162 PT EVAL MOD COMPLEX 30 MIN: CPT | Mod: PN

## 2024-01-24 NOTE — PLAN OF CARE
[facility]  Physical Therapy Initial Evaluation    Date: 1/24/2024   Name: Shannan Norman  Clinic Number: [id number]    Therapy Diagnosis:   Encounter Diagnoses   Name Primary?    Lumbar spondylosis     Primary osteoarthritis of both hips      Physician: Kit Potter, *    Physician Orders: PT Eval and Treat   Medical Diagnosis from Referral:   M47.816 (ICD-10-CM) - Lumbar spondylosis   M16.0 (ICD-10-CM) - Primary osteoarthritis of both hips   Evaluation Date: 1/24/2024  Authorization Period Expiration: 9/6/2024  Plan of Care Expiration: 2/19/2024  Visit # / Visits authorized: 1/1    Time In: 10:05 am  Time Out: 11:00 am  Total Appointment Time (timed & untimed codes): 55 minutes    Precautions: Standard; CKD, diabetes; history of seizures     Subjective   Date of onset: ~ 2 months ago  History of current condition - Shannan reports: he was doing well last year in October when he was in physical therapy. Patient reports left knee and hip pain began around November of last year, which he attributes to the steroids he was taking wearing off. Reporting that he had to walk with a SPC for about a month. Seeking physical therapy now for bilateral hip pain and left knee pain. Aggravating factors include walking, stairs, and increased activity levels. Reporting a pain level of about 2-3/10 with walking and 4-5/10 with stair navigation. Reports feeling a sense of instability with stair navigation.      Medical History:   Past Medical History:   Diagnosis Date    Allergy     Arthritis     Atrial fibrillation     Atrial flutter 2011    ablation    Basal cell carcinoma     Cancer     Cataract     CKD (chronic kidney disease) stage 3, GFR 30-59 ml/min 8/13/2019    Diabetes mellitus     Dry eye syndrome     Gout, unspecified     High cholesterol     History of shingles     Hypertension     Melanoma     Seizures        Surgical History:   Shannan Norman  has a past surgical history that includes  Tonsillectomy; varicose veins; Ablation of dysrhythmic focus; Ganglion cyst excision; Hernia repair (2013); Vasectomy; Ablation (N/A, 9/11/2018); Colonoscopy (N/A, 1/2/2019); Tendon repair (Right); Adenoidectomy; Vein Surgery; Cardiac pacemaker placement; Cataract extraction w/  intraocular lens implant (Right, 7/28/2020); and Cataract extraction w/  intraocular lens implant (Left, 8/11/2020).    Medications:   Shannan has a current medication list which includes the following prescription(s): allopurinol, atorvastatin, benazepril, cyclosporine, econazole nitrate, ferrous sulfate, glucosamine-chondroitin, imiquimod, levocetirizine, metoprolol tartrate, multivitamin, sildenafil, tamsulosin, UNABLE TO FIND, and xarelto.    Allergies:   Review of patient's allergies indicates:  No Known Allergies     Imaging: see EMR    Prior Therapy: yes  Social History: lives alone   Occupation: retired   Prior Level of Function: independent   Current Level of Function: independent - not playing golf     Pain:  Current 0/10, worst 7/10, best 0/10   Location: bilateral hips, left knee   Description:  aching/dull  Aggravating Factors: see above  Easing Factors: see above    Pt's goals:  Patient would like improve his core strength, lower extremity strength, and flexibility.     Objective     Observation/Gait: no abnormalities     Hip Range of Motion: PROM    Right Passive Left Passive   Flexion  110 degrees  125 degrees   Ext. Rotation  30 degrees  25 degrees   Int. Rotation  10 degrees   15 degrees      Flexibility: hip internal rotation/external rotation flexibility impairments      Left knee AROM: (0-0-113 degrees)  Right knee AROM: (0-0-125 degrees)    Lower Extremity Strength  Right LE   Left LE     Knee extension:  28.5 kg  Knee extension:  18.9 kg    Knee flexion:  15.3 kg Knee flexion:  12.6 kg    Hip flexion:  10.0 kg Hip flexion:  6.3 kg    Hip extension:   15.7 kg Hip extension:  14.5 kg   Hip abduction:  14.6 kg Hip abduction:   "13.6 kg      Joint Mobility: femoroacetabular hypomobility     Palpation: no tenderness to palpation         Limitation/Restriction for FOTO Hip Survey    Therapist reviewed FOTO scores for Shannan Norman on 1/24/2024.   FOTO documents entered into [employer] - see Media section.    Limitation Score: 1%  Predicted Limitation Score: 11%         TREATMENT   Treatment Time In: 10:45 am  Treatment Time Out: 11:00 am  Total Treatment time (time-based codes) separate from Evaluation: 15 minutes    Shannan received therapeutic exercises to develop strength, endurance, ROM, and flexibility for 15 minutes including:  Heel slides: 5"x20  Hip flexor stair stretch: 5x15"  Seated long arc quad: 2x10 - green theraband       Home Exercises and Patient Education Provided    Education provided:   - home exercise program  - POC/Prognosis     Written Home Exercises Provided: yes.  Exercises were reviewed and Shannan was able to demonstrate them prior to the end of the session.  Shannan demonstrated good  understanding of the education provided.     See EMR under Patient Instructions for exercises provided 9/25/2023.    Assessment   Shannan is a 78 y.o. male referred to outpatient Physical Therapy with a medical diagnosis of lumbar spondylosis and bilateral osteoarthritis of both hips. Pt presents as a very active 78 year old male with signs and symptoms of hip and left knee osteoarthritis with concordant bursitis of the left hip. Patient has lost strength of the left quadriceps and hip flexor in comparison to previous measurements in physical therapy. Patient would benefit from skilled PT in order to regain left knee range of motion, bilateral hip range of motion, and lower extremity strength.     Pt prognosis is Excellent.   Pt will benefit from skilled outpatient Physical Therapy to address the deficits stated above and in the chart below, provide pt/family education, and to maximize pt's level of independence.     Plan of care discussed with patient: " Yes  Pt's spiritual, cultural and educational needs considered and patient is agreeable to the plan of care and goals as stated below:     Anticipated Barriers for therapy: age    Medical Necessity is demonstrated by the following  History  Co-morbidities and personal factors that may impact the plan of care Co-morbidities:   advanced age, CKD stage III, diabetes, and HTN    Personal Factors:   age     high   Examination  Body Structures and Functions, activity limitations and participation restrictions that may impact the plan of care Body Regions:   back  lower extremities    Body Systems:    gross symmetry  ROM  strength  gross coordinated movement  balance  gait  transfers  transitions  motor control  motor learning    Participation Restrictions:   Exercising     Activity limitations:   Learning and applying knowledge  no deficits    General Tasks and Commands  no deficits    Communication  no deficits    Mobility  lifting and carrying objects  walking    Self care  no deficits    Domestic Life  shopping  doing house work (cleaning house, washing dishes, laundry)  assisting others    Interactions/Relationships  no deficits    Life Areas  no deficits    Community and Social Life  no deficits         high   Clinical Presentation evolving clinical presentation with changing clinical characteristics moderate   Decision Making/ Complexity Score: moderate     Goals:  Short Term Goals: 4 weeks   1. Patient will maintain compliance with home exercise program   2. Patient will improve pain level of left/right hip to at least </=2/10  3. Patient will improve left/right hip range of motion at least 5 degrees with flexion, internal rotation, and external rotation.   4. Patient will improve left /right hip abduction strength >/= 4kg with microfit dynamometer and/or to 5/5 for adequate trunk stability.     Long Term Goals: 8 weeks   1. Patient will maintain compliance with updated home exercise program   2. Patient will  improve pain level of left/right hip to at least </=0/10  3. Patient will improve left/right hip range of motion at least 10 degrees with flexion, internal rotation, and external rotation.   4. Patient will improve left/right hip abduction strength >/= 8kg with microfit dynamometer and/or to 5/5 for adequate trunk stability.  5. Patient will display the ability to ambulate 2 miles without reproduction of hip pain.    Plan   Plan of care Certification: 1/24/2024 to 2/19/2024    Outpatient Physical Therapy 1 times weekly for 4 weeks to include the following interventions: Gait Training, Manual Therapy, Moist Heat/ Ice, Neuromuscular Re-ed, Patient Education, Self Care, Therapeutic Activities, and Therapeutic Exercise.     Madan Loera, PT

## 2024-01-25 ENCOUNTER — TELEPHONE (OUTPATIENT)
Dept: SPORTS MEDICINE | Facility: CLINIC | Age: 79
End: 2024-01-25
Payer: MEDICARE

## 2024-01-25 ENCOUNTER — PATIENT MESSAGE (OUTPATIENT)
Dept: SPORTS MEDICINE | Facility: CLINIC | Age: 79
End: 2024-01-25
Payer: MEDICARE

## 2024-01-25 DIAGNOSIS — M19.012 GLENOHUMERAL ARTHRITIS, LEFT: Primary | ICD-10-CM

## 2024-01-25 DIAGNOSIS — M75.22 BICEPS TENDINITIS OF LEFT UPPER EXTREMITY: ICD-10-CM

## 2024-01-25 DIAGNOSIS — M67.912 ROTATOR CUFF DYSFUNCTION, LEFT: ICD-10-CM

## 2024-01-26 ENCOUNTER — OFFICE VISIT (OUTPATIENT)
Dept: UROLOGY | Facility: CLINIC | Age: 79
End: 2024-01-26
Payer: MEDICARE

## 2024-01-26 ENCOUNTER — OFFICE VISIT (OUTPATIENT)
Dept: NEUROLOGY | Facility: CLINIC | Age: 79
End: 2024-01-26
Payer: MEDICARE

## 2024-01-26 ENCOUNTER — TELEPHONE (OUTPATIENT)
Dept: SPORTS MEDICINE | Facility: CLINIC | Age: 79
End: 2024-01-26
Payer: MEDICARE

## 2024-01-26 VITALS
WEIGHT: 203.69 LBS | HEART RATE: 75 BPM | SYSTOLIC BLOOD PRESSURE: 148 MMHG | HEIGHT: 73 IN | DIASTOLIC BLOOD PRESSURE: 71 MMHG | BODY MASS INDEX: 26.99 KG/M2

## 2024-01-26 VITALS
HEART RATE: 61 BPM | DIASTOLIC BLOOD PRESSURE: 75 MMHG | HEIGHT: 73 IN | SYSTOLIC BLOOD PRESSURE: 135 MMHG | WEIGHT: 198 LBS | BODY MASS INDEX: 26.24 KG/M2

## 2024-01-26 DIAGNOSIS — R35.0 BENIGN PROSTATIC HYPERPLASIA WITH URINARY FREQUENCY: Primary | ICD-10-CM

## 2024-01-26 DIAGNOSIS — N40.1 BPH ASSOCIATED WITH NOCTURIA: ICD-10-CM

## 2024-01-26 DIAGNOSIS — G31.9 NEURODEGENERATIVE COGNITIVE IMPAIRMENT: ICD-10-CM

## 2024-01-26 DIAGNOSIS — G20.C PARKINSONISM, UNSPECIFIED PARKINSONISM TYPE: Primary | ICD-10-CM

## 2024-01-26 DIAGNOSIS — R35.1 BPH ASSOCIATED WITH NOCTURIA: ICD-10-CM

## 2024-01-26 DIAGNOSIS — N40.1 BENIGN PROSTATIC HYPERPLASIA WITH URINARY FREQUENCY: Primary | ICD-10-CM

## 2024-01-26 DIAGNOSIS — N32.81 OAB (OVERACTIVE BLADDER): ICD-10-CM

## 2024-01-26 PROBLEM — E46 PROTEIN-CALORIE MALNUTRITION, UNSPECIFIED SEVERITY: Status: ACTIVE | Noted: 2024-01-26

## 2024-01-26 LAB — POC RESIDUAL URINE VOLUME: 39 ML (ref 0–100)

## 2024-01-26 PROCEDURE — 3288F FALL RISK ASSESSMENT DOCD: CPT | Mod: CPTII,S$GLB,, | Performed by: NURSE PRACTITIONER

## 2024-01-26 PROCEDURE — 3075F SYST BP GE 130 - 139MM HG: CPT | Mod: CPTII,S$GLB,, | Performed by: NURSE PRACTITIONER

## 2024-01-26 PROCEDURE — 99999 PR PBB SHADOW E&M-EST. PATIENT-LVL III: CPT | Mod: PBBFAC,,, | Performed by: NURSE PRACTITIONER

## 2024-01-26 PROCEDURE — 1126F AMNT PAIN NOTED NONE PRSNT: CPT | Mod: CPTII,S$GLB,, | Performed by: UROLOGY

## 2024-01-26 PROCEDURE — 1101F PT FALLS ASSESS-DOCD LE1/YR: CPT | Mod: CPTII,S$GLB,, | Performed by: UROLOGY

## 2024-01-26 PROCEDURE — 3077F SYST BP >= 140 MM HG: CPT | Mod: CPTII,S$GLB,, | Performed by: UROLOGY

## 2024-01-26 PROCEDURE — 51798 US URINE CAPACITY MEASURE: CPT | Mod: S$GLB,,, | Performed by: UROLOGY

## 2024-01-26 PROCEDURE — 1160F RVW MEDS BY RX/DR IN RCRD: CPT | Mod: CPTII,S$GLB,, | Performed by: UROLOGY

## 2024-01-26 PROCEDURE — 99214 OFFICE O/P EST MOD 30 MIN: CPT | Mod: S$GLB,,, | Performed by: UROLOGY

## 2024-01-26 PROCEDURE — 99215 OFFICE O/P EST HI 40 MIN: CPT | Mod: S$GLB,,, | Performed by: NURSE PRACTITIONER

## 2024-01-26 PROCEDURE — 1101F PT FALLS ASSESS-DOCD LE1/YR: CPT | Mod: CPTII,S$GLB,, | Performed by: NURSE PRACTITIONER

## 2024-01-26 PROCEDURE — 3288F FALL RISK ASSESSMENT DOCD: CPT | Mod: CPTII,S$GLB,, | Performed by: UROLOGY

## 2024-01-26 PROCEDURE — 1159F MED LIST DOCD IN RCRD: CPT | Mod: CPTII,S$GLB,, | Performed by: UROLOGY

## 2024-01-26 PROCEDURE — 1125F AMNT PAIN NOTED PAIN PRSNT: CPT | Mod: CPTII,S$GLB,, | Performed by: NURSE PRACTITIONER

## 2024-01-26 PROCEDURE — 3078F DIAST BP <80 MM HG: CPT | Mod: CPTII,S$GLB,, | Performed by: NURSE PRACTITIONER

## 2024-01-26 PROCEDURE — 3078F DIAST BP <80 MM HG: CPT | Mod: CPTII,S$GLB,, | Performed by: UROLOGY

## 2024-01-26 PROCEDURE — 99999 PR PBB SHADOW E&M-EST. PATIENT-LVL IV: CPT | Mod: PBBFAC,,, | Performed by: UROLOGY

## 2024-01-26 RX ORDER — OXYBUTYNIN CHLORIDE 10 MG/1
10 TABLET, EXTENDED RELEASE ORAL DAILY
Qty: 30 TABLET | Refills: 11 | Status: SHIPPED | OUTPATIENT
Start: 2024-01-26 | End: 2024-04-25

## 2024-01-26 NOTE — TELEPHONE ENCOUNTER
Spoke with patient in discussion of potentially changing location of scheduled surgery on 2/21/24 to later date in March at [facility]. This is due to the need of patient needing to stay possibly a few days post operatively. Patient states he lives alone. We will reach out to that location and communicative that with patient. He understood.

## 2024-01-26 NOTE — PROGRESS NOTES
Subjective:       Patient ID: Shannan Norman is a 78 y.o. male.    Chief Complaint: Benign Prostatic Hypertrophy     This is a 78 y.o.  male patient that is new to me.    Past patient of Dr. Gunter, summary:  H/o BPH with LUTs on tamsulosin/finasteride, oxybutynin.    Dr. Phipps recommended limit fluids before bedtime, elevate legs, avoiding bladder irritants in 2020.  Last seen by Dr. Gunter in 2022.  Was off oxybutynin and finasteride, still on tamsulosin.    Now establishing with me.  Reports some help with limiting bladder irritants and pelvic floor physical therapy in past.    No UUI or urgency.  Nocturia 4-5, hourly in day.  AUA SS19/6 mostly irritative voiding symptoms.           IPSS Questionnaire (AUA-SS):  Over the past month    1)  Incomplete Emptying - How often have you had a sensation of not emptying your bladder completely after you finish urinating?  0 - Not at all   2)  Frequency - How often have you had to urinate again less than two hours after you finished urinating? 5 - Almost always   3)  Intermittency - How often have you found you stopped and started again several times when you urinated?  0 - Not at all   4) Urgency - How difficult have you found it to postpone urination?  4 - More than half the time   5) Weak Stream - How often have you had a weak urinary stream?  0 - Not at all   6) Straining  - How often have you had to push or strain to begin urination?  5 - Almost always   7) Nocturia - How many times did you most typically get up to urinate from the time you went to bed until the time you got up in the morning?  5 - 5+ times   Total score:  0-7 mild, 8-19 moderate, 20-35 severe 19   Quality of Life:  6 - Terrible          LAST PSA  Lab Results   Component Value Date    PSA 0.28 02/05/2019    PSA 0.43 08/05/2017    PSA 0.63 07/25/2016    PSA 0.67 03/25/2013    PSA 0.59 01/30/2012    PSA 0.62 01/21/2011    PSA 0.39 01/09/2010    PSA 0.47 12/08/2008    PSA 0.4 12/06/2007    PSA 0.9  11/20/2006    PSA 0.5 11/16/2005    PSA 0.7 08/23/2004       Lab Results   Component Value Date    CREATININE 1.0 10/03/2023       ---  PMH/PSH/Medications/Allergies/Social history reviewed and as in chart.    Review of Systems   Constitutional:  Negative for activity change, chills and fever.   HENT:  Negative for congestion.    Respiratory:  Negative for cough, chest tightness and shortness of breath.    Cardiovascular:  Negative for chest pain and palpitations.   Gastrointestinal:  Negative for abdominal distention, abdominal pain, nausea and vomiting.   Genitourinary:  Positive for frequency. Negative for difficulty urinating, flank pain, hematuria, penile pain, scrotal swelling and testicular pain.   Musculoskeletal:  Negative for gait problem.       Objective:      Physical Exam  HENT:      Head: Atraumatic.   Pulmonary:      Effort: Pulmonary effort is normal.   Neurological:      General: No focal deficit present.      Mental Status: He is alert and oriented to person, place, and time.         Assessment:     Problem Noted   Oab (Overactive Bladder) 1/26/2024   Benign Prostatic Hyperplasia With Urinary Frequency 8/4/2017    AUA 19/6         Plan:     Oxybutynin.  Side effects, risks, benefits and alternatives of the medication discussed.    Follow up in 6 weeks, stop oxybutynin 1 week prior to shoulder surgery      Earle Grewal MD    The above referenced imaging and interpretations were personally reviewed.  Disclaimer: This note has been generated using voice-recognition software. There may be typographical errors that have been missed during proof-reading.

## 2024-01-29 ENCOUNTER — PATIENT MESSAGE (OUTPATIENT)
Dept: SPORTS MEDICINE | Facility: CLINIC | Age: 79
End: 2024-01-29
Payer: MEDICARE

## 2024-01-29 ENCOUNTER — TELEPHONE (OUTPATIENT)
Dept: NEUROLOGY | Facility: CLINIC | Age: 79
End: 2024-01-29
Payer: MEDICARE

## 2024-01-29 ENCOUNTER — CLINICAL SUPPORT (OUTPATIENT)
Dept: REHABILITATION | Facility: HOSPITAL | Age: 79
End: 2024-01-29
Payer: MEDICARE

## 2024-01-29 DIAGNOSIS — M62.81 QUADRICEPS WEAKNESS: Primary | ICD-10-CM

## 2024-01-29 DIAGNOSIS — M47.816 LUMBAR SPONDYLOSIS: ICD-10-CM

## 2024-01-29 DIAGNOSIS — M16.0 PRIMARY OSTEOARTHRITIS OF BOTH HIPS: ICD-10-CM

## 2024-01-29 PROCEDURE — 97530 THERAPEUTIC ACTIVITIES: CPT | Mod: PN

## 2024-01-29 PROCEDURE — 97112 NEUROMUSCULAR REEDUCATION: CPT | Mod: PN

## 2024-01-29 NOTE — TELEPHONE ENCOUNTER
Call placed to patient and family regarding Skin Biopsy scheduled for Friday.  Instructed to stop all blood thinning medication including ASA, Ibuprofen, and Naproxen and to wear comfortable clothing.  Procedure explained.  All questions answers.

## 2024-01-29 NOTE — PROGRESS NOTES
"[facility]   Physical Therapy Treatment Note      Name: Shannan Norman  Clinic Number: [id number]    Therapy Diagnosis:   Encounter Diagnoses   Name Primary?    Quadriceps weakness Yes    Lumbar spondylosis     Primary osteoarthritis of both hips      Physician: Kit Potter, *    Visit Date: 1/29/2024    Physician Orders: PT Eval and Treat   Medical Diagnosis from Referral:   M47.816 (ICD-10-CM) - Lumbar spondylosis   M16.0 (ICD-10-CM) - Primary osteoarthritis of both hips   Evaluation Date: 1/24/2024  Authorization Period Expiration: 9/6/2024  Plan of Care Expiration: 2/19/2024  Visit # / Visits authorized: 1/20 (+1)     Time In: 8:00 am  Time Out: 8:55 am  Total Appointment Time (timed & untimed codes): 55 minutes     Precautions: Standard; CKD, diabetes; history of seizures   Subjective     Patient reports: he did well with the exercises provided and he believes he is already walking better   He was compliant with home exercise program.  Response to previous treatment: less pain  Functional change: improved gait pattern    Pain: 2/10  Location: bilateral hips; left knee    Objective      Objective Measures updated at progress report unless specified.     Treatment     Shannan received the treatments listed below:      neuromuscular re-education activities to improve: Coordination, Kinesthetic, Sense, and Proprioception for 25 minutes. The following activities were included:  Straight leg raise: 2x10 - left   Short arc quad: 2x10 - 9# - left   SKTC: 10"x10 - right     therapeutic activities to improve functional performance for 30 minutes, including:  Nu-step: 8'xL4   Single leg cybex long arc quad: 3x10 - 20# - left   Double leg cybex leg press: 10x - 6 plates; 2x10 - 8 plates   Standing hip abduction: 3x10 - red theraband   Cybex straight arm pull downs: 2x10 - 7#     Patient Education and Home Exercises       Education provided:   - home exercise program   - POC/Prognosis "     Written Home Exercises Provided: Patient instructed to cont prior HEP. Exercises were reviewed and Shannan was able to demonstrate them prior to the end of the session.  Shannan demonstrated good  understanding of the education provided. See Electronic Medical Record under Patient Instructions for exercises provided during therapy sessions    Assessment   Shannan is a 78 y.o. male referred to outpatient Physical Therapy with a medical diagnosis of lumbar spondylosis and bilateral osteoarthritis of both hips. Pt presents as a very active 78 year old male with signs and symptoms of hip and left knee osteoarthritis with concordant bursitis of the left hip. First visit following initial evaluation consisted primarily of left quadriceps/hip flexor strengthening in conjunction with lower extremity strength and conditioning. Planning to monitor response to increased exercise regimen next visit.     Shannan Is progressing well towards his goals.   Patient prognosis is Excellent.     Patient will continue to benefit from skilled outpatient physical therapy to address the deficits listed in the problem list box on initial evaluation, provide pt/family education and to maximize pt's level of independence in the home and community environment.     Patient's spiritual, cultural and educational needs considered and pt agreeable to plan of care and goals.     Anticipated barriers to physical therapy: age    Goals:   Short Term Goals: 4 weeks   1. Patient will maintain compliance with home exercise program   2. Patient will improve pain level of left/right hip to at least </=2/10  3. Patient will improve left/right hip range of motion at least 5 degrees with flexion, internal rotation, and external rotation.   4. Patient will improve left /right hip abduction strength >/= 4kg with microfit dynamometer and/or to 5/5 for adequate trunk stability.     Long Term Goals: 8 weeks   1. Patient will maintain compliance with updated home exercise  program   2. Patient will improve pain level of left/right hip to at least </=0/10  3. Patient will improve left/right hip range of motion at least 10 degrees with flexion, internal rotation, and external rotation.   4. Patient will improve left/right hip abduction strength >/= 8kg with microfit dynamometer and/or to 5/5 for adequate trunk stability.  5. Patient will display the ability to ambulate 2 miles without reproduction of hip pain.    Plan   Left knee range of motion   Right hip range of motion   Left quad and hip flexor strengthening   Core stabilization   Lower extremity strength and endurance    Madan Loera, PT

## 2024-01-31 ENCOUNTER — TELEPHONE (OUTPATIENT)
Dept: DERMATOLOGY | Facility: CLINIC | Age: 79
End: 2024-01-31
Payer: MEDICARE

## 2024-01-31 ENCOUNTER — TELEPHONE (OUTPATIENT)
Dept: FAMILY MEDICINE | Facility: CLINIC | Age: 79
End: 2024-01-31
Payer: MEDICARE

## 2024-01-31 DIAGNOSIS — M25.562 LEFT KNEE PAIN, UNSPECIFIED CHRONICITY: Primary | ICD-10-CM

## 2024-01-31 NOTE — TELEPHONE ENCOUNTER
Spoke to pt regarding rescheduling his procedure due to having shoulder replacement surgery just before his appt with [facility]. Related to pt that no earlier procedure times were available, pt stated understanding. Procedure r/s for 4/4/24 @ 9:00

## 2024-01-31 NOTE — TELEPHONE ENCOUNTER
----- Message from Dionna De La Rosa MA sent at 1/30/2024  4:31 PM CST -----  Regarding: FW: Reschedule  Contact: pt.  [phone]    ----- Message -----  From: Aniket Arroyo  Sent: 1/30/2024   3:57 PM CST  To: Travis Contreras Staff  Subject: Reschedule                                       Pt is calling in ref to rescheduling his 3/2 scheduled procedure. Pt says he will be having shoulder replacement surgery. Patient Requesting Call Back @  [phone]

## 2024-01-31 NOTE — TELEPHONE ENCOUNTER
----- Message from Haily Griffith sent at 1/30/2024  2:28 PM CST -----  Type:  Sooner Apoointment Request    Caller is requesting a sooner appointment.  Caller declined first available appointment listed below.  Caller will not accept being placed on the waitlist and is requesting a message be sent to doctor.  Name of Caller: Pt  When is the first available appointment? N/a  Symptoms: Wellness visit  Would the patient rather a call back or a response via [facility]?  call  Best Call Back Number:  [phone]  Additional Information:  Pt wants established care from Dr. Paulson as his pcp.

## 2024-02-01 ENCOUNTER — OFFICE VISIT (OUTPATIENT)
Dept: CARDIOLOGY | Facility: CLINIC | Age: 79
End: 2024-02-01
Payer: MEDICARE

## 2024-02-01 ENCOUNTER — TELEPHONE (OUTPATIENT)
Dept: FAMILY MEDICINE | Facility: CLINIC | Age: 79
End: 2024-02-01
Payer: MEDICARE

## 2024-02-01 VITALS
WEIGHT: 196 LBS | BODY MASS INDEX: 25.98 KG/M2 | HEART RATE: 60 BPM | OXYGEN SATURATION: 99 % | HEIGHT: 73 IN | DIASTOLIC BLOOD PRESSURE: 78 MMHG | SYSTOLIC BLOOD PRESSURE: 130 MMHG

## 2024-02-01 DIAGNOSIS — I15.2 HYPERTENSION ASSOCIATED WITH DIABETES: ICD-10-CM

## 2024-02-01 DIAGNOSIS — I49.5 SSS (SICK SINUS SYNDROME): ICD-10-CM

## 2024-02-01 DIAGNOSIS — E11.69 DYSLIPIDEMIA ASSOCIATED WITH TYPE 2 DIABETES MELLITUS: ICD-10-CM

## 2024-02-01 DIAGNOSIS — E78.5 DYSLIPIDEMIA ASSOCIATED WITH TYPE 2 DIABETES MELLITUS: ICD-10-CM

## 2024-02-01 DIAGNOSIS — I35.1 NONRHEUMATIC AORTIC VALVE INSUFFICIENCY: ICD-10-CM

## 2024-02-01 DIAGNOSIS — I48.0 PAF (PAROXYSMAL ATRIAL FIBRILLATION): ICD-10-CM

## 2024-02-01 DIAGNOSIS — I48.3 TYPICAL ATRIAL FLUTTER: ICD-10-CM

## 2024-02-01 DIAGNOSIS — E11.59 HYPERTENSION ASSOCIATED WITH DIABETES: ICD-10-CM

## 2024-02-01 DIAGNOSIS — N18.30 STAGE 3 CHRONIC KIDNEY DISEASE, UNSPECIFIED WHETHER STAGE 3A OR 3B CKD: ICD-10-CM

## 2024-02-01 DIAGNOSIS — Q21.12 PFO (PATENT FORAMEN OVALE): ICD-10-CM

## 2024-02-01 DIAGNOSIS — I10 PRIMARY HYPERTENSION: ICD-10-CM

## 2024-02-01 DIAGNOSIS — I83.93 ASYMPTOMATIC VARICOSE VEINS OF BOTH LOWER EXTREMITIES: Primary | ICD-10-CM

## 2024-02-01 PROCEDURE — 93000 ELECTROCARDIOGRAM COMPLETE: CPT | Mod: S$GLB,,, | Performed by: INTERNAL MEDICINE

## 2024-02-01 PROCEDURE — 1159F MED LIST DOCD IN RCRD: CPT | Mod: CPTII,S$GLB,, | Performed by: INTERNAL MEDICINE

## 2024-02-01 PROCEDURE — 1126F AMNT PAIN NOTED NONE PRSNT: CPT | Mod: CPTII,S$GLB,, | Performed by: INTERNAL MEDICINE

## 2024-02-01 PROCEDURE — 99999 PR PBB SHADOW E&M-EST. PATIENT-LVL IV: CPT | Mod: PBBFAC,,, | Performed by: INTERNAL MEDICINE

## 2024-02-01 PROCEDURE — 3078F DIAST BP <80 MM HG: CPT | Mod: CPTII,S$GLB,, | Performed by: INTERNAL MEDICINE

## 2024-02-01 PROCEDURE — 3075F SYST BP GE 130 - 139MM HG: CPT | Mod: CPTII,S$GLB,, | Performed by: INTERNAL MEDICINE

## 2024-02-01 PROCEDURE — 3288F FALL RISK ASSESSMENT DOCD: CPT | Mod: CPTII,S$GLB,, | Performed by: INTERNAL MEDICINE

## 2024-02-01 PROCEDURE — 1101F PT FALLS ASSESS-DOCD LE1/YR: CPT | Mod: CPTII,S$GLB,, | Performed by: INTERNAL MEDICINE

## 2024-02-01 PROCEDURE — 99214 OFFICE O/P EST MOD 30 MIN: CPT | Mod: 25,S$GLB,, | Performed by: INTERNAL MEDICINE

## 2024-02-01 NOTE — TELEPHONE ENCOUNTER
----- Message from Ada Traylor sent at 2/1/2024  3:56 PM CST -----  Type:  Sooner Appointment Request    Caller is requesting a sooner appointment.  Caller declined first available appointment listed below.  Caller will not accept being placed on the waitlist and is requesting a message be sent to doctor.  Name of Caller:pt   When is the first available appointment?  Symptoms:Est care   Would the patient rather a call back or a response via [facility]? Call   Best Call Back Number: [phone]  Additional Information:

## 2024-02-01 NOTE — PROGRESS NOTES
Subjective:   @Patient ID:  Shannan Norman is a 78 y.o. male who presents for evaluation of abnormal EKG .      HPI:   February 2024:  follow up.  No significant palpitation lately.  Plans to have left shoulder surgery.  Noticed worsening swelling in the left lower extremity got much better after wearing compression stockings.  Interestingly he would lost 7-8 lb after using the compression stockings.  Denies any chest pain.  Works out regularly without any angina or angina equivalent    6/14/2023:  Since last time he took the Lopressor for 5 times.  After 3-5 minutes his heart rate gets back to the normal rhythm and he feels fine.  He likes the prn regimen.  He is very active.  Works out 6-7 times a week without any chest pain or significant shortness of breath..    3/29/2023: Patient is here for follow up. ER visit 3/11/2023 with palpitations. EKG with atrial flutter. He was rx lopressor prn. He is in SR today. He had to take the lopressor one time only over the last 3 weeks. Not as active as he was after he had stress LE fractures      EKG showed A-paced  Rhythm. Septal infarct. EKGs in 2017 he had the same findings.     Historically: Hx of A. Flutter ablation in 2010, then redo CTI ablation in 2018. S/p PPM in 2018 for SSS and chronotropic incompetence.  He is on Eliquis for OAC and tolerating it well. He f/u with Dr. Sanchez.  He had chronic VV varicose veins s/p surgical stripping and EVLTs by Dr. Lund. He saw Dr. Toth but did not follow up with the recommendation of either stockings or EVLTs.      Prior cardiovascular  Hx  --------------------------------       - Echo 9/16/2020  Normal left ventricular systolic function. The estimated ejection fraction is 55-60%.  Normal LV diastolic function.  The estimated PA systolic pressure is 21 mmHg.  No wall motion abnormalities.  Normal right ventricular systolic function.  Normal central venous pressure (3 mmHg    - ECHO 10/2016     1 - Normal left ventricular  systolic function (EF 60-65%).     2 - Left ventricular diastolic dysfunction.     3 - Biatrial enlargement.     4 - Normal right ventricular systolic function .     5 - The estimated PA systolic pressure is 25 mmHg.     6 - Trivial aortic regurgitation.     7 - Mild tricuspid regurgitation.     - EKG 9/2020  A. Paced with old septal infarct             Patient Active Problem List    Diagnosis Date Noted    Protein-calorie malnutrition, unspecified severity 01/26/2024    OAB (overactive bladder) 01/26/2024    History of skin cancer 12/20/2023     Left shoulder, melanoma, 0.15 mm, pT1a s/p WLE 2020  Left anterior leg, BCC, 9/2022 s/p excision  Left neck, BCC, s/p excision 2020  Left mid nose, BCC, 2019      Right hip pain 10/05/2023    PAF (paroxysmal atrial fibrillation) 09/01/2023    Basal cell carcinoma (BCC) of skin of left lower extremity including hip 11/17/2022     Treated with excision 11/17/ 2022      Pelvic floor dysfunction 05/13/2022    Pain of left upper extremity 03/17/2022    Weakness 03/17/2022    Stiffness due to immobility 03/17/2022    Lumbar spondylosis 02/25/2022    Primary osteoarthritis of both hips 02/25/2022    Decreased range of hip movement 02/25/2022    Decreased strength 02/25/2022    AK (actinic keratosis) 03/12/2021    History of melanoma 03/12/2021    Nuclear sclerotic cataract of left eye 08/11/2020    Nuclear sclerotic cataract of right eye 07/28/2020    CKD (chronic kidney disease) stage 3, GFR 30-59 ml/min 08/13/2019    Overweight (BMI 25.0-29.9) 08/13/2019    Right inguinal hernia 02/27/2019    Quadriceps weakness 12/21/2018    Dyslipidemia associated with type 2 diabetes mellitus 03/07/2018    Hypertension associated with diabetes 03/07/2018    Junctional bradycardia 02/06/2018    Benign prostatic hyperplasia with urinary frequency 08/04/2017     AUA 19/6      Nonrheumatic aortic valve insufficiency 12/01/2016     Trivial      SSS (sick sinus syndrome) 10/24/2016    Junctional  escape rhythm 2016    Type 2 diabetes mellitus without retinopathy 10/13/2015    Nuclear sclerosis of both eyes 10/13/2015    Left epiretinal membrane 10/13/2015    Asymptomatic varicose veins of both lower extremities 2015         S/p bilateral GSV EVLT [id number]        Post-operative state 02/10/2014     PROCEDURES 2014:   1. ECU right side stabilization with retinacular sling.   2. Synovectomy, right wrist.   3. Splint application.        Umbilical hernia 2013    PFO (patent foramen ovale) 2013     On asa 325mg. No hx of CVA        HTN (hypertension) 2013    Gout, arthritis 2013    Atrial flutter 2013     EF 60%, s/p RF ablation in  (Dr. Grady Copeland)      Type 2 diabetes mellitus with stage 3 chronic kidney disease, without long-term current use of insulin 2013           Right Arm BP - Sittin/76  Left Arm BP - Sittin/78        LAST HbA1c  Lab Results   Component Value Date    HGBA1C 6.0 (H) 10/03/2023       Lipid panel  Lab Results   Component Value Date    CHOL 136 10/03/2023    CHOL 129 2023    CHOL 136 10/03/2022     Lab Results   Component Value Date    HDL 63 10/03/2023    HDL 49 2023    HDL 66 10/03/2022     Lab Results   Component Value Date    LDLCALC 61.4 (L) 10/03/2023    LDLCALC 68.4 2023    LDLCALC 62.2 (L) 10/03/2022     Lab Results   Component Value Date    TRIG 58 10/03/2023    TRIG 58 2023    TRIG 39 10/03/2022     Lab Results   Component Value Date    CHOLHDL 46.3 10/03/2023    CHOLHDL 38.0 2023    CHOLHDL 48.5 10/03/2022            Review of Systems   Constitutional: Negative for chills and fever.   HENT:  Negative for hearing loss and nosebleeds.    Eyes:  Negative for blurred vision.   Cardiovascular:  Negative for chest pain, leg swelling and palpitations.   Respiratory:  Negative for hemoptysis and shortness of breath.    Hematologic/Lymphatic: Negative for bleeding problem.   Skin:  Negative  for itching.   Musculoskeletal:  Negative for falls.   Gastrointestinal:  Negative for abdominal pain and hematochezia.   Genitourinary:  Negative for hematuria.   Neurological:  Negative for dizziness and loss of balance.   Psychiatric/Behavioral:  Negative for altered mental status and depression.        Objective:   Physical Exam  Constitutional:       Appearance: He is well-developed.   HENT:      Head: Normocephalic and atraumatic.   Eyes:      Conjunctiva/sclera: Conjunctivae normal.   Neck:      Vascular: No carotid bruit or JVD.   Cardiovascular:      Rate and Rhythm: Normal rate and regular rhythm.      Pulses:           Carotid pulses are 2+ on the right side and 2+ on the left side.       Radial pulses are 2+ on the right side and 2+ on the left side.      Heart sounds: Normal heart sounds. No murmur heard.     No friction rub. No gallop.   Pulmonary:      Effort: Pulmonary effort is normal. No respiratory distress.      Breath sounds: No stridor. No wheezing or rhonchi.   Musculoskeletal:      Cervical back: Neck supple.   Skin:     General: Skin is warm and dry.      Comments: Varicose veins    Neurological:      Mental Status: He is alert and oriented to person, place, and time.   Psychiatric:         Behavior: Behavior normal.         Assessment:     1. Asymptomatic varicose veins of both lower extremities    2. Typical atrial flutter    3. Dyslipidemia associated with type 2 diabetes mellitus    4. Primary hypertension    5. Hypertension associated with diabetes    6. Nonrheumatic aortic valve insufficiency    7. PAF (paroxysmal atrial fibrillation)    8. PFO (patent foramen ovale)    9. SSS (sick sinus syndrome)    10. Stage 3 chronic kidney disease, unspecified whether stage 3a or 3b CKD          Plan:   - BP well controlled  - From cardiac standpoint he is stable.  Check echocardiogram for valvular heart disease  - EKG reviewed today.   A paced without any ischemic changes  - Paroxysmal atrial  flutter. Had break through episodes that response to p.r.n. Lopressor he is happy with that regimen  - He has an appointment with Dr. Sanchez Continue Lopressor as needed  - Continue DOAC  - Reported hx of PFO in prior notes  - Continue conservative ttt for VV    Pt has no active cardiac condition (ACS/USA, decompenstated CHF, significant arrhythmias or severe valvular disease). METS >4. Patient has acceptable risk for  moderate risk non cardiovascular surgery. No further cardiac work up required prior to  undergoing the surgical procedure.  These recommendations follow the 2014 ACC/AHA Guideline on Perioperative Cardiovascular Evaluation and Management of Patients Undergoing Noncardiac Surgery. (JACC Vol. 64 No. 22, Dec 9, 2014, pp w51-k432).        I spent 5-10 minutes asking, assessing, assisting, arranging and advising heart healthy diet improvements. This included low-salt meals, portion control and health food alternatives. I also encourage 30 minutes of moderate exercise 3-4x a week.       One year follow-up      Pertinent cardiac images and EKG reviewed independently.    Continue with current medical plan and lifestyle changes.  Return sooner for concerns or questions. If symptoms persist go to the ED  I have reviewed all pertinent data including patient's medical history in detail and updated the computerized patient record.     Orders Placed This Encounter   Procedures    Echo     Standing Status:   Future     Standing Expiration Date:   2/1/2025     Order Specific Question:   Release to patient     Answer:   Immediate         Follow up as scheduled.     He expressed verbal understanding and agreed with the plan    Patient's Medications   New Prescriptions    No medications on file   Previous Medications    ALLOPURINOL (ZYLOPRIM) 100 MG TABLET    Take 1 tablet by mouth once daily    ATORVASTATIN (LIPITOR) 40 MG TABLET    Take 1 tablet (40 mg total) by mouth once daily.    BENAZEPRIL (LOTENSIN) 20 MG TABLET     Take 1 tablet (20 mg total) by mouth once daily.    CYCLOSPORINE (RESTASIS) 0.05 % OPHTHALMIC EMULSION    Place 1 drop into both eyes 2 (two) times daily.    ECONAZOLE NITRATE 1 % CREAM    Apply topically 2 (two) times daily.    FERROUS SULFATE (FEOSOL) 325 MG (65 MG IRON) TAB TABLET    Take 325 mg by mouth once daily.    GLUCOSAMINE HCL/CHONDR VASQUEZ A NA (GLUCOSAMINE-CHONDROITIN) 750-600 MG TAB    Take 2 tablets by mouth Daily.    IMIQUIMOD (ALDARA) 5 % CREAM    Apply to skin cancer on left leg 5 times weekly for 6 weeks    LEVOCETIRIZINE (XYZAL) 5 MG TABLET    Take 1 tablet (5 mg total) by mouth every evening.    METOPROLOL TARTRATE (LOPRESSOR) 25 MG TABLET    Take 1 tablet (25 mg total) by mouth 2 (two) times daily as needed (palpitations).    MULTIVITAMIN CAPSULE    Take 1 capsule by mouth nightly.     OXYBUTYNIN (DITROPAN-XL) 10 MG 24 HR TABLET    Take 1 tablet (10 mg total) by mouth once daily.    TAMSULOSIN (FLOMAX) 0.4 MG CAP    TAKE 1 CAPSULE BY MOUTH AFTER DINNER    UNABLE TO FIND    6000 hydrolyzes collagen    XARELTO 20 MG TAB    TAKE 1 TABLET BY MOUTH ONCE DAILY WITH SUPPER OR  EVENING  MEAL   Modified Medications    No medications on file   Discontinued Medications    No medications on file

## 2024-02-02 ENCOUNTER — PROCEDURE VISIT (OUTPATIENT)
Dept: NEUROLOGY | Facility: CLINIC | Age: 79
End: 2024-02-02
Payer: MEDICARE

## 2024-02-02 VITALS
HEART RATE: 60 BPM | WEIGHT: 199.31 LBS | SYSTOLIC BLOOD PRESSURE: 119 MMHG | BODY MASS INDEX: 26.29 KG/M2 | DIASTOLIC BLOOD PRESSURE: 68 MMHG

## 2024-02-02 DIAGNOSIS — G20.C PARKINSONISM, UNSPECIFIED PARKINSONISM TYPE: Primary | ICD-10-CM

## 2024-02-02 DIAGNOSIS — I10 PRIMARY HYPERTENSION: Primary | ICD-10-CM

## 2024-02-02 PROCEDURE — 11104 PUNCH BX SKIN SINGLE LESION: CPT | Mod: S$GLB,,, | Performed by: NURSE PRACTITIONER

## 2024-02-02 PROCEDURE — 11105 PUNCH BX SKIN EA SEP/ADDL: CPT | Mod: S$GLB,,, | Performed by: NURSE PRACTITIONER

## 2024-02-02 NOTE — PROCEDURES
PRE-OP DIAGNOSIS: Parkinsonism  POST-OP DIAGNOSIS: Same   PROCEDURE: skin punch biopsy     Performing Physician: Ana María Palomo NP  Supervising Physician (if applicable): Nimesh Adams MD.     PROCEDURE:   _  Shave Biopsy  X  Punch Biopsy  _  Incisional Biopsy     DESCRIPTION:  - Punch Size: 0.5 cm  - Number of Punch Biopsies: 3  + CPT 62410 (punch biopsy, neck) 1st procedure  + CPT 51347 (punch biopsy, each additional lesion, thigh) 2nd procedure  + CPT 95997 (punch biopsy, each additional lesion, ankle) 3rd procedure     The area surrounding the skin lesion was prepared and draped in the  usual sterile manner. The lesion was removed in the usual manner by punch biopsy method noted above. Three full thickness cylindrical samples of the skin were removed from the upper back, lower thigh, and lower leg. The intent of the biopsy is to remove a sample of a cutaneous lesion for [facility] diagnostic pathologic examination. Hemostasis was assured. The patient tolerated  the procedure well.     Closure:       _  Monsels for hemostasis                _  Suture   X Simple closure  _ None     Followup: The patient tolerated the procedure well without  complications.  Standard post-procedure care is explained and return  precautions are given.     Pathology/biopsy specimens were sent directly to [facility]Cert[facility] for processing. Pathology was not sent via in-house [facility] laboratory. Pathology results will be returned within 4-6 weeks and scanned into [facility] Electronic Medical Record.

## 2024-02-05 ENCOUNTER — CLINICAL SUPPORT (OUTPATIENT)
Dept: REHABILITATION | Facility: HOSPITAL | Age: 79
End: 2024-02-05
Payer: MEDICARE

## 2024-02-05 DIAGNOSIS — M16.0 PRIMARY OSTEOARTHRITIS OF BOTH HIPS: ICD-10-CM

## 2024-02-05 DIAGNOSIS — M47.816 LUMBAR SPONDYLOSIS: ICD-10-CM

## 2024-02-05 DIAGNOSIS — M62.81 QUADRICEPS WEAKNESS: Primary | ICD-10-CM

## 2024-02-05 PROCEDURE — 97112 NEUROMUSCULAR REEDUCATION: CPT | Mod: PN

## 2024-02-05 NOTE — PROGRESS NOTES
[facility]   Physical Therapy Treatment Note      Name: Shannan Norman  Clinic Number: [id number]    Therapy Diagnosis:   Encounter Diagnoses   Name Primary?    Quadriceps weakness Yes    Lumbar spondylosis     Primary osteoarthritis of both hips      Physician: Kit Potter, *    Visit Date: 2/5/2024    Physician Orders: PT Eval and Treat   Medical Diagnosis from Referral:   M47.816 (ICD-10-CM) - Lumbar spondylosis   M16.0 (ICD-10-CM) - Primary osteoarthritis of both hips   Evaluation Date: 1/24/2024  Authorization Period Expiration: 9/6/2024  Plan of Care Expiration: 2/19/2024  Visit # / Visits authorized: 2/20 (+1)     Time In: 9:00 am  Time Out: 9:30 am  Total Appointment Time (timed & untimed codes): 30 minutes     Precautions: Standard; CKD, diabetes; history of seizures   Subjective     Patient reports: the leg press machine was making his left knee sore, so he discontinued doing it. He has continued with the long arc quad exercises. Reports that he will have to leave at 9:30 today.   He was compliant with home exercise program.  Response to previous treatment: less pain  Functional change: improved gait pattern    Pain: 2/10  Location: bilateral hips; left knee    Objective      Objective Measures updated at progress report unless specified.     Treatment     Shannan received the treatments listed below:      neuromuscular re-education activities to improve: Coordination, Kinesthetic, Sense, and Proprioception for 30 minutes. The following activities were included:  Nu-step: 4'xL5  Straight leg raise: 3x10 - left   Short arc quad: 3x10 - 9# - left   Standing hip abduction: 3x10 - red theraband   Double leg cybex long arc quad: 2x10 - 20#     therapeutic activities to improve functional performance for 00 minutes, including:  Single leg cybex long arc quad: 3x10 - 20# - left   Double leg cybex leg press: 10x - 6 plates; 2x10 - 8 plates   Cybex straight arm pull downs: 2x10 - 7#      Patient Education and Home Exercises       Education provided:   - home exercise program   - POC/Prognosis     Written Home Exercises Provided: Patient instructed to cont prior HEP. Exercises were reviewed and Shannan was able to demonstrate them prior to the end of the session.  Shannan demonstrated good  understanding of the education provided. See Electronic Medical Record under Patient Instructions for exercises provided during therapy sessions    Assessment   Shannan is a 78 y.o. male referred to outpatient Physical Therapy with a medical diagnosis of lumbar spondylosis and bilateral osteoarthritis of both hips. Pt presents as a very active 78 year old male with signs and symptoms of hip and left knee osteoarthritis with concordant bursitis of the left hip. Condensed treatment session due to patient having to leave early. Continued working on left quadriceps, hip flexor, and hip abductor strengthening.     Shannan Is progressing well towards his goals.   Patient prognosis is Excellent.     Patient will continue to benefit from skilled outpatient physical therapy to address the deficits listed in the problem list box on initial evaluation, provide pt/family education and to maximize pt's level of independence in the home and community environment.     Patient's spiritual, cultural and educational needs considered and pt agreeable to plan of care and goals.     Anticipated barriers to physical therapy: age    Goals:   Short Term Goals: 4 weeks   1. Patient will maintain compliance with home exercise program   2. Patient will improve pain level of left/right hip to at least </=2/10  3. Patient will improve left/right hip range of motion at least 5 degrees with flexion, internal rotation, and external rotation.   4. Patient will improve left /right hip abduction strength >/= 4kg with microfit dynamometer and/or to 5/5 for adequate trunk stability.     Long Term Goals: 8 weeks   1. Patient will maintain compliance with updated  home exercise program   2. Patient will improve pain level of left/right hip to at least </=0/10  3. Patient will improve left/right hip range of motion at least 10 degrees with flexion, internal rotation, and external rotation.   4. Patient will improve left/right hip abduction strength >/= 8kg with microfit dynamometer and/or to 5/5 for adequate trunk stability.  5. Patient will display the ability to ambulate 2 miles without reproduction of hip pain.    Plan   Left knee range of motion   Right hip range of motion   Left quad and hip flexor strengthening   Core stabilization   Lower extremity strength and endurance    Madan Loera, PT

## 2024-02-07 ENCOUNTER — PATIENT MESSAGE (OUTPATIENT)
Dept: RESEARCH | Facility: HOSPITAL | Age: 79
End: 2024-02-07
Payer: MEDICARE

## 2024-02-08 ENCOUNTER — OFFICE VISIT (OUTPATIENT)
Dept: SPORTS MEDICINE | Facility: CLINIC | Age: 79
End: 2024-02-08
Payer: MEDICARE

## 2024-02-08 VITALS — TEMPERATURE: 98 F

## 2024-02-08 DIAGNOSIS — M25.562 LEFT KNEE PAIN, UNSPECIFIED CHRONICITY: ICD-10-CM

## 2024-02-08 DIAGNOSIS — M17.12 PRIMARY OSTEOARTHRITIS OF LEFT KNEE: Primary | ICD-10-CM

## 2024-02-08 PROCEDURE — 99499 UNLISTED E&M SERVICE: CPT | Mod: S$GLB,,, | Performed by: FAMILY MEDICINE

## 2024-02-08 PROCEDURE — 99999 PR PBB SHADOW E&M-EST. PATIENT-LVL III: CPT | Mod: PBBFAC,,, | Performed by: FAMILY MEDICINE

## 2024-02-08 PROCEDURE — 20611 DRAIN/INJ JOINT/BURSA W/US: CPT | Mod: LT,S$GLB,, | Performed by: FAMILY MEDICINE

## 2024-02-08 NOTE — PROGRESS NOTES
Sil  knee joint left 1/3  Lot number: [id number]  Expiration date: 01/31/26    MEDICAL NECESSITY FOR VISCOSUPPLEMENTATION USE: After thorough evaluation of the patient, I have determined that viscosupplementation treatment is medically necessary. 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 Oral analgesics including NSAIDs have not adequately controlled the patient's symptoms. There is radiographic evidence of Kellgren-Tony grade II (or greater) osteoarthritic (OA) changes, or if lack of radiographic evidence, there is arthroscopic or other evidence of chondrosis of the knee(s).

## 2024-02-08 NOTE — PROCEDURES
"Large Joint Aspiration/Injection: L knee    Date/Time: 2/8/2024 10:00 AM    Performed by: Kit Potter MD  Authorized by: Kit Potter MD    Consent Done?:  Yes (Verbal)  Indications:  Pain  Site marked: the procedure site was marked    Timeout: prior to procedure the correct patient, procedure, and site was verified    Prep: patient was prepped and draped in usual sterile fashion      Details:  Needle Size:  25 G  Ultrasonic Guidance for needle placement?: Yes    Images are saved and documented.  Approach:  Lateral  Location:  Knee  Site:  L knee  Medications:  16.8 mg sodium hyaluronate 16.8 mg/2 mL  Patient tolerance:  Patient tolerated the procedure well with no immediate complications     Description of ultrasound utilization for needle guidance:   Ultrasound guidance used for needle localization. Images saved and stored for documentation. The SUPRAPATELLAR BURSA / KNEE JOINT was visualized. Dynamic visualization of the 25g x 1.5" needle was continuous throughout the procedure.     "

## 2024-02-12 ENCOUNTER — OFFICE VISIT (OUTPATIENT)
Dept: FAMILY MEDICINE | Facility: CLINIC | Age: 79
End: 2024-02-12
Payer: MEDICARE

## 2024-02-12 ENCOUNTER — LAB VISIT (OUTPATIENT)
Dept: LAB | Facility: HOSPITAL | Age: 79
End: 2024-02-12
Attending: FAMILY MEDICINE
Payer: MEDICARE

## 2024-02-12 VITALS
SYSTOLIC BLOOD PRESSURE: 124 MMHG | HEART RATE: 67 BPM | WEIGHT: 200.81 LBS | BODY MASS INDEX: 26.62 KG/M2 | OXYGEN SATURATION: 98 % | DIASTOLIC BLOOD PRESSURE: 54 MMHG | HEIGHT: 73 IN

## 2024-02-12 DIAGNOSIS — E78.5 DYSLIPIDEMIA ASSOCIATED WITH TYPE 2 DIABETES MELLITUS: ICD-10-CM

## 2024-02-12 DIAGNOSIS — E11.69 DYSLIPIDEMIA ASSOCIATED WITH TYPE 2 DIABETES MELLITUS: ICD-10-CM

## 2024-02-12 DIAGNOSIS — M35.3 POLYMYALGIA RHEUMATICA: ICD-10-CM

## 2024-02-12 DIAGNOSIS — M67.912 ROTATOR CUFF DYSFUNCTION, LEFT: ICD-10-CM

## 2024-02-12 DIAGNOSIS — I42.8 NICM (NONISCHEMIC CARDIOMYOPATHY): ICD-10-CM

## 2024-02-12 DIAGNOSIS — N18.30 STAGE 3 CHRONIC KIDNEY DISEASE, UNSPECIFIED WHETHER STAGE 3A OR 3B CKD: ICD-10-CM

## 2024-02-12 DIAGNOSIS — I48.0 PAF (PAROXYSMAL ATRIAL FIBRILLATION): ICD-10-CM

## 2024-02-12 DIAGNOSIS — Z01.818 PREOP EXAMINATION: ICD-10-CM

## 2024-02-12 DIAGNOSIS — M25.512 CHRONIC PAIN IN LEFT SHOULDER: ICD-10-CM

## 2024-02-12 DIAGNOSIS — G89.29 CHRONIC PAIN IN LEFT SHOULDER: ICD-10-CM

## 2024-02-12 DIAGNOSIS — E11.59 HYPERTENSION ASSOCIATED WITH DIABETES: ICD-10-CM

## 2024-02-12 DIAGNOSIS — E66.3 OVERWEIGHT (BMI 25.0-29.9): ICD-10-CM

## 2024-02-12 DIAGNOSIS — I15.2 HYPERTENSION ASSOCIATED WITH DIABETES: ICD-10-CM

## 2024-02-12 DIAGNOSIS — Z95.0 PRESENCE OF CARDIAC PACEMAKER: ICD-10-CM

## 2024-02-12 DIAGNOSIS — Z01.818 PREOP EXAMINATION: Primary | ICD-10-CM

## 2024-02-12 PROBLEM — R00.1 BRADYCARDIA: Status: ACTIVE | Noted: 2018-02-06

## 2024-02-12 PROBLEM — E46 PROTEIN-CALORIE MALNUTRITION, UNSPECIFIED SEVERITY: Status: RESOLVED | Noted: 2024-01-26 | Resolved: 2024-02-12

## 2024-02-12 LAB
BILIRUB UR QL STRIP: NEGATIVE
CLARITY UR REFRACT.AUTO: CLEAR
COLOR UR AUTO: YELLOW
GLUCOSE UR QL STRIP: NEGATIVE
HGB UR QL STRIP: NEGATIVE
KETONES UR QL STRIP: NEGATIVE
LEUKOCYTE ESTERASE UR QL STRIP: NEGATIVE
NITRITE UR QL STRIP: NEGATIVE
PH UR STRIP: 6 [PH] (ref 5–8)
PROT UR QL STRIP: ABNORMAL
SP GR UR STRIP: 1.02 (ref 1–1.03)
URN SPEC COLLECT METH UR: ABNORMAL

## 2024-02-12 PROCEDURE — 3074F SYST BP LT 130 MM HG: CPT | Mod: CPTII,S$GLB,, | Performed by: NURSE PRACTITIONER

## 2024-02-12 PROCEDURE — 99999 PR PBB SHADOW E&M-EST. PATIENT-LVL IV: CPT | Mod: PBBFAC,,, | Performed by: NURSE PRACTITIONER

## 2024-02-12 PROCEDURE — 1159F MED LIST DOCD IN RCRD: CPT | Mod: CPTII,S$GLB,, | Performed by: NURSE PRACTITIONER

## 2024-02-12 PROCEDURE — 1126F AMNT PAIN NOTED NONE PRSNT: CPT | Mod: CPTII,S$GLB,, | Performed by: NURSE PRACTITIONER

## 2024-02-12 PROCEDURE — 3078F DIAST BP <80 MM HG: CPT | Mod: CPTII,S$GLB,, | Performed by: NURSE PRACTITIONER

## 2024-02-12 PROCEDURE — 81003 URINALYSIS AUTO W/O SCOPE: CPT | Performed by: NURSE PRACTITIONER

## 2024-02-12 PROCEDURE — 1160F RVW MEDS BY RX/DR IN RCRD: CPT | Mod: CPTII,S$GLB,, | Performed by: NURSE PRACTITIONER

## 2024-02-12 PROCEDURE — 1101F PT FALLS ASSESS-DOCD LE1/YR: CPT | Mod: CPTII,S$GLB,, | Performed by: NURSE PRACTITIONER

## 2024-02-12 PROCEDURE — 99215 OFFICE O/P EST HI 40 MIN: CPT | Mod: S$GLB,,, | Performed by: NURSE PRACTITIONER

## 2024-02-12 PROCEDURE — 3288F FALL RISK ASSESSMENT DOCD: CPT | Mod: CPTII,S$GLB,, | Performed by: NURSE PRACTITIONER

## 2024-02-12 NOTE — PROGRESS NOTES
Subjective:       Patient ID: Shannan Norman is a 78 y.o. male     Chief Complaint: Pre-op Exam    This is a 78 y.o. male patient of Dr. Carpenter who is new to me. He presents today for a pre-op examination for shoulder surgery. PMH includes   Patient Active Problem List   Diagnosis    HTN (hypertension)    Gout, arthritis    Atrial flutter    PFO (patent foramen ovale)    Umbilical hernia    Asymptomatic varicose veins of both lower extremities    Nuclear sclerosis of both eyes    Left epiretinal membrane    Junctional escape rhythm    SSS (sick sinus syndrome)    Nonrheumatic aortic valve insufficiency    Benign prostatic hyperplasia with urinary frequency    Junctional bradycardia    Dyslipidemia associated with type 2 diabetes mellitus    Hypertension associated with diabetes    Quadriceps weakness    Right inguinal hernia    CKD (chronic kidney disease) stage 3, GFR 30-59 ml/min    Overweight (BMI 25.0-29.9)    Nuclear sclerotic cataract of right eye    Nuclear sclerotic cataract of left eye    AK (actinic keratosis)    History of melanoma    Lumbar spondylosis    Primary osteoarthritis of both hips    Decreased range of hip movement    Decreased strength    Pain of left upper extremity    Weakness    Stiffness due to immobility    Pelvic floor dysfunction    Basal cell carcinoma (BCC) of skin of left lower extremity including hip    PAF (paroxysmal atrial fibrillation)    Right hip pain    History of skin cancer    OAB (overactive bladder)    Presence of cardiac pacemaker    Familial hypercholesterolemia    Bradycardia    Polymyalgia rheumatica      Past Medical History:   Diagnosis Date    Allergy     Arthritis     Atrial fibrillation     Atrial flutter 2011    ablation    Basal cell carcinoma     Cancer     Cataract     CKD (chronic kidney disease) stage 3, GFR 30-59 ml/min 8/13/2019    Diabetes mellitus     Dry eye syndrome     Gout, unspecified     High cholesterol     History of shingles     Hypertension      Melanoma     Seizures       Past Surgical History:   Procedure Laterality Date    ABLATION N/A 9/11/2018    Procedure: ABLATION;  Surgeon: Hany Sanchez MD;  Location: [facility] CATH LAB;  Service: Cardiology;  Laterality: N/A;  AFL, ISABELLE, RFA, ELODIA, MAC, DM, 3 PREP    ABLATION OF DYSRHYTHMIC FOCUS      ADENOIDECTOMY      CARDIAC PACEMAKER PLACEMENT      no defib    CATARACT EXTRACTION W/  INTRAOCULAR LENS IMPLANT Right 7/28/2020    Procedure: EXTRACTION, CATARACT, WITH IOL INSERTION;  Surgeon: Andrés Morse MD;  Location: [facility] OR;  Service: Ophthalmology;  Laterality: Right;    CATARACT EXTRACTION W/  INTRAOCULAR LENS IMPLANT Left 8/11/2020    Procedure: EXTRACTION, CATARACT, WITH IOL INSERTION;  Surgeon: Andrés Morse MD;  Location: [facility] OR;  Service: Ophthalmology;  Laterality: Left;    COLONOSCOPY N/A 1/2/2019    Procedure: COLONOSCOPY Suprep;  Surgeon: Cindy Solorzano MD;  Location: [facility] ENDO;  Service: Endoscopy;  Laterality: N/A;    GANGLION CYST EXCISION      left neck    HERNIA REPAIR  2013    umbilical    TENDON REPAIR Right     wrist    TONSILLECTOMY      varicose veins      several sx  bilateral    VASECTOMY      VEIN SURGERY          Patient reports he is having left shoulder surgery on 3/4/2024 with Dr. Dominique.     Fever: No  Chills: No  Chest pain: No  SOB: No  Dyspnea: No  Palpitations: No  Smoker: No  Drinks alcohol: Yes  Using NSAIDs: No    Advised to abstain from alcohol, NSAIDs, ASA, and blood thinners prior to surgery. Patient has been under general anesthesia in the past and had no complications. Will have pre-op appointment on 2/15/2024; will be notified of post-op visit appointment date/time at pre-op appointment. CBC, CMP, U/A ordered. EKG done recently.    Review of Systems    Otherwise negative      Review of patient's allergies indicates:  No Known Allergies       Current Outpatient Medications:     allopurinoL (ZYLOPRIM) 100 MG tablet, Take 1 tablet by mouth once  daily, Disp: 90 tablet, Rfl: 3    atorvastatin (LIPITOR) 40 MG tablet, Take 1 tablet (40 mg total) by mouth once daily., Disp: 90 tablet, Rfl: 3    benazepriL (LOTENSIN) 20 MG tablet, Take 1 tablet (20 mg total) by mouth once daily., Disp: 90 tablet, Rfl: 3    cycloSPORINE (RESTASIS) 0.05 % ophthalmic emulsion, Place 1 drop into both eyes 2 (two) times daily., Disp: 60 each, Rfl: 11    econazole nitrate 1 % cream, Apply topically 2 (two) times daily., Disp: 85 g, Rfl: 3    ferrous sulfate (FEOSOL) 325 mg (65 mg iron) Tab tablet, Take 325 mg by mouth once daily., Disp: , Rfl:     GLUCOSAMINE HCL/CHONDR VASQUEZ A NA (GLUCOSAMINE-CHONDROITIN) 750-600 mg Tab, Take 2 tablets by mouth Daily., Disp: , Rfl:     levocetirizine (XYZAL) 5 MG tablet, Take 1 tablet (5 mg total) by mouth every evening., Disp: 90 tablet, Rfl: 1    multivitamin capsule, Take 1 capsule by mouth nightly. , Disp: , Rfl:     oxybutynin (DITROPAN-XL) 10 MG 24 hr tablet, Take 1 tablet (10 mg total) by mouth once daily., Disp: 30 tablet, Rfl: 11    tamsulosin (FLOMAX) 0.4 mg Cap, TAKE 1 CAPSULE BY MOUTH AFTER DINNER, Disp: 90 capsule, Rfl: 3    UNABLE TO FIND, 6000 hydrolyzes collagen, Disp: , Rfl:     XARELTO 20 mg Tab, TAKE 1 TABLET BY MOUTH ONCE DAILY WITH SUPPER OR  EVENING  MEAL, Disp: 90 tablet, Rfl: 3    imiquimod (ALDARA) 5 % cream, Apply to skin cancer on left leg 5 times weekly for 6 weeks (Patient not taking: Reported on 2/12/2024), Disp: 24 packet, Rfl: 3    metoprolol tartrate (LOPRESSOR) 25 MG tablet, Take 1 tablet (25 mg total) by mouth 2 (two) times daily as needed (palpitations). (Patient not taking: Reported on 2/12/2024), Disp: 45 tablet, Rfl: 0       Objective:    Physical Exam  Vitals reviewed.   Constitutional:       General: He is awake. He is not in acute distress.     Appearance: Normal appearance. He is well-developed, well-groomed and overweight.   HENT:      Head: Normocephalic and atraumatic.      Right Ear: Tympanic membrane, ear  canal and external ear normal.      Left Ear: Tympanic membrane, ear canal and external ear normal.      Mouth/Throat:      Mouth: Mucous membranes are moist.      Pharynx: No posterior oropharyngeal erythema.   Eyes:      General:         Right eye: No discharge.         Left eye: No discharge.      Extraocular Movements: Extraocular movements intact.      Pupils: Pupils are equal, round, and reactive to light.   Neck:      Vascular: No carotid bruit.   Cardiovascular:      Rate and Rhythm: Normal rate.      Pulses:           Radial pulses are 2+ on the right side and 2+ on the left side.        Dorsalis pedis pulses are 2+ on the right side and 2+ on the left side.        Posterior tibial pulses are 2+ on the right side and 2+ on the left side.      Heart sounds: No murmur heard.     Comments: LCW pacemaker in situ  Pulmonary:      Effort: Pulmonary effort is normal. No respiratory distress.      Breath sounds: Normal breath sounds. No wheezing or rhonchi.   Abdominal:      General: Bowel sounds are normal. There is no distension.      Palpations: Abdomen is soft.      Tenderness: There is no abdominal tenderness. There is no guarding or rebound.   Musculoskeletal:      Right lower leg: No edema.      Left lower leg: No edema.   Lymphadenopathy:      Cervical: No cervical adenopathy.   Skin:     General: Skin is warm and dry.      Coloration: Skin is not pale.   Neurological:      Mental Status: He is alert and oriented to person, place, and time.      Coordination: Coordination normal.   Psychiatric:         Attention and Perception: Attention normal.         Mood and Affect: Mood and affect normal.         Speech: Speech normal.         Behavior: Behavior normal. Behavior is cooperative.         Thought Content: Thought content normal.       Assessment:       1. Preop examination    2. Rotator cuff dysfunction, left    3. Chronic pain in left shoulder    4. PAF (paroxysmal atrial fibrillation)    5. Presence of  cardiac pacemaker    6. Hypertension associated with diabetes    7. Dyslipidemia associated with type 2 diabetes mellitus    8. Stage 3 chronic kidney disease, unspecified whether stage 3a or 3b CKD    9. Polymyalgia rheumatica    10. Overweight (BMI 25.0-29.9)            Plan:   Shannan was seen today for pre-op exam.    Diagnoses and all orders for this visit:    Preop examination  -     CBC Auto Differential; Future  -     Comprehensive Metabolic Panel; Future  -     Urinalysis; Future    Rotator cuff dysfunction, left    Chronic pain in left shoulder    PAF (paroxysmal atrial fibrillation)    Presence of cardiac pacemaker    Hypertension associated with diabetes    Dyslipidemia associated with type 2 diabetes mellitus    Stage 3 chronic kidney disease, unspecified whether stage 3a or 3b CKD    Polymyalgia rheumatica    Overweight (BMI 25.0-29.9)          Cardiac Risk Estimation per RCRI: RISK CLASS II (0 risk factors, risks of major cardiac complications approximately 6%)            Patient is stable for surgery.       - RTC for a follow-up with PCP post-surgery, or sooner if needed          I spent a total of 45 minutes on the day of the visit.  This includes face to face time and non-face to face time preparing to see the patient (eg, review of tests), obtaining and/or reviewing separately obtained history, documenting clinical information in the electronic or other health record, independently interpreting results and communicating results to the patient/family/caregiver, or care coordinator.

## 2024-02-12 NOTE — Clinical Note
Solomon Merlossef,  I saw this mutual patient today for his pre-op examination. You saw him recently and ordered an EKG. Any concerns from a CV standpoint regarding this patient's shoulder surgery? I told him to hold his Xarelto for two days prior to the surgery; any other recommendations? I appreciate your input.  Trixie Vallejo NP

## 2024-02-13 NOTE — TELEPHONE ENCOUNTER
No care due was identified.  [facility] Embedded Care Due Messages. Reference number: [id number].   2/12/2024 9:11:04 PM CST

## 2024-02-14 ENCOUNTER — TELEPHONE (OUTPATIENT)
Dept: FAMILY MEDICINE | Facility: CLINIC | Age: 79
End: 2024-02-14
Payer: MEDICARE

## 2024-02-14 ENCOUNTER — CLINICAL SUPPORT (OUTPATIENT)
Dept: REHABILITATION | Facility: HOSPITAL | Age: 79
End: 2024-02-14
Payer: MEDICARE

## 2024-02-14 DIAGNOSIS — M47.816 LUMBAR SPONDYLOSIS: ICD-10-CM

## 2024-02-14 DIAGNOSIS — M62.81 QUADRICEPS WEAKNESS: Primary | ICD-10-CM

## 2024-02-14 DIAGNOSIS — M16.0 PRIMARY OSTEOARTHRITIS OF BOTH HIPS: ICD-10-CM

## 2024-02-14 PROCEDURE — 97530 THERAPEUTIC ACTIVITIES: CPT | Mod: PN

## 2024-02-14 PROCEDURE — 97112 NEUROMUSCULAR REEDUCATION: CPT | Mod: PN

## 2024-02-14 RX ORDER — BENAZEPRIL HYDROCHLORIDE 20 MG/1
20 TABLET ORAL
Qty: 90 TABLET | Refills: 2 | Status: SHIPPED | OUTPATIENT
Start: 2024-02-14 | End: 2024-02-19 | Stop reason: SDUPTHER

## 2024-02-14 NOTE — PROGRESS NOTES
[facility]   Physical Therapy Treatment Note      Name: Shannan Norman  Clinic Number: [id number]    Therapy Diagnosis:   Encounter Diagnoses   Name Primary?    Quadriceps weakness Yes    Lumbar spondylosis     Primary osteoarthritis of both hips      Physician: Kit Potter, *    Visit Date: 2/14/2024    Physician Orders: PT Eval and Treat   Medical Diagnosis from Referral:   M47.816 (ICD-10-CM) - Lumbar spondylosis   M16.0 (ICD-10-CM) - Primary osteoarthritis of both hips   Evaluation Date: 1/24/2024  Authorization Period Expiration: 9/6/2024  Plan of Care Expiration: 2/19/2024  Visit # / Visits authorized: 3/20 (+1)     Time In: 9:00 am  Time Out: 9:30 am  Total Appointment Time (timed & untimed codes): 30 minutes     Precautions: Standard; CKD, diabetes; history of seizures   Subjective     Patient reports: he hasn't had any pain or complaints recently. Patient ready for discharge next visit.   He was compliant with home exercise program.  Response to previous treatment: less pain  Functional change: improved gait pattern    Pain: 0/10  Location: bilateral hips; left knee    Objective      Objective Measures updated at progress report unless specified.     Treatment     Shannan received the treatments listed below:      neuromuscular re-education activities to improve: Coordination, Kinesthetic, Sense, and Proprioception for 25 minutes. The following activities were included:  Nu-step: 4'xL5  Straight leg raise: 3x10 - left   Short arc quad: 3x10 - 9# - left   Standing hip abduction: 3x10 - red theraband   Double leg cybex long arc quad: 2x10 - 20#     therapeutic activities to improve functional performance for 30 minutes, including:  Single leg cybex long arc quad: 3x10 - 20# - left   Double leg cybex leg press: 10x - 6 plates; 2x10 - 8 plates   Cybex straight arm pull downs: 2x10 - 7#   Cybex hamstring curls: 3x10 - 4 plates   Lateral step overs: 20x - blue step   Cybex hip  extension: 3x10 - 3 plates     Patient Education and Home Exercises       Education provided:   - home exercise program   - POC/Prognosis     Written Home Exercises Provided: Patient instructed to cont prior HEP. Exercises were reviewed and Shannan was able to demonstrate them prior to the end of the session.  Shannan demonstrated good  understanding of the education provided. See Electronic Medical Record under Patient Instructions for exercises provided during therapy sessions    Assessment   Shannan is a 78 y.o. male referred to outpatient Physical Therapy with a medical diagnosis of lumbar spondylosis and bilateral osteoarthritis of both hips. Pt presents as a very active 78 year old male with signs and symptoms of hip and left knee osteoarthritis with concordant bursitis of the left hip. Condensed treatment session due to patient having to leave early. Continued working on left quadriceps, hip flexor, and hip abductor strengthening. Planning to discharge next visit.     Shannan Is progressing well towards his goals.   Patient prognosis is Excellent.     Patient will continue to benefit from skilled outpatient physical therapy to address the deficits listed in the problem list box on initial evaluation, provide pt/family education and to maximize pt's level of independence in the home and community environment.     Patient's spiritual, cultural and educational needs considered and pt agreeable to plan of care and goals.     Anticipated barriers to physical therapy: age    Goals:   Short Term Goals: 4 weeks   1. Patient will maintain compliance with home exercise program   2. Patient will improve pain level of left/right hip to at least </=2/10  3. Patient will improve left/right hip range of motion at least 5 degrees with flexion, internal rotation, and external rotation.   4. Patient will improve left /right hip abduction strength >/= 4kg with microfit dynamometer and/or to 5/5 for adequate trunk stability.     Long Term  Goals: 8 weeks   1. Patient will maintain compliance with updated home exercise program   2. Patient will improve pain level of left/right hip to at least </=0/10  3. Patient will improve left/right hip range of motion at least 10 degrees with flexion, internal rotation, and external rotation.   4. Patient will improve left/right hip abduction strength >/= 8kg with microfit dynamometer and/or to 5/5 for adequate trunk stability.  5. Patient will display the ability to ambulate 2 miles without reproduction of hip pain.    Plan   Left knee range of motion   Right hip range of motion   Left quad and hip flexor strengthening   Core stabilization   Lower extremity strength and endurance    Madan Loera, PT

## 2024-02-14 NOTE — TELEPHONE ENCOUNTER
"Spoke with patient to notify that pre-op documents can be picked up from the clinic. Also notified that I spoke with his cardiologist Dr. Cisneros who says regarding his surgery:    "Hi,     Ok to proceed and hold Xarelto for 2-3 days prior and restart post.     Sincerely,   Brandon Cisneros MD.   Interventional Cardiologist"    Patient verbalized understanding. Will arrive shortly to  paperwork.    Trixie Vallejo NP  "

## 2024-02-14 NOTE — TELEPHONE ENCOUNTER
Refill Decision Note   Shannan Norman  is requesting a refill authorization.  Brief Assessment and Rationale for Refill:  Approve     Medication Therapy Plan:         Comments:     Note composed:3:35 PM 02/14/2024

## 2024-02-15 ENCOUNTER — OFFICE VISIT (OUTPATIENT)
Dept: SPORTS MEDICINE | Facility: CLINIC | Age: 79
End: 2024-02-15
Payer: MEDICARE

## 2024-02-15 VITALS
WEIGHT: 200 LBS | SYSTOLIC BLOOD PRESSURE: 140 MMHG | DIASTOLIC BLOOD PRESSURE: 70 MMHG | HEIGHT: 73 IN | BODY MASS INDEX: 26.51 KG/M2 | HEART RATE: 60 BPM

## 2024-02-15 DIAGNOSIS — M19.012 GLENOHUMERAL ARTHRITIS, LEFT: Primary | ICD-10-CM

## 2024-02-15 PROCEDURE — 99999 PR PBB SHADOW E&M-EST. PATIENT-LVL IV: CPT | Mod: PBBFAC,,, | Performed by: ORTHOPAEDIC SURGERY

## 2024-02-15 PROCEDURE — 99499 UNLISTED E&M SERVICE: CPT | Mod: S$GLB,,, | Performed by: ORTHOPAEDIC SURGERY

## 2024-02-15 RX ORDER — CELECOXIB 200 MG/1
400 CAPSULE ORAL
Status: CANCELLED | OUTPATIENT
Start: 2024-02-15

## 2024-02-15 RX ORDER — CEFAZOLIN SODIUM 2 G/50ML
2 SOLUTION INTRAVENOUS
Status: CANCELLED | OUTPATIENT
Start: 2024-02-15

## 2024-02-15 RX ORDER — METHOCARBAMOL 500 MG/1
500 TABLET, FILM COATED ORAL 3 TIMES DAILY PRN
Qty: 30 TABLET | Refills: 0 | Status: SHIPPED | OUTPATIENT
Start: 2024-02-15 | End: 2024-04-23 | Stop reason: ALTCHOICE

## 2024-02-15 RX ORDER — FAMOTIDINE 20 MG/1
20 TABLET, FILM COATED ORAL 2 TIMES DAILY
Status: CANCELLED | OUTPATIENT
Start: 2024-02-15

## 2024-02-15 RX ORDER — MUPIROCIN 20 MG/G
OINTMENT TOPICAL
Status: CANCELLED | OUTPATIENT
Start: 2024-02-15

## 2024-02-15 RX ORDER — AMOXICILLIN 250 MG
1 CAPSULE ORAL 2 TIMES DAILY
Status: CANCELLED | OUTPATIENT
Start: 2024-02-15

## 2024-02-15 RX ORDER — OXYCODONE HYDROCHLORIDE 5 MG/1
5 TABLET ORAL
Status: CANCELLED | OUTPATIENT
Start: 2024-02-15

## 2024-02-15 RX ORDER — OXYCODONE HYDROCHLORIDE 5 MG/1
5 TABLET ORAL EVERY 6 HOURS PRN
Qty: 28 TABLET | Refills: 0 | Status: SHIPPED | OUTPATIENT
Start: 2024-02-15 | End: 2024-04-23 | Stop reason: ALTCHOICE

## 2024-02-15 RX ORDER — CELECOXIB 200 MG/1
200 CAPSULE ORAL 2 TIMES DAILY WITH MEALS
Qty: 60 CAPSULE | Refills: 0 | Status: SHIPPED | OUTPATIENT
Start: 2024-02-15 | End: 2024-04-08 | Stop reason: ALTCHOICE

## 2024-02-15 RX ORDER — POLYETHYLENE GLYCOL 3350 17 G/17G
17 POWDER, FOR SOLUTION ORAL DAILY
Status: CANCELLED | OUTPATIENT
Start: 2024-02-15

## 2024-02-15 RX ORDER — CELECOXIB 200 MG/1
200 CAPSULE ORAL DAILY
Status: CANCELLED | OUTPATIENT
Start: 2024-02-16

## 2024-02-15 RX ORDER — DEXTROSE MONOHYDRATE AND SODIUM CHLORIDE 5; .9 G/100ML; G/100ML
INJECTION, SOLUTION INTRAVENOUS CONTINUOUS
Status: CANCELLED | OUTPATIENT
Start: 2024-02-15

## 2024-02-15 RX ORDER — ACETAMINOPHEN 500 MG
1000 TABLET ORAL EVERY 6 HOURS
Status: CANCELLED | OUTPATIENT
Start: 2024-02-15 | End: 2024-02-17

## 2024-02-15 RX ORDER — PREGABALIN 75 MG/1
75 CAPSULE ORAL NIGHTLY
Status: CANCELLED | OUTPATIENT
Start: 2024-02-15

## 2024-02-15 RX ORDER — MUPIROCIN 20 MG/G
1 OINTMENT TOPICAL 2 TIMES DAILY
Status: CANCELLED | OUTPATIENT
Start: 2024-02-15 | End: 2024-02-20

## 2024-02-15 RX ORDER — PROCHLORPERAZINE EDISYLATE 5 MG/ML
5 INJECTION INTRAMUSCULAR; INTRAVENOUS EVERY 6 HOURS PRN
Status: CANCELLED | OUTPATIENT
Start: 2024-02-15

## 2024-02-15 RX ORDER — SODIUM CHLORIDE 0.9 % (FLUSH) 0.9 %
10 SYRINGE (ML) INJECTION
Status: CANCELLED | OUTPATIENT
Start: 2024-02-15

## 2024-02-15 RX ORDER — CEFAZOLIN SODIUM 2 G/50ML
2 SOLUTION INTRAVENOUS
Status: CANCELLED | OUTPATIENT
Start: 2024-02-15 | End: 2024-02-16

## 2024-02-15 RX ORDER — ONDANSETRON 8 MG/1
8 TABLET, ORALLY DISINTEGRATING ORAL EVERY 8 HOURS PRN
Status: CANCELLED | OUTPATIENT
Start: 2024-02-15

## 2024-02-15 RX ORDER — ACETAMINOPHEN 500 MG
1000 TABLET ORAL
Status: CANCELLED | OUTPATIENT
Start: 2024-02-15

## 2024-02-15 NOTE — H&P
"Shannan Norman  is here for a completion of his perioperative paperwork. he  Is scheduled to undergo:    LEFT Shoulder  Reverse total shoulder arthroplasty  Open biceps tenodesis  All other indicated procedures     on 03/04/2024.  He is a healthy individual and does need clearance for this procedure.     "Patient is stable for surgery."  2/14/24; Trixie Vallejo NP    Risks, indications and benefits of the surgical procedure were discussed with the patient. All questions with regard to surgery, rehab, expected return to functional activities, activities of daily living and recreational endeavors were answered to his satisfaction.    Discussed COVID-19 with the patient, they are aware of our current policies and procedures, were given the option of delaying surgery, and they elect to proceed.    Patient was informed and understands the risks of surgery are greater for patients with a current condition or history of heart disease, obesity, clotting disorders, recurrent infections, steroid use, current or past smoking, and factors such as sedentary lifestyle and noncompliance with medications, therapy or follow-up. The degree of the increased risk is hard to estimate with any degree of precision.    Once no other questions were asked, a brief history and physical exam was then performed.    PAST MEDICAL HISTORY:   Past Medical History:   Diagnosis Date    Allergy     Arthritis     Atrial fibrillation     Atrial flutter 2011    ablation    Basal cell carcinoma     Cancer     Cataract     CKD (chronic kidney disease) stage 3, GFR 30-59 ml/min 8/13/2019    Diabetes mellitus     Dry eye syndrome     Gout, unspecified     High cholesterol     History of shingles     Hypertension     Melanoma     Seizures      PAST SURGICAL HISTORY:   Past Surgical History:   Procedure Laterality Date    ABLATION N/A 9/11/2018    Procedure: ABLATION;  Surgeon: Hany Sanchez MD;  Location: [facility] CATH LAB;  Service: Cardiology;  " Laterality: N/A;  AFL, ISABELLE, RFA, ELODIA, MAC, DM, 3 PREP    ABLATION OF DYSRHYTHMIC FOCUS      ADENOIDECTOMY      CARDIAC PACEMAKER PLACEMENT      no defib    CATARACT EXTRACTION W/  INTRAOCULAR LENS IMPLANT Right 7/28/2020    Procedure: EXTRACTION, CATARACT, WITH IOL INSERTION;  Surgeon: Andrés Morse MD;  Location: [facility];  Service: Ophthalmology;  Laterality: Right;    CATARACT EXTRACTION W/  INTRAOCULAR LENS IMPLANT Left 8/11/2020    Procedure: EXTRACTION, CATARACT, WITH IOL INSERTION;  Surgeon: Andrés Morse MD;  Location: [facility] OR;  Service: Ophthalmology;  Laterality: Left;    COLONOSCOPY N/A 1/2/2019    Procedure: COLONOSCOPY Suprep;  Surgeon: Cindy Solorzano MD;  Location: [facility];  Service: Endoscopy;  Laterality: N/A;    GANGLION CYST EXCISION      left neck    HERNIA REPAIR  2013    umbilical    TENDON REPAIR Right     wrist    TONSILLECTOMY      varicose veins      several sx  bilateral    VASECTOMY      VEIN SURGERY       FAMILY HISTORY:   Family History   Problem Relation Age of Onset    Diabetes Mother     COPD Father     Heart disease Father     Arthritis Sister     Heart attack Brother     Heart disease Brother     Diabetes Brother     No Known Problems Maternal Aunt     No Known Problems Maternal Uncle     No Known Problems Paternal Aunt     No Known Problems Paternal Uncle     No Known Problems Maternal Grandmother     No Known Problems Maternal Grandfather     No Known Problems Paternal Grandmother     No Known Problems Paternal Grandfather     No Known Problems Son     Cancer Sister         lymph node, breast    No Known Problems Sister     No Known Problems Son     Amblyopia Neg Hx     Blindness Neg Hx     Cataracts Neg Hx     Glaucoma Neg Hx     Hypertension Neg Hx     Macular degeneration Neg Hx     Retinal detachment Neg Hx     Strabismus Neg Hx     Stroke Neg Hx     Thyroid disease Neg Hx     Prostate cancer Neg Hx     Kidney disease Neg Hx     Melanoma Neg Hx       SOCIAL HISTORY:   Social History     Socioeconomic History    Marital status:    Occupational History     Employer: [employer]   Tobacco Use    Smoking status: Never    Smokeless tobacco: Never   Substance and Sexual Activity    Alcohol use: Yes     Alcohol/week: 7.0 - 14.0 standard drinks of alcohol     Types: 7 - 14 Glasses of wine per week    Drug use: No    Sexual activity: Yes     Partners: Female     Social Determinants of Health     Financial Resource Strain: Low Risk  (1/25/2024)    Overall Financial Resource Strain (CARDIA)     Difficulty of Paying Living Expenses: Not hard at all   Food Insecurity: No Food Insecurity (1/25/2024)    Hunger Vital Sign     Worried About Running Out of Food in the Last Year: Never true     Ran Out of Food in the Last Year: Never true   Transportation Needs: No Transportation Needs (1/25/2024)    PRAPARE - Transportation     Lack of Transportation (Medical): No     Lack of Transportation (Non-Medical): No   Physical Activity: Inactive (1/25/2024)    Exercise Vital Sign     Days of Exercise per Week: 0 days     Minutes of Exercise per Session: 30 min   Stress: No Stress Concern Present (1/25/2024)    [place] [employer] - Occupational Stress Questionnaire     Feeling of Stress : Only a little   Social Connections: Unknown (1/25/2024)    Social Connection and Isolation Panel [NHANES]     Frequency of Communication with Friends and Family: More than three times a week     Frequency of Social Gatherings with Friends and Family: More than three times a week     Active Member of Clubs or Organizations: Yes     Attends Club or Organization Meetings: More than 4 times per year     Marital Status:    Housing Stability: Low Risk  (1/25/2024)    Housing Stability Vital Sign     Unable to Pay for Housing in the Last Year: No     Number of Places Lived in the Last Year: 1     Unstable Housing in the Last Year: No       MEDICATIONS:   Current  Outpatient Medications:     allopurinoL (ZYLOPRIM) 100 MG tablet, Take 1 tablet by mouth once daily, Disp: 90 tablet, Rfl: 3    atorvastatin (LIPITOR) 40 MG tablet, Take 1 tablet (40 mg total) by mouth once daily., Disp: 90 tablet, Rfl: 3    benazepriL (LOTENSIN) 20 MG tablet, Take 1 tablet by mouth once daily, Disp: 90 tablet, Rfl: 2    cycloSPORINE (RESTASIS) 0.05 % ophthalmic emulsion, Place 1 drop into both eyes 2 (two) times daily., Disp: 60 each, Rfl: 11    econazole nitrate 1 % cream, Apply topically 2 (two) times daily., Disp: 85 g, Rfl: 3    ferrous sulfate (FEOSOL) 325 mg (65 mg iron) Tab tablet, Take 325 mg by mouth once daily., Disp: , Rfl:     GLUCOSAMINE HCL/CHONDR VASQUEZ A NA (GLUCOSAMINE-CHONDROITIN) 750-600 mg Tab, Take 2 tablets by mouth Daily., Disp: , Rfl:     imiquimod (ALDARA) 5 % cream, Apply to skin cancer on left leg 5 times weekly for 6 weeks, Disp: 24 packet, Rfl: 3    levocetirizine (XYZAL) 5 MG tablet, Take 1 tablet (5 mg total) by mouth every evening., Disp: 90 tablet, Rfl: 1    multivitamin capsule, Take 1 capsule by mouth nightly. , Disp: , Rfl:     oxybutynin (DITROPAN-XL) 10 MG 24 hr tablet, Take 1 tablet (10 mg total) by mouth once daily., Disp: 30 tablet, Rfl: 11    tamsulosin (FLOMAX) 0.4 mg Cap, TAKE 1 CAPSULE BY MOUTH AFTER DINNER, Disp: 90 capsule, Rfl: 3    UNABLE TO FIND, 6000 hydrolyzes collagen, Disp: , Rfl:     XARELTO 20 mg Tab, TAKE 1 TABLET BY MOUTH ONCE DAILY WITH SUPPER OR  EVENING  MEAL, Disp: 90 tablet, Rfl: 3    metoprolol tartrate (LOPRESSOR) 25 MG tablet, Take 1 tablet (25 mg total) by mouth 2 (two) times daily as needed (palpitations). (Patient not taking: Reported on 2/12/2024), Disp: 45 tablet, Rfl: 0  ALLERGIES: Review of patient's allergies indicates:  No Known Allergies    Review of Systems   Constitution: Negative. Negative for chills, fever and night sweats.   HENT: Negative for congestion and headaches.    Eyes: Negative for blurred vision, left vision loss  and right vision loss.   Cardiovascular: Negative for chest pain and syncope.   Respiratory: Negative for cough and shortness of breath.    Endocrine: Negative for polydipsia, polyphagia and polyuria.   Hematologic/Lymphatic: Negative for bleeding problem. Does not bruise/bleed easily.   Skin: Negative for dry skin, itching and rash.   Musculoskeletal: Negative for falls and muscle weakness.   Gastrointestinal: Negative for abdominal pain and bowel incontinence.   Genitourinary: Negative for bladder incontinence and nocturia.   Neurological: Negative for disturbances in coordination, loss of balance and seizures.   Psychiatric/Behavioral: Negative for depression. The patient does not have insomnia.    Allergic/Immunologic: Negative for hives and persistent infections.     PHYSICAL EXAM:  GEN: A&Ox3, WD WN NAD  HEENT: WNL  CHEST: CTAB, no W/R/R  HEART: RRR, no M/R/G   ABD: Soft, NT ND, BS x4 QUADS  MS: Refer to previous note for detailed MS exam  NEURO: CN II-XII intact       The surgical consent was then reviewed with the patient, who agreed with all the contents of the consent form and it was signed. he was instructed to wait for a phone call from the anesthesia department prior to surgery to discuss past medical history, medications, and clearance. Also, informed he may be required to get additional testing per the anesthesia department prior to having surgery.     PHYSICAL THERAPY:  He was also instructed regarding physical therapy and will begin POD # 1-3. He is doing physical therapy at [facility].    POST OP CARE:instructions were reviewed including care of the wound and dressing after surgery and when he can shower.     PAIN MANAGEMENT: Shannan Marie Norman was also given a pain management regime, which includes the TENS unit given to him by Andie Powers along with the education required for its use. He was also instructed regarding the Polar ice unit that will be in place after surgery  and his postoperative pain medications.     PAIN MEDICATION:  Roxicodone (Oxycodone) 5 mg po q 4-6 hours prn pain   Celebrex 200 mg BID with meals  Robaxin 500mg q 8 hours prn pain  He will resume his daily Xarelto 20 mg for DVT prophylaxis starting on the evening after surgery.    Post op meds to be delivered bedside prior to discharge. Deliver to family if patient is in surgery at 5pm.   Patient denies history of seizures.     Patient will also use bilateral TEDs on lower extremities, SCDs during surgery, and early ambulation post-op. If the patient was previously taking 81mg baby aspirin, they were told to not take it will using the above stated aspirin and to restart the 81mg aspirin after completion of the aspirin dose.       Patient was also told to buy over the counter Prilosec medication and take it once daily for GI protection as long as they are taking NSAIDs or Aspirin.    DVT prophylaxis was discussed with the patient today including risk factors for developing DVTs and history of DVTs. The patient was asked if any specific recommendations were given from the doctor/s that did pre-operative surgical clearance.      The patient was told that narcotic pain medications may make them drowsy and instructions were given to not sign legal documents, drive or operate heavy machinery, cars, or equipment while under the influence of narcotic medications.     My supervising physician Quinten Dominique MD was also present during the appointment.  He discussed with the patient all the potential complications, risks, and benefits of their upcoming surgery.    As there were no other questions to be asked, he was given my business card along with Quinten Dominique'MD charanjit business card if he has any questions or concerns prior to surgery or in the postop period.

## 2024-02-15 NOTE — H&P (VIEW-ONLY)
"Shannan Norman  is here for a completion of his perioperative paperwork. he  Is scheduled to undergo:    LEFT Shoulder  Reverse total shoulder arthroplasty  Open biceps tenodesis  All other indicated procedures     on 03/04/2024.  He is a healthy individual and does need clearance for this procedure.     "Patient is stable for surgery."  2/14/24; Trixie Vallejo NP    Risks, indications and benefits of the surgical procedure were discussed with the patient. All questions with regard to surgery, rehab, expected return to functional activities, activities of daily living and recreational endeavors were answered to his satisfaction.    Discussed COVID-19 with the patient, they are aware of our current policies and procedures, were given the option of delaying surgery, and they elect to proceed.    Patient was informed and understands the risks of surgery are greater for patients with a current condition or history of heart disease, obesity, clotting disorders, recurrent infections, steroid use, current or past smoking, and factors such as sedentary lifestyle and noncompliance with medications, therapy or follow-up. The degree of the increased risk is hard to estimate with any degree of precision.    Once no other questions were asked, a brief history and physical exam was then performed.    PAST MEDICAL HISTORY:   Past Medical History:   Diagnosis Date    Allergy     Arthritis     Atrial fibrillation     Atrial flutter 2011    ablation    Basal cell carcinoma     Cancer     Cataract     CKD (chronic kidney disease) stage 3, GFR 30-59 ml/min 8/13/2019    Diabetes mellitus     Dry eye syndrome     Gout, unspecified     High cholesterol     History of shingles     Hypertension     Melanoma     Seizures      PAST SURGICAL HISTORY:   Past Surgical History:   Procedure Laterality Date    ABLATION N/A 9/11/2018    Procedure: ABLATION;  Surgeon: Hany Sanchez MD;  Location: [facility] CATH LAB;  Service: Cardiology;  " Laterality: N/A;  AFL, ISABELLE, RFA, ELODIA, MAC, DM, 3 PREP    ABLATION OF DYSRHYTHMIC FOCUS      ADENOIDECTOMY      CARDIAC PACEMAKER PLACEMENT      no defib    CATARACT EXTRACTION W/  INTRAOCULAR LENS IMPLANT Right 7/28/2020    Procedure: EXTRACTION, CATARACT, WITH IOL INSERTION;  Surgeon: Andrés Morse MD;  Location: [facility];  Service: Ophthalmology;  Laterality: Right;    CATARACT EXTRACTION W/  INTRAOCULAR LENS IMPLANT Left 8/11/2020    Procedure: EXTRACTION, CATARACT, WITH IOL INSERTION;  Surgeon: Andrés Morse MD;  Location: [facility] OR;  Service: Ophthalmology;  Laterality: Left;    COLONOSCOPY N/A 1/2/2019    Procedure: COLONOSCOPY Suprep;  Surgeon: Cindy Solorzano MD;  Location: [facility];  Service: Endoscopy;  Laterality: N/A;    GANGLION CYST EXCISION      left neck    HERNIA REPAIR  2013    umbilical    TENDON REPAIR Right     wrist    TONSILLECTOMY      varicose veins      several sx  bilateral    VASECTOMY      VEIN SURGERY       FAMILY HISTORY:   Family History   Problem Relation Age of Onset    Diabetes Mother     COPD Father     Heart disease Father     Arthritis Sister     Heart attack Brother     Heart disease Brother     Diabetes Brother     No Known Problems Maternal Aunt     No Known Problems Maternal Uncle     No Known Problems Paternal Aunt     No Known Problems Paternal Uncle     No Known Problems Maternal Grandmother     No Known Problems Maternal Grandfather     No Known Problems Paternal Grandmother     No Known Problems Paternal Grandfather     No Known Problems Son     Cancer Sister         lymph node, breast    No Known Problems Sister     No Known Problems Son     Amblyopia Neg Hx     Blindness Neg Hx     Cataracts Neg Hx     Glaucoma Neg Hx     Hypertension Neg Hx     Macular degeneration Neg Hx     Retinal detachment Neg Hx     Strabismus Neg Hx     Stroke Neg Hx     Thyroid disease Neg Hx     Prostate cancer Neg Hx     Kidney disease Neg Hx     Melanoma Neg Hx       SOCIAL HISTORY:   Social History     Socioeconomic History    Marital status:    Occupational History     Employer: [employer]   Tobacco Use    Smoking status: Never    Smokeless tobacco: Never   Substance and Sexual Activity    Alcohol use: Yes     Alcohol/week: 7.0 - 14.0 standard drinks of alcohol     Types: 7 - 14 Glasses of wine per week    Drug use: No    Sexual activity: Yes     Partners: Female     Social Determinants of Health     Financial Resource Strain: Low Risk  (1/25/2024)    Overall Financial Resource Strain (CARDIA)     Difficulty of Paying Living Expenses: Not hard at all   Food Insecurity: No Food Insecurity (1/25/2024)    Hunger Vital Sign     Worried About Running Out of Food in the Last Year: Never true     Ran Out of Food in the Last Year: Never true   Transportation Needs: No Transportation Needs (1/25/2024)    PRAPARE - Transportation     Lack of Transportation (Medical): No     Lack of Transportation (Non-Medical): No   Physical Activity: Inactive (1/25/2024)    Exercise Vital Sign     Days of Exercise per Week: 0 days     Minutes of Exercise per Session: 30 min   Stress: No Stress Concern Present (1/25/2024)    [place] [employer] - Occupational Stress Questionnaire     Feeling of Stress : Only a little   Social Connections: Unknown (1/25/2024)    Social Connection and Isolation Panel [NHANES]     Frequency of Communication with Friends and Family: More than three times a week     Frequency of Social Gatherings with Friends and Family: More than three times a week     Active Member of Clubs or Organizations: Yes     Attends Club or Organization Meetings: More than 4 times per year     Marital Status:    Housing Stability: Low Risk  (1/25/2024)    Housing Stability Vital Sign     Unable to Pay for Housing in the Last Year: No     Number of Places Lived in the Last Year: 1     Unstable Housing in the Last Year: No       MEDICATIONS:   Current  Outpatient Medications:     allopurinoL (ZYLOPRIM) 100 MG tablet, Take 1 tablet by mouth once daily, Disp: 90 tablet, Rfl: 3    atorvastatin (LIPITOR) 40 MG tablet, Take 1 tablet (40 mg total) by mouth once daily., Disp: 90 tablet, Rfl: 3    benazepriL (LOTENSIN) 20 MG tablet, Take 1 tablet by mouth once daily, Disp: 90 tablet, Rfl: 2    cycloSPORINE (RESTASIS) 0.05 % ophthalmic emulsion, Place 1 drop into both eyes 2 (two) times daily., Disp: 60 each, Rfl: 11    econazole nitrate 1 % cream, Apply topically 2 (two) times daily., Disp: 85 g, Rfl: 3    ferrous sulfate (FEOSOL) 325 mg (65 mg iron) Tab tablet, Take 325 mg by mouth once daily., Disp: , Rfl:     GLUCOSAMINE HCL/CHONDR VASQUEZ A NA (GLUCOSAMINE-CHONDROITIN) 750-600 mg Tab, Take 2 tablets by mouth Daily., Disp: , Rfl:     imiquimod (ALDARA) 5 % cream, Apply to skin cancer on left leg 5 times weekly for 6 weeks, Disp: 24 packet, Rfl: 3    levocetirizine (XYZAL) 5 MG tablet, Take 1 tablet (5 mg total) by mouth every evening., Disp: 90 tablet, Rfl: 1    multivitamin capsule, Take 1 capsule by mouth nightly. , Disp: , Rfl:     oxybutynin (DITROPAN-XL) 10 MG 24 hr tablet, Take 1 tablet (10 mg total) by mouth once daily., Disp: 30 tablet, Rfl: 11    tamsulosin (FLOMAX) 0.4 mg Cap, TAKE 1 CAPSULE BY MOUTH AFTER DINNER, Disp: 90 capsule, Rfl: 3    UNABLE TO FIND, 6000 hydrolyzes collagen, Disp: , Rfl:     XARELTO 20 mg Tab, TAKE 1 TABLET BY MOUTH ONCE DAILY WITH SUPPER OR  EVENING  MEAL, Disp: 90 tablet, Rfl: 3    metoprolol tartrate (LOPRESSOR) 25 MG tablet, Take 1 tablet (25 mg total) by mouth 2 (two) times daily as needed (palpitations). (Patient not taking: Reported on 2/12/2024), Disp: 45 tablet, Rfl: 0  ALLERGIES: Review of patient's allergies indicates:  No Known Allergies    Review of Systems   Constitution: Negative. Negative for chills, fever and night sweats.   HENT: Negative for congestion and headaches.    Eyes: Negative for blurred vision, left vision loss  and right vision loss.   Cardiovascular: Negative for chest pain and syncope.   Respiratory: Negative for cough and shortness of breath.    Endocrine: Negative for polydipsia, polyphagia and polyuria.   Hematologic/Lymphatic: Negative for bleeding problem. Does not bruise/bleed easily.   Skin: Negative for dry skin, itching and rash.   Musculoskeletal: Negative for falls and muscle weakness.   Gastrointestinal: Negative for abdominal pain and bowel incontinence.   Genitourinary: Negative for bladder incontinence and nocturia.   Neurological: Negative for disturbances in coordination, loss of balance and seizures.   Psychiatric/Behavioral: Negative for depression. The patient does not have insomnia.    Allergic/Immunologic: Negative for hives and persistent infections.     PHYSICAL EXAM:  GEN: A&Ox3, WD WN NAD  HEENT: WNL  CHEST: CTAB, no W/R/R  HEART: RRR, no M/R/G   ABD: Soft, NT ND, BS x4 QUADS  MS: Refer to previous note for detailed MS exam  NEURO: CN II-XII intact       The surgical consent was then reviewed with the patient, who agreed with all the contents of the consent form and it was signed. he was instructed to wait for a phone call from the anesthesia department prior to surgery to discuss past medical history, medications, and clearance. Also, informed he may be required to get additional testing per the anesthesia department prior to having surgery.     PHYSICAL THERAPY:  He was also instructed regarding physical therapy and will begin POD # 1-3. He is doing physical therapy at [facility].    POST OP CARE:instructions were reviewed including care of the wound and dressing after surgery and when he can shower.     PAIN MANAGEMENT: Shannan Marie Norman was also given a pain management regime, which includes the TENS unit given to him by Andie Powers along with the education required for its use. He was also instructed regarding the Polar ice unit that will be in place after surgery  and his postoperative pain medications.     PAIN MEDICATION:  Roxicodone (Oxycodone) 5 mg po q 4-6 hours prn pain   Celebrex 200 mg BID with meals  Robaxin 500mg q 8 hours prn pain  He will resume his daily Xarelto 20 mg for DVT prophylaxis starting on the evening after surgery.    Post op meds to be delivered bedside prior to discharge. Deliver to family if patient is in surgery at 5pm.   Patient denies history of seizures.     Patient will also use bilateral TEDs on lower extremities, SCDs during surgery, and early ambulation post-op. If the patient was previously taking 81mg baby aspirin, they were told to not take it will using the above stated aspirin and to restart the 81mg aspirin after completion of the aspirin dose.       Patient was also told to buy over the counter Prilosec medication and take it once daily for GI protection as long as they are taking NSAIDs or Aspirin.    DVT prophylaxis was discussed with the patient today including risk factors for developing DVTs and history of DVTs. The patient was asked if any specific recommendations were given from the doctor/s that did pre-operative surgical clearance.      The patient was told that narcotic pain medications may make them drowsy and instructions were given to not sign legal documents, drive or operate heavy machinery, cars, or equipment while under the influence of narcotic medications.     My supervising physician Quinten Dominique MD was also present during the appointment.  He discussed with the patient all the potential complications, risks, and benefits of their upcoming surgery.    As there were no other questions to be asked, he was given my business card along with Quinten Dominique'MD charanjit business card if he has any questions or concerns prior to surgery or in the postop period.

## 2024-02-19 ENCOUNTER — OFFICE VISIT (OUTPATIENT)
Dept: FAMILY MEDICINE | Facility: CLINIC | Age: 79
End: 2024-02-19
Attending: FAMILY MEDICINE
Payer: MEDICARE

## 2024-02-19 VITALS
HEIGHT: 73 IN | SYSTOLIC BLOOD PRESSURE: 112 MMHG | BODY MASS INDEX: 26.97 KG/M2 | OXYGEN SATURATION: 98 % | TEMPERATURE: 98 F | DIASTOLIC BLOOD PRESSURE: 60 MMHG | WEIGHT: 203.5 LBS | HEART RATE: 69 BPM

## 2024-02-19 DIAGNOSIS — I48.0 PAF (PAROXYSMAL ATRIAL FIBRILLATION): Primary | ICD-10-CM

## 2024-02-19 DIAGNOSIS — M35.3 POLYMYALGIA RHEUMATICA: ICD-10-CM

## 2024-02-19 DIAGNOSIS — N40.1 BENIGN PROSTATIC HYPERPLASIA WITH LOWER URINARY TRACT SYMPTOMS, SYMPTOM DETAILS UNSPECIFIED: ICD-10-CM

## 2024-02-19 DIAGNOSIS — N18.30 STAGE 3 CHRONIC KIDNEY DISEASE, UNSPECIFIED WHETHER STAGE 3A OR 3B CKD: ICD-10-CM

## 2024-02-19 DIAGNOSIS — E79.0 HYPERURICEMIA: ICD-10-CM

## 2024-02-19 DIAGNOSIS — E11.69 DYSLIPIDEMIA ASSOCIATED WITH TYPE 2 DIABETES MELLITUS: ICD-10-CM

## 2024-02-19 DIAGNOSIS — I15.2 HYPERTENSION ASSOCIATED WITH DIABETES: ICD-10-CM

## 2024-02-19 DIAGNOSIS — E11.69 TYPE 2 DIABETES MELLITUS WITH OTHER SPECIFIED COMPLICATION, WITHOUT LONG-TERM CURRENT USE OF INSULIN: ICD-10-CM

## 2024-02-19 DIAGNOSIS — E78.5 DYSLIPIDEMIA ASSOCIATED WITH TYPE 2 DIABETES MELLITUS: ICD-10-CM

## 2024-02-19 DIAGNOSIS — I42.8 NICM (NONISCHEMIC CARDIOMYOPATHY): ICD-10-CM

## 2024-02-19 DIAGNOSIS — E11.59 HYPERTENSION ASSOCIATED WITH DIABETES: ICD-10-CM

## 2024-02-19 PROCEDURE — 99214 OFFICE O/P EST MOD 30 MIN: CPT | Mod: S$GLB,,, | Performed by: FAMILY MEDICINE

## 2024-02-19 PROCEDURE — 1160F RVW MEDS BY RX/DR IN RCRD: CPT | Mod: CPTII,S$GLB,, | Performed by: FAMILY MEDICINE

## 2024-02-19 PROCEDURE — 1101F PT FALLS ASSESS-DOCD LE1/YR: CPT | Mod: CPTII,S$GLB,, | Performed by: FAMILY MEDICINE

## 2024-02-19 PROCEDURE — 3074F SYST BP LT 130 MM HG: CPT | Mod: CPTII,S$GLB,, | Performed by: FAMILY MEDICINE

## 2024-02-19 PROCEDURE — 3288F FALL RISK ASSESSMENT DOCD: CPT | Mod: CPTII,S$GLB,, | Performed by: FAMILY MEDICINE

## 2024-02-19 PROCEDURE — 1126F AMNT PAIN NOTED NONE PRSNT: CPT | Mod: CPTII,S$GLB,, | Performed by: FAMILY MEDICINE

## 2024-02-19 PROCEDURE — 1159F MED LIST DOCD IN RCRD: CPT | Mod: CPTII,S$GLB,, | Performed by: FAMILY MEDICINE

## 2024-02-19 PROCEDURE — 3078F DIAST BP <80 MM HG: CPT | Mod: CPTII,S$GLB,, | Performed by: FAMILY MEDICINE

## 2024-02-19 PROCEDURE — 99999 PR PBB SHADOW E&M-EST. PATIENT-LVL V: CPT | Mod: PBBFAC,,, | Performed by: FAMILY MEDICINE

## 2024-02-19 RX ORDER — ATORVASTATIN CALCIUM 40 MG/1
40 TABLET, FILM COATED ORAL DAILY
Qty: 90 TABLET | Refills: 3 | Status: SHIPPED | OUTPATIENT
Start: 2024-02-19

## 2024-02-19 RX ORDER — ALLOPURINOL 100 MG/1
TABLET ORAL
Qty: 90 TABLET | Refills: 3 | Status: SHIPPED | OUTPATIENT
Start: 2024-02-19

## 2024-02-19 RX ORDER — BENAZEPRIL HYDROCHLORIDE 20 MG/1
20 TABLET ORAL DAILY
Qty: 90 TABLET | Refills: 3 | Status: SHIPPED | OUTPATIENT
Start: 2024-02-19

## 2024-02-19 RX ORDER — TAMSULOSIN HYDROCHLORIDE 0.4 MG/1
CAPSULE ORAL
Qty: 90 CAPSULE | Refills: 3 | Status: SHIPPED | OUTPATIENT
Start: 2024-02-19

## 2024-02-19 NOTE — PROGRESS NOTES
Subjective:       Patient ID: Shannan Norman is a 78 y.o. male.    Chief Complaint: Establish Care    78 yr old pleasant white male with DM II controlled, HTN, HLD, obesity, presents today for his annual wellness check, lab work review and 6 month follow up and med refills. No new concerns today.    BPH - much better  - no dysuria - tried flomax and proscar and nothing helps       DM II - controlled -  diet control -   HGBA1C                   6.0 (H)             10/03/2023                               - on ACE and ASA - UTD with eye and foot screen      HTN - controlled - oN ACE - compliant - no side effects      HLD - controlled - on statin - compliant - no side effects -     LDLCALC                  61.4 (L)            10/03/2023                                         History as below - reviewed and no changes    HM  -labs UTD  -PSA UTD  -adacel UTD          Diabetes  He presents for his follow-up diabetic visit. He has type 2 diabetes mellitus. His disease course has been stable. Pertinent negatives for hypoglycemia include no confusion, dizziness, headaches, hunger, mood changes, nervousness/anxiousness, seizures, sleepiness, speech difficulty, sweats or tremors. Pertinent negatives for diabetes include no chest pain, no foot ulcerations, no polydipsia, no polyuria, no visual change, no weakness and no weight loss. Symptoms are stable. Pertinent negatives for diabetic complications include no CVA, PVD or retinopathy. Risk factors for coronary artery disease include diabetes mellitus, dyslipidemia, hypertension and male sex. He is compliant with treatment all of the time. He is following a generally healthy diet. Meal planning includes avoidance of concentrated sweets. He rarely participates in exercise. An ACE inhibitor/angiotensin II receptor blocker is being taken. He does not see a podiatrist.Eye exam is current.   Hypertension  This is a chronic problem. The current episode started more than 1 month ago.  The problem has been gradually improving since onset. The problem is controlled. Pertinent negatives include no chest pain, headaches, malaise/fatigue, neck pain, palpitations, peripheral edema, PND or sweats. There are no associated agents to hypertension. Risk factors for coronary artery disease include diabetes mellitus, dyslipidemia, male gender and obesity. Past treatments include ACE inhibitors. The current treatment provides significant improvement. There are no compliance problems.  There is no history of angina, CAD/MI, CVA, left ventricular hypertrophy, PVD or retinopathy. There is no history of chronic renal disease, coarctation of the aorta, hypercortisolism, hyperparathyroidism, pheochromocytoma, renovascular disease or a thyroid problem.   Hyperlipidemia  This is a chronic problem. The current episode started more than 1 year ago. The problem is controlled. Recent lipid tests were reviewed and are normal. Exacerbating diseases include diabetes and obesity. He has no history of chronic renal disease. Associated symptoms include myalgias. Pertinent negatives include no chest pain or leg pain. The current treatment provides significant improvement of lipids. There are no compliance problems.  Risk factors for coronary artery disease include diabetes mellitus, dyslipidemia, hypertension, male sex and obesity.   Benign Prostatic Hypertrophy  This is a chronic problem. The current episode started more than 1 year ago. The problem has been gradually worsening since onset. Irritative symptoms include nocturia. Irritative symptoms do not include frequency or urgency. Obstructive symptoms include dribbling, incomplete emptying, an intermittent stream, a slower stream and straining. Pertinent negatives include no hematuria or vomiting. AUA score is 8-19. He is sexually active. Nothing aggravates the symptoms. Past treatments include tamsulosin. The treatment provided significant relief.     Review of Systems    Constitutional: Negative.  Negative for activity change, diaphoresis, malaise/fatigue, unexpected weight change and weight loss.   HENT: Negative.  Negative for congestion, ear pain, mouth sores, rhinorrhea and voice change.    Eyes: Negative.  Negative for pain, discharge and visual disturbance.   Respiratory: Negative.  Negative for apnea, cough and wheezing.    Cardiovascular: Negative.  Negative for chest pain, palpitations and PND.   Gastrointestinal: Negative.  Negative for abdominal distention, anal bleeding, diarrhea and vomiting.   Endocrine: Negative.  Negative for cold intolerance, polydipsia and polyuria.   Genitourinary:  Positive for incomplete emptying and nocturia. Negative for decreased urine volume, difficulty urinating, frequency, hematuria, penile discharge, scrotal swelling and urgency.   Musculoskeletal:  Positive for arthralgias and myalgias. Negative for back pain, neck pain and neck stiffness.   Skin: Negative.  Negative for color change and rash.   Allergic/Immunologic: Negative.  Negative for environmental allergies and immunocompromised state.   Neurological: Negative.  Negative for dizziness, tremors, seizures, speech difficulty, weakness, light-headedness and headaches.   Hematological: Negative.    Psychiatric/Behavioral: Negative.  Negative for agitation, confusion, dysphoric mood and suicidal ideas. The patient is not nervous/anxious.        PMH/PSH/FH/SH/MED/ALLERGY reviewed    Past Medical History:   Diagnosis Date    Allergy     Arthritis     Atrial fibrillation     Atrial flutter 2011    ablation    Basal cell carcinoma     Cancer     Cataract     CKD (chronic kidney disease) stage 3, GFR 30-59 ml/min 8/13/2019    Diabetes mellitus     Dry eye syndrome     Gout, unspecified     High cholesterol     History of shingles     Hypertension     Melanoma     Seizures        Past Surgical History:   Procedure Laterality Date    ABLATION N/A 9/11/2018    Procedure: ABLATION;  Surgeon:  Hany Sanchez MD;  Location: [facility] CATH LAB;  Service: Cardiology;  Laterality: N/A;  AFL, ISABELLE, RFA, ELODIA, MAC, DM, 3 PREP    ABLATION OF DYSRHYTHMIC FOCUS      ADENOIDECTOMY      CARDIAC PACEMAKER PLACEMENT      no defib    CATARACT EXTRACTION W/  INTRAOCULAR LENS IMPLANT Right 7/28/2020    Procedure: EXTRACTION, CATARACT, WITH IOL INSERTION;  Surgeon: Andrés Morse MD;  Location: [facility] OR;  Service: Ophthalmology;  Laterality: Right;    CATARACT EXTRACTION W/  INTRAOCULAR LENS IMPLANT Left 8/11/2020    Procedure: EXTRACTION, CATARACT, WITH IOL INSERTION;  Surgeon: Andrés Morse MD;  Location: [facility] OR;  Service: Ophthalmology;  Laterality: Left;    COLONOSCOPY N/A 1/2/2019    Procedure: COLONOSCOPY Suprep;  Surgeon: Cindy Solorzano MD;  Location: [facility] ENDO;  Service: Endoscopy;  Laterality: N/A;    GANGLION CYST EXCISION      left neck    HERNIA REPAIR  2013    umbilical    TENDON REPAIR Right     wrist    TONSILLECTOMY      varicose veins      several sx  bilateral    VASECTOMY      VEIN SURGERY         Family History   Problem Relation Age of Onset    Diabetes Mother     COPD Father     Heart disease Father     Arthritis Sister     Heart attack Brother     Heart disease Brother     Diabetes Brother     No Known Problems Maternal Aunt     No Known Problems Maternal Uncle     No Known Problems Paternal Aunt     No Known Problems Paternal Uncle     No Known Problems Maternal Grandmother     No Known Problems Maternal Grandfather     No Known Problems Paternal Grandmother     No Known Problems Paternal Grandfather     No Known Problems Son     Cancer Sister         lymph node, breast    No Known Problems Sister     No Known Problems Son     Amblyopia Neg Hx     Blindness Neg Hx     Cataracts Neg Hx     Glaucoma Neg Hx     Hypertension Neg Hx     Macular degeneration Neg Hx     Retinal detachment Neg Hx     Strabismus Neg Hx     Stroke Neg Hx     Thyroid disease Neg Hx     Prostate cancer Neg Hx      Kidney disease Neg Hx     Melanoma Neg Hx        Social History     Socioeconomic History    Marital status:    Occupational History     Employer: [employer]   Tobacco Use    Smoking status: Never    Smokeless tobacco: Never   Substance and Sexual Activity    Alcohol use: Yes     Alcohol/week: 7.0 - 14.0 standard drinks of alcohol     Types: 7 - 14 Glasses of wine per week    Drug use: No    Sexual activity: Yes     Partners: Female     Social Determinants of Health     Financial Resource Strain: Low Risk  (1/25/2024)    Overall Financial Resource Strain (CARDIA)     Difficulty of Paying Living Expenses: Not hard at all   Food Insecurity: No Food Insecurity (1/25/2024)    Hunger Vital Sign     Worried About Running Out of Food in the Last Year: Never true     Ran Out of Food in the Last Year: Never true   Transportation Needs: No Transportation Needs (1/25/2024)    PRAPARE - Transportation     Lack of Transportation (Medical): No     Lack of Transportation (Non-Medical): No   Physical Activity: Inactive (1/25/2024)    Exercise Vital Sign     Days of Exercise per Week: 0 days     Minutes of Exercise per Session: 30 min   Stress: No Stress Concern Present (1/25/2024)    [place] [employer] - Occupational Stress Questionnaire     Feeling of Stress : Only a little   Social Connections: Unknown (1/25/2024)    Social Connection and Isolation Panel [NHANES]     Frequency of Communication with Friends and Family: More than three times a week     Frequency of Social Gatherings with Friends and Family: More than three times a week     Active Member of Clubs or Organizations: Yes     Attends Club or Organization Meetings: More than 4 times per year     Marital Status:    Housing Stability: Low Risk  (1/25/2024)    Housing Stability Vital Sign     Unable to Pay for Housing in the Last Year: No     Number of Places Lived in the Last Year: 1     Unstable Housing in the Last Year: No        Current Outpatient Medications   Medication Sig Dispense Refill    celecoxib (CELEBREX) 200 MG capsule Take 1 capsule (200 mg total) by mouth 2 (two) times daily with meals. 60 capsule 0    cycloSPORINE (RESTASIS) 0.05 % ophthalmic emulsion Place 1 drop into both eyes 2 (two) times daily. 60 each 11    econazole nitrate 1 % cream Apply topically 2 (two) times daily. 85 g 3    ferrous sulfate (FEOSOL) 325 mg (65 mg iron) Tab tablet Take 325 mg by mouth once daily.      GLUCOSAMINE HCL/CHONDR VASQUEZ A NA (GLUCOSAMINE-CHONDROITIN) 750-600 mg Tab Take 2 tablets by mouth Daily.      imiquimod (ALDARA) 5 % cream Apply to skin cancer on left leg 5 times weekly for 6 weeks 24 packet 3    levocetirizine (XYZAL) 5 MG tablet Take 1 tablet (5 mg total) by mouth every evening. 90 tablet 1    methocarbamoL (ROBAXIN) 500 MG Tab Take 1 tablet (500 mg total) by mouth 3 (three) times daily as needed (muscle spasms). 30 tablet 0    metoprolol tartrate (LOPRESSOR) 25 MG tablet Take 1 tablet (25 mg total) by mouth 2 (two) times daily as needed (palpitations). 45 tablet 0    multivitamin capsule Take 1 capsule by mouth nightly.       oxybutynin (DITROPAN-XL) 10 MG 24 hr tablet Take 1 tablet (10 mg total) by mouth once daily. 30 tablet 11    oxyCODONE (ROXICODONE) 5 MG immediate release tablet Take 1 tablet (5 mg total) by mouth every 6 (six) hours as needed for Pain. 28 tablet 0    UNABLE TO FIND 6000 hydrolyzes collagen      XARELTO 20 mg Tab TAKE 1 TABLET BY MOUTH ONCE DAILY WITH SUPPER OR  EVENING  MEAL 90 tablet 3    allopurinoL (ZYLOPRIM) 100 MG tablet Take 1 tablet by mouth once daily 90 tablet 3    atorvastatin (LIPITOR) 40 MG tablet Take 1 tablet (40 mg total) by mouth once daily. 90 tablet 3    benazepriL (LOTENSIN) 20 MG tablet Take 1 tablet (20 mg total) by mouth once daily. 90 tablet 3    tamsulosin (FLOMAX) 0.4 mg Cap TAKE 1 CAPSULE BY MOUTH AFTER DINNER 90 capsule 3     No current facility-administered medications for  this visit.       Review of patient's allergies indicates:  No Known Allergies      Objective:       Vitals:    02/19/24 0901   BP: 112/60   Pulse: 69   Temp: 98.2 °F (36.8 °C)       Physical Exam  Constitutional:       Appearance: He is well-developed.   HENT:      Head: Normocephalic and atraumatic.      Right Ear: External ear normal.      Left Ear: External ear normal.      Nose: Nose normal.      Mouth/Throat:      Pharynx: No oropharyngeal exudate.   Eyes:      General: No scleral icterus.        Right eye: No discharge.         Left eye: No discharge.      Conjunctiva/sclera: Conjunctivae normal.      Pupils: Pupils are equal, round, and reactive to light.   Neck:      Thyroid: No thyromegaly.      Vascular: No JVD.      Trachea: No tracheal deviation.   Cardiovascular:      Rate and Rhythm: Normal rate and regular rhythm.      Heart sounds: Normal heart sounds. No murmur heard.     No friction rub. No gallop.   Pulmonary:      Effort: Pulmonary effort is normal. No respiratory distress.      Breath sounds: Normal breath sounds. No stridor. No wheezing or rales.   Chest:      Chest wall: No tenderness.   Abdominal:      General: Bowel sounds are normal. There is no distension.      Palpations: Abdomen is soft. There is no mass.      Tenderness: There is no abdominal tenderness. There is no guarding or rebound.      Hernia: No hernia is present.   Musculoskeletal:         General: No tenderness. Normal range of motion.      Cervical back: Normal range of motion and neck supple.   Lymphadenopathy:      Cervical: No cervical adenopathy.   Skin:     General: Skin is warm and dry.      Coloration: Skin is not pale.      Findings: No erythema or rash.   Neurological:      Mental Status: He is alert and oriented to person, place, and time.      Cranial Nerves: No cranial nerve deficit.      Motor: No abnormal muscle tone.      Coordination: Coordination normal.      Deep Tendon Reflexes: Reflexes are normal and  symmetric. Reflexes normal.   Psychiatric:         Behavior: Behavior normal.         Thought Content: Thought content normal.         Judgment: Judgment normal.         Protective Sensation (w/ 10 gram monofilament):  Right: Intact  Left: Intact    Visual Inspection:  Normal -  Bilateral    Pedal Pulses:   Right: Present  Left: Present    Posterior tibialis:   Right:Present  Left: Present      Assessment:       1. PAF (paroxysmal atrial fibrillation)    2. Stage 3 chronic kidney disease, unspecified whether stage 3a or 3b CKD    3. Hypertension associated with diabetes    4. Dyslipidemia associated with type 2 diabetes mellitus    5. Type 2 diabetes mellitus with other specified complication, without long-term current use of insulin    6. Polymyalgia rheumatica    7. Benign prostatic hyperplasia with lower urinary tract symptoms, symptom details unspecified    8. NICM (nonischemic cardiomyopathy)    9. Hyperuricemia        Plan:           Shannan was seen today for establish care.    Diagnoses and all orders for this visit:    PAF (paroxysmal atrial fibrillation)    Stage 3 chronic kidney disease, unspecified whether stage 3a or 3b CKD    Hypertension associated with diabetes    Dyslipidemia associated with type 2 diabetes mellitus  -     atorvastatin (LIPITOR) 40 MG tablet; Take 1 tablet (40 mg total) by mouth once daily.    Type 2 diabetes mellitus with other specified complication, without long-term current use of insulin    Polymyalgia rheumatica    Benign prostatic hyperplasia with lower urinary tract symptoms, symptom details unspecified  -     tamsulosin (FLOMAX) 0.4 mg Cap; TAKE 1 CAPSULE BY MOUTH AFTER DINNER    NICM (nonischemic cardiomyopathy)  -     benazepriL (LOTENSIN) 20 MG tablet; Take 1 tablet (20 mg total) by mouth once daily.    Hyperuricemia  -     allopurinoL (ZYLOPRIM) 100 MG tablet; Take 1 tablet by mouth once daily      Wellness check  -normal exam  -labs    DM  II  -controlled  -labs    HTN  -controlled    HLD  -stable    BPH  -controlled    SSS  -on pacemaker      Spent adequate time in obtaining history and explaining differentials    30 minutes spent during this visit of which greater than 50% devoted to face-face counseling and coordination of care regarding diagnosis and management plan    RTC 6 months or prn

## 2024-02-20 ENCOUNTER — OFFICE VISIT (OUTPATIENT)
Dept: SPORTS MEDICINE | Facility: CLINIC | Age: 79
End: 2024-02-20
Payer: MEDICARE

## 2024-02-20 VITALS — TEMPERATURE: 98 F

## 2024-02-20 DIAGNOSIS — M25.562 LEFT KNEE PAIN, UNSPECIFIED CHRONICITY: ICD-10-CM

## 2024-02-20 DIAGNOSIS — M17.12 PRIMARY OSTEOARTHRITIS OF LEFT KNEE: Primary | ICD-10-CM

## 2024-02-20 PROCEDURE — 99499 UNLISTED E&M SERVICE: CPT | Mod: S$GLB,,, | Performed by: FAMILY MEDICINE

## 2024-02-20 PROCEDURE — 20611 DRAIN/INJ JOINT/BURSA W/US: CPT | Mod: LT,S$GLB,, | Performed by: FAMILY MEDICINE

## 2024-02-20 PROCEDURE — 99999 PR PBB SHADOW E&M-EST. PATIENT-LVL III: CPT | Mod: PBBFAC,,, | Performed by: FAMILY MEDICINE

## 2024-02-20 NOTE — PROCEDURES
"Large Joint Aspiration/Injection: L knee    Date/Time: 2/20/2024 10:00 AM    Performed by: Kit Potter MD  Authorized by: Kit Potter MD    Consent Done?:  Yes (Verbal)  Indications:  Pain  Site marked: the procedure site was marked    Timeout: prior to procedure the correct patient, procedure, and site was verified    Prep: patient was prepped and draped in usual sterile fashion      Details:  Needle Size:  25 G  Ultrasonic Guidance for needle placement?: Yes    Images are saved and documented.  Approach:  Lateral  Location:  Knee  Site:  L knee  Medications:  16.8 mg sodium hyaluronate 16.8 mg/2 mL  Patient tolerance:  Patient tolerated the procedure well with no immediate complications     Description of ultrasound utilization for needle guidance:   Ultrasound guidance used for needle localization. Images saved and stored for documentation. The SUPRAPATELLAR BURSA / KNEE JOINT was visualized. Dynamic visualization of the 25g x 1.5" needle was continuous throughout the procedure.     "

## 2024-02-20 NOTE — PROGRESS NOTES
Sil knee joint left 2/3  Lot number: [id number]  Expiration date: 26.01.31    MEDICAL NECESSITY FOR VISCOSUPPLEMENTATION USE: After thorough evaluation of the patient, I have determined that viscosupplementation treatment is medically necessary. 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 Oral analgesics including NSAIDs have not adequately controlled the patient's symptoms. There is radiographic evidence of Kellgren-Tony grade II (or greater) osteoarthritic (OA) changes, or if lack of radiographic evidence, there is arthroscopic or other evidence of chondrosis of the knee(s).

## 2024-02-21 ENCOUNTER — HOSPITAL ENCOUNTER (OUTPATIENT)
Dept: CARDIOLOGY | Facility: HOSPITAL | Age: 79
Discharge: HOME | End: 2024-02-21
Attending: INTERNAL MEDICINE
Payer: MEDICARE

## 2024-02-21 VITALS — BODY MASS INDEX: 26.9 KG/M2 | WEIGHT: 203 LBS | HEIGHT: 73 IN

## 2024-02-21 DIAGNOSIS — I48.0 PAF (PAROXYSMAL ATRIAL FIBRILLATION): ICD-10-CM

## 2024-02-21 DIAGNOSIS — I35.1 NONRHEUMATIC AORTIC VALVE INSUFFICIENCY: ICD-10-CM

## 2024-02-21 DIAGNOSIS — I15.2 HYPERTENSION ASSOCIATED WITH DIABETES: ICD-10-CM

## 2024-02-21 DIAGNOSIS — E11.59 HYPERTENSION ASSOCIATED WITH DIABETES: ICD-10-CM

## 2024-02-21 LAB
ASCENDING AORTA: 3.65 CM
AV INDEX (PROSTH): 0.56
AV MEAN GRADIENT: 11 MMHG
AV PEAK GRADIENT: 20 MMHG
AV VALVE AREA BY VELOCITY RATIO: 2.32 CM²
AV VALVE AREA: 2.55 CM²
AV VELOCITY RATIO: 0.51
BSA FOR ECHO PROCEDURE: 2.18 M2
CV ECHO LV RWT: 0.22 CM
DOP CALC AO PEAK VEL: 2.24 M/S
DOP CALC AO VTI: 47.3 CM
DOP CALC LVOT AREA: 4.6 CM2
DOP CALC LVOT DIAMETER: 2.41 CM
DOP CALC LVOT PEAK VEL: 1.14 M/S
DOP CALC LVOT STROKE VOLUME: 120.82 CM3
DOP CALC MV VTI: 25.9 CM
DOP CALCLVOT PEAK VEL VTI: 26.5 CM
E WAVE DECELERATION TIME: 252.67 MSEC
E/A RATIO: 1.29
E/E' RATIO: 10.33 M/S
ECHO LV POSTERIOR WALL: 0.64 CM (ref 0.6–1.1)
EJECTION FRACTION: 65 %
FRACTIONAL SHORTENING: 29 % (ref 28–44)
INTERVENTRICULAR SEPTUM: 1.05 CM (ref 0.6–1.1)
IVC DIAMETER: 2.55 CM
LA MAJOR: 6.47 CM
LA MINOR: 5.39 CM
LA WIDTH: 4.6 CM
LEFT ATRIUM SIZE: 3.77 CM
LEFT ATRIUM VOLUME INDEX MOD: 36.2 ML/M2
LEFT ATRIUM VOLUME INDEX: 39.9 ML/M2
LEFT ATRIUM VOLUME MOD: 78.64 CM3
LEFT ATRIUM VOLUME: 86.69 CM3
LEFT INTERNAL DIMENSION IN SYSTOLE: 4.03 CM (ref 2.1–4)
LEFT VENTRICLE DIASTOLIC VOLUME INDEX: 74.06 ML/M2
LEFT VENTRICLE DIASTOLIC VOLUME: 160.7 ML
LEFT VENTRICLE MASS INDEX: 84 G/M2
LEFT VENTRICLE SYSTOLIC VOLUME INDEX: 32.9 ML/M2
LEFT VENTRICLE SYSTOLIC VOLUME: 71.36 ML
LEFT VENTRICULAR INTERNAL DIMENSION IN DIASTOLE: 5.71 CM (ref 3.5–6)
LEFT VENTRICULAR MASS: 182.85 G
LV LATERAL E/E' RATIO: 10.33 M/S
LV SEPTAL E/E' RATIO: 10.33 M/S
LVOT MG: 3 MMHG
LVOT MV: 0.82 CM/S
MV A" WAVE DURATION": 98.95 MSEC
MV MEAN GRADIENT: 1 MMHG
MV PEAK A VEL: 0.72 M/S
MV PEAK E VEL: 0.93 M/S
MV PEAK GRADIENT: 3 MMHG
MV STENOSIS PRESSURE HALF TIME: 73.28 MS
MV VALVE AREA BY CONTINUITY EQUATION: 4.66 CM2
MV VALVE AREA P 1/2 METHOD: 3 CM2
OHS LV EJECTION FRACTION SIMPSONS BIPLANE MOD: 74 %
PISA TR MAX VEL: 2.37 M/S
PULM VEIN S/D RATIO: 1.19
PV MV: 0.89 M/S
PV PEAK D VEL: 0.57 M/S
PV PEAK GRADIENT: 6 MMHG
PV PEAK S VEL: 0.68 M/S
PV PEAK VELOCITY: 1.26 M/S
RA MAJOR: 4.89 CM
RA PRESSURE ESTIMATED: 8 MMHG
RA WIDTH: 3.76 CM
RIGHT VENTRICLE DIASTOLIC MID DIMENSION: 3.1 CM
RIGHT VENTRICULAR END-DIASTOLIC DIMENSION: 3.7 CM
RIGHT VENTRICULAR LENGTH IN DIASTOLE (APICAL 4-CHAMBER VIEW): 7.6 CM
RV TB RVSP: 10 MMHG
RV TISSUE DOPPLER FREE WALL SYSTOLIC VELOCITY 1 (APICAL 4 CHAMBER VIEW): 19.14 CM/S
SINUS: 4.16 CM
STJ: 3.66 CM
TDI LATERAL: 0.09 M/S
TDI SEPTAL: 0.09 M/S
TDI: 0.09 M/S
TR MAX PG: 22 MMHG
TRICUSPID ANNULAR PLANE SYSTOLIC EXCURSION: 2.48 CM
TV REST PULMONARY ARTERY PRESSURE: 30 MMHG
Z-SCORE OF LEFT VENTRICULAR DIMENSION IN END DIASTOLE: -2.29
Z-SCORE OF LEFT VENTRICULAR DIMENSION IN END SYSTOLE: -0.64

## 2024-02-21 PROCEDURE — 93306 TTE W/DOPPLER COMPLETE: CPT

## 2024-02-21 PROCEDURE — 93306 TTE W/DOPPLER COMPLETE: CPT | Mod: 26,,, | Performed by: INTERNAL MEDICINE

## 2024-02-22 ENCOUNTER — TELEPHONE (OUTPATIENT)
Dept: CARDIOLOGY | Facility: CLINIC | Age: 79
End: 2024-02-22
Payer: MEDICARE

## 2024-02-22 ENCOUNTER — PATIENT MESSAGE (OUTPATIENT)
Dept: CARDIOLOGY | Facility: CLINIC | Age: 79
End: 2024-02-22
Payer: MEDICARE

## 2024-02-22 ENCOUNTER — PATIENT MESSAGE (OUTPATIENT)
Dept: SPORTS MEDICINE | Facility: CLINIC | Age: 79
End: 2024-02-22
Payer: MEDICARE

## 2024-02-22 NOTE — PROGRESS NOTES
Your echocardiogram results is good.  Normal heart pumping function.  No major abnormalities  Sincerely,  Brandon Cisneros MD.   Interventional Cardiologist  [facility], [place]

## 2024-02-22 NOTE — TELEPHONE ENCOUNTER
I called pt and left him V.Message regarding his Echo, and I also sent it to him through Patient Portal.      Nw                  ----- Message from Brandon Cisneros MD sent at 2/22/2024 12:00 PM CST -----      Your echocardiogram results is good.  Normal heart pumping function.  No major abnormalities  Sincerely,  Brandon Cisneros MD.   Interventional Cardiologist  [facility], [place]

## 2024-02-23 ENCOUNTER — TELEPHONE (OUTPATIENT)
Dept: SPORTS MEDICINE | Facility: CLINIC | Age: 79
End: 2024-02-23
Payer: MEDICARE

## 2024-02-23 NOTE — TELEPHONE ENCOUNTER
----- Message from Angie Weir sent at 2/23/2024  2:28 PM CST -----  Regarding: Rreport Time  Contact: [phone]  Calling in regards to speaking with nurse to confirm report time and instructions for upcoming procedure/surgery. Please call back as soon as possible to confirm details.

## 2024-02-23 NOTE — TELEPHONE ENCOUNTER
Spoke with pt and explained to him about where he needs to go for surgery, and where his kids can park.

## 2024-02-25 ENCOUNTER — PATIENT MESSAGE (OUTPATIENT)
Dept: SPORTS MEDICINE | Facility: CLINIC | Age: 79
End: 2024-02-25
Payer: MEDICARE

## 2024-02-27 ENCOUNTER — OFFICE VISIT (OUTPATIENT)
Dept: SPORTS MEDICINE | Facility: CLINIC | Age: 79
End: 2024-02-27
Payer: MEDICARE

## 2024-02-27 VITALS — HEIGHT: 73 IN | WEIGHT: 203.06 LBS | TEMPERATURE: 98 F | BODY MASS INDEX: 26.91 KG/M2

## 2024-02-27 DIAGNOSIS — M17.12 PRIMARY OSTEOARTHRITIS OF LEFT KNEE: Primary | ICD-10-CM

## 2024-02-27 DIAGNOSIS — M25.562 LEFT KNEE PAIN, UNSPECIFIED CHRONICITY: ICD-10-CM

## 2024-02-27 PROCEDURE — 99999 PR PBB SHADOW E&M-EST. PATIENT-LVL III: CPT | Mod: PBBFAC,,, | Performed by: FAMILY MEDICINE

## 2024-02-27 PROCEDURE — 20611 DRAIN/INJ JOINT/BURSA W/US: CPT | Mod: LT,S$GLB,, | Performed by: FAMILY MEDICINE

## 2024-02-27 PROCEDURE — 99499 UNLISTED E&M SERVICE: CPT | Mod: S$GLB,,, | Performed by: FAMILY MEDICINE

## 2024-02-27 NOTE — PROCEDURES
"Large Joint Aspiration/Injection: L knee    Date/Time: 2/27/2024 10:00 AM    Performed by: Kit Potter MD  Authorized by: Kit Potter MD    Consent Done?:  Yes (Verbal)  Indications:  Pain  Site marked: the procedure site was marked    Timeout: prior to procedure the correct patient, procedure, and site was verified    Prep: patient was prepped and draped in usual sterile fashion      Details:  Needle Size:  25 G  Ultrasonic Guidance for needle placement?: Yes    Images are saved and documented.  Approach:  Lateral  Location:  Knee  Site:  L knee  Medications:  16.8 mg sodium hyaluronate 16.8 mg/2 mL  Patient tolerance:  Patient tolerated the procedure well with no immediate complications     Description of ultrasound utilization for needle guidance:   Ultrasound guidance used for needle localization. Images saved and stored for documentation. The SUPRAPATELLAR BURSA / KNEE JOINT was visualized. Dynamic visualization of the 25g x 1.5" needle was continuous throughout the procedure.     "

## 2024-02-27 NOTE — PROGRESS NOTES
Sil knee joint left 3/3  Lot number: [id number]  Expiration date: 04 30 26    MEDICAL NECESSITY FOR VISCOSUPPLEMENTATION USE: After thorough evaluation of the patient, I have determined that viscosupplementation treatment is medically necessary. 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 Oral analgesics including NSAIDs have not adequately controlled the patient's symptoms. There is radiographic evidence of Kellgren-Tony grade II (or greater) osteoarthritic (OA) changes, or if lack of radiographic evidence, there is arthroscopic or other evidence of chondrosis of the knee(s).

## 2024-02-29 ENCOUNTER — DOCUMENTATION ONLY (OUTPATIENT)
Dept: ELECTROPHYSIOLOGY | Facility: CLINIC | Age: 79
End: 2024-02-29
Payer: MEDICARE

## 2024-02-29 NOTE — PRE-PROCEDURE INSTRUCTIONS
PREOP INSTRUCTIONS:  No food,milk or milk products for 8 hours before surgery.  Clear liquids like water,gatorade,apple juice are allowed up until 2 hours before surgery.  Instructed to follow the surgeon's instructions if they differ from these.  Shower instructions as well as directions to the Surgery Center were given.  Encouraged to wear loose fitting,comfortable clothing.  Medication instructions for pm prior to and am of procedure reviewed.  Instructed to avoid taking vitamins,supplements,aspirin and ibuprofen the morning of surgery.    PATIENT REPORTED THAT HIS HEARTRATE HAS DROPPED AS LOW AS 20 BEATS PER MINUTE DURING PREVIOUS SURGERIES    Patient does not know arrival time.Explained that this information comes from the surgeon's office and if they haven't heard from them by 2 or 3 pm 3/1/2024 to call the office.Patient stated an understanding.     PATIENT HAS A ST ML PACEMAKER.MARIBELL KING IN THE [facility] NOTIFIED AT 1143 ON 2/29/2024 AND REPLIED THE DEVICE WILL NEED REPROGRAMMING

## 2024-03-03 ENCOUNTER — ANESTHESIA EVENT (OUTPATIENT)
Dept: SURGERY | Facility: HOSPITAL | Age: 79
End: 2024-03-03
Payer: MEDICARE

## 2024-03-04 ENCOUNTER — DOCUMENTATION ONLY (OUTPATIENT)
Dept: CARDIOLOGY | Facility: HOSPITAL | Age: 79
End: 2024-03-04
Payer: MEDICARE

## 2024-03-04 ENCOUNTER — ANESTHESIA (OUTPATIENT)
Dept: SURGERY | Facility: HOSPITAL | Age: 79
End: 2024-03-04
Payer: MEDICARE

## 2024-03-04 ENCOUNTER — HOSPITAL ENCOUNTER (OUTPATIENT)
Facility: HOSPITAL | Age: 79
Discharge: HOME | End: 2024-03-05
Attending: STUDENT IN AN ORGANIZED HEALTH CARE EDUCATION/TRAINING PROGRAM | Admitting: STUDENT IN AN ORGANIZED HEALTH CARE EDUCATION/TRAINING PROGRAM
Payer: MEDICARE

## 2024-03-04 DIAGNOSIS — Z01.818 PREOP EXAMINATION: ICD-10-CM

## 2024-03-04 DIAGNOSIS — M19.012 GLENOHUMERAL ARTHRITIS, LEFT: Primary | ICD-10-CM

## 2024-03-04 LAB
ESTIMATED AVG GLUCOSE: 128 MG/DL (ref 68–131)
HBA1C MFR BLD: 6.1 % (ref 4–5.6)
POCT GLUCOSE: 106 MG/DL (ref 70–110)

## 2024-03-04 PROCEDURE — 63600175 PHARM REV CODE 636 W HCPCS: Performed by: NURSE ANESTHETIST, CERTIFIED REGISTERED

## 2024-03-04 PROCEDURE — 36000711: Performed by: STUDENT IN AN ORGANIZED HEALTH CARE EDUCATION/TRAINING PROGRAM

## 2024-03-04 PROCEDURE — 23472 RECONSTRUCT SHOULDER JOINT: CPT | Mod: LT,,, | Performed by: STUDENT IN AN ORGANIZED HEALTH CARE EDUCATION/TRAINING PROGRAM

## 2024-03-04 PROCEDURE — 27201423 OPTIME MED/SURG SUP & DEVICES STERILE SUPPLY: Performed by: STUDENT IN AN ORGANIZED HEALTH CARE EDUCATION/TRAINING PROGRAM

## 2024-03-04 PROCEDURE — 63600175 PHARM REV CODE 636 W HCPCS: Performed by: STUDENT IN AN ORGANIZED HEALTH CARE EDUCATION/TRAINING PROGRAM

## 2024-03-04 PROCEDURE — 36000710: Performed by: STUDENT IN AN ORGANIZED HEALTH CARE EDUCATION/TRAINING PROGRAM

## 2024-03-04 PROCEDURE — C1769 GUIDE WIRE: HCPCS | Performed by: STUDENT IN AN ORGANIZED HEALTH CARE EDUCATION/TRAINING PROGRAM

## 2024-03-04 PROCEDURE — 83036 HEMOGLOBIN GLYCOSYLATED A1C: CPT | Performed by: ORTHOPAEDIC SURGERY

## 2024-03-04 PROCEDURE — 82962 GLUCOSE BLOOD TEST: CPT | Performed by: STUDENT IN AN ORGANIZED HEALTH CARE EDUCATION/TRAINING PROGRAM

## 2024-03-04 PROCEDURE — 99900035 HC TECH TIME PER 15 MIN (STAT)

## 2024-03-04 PROCEDURE — 71000015 HC POSTOP RECOV 1ST HR: Performed by: STUDENT IN AN ORGANIZED HEALTH CARE EDUCATION/TRAINING PROGRAM

## 2024-03-04 PROCEDURE — 71000033 HC RECOVERY, INTIAL HOUR: Performed by: STUDENT IN AN ORGANIZED HEALTH CARE EDUCATION/TRAINING PROGRAM

## 2024-03-04 PROCEDURE — 25000003 PHARM REV CODE 250: Performed by: NURSE ANESTHETIST, CERTIFIED REGISTERED

## 2024-03-04 PROCEDURE — 82962 GLUCOSE BLOOD TEST: CPT | Mod: 91 | Performed by: STUDENT IN AN ORGANIZED HEALTH CARE EDUCATION/TRAINING PROGRAM

## 2024-03-04 PROCEDURE — C1713 ANCHOR/SCREW BN/BN,TIS/BN: HCPCS | Performed by: STUDENT IN AN ORGANIZED HEALTH CARE EDUCATION/TRAINING PROGRAM

## 2024-03-04 PROCEDURE — D9220A PRA ANESTHESIA: Mod: CRNA,,, | Performed by: NURSE ANESTHETIST, CERTIFIED REGISTERED

## 2024-03-04 PROCEDURE — C1776 JOINT DEVICE (IMPLANTABLE): HCPCS | Performed by: STUDENT IN AN ORGANIZED HEALTH CARE EDUCATION/TRAINING PROGRAM

## 2024-03-04 PROCEDURE — 25000003 PHARM REV CODE 250: Performed by: STUDENT IN AN ORGANIZED HEALTH CARE EDUCATION/TRAINING PROGRAM

## 2024-03-04 PROCEDURE — C1889 IMPLANT/INSERT DEVICE, NOC: HCPCS | Performed by: STUDENT IN AN ORGANIZED HEALTH CARE EDUCATION/TRAINING PROGRAM

## 2024-03-04 PROCEDURE — 37000009 HC ANESTHESIA EA ADD 15 MINS: Performed by: STUDENT IN AN ORGANIZED HEALTH CARE EDUCATION/TRAINING PROGRAM

## 2024-03-04 PROCEDURE — 94761 N-INVAS EAR/PLS OXIMETRY MLT: CPT

## 2024-03-04 PROCEDURE — 71000039 HC RECOVERY, EACH ADD'L HOUR: Performed by: STUDENT IN AN ORGANIZED HEALTH CARE EDUCATION/TRAINING PROGRAM

## 2024-03-04 PROCEDURE — 64416 NJX AA&/STRD BRCH PL NFS IMG: CPT | Mod: 59,LT | Performed by: STUDENT IN AN ORGANIZED HEALTH CARE EDUCATION/TRAINING PROGRAM

## 2024-03-04 PROCEDURE — 37000008 HC ANESTHESIA 1ST 15 MINUTES: Performed by: STUDENT IN AN ORGANIZED HEALTH CARE EDUCATION/TRAINING PROGRAM

## 2024-03-04 PROCEDURE — 64416 NJX AA&/STRD BRCH PL NFS IMG: CPT | Mod: 59,LT,, | Performed by: SURGERY

## 2024-03-04 PROCEDURE — 71000016 HC POSTOP RECOV ADDL HR: Performed by: STUDENT IN AN ORGANIZED HEALTH CARE EDUCATION/TRAINING PROGRAM

## 2024-03-04 PROCEDURE — D9220A PRA ANESTHESIA: Mod: ANES,,, | Performed by: ANESTHESIOLOGY

## 2024-03-04 PROCEDURE — 25000003 PHARM REV CODE 250: Performed by: ORTHOPAEDIC SURGERY

## 2024-03-04 DEVICE — SCREW AEQUALIS CNTRL 6.5X35MM: Type: IMPLANTABLE DEVICE | Site: SHOULDER | Status: FUNCTIONAL

## 2024-03-04 DEVICE — IMPLANTABLE DEVICE
Type: IMPLANTABLE DEVICE | Site: SHOULDER | Status: FUNCTIONAL
Brand: TORNIER PERFORM™ REVERSED

## 2024-03-04 DEVICE — SCREW AEQUALIS PERIPH 5X22MM: Type: IMPLANTABLE DEVICE | Site: SHOULDER | Status: FUNCTIONAL

## 2024-03-04 DEVICE — IMPLANTABLE DEVICE
Type: IMPLANTABLE DEVICE | Site: SHOULDER | Status: FUNCTIONAL
Brand: TORNIER FLEX SHOULDER SYSTEM

## 2024-03-04 DEVICE — IMPLANT COLLAGEN 23X30X5MM: Type: IMPLANTABLE DEVICE | Site: SHOULDER | Status: FUNCTIONAL

## 2024-03-04 RX ORDER — MUPIROCIN 20 MG/G
OINTMENT TOPICAL
Status: DISCONTINUED | OUTPATIENT
Start: 2024-03-04 | End: 2024-03-04

## 2024-03-04 RX ORDER — ONDANSETRON HYDROCHLORIDE 2 MG/ML
4 INJECTION, SOLUTION INTRAVENOUS DAILY PRN
Status: DISCONTINUED | OUTPATIENT
Start: 2024-03-04 | End: 2024-03-04 | Stop reason: HOSPADM

## 2024-03-04 RX ORDER — ACETAMINOPHEN 500 MG
1000 TABLET ORAL
Status: COMPLETED | OUTPATIENT
Start: 2024-03-04 | End: 2024-03-04

## 2024-03-04 RX ORDER — AMOXICILLIN 250 MG
1 CAPSULE ORAL 2 TIMES DAILY
Status: DISCONTINUED | OUTPATIENT
Start: 2024-03-04 | End: 2024-03-05 | Stop reason: HOSPADM

## 2024-03-04 RX ORDER — DEXTROSE MONOHYDRATE AND SODIUM CHLORIDE 5; .9 G/100ML; G/100ML
INJECTION, SOLUTION INTRAVENOUS CONTINUOUS
Status: DISCONTINUED | OUTPATIENT
Start: 2024-03-04 | End: 2024-03-05 | Stop reason: HOSPADM

## 2024-03-04 RX ORDER — TAMSULOSIN HYDROCHLORIDE 0.4 MG/1
0.4 CAPSULE ORAL DAILY
Status: DISCONTINUED | OUTPATIENT
Start: 2024-03-04 | End: 2024-03-05 | Stop reason: HOSPADM

## 2024-03-04 RX ORDER — LIDOCAINE HYDROCHLORIDE 20 MG/ML
INJECTION, SOLUTION EPIDURAL; INFILTRATION; INTRACAUDAL; PERINEURAL
Status: DISCONTINUED | OUTPATIENT
Start: 2024-03-04 | End: 2024-03-04

## 2024-03-04 RX ORDER — ROPIVACAINE HYDROCHLORIDE 2 MG/ML
0.1 INJECTION, SOLUTION EPIDURAL; INFILTRATION; PERINEURAL CONTINUOUS
Status: DISCONTINUED | OUTPATIENT
Start: 2024-03-04 | End: 2024-03-05 | Stop reason: HOSPADM

## 2024-03-04 RX ORDER — CELECOXIB 200 MG/1
400 CAPSULE ORAL
Status: COMPLETED | OUTPATIENT
Start: 2024-03-04 | End: 2024-03-04

## 2024-03-04 RX ORDER — OXYCODONE HYDROCHLORIDE 5 MG/1
5 TABLET ORAL
Status: DISCONTINUED | OUTPATIENT
Start: 2024-03-04 | End: 2024-03-05

## 2024-03-04 RX ORDER — METHOCARBAMOL 500 MG/1
500 TABLET, FILM COATED ORAL 3 TIMES DAILY PRN
Status: DISCONTINUED | OUTPATIENT
Start: 2024-03-04 | End: 2024-03-05 | Stop reason: HOSPADM

## 2024-03-04 RX ORDER — ONDANSETRON 8 MG/1
8 TABLET, ORALLY DISINTEGRATING ORAL EVERY 8 HOURS PRN
Status: DISCONTINUED | OUTPATIENT
Start: 2024-03-04 | End: 2024-03-05 | Stop reason: HOSPADM

## 2024-03-04 RX ORDER — PROPOFOL 10 MG/ML
VIAL (ML) INTRAVENOUS
Status: DISCONTINUED | OUTPATIENT
Start: 2024-03-04 | End: 2024-03-04

## 2024-03-04 RX ORDER — METOPROLOL TARTRATE 25 MG/1
25 TABLET, FILM COATED ORAL 2 TIMES DAILY PRN
Status: DISCONTINUED | OUTPATIENT
Start: 2024-03-04 | End: 2024-03-05 | Stop reason: HOSPADM

## 2024-03-04 RX ORDER — EPHEDRINE SULFATE 50 MG/ML
INJECTION, SOLUTION INTRAVENOUS
Status: DISCONTINUED | OUTPATIENT
Start: 2024-03-04 | End: 2024-03-04

## 2024-03-04 RX ORDER — CELECOXIB 200 MG/1
200 CAPSULE ORAL DAILY
Status: DISCONTINUED | OUTPATIENT
Start: 2024-03-05 | End: 2024-03-04

## 2024-03-04 RX ORDER — OXYCODONE HYDROCHLORIDE 5 MG/1
5 TABLET ORAL
Status: DISCONTINUED | OUTPATIENT
Start: 2024-03-04 | End: 2024-03-05 | Stop reason: HOSPADM

## 2024-03-04 RX ORDER — ALLOPURINOL 100 MG/1
100 TABLET ORAL NIGHTLY
Status: DISCONTINUED | OUTPATIENT
Start: 2024-03-04 | End: 2024-03-05 | Stop reason: HOSPADM

## 2024-03-04 RX ORDER — DEXMEDETOMIDINE HYDROCHLORIDE 100 UG/ML
INJECTION, SOLUTION INTRAVENOUS
Status: DISCONTINUED | OUTPATIENT
Start: 2024-03-04 | End: 2024-03-04

## 2024-03-04 RX ORDER — LISINOPRIL 20 MG/1
20 TABLET ORAL DAILY
Status: DISCONTINUED | OUTPATIENT
Start: 2024-03-04 | End: 2024-03-05 | Stop reason: HOSPADM

## 2024-03-04 RX ORDER — POLYETHYLENE GLYCOL 3350 17 G/17G
17 POWDER, FOR SOLUTION ORAL DAILY
Status: DISCONTINUED | OUTPATIENT
Start: 2024-03-04 | End: 2024-03-05 | Stop reason: HOSPADM

## 2024-03-04 RX ORDER — PROCHLORPERAZINE EDISYLATE 5 MG/ML
5 INJECTION INTRAMUSCULAR; INTRAVENOUS EVERY 6 HOURS PRN
Status: DISCONTINUED | OUTPATIENT
Start: 2024-03-04 | End: 2024-03-05 | Stop reason: HOSPADM

## 2024-03-04 RX ORDER — HALOPERIDOL 5 MG/ML
0.5 INJECTION INTRAMUSCULAR EVERY 10 MIN PRN
Status: DISCONTINUED | OUTPATIENT
Start: 2024-03-04 | End: 2024-03-04 | Stop reason: HOSPADM

## 2024-03-04 RX ORDER — PREGABALIN 75 MG/1
75 CAPSULE ORAL NIGHTLY
Status: DISCONTINUED | OUTPATIENT
Start: 2024-03-04 | End: 2024-03-05 | Stop reason: HOSPADM

## 2024-03-04 RX ORDER — PHENYLEPHRINE HCL IN 0.9% NACL 1 MG/10 ML
SYRINGE (ML) INTRAVENOUS
Status: DISCONTINUED | OUTPATIENT
Start: 2024-03-04 | End: 2024-03-04

## 2024-03-04 RX ORDER — MIDAZOLAM HYDROCHLORIDE 1 MG/ML
.5-4 INJECTION INTRAMUSCULAR; INTRAVENOUS
Status: DISCONTINUED | OUTPATIENT
Start: 2024-03-04 | End: 2024-03-04

## 2024-03-04 RX ORDER — FENTANYL CITRATE 50 UG/ML
25-200 INJECTION, SOLUTION INTRAMUSCULAR; INTRAVENOUS
Status: DISCONTINUED | OUTPATIENT
Start: 2024-03-04 | End: 2024-03-04

## 2024-03-04 RX ORDER — VANCOMYCIN HYDROCHLORIDE 1 G/20ML
INJECTION, POWDER, LYOPHILIZED, FOR SOLUTION INTRAVENOUS
Status: DISCONTINUED | OUTPATIENT
Start: 2024-03-04 | End: 2024-03-04 | Stop reason: HOSPADM

## 2024-03-04 RX ORDER — DEXAMETHASONE SODIUM PHOSPHATE 4 MG/ML
INJECTION, SOLUTION INTRA-ARTICULAR; INTRALESIONAL; INTRAMUSCULAR; INTRAVENOUS; SOFT TISSUE
Status: DISCONTINUED | OUTPATIENT
Start: 2024-03-04 | End: 2024-03-04

## 2024-03-04 RX ORDER — ONDANSETRON HYDROCHLORIDE 2 MG/ML
4 INJECTION, SOLUTION INTRAVENOUS EVERY 12 HOURS PRN
Status: DISCONTINUED | OUTPATIENT
Start: 2024-03-04 | End: 2024-03-05 | Stop reason: HOSPADM

## 2024-03-04 RX ORDER — TRANEXAMIC ACID 100 MG/ML
INJECTION, SOLUTION INTRAVENOUS
Status: DISCONTINUED | OUTPATIENT
Start: 2024-03-04 | End: 2024-03-04

## 2024-03-04 RX ORDER — ATORVASTATIN CALCIUM 40 MG/1
40 TABLET, FILM COATED ORAL NIGHTLY
Status: DISCONTINUED | OUTPATIENT
Start: 2024-03-04 | End: 2024-03-05 | Stop reason: HOSPADM

## 2024-03-04 RX ORDER — FAMOTIDINE 20 MG/1
20 TABLET, FILM COATED ORAL 2 TIMES DAILY
Status: DISCONTINUED | OUTPATIENT
Start: 2024-03-04 | End: 2024-03-05 | Stop reason: HOSPADM

## 2024-03-04 RX ORDER — SODIUM CHLORIDE 0.9 % (FLUSH) 0.9 %
10 SYRINGE (ML) INJECTION
Status: DISCONTINUED | OUTPATIENT
Start: 2024-03-04 | End: 2024-03-04 | Stop reason: HOSPADM

## 2024-03-04 RX ORDER — MUPIROCIN 20 MG/G
1 OINTMENT TOPICAL 2 TIMES DAILY
Status: DISCONTINUED | OUTPATIENT
Start: 2024-03-04 | End: 2024-03-05 | Stop reason: HOSPADM

## 2024-03-04 RX ORDER — FENTANYL CITRATE 50 UG/ML
INJECTION, SOLUTION INTRAMUSCULAR; INTRAVENOUS
Status: DISCONTINUED | OUTPATIENT
Start: 2024-03-04 | End: 2024-03-04

## 2024-03-04 RX ORDER — SODIUM CHLORIDE 0.9 % (FLUSH) 0.9 %
10 SYRINGE (ML) INJECTION
Status: DISCONTINUED | OUTPATIENT
Start: 2024-03-04 | End: 2024-03-05 | Stop reason: HOSPADM

## 2024-03-04 RX ORDER — CELECOXIB 200 MG/1
200 CAPSULE ORAL 2 TIMES DAILY WITH MEALS
Status: DISCONTINUED | OUTPATIENT
Start: 2024-03-04 | End: 2024-03-05 | Stop reason: HOSPADM

## 2024-03-04 RX ORDER — ACETAMINOPHEN 500 MG
1000 TABLET ORAL EVERY 6 HOURS
Status: DISCONTINUED | OUTPATIENT
Start: 2024-03-04 | End: 2024-03-05 | Stop reason: HOSPADM

## 2024-03-04 RX ORDER — SUCCINYLCHOLINE CHLORIDE 20 MG/ML
INJECTION INTRAMUSCULAR; INTRAVENOUS
Status: DISCONTINUED | OUTPATIENT
Start: 2024-03-04 | End: 2024-03-04

## 2024-03-04 RX ORDER — OXYBUTYNIN CHLORIDE 10 MG/1
10 TABLET, EXTENDED RELEASE ORAL DAILY
Status: DISCONTINUED | OUTPATIENT
Start: 2024-03-04 | End: 2024-03-05 | Stop reason: HOSPADM

## 2024-03-04 RX ORDER — ROCURONIUM BROMIDE 10 MG/ML
INJECTION, SOLUTION INTRAVENOUS
Status: DISCONTINUED | OUTPATIENT
Start: 2024-03-04 | End: 2024-03-04

## 2024-03-04 RX ORDER — ONDANSETRON HYDROCHLORIDE 2 MG/ML
INJECTION, SOLUTION INTRAVENOUS
Status: DISCONTINUED | OUTPATIENT
Start: 2024-03-04 | End: 2024-03-04

## 2024-03-04 RX ORDER — CEFAZOLIN SODIUM 1 G/3ML
INJECTION, POWDER, FOR SOLUTION INTRAMUSCULAR; INTRAVENOUS
Status: DISCONTINUED | OUTPATIENT
Start: 2024-03-04 | End: 2024-03-04

## 2024-03-04 RX ORDER — FENTANYL CITRATE 50 UG/ML
25 INJECTION, SOLUTION INTRAMUSCULAR; INTRAVENOUS EVERY 5 MIN PRN
Status: DISCONTINUED | OUTPATIENT
Start: 2024-03-04 | End: 2024-03-04 | Stop reason: HOSPADM

## 2024-03-04 RX ADMIN — ALLOPURINOL 100 MG: 100 TABLET ORAL at 08:03

## 2024-03-04 RX ADMIN — CEFAZOLIN 2 G: 330 INJECTION, POWDER, FOR SOLUTION INTRAMUSCULAR; INTRAVENOUS at 11:03

## 2024-03-04 RX ADMIN — Medication 200 MCG: at 11:03

## 2024-03-04 RX ADMIN — TAMSULOSIN HYDROCHLORIDE 0.4 MG: 0.4 CAPSULE ORAL at 03:03

## 2024-03-04 RX ADMIN — ROCURONIUM BROMIDE 5 MG: 10 SOLUTION INTRAVENOUS at 11:03

## 2024-03-04 RX ADMIN — LISINOPRIL 20 MG: 20 TABLET ORAL at 03:03

## 2024-03-04 RX ADMIN — TRANEXAMIC ACID 1000 MG: 100 INJECTION INTRAVENOUS at 12:03

## 2024-03-04 RX ADMIN — TRANEXAMIC ACID 1000 MG: 100 INJECTION INTRAVENOUS at 11:03

## 2024-03-04 RX ADMIN — DEXMEDETOMIDINE 8 MCG: 100 INJECTION, SOLUTION, CONCENTRATE INTRAVENOUS at 01:03

## 2024-03-04 RX ADMIN — VANCOMYCIN HYDROCHLORIDE 1250 MG: 1.25 INJECTION, POWDER, LYOPHILIZED, FOR SOLUTION INTRAVENOUS at 03:03

## 2024-03-04 RX ADMIN — POLYETHYLENE GLYCOL 3350 17 G: 17 POWDER, FOR SOLUTION ORAL at 03:03

## 2024-03-04 RX ADMIN — SUGAMMADEX 200 MG: 100 INJECTION, SOLUTION INTRAVENOUS at 01:03

## 2024-03-04 RX ADMIN — DEXAMETHASONE SODIUM PHOSPHATE 4 MG: 4 INJECTION INTRA-ARTICULAR; INTRALESIONAL; INTRAMUSCULAR; INTRAVENOUS; SOFT TISSUE at 11:03

## 2024-03-04 RX ADMIN — EPHEDRINE SULFATE 10 MG: 50 INJECTION INTRAVENOUS at 12:03

## 2024-03-04 RX ADMIN — SODIUM CHLORIDE 0.6 MCG/KG/MIN: 9 INJECTION, SOLUTION INTRAVENOUS at 11:03

## 2024-03-04 RX ADMIN — ONDANSETRON 4 MG: 2 INJECTION INTRAMUSCULAR; INTRAVENOUS at 01:03

## 2024-03-04 RX ADMIN — SODIUM CHLORIDE: 0.9 INJECTION, SOLUTION INTRAVENOUS at 10:03

## 2024-03-04 RX ADMIN — FENTANYL CITRATE 100 MCG: 50 INJECTION INTRAMUSCULAR; INTRAVENOUS at 08:03

## 2024-03-04 RX ADMIN — PROPOFOL 140 MG: 10 INJECTION, EMULSION INTRAVENOUS at 11:03

## 2024-03-04 RX ADMIN — FAMOTIDINE 20 MG: 20 TABLET, FILM COATED ORAL at 08:03

## 2024-03-04 RX ADMIN — CEFAZOLIN 2 G: 2 INJECTION, POWDER, FOR SOLUTION INTRAMUSCULAR; INTRAVENOUS at 05:03

## 2024-03-04 RX ADMIN — PROPOFOL 20 MG: 10 INJECTION, EMULSION INTRAVENOUS at 01:03

## 2024-03-04 RX ADMIN — DEXMEDETOMIDINE 4 MCG: 100 INJECTION, SOLUTION, CONCENTRATE INTRAVENOUS at 02:03

## 2024-03-04 RX ADMIN — ATORVASTATIN CALCIUM 40 MG: 40 TABLET, FILM COATED ORAL at 08:03

## 2024-03-04 RX ADMIN — ROCURONIUM BROMIDE 45 MG: 10 SOLUTION INTRAVENOUS at 11:03

## 2024-03-04 RX ADMIN — DEXTROSE AND SODIUM CHLORIDE: 5; 900 INJECTION, SOLUTION INTRAVENOUS at 02:03

## 2024-03-04 RX ADMIN — ROPIVACAINE HYDROCHLORIDE 0.1 ML/HR: 2 INJECTION, SOLUTION EPIDURAL; INFILTRATION at 02:03

## 2024-03-04 RX ADMIN — ACETAMINOPHEN 1000 MG: 500 TABLET ORAL at 07:03

## 2024-03-04 RX ADMIN — MUPIROCIN: 20 OINTMENT TOPICAL at 07:03

## 2024-03-04 RX ADMIN — PREGABALIN 75 MG: 75 CAPSULE ORAL at 08:03

## 2024-03-04 RX ADMIN — ROCURONIUM BROMIDE 10 MG: 10 SOLUTION INTRAVENOUS at 01:03

## 2024-03-04 RX ADMIN — DOCUSATE SODIUM AND SENNOSIDES 1 TABLET: 8.6; 5 TABLET, FILM COATED ORAL at 08:03

## 2024-03-04 RX ADMIN — CELECOXIB 400 MG: 200 CAPSULE ORAL at 07:03

## 2024-03-04 RX ADMIN — FENTANYL CITRATE 100 MCG: 50 INJECTION, SOLUTION INTRAMUSCULAR; INTRAVENOUS at 11:03

## 2024-03-04 RX ADMIN — EPHEDRINE SULFATE 10 MG: 50 INJECTION INTRAVENOUS at 11:03

## 2024-03-04 RX ADMIN — LIDOCAINE HYDROCHLORIDE 100 MG: 20 INJECTION, SOLUTION EPIDURAL; INFILTRATION; INTRACAUDAL at 11:03

## 2024-03-04 RX ADMIN — MIDAZOLAM 2 MG: 1 INJECTION INTRAMUSCULAR; INTRAVENOUS at 08:03

## 2024-03-04 RX ADMIN — SUCCINYLCHOLINE CHLORIDE 120 MG: 20 INJECTION, SOLUTION INTRAMUSCULAR; INTRAVENOUS; PARENTERAL at 11:03

## 2024-03-04 NOTE — ANESTHESIA PROCEDURE NOTES
Intubation    Date/Time: 3/4/2024 11:09 AM    Performed by: Belkys Pedroza CRNA  Authorized by: Lakeisha Willis MD    Intubation:     Induction:  Intravenous    Intubated:  Postinduction    Mask Ventilation:  Easy mask    Attempts:  1    Attempted By:  LUCIA    Blade:  Spencer 3    Laryngeal View Grade: Grade I - full view of cords      Difficult Airway Encountered?: No      Complications:  None    Airway Device:  Oral endotracheal tube    Airway Device Size:  7.5    Style/Cuff Inflation:  Cuffed (inflated to minimal occlusive pressure)    Tube secured:  23    Secured at:  The lips    Placement Verified By:  Capnometry    Complicating Factors:  None    Findings Post-Intubation:  BS equal bilateral and atraumatic/condition of teeth unchanged

## 2024-03-04 NOTE — TRANSFER OF CARE
"Anesthesia Transfer of Care Note    Patient: Shannan Norman    Procedure(s) Performed: Procedure(s) (LRB):  ARTHROPLASTY, SHOULDER, TOTAL, REVERSE (Left)  BICEPS TENODESIS (Left)    Patient location: PACU    Anesthesia Type: general    Transport from OR: Transported from OR on 6-10 L/min O2 by face mask with adequate spontaneous ventilation    Post pain: adequate analgesia    Post assessment: no apparent anesthetic complications    Post vital signs: stable    Level of consciousness: awake and alert    Nausea/Vomiting: no nausea/vomiting    Complications: none    Transfer of care protocol was followed      Last vitals: Visit Vitals  /60   Pulse 71   Temp 37.2 °C (99 °F) (Temporal)   Resp 20   Ht 6' 1" (1.854 m)   Wt 89.4 kg (197 lb)   SpO2 95%   BMI 25.99 kg/m²     "

## 2024-03-04 NOTE — OP NOTE
DATE OF PROCEDURE: 03/04/2024    SURGEON: Quinten Dominique MD    ASSISTANT:   Grady العراقي     PREOPERATIVE DIAGNOSIS:    left shoulder glenohumeral osteoarthritis  left long head biceps tendinopathy    POSTOPERATIVE DIAGNOSIS:   left glenohumeral osteoarthritis  left long head biceps tendinopathy    PROCEDURE PERFORMED:   left open reverse shoulder arthroplasty (CPT 69106)  left open biceps tenodesis (CPT 92720-62)  CPT code modifier 59    CPT Code Modifier:  The biceps tendon was inflamed and enlarged consistent with biceps tendon pathology.  A tenodesis was opted for.  This was a separate and distinct portion of the procedure.  Therefore CPT code modifier 59 will be added.    ANESTHESIA: General with interscalene block and catheter    BLOOD LOSS: 150cc    IV Fluids: 800cc crystalloid    IMPLANTS:  Implant Name Type Inv. Item Serial No.  Lot No. LRB No. Used Action   PIN GUIDE SIMPLICITI 3X75 - [id number]  PIN GUIDE SIMPLICITI 3X75  [employer] [id number] Left 1 Implanted and Explanted   PIN GUIDE SIMPLICITI 3X75 - [id number]  PIN GUIDE SIMPLICITI 3X75  [employer] [id number] Left 1 Implanted and Explanted   PIN GUIDE AEQUALIS [id number] - [id number]  PIN GUIDE AEQUALIS [id number]  [employer] [id number] Left 1 Implanted and Explanted   Glenoid Standard Baseplate   [id number]  N/A Left 1 Implanted   Cannulated CoCr Standard Gelnosphere   [id number]  N/A Left 1 Implanted   BASEPLATE HUMERAL CNTR REV TY - [id number]  BASEPLATE HUMERAL CNTR REV TY [id number] [employer]  Left 1 Implanted   Standard PTC Humeral kStem   [id number]  N/A Left 1 Implanted   Reversed Insert   [id number]   Left 1 Implanted   IMPLANT COLLAGEN [id number] - [id number]  IMPLANT COLLAGEN [id number]  "[employer] INC [id number] Left 1 Implanted   SCREW AEQUALIS CNTRL 6.5X35MM - [id number]  SCREW AEQUALIS CNTRL 6.5X35MM  [employer]  Left 1 Implanted   SCREW AEQUALIS PERIPH 5X22MM - [id number]  SCREW AEQUALIS PERIPH 5X22MM  [employer]  Left  1 Implanted   Screw Aqualis Peripheral 14      Left 1 Implanted   Screw Aqualis Peripheral 26      Left 1 Implanted   Screw Aqualis Peripheral 34      Left 1 Implanted         DRAINS: None.     COMPLICATIONS: None.     INTRAOPERATIVE FINDINGS:  Exam under anesthesia revealed range of motion of  150 forward elevation, 20 external rotation at 0°, at 90° there was 60 of external rotation and 20 of internal rotation.  Glenohumeral joint osteoarthritis    BRIEF INDICATION OF MEDICAL NECESSITY:   Shannan Norman is a 78 y.o. male is well known to our clinic with a long history of left shoulder pain.  Imaging demonstrated osteoarthritis of the glenohumeral joint.  he has had an extensive course of conservative treatment which have failed to relieved the pain.  After discussion with the patient it was determined the best course of action would be to proceed to the operating room for left reverse total shoulder arthroplasty.  The potential procedures, as well as the risks benefits and alternatives, were explained in great detail to the patient.  All questions were answered and informed consent was obtained.      PROCEDURE IN DETAIL:   The patient was identified in the preoperative holding area and the site was marked.   a block was administered by the anesthesia team and he was taken to the operating room where there placed in the supine position on the operating room table.  General anesthetic agents were then administered.  An exam under anesthesia was performed, see detailed findings above.  The patient was then placed in the beach chair position with the head of the bed elevated 45-50 degrees in the arm in the Trimano device; all bony prominences were well padded.  Time-out was undertaken to confirm the patient, site, surgery, surgeon, and administration preoperative antibiotics.  All was agreed upon and we proceeded.    The bony landmarks were skin incision was made from the coracoid extending down the anterior medial  aspect of the upper arm, the incision was approximately 15 cm in length.  Sharp dissection was carried through the skin and subcutaneous tissue and hemostasis was achieved with electrocautery.  The deltopectoral interval was then developed.  The cephalic vein was taken laterally in the medial branches were ligated using electrocautery.  The subdeltoid space was developed bluntly and a deltoid Thompson retractor was placed to retract the deltoid both laterally and superiorly.  The pectoralis major tendinous insertion on the humerus was identified and the proximal 1 cm of it was released to improve our exposure.  The clavipectoral fascia was removed with electrocautery.  The lateral aspect of the conjoined tendon was then followed to its insertion onto the coracoid.  The conjoined tendon was retracted medially using a Piedra retractor.  The biceps tendon was identified.  It was noted to be very enlarged consistent with tendinopathy.  The Bovie was used to release the biceps tendon from its sheath and this was followed proximally into the rotator interval.  #2 FiberWire was used to perform the tenodesis of the long head of the biceps to the pectoralis major tendon in a figure-of-eight fashion.  This was done twice.  This was our biceps tenodesis.  The long head of the biceps was then followed proximally into the glenohumeral joint.  Curved [place] scissors were used to amputate the long head of the biceps from the superior glenoid attachment.  The stump was then cut just proximal to the tenodesis.    A sharp Hohmann retractor was then placed into the rotator interval into the glenohumeral joint.  The subscapularis tendon was identified and noted to be intact.  The articular surface of the humeral head was also visualized.  We then performed a subscapularis peel and the tendon was tagged with 2. Orthocord sutures to gain control.  The capsule with the subscapularis was then released off of the lesser tuberosity an  inferior humeral neck as a single layer.  The humeral head was then dislocated with successive extension and external rotation of the arm.    Visualization and inspection of the humeral head revealed arthritic changes.  The supraspinatus was noted to be intact.  There were osteophytes noted on the anterior and inferior aspect of the humeral head, these were removed using a rongeur.    The top of the humeral head was identified.  The extramedullary guide was placed over the humeral cortex with the guide arm set to 30° retroversion.  The guide pin was noted to be parallel to the forearm.  This was pinned in place using 2 pins.  A saw was used to cut the humeral head on top of the cutting guide.  The guide was then removed.  A canal finding awl was used to identify the canal the humerus.  This was easily found.  We then used sequential canal reamers up to size 8. We then began broaching with the guide pin set the 30° retroversion.  We began with a size 6 and proceeded up to size 6, this achieved excellent bony purchase and rotational control.  The decision was made to proceed with a size 6 stem.  The trial broach was left in the canal and the disc was inserted on top of it.  At this point the humeral preparation was complete.    We now turned our attention to the glenoid.  An anterior glenoid neck retractor was placed.  The mg HL an ST HL were released off of the muscular portion of the subscapularis, being mindful of palpating and identifying the axillary nerve to ensure that it was free of injury during these releases.  Next the interval between the IGHL and the muscular portion the subscapularis was identified.  My finger was placed on top of the axillary nerve to keep it out of harm's way during these releases.  The IGHL was then released around to the 7 o'clock position.  A bat man retractor was placed on the posterior inferior aspect of the glenoid to retract the soft tissues and humerus posteriorly.  A sharp  Hohmann retractor was then placed at the 12 o'clock position.  We now had excellent exposure of our glenoid.  The labrum was sharply excised circumferentially from around the glenoid.    The glenoid appeared with arthritic changes circumferential osteophytes on the anterior, inferior, and posterior aspects.  We then took our custom guide and placed it where we had planned during our preoperative planning.  This had good fit.  The central guide pin was then placed bicortically.  The bone quality was noted to be excellent.  The trial for a standard 29mm base plate was then placed over the guide pin and noted to have good fit.  We decided to proceed with a 29mm standard base plate.  The Reamer was then loaded and we reamed until we had a good bed of bleeding bone.  Care was taken not to ream too much of our bone and over medialize.  The 29mm standard base plate trial was then placed and had good fit.  A 3.2 mm drill was used to drill for our central screw bicortically, this measured to be 35mm.  The drill and guide pin were then removed.  The glenoid was then irrigated with saline solution.  The 29mm standard base plate was then prepared on the back table with a 35mm screw.  We ensured correct orientation of her base plate and Mccann the screw until achieved bicortical purchase.  This had excellent purchase.  We then drilled our screw holes into the base plate.  We began with the cortical screw, this was in the anterior position.  We drilled bicortically and placed a 22mm screw.  This had good purchase.  We then drilled the remaining 3 locking screws.  The 1st of these was at the posterior position and we placed a 14mm screw.  The next was at the superior position and we placed a 34mm screw.  The final was at the inferior position and we placed a 26mm screw.  Our base plate was now stable.  A round Reamer was then used to go circumferentially around the base plate to ensure a glenosphere would fit appropriately.  We  then trialed a 39 mm glenosphere which was of appropriate size.  The 39mm glenosphere was then placed.  This was gently tapped into place and the central locking screw was advanced with no issue.  The glenoid was then irrigated with saline solution.  Our glenoid implantation was now complete.    The humerus was then brought back into view with external rotation, deltoid brown retractor, and anterior tractors.  The metaphyseal disc protector was removed.  We chose the central trial tray with the 6mm poly.  This was placed onto the humeral stem in the glenohumeral joint was reduced.  The reduction was easy.  The arm was then taken through a range of motion which noted 160 degrees forward elevation, 50 degrees external rotation at 0, 90 degrees external rotation at 90, 20 degrees internal rotation at 90, and he could reach the top and back of his head.  There was no shuck.  We then went to read dislocate the shoulder and this was noted to be difficult.    The glenohumeral joint was then dislocated.  The trial implants were removed from the humerus and taken to the back table for matching of our final implants.  The humeral canal was then irrigated.  Three 2.0 mm drill holes were placed in the bicipital groove just lateral to the lesser tuberosity.  Three #5 FiberWire sutures were then passed through these drill holes and secured with hemostats out of the way for subscapularis repair after humerus implantation.    The final humeral construct using a 6 stem with a central base plate were then inserted and seated firmly into the humerus.  The FiberWire sutures were wrapped around the humeral stem prior to seating with excellent press fit.  The 6mm poly was then placed.  The glenohumeral joint was reduced.  The arm was then taken  through range of motion which noted 160 degrees forward elevation, 50 degrees external rotation at 0, 90 degrees external rotation at 90, 20 degrees internal rotation at 90, and he could reach  the top and back of his head.  There was no shuck.  The implants were noted to be very stable.    Diluted Betadine solution was then placed over the final components in the glenohumeral joint and this was allowed to sit for several minutes.  This was then copiously irrigated with saline solution using a Pulsavac.  1 g of vancomycin powder was then placed within the incision over the components.    The the subscapularis was then repaired to the humerus using Stephan-Andi configurations with the three #2 fiber wires.  A bio brace was incorporated with the subscapularis repair.    The deep layers were closed with #1 Vicryl sutures in interrupted fashion.  The deltopectoral interval was closed with #1 Vicryl sutures in interrupted fashion.  The subcutaneous layer was closed with 3-0 Vicryl sutures in interrupted fashion the skin was closed with a running 4-0 Monocryl and Dermabond.  A sterile Aquacel bandage was then placed.  The patient was then placed in a sling and abduction pillow, returned to the supine position.  he was then awakened from the anesthetic agents and taken the postanesthesia care unit in stable condition.  The procedure was completed without complication tolerated well by the patient.    POSTOPERATIVE PLAN:  The patient will remain in a sling and abduction pillow for 6 weeks.  They will follow the standard reverse shoulder arthroplasty rehabilitation protocol.  He was encouraged for hand and wrist range of motion.  He will return to clinic in 2 weeks for postoperative wound check.

## 2024-03-04 NOTE — PLAN OF CARE
Problem: Adult Inpatient Plan of Care  Goal: Plan of Care Review  Outcome: Ongoing, Progressing  Goal: Patient-Specific Goal (Individualized)  Outcome: Ongoing, Progressing  Goal: Absence of Hospital-Acquired Illness or Injury  Outcome: Ongoing, Progressing  Goal: Optimal Comfort and Wellbeing  Outcome: Ongoing, Progressing  Goal: Readiness for Transition of Care  Outcome: Ongoing, Progressing     Problem: Diabetes Comorbidity  Goal: Blood Glucose Level Within Targeted Range  Outcome: Ongoing, Progressing     Problem: Adjustment to Surgery (Shoulder Arthroplasty)  Goal: Optimal Coping  Outcome: Ongoing, Progressing     Problem: Bleeding (Shoulder Arthroplasty)  Goal: Absence of Bleeding  Outcome: Ongoing, Progressing     Problem: Bowel Motility Impaired (Shoulder Arthroplasty)  Goal: Effective Bowel Elimination  Outcome: Ongoing, Progressing     Problem: Fluid and Electrolyte Imbalance (Shoulder Arthroplasty)  Goal: Fluid and Electrolyte Balance  Outcome: Ongoing, Progressing     Problem: Functional Ability Impaired (Shoulder Arthroplasty)  Goal: Optimal Functional Ability  Outcome: Ongoing, Progressing     Problem: Infection (Shoulder Arthroplasty)  Goal: Absence of Infection Signs and Symptoms  Outcome: Ongoing, Progressing     Problem: Neurovascular Compromise (Shoulder Arthroplasty)  Goal: Intact Neurovascular Status  Outcome: Ongoing, Progressing     Problem: Ongoing Anesthesia Effects (Shoulder Arthroplasty)  Goal: Anesthesia/Sedation Recovery  Outcome: Ongoing, Progressing     Problem: Pain (Shoulder Arthroplasty)  Goal: Acceptable Pain Control  Outcome: Ongoing, Progressing     Problem: Postoperative Nausea and Vomiting (Shoulder Arthroplasty)  Goal: Nausea and Vomiting Relief  Outcome: Ongoing, Progressing     Problem: Postoperative Urinary Retention (Shoulder Arthroplasty)  Goal: Effective Urinary Elimination  Outcome: Ongoing, Progressing

## 2024-03-04 NOTE — PROGRESS NOTES
Preop complete. Family not present. Shoulder clipped and prepped. Report given to Kelli PAZ and patient brought to block. Anesthesia consent and updated H&P needed

## 2024-03-04 NOTE — BRIEF OP NOTE
[facility] - Surgery ([facility])  Brief Operative Note    SUMMARY     Surgery Date: 3/4/2024     Surgeon(s) and Role:     * Quinten Dominique MD - Primary    Assisting Surgeon: None    Pre-op Diagnosis:  Glenohumeral arthritis, left [M19.012]  Rotator cuff dysfunction, left [M67.912]  Biceps tendinitis of left upper extremity [M75.22]    Post-op Diagnosis:  Post-Op Diagnosis Codes:     * Glenohumeral arthritis, left [M19.012]     * Rotator cuff dysfunction, left [M67.912]     * Biceps tendinitis of left upper extremity [M75.22]    Procedure(s) (LRB):  ARTHROPLASTY, SHOULDER, TOTAL, REVERSE (Left)  BICEPS TENODESIS (Left)    Anesthesia: Regional    Implants:  Implant Name Type Inv. Item Serial No.  Lot No. LRB No. Used Action   Glenoid Standard Baseplate   [id number]  N/A Left 1 Implanted   Cannulated CoCr Standard Gelnosphere   [id number]  N/A Left 1 Implanted   BASEPLATE HUMERAL CNTR REV TY - [id number]  BASEPLATE HUMERAL CNTR REV TY [id number] [employer]  Left 1 Implanted   Standard PTC Humeral kStem   [id number]  N/A Left 1 Implanted   Reversed Insert   [id number]   Left 1 Implanted   IMPLANT COLLAGEN [id number] - [id number]  IMPLANT COLLAGEN [id number]  [employer] [id number] Left 1 Implanted   SCREW AEQUALIS CNTRL 6.5X35MM - [id number]  SCREW AEQUALIS CNTRL 6.5X35MM  [employer]  Left 1 Implanted   SCREW AEQUALIS PERIPH 5X22MM - [id number]  SCREW AEQUALIS PERIPH 5X22MM  [employer]  Left 1 Implanted   Screw Aqualis Peripheral 14      Left 1 Implanted   Screw Aqualis Peripheral 26      Left 1 Implanted   Screw Aqualis Peripheral 34      Left 1 Implanted       Operative Findings: glenohumeral OA    Estimated Blood Loss: 150 mL    Estimated Blood Loss has been documented.         Specimens:   Specimen (24h ago, onward)      None          DISPO; extubated, stable, to PACU    [id number]

## 2024-03-04 NOTE — ANESTHESIA PREPROCEDURE EVALUATION
03/04/2024  Shannan Norman is a 78 y.o., male Pre-operative evaluation for Procedure(s) (LRB):  ARTHROPLASTY, SHOULDER, TOTAL, REVERSE (Left)  BICEPS TENODESIS (Left)    Shannan Norman is a 78 y.o. male     Patient Active Problem List   Diagnosis    HTN (hypertension)    Gout, arthritis    Atrial flutter    PFO (patent foramen ovale)    Umbilical hernia    Asymptomatic varicose veins of both lower extremities    Nuclear sclerosis of both eyes    Left epiretinal membrane    Junctional escape rhythm    SSS (sick sinus syndrome)    Nonrheumatic aortic valve insufficiency    Benign prostatic hyperplasia with urinary frequency    Junctional bradycardia    Dyslipidemia associated with type 2 diabetes mellitus    Hypertension associated with diabetes    Quadriceps weakness    Right inguinal hernia    CKD (chronic kidney disease) stage 3, GFR 30-59 ml/min    Overweight (BMI 25.0-29.9)    Nuclear sclerotic cataract of right eye    Nuclear sclerotic cataract of left eye    AK (actinic keratosis)    History of melanoma    Lumbar spondylosis    Primary osteoarthritis of both hips    Decreased range of hip movement    Decreased strength    Pain of left upper extremity    Weakness    Stiffness due to immobility    Pelvic floor dysfunction    Basal cell carcinoma (BCC) of skin of left lower extremity including hip    PAF (paroxysmal atrial fibrillation)    Right hip pain    History of skin cancer    OAB (overactive bladder)    Presence of cardiac pacemaker    Familial hypercholesterolemia    Bradycardia    Polymyalgia rheumatica       Review of patient's allergies indicates:  No Known Allergies    No current facility-administered medications on file prior to encounter.     Current Outpatient Medications on File Prior to Encounter   Medication Sig Dispense Refill    ferrous sulfate (FEOSOL) 325 mg (65 mg iron) Tab tablet  Take 325 mg by mouth once daily.      levocetirizine (XYZAL) 5 MG tablet Take 1 tablet (5 mg total) by mouth every evening. 90 tablet 1    metoprolol tartrate (LOPRESSOR) 25 MG tablet Take 1 tablet (25 mg total) by mouth 2 (two) times daily as needed (palpitations). 45 tablet 0    multivitamin capsule Take 1 capsule by mouth nightly.       cycloSPORINE (RESTASIS) 0.05 % ophthalmic emulsion Place 1 drop into both eyes 2 (two) times daily. 60 each 11    econazole nitrate 1 % cream Apply topically 2 (two) times daily. 85 g 3    GLUCOSAMINE HCL/CHONDR VASQUEZ A NA (GLUCOSAMINE-CHONDROITIN) 750-600 mg Tab Take 2 tablets by mouth Daily.      imiquimod (ALDARA) 5 % cream Apply to skin cancer on left leg 5 times weekly for 6 weeks 24 packet 3    UNABLE TO FIND Take by mouth every morning. 6000 hydrolyzes collagen      XARELTO 20 mg Tab TAKE 1 TABLET BY MOUTH ONCE DAILY WITH SUPPER OR  EVENING  MEAL 90 tablet 3       Past Surgical History:   Procedure Laterality Date    ABLATION N/A 9/11/2018    Procedure: ABLATION;  Surgeon: Hany Sanchez MD;  Location: [facility] CATH LAB;  Service: Cardiology;  Laterality: N/A;  AFL, ISABELLE, RFA, ELODIA, MAC, DM, 3 PREP    ABLATION OF DYSRHYTHMIC FOCUS      ADENOIDECTOMY      CARDIAC PACEMAKER PLACEMENT      no defib    CATARACT EXTRACTION W/  INTRAOCULAR LENS IMPLANT Right 7/28/2020    Procedure: EXTRACTION, CATARACT, WITH IOL INSERTION;  Surgeon: Andrés Morse MD;  Location: [facility] OR;  Service: Ophthalmology;  Laterality: Right;    CATARACT EXTRACTION W/  INTRAOCULAR LENS IMPLANT Left 8/11/2020    Procedure: EXTRACTION, CATARACT, WITH IOL INSERTION;  Surgeon: Andrés Morse MD;  Location: [facility] OR;  Service: Ophthalmology;  Laterality: Left;    COLONOSCOPY N/A 1/2/2019    Procedure: COLONOSCOPY Suprep;  Surgeon: Cindy Solorzano MD;  Location: [facility] ENDO;  Service: Endoscopy;  Laterality: N/A;    GANGLION CYST EXCISION      left neck    HERNIA REPAIR  2013    umbilical    TENDON  REPAIR Right     wrist    TONSILLECTOMY      varicose veins      several sx  bilateral    VASECTOMY      VEIN SURGERY     Echo    Result Date: 2/21/2024    Left Ventricle: The left ventricle is mildly dilated. Normal wall   thickness. There is hyperdynamic systolic function. Ejection fraction by   visual approximation is 65%.    Right Ventricle: Normal right ventricular cavity size. Wall thickness   is normal. Right ventricle wall motion  is normal. Systolic function is   normal.    Left Atrium: Left atrium is mildly dilated.    Pulmonic Valve: There is mild regurgitation.    Pulmonary Artery: The estimated pulmonary artery systolic pressure is   30 mmHg.    IVC/SVC: Intermediate venous pressure at 8 mmHg.           Pre-op Assessment    I have reviewed the Patient Summary Reports.     I have reviewed the Nursing Notes. I have reviewed the NPO Status.   I have reviewed the Medications.     Review of Systems  Anesthesia Hx:  No problems with previous Anesthesia   History of prior surgery of interest to airway management or planning:          Denies Family Hx of Anesthesia complications.    Denies Personal Hx of Anesthesia complications.                    Social:  No Alcohol Use, Non-Smoker       Hematology/Oncology:  Hematology Normal   Oncology Normal                                   EENT/Dental:  EENT/Dental Normal           Cardiovascular:  Exercise tolerance: good   Hypertension                                        Pulmonary:  Pulmonary Normal                       Renal/:  Chronic Renal Disease, CKD                Hepatic/GI:  Hepatic/GI Normal                 Musculoskeletal:  Arthritis               Neurological:       Seizures                                Endocrine:  Diabetes, type 2           Psych:  Psychiatric Normal                    Physical Exam  General: Well nourished, Cooperative, Alert and Oriented    Airway:  Mallampati: III / II  Mouth Opening: Normal  TM Distance: Normal  Tongue:  Normal  Neck ROM: Normal ROM    Dental:  Intact    Chest/Lungs:  Clear to auscultation, Normal Respiratory Rate    Heart:  Rate: Normal  Rhythm: Regular Rhythm        Anesthesia Plan  Type of Anesthesia, risks & benefits discussed:    Anesthesia Type: Gen ETT, Regional  Intra-op Monitoring Plan: Standard ASA Monitors  Post Op Pain Control Plan: multimodal analgesia and IV/PO Opioids PRN  Induction:  IV  Airway Plan: Direct and Video, Post-Induction  Informed Consent: Informed consent signed with the Patient and all parties understand the risks and agree with anesthesia plan.  All questions answered. Patient consented to blood products? No  ASA Score: 3  Day of Surgery Review of History & Physical: H&P Update referred to the surgeon/provider.    Ready For Surgery From Anesthesia Perspective.     .

## 2024-03-04 NOTE — PROGRESS NOTES
Pt seen in [facility] prior to surgery. Pt has a DC SJM PPM programmed DDDR 60 with underlying rhythm c/w SB in the 40's. Reprogrammed to DOO 70. Please call device clinic for reprogramming after surgery at [phone].

## 2024-03-04 NOTE — ANESTHESIA POSTPROCEDURE EVALUATION
Anesthesia Post Evaluation    Patient: Shannan Norman    Procedure(s) Performed: Procedure(s) (LRB):  ARTHROPLASTY, SHOULDER, TOTAL, REVERSE (Left)  BICEPS TENODESIS (Left)    Final Anesthesia Type: general      Patient location during evaluation: PACU  Patient participation: Yes- Able to Participate  Level of consciousness: awake and alert and oriented  Post-procedure vital signs: reviewed and stable  Pain management: adequate  Airway patency: patent    PONV status at discharge: No PONV  Anesthetic complications: no      Cardiovascular status: hemodynamically stable  Respiratory status: unassisted and spontaneous ventilation  Hydration status: euvolemic  Follow-up not needed.          Vitals Value Taken Time   /59 03/04/24 1616   Temp 36.6 °C (97.9 °F) 03/04/24 1600   Pulse 60 03/04/24 1619   Resp 14 03/04/24 1619   SpO2 98 % 03/04/24 1619   Vitals shown include unvalidated device data.      Event Time   Out of Recovery 14:45:00         Pain/Delmar Score: Pain Rating Prior to Med Admin: 0 (3/4/2024  2:23 PM)  Pain Rating Post Med Admin: 0 (3/4/2024 10:40 AM)  Delmar Score: 9 (3/4/2024  2:45 PM)

## 2024-03-04 NOTE — ANESTHESIA PROCEDURE NOTES
L  interscalene catheter    Patient location during procedure: pre-op   Block not for primary anesthetic.  Reason for block: at surgeon's request and post-op pain management   Post-op Pain Location: left shoulder pain   Start time: 3/4/2024 9:00 AM  Timeout: 3/4/2024 9:00 AM   End time: 3/4/2024 9:20 AM    Staffing  Authorizing Provider: Grady Mosher MD  Performing Provider: Harini Ferrer MD    Staffing  Performed by: Harini Ferrer MD  Authorized by: Grady Mosher MD    Preanesthetic Checklist  Completed: patient identified, IV checked, site marked, risks and benefits discussed, surgical consent, monitors and equipment checked, pre-op evaluation and timeout performed  Peripheral Block  Patient position: sitting  Prep: ChloraPrep and site prepped and draped  Patient monitoring: heart rate, cardiac monitor, continuous pulse ox, continuous capnometry and frequent blood pressure checks  Block type: interscalene  Laterality: left  Injection technique: continuous  Needle  Needle type: Tuohy   Needle gauge: 18 G  Needle length: 2 in  Needle localization: anatomical landmarks and ultrasound guidance  Catheter type: non-stimulating  Catheter size: 20 G  Test dose: lidocaine 1.5% with Epi 1-to-200,000 and negative   -ultrasound image captured on disc.  Assessment  Injection assessment: negative aspiration, negative parasthesia and local visualized surrounding nerve  Paresthesia pain: none  Heart rate change: no  Slow fractionated injection: yes  Pain Tolerance: no complaints and comfortable throughout block      Additional Notes  VSS.  [facility] RN monitoring vitals throughout procedure.  Patient tolerated procedure well.    20 cc of 0.25% ropivacaine with 1:300,000 epinephrine administered

## 2024-03-05 VITALS
HEIGHT: 73 IN | BODY MASS INDEX: 26.11 KG/M2 | OXYGEN SATURATION: 96 % | TEMPERATURE: 96 F | DIASTOLIC BLOOD PRESSURE: 67 MMHG | WEIGHT: 197 LBS | RESPIRATION RATE: 16 BRPM | SYSTOLIC BLOOD PRESSURE: 133 MMHG | HEART RATE: 57 BPM

## 2024-03-05 LAB
BASOPHILS # BLD AUTO: 0.01 K/UL (ref 0–0.2)
BASOPHILS NFR BLD: 0.1 % (ref 0–1.9)
CREAT SERPL-MCNC: 0.8 MG/DL (ref 0.5–1.4)
DIFFERENTIAL METHOD BLD: ABNORMAL
EOSINOPHIL # BLD AUTO: 0 K/UL (ref 0–0.5)
EOSINOPHIL NFR BLD: 0 % (ref 0–8)
ERYTHROCYTE [DISTWIDTH] IN BLOOD BY AUTOMATED COUNT: 16.6 % (ref 11.5–14.5)
EST. GFR  (NO RACE VARIABLE): >60 ML/MIN/1.73 M^2
HCT VFR BLD AUTO: 42.1 % (ref 40–54)
HGB BLD-MCNC: 13.3 G/DL (ref 14–18)
IMM GRANULOCYTES # BLD AUTO: 0.06 K/UL (ref 0–0.04)
IMM GRANULOCYTES NFR BLD AUTO: 0.8 % (ref 0–0.5)
LYMPHOCYTES # BLD AUTO: 0.7 K/UL (ref 1–4.8)
LYMPHOCYTES NFR BLD: 9.8 % (ref 18–48)
MCH RBC QN AUTO: 28.8 PG (ref 27–31)
MCHC RBC AUTO-ENTMCNC: 31.6 G/DL (ref 32–36)
MCV RBC AUTO: 91 FL (ref 82–98)
MONOCYTES # BLD AUTO: 0.8 K/UL (ref 0.3–1)
MONOCYTES NFR BLD: 11.3 % (ref 4–15)
NEUTROPHILS # BLD AUTO: 5.6 K/UL (ref 1.8–7.7)
NEUTROPHILS NFR BLD: 78 % (ref 38–73)
NRBC BLD-RTO: 0 /100 WBC
PLATELET # BLD AUTO: 187 K/UL (ref 150–450)
PMV BLD AUTO: 11.4 FL (ref 9.2–12.9)
POCT GLUCOSE: 148 MG/DL (ref 70–110)
RBC # BLD AUTO: 4.62 M/UL (ref 4.6–6.2)
WBC # BLD AUTO: 7.24 K/UL (ref 3.9–12.7)

## 2024-03-05 PROCEDURE — 97112 NEUROMUSCULAR REEDUCATION: CPT

## 2024-03-05 PROCEDURE — 82962 GLUCOSE BLOOD TEST: CPT | Performed by: STUDENT IN AN ORGANIZED HEALTH CARE EDUCATION/TRAINING PROGRAM

## 2024-03-05 PROCEDURE — 25000003 PHARM REV CODE 250: Performed by: STUDENT IN AN ORGANIZED HEALTH CARE EDUCATION/TRAINING PROGRAM

## 2024-03-05 PROCEDURE — 97535 SELF CARE MNGMENT TRAINING: CPT

## 2024-03-05 PROCEDURE — 97116 GAIT TRAINING THERAPY: CPT

## 2024-03-05 PROCEDURE — 85025 COMPLETE CBC W/AUTO DIFF WBC: CPT | Performed by: STUDENT IN AN ORGANIZED HEALTH CARE EDUCATION/TRAINING PROGRAM

## 2024-03-05 PROCEDURE — 97165 OT EVAL LOW COMPLEX 30 MIN: CPT

## 2024-03-05 PROCEDURE — 99212 OFFICE O/P EST SF 10 MIN: CPT | Mod: ,,, | Performed by: ANESTHESIOLOGY

## 2024-03-05 PROCEDURE — 25000003 PHARM REV CODE 250: Performed by: PHYSICIAN ASSISTANT

## 2024-03-05 PROCEDURE — 36415 COLL VENOUS BLD VENIPUNCTURE: CPT | Performed by: STUDENT IN AN ORGANIZED HEALTH CARE EDUCATION/TRAINING PROGRAM

## 2024-03-05 PROCEDURE — 82565 ASSAY OF CREATININE: CPT | Performed by: STUDENT IN AN ORGANIZED HEALTH CARE EDUCATION/TRAINING PROGRAM

## 2024-03-05 PROCEDURE — 63600175 PHARM REV CODE 636 W HCPCS: Performed by: STUDENT IN AN ORGANIZED HEALTH CARE EDUCATION/TRAINING PROGRAM

## 2024-03-05 PROCEDURE — 97161 PT EVAL LOW COMPLEX 20 MIN: CPT

## 2024-03-05 RX ORDER — ROPIVACAINE HYDROCHLORIDE 2 MG/ML
INJECTION, SOLUTION EPIDURAL; INFILTRATION; PERINEURAL CONTINUOUS
Status: DISCONTINUED | OUTPATIENT
Start: 2024-03-05 | End: 2024-03-05 | Stop reason: HOSPADM

## 2024-03-05 RX ADMIN — HYPROMELLOSE 2910 2 DROP: 5 SOLUTION/ DROPS OPHTHALMIC at 10:03

## 2024-03-05 RX ADMIN — FAMOTIDINE 20 MG: 20 TABLET, FILM COATED ORAL at 08:03

## 2024-03-05 RX ADMIN — CELECOXIB 200 MG: 200 CAPSULE ORAL at 08:03

## 2024-03-05 RX ADMIN — DOCUSATE SODIUM AND SENNOSIDES 1 TABLET: 8.6; 5 TABLET, FILM COATED ORAL at 08:03

## 2024-03-05 RX ADMIN — DEXTROSE AND SODIUM CHLORIDE: 5; 900 INJECTION, SOLUTION INTRAVENOUS at 12:03

## 2024-03-05 RX ADMIN — TAMSULOSIN HYDROCHLORIDE 0.4 MG: 0.4 CAPSULE ORAL at 08:03

## 2024-03-05 RX ADMIN — ACETAMINOPHEN 1000 MG: 500 TABLET ORAL at 05:03

## 2024-03-05 RX ADMIN — RIVAROXABAN 20 MG: 10 TABLET, FILM COATED ORAL at 08:03

## 2024-03-05 RX ADMIN — LISINOPRIL 20 MG: 20 TABLET ORAL at 08:03

## 2024-03-05 RX ADMIN — CEFAZOLIN 2 G: 2 INJECTION, POWDER, FOR SOLUTION INTRAMUSCULAR; INTRAVENOUS at 01:03

## 2024-03-05 NOTE — HOSPITAL COURSE
On 3/4, the patient arrived to the [facility] for proper pre-operative management.  Upon completion of pre-operative preparation, the patient was taken back to the operative theatre. Left rTSA was performed without complication and the patient was transported to the post anesthesia care unit in stable condition.  After appropriate recovery from the anaesthetic agents used during the surgery, the patient was then transported to the hospital inpatient floor.  The interim of the hospital stay from arrival on the floor up to discharge has been uncomplicated. The patient has tolerated regular diet.  The patient's pain has been controlled using a multimodal approach. Currently, the patient's pain is well controlled on an oral regimen.  The patient has been voiding without difficulty.  The patient began participation in physical therapy after surgery and has progressed throughout the entire hospital stay.  Currently, the patient's progress is sufficient to allow the them to be discharged to home safely.  The patient agrees with this assessment and desires a discharge today.

## 2024-03-05 NOTE — DISCHARGE SUMMARY
[facility]  Orthopedics  Discharge Summary      Patient Name: Shannan Norman  MRN: [id number]  Admission Date: 3/4/2024  Hospital Length of Stay: 0 days  Discharge Date and Time:  03/05/2024 10:09 AM  Attending Physician: Quinten Dominique MD   Discharging Provider: ROSINA Mart MD  Primary Care Provider: Onofre Paulson MD    HPI:   Shannan Norman is a 78 y.o. male retiree that presents for evaluation for his left shoulder pain. He is referred by Dr. Potter. He notes that his pain started months ago with no injury or trauma. He received a corticosteroid injection with Dr. Potter on 8/29/23 that he states gave him moderate relief that lasted about 2 months. He notes that about 1.5 months ago his pain returned with increased intensity. He has a completed MRI. He has completed a course of directed home exercise with little to no relief. He complains of decreased strength and motion. He notes the sensation of instability however has not experienced any true instability.  He states his biggest issue is lack of motion of the shoulder.  He can not raise his arm to shoulder level.  This makes it difficult for him to perform basic activities of daily living.        Dominant Hand: Right     Occupation: retiree     SSV: 20%     Night Pain? Yes     Interfere with ADLs? Yes    Procedure(s) (LRB):  ARTHROPLASTY, SHOULDER, TOTAL, REVERSE (Left)  BICEPS TENODESIS (Left)      Hospital Course:  On 3/4, the patient arrived to the [facility] for proper pre-operative management.  Upon completion of pre-operative preparation, the patient was taken back to the operative theatre. Left rTSA was performed without complication and the patient was transported to the post anesthesia care unit in stable condition.  After appropriate recovery from the anaesthetic agents used during the surgery, the patient was then transported to the hospital inpatient floor.  The interim of the hospital stay from arrival  on the floor up to discharge has been uncomplicated. The patient has tolerated regular diet.  The patient's pain has been controlled using a multimodal approach. Currently, the patient's pain is well controlled on an oral regimen.  The patient has been voiding without difficulty.  The patient began participation in physical therapy after surgery and has progressed throughout the entire hospital stay.  Currently, the patient's progress is sufficient to allow the them to be discharged to home safely.  The patient agrees with this assessment and desires a discharge today.      Goals of Care Treatment Preferences:  Code Status: Full Code      Consults (From admission, onward)          Status Ordering Provider     Inpatient consult to Social Work/Case Management  Once        Provider:  (Not yet assigned)    Acknowledged YULIA BLANKENSHIP            Significant Diagnostic Studies: No pertinent studies.    Pending Diagnostic Studies:       None          Final Active Diagnoses:    Diagnosis Date Noted POA    PRINCIPAL PROBLEM:  Glenohumeral arthritis, left [M19.012] 03/04/2024 Yes      Problems Resolved During this Admission:      Discharged Condition: good    Disposition: Home or Self Care    Follow Up:    Patient Instructions:      Notify your health care provider if you experience any of the following:  increased confusion or weakness     Notify your health care provider if you experience any of the following:  persistent dizziness, light-headedness, or visual disturbances     Notify your health care provider if you experience any of the following:  worsening rash     Notify your health care provider if you experience any of the following:  severe persistent headache     Notify your health care provider if you experience any of the following:  difficulty breathing or increased cough     Notify your health care provider if you experience any of the following:  redness, tenderness, or signs of infection (pain, swelling,  redness, odor or green/yellow discharge around incision site)     Notify your health care provider if you experience any of the following:  severe uncontrolled pain     Notify your health care provider if you experience any of the following:  persistent nausea and vomiting or diarrhea     Notify your health care provider if you experience any of the following:  temperature >100.4     Medications:  Reconciled Home Medications:      Medication List        CHANGE how you take these medications      allopurinoL 100 MG tablet  Commonly known as: ZYLOPRIM  Take 1 tablet by mouth once daily  What changed:   how to take this  when to take this     atorvastatin 40 MG tablet  Commonly known as: LIPITOR  Take 1 tablet (40 mg total) by mouth once daily.  What changed: when to take this     benazepriL 20 MG tablet  Commonly known as: LOTENSIN  Take 1 tablet (20 mg total) by mouth once daily.  What changed: when to take this            CONTINUE taking these medications      celecoxib 200 MG capsule  Commonly known as: CeleBREX  Take 1 capsule (200 mg total) by mouth 2 (two) times daily with meals.     cycloSPORINE 0.05 % ophthalmic emulsion  Commonly known as: RESTASIS  Place 1 drop into both eyes 2 (two) times daily.     econazole nitrate 1 % cream  Apply topically 2 (two) times daily.     ferrous sulfate 325 mg (65 mg iron) Tab tablet  Commonly known as: FEOSOL  Take 325 mg by mouth once daily.     glucosamine-chondroitin 750-600 mg Tab  Take 2 tablets by mouth Daily.     imiquimod 5 % cream  Commonly known as: ALDARA  Apply to skin cancer on left leg 5 times weekly for 6 weeks     levocetirizine 5 MG tablet  Commonly known as: XYZAL  Take 1 tablet (5 mg total) by mouth every evening.     methocarbamoL 500 MG Tab  Commonly known as: ROBAXIN  Take 1 tablet (500 mg total) by mouth 3 (three) times daily as needed (muscle spasms).     metoprolol tartrate 25 MG tablet  Commonly known as: LOPRESSOR  Take 1 tablet (25 mg total) by  mouth 2 (two) times daily as needed (palpitations).     multivitamin capsule  Take 1 capsule by mouth nightly.     oxybutynin 10 MG 24 hr tablet  Commonly known as: DITROPAN-XL  Take 1 tablet (10 mg total) by mouth once daily.     oxyCODONE 5 MG immediate release tablet  Commonly known as: ROXICODONE  Take 1 tablet (5 mg total) by mouth every 6 (six) hours as needed for Pain.     tamsulosin 0.4 mg Cap  Commonly known as: FLOMAX  TAKE 1 CAPSULE BY MOUTH AFTER DINNER     UNABLE TO FIND  Take by mouth every morning. 6000 hydrolyzes collagen     XARELTO 20 mg Tab  Generic drug: rivaroxaban  TAKE 1 TABLET BY MOUTH ONCE DAILY WITH SUPPER OR  EVENING  MEAL              ROSINA Mart MD  Orthopedics  [facility] - Surgery

## 2024-03-05 NOTE — PT/OT/SLP EVAL
"Physical Therapy Co-Evaluation and Discharge Note    Patient Name:  Shannan Norman   MRN:  [id number]    Recommendations:     Discharge Recommendations: Low Intensity Therapy  Discharge Equipment Recommendations: none   Barriers to discharge: None    Assessment:     Shannan Norman is a 78 y.o. male admitted with a medical diagnosis of Glenohumeral arthritis, left. .  At this time, patient is functioning at their prior level of function and does not require further acute PT services.     Recent Surgery: Procedure(s) (LRB):  ARTHROPLASTY, SHOULDER, TOTAL, REVERSE (Left)  BICEPS TENODESIS (Left) 1 Day Post-Op    Plan:     During this hospitalization, patient does not require further acute PT services.  Please re-consult if situation changes.      Subjective     "I have an arctic ice at home and 4 snapped on shirts"    Pain/Comfort:  Pain Rating 1: 0/10    Patients cultural, spiritual, Confucianist conflicts given the current situation: no    Living Environment:  Per pt, pt resides alone in [facility] however mostly resides on first floor with walk-in shower and built in bench. His two sons are flying in to assist for couple of weeks.  Prior to admission, patients level of function was I.  Equipment used at home: rollator, wheelchair (hurrycane; stated "i have it all").  DME owned (not currently used):  rollator, wheelchair, hurrycane .  Upon discharge, patient will have assistance from sons.    Objective:     Communicated with RN prior to session.  Patient found HOB elevated with perineural catheter upon PT entry to room.    General Precautions: Standard, fall    Orthopedic Precautions:LUE non weight bearing   Braces: UE Sling  Respiratory Status: Room air    Exams:  RLE ROM: WFL  RLE Strength: WFL  LLE ROM: WFL  LLE Strength: WFL    Functional Mobility:  Bed Mobility HOB 30:     Supine to Sit: supervision  Transfers:     Sit to Stand:  supervision with no AD  Bed to Chair: supervision with  no AD  using  Step Transfer  Gait: " pt amb ~300' no AD supvn and presented with good trever, reciprocal gait, R arm swing present, good upright posture   Balance:   Good sitting balance  Good standing balance    AM-PAC 6 CLICK MOBILITY  Total Score:20       Treatment and Education:  Discussed WB precautions and mobility restrictions, donned/doffed sling  Discussed and demonstrated dressing upper and lower body, see OT note for details  Discussed sitting upright in chair >1hr, pt agreeable  Demonstration and provided HEP handout of exercises prescribed by MD. No questions or concerns noted.    Pt is at functional baseline with no acute needs. PT to sign off. Please reconsult if status changes.  Educated pt on PT role/POC  Educated pt on importance of OOB activity and daily ambulation   Pt educated on proper body mechanics, safety techniques, and energy conservation with PT facilitation and cueing throughout session   Pt verbalized understanding        AM-PAC 6 CLICK MOBILITY  Total Score:20     Patient left up in chair with all lines intact, call button in reach, RN notified, and OT present.    GOALS:   Multidisciplinary Problems       Physical Therapy Goals       Not on file              Multidisciplinary Problems (Resolved)          Problem: Physical Therapy    Goal Priority Disciplines Outcome Goal Variances Interventions   Physical Therapy Goal   (Resolved)     PT, PT/OT Met     Description: Pt is at functional baseline with no acute needs. PT to sign off. Please reconsult if status changes.                           History:     Past Medical History:   Diagnosis Date    Allergy     Arthritis     Atrial fibrillation     Atrial flutter 2011    ablation    Basal cell carcinoma     Cancer     Cataract     CKD (chronic kidney disease) stage 3, GFR 30-59 ml/min 8/13/2019    Diabetes mellitus     Dry eye syndrome     Gout, unspecified     High cholesterol     History of shingles     Hypertension     Melanoma     Seizures        Past Surgical History:    Procedure Laterality Date    ABLATION N/A 9/11/2018    Procedure: ABLATION;  Surgeon: Hany Sanchez MD;  Location: [facility] CATH LAB;  Service: Cardiology;  Laterality: N/A;  AFL, ISABELLE, RFA, ELODIA, MAC, DM, 3 PREP    ABLATION OF DYSRHYTHMIC FOCUS      ADENOIDECTOMY      CARDIAC PACEMAKER PLACEMENT      no defib    CATARACT EXTRACTION W/  INTRAOCULAR LENS IMPLANT Right 7/28/2020    Procedure: EXTRACTION, CATARACT, WITH IOL INSERTION;  Surgeon: Andrés Morse MD;  Location: [facility] OR;  Service: Ophthalmology;  Laterality: Right;    CATARACT EXTRACTION W/  INTRAOCULAR LENS IMPLANT Left 8/11/2020    Procedure: EXTRACTION, CATARACT, WITH IOL INSERTION;  Surgeon: Andrés Morse MD;  Location: [facility] OR;  Service: Ophthalmology;  Laterality: Left;    COLONOSCOPY N/A 1/2/2019    Procedure: COLONOSCOPY Suprep;  Surgeon: Cindy Solorzano MD;  Location: [facility] ENDO;  Service: Endoscopy;  Laterality: N/A;    FIXATION OF TENDON Left 3/4/2024    Procedure: BICEPS TENODESIS;  Surgeon: Quinten Dominique MD;  [address];  Service: Orthopedics;  Laterality: Left;  WITH CATHETER    GANGLION CYST EXCISION      left neck    HERNIA REPAIR  2013    umbilical    REVERSE TOTAL SHOULDER ARTHROPLASTY Left 3/4/2024    Procedure: ARTHROPLASTY, SHOULDER, TOTAL, REVERSE;  Surgeon: Quinten Dominique MD;  [address];  Service: Orthopedics;  Laterality: Left;  WITH CATHETER    TENDON REPAIR Right     wrist    TONSILLECTOMY      varicose veins      several sx  bilateral    VASECTOMY      VEIN SURGERY         Time Tracking:     PT Received On: 03/05/24  PT Start Time: 0938   1156  PT Stop Time: 1014   1200  PT Total Time (min): 36 min     Billable Minutes: Evaluation 13, Gait Training 10, and Neuromuscular Re-education 13    Co-treatment performed due to patient's multiple deficits requiring two skilled therapists to appropriately and safely assess patient's strength and endurance while facilitating  functional tasks in addition to accommodating for patient's activity tolerance.       03/05/2024

## 2024-03-05 NOTE — ADDENDUM NOTE
Addendum  created 03/05/24 1041 by Rickey Cazares MD    Clinical Note Signed, Pend clinical note

## 2024-03-05 NOTE — ASSESSMENT & PLAN NOTE
78 y.o. male POD1 s/p L rTSA with Dr. Dominique     Pain control: MM  PT/OT: NWB LUE   DVT PPx: Xarelto 20mg at baseline  Labs: Hgb 13.3  Drain: none  Russo: none    Dispo: dc today, FU in 2 weeks, appt scheduled

## 2024-03-05 NOTE — SUBJECTIVE & OBJECTIVE
"Principal Problem:Glenohumeral arthritis, left    Principal Orthopedic Problem: same as above s/p L rTSA 3/4     Interval History: NAEO. VSS. Pain well controlled in his L shoulder. Hgb 13.3     Review of patient's allergies indicates:  No Known Allergies    Current Facility-Administered Medications   Medication    acetaminophen tablet 1,000 mg    allopurinoL tablet 100 mg    artificial tears 0.5 % ophthalmic solution 2 drop    artificial tears 0.5 % ophthalmic solution 2 drop    atorvastatin tablet 40 mg    celecoxib capsule 200 mg    dextrose 5 % and 0.9 % NaCl infusion    famotidine tablet 20 mg    lisinopriL tablet 20 mg    methocarbamoL tablet 500 mg    metoprolol tartrate (LOPRESSOR) tablet 25 mg    mupirocin 2 % ointment 1 g    ondansetron disintegrating tablet 8 mg    ondansetron injection 4 mg    oxybutynin 24 hr tablet 10 mg    oxyCODONE immediate release tablet 5 mg    polyethylene glycol packet 17 g    pregabalin capsule 75 mg    prochlorperazine injection Soln 5 mg    rivaroxaban tablet 20 mg    ROPIvacaine (PF) 2 mg/ml (0.2%) solution    senna-docusate 8.6-50 mg per tablet 1 tablet    sodium chloride 0.9% flush 10 mL    tamsulosin 24 hr capsule 0.4 mg     Objective:     Vital Signs (Most Recent):  Temp: 96.2 °F (35.7 °C) (03/05/24 0731)  Pulse: 63 (03/05/24 0731)  Resp: 16 (03/05/24 0731)  BP: 132/70 (03/05/24 0731)  SpO2: 97 % (03/05/24 0731) Vital Signs (24h Range):  Temp:  [96.2 °F (35.7 °C)-98.3 °F (36.8 °C)] 96.2 °F (35.7 °C)  Pulse:  [60-72] 63  Resp:  [12-30] 16  SpO2:  [93 %-99 %] 97 %  BP: (101-139)/(56-75) 132/70     Weight: 89.4 kg (197 lb)  Height: 6' 1" (185.4 cm)  Body mass index is 25.99 kg/m².      Intake/Output Summary (Last 24 hours) at 3/5/2024 0857  Last data filed at 3/5/2024 0514  Gross per 24 hour   Intake 1253.48 ml   Output 1750 ml   Net -496.52 ml        Ortho/SPM Exam  LUE:   Polar care and sling in place  NVI distally   SILT aside from chronic numbness of index finger   "   Significant Labs: All pertinent labs within the past 24 hours have been reviewed.    Significant Imaging: I have reviewed and interpreted all pertinent imaging results/findings.

## 2024-03-05 NOTE — NURSING
Anesthesia team completed discharge teaching for nimbus pump to take home with follow-up instructions

## 2024-03-05 NOTE — PT/OT/SLP EVAL
"Occupational Therapy   Evaluation    Name: Shannan Norman  MRN: [id number]  Admitting Diagnosis: Glenohumeral arthritis, left  Recent Surgery: Procedure(s) (LRB):  ARTHROPLASTY, SHOULDER, TOTAL, REVERSE (Left)  BICEPS TENODESIS (Left) 1 Day Post-Op    Recommendations:     Discharge Recommendations: Low Intensity Therapy  Discharge Equipment Recommendations:  none  Barriers to discharge:  None    Assessment:     Shannan Norman is a 78 y.o. male with a medical diagnosis of Glenohumeral arthritis, left.  He presents with no c/o pain however eager to get dressed to get home. Performance deficits affecting function: impaired endurance, weakness, impaired self care skills, impaired functional mobility, decreased upper extremity function, orthopedic precautions.  Patient currently demonstrates a need for low intensity therapy on a scheduled basis secondary to a decline in functional status due to illness    Rehab Prognosis: Good; patient would benefit from acute skilled OT services to address these deficits and reach maximum level of function.       Plan:     Patient to be seen 3 x/week to address the above listed problems via self-care/home management, therapeutic activities, therapeutic exercises, neuromuscular re-education  Plan of Care Expires: 04/04/24  Plan of Care Reviewed with: patient    Subjective     Chief Complaint: None verbalized  Patient/Family Comments/goals: Pt.reports this is a good time to get dressed     Occupational Profile:  Per pt, pt resides alone in [facility] however mostly resides on first floor with walk-in shower and built in bench. His two sons are flying in to assist for couple of weeks.  Prior to admission, patients level of function was Ind fxl mob and ADLs.  Equipment used at home: rollator, wheelchair (hurrycane; stated "i have it all").  DME owned (not currently used):  rollator, wheelchair, hurrycane .  Upon discharge, patient will have assistance from sons.    Pain/Comfort:  Pain Rating 1: " 0/10    Patients cultural, spiritual, Adventism conflicts given the current situation: no    Objective:     Communicated with: Nurse prior to session.  Patient found HOB elevated with perineural catheter upon OT entry to room.    General Precautions: Standard, fall  Orthopedic Precautions: LUE non weight bearing  Braces: UE brace  Respiratory Status: Room air    Occupational Performance:    Bed Mobility:    Patient completed Supine to Sit with supervision    Functional Mobility/Transfers:  Patient completed Sit <> Stand Transfer with supervision  with  no assistive device   Functional Mobility: Room level fxl mob Ind with no AD  Fxl mob in the jacques with PT    Activities of Daily Living:  Upper Body Dressing: maximal assistance don shirt   Lower Body Dressing: moderate assistance don pants and shoes    Cognitive/Visual Perceptual:  Cognitive/Psychosocial Skills:     -       Oriented to: Person, Place, and Situation   -       Follows Commands/attention:Follows multistep  commands  -       Communication: clear/fluent  -       Memory: No Deficits noted  -       Safety awareness/insight to disability: impaired   -       Mood/Affect/Coping skills/emotional control: Appropriate to situation    Physical Exam:  Upper Extremity Range of Motion:     -       Right Upper Extremity: WFL  -       Left Upper Extremity: JOSEPH  Upper Extremity Strength:    -       Right Upper Extremity: WFL  -       Left Upper Extremity: JOSEPH    Strength:    -       Right Upper Extremity: WFL  -       Left Upper Extremity: JOSEPH  R hand dominant    AMPAC 6 Click ADL:  AMPAC Total Score: 17    Treatment & Education:  Pt.educated and reviewed WB precautions  Pt.educated on edema management,dressing techniques, shoe etiquette, and energy conservation upon d/c returning to home to maintain roles  Pt educated on role of occupational therapy, POC, and safety during ADLs and functional mobility. Pt and OT discussed importance of safe, continued mobility to  optimize daily living skills. Pt verbalized understanding. Pt given instruction to call for medical staff/nurse for assistance.   PT present for coeval due to pt's multiple medical comorbidities and functional/cognition deficits requiring two skilled therapists to appropriately progress pt's musculoskeletal strength, neuromuscular control, and endurance while taking into consideration medical acuity and pt safety.       Patient left up in chair with all lines intact and call button in reach    GOALS:   Multidisciplinary Problems       Occupational Therapy Goals          Problem: Occupational Therapy    Goal Priority Disciplines Outcome Interventions   Occupational Therapy Goal     OT, PT/OT Ongoing, Progressing    Description: Goals to be met by: 4/4/24     Patient will increase functional independence with ADLs by performing:    UE Dressing with Minimal Assistance.  Grooming while standing with Supervision.  Toileting from toilet with Modified [place] for hygiene and clothing management.   Toilet transfer to toilet with Modified [place].                         History:     Past Medical History:   Diagnosis Date    Allergy     Arthritis     Atrial fibrillation     Atrial flutter 2011    ablation    Basal cell carcinoma     Cancer     Cataract     CKD (chronic kidney disease) stage 3, GFR 30-59 ml/min 8/13/2019    Diabetes mellitus     Dry eye syndrome     Gout, unspecified     High cholesterol     History of shingles     Hypertension     Melanoma     Seizures          Past Surgical History:   Procedure Laterality Date    ABLATION N/A 9/11/2018    Procedure: ABLATION;  Surgeon: Hany Sanchez MD;  Location: [facility] CATH LAB;  Service: Cardiology;  Laterality: N/A;  AFL, ISABELLE, RFA, ELODIA, MAC, DM, 3 PREP    ABLATION OF DYSRHYTHMIC FOCUS      ADENOIDECTOMY      CARDIAC PACEMAKER PLACEMENT      no defib    CATARACT EXTRACTION W/  INTRAOCULAR LENS IMPLANT Right 7/28/2020    Procedure: EXTRACTION, CATARACT, WITH  IOL INSERTION;  Surgeon: Andrés Morse MD;  Location: [facility];  Service: Ophthalmology;  Laterality: Right;    CATARACT EXTRACTION W/  INTRAOCULAR LENS IMPLANT Left 8/11/2020    Procedure: EXTRACTION, CATARACT, WITH IOL INSERTION;  Surgeon: Andrés Morse MD;  Location: [facility] OR;  Service: Ophthalmology;  Laterality: Left;    COLONOSCOPY N/A 1/2/2019    Procedure: COLONOSCOPY Suprep;  Surgeon: Cindy Solorzano MD;  Location: [facility] ENDO;  Service: Endoscopy;  Laterality: N/A;    FIXATION OF TENDON Left 3/4/2024    Procedure: BICEPS TENODESIS;  Surgeon: Quinten Dominique MD;  [facility];  Service: Orthopedics;  Laterality: Left;  WITH CATHETER    GANGLION CYST EXCISION      left neck    HERNIA REPAIR  2013    umbilical    REVERSE TOTAL SHOULDER ARTHROPLASTY Left 3/4/2024    Procedure: ARTHROPLASTY, SHOULDER, TOTAL, REVERSE;  Surgeon: Quinten Dominique MD;  [facility];  Service: Orthopedics;  Laterality: Left;  WITH CATHETER    TENDON REPAIR Right     wrist    TONSILLECTOMY      varicose veins      several sx  bilateral    VASECTOMY      VEIN SURGERY         Time Tracking:     OT Date of Treatment: 03/05/24  OT Start Time: 0938  OT Stop Time: 1014  OT Total Time (min): 36 min    Billable Minutes:Evaluation 10  Self Care/Home Management 26    3/5/2024

## 2024-03-05 NOTE — PLAN OF CARE
Problem: Adult Inpatient Plan of Care  Goal: Plan of Care Review  Outcome: Ongoing, Progressing  Goal: Patient-Specific Goal (Individualized)  Outcome: Ongoing, Progressing  Goal: Absence of Hospital-Acquired Illness or Injury  Outcome: Ongoing, Progressing  Goal: Optimal Comfort and Wellbeing  Outcome: Ongoing, Progressing  Goal: Readiness for Transition of Care  Outcome: Ongoing, Progressing     Problem: Adjustment to Surgery (Shoulder Arthroplasty)  Goal: Optimal Coping  Outcome: Ongoing, Progressing     Problem: Bowel Motility Impaired (Shoulder Arthroplasty)  Goal: Effective Bowel Elimination  Outcome: Ongoing, Progressing     Problem: Fluid and Electrolyte Imbalance (Shoulder Arthroplasty)  Goal: Fluid and Electrolyte Balance  Outcome: Ongoing, Progressing     Problem: Neurovascular Compromise (Shoulder Arthroplasty)  Goal: Intact Neurovascular Status  Outcome: Ongoing, Progressing     Problem: Pain (Shoulder Arthroplasty)  Goal: Acceptable Pain Control  Outcome: Ongoing, Progressing    Pt AAOx4. RA. IS started w/ pt reaching 1750ml level. IV ABX continues as scheduled. D5NS infusing @125ml/hr and Ropivacaine infusing @0.1ml/hr. NWB LUE; remains in sling w/ polar ice. No c/o pain voiced/expressed. Bed in lowest position, wheels locked, siderails up x2 and call light within reach. No s/s of distress noted. Monitoring continues.

## 2024-03-05 NOTE — PROGRESS NOTES
Discharge instructions and AVS given to and reviewed with patient and family. Both verbalized understanding. PIV removed. Medications delivered to bedside. Awaiting transport

## 2024-03-05 NOTE — NURSING
Nurses Note -- 4 Eyes      3/4/2024   6:24 PM      Skin assessed during: Admit      [x] No Altered Skin Integrity Present    []Prevention Measures Documented      [] Yes- Altered Skin Integrity Present or Discovered   [] LDA Added if Not in Epic (Describe Wound)   [] New Altered Skin Integrity was Present on Admit and Documented in LDA   [] Wound Image Taken    Wound Care Consulted? No    Attending Nurse:  Leila Burleson RN     Second RN/Staff Member:   JACKSON Tucker

## 2024-03-05 NOTE — ANESTHESIA POST-OP PAIN MANAGEMENT
Acute Pain Service Progress Note    Shannan Norman is a 78 y.o., male, [id number].    Surgery:  ARTHROPLASTY, SHOULDER, TOTAL, REVERSE (Left: Shoulder) - WITH CATHETER   BICEPS TENODESIS (Left: Shoulder) - WITH CATHETER     Post Op Day #: 1    Catheter type: L interscalene catheter    Infusion type: Ropivacaine 0.2% 7cc q3h IB + 5cc q30min DB    Problem List:    Active Hospital Problems    Diagnosis  POA    *Glenohumeral arthritis, left [M19.012]  Yes      Resolved Hospital Problems   No resolved problems to display.       Subjective:     General appearance of alert, oriented, no complaints   Pain with rest: 1    Numbers   Pain with movement: 1    Numbers   Side Effects    1. Pruritis No    2. Nausea No    3. Motor Blockade No, 0=Ability to raise lower extremities off bed    4. Sedation No, 1=awake and alert    Objective:     Catheter site clean, dry, intact         Vitals   Vitals:    03/05/24 0731   BP: 132/70   Pulse: 63   Resp: 16   Temp: 35.7 °C (96.2 °F)        Labs    Admission on 03/04/2024   Component Date Value Ref Range Status    Hemoglobin A1C 03/04/2024 6.1 (H)  4.0 - 5.6 % Final    Estimated Avg Glucose 03/04/2024 128  68 - 131 mg/dL Final    POCT Glucose 03/04/2024 106  70 - 110 mg/dL Final    WBC 03/05/2024 7.24  3.90 - 12.70 K/uL Final    RBC 03/05/2024 4.62  4.60 - 6.20 M/uL Final    Hemoglobin 03/05/2024 13.3 (L)  14.0 - 18.0 g/dL Final    Hematocrit 03/05/2024 42.1  40.0 - 54.0 % Final    MCV 03/05/2024 91  82 - 98 fL Final    MCH 03/05/2024 28.8  27.0 - 31.0 pg Final    MCHC 03/05/2024 31.6 (L)  32.0 - 36.0 g/dL Final    RDW 03/05/2024 16.6 (H)  11.5 - 14.5 % Final    Platelets 03/05/2024 187  150 - 450 K/uL Final    MPV 03/05/2024 11.4  9.2 - 12.9 fL Final    Immature Granulocytes 03/05/2024 0.8 (H)  0.0 - 0.5 % Final    Gran # (ANC) 03/05/2024 5.6  1.8 - 7.7 K/uL Final    Immature Grans (Abs) 03/05/2024 0.06 (H)  0.00 - 0.04 K/uL Final    Lymph # 03/05/2024 0.7 (L)  1.0 - 4.8 K/uL Final    Mono  # 03/05/2024 0.8  0.3 - 1.0 K/uL Final    Eos # 03/05/2024 0.0  0.0 - 0.5 K/uL Final    Baso # 03/05/2024 0.01  0.00 - 0.20 K/uL Final    nRBC 03/05/2024 0  0 /100 WBC Final    Gran % 03/05/2024 78.0 (H)  38.0 - 73.0 % Final    Lymph % 03/05/2024 9.8 (L)  18.0 - 48.0 % Final    Mono % 03/05/2024 11.3  4.0 - 15.0 % Final    Eosinophil % 03/05/2024 0.0  0.0 - 8.0 % Final    Basophil % 03/05/2024 0.1  0.0 - 1.9 % Final    Differential Method 03/05/2024 Automated   Final    Creatinine 03/05/2024 0.8  0.5 - 1.4 mg/dL Final    eGFR 03/05/2024 >60.0  >60 mL/min/1.73 m^2 Final        Meds   Current Facility-Administered Medications   Medication Dose Route Frequency Provider Last Rate Last Admin    acetaminophen tablet 1,000 mg  1,000 mg Oral Q6H Qiunten Dominique MD   1,000 mg at 03/05/24 0511    allopurinoL tablet 100 mg  100 mg Oral Nightly Quinten Dominique MD   100 mg at 03/04/24 2037    atorvastatin tablet 40 mg  40 mg Oral QHS Quinten Dominique MD   40 mg at 03/04/24 2038    celecoxib capsule 200 mg  200 mg Oral BID  Quinten Dominique MD        dextrose 5 % and 0.9 % NaCl infusion   Intravenous Continuous Quinten Dominique  mL/hr at 03/05/24 0059 New Bag at 03/05/24 0059    famotidine tablet 20 mg  20 mg Oral BID Quinten Dominique MD   20 mg at 03/04/24 2038    lisinopriL tablet 20 mg  20 mg Oral Daily Quinten Dominique MD   20 mg at 03/04/24 1506    methocarbamoL tablet 500 mg  500 mg Oral TID PRN Quinten Dominique MD        metoprolol tartrate (LOPRESSOR) tablet 25 mg  25 mg Oral BID PRN Quinten Dominique MD        mupirocin 2 % ointment 1 g  1 g Nasal BID Quinten Dominique MD        ondansetron disintegrating tablet 8 mg  8 mg Oral Q8H PRN Quinten Dominique MD        ondansetron injection 4 mg  4 mg Intravenous Q12H PRN Quinten Dominique MD        oxybutynin 24 hr tablet 10 mg  10 mg Oral Daily Quinten Dominique MD        oxyCODONE immediate release tablet 5 mg  5 mg Oral Q3H PRN Quinten Dominique  MD KAM        polyethylene glycol packet 17 g  17 g Oral Daily Quinten Dominique MD   17 g at 03/04/24 1506    pregabalin capsule 75 mg  75 mg Oral QHS Quinten Dominique MD   75 mg at 03/04/24 2040    prochlorperazine injection Soln 5 mg  5 mg Intravenous Q6H PRN Quinten Dominique MD        rivaroxaban tablet 20 mg  20 mg Oral Daily Quinten Dominique MD        ROPIvacaine (PF) 2 mg/ml (0.2%) solution  0.1 mL/hr Perineural Continuous Quinten Dominique MD 0.1 mL/hr at 03/04/24 1423 0.1 mL/hr at 03/04/24 1423    senna-docusate 8.6-50 mg per tablet 1 tablet  1 tablet Oral BID Quinten Dominique MD   1 tablet at 03/04/24 2037    sodium chloride 0.9% flush 10 mL  10 mL Intravenous PRN Quinten Dominique MD        tamsulosin 24 hr capsule 0.4 mg  0.4 mg Oral Daily Quinten Dominique MD   0.4 mg at 03/04/24 1506        Anticoagulant dose Xarelto at 20 mg QD.    Assessment:     Pain control adequate. No PRN use overnight.    Plan:     Patient doing well, continue present treatment.    1) Continue PNC, will discharge with Nimbus  2) Continue multimodals   -Tylenol 1g Q8h   -Lyrica 75mg Qhs   -Robaxin 500 mg TID PRN  3) Oxycodone 5 mg q3h PRN  4) PT/OT    Rickey Cazares MD  Anesthesiology, PGY3  [facility]

## 2024-03-05 NOTE — PLAN OF CARE
[facility]  Discharge Final Note    Primary Care Provider: Onofre Paulson MD    Expected Discharge Date: 3/5/2024    Final Discharge Note (most recent)       Final Note - 03/05/24 1257          Final Note    Assessment Type Final Discharge Note     Anticipated Discharge Disposition Home or Self Care     Hospital Resources/Appts/Education Provided Provided patient/caregiver with written discharge plan information;Provided education on problems/symptoms using teachback                   Future Appointments   Date Time Provider Department Center   3/18/2024  2:00 PM Franca Boston, PT [facility]   3/19/2024 11:30 AM Steve Major, PA-C [facility]   4/2/2024  7:00 AM SPECIMENMIKA SPECLAB [facility]   4/2/2024  7:30 AM LAB[facility]YANNA LAB [facility]   4/4/2024  9:00 AM Ben Robles MD [facility]   4/5/2024  9:00 AM Onofre Paulson MD [facility] MED [facility]   4/10/2024 10:20 AM Debbie Bills OD [facility] [place]   4/16/2024 11:30 AM Quinten Dominique MD [facility]

## 2024-03-05 NOTE — PLAN OF CARE
Description: Goals to be met by: 4/4/24     Patient will increase functional independence with ADLs by performing:    UE Dressing with Minimal Assistance.  Grooming while standing with Supervision.  Toileting from toilet with Modified [place] for hygiene and clothing management.   Toilet transfer to toilet with Modified [place].    3/5/2024 1134 by Edie Love OT  Outcome: Ongoing, Progressing     Problem: Occupational Therapy  Goal: Occupational Therapy Goal  Outcome: Ongoing, Progressing

## 2024-03-05 NOTE — PROGRESS NOTES
[facility]  Orthopedics  Progress Note    Patient Name: Shannan Norman  MRN: [id number]  Admission Date: 3/4/2024  Hospital Length of Stay: 0 days  Attending Provider: Quinten Dominique MD  Primary Care Provider: Onofre Paulson MD  Follow-up For: Procedure(s) (LRB):  ARTHROPLASTY, SHOULDER, TOTAL, REVERSE (Left)  BICEPS TENODESIS (Left)    Post-Operative Day: 1 Day Post-Op  Subjective:     Principal Problem:Glenohumeral arthritis, left    Principal Orthopedic Problem: same as above s/p L rTSA 3/4     Interval History: NAEO. VSS. Pain well controlled in his L shoulder. Hgb 13.3     Review of patient's allergies indicates:  No Known Allergies    Current Facility-Administered Medications   Medication    acetaminophen tablet 1,000 mg    allopurinoL tablet 100 mg    artificial tears 0.5 % ophthalmic solution 2 drop    artificial tears 0.5 % ophthalmic solution 2 drop    atorvastatin tablet 40 mg    celecoxib capsule 200 mg    dextrose 5 % and 0.9 % NaCl infusion    famotidine tablet 20 mg    lisinopriL tablet 20 mg    methocarbamoL tablet 500 mg    metoprolol tartrate (LOPRESSOR) tablet 25 mg    mupirocin 2 % ointment 1 g    ondansetron disintegrating tablet 8 mg    ondansetron injection 4 mg    oxybutynin 24 hr tablet 10 mg    oxyCODONE immediate release tablet 5 mg    polyethylene glycol packet 17 g    pregabalin capsule 75 mg    prochlorperazine injection Soln 5 mg    rivaroxaban tablet 20 mg    ROPIvacaine (PF) 2 mg/ml (0.2%) solution    senna-docusate 8.6-50 mg per tablet 1 tablet    sodium chloride 0.9% flush 10 mL    tamsulosin 24 hr capsule 0.4 mg     Objective:     Vital Signs (Most Recent):  Temp: 96.2 °F (35.7 °C) (03/05/24 0731)  Pulse: 63 (03/05/24 0731)  Resp: 16 (03/05/24 0731)  BP: 132/70 (03/05/24 0731)  SpO2: 97 % (03/05/24 0731) Vital Signs (24h Range):  Temp:  [96.2 °F (35.7 °C)-98.3 °F (36.8 °C)] 96.2 °F (35.7 °C)  Pulse:  [60-72] 63  Resp:  [12-30] 16  SpO2:  [93 %-99 %] 97 %  BP:  "(101-139)/(56-75) 132/70     Weight: 89.4 kg (197 lb)  Height: 6' 1" (185.4 cm)  Body mass index is 25.99 kg/m².      Intake/Output Summary (Last 24 hours) at 3/5/2024 0847  Last data filed at 3/5/2024 0514  Gross per 24 hour   Intake 1253.48 ml   Output 1750 ml   Net -496.52 ml        Ortho/SPM Exam  LUE:   Polar care and sling in place  NVI distally   SILT aside from chronic numbness of index finger     Significant Labs: All pertinent labs within the past 24 hours have been reviewed.    Significant Imaging: I have reviewed and interpreted all pertinent imaging results/findings.  Assessment/Plan:     * Glenohumeral arthritis, left  78 y.o. male POD1 s/p L rTSA with Dr. Dominique     Pain control: MM  PT/OT: NWB LUE   DVT PPx: Xarelto 20mg at baseline  Labs: Hgb 13.3  Drain: none  Russo: none    Dispo: dc today, FU in 2 weeks, appt scheduled            ROSINA Mart MD  Orthopedics  [facility] - Surgery    "

## 2024-03-06 NOTE — ANESTHESIA POST-OP PAIN MANAGEMENT
[facility]  Acute Pain Service    Perineural Catheter/Nimbus Pump Follow-Up    03/06/2024    Contacted patient regarding post-operative pain control and Nimbus pump. Patient states pain is well controlled and denies any new issues. Denies any side effects including numbness/tingling, ringing in ears, bleeding around catheter site, or erythema. Also discussed plan for patient to remove perineural catheter tomorrow. All questions and concerns addressed. Advised to call the Anesthesia team ([phone]) if any questions arose.     Rickey Cazares MD  Anesthesiology, PGY3  [facility]

## 2024-03-08 ENCOUNTER — PATIENT MESSAGE (OUTPATIENT)
Dept: SPORTS MEDICINE | Facility: CLINIC | Age: 79
End: 2024-03-08
Payer: MEDICARE

## 2024-03-13 ENCOUNTER — PATIENT MESSAGE (OUTPATIENT)
Dept: SPORTS MEDICINE | Facility: CLINIC | Age: 79
End: 2024-03-13
Payer: MEDICARE

## 2024-03-16 ENCOUNTER — CLINICAL SUPPORT (OUTPATIENT)
Dept: CARDIOLOGY | Facility: HOSPITAL | Age: 79
End: 2024-03-16
Payer: MEDICARE

## 2024-03-16 ENCOUNTER — CLINICAL SUPPORT (OUTPATIENT)
Dept: CARDIOLOGY | Facility: HOSPITAL | Age: 79
End: 2024-03-16
Attending: INTERNAL MEDICINE
Payer: MEDICARE

## 2024-03-16 ENCOUNTER — PATIENT MESSAGE (OUTPATIENT)
Dept: DERMATOLOGY | Facility: CLINIC | Age: 79
End: 2024-03-16
Payer: MEDICARE

## 2024-03-16 DIAGNOSIS — Z95.0 PRESENCE OF CARDIAC PACEMAKER: ICD-10-CM

## 2024-03-16 PROCEDURE — 93296 REM INTERROG EVL PM/IDS: CPT | Performed by: INTERNAL MEDICINE

## 2024-03-16 PROCEDURE — 93294 REM INTERROG EVL PM/LDLS PM: CPT | Mod: ,,, | Performed by: INTERNAL MEDICINE

## 2024-03-18 ENCOUNTER — CLINICAL SUPPORT (OUTPATIENT)
Dept: REHABILITATION | Facility: HOSPITAL | Age: 79
End: 2024-03-18
Payer: MEDICARE

## 2024-03-18 ENCOUNTER — PATIENT MESSAGE (OUTPATIENT)
Dept: RHEUMATOLOGY | Facility: CLINIC | Age: 79
End: 2024-03-18
Payer: MEDICARE

## 2024-03-18 DIAGNOSIS — R29.898 DECREASED STRENGTH OF UPPER EXTREMITY: ICD-10-CM

## 2024-03-18 DIAGNOSIS — G89.29 CHRONIC LEFT SHOULDER PAIN: Primary | ICD-10-CM

## 2024-03-18 DIAGNOSIS — M25.612 DECREASED ROM OF LEFT SHOULDER: ICD-10-CM

## 2024-03-18 DIAGNOSIS — M25.512 CHRONIC LEFT SHOULDER PAIN: Primary | ICD-10-CM

## 2024-03-18 PROBLEM — M62.81 QUADRICEPS WEAKNESS: Status: RESOLVED | Noted: 2018-12-21 | Resolved: 2024-03-18

## 2024-03-18 PROBLEM — M16.0 PRIMARY OSTEOARTHRITIS OF BOTH HIPS: Status: RESOLVED | Noted: 2022-02-25 | Resolved: 2024-03-18

## 2024-03-18 PROBLEM — M47.816 LUMBAR SPONDYLOSIS: Status: RESOLVED | Noted: 2022-02-25 | Resolved: 2024-03-18

## 2024-03-18 PROCEDURE — 97110 THERAPEUTIC EXERCISES: CPT | Mod: PN

## 2024-03-18 PROCEDURE — 97140 MANUAL THERAPY 1/> REGIONS: CPT | Mod: PN

## 2024-03-18 PROCEDURE — 97162 PT EVAL MOD COMPLEX 30 MIN: CPT | Mod: PN

## 2024-03-18 NOTE — PLAN OF CARE
[facility]   Physical Therapy Initial Evaluation      Name: Shannan Norman  Clinic Number: [id number]    Therapy Diagnosis:   Encounter Diagnoses   Name Primary?    Chronic left shoulder pain Yes    Decreased ROM of left shoulder     Decreased strength of upper extremity         Physician: Kit Potter, *    Physician Orders: PT Eval and Treat   Medical Diagnosis from Referral: Glenohumeral arthritis, left [M19.012]   Evaluation Date: 3/18/2024  Authorization Period Expiration: 12/31/2024  Plan of Care Expiration: 5/4/2024  Progress Note Due: 4/18/2024  Date of Surgery: 3/4/2024  Visit # / Visits authorized: 1/1   FOTO: 1/3    Precautions: Standard     Time In: 1107  Time Out: 1159  Total Billable Time: 52 minutes    Subjective     Date of onset: 3/4/2024    History of current condition - Shannan reports: he had a left reverse total shoulder arthroplasty performed two weeks ago. Patient denies pain since, but increased swelling throughout left upper extremity. Patient is compliant with his sling, including wear at night. Patient denies receiving extra part to his sling. Patient visits with Dr. Potter's PA, Steve Major, tomorrow. Therapist inquires about trembling of left upper extremity during motion assessment. Patient reports he is awaiting results from neurology on potential Parkinson's diagnosis.    Falls: None    Imaging: X-Ray Shoulder Left: Postoperative changes of left shoulder reversed arthroplasty identified. The position and alignment is satisfactory     Prior Therapy: Not since surgery  Social History: Patient lives alone in two story house. Patient's bedroom is on the first floor. Patient reports he is able to navigate the stairs without a problem. Patient enjoys to cook and play golf and would like to return to both when able  Occupation: Retired  Prior Level of Function: Unable to raise left arm past 90. Patient improved motion to near full pre-op but  with increased pain to 5/10  Current Level of Function: Independent    Pain: None since surgery    Patients goals: Return to golf once a week and cooking     Medical History:   Past Medical History:   Diagnosis Date    Allergy     Arthritis     Atrial fibrillation     Atrial flutter 2011    ablation    Basal cell carcinoma     Cancer     Cataract     CKD (chronic kidney disease) stage 3, GFR 30-59 ml/min 8/13/2019    Diabetes mellitus     Dry eye syndrome     Gout, unspecified     High cholesterol     History of shingles     Hypertension     Melanoma     Seizures      Surgical History:   Shannan Norman  has a past surgical history that includes Tonsillectomy; varicose veins; Ablation of dysrhythmic focus; Ganglion cyst excision; Hernia repair (2013); Vasectomy; Ablation (N/A, 9/11/2018); Colonoscopy (N/A, 1/2/2019); Tendon repair (Right); Adenoidectomy; Vein Surgery; Cardiac pacemaker placement; Cataract extraction w/  intraocular lens implant (Right, 7/28/2020); Cataract extraction w/  intraocular lens implant (Left, 8/11/2020); Reverse total shoulder arthroplasty (Left, 3/4/2024); and Fixation of tendon (Left, 3/4/2024).    Medications:   Shannan has a current medication list which includes the following prescription(s): allopurinol, atorvastatin, benazepril, celecoxib, cyclosporine, econazole nitrate, ferrous sulfate, glucosamine-chondroitin, imiquimod, levocetirizine, methocarbamol, metoprolol tartrate, multivitamin, oxybutynin, oxycodone, tamsulosin, UNABLE TO FIND, and xarelto.    Allergies:   Review of patient's allergies indicates:  No Known Allergies     Objective      Observation: Absent abduction pillow on sling ** (prescription discussed at end of op note)    Posture: Cervical: Cervical flexion       Thoracic: Slumped sitting       Shoulders: Anterior humeral head positioning bilateral        Scapulae: Bilateral abduction and prominent inferior scapular border    Palpation: Firmness underneath bandage at  anterior shoulder and proximal anteromedial upper arm  2+ pitting edema throughout left upper extremity     Observation: Swelling throughout left upper extremity. Reduced visualization of knuckles    Range of Motion:    Left Right   Shoulder flexion 135/146 (scapular plane) Not tested/63   Shoulder abduction 135/150 Not tested/55   Shoulder extension 73/78 Not tested/not tested    Shoulder external rotation at 90 degrees abduction 78/80 Not tested/not tested    Shoulder internal rotation at 90 degrees abduction 76/not tested (combined with extension) Not tested/not tested   Elbow flexion Within normal limits/not tested   Not tested/within normal limits   Elbow extension Within normal limits/not tested   -16/not tested    Wrist flexion Within normal limits/not tested  Within functional limits/within normal limits    Wrist extension Within normal limits/not tested  27/50    Cervical flexion [45 deg] Within normal limits     Cervical extension [55 deg]  Within normal limits     Cervical rotation [70 deg] Within normal limits  Within normal limits    Cervical side flexion [40 deg] Within normal limits  Within normal limits       Gross movement:    Right  Left   IR (behind back) L4 Not tested    ER (top of head) T2 Not tested      Upper Extremity Strength  *=pain   Right   Left  Notes   Shoulder flexion 4/5 Not tested     Shoulder abduction 4/5 Not tested     Shoulder extension 5/5 Not tested     Shoulder external rotation at 90 abd 4+/5 Not tested     Shoulder internal rotation at 90 abd 5/5 Not tested     Elbow flexion 5/5 Not tested     Elbow extension 5/5 Not tested        Sensation: normal light touch sensation to BUE in C4-T2 dermatomal pattern    Intake Outcome Measure for FOTO Shoulder Survey    Therapist reviewed FOTO scores for Shannan Norman on 3/18/2024.   FOTO report - see Media section or FOTO account episode details.    Intake Score: 18%     Treatment     Total Treatment time (time-based codes) separate  from Evaluation: 33 minutes     Shannan received the treatments listed below:      therapeutic exercises to develop ROM and flexibility for 8 minutes including:    Elbow extension creep stretch: Reviewed for home exercise program left   Wrist extension stretch via contralateral upper extremity: Reviewed for home exercise program left     manual therapy techniques: were applied to the: left upper extremity for 25 minutes, including:    Effleurage to left hand, forearm, and upper arm  Donning tubigrip (size G) to left upper extremity and edema glove (size large) to left hand    Patient Education and Home Exercises     Education provided:   - findings; prognosis  - plan of care/rehab course. Precautions/contraindications    Written Home Exercises Provided: yes. Exercises were reviewed and Shannan was able to demonstrate them prior to the end of the session.  Shannan demonstrated good  understanding of the education provided. See EMR under Patient Instructions for exercises provided during therapy sessions.    Assessment     Shannan is a 78 y.o. male referred to outpatient Physical Therapy with a medical diagnosis of glenohumeral arthritis. Patient presents 2 weeks post op reverse total shoulder arthroplasty with biceps tendonesis. Considerable swelling throughout left upper extremity, especially in forearm and hand. Session focused on reduction of this with improvement in forearm > hand. Wrist and elbow extension limited; sling wear may play a role.     Patient prognosis is Good.   Patient will benefit from skilled outpatient Physical Therapy to address the deficits stated above and in the chart below, provide patient /family education, and to maximize patientt's level of independence.     Plan of care discussed with patient: Yes  Patient's spiritual, cultural and educational needs considered and patient is agreeable to the plan of care and goals as stated below:     Anticipated Barriers for therapy: None    Medical Necessity is  demonstrated by the following  History  Co-morbidities and personal factors that may impact the plan of care [] LOW: no personal factors / co-morbidities  [] MODERATE: 1-2 personal factors / co-morbidities  [x] HIGH: 3+ personal factors / co-morbidities    Moderate / High Support Documentation:   Co-morbidities affecting plan of care: Arthritis, Congestive Heart Failure or Heart Disease, Diabetes Type I or II, High Blood  Pressure, Kidney, Bladder, Prostate or Urination Problems, Neurological Disease, Pacemaker    Personal Factors:   no deficits     Examination  Body Structures and Functions, activity limitations and participation restrictions that may impact the plan of care [] LOW: addressing 1-2 elements  [x] MODERATE: 3+ elements  [] HIGH: 4+ elements (please support below)    Moderate / High Support Documentation: Above     Clinical Presentation [] LOW: stable  [x] MODERATE: Evolving  [] HIGH: Unstable     Decision Making/ Complexity Score: moderate       Short Term Goals (6 Weeks):   1. Patient will be compliant with home exercise program to assist therapy in restoring pain free motion of the shoulder.   2. Patient will improve wrist extension to 70 degrees to improve use of distal arm.   3. Patient will improve forearm supination and elbow extension to full to improve use of distal arm.   3. Patient will achieve passive shoulder flexion of 120 degrees to promote functional reaching.      Long Term Goals (12 Weeks):   1. Patient will improve FOTO score to </= 57% to demonstrate improvements in carrying, moving, and handling objects  2. Patient will improve initiate isotonic shoulder scaption and rotation and elbow flexion with light weight without pain to promote use of left arm for light lifting.  3. Patient will improve active shoulder flexion to 120 degrees to improve functional mobility of upper extremities.   4. Patient will perform functional external rotation to C4 to promote independent grooming and  dressing    Plan     Plan of care Certification: 3/18/2024 to 6/14/2024.    Outpatient Physical Therapy 2 times weekly for 12 weeks to include the following interventions: Cervical/Lumbar Traction, Electrical Stimulation -, Manual Therapy, Moist Heat/ Ice, Neuromuscular Re-ed, Patient Education, Self Care, Therapeutic Activities, and Therapeutic Exercise.     Franca Boston, PT, DPT, OCS        Physician's Signature: _________________________________________ Date: ________________

## 2024-03-19 ENCOUNTER — HOSPITAL ENCOUNTER (OUTPATIENT)
Dept: RADIOLOGY | Facility: HOSPITAL | Age: 79
Discharge: HOME | End: 2024-03-19
Attending: ORTHOPAEDIC SURGERY
Payer: MEDICARE

## 2024-03-19 ENCOUNTER — PATIENT MESSAGE (OUTPATIENT)
Dept: SPORTS MEDICINE | Facility: CLINIC | Age: 79
End: 2024-03-19
Payer: MEDICARE

## 2024-03-19 ENCOUNTER — OFFICE VISIT (OUTPATIENT)
Dept: SPORTS MEDICINE | Facility: CLINIC | Age: 79
End: 2024-03-19
Payer: MEDICARE

## 2024-03-19 VITALS
DIASTOLIC BLOOD PRESSURE: 73 MMHG | HEIGHT: 73 IN | BODY MASS INDEX: 28.34 KG/M2 | WEIGHT: 213.88 LBS | SYSTOLIC BLOOD PRESSURE: 138 MMHG | HEART RATE: 61 BPM

## 2024-03-19 DIAGNOSIS — Z96.612 S/P REVERSE TOTAL SHOULDER ARTHROPLASTY, LEFT: Primary | ICD-10-CM

## 2024-03-19 DIAGNOSIS — M25.512 ACUTE PAIN OF LEFT SHOULDER: ICD-10-CM

## 2024-03-19 PROCEDURE — 73030 X-RAY EXAM OF SHOULDER: CPT | Mod: 26,LT,, | Performed by: RADIOLOGY

## 2024-03-19 PROCEDURE — 1159F MED LIST DOCD IN RCRD: CPT | Mod: CPTII,S$GLB,, | Performed by: ORTHOPAEDIC SURGERY

## 2024-03-19 PROCEDURE — 1101F PT FALLS ASSESS-DOCD LE1/YR: CPT | Mod: CPTII,S$GLB,, | Performed by: ORTHOPAEDIC SURGERY

## 2024-03-19 PROCEDURE — 1160F RVW MEDS BY RX/DR IN RCRD: CPT | Mod: CPTII,S$GLB,, | Performed by: ORTHOPAEDIC SURGERY

## 2024-03-19 PROCEDURE — 73030 X-RAY EXAM OF SHOULDER: CPT | Mod: TC,LT

## 2024-03-19 PROCEDURE — 99999 PR PBB SHADOW E&M-EST. PATIENT-LVL IV: CPT | Mod: PBBFAC,,, | Performed by: ORTHOPAEDIC SURGERY

## 2024-03-19 PROCEDURE — 3078F DIAST BP <80 MM HG: CPT | Mod: CPTII,S$GLB,, | Performed by: ORTHOPAEDIC SURGERY

## 2024-03-19 PROCEDURE — 3288F FALL RISK ASSESSMENT DOCD: CPT | Mod: CPTII,S$GLB,, | Performed by: ORTHOPAEDIC SURGERY

## 2024-03-19 PROCEDURE — 3075F SYST BP GE 130 - 139MM HG: CPT | Mod: CPTII,S$GLB,, | Performed by: ORTHOPAEDIC SURGERY

## 2024-03-19 PROCEDURE — 99024 POSTOP FOLLOW-UP VISIT: CPT | Mod: S$GLB,,, | Performed by: ORTHOPAEDIC SURGERY

## 2024-03-19 PROCEDURE — 1126F AMNT PAIN NOTED NONE PRSNT: CPT | Mod: CPTII,S$GLB,, | Performed by: ORTHOPAEDIC SURGERY

## 2024-03-19 NOTE — PROGRESS NOTES
Subjective:     Chief Complaint: Shannan Norman is a 78 y.o. male who had concerns including Post-op Evaluation of the Left Shoulder.    HPI    Patient is here s/p Left rTSA for 2 week post-op appointment. He reports 0/10 pain and is not taking anything for pain.  He denies any nausea, vomiting, fever, chills, shortness of breath, or calf pain. He is attending formal physical therapy at [facility]. Left arm sling in place    PROCEDURE PERFORMED by Quinten Dominique MD on 03/04/2024:   left open reverse shoulder arthroplasty (CPT 73734)  left open biceps tenodesis (CPT 34951-38)      Review of Systems   Constitutional: Negative.   HENT: Negative.     Eyes: Negative.    Cardiovascular: Negative.    Respiratory: Negative.     Endocrine: Negative.    Hematologic/Lymphatic: Negative.    Skin: Negative.    Musculoskeletal:  Positive for joint pain, joint swelling, muscle weakness and stiffness.   Neurological: Negative.    Psychiatric/Behavioral: Negative.     Allergic/Immunologic: Negative.        Pain Related Questions  Over the past 3 days, what was your average pain during activity? (I.e. running, jogging, walking, climbing stairs, getting dressed, ect.): 0  Over the past 3 days, what was your highest pain level?: 0  Over the past 3 days, what was your lowest pain level? : 0    Other  How many nights a week are you awakened by your affected body part?: 0  Was the patient's HEIGHT measured or patient reported?: Patient Reported  Was the patient's WEIGHT measured or patient reported?: Measured    Objective:     General: Shannan is well-developed, well-nourished, appears stated age, in no acute distress, alert and oriented to time, place and person.     General    Constitutional: He is oriented to person, place, and time. He appears well-developed and well-nourished. No distress.   Cardiovascular:  Intact distal pulses.            Neurological: He is alert and oriented to person, place, and time.   Psychiatric: He has a  normal mood and affect. His behavior is normal. Thought content normal.             Left Shoulder Exam     Inspection/Observation   Swelling: absent  Bruising: absent  Scars: present  Deformity: absent  Scapular Winging: absent  Scapular Dyskinesia: negative  Atrophy: absent    Other   Sensation: normal     Comments:  Incision sites healing well, without signs of erythema, infection, or wound dehiscence.      Vascular Exam       Left Pulses      Radial:                    2+      Capillary Refill  Left Hand: normal capillary refill        Radiographs left shoulder    My interpretation:     Postop rTSA hardware in place showing proper alignment      Assessment:     Encounter Diagnoses   Name Primary?    Acute pain of left shoulder     S/P reverse total shoulder arthroplasty, left Yes        Plan:     1. Patient instructed to continue to utilize left arm sling until we see him again for 6 week postop appointment.  We discussed his restrictions in detail.    2. Suture tags removed Steri-Strips applied. Patient may now shower without covering incision site. Instructed not to submerge incision site in water for another 2 weeks.    3. Continue daily Celebrex b.i.d.    4. Continue [facility] per our reverse total shoulder arthroplasty protocol. Stressed working on flexion/extension of both the wrist and elbow throughout the day to decrease swelling.    5. RTC to see Quinten Dominique MD in 4 weeks for 6 week post-op evaluation      All of the patient's questions were answered. Patient was advised to call the clinic or contact me through the patient portal for any questions or concerns.         Patient questionnaires may have been collected.

## 2024-03-20 ENCOUNTER — TELEPHONE (OUTPATIENT)
Dept: ELECTROPHYSIOLOGY | Facility: CLINIC | Age: 79
End: 2024-03-20
Payer: MEDICARE

## 2024-03-20 LAB
OHS CV AF BURDEN PERCENT: 2
OHS CV DC REMOTE DEVICE TYPE: NORMAL
OHS CV ICD SHOCK: NO
OHS CV RV PACING PERCENT: 4.6 %

## 2024-03-20 NOTE — TELEPHONE ENCOUNTER
Alert received from [facility] home monitoring  On 3/7/2024 pt had AF for 3 hrs 44 mins starting at 1636 pm with RVR.        Called and LVM to assess symptoms, if pt is taking Lopressor 25 mg po BID prn palpitations and his most recent BP          ________________

## 2024-03-21 ENCOUNTER — PATIENT MESSAGE (OUTPATIENT)
Dept: NEUROLOGY | Facility: CLINIC | Age: 79
End: 2024-03-21
Payer: MEDICARE

## 2024-03-21 NOTE — TELEPHONE ENCOUNTER
Pt responded back to clinic staff- see note.  He had surgery on shoulder around the time of isolated episode, was asymptomatic and only takes Lopressor when he has palpitations.    Will continue to monitor

## 2024-03-22 ENCOUNTER — CLINICAL SUPPORT (OUTPATIENT)
Dept: REHABILITATION | Facility: HOSPITAL | Age: 79
End: 2024-03-22
Payer: MEDICARE

## 2024-03-22 DIAGNOSIS — R29.898 DECREASED STRENGTH OF UPPER EXTREMITY: ICD-10-CM

## 2024-03-22 DIAGNOSIS — M25.512 CHRONIC LEFT SHOULDER PAIN: Primary | ICD-10-CM

## 2024-03-22 DIAGNOSIS — G89.29 CHRONIC LEFT SHOULDER PAIN: Primary | ICD-10-CM

## 2024-03-22 DIAGNOSIS — M25.612 DECREASED ROM OF LEFT SHOULDER: ICD-10-CM

## 2024-03-22 PROCEDURE — 97140 MANUAL THERAPY 1/> REGIONS: CPT | Mod: PN

## 2024-03-22 PROCEDURE — 97110 THERAPEUTIC EXERCISES: CPT | Mod: PN

## 2024-03-22 NOTE — PROGRESS NOTES
"[facility]   Physical Therapy Treatment Note      Name: Shannan Norman  Clinic Number: [id number]    Therapy Diagnosis:   Encounter Diagnoses   Name Primary?    Chronic left shoulder pain Yes    Decreased ROM of left shoulder     Decreased strength of upper extremity      Physician: Tony Major*    Visit Date: 3/22/2024  Physician Orders: PT Eval and Treat   Medical Diagnosis from Referral: Glenohumeral arthritis, left [M19.012]   Evaluation Date: 3/18/2024  Authorization Period Expiration: 2024  Plan of Care Expiration: 2024  Progress Note Due: 2024  Date of Surgery: 3/4/2024  Visit # / Visits authorized:  +    FOTO:   PTA Visit #: 0      Precautions: Standard; reverse total shoulder with biceps tendonesis on Monday, 3/4     Time In: 0800  Time Out: 0859  Total Billable Time: 59 minutes    Subjective     Patient reports: his swelling has decreased some. Patient reports some difficulty and anterior shoulder pain with home exercise program positioning. Otherwise, patient has not experienced pain since surgery. Patient reports he mentioned not having an abduction pillow on his sling at the post-op appointment but was not given one.    He was compliant with home exercise program.  Response to previous treatment: decreased swelling  Functional change: home exercise program initiation    Pain: 0/10  Location: Left shoulder     Objective      3/25/2024:   Passive shoulder flexion: 90 degrees left     Treatment     Shannan received the treatments listed below:      therapeutic exercises to develop ROM and flexibility for 35 minutes including:     Standing:  Arm han' left   Pendulums (fwd/bwd, lateral, cw, ccw): 1' each left     Seated:   Elbow extension creep stretch - towel under elbow: 5' left   Wrist extension stretch: 5x30" left   Active wrist extension: 3x10 left      manual therapy techniques: were applied to the: left upper extremity for 24 minutes, " including:     Grade II glenohumeral mobilizations  Passive shoulder flexion  Passive elbow and wrist extension  Effleurage to left hand, forearm, and upper arm  Donning tubigrip (size G) to left upper extremity and edema glove (size large) to left hand  Donning bilateral compression socks    Patient Education and Home Exercises       Education provided:   - Precautions  - Continue home exercise program. Adjust towel to under elbow or proximal upper arm to avoid pain  - Sleep and recline with pillow under arm to prevent extension    Written Home Exercises Provided: yes-3/25/2024. Exercises were reviewed and Shannan was able to demonstrate them prior to the end of the session.  Shannan demonstrated good understanding of the education provided. See Electronic Medical Record under Patient Instructions for exercises provided during therapy sessions    Assessment     Shannan is a 78 y.o. male referred to outpatient Physical Therapy with a medical diagnosis of glenohumeral arthritis. Patient presents 2 weeks post op reverse total shoulder arthroplasty with biceps tendonesis. Considerable swelling throughout left upper extremity, especially in forearm and hand. Session focused on reduction of this with improvement in forearm > hand. Wrist and elbow extension limited; sling wear may play a role.     Shannan Is progressing well towards his goals.   Patient prognosis is Good.   Patient will continue to benefit from skilled outpatient physical therapy to address the deficits listed in the problem list box on initial evaluation, provide pt/family education and to maximize pt's level of independence in the home and community environment.     Patient's spiritual, cultural and educational needs considered and pt agreeable to plan of care and goals.  Anticipated barriers to physical therapy: None    Short Term Goals (6 Weeks):   1. Patient will be compliant with home exercise program to assist therapy in restoring pain free motion of the shoulder.  Progressing, not met   2. Patient will improve wrist extension to 70 degrees to improve use of distal arm. Progressing, not met   3. Patient will improve forearm supination and elbow extension to full to improve use of distal arm. Progressing, not met   3. Patient will achieve passive shoulder flexion of 120 degrees to promote functional reaching. Progressing, not met      Long Term Goals (12 Weeks):   1. Patient will improve FOTO score to </= 57% to demonstrate improvements in carrying, moving, and handling objects. Progressing, not met   2. Patient will improve initiate isotonic shoulder scaption and rotation and elbow flexion with light weight without pain to promote use of left arm for light lifting. Progressing, not met   3. Patient will improve active shoulder flexion to 120 degrees to improve functional mobility of upper extremities. Progressing, not met   4. Patient will perform functional external rotation to C4 to promote independent grooming and dressing. Progressing, not met     Plan     Phase I of Dr. Dominique's RTSA protocol    Franca Boston, PT, DPT, OCS

## 2024-03-28 ENCOUNTER — CLINICAL SUPPORT (OUTPATIENT)
Dept: REHABILITATION | Facility: HOSPITAL | Age: 79
End: 2024-03-28
Payer: MEDICARE

## 2024-03-28 DIAGNOSIS — G89.29 CHRONIC LEFT SHOULDER PAIN: ICD-10-CM

## 2024-03-28 DIAGNOSIS — M25.512 CHRONIC LEFT SHOULDER PAIN: ICD-10-CM

## 2024-03-28 DIAGNOSIS — R29.898 DECREASED STRENGTH OF UPPER EXTREMITY: ICD-10-CM

## 2024-03-28 DIAGNOSIS — M25.612 DECREASED ROM OF LEFT SHOULDER: Primary | ICD-10-CM

## 2024-03-28 PROCEDURE — 97140 MANUAL THERAPY 1/> REGIONS: CPT | Mod: PN

## 2024-03-28 PROCEDURE — 97110 THERAPEUTIC EXERCISES: CPT | Mod: PN

## 2024-03-28 PROCEDURE — 97112 NEUROMUSCULAR REEDUCATION: CPT | Mod: PN

## 2024-03-28 NOTE — PROGRESS NOTES
[facility]   Physical Therapy Treatment Note      Name: Shannan Norman  Clinic Number: [id number]    Therapy Diagnosis:   Encounter Diagnoses   Name Primary?    Decreased ROM of left shoulder Yes    Decreased strength of upper extremity     Chronic left shoulder pain      Physician: Tony Major*    Visit Date: 3/28/2024  Physician Orders: PT Eval and Treat   Medical Diagnosis from Referral: Glenohumeral arthritis, left [M19.012]   Evaluation Date: 3/18/2024  Authorization Period Expiration: 2024  Plan of Care Expiration: 2024  Progress Note Due: 2024  Date of Surgery: Monday, 3/4/2024  Visit # / Visits authorized:  +    FOTO: 3/5  PTA Visit #:       Precautions: Standard; reverse total shoulder with biceps tendonesis on Monday, 3/4     Time In: 0816  Time Out: 0916  Total Billable Time: 60 minutes    Subjective     Patient reports: pain up to 5/10 while sleeping. Patient reports he sleeps on a Sleep Number mattress and sinks in, allowing for a natural prop of his arm in relation to his trunk and lower extremities. Patient doesn't feel like the sleeve or glove help with swelling anymore.  He was compliant with home exercise program.  Response to previous treatment: decreased swelling; able to see his knuckles first thing in the morning and mid-way through the day  Functional change: home exercise program 3x/day    Pain: 0/10  Location: Left shoulder     Objective      3/28/2024:   Passive shoulder flexion: 100 degrees left     Elbow extension, active/passive: -10/-6 degrees left  Wrist extension, active/passive: 40/50 degrees left     Treatment     Shannan received the treatments listed below:      therapeutic exercises to develop ROM and flexibility for 29 minutes including:     Standing:  Arm han' left   Pendulums (fwd/bwd, lateral, cw, ccw): 1' each left     Seated:   Elbow extension creep stretch - towel under elbow: 4 lb ankle weight around wrist, 5'  "left   Active assisted (therapist) wrist extension: 3x15 left   Alternate pronation and supination: x10 left    neuromuscular re-education activities to improve: Coordination, Kinesthetic, and Posture for 8 minutes. The following activities were included:    Shoulder shrug: 3x10  Scapular retraction: 3" 3x10    manual therapy techniques: were applied to the: left upper extremity for 23 minutes, including:     Effleurage to left hand, forearm, and upper arm  Passive elbow and wrist extension. Grade III anterior carpal mobilization for wrist extension  Grade II glenohumeral mobilizations  Passive shoulder flexion    Patient Education and Home Exercises       Education provided:   - Precautions  - Continue home exercise program. Focus on supination during pronation/supination exercise  - Sleep and recline with pillow under arm to decrease swelling and pain    Written Home Exercises Provided: yes-3/28/2024. Exercises were reviewed and Shannan was able to demonstrate them prior to the end of the session.  Shannan demonstrated good understanding of the education provided. See Electronic Medical Record under Patient Instructions for exercises provided during therapy sessions    Assessment     Shannan is a 78 y.o. male referred to outpatient Physical Therapy with a medical diagnosis of glenohumeral arthritis. Patient presents 3 weeks post op reverse total shoulder arthroplasty with biceps tendonesis. Swelling is gradually improving. Wrist and elbow extension improved by 13 and 6 degrees respectively. Decreased height of shrug on the left. Pain free passive flexion is progressing well.     Shannan Is progressing well towards his goals.   Patient prognosis is Good.   Patient will continue to benefit from skilled outpatient physical therapy to address the deficits listed in the problem list box on initial evaluation, provide pt/family education and to maximize pt's level of independence in the home and community environment.     Patient's " spiritual, cultural and educational needs considered and pt agreeable to plan of care and goals.  Anticipated barriers to physical therapy: None    Short Term Goals (6 Weeks):   1. Patient will be compliant with home exercise program to assist therapy in restoring pain free motion of the shoulder. Progressing, not met   2. Patient will improve wrist extension to 70 degrees to improve use of distal arm. Progressing, not met   3. Patient will improve forearm supination and elbow extension to full to improve use of distal arm. Progressing, not met   3. Patient will achieve passive shoulder flexion of 120 degrees to promote functional reaching. Progressing, not met      Long Term Goals (12 Weeks):   1. Patient will improve FOTO score to </= 57% to demonstrate improvements in carrying, moving, and handling objects. Progressing, not met   2. Patient will improve initiate isotonic shoulder scaption and rotation and elbow flexion with light weight without pain to promote use of left arm for light lifting. Progressing, not met   3. Patient will improve active shoulder flexion to 120 degrees to improve functional mobility of upper extremities. Progressing, not met   4. Patient will perform functional external rotation to C4 to promote independent grooming and dressing. Progressing, not met     Plan     Phase I of Dr. Dominique's RTSA protocol    Franca Boston, PT, DPT, OCS

## 2024-04-02 ENCOUNTER — LAB VISIT (OUTPATIENT)
Dept: LAB | Facility: HOSPITAL | Age: 79
End: 2024-04-02
Attending: FAMILY MEDICINE
Payer: MEDICARE

## 2024-04-02 DIAGNOSIS — I10 PRIMARY HYPERTENSION: ICD-10-CM

## 2024-04-02 DIAGNOSIS — E11.69 TYPE 2 DIABETES MELLITUS WITH OTHER SPECIFIED COMPLICATION, WITHOUT LONG-TERM CURRENT USE OF INSULIN: ICD-10-CM

## 2024-04-02 LAB
ALBUMIN SERPL BCP-MCNC: 3.3 G/DL (ref 3.5–5.2)
ALP SERPL-CCNC: 95 U/L (ref 55–135)
ALT SERPL W/O P-5'-P-CCNC: 12 U/L (ref 10–44)
ANION GAP SERPL CALC-SCNC: 10 MMOL/L (ref 8–16)
AST SERPL-CCNC: 17 U/L (ref 10–40)
BILIRUB SERPL-MCNC: 0.5 MG/DL (ref 0.1–1)
BUN SERPL-MCNC: 17 MG/DL (ref 8–23)
CALCIUM SERPL-MCNC: 9.3 MG/DL (ref 8.7–10.5)
CHLORIDE SERPL-SCNC: 107 MMOL/L (ref 95–110)
CHOLEST SERPL-MCNC: 111 MG/DL (ref 120–199)
CHOLEST/HDLC SERPL: 2.8 {RATIO} (ref 2–5)
CO2 SERPL-SCNC: 23 MMOL/L (ref 23–29)
CREAT SERPL-MCNC: 0.9 MG/DL (ref 0.5–1.4)
EST. GFR  (NO RACE VARIABLE): >60 ML/MIN/1.73 M^2
ESTIMATED AVG GLUCOSE: 123 MG/DL (ref 68–131)
GLUCOSE SERPL-MCNC: 80 MG/DL (ref 70–110)
HBA1C MFR BLD: 5.9 % (ref 4–5.6)
HDLC SERPL-MCNC: 40 MG/DL (ref 40–75)
HDLC SERPL: 36 % (ref 20–50)
LDLC SERPL CALC-MCNC: 61.2 MG/DL (ref 63–159)
NONHDLC SERPL-MCNC: 71 MG/DL
POTASSIUM SERPL-SCNC: 4.8 MMOL/L (ref 3.5–5.1)
PROT SERPL-MCNC: 6.4 G/DL (ref 6–8.4)
SODIUM SERPL-SCNC: 140 MMOL/L (ref 136–145)
TRIGL SERPL-MCNC: 49 MG/DL (ref 30–150)

## 2024-04-02 PROCEDURE — 36415 COLL VENOUS BLD VENIPUNCTURE: CPT | Mod: PO | Performed by: FAMILY MEDICINE

## 2024-04-02 PROCEDURE — 80053 COMPREHEN METABOLIC PANEL: CPT | Performed by: FAMILY MEDICINE

## 2024-04-02 PROCEDURE — 83036 HEMOGLOBIN GLYCOSYLATED A1C: CPT | Performed by: FAMILY MEDICINE

## 2024-04-02 PROCEDURE — 80061 LIPID PANEL: CPT | Performed by: FAMILY MEDICINE

## 2024-04-03 ENCOUNTER — CLINICAL SUPPORT (OUTPATIENT)
Dept: REHABILITATION | Facility: HOSPITAL | Age: 79
End: 2024-04-03
Payer: MEDICARE

## 2024-04-03 DIAGNOSIS — G89.29 CHRONIC LEFT SHOULDER PAIN: ICD-10-CM

## 2024-04-03 DIAGNOSIS — R29.898 DECREASED STRENGTH OF UPPER EXTREMITY: ICD-10-CM

## 2024-04-03 DIAGNOSIS — M25.512 CHRONIC LEFT SHOULDER PAIN: ICD-10-CM

## 2024-04-03 DIAGNOSIS — M25.612 DECREASED ROM OF LEFT SHOULDER: Primary | ICD-10-CM

## 2024-04-03 PROCEDURE — 97112 NEUROMUSCULAR REEDUCATION: CPT | Mod: PN

## 2024-04-03 PROCEDURE — 97140 MANUAL THERAPY 1/> REGIONS: CPT | Mod: PN

## 2024-04-03 PROCEDURE — 97110 THERAPEUTIC EXERCISES: CPT | Mod: PN

## 2024-04-03 NOTE — PROGRESS NOTES
[facility]   Physical Therapy Treatment Note      Name: Shannan Norman  Clinic Number: [id number]    Therapy Diagnosis:   Encounter Diagnoses   Name Primary?    Decreased ROM of left shoulder Yes    Decreased strength of upper extremity     Chronic left shoulder pain      Physician: Tony Major*    Visit Date: 4/3/2024  Physician Orders: PT Eval and Treat   Medical Diagnosis from Referral: Glenohumeral arthritis, left [M19.012]   Evaluation Date: 3/18/2024  Authorization Period Expiration: 12/31/2024  Plan of Care Expiration: 5/4/2024  Progress Note Due: 4/18/2024  Date of Surgery: Monday, 3/4/2024  Visit # / Visits authorized: 3/20 + 1   FOTO: 4/5  PTA Visit #: 0/5      Precautions: Standard; reverse total shoulder with biceps tendonesis on Monday, 3/4     Time In: 0800  Time Out: 0904  Total Billable Time: 38 minutes    Subjective     Patient reports: he bought a pillow for designed for post-op shoulder patients. This has helped with swelling considerably  He was compliant with home exercise program.  Response to previous treatment: continued reduction in swelling  Functional change: compliant with updated home exercise program     Pain: 0/10  Location: Left shoulder     Objective      4/3/2024:   Passive shoulder flexion: 120 degrees left    External rotation: 19 degrees left     Elbow extension, active assisted/passive: -6/-4 degrees left  Wrist extension, active/passive: 46/54 degrees left   Forearm supination, active/passive: 50/62 degrees left     Treatment     Shannan received the treatments listed below:      therapeutic exercises to develop ROM and flexibility for 34 minutes including:     Supine:  Shoulder flexion with contralateral upper extremity to 90: x10 left   Shoulder flexion with therapist min assist to 90: 2x5 left  Chest press: 1 lb wand x10    Seated:   Elbow extension creep stretch - towel under elbow: 5 lb ankle weight around wrist, 3' left          Red  "theraband around wrist, anchored to 20 lb weight on floor, 5' left   Active assisted (therapist) supination: 5' left   Active wrist extension: 1 lb, 3x10 left   Wrist extension stretch: 5x30" left     neuromuscular re-education activities to improve: Coordination, Kinesthetic, and Posture for 16 minutes. The following activities were included:    Seated:  Shoulder shrug: 3x10. Verbal cues to increase left shrug  Scapular retraction: 2"x30    Standing 3-way deltoid isometrics: 3"x10 each left. Verbal and tactile cues for kinesthetic    manual therapy techniques: were applied to the: left upper extremity for 14 minutes, including:     Passive elbow extension  Passive shoulder flexion and external rotation     Patient Education and Home Exercises       Education provided:   - Precautions  - Continue updated home exercise program    Written Home Exercises Provided: yes-3/28/2024. Exercises were reviewed and Shannan was able to demonstrate them prior to the end of the session.  Shannan demonstrated good understanding of the education provided. See Electronic Medical Record under Patient Instructions for exercises provided during therapy sessions    Assessment     Shannan is a 78 y.o. male referred to outpatient Physical Therapy with a medical diagnosis of glenohumeral arthritis. Patient presents 4 weeks post op reverse total shoulder arthroplasty with biceps tendonesis. Shoulder motion is progressing well. Greatest limitation in elbow extension + forearm supination. Initiated low level active assisted range of motion activities consistent with [place]'s anterior deltoid strengthening program with fluid movement and no pain. Most cues for lateral deltoid isometric kinesthetic.     Shannan Is progressing well towards his goals.   Patient prognosis is Good.   Patient will continue to benefit from skilled outpatient physical therapy to address the deficits listed in the problem list box on initial evaluation, provide pt/family education and " to maximize pt's level of independence in the home and community environment.     Patient's spiritual, cultural and educational needs considered and pt agreeable to plan of care and goals.  Anticipated barriers to physical therapy: None    Short Term Goals (6 Weeks):   1. Patient will be compliant with home exercise program to assist therapy in restoring pain free motion of the shoulder. Progressing, not met   2. Patient will improve wrist extension to 70 degrees to improve use of distal arm. Progressing, not met   3. Patient will improve forearm supination and elbow extension to full to improve use of distal arm. Progressing, not met   3. Patient will achieve passive shoulder flexion of 120 degrees to promote functional reaching. Progressing, not met      Long Term Goals (12 Weeks):   1. Patient will improve FOTO score to </= 57% to demonstrate improvements in carrying, moving, and handling objects. Progressing, not met   2. Patient will improve initiate isotonic shoulder scaption and rotation and elbow flexion with light weight without pain to promote use of left arm for light lifting. Progressing, not met   3. Patient will improve active shoulder flexion to 120 degrees to improve functional mobility of upper extremities. Progressing, not met   4. Patient will perform functional external rotation to C4 to promote independent grooming and dressing. Progressing, not met     Plan     Phase I of Dr. Dominique's RTSA protocol    Franca Boston, PT, DPT, OCS

## 2024-04-05 ENCOUNTER — OFFICE VISIT (OUTPATIENT)
Dept: FAMILY MEDICINE | Facility: CLINIC | Age: 79
End: 2024-04-05
Attending: FAMILY MEDICINE
Payer: MEDICARE

## 2024-04-05 VITALS
WEIGHT: 211.88 LBS | TEMPERATURE: 98 F | OXYGEN SATURATION: 95 % | SYSTOLIC BLOOD PRESSURE: 118 MMHG | BODY MASS INDEX: 28.08 KG/M2 | HEIGHT: 73 IN | DIASTOLIC BLOOD PRESSURE: 62 MMHG | HEART RATE: 60 BPM

## 2024-04-05 DIAGNOSIS — E11.59 HYPERTENSION ASSOCIATED WITH DIABETES: Primary | ICD-10-CM

## 2024-04-05 DIAGNOSIS — E11.69 TYPE 2 DIABETES MELLITUS WITH OTHER SPECIFIED COMPLICATION, WITHOUT LONG-TERM CURRENT USE OF INSULIN: ICD-10-CM

## 2024-04-05 DIAGNOSIS — I42.8 NICM (NONISCHEMIC CARDIOMYOPATHY): ICD-10-CM

## 2024-04-05 DIAGNOSIS — E11.69 DYSLIPIDEMIA ASSOCIATED WITH TYPE 2 DIABETES MELLITUS: ICD-10-CM

## 2024-04-05 DIAGNOSIS — E78.5 DYSLIPIDEMIA ASSOCIATED WITH TYPE 2 DIABETES MELLITUS: ICD-10-CM

## 2024-04-05 DIAGNOSIS — I48.0 PAF (PAROXYSMAL ATRIAL FIBRILLATION): ICD-10-CM

## 2024-04-05 DIAGNOSIS — I15.2 HYPERTENSION ASSOCIATED WITH DIABETES: Primary | ICD-10-CM

## 2024-04-05 DIAGNOSIS — N18.30 STAGE 3 CHRONIC KIDNEY DISEASE, UNSPECIFIED WHETHER STAGE 3A OR 3B CKD: ICD-10-CM

## 2024-04-05 DIAGNOSIS — N40.1 BENIGN PROSTATIC HYPERPLASIA WITH LOWER URINARY TRACT SYMPTOMS, SYMPTOM DETAILS UNSPECIFIED: ICD-10-CM

## 2024-04-05 PROCEDURE — 1126F AMNT PAIN NOTED NONE PRSNT: CPT | Mod: CPTII,S$GLB,, | Performed by: FAMILY MEDICINE

## 2024-04-05 PROCEDURE — 1101F PT FALLS ASSESS-DOCD LE1/YR: CPT | Mod: CPTII,S$GLB,, | Performed by: FAMILY MEDICINE

## 2024-04-05 PROCEDURE — 1159F MED LIST DOCD IN RCRD: CPT | Mod: CPTII,S$GLB,, | Performed by: FAMILY MEDICINE

## 2024-04-05 PROCEDURE — 99214 OFFICE O/P EST MOD 30 MIN: CPT | Mod: S$GLB,,, | Performed by: FAMILY MEDICINE

## 2024-04-05 PROCEDURE — 3074F SYST BP LT 130 MM HG: CPT | Mod: CPTII,S$GLB,, | Performed by: FAMILY MEDICINE

## 2024-04-05 PROCEDURE — 3288F FALL RISK ASSESSMENT DOCD: CPT | Mod: CPTII,S$GLB,, | Performed by: FAMILY MEDICINE

## 2024-04-05 PROCEDURE — 99999 PR PBB SHADOW E&M-EST. PATIENT-LVL V: CPT | Mod: PBBFAC,,, | Performed by: FAMILY MEDICINE

## 2024-04-05 PROCEDURE — 1160F RVW MEDS BY RX/DR IN RCRD: CPT | Mod: CPTII,S$GLB,, | Performed by: FAMILY MEDICINE

## 2024-04-05 PROCEDURE — 3078F DIAST BP <80 MM HG: CPT | Mod: CPTII,S$GLB,, | Performed by: FAMILY MEDICINE

## 2024-04-05 NOTE — PROGRESS NOTES
Subjective:       Patient ID: Shannan Norman is a 78 y.o. male.    Chief Complaint: Follow-up    78 yr old pleasant white male with DM II controlled, HTN, HLD, obesity, presents today for his lab work review and 6 month follow up and med refills. No new concerns today.    BPH - much better  - no dysuria - tried flomax and proscar and nothing helps       DM II - controlled -  diet control -      HGBA1C                   5.9 (H)             04/02/2024                                      - on ACE and ASA - UTD with eye and foot screen      HTN - controlled - oN ACE - compliant - no side effects      HLD - controlled - on statin - compliant - no side effects -     LDLCALC                  61.4 (L)            10/03/2023                                         History as below - reviewed and no changes    HM  -labs UTD  -PSA UTD  -adacel UTD          Follow-up  Associated symptoms include arthralgias and myalgias. Pertinent negatives include no chest pain, congestion, coughing, diaphoresis, headaches, neck pain, rash, visual change, vomiting or weakness.   Diabetes  He presents for his follow-up diabetic visit. He has type 2 diabetes mellitus. His disease course has been stable. Pertinent negatives for hypoglycemia include no confusion, dizziness, headaches, hunger, mood changes, nervousness/anxiousness, seizures, sleepiness, speech difficulty, sweats or tremors. Pertinent negatives for diabetes include no chest pain, no foot ulcerations, no polydipsia, no polyuria, no visual change, no weakness and no weight loss. Symptoms are stable. Pertinent negatives for diabetic complications include no CVA, PVD or retinopathy. Risk factors for coronary artery disease include diabetes mellitus, dyslipidemia, hypertension and male sex. He is compliant with treatment all of the time. He is following a generally healthy diet. Meal planning includes avoidance of concentrated sweets. He rarely participates in exercise. An ACE  inhibitor/angiotensin II receptor blocker is being taken. He does not see a podiatrist.Eye exam is current.   Hypertension  This is a chronic problem. The current episode started more than 1 month ago. The problem has been gradually improving since onset. The problem is controlled. Pertinent negatives include no chest pain, headaches, malaise/fatigue, neck pain, palpitations, peripheral edema, PND or sweats. There are no associated agents to hypertension. Risk factors for coronary artery disease include diabetes mellitus, dyslipidemia, male gender and obesity. Past treatments include ACE inhibitors. The current treatment provides significant improvement. There are no compliance problems.  There is no history of angina, CAD/MI, CVA, left ventricular hypertrophy, PVD or retinopathy. There is no history of chronic renal disease, coarctation of the aorta, hypercortisolism, hyperparathyroidism, pheochromocytoma, renovascular disease or a thyroid problem.   Hyperlipidemia  This is a chronic problem. The current episode started more than 1 year ago. The problem is controlled. Recent lipid tests were reviewed and are normal. Exacerbating diseases include diabetes and obesity. He has no history of chronic renal disease. Associated symptoms include myalgias. Pertinent negatives include no chest pain or leg pain. The current treatment provides significant improvement of lipids. There are no compliance problems.  Risk factors for coronary artery disease include diabetes mellitus, dyslipidemia, hypertension, male sex and obesity.   Benign Prostatic Hypertrophy  This is a chronic problem. The current episode started more than 1 year ago. The problem has been gradually worsening since onset. Irritative symptoms include nocturia. Irritative symptoms do not include frequency or urgency. Obstructive symptoms include dribbling, incomplete emptying, an intermittent stream, a slower stream and straining. Pertinent negatives include no  hematuria or vomiting. AUA score is 8-19. He is sexually active. Nothing aggravates the symptoms. Past treatments include tamsulosin. The treatment provided significant relief.     Review of Systems   Constitutional: Negative.  Negative for activity change, diaphoresis, malaise/fatigue, unexpected weight change and weight loss.   HENT: Negative.  Negative for congestion, ear pain, mouth sores, rhinorrhea and voice change.    Eyes: Negative.  Negative for pain, discharge and visual disturbance.   Respiratory: Negative.  Negative for apnea, cough and wheezing.    Cardiovascular: Negative.  Negative for chest pain, palpitations and PND.   Gastrointestinal: Negative.  Negative for abdominal distention, anal bleeding, diarrhea and vomiting.   Endocrine: Negative.  Negative for cold intolerance, polydipsia and polyuria.   Genitourinary:  Positive for incomplete emptying and nocturia. Negative for decreased urine volume, difficulty urinating, frequency, hematuria, penile discharge, scrotal swelling and urgency.   Musculoskeletal:  Positive for arthralgias and myalgias. Negative for back pain, neck pain and neck stiffness.   Skin: Negative.  Negative for color change and rash.   Allergic/Immunologic: Negative.  Negative for environmental allergies and immunocompromised state.   Neurological: Negative.  Negative for dizziness, tremors, seizures, speech difficulty, weakness, light-headedness and headaches.   Hematological: Negative.    Psychiatric/Behavioral: Negative.  Negative for agitation, confusion, dysphoric mood and suicidal ideas. The patient is not nervous/anxious.        PMH/PSH/FH/SH/MED/ALLERGY reviewed    Past Medical History:   Diagnosis Date    Allergy     Arthritis     Atrial fibrillation     Atrial flutter 2011    ablation    Basal cell carcinoma     Cancer     Cataract     CKD (chronic kidney disease) stage 3, GFR 30-59 ml/min 8/13/2019    Diabetes mellitus     Dry eye syndrome     Gout, unspecified     High  cholesterol     History of shingles     Hypertension     Melanoma     Seizures        Past Surgical History:   Procedure Laterality Date    ABLATION N/A 9/11/2018    Procedure: ABLATION;  Surgeon: Hany Sanchez MD;  Location: [facility] CATH LAB;  Service: Cardiology;  Laterality: N/A;  AFL, ISABELLE, RFA, ELODIA, MAC, DM, 3 PREP    ABLATION OF DYSRHYTHMIC FOCUS      ADENOIDECTOMY      CARDIAC PACEMAKER PLACEMENT      no defib    CATARACT EXTRACTION W/  INTRAOCULAR LENS IMPLANT Right 7/28/2020    Procedure: EXTRACTION, CATARACT, WITH IOL INSERTION;  Surgeon: Andrés Morse MD;  Location: [facility];  Service: Ophthalmology;  Laterality: Right;    CATARACT EXTRACTION W/  INTRAOCULAR LENS IMPLANT Left 8/11/2020    Procedure: EXTRACTION, CATARACT, WITH IOL INSERTION;  Surgeon: Andrés Morse MD;  Location: [facility];  Service: Ophthalmology;  Laterality: Left;    COLONOSCOPY N/A 1/2/2019    Procedure: COLONOSCOPY Suprep;  Surgeon: Cindy Solorzano MD;  Location: [facility] ENDO;  Service: Endoscopy;  Laterality: N/A;    FIXATION OF TENDON Left 3/4/2024    Procedure: BICEPS TENODESIS;  Surgeon: Quinten Dominique MD;  Location: [address];  Service: Orthopedics;  Laterality: Left;  WITH CATHETER    GANGLION CYST EXCISION      left neck    HERNIA REPAIR  2013    umbilical    REVERSE TOTAL SHOULDER ARTHROPLASTY Left 3/4/2024    Procedure: ARTHROPLASTY, SHOULDER, TOTAL, REVERSE;  Surgeon: Quinten Dominique MD;  [address];  Service: Orthopedics;  Laterality: Left;  WITH CATHETER    TENDON REPAIR Right     wrist    TONSILLECTOMY      varicose veins      several sx  bilateral    VASECTOMY      VEIN SURGERY         Family History   Problem Relation Age of Onset    Diabetes Mother     COPD Father     Heart disease Father     Arthritis Sister     Heart attack Brother     Heart disease Brother     Diabetes Brother     No Known Problems Maternal Aunt     No Known Problems Maternal Uncle     No Known Problems  Paternal Aunt     No Known Problems Paternal Uncle     No Known Problems Maternal Grandmother     No Known Problems Maternal Grandfather     No Known Problems Paternal Grandmother     No Known Problems Paternal Grandfather     No Known Problems Son     Cancer Sister         lymph node, breast    No Known Problems Sister     No Known Problems Son     Amblyopia Neg Hx     Blindness Neg Hx     Cataracts Neg Hx     Glaucoma Neg Hx     Hypertension Neg Hx     Macular degeneration Neg Hx     Retinal detachment Neg Hx     Strabismus Neg Hx     Stroke Neg Hx     Thyroid disease Neg Hx     Prostate cancer Neg Hx     Kidney disease Neg Hx     Melanoma Neg Hx        Social History     Socioeconomic History    Marital status:    Occupational History     Employer: [employer]   Tobacco Use    Smoking status: Never    Smokeless tobacco: Never   Substance and Sexual Activity    Alcohol use: Yes     Alcohol/week: 7.0 - 14.0 standard drinks of alcohol     Types: 7 - 14 Glasses of wine per week    Drug use: No    Sexual activity: Yes     Partners: Female     Social Determinants of Health     Financial Resource Strain: Low Risk  (1/25/2024)    Overall Financial Resource Strain (CARDIA)     Difficulty of Paying Living Expenses: Not hard at all   Food Insecurity: No Food Insecurity (1/25/2024)    Hunger Vital Sign     Worried About Running Out of Food in the Last Year: Never true     Ran Out of Food in the Last Year: Never true   Transportation Needs: No Transportation Needs (1/25/2024)    PRAPARE - Transportation     Lack of Transportation (Medical): No     Lack of Transportation (Non-Medical): No   Physical Activity: Inactive (1/25/2024)    Exercise Vital Sign     Days of Exercise per Week: 0 days     Minutes of Exercise per Session: 30 min   Stress: No Stress Concern Present (1/25/2024)    [employer] [place] [employer] - Occupational Stress Questionnaire     Feeling of Stress : Only a little   Social  Connections: Unknown (1/25/2024)    Social Connection and Isolation Panel [NHANES]     Frequency of Communication with Friends and Family: More than three times a week     Frequency of Social Gatherings with Friends and Family: More than three times a week     Active Member of Clubs or Organizations: Yes     Attends Club or Organization Meetings: More than 4 times per year     Marital Status:    Housing Stability: Low Risk  (1/25/2024)    Housing Stability Vital Sign     Unable to Pay for Housing in the Last Year: No     Number of Places Lived in the Last Year: 1     Unstable Housing in the Last Year: No       Current Outpatient Medications   Medication Sig Dispense Refill    allopurinoL (ZYLOPRIM) 100 MG tablet Take 1 tablet by mouth once daily (Patient taking differently: Take by mouth nightly. Take 1 tablet by mouth once daily) 90 tablet 3    atorvastatin (LIPITOR) 40 MG tablet Take 1 tablet (40 mg total) by mouth once daily. (Patient taking differently: Take 40 mg by mouth every evening.) 90 tablet 3    benazepriL (LOTENSIN) 20 MG tablet Take 1 tablet (20 mg total) by mouth once daily. (Patient taking differently: Take 20 mg by mouth every evening.) 90 tablet 3    celecoxib (CELEBREX) 200 MG capsule Take 1 capsule (200 mg total) by mouth 2 (two) times daily with meals. 60 capsule 0    cycloSPORINE (RESTASIS) 0.05 % ophthalmic emulsion Place 1 drop into both eyes 2 (two) times daily. 60 each 11    econazole nitrate 1 % cream Apply topically 2 (two) times daily. 85 g 3    ferrous sulfate (FEOSOL) 325 mg (65 mg iron) Tab tablet Take 325 mg by mouth once daily.      GLUCOSAMINE HCL/CHONDR VASQUEZ A NA (GLUCOSAMINE-CHONDROITIN) 750-600 mg Tab Take 2 tablets by mouth Daily.      imiquimod (ALDARA) 5 % cream Apply to skin cancer on left leg 5 times weekly for 6 weeks 24 packet 3    levocetirizine (XYZAL) 5 MG tablet Take 1 tablet (5 mg total) by mouth every evening. 90 tablet 1    methocarbamoL (ROBAXIN) 500 MG Tab  Take 1 tablet (500 mg total) by mouth 3 (three) times daily as needed (muscle spasms). 30 tablet 0    multivitamin capsule Take 1 capsule by mouth nightly.       oxybutynin (DITROPAN-XL) 10 MG 24 hr tablet Take 1 tablet (10 mg total) by mouth once daily. 30 tablet 11    oxyCODONE (ROXICODONE) 5 MG immediate release tablet Take 1 tablet (5 mg total) by mouth every 6 (six) hours as needed for Pain. 28 tablet 0    tamsulosin (FLOMAX) 0.4 mg Cap TAKE 1 CAPSULE BY MOUTH AFTER DINNER 90 capsule 3    UNABLE TO FIND Take by mouth every morning. 6000 hydrolyzes collagen      XARELTO 20 mg Tab TAKE 1 TABLET BY MOUTH ONCE DAILY WITH SUPPER OR  EVENING  MEAL 90 tablet 3    metoprolol tartrate (LOPRESSOR) 25 MG tablet Take 1 tablet (25 mg total) by mouth 2 (two) times daily as needed (palpitations). 45 tablet 0     No current facility-administered medications for this visit.       Review of patient's allergies indicates:  No Known Allergies      Objective:       Vitals:    04/05/24 0912   BP: 118/62   Pulse: 60   Temp: 98 °F (36.7 °C)       Physical Exam  Constitutional:       Appearance: He is well-developed.   HENT:      Head: Normocephalic and atraumatic.      Right Ear: External ear normal.      Left Ear: External ear normal.      Nose: Nose normal.      Mouth/Throat:      Pharynx: No oropharyngeal exudate.   Eyes:      General: No scleral icterus.        Right eye: No discharge.         Left eye: No discharge.      Conjunctiva/sclera: Conjunctivae normal.      Pupils: Pupils are equal, round, and reactive to light.   Neck:      Thyroid: No thyromegaly.      Vascular: No JVD.      Trachea: No tracheal deviation.   Cardiovascular:      Rate and Rhythm: Normal rate and regular rhythm.      Heart sounds: Normal heart sounds. No murmur heard.     No friction rub. No gallop.   Pulmonary:      Effort: Pulmonary effort is normal. No respiratory distress.      Breath sounds: Normal breath sounds. No stridor. No wheezing or rales.    Chest:      Chest wall: No tenderness.   Abdominal:      General: Bowel sounds are normal. There is no distension.      Palpations: Abdomen is soft. There is no mass.      Tenderness: There is no abdominal tenderness. There is no guarding or rebound.      Hernia: No hernia is present.   Musculoskeletal:         General: No tenderness. Normal range of motion.      Cervical back: Normal range of motion and neck supple.   Lymphadenopathy:      Cervical: No cervical adenopathy.   Skin:     General: Skin is warm and dry.      Coloration: Skin is not pale.      Findings: No erythema or rash.   Neurological:      Mental Status: He is alert and oriented to person, place, and time.      Cranial Nerves: No cranial nerve deficit.      Motor: No abnormal muscle tone.      Coordination: Coordination normal.      Deep Tendon Reflexes: Reflexes are normal and symmetric. Reflexes normal.   Psychiatric:         Behavior: Behavior normal.         Thought Content: Thought content normal.         Judgment: Judgment normal.         Protective Sensation (w/ 10 gram monofilament):  Right: Intact  Left: Intact    Visual Inspection:  Normal -  Bilateral    Pedal Pulses:   Right: Present  Left: Present    Posterior tibialis:   Right:Present  Left: Present      Assessment:       1. Hypertension associated with diabetes    2. Type 2 diabetes mellitus with other specified complication, without long-term current use of insulin    3. Dyslipidemia associated with type 2 diabetes mellitus    4. PAF (paroxysmal atrial fibrillation)    5. Stage 3 chronic kidney disease, unspecified whether stage 3a or 3b CKD    6. NICM (nonischemic cardiomyopathy)    7. Benign prostatic hyperplasia with lower urinary tract symptoms, symptom details unspecified        Plan:           Shannan was seen today for follow-up.    Diagnoses and all orders for this visit:    Hypertension associated with diabetes    Type 2 diabetes mellitus with other specified complication,  without long-term current use of insulin    Dyslipidemia associated with type 2 diabetes mellitus    PAF (paroxysmal atrial fibrillation)    Stage 3 chronic kidney disease, unspecified whether stage 3a or 3b CKD    NICM (nonischemic cardiomyopathy)    Benign prostatic hyperplasia with lower urinary tract symptoms, symptom details unspecified          DM II  -controlled  -labs    HTN  -controlled    HLD  -stable    BPH  -controlled    SSS  -on pacemaker      Spent adequate time in obtaining history and explaining differentials    30 minutes spent during this visit of which greater than 50% devoted to face-face counseling and coordination of care regarding diagnosis and management plan    RTC 6 months or prn

## 2024-04-07 ENCOUNTER — PATIENT MESSAGE (OUTPATIENT)
Dept: SPORTS MEDICINE | Facility: CLINIC | Age: 79
End: 2024-04-07
Payer: MEDICARE

## 2024-04-08 DIAGNOSIS — Z96.612 S/P REVERSE TOTAL SHOULDER ARTHROPLASTY, LEFT: Primary | ICD-10-CM

## 2024-04-08 RX ORDER — CELECOXIB 200 MG/1
200 CAPSULE ORAL 2 TIMES DAILY WITH MEALS
Qty: 60 CAPSULE | Refills: 0 | Status: SHIPPED | OUTPATIENT
Start: 2024-04-08 | End: 2024-05-27 | Stop reason: SDUPTHER

## 2024-04-10 ENCOUNTER — OFFICE VISIT (OUTPATIENT)
Dept: OPTOMETRY | Facility: CLINIC | Age: 79
End: 2024-04-10
Payer: MEDICARE

## 2024-04-10 DIAGNOSIS — E11.9 TYPE 2 DIABETES MELLITUS WITHOUT RETINOPATHY: Primary | ICD-10-CM

## 2024-04-10 DIAGNOSIS — H04.123 BILATERAL DRY EYES: ICD-10-CM

## 2024-04-10 DIAGNOSIS — Z96.1 PSEUDOPHAKIA OF BOTH EYES: ICD-10-CM

## 2024-04-10 PROCEDURE — 99999 PR PBB SHADOW E&M-EST. PATIENT-LVL III: CPT | Mod: PBBFAC,,, | Performed by: OPTOMETRIST

## 2024-04-10 PROCEDURE — 3288F FALL RISK ASSESSMENT DOCD: CPT | Mod: CPTII,S$GLB,, | Performed by: OPTOMETRIST

## 2024-04-10 PROCEDURE — 1159F MED LIST DOCD IN RCRD: CPT | Mod: CPTII,S$GLB,, | Performed by: OPTOMETRIST

## 2024-04-10 PROCEDURE — 2023F DILAT RTA XM W/O RTNOPTHY: CPT | Mod: CPTII,S$GLB,, | Performed by: OPTOMETRIST

## 2024-04-10 PROCEDURE — 1101F PT FALLS ASSESS-DOCD LE1/YR: CPT | Mod: CPTII,S$GLB,, | Performed by: OPTOMETRIST

## 2024-04-10 PROCEDURE — 1126F AMNT PAIN NOTED NONE PRSNT: CPT | Mod: CPTII,S$GLB,, | Performed by: OPTOMETRIST

## 2024-04-10 PROCEDURE — 92014 COMPRE OPH EXAM EST PT 1/>: CPT | Mod: S$GLB,,, | Performed by: OPTOMETRIST

## 2024-04-10 RX ORDER — CYCLOSPORINE 0.5 MG/ML
1 EMULSION OPHTHALMIC 2 TIMES DAILY
Refills: 1 | Status: CANCELLED | OUTPATIENT
Start: 2024-04-10 | End: 2025-04-10

## 2024-04-10 RX ORDER — CYCLOSPORINE 0.5 MG/ML
1 EMULSION OPHTHALMIC 2 TIMES DAILY
Qty: 60 EACH | Refills: 11 | Status: SHIPPED | OUTPATIENT
Start: 2024-04-10

## 2024-04-10 RX ORDER — CYCLOSPORINE 0.5 MG/ML
1 EMULSION OPHTHALMIC 2 TIMES DAILY
Qty: 60 EACH | Refills: 11 | Status: CANCELLED | OUTPATIENT
Start: 2024-04-10

## 2024-04-10 NOTE — PROGRESS NOTES
HPI    Pt is here today for routine eye exam. Patient denies pain/discomfort.  DLS: 2022 Dr. Borrero  (-)Flashes   (-)Floaters   (-)Diplopia   (-)Headaches   (+)Itching   (-)Tearing  (-)Burning  (+)Dryness   (-)Photophobia  (-)Glare   (-)Blurred [facility]  Past Eye Sx: Cataract OU   Eye Meds: Restasis BID OU                    At's OU PRN  Last edited by Debbie Bills, OD on 4/10/2024 11:30 AM.            Assessment /Plan     For exam results, see Encounter Report.    Type 2 diabetes mellitus without retinopathy    Pseudophakia of both eyes    Bilateral dry eyes  -     cycloSPORINE (RESTASIS) 0.05 % ophthalmic emulsion; Place 1 drop into both eyes 2 (two) times daily.  Dispense: 60 each; Refill: 11        1. No retinopathy noted today.  Continued control with primary care physician and annual comprehensive eye exam.     2. Monovision correction in PCIOLs - monitor annually for PCO progression.    3. Continue Restasis bid OU long term.    RTC in 1 year for annual eye exam unless changes noted sooner.

## 2024-04-11 ENCOUNTER — CLINICAL SUPPORT (OUTPATIENT)
Dept: REHABILITATION | Facility: HOSPITAL | Age: 79
End: 2024-04-11
Payer: MEDICARE

## 2024-04-11 DIAGNOSIS — R29.898 DECREASED STRENGTH OF UPPER EXTREMITY: ICD-10-CM

## 2024-04-11 DIAGNOSIS — M25.512 CHRONIC LEFT SHOULDER PAIN: ICD-10-CM

## 2024-04-11 DIAGNOSIS — G89.29 CHRONIC LEFT SHOULDER PAIN: ICD-10-CM

## 2024-04-11 DIAGNOSIS — M25.612 DECREASED ROM OF LEFT SHOULDER: Primary | ICD-10-CM

## 2024-04-11 PROCEDURE — 97112 NEUROMUSCULAR REEDUCATION: CPT | Mod: PN

## 2024-04-11 PROCEDURE — 97140 MANUAL THERAPY 1/> REGIONS: CPT | Mod: PN

## 2024-04-11 NOTE — PROGRESS NOTES
"[facility]   Physical Therapy Treatment Note      Name: Shannan Norman  Clinic Number: [id number]    Therapy Diagnosis:   Encounter Diagnoses   Name Primary?    Decreased ROM of left shoulder Yes    Decreased strength of upper extremity     Chronic left shoulder pain      Physician: Tony Major*    Visit Date: 4/11/2024  Physician Orders: PT Eval and Treat   Medical Diagnosis from Referral: Glenohumeral arthritis, left [M19.012]   Evaluation Date: 3/18/2024  Authorization Period Expiration: 12/31/2024  Plan of Care Expiration: 5/4/2024  Progress Note Due: 4/18/2024  Date of Surgery: Monday, 3/4/2024  Visit # / Visits authorized: 4/20 + 1   FOTO: NEXT  PTA Visit #: 0/5      Precautions: Standard; reverse total shoulder with biceps tendonesis on Monday, 3/4     Time In: 0805  Time Out: 0900  Total Billable Time: 29 minutes    Subjective     Patient reports: swelling continues to improve  He was compliant with home exercise program.  Response to previous treatment: decreased stiffness with regards to elbow extension  Functional change: easier time managing swelling    Pain: 0/10  Location: Left shoulder     Objective      Not performed today.    Treatment     Shannan received the treatments listed below:      therapeutic exercises to develop ROM and flexibility for 24 minutes including:     Supine:  Shoulder flexion with contralateral upper extremity: 2x10 left   Shoulder flexion with therapist min assist to 90: 2x5 left  Chest press: 1 lb wand 2x10    Seated:   Elbow extension creep stretch - 1/2 foam and towel under elbow and thumb up: 3 lb ankle weight around wrist, 3' left   Active supination: 3x10 left   Wrist extension stretch: 3x30" left     neuromuscular re-education activities to improve: Coordination, Kinesthetic, and Posture for 13 minutes. The following activities were included:    Standing:  Shoulder shrug: 3x10. Verbal and visual cues to increase left shrug  Standing " "3-way deltoid isometrics: 3"x10 each left. Verbal cues to confirm kinesthetic   Rows: red theraband 3x10    manual therapy techniques: were applied to the: left upper extremity for 18 minutes, including:     Passive elbow extension, wrist extension, and forearm supination  Passive shoulder flexion and external rotation     Patient Education and Home Exercises       Education provided:   - Precautions  - Continue updated home exercise program    Written Home Exercises Provided: yes-3/28/2024. Exercises were reviewed and Shannan was able to demonstrate them prior to the end of the session.  Shannan demonstrated good understanding of the education provided. See Electronic Medical Record under Patient Instructions for exercises provided during therapy sessions    Assessment     Shannan is a 78 y.o. male referred to outpatient Physical Therapy with a medical diagnosis of glenohumeral arthritis. Patient presents >5 weeks post op reverse total shoulder arthroplasty with biceps tendonesis. Shoulder range not formally tested today but appears similar to last visit, which is good for current phase. Supination and wrist extension remain limited; tremors increase with passive wrist extension. Some pain with passive elbow and wrist extension. Minimal cues required for deltoid isometrics today.     Shannan Is progressing well towards his goals.   Patient prognosis is Good.   Patient will continue to benefit from skilled outpatient physical therapy to address the deficits listed in the problem list box on initial evaluation, provide pt/family education and to maximize pt's level of independence in the home and community environment.     Patient's spiritual, cultural and educational needs considered and pt agreeable to plan of care and goals.  Anticipated barriers to physical therapy: None    Short Term Goals (6 Weeks):   1. Patient will be compliant with home exercise program to assist therapy in restoring pain free motion of the shoulder. " Progressing, not met   2. Patient will improve wrist extension to 70 degrees to improve use of distal arm. Progressing, not met   3. Patient will improve forearm supination and elbow extension to full to improve use of distal arm. Progressing, not met   3. Patient will achieve passive shoulder flexion of 120 degrees to promote functional reaching. Progressing, not met      Long Term Goals (12 Weeks):   1. Patient will improve FOTO score to </= 57% to demonstrate improvements in carrying, moving, and handling objects. Progressing, not met   2. Patient will improve initiate isotonic shoulder scaption and rotation and elbow flexion with light weight without pain to promote use of left arm for light lifting. Progressing, not met   3. Patient will improve active shoulder flexion to 120 degrees to improve functional mobility of upper extremities. Progressing, not met   4. Patient will perform functional external rotation to C4 to promote independent grooming and dressing. Progressing, not met     Plan     Phase I of Dr. Dominique's RTSA protocol. Transition to phase II next week.    Franca Boston, PT, DPT, OCS

## 2024-04-16 ENCOUNTER — HOSPITAL ENCOUNTER (OUTPATIENT)
Dept: RADIOLOGY | Facility: HOSPITAL | Age: 79
Discharge: HOME | End: 2024-04-16
Attending: STUDENT IN AN ORGANIZED HEALTH CARE EDUCATION/TRAINING PROGRAM
Payer: MEDICARE

## 2024-04-16 ENCOUNTER — OFFICE VISIT (OUTPATIENT)
Dept: SPORTS MEDICINE | Facility: CLINIC | Age: 79
End: 2024-04-16
Payer: MEDICARE

## 2024-04-16 VITALS
SYSTOLIC BLOOD PRESSURE: 121 MMHG | HEIGHT: 73 IN | DIASTOLIC BLOOD PRESSURE: 64 MMHG | WEIGHT: 211.88 LBS | BODY MASS INDEX: 28.08 KG/M2 | HEART RATE: 82 BPM

## 2024-04-16 DIAGNOSIS — M25.512 LEFT SHOULDER PAIN, UNSPECIFIED CHRONICITY: ICD-10-CM

## 2024-04-16 DIAGNOSIS — M19.012 GLENOHUMERAL ARTHRITIS, LEFT: ICD-10-CM

## 2024-04-16 DIAGNOSIS — Z96.612 S/P REVERSE TOTAL SHOULDER ARTHROPLASTY, LEFT: Primary | ICD-10-CM

## 2024-04-16 PROCEDURE — 73030 X-RAY EXAM OF SHOULDER: CPT | Mod: 26,LT,, | Performed by: RADIOLOGY

## 2024-04-16 PROCEDURE — 99024 POSTOP FOLLOW-UP VISIT: CPT | Mod: S$GLB,,, | Performed by: STUDENT IN AN ORGANIZED HEALTH CARE EDUCATION/TRAINING PROGRAM

## 2024-04-16 PROCEDURE — 99999 PR PBB SHADOW E&M-EST. PATIENT-LVL IV: CPT | Mod: PBBFAC,,, | Performed by: STUDENT IN AN ORGANIZED HEALTH CARE EDUCATION/TRAINING PROGRAM

## 2024-04-16 PROCEDURE — 3078F DIAST BP <80 MM HG: CPT | Mod: CPTII,S$GLB,, | Performed by: STUDENT IN AN ORGANIZED HEALTH CARE EDUCATION/TRAINING PROGRAM

## 2024-04-16 PROCEDURE — 3074F SYST BP LT 130 MM HG: CPT | Mod: CPTII,S$GLB,, | Performed by: STUDENT IN AN ORGANIZED HEALTH CARE EDUCATION/TRAINING PROGRAM

## 2024-04-16 PROCEDURE — 73030 X-RAY EXAM OF SHOULDER: CPT | Mod: TC,LT

## 2024-04-16 PROCEDURE — 1160F RVW MEDS BY RX/DR IN RCRD: CPT | Mod: CPTII,S$GLB,, | Performed by: STUDENT IN AN ORGANIZED HEALTH CARE EDUCATION/TRAINING PROGRAM

## 2024-04-16 PROCEDURE — 1126F AMNT PAIN NOTED NONE PRSNT: CPT | Mod: CPTII,S$GLB,, | Performed by: STUDENT IN AN ORGANIZED HEALTH CARE EDUCATION/TRAINING PROGRAM

## 2024-04-16 PROCEDURE — 1159F MED LIST DOCD IN RCRD: CPT | Mod: CPTII,S$GLB,, | Performed by: STUDENT IN AN ORGANIZED HEALTH CARE EDUCATION/TRAINING PROGRAM

## 2024-04-16 NOTE — PROGRESS NOTES
Subjective:     Chief Complaint: Shannan Norman is a 78 y.o. male who had concerns including Post-op Evaluation of the Left Shoulder.    HPI    Patient is here s/p Left rTSA for 6 week post-op appointment. He reports 0/10 pain and is not taking anything for pain.  He denies any nausea, vomiting, fever, chills, shortness of breath, or calf pain. He is attending formal physical therapy at [facility]. Left arm sling in place    PROCEDURE PERFORMED by Quinten Dominique MD on 03/04/2024:   left open reverse shoulder arthroplasty (CPT 53099)  left open biceps tenodesis (CPT 16843-42)      Review of Systems   Constitutional: Negative.   HENT: Negative.     Eyes: Negative.    Cardiovascular: Negative.    Respiratory: Negative.     Endocrine: Negative.    Hematologic/Lymphatic: Negative.    Skin: Negative.    Musculoskeletal:  Positive for joint swelling, muscle weakness and stiffness. Negative for joint pain.   Neurological: Negative.    Psychiatric/Behavioral: Negative.     Allergic/Immunologic: Negative.        Pain Related Questions  Over the past 3 days, what was your highest pain level?: 0  Over the past 3 days, what was your lowest pain level? : 0    Other  How many nights a week are you awakened by your affected body part?: 0  Was the patient's HEIGHT measured or patient reported?: Patient Reported  Was the patient's WEIGHT measured or patient reported?: Measured    Objective:     General: Shannan is well-developed, well-nourished, appears stated age, in no acute distress, alert and oriented to time, place and person.     General    Nursing note and vitals reviewed.  Constitutional: He is oriented to person, place, and time. He appears well-developed and well-nourished. No distress.   HENT:   Head: Normocephalic and atraumatic.   Nose: Nose normal.   Eyes: EOM are normal.   Cardiovascular:  Intact distal pulses.            Pulmonary/Chest: Effort normal. No respiratory distress.   Neurological: He is alert and oriented  to person, place, and time.   Psychiatric: He has a normal mood and affect. His behavior is normal. Judgment and thought content normal.             Left Shoulder Exam     Inspection/Observation   Swelling: absent  Bruising: absent  Scars: present  Deformity: absent  Scapular Winging: absent  Scapular Dyskinesia: negative  Atrophy: absent    Other   Sensation: normal     Comments:  Incision sites healing well, without signs of erythema, infection, or wound dehiscence.      Vascular Exam       Left Pulses      Radial:                    2+      Capillary Refill  Left Hand: normal capillary refill          Imaging:   X-rays of the left shoulder from 04/16/2024 personally viewed by me on that day these include AP, Grashey, scapular Y. reverse shoulder arthroplasty.  No noted complications.        Assessment:   Shannan Norman is a 78 y.o. male status post above and doing well  Encounter Diagnoses   Name Primary?    Left shoulder pain, unspecified chronicity     S/P reverse total shoulder arthroplasty, left Yes    Glenohumeral arthritis, left         Plan:     Overall he was doing well.  This point we will discontinue use of the sling.  Return to clinic in 6 weeks.  Continue physical therapy and advance per protocol as tolerated.      All of the patient's questions were answered. Patient was advised to call the clinic or contact me through the patient portal for any questions or concerns.         Patient questionnaires may have been collected.

## 2024-04-17 ENCOUNTER — CLINICAL SUPPORT (OUTPATIENT)
Dept: REHABILITATION | Facility: HOSPITAL | Age: 79
End: 2024-04-17
Payer: MEDICARE

## 2024-04-17 DIAGNOSIS — G89.29 CHRONIC LEFT SHOULDER PAIN: ICD-10-CM

## 2024-04-17 DIAGNOSIS — R29.898 DECREASED STRENGTH OF UPPER EXTREMITY: ICD-10-CM

## 2024-04-17 DIAGNOSIS — M25.612 DECREASED ROM OF LEFT SHOULDER: Primary | ICD-10-CM

## 2024-04-17 DIAGNOSIS — M25.512 CHRONIC LEFT SHOULDER PAIN: ICD-10-CM

## 2024-04-17 PROCEDURE — 97112 NEUROMUSCULAR REEDUCATION: CPT | Mod: PN

## 2024-04-17 PROCEDURE — 97110 THERAPEUTIC EXERCISES: CPT | Mod: PN

## 2024-04-17 PROCEDURE — 97140 MANUAL THERAPY 1/> REGIONS: CPT | Mod: PN

## 2024-04-17 NOTE — PROGRESS NOTES
"[facility]   Physical Therapy Treatment Note      Name: Shannan Norman  Clinic Number: [id number]    Therapy Diagnosis:   Encounter Diagnoses   Name Primary?    Decreased ROM of left shoulder Yes    Decreased strength of upper extremity     Chronic left shoulder pain      Physician: Tony Major*    Visit Date: 4/17/2024  Physician Orders: PT Eval and Treat   Medical Diagnosis from Referral: Glenohumeral arthritis, left [M19.012]   Evaluation Date: 3/18/2024  Authorization Period Expiration: 12/31/2024  Plan of Care Expiration: 5/4/2024  Progress Note Due: 4/18/2024  Date of Surgery: Monday, 3/4/2024  Visit # / Visits authorized: 5/20 + 1   FOTO: NEXT  PTA Visit #: 0/5      Precautions: Standard; reverse total shoulder with biceps tendonesis on Monday, 3/4     Time In: 0702  Time Out: 0803  Total Billable Time: 61 minutes    Subjective     Patient reports: he visited with Dr. Dominique and was instructed to avoid certain movements, but unsure what these were. Patient reports difficulty donning lower extremity compression socks  He was compliant with home exercise program.  Response to previous treatment: absent pain  Functional change: discharged from the sling    Pain: 0/10  Location: Left shoulder     Objective      4/17/2024:   Passive shoulder flexion: 123 degrees left    External rotation: 26 degrees left     Elbow extension, active assisted/passive: -6/-3 degrees left    Treatment     Shannan received the treatments listed below:      therapeutic exercises to develop ROM and flexibility for 30 minutes including:     Supine:  Shoulder flexion - active assisted range of motion: 2x10 left with right upper extremity assist. 2x10 with 1 lb wand  Chest press: 1 lb wand 2x10  Elbow extension creep stretch: 4 lbs, 5' left     Seated:   Active supination - forearm resting on mat:3x10 left   Wrist extension stretch - forearm resting on mat: 3x30" left   Pulley flexion: " 2x15    neuromuscular re-education activities to improve: Coordination, Kinesthetic, and Posture for 8 minutes. The following activities were included:    Supine:   Shoulder flexion - active to 90: 4x5 left with therapist assistance to block horizontal abduction  Shoulder flexion - active short arcs: 2x10 left     Seated:  No money: 2x15    manual therapy techniques: were applied to the: left upper extremity for 23 minutes, including:     Passive elbow extension +/- grade III humeroulnar ulnar glide  Passive forearm supination  Grade II-III glenohumeral mobilizations. Posterior with passive shoulder external rotation  Passive shoulder flexion    Patient Education and Home Exercises       Education provided:   - Precautions - no internal rotation or extension. Risks related to repetitive active flexion against gravity  - Will send updated home exercise program  - Patient more than welcome to come to clinic for therapist to assist with compression sock donning.     Written Home Exercises Provided: Will upload next visit. Exercises were reviewed and Shannan was able to demonstrate them prior to the end of the session.  Shannan demonstrated good understanding of the education provided. See Electronic Medical Record under Patient Instructions for exercises provided during therapy sessions    Assessment     Shannan is a 78 y.o. male referred to outpatient Physical Therapy with a medical diagnosis of glenohumeral arthritis. Patient presents 6 weeks post op reverse total shoulder arthroplasty with biceps tendonesis. Shoulder motion is progressing well. Plateau in elbow extension and forearm supination progression; increase tremor during passive supination and wrist extension. Tendency to horizontal abduct during flexion exercises is a factor of strength.      Shannan Is progressing well towards his goals.   Patient prognosis is Good.   Patient will continue to benefit from skilled outpatient physical therapy to address the deficits listed  in the problem list box on initial evaluation, provide pt/family education and to maximize pt's level of independence in the home and community environment.     Patient's spiritual, cultural and educational needs considered and pt agreeable to plan of care and goals.  Anticipated barriers to physical therapy: None    Short Term Goals (6 Weeks):   1. Patient will be compliant with home exercise program to assist therapy in restoring pain free motion of the shoulder. Progressing, not met   2. Patient will improve wrist extension to 70 degrees to improve use of distal arm. Progressing, not met   3. Patient will improve forearm supination and elbow extension to full to improve use of distal arm. Progressing, not met   3. Patient will achieve passive shoulder flexion of 120 degrees to promote functional reaching. Progressing, not met      Long Term Goals (12 Weeks):   1. Patient will improve FOTO score to </= 57% to demonstrate improvements in carrying, moving, and handling objects. Progressing, not met   2. Patient will improve initiate isotonic shoulder scaption and rotation and elbow flexion with light weight without pain to promote use of left arm for light lifting. Progressing, not met   3. Patient will improve active shoulder flexion to 120 degrees to improve functional mobility of upper extremities. Progressing, not met   4. Patient will perform functional external rotation to C4 to promote independent grooming and dressing. Progressing, not met     Plan     Phase II of Dr. Dominique's RTSA protocol    Franca Boston, PT, DPT, OCS

## 2024-04-23 ENCOUNTER — OFFICE VISIT (OUTPATIENT)
Dept: FAMILY MEDICINE | Facility: CLINIC | Age: 79
End: 2024-04-23
Payer: MEDICARE

## 2024-04-23 DIAGNOSIS — Z98.890 S/P ABLATION OF ATRIAL FLUTTER: ICD-10-CM

## 2024-04-23 DIAGNOSIS — Z86.79 S/P ABLATION OF ATRIAL FLUTTER: ICD-10-CM

## 2024-04-23 DIAGNOSIS — M79.89 RIGHT LEG SWELLING: Primary | ICD-10-CM

## 2024-04-23 DIAGNOSIS — Z79.01 CHRONIC ANTICOAGULATION: ICD-10-CM

## 2024-04-23 DIAGNOSIS — Z95.0 PRESENCE OF CARDIAC PACEMAKER: ICD-10-CM

## 2024-04-23 DIAGNOSIS — I15.2 HYPERTENSION ASSOCIATED WITH DIABETES: ICD-10-CM

## 2024-04-23 DIAGNOSIS — E11.59 HYPERTENSION ASSOCIATED WITH DIABETES: ICD-10-CM

## 2024-04-23 DIAGNOSIS — I87.2 CHRONIC VENOUS INSUFFICIENCY: ICD-10-CM

## 2024-04-23 PROCEDURE — 1159F MED LIST DOCD IN RCRD: CPT | Mod: CPTII,95,, | Performed by: FAMILY MEDICINE

## 2024-04-23 PROCEDURE — 99214 OFFICE O/P EST MOD 30 MIN: CPT | Mod: 95,,, | Performed by: FAMILY MEDICINE

## 2024-04-23 PROCEDURE — 1160F RVW MEDS BY RX/DR IN RCRD: CPT | Mod: CPTII,95,, | Performed by: FAMILY MEDICINE

## 2024-04-23 NOTE — PROGRESS NOTES
Office Visit    Patient Name: Shannan Norman    : 1945  MRN: [id number]    Subjective:  Shannan is a 78 y.o. male who presents today for:    No chief complaint on file.    The patient location is: HIS HOME   The chief complaint leading to consultation is: RIGHT LEG SWELLING     Visit type: audiovisual    Face to Face time with patient: START 108 PM  end 128  35 minutes of total time spent on the encounter, which includes face to face time and non-face to face time preparing to see the patient (eg, review of tests), Obtaining and/or reviewing separately obtained history, Documenting clinical information in the electronic or other health record, Independently interpreting results (not separately reported) and communicating results to the patient/family/caregiver, or Care coordination (not separately reported).       Each patient to whom he or she provides medical services by telemedicine is:  (1) informed of the relationship between the physician and patient and the respective role of any other health care provider with respect to management of the patient; and (2) notified that he or she may decline to receive medical services by telemedicine and may withdraw from such care at any time.    Notes:     77 yo patient of Dr Paulson s/p s/p Left reverse TSA  3/4/24 and with medical hx pertinent for diet controlled Type 2 Diabetes & controlled HTN, h/o gout, A-Flutter followed by cardiology and EP Dr Sanchez w/ ablations in  & 2018 S/p PPM in 2018, on chronic anticoagulation(Xarelto 20), chronic venous insufficiency s/p surgical stripping of veins and EVLTs & intermittently compliant with compression stockings, who presents today for urgent care virtual visit to discuss concern for Right Leg swelling.     After his shoulder surgery he developed a fair amount of swelling in L arm and hand.   Back in December he has LLE edema and he started using compression stockings intermittently (difficult putting on) and left leg  swelling swelling resolved fairly quickly. Lost almost 10 lbs when the swelling resolved.     Now he is experiencing a similar phenomenon of his R LE over the last 1.5 weeks. He has not been able to use a compression stocking on this occasion due to physical difficulties with applying the stocking.   Elevates leg intermittently during day AND SWELLING SEEMS TO BE GETTING A BIT BETTER. The swelling is not painful. Skin is reddish in color but not warm. No skin breakdown. Dry skin is helped with Amlactin Cream.     Labs 4/2/24 with normal kidney function EGFR > 60, A1c 5.9. Normal electrolytes, mild urine microalbuminuria.     ECHO 2/21/24:     Left Ventricle: The left ventricle is mildly dilated. Normal wall thickness. There is hyperdynamic systolic function. Ejection fraction by visual approximation is 65%.    Right Ventricle: Normal right ventricular cavity size. Wall thickness is normal. Right ventricle wall motion  is normal. Systolic function is normal.    Left Atrium: Left atrium is mildly dilated.    Pulmonic Valve: There is mild regurgitation.    Pulmonary Artery: The estimated pulmonary artery systolic pressure is 30 mmHg.    IVC/SVC: Intermediate venous pressure at 8 mmHg.         PAST MEDICAL HISTORY, SURGICAL/SOCIAL/FAMILY HISTORY REVIEWED AS PER CHART, WITH PERTINENT FINDINGS INCLUDED IN HISTORY SECTION OF NOTE.     Current Medications    Medication List with Changes/Refills   Current Medications    ALLOPURINOL (ZYLOPRIM) 100 MG TABLET    Take 1 tablet by mouth once daily    ATORVASTATIN (LIPITOR) 40 MG TABLET    Take 1 tablet (40 mg total) by mouth once daily.    BENAZEPRIL (LOTENSIN) 20 MG TABLET    Take 1 tablet (20 mg total) by mouth once daily.    CELECOXIB (CELEBREX) 200 MG CAPSULE    Take 1 capsule (200 mg total) by mouth 2 (two) times daily with meals.    CYCLOSPORINE (RESTASIS) 0.05 % OPHTHALMIC EMULSION    Place 1 drop into both eyes 2 (two) times daily.    ECONAZOLE NITRATE 1 % CREAM    Apply  topically 2 (two) times daily.    FERROUS SULFATE (FEOSOL) 325 MG (65 MG IRON) TAB TABLET    Take 325 mg by mouth once daily.    GLUCOSAMINE HCL/CHONDR VASQUEZ A NA (GLUCOSAMINE-CHONDROITIN) 750-600 MG TAB    Take 2 tablets by mouth Daily.    IMIQUIMOD (ALDARA) 5 % CREAM    Apply to skin cancer on left leg 5 times weekly for 6 weeks    LEVOCETIRIZINE (XYZAL) 5 MG TABLET    Take 1 tablet (5 mg total) by mouth every evening.    METOPROLOL TARTRATE (LOPRESSOR) 25 MG TABLET    Take 1 tablet (25 mg total) by mouth 2 (two) times daily as needed (palpitations).    MULTIVITAMIN CAPSULE    Take 1 capsule by mouth nightly.     OXYBUTYNIN (DITROPAN-XL) 10 MG 24 HR TABLET    Take 1 tablet (10 mg total) by mouth once daily.    TAMSULOSIN (FLOMAX) 0.4 MG CAP    TAKE 1 CAPSULE BY MOUTH AFTER DINNER    UNABLE TO FIND    Take by mouth every morning. 6000 hydrolyzes collagen    XARELTO 20 MG TAB    TAKE 1 TABLET BY MOUTH ONCE DAILY WITH SUPPER OR  EVENING  MEAL   Discontinued Medications    METHOCARBAMOL (ROBAXIN) 500 MG TAB    Take 1 tablet (500 mg total) by mouth 3 (three) times daily as needed (muscle spasms).    OXYCODONE (ROXICODONE) 5 MG IMMEDIATE RELEASE TABLET    Take 1 tablet (5 mg total) by mouth every 6 (six) hours as needed for Pain.       Allergies   Review of patient's allergies indicates:  No Known Allergies      Review of Systems (Pertinent positives)  Review of Systems   Constitutional:  Positive for activity change and unexpected weight change.   HENT:  Negative for hearing loss, rhinorrhea and trouble swallowing.    Eyes:  Negative for discharge and visual disturbance.   Respiratory:  Negative for chest tightness and wheezing.    Cardiovascular:  Positive for leg swelling (right lower extremity). Negative for chest pain and palpitations.   Gastrointestinal:  Negative for blood in stool, constipation, diarrhea, nausea and vomiting.   Endocrine: Negative for polydipsia and polyuria.   Genitourinary:  Negative for  difficulty urinating, hematuria and urgency.   Musculoskeletal:  Positive for arthralgias and joint swelling. Negative for neck pain.   Skin:  Positive for color change. Negative for rash and wound.   Neurological:  Negative for weakness and headaches.   Psychiatric/Behavioral:  Negative for confusion and dysphoric mood.        There were no vitals taken for this visit.    Physical Exam  Vitals reviewed.   Constitutional:       General: He is not in acute distress.     Appearance: He is well-developed.   HENT:      Head: Normocephalic and atraumatic.   Eyes:      Conjunctiva/sclera: Conjunctivae normal.   Pulmonary:      Effort: Pulmonary effort is normal.   Musculoskeletal:         General: Swelling (swelling below the knee and associated with varicose veins) present.      Right lower leg: No edema.   Neurological:      Mental Status: He is alert and oriented to person, place, and time.   Psychiatric:         Mood and Affect: Mood normal.         Behavior: Behavior normal.           Assessment/Plan:  Shannan Norman is a 78 y.o. male who presents today for :        ICD-10-CM ICD-9-CM    1. Right leg swelling  M79.89 729.81 US Lower Extremity Veins Bilateral Insufficiency      2. Chronic venous insufficiency  I87.2 459.81 US Lower Extremity Veins Bilateral Insufficiency      3. Chronic anticoagulation  Z79.01 V58.61       4. S/P ablation of atrial flutter  Z98.890 V45.89     Z86.79        5. Presence of cardiac pacemaker  Z95.0 V45.01       6. Hypertension associated with diabetes  E11.59 250.80     I15.2 401.9         Suspect RLE edema is 2/2 venous insufficiency and skin redness 2/2 venous stasis dermatitis and not cellulitis. On therapeutic dose of Xarelto 20 mg daily and has been consistently over the last 6+weeks since his shoulder surgery so do not suspect DVT.     No consitutional or illness symptoms and swelling is not painful.     Seems to be resolving some with elevation.    ADVISED ON THE  IMPORTANCE OF  DAILY COMPRESSION STOCK USE-- this previously helped resolve a similar episode of the LLE, but recently has more physical limitations with applying the stocking. Will have a neighbor help help.'  BLE US for venous insufficiency ordered due to recurring issues of this nature and will advise based on results.     Kidney function and EF normal on most recent labs/ ECHO.         Patient Instructions   WEAR KNEE HIGH COMPRESSION STOCKING ON RIGHT LEG DAILY FOR 7 DAYS-- DO NOT SLEEP IN THE STOCKING .    NOTIFY IF SWELLING IS NOT IMPROVING WITHIN 2 DAYS OF WEARING THE COMPRESSION STOCKING OR IF ANY INFECTION SYMPTOMS DEVELOP-- FEVERS/CHILLS/INCREASING REDNESS OR WARMTH.    YOU WILL BE CONTACTED TO SCHEDULE THE VEIN STUDY.       Follow up for to review results of diagnostic procedure, return as needed for new concerns.

## 2024-04-23 NOTE — PATIENT INSTRUCTIONS
WEAR KNEE HIGH COMPRESSION STOCKING ON RIGHT LEG DAILY FOR 7 DAYS-- DO NOT SLEEP IN THE STOCKING .    NOTIFY IF SWELLING IS NOT IMPROVING WITHIN 2 DAYS OF WEARING THE COMPRESSION STOCKING OR IF ANY INFECTION SYMPTOMS DEVELOP-- FEVERS/CHILLS/INCREASING REDNESS OR WARMTH.    YOU WILL BE CONTACTED TO SCHEDULE THE VEIN STUDY.

## 2024-04-25 ENCOUNTER — CLINICAL SUPPORT (OUTPATIENT)
Dept: REHABILITATION | Facility: HOSPITAL | Age: 79
End: 2024-04-25
Payer: MEDICARE

## 2024-04-25 ENCOUNTER — OFFICE VISIT (OUTPATIENT)
Dept: NEUROLOGY | Facility: CLINIC | Age: 79
End: 2024-04-25
Payer: MEDICARE

## 2024-04-25 DIAGNOSIS — G89.29 CHRONIC LEFT SHOULDER PAIN: ICD-10-CM

## 2024-04-25 DIAGNOSIS — F02.80 DIFFUSE LEWY BODY DISEASE: ICD-10-CM

## 2024-04-25 DIAGNOSIS — M35.3 POLYMYALGIA RHEUMATICA: ICD-10-CM

## 2024-04-25 DIAGNOSIS — M25.612 DECREASED ROM OF LEFT SHOULDER: Primary | ICD-10-CM

## 2024-04-25 DIAGNOSIS — G20.C PARKINSONISM, UNSPECIFIED PARKINSONISM TYPE: ICD-10-CM

## 2024-04-25 DIAGNOSIS — G31.83 DIFFUSE LEWY BODY DISEASE: ICD-10-CM

## 2024-04-25 DIAGNOSIS — M25.512 CHRONIC LEFT SHOULDER PAIN: ICD-10-CM

## 2024-04-25 DIAGNOSIS — R41.89 SUBJECTIVE COGNITIVE IMPAIRMENT: Primary | ICD-10-CM

## 2024-04-25 DIAGNOSIS — R29.898 DECREASED STRENGTH OF UPPER EXTREMITY: ICD-10-CM

## 2024-04-25 PROCEDURE — 97140 MANUAL THERAPY 1/> REGIONS: CPT | Mod: PN

## 2024-04-25 PROCEDURE — 97112 NEUROMUSCULAR REEDUCATION: CPT | Mod: PN

## 2024-04-25 PROCEDURE — 97110 THERAPEUTIC EXERCISES: CPT | Mod: PN

## 2024-04-25 PROCEDURE — 1159F MED LIST DOCD IN RCRD: CPT | Mod: CPTII,95,, | Performed by: PSYCHIATRY & NEUROLOGY

## 2024-04-25 PROCEDURE — 1160F RVW MEDS BY RX/DR IN RCRD: CPT | Mod: CPTII,95,, | Performed by: PSYCHIATRY & NEUROLOGY

## 2024-04-25 PROCEDURE — 99215 OFFICE O/P EST HI 40 MIN: CPT | Mod: 95,,, | Performed by: PSYCHIATRY & NEUROLOGY

## 2024-04-25 PROCEDURE — 96116 NUBHVL XM PHYS/QHP 1ST HR: CPT | Mod: 95,59,, | Performed by: PSYCHIATRY & NEUROLOGY

## 2024-04-25 NOTE — PROGRESS NOTES
"[facility]   Physical Therapy Treatment Note      Name: Shannan Norman  Clinic Number: [id number]    Therapy Diagnosis:   Encounter Diagnoses   Name Primary?    Decreased ROM of left shoulder Yes    Decreased strength of upper extremity     Chronic left shoulder pain      Physician: Tony Major*    Visit Date: 4/25/2024  Physician Orders: PT Eval and Treat   Medical Diagnosis from Referral: Glenohumeral arthritis, left [M19.012]   Evaluation Date: 3/18/2024  Authorization Period Expiration: 12/31/2024  Plan of Care Expiration: 5/4/2024  Progress Note Due: 4/18/2024  Date of Surgery: Monday, 3/4/2024  Visit # / Visits authorized: 6/20 + 1   FOTO: 7/10  PTA Visit #: 0/5      Precautions: Standard; reverse total shoulder with biceps tendonesis on Monday, 3/4     Time In: 0805  Time Out: 0903  Total Billable Time: 58 minutes    Subjective     Patient reports: presents with lower extremity compression socks for theapist to don  He was not compliant with home exercise program given prior to 4/25.  Response to previous treatment: absent pain  Functional change: noticed wrist pain while supinating left forearm during garden maintentance.     Pain: 0/10  Location: Left shoulder     Objective      NEXT: Seated flexion active range of motion     Treatment     Shannan received the treatments listed below:      therapeutic exercises to develop ROM and flexibility for 23 minutes including objective and education:     Supine:  Chest press: 1 lb wand 3x10  Shoulder external rotation - towel roll under arm: 5", 2x10 left     Seated:   Pulley flexion: 3'  Table flexion slides: 3x5 left     neuromuscular re-education activities to improve: Coordination, Kinesthetic, and Posture for 17 minutes. The following activities were included:    Supine:   Shoulder flexion - active assisted to 90: 2x10 left. Assistance to prevent horizontal abduction  Shoulder flexion - active short arcs ( degrees): 4x5 " with verbal cues to prevent horizontal abduction fallout    Standing:  Rows - to neutral (no EXT): green theraband 2x20  No money: 2x20    manual therapy techniques: were applied to the: left upper extremity for 18 minutes, including:     Passive elbow and wrist extension  Grade II-III glenohumeral mobilizations. Posterior with passive shoulder external rotation  Passive shoulder flexion    Patient Education and Home Exercises       Education provided:   - Precautions - no internal rotation or extension. Risks related to repetitive flexion against gravity  - Updated home exercise program    Written Home Exercises Provided: On 4/25/2024. Exercises were reviewed and Shannan was able to demonstrate them prior to the end of the session.  Shannan demonstrated good understanding of the education provided. See Electronic Medical Record under Patient Instructions for exercises provided during therapy sessions    Assessment     Shannan is a 78 y.o. male referred to outpatient Physical Therapy with a medical diagnosis of glenohumeral arthritis. Patient presents 7 weeks post op reverse total shoulder arthroplasty with biceps tendonesis. Shoulder motion is progressing well. Plateau and increased tremor with elbow and wrist extension. Tendency to horizontal abduct during flexion persists. However, he is able able to perform short arcs independently with reduction in repetitions.     Shannan Is progressing well towards his goals.   Patient prognosis is Good.   Patient will continue to benefit from skilled outpatient physical therapy to address the deficits listed in the problem list box on initial evaluation, provide pt/family education and to maximize pt's level of independence in the home and community environment.     Patient's spiritual, cultural and educational needs considered and pt agreeable to plan of care and goals.  Anticipated barriers to physical therapy: None    Short Term Goals (6 Weeks):   1. Patient will be compliant with home  exercise program to assist therapy in restoring pain free motion of the shoulder. Partially met 4/25  2. Patient will improve wrist extension to 70 degrees to improve use of distal arm. Progressing, not met   3. Patient will improve forearm supination and elbow extension to full to improve use of distal arm. Progressing, not met   3. Patient will achieve passive shoulder flexion of 120 degrees to promote functional reaching. Met prior to 4/25.      Long Term Goals (12 Weeks):   1. Patient will improve FOTO score to </= 57% to demonstrate improvements in carrying, moving, and handling objects. Progressing, not met   2. Patient will improve initiate isotonic shoulder scaption and rotation and elbow flexion with light weight without pain to promote use of left arm for light lifting. Progressing, not met   3. Patient will improve active shoulder flexion to 120 degrees to improve functional mobility of upper extremities. Progressing, not met   4. Patient will perform functional external rotation to C4 to promote independent grooming and dressing. Progressing, not met     Plan     Phase II of Dr. Dominique's RTSA protocol.   Initiate biceps exercise next. Measure shoulder active range of motion next, active flexion in sitting     Franca Boston, PT, DPT, OCS

## 2024-04-25 NOTE — PROGRESS NOTES
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 impairment.  MOCA 30/30: MOCA Score suggestive of normal to questionable cognitive impairment.  2023-04-04:  Subjective Cognitive impairment with borderline mild attention/concentration and retrieval deficits   Neurocognitive/Neurobehavioral Evaluation completed on 2024-04-25    Neuropsychiatric/Behavioral Focused Evaluation Assessment    BEHAV5+ 1/6 See ROS section for a full description   Laboratories:     Lab Date Value [Reference]   Autoimmune/Paraneoplastic Screening           CRP 2023, Apr-03    8.4 (H) [0.0 - 8.2 mg/L]      Sed Rate 2023, Apr-03    10 [0 - 23 mm/Hr]      Neurodegenerative Skin Protein Biomarkers   Distal Leg 2024, Feb-02    Normal No phosphorylated alpha-synuclein deposition observed in stained sections. [Normal]      Distal Thigh 2024, Feb-02    Normal No phosphorylated alpha-synuclein deposition observed in stained sections. [Normal]      Posterior Cervical 2024, Feb-02    Abnormal Two or more colocalized fibers seen across all stained sections. [Normal]      Metabolic Screening   Hemoglobin A1C External 02/17/2023  6.1 (H) [4.0 - 5.6 %]      Methlymalonic Acid 02/17/2023  0.20      T4 Total 02/17/2023  4.9 [4.5 - 11.5 ug/dL]      TSH 10/03/2022  2.081 [0.400 - 4.000 uIU/mL]       Glucose 2023, Apr-03    99 [70 - 110 mg/dL]      Albumin 2023, Apr-03    3.7 [3.5 - 5.2 g/dL]      Alkaline Phosphatase 2023, Apr-03    80 [55 - 135 U/L]      ALT 2023, Apr-03    12 [10 - 44 U/L]      AST 2023, Apr-03    13 [10 - 40 U/L]      BILIRUBIN TOTAL 2023, Apr-03    0.6 [0.1 - 1.0 mg/dL]      PROTEIN TOTAL 2023, Apr-03    7.1 [6.0 - 8.4 g/dL]      Cholesterol 2023, Apr-03    129 [120 - 199 mg/dL]      HDL 2023, Apr-03    49 [40 - 75 mg/dL]      Non-HDL Cholesterol 2023, Apr-03    80 [mg/dL]      Triglycerides 02/17/2023  58 [30 - 150 mg/dL]      Folate 02/17/2023  16.5 [4.0 - 24.0 ng/mL]      Thiamine 02/17/2023  84 [38 - 122 ug/L]      Vitamin B-12 2023, Feb-17    559 [180 - 914 ng/L]      Cerebrospinal Fluid Assessment   IgG 2023, Feb-17    791 [650 - 1600 mg/dL]      Coagulopathy Screening   INR 2023, Mar-11    1.2 [0.8 - 1.2]      Protime 03/11/2023  11.9 [9.0 - 12.5 sec]      Neuroendocrine/Electrolyte Screening   Magnesium 02/17/2023  2.0 [1.6 - 2.6 mg/dL]      BUN 2023, Apr-03    21 [8 - 23 mg/dL]      Chloride 2023, Apr-03    104 [95 - 110 mmol/L]      Creatinine 2023, Apr-03    1.2 [0.5 - 1.4 mg/dL]      Potassium 2023, Apr-03    4.3 [3.5 - 5.1 mmol/L]      Sodium 2023, Apr-03    141 [136 - 145 mmol/L]      Neurodegenerative Serum Fluid Assessment   NEUROFILAMENT LIGHT CHAIN, PLASMA 2023, Feb-17    19.5      Infectious Disease/Immunocompromised Screening   SARS-CoV-2 RNA, Amplification, Qual 02/17/2023  Negative      Syphilis Treponemal Ab 2023, Feb-17    Nonreactive [Nonreactive]      Standard Hematology Screen   Hematocrit 2023, Apr-03    48.2 [40.0 - 54.0 %]      Hemoglobin 2023, Apr-03    15.2 [14.0 - 18.0 g/dL]      MCV 2023, Apr-03    94 [82 - 98 fL]      Platelets 2023, Apr-03    230 [150 - 450 K/uL]           Neuroimaging:    MRI brain/head without contrast on 3/17/2023  Formal interpretation by Radiology:  No acute abnormality  Independently reviewed radiological imaging by Nimesh  MD Shena. MPH. Behavioral Neurologist  T1: Mild generalized cortical atrophy posterior>frontal, dorsal>ventral, lateral>medial; Regional atrophy is most well appreciated the anterior and posterior edge of the motor cortex. There secondary mild occipital atrophy however no significant orbital frontal atrophy temporal atrophy. Relative sparing of the posterior cingulate and precuneus cortices. Thinning of the posterior segment of the corpus callosum sparing the genu. Intact midbrain with normal midbrain pontine ratio. Cerebellum has mild verbal atrophy no significant sectional or lateralized atrophy. Hippocampi show very mild right greater than left volume loss however commensurate with advanced age.  T2/FLAIR: Age indeterminate left cerebellar hyperintensity suggestive of a microvascular ischemic event. Mild gliosis bilateral hippocampi. Very mild anterior periventricular capping. Right caudate adhesion.  DWI/ADC: No Significant DWI hyperintensities/hypointensities. No ADC correlation.  SWI/GRE: No Significant hypointensities to suggest cortical/subcortical hemosiderin deposition.  Impression: : Mild generalized cortical atrophy dorsal greater than ventral slight posterior to anterior gradient new sulci widening of the mid pre motor cortex and posterior somatosensory cortex. Secondary occipital atrophy. 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 No significant evidence of frontal temporal atrophy. No demonstrable evidence of Alzheimer's disease. No major evidence significant vascular disease     Procedures:    Electrocardiogram on 7/16/2020  Formal interpretation:  Vent. Rate : 060 BPM     Atrial Rate : 060 BPM    P-R Int : 154 ms          QRS Dur : 104 ms     QT Int : 412 ms       P-R-T Axes : 000 051 034 degrees    QTc Int : 412 ms Electronic atrial pacemaker Septal infarct ,age undetermined Abnormal ECG  Independently reviewed Electrocardiogram by  Nimesh Chatterjee MD. MPH. Behavioral Neurologist  Impression: : Received ECG has evidence of sinus node disease. HR (>=50-60). Prolonged IL interval (>0.22 s). Broad QRS complex (> 0.12 s).     Clinical Summary:     The patient is a 78-year-old right-handed male with a relevant past medical history of PMR, DM II, CKD 3, Atrial fib w sick sinus syndrome & bilateral hip & LSpine OA, who presents reporting a 1-year history of step-wise progressive neurocognitive impairment.       The clinical history is suggestive of:  Memory Impairment: STM encoding impairment  Attention Impairment: Attention, Sustained attention, Shifting attention  Executive Impairment: Energization, Working Memory  The neurological examination is significant for:  Cortical Transcallosal Disconnection: interhemispheric motor control (interhemispheric motor control ), motor efference (motor overflow)  Movement Disorder (Hyperkinetic): tremor (postural)  Movement Disorder (Hypokinetic): parkinsonism (tone, bradykinesia), dyskinesia (slowing, hypometria, dysrhythmia)  Sensory Dysfunction: peripheral (A? fibers)  The neurocognitive battery is significant (based on age and education) for:  Subjective Cognitive impairment with borderline mild attention/concentration and retrieval deficits   BEHAV5+ 1/6: See ROS section for a full description  The neurologically relevant imaging is significant for  MRI brain/head without contrast (3/17/2023): Mild generalized cortical atrophy dorsal greater than ventral slight posterior to anterior gradient new sulci widening of the mid pre motor cortex and posterior somatosensory cortex. Secondary occipital atrophy. 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 No significant evidence of frontal temporal atrophy. No demonstrable evidence of Alzheimer's disease. No major evidence significant vascular disease        Assessment:        The patient's clinical  presentation is attention predominant subjective cognitive impairment insufficient to interfere with activities of daily living (CDR-SOB: 0.5 - Questionable cognitive impairment).    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  Deficits on tests of attention, executive function, and visuospatial ability may be especially prominent.  Fluctuating cognition with pronounced variations in attention and alertness.  Spontaneous motor features of parkinsonism.     The pathology underlying The patient's neurocognitive impairment is likely a mixture of pathologies (Lewy body disease/alpha-synucleinopathy, Vascular Contributions to Cognitive Impairment and Dementia) likely provoked by uncontrolled hyperglycemia in setting of steroid use for PMR.   Plasma Protein Biomarkers: [02/17/2023] Neurofilament Light Chain (Nfl): 19.5.  There are no CSF protein biomarkers available on record.  [facility] Skin Biomarkers: [02/02/2024] Syn-One PC: 2 (Positive). Syn-One DT: 0 (Negative). Syn-One DL: 0 (Negative).  There is no known relevant genetic testing available.    The patient presents with a 1-year history of stepwise fluctuating neurocognitive disorder characterized by predominant attention and motor deficits. 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 There is a relevant family history of similar symptoms in the patient's older sister. These findings suggest early signs of a Lewy body disease-mediated process, with fluctuating levels of attention, concentration, and motor deficits, compounded by polymyalgia rheumatica.During the consultation, we discussed the clinical significance of these findings in the context of long-term brain health. We explored various behavioral strategies, environmental modifications, dietary  considerations, sleep hygiene, and exercise regimens. Additionally, supplementation and pharmaceutical therapy options were reviewed.The patient reported bothersome symptoms of excessive daytime fatigue. It was recommended to initiate as-needed modafinil for symptom relief. L-dopa therapy was also discussed, but the patient expressed a preference for over-the-counter supplementation instead of pharmacotherapy.All questions raised by the patient were addressed during the consultation, and no additional concerns were noted at this time.     The observations made above, were discussed with the patient. We have discussed the additional diagnostic(s) and/or managenent below.     Care Management Plan:    #Optimize Neurocognitive Impairment and Quality  We have discussed the MIND Diet and other lifestyle behavior that may help maintain brain health.  We have provided written/digital reading material  Do not recommend donepezil given atrial fibrillation  Start low-dose modafinil  mg qAM PRN for excessive daytime fatigue  #Optimize Behavioral Management and Quality.  No indication for memantine at this time  #Optimize Cerebrovascular Health.  The patient has a documented history of hyperlipidemia and/or hypercholesteremia with long-term complications such as cerebrovascular disease, peripheral vascular disease, and/or aortic atherosclerosis. Collectively these risk factors may contribute to cerebral atherosclerosis, and cerebral hypoperfusion compounded neurocognitive disorder. We discussed maximizing cerebrovascular-related medical therapy, including but not limited to cholesterol medications and antiplatelet agents. We have discussed the value of aggressively controlling vascular risk factors like hypertension, hyperlipidemia, and Diabetes SBP<130, LDL<100, and A1C<7.0. We discussed the need to optimize lifestyle choices, including a heart-healthy diet (e.g., Mediterranean or DASH), increased cardiovascular exercise  "(goal 150 minutes of moderate-intensity per week), and staying cognitively and socially active.  #Optimize Sleep Hygiene and Quality  We discussed and recommended additional diagnostic/management of sleep disorder to optimize brain health and longevity - recommend referral to sleep Medicine - patient would like to wait - will gather more information via iPhone sharona  #Optimize Movement Disorder and Quality.  We discussed the value of Neurology specific physical therapy. Patient has decline in his time  We discussed the value Sinemet versus over-the-counter supplementation - patient elects for OTC Dopa [place]  #Coordination of Care Management Planning.  We have recommended additional care management through social work support.  #Behavioral/Environmental Strategies  We recommend engaging in activities that stimulate cognitively and socially while avoiding excessive stimulation and fatigue in overwhelmingly complex situations.  We recommend integrating routine and schedule into your daily life. https://www.alzheimersproject.org/news/the-importance-of-routine-and-familiarity-to-persons-with-dementia/  #Health Maintenance/Lifestyle Advice  We have discussed the value in aggressively controlling vascular risk factors like hypertension, hyperlipidemia, and Diabetes SBP<130, LDL<100, A1C<7.0.  We discussed the need to optimize lifestyle choices including a heart-healthy diet (e.g., Mediterranean or DASH), increased cardiovascular exercise (goal 150 minutes of moderate-intensity per week), and stay cognitively and socially active.  #Support  We all need support sometimes. Get easy access to local resources, community programs, and services. https://www.communityresourcefinder.org/  Learn more about Cognitive Impairment in [place]: https://www.alz.org/professionals/public-health/state-overview[place]  #Safety  The Alzheimer's Association administers the nationwide "Safe Return" program with identification bracelets, " necklaces, or clothing tags and 24-hour assistance. More information is available online at https://www.alz.org/help-support/caregiving/safety/medicalert-with-24-7-wandering-support  #Follow up:  Follow-up as needed.    Thank you for allowing us to participate in the care of your patient. Please do not hesitate to contact us with any questions or concerns.     It was a pleasure seeing The patient and we look forward to seeing them at their follow-up visit.     This note is dictated on M*Modal Fluency Direct word recognition program. There are word recognition mistakes that are occasionally missed on review.       Scheduled Follow-up :  Future Appointments   Date Time Provider Department Center   4/26/2024 10:15 AM [facility] [place]   5/1/2024 10:00 AM Franca Boston, PT [facility] [place] [facility]   5/9/2024  8:00 AM Franca Boston, PT [facility] [place] [facility]   5/13/2024 11:00 AM Franca Boston, PT [facility] [place] [facility]   5/16/2024 10:00 AM Franca Boston, PT [facility] [place] [facility]   5/17/2024 10:00 AM Franca Boston, PT [facility] [place] [facility]   5/20/2024  2:00 PM Franca Boston, PT [facility] [place] [facility]   5/24/2024 11:00 AM Madan Loera, PT [facility] [place] [facility]   5/27/2024  2:00 PM Madan Loera, PT [facility] [place] [facility]   5/30/2024 11:30 AM Quinten Dominique MD [facility] SP[facility] [place]   5/31/2024 12:00 PM Madan Loera, PT [facility] [place] [facility]   6/3/2024  1:00 PM Franca Boston, PT [facility]   6/5/2024 11:00 AM Franca Boston, PT [facility] [place] [facility]   6/6/2024  1:00 PM Franca Boston, PT [facility] [place] [facility]   8/2/2024  9:20 AM Onofre Paulson MD [facility]       After Visit Medication List :     Medication List            Accurate as of April 25, 2024  1:10 PM. If you have any questions, ask your nurse or doctor.                CHANGE how you take these medications      allopurinoL 100 MG tablet  Commonly  known as: ZYLOPRIM  Take 1 tablet by mouth once daily  What changed:   how to take this  when to take this     atorvastatin 40 MG tablet  Commonly known as: LIPITOR  Take 1 tablet (40 mg total) by mouth once daily.  What changed: when to take this     benazepriL 20 MG tablet  Commonly known as: LOTENSIN  Take 1 tablet (20 mg total) by mouth once daily.  What changed: when to take this            CONTINUE taking these medications      celecoxib 200 MG capsule  Commonly known as: CeleBREX  Take 1 capsule (200 mg total) by mouth 2 (two) times daily with meals.     cycloSPORINE 0.05 % ophthalmic emulsion  Commonly known as: RESTASIS  Place 1 drop into both eyes 2 (two) times daily.     econazole nitrate 1 % cream  Apply topically 2 (two) times daily.     ferrous sulfate 325 mg (65 mg iron) Tab tablet  Commonly known as: FEOSOL     glucosamine-chondroitin 750-600 mg Tab     imiquimod 5 % cream  Commonly known as: ALDARA  Apply to skin cancer on left leg 5 times weekly for 6 weeks     levocetirizine 5 MG tablet  Commonly known as: XYZAL  Take 1 tablet (5 mg total) by mouth every evening.     metoprolol tartrate 25 MG tablet  Commonly known as: LOPRESSOR  Take 1 tablet (25 mg total) by mouth 2 (two) times daily as needed (palpitations).     multivitamin capsule     oxybutynin 10 MG 24 hr tablet  Commonly known as: DITROPAN-XL  Take 1 tablet (10 mg total) by mouth once daily.     tamsulosin 0.4 mg Cap  Commonly known as: FLOMAX  TAKE 1 CAPSULE BY MOUTH AFTER DINNER     UNABLE TO FIND     XARELTO 20 mg Tab  Generic drug: rivaroxaban  TAKE 1 TABLET BY MOUTH ONCE DAILY WITH SUPPER OR  EVENING  MEAL              Signing Physician:  MD Cady Chairez:       -----------------------------------------------------------------------------  I performed this consultation using real-time Telehealth tools, including a live video connection between my location and the patient's location (their home within the state of  [place]). Prior to initiating the consultation, I obtained informed verbal consent to perform this consultation using Telehealth tools and answered all the questions about the Telehealth interaction. The participants understand that only a limited neurological exam and limited neuropsychological testing can be performed using Telehealth tools.  I spent a total of 45 minutes (time-in: 13:00 PM; time-out: 13:45 PM) on 2024-04-25, virtually face-to-face with the patient and caregiver(s), >50% of that time was spent counseling regarding the symptoms, treatment plan, risks, therapeutic options, lifestyle modifications, and/or safety issues for the diagnoses above.  10/14 Review of Systems completed and is negative except as stated above in HPI (Systems reviewed: Const, Eyes, ENT, Resp, CV, GI, , MSK, Skin, Neuro)  I reviewed previous labs for a total of 10 minutes on 2024-04-25. This is directly related to the face-to-face encounter. Review of previous labs was performed all negative except as stated above in HPI  I performed a neurobehavioral status examination that included a clinical assessment of thinking, reasoning, and judgment to ensure a comprehensive approach in managing the complex and evolving needs of the patient's neurocognitive condition. Please see above HPI and ROS for full details. This exam was performed on 2024-04-25 and included 13 minutes spent on direct face-to-face clinical observation and interview with the patient and 20 minutes spent interpreting test results and preparing the report. The total time of 33 minutes spent on the neurobehavioral status examination is not included in the time spent on evaluation and management coding.  Total Billing time spent on encounter/documentation for this patient's evaluation and management, not including the neurobehavioral status examination: 42 minutes.

## 2024-04-26 ENCOUNTER — PATIENT MESSAGE (OUTPATIENT)
Dept: NEUROLOGY | Facility: CLINIC | Age: 79
End: 2024-04-26
Payer: MEDICARE

## 2024-04-26 ENCOUNTER — HOSPITAL ENCOUNTER (OUTPATIENT)
Dept: RADIOLOGY | Facility: HOSPITAL | Age: 79
Discharge: HOME | End: 2024-04-26
Attending: FAMILY MEDICINE
Payer: MEDICARE

## 2024-04-26 DIAGNOSIS — I87.2 CHRONIC VENOUS INSUFFICIENCY: ICD-10-CM

## 2024-04-26 DIAGNOSIS — M79.89 RIGHT LEG SWELLING: ICD-10-CM

## 2024-04-26 PROCEDURE — 93970 EXTREMITY STUDY: CPT | Mod: TC

## 2024-04-26 PROCEDURE — 93970 EXTREMITY STUDY: CPT | Mod: 26,,, | Performed by: INTERNAL MEDICINE

## 2024-04-27 RX ORDER — MODAFINIL 100 MG/1
TABLET ORAL
Qty: 60 TABLET | Refills: 0 | Status: SHIPPED | OUTPATIENT
Start: 2024-04-27 | End: 2024-05-06 | Stop reason: SDUPTHER

## 2024-04-28 ENCOUNTER — TELEPHONE (OUTPATIENT)
Dept: FAMILY MEDICINE | Facility: CLINIC | Age: 79
End: 2024-04-28
Payer: MEDICARE

## 2024-04-28 DIAGNOSIS — I87.2 VENOUS INSUFFICIENCY OF BOTH LOWER EXTREMITIES: ICD-10-CM

## 2024-04-28 DIAGNOSIS — I87.2 VENOUS REFLUX: Primary | ICD-10-CM

## 2024-04-29 ENCOUNTER — TELEPHONE (OUTPATIENT)
Dept: NEUROLOGY | Facility: CLINIC | Age: 79
End: 2024-04-29
Payer: MEDICARE

## 2024-04-29 NOTE — TELEPHONE ENCOUNTER
Spoke with pt per providers request to advise on reading the correct notes and that not everything they discussed would be written down. Pt advised that he will go reread the proper notes and get back with me.

## 2024-05-01 ENCOUNTER — CLINICAL SUPPORT (OUTPATIENT)
Dept: REHABILITATION | Facility: HOSPITAL | Age: 79
End: 2024-05-01
Payer: MEDICARE

## 2024-05-01 DIAGNOSIS — R29.898 DECREASED STRENGTH OF UPPER EXTREMITY: ICD-10-CM

## 2024-05-01 DIAGNOSIS — M25.612 DECREASED ROM OF LEFT SHOULDER: Primary | ICD-10-CM

## 2024-05-01 DIAGNOSIS — G89.29 CHRONIC LEFT SHOULDER PAIN: ICD-10-CM

## 2024-05-01 DIAGNOSIS — M25.512 CHRONIC LEFT SHOULDER PAIN: ICD-10-CM

## 2024-05-01 PROCEDURE — 97110 THERAPEUTIC EXERCISES: CPT | Mod: PN

## 2024-05-01 PROCEDURE — 97112 NEUROMUSCULAR REEDUCATION: CPT | Mod: PN

## 2024-05-01 PROCEDURE — 97140 MANUAL THERAPY 1/> REGIONS: CPT | Mod: PN

## 2024-05-01 NOTE — PROGRESS NOTES
"[facility]   Physical Therapy Treatment Note      Name: Shannan Norman  Clinic Number: [id number]    Therapy Diagnosis:   Encounter Diagnoses   Name Primary?    Decreased ROM of left shoulder Yes    Decreased strength of upper extremity     Chronic left shoulder pain      Physician: Tony Major*    Visit Date: 5/1/2024  Physician Orders: PT Eval and Treat   Medical Diagnosis from Referral: Glenohumeral arthritis, left [M19.012]   Evaluation Date: 3/18/2024  Authorization Period Expiration: 12/31/2024  Plan of Care Expiration: 5/4/2024 -> 6/7/2024  Date of Surgery: Monday, 3/4/2024  Visit # / Visits authorized: 7/20 + 1   FOTO: 8/10  PTA Visit #: 0/5      Precautions: Standard; reverse total shoulder with biceps tendonesis on Monday, 3/4     Time In: 1002  Time Out: 1103  Total Billable Time: 61 minutes    Subjective     Patient reports: knees and legs hurt. Patient reports he has to take baby steps before getting going in the AM. Patient reports his right shoulder is starting to hurt, particularly with elevation  He was not compliant with home exercise program given prior to 4/25.  Response to previous treatment: absent pain in left shoulder  Functional change: noticing more right upper extremity pain. Patient thinks it might be from decreased ability to use left upper extremity post-op    Pain: 0/10  Location: Left shoulder     Objective      Objective Measures updated at progress report unless specified.     Treatment     Shannan received the treatments listed below:      therapeutic exercises to develop ROM and flexibility for 24 minutes including objective and education:     Supine:  Wand shoulder flexion: 1 lb wand, 2x10    Seated:   Pulley flexion: 2x15  Table flexion slides: 2x10 left   Tabletop external rotation: 2x10 left     Standing forward bows: 3"x5    neuromuscular re-education activities to improve: Coordination, Kinesthetic, and Posture for 23 minutes. The following " activities were included:    Supine:   Chest press: 1 lb wand 2x10  Short lever shoulder flexion: 2x10 left. Therapist arm blocking horizontal abduction  Short arc/long lever shoulder flexion -  degrees: 3x10 left     Standing:  Rows - to neutral (no EXT): home exercise program   No money: yellow theraband 3x20  Biceps curls: 3x10 left     manual therapy techniques: were applied to the: left upper extremity for 14 minutes, including:     Grade II-III glenohumeral mobilizations.   Posterolateral with passive shoulder external rotation  Posterior and anterior mobilizations with passive shoulder flexion    Patient Education and Home Exercises       Education provided:   - Precautions - no internal rotation or extension. Risks related to repetitive flexion against gravity  - Updates home exercise program. Continue those from 4/25 in addition to new ones added 5/1    Written Home Exercises Provided: On 5/1/2024 and 4/25/2024. Exercises were reviewed and Shannan was able to demonstrate them prior to the end of the session.  Shannan demonstrated good understanding of the education provided. See Electronic Medical Record under Patient Instructions for exercises provided during therapy sessions    Assessment     See updated plan of care in Treatment section    Plan     Phase II of Dr. Dominique's RTSA protocol.   Progress biceps strengthening/kinesthetic as appropriate. Continue flexion measurements in sitting.    Franca Boston, PT, DPT, OCS

## 2024-05-02 ENCOUNTER — TELEPHONE (OUTPATIENT)
Dept: NEUROLOGY | Facility: CLINIC | Age: 79
End: 2024-05-02
Payer: MEDICARE

## 2024-05-02 NOTE — TELEPHONE ENCOUNTER
Attempted to call pt to schedule a vv with Dr. Adams per the pt's request for Medications and Edema in legs questions, No answer, LVM, Awaiting callback

## 2024-05-07 NOTE — PATIENT INSTRUCTIONS
Home Program: 6/15/2022    Kegels in standin. Stand comfortably with legs and buttocks relaxed.  Ribcage over pelvis.  2. Squeeze and LIFT the muscles that stop the flow of urine and passage of gas.  Keep legs and buttock muscles quiet.  3. Hold lift for 10 seconds without holding breath.  4. Release and DROP for 10 seconds.  5. Repeat 10 times, 2 sets, 1-2 times per day.      No Kegels while urinating!       Abdominal sets (TA Sets)  In same position, draw in your lower belly as if zipping tight pants.    Hold for 5 sec without holding breath.  Release for 10 sec  Repeat 10 times, 1 set, 1-2 times per day.      Home Exercise Program: 06/15/2022      CONTROLLING URINARY / FECAL URGENCY      WHEN YOU EXPERIENCE A STRONG URGE TO URINATE OR DEFECATE:    FIRST Stop activity, stand quietly or sit down. Try to stay very still to maintain control. Avoid rushing to the toilet.    SECOND Begin Quick Flicks (1 second LIFT of pelvic floor muscles, 4 second DROP). Pelvic floor contractions send a message to the bladder to relax and hold urine.     THIRD Relax. Do not rush to the toilet. Take a deep belly or diaphragmatic breath and let it out slowly. Let the urge to urinate pass by using distraction techniques and positive thoughts. Try not to think about going to the bathroom.    FINALLY If the urge returns, repeat the above steps to regain control. When you feel the urge subside, walk normally to the bathroom. You can urinate once the urge has subsided.           2

## 2024-05-09 RX ORDER — MODAFINIL 100 MG/1
TABLET ORAL
Qty: 60 TABLET | Refills: 5 | Status: SHIPPED | OUTPATIENT
Start: 2024-05-09

## 2024-05-10 ENCOUNTER — CLINICAL SUPPORT (OUTPATIENT)
Dept: REHABILITATION | Facility: HOSPITAL | Age: 79
End: 2024-05-10
Payer: MEDICARE

## 2024-05-10 DIAGNOSIS — R29.898 DECREASED STRENGTH OF UPPER EXTREMITY: ICD-10-CM

## 2024-05-10 DIAGNOSIS — M25.612 DECREASED ROM OF LEFT SHOULDER: Primary | ICD-10-CM

## 2024-05-10 DIAGNOSIS — G89.29 CHRONIC LEFT SHOULDER PAIN: ICD-10-CM

## 2024-05-10 DIAGNOSIS — M25.512 CHRONIC LEFT SHOULDER PAIN: ICD-10-CM

## 2024-05-10 PROCEDURE — 97112 NEUROMUSCULAR REEDUCATION: CPT | Mod: PN

## 2024-05-10 PROCEDURE — 97110 THERAPEUTIC EXERCISES: CPT | Mod: PN

## 2024-05-10 NOTE — PROGRESS NOTES
"[facility]   Physical Therapy Treatment Note      Name: Shannan Norman  Clinic Number: [id number]    Therapy Diagnosis:   Encounter Diagnoses   Name Primary?    Decreased ROM of left shoulder Yes    Decreased strength of upper extremity     Chronic left shoulder pain      Physician: Tony Major*    Visit Date: 5/10/2024  Physician Orders: PT Eval and Treat   Medical Diagnosis from Referral: Glenohumeral arthritis, left [M19.012]   Evaluation Date: 3/18/2024  Authorization Period Expiration: 2024  Plan of Care Expiration: 2024  Date of Surgery: Monday, 3/4/2024  Visit # / Visits authorized:  +    FOTO: 8/10  PTA Visit #: 0      Precautions: Standard; reverse total shoulder with biceps tendonesis on Monday, 3/4     Time In: 11:05 am  Time Out: 12:00 pm  Total Billable Time: 55 minutes    Subjective     Patient reports: shoulder is progressing well. Ongoing issues with his knees and lower extremity swelling  He was not compliant with home exercise program given prior to .  Response to previous treatment: absent pain in left shoulder  Functional change: noticing more right upper extremity pain. Patient thinks it might be from decreased ability to use left upper extremity post-op    Pain: 0/10  Location: Left shoulder     Objective      2024:   Shoulder active/passive range of motion, sittin/85 degrees left at beginning. 85/100 degrees left mid-way. 100/104 degrees left at the end     Treatment     Shannan received the treatments listed below:      therapeutic exercises to develop ROM and flexibility for 25 minutes including objective and education:  Supine:  Wand shoulder flexion: 1 lb wand, 2x10    Seated:   Pulley flexion: 2x15  Table flexion slides: 2x10 left   Tabletop external rotation: 2x10 left     Standing forward bows: 3"x5    neuromuscular re-education activities to improve: Coordination, Kinesthetic, and Posture for 30 minutes. The following " activities were included:  Supine:   Chest press: 1 lb wand 2x10  Short lever shoulder flexion AROM: 3x10   Long lever shoulder flexion AROM: 3x10     Standing:  Rows - to neutral (no EXT): home exercise program   No money: yellow theraband 3x20    Patient Education and Home Exercises       Education provided:   - Precautions - no internal rotation or extension. Risks related to repetitive flexion against gravity  - Updates home exercise program. Continue those from 4/25 in addition to new ones added 5/1    Written Home Exercises Provided: On 5/1/2024 and 4/25/2024. Exercises were reviewed and Shannan was able to demonstrate them prior to the end of the session.  Shannan demonstrated good understanding of the education provided. See Electronic Medical Record under Patient Instructions for exercises provided during therapy sessions    Assessment     Shannan is a 78 y.o. male referred to outpatient Physical Therapy with a medical diagnosis of glenohumeral arthritis. Patient presents 8 weeks post op reverse total shoulder arthroplasty with biceps tendonesis. Improvement in active flexion against gravity by 15 degrees during session. Less horizontal abduction fallout during supine active shoulder flexion exercises.     Shannan Is progressing well towards his goals.   Patient prognosis is Good.   Patient will continue to benefit from skilled outpatient physical therapy to address the deficits listed in the problem list box on initial evaluation, provide pt/family education and to maximize pt's level of independence in the home and community environment.     Patient's spiritual, cultural and educational needs considered and pt agreeable to plan of care and goals.  Anticipated barriers to physical therapy: None    Short Term Goals (6 Weeks):   1. Patient will be compliant with home exercise program to assist therapy in restoring pain free motion of the shoulder. Partially met 4/25  2. Patient will improve wrist extension to 70 degrees to  improve use of distal arm. Progressing, not met   3. Patient will improve forearm supination and elbow extension to full to improve use of distal arm. Progressing, not met   3. Patient will achieve passive shoulder flexion of 120 degrees to promote functional reaching. Met prior to 4/25.      Long Term Goals (12 Weeks):   1. Patient will improve FOTO score to </= 57% to demonstrate improvements in carrying, moving, and handling objects. Progressing, not met   2. Patient will improve initiate isotonic shoulder scaption and rotation and elbow flexion with light weight without pain to promote use of left arm for light lifting. Progressing, not met   3. Patient will improve active shoulder flexion to 120 degrees to improve functional mobility of upper extremities. Progressing, not met   4. Patient will perform functional external rotation to C4 to promote independent grooming and dressing. Progressing, not met     Plan     Phase II of Dr. Dominique's RTSA protocol.   Initiate biceps exercise next. Continue flexion measurements in sitting.    Madan Loera, PT, DPT

## 2024-05-13 ENCOUNTER — CLINICAL SUPPORT (OUTPATIENT)
Dept: REHABILITATION | Facility: HOSPITAL | Age: 79
End: 2024-05-13
Payer: MEDICARE

## 2024-05-13 DIAGNOSIS — G89.29 CHRONIC LEFT SHOULDER PAIN: ICD-10-CM

## 2024-05-13 DIAGNOSIS — M25.512 CHRONIC LEFT SHOULDER PAIN: ICD-10-CM

## 2024-05-13 DIAGNOSIS — R29.898 DECREASED STRENGTH OF UPPER EXTREMITY: ICD-10-CM

## 2024-05-13 DIAGNOSIS — M25.612 DECREASED ROM OF LEFT SHOULDER: Primary | ICD-10-CM

## 2024-05-13 PROCEDURE — 97110 THERAPEUTIC EXERCISES: CPT | Mod: PN

## 2024-05-13 PROCEDURE — 97112 NEUROMUSCULAR REEDUCATION: CPT | Mod: PN

## 2024-05-13 NOTE — PLAN OF CARE
[facility]  Physical Therapy Plan of Care Note     Name: Shannan Norman  Clinic Number: [id number]    Therapy Diagnosis:   Encounter Diagnoses   Name Primary?    Decreased ROM of left shoulder Yes    Decreased strength of upper extremity     Chronic left shoulder pain      Physician: Tony Major*    Visit Date: 2024    Visit Date: 2024  Physician Orders: PT Eval and Treat   Medical Diagnosis from Referral: Glenohumeral arthritis, left [M19.012]   Evaluation Date: 3/18/2024  Authorization Period Expiration: 2024  Plan of Care Expiration: 2024 -> 2024  Date of Surgery: Monday, 3/4/2024  Visit # / Visits authorized:  +    FOTO: 8/10  PTA Visit #: 0     Precautions: Standard; reverse total shoulder with biceps tendonesis on Monday, 3/4   Functional Level Prior to Evaluation: Independent    SUBJECTIVE     Update: He has no pain. Patient still limited in functional flexion of shoulder.     OBJECTIVE     Update:     Shoulder active/passive range of motion, sittin/85 degrees left at beginning of session   85/100 degrees left mid-way  100/104 degrees left at the end     ASSESSMENT     Update: Shannan is a 78 y.o. male referred to outpatient Physical Therapy with a medical diagnosis of glenohumeral arthritis. Patient presents 8 weeks post op reverse total shoulder arthroplasty with biceps tendonesis. Improvement in active flexion against gravity by 15 degrees during session. Less horizontal abduction fallout during supine active shoulder flexion exercises.      Shannan Is progressing well towards his goals.   Patient prognosis is Good.   Patient will continue to benefit from skilled outpatient physical therapy to address the deficits listed in the problem list box on initial evaluation, provide pt/family education and to maximize pt's level of independence in the home and community environment.      Patient's spiritual, cultural and educational needs considered  and pt agreeable to plan of care and goals.  Anticipated barriers to physical therapy: None     Short Term Goals (6 Weeks):   1. Patient will be compliant with home exercise program to assist therapy in restoring pain free motion of the shoulder. Partially met 4/25  2. Patient will improve wrist extension to 70 degrees to improve use of distal arm. Progressing, not met   3. Patient will improve forearm supination and elbow extension to full to improve use of distal arm. Progressing, not met   3. Patient will achieve passive shoulder flexion of 120 degrees to promote functional reaching. Met prior to 4/25.      Long Term Goals (12 Weeks):   1. Patient will improve FOTO score to </= 57% to demonstrate improvements in carrying, moving, and handling objects. Progressing, not met   2. Patient will improve initiate isotonic shoulder scaption and rotation and elbow flexion with light weight without pain to promote use of left arm for light lifting. Progressing, not met   3. Patient will improve active shoulder flexion to 120 degrees to improve functional mobility of upper extremities. Progressing, not met   4. Patient will perform functional external rotation to C4 to promote independent grooming and dressing. Progressing, not met     New Short Term Goals Status: Continue goals 2-3  Long Term Goal Status: Continue goals 1-4  Reasons for Recertification of Therapy:   Plan of care expires next visit    PLAN     Updated Certification Period: 5/1/2024 to 6/7/2024   Recommended Treatment Plan: 2 times per week for 4 weeks:  Cervical/Lumbar Traction, Electrical Stimulation -, Gait Training, Manual Therapy, Moist Heat/ Ice, Neuromuscular Re-ed, Patient Education, Self Care, Therapeutic Activities, and Therapeutic Exercise  Other Recommendations: Continue home exercise program, follow precautions    Franca Boston, PT, DPT, OCS

## 2024-05-13 NOTE — PROGRESS NOTES
[facility]   Physical Therapy Treatment Note      Name: Shannan Norman  Clinic Number: [id number]    Therapy Diagnosis:   Encounter Diagnoses   Name Primary?    Decreased ROM of left shoulder Yes    Decreased strength of upper extremity     Chronic left shoulder pain      Physician: Tony Major*    Visit Date: 2024  Physician Orders: PT Eval and Treat   Medical Diagnosis from Referral: Glenohumeral arthritis, left [M19.012]   Evaluation Date: 3/18/2024  Authorization Period Expiration: 2024  Plan of Care Expiration: 2024  Date of Surgery: Monday, 3/4/2024  Visit # / Visits authorized:  + 1   FOTO: 10/11 NEXT  PTA Visit #:       Precautions: Standard; reverse total shoulder with biceps tendonesis on Monday, 3/4     Time In: 1100  Time Out: 1159  Total Billable Time: 59 minutes    Subjective     Patient reports: he had a day of relief from leg pain over the weekend. Patient walking with cane now secondary to pain and swelling and subsequent effects on mobility. Patient has a visit with a vascular specialist in  or July. Patient follows up with Dr. Dominique on Memorial day.  He was compliant with home exercise program given prior to .  Response to previous treatment: no issues  Functional change: remains careful with left upper extremity use    Pain: 0/10  Location: Left shoulder     Objective      2024:     Shoulder flexion active/passive range of motion, sittin/113 degrees left at beginning; 103 active at end  Shoulder flexion active/passive range of motion, supine: 128/133 degrees left  Shoulder external rotation active/passive range of motion, supine: 38/42 degrees left   Elbow extension active/passive range of motion, supine: -3/-1 degrees left     FOTO NEXT!    Treatment     Shannan received the treatments listed below:      therapeutic exercises to develop ROM and flexibility for 20 minutes including objective and education:    Supine:    Elbow extension han lbs, 5' left     Seated:   Pulley flexion: 2x15  Table flexion slides: 2x10 left     neuromuscular re-education activities to improve: Coordination, Kinesthetic, and Posture for 39 minutes. The following activities were included:    Supine short arc shoulder flexion - : 2x10 left *    Lawn chair:  Wand shoulder flexion: 1 lb, 3x5 *  Wand chest press: 1 lb, 2x10 *   Short lever shoulder flexion: 3x5 left     Standing:  External rotation walkout: red theraband 2x10 left   Internal rotation walkout: red theraband 2x10 left   Biceps curls: 2 lbs 3x10 right     *=verbal and tactile cues to maintain elbow extension    Patient Education and Home Exercises       Education provided:   - Precautions. Risks related to repetitive flexion against gravity  - Continue home exercise program     Written Home Exercises Provided: On 2024 and 2024. Exercises were reviewed and Shannan was able to demonstrate them prior to the end of the session.  Shannan demonstrated good understanding of the education provided. See Electronic Medical Record under Patient Instructions for exercises provided during therapy sessions    Assessment     Shannan is a 78 y.o. male referred to outpatient Physical Therapy with a medical diagnosis of glenohumeral arthritis. Patient presents 10 weeks post op reverse total shoulder arthroplasty with biceps tendonesis. Slight improvement in shoulder flexion range from third/final measurement on . Excellent carryover considering initial  measurement. Improved elbow extension range from 17. Rotation walkout exercises with more of a weight shift secondary to lower extremity function. Repeated cues to prevent elbow flexion compensation during anterior deltoid exercises.     Shannan Is progressing well towards his goals.   Patient prognosis is Good.   Patient will continue to benefit from skilled outpatient physical therapy to address the deficits listed in the problem list box on initial  evaluation, provide pt/family education and to maximize pt's level of independence in the home and community environment.     Patient's spiritual, cultural and educational needs considered and pt agreeable to plan of care and goals.  Anticipated barriers to physical therapy: None    Short Term Goals (6 Weeks):   1. Patient will be compliant with home exercise program to assist therapy in restoring pain free motion of the shoulder. Partially met 4/25  2. Patient will improve wrist extension to 70 degrees to improve use of distal arm. Progressing, not met   3. Patient will improve forearm supination and elbow extension to full to improve use of distal arm. Progressing, not met   3. Patient will achieve passive shoulder flexion of 120 degrees to promote functional reaching. Met prior to 4/25.      Long Term Goals (12 Weeks):   1. Patient will improve FOTO score to </= 57% to demonstrate improvements in carrying, moving, and handling objects. Progressing, not met   2. Patient will improve initiate isotonic shoulder scaption and rotation and elbow flexion with light weight without pain to promote use of left arm for light lifting. Progressing, not met   3. Patient will improve active shoulder flexion to 120 degrees to improve functional mobility of upper extremities. Progressing, not met   4. Patient will perform functional external rotation to C4 to promote independent grooming and dressing. Progressing, not met     Plan     Phase II of Dr. Dominique's RTSA protocol.   Progress biceps strengthening as tolerated. Continue flexion measurements in sitting.    Franca Boston, PT, DPT, OCS

## 2024-05-16 ENCOUNTER — CLINICAL SUPPORT (OUTPATIENT)
Dept: REHABILITATION | Facility: HOSPITAL | Age: 79
End: 2024-05-16
Payer: MEDICARE

## 2024-05-16 DIAGNOSIS — R29.898 DECREASED STRENGTH OF UPPER EXTREMITY: ICD-10-CM

## 2024-05-16 DIAGNOSIS — M25.512 CHRONIC LEFT SHOULDER PAIN: ICD-10-CM

## 2024-05-16 DIAGNOSIS — G89.29 CHRONIC LEFT SHOULDER PAIN: ICD-10-CM

## 2024-05-16 DIAGNOSIS — M25.612 DECREASED ROM OF LEFT SHOULDER: Primary | ICD-10-CM

## 2024-05-16 PROCEDURE — 97112 NEUROMUSCULAR REEDUCATION: CPT | Mod: PN

## 2024-05-16 NOTE — PROGRESS NOTES
"[facility]   Physical Therapy Treatment Note      Name: Shannan Norman  Clinic Number: [id number]    Therapy Diagnosis:   Encounter Diagnoses   Name Primary?    Decreased ROM of left shoulder Yes    Decreased strength of upper extremity     Chronic left shoulder pain      Physician: Tony Major*    Visit Date: 2024  Physician Orders: PT Eval and Treat   Medical Diagnosis from Referral: Glenohumeral arthritis, left [M19.012]   Evaluation Date: 3/18/2024  Authorization Period Expiration: 2024  Plan of Care Expiration: 2024  Date of Surgery: Monday, 3/4/2024  Visit # / Visits authorized: 10/20 + 1   FOTO:  NEXT  PTA Visit #: 0      Precautions: Standard; reverse total shoulder with biceps tendonesis on Monday, 3/4     Time In: 1001  Time Out: 1055  Total Billable Time: 38 minutes    Subjective     Patient reports: he has 2 lb weights at home for bicep curl performance  He was compliant with home exercise program given prior to .  Response to previous treatment: pain with elbow extension creep stretch  Functional change: similar-careful with left upper extremity use    Pain: 0/10  Location: Left shoulder     Objective      2024:     Shoulder flexion active/passive range of motion, sittin/110 degrees left at beginning of session; 104/112 degrees at end    Treatment     Shannan received the treatments listed below:      therapeutic exercises to develop ROM and flexibility for 9 minutes including objective and education:    Seated:   Pulley flexion: 2x15  Wand external rotation: 5" 2x10 left     neuromuscular re-education activities to improve: Coordination, Kinesthetic, and Posture for 45 minutes. The following activities were included:    Supine:  Wand shoulder flexion: 1 lb, 2x10   Wand chest press: 1 lb, 2x10   Short lever shoulder flexion: 3x5 left     Lawn chair wand chest press: 1 lb, 2x10    Standing:  External rotation walkout: red theraband " x15 left. Tactile cues to prevent movement out of neutral positioning  Internal rotation walkout: green theraband x15 left   Biceps curls: 4 lbs x20 left       5 lbs x20 left      Patient Education and Home Exercises       Education provided:   - Precautions. Risks related to repetitive flexion against gravity  - Continue home exercise program. Add biceps curls - may use heavier canned goods for this    Written Home Exercises Provided: Verbal on 5/16. Written on 5/1/2024 and 4/25/2024. Exercises were reviewed and Shannan was able to demonstrate them prior to the end of the session.  Shannan demonstrated good understanding of the education provided. See Electronic Medical Record under Patient Instructions for exercises provided during therapy sessions    Assessment     Shannan is a 78 y.o. male referred to outpatient Physical Therapy with a medical diagnosis of glenohumeral arthritis. Patient presents 11 weeks post op reverse total shoulder arthroplasty with biceps tendonesis. Decline in motion that improved to that achieved last session. Improved control of neutral rotation during walkouts. No issues with progression of biceps concentric loading.     Shannan Is progressing well towards his goals.   Patient prognosis is Good.   Patient will continue to benefit from skilled outpatient physical therapy to address the deficits listed in the problem list box on initial evaluation, provide pt/family education and to maximize pt's level of independence in the home and community environment.     Patient's spiritual, cultural and educational needs considered and pt agreeable to plan of care and goals.  Anticipated barriers to physical therapy: None    Short Term Goals (6 Weeks):   1. Patient will be compliant with home exercise program to assist therapy in restoring pain free motion of the shoulder. Partially met 4/25  2. Patient will improve wrist extension to 70 degrees to improve use of distal arm. Progressing, not met   3. Patient will  improve forearm supination and elbow extension to full to improve use of distal arm. Progressing, not met   3. Patient will achieve passive shoulder flexion of 120 degrees to promote functional reaching. Met prior to 4/25.      Long Term Goals (12 Weeks):   1. Patient will improve FOTO score to </= 57% to demonstrate improvements in carrying, moving, and handling objects. Progressing, not met   2. Patient will improve initiate isotonic shoulder scaption and rotation and elbow flexion with light weight without pain to promote use of left arm for light lifting. Progressing, not met   3. Patient will improve active shoulder flexion to 120 degrees to improve functional mobility of upper extremities. Progressing, not met   4. Patient will perform functional external rotation to C4 to promote independent grooming and dressing. Progressing, not met     Plan     Phase II of Dr. Dominique's RTSA protocol.     Franca Boston, PT, DPT, OCS

## 2024-05-20 ENCOUNTER — CLINICAL SUPPORT (OUTPATIENT)
Dept: REHABILITATION | Facility: HOSPITAL | Age: 79
End: 2024-05-20
Payer: MEDICARE

## 2024-05-20 DIAGNOSIS — G89.29 CHRONIC LEFT SHOULDER PAIN: ICD-10-CM

## 2024-05-20 DIAGNOSIS — R29.898 DECREASED STRENGTH OF UPPER EXTREMITY: ICD-10-CM

## 2024-05-20 DIAGNOSIS — M25.612 DECREASED ROM OF LEFT SHOULDER: Primary | ICD-10-CM

## 2024-05-20 DIAGNOSIS — M25.512 CHRONIC LEFT SHOULDER PAIN: ICD-10-CM

## 2024-05-20 PROCEDURE — 97140 MANUAL THERAPY 1/> REGIONS: CPT | Mod: KX,PN

## 2024-05-20 PROCEDURE — 97112 NEUROMUSCULAR REEDUCATION: CPT | Mod: KX,PN

## 2024-05-21 ENCOUNTER — TELEPHONE (OUTPATIENT)
Dept: NEUROLOGY | Facility: CLINIC | Age: 79
End: 2024-05-21
Payer: MEDICARE

## 2024-05-21 NOTE — TELEPHONE ENCOUNTER
----- Message from Richa Peck sent at 5/21/2024  1:04 PM CDT -----  Regarding: pt advice  Contact: pt@ [phone]  Pt is returning a missed call from someone in the office and is asking for a return call back soon. Thanks.     Reason for call:miss call      Patient's DX:     Patient requesting call back or [facility] msg: pt@ [phone]

## 2024-05-22 ENCOUNTER — PATIENT MESSAGE (OUTPATIENT)
Dept: NEUROLOGY | Facility: CLINIC | Age: 79
End: 2024-05-22
Payer: MEDICARE

## 2024-05-23 ENCOUNTER — OFFICE VISIT (OUTPATIENT)
Dept: VASCULAR SURGERY | Facility: CLINIC | Age: 79
End: 2024-05-23
Payer: MEDICARE

## 2024-05-23 VITALS
HEIGHT: 73 IN | WEIGHT: 211.88 LBS | HEART RATE: 60 BPM | DIASTOLIC BLOOD PRESSURE: 58 MMHG | BODY MASS INDEX: 28.08 KG/M2 | SYSTOLIC BLOOD PRESSURE: 116 MMHG

## 2024-05-23 DIAGNOSIS — I87.2 VENOUS REFLUX: ICD-10-CM

## 2024-05-23 DIAGNOSIS — I87.2 VENOUS INSUFFICIENCY: Primary | ICD-10-CM

## 2024-05-23 DIAGNOSIS — I89.0 LYMPHEDEMA: ICD-10-CM

## 2024-05-23 DIAGNOSIS — I87.2 VENOUS INSUFFICIENCY OF BOTH LOWER EXTREMITIES: ICD-10-CM

## 2024-05-23 PROCEDURE — 3074F SYST BP LT 130 MM HG: CPT | Mod: CPTII,S$GLB,, | Performed by: SURGERY

## 2024-05-23 PROCEDURE — 1125F AMNT PAIN NOTED PAIN PRSNT: CPT | Mod: CPTII,S$GLB,, | Performed by: SURGERY

## 2024-05-23 PROCEDURE — 3078F DIAST BP <80 MM HG: CPT | Mod: CPTII,S$GLB,, | Performed by: SURGERY

## 2024-05-23 PROCEDURE — 99204 OFFICE O/P NEW MOD 45 MIN: CPT | Mod: S$GLB,,, | Performed by: SURGERY

## 2024-05-23 PROCEDURE — 1159F MED LIST DOCD IN RCRD: CPT | Mod: CPTII,S$GLB,, | Performed by: SURGERY

## 2024-05-23 NOTE — PROGRESS NOTES
Martell Alonso MD, RPVI                                 [facility]                           [facility]                             05/23/2024    HPI:  Shannan Norman is a 78 y.o. male with   Patient Active Problem List   Diagnosis    HTN (hypertension)    Gout, arthritis    Atrial flutter    PFO (patent foramen ovale)    Umbilical hernia    Asymptomatic varicose veins of both lower extremities    Nuclear sclerosis of both eyes    Left epiretinal membrane    Junctional escape rhythm    SSS (sick sinus syndrome)    Nonrheumatic aortic valve insufficiency    Benign prostatic hyperplasia with urinary frequency    Junctional bradycardia    Dyslipidemia associated with type 2 diabetes mellitus    Hypertension associated with diabetes    Right inguinal hernia    CKD (chronic kidney disease) stage 3, GFR 30-59 ml/min    Overweight (BMI 25.0-29.9)    Nuclear sclerotic cataract of right eye    Nuclear sclerotic cataract of left eye    AK (actinic keratosis)    History of melanoma    Decreased range of hip movement    Decreased strength    Pain of left upper extremity    Weakness    Stiffness due to immobility    Pelvic floor dysfunction    Basal cell carcinoma (BCC) of skin of left lower extremity including hip    PAF (paroxysmal atrial fibrillation)    Right hip pain    History of skin cancer    OAB (overactive bladder)    Presence of cardiac pacemaker    Familial hypercholesterolemia    Bradycardia    Polymyalgia rheumatica    Glenohumeral arthritis, left    Decreased ROM of left shoulder    Decreased strength of upper extremity    Chronic left shoulder pain    Chronic anticoagulation    S/P ablation of atrial flutter    Venous reflux    being managed by PCP and specialists who is here today for evaluation of BLE edema.  Patient states location is BLE occurring for yrs.  Associated signs and symptoms include pain.  Quality is heavy and severity is 7/10.  Symptoms began yrs ago.   Alleviating factors include elevation.  Worsening factors include dependency.  Patient has been wearing compression stockings for greater than 3 months.  Symptoms do limit patient's functional status and daily activities.  no DVT history.  + GSV vein stripping past venous interventions.  no low sodium diet.  no excessive water intake.    Migraine with aura: no  PFO/ASD/right to left shunt: no  Pregnant: no  Breastfeeding: no    no MI  no Stroke  Tobacco use: denies    Past Medical History:   Diagnosis Date    Allergy     Arthritis     Atrial fibrillation     Atrial flutter 2011    ablation    Basal cell carcinoma     Cancer     Cataract     CKD (chronic kidney disease) stage 3, GFR 30-59 ml/min 8/13/2019    Diabetes mellitus     Dry eye syndrome     Gout, unspecified     High cholesterol     History of shingles     Hypertension     Melanoma     Seizures      Past Surgical History:   Procedure Laterality Date    ABLATION N/A 9/11/2018    Procedure: ABLATION;  Surgeon: Hany Sanchez MD;  Location: [facility] CATH LAB;  Service: Cardiology;  Laterality: N/A;  AFL, ISABELLE, RFA, ELODIA, MAC, DM, 3 PREP    ABLATION OF DYSRHYTHMIC FOCUS      ADENOIDECTOMY      CARDIAC PACEMAKER PLACEMENT      no defib    CATARACT EXTRACTION W/  INTRAOCULAR LENS IMPLANT Right 7/28/2020    Procedure: EXTRACTION, CATARACT, WITH IOL INSERTION;  Surgeon: Andrés Morse MD;  Location: [facility];  Service: Ophthalmology;  Laterality: Right;    CATARACT EXTRACTION W/  INTRAOCULAR LENS IMPLANT Left 8/11/2020    Procedure: EXTRACTION, CATARACT, WITH IOL INSERTION;  Surgeon: Andrés Morse MD;  Location: [facility];  Service: Ophthalmology;  Laterality: Left;    COLONOSCOPY N/A 1/2/2019    Procedure: COLONOSCOPY Suprep;  Surgeon: Cindy Solorzano MD;  Location: [facility] ENDO;  Service: Endoscopy;  Laterality: N/A;    FIXATION OF TENDON Left 3/4/2024    Procedure: BICEPS TENODESIS;  Surgeon: Quinten Dominique MD;  [address];   Service: Orthopedics;  Laterality: Left;  WITH CATHETER    GANGLION CYST EXCISION      left neck    HERNIA REPAIR  2013    umbilical    REVERSE TOTAL SHOULDER ARTHROPLASTY Left 3/4/2024    Procedure: ARTHROPLASTY, SHOULDER, TOTAL, REVERSE;  Surgeon: Quinten Dominique MD;  Location: [facility] OR [address] [place] [address];  Service: Orthopedics;  Laterality: Left;  WITH CATHETER    TENDON REPAIR Right     wrist    TONSILLECTOMY      varicose veins      several sx  bilateral    VASECTOMY      VEIN SURGERY       Family History   Problem Relation Name Age of Onset    Diabetes Mother      COPD Father      Heart disease Father      Arthritis Sister Argenis     Heart attack Brother      Heart disease Brother      Diabetes Brother      No Known Problems Maternal Aunt      No Known Problems Maternal Uncle      No Known Problems Paternal Aunt      No Known Problems Paternal Uncle      No Known Problems Maternal Grandmother      No Known Problems Maternal Grandfather      No Known Problems Paternal Grandmother      No Known Problems Paternal Grandfather      No Known Problems Son Shannan     Cancer Sister Tianna         lymph node, breast    No Known Problems Sister Aaliyah     No Known Problems Son Nimesh     Amblyopia Neg Hx      Blindness Neg Hx      Cataracts Neg Hx      Glaucoma Neg Hx      Hypertension Neg Hx      Macular degeneration Neg Hx      Retinal detachment Neg Hx      Strabismus Neg Hx      Stroke Neg Hx      Thyroid disease Neg Hx      Prostate cancer Neg Hx      Kidney disease Neg Hx      Melanoma Neg Hx       Social History     Socioeconomic History    Marital status:    Occupational History     Employer: [employer]   Tobacco Use    Smoking status: Never    Smokeless tobacco: Never   Substance and Sexual Activity    Alcohol use: Yes     Alcohol/week: 7.0 - 14.0 standard drinks of alcohol     Types: 7 - 14 Glasses of wine per week    Drug use: No    Sexual activity: Yes     Partners: Female     Social Determinants of Health      Financial Resource Strain: Low Risk  (1/25/2024)    Overall Financial Resource Strain (CARDIA)     Difficulty of Paying Living Expenses: Not hard at all   Food Insecurity: No Food Insecurity (1/25/2024)    Hunger Vital Sign     Worried About Running Out of Food in the Last Year: Never true     Ran Out of Food in the Last Year: Never true   Transportation Needs: No Transportation Needs (1/25/2024)    PRAPARE - Transportation     Lack of Transportation (Medical): No     Lack of Transportation (Non-Medical): No   Physical Activity: Inactive (1/25/2024)    Exercise Vital Sign     Days of Exercise per Week: 0 days     Minutes of Exercise per Session: 30 min   Stress: No Stress Concern Present (1/25/2024)    [employer] [place] [employer] - Occupational Stress Questionnaire     Feeling of Stress : Only a little   Housing Stability: Low Risk  (1/25/2024)    Housing Stability Vital Sign     Unable to Pay for Housing in the Last Year: No     Number of Places Lived in the Last Year: 1     Unstable Housing in the Last Year: No       Current Outpatient Medications:     allopurinoL (ZYLOPRIM) 100 MG tablet, Take 1 tablet by mouth once daily (Patient taking differently: Take by mouth nightly. Take 1 tablet by mouth once daily), Disp: 90 tablet, Rfl: 3    atorvastatin (LIPITOR) 40 MG tablet, Take 1 tablet (40 mg total) by mouth once daily. (Patient taking differently: Take 40 mg by mouth every evening.), Disp: 90 tablet, Rfl: 3    benazepriL (LOTENSIN) 20 MG tablet, Take 1 tablet (20 mg total) by mouth once daily. (Patient taking differently: Take 20 mg by mouth every evening.), Disp: 90 tablet, Rfl: 3    celecoxib (CELEBREX) 200 MG capsule, Take 1 capsule (200 mg total) by mouth 2 (two) times daily with meals., Disp: 60 capsule, Rfl: 0    cycloSPORINE (RESTASIS) 0.05 % ophthalmic emulsion, Place 1 drop into both eyes 2 (two) times daily., Disp: 60 each, Rfl: 11    econazole nitrate 1 % cream, Apply topically 2 (two)  times daily., Disp: 85 g, Rfl: 3    ferrous sulfate (FEOSOL) 325 mg (65 mg iron) Tab tablet, Take 325 mg by mouth once daily., Disp: , Rfl:     GLUCOSAMINE HCL/CHONDR VASQUEZ A NA (GLUCOSAMINE-CHONDROITIN) 750-600 mg Tab, Take 2 tablets by mouth Daily., Disp: , Rfl:     modafiniL (PROVIGIL) 100 MG Tab, Take 1/4-1/2 tablet in AM as needed for excessive daytime fatigue, Disp: 60 tablet, Rfl: 5    multivitamin capsule, Take 1 capsule by mouth nightly. , Disp: , Rfl:     rivaroxaban (XARELTO) 20 mg Tab, Take 1 tablet (20 mg total) by mouth once daily., Disp: 90 tablet, Rfl: 3    tamsulosin (FLOMAX) 0.4 mg Cap, TAKE 1 CAPSULE BY MOUTH AFTER DINNER, Disp: 90 capsule, Rfl: 3    UNABLE TO FIND, Take by mouth every morning. 6000 hydrolyzes collagen, Disp: , Rfl:     vibegron 75 mg Tab, Take 1 tablet (75 mg total) by mouth once daily., Disp: 30 tablet, Rfl: 11    imiquimod (ALDARA) 5 % cream, Apply to skin cancer on left leg 5 times weekly for 6 weeks, Disp: 24 packet, Rfl: 3    levocetirizine (XYZAL) 5 MG tablet, Take 1 tablet (5 mg total) by mouth every evening., Disp: 90 tablet, Rfl: 1    metoprolol tartrate (LOPRESSOR) 25 MG tablet, Take 1 tablet (25 mg total) by mouth 2 (two) times daily as needed (palpitations)., Disp: 45 tablet, Rfl: 0    REVIEW OF SYSTEMS:  General: No fevers or chills; ENT: No sore throat; Allergy and Immunology: no persistent infections; Hematological and Lymphatic: No history of bleeding or easy bruising; Endocrine: negative; Respiratory: no cough, shortness of breath, or wheezing; Cardiovascular: no chest pain or dyspnea on exertion; Gastrointestinal: no abdominal pain/back, change in bowel habits, or bloody stools; Genito-Urinary: no dysuria, trouble voiding, or hematuria; Musculoskeletal: edema; Neurological: no TIA or stroke symptoms; Psychiatric: no nervousness, anxiety or depression.    PHYSICAL EXAM:      Pulse: 60         General appearance:  Alert, well-appearing, and in no distress.  Oriented  "to person, place, and time                    Neurological: Normal speech, no focal findings noted; CN II - XII grossly intact. RLE with sensation to light touch, LLE with sensation to light touch.            Musculoskeletal: Digits/nail without cyanosis/clubbing.  Strength 5/5 BLE.                    Neck: Supple, no significant adenopathy                  Chest:  No wheezes, symmetric air entry. No use of accessory muscles               Cardiac: Normal rate and regular rhythm            Abdomen: Soft, nontender, nondistended      Extremities:   2+ R DP pulse, 2+ L DP pulse      2+ RLE edema, 2+ LLE edema    Skin:  RLE no ulcer; LLE no ulcer      RLE no spider veins, LLE no spider veins      RLE no varicose veins, LLE no varicose veins    CEAP 3/3    VCSS 6    LAB RESULTS:  No results found for: "CBC"  Lab Results   Component Value Date    LABPROT 11.9 03/11/2023    INR 1.2 03/11/2023     Lab Results   Component Value Date     04/02/2024    K 4.8 04/02/2024     04/02/2024    CO2 23 04/02/2024    GLU 80 04/02/2024    BUN 17 04/02/2024    CREATININE 0.9 04/02/2024    CALCIUM 9.3 04/02/2024    ANIONGAP 10 04/02/2024    EGFRNONAA >60.0 07/07/2022     Lab Results   Component Value Date    WBC 7.24 03/05/2024    RBC 4.62 03/05/2024    HGB 13.3 (L) 03/05/2024    HCT 42.1 03/05/2024    MCV 91 03/05/2024    MCH 28.8 03/05/2024    MCHC 31.6 (L) 03/05/2024    RDW 16.6 (H) 03/05/2024     03/05/2024    MPV 11.4 03/05/2024    GRAN 5.6 03/05/2024    GRAN 78.0 (H) 03/05/2024    LYMPH 0.7 (L) 03/05/2024    LYMPH 9.8 (L) 03/05/2024    MONO 0.8 03/05/2024    MONO 11.3 03/05/2024    EOS 0.0 03/05/2024    BASO 0.01 03/05/2024    EOSINOPHIL 0.0 03/05/2024    BASOPHIL 0.1 03/05/2024    DIFFMETHOD Automated 03/05/2024     .  Lab Results   Component Value Date    HGBA1C 5.9 (H) 04/02/2024       IMAGING:  All pertinent imaging has been reviewed and interpreted independently.    Venous US 4/2024 " Impression:  FINDINGS:  Evaluation for venous thrombosis     Bilateral thigh veins: The common femoral, femoral, popliteal, and deep femoral veins are patent and free of thrombus. The veins are normally compressible and have normal phasic flow and augmentation response.     Miscellaneous: Enlarged lymph node in the right groin measuring 1.3 cm short axis but demonstrating normal reniform shape and fatty hilum.     Evaluation for venous insufficiency     Reported history of surgical stripping and EVLT to the greater saphenous veins bilaterally.     Right lower extremity:     Elevated reflux time within an accessory greater saphenous vein measuring 992 millisecond at the knee.     The maximum diameter in the right accessory greater saphenous vein is 10.7 mm at the knee.     The maximum diameter in the right small saphenous vein is 8.5 mm.     Left lower extremity:     Elevated reflux time in the left popliteal vein measuring 1256 millisecond.     Elevated reflux times within an accessory greater saphenous vein measuring up to 1660 millisecond.     The maximum diameter in the left accessory greater saphenous vein is 9.5 mm at the saphenofemoral junction.     The maximum diameter in the left small saphenous vein is 3.2 mm.     Impression:     No DVT in either thigh.     History of surgical stripping and EVLT to the greater saphenous veins bilaterally.     Venous insufficiency involving accessory greater saphenous veins bilaterally, as well as the left popliteal vein.     Enlarged but morphologically normal lymph node in the right groin, likely reactive.       IMP/PLAN:  78 y.o. male with   Patient Active Problem List   Diagnosis    HTN (hypertension)    Gout, arthritis    Atrial flutter    PFO (patent foramen ovale)    Umbilical hernia    Asymptomatic varicose veins of both lower extremities    Nuclear sclerosis of both eyes    Left epiretinal membrane    Junctional escape rhythm    SSS (sick sinus syndrome)     Nonrheumatic aortic valve insufficiency    Benign prostatic hyperplasia with urinary frequency    Junctional bradycardia    Dyslipidemia associated with type 2 diabetes mellitus    Hypertension associated with diabetes    Right inguinal hernia    CKD (chronic kidney disease) stage 3, GFR 30-59 ml/min    Overweight (BMI 25.0-29.9)    Nuclear sclerotic cataract of right eye    Nuclear sclerotic cataract of left eye    AK (actinic keratosis)    History of melanoma    Decreased range of hip movement    Decreased strength    Pain of left upper extremity    Weakness    Stiffness due to immobility    Pelvic floor dysfunction    Basal cell carcinoma (BCC) of skin of left lower extremity including hip    PAF (paroxysmal atrial fibrillation)    Right hip pain    History of skin cancer    OAB (overactive bladder)    Presence of cardiac pacemaker    Familial hypercholesterolemia    Bradycardia    Polymyalgia rheumatica    Glenohumeral arthritis, left    Decreased ROM of left shoulder    Decreased strength of upper extremity    Chronic left shoulder pain    Chronic anticoagulation    S/P ablation of atrial flutter    Venous reflux    being managed by PCP and specialists who is here today for evaluation of BLE edema and lymphedema.    -recommend compression with Rx stockings, elevation, dietary changes associated with water and sodium intake discussed at length with patient  -Lymphedema clinic  -Exercise   -RTC 4 weeks to eval for pumps - virtual visit    I spent 12 minutes evaluating this patient and greater than 50% of the time was spent counseling, coordinator care and discussing the plan of care.  All questions were answered and patient stated understanding with agreement with the above treatment plan.    Martell Alonso MD [facility]  Vascular and Endovascular Surgery

## 2024-05-24 ENCOUNTER — CLINICAL SUPPORT (OUTPATIENT)
Dept: REHABILITATION | Facility: HOSPITAL | Age: 79
End: 2024-05-24
Payer: MEDICARE

## 2024-05-24 DIAGNOSIS — R29.898 DECREASED STRENGTH OF UPPER EXTREMITY: ICD-10-CM

## 2024-05-24 DIAGNOSIS — M25.612 DECREASED ROM OF LEFT SHOULDER: Primary | ICD-10-CM

## 2024-05-24 DIAGNOSIS — G89.29 CHRONIC LEFT SHOULDER PAIN: ICD-10-CM

## 2024-05-24 DIAGNOSIS — M25.512 CHRONIC LEFT SHOULDER PAIN: ICD-10-CM

## 2024-05-24 PROCEDURE — 97112 NEUROMUSCULAR REEDUCATION: CPT | Mod: PN

## 2024-05-24 NOTE — PROGRESS NOTES
"[facility]   Physical Therapy Treatment Note      Name: Shannan Norman  Clinic Number: [id number]    Therapy Diagnosis:   Encounter Diagnoses   Name Primary?    Decreased ROM of left shoulder Yes    Decreased strength of upper extremity     Chronic left shoulder pain      Physician: Tony Major*    Visit Date: 2024  Physician Orders: PT Eval and Treat   Medical Diagnosis from Referral: Glenohumeral arthritis, left [M19.012]   Evaluation Date: 3/18/2024  Authorization Period Expiration: 2024  Plan of Care Expiration: 2024  Date of Surgery: Monday, 3/4/2024  Visit # / Visits authorized: 10/20 + 1   FOTO:  NEXT  PTA Visit #: 0      Precautions: Standard; reverse total shoulder with biceps tendonesis on Monday, 3/4     Time In: 11:05 am  Time Out: 12:00 pm  Total Billable Time: 38 minutes    Subjective     Patient reports: shoulder has been progressing well  He was compliant with home exercise program given prior to .  Response to previous treatment: pain with elbow extension creep stretch  Functional change: similar-careful with left upper extremity use    Pain: 0/10  Location: Left shoulder     Objective      2024:     Shoulder flexion active/passive range of motion, sittin/110 degrees left at beginning of session; 104/112 degrees at end    Treatment     Shannan received the treatments listed below:      therapeutic exercises to develop ROM and flexibility for 10 minutes including objective and education:    Seated:   Pulley flexion: 2x15  Wand external rotation: 5" 2x10 left     neuromuscular re-education activities to improve: Coordination, Kinesthetic, and Posture for 45 minutes. The following activities were included:    Supine:  Wand shoulder flexion: 1 lb, 2x10   Wand chest press: 1 lb, 2x10   Short lever shoulder flexion: 3x5 left     Lawn chair wand chest press: 1 lb, 2x10    Standing:  External rotation walkout: red theraband x15 left. " Tactile cues to prevent movement out of neutral positioning  Internal rotation walkout: green theraband x15 left   Biceps curls: 4 lbs x20 left       5 lbs x20 left      Patient Education and Home Exercises       Education provided:   - Precautions. Risks related to repetitive flexion against gravity  - Continue home exercise program. Add biceps curls - may use heavier canned goods for this    Written Home Exercises Provided: Verbal on 5/16. Written on 5/1/2024 and 4/25/2024. Exercises were reviewed and Shannan was able to demonstrate them prior to the end of the session.  Shannan demonstrated good understanding of the education provided. See Electronic Medical Record under Patient Instructions for exercises provided during therapy sessions    Assessment     Shannan is a 78 y.o. male referred to outpatient Physical Therapy with a medical diagnosis of glenohumeral arthritis. Patient presents 11 weeks post op reverse total shoulder arthroplasty with biceps tendonesis. Decline in motion that improved to that achieved last session. Improved control of neutral rotation during walkouts. No issues with progression of biceps concentric loading.     Shannan Is progressing well towards his goals.   Patient prognosis is Good.   Patient will continue to benefit from skilled outpatient physical therapy to address the deficits listed in the problem list box on initial evaluation, provide pt/family education and to maximize pt's level of independence in the home and community environment.     Patient's spiritual, cultural and educational needs considered and pt agreeable to plan of care and goals.  Anticipated barriers to physical therapy: None    Short Term Goals (6 Weeks):   1. Patient will be compliant with home exercise program to assist therapy in restoring pain free motion of the shoulder. Partially met 4/25  2. Patient will improve wrist extension to 70 degrees to improve use of distal arm. Progressing, not met   3. Patient will improve  forearm supination and elbow extension to full to improve use of distal arm. Progressing, not met   3. Patient will achieve passive shoulder flexion of 120 degrees to promote functional reaching. Met prior to 4/25.      Long Term Goals (12 Weeks):   1. Patient will improve FOTO score to </= 57% to demonstrate improvements in carrying, moving, and handling objects. Progressing, not met   2. Patient will improve initiate isotonic shoulder scaption and rotation and elbow flexion with light weight without pain to promote use of left arm for light lifting. Progressing, not met   3. Patient will improve active shoulder flexion to 120 degrees to improve functional mobility of upper extremities. Progressing, not met   4. Patient will perform functional external rotation to C4 to promote independent grooming and dressing. Progressing, not met     Plan     Phase II of Dr. Dominique's RTSA protocol.     Madan Loera, PT, DPT, OCS

## 2024-05-27 ENCOUNTER — CLINICAL SUPPORT (OUTPATIENT)
Dept: REHABILITATION | Facility: HOSPITAL | Age: 79
End: 2024-05-27
Payer: MEDICARE

## 2024-05-27 DIAGNOSIS — M25.612 DECREASED ROM OF LEFT SHOULDER: Primary | ICD-10-CM

## 2024-05-27 DIAGNOSIS — Z96.612 S/P REVERSE TOTAL SHOULDER ARTHROPLASTY, LEFT: ICD-10-CM

## 2024-05-27 DIAGNOSIS — M25.512 CHRONIC LEFT SHOULDER PAIN: ICD-10-CM

## 2024-05-27 DIAGNOSIS — G89.29 CHRONIC LEFT SHOULDER PAIN: ICD-10-CM

## 2024-05-27 DIAGNOSIS — R29.898 DECREASED STRENGTH OF UPPER EXTREMITY: ICD-10-CM

## 2024-05-27 PROCEDURE — 97112 NEUROMUSCULAR REEDUCATION: CPT | Mod: PN

## 2024-05-27 NOTE — PROGRESS NOTES
[facility]   Physical Therapy Treatment Note      Name: Shannan Norman  Clinic Number: [id number]    Therapy Diagnosis:   Encounter Diagnoses   Name Primary?    Decreased ROM of left shoulder Yes    Decreased strength of upper extremity     Chronic left shoulder pain      Physician: Tony Major*    Visit Date: 2024  Physician Orders: PT Eval and Treat   Medical Diagnosis from Referral: Glenohumeral arthritis, left [M19.012]   Evaluation Date: 3/18/2024  Authorization Period Expiration: 2024  Plan of Care Expiration: 2024  Date of Surgery: Monday, 3/4/2024  Visit # / Visits authorized: 10/20 + 1   FOTO:  NEXT  PTA Visit #: 0/5      Precautions: Standard; reverse total shoulder with biceps tendonesis on Monday, 3/4     Time In: 1001  Time Out: 1055  Total Billable Time: 38 minutes    Subjective     Patient reports: he has 2 lb weights at home for bicep curl performance  He was compliant with home exercise program given prior to .  Response to previous treatment: pain with elbow extension creep stretch  Functional change: similar-careful with left upper extremity use    Pain: 0/10  Location: Left shoulder     Objective      2024:     Shoulder flexion active/passive range of motion, sittin/110 degrees left at beginning of session; 104/112 degrees at end    Treatment     Shannan received the treatments listed below:      neuromuscular re-education activities to improve: Coordination, Kinesthetic, and Posture for 55 minutes. The following activities were included:  Seated:  Wand shoulder flexion: 1 lb, 2x10   Wand shoulder flexion with unilateral eccentric lowering: 2x10 - 0#  Wand chest press: 1 lb, 2x10   Short lever shoulder flexion: 3x5 left   Eccentric shoulder flexion AAROM with green theraband: 3x10 - green theraband     Seated biceps curls: 3x10 - 5#    Standing:  Flexion wall walks: 20x   External rotation walkout: red theraband x15 left.  Tactile cues to prevent movement out of neutral positioning  Internal rotation walkout: green theraband x15 left     Patient Education and Home Exercises       Education provided:   - Precautions. Risks related to repetitive flexion against gravity  - Continue home exercise program. Add biceps curls - may use heavier canned goods for this    Written Home Exercises Provided: Verbal on 5/16. Written on 5/1/2024 and 4/25/2024. Exercises were reviewed and Shannan was able to demonstrate them prior to the end of the session.  Shannan demonstrated good understanding of the education provided. See Electronic Medical Record under Patient Instructions for exercises provided during therapy sessions    Assessment     Shannan is a 78 y.o. male referred to outpatient Physical Therapy with a medical diagnosis of glenohumeral arthritis. Patient presents 11 weeks post op reverse total shoulder arthroplasty with biceps tendonesis. Decline in motion that improved to that achieved last session. Improved control of neutral rotation during walkouts. No issues with progression of biceps concentric loading. Continuing to progress deltoid and biceps strength in conjunction with overhead strengthening.     Shannan Is progressing well towards his goals.   Patient prognosis is Good.   Patient will continue to benefit from skilled outpatient physical therapy to address the deficits listed in the problem list box on initial evaluation, provide pt/family education and to maximize pt's level of independence in the home and community environment.     Patient's spiritual, cultural and educational needs considered and pt agreeable to plan of care and goals.  Anticipated barriers to physical therapy: None    Short Term Goals (6 Weeks):   1. Patient will be compliant with home exercise program to assist therapy in restoring pain free motion of the shoulder. Partially met 4/25  2. Patient will improve wrist extension to 70 degrees to improve use of distal arm.  Progressing, not met   3. Patient will improve forearm supination and elbow extension to full to improve use of distal arm. Progressing, not met   3. Patient will achieve passive shoulder flexion of 120 degrees to promote functional reaching. Met prior to 4/25.      Long Term Goals (12 Weeks):   1. Patient will improve FOTO score to </= 57% to demonstrate improvements in carrying, moving, and handling objects. Progressing, not met   2. Patient will improve initiate isotonic shoulder scaption and rotation and elbow flexion with light weight without pain to promote use of left arm for light lifting. Progressing, not met   3. Patient will improve active shoulder flexion to 120 degrees to improve functional mobility of upper extremities. Progressing, not met   4. Patient will perform functional external rotation to C4 to promote independent grooming and dressing. Progressing, not met     Plan     Phase II of Dr. Dominique's RTSA protocol.     Madan Loera, PT, DPT

## 2024-05-27 NOTE — PROGRESS NOTES
"[facility]   Physical Therapy Treatment Note      Name: Shannan Norman  Clinic Number: [id number]    Therapy Diagnosis:   Encounter Diagnoses   Name Primary?    Decreased ROM of left shoulder Yes    Decreased strength of upper extremity     Chronic left shoulder pain      Physician: Tony Major*    Visit Date: 2024  Physician Orders: PT Eval and Treat   Medical Diagnosis from Referral: Glenohumeral arthritis, left [M19.012]   Evaluation Date: 3/18/2024  Authorization Period Expiration: 2024  Plan of Care Expiration: 2024  Date of Surgery: Monday, 3/4/2024  Visit # / Visits authorized:  +    FOTO: Next to be administered at discharge visit  PTA Visit #: 0      Precautions: Standard; reverse total shoulder with biceps tendonesis on Monday, 3/4     Time In: 1405  Time Out: 1506  Total Billable Time: 38 minutes    Subjective     Patient reports: he visits Dr. Dominique next week and vein specialist in July. Patient continues to have a hard time with walking secondary to lower extremity swelling and ambulates with his cane as a result.   He was compliant with home exercise program.  Response to previous treatment: pain with elbow extension creep stretch  Functional change: similar-careful with left upper extremity use    Pain: 0/10  Location: Left shoulder     Objective      2024:     Shoulder flexion active/passive range of motion, sittin/123 degrees left at end    Treatment     Shannan received the treatments listed below:      therapeutic exercises to develop ROM and flexibility for 8 minutes including objective and education:    Seated:   Pulley flexion: 2x15  Wand external rotation: 5" 2x10 left     manual therapy techniques: were applied to the: left shoulder for 8 minutes, including:    Grade II-III glenohumeral joint mobilizations  Passive shoulder flexion     neuromuscular re-education activities to improve: Coordination, Kinesthetic, and Posture " for 45 minutes. The following activities were included:    Supine:  Wand shoulder flexion: 1 lb, 2x10     Lawn chair:  Wand chest press: 1 lb, 2x10  Unilateral chest press: 3x3 left  Short lever shoulder flexion: 3x3 left   External rotation walkout*: red theraband 2x10 left. Tactile cues to prevent movement out of neutral positioning  Internal rotation walkout*: green theraband 2x10 left     Standing:  Biceps curls: 5 lbs 3x15 left      *Therapist performing walkout secondary to lower extremity swelling and difficulty balancing with side stepping    Patient Education and Home Exercises       Education provided:   - Precautions. Risks related to repetitive flexion against gravity    Written Home Exercises Provided: Verbal on 5/16. Written on 5/1/2024 and 4/25/2024. Exercises were reviewed and Shannan was able to demonstrate them prior to the end of the session.  Shannan demonstrated good understanding of the education provided. See Electronic Medical Record under Patient Instructions for exercises provided during therapy sessions    Assessment     Shannan is a 78 y.o. male referred to outpatient Physical Therapy with a medical diagnosis of glenohumeral arthritis. Patient presents 12 weeks post op reverse total shoulder arthroplasty with biceps tendonesis. Motion up from last visit. No issues with progression of Reading's exercises to more upright/against gravity position.     Shannan Is progressing well towards his goals.   Patient prognosis is Good.   Patient will continue to benefit from skilled outpatient physical therapy to address the deficits listed in the problem list box on initial evaluation, provide pt/family education and to maximize pt's level of independence in the home and community environment.     Patient's spiritual, cultural and educational needs considered and pt agreeable to plan of care and goals.  Anticipated barriers to physical therapy: None    Short Term Goals (6 Weeks):   1. Patient will be compliant with  home exercise program to assist therapy in restoring pain free motion of the shoulder. Partially met 4/25  2. Patient will improve wrist extension to 70 degrees to improve use of distal arm. Progressing, not met   3. Patient will improve forearm supination and elbow extension to full to improve use of distal arm. Progressing, not met   3. Patient will achieve passive shoulder flexion of 120 degrees to promote functional reaching. Met prior to 4/25.      Long Term Goals (12 Weeks):   1. Patient will improve FOTO score to </= 57% to demonstrate improvements in carrying, moving, and handling objects. Progressing, not met   2. Patient will improve initiate isotonic shoulder scaption and rotation and elbow flexion with light weight without pain to promote use of left arm for light lifting. Progressing, not met   3. Patient will improve active shoulder flexion to 120 degrees to improve functional mobility of upper extremities. Progressing, not met   4. Patient will perform functional external rotation to C4 to promote independent grooming and dressing. Progressing, not met     Plan     Phase II of Dr. Dominique's RTSA protocol.   Reading's anterior deltoid strengthening/kinesthetic as tolerated/within protocol guidelines.    Franca Boston, PT, DPT, OCS

## 2024-05-28 RX ORDER — CELECOXIB 200 MG/1
200 CAPSULE ORAL 2 TIMES DAILY WITH MEALS
Qty: 30 CAPSULE | Refills: 0 | Status: SHIPPED | OUTPATIENT
Start: 2024-05-28

## 2024-05-30 ENCOUNTER — HOSPITAL ENCOUNTER (OUTPATIENT)
Dept: RADIOLOGY | Facility: HOSPITAL | Age: 79
Discharge: HOME | End: 2024-05-30
Attending: STUDENT IN AN ORGANIZED HEALTH CARE EDUCATION/TRAINING PROGRAM
Payer: MEDICARE

## 2024-05-30 ENCOUNTER — OFFICE VISIT (OUTPATIENT)
Dept: SPORTS MEDICINE | Facility: CLINIC | Age: 79
End: 2024-05-30
Payer: MEDICARE

## 2024-05-30 VITALS
HEIGHT: 73 IN | DIASTOLIC BLOOD PRESSURE: 63 MMHG | SYSTOLIC BLOOD PRESSURE: 120 MMHG | HEART RATE: 60 BPM | WEIGHT: 211.88 LBS | BODY MASS INDEX: 28.08 KG/M2

## 2024-05-30 DIAGNOSIS — M25.512 LEFT SHOULDER PAIN, UNSPECIFIED CHRONICITY: ICD-10-CM

## 2024-05-30 DIAGNOSIS — Z96.612 S/P REVERSE TOTAL SHOULDER ARTHROPLASTY, LEFT: ICD-10-CM

## 2024-05-30 DIAGNOSIS — M25.512 LEFT SHOULDER PAIN, UNSPECIFIED CHRONICITY: Primary | ICD-10-CM

## 2024-05-30 PROCEDURE — 73030 X-RAY EXAM OF SHOULDER: CPT | Mod: 26,LT,, | Performed by: RADIOLOGY

## 2024-05-30 PROCEDURE — 3074F SYST BP LT 130 MM HG: CPT | Mod: CPTII,S$GLB,, | Performed by: STUDENT IN AN ORGANIZED HEALTH CARE EDUCATION/TRAINING PROGRAM

## 2024-05-30 PROCEDURE — 1160F RVW MEDS BY RX/DR IN RCRD: CPT | Mod: CPTII,S$GLB,, | Performed by: STUDENT IN AN ORGANIZED HEALTH CARE EDUCATION/TRAINING PROGRAM

## 2024-05-30 PROCEDURE — 73030 X-RAY EXAM OF SHOULDER: CPT | Mod: TC,LT

## 2024-05-30 PROCEDURE — 3288F FALL RISK ASSESSMENT DOCD: CPT | Mod: CPTII,S$GLB,, | Performed by: STUDENT IN AN ORGANIZED HEALTH CARE EDUCATION/TRAINING PROGRAM

## 2024-05-30 PROCEDURE — 1159F MED LIST DOCD IN RCRD: CPT | Mod: CPTII,S$GLB,, | Performed by: STUDENT IN AN ORGANIZED HEALTH CARE EDUCATION/TRAINING PROGRAM

## 2024-05-30 PROCEDURE — 99024 POSTOP FOLLOW-UP VISIT: CPT | Mod: S$GLB,,, | Performed by: STUDENT IN AN ORGANIZED HEALTH CARE EDUCATION/TRAINING PROGRAM

## 2024-05-30 PROCEDURE — 99999 PR PBB SHADOW E&M-EST. PATIENT-LVL IV: CPT | Mod: PBBFAC,,, | Performed by: STUDENT IN AN ORGANIZED HEALTH CARE EDUCATION/TRAINING PROGRAM

## 2024-05-30 PROCEDURE — 1101F PT FALLS ASSESS-DOCD LE1/YR: CPT | Mod: CPTII,S$GLB,, | Performed by: STUDENT IN AN ORGANIZED HEALTH CARE EDUCATION/TRAINING PROGRAM

## 2024-05-30 PROCEDURE — 1126F AMNT PAIN NOTED NONE PRSNT: CPT | Mod: CPTII,S$GLB,, | Performed by: STUDENT IN AN ORGANIZED HEALTH CARE EDUCATION/TRAINING PROGRAM

## 2024-05-30 PROCEDURE — 3078F DIAST BP <80 MM HG: CPT | Mod: CPTII,S$GLB,, | Performed by: STUDENT IN AN ORGANIZED HEALTH CARE EDUCATION/TRAINING PROGRAM

## 2024-05-31 ENCOUNTER — CLINICAL SUPPORT (OUTPATIENT)
Dept: REHABILITATION | Facility: HOSPITAL | Age: 79
End: 2024-05-31
Payer: MEDICARE

## 2024-05-31 DIAGNOSIS — M79.89 SWELLING OF LOWER EXTREMITY: ICD-10-CM

## 2024-05-31 DIAGNOSIS — M25.512 CHRONIC LEFT SHOULDER PAIN: ICD-10-CM

## 2024-05-31 DIAGNOSIS — I87.2 VENOUS REFLUX: Primary | ICD-10-CM

## 2024-05-31 DIAGNOSIS — R29.898 DECREASED STRENGTH OF UPPER EXTREMITY: ICD-10-CM

## 2024-05-31 DIAGNOSIS — I89.0 LYMPHEDEMA: ICD-10-CM

## 2024-05-31 DIAGNOSIS — M25.612 DECREASED ROM OF LEFT SHOULDER: Primary | ICD-10-CM

## 2024-05-31 DIAGNOSIS — G89.29 CHRONIC LEFT SHOULDER PAIN: ICD-10-CM

## 2024-05-31 DIAGNOSIS — I89.0 LYMPHEDEMA OF BOTH LOWER EXTREMITIES: ICD-10-CM

## 2024-05-31 DIAGNOSIS — I87.2 VENOUS INSUFFICIENCY OF BOTH LOWER EXTREMITIES: ICD-10-CM

## 2024-05-31 DIAGNOSIS — I87.2 VENOUS INSUFFICIENCY: ICD-10-CM

## 2024-05-31 PROCEDURE — 97112 NEUROMUSCULAR REEDUCATION: CPT | Mod: KX,PN

## 2024-05-31 PROCEDURE — 97140 MANUAL THERAPY 1/> REGIONS: CPT | Mod: KX

## 2024-05-31 PROCEDURE — 97535 SELF CARE MNGMENT TRAINING: CPT | Mod: KX

## 2024-05-31 PROCEDURE — 97162 PT EVAL MOD COMPLEX 30 MIN: CPT | Mod: KX

## 2024-05-31 NOTE — PLAN OF CARE
[facility]  Physical Therapy Initial Evaluation    Name: Shannan Norman  Clinic Number: [id number]    Therapy Diagnosis:   Encounter Diagnoses   Name Primary?    Venous insufficiency     Lymphedema     Venous reflux Yes    Swelling of lower extremity     Venous insufficiency of both lower extremities     Lymphedema of both lower extremities      Physician: Martell Alonso MD    Physician Orders: PT Eval and Treat - lymphedema  Medical Diagnosis from Referral:   Diagnosis   I87.2 (ICD-10-CM) - Venous insufficiency   I89.0 (ICD-10-CM) - Lymphedema   Evaluation Date: 2024  Authorization Period Expiration: 25  Plan of Care Expiration: 24  Visit # / Visits authorized:     Time In: 800a  Time Out: 905a  Total Billable Time: 65 minutes    Precautions: Standard and R TSA, GSV vein stripping  pacemaker, CKD 3    Subjective   Date of onset: L TSA 3/2024.  Managing some swelling in both legs for some time, initially did affect ability to walk or get out of chairs.  Does have difficulty getting up from short chairs.  Vein stripping on R leg in past.  Wearing compression KH socks - friend comes in morning to apply last 5-6 weeks.  In past wasn't sure of benefit.  [place] travel would wear socks, admits top was too tight or cutting in- black/blue changes. Vein issues for 20-30 years.  No knee surgeries.   History of current condition - Shannan reports: admits some Knee OA with injections.    Wife  1 1/2 years ago of Alzheimer's disease- may hire caregiver.   Chooses flip flop sandals for ease of application - open toe zipper socks.   Pt admits skin CA concerns - follows with dermatology.    Pt denies CHF, KF,  admits DM with some nerve neuropathy recently- chart notes CKD   Denies CA.  Skin cancer - melanoma one shoulder with site surgical excision.   Fluid pill- no  Blood thinner- yes    Imaging: US   Impression:   No DVT in either thigh.   History of surgical stripping and  EVLT to the greater saphenous veins bilaterally.   Venous insufficiency involving accessory greater saphenous veins bilaterally, as well as the left popliteal vein.   Enlarged but morphologically normal lymph node in the right groin, likely reactive.   Electronically signed by:Angelo Carrizales  Date:                                            04/26/2024:     Medical History:   Past Medical History:   Diagnosis Date    Allergy     Arthritis     Atrial fibrillation     Atrial flutter 2011    ablation    Basal cell carcinoma     Cancer     Cataract     CKD (chronic kidney disease) stage 3, GFR 30-59 ml/min 8/13/2019    Diabetes mellitus     Dry eye syndrome     Gout, unspecified     High cholesterol     History of shingles     Hypertension     Melanoma     Seizures        Surgical History:   Shannan Norman  has a past surgical history that includes Tonsillectomy; varicose veins; Ablation of dysrhythmic focus; Ganglion cyst excision; Hernia repair (2013); Vasectomy; Ablation (N/A, 9/11/2018); Colonoscopy (N/A, 1/2/2019); Tendon repair (Right); Adenoidectomy; Vein Surgery; Cardiac pacemaker placement; Cataract extraction w/  intraocular lens implant (Right, 7/28/2020); Cataract extraction w/  intraocular lens implant (Left, 8/11/2020); Reverse total shoulder arthroplasty (Left, 3/4/2024); and Fixation of tendon (Left, 3/4/2024).    Medications:   Shannan has a current medication list which includes the following prescription(s): allopurinol, atorvastatin, benazepril, celecoxib, cyclosporine, econazole nitrate, ferrous sulfate, glucosamine-chondroitin, imiquimod, levocetirizine, metoprolol tartrate, modafinil, multivitamin, rivaroxaban, tamsulosin, UNABLE TO FIND, and vibegron.    Allergies:   Review of patient's allergies indicates:  No Known Allergies     Previous Lymphedema Treatment: no  Prior Therapy: yes  Social History: lives alone, + driving   Occupation: na  Environmental barriers: few steps on first floor, rarely goes  to second floor,  uses bench, walk in shower.  Assist to apply compression but is able to take off  Today - using zipper style open toe, flip flops- limited in shoes due to size and ability to apply   Abuse/Neglect: none noted    Nutritional status: wfl   Educational needs: met   Spiritual/Cultural: met    Fall risk: none      Prior Level of Function: MOD I   Current Level of Function: MOD I - recent shoulder surgery  Gait: amb with cane, slowly uses Celebrex   Transfers labored to stand MOD I   Bed Mobility: mod I     Pain and Swelling:  Current 0/10, worst 4/10, best 0/10   Location: bilateral legs but mostly a knee pain L > R - needs cane x last 1-2 months   Description: stiff and aching knees  Aggravating Factors: Sitting, Getting out of bed/chair, and just there  Easing Factors:  compression, trying to reduce salt    Pts goals: address swelling in both legs, compression choice he can apply    Objective           Tall male with KH zipper style open toe compression socks- unsure of class- sliding down to upper calf, flip flop sandals, amb with cane slowly  Rocking and momentum to stand from typical chair with arms  Amount of Swelling/Location of Swelling: mod to B LE ankles legs, knees and moving into thighs   Skin Integrity: dry scaly skin, venous varicosities, bulges, broken veins   Palpation/Texture: discoloration with brown/red discoloration distally including post calf to knee, mild warmth    + B Stemmer Sign  - B Nancy's Sign  Circulation: intact     Posture: tall, limited knee and ankle ROM, slight flexion in  standing     Range of Motion - LE  Arom knee, ankle sitting or supine  (R)  with tighthness, fullness to knee DF 0  (L)  tightness to inner thigh DF 5    Strength: functional screen of AROM against gravity and sit to stand transfers  Noted limits in ROM, requires rocking momentum and use of B UE to stand, admits knee stiffness and tightness     Five Times Sit to Stand Test:- unable to  perform    Sensation: intact to lt touch B LE    Girth Measurements (in centimeters)  LANDMARK LEFT LE  5/31/24 RIGHT LE  5/31/24 DIFF   at eval   SBP 49.5 cm 50.0 cm 0.5 cm   10 below SBP 45.0 cm 45.5 cm 0.5 cm   20 below SBP 42.5 cm 46.0 cm 3.5 cm   30 below SBP 38.0 cm 37.0 cm 1.0 cm   35 below SBP 32.0 cm 32.0 cm 0 cm   Ankle 30.5 cm 33.0 cm 2.5 cm   Forefoot 24.0 cm 26.0 cm 2.0 cm     CMS Impairment/Limitation/Restriction for FOTO Survey  Therapist reviewed FOTO scores for Shannan Marie Norman on 5/31/2024.   FOTO documents entered into [employer] - see Media section.     TREATMENT   Treatment Time In: 835a  Treatment Time Out: 905a  Total Treatment time separate from Evaluation: 30 minutes    Shannan received therapeutic exercises to develop strength, endurance, ROM, flexibility, and posture for 5 minutes including:  Performed assisted GSS and cueing on movements to increase AROM of ankles and knees  Sit to stand with arm rests - advised to choose higher surfaces and use LE to assist  Aps, knee ROM, avoid dependency, avoid immobility, elevation, walking with compression, deep breathing, use of muscle pump to assist venous return    Shannan received the following manual therapy techniques: Manual Lymphatic Drainage as plan of care and compression choices were applied to the: B LE  for 15 minutes, including:  Education and training in compression needs.  Complete Decongestive Therapy components and management with goals and plan of care review.    Demo of Manual Lymphatic Drainage and short stretch compression bandaging.     Zipper style - advised on safety, options of class, open toe for shoe  choice, recommended shoe choices, need for tall length, may require full calf  Discussed joint fullness and knee OA/DJD    Demo of options in his KH, donated KH and Velcro Wraps  Pt was provided with an option for Knee High Copper Compression socks 20-30mmHg size L/XL- applied to R LE- good fit and position, would not be able to wear his  flip flops - removed and then applied his KH zipper style - R LE may be small at proximal calf, caution on zipper pressure and not to pinch.  May need longer style or stronger class. Pt presently has assistance to apply in AM and he self removes.  Discussed shoe choice and support for walking with heel strike and ankle ROM.    Pros/cons of basic compression choices were fully reviewed with noted limited size selection, one length choice, lessor tier of product, benefits of low cost, relative ease of application and comfort, and additional items available for use.  Pt was trained on don/doff and methods to apply with assistance of gloves or family member.  Position and fit with appropriate girth, length and support are required.   Daytime use with removal for sleep.   Wear schedules and wash schedules confirmed.   Provided information to secure additional pairs or use as temporary option until medical grade choices are available.     Self Care/Home Management / Functional Therapeutic Activity training for 15 minutes including:  Don/doff training, ability to reach his feet, ability to wear shoes with laces or full foot coverage, knee joint considerations.    Present compression: KH zipper style open toe    Pt was educated in potential compression needs.  Demo of products including socks, garments, and Inelastic Velcro wraps.   Discussed cost/coverage and authorization per insurance with Durable Medical Equipment(DME) provider.  Compression require orders from referring provider and coverage or purchase of products from DME or self order.      Discussed wear schedule, don/doff, wash and management of products.  Size and compression class and AM/PM needs.    Consideration for Copper Socks as form of temporary compression use until securing compression needs.   Consideration for shorts/tights or capris style compression to compliment lower leg compression to support size/shape of LE- would not tolerate TH or fit plus  bathroom considerations.    Product information provided.   Vendor list provided.    Informed insurance coverage of compression is per DME provider and typically Medicare and Medicare group may not cover cost or OOP expense.  Pt notes MD consideration for Home pump consult - reviewed goals and process of receiving home pump.  Commitment to attendance as well as commitment to securing compression needs is critical to edema management.      Home Exercises and Patient Education Provided  Education provided:   - walking with cane, knee and ankle  ROM  Daily compression choice.  - Pt was educated in lymphedema etiology and management plans.  Pt was provided with written risk reductions and precautions for managing lymphedema.      This patient is in agreement to participate in Lymphedema treatment.    Written Home Exercises Provided: Patient instructed to cont prior HEP.  Exercises were reviewed and Shannan was able to demonstrate them prior to the end of the session.  Shannan demonstrated fair  understanding of the education provided.     See EMR under Patient Instructions for exercises provided 5/31/2024.    Assessment   Shannan is a 78 y.o. male referred to outpatient Physical Therapy with a medical diagnosis of   Diagnosis   I87.2 (ICD-10-CM) - Venous insufficiency   I89.0 (ICD-10-CM) - Lymphedema   This patient presents s/p multi year history of venous reflux CVI, tall stature, OA/DJD shoulder, knees, spine, history of skin cancers with dermatology following, recent L RTSA which may limit ability to pull on compression with B UE  resulting in: multifactorial venous related lymphedema of the B LE, increased pain, increased stiffness in the ankles, knees, tall  stature with history of vein stripping, as well as difficulty performing walking, sit to stand, reach for compression application, size/shape/length of compression needs, and placing the pt at higher risk of infection. Patient has Stage II secondary lymphedema due to  OA/DJD, reflux, CVI. Therapy recommends elevation, exercise, regular use of compression, and therapy for at least a month to reduce swelling.  Modifications to allow compliance with compression.     Pt prognosis is Good.   Pt will benefit from skilled outpatient Physical Therapy to address the deficits stated above and in the chart below, provide pt/family education, and to maximize pt's level of independence.     Plan of care discussed with patient: Yes  Pt's spiritual, cultural and educational needs considered and patient is agreeable to the plan of care and goals as stated below:     Medical Necessity is demonstrated by the following  History  Co-morbidities and personal factors that may impact the plan of care [] LOW: no personal factors / co-morbidities  [x] MODERATE: 1-2 personal factors / co-morbidities  [] HIGH: 3+ personal factors / co-morbidities    Moderate / High Support Documentation: multi year history of venous reflux CVI, tall stature, OA/DJD shoulder, knees, spine, history of skin cancers, L RTSA, pacemaker, CKD 3, venous related lymphedema      Examination  Body Structures and Functions, activity limitations and participation restrictions that may impact the plan of care [] LOW: addressing 1-2 elements  [x] MODERATE: 3+ elements  [] HIGH: 4+ elements (please support below)    Moderate / High Support Documentation: ROM, transfer/transitions, walking, pitting, tender, density, dry skin, discoloration, hypertrophy      Clinical Presentation [] LOW: stable  [x] MODERATE: Evolving  [] HIGH: Unstable     Decision Making/ Complexity Score: moderate       Anticipated Barriers for therapy: CKD, sh TSA, ability to reach feet, knee pain    The following goals were discussed with the patient and patient is in agreement with them as to be addressed in the treatment plan.     Short Term Goals: (6 weeks)  1. Patient will show decreased girth in B LE by up to 2 cm to allow for LE symmetry, shoe and clothing  choice, and ability to apply needed compression.  (progressing, not met)   2. Patient will demonstrate 100% knowledge of lymphedema precautions and signs of infection to allow for reduced lymphedema risk, infection risk, and/or exacerbation of condition.  (progressing, not met)  3. Patient or caregiver will perform self-bandaging techniques and/or wearing of compression garments to allow for lymphatic drainage support, skin elasticity, and reduction in shape and size of limb. (progressing, not met)  4. Patient will perform self lymph drainage techniques to areas within reach to enhance lymphatic drainage and skin condition.  (progressing, not met)  5. Patient will tolerate daily activities with multilayered bandaging to allow for lymphatic and venous support.  (progressing, not met)    Long Term Goals: (12  weeks)  1. Patient will show decreased girth in B LE by up to 3 cm  to allow for LE symmetry, shoe and clothing choice, and ability to apply needed compression daily.  (progressing, not met)  2. Patient will show reduction in density to mild or less with improved contour of limb to allow for cosmesis, LE symmetry, infection risk reduction, and clothing and compression choice.   (progressing, not met)  3. Patient to rowan/doff compression garment with daily compliance to assist in lymphedema management, skin elasticity, and tissue density.  (progressing, not met)  4. Pt to show improved postural awareness and alignment.  (progressing, not met)  5. Pt to be I and compliant with HEP to allow for increased function in affected limb.   (progressing, not met)  Plan   Plan of care Certification: 5/31/2024 to 8/23/24.    Outpatient Physical Therapy 2 times weekly for 10 weeks to include the following interventions: Patient Education, Self Care, Therapeutic Activities, and Therapeutic Exercise. Complete Decongestive Therapy- compression and home equipment needs to be addressed and assisted.    Pt may be seen by a PTA as  part of the Rehab treatment team.  Plan of Care was discussed with Mayte Magana, WILLY Owens, PT

## 2024-05-31 NOTE — PROGRESS NOTES
[facility]   Physical Therapy Treatment Note      Name: Shannan Norman  Clinic Number: [id number]    Therapy Diagnosis:   No diagnosis found.    Physician: Tony Major*    Visit Date: 2024  Physician Orders: PT Eval and Treat   Medical Diagnosis from Referral: Glenohumeral arthritis, left [M19.012]   Evaluation Date: 3/18/2024  Authorization Period Expiration: 2024  Plan of Care Expiration: 2024  Date of Surgery: Monday, 3/4/2024  Visit # / Visits authorized:  +    FOTO: completed on . Goal met.  PTA Visit #:       Precautions: Standard; reverse total shoulder with biceps tendonesis on Monday, 3/4     Time In: 12:00 pm  Time Out: 1:00 pm   Total Billable Time: 30 minutes    Subjective     Patient reports: saw surgeon yesterday who was pleased with range of motion and said it looked in good shape. Starting lymphedema therapy twice a week and scared it may mess up shoulder therapy appointments. Feels like he is 50-60% better since surgery. Greatest difficulty with reaching overhead.     He was compliant with home exercise program given prior to .  Response to previous treatment: no pain   Functional change: trouble with sustained overhead reaching    Pain: 0/10  Location: Left shoulder     Objective      2024:     Shoulder flexion active/passive range of motion, sittin/120 degrees left at beginning of session  Functional ER: Behind head   Functional IR: PSIS    Treatment     Shannan received the treatments listed below:      neuromuscular re-education activities to improve: Coordination, Kinesthetic, and Posture for 60 minutes. The following activities were included:    Seated:  Thoracic extension over chair: 15x arms crossed over chest  Long lever shoulder flexion: 3x5 left   Clasped hand shoulder flexion with unilateral eccentric lowering: 2x10 left   Wand overhead chest press: 2 lb, 2x10   Wand shoulder flexion: 1 lb, 2x10   Seated biceps  "curls: 3x10 - 5#  Eccentric shoulder flexion AAROM with green theraband: 3x10 - green theraband       Standing:  Single arm wall slides: 20x3" hold with towel   Landmine press: 5# dowel 2x15. Verbal cues for full elbow extension and shoulder flexion  External rotation walkout: red theraband x15 left. Tactile cues to prevent movement out of neutral positioning  Internal rotation walkout: green theraband x15 left     Patient Education and Home Exercises       Education provided:   - Precautions. Risks related to repetitive flexion against gravity  - Continue home exercise program. Add biceps curls - may use heavier canned goods for this    Written Home Exercises Provided: Verbal on 5/16. Written on 5/1/2024 and 4/25/2024. Exercises were reviewed and Shannan was able to demonstrate them prior to the end of the session.  Shannan demonstrated good understanding of the education provided. See Electronic Medical Record under Patient Instructions for exercises provided during therapy sessions    Assessment     Shannan is a 78 y.o. male referred to outpatient Physical Therapy with a medical diagnosis of glenohumeral arthritis. Patient presents 12 weeks post op reverse total shoulder arthroplasty with biceps tendonesis. Improved shoulder flexion range of motion by 15 degrees since previous measurements; however, compensatory lateral trunk lean noted. Progressed overhead and anterior deltoid strengthening program, fatigue with higher number of repetitions.     Shannan Is progressing well towards his goals.   Patient prognosis is Good.   Patient will continue to benefit from skilled outpatient physical therapy to address the deficits listed in the problem list box on initial evaluation, provide pt/family education and to maximize pt's level of independence in the home and community environment.     Patient's spiritual, cultural and educational needs considered and pt agreeable to plan of care and goals.  Anticipated barriers to physical " therapy: None    Short Term Goals (6 Weeks):   1. Patient will be compliant with home exercise program to assist therapy in restoring pain free motion of the shoulder. Partially met 4/25  2. Patient will improve wrist extension to 70 degrees to improve use of distal arm. Progressing, not met   3. Patient will improve forearm supination and elbow extension to full to improve use of distal arm. Progressing, not met   3. Patient will achieve passive shoulder flexion of 120 degrees to promote functional reaching. Met prior to 4/25.      Long Term Goals (12 Weeks):   1. Patient will improve FOTO score to </= 57% to demonstrate improvements in carrying, moving, and handling objects. Progressing, not met   2. Patient will improve initiate isotonic shoulder scaption and rotation and elbow flexion with light weight without pain to promote use of left arm for light lifting. Progressing, not met   3. Patient will improve active shoulder flexion to 120 degrees to improve functional mobility of upper extremities. Progressing, not met   4. Patient will perform functional external rotation to C4 to promote independent grooming and dressing. Progressing, not met     Plan     Phase II of Dr. Dominique's RTSA protocol.     Nel Mosqueda, PT, DPT

## 2024-06-03 ENCOUNTER — CLINICAL SUPPORT (OUTPATIENT)
Dept: REHABILITATION | Facility: HOSPITAL | Age: 79
End: 2024-06-03
Payer: MEDICARE

## 2024-06-03 DIAGNOSIS — I89.0 LYMPHEDEMA OF BOTH LOWER EXTREMITIES: ICD-10-CM

## 2024-06-03 DIAGNOSIS — I87.2 VENOUS REFLUX: ICD-10-CM

## 2024-06-03 DIAGNOSIS — M79.89 SWELLING OF LOWER EXTREMITY: ICD-10-CM

## 2024-06-03 DIAGNOSIS — I87.2 VENOUS INSUFFICIENCY OF BOTH LOWER EXTREMITIES: Primary | ICD-10-CM

## 2024-06-03 PROCEDURE — 97140 MANUAL THERAPY 1/> REGIONS: CPT | Mod: KX

## 2024-06-03 PROCEDURE — 29581 APPL MULTLAYER CMPRN SYS LEG: CPT | Mod: KX

## 2024-06-03 NOTE — PROGRESS NOTES
Physical Therapy Daily Treatment Note     Name: Shannan Norman  Clinic Number: [id number]    Therapy Diagnosis:   Encounter Diagnoses   Name Primary?    Venous insufficiency of both lower extremities Yes    Venous reflux     Swelling of lower extremity     Lymphedema of both lower extremities      Physician: Martell Alonso MD    Visit Date: 6/3/2024    Physician Orders: PT Eval and Treat - lymphedema  Medical Diagnosis from Referral:   Diagnosis   I87.2 (ICD-10-CM) - Venous insufficiency   I89.0 (ICD-10-CM) - Lymphedema   Evaluation Date: 5/31/2024  Authorization Period Expiration: 12/31/24  Plan of Care Expiration: 8/23/24  Visit # / Visits authorized: 2/20     Time In: 900a  Time Out: 1000a  Total Billable Time: 60 minutes     Precautions: Standard and R TSA, GSV vein stripping  pacemaker, CKD 3  Subjective     Pt reports: pt's friend comes around 6am to apply compression. L RTSA doing well.  Admits Knee OA/DJD injections in past were helpful and will see orthopedics..  He was compliant with home compression/exercise program.  Response to previous treatment: has zipper KH and now using donated KH 20-30mmHg compression socks - will likely order more pairs.   Functional change: slip on [place] tennis shoes - able  to self apply, KH socks on with a friend in morning, he is able to remove in evening.     Pain: 3/10  Location: bilateral knee joints more than leg swelling concerns, L > R      Objective   Tall male with KH 20-30mmHg L/XL compression socks, slip on tennis shoe loafers.   amb with cane slowly  Rocking and momentum to stand from typical chair with arms  Amount of Swelling/Location of Swelling: mod to B LE ankles legs, knees and moving into thighs   Skin Integrity: dry scaly skin, venous varicosities, bulges, broken veins   Palpation/Texture: discoloration with brown/red discoloration distally including post calf to knee, mild warmth    + B Stemmer Sign  - B Nancy's Sign  Circulation: intact    Required head of bed elevated, pillow behind lower back  Required towel roll or support under B knees, some time on elevation wedge then removed for knee comfort    Treatment:   Shannan received the following manual therapy techniques:- Manual Lymphatic Drainage were applied to the: R LE  for 55 minutes, including: MLD and short stretch compression bandaging   Sock was adjusted then remained intact to L LE     MANUAL LYMPHATIC DRAINAGE (MLD):    While supine with LEs elevated stimulation at terminus, along GI region, B inguinal regions, drainage of entire R LE [place] lower leg, ankle, and foot with return proximally,  Use of Aquaphor/Eucerin due to dryness.   Varied support for knee position  Consider self massage to abdominal areas, B inguinal areas, thigh, and remaining LE within reach.    MULTILAYERED BANDAGING:  issued supplies and bandaged R LE with cotton stockinette, Komprex foam section dorsum of foot, Komprex foam 1.5 rolls ankle to knee, 1-8cm and 2- 10cm Rosidal K rolls foot to knee, to leave intact 12-24 hrs as tolerated, discontinue with any problems, return rolled bandages next session.   Wash and wear schedules confirmed.   Modified for foot due to slip on loafer style shoe - advised preferred over flip flops sandals, however with difficulty to apply and mild push of bandaging.  Used shoe horn and assistance for B Shoes.     Shannan received therapeutic exercises to develop strength, endurance, ROM, flexibility, and posture for 5 minutes including: aps, walking, avoid dependency and immobility, support of muscle pump with compression and activity.  Assisted stretching B  GSS - educated on movement for support, caution with L shoulder per protocol, Knee to bee seen by Ortho.  THERAPEUTIC EXERCISES:  Continue HEP of AROM, stretching, and postural correction.     Home Exercises Provided and Patient Education Provided:  Self Care Home Management Training/Functional Therapeutic Activity x 5 minutes  Pt was provided  with an option for Knee High Copper Compression socks 20-30mmHg size L/XL.  Pros/cons of basic compression choices were fully reviewed with noted limited size selection, one length choice, lessor tier of product, benefits of low cost, relative ease of application and comfort, and additional items available for use.  Pt was trained on don/doff and methods to apply with assistance of gloves or family member.  Position and fit with appropriate girth, length and support are required.   Daytime use with removal for sleep.   Wear schedules and wash schedules confirmed.   Provided information to secure additional pairs or use as temporary option until medical grade choices are available.     Present compression:  zipper KH, KH 20-30mmHg socks  Orders and recommendations: KH 20-30mmHg and/or Velcro Inelastic Wraps  PATIENT/FAMILY Education: bandaging/compression wear schedule,  HEP,  Beginning of self massage,  Self or assisted bandaging, compression options, and Risk reduction    Written Home Exercises Provided: Patient instructed to cont prior HEP.  Home exercise and compression plan of management was reviewed and Shannan was able to demonstrate understanding prior to the end of the session.  Shannan demonstrated good  understanding of the education provided.     Assessment   Shannan is a 78 y.o. male referred to outpatient Physical Therapy with a medical diagnosis of   Diagnosis   I87.2 (ICD-10-CM) - Venous insufficiency   I89.0 (ICD-10-CM) - Lymphedema   This patient presents s/p multi year history of venous reflux CVI, tall stature, OA/DJD shoulder, knees, spine, history of skin cancers with dermatology following, recent L RTSA which may limit ability to pull on compression with B UE  resulting in: multifactorial venous related lymphedema of the B LE, increased pain, increased stiffness in the ankles, knees, tall  stature with history of vein stripping, as well as difficulty performing walking, sit to stand, reach for compression  application, size/shape/length of compression needs, and placing the pt at higher risk of infection. 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 Therapy recommends elevation, exercise, regular use of compression, and therapy for at least a month to reduce swelling.  Modifications to allow compliance with compression.      Initiated Complete Decongestive Therapy, CDT, to his R LE and he has been successful wearing lessor tier of KH 20-30mmHg socks B LE.  Pt does require assistance of a friend to apply as well as a form of slip on shoe for access.  Venous/lymphatic changes noted to B LE with ongoing limitations in mobility due to joint OA/DJD.   Modified amb with cane and shoe choices will consider B LE as allowed or alternate for functional support.     Shannan Is progressing well towards his goals.   Pt prognosis is Good.     Pt will continue to benefit from skilled outpatient physical therapy to address the deficits listed in the problem list box on initial evaluation, provide pt/family education and to maximize pt's level of independence in the home and community environment.     Pt's spiritual, cultural and educational needs considered and pt agreeable to plan of care and goals.  Anticipated Barriers for therapy: CKD, sh TSA, ability to reach feet, knee pain     The following goals were discussed with the patient and patient is in agreement with them as to be addressed in the treatment plan.      Short Term Goals: (6 weeks)  1. Patient will show decreased girth in B LE by up to 2 cm to allow for LE symmetry, shoe and clothing choice, and ability to apply needed compression.  (progressing, not met)   2. Patient will demonstrate 100% knowledge of lymphedema precautions and signs of infection to allow for reduced lymphedema risk, infection risk, and/or exacerbation of condition.  (progressing, not met)  3. Patient or caregiver will perform self-bandaging techniques and/or wearing of compression  garments to allow for lymphatic drainage support, skin elasticity, and reduction in shape and size of limb. (progressing, not met)  4. Patient will perform self lymph drainage techniques to areas within reach to enhance lymphatic drainage and skin condition.  (progressing, not met)  5. Patient will tolerate daily activities with multilayered bandaging to allow for lymphatic and venous support.  (progressing, not met)     Long Term Goals: (12  weeks)  1. Patient will show decreased girth in B LE by up to 3 cm  to allow for LE symmetry, shoe and clothing choice, and ability to apply needed compression daily.  (progressing, not met)  2. Patient will show reduction in density to mild or less with improved contour of limb to allow for cosmesis, LE symmetry, infection risk reduction, and clothing and compression choice.   (progressing, not met)  3. Patient to rowan/doff compression garment with daily compliance to assist in lymphedema management, skin elasticity, and tissue density.  (progressing, not met)  4. Pt to show improved postural awareness and alignment.  (progressing, not met)  5. Pt to be I and compliant with HEP to allow for increased function in affected limb.   (progressing, not met)  Plan   Plan of care Certification: 5/31/2024 to 8/23/24.     Outpatient Physical Therapy 2 times weekly for 10 weeks to include the following interventions: Patient Education, Self Care, Therapeutic Activities, and Therapeutic Exercise. Complete Decongestive Therapy- compression and home equipment needs to be addressed and assisted.     Geneva Owens, PT

## 2024-06-04 NOTE — PROGRESS NOTES
History of Present Illness (HPI)  6/5/24  Shannan Norman, a 78 y.o. male, presents today for follow up evaluation of his left knee. At last appointment, 2/27/2024, patient completed 3/3 Gelsyn series & pain/symptoms did improve. Pain returned one month ago. Pain is 0/10 at present & up to mild to moderate  with provacative activity including sitting to standing. Patient reports recent diagnosis of lymphedema.    24.01.22  Last csi+vsi (quite some time ago) worked very well  Requesting f/u evaluation and injection if appropriate  No new sx    Shannan Norman, a 78 y.o. male, presents today for evaluation of his bilateral knee pain.    ---prior  Location: anterior knee  Onset: Chronic   Palliative:    Relative rest   Oral analgesics   Ambulation Assistance -    Bracing: none   Injections   Euflexxa series- 3/18/2019, 3/25/19, 4/1/2019  Provocative:    ADLS   Prolonged ambulation     Prior: none  Progression: constant discomfort  Quality:    pain is a 0 /10 throbbing pain today.Pain is 6 /10 at its worst.   Radiation: Denies, numbness, tingling, and inability to bear weight.  Severity: per nursing documentation  Timing: intermittent w/ use  Trauma: golf    Review of Systems (ROS)  A 10+ review of systems was performed with pertinent positives and negatives noted above in the history of present illness. Other systems were negative unless otherwise specified.    Physical Examination (PE)  General:  The patient is alert and oriented x 3. Mood is pleasant. Observation of ears, eyes and nose reveal no gross abnormalities. HEENT: NCAT, sclera anicteric.   Lungs: Respirations are equal and unlabored.  Gait is coordinated. Patient can toe walk and heel walk without difficulty.    KNEE EXAMINATION    Observation/Inspection  Gait:   Nonantalgic   Alignment:  Neutral   Scars:   None   Muscle atrophy: Mild  Effusion:  None   Warmth:  None   Discoloration:   none     Tenderness / Crepitus (T / C):         T / C      T /  C  Patella   - / -   Lateral joint line   - / -     Peripatellar medial  -  Medial joint line    + / -  Peripatellar lateral -  Medial plica   - / -  Patellar tendon -   Popliteal fossa   - / -  Quad tendon   -   Gastrocnemius   -  Prepatellar Bursa - / -   Quadricep   -  Tibial tubercle  -  Thigh/hamstring  -  Pes anserine/HS -  Fibula    -  ITB   - / -  Tibia     -  Tib/fib joint  - / -  LCL    -    MFC   - / -   MCL: Proximal  -    LFC   - / -   Distal    -          ROM: (* = pain)  PASSIVE   ACTIVE    Left :   5 / 0 / 145   5 / 0 / 145     Right :    5 / 0 / 145   5 / 0 / 145    Patellofemoral examination:  See above noted areas of tenderness.   Patella position    Subluxation / dislocation: Centered        Sup. / Inf;   Normal   Crepitus (PF):    Absent   Patellar Mobility:       Medial-lateral:   Normal    Superior-inferior:  Normal    Inferior tilt   Normal    Patellar tendon:  Normal   Lateral tilt:    Normal   J-sign:     None   Patellofemoral grind:   No pain     Meniscal Signs:     Pain on terminal extension:  +  Pain on terminal flexion:  +  Luisitos maneuver:  +*  Squat     NT  Thesaly    NT    Ligament Examination:  ACL / Lachman:  WNL  PCL-Post.  drawer: normal 0 to 2mm  MCL- Valgus:  normal 0 to 2mm  LCL- Varus:    normal 0 to 2mm  Pivot shift:  guarding   Dial Test:   difference c/w other side   At 30° flexion: normal (< 5°)    At 90° flexion: normal (< 5°)   Reverse Pivot Shift:   normal (Equal)     Strength: (* = with pain) Painful Side  Quadriceps   5/5  Hamstrin/5    Extremity Neuro-vascular Examination:   Sensation:  Grossly intact to light touch all dermatomal regions.   Motor Function:  Fully intact motor function at hip, knee, foot and ankle    DTRs;  quadriceps and  achilles 2+.  No clonus and downgoing Babinski.    Vascular status:  DP and PT pulses 2+, brisk capillary refill, symmetric.     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, bilat  Knee pain left >>right  #2 RLE lymphedema    Imaging studies reviewed:   X-ray knee, bilateral 24.01    Plan  We discussed the importance of appropriate diet, weight, and regular exercise    We discussed options including    Watchful waiting / relative rest    Physical therapy x   Injection therapy Csi iaknee left   Consultation    The patient chooses As above   x = prescribed  CSI = corticosteroid injection  VSI = viscosupplement injection  PRPI = platelet rich plasma injection  ia = intra articular  R = right  L = left  B = bilateral   nfSx = surgical consultation was recommended, but patient is not interested in consultation at this time    Physical Therapy        Formal (fPT), @ [facility] facility p   Formal (fPT), @ [facility] facility        Homegoing (hgPT), per concurrent fPT recommendations    Homegoing (hgPT), per prior fPT recommendations t   Homegoing (hgPT), handout provided        w/  (atPT)    [blank] = not prescribed  x = prescribed  b = prescribed, and begin as indicated  t = continue as indicated  r = prescribed, and restart as indicated  p = completed prior as indicated  hs = prescribed, and with high school   col = prescribed, and with college or university   nfPT = physical therapy was recommended, but patient is not interested in PT at this time    Activity (e.g. sports, work) restrictions    [blank] = as tolerated  pt = per physical therapist  at = per     Bracing    [blank] = not prescribed  r = recommended, but not fit with at todays visit  f = prescribed and fit with at todays visit  t = continue as indicated  p = prn use on rare, as-needed basis; advised against chronic use    Pain management    [blank] = No prescription necessary. A handout detailing dosing of appropriate   over-the-counter musculoskeletal analgesics was made available to the patient.   m = meloxicam x 14 days  mp = 14 day  course of meloxicam prescribed prior    Follow up    [blank] = as needed  [number] = in [number] weeks  CSI = for corticosteroid injection  VSI = for viscosupplement injection or injection series  PRP = for platelet rich plasma injection or injection series  MRI = after MRI imaging  ns = should surgical options be deferred (no surgery)  o = appointment offered, deferred by patient    Should symptoms worsen or fail to resolve, consider    Revisiting the above options and / or      Vocation:   ++yoga  former golfer  wife w/ advanced dementia  enjoyed long vacations in his RV  cb re: glucosamine / chondroitin  baseball fan

## 2024-06-05 ENCOUNTER — CLINICAL SUPPORT (OUTPATIENT)
Dept: REHABILITATION | Facility: HOSPITAL | Age: 79
End: 2024-06-05
Payer: MEDICARE

## 2024-06-05 ENCOUNTER — OFFICE VISIT (OUTPATIENT)
Dept: SPORTS MEDICINE | Facility: CLINIC | Age: 79
End: 2024-06-05
Payer: MEDICARE

## 2024-06-05 VITALS
HEIGHT: 73 IN | DIASTOLIC BLOOD PRESSURE: 50 MMHG | SYSTOLIC BLOOD PRESSURE: 92 MMHG | HEART RATE: 60 BPM | WEIGHT: 211.88 LBS | BODY MASS INDEX: 28.08 KG/M2

## 2024-06-05 DIAGNOSIS — M79.89 SWELLING OF LOWER EXTREMITY: ICD-10-CM

## 2024-06-05 DIAGNOSIS — G89.29 CHRONIC PAIN OF LEFT KNEE: ICD-10-CM

## 2024-06-05 DIAGNOSIS — M25.562 CHRONIC PAIN OF LEFT KNEE: ICD-10-CM

## 2024-06-05 DIAGNOSIS — I87.2 VENOUS REFLUX: ICD-10-CM

## 2024-06-05 DIAGNOSIS — I87.2 VENOUS INSUFFICIENCY OF BOTH LOWER EXTREMITIES: Primary | ICD-10-CM

## 2024-06-05 DIAGNOSIS — I89.0 LYMPHEDEMA OF BOTH LOWER EXTREMITIES: ICD-10-CM

## 2024-06-05 DIAGNOSIS — M17.12 PRIMARY OSTEOARTHRITIS OF LEFT KNEE: Primary | ICD-10-CM

## 2024-06-05 DIAGNOSIS — R26.89 ANTALGIC GAIT: ICD-10-CM

## 2024-06-05 PROCEDURE — 3074F SYST BP LT 130 MM HG: CPT | Mod: CPTII,S$GLB,, | Performed by: FAMILY MEDICINE

## 2024-06-05 PROCEDURE — 3288F FALL RISK ASSESSMENT DOCD: CPT | Mod: CPTII,S$GLB,, | Performed by: FAMILY MEDICINE

## 2024-06-05 PROCEDURE — 29581 APPL MULTLAYER CMPRN SYS LEG: CPT | Mod: KX

## 2024-06-05 PROCEDURE — 99214 OFFICE O/P EST MOD 30 MIN: CPT | Mod: 25,S$GLB,, | Performed by: FAMILY MEDICINE

## 2024-06-05 PROCEDURE — 1159F MED LIST DOCD IN RCRD: CPT | Mod: CPTII,S$GLB,, | Performed by: FAMILY MEDICINE

## 2024-06-05 PROCEDURE — 99999 PR PBB SHADOW E&M-EST. PATIENT-LVL III: CPT | Mod: PBBFAC,,, | Performed by: FAMILY MEDICINE

## 2024-06-05 PROCEDURE — 97140 MANUAL THERAPY 1/> REGIONS: CPT | Mod: KX

## 2024-06-05 PROCEDURE — 1125F AMNT PAIN NOTED PAIN PRSNT: CPT | Mod: CPTII,S$GLB,, | Performed by: FAMILY MEDICINE

## 2024-06-05 PROCEDURE — 1101F PT FALLS ASSESS-DOCD LE1/YR: CPT | Mod: CPTII,S$GLB,, | Performed by: FAMILY MEDICINE

## 2024-06-05 PROCEDURE — 20611 DRAIN/INJ JOINT/BURSA W/US: CPT | Mod: LT,S$GLB,, | Performed by: FAMILY MEDICINE

## 2024-06-05 PROCEDURE — 3078F DIAST BP <80 MM HG: CPT | Mod: CPTII,S$GLB,, | Performed by: FAMILY MEDICINE

## 2024-06-05 PROCEDURE — 1160F RVW MEDS BY RX/DR IN RCRD: CPT | Mod: CPTII,S$GLB,, | Performed by: FAMILY MEDICINE

## 2024-06-05 RX ORDER — TRIAMCINOLONE ACETONIDE 40 MG/ML
40 INJECTION, SUSPENSION INTRA-ARTICULAR; INTRAMUSCULAR
Status: DISCONTINUED | OUTPATIENT
Start: 2024-06-05 | End: 2024-06-05 | Stop reason: HOSPADM

## 2024-06-05 RX ADMIN — TRIAMCINOLONE ACETONIDE 40 MG: 40 INJECTION, SUSPENSION INTRA-ARTICULAR; INTRAMUSCULAR at 01:06

## 2024-06-05 NOTE — PROGRESS NOTES
Physical Therapy Daily Treatment Note     Name: Shannan Norman  Clinic Number: [id number]    Therapy Diagnosis:   Encounter Diagnoses   Name Primary?    Venous insufficiency of both lower extremities Yes    Venous reflux     Swelling of lower extremity     Lymphedema of both lower extremities      Physician: Martell Alonso MD    Visit Date: 6/5/2024    Physician Orders: PT Eval and Treat - lymphedema  Medical Diagnosis from Referral:   Diagnosis   I87.2 (ICD-10-CM) - Venous insufficiency   I89.0 (ICD-10-CM) - Lymphedema   Evaluation Date: 5/31/2024  Authorization Period Expiration: 12/31/24  Plan of Care Expiration: 8/23/24  Visit # / Visits authorized: 3/20     Time In: 905a  Time Out: 1000a  Total Billable Time: 60 minutes     Precautions: Standard and R TSA, GSV vein stripping  pacemaker, CKD 3  Subjective     Pt reports: leg discoloration and appearance are his norm - advised to monitor closely for signs/symptoms of infection and to seek care if needed.  Friend comes in early AM to apply compression socks and he is able to remove.  He was compliant with home compression/exercise program.  Response to previous treatment: pt is wearing donated KH 20-30mmHg compression socks - he has ordered several more pairs.  Pt will have ortho knee assessment today.   Shoes are fitting better.     Functional change: PT for L shoulder post op RTSA,  using slip on [employer] tennis shoes - noted pushes bandaging some, cueing to monitor fit on foot post session.  Pain: 3/10  Location: bilateral knee joints more than leg swelling concerns, L > R      Objective   Tall male with KH 20-30mmHg L/XL compression socks, slip on tennis shoe loafers.   amb with cane slowly  Rocking and momentum to stand from typical chair with arms  Amount of Swelling/Location of Swelling: mod to B LE ankles legs, knees and moving into thighs   Skin Integrity: dry scaly skin, venous varicosities, bulges, broken veins   Palpation/Texture: discoloration  with brown/red discoloration distally including post calf to knee, mild warmth  discoloration along R post leg > L - similar temperature and presentation reported.  Per chart:  Impression:   No DVT in either thigh.   History of surgical stripping and EVLT to the greater saphenous veins bilaterally.   Venous insufficiency involving accessory greater saphenous veins bilaterally, as well as the left popliteal vein.   Enlarged but morphologically normal lymph node in the right groin, likely reactive.   Electronically signed by:Angelo Carrizales  Date:                                            04/26/2024  + B Stemmer Sign  - B Nancy's Sign  Circulation: intact   Required head of bed elevated, pillow behind lower back  Required towel roll or support under B knees, no elevation wedge     Treatment:   Shannan received the following manual therapy techniques:- Manual Lymphatic Drainage were applied to the: R LE  for 55 minutes, including: MLD and short stretch compression bandaging   Sock was adjusted then remained intact to L LE     MANUAL LYMPHATIC DRAINAGE (MLD):    While supine with LEs elevated stimulation at terminus, along GI region, B inguinal regions, drainage of entire R LE [place] lower leg, ankle, and foot with return proximally,  Use of Aquaphor/Eucerin due to dryness.   Varied support for knee position  Consider self massage to abdominal areas, B inguinal areas, thigh, and remaining LE within reach.    MULTILAYERED BANDAGING:  issued supplies and bandaged R LE with cotton stockinette, Komprex foam section dorsum of foot, Komprex foam 1.5 rolls ankle to knee, 1-8cm and 2- 10cm Rosidal K rolls foot to knee, to leave intact 12-24 hrs as tolerated, discontinue with any problems, return rolled bandages next session.   Wash and wear schedules confirmed.   Modified for foot due to slip on loafer style shoe - advised preferred shoe over flip flops sandals, however with difficulty to apply and mild push of bandaging.  Used shoe horn  and assistance for B Shoes.   Monitor shoe fit and comfort closely.    Shannan received therapeutic exercises to develop strength, endurance, ROM, flexibility, and posture for 2 minutes including: aps, walking, avoid dependency and immobility, support of muscle pump with compression and activity.  Assisted stretching B  GSS - educated on movement for support, caution with L shoulder per protocol, Knee to bee seen by Ortho.  THERAPEUTIC EXERCISES:  Continue HEP of AROM, stretching, and postural correction.     Home Exercises Provided and Patient Education Provided:  Self Care Home Management Training/Functional Therapeutic Activity x 5 minutes  option for Knee High Copper Compression socks 20-30mmHg size L/XL.  Pros/cons of basic compression choices were fully reviewed with noted limited size selection, one length choice, lessor tier of product, benefits of low cost, relative ease of application and comfort, and additional items available for use.  Pt was trained on don/doff and methods to apply with assistance of gloves or family member/friend.  Position and fit with appropriate girth, length and support are required.   Daytime use with removal for sleep.   Wear schedules and wash schedules confirmed.   Provided information to secure additional pairs or use as temporary option until medical grade choices are available.     Signs/symptoms of infection reviewed - pt admits his typical size/shape, discoloration and temperature.    L knee to be assessed per ortho 6/5/24  Ongoing PT per ortho post L RTSA.     Present compression:  zipper KH, KH 20-30mmHg socks  Orders and recommendations: KH 20-30mmHg and/or Velcro Inelastic Wraps  PATIENT/FAMILY Education: bandaging/compression wear schedule,  HEP,  Beginning of self massage,  Self or assisted bandaging, compression options, and Risk reduction    Written Home Exercises Provided: Patient instructed to cont prior HEP.  Home exercise and compression plan of management was  reviewed and Shannan was able to demonstrate understanding prior to the end of the session.  Shannan demonstrated good  understanding of the education provided.     Assessment   Shannan is a 78 y.o. male referred to outpatient Physical Therapy with a medical diagnosis of   Diagnosis   I87.2 (ICD-10-CM) - Venous insufficiency   I89.0 (ICD-10-CM) - Lymphedema   This patient presents s/p multi year history of venous reflux CVI, tall stature, OA/DJD shoulder, knees, spine, history of skin cancers with dermatology following, recent L RTSA which may limit ability to pull on compression with B UE  resulting in: multifactorial venous related lymphedema of the B LE, increased pain, increased stiffness in the ankles, knees, tall  stature with history of vein stripping, as well as difficulty performing walking, sit to stand, reach for compression application, size/shape/length of compression needs, and placing the pt at higher risk of infection. 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 Therapy recommends elevation, exercise, regular use of compression, and therapy for at least a month to reduce swelling.  Modifications to allow compliance with compression.      B ankles have shown reductions since therapy and daily support with a form of compression.  Pt continues to require assistance to apply due to combinations of post shoulder surgery and knee limitations with OA/DJD.  Discoloration remains present and prominent varicose veins are visible.    Caution for signs/symptoms of infection to be seen by his medical team.  Primary goals will be to have home program and daily support of compression he can self apply.    Shannan Is progressing well towards his goals.   Pt prognosis is Good.     Pt will continue to benefit from skilled outpatient physical therapy to address the deficits listed in the problem list box on initial evaluation, provide pt/family education and to maximize pt's level of independence in the home  and community environment.     Pt's spiritual, cultural and educational needs considered and pt agreeable to plan of care and goals.  Anticipated Barriers for therapy: CKD, sh TSA, ability to reach feet, knee pain     The following goals were discussed with the patient and patient is in agreement with them as to be addressed in the treatment plan.      Short Term Goals: (6 weeks)  1. Patient will show decreased girth in B LE by up to 2 cm to allow for LE symmetry, shoe and clothing choice, and ability to apply needed compression.  (progressing, not met)   2. Patient will demonstrate 100% knowledge of lymphedema precautions and signs of infection to allow for reduced lymphedema risk, infection risk, and/or exacerbation of condition.  (progressing, not met)  3. Patient or caregiver will perform self-bandaging techniques and/or wearing of compression garments to allow for lymphatic drainage support, skin elasticity, and reduction in shape and size of limb. (progressing, not met)  4. Patient will perform self lymph drainage techniques to areas within reach to enhance lymphatic drainage and skin condition.  (progressing, not met)  5. Patient will tolerate daily activities with multilayered bandaging to allow for lymphatic and venous support.  (progressing, not met)     Long Term Goals: (12  weeks)  1. Patient will show decreased girth in B LE by up to 3 cm  to allow for LE symmetry, shoe and clothing choice, and ability to apply needed compression daily.  (progressing, not met)  2. Patient will show reduction in density to mild or less with improved contour of limb to allow for cosmesis, LE symmetry, infection risk reduction, and clothing and compression choice.   (progressing, not met)  3. Patient to rowan/doff compression garment with daily compliance to assist in lymphedema management, skin elasticity, and tissue density.  (progressing, not met)  4. Pt to show improved postural awareness and alignment.  (progressing,  not met)  5. Pt to be I and compliant with HEP to allow for increased function in affected limb.   (progressing, not met)  Plan   Plan of care Certification: 5/31/2024 to 8/23/24.     Outpatient Physical Therapy 2 times weekly for 10 weeks to include the following interventions: Patient Education, Self Care, Therapeutic Activities, and Therapeutic Exercise. Complete Decongestive Therapy- compression and home equipment needs to be addressed and assisted.     Geneva Owens, PT

## 2024-06-05 NOTE — PROCEDURES
"Large Joint Aspiration/Injection: L knee    Date/Time: 6/5/2024 1:45 PM    Performed by: Kit Potter MD  Authorized by: Kit Potter MD    Consent Done?:  Yes (Verbal)  Indications:  Pain  Site marked: the procedure site was marked    Timeout: prior to procedure the correct patient, procedure, and site was verified    Prep: patient was prepped and draped in usual sterile fashion      Details:  Needle Size:  25 G  Ultrasonic Guidance for needle placement?: Yes    Images are saved and documented.  Approach:  Lateral  Location:  Knee  Site:  L knee  Medications:  40 mg triamcinolone acetonide 40 mg/mL  Patient tolerance:  Patient tolerated the procedure well with no immediate complications     Description of ultrasound utilization for needle guidance:   Ultrasound guidance used for needle localization. Images saved and stored for documentation. The SUPRAPATELLAR BURSA / KNEE JOINT was visualized. Dynamic visualization of the 25g x 1.5" needle was continuous throughout the procedure.     "

## 2024-06-06 ENCOUNTER — PATIENT MESSAGE (OUTPATIENT)
Dept: DERMATOLOGY | Facility: CLINIC | Age: 79
End: 2024-06-06
Payer: MEDICARE

## 2024-06-07 ENCOUNTER — CLINICAL SUPPORT (OUTPATIENT)
Dept: REHABILITATION | Facility: HOSPITAL | Age: 79
End: 2024-06-07
Payer: MEDICARE

## 2024-06-07 DIAGNOSIS — R29.898 DECREASED STRENGTH OF UPPER EXTREMITY: ICD-10-CM

## 2024-06-07 DIAGNOSIS — M25.612 DECREASED ROM OF LEFT SHOULDER: Primary | ICD-10-CM

## 2024-06-07 DIAGNOSIS — M25.512 CHRONIC LEFT SHOULDER PAIN: ICD-10-CM

## 2024-06-07 DIAGNOSIS — G89.29 CHRONIC LEFT SHOULDER PAIN: ICD-10-CM

## 2024-06-07 PROCEDURE — 97110 THERAPEUTIC EXERCISES: CPT | Mod: PN

## 2024-06-07 PROCEDURE — 97530 THERAPEUTIC ACTIVITIES: CPT | Mod: PN

## 2024-06-07 PROCEDURE — 97112 NEUROMUSCULAR REEDUCATION: CPT | Mod: PN

## 2024-06-07 NOTE — PROGRESS NOTES
[facility]   Physical Therapy Treatment/Discharge Note      Name: Shannan Norman  Clinic Number: [id number]    Therapy Diagnosis:   Encounter Diagnoses   Name Primary?    Decreased ROM of left shoulder Yes    Decreased strength of upper extremity     Chronic left shoulder pain      Physician: Tony Major*    Visit Date: 6/7/2024  Physician Orders: PT Eval and Treat   Medical Diagnosis from Referral: Glenohumeral arthritis, left [M19.012]   Evaluation Date: 3/18/2024  Authorization Period Expiration: 12/31/2024  Plan of Care Expiration: 6/7/2024  Date of Surgery: Monday, 3/4/2024  Visit # / Visits authorized: 15/20 + 1   FOTO: Complete  PTA Visit #: 0/5      Precautions: Standard; reverse total shoulder with biceps tendonesis on Monday, 3/4     Time In: 0802  Time Out: 0835   Total Billable Time: 33 minutes    Subjective     Patient reports: he is not having any issues. Patient discussed returning to golf with surgeon. Patient reports he was instructed not to perform full swing, but to initiate with putting and chipping with care. Patient reports he is ready for discharge at this time, but defers updated home exercise program.     He was compliant with home exercise program.   Response to previous treatment: no pain   Functional change: no issues. Compliant with restriction to avoid repetitive overhead reaching    Pain: 0/10  Location: Left shoulder     Objective      6/7/2024:     Seated shoulder range:  Flexion active/passive: 134/140 degrees  Functional ER: T1    Treatment     Shannan received the treatments listed below:      neuromuscular re-education activities to improve: Coordination, Kinesthetic, and Posture for 13 minutes. The following activities were included:    Standing:  Short arc flexion: 3x3 left   Biceps curls: 7 lbs 3x15  Eccentric shoulder flexion AAROM with green theraband: green theraband 3x10    therapeutic activities to improve functional performance for 10  minutes, including:    Lifts - partial range both hands holding yellow theraband: 2x10 bilateral   Partial, slow golf swin lb dowel: x5 to the left (right hand dominate)    therapeutic exercises to develop ROM and posture for 10 minutes including:    Objective and education  Seated pulley flexion: 2x15    Patient Education and Home Exercises       Education provided:   - Precautions. Risks related to repetitive flexion against gravity.   - May put and chip. Avoid full range and velocity of speed    Written Home Exercises Provided: Verbal on . Written on 2024 and 2024. Exercises were reviewed and Shannan was able to demonstrate them prior to the end of the session.  Hsannan demonstrated good understanding of the education provided. See Electronic Medical Record under Patient Instructions for exercises provided during therapy sessions    Assessment     Shannan is a 78 y.o. male referred to outpatient Physical Therapy with a medical diagnosis of glenohumeral arthritis. Patient presents 12 weeks post op reverse total shoulder arthroplasty with biceps tendonesis. Motion is functional, and patient is pain free. Patient is appropriate for discharge at this time.     Short Term Goals (6 Weeks):   1. Patient will be compliant with home exercise program to assist therapy in restoring pain free motion of the shoulder. Partially met   2. Patient will improve wrist extension to 70 degrees to improve use of distal arm. Not measured   3. Patient will improve forearm supination and elbow extension to full to improve use of distal arm. Not measured   3. Patient will achieve passive shoulder flexion of 120 degrees to promote functional reaching. Met prior to .      Long Term Goals (12 Weeks):   1. Patient will improve FOTO score to </= 57% to demonstrate improvements in carrying, moving, and handling objects. Met   2. Patient will improve initiate isotonic shoulder scaption and rotation and elbow flexion with  light weight without pain to promote use of left arm for light lifting. Partially met - elbow flexion 6/7   3. Patient will improve active shoulder flexion to 120 degrees to improve functional mobility of upper extremities. Met 6/7  4. Patient will perform functional external rotation to C4 to promote independent grooming and dressing. Met 6/7    Plan     Discharge from physical therapy.    Franca Boston, PT, DPT, OCS

## 2024-06-10 ENCOUNTER — CLINICAL SUPPORT (OUTPATIENT)
Dept: REHABILITATION | Facility: HOSPITAL | Age: 79
End: 2024-06-10
Payer: MEDICARE

## 2024-06-10 DIAGNOSIS — I87.2 VENOUS INSUFFICIENCY OF BOTH LOWER EXTREMITIES: Primary | ICD-10-CM

## 2024-06-10 DIAGNOSIS — I89.0 LYMPHEDEMA OF BOTH LOWER EXTREMITIES: ICD-10-CM

## 2024-06-10 DIAGNOSIS — M79.89 SWELLING OF LOWER EXTREMITY: ICD-10-CM

## 2024-06-10 DIAGNOSIS — I87.2 VENOUS REFLUX: ICD-10-CM

## 2024-06-10 PROCEDURE — 97140 MANUAL THERAPY 1/> REGIONS: CPT | Mod: KX

## 2024-06-10 PROCEDURE — 29581 APPL MULTLAYER CMPRN SYS LEG: CPT | Mod: KX

## 2024-06-10 NOTE — PROGRESS NOTES
Physical Therapy Daily Treatment Note     Name: Shannan Norman  Clinic Number: [id number]    Therapy Diagnosis:   Encounter Diagnoses   Name Primary?    Venous insufficiency of both lower extremities Yes    Venous reflux     Swelling of lower extremity     Lymphedema of both lower extremities      Physician: Martell Alonso MD    Visit Date: 6/10/2024    Physician Orders: PT Eval and Treat - lymphedema  Medical Diagnosis from Referral:   Diagnosis   I87.2 (ICD-10-CM) - Venous insufficiency   I89.0 (ICD-10-CM) - Lymphedema   Evaluation Date: 5/31/2024  Authorization Period Expiration: 12/31/24  Plan of Care Expiration: 8/23/24  Visit # / Visits authorized: 4/20     Time In: 855a  Time Out: 955a  Total Billable Time: 60 minutes     Precautions: Standard and R TSA, GSV vein stripping  pacemaker, CKD 3  Subjective     Pt reports: admits several restroom trips and losing fluid weight.  Pt also saw ortho for his knees with steroid injections.   Friend comes in early AM to apply compression socks and he is able to remove.  He was compliant with home compression/exercise program.  Response to previous treatment: less fullness to legs and ankles.  Pt admits long history of venous disease.  Finding products he can apply or use assist device.  Pt visited DME in past and was not sure if products were covered.  He self purchased a don assist device and gloves to assist him to apply compression - presently friend still assisting in the morning.   Shoes are fitting better.     Functional change: walking better over this last week, often needs to choose tall chairs.   His choice of [employer] tennis shoes - noted pushes bandaging some, cueing to monitor fit on foot post session.  Pain: 2-3/10  Location: bilateral knee joints more than leg swelling concerns, L > R      Objective   Tall male with KH 20-30mmHg L/XL compression socks, slip on tennis shoe loafers.   amb with cane slowly  Rocking and momentum to stand from typical  chair with arms  Amount of Swelling/Location of Swelling: mod to B LE ankles legs, knees and moving into thighs   Skin Integrity: dry scaly skin, venous varicosities, bulges, broken veins - marked varicosities med thighs  Palpation/Texture: discoloration with brown/red discoloration distally including post calf to knee, mild warmth  discoloration along R post leg > L - similar temperature and presentation reported. No added pain or heat to touch.    Girth Measurements (in centimeters)  LANDMARK LEFT LE  5/31/24 RIGHT LE  5/31/24 R LE  6/10/24 Change  R LE DIFF   at eval   SBP 49.5 cm 50.0 cm 47.0 3.0 0.5 cm   10 below SBP 45.0 cm 45.5 cm 44.0 1.5 0.5 cm   20 below SBP 42.5 cm 46.0 cm 41.0 5.0 3.5 cm   30 below SBP 38.0 cm 37.0 cm 31.0 6.0 1.0 cm   35 below SBP 32.0 cm 32.0 cm 27.0 5.0 0 cm   Ankle 30.5 cm 33.0 cm 28.0 5.0 2.5 cm   Forefoot 24.0 cm 26.0 cm 24.0 2.0 2.0 cm     Per chart:  Impression:   No DVT in either thigh.   History of surgical stripping and EVLT to the greater saphenous veins bilaterally.   Venous insufficiency involving accessory greater saphenous veins bilaterally, as well as the left popliteal vein.   Enlarged but morphologically normal lymph node in the right groin, likely reactive.   Electronically signed by:Angelo Carrizales  Date:                                            04/26/2024  + B Stemmer Sign  - B Nancy's Sign  Circulation: intact   Required head of bed elevated, pillow behind lower back  Required towel roll or support under B knees, no elevation wedge     Treatment:   Shannan received the following manual therapy techniques:- Manual Lymphatic Drainage were applied to the: R LE  for 55 minutes, including: MLD and short stretch compression bandaging   Sock was adjusted then remained intact to L LE   Girth assessment    MANUAL LYMPHATIC DRAINAGE (MLD):    While supine with LEs elevated stimulation at terminus, along GI region, B inguinal regions, drainage of entire R LE [place] lower leg, ankle,  and foot with return proximally,  Use of Aquaphor/Eucerin due to dryness.   Varied support for knee position  Consider self massage to abdominal areas, B inguinal areas, thigh, and remaining LE within reach.    MULTILAYERED BANDAGING:  issued supplies and bandaged R LE with cotton stockinette, Komprex foam section dorsum of foot, Komprex foam 1.5 rolls ankle to knee, 1-8cm and 2- 10cm Rosidal K rolls foot to knee, to leave intact 12-24 hrs as tolerated, discontinue with any problems, return rolled bandages next session.   Wash and wear schedules confirmed.   Modified for foot due to slip on loafer style shoe - advised preferred shoe over flip flops sandals, however with difficulty to apply and mild push of bandaging.  Used shoe horn and assistance for B Shoes.   Monitor shoe fit and comfort closely.    Shannan received therapeutic exercises to develop strength, endurance, ROM, flexibility, and posture including: aps, walking, avoid dependency and immobility, support of muscle pump with compression and activity.  Cueing on recommended stretching B  GSS - educated on movement for support, caution with L shoulder per protocol, Knee per Ortho.  THERAPEUTIC EXERCISES:  Continue HEP of AROM, stretching, and postural correction.     Home Exercises Provided and Patient Education Provided:  Self Care Home Management Training/Functional Therapeutic Activity x 5 minutes  Performed demo of don assist device - also assist of gloves- advised on need to be able to bend over to make adjustments and caution with his knees and shoulder.   Pt has chosen self purchase of products for don assist - presently a friend is assisting in AM.    Review of KH or Velcro Inelastic Wraps as an option.    Prior visit to DME - pt has orders for Knee High Compression 20-30mmHg - review of process of orders, to DME for fitting, sizing per vendor and Insurance billing for cost/coverage - suggested he contact his plan to confirm if Medicare Managed plans  will cover.     trained on don/doff and methods to apply with assistance of gloves, assist device or family member/friend.  Position and fit with appropriate girth, length and support are required.   Daytime use with removal for sleep.   Wear schedules and wash schedules confirmed.     Signs/symptoms of infection reviewed - pt admits his typical size/shape, discoloration and temperature.    L knee per ortho 6/5/24 visit - had injections  Ongoing PT per ortho post L RTSA.     Present compression:  zipper KH, KH 20-30mmHg socks  Orders and recommendations: KH 20-30mmHg and/or Velcro Inelastic Wraps  PATIENT/FAMILY Education: bandaging/compression wear schedule,  HEP,  Beginning of self massage,  Self or assisted bandaging, compression options, and Risk reduction    Written Home Exercises Provided: Patient instructed to cont prior HEP.  Home exercise and compression plan of management was reviewed and Shannan was able to demonstrate understanding prior to the end of the session.  Shannan demonstrated good  understanding of the education provided.     Assessment   Shannan is a 78 y.o. male referred to outpatient Physical Therapy with a medical diagnosis of   Diagnosis   I87.2 (ICD-10-CM) - Venous insufficiency   I89.0 (ICD-10-CM) - Lymphedema   This patient presents s/p multi year history of venous reflux CVI, tall stature, OA/DJD shoulder, knees, spine, history of skin cancers with dermatology following, recent L RTSA which may limit ability to pull on compression with B UE  resulting in: multifactorial venous related lymphedema of the B LE, increased pain, increased stiffness in the ankles, knees, tall  stature with history of vein stripping, as well as difficulty performing walking, sit to stand, reach for compression application, size/shape/length of compression needs, and placing the pt at higher risk of infection. 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 Therapy recommends elevation, exercise,  regular use of compression, and therapy for at least a month to reduce swelling.  Modifications to allow compliance with compression.      Fairly significant reductions in girth of his R LE as well as reported overall weight loss.  Advised to monitor medical management, medications and weight loss per his medical team.  R LE discoloration and hyperpigmentation persists, size presents larger than L LE.  Pt admits chronic venous reflux and long standing discoloration has been typical.   Pt is moving better and has ortho addressing his knee and shoulder OA/DJD.     Shannan Is progressing well towards his goals.   Pt prognosis is Good.     Pt will continue to benefit from skilled outpatient physical therapy to address the deficits listed in the problem list box on initial evaluation, provide pt/family education and to maximize pt's level of independence in the home and community environment.     Pt's spiritual, cultural and educational needs considered and pt agreeable to plan of care and goals.  Anticipated Barriers for therapy: CKD, sh TSA, ability to reach feet, knee pain     The following goals were discussed with the patient and patient is in agreement with them as to be addressed in the treatment plan.      Short Term Goals: (6 weeks)  1. Patient will show decreased girth in B LE by up to 2 cm to allow for LE symmetry, shoe and clothing choice, and ability to apply needed compression.  (progressing, met  RLE   2. Patient will demonstrate 100% knowledge of lymphedema precautions and signs of infection to allow for reduced lymphedema risk, infection risk, and/or exacerbation of condition. met)  3. Patient or caregiver will perform self-bandaging techniques and/or wearing of compression garments to allow for lymphatic drainage support, skin elasticity, and reduction in shape and size of limb. (progressing, R  4. Patient will perform self lymph drainage techniques to areas within reach to enhance lymphatic drainage and  skin condition.  (progressing, not met)  5. Patient will tolerate daily activities with multilayered bandaging to allow for lymphatic and venous support.  (progressing, R    Long Term Goals: (12  weeks)  1. Patient will show decreased girth in B LE by up to 3 cm  to allow for LE symmetry, shoe and clothing choice, and ability to apply needed compression daily.  (progressing,   2. Patient will show reduction in density to mild or less with improved contour of limb to allow for cosmesis, LE symmetry, infection risk reduction, and clothing and compression choice.   (progressing,  3. Patient to rowan/doff compression garment with daily compliance to assist in lymphedema management, skin elasticity, and tissue density.  (progressing,   4. Pt to show improved postural awareness and alignment.  (progressing, not met)  5. Pt to be I and compliant with HEP to allow for increased function in affected limb.   (progressing, not met)  Plan   Plan of care Certification: 5/31/2024 to 8/23/24.     Outpatient Physical Therapy 2 times weekly for 10 weeks to include the following interventions: Patient Education, Self Care, Therapeutic Activities, and Therapeutic Exercise. Complete Decongestive Therapy- compression and home equipment needs to be addressed and assisted.     Geneva Owens, PT

## 2024-06-11 ENCOUNTER — TELEPHONE (OUTPATIENT)
Dept: DERMATOLOGY | Facility: CLINIC | Age: 79
End: 2024-06-11
Payer: MEDICARE

## 2024-06-15 ENCOUNTER — CLINICAL SUPPORT (OUTPATIENT)
Dept: CARDIOLOGY | Facility: HOSPITAL | Age: 79
End: 2024-06-15
Attending: INTERNAL MEDICINE
Payer: MEDICARE

## 2024-06-15 ENCOUNTER — CLINICAL SUPPORT (OUTPATIENT)
Dept: CARDIOLOGY | Facility: HOSPITAL | Age: 79
End: 2024-06-15
Payer: MEDICARE

## 2024-06-15 DIAGNOSIS — Z95.0 PRESENCE OF CARDIAC PACEMAKER: ICD-10-CM

## 2024-06-15 PROCEDURE — 93294 REM INTERROG EVL PM/LDLS PM: CPT | Mod: ,,, | Performed by: INTERNAL MEDICINE

## 2024-06-15 PROCEDURE — 93296 REM INTERROG EVL PM/IDS: CPT | Performed by: INTERNAL MEDICINE

## 2024-06-17 ENCOUNTER — CLINICAL SUPPORT (OUTPATIENT)
Dept: REHABILITATION | Facility: HOSPITAL | Age: 79
End: 2024-06-17
Payer: MEDICARE

## 2024-06-17 DIAGNOSIS — I89.0 LYMPHEDEMA OF BOTH LOWER EXTREMITIES: ICD-10-CM

## 2024-06-17 DIAGNOSIS — I87.2 VENOUS REFLUX: ICD-10-CM

## 2024-06-17 DIAGNOSIS — I87.2 VENOUS INSUFFICIENCY OF BOTH LOWER EXTREMITIES: Primary | ICD-10-CM

## 2024-06-17 DIAGNOSIS — M79.89 SWELLING OF LOWER EXTREMITY: ICD-10-CM

## 2024-06-17 PROCEDURE — 97140 MANUAL THERAPY 1/> REGIONS: CPT | Mod: KX

## 2024-06-17 PROCEDURE — 29581 APPL MULTLAYER CMPRN SYS LEG: CPT | Mod: KX

## 2024-06-17 PROCEDURE — 97016 VASOPNEUMATIC DEVICE THERAPY: CPT | Mod: KX

## 2024-06-17 NOTE — PROGRESS NOTES
"  Physical Therapy Daily Treatment Note     Name: Shannan Norman  Clinic Number: [id number]    Therapy Diagnosis:   Encounter Diagnoses   Name Primary?    Venous insufficiency of both lower extremities Yes    Venous reflux     Swelling of lower extremity     Lymphedema of both lower extremities      Physician: Martell Alonso MD    Visit Date: 6/17/2024    Physician Orders: PT Eval and Treat - lymphedema  Medical Diagnosis from Referral:   Diagnosis   I87.2 (ICD-10-CM) - Venous insufficiency   I89.0 (ICD-10-CM) - Lymphedema   Evaluation Date: 5/31/2024  Authorization Period Expiration: 12/31/24  Plan of Care Expiration: 8/23/24  Visit # / Visits authorized: 5/20     Time In: 900a  Time Out: 1000a  Total Billable Time: 60 minutes  Pump    Per chart:  Impression:   No DVT in either thigh.   History of surgical stripping and EVLT to the greater saphenous veins bilaterally.   Venous insufficiency involving accessory greater saphenous veins bilaterally, as well as the left popliteal vein.   Enlarged but morphologically normal lymph node in the right groin, likely reactive.   Electronically signed by:Angelo Carrizales  Date:                                            04/26/2024       Precautions: Standard and R TSA, GSV vein stripping  pacemaker, CKD 3  Subjective     Pt reports: his dog chewed portions of his bandaging.   He was compliant with home compression/exercise program.  Response to previous treatment: attempted don assist device without success. Still having a friend apply his compression socks daily - notes legs are smaller, R leg remains more involved than L. Shoes are fitting better but also chooses slip on styles..     Functional change: L shoulder with recent surgery, R shoulder is "shot" but would need to hire more help during recovery if chooses surgery.  Slip on shoes pushes bandages but best choice for his independence.  L knee with recent injections.  Pt admits possible pump consult per  " Gavin.  Pain: 2-3/10  Location: bilateral knee joints more than leg swelling concerns, L > R      Objective   Tall male with KH 20-30mmHg L/XL compression socks, slip on loafers.   amb with cane slowly  Rocking and momentum to stand from typical chair with arms  Amount of Swelling/Location of Swelling: mod to B LE ankles legs, knees and moving into thighs R > L  Skin Integrity: dry scaly skin, venous varicosities, bulges, broken veins - marked varicosities med thighs  Palpation/Texture: discoloration with brown/red discoloration distally including post calf to knee, mild warmth  discoloration along R post leg > L - similar temperature and presentation reported. No added pain or heat to touch.    Girth Measurements (in centimeters)  LANDMARK LEFT LE  5/31/24 RIGHT LE  5/31/24 R LE  6/10/24 Change  R LE DIFF   at eval   SBP 49.5 cm 50.0 cm 47.0 3.0 0.5 cm   10 below SBP 45.0 cm 45.5 cm 44.0 1.5 0.5 cm   20 below SBP 42.5 cm 46.0 cm 41.0 5.0 3.5 cm   30 below SBP 38.0 cm 37.0 cm 31.0 6.0 1.0 cm   35 below SBP 32.0 cm 32.0 cm 27.0 5.0 0 cm   Ankle 30.5 cm 33.0 cm 28.0 5.0 2.5 cm   Forefoot 24.0 cm 26.0 cm 24.0 2.0 2.0 cm     + B Stemmer Sign  - B Nancy's Sign  Circulation: intact   Required head of bed elevated, pillow behind lower back  Required towel roll or support under B knees, no elevation wedge     Treatment:   Shannan received the following manual therapy techniques:- Manual Lymphatic Drainage were applied to the: R LE  for 45 minutes, including: MLD and short stretch compression bandaging   Sock was adjusted, remained intact to L LE     MANUAL LYMPHATIC DRAINAGE (MLD):    While supine with LEs elevated stimulation at terminus, along GI region, B inguinal regions, drainage of entire R LE [place] lower leg, ankle, and foot with return proximally,  Use of Aquaphor/Eucerin due to dryness.   Varied support for knee position  Consider self massage to abdominal areas, B inguinal areas, thigh, and remaining LE within  reach.    SEQUENTIAL COMPRESSION PUMP: full leg sleeve applied to R LE  VES 5200 with default setting with distal pressures starting at 45mmHg entire LE   Simultaneous to compression supplies, compression education and training, and self management.    MULTILAYERED BANDAGING:  issued new foam supplies and bandaged R LE with cotton stockinette, Komprex foam section dorsum of foot, Komprex foam 1.5 rolls ankle to knee, 1-8cm and 2- 10cm Rosidal K rolls foot to knee, to leave intact 12-24 hrs as tolerated, discontinue with any problems, return rolled bandages next session.   Wash and wear schedules confirmed.   Modified for foot due to slip on loafer style shoe - advised preferred shoe over flip flops sandals, however with difficulty to apply and mild push of bandaging.  Used shoe horn and assistance for B Shoes.   Monitor shoe fit and comfort closely.    Shannan received therapeutic exercises to develop strength, endurance, ROM, flexibility, and posture including: aps, walking, avoid dependency and immobility, support of muscle pump with compression and activity.  Cueing on recommended stretching B  GSS - educated on movement for support, caution with L shoulder per protocol, Knee per Ortho.  THERAPEUTIC EXERCISES:  Continue HEP of AROM, stretching, and postural correction.     Home Exercises Provided and Patient Education Provided:  Self Care Home Management Training/Functional Therapeutic Activity x 5 minutes  Review of home pump systems and consult per  MD - may send therapy documentation to vendor if has name.    Don with friend assist at this time, able to doff in evenings.   pt has orders for Knee High Compression 20-30mmHg - review of process of orders, to DME for fitting, sizing per vendor and Insurance billing for cost/coverage - suggested he contact his plan to confirm if his Medicare Managed plans will cover.     trained on don/doff and methods to apply with assistance of gloves, assist device or family  member/friend.  Position and fit with appropriate girth, length and support are required.   Daytime use with removal for sleep.   Wear schedules and wash schedules confirmed.     Signs/symptoms of infection reviewed - pt admits his typical size/shape, discoloration and temperature.    L knee per ortho 6/5/24 visit - had injections  Ongoing PT per ortho post L RTSA.     Present compression:  zipper KH, KH 20-30mmHg socks  Orders and recommendations: KH 20-30mmHg and/or Velcro Inelastic Wraps  PATIENT/FAMILY Education: bandaging/compression wear schedule,  HEP,  Beginning of self massage,  Self or assisted bandaging, compression options, and Risk reduction    Written Home Exercises Provided: Patient instructed to cont prior HEP.  Home exercise and compression plan of management was reviewed and Shannan was able to demonstrate understanding prior to the end of the session.  Shannan demonstrated good  understanding of the education provided.     Assessment   Shannan is a 78 y.o. male referred to outpatient Physical Therapy with a medical diagnosis of   Diagnosis   I87.2 (ICD-10-CM) - Venous insufficiency   I89.0 (ICD-10-CM) - Lymphedema   This patient presents s/p multi year history of venous reflux CVI, tall stature, OA/DJD shoulder, knees, spine, history of skin cancers with dermatology following, recent L RTSA which may limit ability to pull on compression with B UE  resulting in: multifactorial venous related lymphedema of the B LE, increased pain, increased stiffness in the ankles, knees, tall  stature with history of vein stripping, as well as difficulty performing walking, sit to stand, reach for compression application, size/shape/length of compression needs, and placing the pt at higher risk of infection. 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 Therapy recommends elevation, exercise, regular use of compression, and therapy for at least a month to reduce swelling.  Modifications to allow  compliance with compression.      Required replacement products for compression.  PT has OA/DJD considerations that limit function, ambulation and reach of UE.  Noted reductions of B LE with pt noting weight loss and loss of fluid retention.  Addressing R LE since > than L involvement.  Choice of compression he will be most  successful with for daily use.  Pump trial was successful and will address consult per MD as needed.     Shannan Is progressing well towards his goals.   Pt prognosis is Good.     Pt will continue to benefit from skilled outpatient physical therapy to address the deficits listed in the problem list box on initial evaluation, provide pt/family education and to maximize pt's level of independence in the home and community environment.     Pt's spiritual, cultural and educational needs considered and pt agreeable to plan of care and goals.  Anticipated Barriers for therapy: CKD, sh TSA, ability to reach feet, knee pain     The following goals were discussed with the patient and patient is in agreement with them as to be addressed in the treatment plan.      Short Term Goals: (6 weeks)  1. Patient will show decreased girth in B LE by up to 2 cm to allow for LE symmetry, shoe and clothing choice, and ability to apply needed compression.  (progressing, met  RLE   2. Patient will demonstrate 100% knowledge of lymphedema precautions and signs of infection to allow for reduced lymphedema risk, infection risk, and/or exacerbation of condition. met)  3. Patient or caregiver will perform self-bandaging techniques and/or wearing of compression garments to allow for lymphatic drainage support, skin elasticity, and reduction in shape and size of limb. (progressing, R  4. Patient will perform self lymph drainage techniques to areas within reach to enhance lymphatic drainage and skin condition.  (progressing, not met)  5. Patient will tolerate daily activities with multilayered bandaging to allow for lymphatic and  venous support.  (progressing, R    Long Term Goals: (12  weeks)  1. Patient will show decreased girth in B LE by up to 3 cm  to allow for LE symmetry, shoe and clothing choice, and ability to apply needed compression daily.  (progressing,   2. Patient will show reduction in density to mild or less with improved contour of limb to allow for cosmesis, LE symmetry, infection risk reduction, and clothing and compression choice.   (progressing,  3. Patient to rowan/doff compression garment with daily compliance to assist in lymphedema management, skin elasticity, and tissue density.  (progressing,   4. Pt to show improved postural awareness and alignment.  (progressing, not met)  5. Pt to be I and compliant with HEP to allow for increased function in affected limb.   (progressing, not met)  Plan   Plan of care Certification: 5/31/2024 to 8/23/24.     Outpatient Physical Therapy 2 times weekly for 10 weeks to include the following interventions: Patient Education, Self Care, Therapeutic Activities, and Therapeutic Exercise. Complete Decongestive Therapy- compression and home equipment needs to be addressed and assisted.     Geneva Owens, PT

## 2024-06-19 ENCOUNTER — CLINICAL SUPPORT (OUTPATIENT)
Dept: REHABILITATION | Facility: HOSPITAL | Age: 79
End: 2024-06-19
Payer: MEDICARE

## 2024-06-19 DIAGNOSIS — I87.2 VENOUS REFLUX: ICD-10-CM

## 2024-06-19 DIAGNOSIS — M79.89 SWELLING OF LOWER EXTREMITY: ICD-10-CM

## 2024-06-19 DIAGNOSIS — I89.0 LYMPHEDEMA OF BOTH LOWER EXTREMITIES: ICD-10-CM

## 2024-06-19 DIAGNOSIS — I87.2 VENOUS INSUFFICIENCY OF BOTH LOWER EXTREMITIES: Primary | ICD-10-CM

## 2024-06-19 PROCEDURE — 97110 THERAPEUTIC EXERCISES: CPT | Mod: KX

## 2024-06-19 PROCEDURE — 97016 VASOPNEUMATIC DEVICE THERAPY: CPT | Mod: KX

## 2024-06-19 PROCEDURE — 29581 APPL MULTLAYER CMPRN SYS LEG: CPT | Mod: KX

## 2024-06-19 PROCEDURE — 97140 MANUAL THERAPY 1/> REGIONS: CPT | Mod: KX

## 2024-06-19 NOTE — PROGRESS NOTES
Physical Therapy Daily Treatment Note     Name: Shannan Norman  Clinic Number: [id number]    Therapy Diagnosis:   Encounter Diagnoses   Name Primary?    Venous insufficiency of both lower extremities Yes    Venous reflux     Swelling of lower extremity     Lymphedema of both lower extremities      Physician: Martell Alonso MD    Visit Date: 6/19/2024    Physician Orders: PT Eval and Treat - lymphedema  Medical Diagnosis from Referral:   Diagnosis   I87.2 (ICD-10-CM) - Venous insufficiency   I89.0 (ICD-10-CM) - Lymphedema   Evaluation Date: 5/31/2024  Authorization Period Expiration: 12/31/24  Plan of Care Expiration: 8/23/24  Visit # / Visits authorized: 6/20     Time In: 900a  Time Out: 1015a  Total Billable Time: 75 minutes  Pump    Per chart:  Impression:   No DVT in either thigh.   History of surgical stripping and EVLT to the greater saphenous veins bilaterally.   Venous insufficiency involving accessory greater saphenous veins bilaterally, as well as the left popliteal vein.   Enlarged but morphologically normal lymph node in the right groin, likely reactive.   Electronically signed by:Angelo Carrizales  Date:                                            04/26/2024       Precautions: Standard and R TSA, GSV vein stripping  pacemaker, CKD 3  Subjective     Pt reports: has all bandaging except under liner was drying.   He was compliant with home compression/exercise program.  Response to previous treatment: using KH compression socks daily.  Admits losing flexibility and difficulty reaching his feet. His friend must assist applying his compression in the morning.  Pt is choosing to proceed with Velcro Wraps as his compression option. He attempted don assist device - hard to use.   Functional change: admits less flexibility and difficulty reaching his feet.  Uses cane only as needed. Able to sit in chairs and get up with mild difficulty - some rocking, chooses higher seats and arm rests. Pt has a booster at home  for chair outside. His choice of Slip on shoes. Pt notes pump consult per Dr. Alonso.  Pain: 2-3/10  Location: bilateral knee joints more than leg swelling concerns, L > R      Objective   Tall male with KH 20-30mmHg L/XL compression socks- slightly low or short on R LE, slip on loafers.   amb slowly  Rocking and momentum to stand from typical chair with arms  Amount of Swelling/Location of Swelling: mod to B LE ankles legs, knees and moving into thighs R > L  Skin Integrity: dry scaly skin, venous varicosities, bulges, broken veins - marked varicosities med thighs  Palpation/Texture: discoloration with brown/red discoloration distally including post calf to knee, mild warmth  discoloration along R post leg > L - similar temperature and presentation reported. No added pain or heat to touch.    Girth Measurements (in centimeters)  LANDMARK LEFT LE  5/31/24 RIGHT LE  5/31/24 R LE  6/10/24 Change  R LE DIFF   at eval   SBP 49.5 cm 50.0 cm 47.0 3.0 0.5 cm   10 below SBP 45.0 cm 45.5 cm 44.0 1.5 0.5 cm   20 below SBP 42.5 cm 46.0 cm 41.0 5.0 3.5 cm   30 below SBP 38.0 cm 37.0 cm 31.0 6.0 1.0 cm   35 below SBP 32.0 cm 32.0 cm 27.0 5.0 0 cm   Ankle 30.5 cm 33.0 cm 28.0 5.0 2.5 cm   Forefoot 24.0 cm 26.0 cm 24.0 2.0 2.0 cm     + B Stemmer Sign  - B Nancy's Sign  Circulation: intact   Required head of bed elevated, pillow behind lower back  Required towel roll or support under B knees, no elevation wedge     Treatment:   Shannan received the following manual therapy techniques:- Manual Lymphatic Drainage were applied to the: R LE  for 45 minutes, including: MLD and short stretch compression bandaging   Sock was adjusted, remained intact to L LE     Product review and choices  Discussed obstacles with reach and self application.  Demo of clinic products  Velcro Inelastic Wraps  KH garments  Don assist devices    MANUAL LYMPHATIC DRAINAGE (MLD):    While supine with LEs elevated stimulation at terminus, along GI region, B  inguinal regions, drainage of entire R LE [place] lower leg, ankle, and foot with return proximally,  Use of Aquaphor/Eucerin due to dryness.   Varied support for knee position  Consider self massage to abdominal areas, B inguinal areas, thigh, and remaining LE within reach.    SEQUENTIAL COMPRESSION PUMP: full leg sleeve applied to R LE  VES 5200 with default setting with distal pressures starting at 45mmHg entire LE   Simultaneous to compression supplies, compression education and training, and self management.    MULTILAYERED BANDAGING:  issued new under liner, bandaged R LE with cotton stockinette, Komprex foam section dorsum of foot, Komprex foam 1.5 rolls ankle to knee, 1-8cm and 2- 10cm Rosidal K rolls foot to knee, to leave intact 12-24 hrs as tolerated, discontinue with any problems, return rolled bandages next session.   Wash and wear schedules confirmed.   Modified for foot due to slip on loafer style shoe - advised preferred shoe over flip flops sandals, however with difficulty to apply and mild push of bandaging.  Used shoe horn and assistance for B Shoes.   Monitor shoe fit and comfort closely.    Shannan received therapeutic exercises to develop strength, endurance, ROM, flexibility, and posture x 10 min including: aps, walking, avoid dependency and immobility, support of muscle pump with compression and activity.  Performed assisted GSS, towel assist heel slides with HF and KF, towel assist hamstring flossing, towel assist HF  Pt wants to resume Yoga - offered basic suggestions on chair or bed motions.  Pt admits he would like to lay on floor - discussed recent TSA and concern with B knee mobility for floor to stand transitions even if using chair or he  related gardening stool with arm handles.   Cueing on recommended stretching B  GSS - educated on movement for support, caution with L shoulder per protocol, Knee per Ortho.  THERAPEUTIC EXERCISES:  Continue HEP of AROM, stretching, and postural correction.      Home Exercises Provided and Patient Education Provided:  Self Care Home Management Training/Functional Therapeutic Activity x 5 minutes    Product review and choices  Discussed obstacles with reach and self application.  Demo of clinic products  Velcro Inelastic Wraps  KH garments  Don assist devices    Methods to secure with online sites vs DME vendors. Will consider product selection and ordering process.    Review of home pump systems and consult per MD - may send therapy documentation to vendor if has name. MD virtual visit 6/26/24.    Don with friend assist at this time, able to doff in evenings.   Will consider products, recommendations as well as modifications in choice of product and request appropriate orders as needed.   Presently has orders for Knee High Compression 20-30mmHg - review of process of orders, to DME for fitting, sizing per vendor and Insurance billing for cost/coverage -     trained on don/doff and methods to apply present socks with assistance of gloves, assist device or family member/friend.  Position and fit with appropriate girth, length and support are required.   Daytime use with removal for sleep.   Wear schedules and wash schedules confirmed.     Present compression:  zipper KH, KH 20-30mmHg socks  Orders and recommendations: KH 20-30mmHg and/or Velcro Inelastic Wraps  PATIENT/FAMILY Education: bandaging/compression wear schedule,  HEP,  Beginning of self massage,  Self or assisted bandaging, compression options, and Risk reduction    Written Home Exercises Provided: Patient instructed to cont prior HEP.  Home exercise and compression plan of management was reviewed and Shannan was able to demonstrate understanding prior to the end of the session.  Shannan demonstrated good  understanding of the education provided.     Assessment   Shannan is a 78 y.o. male referred to outpatient Physical Therapy with a medical diagnosis of   Diagnosis   I87.2 (ICD-10-CM) - Venous insufficiency   I89.0  (ICD-10-CM) - Lymphedema   This patient presents s/p multi year history of venous reflux CVI, tall stature, OA/DJD shoulder, knees, spine, history of skin cancers with dermatology following, recent L RTSA which may limit ability to pull on compression with B UE  resulting in: multifactorial venous related lymphedema of the B LE, increased pain, increased stiffness in the ankles, knees, tall  stature with history of vein stripping, as well as difficulty performing walking, sit to stand, reach for compression application, size/shape/length of compression needs, and placing the pt at higher risk of infection.     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 Therapy recommends elevation, exercise, regular use of compression, and therapy.  Pt has been participating in therapy and use of compression although edema persists.   Modifications to allow compliance with compression due to reach and mobility issues.      Noted venous varicosities and long standing changes to B LE.  Pt has been using lite compression socks with success due to ability to wear, tolerate and remove - presently a friend has to apply.  Velcro Inelastic Wraps may allow more support, greater ease of application and longer leg coverage. Coverage of knees and thighs is limited at this time by his mobility and compression choices.     Shannan Is progressing well towards his goals.   Pt prognosis is Good.     Pt will continue to benefit from skilled outpatient physical therapy to address the deficits listed in the problem list box on initial evaluation, provide pt/family education and to maximize pt's level of independence in the home and community environment.     Pt's spiritual, cultural and educational needs considered and pt agreeable to plan of care and goals.  Anticipated Barriers for therapy: CKD, sh TSA, ability to reach feet, knee pain     The following goals were discussed with the patient and patient is in agreement with them  as to be addressed in the treatment plan.      Short Term Goals: (6 weeks)  1. Patient will show decreased girth in B LE by up to 2 cm to allow for LE symmetry, shoe and clothing choice, and ability to apply needed compression.  (progressing, met  RLE   2. Patient will demonstrate 100% knowledge of lymphedema precautions and signs of infection to allow for reduced lymphedema risk, infection risk, and/or exacerbation of condition. met)  3. Patient or caregiver will perform self-bandaging techniques and/or wearing of compression garments to allow for lymphatic drainage support, skin elasticity, and reduction in shape and size of limb. (progressing, R  4. Patient will perform self lymph drainage techniques to areas within reach to enhance lymphatic drainage and skin condition.  (progressing, not met)  5. Patient will tolerate daily activities with multilayered bandaging to allow for lymphatic and venous support.  (progressing, R    Long Term Goals: (12  weeks)  1. Patient will show decreased girth in B LE by up to 3 cm  to allow for LE symmetry, shoe and clothing choice, and ability to apply needed compression daily.  (progressing,   2. Patient will show reduction in density to mild or less with improved contour of limb to allow for cosmesis, LE symmetry, infection risk reduction, and clothing and compression choice.   (progressing,  3. Patient to rowan/doff compression garment with daily compliance to assist in lymphedema management, skin elasticity, and tissue density.  (progressing,   4. Pt to show improved postural awareness and alignment.  (progressing, not met)  5. Pt to be I and compliant with HEP to allow for increased function in affected limb.   (progressing, not met)  Plan   Plan of care Certification: 5/31/2024 to 8/23/24.     Outpatient Physical Therapy 2 times weekly for 10 weeks to include the following interventions: Patient Education, Self Care, Therapeutic Activities, and Therapeutic Exercise. Complete  Decongestive Therapy- compression and home equipment needs to be addressed and assisted.     Geneva Owens, PT

## 2024-06-22 LAB
OHS CV AF BURDEN PERCENT: < 1
OHS CV DC REMOTE DEVICE TYPE: NORMAL
OHS CV ICD SHOCK: NO
OHS CV RV PACING PERCENT: 9.1 %

## 2024-06-24 ENCOUNTER — CLINICAL SUPPORT (OUTPATIENT)
Dept: REHABILITATION | Facility: HOSPITAL | Age: 79
End: 2024-06-24
Payer: MEDICARE

## 2024-06-24 DIAGNOSIS — I89.0 LYMPHEDEMA OF BOTH LOWER EXTREMITIES: ICD-10-CM

## 2024-06-24 DIAGNOSIS — M79.89 SWELLING OF LOWER EXTREMITY: ICD-10-CM

## 2024-06-24 DIAGNOSIS — I87.2 VENOUS REFLUX: ICD-10-CM

## 2024-06-24 DIAGNOSIS — I87.2 VENOUS INSUFFICIENCY OF BOTH LOWER EXTREMITIES: Primary | ICD-10-CM

## 2024-06-24 PROCEDURE — 97016 VASOPNEUMATIC DEVICE THERAPY: CPT | Mod: KX

## 2024-06-24 PROCEDURE — 97535 SELF CARE MNGMENT TRAINING: CPT | Mod: KX

## 2024-06-24 PROCEDURE — 29581 APPL MULTLAYER CMPRN SYS LEG: CPT | Mod: KX

## 2024-06-24 PROCEDURE — 97140 MANUAL THERAPY 1/> REGIONS: CPT | Mod: KX

## 2024-06-24 NOTE — PROGRESS NOTES
Physical Therapy Daily Treatment Note     Name: Shannan Norman  Clinic Number: [id number]    Therapy Diagnosis:   Encounter Diagnoses   Name Primary?    Venous insufficiency of both lower extremities Yes    Venous reflux     Swelling of lower extremity     Lymphedema of both lower extremities      Physician: Martell Alonso MD    Visit Date: 6/24/2024    Physician Orders: PT Eval and Treat - lymphedema  Medical Diagnosis from Referral:   Diagnosis   I87.2 (ICD-10-CM) - Venous insufficiency   I89.0 (ICD-10-CM) - Lymphedema   Evaluation Date: 5/31/2024  Authorization Period Expiration: 12/31/24  Plan of Care Expiration: 8/23/24  Visit # / Visits authorized: 7/20     Time In: 905a  Time Out: 1015a  Total Billable Time: 70 minutes  Pump    FOTO 6/24/24    Per chart:  Impression:   No DVT in either thigh.   History of surgical stripping and EVLT to the greater saphenous veins bilaterally.   Venous insufficiency involving accessory greater saphenous veins bilaterally, as well as the left popliteal vein.   Enlarged but morphologically normal lymph node in the right groin, likely reactive.   Electronically signed by:Angelo Carrizales  Date:                                            04/26/2024       Precautions: Standard and R TSA, GSV vein stripping  pacemaker, CKD 3  Subjective     Pt reports: admits his dog had chewed some bandaging prior - did return with all products.    He was compliant with home compression/exercise program.  Response to previous treatment: pt would like to proceed with KH and Velcro Inelastic Wrap products via order. Pt will also have virtual visit with MD 6/26/24 for home pump considerations. Pt feels his legs have gone down and are staying smaller.   Functional change: performing some of the new  stretches for his LE - will bring phone book in case chairs are  too low with outings.   Swelling is not tr  Pain: 2-3/10  Location: bilateral knee joints more than leg swelling concerns, L > R       Objective   Tall male with KH 20-30mmHg L/XL compression socks- slightly low or short on R LE, slip on loafers.   Rocking and momentum to stand from typical chair with arms  Amount of Swelling/Location of Swelling: mod to B LE ankles legs, knees and moving into thighs R > L  Skin Integrity: dry scaly skin, venous varicosities, bulges, broken veins - marked varicosities med thighs  Palpation/Texture: discoloration with brown/red discoloration distally including post calf to knee, mild warmth  discoloration along R post leg > L - similar temperature and presentation reported. No added pain or heat to touch.    Girth Measurements (in centimeters)  LANDMARK LEFT LE  5/31/24 RIGHT LE  5/31/24 R LE  6/10/24 Change  R LE DIFF   at eval   SBP 49.5 cm 50.0 cm 47.0 3.0 0.5 cm   10 below SBP 45.0 cm 45.5 cm 44.0 1.5 0.5 cm   20 below SBP 42.5 cm 46.0 cm 41.0 5.0 3.5 cm   30 below SBP 38.0 cm 37.0 cm 31.0 6.0 1.0 cm   35 below SBP 32.0 cm 32.0 cm 27.0 5.0 0 cm   Ankle 30.5 cm 33.0 cm 28.0 5.0 2.5 cm   Forefoot 24.0 cm 26.0 cm 24.0 2.0 2.0 cm     + B Stemmer Sign  - B Nancy's Sign  Circulation: intact   Required head of bed elevated, pillow behind lower back  Required towel roll or support under B knees, no elevation wedge     Treatment:   Shannan received the following manual therapy techniques:- Manual Lymphatic Drainage were applied to the: R LE  for 45 minutes, including: MLD and short stretch compression bandaging   Sock was adjusted, remained intact to L LE     MANUAL LYMPHATIC DRAINAGE (MLD):    While supine with LEs elevated stimulation at terminus, along GI region, B inguinal regions, drainage of entire R LE [place] lower leg, ankle, and foot with return proximally,  Use of Aquaphor/Eucerin due to dryness.   Varied support for knee position  Consider self massage to abdominal areas, B inguinal areas, thigh, and remaining LE within reach.    SEQUENTIAL COMPRESSION PUMP: full leg sleeve applied to R LE  VES 5200 with default  setting with distal pressures starting at 45mmHg entire LE   Simultaneous to compression supplies, compression education and training, and self management.    MULTILAYERED BANDAGING:  issued supplies, bandaged R LE with cotton stockinette, cotton padding ankle, lower leg, Komprex foam section dorsum of foot, Komprex foam 1 roll ankle to knee, 1-8cm and 2- 10cm Rosidal K rolls foot to knee, to leave intact 12-24 hrs as tolerated, discontinue with any problems, return rolled bandages next session.   Wash and wear schedules confirmed.   Modified for foot due to slip on loafer style shoe - advised preferred shoe over flip flops sandals, however with difficulty to apply and mild push of bandaging.  Used shoe horn and assistance for B Shoes.   Monitor shoe fit and comfort closely.    Shannan received therapeutic exercises to develop strength, endurance, ROM, flexibility, and posture including: aps, walking, avoid dependency and immobility, support of muscle pump with compression and activity.  Review of assisted GSS, towel assist heel slides with HF and KF, towel assist hamstring flossing, towel assist HF  Cueing on recommended stretching B  GSS - educated on movement for support, caution with L shoulder per protocol, Knee per Ortho.  THERAPEUTIC EXERCISES:  Continue HEP of AROM, stretching, and postural correction.     Home Exercises Provided and Patient Education Provided:  Self Care Home Management Training/Functional Therapeutic Activity x 15 minutes    Product review and choices  Discussed obstacles with reach and self application.  Demo of clinic products  Velcro Inelastic Wraps  KH garments  Don assist devices    Sent order request to MD for KH garments and Velcro Inelastic Wraps  Will consider therapy process and consult to [employer] for these products once orders received.    Jobst Relief KH 20-30mmHg  Ankle 29.0 cm  Calf  45.0 cm  Length tall   Due to efforts to apply and tall length recommending XLFC or XL sizing      Circ Aid Juxta Lite  C1 43.5  C  41.5  B 28.0  A  25.0  Length Tall   Recommend Large Long     Methods to secure with online sites vs DME vendors. Product selection and ordering process.  Review of home pump systems and consult per MD - will send therapy documentation to vendor if has name. MD virtual visit 6/26/24.    Don with friend assist at this time, able to doff in evenings.   Don assist device has not proven effective for him to self apply- Velcro Inelastic Wraps may be easier choice.   Products, recommendations as well as modifications in choice of product and request orders as needed.   Presently has orders for Knee High Compression 20-30mmHg - review of process of orders, to DME for fitting, sizing per vendor and Insurance billing for cost/coverage -     trained on don/doff and methods to apply present socks with assistance of gloves, assist device or family member/friend.  Position and fit with appropriate girth, length and support are required.   Daytime use with removal for sleep.   Wear schedules and wash schedules confirmed.     Present compression:  zipper KH, KH 20-30mmHg socks  Orders and recommendations: KH 20-30mmHg and/or Velcro Inelastic Wraps  PATIENT/FAMILY Education: bandaging/compression wear schedule,  HEP,  Beginning of self massage,  Self or assisted bandaging, compression options, and Risk reduction    Written Home Exercises Provided: Patient instructed to cont prior HEP.  Home exercise and compression plan of management was reviewed and Shannan was able to demonstrate understanding prior to the end of the session.  Shannan demonstrated good  understanding of the education provided.     Assessment   Shannan is a 78 y.o. male referred to outpatient Physical Therapy with a medical diagnosis of   Diagnosis   I87.2 (ICD-10-CM) - Venous insufficiency   I89.0 (ICD-10-CM) - Lymphedema   This patient presents s/p multi year history of venous reflux CVI, tall stature, OA/DJD shoulder, knees, spine,  history of skin cancers with dermatology following, recent L RTSA which may limit ability to pull on compression with B UE  resulting in: multifactorial venous related lymphedema of the B LE, increased pain, increased stiffness in the ankles, knees, tall  stature with history of vein stripping, as well as difficulty performing walking, sit to stand, reach for compression application, size/shape/length of compression needs, and placing the pt at higher risk of infection.     B LE continue to show reductions and maintenance of size/shape with compression daily.  Pt has limitations in B SH, hips and knees that complicate his mobility and ability to self apply his compression choices.  Venous insufficiency and dependant edema/swelling persist despite  elevation, exercise, regular use of compression, and therapy.  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   Modifications in choice or selection of products to allow compliance with compression due to reach and mobility issues.   Noted thigh and LE venous varicosities with difficulty supporting full leg compression and don/doff needs.   Pt remains compliant with recommendations.     Shannan Is progressing well towards his goals.   Pt prognosis is Good.     Pt will continue to benefit from skilled outpatient physical therapy to address the deficits listed in the problem list box on initial evaluation, provide pt/family education and to maximize pt's level of independence in the home and community environment.     Pt's spiritual, cultural and educational needs considered and pt agreeable to plan of care and goals.  Anticipated Barriers for therapy: CKD, sh TSA, ability to reach feet, knee pain     The following goals were discussed with the patient and patient is in agreement with them as to be addressed in the treatment plan.      Short Term Goals: (6 weeks)  1. Patient will show decreased girth in B LE by up to 2 cm to allow for LE symmetry, shoe and  clothing choice, and ability to apply needed compression.  met  RLE   2. Patient will demonstrate 100% knowledge of lymphedema precautions and signs of infection to allow for reduced lymphedema risk, infection risk, and/or exacerbation of condition. met)  3. Patient or caregiver will perform self-bandaging techniques and/or wearing of compression garments to allow for lymphatic drainage support, skin elasticity, and reduction in shape and size of limb. (progressing, R- wearing socks daily  4. Patient will perform self lymph drainage techniques to areas within reach to enhance lymphatic drainage and skin condition.  (progressing  5. Patient will tolerate daily activities with multilayered bandaging to allow for lymphatic and venous support.  Met R    Long Term Goals: (12  weeks)  1. Patient will show decreased girth in B LE by up to 3 cm  to allow for LE symmetry, shoe and clothing choice, and ability to apply needed compression daily.  (progressing, met in some areas R  2. Patient will show reduction in density to mild or less with improved contour of limb to allow for cosmesis, LE symmetry, infection risk reduction, and clothing and compression choice.   (progressing, some pitting remains  3. Patient to rowan/doff compression garment with daily compliance to assist in lymphedema management, skin elasticity, and tissue density.  (progressing, wearing socks  4. Pt to show improved postural awareness and alignment.  (progressing,  5. Pt to be I and compliant with HEP to allow for increased function in affected limb.   (progressing, ongoing  Plan   Plan of care Certification: 5/31/2024 to 8/23/24.     Outpatient Physical Therapy 2 times weekly for 10 weeks to include the following interventions: Patient Education, Self Care, Therapeutic Activities, and Therapeutic Exercise. Complete Decongestive Therapy- compression and home equipment needs to be addressed and assisted.     Geneva Owens, PT

## 2024-06-26 ENCOUNTER — PATIENT MESSAGE (OUTPATIENT)
Dept: OPTOMETRY | Facility: CLINIC | Age: 79
End: 2024-06-26
Payer: MEDICARE

## 2024-06-26 ENCOUNTER — OFFICE VISIT (OUTPATIENT)
Dept: VASCULAR SURGERY | Facility: CLINIC | Age: 79
End: 2024-06-26
Payer: MEDICARE

## 2024-06-26 ENCOUNTER — CLINICAL SUPPORT (OUTPATIENT)
Dept: REHABILITATION | Facility: HOSPITAL | Age: 79
End: 2024-06-26
Payer: MEDICARE

## 2024-06-26 DIAGNOSIS — I87.2 VENOUS INSUFFICIENCY: Primary | ICD-10-CM

## 2024-06-26 DIAGNOSIS — I87.2 VENOUS INSUFFICIENCY OF BOTH LOWER EXTREMITIES: Primary | ICD-10-CM

## 2024-06-26 DIAGNOSIS — I89.0 LYMPHEDEMA: ICD-10-CM

## 2024-06-26 DIAGNOSIS — I87.2 VENOUS REFLUX: ICD-10-CM

## 2024-06-26 DIAGNOSIS — M79.89 SWELLING OF LOWER EXTREMITY: ICD-10-CM

## 2024-06-26 DIAGNOSIS — I89.0 LYMPHEDEMA OF BOTH LOWER EXTREMITIES: ICD-10-CM

## 2024-06-26 PROCEDURE — 29581 APPL MULTLAYER CMPRN SYS LEG: CPT | Mod: KX

## 2024-06-26 PROCEDURE — 97016 VASOPNEUMATIC DEVICE THERAPY: CPT | Mod: KX

## 2024-06-26 PROCEDURE — 97140 MANUAL THERAPY 1/> REGIONS: CPT | Mod: KX

## 2024-06-26 PROCEDURE — 99442 PR PHYSICIAN TELEPHONE EVALUATION 11-20 MIN: CPT | Mod: 95,,, | Performed by: SURGERY

## 2024-06-26 NOTE — PROGRESS NOTES
Physical Therapy Daily Treatment Note     Name: Shannan Norman  Clinic Number: [id number]    Therapy Diagnosis:   Encounter Diagnoses   Name Primary?    Venous insufficiency of both lower extremities Yes    Venous reflux     Swelling of lower extremity     Lymphedema of both lower extremities      Physician: Martell Alonso MD    Visit Date: 6/26/2024    Physician Orders: PT Eval and Treat - lymphedema  Medical Diagnosis from Referral:   Diagnosis   I87.2 (ICD-10-CM) - Venous insufficiency   I89.0 (ICD-10-CM) - Lymphedema   Evaluation Date: 5/31/2024  Authorization Period Expiration: 12/31/24  Plan of Care Expiration: 8/23/24  Visit # / Visits authorized: 8/20     Time In: 1105a  Time Out: 1210a  Total Billable Time: 65 minutes  Pump    FOTO 6/24/24    Per chart:  Impression:   No DVT in either thigh.   History of surgical stripping and EVLT to the greater saphenous veins bilaterally.   Venous insufficiency involving accessory greater saphenous veins bilaterally, as well as the left popliteal vein.   Enlarged but morphologically normal lymph node in the right groin, likely reactive.   Electronically signed by:Angelo Carrizales  Date:                                            04/26/2024       Precautions: Standard and R TSA, GSV vein stripping  pacemaker, CKD 3  Subjective     Pt reports: had virtual visit with Dr. Alonso - they will proceed with pump consult - does not know name of vendor.   He was compliant with home compression/exercise program.  Response to previous treatment: pt questions ability to self apply home pump systems and choices of compression.  Pt admits and discussed with MD - moving fluid with urination and need to remain hydrated. Pt continues to wear KH compression socks daily.   Functional change: knees often feel stiff in mornings.  Pt chooses slip on shoes for his ease of application.   Pain: 2-3/10  Location: bilateral knee joints more than leg swelling concerns, L > R      Objective    Tall male with KH 20-30mmHg L/XL compression socks- slip on loafers.   Rocking and momentum to stand from typical chair with arms  Amount of Swelling/Location of Swelling: mild/mod to B LE ankles legs, knees and moving into thighs R > L, slight fullness above sock on R near  knee  Skin Integrity: dry scaly skin, venous varicosities, bulges, broken veins - marked varicosities med thighs  Palpation/Texture: discoloration with brown/red discoloration distally including post calf to knee. discoloration along R post leg > L - similar temperature and presentation reported. No added pain or heat to touch.    Girth Measurements (in centimeters)  LANDMARK LEFT LE  5/31/24 RIGHT LE  5/31/24 R LE  6/10/24 Change  R LE DIFF   at eval   SBP 49.5 cm 50.0 cm 47.0 3.0 0.5 cm   10 below SBP 45.0 cm 45.5 cm 44.0 1.5 0.5 cm   20 below SBP 42.5 cm 46.0 cm 41.0 5.0 3.5 cm   30 below SBP 38.0 cm 37.0 cm 31.0 6.0 1.0 cm   35 below SBP 32.0 cm 32.0 cm 27.0 5.0 0 cm   Ankle 30.5 cm 33.0 cm 28.0 5.0 2.5 cm   Forefoot 24.0 cm 26.0 cm 24.0 2.0 2.0 cm     Required head of bed elevated, pillow behind lower back  no elevation wedge     Treatment:   Shannan received the following manual therapy techniques:- Manual Lymphatic Drainage were applied to the: R LE  for 45 minutes, including: MLD and short stretch compression bandaging   Sock was adjusted, remained intact to L LE     MANUAL LYMPHATIC DRAINAGE (MLD):    While supine with LEs elevated stimulation at terminus, along GI region, B inguinal regions, drainage of entire R LE [place] lower leg, ankle, and foot with return proximally,  Use of Aquaphor/Eucerin due to dryness.   Varied support for knee position  Consider self massage to abdominal areas, B inguinal areas, thigh, and remaining LE within reach.    SEQUENTIAL COMPRESSION PUMP: full leg sleeve applied to R LE  VES 5200 with default setting with distal pressures starting at 45mmHg entire LE   Simultaneous to compression supplies, compression  education and training, and self management.    MULTILAYERED BANDAGING:  issued supplies, bandaged R LE with cotton stockinette, cotton padding ankle, lower leg, Komprex foam section dorsum of foot, Komprex foam 1 roll ankle to knee, 1-8cm and 2- 10cm Rosidal K rolls foot to knee, to leave intact 12-24 hrs as tolerated, discontinue with any problems, return rolled bandages next session.   Wash and wear schedules confirmed.   Modified for foot due to slip on loafer style shoe - advised preferred shoe over flip flops sandals, however with difficulty to apply and mild push of bandaging.  Used shoe horn and assistance for B Shoes.   Monitor shoe fit and comfort closely.    Shannan received therapeutic exercises to develop strength, endurance, ROM, flexibility, and posture including: aps, walking, avoid dependency and immobility, support of muscle pump with compression and activity.  Review of assisted GSS, towel assist heel slides with HF and KF, towel assist hamstring flossing, towel assist HF  Cueing on recommended stretching B  GSS - educated on movement for support, caution with L shoulder per protocol, Knee per Ortho.  THERAPEUTIC EXERCISES:  Continue HEP of AROM, stretching, and postural correction.     Home Exercises Provided and Patient Education Provided:  Self Care Home Management Training/Functional Therapeutic Activity x 5 minutes    Product review and choices  Discussed obstacles with reach and self application.  Demo of potential pump products and methods to self apply leg sleeves.     Sent order request to MD staff for KH garments and Velcro Inelastic Wraps 6/24/24 and 6/26/24  Options for local DME or online choice with consult to [employer] for these products once orders received.    Fitting 6/24/24  Jobst Relief KH 20-30mmHg  Ankle 29.0 cm  Calf  45.0 cm  Length tall   Due to efforts to apply and tall length recommending XLFC or XL sizing     Circ Aid Juxta Lite  C1 43.5  C  41.5  B 28.0  A  25.0  Length Tall    Recommend Large Long     Don with friend assist at this time, able to doff in evenings.   Don assist device has not proven effective for him to self apply- Velcro Inelastic Wraps may be easier choice.   Products, recommendations as well as modifications in choice of product and request orders as needed.   Presently has orders for Knee High Compression 20-30mmHg - review of process of orders, to DME for fitting, sizing per vendor and Insurance billing for cost/coverage -     Present compression:  zipper KH, KH 20-30mmHg socks  Orders and recommendations: KH 20-30mmHg and/or Velcro Inelastic Wraps, MD home pump consult  PATIENT/FAMILY Education: bandaging/compression wear schedule,  HEP,  Beginning of self massage,  Self or assisted bandaging, compression options, and Risk reduction    Written Home Exercises Provided: Patient instructed to cont prior HEP.  Home exercise and compression plan of management was reviewed and Shannan was able to demonstrate understanding prior to the end of the session.  Shannan demonstrated good  understanding of the education provided.     Assessment   Shannan is a 78 y.o. male referred to outpatient Physical Therapy with a medical diagnosis of   Diagnosis   I87.2 (ICD-10-CM) - Venous insufficiency   I89.0 (ICD-10-CM) - Lymphedema   This patient presents s/p multi year history of venous reflux CVI, tall stature, OA/DJD shoulder, knees, spine, history of skin cancers with dermatology following, recent L RTSA which may limit ability to pull on compression with B UE  resulting in: multifactorial venous related lymphedema of the B LE, increased pain, increased stiffness in the ankles, knees, tall  stature with history of vein stripping, as well as difficulty performing walking, sit to stand, reach for compression application, size/shape/length of compression needs, and placing the pt at higher risk of infection.     Chronic venous insufficiency with lymphedema changes to B LE R > L showing fairly good  support with use of lite compression although pt is requiring assistance to apply.  Hoping to find products and systems that allow self application with compression support that is recommended.  Pt would benefit from a home compression pump system and new compression choices to support his B LE. CVI changes of hyperpigmentation, varicose veins and discoloration remain present although showing slight improvements R more involved than L.   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 Pt remains compliant with therapy, exercise, compression and elevation however edema persists.  Joint considerations knees, hips and shoulders require modifications in choice or selection of products to allow compliance with compression due to reach and mobility issues.     Shannan Is progressing well towards his goals.   Pt prognosis is Good.     Pt will continue to benefit from skilled outpatient physical therapy to address the deficits listed in the problem list box on initial evaluation, provide pt/family education and to maximize pt's level of independence in the home and community environment.     Pt's spiritual, cultural and educational needs considered and pt agreeable to plan of care and goals.  Anticipated Barriers for therapy: CKD, sh TSA, ability to reach feet, knee pain     The following goals were discussed with the patient and patient is in agreement with them as to be addressed in the treatment plan.      Short Term Goals: (6 weeks)  1. Patient will show decreased girth in B LE by up to 2 cm to allow for LE symmetry, shoe and clothing choice, and ability to apply needed compression.  met  RLE   2. Patient will demonstrate 100% knowledge of lymphedema precautions and signs of infection to allow for reduced lymphedema risk, infection risk, and/or exacerbation of condition. met)  3. Patient or caregiver will perform self-bandaging techniques and/or wearing of compression garments to allow for lymphatic  drainage support, skin elasticity, and reduction in shape and size of limb. (progressing, R- wearing socks daily  4. Patient will perform self lymph drainage techniques to areas within reach to enhance lymphatic drainage and skin condition.  (progressing  5. Patient will tolerate daily activities with multilayered bandaging to allow for lymphatic and venous support.  Met R    Long Term Goals: (12  weeks)  1. Patient will show decreased girth in B LE by up to 3 cm  to allow for LE symmetry, shoe and clothing choice, and ability to apply needed compression daily.  (progressing, met in some areas R  2. Patient will show reduction in density to mild or less with improved contour of limb to allow for cosmesis, LE symmetry, infection risk reduction, and clothing and compression choice.   (progressing, some pitting remains  3. Patient to rowan/doff compression garment with daily compliance to assist in lymphedema management, skin elasticity, and tissue density.  (progressing, wearing socks  4. Pt to show improved postural awareness and alignment.  (progressing,  5. Pt to be I and compliant with HEP to allow for increased function in affected limb.   (progressing, ongoing  Plan   Plan of care Certification: 5/31/2024 to 8/23/24.     Outpatient Physical Therapy 2 times weekly for 10 weeks to include the following interventions: Patient Education, Self Care, Therapeutic Activities, and Therapeutic Exercise. Complete Decongestive Therapy- compression and home equipment needs to be addressed and assisted.     Geneva Owens, PT

## 2024-06-26 NOTE — PROGRESS NOTES
Martell Alonso MD, RPVI                                 [facility]                           [facility]                             06/26/2024    HPI:  Shannan Norman is a 78 y.o. male with   Patient Active Problem List   Diagnosis    HTN (hypertension)    Gout, arthritis    Atrial flutter    PFO (patent foramen ovale)    Umbilical hernia    Asymptomatic varicose veins of both lower extremities    Nuclear sclerosis of both eyes    Left epiretinal membrane    Junctional escape rhythm    SSS (sick sinus syndrome)    Nonrheumatic aortic valve insufficiency    Benign prostatic hyperplasia with urinary frequency    Junctional bradycardia    Dyslipidemia associated with type 2 diabetes mellitus    Hypertension associated with diabetes    Right inguinal hernia    CKD (chronic kidney disease) stage 3, GFR 30-59 ml/min    Overweight (BMI 25.0-29.9)    Nuclear sclerotic cataract of right eye    Nuclear sclerotic cataract of left eye    AK (actinic keratosis)    History of melanoma    Decreased range of hip movement    Decreased strength    Pain of left upper extremity    Weakness    Stiffness due to immobility    Pelvic floor dysfunction    Basal cell carcinoma (BCC) of skin of left lower extremity including hip    PAF (paroxysmal atrial fibrillation)    Right hip pain    History of skin cancer    OAB (overactive bladder)    Presence of cardiac pacemaker    Familial hypercholesterolemia    Bradycardia    Polymyalgia rheumatica    Glenohumeral arthritis, left    Chronic anticoagulation    S/P ablation of atrial flutter    Venous reflux    Swelling of lower extremity    Venous insufficiency of both lower extremities    Lymphedema of both lower extremities    being managed by PCP and specialists who is here today for evaluation of BLE edema.  Patient states location is BLE occurring for yrs.  Associated signs and symptoms include pain.  Quality is heavy and severity is 7/10.  Symptoms began  Impression: Presence of intraocular lens ; Bilateral Plan: Clear, well positioned PC IOLs. yrs ago.  Alleviating factors include elevation.  Worsening factors include dependency.  Patient has been wearing compression stockings for greater than 3 months.  Symptoms do limit patient's functional status and daily activities.  no DVT history.  + GSV vein stripping past venous interventions.  no low sodium diet.  no excessive water intake.    Migraine with aura: no  PFO/ASD/right to left shunt: no  Pregnant: no  Breastfeeding: no    no MI  no Stroke  Tobacco use: denies    The patient location is: home  The chief complaint leading to consultation is: BLE edema    Visit type: audiovisual    Face to Face time with patient:   12 minutes of total time spent on the encounter, which includes face to face time and non-face to face time preparing to see the patient (eg, review of tests), Obtaining and/or reviewing separately obtained history, Documenting clinical information in the electronic or other health record, Independently interpreting results (not separately reported) and communicating results to the patient/family/caregiver, or Care coordination (not separately reported).         Each patient to whom he or she provides medical services by telemedicine is:  (1) informed of the relationship between the physician and patient and the respective role of any other health care provider with respect to management of the patient; and (2) notified that he or she may decline to receive medical services by telemedicine and may withdraw from such care at any time.    Notes:     6/2024:  c/o BLE muscle tightness, +hydration.      Past Medical History:   Diagnosis Date    Allergy     Arthritis     Atrial fibrillation     Atrial flutter 2011    ablation    Basal cell carcinoma     Cancer     Cataract     CKD (chronic kidney disease) stage 3, GFR 30-59 ml/min 8/13/2019    Diabetes mellitus     Dry eye syndrome     Gout, unspecified     High cholesterol     History of shingles     Hypertension     Melanoma     Seizures       Past Surgical History:   Procedure Laterality Date    ABLATION N/A 9/11/2018    Procedure: ABLATION;  Surgeon: Hany Sanchez MD;  Location: [facility] CATH LAB;  Service: Cardiology;  Laterality: N/A;  AFL, ISABELLE, RFA, ELODIA, MAC, DM, 3 PREP    ABLATION OF DYSRHYTHMIC FOCUS      ADENOIDECTOMY      CARDIAC PACEMAKER PLACEMENT      no defib    CATARACT EXTRACTION W/  INTRAOCULAR LENS IMPLANT Right 7/28/2020    Procedure: EXTRACTION, CATARACT, WITH IOL INSERTION;  Surgeon: Andrés Morse MD;  Location: [facility];  Service: Ophthalmology;  Laterality: Right;    CATARACT EXTRACTION W/  INTRAOCULAR LENS IMPLANT Left 8/11/2020    Procedure: EXTRACTION, CATARACT, WITH IOL INSERTION;  Surgeon: Andrés Morse MD;  Location: [facility];  Service: Ophthalmology;  Laterality: Left;    COLONOSCOPY N/A 1/2/2019    Procedure: COLONOSCOPY Suprep;  Surgeon: Cindy Solorzano MD;  Location: [facility] ENDO;  Service: Endoscopy;  Laterality: N/A;    FIXATION OF TENDON Left 3/4/2024    Procedure: BICEPS TENODESIS;  Surgeon: Quinten Dominique MD;  [address];  Service: Orthopedics;  Laterality: Left;  WITH CATHETER    GANGLION CYST EXCISION      left neck    HERNIA REPAIR  2013    umbilical    REVERSE TOTAL SHOULDER ARTHROPLASTY Left 3/4/2024    Procedure: ARTHROPLASTY, SHOULDER, TOTAL, REVERSE;  Surgeon: Quinten Dominique MD;  Location: [address] FLR;  Service: Orthopedics;  Laterality: Left;  WITH CATHETER    TENDON REPAIR Right     wrist    TONSILLECTOMY      varicose veins      several sx  bilateral    VASECTOMY      VEIN SURGERY       Family History   Problem Relation Name Age of Onset    Diabetes Mother      COPD Father      Heart disease Father      Arthritis Sister Argenis     Heart attack Brother      Heart disease Brother      Diabetes Brother      No Known Problems Maternal Aunt      No Known Problems Maternal Uncle      No Known Problems Paternal Aunt      No Known Problems Paternal Uncle      No Known  Problems Maternal Grandmother      No Known Problems Maternal Grandfather      No Known Problems Paternal Grandmother      No Known Problems Paternal Grandfather      No Known Problems Son Shannan     Cancer Sister Tianna         lymph node, breast    No Known Problems Sister Aaliyah     No Known Problems Son Nimesh     Amblyopia Neg Hx      Blindness Neg Hx      Cataracts Neg Hx      Glaucoma Neg Hx      Hypertension Neg Hx      Macular degeneration Neg Hx      Retinal detachment Neg Hx      Strabismus Neg Hx      Stroke Neg Hx      Thyroid disease Neg Hx      Prostate cancer Neg Hx      Kidney disease Neg Hx      Melanoma Neg Hx       Social History     Socioeconomic History    Marital status:    Occupational History     Employer: [employer]   Tobacco Use    Smoking status: Never    Smokeless tobacco: Never   Substance and Sexual Activity    Alcohol use: Yes     Alcohol/week: 7.0 - 14.0 standard drinks of alcohol     Types: 7 - 14 Glasses of wine per week    Drug use: No    Sexual activity: Yes     Partners: Female     Social Determinants of Health     Financial Resource Strain: Low Risk  (1/25/2024)    Overall Financial Resource Strain (CARDIA)     Difficulty of Paying Living Expenses: Not hard at all   Food Insecurity: No Food Insecurity (1/25/2024)    Hunger Vital Sign     Worried About Running Out of Food in the Last Year: Never true     Ran Out of Food in the Last Year: Never true   Transportation Needs: No Transportation Needs (1/25/2024)    PRAPARE - Transportation     Lack of Transportation (Medical): No     Lack of Transportation (Non-Medical): No   Physical Activity: Inactive (1/25/2024)    Exercise Vital Sign     Days of Exercise per Week: 0 days     Minutes of Exercise per Session: 30 min   Stress: No Stress Concern Present (1/25/2024)    [employer] - Occupational Stress Questionnaire     Feeling of Stress : Only a little   Housing Stability: Low Risk  (1/25/2024)     Housing Stability Vital Sign     Unable to Pay for Housing in the Last Year: No     Number of Places Lived in the Last Year: 1     Unstable Housing in the Last Year: No       Current Outpatient Medications:     allopurinoL (ZYLOPRIM) 100 MG tablet, Take 1 tablet by mouth once daily (Patient taking differently: Take by mouth nightly. Take 1 tablet by mouth once daily), Disp: 90 tablet, Rfl: 3    atorvastatin (LIPITOR) 40 MG tablet, Take 1 tablet (40 mg total) by mouth once daily. (Patient taking differently: Take 40 mg by mouth every evening.), Disp: 90 tablet, Rfl: 3    benazepriL (LOTENSIN) 20 MG tablet, Take 1 tablet (20 mg total) by mouth once daily. (Patient taking differently: Take 20 mg by mouth every evening.), Disp: 90 tablet, Rfl: 3    celecoxib (CELEBREX) 200 MG capsule, Take 1 capsule (200 mg total) by mouth 2 (two) times daily with meals., Disp: 30 capsule, Rfl: 0    cycloSPORINE (RESTASIS) 0.05 % ophthalmic emulsion, Place 1 drop into both eyes 2 (two) times daily., Disp: 60 each, Rfl: 11    econazole nitrate 1 % cream, Apply topically 2 (two) times daily., Disp: 85 g, Rfl: 3    ferrous sulfate (FEOSOL) 325 mg (65 mg iron) Tab tablet, Take 325 mg by mouth once daily., Disp: , Rfl:     GLUCOSAMINE HCL/CHONDR VASQUEZ A NA (GLUCOSAMINE-CHONDROITIN) 750-600 mg Tab, Take 2 tablets by mouth Daily., Disp: , Rfl:     imiquimod (ALDARA) 5 % cream, Apply to skin cancer on left leg 5 times weekly for 6 weeks, Disp: 24 packet, Rfl: 3    levocetirizine (XYZAL) 5 MG tablet, Take 1 tablet (5 mg total) by mouth every evening., Disp: 90 tablet, Rfl: 1    metoprolol tartrate (LOPRESSOR) 25 MG tablet, Take 1 tablet (25 mg total) by mouth 2 (two) times daily as needed (palpitations)., Disp: 45 tablet, Rfl: 0    modafiniL (PROVIGIL) 100 MG Tab, Take 1/4-1/2 tablet in AM as needed for excessive daytime fatigue, Disp: 60 tablet, Rfl: 5    multivitamin capsule, Take 1 capsule by mouth nightly. , Disp: , Rfl:     rivaroxaban  "(XARELTO) 20 mg Tab, Take 1 tablet (20 mg total) by mouth once daily., Disp: 90 tablet, Rfl: 3    tamsulosin (FLOMAX) 0.4 mg Cap, TAKE 1 CAPSULE BY MOUTH AFTER DINNER, Disp: 90 capsule, Rfl: 3    UNABLE TO FIND, Take by mouth every morning. 6000 hydrolyzes collagen, Disp: , Rfl:     vibegron 75 mg Tab, Take 1 tablet (75 mg total) by mouth once daily., Disp: 30 tablet, Rfl: 11    REVIEW OF SYSTEMS:  General: No fevers or chills; ENT: No sore throat; Allergy and Immunology: no persistent infections; Hematological and Lymphatic: No history of bleeding or easy bruising; Endocrine: negative; Respiratory: no cough, shortness of breath, or wheezing; Cardiovascular: no chest pain or dyspnea on exertion; Gastrointestinal: no abdominal pain/back, change in bowel habits, or bloody stools; Genito-Urinary: no dysuria, trouble voiding, or hematuria; Musculoskeletal: edema; Neurological: no TIA or stroke symptoms; Psychiatric: no nervousness, anxiety or depression.    PHYSICAL EXAM:                General appearance:  Alert, well-appearing, and in no distress.  Oriented to person, place, and time                    Neurological: Normal speech, no focal findings noted.  RLE with sensation to light touch, LLE with sensation to light touch.            Musculoskeletal: Digits/nail without cyanosis/clubbing.  Strength 5/5 BLE.                    Neck: Supple                  Chest:  No use of accessory muscles               Cardiac: Normal color            Abdomen: Soft, nontender, nondistended      Extremities:   BLE edema    Skin:  RLE no ulcer; LLE no ulcer      RLE no spider veins, LLE no spider veins      RLE no varicose veins, LLE no varicose veins    CEAP 3/3    VCSS 6    LAB RESULTS:  No results found for: "CBC"  Lab Results   Component Value Date    LABPROT 11.9 03/11/2023    INR 1.2 03/11/2023     Lab Results   Component Value Date     04/02/2024    K 4.8 04/02/2024     04/02/2024    CO2 23 04/02/2024    GLU 80 " 04/02/2024    BUN 17 04/02/2024    CREATININE 0.9 04/02/2024    CALCIUM 9.3 04/02/2024    ANIONGAP 10 04/02/2024    EGFRNONAA >60.0 07/07/2022     Lab Results   Component Value Date    WBC 7.24 03/05/2024    RBC 4.62 03/05/2024    HGB 13.3 (L) 03/05/2024    HCT 42.1 03/05/2024    MCV 91 03/05/2024    MCH 28.8 03/05/2024    MCHC 31.6 (L) 03/05/2024    RDW 16.6 (H) 03/05/2024     03/05/2024    MPV 11.4 03/05/2024    GRAN 5.6 03/05/2024    GRAN 78.0 (H) 03/05/2024    LYMPH 0.7 (L) 03/05/2024    LYMPH 9.8 (L) 03/05/2024    MONO 0.8 03/05/2024    MONO 11.3 03/05/2024    EOS 0.0 03/05/2024    BASO 0.01 03/05/2024    EOSINOPHIL 0.0 03/05/2024    BASOPHIL 0.1 03/05/2024    DIFFMETHOD Automated 03/05/2024     .  Lab Results   Component Value Date    HGBA1C 5.9 (H) 04/02/2024       IMAGING:  All pertinent imaging has been reviewed and interpreted independently.    Venous US 4/2024 Impression:  FINDINGS:  Evaluation for venous thrombosis     Bilateral thigh veins: The common femoral, femoral, popliteal, and deep femoral veins are patent and free of thrombus. The veins are normally compressible and have normal phasic flow and augmentation response.     Miscellaneous: Enlarged lymph node in the right groin measuring 1.3 cm short axis but demonstrating normal reniform shape and fatty hilum.     Evaluation for venous insufficiency     Reported history of surgical stripping and EVLT to the greater saphenous veins bilaterally.     Right lower extremity:     Elevated reflux time within an accessory greater saphenous vein measuring 992 millisecond at the knee.     The maximum diameter in the right accessory greater saphenous vein is 10.7 mm at the knee.     The maximum diameter in the right small saphenous vein is 8.5 mm.     Left lower extremity:     Elevated reflux time in the left popliteal vein measuring 1256 millisecond.     Elevated reflux times within an accessory greater saphenous vein measuring up to 1660  millisecond.     The maximum diameter in the left accessory greater saphenous vein is 9.5 mm at the saphenofemoral junction.     The maximum diameter in the left small saphenous vein is 3.2 mm.     Impression:     No DVT in either thigh.     History of surgical stripping and EVLT to the greater saphenous veins bilaterally.     Venous insufficiency involving accessory greater saphenous veins bilaterally, as well as the left popliteal vein.     Enlarged but morphologically normal lymph node in the right groin, likely reactive.       IMP/PLAN:  78 y.o. male with   Patient Active Problem List   Diagnosis    HTN (hypertension)    Gout, arthritis    Atrial flutter    PFO (patent foramen ovale)    Umbilical hernia    Asymptomatic varicose veins of both lower extremities    Nuclear sclerosis of both eyes    Left epiretinal membrane    Junctional escape rhythm    SSS (sick sinus syndrome)    Nonrheumatic aortic valve insufficiency    Benign prostatic hyperplasia with urinary frequency    Junctional bradycardia    Dyslipidemia associated with type 2 diabetes mellitus    Hypertension associated with diabetes    Right inguinal hernia    CKD (chronic kidney disease) stage 3, GFR 30-59 ml/min    Overweight (BMI 25.0-29.9)    Nuclear sclerotic cataract of right eye    Nuclear sclerotic cataract of left eye    AK (actinic keratosis)    History of melanoma    Decreased range of hip movement    Decreased strength    Pain of left upper extremity    Weakness    Stiffness due to immobility    Pelvic floor dysfunction    Basal cell carcinoma (BCC) of skin of left lower extremity including hip    PAF (paroxysmal atrial fibrillation)    Right hip pain    History of skin cancer    OAB (overactive bladder)    Presence of cardiac pacemaker    Familial hypercholesterolemia    Bradycardia    Polymyalgia rheumatica    Glenohumeral arthritis, left    Chronic anticoagulation    S/P ablation of atrial flutter    Venous reflux    Swelling of lower  extremity    Venous insufficiency of both lower extremities    Lymphedema of both lower extremities    being managed by PCP and specialists who is here today for evaluation of BLE edema and lymphedema.    -recommend compression with Rx stockings, elevation, dietary changes associated with water and sodium intake discussed at length with patient  -Patient is diagnosed with lymphedema and presents with hyperpigmentation of the lower extremity.  Patient has previously attempted compression, elevation, and exercise for at least 4 weeks.  Patient has secondary lymphedema and has tried and failed compression of 20-30 mm Hg, elevation and exercise for >1 month period however symptoms persist.  Basic pump trial performed and discontinued due to sensitivity and pain to static pressure.  Symptoms of swelling, pain and hyperplasia present.  A compression device is recommended to treat current symptoms and prevent disease progression. The patient has tried elevation, exercise and compression and symptoms remain.  The patient has marked hyperkeratosis with hyperplasia and hyperpigmentation. Pt has been compliant with diet/med eval, MLD, skin care  - recommend lymphedema clinic therapy and pumps  -Exercise   -RTC 6 mo    I spent 12 minutes evaluating this patient and greater than 50% of the time was spent counseling, coordinator care and discussing the plan of care.  All questions were answered and patient stated understanding with agreement with the above treatment plan.    Martell Alonso MD [facility]  Vascular and Endovascular Surgery

## 2024-06-27 DIAGNOSIS — I87.2 VENOUS INSUFFICIENCY: Primary | ICD-10-CM

## 2024-06-27 DIAGNOSIS — I89.0 LYMPHEDEMA: ICD-10-CM

## 2024-06-28 ENCOUNTER — PATIENT MESSAGE (OUTPATIENT)
Dept: SPORTS MEDICINE | Facility: CLINIC | Age: 79
End: 2024-06-28
Payer: MEDICARE

## 2024-07-01 ENCOUNTER — CLINICAL SUPPORT (OUTPATIENT)
Dept: REHABILITATION | Facility: HOSPITAL | Age: 79
End: 2024-07-01
Payer: MEDICARE

## 2024-07-01 DIAGNOSIS — I89.0 LYMPHEDEMA OF BOTH LOWER EXTREMITIES: ICD-10-CM

## 2024-07-01 DIAGNOSIS — I87.2 VENOUS INSUFFICIENCY OF BOTH LOWER EXTREMITIES: Primary | ICD-10-CM

## 2024-07-01 DIAGNOSIS — M79.89 SWELLING OF LOWER EXTREMITY: ICD-10-CM

## 2024-07-01 DIAGNOSIS — I87.2 VENOUS REFLUX: ICD-10-CM

## 2024-07-01 PROCEDURE — 97016 VASOPNEUMATIC DEVICE THERAPY: CPT | Mod: KX

## 2024-07-01 PROCEDURE — 97140 MANUAL THERAPY 1/> REGIONS: CPT | Mod: KX

## 2024-07-01 PROCEDURE — 29581 APPL MULTLAYER CMPRN SYS LEG: CPT | Mod: KX

## 2024-07-01 NOTE — PROGRESS NOTES
Physical Therapy Daily Treatment Note     Name: Shannan Norman  Clinic Number: [id number]    Therapy Diagnosis:   Encounter Diagnoses   Name Primary?    Venous insufficiency of both lower extremities Yes    Venous reflux     Swelling of lower extremity     Lymphedema of both lower extremities      Physician: Martell Alonso MD    Visit Date: 7/1/2024    Physician Orders: PT Eval and Treat - lymphedema  Medical Diagnosis from Referral:   Diagnosis   I87.2 (ICD-10-CM) - Venous insufficiency   I89.0 (ICD-10-CM) - Lymphedema   Evaluation Date: 5/31/2024  Authorization Period Expiration: 12/31/24  Plan of Care Expiration: 8/23/24  Visit # / Visits authorized: 9/20     Time In: 900a  Time Out: 1005a  Total Billable Time: 65 minutes  Pump    FOTO 6/24/24    Per chart:  Impression:   No DVT in either thigh.   History of surgical stripping and EVLT to the greater saphenous veins bilaterally.   Venous insufficiency involving accessory greater saphenous veins bilaterally, as well as the left popliteal vein.   Enlarged but morphologically normal lymph node in the right groin, likely reactive.   Electronically signed by:Angelo Carrizales  Date:                                            04/26/2024       Precautions: Standard and R TSA, GSV vein stripping  pacemaker, CKD 3  Subjective     Pt reports: in about 2 weeks he will attend a memorial for his sister in [place]. Advised on compression support and demands of travel.  He was compliant with home compression/exercise program.  Response to previous treatment: MD consult to [employer] for home pump support.  Pt will determine if able to don/doff pump sleeves.  Pt has chosen to proceed with new KH garments and Velcro Inelastic Wraps.  Pt has assistance to apply KH compression socks daily.   R leg remains more involved than L.  Functional change: pt is attempting Tylenol vs Celebrex due to kidneys and prescription was post surgical.    Pt admits doing OK today with less joint  stiffness although both knees, R shoulder and at times hips are stiff. L shoulder recent replacement.   Pain: 2-3/10  Location: bilateral knee joints more than leg swelling concerns, L > R      Objective   Tall male with KH 20-30mmHg L/XL compression socks- slip on loafers.   Rocking and momentum to stand from typical chair with arms  Amount of Swelling/Location of Swelling: mild/mod to B LE ankles legs, knees and moving into thighs R > L, slight fullness above sock on R near  knee  Soft bulbous area post knee / calf R   Skin Integrity: dry scaly skin, venous varicosities, bulges, broken veins - marked varicosities med thighs  Palpation/Texture: discoloration with brown/red discoloration distally including post calf to knee. discoloration along R post leg > L - similar temperature and presentation reported. No added pain or heat to touch.    Girth Measurements (in centimeters)  LANDMARK LEFT LE  5/31/24 RIGHT LE  5/31/24 R LE  6/10/24 Change  R LE DIFF   at eval   SBP 49.5 cm 50.0 cm 47.0 3.0 0.5 cm   10 below SBP 45.0 cm 45.5 cm 44.0 1.5 0.5 cm   20 below SBP 42.5 cm 46.0 cm 41.0 5.0 3.5 cm   30 below SBP 38.0 cm 37.0 cm 31.0 6.0 1.0 cm   35 below SBP 32.0 cm 32.0 cm 27.0 5.0 0 cm   Ankle 30.5 cm 33.0 cm 28.0 5.0 2.5 cm   Forefoot 24.0 cm 26.0 cm 24.0 2.0 2.0 cm     Required head of bed elevated, pillow behind lower back  no elevation wedge     Treatment:   Shannan received the following manual therapy techniques:- Manual Lymphatic Drainage were applied to the: R LE  for 40 minutes, including: MLD and short stretch compression bandaging   Sock was adjusted, remained intact to L LE     MANUAL LYMPHATIC DRAINAGE (MLD):    While supine with LEs elevated stimulation at terminus, along GI region, B inguinal regions, drainage of entire R LE [place] lower leg, ankle, and foot with return proximally,  Use of Aquaphor/Eucerin due to dryness.   Varied support for knee position  Consider self massage to abdominal areas, B inguinal  areas, thigh, and remaining LE within reach.    SEQUENTIAL COMPRESSION PUMP: full leg sleeve applied to R LE  VES 5200 with default setting with distal pressures starting at 45mmHg entire LE   Simultaneous to compression supplies, compression education and training, and self management.    MULTILAYERED BANDAGING:  issued supplies, bandaged R LE with cotton stockinette, cotton padding ankle, lower leg, Komprex foam section dorsum of foot, Komprex foam 1 roll ankle to knee, 1-8cm and 2- 10cm Rosidal K rolls foot to knee, to leave intact 12-24 hrs as tolerated, discontinue with any problems, return rolled bandages next session.   Wash and wear schedules confirmed.   Modified for foot due to slip on loafer style shoe - advised preferred shoe over flip flops sandals, however with difficulty to apply and mild push of bandaging.  Used shoe horn and assistance for B Shoes.   Monitor shoe fit and comfort closely.    Shannan received therapeutic exercises to develop strength, endurance, ROM, flexibility, and posture including: aps, walking, avoid dependency and immobility, support of muscle pump with compression and activity.  Review of assisted GSS, towel assist heel slides with HF and KF, towel assist hamstring flossing, towel assist HF  Cueing on recommended stretching B  GSS - educated on movement for support, caution with L shoulder per protocol, Knee per Ortho.  THERAPEUTIC EXERCISES:  Continue HEP of AROM, stretching, and postural correction.     Home Exercises Provided and Patient Education Provided:  Self Care Home Management Training/Functional Therapeutic Activity x 10 minutes    Product review and choices  Therapy sent documentation to [employer], 6/28/24 - home pump consult per Dr. Alonso   Discussed obstacles with reach and self application.  Demo of potential pump products and methods to self apply leg sleeves.     MD provided orders for KH garments and Velcro Inelastic Wraps - sent consult to [facility] with Du  6/28/24 for KH garments and Velcro Inelastic Wraps.  Online choice with consult to [employer] for these products.    Fitting 6/24/24  [employer] Relief KH 20-30mmHg  Ankle 29.0 cm  Calf  45.0 cm  Length tall   Due to efforts to apply and tall length recommending XLFC or XL sizing     Circ Aid Juxta Lite  C1 43.5  C  41.5  B 28.0  A  25.0  Length Tall   Recommend Large Long     Don with friend assist at this time, able to doff in evenings.   Choice of compression to self apply- Velcro Inelastic Wraps may be easier choice.   Products, recommendations as well as modifications in choice of product and request orders as needed.   review of process of orders, to DME for fitting, sizing per vendor and Insurance billing for cost/coverage    Discussed travel demands, air flight and sitting status - manage with compression, movement, hydration as allowed.  Family may have to assist with compression choice while away.     Present compression:  zipper KH, KH 20-30mmHg socks  Orders and recommendations: KH 20-30mmHg and/or Velcro Inelastic Wraps, MD home pump consult  PATIENT/FAMILY Education: bandaging/compression wear schedule,  HEP,  Beginning of self massage,  Self or assisted bandaging, compression options, and Risk reduction    Written Home Exercises Provided: Patient instructed to cont prior HEP.  Home exercise and compression plan of management was reviewed and Shannan was able to demonstrate understanding prior to the end of the session.  Shannan demonstrated good  understanding of the education provided.     Assessment   Shannan is a 78 y.o. male referred to outpatient Physical Therapy with a medical diagnosis of   Diagnosis   I87.2 (ICD-10-CM) - Venous insufficiency   I89.0 (ICD-10-CM) - Lymphedema   This patient presents s/p multi year history of venous reflux CVI, tall stature, OA/DJD shoulder, knees, spine, history of skin cancers with dermatology following, recent L RTSA which may limit ability to pull on compression with B UE   resulting in: multifactorial venous related lymphedema of the B LE, increased pain, increased stiffness in the ankles, knees, tall  stature with history of vein stripping, as well as difficulty performing walking, sit to stand, reach for compression application, size/shape/length of compression needs, and placing the pt at higher risk of infection.     Choosing compression products for daily support with recommended class of compression while also considering self application or ease of don/doff secondary to joint /mobility considerations.  L LE is more shapely and shows continued size / shape reductions.  R LE is more involved with discoloration, density and pitting present despite improvements and despite therapy management and recommendations.    Proceeding to home pump and compression vendors for recommended products.     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 Pt remains compliant with therapy, exercise, compression and elevation however edema persists.  Joint considerations knees, hips and shoulders require modifications in choice or selection of products to allow compliance with compression due to reach and mobility issues.     Shannan Is progressing well towards his goals.   Pt prognosis is Good.     Pt will continue to benefit from skilled outpatient physical therapy to address the deficits listed in the problem list box on initial evaluation, provide pt/family education and to maximize pt's level of independence in the home and community environment.     Pt's spiritual, cultural and educational needs considered and pt agreeable to plan of care and goals.  Anticipated Barriers for therapy: CKD, sh TSA, ability to reach feet, knee pain     The following goals were discussed with the patient and patient is in agreement with them as to be addressed in the treatment plan.      Short Term Goals: (6 weeks)  1. Patient will show decreased girth in B LE by up to 2 cm to allow for LE  symmetry, shoe and clothing choice, and ability to apply needed compression.  met  RLE   2. Patient will demonstrate 100% knowledge of lymphedema precautions and signs of infection to allow for reduced lymphedema risk, infection risk, and/or exacerbation of condition. met)  3. Patient or caregiver will perform self-bandaging techniques and/or wearing of compression garments to allow for lymphatic drainage support, skin elasticity, and reduction in shape and size of limb. (progressing, R- wearing socks daily  4. Patient will perform self lymph drainage techniques to areas within reach to enhance lymphatic drainage and skin condition.  (progressing  5. Patient will tolerate daily activities with multilayered bandaging to allow for lymphatic and venous support.  Met R    Long Term Goals: (12  weeks)  1. Patient will show decreased girth in B LE by up to 3 cm  to allow for LE symmetry, shoe and clothing choice, and ability to apply needed compression daily.  (progressing, met in some areas R  2. Patient will show reduction in density to mild or less with improved contour of limb to allow for cosmesis, LE symmetry, infection risk reduction, and clothing and compression choice.   (progressing, less pitting remains  3. Patient to rowan/doff compression garment with daily compliance to assist in lymphedema management, skin elasticity, and tissue density.  (progressing, wearing socks  4. Pt to show improved postural awareness and alignment.  (progressing,  5. Pt to be I and compliant with HEP to allow for increased function in affected limb.   (progressing, ongoing  Plan   Plan of care Certification: 5/31/2024 to 8/23/24.     Outpatient Physical Therapy 2 times weekly for 10 weeks to include the following interventions: Patient Education, Self Care, Therapeutic Activities, and Therapeutic Exercise. Complete Decongestive Therapy- compression and home equipment needs to be addressed and assisted.     Geneva Owens, PT

## 2024-07-02 ENCOUNTER — OFFICE VISIT (OUTPATIENT)
Dept: DERMATOLOGY | Facility: CLINIC | Age: 79
End: 2024-07-02
Payer: MEDICARE

## 2024-07-02 DIAGNOSIS — C44.91 BASAL CELL CARCINOMA (BCC), UNSPECIFIED SITE: Primary | ICD-10-CM

## 2024-07-02 DIAGNOSIS — Z85.828 HISTORY OF SKIN CANCER: ICD-10-CM

## 2024-07-02 PROCEDURE — 1159F MED LIST DOCD IN RCRD: CPT | Mod: CPTII,S$GLB,, | Performed by: STUDENT IN AN ORGANIZED HEALTH CARE EDUCATION/TRAINING PROGRAM

## 2024-07-02 PROCEDURE — 1125F AMNT PAIN NOTED PAIN PRSNT: CPT | Mod: CPTII,S$GLB,, | Performed by: STUDENT IN AN ORGANIZED HEALTH CARE EDUCATION/TRAINING PROGRAM

## 2024-07-02 PROCEDURE — 3288F FALL RISK ASSESSMENT DOCD: CPT | Mod: CPTII,S$GLB,, | Performed by: STUDENT IN AN ORGANIZED HEALTH CARE EDUCATION/TRAINING PROGRAM

## 2024-07-02 PROCEDURE — 1101F PT FALLS ASSESS-DOCD LE1/YR: CPT | Mod: CPTII,S$GLB,, | Performed by: STUDENT IN AN ORGANIZED HEALTH CARE EDUCATION/TRAINING PROGRAM

## 2024-07-02 PROCEDURE — 99213 OFFICE O/P EST LOW 20 MIN: CPT | Mod: S$GLB,,, | Performed by: STUDENT IN AN ORGANIZED HEALTH CARE EDUCATION/TRAINING PROGRAM

## 2024-07-02 PROCEDURE — 1160F RVW MEDS BY RX/DR IN RCRD: CPT | Mod: CPTII,S$GLB,, | Performed by: STUDENT IN AN ORGANIZED HEALTH CARE EDUCATION/TRAINING PROGRAM

## 2024-07-02 PROCEDURE — 99999 PR PBB SHADOW E&M-EST. PATIENT-LVL III: CPT | Mod: PBBFAC,,, | Performed by: STUDENT IN AN ORGANIZED HEALTH CARE EDUCATION/TRAINING PROGRAM

## 2024-07-02 NOTE — PROGRESS NOTES
Subjective:      Patient ID:  Shannan Norman is a 78 y.o. male who presents for   Chief Complaint   Patient presents with    Skin Check     TBSE     Pt present for TBSE    Pt has concerns of right anterior leg- further treatment.     This is a high risk patient here to check for the development of new lesions.     Pt has h/o BCC & MM.       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 Recommended imiquimod but patient was unable to do it  - right forearm, nodular bcc, recommended surgery but patient did not schedule appointment          Other Visit Diagnoses       Basal cell carcinoma (BCC), unspecified site    -  Primary          Patient does not want TBSE today. He would like to discuss treatment options for prior biopsies from 12/23 as he has not yet had them treated. Putting imiquimod on the leg lesion is difficult as he has to wear compression socks daily and he cannot take them on and off himself      Review of Systems   Constitutional:  Negative for fever, chills and fatigue.   Skin:  Positive for activity-related sunscreen use and wears hat. Negative for daily sunscreen use and recent sunburn.   Hematologic/Lymphatic: Does not bruise/bleed easily.       Objective:   Physical Exam   Skin:   Areas Examined (abnormalities noted in diagram):   RUE Inspected  LLE Inspection Performed            Diagram Legend     Erythematous scaling macule/papule c/w actinic keratosis       Vascular papule c/w angioma      Pigmented verrucoid papule/plaque c/w seborrheic keratosis      Yellow umbilicated papule c/w sebaceous hyperplasia      Irregularly shaped tan macule c/w lentigo     1-2 mm smooth white papules consistent with Milia      Movable subcutaneous  cyst with punctum c/w epidermal inclusion cyst      Subcutaneous movable cyst c/w pilar cyst      Firm pink to brown papule c/w dermatofibroma      Pedunculated fleshy papule(s) c/w skin tag(s)      Evenly pigmented macule c/w junctional nevus     Mildly variegated pigmented, slightly irregular-bordered macule c/w mildly atypical nevus      Flesh colored to evenly pigmented papule c/w intradermal nevus       Pink pearly papule/plaque c/w basal cell carcinoma      Erythematous hyperkeratotic cursted plaque c/w SCC      Surgical scar with no sign of skin cancer recurrence      Open and closed comedones      Inflammatory papules and pustules      Verrucoid papule consistent consistent with wart     Erythematous eczematous patches and plaques     Dystrophic onycholytic nail with subungual debris c/w onychomycosis     Umbilicated papule    Erythematous-base heme-crusted tan verrucoid plaque consistent with inflamed seborrheic keratosis     Erythematous Silvery Scaling Plaque c/w Psoriasis     See annotation      Assessment / Plan:        Basal cell carcinoma (BCC), unspecified site  - here today to discuss treatment options for his 2 outstanding BCCs--nodular on right forearm and superficial on left leg. For forearm, will schedule excision. For leg, discussed imiquimod vs ED&C, vs mohs, vs radiation. Discussed rbse of all options. He has venous stasis so he will have delayed wound healing. It is difficult for him to apply the imiquimod because he needs assistance putting on his compression socks and he cannot take them off to apply the topical. It would also be occluded which can make the reaction to imiquimod more severe. After discussing rbse, he preferred to go with ED&C. Will do at the time of excision  F/u 8/13/24 for excision and ed&c    History of skin cancer  F/u 8/2024 for tbse           No follow-ups on file.

## 2024-07-03 ENCOUNTER — CLINICAL SUPPORT (OUTPATIENT)
Dept: REHABILITATION | Facility: HOSPITAL | Age: 79
End: 2024-07-03
Payer: MEDICARE

## 2024-07-03 ENCOUNTER — TELEPHONE (OUTPATIENT)
Dept: NEUROLOGY | Facility: CLINIC | Age: 79
End: 2024-07-03
Payer: MEDICARE

## 2024-07-03 DIAGNOSIS — M79.89 SWELLING OF LOWER EXTREMITY: ICD-10-CM

## 2024-07-03 DIAGNOSIS — I87.2 VENOUS INSUFFICIENCY OF BOTH LOWER EXTREMITIES: Primary | ICD-10-CM

## 2024-07-03 DIAGNOSIS — I89.0 LYMPHEDEMA OF BOTH LOWER EXTREMITIES: ICD-10-CM

## 2024-07-03 DIAGNOSIS — I87.2 VENOUS REFLUX: ICD-10-CM

## 2024-07-03 PROCEDURE — 97016 VASOPNEUMATIC DEVICE THERAPY: CPT | Mod: KX

## 2024-07-03 PROCEDURE — 29581 APPL MULTLAYER CMPRN SYS LEG: CPT | Mod: KX

## 2024-07-03 PROCEDURE — 97140 MANUAL THERAPY 1/> REGIONS: CPT | Mod: KX

## 2024-07-03 NOTE — PROGRESS NOTES
Physical Therapy Daily Treatment Note     Name: Shannan Norman  Clinic Number: [id number]    Therapy Diagnosis:   Encounter Diagnoses   Name Primary?    Venous insufficiency of both lower extremities Yes    Venous reflux     Swelling of lower extremity     Lymphedema of both lower extremities      Physician: Martell Alonso MD    Visit Date: 7/3/2024    Physician Orders: PT Eval and Treat - lymphedema  Medical Diagnosis from Referral:   Diagnosis   I87.2 (ICD-10-CM) - Venous insufficiency   I89.0 (ICD-10-CM) - Lymphedema   Evaluation Date: 5/31/2024  Authorization Period Expiration: 12/31/24  Plan of Care Expiration: 8/23/24  Visit # / Visits authorized: 10/20     Time In: 900a  Time Out: 1005a  Total Billable Time: 65 minutes  Pump    FOTO 6/24/24    Per chart:  Impression:   No DVT in either thigh.   History of surgical stripping and EVLT to the greater saphenous veins bilaterally.   Venous insufficiency involving accessory greater saphenous veins bilaterally, as well as the left popliteal vein.   Enlarged but morphologically normal lymph node in the right groin, likely reactive.   Electronically signed by:Angelo Carrizales  Date:                                            04/26/2024       Precautions: Standard and R TSA, GSV vein stripping  pacemaker, CKD 3  Subjective     Pt reports: his weight has stabilized.  His neighbor helps with compression and other lifting due to recent shoulder surgery- was using Celebrex now using Tylenol.    He was compliant with home compression/exercise program.  Response to previous treatment: still awaiting communication regarding consult to [employer] for home pump support and online compression vendor.   Pt wears KH socks daily - R LE with some swelling above sock and knee region but admits TH is not a viable option.    L LE managing well.   Functional change: joint pains with ortho and medicine support.   Pain: 2-3/10  Location: bilateral knee joints more than leg swelling  concerns, L > R      Objective   Tall male with KH 20-30mmHg L/XL compression socks- slip on loafers.   Rocking and momentum to stand from typical chair with arms  Amount of Swelling/Location of Swelling: mild/mod to B LE ankles legs, knees and moving into thighs R > L, slight fullness above sock on R near  knee  Soft bulbous area post knee / calf R   Skin Integrity: dry scaly skin, venous varicosities, bulges, broken veins - marked varicosities med thighs  Palpation/Texture: discoloration with brown/red discoloration distally including post calf to knee. discoloration along R post leg > L - similar temperature and presentation reported. No added pain or heat to touch.    Girth Measurements (in centimeters)  LANDMARK LEFT LE  5/31/24 RIGHT LE  5/31/24 R LE  6/10/24 R LE'  7/3/24 Change  R LE DIFF   at eval   SBP 49.5 cm 50.0 cm 47.0 47.0 3.0 0.5 cm   10 below SBP 45.0 cm 45.5 cm 44.0 44.0 1.5 0.5 cm   20 below SBP 42.5 cm 46.0 cm 41.0 42.0 4.0 3.5 cm   30 below SBP 38.0 cm 37.0 cm 31.0 31.5 5.5 1.0 cm   35 below SBP 32.0 cm 32.0 cm 27.0 27.0 5.0 0 cm   Ankle 30.5 cm 33.0 cm 28.0 29.0 4.0 2.5 cm   Forefoot 24.0 cm 26.0 cm 24.0 24.5 1.5 2.0 cm     Required head of bed elevated, pillow behind lower back  no elevation wedge     Treatment:   Shannan received the following manual therapy techniques:- Manual Lymphatic Drainage were applied to the: R LE  for 45 minutes, including: MLD and short stretch compression bandaging   Sock was adjusted, remained intact to L LE   Girth assessment    MANUAL LYMPHATIC DRAINAGE (MLD):    While supine with LEs elevated stimulation at terminus, along GI region, B inguinal regions, drainage of entire R LE [place] lower leg, ankle, and foot with return proximally,  Use of Aquaphor/Eucerin due to dryness.   Varied support for knee position  Consider self massage to abdominal areas, B inguinal areas, thigh, and remaining LE within reach.    SEQUENTIAL COMPRESSION PUMP: full leg sleeve applied to R  LE  VES 5200 with default setting with distal pressures starting at 45mmHg entire LE   Simultaneous to compression supplies, compression education and training, and self management.    MULTILAYERED BANDAGING:  issued supplies, bandaged R LE with cotton stockinette, cotton padding ankle, lower leg, Komprex foam section dorsum of foot, Komprex foam 1 roll ankle to knee, 1-8cm and 2- 10cm Rosidal K rolls foot to knee, to leave intact 12-24 hrs as tolerated, discontinue with any problems, return rolled bandages next session.   Wash and wear schedules confirmed.   Modified for foot due to slip on loafer style shoe - advised preferred shoe over flip flops sandals, however with difficulty to apply and mild push of bandaging.  Used shoe horn and assistance for B Shoes.   Monitor shoe fit and comfort closely.    Shannan received therapeutic exercises to develop strength, endurance, ROM, flexibility, and posture including: aps, walking, avoid dependency and immobility, support of muscle pump with compression and activity.  Review of assisted GSS, towel assist heel slides with HF and KF, towel assist hamstring flossing, towel assist HF  Cueing on recommended stretching B  GSS - educated on movement for support, caution with L shoulder per protocol, Knee per Ortho.  THERAPEUTIC EXERCISES:  Continue HEP of AROM, stretching, and postural correction.     Home Exercises Provided and Patient Education Provided:  Self Care Home Management Training/Functional Therapeutic Activity x 5 minutes    Therapy documentation to [employer], 6/28/24 - home pump consult per Dr. Alonso   Consideration for his reach and self application.  potential pump products and methods to self apply leg sleeves.     MD provided orders for KH garments and Velcro Inelastic Wraps - sent consult to [employer] with [employer] 6/28/24 for KH garments and Velcro Inelastic Wraps.  Online choice with consult to [employer] for these products.    Fitting 6/24/24  Jobst Relief KH  20-30mmHg  Ankle 29.0 cm  Calf  45.0 cm  Length tall   Due to efforts to apply and tall length recommending XLFC or XL sizing     Circ Aid Juxta Lite  C1 43.5  C  41.5  B 28.0  A  25.0  Length Tall   Recommend Large Long     Don with friend assist at this time, able to doff in evenings.   Choice of compression to self apply- Velcro Inelastic Wraps may be easier choice.   Products, recommendations as well as modifications in choice of product and orders as needed.   review of process of orders, to DME for fitting, sizing per vendor and Insurance billing for cost/coverage    Present compression:  zipper KH, KH 20-30mmHg socks  Orders and recommendations: KH 20-30mmHg and/or Velcro Inelastic Wraps, MD home pump consult  PATIENT/FAMILY Education: bandaging/compression wear schedule,  HEP,  Beginning of self massage,  Self or assisted bandaging, compression options, and Risk reduction    Written Home Exercises Provided: Patient instructed to cont prior HEP.  Home exercise and compression plan of management was reviewed and Shannan was able to demonstrate understanding prior to the end of the session.  Shannan demonstrated good  understanding of the education provided.     Assessment   Shannan is a 78 y.o. male referred to outpatient Physical Therapy with a medical diagnosis of   Diagnosis   I87.2 (ICD-10-CM) - Venous insufficiency   I89.0 (ICD-10-CM) - Lymphedema   This patient presents s/p multi year history of venous reflux CVI, tall stature, OA/DJD shoulder, knees, spine, history of skin cancers with dermatology following, recent L RTSA which may limit ability to pull on compression with B UE  resulting in: multifactorial venous related lymphedema of the B LE, increased pain, increased stiffness in the ankles, knees, tall  stature with history of vein stripping, as well as difficulty performing walking, sit to stand, reach for compression application, size/shape/length of compression needs, and placing the pt at higher risk of  infection.     Arrived today with some fullness just above his KH compression sock choice to his R LE - admits TH garments are not a viable option due to don/doff requirements, sizing and fit.  Presently his use of a lite compression KH is allowed due to his ability to self remove at night.  Finding products he can remain compliant with is best option even if altered style, lessor tier, or size, product modifications.  Venous related lymphedema with reductions in size/shape however chronic status remains and requires daily assistance for venous lymphatic mobility.  OA/DJD and recent surgical procedures contribute to management choices.  KH garments with tall length recommended.   Pt has remained compliant with recommendations.   Shannan Is progressing well towards his goals.   Pt prognosis is Good.     Pt will continue to benefit from skilled outpatient physical therapy to address the deficits listed in the problem list box on initial evaluation, provide pt/family education and to maximize pt's level of independence in the home and community environment.     Pt's spiritual, cultural and educational needs considered and pt agreeable to plan of care and goals.  Anticipated Barriers for therapy: CKD, sh TSA, ability to reach feet, knee pain     The following goals were discussed with the patient and patient is in agreement with them as to be addressed in the treatment plan.      Short Term Goals: (6 weeks)  1. Patient will show decreased girth in B LE by up to 2 cm to allow for LE symmetry, shoe and clothing choice, and ability to apply needed compression.  met  RLE   2. Patient will demonstrate 100% knowledge of lymphedema precautions and signs of infection to allow for reduced lymphedema risk, infection risk, and/or exacerbation of condition. met)  3. Patient or caregiver will perform self-bandaging techniques and/or wearing of compression garments to allow for lymphatic drainage support, skin elasticity, and reduction  in shape and size of limb. (progressing, R- wearing socks daily  4. Patient will perform self lymph drainage techniques to areas within reach to enhance lymphatic drainage and skin condition.  (progressing  5. Patient will tolerate daily activities with multilayered bandaging to allow for lymphatic and venous support.  Met R    Long Term Goals: (12  weeks)  1. Patient will show decreased girth in B LE by up to 3 cm  to allow for LE symmetry, shoe and clothing choice, and ability to apply needed compression daily.  Met R LE  2. Patient will show reduction in density to mild or less with improved contour of limb to allow for cosmesis, LE symmetry, infection risk reduction, and clothing and compression choice.   (progressing, less pitting remains  3. Patient to rowan/doff compression garment with daily compliance to assist in lymphedema management, skin elasticity, and tissue density.  (progressing, wearing socks  4. Pt to show improved postural awareness and alignment.  (progressing,  5. Pt to be I and compliant with HEP to allow for increased function in affected limb.   (progressing, ongoing  Plan   Plan of care Certification: 5/31/2024 to 8/23/24.     Outpatient Physical Therapy 2 times weekly for 10 weeks to include the following interventions: Patient Education, Self Care, Therapeutic Activities, and Therapeutic Exercise. Complete Decongestive Therapy- compression and home equipment needs to be addressed and assisted.     Geneva Owens, PT

## 2024-07-03 NOTE — TELEPHONE ENCOUNTER
----- Message from Lali Jay MA sent at 7/2/2024 11:43 AM CDT -----  Regarding: FW: Demographic Info Needed  Contact: [phone]  I am working from home today, please advise if possible  ----- Message -----  From: Santiago Cespedes  Sent: 7/2/2024  10:32 AM CDT  To: [facility] Staff  Subject: Demographic Info Needed                          NEVA Carbajal called to request the pt's address and insurance information that was missing from the Biopsy order of hr leg and thigh. Pls fax over the information to [phone] or call [phone].  Thank you.

## 2024-07-08 ENCOUNTER — CLINICAL SUPPORT (OUTPATIENT)
Dept: REHABILITATION | Facility: HOSPITAL | Age: 79
End: 2024-07-08
Payer: MEDICARE

## 2024-07-08 DIAGNOSIS — I87.2 VENOUS INSUFFICIENCY OF BOTH LOWER EXTREMITIES: Primary | ICD-10-CM

## 2024-07-08 DIAGNOSIS — I87.2 VENOUS REFLUX: ICD-10-CM

## 2024-07-08 DIAGNOSIS — M79.89 SWELLING OF LOWER EXTREMITY: ICD-10-CM

## 2024-07-08 DIAGNOSIS — I89.0 LYMPHEDEMA OF BOTH LOWER EXTREMITIES: ICD-10-CM

## 2024-07-08 PROCEDURE — 97016 VASOPNEUMATIC DEVICE THERAPY: CPT | Mod: KX

## 2024-07-08 PROCEDURE — 97140 MANUAL THERAPY 1/> REGIONS: CPT | Mod: KX

## 2024-07-08 PROCEDURE — 29581 APPL MULTLAYER CMPRN SYS LEG: CPT | Mod: KX

## 2024-07-08 NOTE — PROGRESS NOTES
Physical Therapy Daily Treatment Note     Name: Shannan Norman  Clinic Number: [id number]    Therapy Diagnosis:   Encounter Diagnoses   Name Primary?    Venous insufficiency of both lower extremities Yes    Venous reflux     Swelling of lower extremity     Lymphedema of both lower extremities      Physician: Martell Alonso MD    Visit Date: 7/8/2024    Physician Orders: PT Eval and Treat - lymphedema  Medical Diagnosis from Referral:   Diagnosis   I87.2 (ICD-10-CM) - Venous insufficiency   I89.0 (ICD-10-CM) - Lymphedema   Evaluation Date: 5/31/2024  Authorization Period Expiration: 12/31/24  Plan of Care Expiration: 8/23/24  Visit # / Visits authorized: 11/20     Time In: 900a  Time Out: 1005a  Total Billable Time: 65 minutes  Pump    FOTO 6/24/24    Per chart:  Impression:   No DVT in either thigh.   History of surgical stripping and EVLT to the greater saphenous veins bilaterally.   Venous insufficiency involving accessory greater saphenous veins bilaterally, as well as the left popliteal vein.   Enlarged but morphologically normal lymph node in the right groin, likely reactive.   Electronically signed by:Angelo Carrizales  Date:                                            04/26/2024       Precautions: Standard and R TSA, GSV vein stripping  pacemaker, CKD 3  Subjective     Pt reports: needed help to remove stockings last night - neighbor assisted.  Admits dermatology managing skin CA to his forearm and L leg- will have scraping and ointments - should be able to continue compression.   He was compliant with home compression/exercise program.  Response to previous treatment: no updates on home pump on compression products.    Pt has assistance to apply KH socks to both legs daily and most days he is able to self remove at night.   Functional change: discussing various joint pains and stiffness with ortho.  Uses book / lift to raise chair heights for easier transitions.    Pain: 2-3/10  Location: bilateral knee  joints more than leg swelling concerns, L > R      Objective   Tall male with KH 20-30mmHg L/XL compression socks- slip on loafers.   Rocking and momentum to stand from typical chair with arms  Amount of Swelling/Location of Swelling: mild/mod to B LE ankles legs, knees and moving into thighs R > L, slight fullness above sock on R near  knee  Soft bulbous area post knee / calf R   Skin Integrity: dry scaly skin, venous varicosities, bulges, broken veins - marked varicosities med thighs  Palpation/Texture: discoloration with brown/red discoloration distally including post calf to knee. discoloration along R post leg - similar temperature and presentation reported. No added pain or heat to touch.    Girth Measurements (in centimeters)  LANDMARK LEFT LE  5/31/24 RIGHT LE  5/31/24 R LE  6/10/24 R LE  7/3/24 Change  R LE DIFF   at eval   SBP 49.5 cm 50.0 cm 47.0 47.0 3.0 0.5 cm   10 below SBP 45.0 cm 45.5 cm 44.0 44.0 1.5 0.5 cm   20 below SBP 42.5 cm 46.0 cm 41.0 42.0 4.0 3.5 cm   30 below SBP 38.0 cm 37.0 cm 31.0 31.5 5.5 1.0 cm   35 below SBP 32.0 cm 32.0 cm 27.0 27.0 5.0 0 cm   Ankle 30.5 cm 33.0 cm 28.0 29.0 4.0 2.5 cm   Forefoot 24.0 cm 26.0 cm 24.0 24.5 1.5 2.0 cm     Required head of bed elevated, pillow behind lower back  no elevation wedge     Treatment:   Shannan received the following manual therapy techniques:- Manual Lymphatic Drainage were applied to the: R LE  for 45 minutes, including: MLD and short stretch compression bandaging   Sock was adjusted, remained intact to L LE   Follow dermatology recommendations per L leg    MANUAL LYMPHATIC DRAINAGE (MLD):    While supine with LEs elevated stimulation at terminus, along GI region, B inguinal regions, drainage of entire R LE [place] lower leg, ankle, and foot with return proximally,  Use of Aquaphor/Eucerin due to dryness.   Varied support for knee position  Consider self massage to abdominal areas, B inguinal areas, thigh, and remaining LE within  reach.    SEQUENTIAL COMPRESSION PUMP: full leg sleeve applied to R LE  VES 5200 with default setting with distal pressures starting at 45mmHg entire LE   Simultaneous to compression supplies, compression education and training, and self management.  Restroom visit after session.    MULTILAYERED BANDAGING:  issued supplies, bandaged R LE with cotton stockinette, cotton padding ankle, lower leg, Komprex foam section dorsum of foot, Komprex foam 1 roll ankle to knee, 1-8cm and 2- 10cm Rosidal K rolls foot to knee, to leave intact 12-24 hrs as tolerated, discontinue with any problems, return rolled bandages next session.   Wash and wear schedules confirmed.   Modified for foot due to slip on loafer style shoe - advised preferred shoe over flip flops sandals, however with difficulty to apply and mild push of bandaging.  Used shoe horn and assistance for B Shoes.   Monitor shoe fit and comfort closely.    Shannan received therapeutic exercises to develop strength, endurance, ROM, flexibility, and posture including: aps, walking, avoid dependency and immobility, support of muscle pump with compression and activity.  Review of assisted GSS, towel assist heel slides with HF and KF, towel assist hamstring flossing, towel assist HF  Cueing on recommended stretching B  GSS - educated on movement for support, caution with L shoulder per protocol, Knee per Ortho.  THERAPEUTIC EXERCISES:  Continue HEP of AROM, stretching, and postural correction.     Home Exercises Provided and Patient Education Provided:  Self Care Home Management Training/Functional Therapeutic Activity x 5 minutes    Therapy documentation to [employer], 6/28/24 - home pump consult per Dr. Alonso   Email request sent to vendor for status update on pump consult 7/8/24  Consideration for his reach and self application.  potential pump products and methods to self apply leg sleeves- typically home pump trial and vendor training    MD provided orders for KH garments and  Velcro Inelastic Wraps - consult to [employer] 6/28/24 for KH garments and Velcro Inelastic Wraps.  Online choice with consult to [employer] for these products.  Will request status update.    Products recommended: Fitting 6/24/24    Jobst Relief KH 20-30mmHg  Ankle 29.0 cm  Calf  45.0 cm  Length tall   Due to efforts to apply and tall length recommending XLFC or XL sizing     Circ Aid Juxta Lite  C1 43.5  C  41.5  B 28.0  A  25.0  Length Tall   Recommend Large Long     Don with friend assist at this time, able to doff in evenings.   Choice of compression to self apply- Velcro Inelastic Wraps may be easier choice.   Products, recommendations as well as modifications in choice of product and orders as needed.   review of process of orders, to DME for fitting, sizing per vendor and Insurance billing for cost/coverage    Present compression:  zipper KH, KH 20-30mmHg socks  Orders and recommendations: KH 20-30mmHg and/or Velcro Inelastic Wraps, MD home pump consult  PATIENT/FAMILY Education: bandaging/compression wear schedule,  HEP,  Beginning of self massage,  Self or assisted bandaging, compression options, and Risk reduction    Written Home Exercises Provided: Patient instructed to cont prior HEP.  Home exercise and compression plan of management was reviewed and Shannan was able to demonstrate understanding prior to the end of the session.  Shannan demonstrated good  understanding of the education provided.     Assessment   Shannan is a 78 y.o. male referred to outpatient Physical Therapy with a medical diagnosis of   Diagnosis   I87.2 (ICD-10-CM) - Venous insufficiency   I89.0 (ICD-10-CM) - Lymphedema   This patient presents s/p multi year history of venous reflux CVI, tall stature, OA/DJD shoulder, knees, spine, history of skin cancers with dermatology following, recent L RTSA which may limit ability to pull on compression with B UE  resulting in: multifactorial venous related lymphedema of the B LE, increased pain,  increased stiffness in the ankles, knees, tall  stature with history of vein stripping, as well as difficulty performing walking, sit to stand, reach for compression application, size/shape/length of compression needs, and placing the pt at higher risk of infection.     Tall stature, h/o venous reflux CVI and OA/DJD all likely contribute to LE edema.  Newly reported skin CA to Left leg with dermatology following with procedure planned- discussed compression support and pause if recommended post procedure per dermatology.  Therapy has been addressing his R  LE which has reduced in size/shape with improved contour and less pitting although venous varicosities and distal edema remain present.  Mobility and self application issues to be considered - ready to proceed to home products and support - awaiting vendors for products.     Shannan Is progressing well towards his goals.   Pt prognosis is Good.     Pt will continue to benefit from skilled outpatient physical therapy to address the deficits listed in the problem list box on initial evaluation, provide pt/family education and to maximize pt's level of independence in the home and community environment.     Pt's spiritual, cultural and educational needs considered and pt agreeable to plan of care and goals.  Anticipated Barriers for therapy: CKD, sh TSA, ability to reach feet, knee pain     The following goals were discussed with the patient and patient is in agreement with them as to be addressed in the treatment plan.      Short Term Goals: (6 weeks)  1. Patient will show decreased girth in B LE by up to 2 cm to allow for LE symmetry, shoe and clothing choice, and ability to apply needed compression.  met  RLE   2. Patient will demonstrate 100% knowledge of lymphedema precautions and signs of infection to allow for reduced lymphedema risk, infection risk, and/or exacerbation of condition. met)  3. Patient or caregiver will perform self-bandaging techniques and/or  wearing of compression garments to allow for lymphatic drainage support, skin elasticity, and reduction in shape and size of limb. (progressing, R- wearing socks daily  4. Patient will perform self lymph drainage techniques to areas within reach to enhance lymphatic drainage and skin condition.  (progressing  5. Patient will tolerate daily activities with multilayered bandaging to allow for lymphatic and venous support.  Met R    Long Term Goals: (12  weeks)  1. Patient will show decreased girth in B LE by up to 3 cm  to allow for LE symmetry, shoe and clothing choice, and ability to apply needed compression daily.  Met R LE  2. Patient will show reduction in density to mild or less with improved contour of limb to allow for cosmesis, LE symmetry, infection risk reduction, and clothing and compression choice.   (progressing, less pitting remains  3. Patient to rowan/doff compression garment with daily compliance to assist in lymphedema management, skin elasticity, and tissue density.  (progressing, wearing socks  4. Pt to show improved postural awareness and alignment.  (progressing,  5. Pt to be I and compliant with HEP to allow for increased function in affected limb.   (progressing, ongoing  Plan   Plan of care Certification: 5/31/2024 to 8/23/24.     Outpatient Physical Therapy 2 times weekly for 10 weeks to include the following interventions: Patient Education, Self Care, Therapeutic Activities, and Therapeutic Exercise. Complete Decongestive Therapy- compression and home equipment needs to be addressed and assisted.     Geneva Owens, PT

## 2024-07-10 ENCOUNTER — CLINICAL SUPPORT (OUTPATIENT)
Dept: REHABILITATION | Facility: HOSPITAL | Age: 79
End: 2024-07-10
Payer: MEDICARE

## 2024-07-10 DIAGNOSIS — M79.89 SWELLING OF LOWER EXTREMITY: ICD-10-CM

## 2024-07-10 DIAGNOSIS — I87.2 VENOUS INSUFFICIENCY OF BOTH LOWER EXTREMITIES: Primary | ICD-10-CM

## 2024-07-10 DIAGNOSIS — I89.0 LYMPHEDEMA OF BOTH LOWER EXTREMITIES: ICD-10-CM

## 2024-07-10 DIAGNOSIS — I87.2 VENOUS REFLUX: ICD-10-CM

## 2024-07-10 PROCEDURE — 97016 VASOPNEUMATIC DEVICE THERAPY: CPT | Mod: KX

## 2024-07-10 PROCEDURE — 29581 APPL MULTLAYER CMPRN SYS LEG: CPT | Mod: KX

## 2024-07-10 PROCEDURE — 97140 MANUAL THERAPY 1/> REGIONS: CPT | Mod: KX

## 2024-07-10 NOTE — PROGRESS NOTES
Physical Therapy Daily Treatment Note     Name: Shannan Norman  Clinic Number: [id number]    Therapy Diagnosis:   Encounter Diagnoses   Name Primary?    Venous insufficiency of both lower extremities Yes    Venous reflux     Swelling of lower extremity     Lymphedema of both lower extremities      Physician: Martell Alonso MD    Visit Date: 7/10/2024    Physician Orders: PT Eval and Treat - lymphedema  Medical Diagnosis from Referral:   Diagnosis   I87.2 (ICD-10-CM) - Venous insufficiency   I89.0 (ICD-10-CM) - Lymphedema   Evaluation Date: 5/31/2024  Authorization Period Expiration: 12/31/24  Plan of Care Expiration: 8/23/24  Visit # / Visits authorized: 12/20     Time In: 900a  Time Out: 1010a  Total Billable Time: 70 minutes  Pump    FOTO 6/24/24    Per chart:  Impression:   No DVT in either thigh.   History of surgical stripping and EVLT to the greater saphenous veins bilaterally.   Venous insufficiency involving accessory greater saphenous veins bilaterally, as well as the left popliteal vein.   Enlarged but morphologically normal lymph node in the right groin, likely reactive.   Electronically signed by:Angelo Carrizales  Date:                                            04/26/2024       Precautions: Standard and R TSA, GSV vein stripping  pacemaker, CKD 3  Subjective     Pt reports: having pump trial with [employer] vendor yesterday - likely 1-4 weeks for delivery.  No updates on compression products - therapy sent email request for status.    He was compliant with home compression/exercise program.  Response to previous treatment: L LE doing well with basic KH sock with friend to assist.  R LE may need larger longer products and stronger support.  Hopes to have KH garments and velcro wraps as options.  Awaiting compression consult for new products.  Wearing KH 20-30mmHg socks daily.     Functional change: Today is his birthday!!!  Hopes to be able to self apply pump sleeves once arrives - training was performed  by vendor.     Pain: 2-3/10  Location: bilateral knee joints more than leg swelling concerns, L > R      Objective   Tall male with KH 20-30mmHg L/XL compression socks- slip on loafers.   Rocking and momentum to stand from typical chair with arms  Amount of Swelling/Location of Swelling: mild/mod to B LE ankles legs, knees and moving into thighs R > L, slight fullness above sock on R near  knee  Soft bulbous area post knee / calf R   Skin Integrity: dry scaly skin, venous varicosities, bulges, broken veins - marked varicosities med thighs  Palpation/Texture: discoloration with brown/red discoloration distally including post calf to knee. discoloration along R post leg - similar temperature and presentation reported. No added pain or heat to touch.    Girth Measurements (in centimeters)  LANDMARK LEFT LE  5/31/24 RIGHT LE  5/31/24 R LE  6/10/24 R LE  7/3/24 Change  R LE DIFF   at eval   SBP 49.5 cm 50.0 cm 47.0 47.0 3.0 0.5 cm   10 below SBP 45.0 cm 45.5 cm 44.0 44.0 1.5 0.5 cm   20 below SBP 42.5 cm 46.0 cm 41.0 42.0 4.0 3.5 cm   30 below SBP 38.0 cm 37.0 cm 31.0 31.5 5.5 1.0 cm   35 below SBP 32.0 cm 32.0 cm 27.0 27.0 5.0 0 cm   Ankle 30.5 cm 33.0 cm 28.0 29.0 4.0 2.5 cm   Forefoot 24.0 cm 26.0 cm 24.0 24.5 1.5 2.0 cm     Required head of bed elevated, pillow behind lower back  no elevation wedge     Treatment:   Shannan received the following manual therapy techniques:- Manual Lymphatic Drainage were applied to the: R LE  for 45 minutes, including: MLD and short stretch compression bandaging   Sock was adjusted, remained intact to L LE   Follow dermatology recommendations per L leg and R forearm    MANUAL LYMPHATIC DRAINAGE (MLD):    While supine with LEs elevated stimulation at terminus, along GI region, B inguinal regions, drainage of entire R LE [place] lower leg, ankle, and foot with return proximally,  Use of Aquaphor/Eucerin due to dryness.   Varied support for knee position  Consider self massage to abdominal  areas, B inguinal areas, thigh, and remaining LE within reach.    SEQUENTIAL COMPRESSION PUMP: full leg sleeve applied to R LE  VES 5200 with default setting with distal pressures starting at 45mmHg entire LE   Simultaneous to compression supplies, compression education and training, and self management.  Drifts to sleep for portions of sesssion.  Restroom visit after session.    MULTILAYERED BANDAGING:  issued supplies, bandaged R LE with cotton stockinette, cotton padding ankle, lower leg, Komprex foam section dorsum of foot, Komprex foam 1 roll ankle to knee, 1-8cm and 2- 10cm Rosidal K rolls foot to knee, to leave intact 12-24 hrs as tolerated, discontinue with any problems, return rolled bandages next session.   Wash and wear schedules confirmed.   Modified for foot due to slip on loafer style shoe - advised preferred shoe over flip flops sandals, however with difficulty to apply and mild push of bandaging.  Used shoe horn and assistance for B Shoes.   Monitor shoe fit and comfort closely.    Shannan received therapeutic exercises to develop strength, endurance, ROM, flexibility, and posture including: aps, walking, avoid dependency and immobility, support of muscle pump with compression and activity.  Review of assisted GSS, towel assist heel slides with HF and KF, towel assist hamstring flossing, towel assist HF  Cueing on recommended stretching B  GSS - educated on movement for support, caution with L shoulder per protocol, Knee per Ortho.  THERAPEUTIC EXERCISES:  Continue HEP of AROM, stretching, and postural correction.     Home Exercises Provided and Patient Education Provided:  Self Care Home Management Training/Functional Therapeutic Activity x 5 minutes    [employer] trial completed 7/9/24 - home visit, awaiting delivery.  Therapy documentation to [employer], 6/28/24 - home pump consult per Dr. Alonso   Consideration for his reach and self application.  Basic clinic review of potential set up and  settings.    Email sent to [employer] 7/10/24 for updates on status of compression consults.  Response during session - awaiting insurance verification.   MD orders for KH garments and Velcro Inelastic Wraps - consult to [employer] with Du 6/28/24     Don/doff training and suggestions for his KH socks, garments, and if receives Wraps will train on return visit.    Planned travel to [place] for sister's memorial service - travel demands, compression assist may be available with family, movement, hydration and daily support.     Products recommended: 6/24/24    Jobst Relief KH 20-30mmHg  Ankle 29.0 cm  Calf  45.0 cm  Length tall   Due to efforts to apply and tall length recommending XLFC or XL sizing     Circ Aid Juxta Lite  C1 43.5  C  41.5  B 28.0  A  25.0  Length Tall   Recommend Large Long     Don with friend assist at this time, able to doff in evenings.   Choice of compression to self apply- Velcro Inelastic Wraps may be easier choice.   Products, recommendations as well as modifications in choice of product and orders as needed.   review of process of orders, to DME for fitting, sizing per vendor and Insurance billing for cost/coverage    Present compression:  zipper KH, KH 20-30mmHg socks  Orders and recommendations: KH 20-30mmHg and/or Velcro Inelastic Wraps, MD home pump consult  PATIENT/FAMILY Education: bandaging/compression wear schedule,  HEP,  Beginning of self massage,  Self or assisted bandaging, compression options, and Risk reduction    Written Home Exercises Provided: Patient instructed to cont prior HEP.  Home exercise and compression plan of management was reviewed and Shannan was able to demonstrate understanding prior to the end of the session.  Shannan demonstrated good  understanding of the education provided.     Assessment   Shannan is a 78 y.o. male referred to outpatient Physical Therapy with a medical diagnosis of   Diagnosis   I87.2 (ICD-10-CM) - Venous insufficiency   I89.0 (ICD-10-CM) - Lymphedema    This patient presents s/p multi year history of venous reflux CVI, tall stature, OA/DJD shoulder, knees, spine, history of skin cancers with dermatology following, recent L RTSA which may limit ability to pull on compression with B UE  resulting in: multifactorial venous related lymphedema of the B LE, increased pain, increased stiffness in the ankles, knees, tall  stature with history of vein stripping, as well as difficulty performing walking, sit to stand, reach for compression application, size/shape/length of compression needs, and placing the pt at higher risk of infection.     B LE have been managed with KH compression socks, therapy, exercise and elevation.  Pt continues to need daily assistance to don and will need daily support for B LE of compression and pump assist for lymphatic and venous mobility.  Pump is in process for delivery.  Status on new compression products is being checked.  Movement and compression will be best support on a daily basis.  Present compression socks are adequate but may show more benefit with medical grade products and don/doff ease of Velcro Inelastic Wraps.  Pt has travel to [place].  Awaiting new product support - use KH socks until new options are available.   Recommend return in 3-4 weeks for reassessment of status, compression support and new product training.    Mobility and self application issues to be considered -proceed to new home products and support - awaiting vendors for products.     Shannan Is progressing well towards his goals.   Pt prognosis is Good.     Pt will continue to benefit from skilled outpatient physical therapy to address the deficits listed in the problem list box on initial evaluation, provide pt/family education and to maximize pt's level of independence in the home and community environment.     Pt's spiritual, cultural and educational needs considered and pt agreeable to plan of care and goals.  Anticipated Barriers for therapy: CKD, sh  TSA, ability to reach feet, knee pain     The following goals were discussed with the patient and patient is in agreement with them as to be addressed in the treatment plan.      Short Term Goals: (6 weeks)  1. Patient will show decreased girth in B LE by up to 2 cm to allow for LE symmetry, shoe and clothing choice, and ability to apply needed compression.  met  RLE   2. Patient will demonstrate 100% knowledge of lymphedema precautions and signs of infection to allow for reduced lymphedema risk, infection risk, and/or exacerbation of condition. met)  3. Patient or caregiver will perform self-bandaging techniques and/or wearing of compression garments to allow for lymphatic drainage support, skin elasticity, and reduction in shape and size of limb. (progressing, R- wearing socks daily  4. Patient will perform self lymph drainage techniques to areas within reach to enhance lymphatic drainage and skin condition.  (progressing  5. Patient will tolerate daily activities with multilayered bandaging to allow for lymphatic and venous support.  Met R    Long Term Goals: (12  weeks)  1. Patient will show decreased girth in B LE by up to 3 cm  to allow for LE symmetry, shoe and clothing choice, and ability to apply needed compression daily.  Met R LE  2. Patient will show reduction in density to mild or less with improved contour of limb to allow for cosmesis, LE symmetry, infection risk reduction, and clothing and compression choice.   (progressing, less pitting remains  3. Patient to rowan/doff compression garment with daily compliance to assist in lymphedema management, skin elasticity, and tissue density.  (progressing, wearing socks  4. Pt to show improved postural awareness and alignment.  (progressing,  5. Pt to be I and compliant with HEP to allow for increased function in affected limb.   (progressing, ongoing  Plan   Plan of care Certification: 5/31/2024 to 8/23/24.     Outpatient Physical Therapy 2 times weekly for  10 weeks to include the following interventions: Patient Education, Self Care, Therapeutic Activities, and Therapeutic Exercise. Complete Decongestive Therapy- compression and home equipment needs to be addressed and assisted.  Recommend return in 3-4 weeks for reassessment of status, compression support and new product training.     Geneva Owens, PT

## 2024-07-15 ENCOUNTER — PATIENT MESSAGE (OUTPATIENT)
Dept: FAMILY MEDICINE | Facility: CLINIC | Age: 79
End: 2024-07-15
Payer: MEDICARE

## 2024-07-15 ENCOUNTER — PATIENT MESSAGE (OUTPATIENT)
Dept: ADMINISTRATIVE | Facility: HOSPITAL | Age: 79
End: 2024-07-15
Payer: MEDICARE

## 2024-07-16 ENCOUNTER — PATIENT OUTREACH (OUTPATIENT)
Dept: ADMINISTRATIVE | Facility: HOSPITAL | Age: 79
End: 2024-07-16
Payer: MEDICARE

## 2024-07-16 DIAGNOSIS — Z79.52 LONG TERM CURRENT USE OF SYSTEMIC STEROIDS: ICD-10-CM

## 2024-07-16 DIAGNOSIS — M25.60 STIFFNESS DUE TO IMMOBILITY: ICD-10-CM

## 2024-07-16 DIAGNOSIS — Z74.09 STIFFNESS DUE TO IMMOBILITY: ICD-10-CM

## 2024-07-16 DIAGNOSIS — R53.1 WEAKNESS: Primary | ICD-10-CM

## 2024-07-16 NOTE — PROGRESS NOTES
Population Health Chart Review & Patient Outreach Details      Additional [facility] Notes:    DEXA order placed. Called patient to assist with setting up bone density. No answer, LVM.        Updates Requested / Reviewed:      Updated Care Coordination Note, Care Everywhere, Care Team Updated, and Immunizations Reconciliation Completed or Queried: [facility] Topics Overdue:      [facility] Score: 0     Patient is not due for any topics at this time.    RSV Vaccine                  Health Maintenance Topic(s) Outreach Outcomes & Actions Taken:    Osteoporosis Screening - Outreach Outcomes & Actions Taken  : Dexa Order Placed

## 2024-07-30 ENCOUNTER — HOSPITAL ENCOUNTER (OUTPATIENT)
Dept: RADIOLOGY | Facility: HOSPITAL | Age: 79
Discharge: HOME | End: 2024-07-30
Attending: STUDENT IN AN ORGANIZED HEALTH CARE EDUCATION/TRAINING PROGRAM
Payer: MEDICARE

## 2024-07-30 ENCOUNTER — OFFICE VISIT (OUTPATIENT)
Dept: SPORTS MEDICINE | Facility: CLINIC | Age: 79
End: 2024-07-30
Payer: MEDICARE

## 2024-07-30 VITALS
DIASTOLIC BLOOD PRESSURE: 69 MMHG | SYSTOLIC BLOOD PRESSURE: 113 MMHG | HEIGHT: 73 IN | WEIGHT: 186.75 LBS | HEART RATE: 60 BPM | BODY MASS INDEX: 24.75 KG/M2

## 2024-07-30 DIAGNOSIS — Z96.612 S/P REVERSE TOTAL SHOULDER ARTHROPLASTY, LEFT: ICD-10-CM

## 2024-07-30 DIAGNOSIS — M19.012 GLENOHUMERAL ARTHRITIS, LEFT: ICD-10-CM

## 2024-07-30 DIAGNOSIS — Z96.612 S/P REVERSE TOTAL SHOULDER ARTHROPLASTY, LEFT: Primary | ICD-10-CM

## 2024-07-30 PROCEDURE — 1160F RVW MEDS BY RX/DR IN RCRD: CPT | Mod: CPTII,S$GLB,, | Performed by: STUDENT IN AN ORGANIZED HEALTH CARE EDUCATION/TRAINING PROGRAM

## 2024-07-30 PROCEDURE — 99999 PR PBB SHADOW E&M-EST. PATIENT-LVL IV: CPT | Mod: PBBFAC,,, | Performed by: STUDENT IN AN ORGANIZED HEALTH CARE EDUCATION/TRAINING PROGRAM

## 2024-07-30 PROCEDURE — 3074F SYST BP LT 130 MM HG: CPT | Mod: CPTII,S$GLB,, | Performed by: STUDENT IN AN ORGANIZED HEALTH CARE EDUCATION/TRAINING PROGRAM

## 2024-07-30 PROCEDURE — 1101F PT FALLS ASSESS-DOCD LE1/YR: CPT | Mod: CPTII,S$GLB,, | Performed by: STUDENT IN AN ORGANIZED HEALTH CARE EDUCATION/TRAINING PROGRAM

## 2024-07-30 PROCEDURE — 99213 OFFICE O/P EST LOW 20 MIN: CPT | Mod: S$GLB,,, | Performed by: STUDENT IN AN ORGANIZED HEALTH CARE EDUCATION/TRAINING PROGRAM

## 2024-07-30 PROCEDURE — 3078F DIAST BP <80 MM HG: CPT | Mod: CPTII,S$GLB,, | Performed by: STUDENT IN AN ORGANIZED HEALTH CARE EDUCATION/TRAINING PROGRAM

## 2024-07-30 PROCEDURE — 73030 X-RAY EXAM OF SHOULDER: CPT | Mod: TC,LT

## 2024-07-30 PROCEDURE — 1126F AMNT PAIN NOTED NONE PRSNT: CPT | Mod: CPTII,S$GLB,, | Performed by: STUDENT IN AN ORGANIZED HEALTH CARE EDUCATION/TRAINING PROGRAM

## 2024-07-30 PROCEDURE — 1159F MED LIST DOCD IN RCRD: CPT | Mod: CPTII,S$GLB,, | Performed by: STUDENT IN AN ORGANIZED HEALTH CARE EDUCATION/TRAINING PROGRAM

## 2024-07-30 PROCEDURE — 3288F FALL RISK ASSESSMENT DOCD: CPT | Mod: CPTII,S$GLB,, | Performed by: STUDENT IN AN ORGANIZED HEALTH CARE EDUCATION/TRAINING PROGRAM

## 2024-07-30 PROCEDURE — 73030 X-RAY EXAM OF SHOULDER: CPT | Mod: 26,LT,, | Performed by: RADIOLOGY

## 2024-07-30 NOTE — PROGRESS NOTES
Subjective:     Chief Complaint: Shannan Norman is a 79 y.o. male who had concerns including Pain of the Left Shoulder.    HPI    Patient is here almost 5 months s/p Left rTSA.  He reports 0/10 pain and is not taking anything for pain.  He denies any nausea, vomiting, fever, chills, shortness of breath, or calf pain. He is no longer attending formal physical therapy at [facility] and continues with his own exercise . His motion has returned.     He does complain of right shoulder pain.  He says that he has limited motion of his arm and can barely reaches arm to or above his shoulder level.  This is getting progressively worse.    PROCEDURE PERFORMED by Quinten Dominique MD on 03/04/2024:   left open reverse shoulder arthroplasty (CPT 70421)  left open biceps tenodesis (CPT 12291-33)    Past Medical History:   Diagnosis Date    Allergy     Arthritis     Atrial fibrillation     Atrial flutter 2011    ablation    Basal cell carcinoma     Cancer     Cataract     CKD (chronic kidney disease) stage 3, GFR 30-59 ml/min 8/13/2019    Diabetes mellitus     Dry eye syndrome     Gout, unspecified     High cholesterol     History of shingles     Hypertension     Melanoma     Seizures        Current Outpatient Medications on File Prior to Visit   Medication Sig Dispense Refill    allopurinoL (ZYLOPRIM) 100 MG tablet Take 1 tablet by mouth once daily (Patient taking differently: Take by mouth nightly. Take 1 tablet by mouth once daily) 90 tablet 3    atorvastatin (LIPITOR) 40 MG tablet Take 1 tablet (40 mg total) by mouth once daily. (Patient taking differently: Take 40 mg by mouth every evening.) 90 tablet 3    benazepriL (LOTENSIN) 20 MG tablet Take 1 tablet (20 mg total) by mouth once daily. (Patient taking differently: Take 20 mg by mouth every evening.) 90 tablet 3    celecoxib (CELEBREX) 200 MG capsule Take 1 capsule (200 mg total) by mouth 2 (two) times daily with meals. 30 capsule 0    cycloSPORINE (RESTASIS) 0.05 %  ophthalmic emulsion Place 1 drop into both eyes 2 (two) times daily. 60 each 11    econazole nitrate 1 % cream Apply topically 2 (two) times daily. 85 g 3    ferrous sulfate (FEOSOL) 325 mg (65 mg iron) Tab tablet Take 325 mg by mouth once daily.      GLUCOSAMINE HCL/CHONDR VASQUEZ A NA (GLUCOSAMINE-CHONDROITIN) 750-600 mg Tab Take 2 tablets by mouth Daily.      modafiniL (PROVIGIL) 100 MG Tab Take 1/4-1/2 tablet in AM as needed for excessive daytime fatigue 60 tablet 5    multivitamin capsule Take 1 capsule by mouth nightly.       rivaroxaban (XARELTO) 20 mg Tab Take 1 tablet (20 mg total) by mouth once daily. 90 tablet 3    tamsulosin (FLOMAX) 0.4 mg Cap TAKE 1 CAPSULE BY MOUTH AFTER DINNER 90 capsule 3    UNABLE TO FIND Take by mouth every morning. 6000 hydrolyzes collagen      vibegron 75 mg Tab Take 1 tablet (75 mg total) by mouth once daily. 30 tablet 11    imiquimod (ALDARA) 5 % cream Apply to skin cancer on left leg 5 times weekly for 6 weeks 24 packet 3    levocetirizine (XYZAL) 5 MG tablet Take 1 tablet (5 mg total) by mouth every evening. 90 tablet 1    metoprolol tartrate (LOPRESSOR) 25 MG tablet Take 1 tablet (25 mg total) by mouth 2 (two) times daily as needed (palpitations). 45 tablet 0     No current facility-administered medications on file prior to visit.       Past Surgical History:   Procedure Laterality Date    ABLATION N/A 9/11/2018    Procedure: ABLATION;  Surgeon: Hany Sanchez MD;  Location: [facility] CATH LAB;  Service: Cardiology;  Laterality: N/A;  AFL, ISABELLE, RFA, ELODIA, MAC, DM, 3 PREP    ABLATION OF DYSRHYTHMIC FOCUS      ADENOIDECTOMY      CARDIAC PACEMAKER PLACEMENT      no defib    CATARACT EXTRACTION W/  INTRAOCULAR LENS IMPLANT Right 7/28/2020    Procedure: EXTRACTION, CATARACT, WITH IOL INSERTION;  Surgeon: Andrés Morse MD;  Location: [facility] OR;  Service: Ophthalmology;  Laterality: Right;    CATARACT EXTRACTION W/  INTRAOCULAR LENS IMPLANT Left 8/11/2020    Procedure: EXTRACTION,  CATARACT, WITH IOL INSERTION;  Surgeon: Andrés Morse MD;  Location: [facility];  Service: Ophthalmology;  Laterality: Left;    COLONOSCOPY N/A 1/2/2019    Procedure: COLONOSCOPY Suprep;  Surgeon: Cindy Solorzano MD;  Location: [facility] ENDO;  Service: Endoscopy;  Laterality: N/A;    FIXATION OF TENDON Left 3/4/2024    Procedure: BICEPS TENODESIS;  Surgeon: Quinten Dominique MD;  Location: [address];  Service: Orthopedics;  Laterality: Left;  WITH CATHETER    GANGLION CYST EXCISION      left neck    HERNIA REPAIR  2013    umbilical    REVERSE TOTAL SHOULDER ARTHROPLASTY Left 3/4/2024    Procedure: ARTHROPLASTY, SHOULDER, TOTAL, REVERSE;  Surgeon: Quinten Dominique MD;  Location: [address];  Service: Orthopedics;  Laterality: Left;  WITH CATHETER    TENDON REPAIR Right     wrist    TONSILLECTOMY      varicose veins      several sx  bilateral    VASECTOMY      VEIN SURGERY         Family History   Problem Relation Name Age of Onset    Diabetes Mother      COPD Father      Heart disease Father      Arthritis Sister Aregnis     Heart attack Brother      Heart disease Brother      Diabetes Brother      No Known Problems Maternal Aunt      No Known Problems Maternal Uncle      No Known Problems Paternal Aunt      No Known Problems Paternal Uncle      No Known Problems Maternal Grandmother      No Known Problems Maternal Grandfather      No Known Problems Paternal Grandmother      No Known Problems Paternal Grandfather      No Known Problems Son Shannan     Cancer Sister Tianna         lymph node, breast    No Known Problems Sister Aaliyah     No Known Problems Son Nimesh     Amblyopia Neg Hx      Blindness Neg Hx      Cataracts Neg Hx      Glaucoma Neg Hx      Hypertension Neg Hx      Macular degeneration Neg Hx      Retinal detachment Neg Hx      Strabismus Neg Hx      Stroke Neg Hx      Thyroid disease Neg Hx      Prostate cancer Neg Hx      Kidney disease Neg Hx      Melanoma Neg Hx         Social History      Socioeconomic History    Marital status:    Occupational History     Employer: [employer]   Tobacco Use    Smoking status: Never    Smokeless tobacco: Never   Substance and Sexual Activity    Alcohol use: Yes     Alcohol/week: 7.0 - 14.0 standard drinks of alcohol     Types: 7 - 14 Glasses of wine per week    Drug use: No    Sexual activity: Yes     Partners: Female     Social Determinants of Health     Financial Resource Strain: Low Risk  (1/25/2024)    Overall Financial Resource Strain (CARDIA)     Difficulty of Paying Living Expenses: Not hard at all   Food Insecurity: No Food Insecurity (1/25/2024)    Hunger Vital Sign     Worried About Running Out of Food in the Last Year: Never true     Ran Out of Food in the Last Year: Never true   Transportation Needs: No Transportation Needs (1/25/2024)    PRAPARE - Transportation     Lack of Transportation (Medical): No     Lack of Transportation (Non-Medical): No   Physical Activity: Inactive (1/25/2024)    Exercise Vital Sign     Days of Exercise per Week: 0 days     Minutes of Exercise per Session: 30 min   Stress: No Stress Concern Present (1/25/2024)    [employer] [place] [employer] - Occupational Stress Questionnaire     Feeling of Stress : Only a little   Housing Stability: Low Risk  (1/25/2024)    Housing Stability Vital Sign     Unable to Pay for Housing in the Last Year: No     Number of Places Lived in the Last Year: 1     Unstable Housing in the Last Year: No      Review of Systems   Constitutional: Negative.   HENT: Negative.     Eyes: Negative.    Cardiovascular: Negative.    Respiratory: Negative.     Endocrine: Negative.    Hematologic/Lymphatic: Negative.    Skin: Negative.    Musculoskeletal:  Positive for joint pain and muscle weakness. Negative for joint swelling and stiffness.   Neurological: Negative.    Psychiatric/Behavioral: Negative.     Allergic/Immunologic: Negative.        Pain Related Questions  Over the past 3  days, what was your average pain during activity? (I.e. running, jogging, walking, climbing stairs, getting dressed, ect.): 0  Over the past 3 days, what was your highest pain level?: 0  Over the past 3 days, what was your lowest pain level? : 0    Other  How many nights a week are you awakened by your affected body part?: 0  Was the patient's HEIGHT measured or patient reported?: Patient Reported  Was the patient's WEIGHT measured or patient reported?: Measured    Objective:     General: Shannan is well-developed, well-nourished, appears stated age, in no acute distress, alert and oriented to time, place and person.     General    Nursing note and vitals reviewed.  Constitutional: He is oriented to person, place, and time. He appears well-developed and well-nourished. No distress.   HENT:   Head: Normocephalic and atraumatic.   Nose: Nose normal.   Eyes: EOM are normal.   Cardiovascular:  Intact distal pulses.            Pulmonary/Chest: Effort normal. No respiratory distress.   Neurological: He is alert and oriented to person, place, and time.   Psychiatric: He has a normal mood and affect. His behavior is normal. Judgment and thought content normal.         Right Shoulder Exam     Inspection/Observation   Swelling: absent  Bruising: absent  Scars: absent  Deformity: absent  Scapular Winging: absent  Scapular Dyskinesia: negative  Atrophy: absent    Left Shoulder Exam     Inspection/Observation   Swelling: absent  Bruising: absent  Scars: present  Deformity: absent  Scapular Winging: absent  Scapular Dyskinesia: negative  Atrophy: absent    Other   Sensation: normal     Comments:  Active motion 120/30      Vascular Exam       Left Pulses      Radial:                    2+      Capillary Refill  Left Hand: normal capillary refill          Imaging:   X-rays of the left shoulder from 07/30/2024  personally viewed by me on that day these include AP, Grashey, scapular Y. reverse shoulder arthroplasty.  No noted  complications.        Assessment:   Shannan Norman is a 79 y.o. male status post above and doing well.  He is now beginning to have right shoulder issues.  Encounter Diagnoses   Name Primary?    Primary osteoarthritis of left knee     S/P reverse total shoulder arthroplasty, left Yes        Plan:     He is doing well with the left reverse shoulder arthroplasty.  He will continue to work on strengthening on his own.      Regarding the right, he is beginning to become symptomatic.  I did offer a corticosteroid injection, however this was declined at this time.    Return to clinic in 2 months with x-rays of the bilateral shoulders and we can possibly discuss right shoulder surgical intervention.    All of the patient's questions were answered. Patient was advised to call the clinic or contact me through the patient portal for any questions or concerns.         Patient questionnaires may have been collected.

## 2024-08-02 ENCOUNTER — PATIENT MESSAGE (OUTPATIENT)
Dept: FAMILY MEDICINE | Facility: CLINIC | Age: 79
End: 2024-08-02

## 2024-08-02 ENCOUNTER — OFFICE VISIT (OUTPATIENT)
Dept: FAMILY MEDICINE | Facility: CLINIC | Age: 79
End: 2024-08-02
Attending: FAMILY MEDICINE
Payer: MEDICARE

## 2024-08-02 VITALS
HEART RATE: 65 BPM | HEIGHT: 73 IN | OXYGEN SATURATION: 96 % | DIASTOLIC BLOOD PRESSURE: 60 MMHG | WEIGHT: 185.88 LBS | SYSTOLIC BLOOD PRESSURE: 118 MMHG | BODY MASS INDEX: 24.63 KG/M2

## 2024-08-02 DIAGNOSIS — I15.2 HYPERTENSION ASSOCIATED WITH DIABETES: Primary | ICD-10-CM

## 2024-08-02 DIAGNOSIS — I48.0 PAF (PAROXYSMAL ATRIAL FIBRILLATION): ICD-10-CM

## 2024-08-02 DIAGNOSIS — E11.59 HYPERTENSION ASSOCIATED WITH DIABETES: Primary | ICD-10-CM

## 2024-08-02 DIAGNOSIS — R35.0 BENIGN PROSTATIC HYPERPLASIA WITH URINARY FREQUENCY: ICD-10-CM

## 2024-08-02 DIAGNOSIS — G89.29 CHRONIC PAIN OF BOTH SHOULDERS: ICD-10-CM

## 2024-08-02 DIAGNOSIS — M25.511 CHRONIC PAIN OF BOTH SHOULDERS: ICD-10-CM

## 2024-08-02 DIAGNOSIS — I10 PRIMARY HYPERTENSION: ICD-10-CM

## 2024-08-02 DIAGNOSIS — L85.3 DRY SKIN DERMATITIS: ICD-10-CM

## 2024-08-02 DIAGNOSIS — M25.512 CHRONIC PAIN OF BOTH SHOULDERS: ICD-10-CM

## 2024-08-02 DIAGNOSIS — E11.9 TYPE 2 DIABETES MELLITUS WITHOUT RETINOPATHY: ICD-10-CM

## 2024-08-02 DIAGNOSIS — Z96.612 S/P REVERSE TOTAL SHOULDER ARTHROPLASTY, LEFT: ICD-10-CM

## 2024-08-02 DIAGNOSIS — I42.8 NICM (NONISCHEMIC CARDIOMYOPATHY): ICD-10-CM

## 2024-08-02 DIAGNOSIS — E78.5 DYSLIPIDEMIA ASSOCIATED WITH TYPE 2 DIABETES MELLITUS: ICD-10-CM

## 2024-08-02 DIAGNOSIS — N40.1 BENIGN PROSTATIC HYPERPLASIA WITH URINARY FREQUENCY: ICD-10-CM

## 2024-08-02 DIAGNOSIS — E11.69 DYSLIPIDEMIA ASSOCIATED WITH TYPE 2 DIABETES MELLITUS: ICD-10-CM

## 2024-08-02 PROCEDURE — 99999 PR PBB SHADOW E&M-EST. PATIENT-LVL IV: CPT | Mod: PBBFAC,,, | Performed by: FAMILY MEDICINE

## 2024-08-02 RX ORDER — CELECOXIB 200 MG/1
200 CAPSULE ORAL 2 TIMES DAILY WITH MEALS
Qty: 90 CAPSULE | Refills: 4 | Status: SHIPPED | OUTPATIENT
Start: 2024-08-02

## 2024-08-02 RX ORDER — TRIAMCINOLONE ACETONIDE 1 MG/G
CREAM TOPICAL 2 TIMES DAILY
Qty: 30 G | Refills: 3 | Status: SHIPPED | OUTPATIENT
Start: 2024-08-02

## 2024-08-02 RX ORDER — METOPROLOL TARTRATE 25 MG/1
25 TABLET, FILM COATED ORAL 2 TIMES DAILY PRN
Qty: 45 TABLET | Refills: 0 | Status: SHIPPED | OUTPATIENT
Start: 2024-08-02 | End: 2024-08-25

## 2024-08-02 NOTE — PROGRESS NOTES
Subjective:       Patient ID: Shannan Norman is a 79 y.o. male.    Chief Complaint: Follow-up    79  yr old pleasant white male with DM II controlled, HTN, HLD, obesity, presents today for his lab work review and 6 month follow up and med refills. C/o rash in arms and itching x 1-2 weeks. Not around plants.    BPH - much better  - no dysuria - tried flomax and proscar and nothing helps       DM II - controlled -  diet control -      HGBA1C                   5.9 (H)             04/02/2024                                      - on ACE and ASA - UTD with eye and foot screen      HTN - controlled - oN ACE - compliant - no side effects      HLD - controlled - on statin - compliant - no side effects -     LDLCALC                  61.4 (L)            10/03/2023                                         History as below - reviewed and no changes    HM  -labs UTD  -PSA UTD  -adacel UTD          Follow-up  Associated symptoms include arthralgias, myalgias and a rash. Pertinent negatives include no chest pain, congestion, coughing, diaphoresis, headaches, neck pain, visual change, vomiting or weakness.   Diabetes  He presents for his follow-up diabetic visit. He has type 2 diabetes mellitus. His disease course has been stable. Pertinent negatives for hypoglycemia include no confusion, dizziness, headaches, hunger, mood changes, nervousness/anxiousness, seizures, sleepiness, speech difficulty, sweats or tremors. Pertinent negatives for diabetes include no chest pain, no foot ulcerations, no polydipsia, no polyuria, no visual change, no weakness and no weight loss. Symptoms are stable. Pertinent negatives for diabetic complications include no CVA, PVD or retinopathy. Risk factors for coronary artery disease include diabetes mellitus, dyslipidemia, hypertension and male sex. He is compliant with treatment all of the time. He is following a generally healthy diet. Meal planning includes avoidance of concentrated sweets. He rarely  participates in exercise. An ACE inhibitor/angiotensin II receptor blocker is being taken. He does not see a podiatrist.Eye exam is current.   Hypertension  This is a chronic problem. The current episode started more than 1 month ago. The problem has been gradually improving since onset. The problem is controlled. Pertinent negatives include no chest pain, headaches, malaise/fatigue, neck pain, palpitations, peripheral edema, PND or sweats. There are no associated agents to hypertension. Risk factors for coronary artery disease include diabetes mellitus, dyslipidemia, male gender and obesity. Past treatments include ACE inhibitors. The current treatment provides significant improvement. There are no compliance problems.  There is no history of angina, CAD/MI, CVA, left ventricular hypertrophy, PVD or retinopathy. There is no history of chronic renal disease, coarctation of the aorta, hypercortisolism, hyperparathyroidism, pheochromocytoma, renovascular disease or a thyroid problem.   Hyperlipidemia  This is a chronic problem. The current episode started more than 1 year ago. The problem is controlled. Recent lipid tests were reviewed and are normal. Exacerbating diseases include diabetes and obesity. He has no history of chronic renal disease. Associated symptoms include myalgias. Pertinent negatives include no chest pain or leg pain. The current treatment provides significant improvement of lipids. There are no compliance problems.  Risk factors for coronary artery disease include diabetes mellitus, dyslipidemia, hypertension, male sex and obesity.   Benign Prostatic Hypertrophy  This is a chronic problem. The current episode started more than 1 year ago. The problem has been gradually worsening since onset. Irritative symptoms include nocturia. Irritative symptoms do not include frequency or urgency. Obstructive symptoms include dribbling, incomplete emptying, an intermittent stream, a slower stream and straining.  Pertinent negatives include no hematuria or vomiting. AUA score is 8-19. He is sexually active. Nothing aggravates the symptoms. Past treatments include tamsulosin. The treatment provided significant relief.     Review of Systems   Constitutional: Negative.  Negative for activity change, diaphoresis, malaise/fatigue, unexpected weight change and weight loss.   HENT: Negative.  Negative for congestion, ear pain, mouth sores, rhinorrhea and voice change.    Eyes: Negative.  Negative for pain, discharge and visual disturbance.   Respiratory: Negative.  Negative for apnea, cough and wheezing.    Cardiovascular: Negative.  Negative for chest pain, palpitations and PND.   Gastrointestinal: Negative.  Negative for abdominal distention, anal bleeding, diarrhea and vomiting.   Endocrine: Negative.  Negative for cold intolerance, polydipsia and polyuria.   Genitourinary:  Positive for incomplete emptying and nocturia. Negative for decreased urine volume, difficulty urinating, frequency, hematuria, penile discharge, scrotal swelling and urgency.   Musculoskeletal:  Positive for arthralgias and myalgias. Negative for back pain, neck pain and neck stiffness.   Skin:  Positive for rash. Negative for color change.   Allergic/Immunologic: Negative.  Negative for environmental allergies and immunocompromised state.   Neurological: Negative.  Negative for dizziness, tremors, seizures, speech difficulty, weakness, light-headedness and headaches.   Hematological: Negative.    Psychiatric/Behavioral: Negative.  Negative for agitation, confusion, dysphoric mood and suicidal ideas. The patient is not nervous/anxious.        PMH/PSH/FH/SH/MED/ALLERGY reviewed    Past Medical History:   Diagnosis Date    Allergy     Arthritis     Atrial fibrillation     Atrial flutter 2011    ablation    Basal cell carcinoma     Cancer     Cataract     CKD (chronic kidney disease) stage 3, GFR 30-59 ml/min 8/13/2019    Diabetes mellitus     Dry eye syndrome      Gout, unspecified     High cholesterol     History of shingles     Hypertension     Melanoma     Seizures        Past Surgical History:   Procedure Laterality Date    ABLATION N/A 9/11/2018    Procedure: ABLATION;  Surgeon: Hany Sanchez MD;  Location: [facility] CATH LAB;  Service: Cardiology;  Laterality: N/A;  AFL, ISABELLE, RFA, ELODIA, MAC, DM, 3 PREP    ABLATION OF DYSRHYTHMIC FOCUS      ADENOIDECTOMY      CARDIAC PACEMAKER PLACEMENT      no defib    CATARACT EXTRACTION W/  INTRAOCULAR LENS IMPLANT Right 7/28/2020    Procedure: EXTRACTION, CATARACT, WITH IOL INSERTION;  Surgeon: Andrés Morse MD;  Location: [facility];  Service: Ophthalmology;  Laterality: Right;    CATARACT EXTRACTION W/  INTRAOCULAR LENS IMPLANT Left 8/11/2020    Procedure: EXTRACTION, CATARACT, WITH IOL INSERTION;  Surgeon: Andrés Morse MD;  Location: [facility];  Service: Ophthalmology;  Laterality: Left;    COLONOSCOPY N/A 1/2/2019    Procedure: COLONOSCOPY Suprep;  Surgeon: Cindy Solorzano MD;  Location: [facility] ENDO;  Service: Endoscopy;  Laterality: N/A;    FIXATION OF TENDON Left 3/4/2024    Procedure: BICEPS TENODESIS;  Surgeon: Quinten Dominique MD;  [address];  Service: Orthopedics;  Laterality: Left;  WITH CATHETER    GANGLION CYST EXCISION      left neck    HERNIA REPAIR  2013    umbilical    REVERSE TOTAL SHOULDER ARTHROPLASTY Left 3/4/2024    Procedure: ARTHROPLASTY, SHOULDER, TOTAL, REVERSE;  Surgeon: Quinten Dominique MD;  [address];  Service: Orthopedics;  Laterality: Left;  WITH CATHETER    TENDON REPAIR Right     wrist    TONSILLECTOMY      varicose veins      several sx  bilateral    VASECTOMY      VEIN SURGERY         Family History   Problem Relation Name Age of Onset    Diabetes Mother      COPD Father      Heart disease Father      Arthritis Sister Argenis     Heart attack Brother      Heart disease Brother      Diabetes Brother      No Known Problems Maternal Aunt      No Known  Problems Maternal Uncle      No Known Problems Paternal Aunt      No Known Problems Paternal Uncle      No Known Problems Maternal Grandmother      No Known Problems Maternal Grandfather      No Known Problems Paternal Grandmother      No Known Problems Paternal Grandfather      No Known Problems Son Shannan     Cancer Sister Tianna         lymph node, breast    No Known Problems Sister Aaliyah     No Known Problems Son Nimesh     Amblyopia Neg Hx      Blindness Neg Hx      Cataracts Neg Hx      Glaucoma Neg Hx      Hypertension Neg Hx      Macular degeneration Neg Hx      Retinal detachment Neg Hx      Strabismus Neg Hx      Stroke Neg Hx      Thyroid disease Neg Hx      Prostate cancer Neg Hx      Kidney disease Neg Hx      Melanoma Neg Hx         Social History     Socioeconomic History    Marital status:    Occupational History     Employer: [employer]   Tobacco Use    Smoking status: Never     Passive exposure: Never    Smokeless tobacco: Never   Substance and Sexual Activity    Alcohol use: Yes     Alcohol/week: 7.0 - 14.0 standard drinks of alcohol     Types: 7 - 14 Glasses of wine per week    Drug use: No    Sexual activity: Yes     Partners: Female     Social Determinants of Health     Financial Resource Strain: Low Risk  (1/25/2024)    Overall Financial Resource Strain (CARDIA)     Difficulty of Paying Living Expenses: Not hard at all   Food Insecurity: No Food Insecurity (1/25/2024)    Hunger Vital Sign     Worried About Running Out of Food in the Last Year: Never true     Ran Out of Food in the Last Year: Never true   Transportation Needs: No Transportation Needs (1/25/2024)    PRAPARE - Transportation     Lack of Transportation (Medical): No     Lack of Transportation (Non-Medical): No   Physical Activity: Inactive (1/25/2024)    Exercise Vital Sign     Days of Exercise per Week: 0 days     Minutes of Exercise per Session: 30 min   Stress: No Stress Concern Present (1/25/2024)    [place]  [place] [employer] - Occupational Stress Questionnaire     Feeling of Stress : Only a little   Housing Stability: Low Risk  (1/25/2024)    Housing Stability Vital Sign     Unable to Pay for Housing in the Last Year: No     Number of Places Lived in the Last Year: 1     Unstable Housing in the Last Year: No       Current Outpatient Medications   Medication Sig Dispense Refill    allopurinoL (ZYLOPRIM) 100 MG tablet Take 1 tablet by mouth once daily (Patient taking differently: Take by mouth nightly. Take 1 tablet by mouth once daily) 90 tablet 3    atorvastatin (LIPITOR) 40 MG tablet Take 1 tablet (40 mg total) by mouth once daily. (Patient taking differently: Take 40 mg by mouth every evening.) 90 tablet 3    benazepriL (LOTENSIN) 20 MG tablet Take 1 tablet (20 mg total) by mouth once daily. (Patient taking differently: Take 20 mg by mouth every evening.) 90 tablet 3    cycloSPORINE (RESTASIS) 0.05 % ophthalmic emulsion Place 1 drop into both eyes 2 (two) times daily. 60 each 11    econazole nitrate 1 % cream Apply topically 2 (two) times daily. 85 g 3    ferrous sulfate (FEOSOL) 325 mg (65 mg iron) Tab tablet Take 325 mg by mouth once daily.      GLUCOSAMINE HCL/CHONDR VASQUEZ A NA (GLUCOSAMINE-CHONDROITIN) 750-600 mg Tab Take 2 tablets by mouth Daily.      modafiniL (PROVIGIL) 100 MG Tab Take 1/4-1/2 tablet in AM as needed for excessive daytime fatigue 60 tablet 5    multivitamin capsule Take 1 capsule by mouth nightly.       rivaroxaban (XARELTO) 20 mg Tab Take 1 tablet (20 mg total) by mouth once daily. 90 tablet 3    tamsulosin (FLOMAX) 0.4 mg Cap TAKE 1 CAPSULE BY MOUTH AFTER DINNER 90 capsule 3    UNABLE TO FIND Take by mouth every morning. 6000 hydrolyzes collagen      vibegron 75 mg Tab Take 1 tablet (75 mg total) by mouth once daily. 30 tablet 11    celecoxib (CELEBREX) 200 MG capsule Take 1 capsule (200 mg total) by mouth 2 (two) times daily with meals. 90 capsule 4    metoprolol tartrate  (LOPRESSOR) 25 MG tablet Take 1 tablet (25 mg total) by mouth 2 (two) times daily as needed (palpitations). 45 tablet 0    triamcinolone acetonide 0.1% (KENALOG) 0.1 % cream Apply topically 2 (two) times daily. 30 g 3     No current facility-administered medications for this visit.       Review of patient's allergies indicates:  No Known Allergies      Objective:       Vitals:    08/02/24 0913   BP: 118/60   Pulse: 65       Physical Exam  Constitutional:       Appearance: He is well-developed.   HENT:      Head: Normocephalic and atraumatic.      Right Ear: External ear normal.      Left Ear: External ear normal.      Nose: Nose normal.      Mouth/Throat:      Pharynx: No oropharyngeal exudate.   Eyes:      General: No scleral icterus.        Right eye: No discharge.         Left eye: No discharge.      Conjunctiva/sclera: Conjunctivae normal.      Pupils: Pupils are equal, round, and reactive to light.   Neck:      Thyroid: No thyromegaly.      Vascular: No JVD.      Trachea: No tracheal deviation.   Cardiovascular:      Rate and Rhythm: Normal rate and regular rhythm.      Heart sounds: Normal heart sounds. No murmur heard.     No friction rub. No gallop.   Pulmonary:      Effort: Pulmonary effort is normal. No respiratory distress.      Breath sounds: Normal breath sounds. No stridor. No wheezing or rales.   Chest:      Chest wall: No tenderness.   Abdominal:      General: Bowel sounds are normal. There is no distension.      Palpations: Abdomen is soft. There is no mass.      Tenderness: There is no abdominal tenderness. There is no guarding or rebound.      Hernia: No hernia is present.   Musculoskeletal:         General: No tenderness. Normal range of motion.      Cervical back: Normal range of motion and neck supple.   Lymphadenopathy:      Cervical: No cervical adenopathy.   Skin:     General: Skin is warm and dry.      Coloration: Skin is not pale.      Findings: Erythema and rash present.      Comments:  Erythematous patchy rash in B/L arms    Neurological:      Mental Status: He is alert and oriented to person, place, and time.      Cranial Nerves: No cranial nerve deficit.      Motor: No abnormal muscle tone.      Coordination: Coordination normal.      Deep Tendon Reflexes: Reflexes are normal and symmetric. Reflexes normal.   Psychiatric:         Behavior: Behavior normal.         Thought Content: Thought content normal.         Judgment: Judgment normal.         Protective Sensation (w/ 10 gram monofilament):  Right: Intact  Left: Intact    Visual Inspection:  Normal -  Bilateral    Pedal Pulses:   Right: Present  Left: Present    Posterior tibialis:   Right:Present  Left: Present      Assessment:       1. Hypertension associated with diabetes    2. S/P reverse total shoulder arthroplasty, left    3. PAF (paroxysmal atrial fibrillation)    4. Dyslipidemia associated with type 2 diabetes mellitus    5. Benign prostatic hyperplasia with urinary frequency    6. Primary hypertension    7. Chronic pain of both shoulders    8. NICM (nonischemic cardiomyopathy)    9. Type 2 diabetes mellitus without retinopathy    10. Dry skin dermatitis        Plan:           Shannan was seen today for follow-up.    Diagnoses and all orders for this visit:    Hypertension associated with diabetes    S/P reverse total shoulder arthroplasty, left  -     celecoxib (CELEBREX) 200 MG capsule; Take 1 capsule (200 mg total) by mouth 2 (two) times daily with meals.    PAF (paroxysmal atrial fibrillation)    Dyslipidemia associated with type 2 diabetes mellitus    Benign prostatic hyperplasia with urinary frequency    Primary hypertension    Chronic pain of both shoulders  -     celecoxib (CELEBREX) 200 MG capsule; Take 1 capsule (200 mg total) by mouth 2 (two) times daily with meals.    NICM (nonischemic cardiomyopathy)  -     metoprolol tartrate (LOPRESSOR) 25 MG tablet; Take 1 tablet (25 mg total) by mouth 2 (two) times daily as needed  (palpitations).    Type 2 diabetes mellitus without retinopathy  -     Comprehensive Metabolic Panel; Future  -     Hemoglobin A1C; Future    Dry skin dermatitis  -     triamcinolone acetonide 0.1% (KENALOG) 0.1 % cream; Apply topically 2 (two) times daily.      Dry skin dermatitis  -kenalog with vaseline twice dialy    DM II  -controlled  -labs    HTN  -controlled    HLD  -stable    BPH  -controlled    SSS  -on pacemaker      Spent adequate time in obtaining history and explaining differentials    30 minutes spent during this visit of which greater than 50% devoted to face-face counseling and coordination of care regarding diagnosis and management plan    RTC 6 months or prn

## 2024-08-09 ENCOUNTER — TELEPHONE (OUTPATIENT)
Dept: DERMATOLOGY | Facility: CLINIC | Age: 79
End: 2024-08-09
Payer: MEDICARE

## 2024-08-09 ENCOUNTER — PATIENT MESSAGE (OUTPATIENT)
Dept: OTOLARYNGOLOGY | Facility: CLINIC | Age: 79
End: 2024-08-09
Payer: MEDICARE

## 2024-08-11 ENCOUNTER — PATIENT MESSAGE (OUTPATIENT)
Dept: ADMINISTRATIVE | Facility: OTHER | Age: 79
End: 2024-08-11
Payer: MEDICARE

## 2024-08-12 ENCOUNTER — CLINICAL SUPPORT (OUTPATIENT)
Dept: REHABILITATION | Facility: HOSPITAL | Age: 79
End: 2024-08-12
Payer: MEDICARE

## 2024-08-12 DIAGNOSIS — I87.2 VENOUS REFLUX: ICD-10-CM

## 2024-08-12 DIAGNOSIS — I89.0 LYMPHEDEMA OF BOTH LOWER EXTREMITIES: ICD-10-CM

## 2024-08-12 DIAGNOSIS — I87.2 VENOUS INSUFFICIENCY OF BOTH LOWER EXTREMITIES: Primary | ICD-10-CM

## 2024-08-12 DIAGNOSIS — M79.89 SWELLING OF LOWER EXTREMITY: ICD-10-CM

## 2024-08-12 PROCEDURE — 97535 SELF CARE MNGMENT TRAINING: CPT | Mod: KX

## 2024-08-12 PROCEDURE — 97140 MANUAL THERAPY 1/> REGIONS: CPT | Mod: KX

## 2024-08-12 NOTE — PLAN OF CARE
Pt will continue home pump assistance and daily compression support.  Recommend return in 3 months for reassessment and confirmation of  compression support.   I certify the need for these services furnished under this plan of treatment and while under my care    ____________________________________  Physician/Referring Practitioner    _______________  Date of Signature

## 2024-08-12 NOTE — PROGRESS NOTES
Physical Therapy Daily Treatment Note     Name: Shannan Norman  Clinic Number: [id number]    Therapy Diagnosis:   Encounter Diagnoses   Name Primary?    Venous insufficiency of both lower extremities Yes    Venous reflux     Swelling of lower extremity     Lymphedema of both lower extremities      Physician: Martell Alonso MD    Visit Date: 8/12/2024    Physician Orders: PT Eval and Treat - lymphedema  Medical Diagnosis from Referral:   Diagnosis   I87.2 (ICD-10-CM) - Venous insufficiency   I89.0 (ICD-10-CM) - Lymphedema   Evaluation Date: 5/31/2024  Authorization Period Expiration: 12/31/24  Plan of Care Expiration: 8/23/24- send update to [facility] - 11/15/24  Visit # / Visits authorized: 13/20     Time In: 900a  Time Out: 1000a  Total Billable Time: 60 minutes    FOTO 6/24/24    Per chart:  Impression:   No DVT in either thigh.   History of surgical stripping and EVLT to the greater saphenous veins bilaterally.   Venous insufficiency involving accessory greater saphenous veins bilaterally, as well as the left popliteal vein.   Enlarged but morphologically normal lymph node in the right groin, likely reactive.   Electronically signed by:Angelo Carrizales  Date:                                            04/26/2024       Precautions: Standard and R TSA, GSV vein stripping  pacemaker, CKD 3  Subjective     Pt reports: using BioTAB pump system x 1 month.  He has been wearing new KH compression garments over last 2 weeks.  He received his Velcro Wraps but has not tried them yet.    He was compliant with home compression/exercise program.  Response to previous treatment: showing reductions in size/shape and swelling of both legs.  Pt does require assistance to apply compression in the mornings but is able to self remove at night.  Pt with some questions on time and pressure for home pump system.  Able to wear compression daily.  Functional change: L shoulder with TSA, R shoulder achy at times.  Admits able to move his legs  slightly better since smaller.   He will practice Wraps if he chooses to wear.      Pain: 2-3/10  Location: bilateral knee joints more than leg swelling concerns, L > R      Objective   Tall male with KH 20-30mmHg JOBST slip on loafers.   Amount of Swelling/Location of Swelling: mild/mod to B LE ankles legs, knees and moving into thighs R > L, Soft bulbous area post knee / calf R   Skin Integrity: dry scaly skin, venous varicosities, bulges, broken veins - marked varicosities med thighs  Palpation/Texture: discoloration with brown/red discoloration distally including post calf to knee. discoloration along R post leg - similar temperature and presentation reported. No added pain or heat to touch.    Girth Measurements (in centimeters)  LANDMARK LEFT LE  5/31/24 L LE  8/12/24 Change  L RIGHT LE  5/31/24 R LE  6/10/24 R LE  7/3/24 R LE  8/12/24 Change  R LE DIFF   at eval   SBP 49.5 cm 44.5 5.0 50.0 cm 47.0 47.0 45.0 5.0 0.5 cm   10 below SBP 45.0 cm 38.0 7.0 45.5 cm 44.0 44.0 38.0 7.5 0.5 cm   20 below SBP 42.5 cm 37.0 5.5 46.0 cm 41.0 42.0 39.0 7.0 3.5 cm   30 below SBP 38.0 cm 29.5 8.5 37.0 cm 31.0 31.5 30.0 7.0 1.0 cm   35 below SBP 32.0 cm 26.0 6.0 32.0 cm 27.0 27.0 27.0 5.0 0 cm   Ankle 30.5 cm 27.0 3.5 33.0 cm 28.0 29.0 28.0 5.0 2.5 cm   Forefoot 24.0 cm 23.0 1.0 26.0 cm 24.0 24.5 24.0 2.0 2.0 cm     Required head of bed elevated, pillow behind lower back  no elevation wedge     Treatment:   Shannan received the following manual therapy techniques:- Manual Lymphatic Drainage were applied to the: R and L LE  for 35 minutes, including: MLD and short stretch compression bandaging   Follow dermatology recommendations per L leg and R forearm    MANUAL LYMPHATIC DRAINAGE (MLD):  not performed today  While supine with LEs elevated stimulation at terminus, along GI region, B inguinal regions, drainage of entire R LE [place] lower leg, ankle, and foot with return proximally,  Use of Aquaphor/Eucerin due to dryness.   Varied  support for knee position  Consider self massage to abdominal areas, B inguinal areas, thigh, and remaining LE within reach.    SEQUENTIAL COMPRESSION PUMP: not performed    MULTILAYERED BANDAGING:   Girth assessment  Don/doff trials of KH garments  Velcro Inelastic wrap to R LE  Reapplied his KH JOBST 20-30mmHg to R and LLE with shoes, slip on.    Shannan received therapeutic exercises to develop strength, endurance, ROM, flexibility, and posture including: aps, walking, avoid dependency and immobility, support of muscle pump with compression and activity.  Review of assisted GSS, towel assist heel slides with HF and KF, towel assist hamstring flossing, towel assist HF  Cueing on recommended stretching B  GSS - educated on movement for support, caution with L shoulder per protocol, Knee per Ortho.  THERAPEUTIC EXERCISES:  Continue HEP of AROM, stretching, and postural correction.     Home Exercises Provided and Patient Education Provided:  Self Care Home Management Training/Functional Therapeutic Activity x 25 minutes    BioTab home pump system - trained in settings recommended, time, inflate/deflate choices, pressure and time of use.   Vendor representative performed home  trial and set up.   BioTAB home pump consult per Dr. Alonso   Consideration for his reach and self application.  Basic clinic review of potential set up and settings.    Email sent to [employer] 7/10/24 for updates on status of compression consults.  Pt received new compression products with MD orders: KH garments and Velcro Inelastic Wraps - Airos with [employer]    Good fit and position of KH garments JOBST  Velcro Inelastic Wrap training with demo, written, pictures and practice- use of card for pressure - admitted due to significant reductions Large size may no longer be required.  Advised possible self application and techniques - trained and reviewed booklet inclosed with product.  Wash, wear schedules and product life span recommendations.     Girth  and sizing selected 6/24/24:  Jobst Relief KH 20-30mmHg  Ankle 29.0 cm  Calf  45.0 cm  Length tall   Due to efforts to apply and tall length recommending XLFC or XL sizing     Circ Aid Juxta Lite  C1 43.5  C  41.5  B 28.0  A  25.0  Length Tall   Recommend Large Long     Don with friend assist at this time, able to doff in evenings.   Choice of compression to self apply- Velcro Inelastic Wraps may be easier choice.   Products, recommendations as well as modifications in choice of product and orders as needed.   Present compression:  zipper KH, KH 20-30mmHg garments and wraps  Orders and recommendations: KH 20-30mmHg and/or Velcro Inelastic Wraps, MD home pump consult  PATIENT/FAMILY Education: bandaging/compression wear schedule,  HEP,  Beginning of self massage,  Self or assisted bandaging, compression options, and Risk reduction    Written Home Exercises Provided: Patient instructed to cont prior HEP.  Home exercise and compression plan of management was reviewed and Shannan was able to demonstrate understanding prior to the end of the session.  Shannan demonstrated good  understanding of the education provided.     Assessment   Shannan is a 78 y.o. male referred to outpatient Physical Therapy with a medical diagnosis of   Diagnosis   I87.2 (ICD-10-CM) - Venous insufficiency   I89.0 (ICD-10-CM) - Lymphedema   This patient presents s/p multi year history of venous reflux CVI, tall stature, OA/DJD shoulder, knees, spine, history of skin cancers with dermatology following, recent L RTSA which may limit ability to pull on compression with B UE  resulting in: multifactorial venous related lymphedema of the B LE, increased pain, increased stiffness in the ankles, knees, tall  stature with history of vein stripping, as well as difficulty performing walking, sit to stand, reach for compression application, size/shape/length of compression needs, and placing the pt at higher risk of infection.     Marked reductions in size/shape,  contour and less pitting with improved mobility noted.  Venous varicosities remain and also following with dermatology for skin procedures L LE.   Pt has been compliant with home recommendations for daily pump support and daily compression choices.  Pt may have continued assistance to don  KH garments but will also consider his new Wraps as an option to self apply.  Recommend return in ~ 3 months for reassessment of status, compression support and new product training.    Mobility and self application issues to be considered -continue new home products and support.  Shannan Is progressing well towards his goals.   Pt prognosis is Good.     Pt will continue to benefit from skilled outpatient physical therapy to address the deficits listed in the problem list box on initial evaluation, provide pt/family education and to maximize pt's level of independence in the home and community environment.     Pt's spiritual, cultural and educational needs considered and pt agreeable to plan of care and goals.  Anticipated Barriers for therapy: CKD, sh TSA, ability to reach feet, knee pain     The following goals were discussed with the patient and patient is in agreement with them as to be addressed in the treatment plan.      Short Term Goals: (6 weeks)  1. Patient will show decreased girth in B LE by up to 2 cm to allow for LE symmetry, shoe and clothing choice, and ability to apply needed compression.  met  RLE   2. Patient will demonstrate 100% knowledge of lymphedema precautions and signs of infection to allow for reduced lymphedema risk, infection risk, and/or exacerbation of condition. met)  3. Patient or caregiver will perform self-bandaging techniques and/or wearing of compression garments to allow for lymphatic drainage support, skin elasticity, and reduction in shape and size of limb. met  4. Patient will perform self lymph drainage techniques to areas within reach to enhance lymphatic drainage and skin condition.   (progressing  5. Patient will tolerate daily activities with multilayered bandaging to allow for lymphatic and venous support.  Met     Long Term Goals: (12  weeks)  1. Patient will show decreased girth in B LE by up to 3 cm  to allow for LE symmetry, shoe and clothing choice, and ability to apply needed compression daily.  Met  2. Patient will show reduction in density to mild or less with improved contour of limb to allow for cosmesis, LE symmetry, infection risk reduction, and clothing and compression choice.  met  3. Patient to rowan/doff compression garment with daily compliance to assist in lymphedema management, skin elasticity, and tissue density.met  4. Pt to show improved postural awareness and alignment.  (progressing,  5. Pt to be I and compliant with HEP to allow for increased function in affected limb.   (progressing, ongoing  Plan   Plan of care Certification: 5/31/2024 to 8/23/24. Update requested 11/15/24     Outpatient Physical Therapy 2 times weekly for 10 weeks to include the following interventions: Patient Education, Self Care, Therapeutic Activities, and Therapeutic Exercise. Complete Decongestive Therapy- compression and home equipment needs to be addressed and assisted.  Recommend return in 3 months for reassessment of status, compression support and new product training.     Geneva Owens, PT

## 2024-08-13 ENCOUNTER — PROCEDURE VISIT (OUTPATIENT)
Dept: DERMATOLOGY | Facility: CLINIC | Age: 79
End: 2024-08-13
Payer: MEDICARE

## 2024-08-13 DIAGNOSIS — C44.91 BASAL CELL CARCINOMA (BCC), UNSPECIFIED SITE: Primary | ICD-10-CM

## 2024-08-13 DIAGNOSIS — C44.91 SUPERFICIAL BASAL CELL CARCINOMA (BCC): ICD-10-CM

## 2024-08-13 DIAGNOSIS — Z85.828 HISTORY OF SKIN CANCER: ICD-10-CM

## 2024-08-13 PROCEDURE — 88305 TISSUE EXAM BY PATHOLOGIST: CPT | Performed by: PATHOLOGY

## 2024-08-13 NOTE — PATIENT INSTRUCTIONS
Post- Operative Wound Care    Your doctor has performed local skin surgery today.  Vaseline ointment and a pressure bandage were placed after the surgery.  It is very important that you keep this bandage in place for 24 hours.  This will decrease the risk of post - operative infection and bleeding.  After 24 hours, you may remove the band aid and wash the area with warm soap and water and apply Vaseline ointment.  Many patients prefer to use Neosporin or Bacitracin ointment.  This is acceptable; however know that you can develop an allergy to this medication even if you have used it safely for years.  It is important to keep the area moist.  Letting it dry out and get air slows healing time, will worsen the scar, and make it more difficult to remove the stitches.  Band aid is optional after first 24 hours.    It is best NOT to use hydrogen peroxide or antibacterial soap to wash the operative site.       If you notice increasing redness, tenderness, pain, or yellow drainage at the biopsy or surgical site, please notify your doctor.  These are signs of an infection.    If your biopsy/surgical site is bleeding, apply firm pressure for 15 minutes straight.  Repeat for another 15 minutes, if it is still bleeding.   If the surgical site continues to bleed, then please contact your doctor.      For [facility] users:   You will receive your pathology results in [facility] as soon as they are available. Please be assured that your physician/provider will review your results and contact you should additional treatment be required. This is one more way [employer] is putting you first.       [facility] [facility] [address]/ [phone] FAX/ [email or web address]

## 2024-08-13 NOTE — PROGRESS NOTES
PROCEDURE: Elliptical excision with intermediate layered repair in order to decrease dead space and decrease tension.    ANESTHETIC: 9 cc 1% Xylocaine with Epinephrine 1:100,000, buffered    SURGEON: Ben Alcantar M.D.    ASSISTANTS: Aaron Braxton MA    PREOPERATIVE DIAGNOSIS:  Basal Cell Carcinoma    POSTOPERATIVE DIAGNOSIS:  Same as preoperative diagnosis    PATHOLOGIC DIAGNOSIS: Pending    LOCATION: right forearm    INITIAL LESION SIZE: 0.4 cm    EXCISED DIAMETER: 1.2 cm    PREPARATION: The diagnosis, procedure, alternatives, benefits and risks, including but not limited to: infection, bleeding/bruising, drug reactions, pain, scar or cosmetic defect, local sensation disturbances, wound dehiscence (separation of wound edges after sutures removed) and/or recurrence of present condition were explained to the patient. The patient elected to proceed.  Patient's identity was verified using 2 patient identifiers and the side and site was verified.  Time out period with surgeon, assistant and patient in surgical suite was taken.    PROCEDURE: The location noted above was prepped, draped, and anesthetized in the usual sterile fashion per  ben alcantar . Lesional tissue was carefully marked with at least 4 mm margins of clinically normal skin in all directions. A fusiform elliptical excision was done with #15 blade carried down completely through the dermis into the deep subcutaneous tissues to the level of the non-muscle fascia, and dissection was carried out in that plane.  Electrocoagulation was used to obtain hemostasis. Blood loss was minimal. The wound was then approximated in a layered fashion with subcutaneous and intradermal sutures of 4.0 Monocryl, approximately 3 in number, and the wound was then superficially closed with simple interrupted sutures of 4.0 Prolene.    The patient tolerated the procedure well.    The area was cleaned and dressed appropriately and the patient was given wound care instructions,  as well as an appointment for follow-up evaluation.    LENGTH OF REPAIR: 3.5 cm            _____________    Here for electrodesiccation and curettage of superficial bcc on the left anterior leg. bx done on 12/2023:    Electrodessication and Curettage Procedure note:    Verbal consent obtained. Lesional tissue marked and prepped with alcohol. Lesion anesthetized with 1% lidocaine with epinephrine. Curettage and Desiccation x 3 cycles to base. Aluminum chloride for hemostasis. Lesion size after primary curettage: 1.2 cm    Area bandaged and wound care explained.

## 2024-08-16 LAB
FINAL PATHOLOGIC DIAGNOSIS: NORMAL
GROSS: NORMAL
Lab: NORMAL
MICROSCOPIC EXAM: NORMAL

## 2024-08-17 ENCOUNTER — PATIENT MESSAGE (OUTPATIENT)
Dept: SPORTS MEDICINE | Facility: CLINIC | Age: 79
End: 2024-08-17
Payer: MEDICARE

## 2024-08-20 ENCOUNTER — PATIENT MESSAGE (OUTPATIENT)
Dept: OTOLARYNGOLOGY | Facility: CLINIC | Age: 79
End: 2024-08-20
Payer: MEDICARE

## 2024-08-22 ENCOUNTER — OFFICE VISIT (OUTPATIENT)
Dept: PODIATRY | Facility: CLINIC | Age: 79
End: 2024-08-22
Payer: MEDICARE

## 2024-08-22 VITALS
HEIGHT: 73 IN | WEIGHT: 185 LBS | DIASTOLIC BLOOD PRESSURE: 62 MMHG | BODY MASS INDEX: 24.52 KG/M2 | HEART RATE: 66 BPM | SYSTOLIC BLOOD PRESSURE: 113 MMHG

## 2024-08-22 DIAGNOSIS — I87.2 VENOUS INSUFFICIENCY OF BOTH LOWER EXTREMITIES: ICD-10-CM

## 2024-08-22 DIAGNOSIS — E11.9 ENCOUNTER FOR DIABETIC FOOT EXAM: Primary | ICD-10-CM

## 2024-08-22 DIAGNOSIS — L60.3 ONYCHODYSTROPHY: ICD-10-CM

## 2024-08-22 PROCEDURE — 1159F MED LIST DOCD IN RCRD: CPT | Mod: CPTII,S$GLB,, | Performed by: PODIATRIST

## 2024-08-22 PROCEDURE — 3078F DIAST BP <80 MM HG: CPT | Mod: CPTII,S$GLB,, | Performed by: PODIATRIST

## 2024-08-22 PROCEDURE — 1126F AMNT PAIN NOTED NONE PRSNT: CPT | Mod: CPTII,S$GLB,, | Performed by: PODIATRIST

## 2024-08-22 PROCEDURE — 1101F PT FALLS ASSESS-DOCD LE1/YR: CPT | Mod: CPTII,S$GLB,, | Performed by: PODIATRIST

## 2024-08-22 PROCEDURE — 3288F FALL RISK ASSESSMENT DOCD: CPT | Mod: CPTII,S$GLB,, | Performed by: PODIATRIST

## 2024-08-22 PROCEDURE — 1160F RVW MEDS BY RX/DR IN RCRD: CPT | Mod: CPTII,S$GLB,, | Performed by: PODIATRIST

## 2024-08-22 PROCEDURE — 99999 PR PBB SHADOW E&M-EST. PATIENT-LVL IV: CPT | Mod: PBBFAC,,, | Performed by: PODIATRIST

## 2024-08-22 PROCEDURE — 3074F SYST BP LT 130 MM HG: CPT | Mod: CPTII,S$GLB,, | Performed by: PODIATRIST

## 2024-08-22 PROCEDURE — 99213 OFFICE O/P EST LOW 20 MIN: CPT | Mod: S$GLB,,, | Performed by: PODIATRIST

## 2024-08-22 NOTE — PROGRESS NOTES
"Subjective:      Patient ID: Shannan Norman is a 79 y.o. male.    Chief Complaint: Follow-up (Follow up regarding recommendations regarding compression; currently wearing compression with zipper 20-30 mmHg purchased from [employer])    Follow-up for left 5th metatarsal base avulsion fracture x6 weeks treated with tall cam boot.  Patient has been compliant wearing the boot and reports no discomfort while wearing the boot and minimal discomfort when he takes steps without it at home.  He is avoided the previous activity consisting of yoga which initially caused the injury.  No other complaints noted today.    02/17/2023:  Follow-up for avulsion fracture left 5th metatarsal base times 10 weeks.  Performing protective weight-bearing with tall cam boot.  Reports no pain.  Taking supplemental vitamin-D 3 weekly as prescribed.    08/22/2024:  Returns for annual diabetic foot exam.  Recent excision of a basal cell carcinoma to his left leg and receiving wound care.  Wearing compression stockings 20-30 mm Hg below-knee with a zipper that he purchased from [email or web address] that feel comfortable.  Overall no pain to his lower extremity bilateral.  No new concerns.    Vitals:    08/22/24 0941   BP: 113/62   Pulse: 66   Weight: 83.9 kg (185 lb)   Height: 6' 1" (1.854 m)   PainSc: 0-No pain      Past Medical History:   Diagnosis Date    Allergy     Arthritis     Atrial fibrillation     Atrial flutter 2011    ablation    Basal cell carcinoma     Cancer     Cataract     CKD (chronic kidney disease) stage 3, GFR 30-59 ml/min 8/13/2019    Diabetes mellitus     Dry eye syndrome     Gout, unspecified     High cholesterol     History of shingles     Hypertension     Melanoma     Seizures        Past Surgical History:   Procedure Laterality Date    ABLATION N/A 9/11/2018    Procedure: ABLATION;  Surgeon: Hany Sanchez MD;  Location: [facility] CATH LAB;  Service: Cardiology;  Laterality: N/A;  AFL, ISABELLE, RFA, ELODIA, MAC, DM, 3 PREP    ABLATION OF " DYSRHYTHMIC FOCUS      ADENOIDECTOMY      CARDIAC PACEMAKER PLACEMENT      no defib    CATARACT EXTRACTION W/  INTRAOCULAR LENS IMPLANT Right 7/28/2020    Procedure: EXTRACTION, CATARACT, WITH IOL INSERTION;  Surgeon: Andrés Morse MD;  Location: [facility];  Service: Ophthalmology;  Laterality: Right;    CATARACT EXTRACTION W/  INTRAOCULAR LENS IMPLANT Left 8/11/2020    Procedure: EXTRACTION, CATARACT, WITH IOL INSERTION;  Surgeon: Andrés Morse MD;  Location: [facility];  Service: Ophthalmology;  Laterality: Left;    COLONOSCOPY N/A 1/2/2019    Procedure: COLONOSCOPY Suprep;  Surgeon: Cindy Solorzano MD;  Location: [facility] ENDO;  Service: Endoscopy;  Laterality: N/A;    FIXATION OF TENDON Left 3/4/2024    Procedure: BICEPS TENODESIS;  Surgeon: Quinten Dominique MD;  Location: [address];  Service: Orthopedics;  Laterality: Left;  WITH CATHETER    GANGLION CYST EXCISION      left neck    HERNIA REPAIR  2013    umbilical    REVERSE TOTAL SHOULDER ARTHROPLASTY Left 3/4/2024    Procedure: ARTHROPLASTY, SHOULDER, TOTAL, REVERSE;  Surgeon: Quinten Dominique MD;  [address];  Service: Orthopedics;  Laterality: Left;  WITH CATHETER    TENDON REPAIR Right     wrist    TONSILLECTOMY      varicose veins      several sx  bilateral    VASECTOMY      VEIN SURGERY         Family History   Problem Relation Name Age of Onset    Diabetes Mother      COPD Father      Heart disease Father      Arthritis Sister Argenis     Heart attack Brother      Heart disease Brother      Diabetes Brother      No Known Problems Maternal Aunt      No Known Problems Maternal Uncle      No Known Problems Paternal Aunt      No Known Problems Paternal Uncle      No Known Problems Maternal Grandmother      No Known Problems Maternal Grandfather      No Known Problems Paternal Grandmother      No Known Problems Paternal Grandfather      No Known Problems Son Shannan     Cancer Sister Tianna         lymph node, breast    No  Known Problems Sister Aaliyah     No Known Problems Son Nimesh     Amblyopia Neg Hx      Blindness Neg Hx      Cataracts Neg Hx      Glaucoma Neg Hx      Hypertension Neg Hx      Macular degeneration Neg Hx      Retinal detachment Neg Hx      Strabismus Neg Hx      Stroke Neg Hx      Thyroid disease Neg Hx      Prostate cancer Neg Hx      Kidney disease Neg Hx      Melanoma Neg Hx         Social History     Socioeconomic History    Marital status:    Occupational History     Employer: [employer]   Tobacco Use    Smoking status: Never     Passive exposure: Never    Smokeless tobacco: Never   Substance and Sexual Activity    Alcohol use: Yes     Alcohol/week: 7.0 - 14.0 standard drinks of alcohol     Types: 7 - 14 Glasses of wine per week    Drug use: No    Sexual activity: Yes     Partners: Female     Social Determinants of Health     Financial Resource Strain: Low Risk  (1/25/2024)    Overall Financial Resource Strain (CARDIA)     Difficulty of Paying Living Expenses: Not hard at all   Food Insecurity: No Food Insecurity (1/25/2024)    Hunger Vital Sign     Worried About Running Out of Food in the Last Year: Never true     Ran Out of Food in the Last Year: Never true   Transportation Needs: No Transportation Needs (1/25/2024)    PRAPARE - Transportation     Lack of Transportation (Medical): No     Lack of Transportation (Non-Medical): No   Physical Activity: Inactive (1/25/2024)    Exercise Vital Sign     Days of Exercise per Week: 0 days     Minutes of Exercise per Session: 30 min   Stress: No Stress Concern Present (1/25/2024)    [place] [employer] - Occupational Stress Questionnaire     Feeling of Stress : Only a little   Housing Stability: Low Risk  (1/25/2024)    Housing Stability Vital Sign     Unable to Pay for Housing in the Last Year: No     Number of Places Lived in the Last Year: 1     Unstable Housing in the Last Year: No       Current Outpatient Medications   Medication  Sig Dispense Refill    allopurinoL (ZYLOPRIM) 100 MG tablet Take 1 tablet by mouth once daily (Patient taking differently: Take by mouth nightly. Take 1 tablet by mouth once daily) 90 tablet 3    atorvastatin (LIPITOR) 40 MG tablet Take 1 tablet (40 mg total) by mouth once daily. (Patient taking differently: Take 40 mg by mouth every evening.) 90 tablet 3    benazepriL (LOTENSIN) 20 MG tablet Take 1 tablet (20 mg total) by mouth once daily. (Patient taking differently: Take 20 mg by mouth every evening.) 90 tablet 3    celecoxib (CELEBREX) 200 MG capsule Take 1 capsule (200 mg total) by mouth 2 (two) times daily with meals. 90 capsule 4    cycloSPORINE (RESTASIS) 0.05 % ophthalmic emulsion Place 1 drop into both eyes 2 (two) times daily. 60 each 11    econazole nitrate 1 % cream Apply topically 2 (two) times daily. 85 g 3    ferrous sulfate (FEOSOL) 325 mg (65 mg iron) Tab tablet Take 325 mg by mouth once daily.      GLUCOSAMINE HCL/CHONDR VASQUEZ A NA (GLUCOSAMINE-CHONDROITIN) 750-600 mg Tab Take 2 tablets by mouth Daily.      metoprolol tartrate (LOPRESSOR) 25 MG tablet Take 1 tablet (25 mg total) by mouth 2 (two) times daily as needed (palpitations). 45 tablet 0    modafiniL (PROVIGIL) 100 MG Tab Take 1/4-1/2 tablet in AM as needed for excessive daytime fatigue 60 tablet 5    multivitamin capsule Take 1 capsule by mouth nightly.       rivaroxaban (XARELTO) 20 mg Tab Take 1 tablet (20 mg total) by mouth once daily. 90 tablet 3    tamsulosin (FLOMAX) 0.4 mg Cap TAKE 1 CAPSULE BY MOUTH AFTER DINNER 90 capsule 3    triamcinolone acetonide 0.1% (KENALOG) 0.1 % cream Apply topically 2 (two) times daily. 30 g 3    UNABLE TO FIND Take by mouth every morning. 6000 hydrolyzes collagen      vibegron 75 mg Tab Take 1 tablet (75 mg total) by mouth once daily. 30 tablet 11     No current facility-administered medications for this visit.       Review of patient's allergies indicates:  No Known Allergies      Review of Systems    Constitutional: Negative for chills, fever and malaise/fatigue.   Cardiovascular:  Negative for chest pain, claudication and leg swelling.   Respiratory:  Negative for cough and shortness of breath.    Musculoskeletal:  Negative for back pain, joint pain, muscle cramps and muscle weakness.   Gastrointestinal:  Negative for nausea and vomiting.   Neurological:  Negative for numbness, paresthesias and weakness.   Psychiatric/Behavioral:  Negative for altered mental status.            Objective:      Physical Exam  Constitutional:       General: He is not in acute distress.     Appearance: Normal appearance. He is not ill-appearing.   Cardiovascular:      Pulses:           Dorsalis pedis pulses are 2+ on the right side and 2+ on the left side.        Posterior tibial pulses are 2+ on the right side and 2+ on the left side.      Comments: Moderate edema to the right lower extremity with hemosiderin staining of the mid aspect of the right leg.  Multiple varicosities to the lower extremity bilateral.  There is mild edema to left lower extremity in addition multiple varicosities and hemosiderin.  No hair growth follow extremity.  No rubor on dependency bilateral foot.  Skin temp is warm to foot bilateral  Musculoskeletal:      Comments: No pain with range motion, manual muscle strength testing or palpation to the lower extremity bilateral.    Flexible cavus foot structure bilateral foot.   Feet:      Right foot:      Protective Sensation: 10 sites tested.  10 sites sensed.      Toenail Condition: Right toenails are abnormally thick and long.      Left foot:      Protective Sensation: 10 sites tested.  10 sites sensed.      Toenail Condition: Left toenails are abnormally thick and long.   Skin:     General: Skin is warm.      Capillary Refill: Capillary refill takes less than 2 seconds.      Findings: No ecchymosis or erythema.      Nails: There is no clubbing.      Comments: Hallux nail bilateral moderately thickened  with multiple transverse ridges and some loosening at the distal 1/3 of the nailbed however it is moderately elongated.  Remaining toenails with mild thickness and no significant discoloration.    No open wound or maceration to foot bilateral.   Neurological:      Mental Status: He is alert and oriented to person, place, and time.      Sensory: Sensation is intact.      Motor: Motor function is intact.               Assessment:       Encounter Diagnoses   Name Primary?    Encounter for diabetic foot exam Yes    Venous insufficiency of both lower extremities     Onychodystrophy          Plan:       Shannan was seen today for follow-up.    Diagnoses and all orders for this visit:    Encounter for diabetic foot exam    Venous insufficiency of both lower extremities    Onychodystrophy      I counseled the patient on his conditions, their implications and medical management.    Comprehensive annual diabetic foot exam completed today.  Patient found to be relatively low to intermediate risk for diabetic foot complication.  Risks slightly increased secondary to chronic venous insufficiency which is also being managed by vascular surgery.    Discussed appropriate sizing shoes in detail since he is having some repetitive trauma to his nails which is indicative of the transverse ridging and thickening of the hallux nail bilateral.  Did recommend tea tree oil which will help soften the nail and treat any possible mild onychomycosis.    RTC within 1 year or p.r.n. as discussed.  In addition inspected his current compression stockings which seemed to be adequate although foot is more appropriate for the right versus the left since there is more significant swelling on the right side.    Assisted by Elsy Cano DPM PGY 3    A portion of this note was generated by voice recognition software and may contain spelling and grammar errors.  .

## 2024-08-23 ENCOUNTER — OFFICE VISIT (OUTPATIENT)
Dept: OTOLARYNGOLOGY | Facility: CLINIC | Age: 79
End: 2024-08-23
Payer: MEDICARE

## 2024-08-23 VITALS
WEIGHT: 190.5 LBS | SYSTOLIC BLOOD PRESSURE: 116 MMHG | HEART RATE: 60 BPM | BODY MASS INDEX: 25.13 KG/M2 | DIASTOLIC BLOOD PRESSURE: 59 MMHG

## 2024-08-23 DIAGNOSIS — H90.3 SENSORINEURAL HEARING LOSS (SNHL) OF BOTH EARS: Primary | Chronic | ICD-10-CM

## 2024-08-23 DIAGNOSIS — J31.0 CHRONIC RHINITIS: Chronic | ICD-10-CM

## 2024-08-23 PROCEDURE — 99999 PR PBB SHADOW E&M-EST. PATIENT-LVL III: CPT | Mod: PBBFAC,,, | Performed by: OTOLARYNGOLOGY

## 2024-08-23 RX ORDER — LEVOCETIRIZINE DIHYDROCHLORIDE 5 MG/1
5 TABLET, FILM COATED ORAL NIGHTLY
Qty: 30 TABLET | Refills: 11 | Status: SHIPPED | OUTPATIENT
Start: 2024-08-23 | End: 2025-08-23

## 2024-08-23 NOTE — PROGRESS NOTES
Chief Complaint   Patient presents with    Follow-up     General check up, overall well since last visit   .     HPI: Shannan Norman is a 79 y.o. male who presents for evaluation of right aural fullness and nosebleeds. He notes that the aural fullness has been present in both ears for several weeks.  He saw Vanessa Bernard NP at [facility]- cerumen removal was performed which did not seem to improve his symptoms. He does report that it might be slightly improvement since then. He did have audiogram at that visit. See results below.  Additionally,  he reports that he has had nosebleeds for several years.  He states that they are primarily the right side and occur intermittently.  The last episode was approximately 3 days ago.  He notes they last about 5 minutes and stop with pressure.  He is on chronic anticoagulation Eliquis for AFib. Has had nasal cautery in the past about 5 or 6 years ago.     Interval HPI 8/23/2024:  Follow up visit- annual check up.  He reports that his hearing has been stable.  He denies any otalgia, otorrhea, or post-auricular pain. He denies tinnitus. He has been using nasal saline rinses and xyzal.  Denies any episodes of epistaxis.       Past Medical History:   Diagnosis Date    Allergy     Arthritis     Atrial fibrillation     Atrial flutter 2011    ablation    Basal cell carcinoma     Cancer     Cataract     CKD (chronic kidney disease) stage 3, GFR 30-59 ml/min 8/13/2019    Diabetes mellitus     Dry eye syndrome     Gout, unspecified     High cholesterol     History of shingles     Hypertension     Melanoma     Seizures      Social History     Socioeconomic History    Marital status:    Occupational History     Employer: [employer]   Tobacco Use    Smoking status: Never     Passive exposure: Never    Smokeless tobacco: Never   Substance and Sexual Activity    Alcohol use: Yes     Alcohol/week: 7.0 - 14.0 standard drinks of alcohol     Types: 7 - 14 Glasses of wine per week     Drug use: No    Sexual activity: Yes     Partners: Female     Social Determinants of Health     Financial Resource Strain: Low Risk  (1/25/2024)    Overall Financial Resource Strain (CARDIA)     Difficulty of Paying Living Expenses: Not hard at all   Food Insecurity: No Food Insecurity (1/25/2024)    Hunger Vital Sign     Worried About Running Out of Food in the Last Year: Never true     Ran Out of Food in the Last Year: Never true   Transportation Needs: No Transportation Needs (1/25/2024)    PRAPARE - Transportation     Lack of Transportation (Medical): No     Lack of Transportation (Non-Medical): No   Physical Activity: Inactive (1/25/2024)    Exercise Vital Sign     Days of Exercise per Week: 0 days     Minutes of Exercise per Session: 30 min   Stress: No Stress Concern Present (1/25/2024)    [place] [place] [employer] - Occupational Stress Questionnaire     Feeling of Stress : Only a little   Housing Stability: Low Risk  (1/25/2024)    Housing Stability Vital Sign     Unable to Pay for Housing in the Last Year: No     Number of Places Lived in the Last Year: 1     Unstable Housing in the Last Year: No     Past Surgical History:   Procedure Laterality Date    ABLATION N/A 9/11/2018    Procedure: ABLATION;  Surgeon: Hany Sanchez MD;  Location: [facility] CATH LAB;  Service: Cardiology;  Laterality: N/A;  AFL, ISABELLE, RFA, ELODIA, MAC, DM, 3 PREP    ABLATION OF DYSRHYTHMIC FOCUS      ADENOIDECTOMY      CARDIAC PACEMAKER PLACEMENT      no defib    CATARACT EXTRACTION W/  INTRAOCULAR LENS IMPLANT Right 7/28/2020    Procedure: EXTRACTION, CATARACT, WITH IOL INSERTION;  Surgeon: Andrés Morse MD;  Location: [facility];  Service: Ophthalmology;  Laterality: Right;    CATARACT EXTRACTION W/  INTRAOCULAR LENS IMPLANT Left 8/11/2020    Procedure: EXTRACTION, CATARACT, WITH IOL INSERTION;  Surgeon: Andrés Morse MD;  Location: [facility];  Service: Ophthalmology;  Laterality: Left;    COLONOSCOPY N/A  1/2/2019    Procedure: COLONOSCOPY Suprep;  Surgeon: Cindy Solorzano MD;  Location: [facility] ENDO;  Service: Endoscopy;  Laterality: N/A;    FIXATION OF TENDON Left 3/4/2024    Procedure: BICEPS TENODESIS;  Surgeon: Quinten Dominique MD;  Location: [facility] OR [address] [place] [address];  Service: Orthopedics;  Laterality: Left;  WITH CATHETER    GANGLION CYST EXCISION      left neck    HERNIA REPAIR  2013    umbilical    REVERSE TOTAL SHOULDER ARTHROPLASTY Left 3/4/2024    Procedure: ARTHROPLASTY, SHOULDER, TOTAL, REVERSE;  Surgeon: Quinten Dominique MD;  [facility];  Service: Orthopedics;  Laterality: Left;  WITH CATHETER    TENDON REPAIR Right     wrist    TONSILLECTOMY      varicose veins      several sx  bilateral    VASECTOMY      VEIN SURGERY       Family History   Problem Relation Name Age of Onset    Diabetes Mother      COPD Father      Heart disease Father      Arthritis Sister Argenis     Heart attack Brother      Heart disease Brother      Diabetes Brother      No Known Problems Maternal Aunt      No Known Problems Maternal Uncle      No Known Problems Paternal Aunt      No Known Problems Paternal Uncle      No Known Problems Maternal Grandmother      No Known Problems Maternal Grandfather      No Known Problems Paternal Grandmother      No Known Problems Paternal Grandfather      No Known Problems Son Shannan     Cancer Sister Tianna         lymph node, breast    No Known Problems Sister Aaliyah     No Known Problems Son Nimesh     Amblyopia Neg Hx      Blindness Neg Hx      Cataracts Neg Hx      Glaucoma Neg Hx      Hypertension Neg Hx      Macular degeneration Neg Hx      Retinal detachment Neg Hx      Strabismus Neg Hx      Stroke Neg Hx      Thyroid disease Neg Hx      Prostate cancer Neg Hx      Kidney disease Neg Hx      Melanoma Neg Hx             Review of Systems  General: negative for chills, fever or weight loss  Psychological: negative for mood changes or depression  Ophthalmic: negative for blurry  vision, photophobia or eye pain  ENT: see HPI  Respiratory: no cough, shortness of breath, or wheezing  Cardiovascular: no chest pain or dyspnea on exertion  Gastrointestinal: no abdominal pain, change in bowel habits, or black/ bloody stools  Musculoskeletal: negative for gait disturbance or muscular weakness  Neurological: no syncope or seizures; no ataxia  Dermatological: negative for puritis,  rash and jaundice  Hematologic/lymphatic: no easy bruising, no new lumps or bumps      Physical Exam:    Vitals:    08/23/24 1343   BP: (!) 116/59   Pulse: 60           Constitutional: Well appearing / communicating without difficutly.  NAD.  Eyes: EOM I Bilaterally  Head/Face: Normocephalic.  Negative paranasal sinus pressure/tenderness.  Salivary glands WNL.  House Brackmann I Bilaterally.    Right Ear: Auricle normal appearance. External Auditory Canal within normal limits no lesions or masses,TM retracted with limited mobility.   Left Ear: Auricle normal appearance. External Auditory Canal within normal limits no lesions or masses,TM retracted with limited mobility  Nose: +healing scab at right caudal nasal septum.    No gross nasal septal deviation. Inferior Turbinates 3+ bilaterally. No septal perforation. No masses/lesions. External nasal skin appears normal without masses/lesions.  Oral Cavity: Gingiva/lips within normal limits.  Dentition/gingiva healthy appearing. Mucus membranes moist. Floor of mouth soft, no masses palpated. Oral Tongue mobile. Hard Palate appears normal.    Oropharynx: Base of tongue appears normal. No masses/lesions noted. Tonsillar fossa/pharyngeal wall without lesions. Posterior oropharynx WNL.  Soft palate without masses. Midline uvula.   Neck/Lymphatic: No LAD I-VI bilaterally.  No thyromegaly.  No masses noted on exam.      Diagnostic testing:   Audiogram interpreted personally by me and discussed in detail with the patient today. Audiogram results revealed a mild to severe sensorineural  hearing loss bilaterally.  Speech reception thresholds were noted at 15 dB in the right ear and 15 dB in the left ear.  Speech discrimination scores were 92% in the right ear and 92% in the left ear.  Tympanometry revealed Type Ad in the right ear and Type A in the left ear.                Assessment:    ICD-10-CM ICD-9-CM    1. Sensorineural hearing loss (SNHL) of both ears  H90.3 389.18       2. Chronic rhinitis  J31.0 472.0         The primary encounter diagnosis was Sensorineural hearing loss (SNHL) of both ears. A diagnosis of Chronic rhinitis was also pertinent to this visit.      Plan:  No orders of the defined types were placed in this encounter.    Continue saline rinses daily  Continue Xyzal 5 mg PO daily  SNHL: Audiogram results discussed. Recommend amplification when motivated. Continue hearing protection when in noise. Audiogram in 1 year.      Maria Del Rosario Ybarra MD

## 2024-08-27 ENCOUNTER — TELEPHONE (OUTPATIENT)
Dept: SPORTS MEDICINE | Facility: CLINIC | Age: 79
End: 2024-08-27
Payer: MEDICARE

## 2024-08-27 ENCOUNTER — OFFICE VISIT (OUTPATIENT)
Dept: DERMATOLOGY | Facility: CLINIC | Age: 79
End: 2024-08-27
Payer: MEDICARE

## 2024-08-27 ENCOUNTER — CLINICAL SUPPORT (OUTPATIENT)
Dept: DERMATOLOGY | Facility: CLINIC | Age: 79
End: 2024-08-27
Payer: MEDICARE

## 2024-08-27 DIAGNOSIS — L30.8 ASTEATOTIC DERMATITIS: Primary | ICD-10-CM

## 2024-08-27 DIAGNOSIS — D22.9 MULTIPLE BENIGN NEVI: ICD-10-CM

## 2024-08-27 DIAGNOSIS — Z85.828 HISTORY OF SKIN CANCER: ICD-10-CM

## 2024-08-27 DIAGNOSIS — L81.4 LENTIGO: ICD-10-CM

## 2024-08-27 DIAGNOSIS — L82.1 SEBORRHEIC KERATOSES: ICD-10-CM

## 2024-08-27 DIAGNOSIS — Z12.83 SCREENING EXAM FOR SKIN CANCER: ICD-10-CM

## 2024-08-27 DIAGNOSIS — D18.01 CHERRY ANGIOMA: ICD-10-CM

## 2024-08-27 DIAGNOSIS — Z85.828 HISTORY OF NONMELANOMA SKIN CANCER: ICD-10-CM

## 2024-08-27 DIAGNOSIS — C44.91 BASAL CELL CARCINOMA (BCC), UNSPECIFIED SITE: Primary | ICD-10-CM

## 2024-08-27 DIAGNOSIS — B35.3 TINEA PEDIS, UNSPECIFIED LATERALITY: ICD-10-CM

## 2024-08-27 PROCEDURE — G2211 COMPLEX E/M VISIT ADD ON: HCPCS | Mod: S$GLB,,, | Performed by: STUDENT IN AN ORGANIZED HEALTH CARE EDUCATION/TRAINING PROGRAM

## 2024-08-27 PROCEDURE — 1159F MED LIST DOCD IN RCRD: CPT | Mod: CPTII,S$GLB,, | Performed by: STUDENT IN AN ORGANIZED HEALTH CARE EDUCATION/TRAINING PROGRAM

## 2024-08-27 PROCEDURE — 99999 PR PBB SHADOW E&M-EST. PATIENT-LVL II: CPT | Mod: PBBFAC,,,

## 2024-08-27 PROCEDURE — 99214 OFFICE O/P EST MOD 30 MIN: CPT | Mod: S$GLB,,, | Performed by: STUDENT IN AN ORGANIZED HEALTH CARE EDUCATION/TRAINING PROGRAM

## 2024-08-27 PROCEDURE — 1160F RVW MEDS BY RX/DR IN RCRD: CPT | Mod: CPTII,S$GLB,, | Performed by: STUDENT IN AN ORGANIZED HEALTH CARE EDUCATION/TRAINING PROGRAM

## 2024-08-27 PROCEDURE — 99999 PR PBB SHADOW E&M-EST. PATIENT-LVL II: CPT | Mod: PBBFAC,,, | Performed by: STUDENT IN AN ORGANIZED HEALTH CARE EDUCATION/TRAINING PROGRAM

## 2024-08-27 RX ORDER — CLOBETASOL PROPIONATE 0.5 MG/G
CREAM TOPICAL 2 TIMES DAILY
Qty: 60 G | Refills: 2 | Status: SHIPPED | OUTPATIENT
Start: 2024-08-27

## 2024-08-27 RX ORDER — FLUCONAZOLE 200 MG/1
200 TABLET ORAL WEEKLY
Qty: 4 TABLET | Refills: 0 | Status: SHIPPED | OUTPATIENT
Start: 2024-08-27 | End: 2024-09-18

## 2024-08-27 NOTE — PATIENT INSTRUCTIONS
XEROSIS (DRY SKIN)        Definition    Xerosis is the term for dry skin.  We all have a natural oil coating over our skin produced by the skin oil glands.  If this oil is removed, the skin becomes dry which can lead to cracking, which can lead to inflammation.  Xerosis is usually a long-term problem that recurs often, especially in the winter.    Cause    Long hot baths or showers can remove our natural oil and lead to xerosis.  One should never take more than one bath or shower a day and for no longer than ten minutes.  Use of harsh soaps such as Zest, Dial, and Ivory can worsen and cause xerosis.  Cold winter weather worsens xerosis because the amount of moisture contained in cold air is much less than the amount of moisture in warm air.    Treatment    Treatment is intended to restore the natural oil to your skin.  Keep the skin lubricated.    Do not take more than one bath or shower a day.  Use lukewarm water, not hot.  Hot water dries out the skin.    Use a gentle moisturizing soap such as Cetaphil soap, Oil of Olay, Dove, Basis, Ivory moisture care, Restoraderm cleanser.    When toweling dry, dont rub.  Blot the skin so there is still some water left on the skin.  You should apply a moisturizing cream to all of the skin such as Cerave cream, Cetaphil cream, Lipikar Cummings AP+ Intense Repair Moisturizing Cream or Restoraderm or Eucerin Original Formula cream.   Alpha hydroxyacid lotions, i.e., AmLactin, also work very well for preventing dry skin, but may burn when used on inflamed or reddened skin.    If you like to swim during the winter months, you should not use soap when getting out of the pool.  When you have finished swimming, rinse off the chlorine with cool to warm water.  If this will be the only shower of the day, then you may use Cetaphil or another mild soap to cleanse your skin.  After the shower, apply a moisturizing cream to all of the skin as above.        [address] [place] [address]/ [phone] FAX/ [email or web address]     Sunscreen Guidelines  Sunscreen protects your skin by absorbing and reflecting ultraviolet rays. All sunscreens have a sun protection factor (SPF) rating that indicates how long a sunscreen will remain effective on the skin.    Why protect your skin?  The sun's rays are composed of many different wavelengths, including UVA, UVB, and visible light that each affect the skin differently.    UVB: sunburn, photoaging, skin cancer (melanoma, basal cell, and squamous cell carcinomas) and modulation of the skin's immune system.    UVA: similar to above but thought to contribute more to aging; at the same dose of UVB it is less powerful however the sun has 10-20 times the levels of UVA as compared with UVB.  Visible light: implicated in causing unwanted darkening of skin, such as melasma and post-inflammatory hyperpigmentation in darker skin types     If I have dark skin, do I need to worry about the sun?    More darkly pigmented skin is more protected against UV-induced skin cancer, sunburn, and photoaging, though may still suffer from sun-related conditions, including melasma, hyperpigmentation, and other dark spots.    Sun avoidance  As a general rule, stay out of the sun as much as possible between 10 a.m. - 4 p.m.    Download the EPA UV index sharona to track the UV index by hour in your zip code.      Which sunscreen should I choose?  The best sunscreen to use varies by individual. The one that feels best on your skin and fits your lifestyle will be the one you will likely use most regularly.   Active ingredients of sunscreen vary by , and may be a chemical (such as avobenzone or oxybenzone) or physical agent (such as zinc oxide or titanium dioxide). I recommend a physical agent.  A water-resistant sunscreen is one that maintains the SPF level after 40 minutes of water exposure. A very water-resistant sunscreen maintains the SPF level after 80  "minutes of water exposure.    Sunscreen: this is the last layer in sun protection   Be generous: 1 shot glass of sunscreen for your body, ½ teaspoon for your face/neck  Reapply every 2 hours  Broad spectrum (provides UVA/UVB protection), SPF 50 or above  Avoid spray sunscreens: less effective and have been found to contain benzene (carcinogen)    Sun protective clothing  Although sunscreen helps minimize sun damage, no sunscreen completely blocks all wavelengths of UV light. Wearing sun protective clothing such as hats, rashguards or swim shirts, and long sleeves and/or pants, as well as avoiding sun exposure from 10 a.m. to 4 p.m. will help protect your skin from overexposure and minimize sun damage. Seek shade.  Long sleeved clothing, hats, and sunglasses: makes sun protection easier, more effective, and can even be more affordable, since sunscreen needs to be reapplied frequently.    Solumbra (www.sunprectautions.com)  scanR (www.Tauntr)  Coolibar (www.Slingboxr.Global Talent Track)  Land's end (www.NurseLiability.com)  Hats from Jenae [employer]row[employer] (www.helenkaminski.com)    My Favorite Sunscreens:  Physical blockers: Can have a "white case" but in general are more effective  - Face: CeraVe tinted mineral sunscreen, Bare Minerals complexion rescue (20 shades), Elta MD (UV elements, UV physical, UV restore, etc), Tizo ultra zinc tinted, Cetaphil Sheer Mineral Face Liquid Sunscreen  - Body: Blue Lizard, Neutrogena Sheer Zinc, Eucerin Daily Protection, Aveeno Baby   "

## 2024-08-27 NOTE — TELEPHONE ENCOUNTER
Rescheduled patient for 9/10 at 10:30 am. Patient confirmed appt change.    Ariella Kearns MS, [facility]     Sports Medicine Assistant to:  Kit Potter M.D.  System Director, [facility]  Head Medical Team Physician, [place]  President-Elect, [facility]  Chief Medical Officer, [facility]

## 2024-08-27 NOTE — PROGRESS NOTES
Subjective:      Patient ID:  Shannan Norman is a 79 y.o. male who presents for No chief complaint on file.    Pt present today for TBSE    Patient with new area of concern:   Location: all over body  Duration: 3-4 weeks  Previous treatments: keto cream (no help) zeasorb FA (helps)       Main issue today is rash. Primarily itchy on right arm and back. Left arm itching has resolved    Used TAC mixed with cerave, which did not help  Now using zeasorb powder which he feels it helped  Uses cerave itch relief which provides relief for several hours    Also has a history of balanitis which is controlled with zeasorb but it is chronic    Problem List Items Addressed This Visit          Oncology    History of skin cancer    Overview     Left shoulder, melanoma, 0.15 mm, pT1a s/p WLE 2020  Left anterior leg, BCC, 9/2022 s/p excision  Left neck, BCC, s/p excision 2020  Left mid nose, BCC, 2019 12/20/2023  - left anterior leg, superficial BCC. S/p ed&c   - right forearm, nodular bcc, s/p excision          Other Visit Diagnoses       Asteatotic dermatitis    -  Primary    Relevant Medications    clobetasoL (TEMOVATE) 0.05 % cream    Tinea pedis, unspecified laterality        Relevant Medications    fluconazole (DIFLUCAN) 200 MG Tab    Seborrheic keratoses        Multiple benign nevi        Lentigo        Powers angioma        History of nonmelanoma skin cancer        Screening exam for skin cancer                  Review of Systems   Constitutional:  Negative for fever, chills and fatigue.   Skin:  Positive for activity-related sunscreen use and wears hat. Negative for daily sunscreen use and recent sunburn.   Hematologic/Lymphatic: Does not bruise/bleed easily.       Objective:   Physical Exam   Constitutional: He appears well-developed and well-nourished. No distress.   Genitourinary:         Musculoskeletal: Lymphadenopathy:      Cervical: No cervical adenopathy.      Upper Body:      Right upper body: No  "Primary Physician: Elinor Richter, APRN    Chief Complaint   Patient presents with   • Difficulty Swallowing     Pt c/o trouble swallowing for the couple of months-states it feels like food/liquids get \"hung\" in her throat and won't go down-states it also feels like her food \"stops\" in her esophagus   • Weight Loss     Pt also c/o issues with weight loss-states she has lost 30-40 pounds over the last couple of months without trying; Pt's last colon was 7/24/2017 and last endo was 8/21/2020       Subjective     Carlee Vee is a 62 y.o. female.    HPI   Weight Loss   Pt reports that she has lost about 30-40 pounds over the past few months un intentional.    Garza's esophagus  Last endoscopy 8/21/2020: small HH, duodenum and stomach normal, esophageal mucosal changes secondary to Garza's in lower third of the esophagus. 3 year recall.  She does reports some acid indigestion in her mid esophagus.  It will awake her at times during the night. She is taking Protonix BID and Seble    Dysphagia  Pt reports having trouble swallowing foods and liquids. Foods are much worse. It seems to get hung in her upper esophagus.      Personal Hx Adenomatous Colon Polyps  Last colonoscopy 7/24/2017 with a tubular adenomatous polyp of transverse colon and 1 hyperplastic polyp in the sigmoid colon. 5 year recall.  Pt reports that her bowels are moving well today.  No blood seen in her stools.  No diarrhea or constipation.      Past Medical History:   Diagnosis Date   • Garza's esophagus    • Depression    • GERD (gastroesophageal reflux disease)    • History of adenomatous polyp of colon    • History of colon polyps    • Hyperlipidemia    • Hypertension    • Kidney stone    • PONV (postoperative nausea and vomiting)    • Type 2 diabetes mellitus (HCC)        Past Surgical History:   Procedure Laterality Date   • BACK SURGERY     • COLONOSCOPY  10/18/2006    Thickened fold in the ascending colon-path shows hyperplastic polyp; Repeat 7 " supraclavicular adenopathy.      Left upper body: No supraclavicular adenopathy.      Lower Body: No right inguinal adenopathy. No left inguinal adenopathy.     Lymphadenopathy:     He has no cervical adenopathy. No inguinal adenopathy noted on the right or left side.        Right: No supraclavicular adenopathy present.        Left: No supraclavicular adenopathy present.   Neurological: He is alert and oriented to person, place, and time. He is not disoriented.   Psychiatric: He has a normal mood and affect.   Skin:   Areas Examined (abnormalities noted in diagram):   Scalp / Hair Palpated and Inspected  Head / Face Inspection Performed  Neck Inspection Performed  Chest / Axilla Inspection Performed  Abdomen Inspection Performed  Genitals / Buttocks / Groin Inspection Performed  Back Inspection Performed  RUE Inspected  LUE Inspection Performed  RLE Inspected  LLE Inspection Performed  Nails and Digits Inspection Performed  Gland Inspection Performed                 Diagram Legend     Erythematous scaling macule/papule c/w actinic keratosis       Vascular papule c/w angioma      Pigmented verrucoid papule/plaque c/w seborrheic keratosis      Yellow umbilicated papule c/w sebaceous hyperplasia      Irregularly shaped tan macule c/w lentigo     1-2 mm smooth white papules consistent with Milia      Movable subcutaneous cyst with punctum c/w epidermal inclusion cyst      Subcutaneous movable cyst c/w pilar cyst      Firm pink to brown papule c/w dermatofibroma      Pedunculated fleshy papule(s) c/w skin tag(s)      Evenly pigmented macule c/w junctional nevus     Mildly variegated pigmented, slightly irregular-bordered macule c/w mildly atypical nevus      Flesh colored to evenly pigmented papule c/w intradermal nevus       Pink pearly papule/plaque c/w basal cell carcinoma      Erythematous hyperkeratotic cursted plaque c/w SCC      Surgical scar with no sign of skin cancer recurrence      Open and closed comedones     years   • COLONOSCOPY N/A 07/24/2017    One 4mm tubular adenomatous polyp in the transverse colon; One 5mm hyperplastic polyp in the sigmoid colon; The examination was otherwise normal on direct and retroflexion views; Repeat 5 years   • DILATATION AND CURETTAGE     • ENDOSCOPY N/A 07/24/2017    Esophageal mucosal changes secondary to established short-segment Garza's disease-biopsied; Small HH; Normal stomach; Normal examined duodenum; Repeat 3 years   • ENDOSCOPY  12/08/2011    Esophagitis-biopsied; Garza's-biopsied; HH; Repeat 3 years   • ENDOSCOPY  10/27/2008    Garza's esophagus-biopsied; HH; Repeat 2 years   • ENDOSCOPY  10/23/2006    HH; Probable Garza's esophagus-biopsied; Repeat 2 years   • ENDOSCOPY N/A 08/21/2020    Small HH; Esophageal mucosal changes secondary to established long-segment Garza's disease-biopsied; Normal stomach; Normal examined duodenum; Repeat 3 years   • HYSTERECTOMY     • THORACOTOMY Left 03/27/2018    Procedure: THORACOTOMY, drainage of empyema and decortication;  Surgeon: Abel Stark MD;  Location: [facility] OR;  Service: Cardiothoracic   • TUBAL ABDOMINAL LIGATION          Current Outpatient Medications:   •  amLODIPine (NORVASC) 10 MG tablet, Take 1 tablet by mouth Daily., Disp: , Rfl:   •  aspirin 81 MG EC tablet, Take 1 tablet by mouth Daily., Disp: , Rfl:   •  FLUoxetine (PROzac) 20 MG capsule, Take 1 capsule by mouth Daily., Disp: , Rfl:   •  insulin aspart (novoLOG) 100 UNIT/ML injection, Inject  under the skin 3 (Three) Times a Day Before Meals. 150 - 199 = 4 units 200 - 249 = 8 units 250 - 299 = 12 units 300 - 349 = 16 units 349 - 400 = 20 units > 400 = 24 units, Disp: , Rfl:   •  insulin detemir (LEVEMIR) 100 UNIT/ML injection, Inject 17 Units under the skin Every 12 (Twelve) Hours., Disp: 1 each, Rfl: 1  •  pantoprazole (PROTONIX) 40 MG EC tablet, TAKE 1 TABLET BY MOUTH TWICE DAILY (Patient taking differently: Take 1 tablet by mouth 2 (Two) Times a Day.),    Inflammatory papules and pustules      Verrucoid papule consistent consistent with wart     Erythematous eczematous patches and plaques     Dystrophic onycholytic nail with subungual debris c/w onychomycosis     Umbilicated papule    Erythematous-base heme-crusted tan verrucoid plaque consistent with inflamed seborrheic keratosis     Erythematous Silvery Scaling Plaque c/w Psoriasis     See annotation      Assessment / Plan:        Asteatotic dermatitis  Tinea pedis, unspecified laterality  -     clobetasoL (TEMOVATE) 0.05 % cream; Apply topically 2 (two) times daily. Use to affected areas for up to 2 weeks then take a 1 week break or decrease to 3 times weekly. Do not apply to groin or face. Use to itchy areas  Dispense: 60 g; Refill: 2  -     fluconazole (DIFLUCAN) 200 MG Tab; Take 1 tablet (200 mg total) by mouth once a week. for 4 doses  Dispense: 4 tablet; Refill: 0    Itching of arms and back. Morphology nonspecific and itching out of proportion to exam. Xerosis, some mild dermatographism, possibly mild asteatotic dermatitis. He has a few pustules and reports improvement in itching with zeasorb so he would like to trial antifungal--could help if component of pityrosporum folliculitis or id from tinea  - counseled on dry skin care  - cerave anti itch prn  - xyzal qhs  - clobetasol bid prn    Seborrheic keratoses  Multiple benign nevi  Lentigo  Cherry angioma  Reassurance given to patient. No treatment is necessary.   Treatment of benign, asymptomatic lesions may be considered cosmetic.    History of nonmelanoma skin cancer  Screening exam for skin cancer  Area of previous NMSC examined. Site well healed with no signs of recurrence.    Total body skin examination performed today including at least 12 points as noted in physical examination. No lesions suspicious for malignancy noted.    Recommend daily sun protection/avoidance, use of at least SPF 30, broad spectrum sunscreen (OTC drug), skin self  "Disp: 180 tablet, Rfl: 0  •  rosuvastatin (CRESTOR) 10 MG tablet, Take 1 tablet by mouth Daily., Disp: , Rfl:   •  sucralfate (CARAFATE) 1 GM/10ML suspension, Take 10 mL by mouth 4 (Four) Times a Day Before Meals & at Bedtime., Disp: , Rfl:   •  vitamin D (ERGOCALCIFEROL) 14030 units capsule capsule, Take 1 capsule by mouth 1 (One) Time Per Week. Fridays, Disp: , Rfl:   •  famotidine (PEPCID) 20 MG tablet, Take 1 tablet by mouth Every Night., Disp: , Rfl:     Allergies   Allergen Reactions   • Sulfa Antibiotics Hives       Social History     Socioeconomic History   • Marital status:    Tobacco Use   • Smoking status: Former     Types: Cigarettes   • Smokeless tobacco: Never   Vaping Use   • Vaping Use: Never used   Substance and Sexual Activity   • Alcohol use: Not Currently   • Drug use: No   • Sexual activity: Not Currently     Partners: Female     Birth control/protection: Tubal ligation, Hysterectomy       Family History   Problem Relation Age of Onset   • Colon cancer Neg Hx    • Colon polyps Neg Hx    • Esophageal cancer Neg Hx    • Liver cancer Neg Hx    • Liver disease Neg Hx    • Rectal cancer Neg Hx    • Stomach cancer Neg Hx        Review of Systems   Constitutional: Positive for unexpected weight change.   HENT: Positive for trouble swallowing.    Respiratory: Negative for shortness of breath.    Cardiovascular: Negative for chest pain.       Objective     /76 (BP Location: Left arm, Patient Position: Sitting, Cuff Size: Adult)   Pulse 106   Temp 96.9 °F (36.1 °C) (Infrared)   Ht 157.5 cm (62\")   Wt 63.5 kg (140 lb)   SpO2 98%   Breastfeeding No   BMI 25.61 kg/m²     Physical Exam  Vitals reviewed.   Constitutional:       Appearance: Normal appearance.   Cardiovascular:      Rate and Rhythm: Normal rate and regular rhythm.      Heart sounds: Murmur heard.   Pulmonary:      Effort: Pulmonary effort is normal.      Breath sounds: Normal breath sounds.   Neurological:      Mental Status: " "She is alert.         Lab Results - Last 18 Months   Lab Units 12/17/22  0408 12/16/22  2132   CRP mg/dL  --  28.28*   SED RATE mm/Hr 108*  --        Lab Results - Last 18 Months   Lab Units 02/22/23  1603 12/28/22  1225 12/20/22  0141 12/19/22  0223 12/18/22  0237 12/17/22  0408   HEMOGLOBIN g/dL 17.0* 13.7 11.7* 11.0* 10.8* 11.8*   HEMATOCRIT % 48.6* 42.6 35.4* 33.4* 32.7* 35.2*   MCV fL 89.5 100.2* 97.8 97.7 97.3 96.4   WBC K/uL 13.3* 10.3 10.6 14.9* 16.1* 15.3*   RDW % 12.4 13.2 12.2 12.2 12.3 12.1   MPV fL 13.0* 11.3 11.2 11.5 12.0 11.9   PLATELETS K/uL 275 371 281 235 232 262       Lab Results - Last 18 Months   Lab Units 12/17/22  0807 12/16/22  2132   IRON ug/dL 15*  --    TIBC ug/dL 189*  --    IRON SATURATION % 8*  --    FERRITIN ng/mL 514.9*  --    TSH uIU/mL  --  0.792   FOLATE ng/mL 19.6  --    VIT D 25 HYDROXY ng/mL  --  66.2        Lab Results - Last 18 Months   Lab Units 12/17/22  0807   FERRITIN ng/mL 514.9*           IMPRESSION/PLAN:    Assessment & Plan      Problem List Items Addressed This Visit        Endocrine and Metabolic    Weight loss    Overview     30-40 pounds over \"past few months\" without trying            Gastrointestinal Abdominal     Garza's esophagus without dysplasia    Overview     Last endoscopy 8/21/2020: small HH, duodenum and stomach normal, esophageal mucosal changes secondary to Garza's in lower third of the esophagus         Relevant Medications    sucralfate (CARAFATE) 1 GM/10ML suspension    Adenomatous colon polyp    Overview     Last colonoscopy 7/24/2017 with a tubular adenomatous polyp of transverse colon and 1 hyperplastic polyp in the sigmoid colon         Esophageal dysphagia - Primary    Overview     Esophageal dysphagia for 2-3 months, with foods and liquids         Relevant Medications    sucralfate (CARAFATE) 1 GM/10ML suspension     Colonoscopy and Endoscopy per Dr Luis Cuello Prep (sample given)  Continue Protonix and Carafate daily and avoid " examinations, and routine physician surveillance to optimize early detection         Due to increased risk of skin cancer (such as due to evidence of skin changes due to chronic sun exposure, personal or family history of skin cancer or precancerous skin lesions, immunosuppressed state, etc), the patient requires regular longitudinal monitoring      Follow up in about 6 months (around 2/27/2025) for TBSE.     NSAIDS    ..Pt is instructed to avoid caffeine, chocolate, peppermint and nicotine.  They are to avoid eating within 2-3 hours prior to bedtime.            ..The risks, benefits, and alternatives of colonoscopy were reviewed with the patient today.  Risks including perforation of the colon possibly requiring surgery or colostomy.  Additional risks include risk of bleeding from biopsies or removal of colon tissue.  There is also the risk of a drug reaction or problems with anesthesia.  This will be discussed with the further by the anesthesia team on the day of the procedure.  Lastly there is a possibility of missing a colon polyp or cancer.  The benefits include the diagnosis and management of disease of the colon and rectum.  Alternatives to colonoscopy include barium enema, laboratory testing, radiographic evaluation, or no intervention.  The patient verbalizes understanding and agrees.    In accordance with requirements under the Affordable Care Act, [facility] has provided pricing for all hospital services and items on each of its websites. However, a patient's actual cost may differ based on the services the patient receives to meet individual healthcare needs and based on the benefits provided under the patient’s insurance coverage.        Erna Estevez, APRN  03/08/23  13:43 CST    Part of this note may be an electronic transcription/translation of spoken language to printed text.

## 2024-08-27 NOTE — PROGRESS NOTES
CC: 79 y.o.male patient is here for suture removal.     HPI: Patient is s/p punch biopsy/excision of BCC on 8/13/2024.  Patient reports no problems.    WOUND PE:  Sutures intact.  Wound healing well.  Good approximation of skin edges.  No signs or symptoms of infection.    IMPRESSION:  Skin, right forearm, excision:   - SCAR, NO RESIDUAL BASAL CELL CARCINOMA IS PRESENT.     PLAN:  Sutures removed today.  Continue wound care.

## 2024-08-29 NOTE — PROGRESS NOTES
History of Present Illness (HPI)  9/10/24  Shannan Norman, a 79 y.o. male, presents today for follow up evaluation of his bilateral knee. At last appointment, 6/5/2024, patient underwent intra-articular knee CSI & pain/symptoms did improve. Pain returned one month ago. Pain is 0/10 at present & up to mild to moderate  with provacative activity including ADLs. Patient has not been doing physical therapy. Patient reports he discontinued taking his celebrex one week ago and has noticed an increase in discomfort when going down steps, though he is not sure if the two are related.    6/5/24  Shannan Norman, a 79 y.o. male, presents today for follow up evaluation of his left knee. At last appointment, 2/27/2024, patient completed 3/3 Gelsyn series & pain/symptoms did improve. Pain returned one month ago. Pain is 0/10 at present & up to mild to moderate  with provacative activity including sitting to standing. Patient reports recent diagnosis of lymphedema.    24.01.22  Last csi+vsi (quite some time ago) worked very well  Requesting f/u evaluation and injection if appropriate  No new sx    Shannan Norman, a 78 y.o. male, presents today for evaluation of his bilateral knee pain.    ---prior  Location: anterior knee  Onset: Chronic   Palliative:    Relative rest   Oral analgesics   Ambulation Assistance -    Bracing: none   Injections   Euflexxa series- 3/18/2019, 3/25/19, 4/1/2019  Provocative:    ADLS   Prolonged ambulation     Prior: none  Progression: constant discomfort  Quality:    pain is a 0 /10 throbbing pain today.Pain is 6 /10 at its worst.   Radiation: Denies, numbness, tingling, and inability to bear weight.  Severity: per nursing documentation  Timing: intermittent w/ use  Trauma: golf    Review of Systems (ROS)  A 10+ review of systems was performed with pertinent positives and negatives noted above in the history of present illness. Other systems were negative unless otherwise specified.    Physical  Examination (PE)  General:  The patient is alert and oriented x 3. Mood is pleasant. Observation of ears, eyes and nose reveal no gross abnormalities. HEENT: NCAT, sclera anicteric.   Lungs: Respirations are equal and unlabored.  Gait is coordinated. Patient can toe walk and heel walk without difficulty.    KNEE EXAMINATION    Observation/Inspection  Gait:   Nonantalgic   Alignment:  Neutral   Scars:   None   Muscle atrophy: Mild  Effusion:  None   Warmth:  None   Discoloration:   none     Tenderness / Crepitus (T / C):         T / C      T / C  Patella   - / -   Lateral joint line   - / -     Peripatellar medial  -  Medial joint line    + / -  Peripatellar lateral -  Medial plica   - / -  Patellar tendon -   Popliteal fossa   - / -  Quad tendon   -   Gastrocnemius   -  Prepatellar Bursa - / -   Quadricep   -  Tibial tubercle  -  Thigh/hamstring  -  Pes anserine/HS -  Fibula    -  ITB   - / -  Tibia     -  Tib/fib joint  - / -  LCL    -    MFC   - / -   MCL: Proximal  -    LFC   - / -   Distal    -          ROM: (* = pain)  PASSIVE   ACTIVE    Left :   5 / 0 / 145   5 / 0 / 145     Right :    5 / 0 / 145   5 / 0 / 145    Patellofemoral examination:  See above noted areas of tenderness.   Patella position    Subluxation / dislocation: Centered        Sup. / Inf;   Normal   Crepitus (PF):    Absent   Patellar Mobility:       Medial-lateral:   Normal    Superior-inferior:  Normal    Inferior tilt   Normal    Patellar tendon:  Normal   Lateral tilt:    Normal   J-sign:     None   Patellofemoral grind:   No pain     Meniscal Signs:     Pain on terminal extension:  +  Pain on terminal flexion:  +  Luisitos maneuver:  +*  Squat     NT  Thesaly    NT    Ligament Examination:  ACL / Lachman:  WNL  PCL-Post.  drawer: normal 0 to 2mm  MCL- Valgus:  normal 0 to 2mm  LCL- Varus:    normal 0 to 2mm  Pivot shift:  guarding   Dial Test:   difference c/w other side   At 30° flexion: normal (< 5°)    At 90° flexion: normal (< 5°)    Reverse Pivot Shift:   normal (Equal)     Strength: (* = with pain) Painful Side  Quadriceps   5/5  Hamstrin/5    Extremity Neuro-vascular Examination:   Sensation:  Grossly intact to light touch all dermatomal regions.   Motor Function:  Fully intact motor function at hip, knee, foot and ankle    DTRs;  quadriceps and  achilles 2+.  No clonus and downgoing Babinski.    Vascular status:  DP and PT pulses 2+, brisk capillary refill, symmetric.     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    Plan  We discussed the importance of appropriate diet, weight, and regular exercise    We discussed options including    Watchful waiting / relative rest    Physical therapy x   Injection therapy Csi iaknee bilateral   Consultation    The patient chooses As above   x = prescribed  CSI = corticosteroid injection  VSI = viscosupplement injection  PRPI = platelet rich plasma injection  ia = intra articular  R = right  L = left  B = bilateral   nfSx = surgical consultation was recommended, but patient is not interested in consultation at this time    Physical Therapy        Formal (fPT), @ [facility] facility p   Formal (fPT), @ [facility] facility        Homegoing (hgPT), per concurrent fPT recommendations    Homegoing (hgPT), per prior fPT recommendations t   Homegoing (hgPT), handout provided        w/  (atPT)    [blank] = not prescribed  x = prescribed  b = prescribed, and begin as indicated  t = continue as indicated  r = prescribed, and restart as indicated  p = completed prior as indicated  hs = prescribed, and with high school   col = prescribed, and with college or university   nfPT = physical therapy was recommended, but patient is not interested in PT at this time    Activity (e.g. sports, work)  restrictions    [blank] = as tolerated  pt = per physical therapist  at = per     Bracing    [blank] = not prescribed  r = recommended, but not fit with at todays visit  f = prescribed and fit with at todays visit  t = continue as indicated  p = prn use on rare, as-needed basis; advised against chronic use    Pain management    [blank] = No prescription necessary. A handout detailing dosing of appropriate   over-the-counter musculoskeletal analgesics was made available to the patient.   m = meloxicam x 14 days  mp = 14 day course of meloxicam prescribed prior    Follow up 12   [blank] = as needed  [number] = in [number] weeks  CSI = for corticosteroid injection  VSI = for viscosupplement injection or injection series  PRP = for platelet rich plasma injection or injection series  MRI = after MRI imaging  ns = should surgical options be deferred (no surgery)  o = appointment offered, deferred by patient    Should symptoms worsen or fail to resolve, consider    Revisiting the above options and / or      Vocation:   ++yoga  former golfer  wife w/ advanced dementia  enjoyed long vacations in his RV  cb re: glucosamine / chondroitin  baseball fan

## 2024-09-10 ENCOUNTER — OFFICE VISIT (OUTPATIENT)
Dept: SPORTS MEDICINE | Facility: CLINIC | Age: 79
End: 2024-09-10
Payer: MEDICARE

## 2024-09-10 VITALS
SYSTOLIC BLOOD PRESSURE: 114 MMHG | WEIGHT: 187.81 LBS | BODY MASS INDEX: 24.89 KG/M2 | HEIGHT: 73 IN | HEART RATE: 60 BPM | DIASTOLIC BLOOD PRESSURE: 66 MMHG

## 2024-09-10 DIAGNOSIS — M25.561 CHRONIC PAIN OF BOTH KNEES: ICD-10-CM

## 2024-09-10 DIAGNOSIS — M17.0 PRIMARY OSTEOARTHRITIS OF BOTH KNEES: Primary | ICD-10-CM

## 2024-09-10 DIAGNOSIS — G89.29 CHRONIC PAIN OF BOTH KNEES: ICD-10-CM

## 2024-09-10 DIAGNOSIS — R26.89 ANTALGIC GAIT: ICD-10-CM

## 2024-09-10 DIAGNOSIS — M25.562 CHRONIC PAIN OF BOTH KNEES: ICD-10-CM

## 2024-09-10 PROCEDURE — 1160F RVW MEDS BY RX/DR IN RCRD: CPT | Mod: CPTII,S$GLB,, | Performed by: FAMILY MEDICINE

## 2024-09-10 PROCEDURE — 1159F MED LIST DOCD IN RCRD: CPT | Mod: CPTII,S$GLB,, | Performed by: FAMILY MEDICINE

## 2024-09-10 PROCEDURE — 99214 OFFICE O/P EST MOD 30 MIN: CPT | Mod: 25,S$GLB,, | Performed by: FAMILY MEDICINE

## 2024-09-10 PROCEDURE — 1126F AMNT PAIN NOTED NONE PRSNT: CPT | Mod: CPTII,S$GLB,, | Performed by: FAMILY MEDICINE

## 2024-09-10 PROCEDURE — 20611 DRAIN/INJ JOINT/BURSA W/US: CPT | Mod: 50,S$GLB,, | Performed by: FAMILY MEDICINE

## 2024-09-10 PROCEDURE — 3078F DIAST BP <80 MM HG: CPT | Mod: CPTII,S$GLB,, | Performed by: FAMILY MEDICINE

## 2024-09-10 PROCEDURE — 1101F PT FALLS ASSESS-DOCD LE1/YR: CPT | Mod: CPTII,S$GLB,, | Performed by: FAMILY MEDICINE

## 2024-09-10 PROCEDURE — 3288F FALL RISK ASSESSMENT DOCD: CPT | Mod: CPTII,S$GLB,, | Performed by: FAMILY MEDICINE

## 2024-09-10 PROCEDURE — 99999 PR PBB SHADOW E&M-EST. PATIENT-LVL IV: CPT | Mod: PBBFAC,,, | Performed by: FAMILY MEDICINE

## 2024-09-10 PROCEDURE — 3074F SYST BP LT 130 MM HG: CPT | Mod: CPTII,S$GLB,, | Performed by: FAMILY MEDICINE

## 2024-09-10 RX ORDER — TRIAMCINOLONE ACETONIDE 40 MG/ML
40 INJECTION, SUSPENSION INTRA-ARTICULAR; INTRAMUSCULAR
Status: DISCONTINUED | OUTPATIENT
Start: 2024-09-10 | End: 2024-09-10 | Stop reason: HOSPADM

## 2024-09-10 RX ADMIN — TRIAMCINOLONE ACETONIDE 40 MG: 40 INJECTION, SUSPENSION INTRA-ARTICULAR; INTRAMUSCULAR at 10:09

## 2024-09-10 NOTE — PROCEDURES
"Large Joint Aspiration/Injection: bilateral knee    Date/Time: 9/10/2024 10:30 AM    Performed by: Kit Potter MD  Authorized by: Kit Potter MD    Consent Done?:  Yes (Verbal)  Indications:  Pain  Site marked: the procedure site was marked    Timeout: prior to procedure the correct patient, procedure, and site was verified    Prep: patient was prepped and draped in usual sterile fashion      Details:  Needle Size:  25 G  Ultrasonic Guidance for needle placement?: Yes    Images are saved and documented.  Approach:  Lateral  Location:  Knee  Laterality:  Bilateral  Site:  Bilateral knee  Medications (Right):  40 mg triamcinolone acetonide 40 mg/mL  Medications (Left):  40 mg triamcinolone acetonide 40 mg/mL  Patient tolerance:  Patient tolerated the procedure well with no immediate complications     Description of ultrasound utilization for needle guidance:   Ultrasound guidance used for needle localization. Images saved and stored for documentation. The SUPRAPATELLAR BURSA / KNEE JOINT was visualized. Dynamic visualization of the 25g x 1.5" needle was continuous throughout the procedure.     "

## 2024-09-15 ENCOUNTER — CLINICAL SUPPORT (OUTPATIENT)
Dept: CARDIOLOGY | Facility: HOSPITAL | Age: 79
End: 2024-09-15
Attending: INTERNAL MEDICINE
Payer: MEDICARE

## 2024-09-15 DIAGNOSIS — Z95.0 PRESENCE OF CARDIAC PACEMAKER: ICD-10-CM

## 2024-09-15 PROCEDURE — 93294 REM INTERROG EVL PM/LDLS PM: CPT | Mod: ,,, | Performed by: INTERNAL MEDICINE

## 2024-09-15 PROCEDURE — 93296 REM INTERROG EVL PM/IDS: CPT | Performed by: INTERNAL MEDICINE

## 2024-09-17 ENCOUNTER — TELEPHONE (OUTPATIENT)
Dept: ELECTROPHYSIOLOGY | Facility: CLINIC | Age: 79
End: 2024-09-17
Payer: MEDICARE

## 2024-09-17 NOTE — TELEPHONE ENCOUNTER
Merlin alert received from [facility] home monitoring.  Pt had AF with RVR for 1 hour and 11 minutes on 9/13/2024, average V rate 188 bpm  AF episode lasted ~8 hours    Pt takes Lopressor 25 mg BID prn palpitations and Xarelto    Bp  115/66 9/10/2024    Pt overdue for follow up, will message [facility]As to schedule.    Pt asymptomatic.  Denies palpitations and did not take Lopressor.

## 2024-09-18 LAB
OHS CV AF BURDEN PERCENT: 2
OHS CV DC REMOTE DEVICE TYPE: NORMAL
OHS CV ICD SHOCK: NO
OHS CV RV PACING PERCENT: 8.5 %

## 2024-09-19 ENCOUNTER — PATIENT MESSAGE (OUTPATIENT)
Dept: SPORTS MEDICINE | Facility: CLINIC | Age: 79
End: 2024-09-19
Payer: MEDICARE

## 2024-09-27 ENCOUNTER — APPOINTMENT (OUTPATIENT)
Dept: RADIOLOGY | Facility: CLINIC | Age: 79
End: 2024-09-27
Attending: FAMILY MEDICINE
Payer: MEDICARE

## 2024-09-27 DIAGNOSIS — Z79.52 LONG TERM CURRENT USE OF SYSTEMIC STEROIDS: ICD-10-CM

## 2024-09-27 PROCEDURE — 77080 DXA BONE DENSITY AXIAL: CPT | Mod: TC,PO

## 2024-09-27 PROCEDURE — 77080 DXA BONE DENSITY AXIAL: CPT | Mod: 26,,, | Performed by: INTERNAL MEDICINE

## 2024-10-01 ENCOUNTER — OFFICE VISIT (OUTPATIENT)
Dept: SPORTS MEDICINE | Facility: CLINIC | Age: 79
End: 2024-10-01
Payer: MEDICARE

## 2024-10-01 ENCOUNTER — HOSPITAL ENCOUNTER (OUTPATIENT)
Dept: RADIOLOGY | Facility: HOSPITAL | Age: 79
Discharge: HOME | End: 2024-10-01
Attending: STUDENT IN AN ORGANIZED HEALTH CARE EDUCATION/TRAINING PROGRAM
Payer: MEDICARE

## 2024-10-01 VITALS
HEIGHT: 73 IN | WEIGHT: 186.06 LBS | BODY MASS INDEX: 24.66 KG/M2 | HEART RATE: 60 BPM | SYSTOLIC BLOOD PRESSURE: 104 MMHG | DIASTOLIC BLOOD PRESSURE: 63 MMHG

## 2024-10-01 DIAGNOSIS — M25.512 LEFT SHOULDER PAIN, UNSPECIFIED CHRONICITY: ICD-10-CM

## 2024-10-01 DIAGNOSIS — M75.101 ROTATOR CUFF SYNDROME OF RIGHT SHOULDER: ICD-10-CM

## 2024-10-01 DIAGNOSIS — Z95.0 PACEMAKER: ICD-10-CM

## 2024-10-01 PROCEDURE — 99999 PR PBB SHADOW E&M-EST. PATIENT-LVL V: CPT | Mod: PBBFAC,,, | Performed by: STUDENT IN AN ORGANIZED HEALTH CARE EDUCATION/TRAINING PROGRAM

## 2024-10-01 PROCEDURE — 3078F DIAST BP <80 MM HG: CPT | Mod: CPTII,S$GLB,, | Performed by: STUDENT IN AN ORGANIZED HEALTH CARE EDUCATION/TRAINING PROGRAM

## 2024-10-01 PROCEDURE — 1159F MED LIST DOCD IN RCRD: CPT | Mod: CPTII,S$GLB,, | Performed by: STUDENT IN AN ORGANIZED HEALTH CARE EDUCATION/TRAINING PROGRAM

## 2024-10-01 PROCEDURE — 3074F SYST BP LT 130 MM HG: CPT | Mod: CPTII,S$GLB,, | Performed by: STUDENT IN AN ORGANIZED HEALTH CARE EDUCATION/TRAINING PROGRAM

## 2024-10-01 PROCEDURE — 1160F RVW MEDS BY RX/DR IN RCRD: CPT | Mod: CPTII,S$GLB,, | Performed by: STUDENT IN AN ORGANIZED HEALTH CARE EDUCATION/TRAINING PROGRAM

## 2024-10-01 PROCEDURE — 1101F PT FALLS ASSESS-DOCD LE1/YR: CPT | Mod: CPTII,S$GLB,, | Performed by: STUDENT IN AN ORGANIZED HEALTH CARE EDUCATION/TRAINING PROGRAM

## 2024-10-01 PROCEDURE — 3288F FALL RISK ASSESSMENT DOCD: CPT | Mod: CPTII,S$GLB,, | Performed by: STUDENT IN AN ORGANIZED HEALTH CARE EDUCATION/TRAINING PROGRAM

## 2024-10-01 PROCEDURE — 99213 OFFICE O/P EST LOW 20 MIN: CPT | Mod: S$GLB,,, | Performed by: STUDENT IN AN ORGANIZED HEALTH CARE EDUCATION/TRAINING PROGRAM

## 2024-10-01 PROCEDURE — 73030 X-RAY EXAM OF SHOULDER: CPT | Mod: 26,50,, | Performed by: RADIOLOGY

## 2024-10-01 PROCEDURE — 73030 X-RAY EXAM OF SHOULDER: CPT | Mod: TC,50

## 2024-10-01 PROCEDURE — 1126F AMNT PAIN NOTED NONE PRSNT: CPT | Mod: CPTII,S$GLB,, | Performed by: STUDENT IN AN ORGANIZED HEALTH CARE EDUCATION/TRAINING PROGRAM

## 2024-10-01 NOTE — PROGRESS NOTES
Subjective:     Chief Complaint: Shannan Norman is a 79 y.o. male who had concerns including Pain of the Left Shoulder.    HPI    Patient is here almost 7 months s/p Left rTSA.  He reports 0/10 pain and is not taking anything for pain.  He denies any nausea, vomiting, fever, chills, shortness of breath, or calf pain. He is no longer attending formal physical therapy at [facility] and continues with his own exercise . His motion has returned.     He does still complain of right shoulder pain.  He says that he has limited motion of his arm and can barely reaches arm to or above his shoulder level.  This is getting progressively worse. He is interested in pursuing surgical intervention with arthroplasty for the right shoulder as well.     PROCEDURE PERFORMED by Quinten Dominique MD on 03/04/2024:   left open reverse shoulder arthroplasty (CPT 34299)  left open biceps tenodesis (CPT 65016-62)    Past Medical History:   Diagnosis Date    Allergy     Arthritis     Atrial fibrillation     Atrial flutter 2011    ablation    Basal cell carcinoma     Cancer     Cataract     CKD (chronic kidney disease) stage 3, GFR 30-59 ml/min 8/13/2019    Diabetes mellitus     Dry eye syndrome     Gout, unspecified     High cholesterol     History of shingles     Hypertension     Melanoma     Seizures        Current Outpatient Medications on File Prior to Visit   Medication Sig Dispense Refill    cycloSPORINE (RESTASIS) 0.05 % ophthalmic emulsion Place 1 drop into both eyes 2 (two) times daily. 60 each 11    ferrous sulfate (FEOSOL) 325 mg (65 mg iron) Tab tablet Take 325 mg by mouth once daily.      GLUCOSAMINE HCL/CHONDR VASQUEZ A NA (GLUCOSAMINE-CHONDROITIN) 750-600 mg Tab Take 2 tablets by mouth Daily.      levocetirizine (XYZAL) 5 MG tablet Take 1 tablet (5 mg total) by mouth every evening. 30 tablet 11    modafiniL (PROVIGIL) 100 MG Tab Take 1/4-1/2 tablet in AM as needed for excessive daytime fatigue 60 tablet 5    multivitamin capsule  Take 1 capsule by mouth nightly.       UNABLE TO FIND Take by mouth every morning. 6000 hydrolyzes collagen      vibegron 75 mg Tab Take 1 tablet (75 mg total) by mouth once daily. 30 tablet 11    metoprolol tartrate (LOPRESSOR) 25 MG tablet Take 1 tablet (25 mg total) by mouth 2 (two) times daily as needed (palpitations). 45 tablet 0     No current facility-administered medications on file prior to visit.       Past Surgical History:   Procedure Laterality Date    ABLATION N/A 9/11/2018    Procedure: ABLATION;  Surgeon: Hany Sanchez MD;  Location: [facility] CATH LAB;  Service: Cardiology;  Laterality: N/A;  AFL, ISABELLE, RFA, ELODIA, MAC, DM, 3 PREP    ABLATION OF DYSRHYTHMIC FOCUS      ADENOIDECTOMY      CARDIAC PACEMAKER PLACEMENT      no defib    CATARACT EXTRACTION W/  INTRAOCULAR LENS IMPLANT Right 7/28/2020    Procedure: EXTRACTION, CATARACT, WITH IOL INSERTION;  Surgeon: Andrés Morse MD;  Location: [facility];  Service: Ophthalmology;  Laterality: Right;    CATARACT EXTRACTION W/  INTRAOCULAR LENS IMPLANT Left 8/11/2020    Procedure: EXTRACTION, CATARACT, WITH IOL INSERTION;  Surgeon: Andrés Morse MD;  Location: [facility];  Service: Ophthalmology;  Laterality: Left;    COLONOSCOPY N/A 1/2/2019    Procedure: COLONOSCOPY Suprep;  Surgeon: Cindy Solorzano MD;  Location: [facility] ENDO;  Service: Endoscopy;  Laterality: N/A;    FIXATION OF TENDON Left 3/4/2024    Procedure: BICEPS TENODESIS;  Surgeon: Quinten Dominique MD;  Location: [address];  Service: Orthopedics;  Laterality: Left;  WITH CATHETER    GANGLION CYST EXCISION      left neck    HERNIA REPAIR  2013    umbilical    REVERSE TOTAL SHOULDER ARTHROPLASTY Left 3/4/2024    Procedure: ARTHROPLASTY, SHOULDER, TOTAL, REVERSE;  Surgeon: Quinten Dominique MD;  [address];  Service: Orthopedics;  Laterality: Left;  WITH CATHETER    TENDON REPAIR Right     wrist    TONSILLECTOMY      varicose veins      several sx  bilateral     VASECTOMY      VEIN SURGERY         Family History   Problem Relation Name Age of Onset    Diabetes Mother      COPD Father      Heart disease Father      Arthritis Sister Argenis     Heart attack Brother      Heart disease Brother      Diabetes Brother      No Known Problems Maternal Aunt      No Known Problems Maternal Uncle      No Known Problems Paternal Aunt      No Known Problems Paternal Uncle      No Known Problems Maternal Grandmother      No Known Problems Maternal Grandfather      No Known Problems Paternal Grandmother      No Known Problems Paternal Grandfather      No Known Problems Son Shannan     Cancer Sister Tianna         lymph node, breast    No Known Problems Sister Aaliyah     No Known Problems Son Nimesh     Amblyopia Neg Hx      Blindness Neg Hx      Cataracts Neg Hx      Glaucoma Neg Hx      Hypertension Neg Hx      Macular degeneration Neg Hx      Retinal detachment Neg Hx      Strabismus Neg Hx      Stroke Neg Hx      Thyroid disease Neg Hx      Prostate cancer Neg Hx      Kidney disease Neg Hx      Melanoma Neg Hx         Social History     Socioeconomic History    Marital status:    Occupational History     Employer: [employer]   Tobacco Use    Smoking status: Never     Passive exposure: Never    Smokeless tobacco: Never   Substance and Sexual Activity    Alcohol use: Yes     Alcohol/week: 7.0 - 14.0 standard drinks of alcohol     Types: 7 - 14 Glasses of wine per week    Drug use: No    Sexual activity: Yes     Partners: Female     Social Drivers of Health     Financial Resource Strain: Low Risk  (1/25/2024)    Overall Financial Resource Strain (CARDIA)     Difficulty of Paying Living Expenses: Not hard at all   Food Insecurity: No Food Insecurity (1/25/2024)    Hunger Vital Sign     Worried About Running Out of Food in the Last Year: Never true     Ran Out of Food in the Last Year: Never true   Transportation Needs: No Transportation Needs (1/25/2024)    PRAPARE - Transportation      Lack of Transportation (Medical): No     Lack of Transportation (Non-Medical): No   Physical Activity: Inactive (1/25/2024)    Exercise Vital Sign     Days of Exercise per Week: 0 days     Minutes of Exercise per Session: 30 min   Stress: No Stress Concern Present (1/25/2024)    [place] [employer] - Occupational Stress Questionnaire     Feeling of Stress : Only a little   Housing Stability: Low Risk  (1/25/2024)    Housing Stability Vital Sign     Unable to Pay for Housing in the Last Year: No     Number of Places Lived in the Last Year: 1     Unstable Housing in the Last Year: No      Review of Systems   Constitutional: Negative.   HENT: Negative.     Eyes: Negative.    Cardiovascular: Negative.    Respiratory: Negative.     Endocrine: Negative.    Hematologic/Lymphatic: Negative.    Skin: Negative.    Musculoskeletal:  Positive for joint pain and muscle weakness. Negative for joint swelling and stiffness.   Neurological: Negative.    Psychiatric/Behavioral: Negative.     Allergic/Immunologic: Negative.        Pain Related Questions  Over the past 3 days, what was your average pain during activity? (I.e. running, jogging, walking, climbing stairs, getting dressed, ect.): 0  Over the past 3 days, what was your highest pain level?: 0  Over the past 3 days, what was your lowest pain level? : 0    Other  How many nights a week are you awakened by your affected body part?: 0  Was the patient's HEIGHT measured or patient reported?: Patient Reported  Was the patient's WEIGHT measured or patient reported?: Measured    Objective:     General: Shannan is well-developed, well-nourished, appears stated age, in no acute distress, alert and oriented to time, place and person.     General    Nursing note and vitals reviewed.  Constitutional: He is oriented to person, place, and time. He appears well-developed and well-nourished. No distress.   HENT:   Head: Normocephalic and atraumatic.   Nose: Nose normal.    Eyes: EOM are normal.   Cardiovascular:  Intact distal pulses.            Pulmonary/Chest: Effort normal. No respiratory distress.   Neurological: He is alert and oriented to person, place, and time.   Psychiatric: He has a normal mood and affect. His behavior is normal. Judgment and thought content normal.         Right Shoulder Exam     Inspection/Observation   Swelling: absent  Bruising: absent  Scars: absent  Deformity: absent  Scapular Winging: absent  Scapular Dyskinesia: negative  Atrophy: absent    Range of Motion   Active abduction:  120   Passive abduction:  120   Forward Flexion:  120   Forward Elevation: 120  External Rotation 0 degrees:  30   Internal rotation 0 degrees:  Sacrum     Tests & Signs   Andrea test: positive  Impingement: positive  Rotator Cuff Painful Arc/Range: moderate  Active Compression Test (Hampton's Sign): positive    Other   Sensation: normal    Left Shoulder Exam     Inspection/Observation   Swelling: absent  Bruising: absent  Scars: present  Deformity: absent  Scapular Winging: absent  Scapular Dyskinesia: negative  Atrophy: absent    Range of Motion   Active abduction:  130   Passive abduction:  130   Forward Flexion:  130   Forward Elevation: 130  External Rotation 0 degrees:  30     Other   Sensation: normal       Muscle Strength   Right Upper Extremity   Shoulder Abduction: 4/5   Shoulder Internal Rotation: 4/5   Shoulder External Rotation: 4/5   Supraspinatus: 4/5   Subscapularis: 4/5   Biceps: 5/5   Left Upper Extremity  Shoulder Abduction: 5/5   Shoulder Internal Rotation: 5/5   Shoulder External Rotation: 5/5   Supraspinatus: 5/5   Subscapularis: 5/5   Biceps: 5/5     Vascular Exam       Left Pulses      Radial:                    2+      Capillary Refill  Left Hand: normal capillary refill          Imaging:   X-rays of the bilateral shoulder from 10/07/2024  personally viewed by me on that day these include AP, Grashey, scapular Y. left reverse shoulder arthroplasty.   No noted complications.  Right shoulder with mild arthritic changes.      Assessment:   Shannan Norman is a 79 y.o. male status post above and doing well.  He is now beginning to have right shoulder issues.  Encounter Diagnoses   Name Primary?    Left shoulder pain, unspecified chronicity     Rotator cuff syndrome of right shoulder     Pacemaker         Plan:     He is doing well with a left reverse shoulder arthroplasty.  Regarding the right, we will obtain an MRI and here of the rotator cuff and articular cartilage.  Additionally, we will obtain a CT scan for preoperative planning for reverse shoulder arthroplasty.    All of the patient's questions were answered. Patient was advised to call the clinic or contact me through the patient portal for any questions or concerns.         Patient questionnaires may have been collected.

## 2024-10-02 ENCOUNTER — HOSPITAL ENCOUNTER (OUTPATIENT)
Dept: RADIOLOGY | Facility: HOSPITAL | Age: 79
Discharge: HOME | End: 2024-10-02
Attending: STUDENT IN AN ORGANIZED HEALTH CARE EDUCATION/TRAINING PROGRAM
Payer: MEDICARE

## 2024-10-02 DIAGNOSIS — Z95.0 PACEMAKER: ICD-10-CM

## 2024-10-02 PROCEDURE — 71046 X-RAY EXAM CHEST 2 VIEWS: CPT | Mod: TC,FY,PO

## 2024-10-02 PROCEDURE — 71046 X-RAY EXAM CHEST 2 VIEWS: CPT | Mod: 26,,, | Performed by: RADIOLOGY

## 2024-10-03 ENCOUNTER — OFFICE VISIT (OUTPATIENT)
Dept: FAMILY MEDICINE | Facility: CLINIC | Age: 79
End: 2024-10-03
Attending: FAMILY MEDICINE
Payer: MEDICARE

## 2024-10-03 VITALS
SYSTOLIC BLOOD PRESSURE: 100 MMHG | HEIGHT: 73 IN | OXYGEN SATURATION: 96 % | WEIGHT: 185.88 LBS | HEART RATE: 76 BPM | DIASTOLIC BLOOD PRESSURE: 60 MMHG | BODY MASS INDEX: 24.63 KG/M2

## 2024-10-03 DIAGNOSIS — I42.8 NICM (NONISCHEMIC CARDIOMYOPATHY): ICD-10-CM

## 2024-10-03 DIAGNOSIS — Z23 IMMUNIZATION DUE: ICD-10-CM

## 2024-10-03 DIAGNOSIS — E78.5 DYSLIPIDEMIA ASSOCIATED WITH TYPE 2 DIABETES MELLITUS: ICD-10-CM

## 2024-10-03 DIAGNOSIS — I15.2 HYPERTENSION ASSOCIATED WITH DIABETES: ICD-10-CM

## 2024-10-03 DIAGNOSIS — E11.69 DYSLIPIDEMIA ASSOCIATED WITH TYPE 2 DIABETES MELLITUS: ICD-10-CM

## 2024-10-03 DIAGNOSIS — E79.0 HYPERURICEMIA: ICD-10-CM

## 2024-10-03 DIAGNOSIS — N40.1 BENIGN PROSTATIC HYPERPLASIA WITH LOWER URINARY TRACT SYMPTOMS, SYMPTOM DETAILS UNSPECIFIED: ICD-10-CM

## 2024-10-03 DIAGNOSIS — I48.0 PAF (PAROXYSMAL ATRIAL FIBRILLATION): ICD-10-CM

## 2024-10-03 DIAGNOSIS — E11.9 TYPE 2 DIABETES MELLITUS WITHOUT RETINOPATHY: Primary | ICD-10-CM

## 2024-10-03 DIAGNOSIS — E11.59 HYPERTENSION ASSOCIATED WITH DIABETES: ICD-10-CM

## 2024-10-03 PROCEDURE — 3078F DIAST BP <80 MM HG: CPT | Mod: CPTII,S$GLB,, | Performed by: FAMILY MEDICINE

## 2024-10-03 PROCEDURE — 99215 OFFICE O/P EST HI 40 MIN: CPT | Mod: 25,S$GLB,, | Performed by: FAMILY MEDICINE

## 2024-10-03 PROCEDURE — 1159F MED LIST DOCD IN RCRD: CPT | Mod: CPTII,S$GLB,, | Performed by: FAMILY MEDICINE

## 2024-10-03 PROCEDURE — G0008 ADMIN INFLUENZA VIRUS VAC: HCPCS | Mod: S$GLB,,, | Performed by: FAMILY MEDICINE

## 2024-10-03 PROCEDURE — 3074F SYST BP LT 130 MM HG: CPT | Mod: CPTII,S$GLB,, | Performed by: FAMILY MEDICINE

## 2024-10-03 PROCEDURE — 99999 PR PBB SHADOW E&M-EST. PATIENT-LVL III: CPT | Mod: PBBFAC,,, | Performed by: FAMILY MEDICINE

## 2024-10-03 PROCEDURE — 1160F RVW MEDS BY RX/DR IN RCRD: CPT | Mod: CPTII,S$GLB,, | Performed by: FAMILY MEDICINE

## 2024-10-03 PROCEDURE — 90653 IIV ADJUVANT VACCINE IM: CPT | Mod: S$GLB,,, | Performed by: FAMILY MEDICINE

## 2024-10-03 PROCEDURE — 3288F FALL RISK ASSESSMENT DOCD: CPT | Mod: CPTII,S$GLB,, | Performed by: FAMILY MEDICINE

## 2024-10-03 PROCEDURE — 1101F PT FALLS ASSESS-DOCD LE1/YR: CPT | Mod: CPTII,S$GLB,, | Performed by: FAMILY MEDICINE

## 2024-10-03 RX ORDER — TAMSULOSIN HYDROCHLORIDE 0.4 MG/1
CAPSULE ORAL
Qty: 90 CAPSULE | Refills: 3 | Status: SHIPPED | OUTPATIENT
Start: 2024-10-03

## 2024-10-03 RX ORDER — ALLOPURINOL 100 MG/1
100 TABLET ORAL NIGHTLY
Qty: 90 TABLET | Refills: 4 | Status: SHIPPED | OUTPATIENT
Start: 2024-10-03

## 2024-10-03 RX ORDER — BENAZEPRIL HYDROCHLORIDE 20 MG/1
20 TABLET ORAL NIGHTLY
Qty: 90 TABLET | Refills: 5 | Status: SHIPPED | OUTPATIENT
Start: 2024-10-03

## 2024-10-03 RX ORDER — ATORVASTATIN CALCIUM 40 MG/1
40 TABLET, FILM COATED ORAL NIGHTLY
Qty: 90 TABLET | Refills: 4 | Status: SHIPPED | OUTPATIENT
Start: 2024-10-03

## 2024-10-03 NOTE — PROGRESS NOTES
Subjective:       Patient ID: Shannan Norman is a 79 y.o. male.    Chief Complaint: Follow-up    79  yr old pleasant white male with DM II controlled, HTN, HLD, obesity, presents today for his lab work review and 6 month follow up and med refills. C/o rash in arms and itching x 1-2 weeks. Not around plants.    BPH - much better  - no dysuria - tried flomax and proscar and nothing helps       DM II - controlled -  diet control -        HGBA1C                   5.9 (H)             10/02/2024                                            - on ACE and ASA - UTD with eye and foot screen      HTN - controlled - oN ACE - compliant - no side effects      HLD - controlled - on statin - compliant - no side effects -     LDLCALC                  61.4 (L)            10/03/2023                                         History as below - reviewed and no changes    HM  -labs UTD  -PSA UTD  -adacel UTD          Follow-up  Associated symptoms include arthralgias, myalgias and a rash. Pertinent negatives include no chest pain, congestion, coughing, diaphoresis, headaches, neck pain, visual change, vomiting or weakness.   Diabetes  He presents for his follow-up diabetic visit. He has type 2 diabetes mellitus. His disease course has been stable. Pertinent negatives for hypoglycemia include no confusion, dizziness, headaches, hunger, mood changes, nervousness/anxiousness, seizures, sleepiness, speech difficulty, sweats or tremors. Pertinent negatives for diabetes include no chest pain, no foot ulcerations, no polydipsia, no polyuria, no visual change, no weakness and no weight loss. Symptoms are stable. Pertinent negatives for diabetic complications include no CVA, PVD or retinopathy. Risk factors for coronary artery disease include diabetes mellitus, dyslipidemia, hypertension and male sex. He is compliant with treatment all of the time. He is following a generally healthy diet. Meal planning includes avoidance of concentrated sweets. He  rarely participates in exercise. An ACE inhibitor/angiotensin II receptor blocker is being taken. He does not see a podiatrist.Eye exam is current.   Hypertension  This is a chronic problem. The current episode started more than 1 month ago. The problem has been gradually improving since onset. The problem is controlled. Pertinent negatives include no chest pain, headaches, malaise/fatigue, neck pain, palpitations, peripheral edema, PND or sweats. There are no associated agents to hypertension. Risk factors for coronary artery disease include diabetes mellitus, dyslipidemia, male gender and obesity. Past treatments include ACE inhibitors. The current treatment provides significant improvement. There are no compliance problems.  There is no history of angina, CAD/MI, CVA, left ventricular hypertrophy, PVD or retinopathy. There is no history of chronic renal disease, coarctation of the aorta, hypercortisolism, hyperparathyroidism, pheochromocytoma, renovascular disease or a thyroid problem.   Hyperlipidemia  This is a chronic problem. The current episode started more than 1 year ago. The problem is controlled. Recent lipid tests were reviewed and are normal. Exacerbating diseases include diabetes and obesity. He has no history of chronic renal disease. Associated symptoms include myalgias. Pertinent negatives include no chest pain or leg pain. The current treatment provides significant improvement of lipids. There are no compliance problems.  Risk factors for coronary artery disease include diabetes mellitus, dyslipidemia, hypertension, male sex and obesity.   Benign Prostatic Hypertrophy  This is a chronic problem. The current episode started more than 1 year ago. The problem has been gradually worsening since onset. Irritative symptoms include nocturia. Irritative symptoms do not include frequency or urgency. Obstructive symptoms include dribbling, incomplete emptying, an intermittent stream, a slower stream and  straining. Pertinent negatives include no hematuria or vomiting. AUA score is 8-19. He is sexually active. Nothing aggravates the symptoms. Past treatments include tamsulosin. The treatment provided significant relief.     Review of Systems   Constitutional: Negative.  Negative for activity change, diaphoresis, malaise/fatigue, unexpected weight change and weight loss.   HENT: Negative.  Negative for congestion, ear pain, mouth sores, rhinorrhea and voice change.    Eyes: Negative.  Negative for pain, discharge and visual disturbance.   Respiratory: Negative.  Negative for apnea, cough and wheezing.    Cardiovascular: Negative.  Negative for chest pain, palpitations and PND.   Gastrointestinal: Negative.  Negative for abdominal distention, anal bleeding, diarrhea and vomiting.   Endocrine: Negative.  Negative for cold intolerance, polydipsia and polyuria.   Genitourinary:  Positive for incomplete emptying and nocturia. Negative for decreased urine volume, difficulty urinating, frequency, hematuria, penile discharge, scrotal swelling and urgency.   Musculoskeletal:  Positive for arthralgias and myalgias. Negative for back pain, neck pain and neck stiffness.   Skin:  Positive for rash. Negative for color change.   Allergic/Immunologic: Negative.  Negative for environmental allergies and immunocompromised state.   Neurological: Negative.  Negative for dizziness, tremors, seizures, speech difficulty, weakness, light-headedness and headaches.   Hematological: Negative.    Psychiatric/Behavioral: Negative.  Negative for agitation, confusion, dysphoric mood and suicidal ideas. The patient is not nervous/anxious.        PMH/PSH/FH/SH/MED/ALLERGY reviewed    Past Medical History:   Diagnosis Date    Allergy     Arthritis     Atrial fibrillation     Atrial flutter 2011    ablation    Basal cell carcinoma     Cancer     Cataract     CKD (chronic kidney disease) stage 3, GFR 30-59 ml/min 8/13/2019    Diabetes mellitus     Dry  eye syndrome     Gout, unspecified     High cholesterol     History of shingles     Hypertension     Melanoma     Seizures        Past Surgical History:   Procedure Laterality Date    ABLATION N/A 9/11/2018    Procedure: ABLATION;  Surgeon: Hany Sanchez MD;  Location: [facility] CATH LAB;  Service: Cardiology;  Laterality: N/A;  AFL, ISABELLE, RFA, ELODIA, MAC, DM, 3 PREP    ABLATION OF DYSRHYTHMIC FOCUS      ADENOIDECTOMY      CARDIAC PACEMAKER PLACEMENT      no defib    CATARACT EXTRACTION W/  INTRAOCULAR LENS IMPLANT Right 7/28/2020    Procedure: EXTRACTION, CATARACT, WITH IOL INSERTION;  Surgeon: Andrés Morse MD;  Location: [facility];  Service: Ophthalmology;  Laterality: Right;    CATARACT EXTRACTION W/  INTRAOCULAR LENS IMPLANT Left 8/11/2020    Procedure: EXTRACTION, CATARACT, WITH IOL INSERTION;  Surgeon: Andrés Morse MD;  Location: [facility];  Service: Ophthalmology;  Laterality: Left;    COLONOSCOPY N/A 1/2/2019    Procedure: COLONOSCOPY Suprep;  Surgeon: Cindy Solorzano MD;  Location: [facility] ENDO;  Service: Endoscopy;  Laterality: N/A;    FIXATION OF TENDON Left 3/4/2024    Procedure: BICEPS TENODESIS;  Surgeon: Quinten Dominique MD;  [address];  Service: Orthopedics;  Laterality: Left;  WITH CATHETER    GANGLION CYST EXCISION      left neck    HERNIA REPAIR  2013    umbilical    REVERSE TOTAL SHOULDER ARTHROPLASTY Left 3/4/2024    Procedure: ARTHROPLASTY, SHOULDER, TOTAL, REVERSE;  Surgeon: Quinten Dominique MD;  [address];  Service: Orthopedics;  Laterality: Left;  WITH CATHETER    TENDON REPAIR Right     wrist    TONSILLECTOMY      varicose veins      several sx  bilateral    VASECTOMY      VEIN SURGERY         Family History   Problem Relation Name Age of Onset    Diabetes Mother      COPD Father      Heart disease Father      Arthritis Sister Argenis     Heart attack Brother      Heart disease Brother      Diabetes Brother      No Known Problems Maternal Aunt       No Known Problems Maternal Uncle      No Known Problems Paternal Aunt      No Known Problems Paternal Uncle      No Known Problems Maternal Grandmother      No Known Problems Maternal Grandfather      No Known Problems Paternal Grandmother      No Known Problems Paternal Grandfather      No Known Problems Son Shannan     Cancer Sister Tianna         lymph node, breast    No Known Problems Sister Aaliyah     No Known Problems Son Nimesh     Amblyopia Neg Hx      Blindness Neg Hx      Cataracts Neg Hx      Glaucoma Neg Hx      Hypertension Neg Hx      Macular degeneration Neg Hx      Retinal detachment Neg Hx      Strabismus Neg Hx      Stroke Neg Hx      Thyroid disease Neg Hx      Prostate cancer Neg Hx      Kidney disease Neg Hx      Melanoma Neg Hx         Social History     Socioeconomic History    Marital status:    Occupational History     Employer: [employer]   Tobacco Use    Smoking status: Never     Passive exposure: Never    Smokeless tobacco: Never   Substance and Sexual Activity    Alcohol use: Yes     Alcohol/week: 7.0 - 14.0 standard drinks of alcohol     Types: 7 - 14 Glasses of wine per week    Drug use: No    Sexual activity: Yes     Partners: Female     Social Drivers of Health     Financial Resource Strain: Low Risk  (1/25/2024)    Overall Financial Resource Strain (CARDIA)     Difficulty of Paying Living Expenses: Not hard at all   Food Insecurity: No Food Insecurity (1/25/2024)    Hunger Vital Sign     Worried About Running Out of Food in the Last Year: Never true     Ran Out of Food in the Last Year: Never true   Transportation Needs: No Transportation Needs (1/25/2024)    PRAPARE - Transportation     Lack of Transportation (Medical): No     Lack of Transportation (Non-Medical): No   Physical Activity: Unknown (1/25/2024)    Exercise Vital Sign     Days of Exercise per Week: 0 days   Recent Concern: Physical Activity - Inactive (1/25/2024)    Exercise Vital Sign     Days of Exercise  per Week: 0 days     Minutes of Exercise per Session: 30 min   Stress: No Stress Concern Present (1/25/2024)    [employer] [place] [employer] - Occupational Stress Questionnaire     Feeling of Stress : Only a little   Housing Stability: Low Risk  (1/25/2024)    Housing Stability Vital Sign     Unable to Pay for Housing in the Last Year: No     Number of Places Lived in the Last Year: 1     Unstable Housing in the Last Year: No       Current Outpatient Medications   Medication Sig Dispense Refill    cycloSPORINE (RESTASIS) 0.05 % ophthalmic emulsion Place 1 drop into both eyes 2 (two) times daily. 60 each 11    ferrous sulfate (FEOSOL) 325 mg (65 mg iron) Tab tablet Take 325 mg by mouth once daily.      GLUCOSAMINE HCL/CHONDR VASQUEZ A NA (GLUCOSAMINE-CHONDROITIN) 750-600 mg Tab Take 2 tablets by mouth Daily.      levocetirizine (XYZAL) 5 MG tablet Take 1 tablet (5 mg total) by mouth every evening. 30 tablet 11    modafiniL (PROVIGIL) 100 MG Tab Take 1/4-1/2 tablet in AM as needed for excessive daytime fatigue 60 tablet 5    multivitamin capsule Take 1 capsule by mouth nightly.       UNABLE TO FIND Take by mouth every morning. 6000 hydrolyzes collagen      vibegron 75 mg Tab Take 1 tablet (75 mg total) by mouth once daily. 30 tablet 11    allopurinoL (ZYLOPRIM) 100 MG tablet Take 1 tablet (100 mg total) by mouth nightly. Take 1 tablet by mouth once daily 90 tablet 4    atorvastatin (LIPITOR) 40 MG tablet Take 1 tablet (40 mg total) by mouth every evening. 90 tablet 4    benazepriL (LOTENSIN) 20 MG tablet Take 1 tablet (20 mg total) by mouth every evening. 90 tablet 5    celecoxib (CELEBREX) 200 MG capsule Take 1 capsule (200 mg total) by mouth 2 (two) times daily with meals. (Patient not taking: Reported on 9/10/2024) 90 capsule 4    metoprolol tartrate (LOPRESSOR) 25 MG tablet Take 1 tablet (25 mg total) by mouth 2 (two) times daily as needed (palpitations). 45 tablet 0    rivaroxaban (XARELTO) 20 mg Tab Take  1 tablet (20 mg total) by mouth once daily. 90 tablet 3    tamsulosin (FLOMAX) 0.4 mg Cap TAKE 1 CAPSULE BY MOUTH AFTER DINNER 90 capsule 3     No current facility-administered medications for this visit.       Review of patient's allergies indicates:  No Known Allergies      Objective:       Vitals:    10/03/24 0909   BP: 100/60   Pulse: 76       Physical Exam  Constitutional:       Appearance: He is well-developed.   HENT:      Head: Normocephalic and atraumatic.      Right Ear: External ear normal.      Left Ear: External ear normal.      Nose: Nose normal.      Mouth/Throat:      Pharynx: No oropharyngeal exudate.   Eyes:      General: No scleral icterus.        Right eye: No discharge.         Left eye: No discharge.      Conjunctiva/sclera: Conjunctivae normal.      Pupils: Pupils are equal, round, and reactive to light.   Neck:      Thyroid: No thyromegaly.      Vascular: No JVD.      Trachea: No tracheal deviation.   Cardiovascular:      Rate and Rhythm: Normal rate and regular rhythm.      Heart sounds: Normal heart sounds. No murmur heard.     No friction rub. No gallop.   Pulmonary:      Effort: Pulmonary effort is normal. No respiratory distress.      Breath sounds: Normal breath sounds. No stridor. No wheezing or rales.   Chest:      Chest wall: No tenderness.   Abdominal:      General: Bowel sounds are normal. There is no distension.      Palpations: Abdomen is soft. There is no mass.      Tenderness: There is no abdominal tenderness. There is no guarding or rebound.      Hernia: No hernia is present.   Musculoskeletal:         General: No tenderness. Normal range of motion.      Cervical back: Normal range of motion and neck supple.   Lymphadenopathy:      Cervical: No cervical adenopathy.   Skin:     General: Skin is warm and dry.      Coloration: Skin is not pale.      Findings: Erythema and rash present.      Comments: Erythematous patchy rash in B/L arms    Neurological:      Mental Status: He is  alert and oriented to person, place, and time.      Cranial Nerves: No cranial nerve deficit.      Motor: No abnormal muscle tone.      Coordination: Coordination normal.      Deep Tendon Reflexes: Reflexes are normal and symmetric. Reflexes normal.   Psychiatric:         Behavior: Behavior normal.         Thought Content: Thought content normal.         Judgment: Judgment normal.         Protective Sensation (w/ 10 gram monofilament):  Right: Intact  Left: Intact    Visual Inspection:  Normal -  Bilateral    Pedal Pulses:   Right: Present  Left: Present    Posterior tibialis:   Right:Present  Left: Present      Assessment:       1. Type 2 diabetes mellitus without retinopathy    2. NICM (nonischemic cardiomyopathy)    3. Dyslipidemia associated with type 2 diabetes mellitus    4. Hyperuricemia    5. Benign prostatic hyperplasia with lower urinary tract symptoms, symptom details unspecified    6. PAF (paroxysmal atrial fibrillation)    7. Hypertension associated with diabetes    8. Immunization due        Plan:           Shannan was seen today for follow-up.    Diagnoses and all orders for this visit:    Type 2 diabetes mellitus without retinopathy  -     CBC Auto Differential; Future  -     Comprehensive Metabolic Panel; Future  -     Lipid Panel; Future  -     Hemoglobin A1C; Future  -     Urinalysis; Future  -     Microalbumin/Creatinine Ratio, Urine; Future    NICM (nonischemic cardiomyopathy)  -     benazepriL (LOTENSIN) 20 MG tablet; Take 1 tablet (20 mg total) by mouth every evening.  -     CBC Auto Differential; Future  -     Comprehensive Metabolic Panel; Future  -     Lipid Panel; Future  -     Hemoglobin A1C; Future  -     Urinalysis; Future  -     Microalbumin/Creatinine Ratio, Urine; Future    Dyslipidemia associated with type 2 diabetes mellitus  -     atorvastatin (LIPITOR) 40 MG tablet; Take 1 tablet (40 mg total) by mouth every evening.  -     CBC Auto Differential; Future  -     Comprehensive Metabolic  Panel; Future  -     Lipid Panel; Future  -     Hemoglobin A1C; Future  -     Urinalysis; Future  -     Microalbumin/Creatinine Ratio, Urine; Future    Hyperuricemia  -     allopurinoL (ZYLOPRIM) 100 MG tablet; Take 1 tablet (100 mg total) by mouth nightly. Take 1 tablet by mouth once daily  -     CBC Auto Differential; Future  -     Comprehensive Metabolic Panel; Future  -     Lipid Panel; Future  -     Hemoglobin A1C; Future  -     Urinalysis; Future  -     Microalbumin/Creatinine Ratio, Urine; Future    Benign prostatic hyperplasia with lower urinary tract symptoms, symptom details unspecified  -     tamsulosin (FLOMAX) 0.4 mg Cap; TAKE 1 CAPSULE BY MOUTH AFTER DINNER  -     CBC Auto Differential; Future  -     Comprehensive Metabolic Panel; Future  -     Lipid Panel; Future  -     Hemoglobin A1C; Future  -     Urinalysis; Future  -     Microalbumin/Creatinine Ratio, Urine; Future    PAF (paroxysmal atrial fibrillation)  -     rivaroxaban (XARELTO) 20 mg Tab; Take 1 tablet (20 mg total) by mouth once daily.  -     CBC Auto Differential; Future  -     Comprehensive Metabolic Panel; Future  -     Lipid Panel; Future  -     Hemoglobin A1C; Future  -     Urinalysis; Future  -     Microalbumin/Creatinine Ratio, Urine; Future    Hypertension associated with diabetes  -     CBC Auto Differential; Future  -     Comprehensive Metabolic Panel; Future  -     Lipid Panel; Future  -     Hemoglobin A1C; Future  -     Urinalysis; Future  -     Microalbumin/Creatinine Ratio, Urine; Future    Immunization due  -     influenza (adjuvanted) (Fluad) 45 mcg/0.5 mL IM vaccine (> or = 66 yo) 0.5 mL      Dry skin dermatitis  -kenalog with vaseline twice dialy    DM II  -controlled  -labs    HTN  -controlled    HLD  -stable    BPH  -controlled    SSS  -on pacemaker      Spent adequate time in obtaining history and explaining differentials    40 minutes spent during this visit of which greater than 50% devoted to face-face counseling and  coordination of care regarding diagnosis and management plan    RTC 6 months or prn

## 2024-10-03 NOTE — PROGRESS NOTES
Per provider order in chart, vaccine given to patient.  FLU vaccine, for 65 and older.    Left  deltoid   Patient tolerated well.    Keely Chance, CMA

## 2024-10-04 ENCOUNTER — PATIENT MESSAGE (OUTPATIENT)
Dept: SPORTS MEDICINE | Facility: CLINIC | Age: 79
End: 2024-10-04
Payer: MEDICARE

## 2024-10-09 ENCOUNTER — PATIENT MESSAGE (OUTPATIENT)
Dept: OPTOMETRY | Facility: CLINIC | Age: 79
End: 2024-10-09
Payer: MEDICARE

## 2024-10-16 ENCOUNTER — PATIENT MESSAGE (OUTPATIENT)
Dept: SPORTS MEDICINE | Facility: CLINIC | Age: 79
End: 2024-10-16
Payer: MEDICARE

## 2024-11-05 ENCOUNTER — HOSPITAL ENCOUNTER (OUTPATIENT)
Dept: RADIOLOGY | Facility: HOSPITAL | Age: 79
Discharge: HOME | End: 2024-11-05
Attending: STUDENT IN AN ORGANIZED HEALTH CARE EDUCATION/TRAINING PROGRAM
Payer: MEDICARE

## 2024-11-05 DIAGNOSIS — M75.101 ROTATOR CUFF SYNDROME OF RIGHT SHOULDER: ICD-10-CM

## 2024-11-05 PROCEDURE — 73200 CT UPPER EXTREMITY W/O DYE: CPT | Mod: TC,RT

## 2024-11-05 PROCEDURE — 73200 CT UPPER EXTREMITY W/O DYE: CPT | Mod: 26,RT,, | Performed by: RADIOLOGY

## 2024-11-13 ENCOUNTER — CLINICAL SUPPORT (OUTPATIENT)
Dept: REHABILITATION | Facility: HOSPITAL | Age: 79
End: 2024-11-13
Payer: MEDICARE

## 2024-11-13 DIAGNOSIS — I89.0 LYMPHEDEMA OF BOTH LOWER EXTREMITIES: ICD-10-CM

## 2024-11-13 DIAGNOSIS — M79.89 SWELLING OF LOWER EXTREMITY: ICD-10-CM

## 2024-11-13 DIAGNOSIS — I87.2 VENOUS INSUFFICIENCY OF BOTH LOWER EXTREMITIES: Primary | ICD-10-CM

## 2024-11-13 DIAGNOSIS — I87.2 VENOUS REFLUX: ICD-10-CM

## 2024-11-13 PROCEDURE — 97535 SELF CARE MNGMENT TRAINING: CPT | Mod: KX

## 2024-11-13 PROCEDURE — 97140 MANUAL THERAPY 1/> REGIONS: CPT | Mod: KX

## 2024-11-13 NOTE — PROGRESS NOTES
Physical Therapy Daily Treatment Note/ Discharge Summary      Name: Shannan Norman  Clinic Number: [id number]    Therapy Diagnosis:   Encounter Diagnoses   Name Primary?    Venous insufficiency of both lower extremities Yes    Venous reflux     Swelling of lower extremity     Lymphedema of both lower extremities      Physician: Martell Alonso MD    Visit Date: 11/13/2024    Physician Orders: PT Eval and Treat - lymphedema  Medical Diagnosis from Referral:   Diagnosis   I87.2 (ICD-10-CM) - Venous insufficiency   I89.0 (ICD-10-CM) - Lymphedema   Evaluation Date: 5/31/2024  Authorization Period Expiration: 12/31/24  Plan of Care Expiration: 8/23/24- send update to [facility] - 11/15/24  Visit # / Visits authorized: 14/20    Date of Last visit: 11/13/24  Total Visits Received: 14     Time In: 900a  Time Out:935a  Total Billable Time: 35minutes    FOTO 6/24/24    Precautions: Standard and R TSA, GSV vein stripping  pacemaker, CKD 3  Subjective     Pt reports: returns for 3 month reassessment.  Successfully using BioTAB pump system to both legs every night and wearing a form of compression sock daily.    He was compliant with home compression/exercise program.  Response to previous treatment: pt admits history of venous issues with procedures x 2,  has chosen compression socks that allow his shoulders and hips ease of application.  Pt follows with podiatry, orthopedics, and vascular.  Pt admits his son also has venous issues.  Pt is addressing skin and nail care.  Notes weight loss and PCP is encouraging more calories and nutrition support.   Pt choice as discharge with this self care plan.   Functional change: L shoulder with TSA, R shoulder may require TSA.  Hips stiff at times.  Performing HEP and stretching program.   Walking no device.     Pain: 2-3/10  Location: bilateral knee joints more than leg swelling concerns, R sh, hips, no pain with swelling.     Objective   Tall male with KH 20-30mmHg zipper style  compression, [place] slip on loafers.   Amount of Swelling/Location of Swelling: mild/mod to B LE ankles legs R > L with noted venous varicosities and bulges calf, knee and thighs R > L, Soft bulbous area post knee / calf R   Skin Integrity: dry scaly skin, venous varicosities, bulges, broken veins - marked varicosities med thighs  Palpation/Texture: discoloration with brown/red discoloration distally including post calf to knee. discoloration along R post leg  Min pitting distal R and ankle, slight L     ROM  Ankle L 10 DF, R 5 DG    Girth Measurements (in centimeters)  LANDMARK LEFT LE  5/31/24 L LE  8/12/24 LLE  11/13/24 Change  L RIGHT LE  5/31/24 R LE  6/10/24 R LE  7/3/24 R LE  8/12/24 R LE  11/13/24 Change  R LE DIFF   at eval   SBP 49.5 cm 44.5 45.0 4.5 50.0 cm 47.0 47.0 45.0 44.0 6.0 0.5 cm   10 below SBP 45.0 cm 38.0 37.5 7.5 45.5 cm 44.0 44.0 38.0 38.0 7.5 0.5 cm   20 below SBP 42.5 cm 37.0 37.5 5.0 46.0 cm 41.0 42.0 39.0 37.5 8.5 3.5 cm   30 below SBP 38.0 cm 29.5 29.5 8.5 37.0 cm 31.0 31.5 30.0 30.0 7.0 1.0 cm   35 below SBP 32.0 cm 26.0 26.0 6.0 32.0 cm 27.0 27.0 27.0 26.0 6.0 0 cm   Ankle 30.5 cm 27.0 28.0 2.5 33.0 cm 28.0 29.0 28.0 28.5 4.5 2.5 cm   Forefoot 24.0 cm 23.0 24.0 0 26.0 cm 24.0 24.5 24.0 24.5 1.5 2.0 cm     Required head of bed elevated, pillow behind lower back  no elevation wedge     Treatment:   Shannan received the following manual therapy techniques:- Manual Lymphatic Drainage were applied to the: R and L LE  for 15 minutes    MANUAL LYMPHATIC DRAINAGE (MLD):  not performed today  While supine with LEs elevated stimulation at terminus, along GI region, B inguinal regions, drainage of entire R LE [place] lower leg, ankle, and foot with return proximally,  Use of Aquaphor/Eucerin due to dryness.   Varied support for knee position  Consider self massage to abdominal areas, B inguinal areas, thigh, and remaining LE within reach.    SEQUENTIAL COMPRESSION PUMP: not performed    MULTILAYERED  BANDAGING:   Girth assessment  Don/doff trials of his choice of zipper KH garments- shows order slip 20-30mmHg L/XL,  pt notes his shoulders require ease of access with zipper style assisting.  Podiatry aware of nail care and shoe choices.   Reapplied his KH  20-30mmHg to R and L LE with shoes, slip on.    Shannan received therapeutic exercises to develop strength, endurance, ROM, flexibility, and posture including: aps, walking, avoid dependency and immobility, support of muscle pump with compression and activity.  Review of assisted GSS, towel assist heel slides with HF and KF, towel assist hamstring flossing, towel assist HF  Cueing on recommended stretching B  GSS - educated on movement for support, caution with L shoulder per protocol, Knee per Ortho.  THERAPEUTIC EXERCISES:  Continue HEP of AROM, stretching, and postural correction.     Home Exercises Provided and Patient Education Provided:  Self Care Home Management Training/Functional Therapeutic Activity x 15 minutes    BioTab home pump system -settings recommended, time, inflate/deflate choices, pressure and time of use- states able  to use daily   Vendor representative performed home  trial and set up.   BioTAB home pump consult per Dr. Alonso   Basic clinic review of potential set up and settings.    Pt choice on zipper style, pt has options for JOBST and Velcro Wraps but has chosen option he is able to don/doff and achieve daily compliance.     Jobst Relief KH 20-30mmHg  Ankle 29.0 cm  Calf  45.0 cm  Length tall   Due to efforts to apply and tall length recommending XLFC or XL sizing     Circ Aid Juxta Lite  C1 43.5  C  41.5  B 28.0  A  25.0  Length Tall   Recommend Large Long     Choice of compression to self apply- KH socks, KH zipper garments, Velcro Inelastic Wraps may be easier choice.   Products, recommendations as well as modifications in choice of product and orders as needed.   Present compression:  zipper KH, KH 20-30mmHg garments and  wraps  Orders and recommendations: KH 20-30mmHg and/or Velcro Inelastic Wraps, MD home pump consult  PATIENT/FAMILY Education: bandaging/compression wear schedule,  HEP, self massage,  compression options, and Risk reduction    Written Home Exercises Provided: Patient instructed to cont prior HEP.  Home exercise and compression plan of management was reviewed and Shannan was able to demonstrate understanding prior to the end of the session.  Shannan demonstrated good  understanding of the education provided.     Assessment   Shannan is a 78 y.o. male referred to outpatient Physical Therapy with a medical diagnosis of   Diagnosis   I87.2 (ICD-10-CM) - Venous insufficiency   I89.0 (ICD-10-CM) - Lymphedema   This patient presents s/p multi year history of venous reflux CVI, tall stature, OA/DJD shoulder, knees, spine, history of skin cancers with dermatology following, recent L RTSA which may limit ability to pull on compression with B UE  resulting in: multifactorial venous related lymphedema of the B LE, increased pain, increased stiffness in the ankles, knees, tall  stature with history of vein stripping, as well as difficulty performing walking, sit to stand, reach for compression application, size/shape/length of compression needs, and placing the pt at higher risk of infection.     Health care management, weight stability and nutrition per PCP, and daily support with KH compression has allowed maintenance and management of the marked reductions in size/shape, contour, less pitting and overall improved mobility.  Due to OA/DJD issues of shoulders L with TSA and pending R for TSA he has chosen KH compression with zipper access to allow don/doff ease and compliance.   Pt is successful with home pump and compression.   B LE venous varicosities remain, min pitting distally near ankle R > L and known following with dermatology for skin issues and procedures.    Pt has remained compliant with home recommendations: daily pump  support and daily compression choices.  Assistance with garments, self apply zipper style, or consider Velcro wraps as his choice.   Shannan Is progressing well towards his goals.   Pt prognosis is Good.     Pt's spiritual, cultural and educational needs considered and pt agreeable to plan of care and goals.  Anticipated Barriers for therapy: CKD, sh TSA, ability to reach feet, knee pain     The following goals were discussed with the patient and patient is in agreement with them as to be addressed in the treatment plan.      Short Term Goals: (6 weeks)  1. Patient will show decreased girth in B LE by up to 2 cm to allow for LE symmetry, shoe and clothing choice, and ability to apply needed compression.  met    2. Patient will demonstrate 100% knowledge of lymphedema precautions and signs of infection to allow for reduced lymphedema risk, infection risk, and/or exacerbation of condition. met)  3. Patient or caregiver will perform self-bandaging techniques and/or wearing of compression garments to allow for lymphatic drainage support, skin elasticity, and reduction in shape and size of limb. met  4. Patient will perform self lymph drainage techniques to areas within reach to enhance lymphatic drainage and skin condition.  (progressing  5. Patient will tolerate daily activities with multilayered bandaging to allow for lymphatic and venous support.  Met     Long Term Goals: (12  weeks)  1. Patient will show decreased girth in B LE by up to 3 cm  to allow for LE symmetry, shoe and clothing choice, and ability to apply needed compression daily.  Met  2. Patient will show reduction in density to mild or less with improved contour of limb to allow for cosmesis, LE symmetry, infection risk reduction, and clothing and compression choice.  met  3. Patient to rowan/doff compression garment with daily compliance to assist in lymphedema management, skin elasticity, and tissue density.met  4. Pt to show improved postural awareness and  alignment.  (progressing, ongoing  5. Pt to be I and compliant with HEP to allow for increased function in affected limb.   (progressing, ongoing    Discharge reason: Patient has reached the maximum rehab potential for the present time    Discharge FOTO Score: staff had discharged    PLAN   This patient is discharged from Physical Therapy     Geneva Owens, PT

## 2024-11-16 ENCOUNTER — PATIENT MESSAGE (OUTPATIENT)
Dept: NEUROLOGY | Facility: CLINIC | Age: 79
End: 2024-11-16
Payer: MEDICARE

## 2024-11-16 ENCOUNTER — PATIENT MESSAGE (OUTPATIENT)
Dept: SPORTS MEDICINE | Facility: CLINIC | Age: 79
End: 2024-11-16
Payer: MEDICARE

## 2024-11-17 DIAGNOSIS — R53.83 FATIGUE, UNSPECIFIED TYPE: Primary | ICD-10-CM

## 2024-11-17 DIAGNOSIS — G20.C PARKINSONISM, UNSPECIFIED PARKINSONISM TYPE: ICD-10-CM

## 2024-11-17 RX ORDER — MODAFINIL 100 MG/1
100 TABLET ORAL EVERY MORNING
Qty: 60 TABLET | Refills: 2 | Status: SHIPPED | OUTPATIENT
Start: 2024-11-17

## 2024-11-21 ENCOUNTER — PATIENT MESSAGE (OUTPATIENT)
Dept: ELECTROPHYSIOLOGY | Facility: CLINIC | Age: 79
End: 2024-11-21
Payer: MEDICARE

## 2024-11-21 ENCOUNTER — TELEPHONE (OUTPATIENT)
Dept: SPORTS MEDICINE | Facility: CLINIC | Age: 79
End: 2024-11-21
Payer: MEDICARE

## 2024-11-21 ENCOUNTER — TELEPHONE (OUTPATIENT)
Dept: ELECTROPHYSIOLOGY | Facility: CLINIC | Age: 79
End: 2024-11-21
Payer: MEDICARE

## 2024-11-21 DIAGNOSIS — I49.5 SSS (SICK SINUS SYNDROME): Primary | ICD-10-CM

## 2024-11-21 NOTE — TELEPHONE ENCOUNTER
----- Message from Reji Orozco sent at 11/21/2024  1:15 PM CST -----  Regarding: returning call  Type:  Patient Returning Call     Who Called: Shannan   Who Left Message for Patient: yong   Does the patient know what this is regarding?:n surgery date offer   Would the patient rather a call back or a response via [employer]? Call back   Best Call Back Number: pt [phone]   Additional Information:

## 2024-11-21 NOTE — TELEPHONE ENCOUNTER
----- Message from JACKSON Rondon sent at 11/20/2024 12:50 PM CST -----  Please schedule with one of the providers.  ----- Message -----  From: Hyun Hobbs  Sent: 11/20/2024  11:30 AM CST  To: [facility] Clinical Support Staff    Pt has an overdue recall for Dr. Sanchez    Please contact him to arrange clinic f/u w/ a new EP provider + coordinated device check    Hyun Haile

## 2024-11-21 NOTE — TELEPHONE ENCOUNTER
VESTAM with patient to offer surgery date of 12/12/24 at our main campus location for his right RTSA. Asked patient to call back to discuss.

## 2024-11-21 NOTE — TELEPHONE ENCOUNTER
Spoke with pt and offered him 12/12 for surgery. He said he would rather wait till after he first of the year.

## 2024-11-21 NOTE — TELEPHONE ENCOUNTER
Patient is a former patient of Dr. Sanchez. With his departure, patient will be scheduled with Dr. Sánchez to establish care. Patient agrees to transition to Dr. Sánchez and agrees with appointment date and time.

## 2024-12-04 ENCOUNTER — PATIENT MESSAGE (OUTPATIENT)
Dept: UROLOGY | Facility: CLINIC | Age: 79
End: 2024-12-04
Payer: MEDICARE

## 2024-12-06 ENCOUNTER — PATIENT MESSAGE (OUTPATIENT)
Dept: SPORTS MEDICINE | Facility: CLINIC | Age: 79
End: 2024-12-06
Payer: MEDICARE

## 2024-12-07 ENCOUNTER — PATIENT MESSAGE (OUTPATIENT)
Dept: NEUROLOGY | Facility: CLINIC | Age: 79
End: 2024-12-07
Payer: MEDICARE

## 2024-12-09 ENCOUNTER — HOSPITAL ENCOUNTER (OUTPATIENT)
Dept: RADIOLOGY | Facility: HOSPITAL | Age: 79
Discharge: HOME | End: 2024-12-09
Attending: FAMILY MEDICINE
Payer: MEDICARE

## 2024-12-09 ENCOUNTER — OFFICE VISIT (OUTPATIENT)
Dept: SPORTS MEDICINE | Facility: CLINIC | Age: 79
End: 2024-12-09
Payer: MEDICARE

## 2024-12-09 VITALS
SYSTOLIC BLOOD PRESSURE: 101 MMHG | BODY MASS INDEX: 24.83 KG/M2 | HEIGHT: 73 IN | HEART RATE: 64 BPM | WEIGHT: 187.38 LBS | DIASTOLIC BLOOD PRESSURE: 62 MMHG

## 2024-12-09 DIAGNOSIS — M25.552 CHRONIC PAIN OF BOTH HIPS: ICD-10-CM

## 2024-12-09 DIAGNOSIS — G89.29 CHRONIC PAIN OF BOTH KNEES: ICD-10-CM

## 2024-12-09 DIAGNOSIS — M25.561 CHRONIC PAIN OF BOTH KNEES: ICD-10-CM

## 2024-12-09 DIAGNOSIS — M16.0 PRIMARY OSTEOARTHRITIS OF BOTH HIPS: Primary | ICD-10-CM

## 2024-12-09 DIAGNOSIS — M25.562 CHRONIC PAIN OF BOTH KNEES: ICD-10-CM

## 2024-12-09 DIAGNOSIS — G89.29 CHRONIC PAIN OF BOTH HIPS: ICD-10-CM

## 2024-12-09 DIAGNOSIS — M25.551 CHRONIC PAIN OF BOTH HIPS: ICD-10-CM

## 2024-12-09 DIAGNOSIS — M47.816 LUMBAR SPONDYLOSIS: ICD-10-CM

## 2024-12-09 PROCEDURE — 1101F PT FALLS ASSESS-DOCD LE1/YR: CPT | Mod: CPTII,S$GLB,, | Performed by: FAMILY MEDICINE

## 2024-12-09 PROCEDURE — 1160F RVW MEDS BY RX/DR IN RCRD: CPT | Mod: CPTII,S$GLB,, | Performed by: FAMILY MEDICINE

## 2024-12-09 PROCEDURE — 1159F MED LIST DOCD IN RCRD: CPT | Mod: CPTII,S$GLB,, | Performed by: FAMILY MEDICINE

## 2024-12-09 PROCEDURE — 99214 OFFICE O/P EST MOD 30 MIN: CPT | Mod: 25,S$GLB,, | Performed by: FAMILY MEDICINE

## 2024-12-09 PROCEDURE — 3074F SYST BP LT 130 MM HG: CPT | Mod: CPTII,S$GLB,, | Performed by: FAMILY MEDICINE

## 2024-12-09 PROCEDURE — 73521 X-RAY EXAM HIPS BI 2 VIEWS: CPT | Mod: TC

## 2024-12-09 PROCEDURE — 99999 PR PBB SHADOW E&M-EST. PATIENT-LVL III: CPT | Mod: PBBFAC,,, | Performed by: FAMILY MEDICINE

## 2024-12-09 PROCEDURE — 20611 DRAIN/INJ JOINT/BURSA W/US: CPT | Mod: 50,S$GLB,, | Performed by: FAMILY MEDICINE

## 2024-12-09 PROCEDURE — 3078F DIAST BP <80 MM HG: CPT | Mod: CPTII,S$GLB,, | Performed by: FAMILY MEDICINE

## 2024-12-09 PROCEDURE — 1125F AMNT PAIN NOTED PAIN PRSNT: CPT | Mod: CPTII,S$GLB,, | Performed by: FAMILY MEDICINE

## 2024-12-09 PROCEDURE — 3288F FALL RISK ASSESSMENT DOCD: CPT | Mod: CPTII,S$GLB,, | Performed by: FAMILY MEDICINE

## 2024-12-09 PROCEDURE — 73521 X-RAY EXAM HIPS BI 2 VIEWS: CPT | Mod: 26,,, | Performed by: RADIOLOGY

## 2024-12-09 RX ORDER — TRIAMCINOLONE ACETONIDE 40 MG/ML
40 INJECTION, SUSPENSION INTRA-ARTICULAR; INTRAMUSCULAR
Status: DISCONTINUED | OUTPATIENT
Start: 2024-12-09 | End: 2024-12-09 | Stop reason: HOSPADM

## 2024-12-09 RX ADMIN — TRIAMCINOLONE ACETONIDE 40 MG: 40 INJECTION, SUSPENSION INTRA-ARTICULAR; INTRAMUSCULAR at 11:12

## 2024-12-09 NOTE — PROGRESS NOTES
HISTORY OF PRESENT ILLNESS   12/9/24  Shannan Norman, a 79 y.o. male, presents today for follow up evaluation of his bilateral hip. At last appointment, 1/18/2024, patient underwent intra-articular hip CSI and lateral glute tendon CSI & pain/symptoms did improve. Pain returned a few weeks ago. Pain is mild to moderate  at present & up to moderate to severe with provacative activity including ADLs and walking. Patient has not been doing physical therapy.    1/18/24  Last csi worked very well  Requesting f/u evaluation and injection if appropriate  No new sx    2/23/22  Shannan Norman, a 79 y.o. male, is here for evaluation of bilateral hip. R>L  ---prior  Location: proximal thigh   Onset: chronic, insidious  Palliative:    relative rest   oral analgesics, OTC    Provocative: prolonged ambulation  Prior: none  Progression: plateau discomfort   Quality: sharp  Radiation: none  Severity: per nursing documentation  Timing: intermittent with use  Trauma: none    Review of systems (ROS):  A 10+ review of systems was performed with pertinent positives and negatives noted above in the history of present illness. Other systems were negative unless otherwise specified.    PHYSICAL EXAMINATION  General:  The patient is alert and oriented x 3.  Mood is pleasant.  Observation of ears, eyes and nose reveal no gross abnormalities.  HEENT: NCAT, sclera nonicteric  Lungs: Respirations are equal and unlabored.   Gait is coordinated. Patient can toe walk and heel walk without difficulty.    HIP/PELVIS EXAMINATION    Observation/Inspection  Gait:   Nonantalgic   Alignment:  Neutral   Scars:   None   Muscle atrophy: None   Effusion:  None   Warmth:  None   Discoloration:   None   Leg lengths:   Equal   Pelvis:    Level     Tenderness/Crepitus (T/C):      T / C  Trochanteric bursa   - / -  Piriformis    - / -  SI joint    - / -  Psoas tendon   - / -  Rectus insertion  - / -  Adductor insertion  - / -  Pubic symphysis  - / -    ROM: (*  = pain)    Flexion:      120 degrees  External rotation:   40 degrees  Internal rotation with axial load:  30 degrees  Internal rotation without axial load:  40 degrees  Abduction:    45 degrees  Adduction:     20 degrees    Special Tests:  Pain w/ forced internal rotation (FADIR):  -   Pain w/ forced external rotation (MARCIE):  -   Circumduction test:     -  Stinchfield test:     -   Log roll:       -   Snapping hip (internal):    -   Sit-up pain:      -   Resisted sit-up pain:     -   Resisted sit-up with adductor contraction pain:  -   Step-down test:     +  Trendelenburg test:     -  Bridge test      +     Extremity Neuro-vascular Examination:   Sensation:  Grossly intact to light touch all dermatomal regions.   Motor Function:  Fully intact motor function at hip, knee, foot and ankle    DTRs;  quadriceps and  achilles 2+.  No clonus and downgoing Babinski.    Vascular status:  DP and PT pulses 2+, brisk capillary refill, symmetric.    Skin:  intact, compartments soft.    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  #2  Tonnis Grade III osteoarthritis of hip, bilat  #3 Concern for Parkinson's, followed by neurology    Imaging studies reviewed:  X-ray pelvis and hip, bilat 24.12    Plan    We discussed options including    Watchful waiting / relative rest    Physical therapy x   Injection therapy Csi iahip bilat   Consultation    The patient chooses As above   x = prescribed  CSI = corticosteroid injection  VSI = viscosupplement injection  PRPI = platelet rich plasma injection  ia = intra articular  R = right  L = left  B = bilateral   nfSx = surgical consultation was recommended, but patient is not interested in consultation at this time    Physical Therapy        Formal (fPT), @ [facility] facility p   Formal (fPT), @ [facility] facility        Homegoing (hgPT), per concurrent fPT recommendations    Homegoing (hgPT), per prior fPT recommendations  t   Homegoing (hgPT), handout provided        w/  (atPT)    [blank] = not prescribed  x = prescribed  b = prescribed, and begin as indicated  t = continue as indicated  r = prescribed, and restart as indicated  p = completed prior as indicated  hs = prescribed, and with high school   col = prescribed, and with college or university   nfPT = physical therapy was recommended, but patient is not interested in PT at this time    Activity (e.g. sports, work) restrictions    [blank] = as tolerated  pt = per physical therapist  at = per   NWB = non weight bearing on affected lower extremity, with crutches assistance for ambulation    Bracing    [blank] = not prescribed  r = recommended, but not fit with at todays visit  f = prescribed and fit with at todays visit  t = continue as indicated  d = d/c  p = as needed  rare = use on rare, as-needed basis; advised against chronic use    Pain management mp   [blank] = No prescription necessary. A handout detailing dosing of appropriate   over-the-counter musculoskeletal analgesics was made available to the patient.   m = meloxicam x 14 days  mp = 14 day course of meloxicam prescribed prior    Follow up 10 days via [facility]  -did csi iahip work  -does he have inguinal hernia  -when is #5 shoulder surgery  -does he want ortho hand to look at right wrist   [blank] = as needed  [number] = in [number] weeks  CSI = for corticosteroid injection  VSI = for viscosupplement injection or injection series  PRP = for platelet rich plasma injection or injection series  MRI = after MRI imaging  ns = should surgical options be deferred (no surgery)  o = appointment offered, deferred by patient    Should symptoms worsen or fail to resolve, consider    Revisiting the above options and / or X-ray spine lumbar  AVERY     Vocation:   ++yoga  former golfer  wife w/ advanced dementia  enjoyed long vacations in his RV

## 2024-12-09 NOTE — PROCEDURES
"Large Joint Aspiration/Injection: bilateral hip joint    Date/Time: 12/9/2024 11:30 AM    Performed by: Kit Potter MD  Authorized by: Kit Potter MD    Consent Done?:  Yes (Verbal)  Indications:  Pain  Site marked: the procedure site was marked    Timeout: prior to procedure the correct patient, procedure, and site was verified    Prep: patient was prepped and draped in usual sterile fashion    Local anesthesia used?: No      Details:  Needle Size:  22 G  Ultrasonic Guidance for needle placement?: Yes    Images are saved and documented.  Approach:  Anterior  Location:  Hip  Laterality:  Bilateral  Site:  Bilateral hip joint  Medications (Right):  40 mg triamcinolone acetonide 40 mg/mL  Medications (Left):  40 mg triamcinolone acetonide 40 mg/mL  Patient tolerance:  Patient tolerated the procedure well with no immediate complications     Description of ultrasound utilization for needle guidance:   Ultrasound guidance used for needle localization. Images saved and stored for documentation. The hip joint was visualized. Dynamic visualization of the 22g x 3.5" needle was continuous throughout the procedure.    Precautionary:  The patient's femoral artery, vein, and nerve were identified, and visualized throughout the procedure, so as to avoid needle contact with those structures.     "

## 2024-12-11 ENCOUNTER — OFFICE VISIT (OUTPATIENT)
Dept: UROLOGY | Facility: CLINIC | Age: 79
End: 2024-12-11
Payer: MEDICARE

## 2024-12-11 VITALS
HEIGHT: 73 IN | WEIGHT: 196.13 LBS | HEART RATE: 73 BPM | DIASTOLIC BLOOD PRESSURE: 80 MMHG | BODY MASS INDEX: 25.99 KG/M2 | SYSTOLIC BLOOD PRESSURE: 154 MMHG

## 2024-12-11 DIAGNOSIS — R35.0 FREQUENT URINATION: Primary | ICD-10-CM

## 2024-12-11 DIAGNOSIS — R35.1 NOCTURIA: ICD-10-CM

## 2024-12-11 DIAGNOSIS — N39.41 URGE INCONTINENCE: ICD-10-CM

## 2024-12-11 LAB — POC RESIDUAL URINE VOLUME: 85 ML (ref 0–100)

## 2024-12-11 PROCEDURE — 1159F MED LIST DOCD IN RCRD: CPT | Mod: CPTII,S$GLB,,

## 2024-12-11 PROCEDURE — 1126F AMNT PAIN NOTED NONE PRSNT: CPT | Mod: CPTII,S$GLB,,

## 2024-12-11 PROCEDURE — 51798 US URINE CAPACITY MEASURE: CPT | Mod: S$GLB,,,

## 2024-12-11 PROCEDURE — 1160F RVW MEDS BY RX/DR IN RCRD: CPT | Mod: CPTII,S$GLB,,

## 2024-12-11 PROCEDURE — 3079F DIAST BP 80-89 MM HG: CPT | Mod: CPTII,S$GLB,,

## 2024-12-11 PROCEDURE — 99999 PR PBB SHADOW E&M-EST. PATIENT-LVL IV: CPT | Mod: PBBFAC,,,

## 2024-12-11 PROCEDURE — 3077F SYST BP >= 140 MM HG: CPT | Mod: CPTII,S$GLB,,

## 2024-12-11 PROCEDURE — 99214 OFFICE O/P EST MOD 30 MIN: CPT | Mod: S$GLB,,,

## 2024-12-11 NOTE — Clinical Note
Mariano Polanco, I placed a referral in for this patient to see you. He has history of BPH but most bothersome symptoms have been urgency, nocturia, and frequency. He has tried oxybutynin and gemtesa with no relief. He is interested in getting urodynamics done. If you agree he is willing to schedule urodynamics without a consultation first. Thanks, Leandra Menchaca

## 2024-12-11 NOTE — PROGRESS NOTES
Subjective:       Patient ID: Shannan Norman is a 79 y.o. male.    Chief Complaint: frequent urination     This is a 79 y.o.  male patient that is new to me but not new to the system.  This is a past patient of Dr. Gunter, summary:  H/o BPH with LUTs on tamsulosin/finasteride, oxybutynin.    Dr. Phipps recommended limit fluids before bedtime, elevate legs, avoiding bladder irritants in 2020.  Last seen by Dr. Gunter in 2022.  Was off oxybutynin and finasteride, still on tamsulosin.  Established with Dr. Grewal 1/26/24:  Some help with limiting bladder irritants and pelvic floor physical therapy in past.  No UUI or urgency.  Nocturia 4-5, hourly in day.  AUA SS19/6   Started on oxybutynin.    Today patient reports frequent urination. Neurologist switched oxybutynin to gemtesa. Reports that he has been taking gemtesa for about a year.  Reports that he is also taking tamsulosin. AUA SS 11/4  DTF every 2 hours. Denies incontinence during the day.  NTF 4-5 times, urge incontinence at night.   Fluids consist of water, 2 glasses of red wine at dinner.  Reports that he drinks fluids up until bedtime and throughout the night.  Reports that he does not snore.  PVR 85ml not immediately after voiding.  Unable to give urine sample in clinic.         LAST PSA  Lab Results   Component Value Date    PSA 0.28 02/05/2019    PSA 0.43 08/05/2017    PSA 0.63 07/25/2016    PSA 0.67 03/25/2013    PSA 0.59 01/30/2012    PSA 0.62 01/21/2011    PSA 0.39 01/09/2010    PSA 0.47 12/08/2008    PSA 0.4 12/06/2007    PSA 0.9 11/20/2006    PSA 0.5 11/16/2005    PSA 0.7 08/23/2004       Lab Results   Component Value Date    CREATININE 1.0 10/02/2024       ---  PMH/PSH/Medications/Allergies/Social history reviewed and as in chart.    Review of Systems   Constitutional:  Negative for activity change, chills and fever.   Respiratory:  Negative for shortness of breath.    Cardiovascular:  Negative for chest pain and palpitations.   Gastrointestinal:   Negative for abdominal pain and constipation.   Genitourinary:  Positive for frequency and urgency. Negative for difficulty urinating, dysuria, flank pain and hematuria.   Neurological:  Negative for dizziness and light-headedness.     Objective:      Physical Exam  HENT:      Head: Normocephalic.   Pulmonary:      Effort: Pulmonary effort is normal.   Musculoskeletal:         General: Normal range of motion.      Cervical back: Normal range of motion.   Skin:     General: Skin is warm and dry.   Neurological:      Mental Status: He is alert and oriented to person, place, and time.       Assessment:     Problem Noted   Nocturia 12/11/2024   Urge Incontinence 12/11/2024   Frequent Urination 8/4/2017    AUA 19/6         Plan:     Discussed management of BPH symptoms. Due to patient trying medication management without relief offered further testing and possibly tertiary treatment to help with symptoms. Patient agrees with this plan.  Referral placed to Dr. Polanco, will see if he recommends urodynamic testing. Briefly discussed urodynamics with the patient in clinic.  Advised patient to stop fluids 2 hours prior to bedtime, elevate legs at night, void prior to going to bed. Patient voiced understanding.  Follow-up pending Dr. Polanco's recommendations.    GISEL Ramos    I spent a total of 30 minutes on the day of the visit.This includes face to face time and non-face to face time preparing to see the patient (eg, review of tests), obtaining and/or reviewing separately obtained history, documenting clinical information in the electronic or other health record, independently interpreting results and communicating results to the patient/family/caregiver, or care coordinator.

## 2024-12-13 RX ORDER — LIDOCAINE HYDROCHLORIDE 20 MG/ML
JELLY TOPICAL ONCE
OUTPATIENT
Start: 2024-12-13 | End: 2024-12-13

## 2024-12-14 ENCOUNTER — CLINICAL SUPPORT (OUTPATIENT)
Dept: CARDIOLOGY | Facility: HOSPITAL | Age: 79
End: 2024-12-14
Payer: MEDICARE

## 2024-12-14 DIAGNOSIS — Z95.0 PRESENCE OF CARDIAC PACEMAKER: ICD-10-CM

## 2024-12-15 ENCOUNTER — OFFICE VISIT (OUTPATIENT)
Dept: URGENT CARE | Facility: CLINIC | Age: 79
End: 2024-12-15
Payer: MEDICARE

## 2024-12-15 ENCOUNTER — CLINICAL SUPPORT (OUTPATIENT)
Dept: CARDIOLOGY | Facility: HOSPITAL | Age: 79
End: 2024-12-15
Attending: INTERNAL MEDICINE
Payer: MEDICARE

## 2024-12-15 VITALS
WEIGHT: 196 LBS | HEIGHT: 73 IN | HEART RATE: 64 BPM | DIASTOLIC BLOOD PRESSURE: 75 MMHG | RESPIRATION RATE: 19 BRPM | TEMPERATURE: 98 F | BODY MASS INDEX: 25.98 KG/M2 | OXYGEN SATURATION: 97 % | SYSTOLIC BLOOD PRESSURE: 123 MMHG

## 2024-12-15 DIAGNOSIS — J01.90 ACUTE NON-RECURRENT SINUSITIS, UNSPECIFIED LOCATION: Primary | ICD-10-CM

## 2024-12-15 DIAGNOSIS — R05.1 ACUTE COUGH: ICD-10-CM

## 2024-12-15 DIAGNOSIS — Z95.0 PRESENCE OF CARDIAC PACEMAKER: ICD-10-CM

## 2024-12-15 PROCEDURE — 99214 OFFICE O/P EST MOD 30 MIN: CPT | Mod: S$GLB,,,

## 2024-12-15 PROCEDURE — 93296 REM INTERROG EVL PM/IDS: CPT | Performed by: INTERNAL MEDICINE

## 2024-12-15 PROCEDURE — 93294 REM INTERROG EVL PM/LDLS PM: CPT | Mod: ,,, | Performed by: INTERNAL MEDICINE

## 2024-12-15 RX ORDER — AMOXICILLIN AND CLAVULANATE POTASSIUM 875; 125 MG/1; MG/1
1 TABLET, FILM COATED ORAL EVERY 12 HOURS
Qty: 14 TABLET | Refills: 0 | Status: SHIPPED | OUTPATIENT
Start: 2024-12-15 | End: 2024-12-22

## 2024-12-15 NOTE — PROGRESS NOTES
"Subjective:      Patient ID: Shannan Norman is a 79 y.o. male.    Vitals:  height is 6' 1" (1.854 m) and weight is 88.9 kg (196 lb). His temperature is 97.9 °F (36.6 °C). His blood pressure is 123/75 and his pulse is 64. His respiration is 19 and oxygen saturation is 97%.     Chief Complaint: Cough    Pt is a 80 yo male with PMHx of HTN, A. Flutter, CKD. He is presenting with non-productive cough, chest congestion.  Onset of symptoms was 7 days ago. Denies CP, SOB, fever, chills, ABD pain, N/V/D, otalgia, sore throat.  Pt reports using OTC cough medication without relief. History of RSV and flu 2 months ago.     Cough  This is a new problem. The current episode started in the past 7 days. The problem has been unchanged. The cough is Non-productive. Pertinent negatives include no chest pain, chills, ear pain, eye redness, fever, myalgias, postnasal drip, rash, sore throat, shortness of breath or wheezing. He has tried OTC cough suppressant for the symptoms. There is no history of asthma, bronchiectasis, bronchitis, COPD, emphysema, environmental allergies or pneumonia.       Constitution: Negative for activity change, appetite change, chills, fatigue and fever.   HENT:  Negative for ear pain, congestion, postnasal drip, sinus pain, sinus pressure and sore throat.    Neck: Negative for neck pain and neck swelling.   Cardiovascular:  Negative for chest pain and sob on exertion.   Eyes:  Negative for eye trauma, eye discharge and eye redness.   Respiratory:  Positive for cough. Negative for sputum production, shortness of breath and wheezing.    Gastrointestinal:  Negative for abdominal pain, nausea, vomiting, constipation and diarrhea.   Genitourinary:  Negative for dysuria, frequency, urgency and urine decreased.   Musculoskeletal:  Negative for pain, joint pain, joint swelling, abnormal ROM of joint and muscle ache.   Skin:  Negative for color change, rash, laceration and erythema.   Allergic/Immunologic: Negative " for environmental allergies.   Neurological:  Negative for dizziness, light-headedness and numbness.      Objective:     Physical Exam   Constitutional: He is oriented to person, place, and time. He appears well-developed. He is cooperative.  Non-toxic appearance. He does not appear ill. No distress.      Comments:Pt sitting erect on examination table. No acute respiratory distress, no use of accessory muscles, no notice of nasal flaring.        HENT:   Head: Normocephalic and atraumatic.   Ears:   Right Ear: Hearing and external ear normal.   Left Ear: Hearing, tympanic membrane and external ear normal.   Nose: Nose normal. No mucosal edema, rhinorrhea, nasal deformity or congestion. No epistaxis. Right sinus exhibits no maxillary sinus tenderness and no frontal sinus tenderness. Left sinus exhibits no maxillary sinus tenderness and no frontal sinus tenderness.   Mouth/Throat: Uvula is midline and mucous membranes are normal. Mucous membranes are moist. No trismus in the jaw. Normal dentition. No uvula swelling. Posterior oropharyngeal erythema present. No oropharyngeal exudate or posterior oropharyngeal edema. Oropharynx is clear.   Eyes: Conjunctivae and lids are normal. No scleral icterus.   Neck: Trachea normal and phonation normal. Neck supple. No edema present. No erythema present. No neck rigidity present.   Cardiovascular: Normal rate, regular rhythm, normal heart sounds and normal pulses.   Pulmonary/Chest: Effort normal. No accessory muscle usage. No apnea, no tachypnea and no bradypnea. No respiratory distress. He has no decreased breath sounds. He has no wheezes. He has rhonchi.   Rhonchi throughout lungs, but otherwise clear to auscultation B/L           Comments: Rhonchi throughout lungs, but otherwise clear to auscultation B/L      Abdominal: Normal appearance.   Musculoskeletal: Normal range of motion.         General: No deformity. Normal range of motion.   Neurological: He is alert and oriented to  person, place, and time. He exhibits normal muscle tone. Coordination normal.   Skin: Skin is warm, dry, intact, not diaphoretic and not pale. No erythema   Psychiatric: His speech is normal and behavior is normal. Judgment and thought content normal.   Nursing note and vitals reviewed.      Assessment:     1. Acute non-recurrent sinusitis, unspecified location    2. Acute cough        Plan:   I have reviewed the patient chart and pertinent past imaging/labs.      Acute non-recurrent sinusitis, unspecified location  -     amoxicillin-clavulanate 875-125mg (AUGMENTIN) 875-125 mg per tablet; Take 1 tablet by mouth every 12 (twelve) hours. for 7 days  Dispense: 14 tablet; Refill: 0    Acute cough

## 2024-12-16 ENCOUNTER — TELEPHONE (OUTPATIENT)
Dept: UROLOGY | Facility: CLINIC | Age: 79
End: 2024-12-16
Payer: MEDICARE

## 2024-12-16 ENCOUNTER — OFFICE VISIT (OUTPATIENT)
Dept: VASCULAR SURGERY | Facility: CLINIC | Age: 79
End: 2024-12-16
Payer: MEDICARE

## 2024-12-16 VITALS
WEIGHT: 196 LBS | HEIGHT: 73 IN | BODY MASS INDEX: 25.98 KG/M2 | RESPIRATION RATE: 18 BRPM | HEART RATE: 61 BPM | SYSTOLIC BLOOD PRESSURE: 120 MMHG | DIASTOLIC BLOOD PRESSURE: 60 MMHG

## 2024-12-16 DIAGNOSIS — I89.0 LYMPHEDEMA: Primary | ICD-10-CM

## 2024-12-16 DIAGNOSIS — I87.2 VENOUS INSUFFICIENCY: ICD-10-CM

## 2024-12-16 PROCEDURE — 1159F MED LIST DOCD IN RCRD: CPT | Mod: CPTII,S$GLB,, | Performed by: SURGERY

## 2024-12-16 PROCEDURE — 3074F SYST BP LT 130 MM HG: CPT | Mod: CPTII,S$GLB,, | Performed by: SURGERY

## 2024-12-16 PROCEDURE — 3288F FALL RISK ASSESSMENT DOCD: CPT | Mod: CPTII,S$GLB,, | Performed by: SURGERY

## 2024-12-16 PROCEDURE — 3078F DIAST BP <80 MM HG: CPT | Mod: CPTII,S$GLB,, | Performed by: SURGERY

## 2024-12-16 PROCEDURE — 99214 OFFICE O/P EST MOD 30 MIN: CPT | Mod: S$GLB,,, | Performed by: SURGERY

## 2024-12-16 PROCEDURE — 1126F AMNT PAIN NOTED NONE PRSNT: CPT | Mod: CPTII,S$GLB,, | Performed by: SURGERY

## 2024-12-16 PROCEDURE — 1101F PT FALLS ASSESS-DOCD LE1/YR: CPT | Mod: CPTII,S$GLB,, | Performed by: SURGERY

## 2024-12-16 NOTE — PROGRESS NOTES
Martell Alonso MD, RPVI                                 [facility]                           [facility]                             12/16/2024    HPI:  Shannan Norman is a 79 y.o. male with   Patient Active Problem List   Diagnosis    HTN (hypertension)    Gout, arthritis    Atrial flutter    PFO (patent foramen ovale)    Umbilical hernia    Asymptomatic varicose veins of both lower extremities    Nuclear sclerosis of both eyes    Left epiretinal membrane    Junctional escape rhythm    SSS (sick sinus syndrome)    Nonrheumatic aortic valve insufficiency    Frequent urination    Junctional bradycardia    Dyslipidemia associated with type 2 diabetes mellitus    Hypertension associated with diabetes    Right inguinal hernia    CKD (chronic kidney disease) stage 3, GFR 30-59 ml/min    Overweight (BMI 25.0-29.9)    Nuclear sclerotic cataract of right eye    Nuclear sclerotic cataract of left eye    AK (actinic keratosis)    History of melanoma    Decreased range of hip movement    Decreased strength    Pain of left upper extremity    Weakness    Stiffness due to immobility    Pelvic floor dysfunction    Basal cell carcinoma (BCC) of skin of left lower extremity including hip    PAF (paroxysmal atrial fibrillation)    Right hip pain    History of skin cancer    OAB (overactive bladder)    Presence of cardiac pacemaker    Familial hypercholesterolemia    Bradycardia    Polymyalgia rheumatica    Glenohumeral arthritis, left    Chronic anticoagulation    S/P ablation of atrial flutter    Venous reflux    Swelling of lower extremity    Venous insufficiency of both lower extremities    Lymphedema of both lower extremities    Nocturia    Urge incontinence    being managed by PCP and specialists who is here today for evaluation of BLE edema.  Patient states location is BLE occurring for yrs.  Associated signs and symptoms include pain.  Quality is heavy and severity is 7/10.  Symptoms began  yrs ago.  Alleviating factors include elevation.  Worsening factors include dependency.  Patient has been wearing compression stockings for greater than 3 months.  Symptoms do limit patient's functional status and daily activities.  no DVT history.  + GSV vein stripping past venous interventions.  no low sodium diet.  no excessive water intake.    Migraine with aura: no  PFO/ASD/right to left shunt: no  Pregnant: no  Breastfeeding: no    no MI  no Stroke  Tobacco use: denies    The patient location is: home  The chief complaint leading to consultation is: BLE edema    Visit type: audiovisual    Face to Face time with patient:   12 minutes of total time spent on the encounter, which includes face to face time and non-face to face time preparing to see the patient (eg, review of tests), Obtaining and/or reviewing separately obtained history, Documenting clinical information in the electronic or other health record, Independently interpreting results (not separately reported) and communicating results to the patient/family/caregiver, or Care coordination (not separately reported).         Each patient to whom he or she provides medical services by telemedicine is:  (1) informed of the relationship between the physician and patient and the respective role of any other health care provider with respect to management of the patient; and (2) notified that he or she may decline to receive medical services by telemedicine and may withdraw from such care at any time.    Notes:     6/2024:  c/o BLE muscle tightness, +hydration.      12/2024:  feels better with low Na diet and lymphedema therapy with compression/pumps.    Past Medical History:   Diagnosis Date    Allergy     Arthritis     Atrial fibrillation     Atrial flutter 2011    ablation    Basal cell carcinoma     Cancer     Cataract     CKD (chronic kidney disease) stage 3, GFR 30-59 ml/min 8/13/2019    Diabetes mellitus     Dry eye syndrome     Gout, unspecified      High cholesterol     History of shingles     Hypertension     Melanoma     Seizures      Past Surgical History:   Procedure Laterality Date    ABLATION N/A 9/11/2018    Procedure: ABLATION;  Surgeon: Hany Sanchez MD;  Location: [facility] CATH LAB;  Service: Cardiology;  Laterality: N/A;  AFL, ISABELLE, RFA, ELODIA, MAC, DM, 3 PREP    ABLATION OF DYSRHYTHMIC FOCUS      ADENOIDECTOMY      CARDIAC PACEMAKER PLACEMENT      no defib    CATARACT EXTRACTION W/  INTRAOCULAR LENS IMPLANT Right 7/28/2020    Procedure: EXTRACTION, CATARACT, WITH IOL INSERTION;  Surgeon: Andrés Morse MD;  Location: [facility];  Service: Ophthalmology;  Laterality: Right;    CATARACT EXTRACTION W/  INTRAOCULAR LENS IMPLANT Left 8/11/2020    Procedure: EXTRACTION, CATARACT, WITH IOL INSERTION;  Surgeon: Andrés Morse MD;  Location: [facility];  Service: Ophthalmology;  Laterality: Left;    COLONOSCOPY N/A 1/2/2019    Procedure: COLONOSCOPY Suprep;  Surgeon: Cindy Solorzano MD;  Location: [facility] ENDO;  Service: Endoscopy;  Laterality: N/A;    FIXATION OF TENDON Left 3/4/2024    Procedure: BICEPS TENODESIS;  Surgeon: Quinten Dominique MD;  Location: [address];  Service: Orthopedics;  Laterality: Left;  WITH CATHETER    GANGLION CYST EXCISION      left neck    HERNIA REPAIR  2013    umbilical    REVERSE TOTAL SHOULDER ARTHROPLASTY Left 3/4/2024    Procedure: ARTHROPLASTY, SHOULDER, TOTAL, REVERSE;  Surgeon: Quinten Dominique MD;  [address];  Service: Orthopedics;  Laterality: Left;  WITH CATHETER    TENDON REPAIR Right     wrist    TONSILLECTOMY      varicose veins      several sx  bilateral    VASECTOMY      VEIN SURGERY       Family History   Problem Relation Name Age of Onset    Diabetes Mother      COPD Father      Heart disease Father      Arthritis Sister Argenis     Heart attack Brother      Heart disease Brother      Diabetes Brother      No Known Problems Maternal Aunt      No Known Problems Maternal Uncle       No Known Problems Paternal Aunt      No Known Problems Paternal Uncle      No Known Problems Maternal Grandmother      No Known Problems Maternal Grandfather      No Known Problems Paternal Grandmother      No Known Problems Paternal Grandfather      No Known Problems Son Shannan     Cancer Sister Tianna         lymph node, breast    No Known Problems Sister Aaliyah     No Known Problems Son Nimesh     Amblyopia Neg Hx      Blindness Neg Hx      Cataracts Neg Hx      Glaucoma Neg Hx      Hypertension Neg Hx      Macular degeneration Neg Hx      Retinal detachment Neg Hx      Strabismus Neg Hx      Stroke Neg Hx      Thyroid disease Neg Hx      Prostate cancer Neg Hx      Kidney disease Neg Hx      Melanoma Neg Hx       Social History     Socioeconomic History    Marital status:    Occupational History     Employer: [employer]   Tobacco Use    Smoking status: Never     Passive exposure: Never    Smokeless tobacco: Never   Substance and Sexual Activity    Alcohol use: Yes     Alcohol/week: 7.0 - 14.0 standard drinks of alcohol     Types: 7 - 14 Glasses of wine per week    Drug use: No    Sexual activity: Yes     Partners: Female     Social Drivers of Health     Financial Resource Strain: Low Risk  (1/25/2024)    Overall Financial Resource Strain (CARDIA)     Difficulty of Paying Living Expenses: Not hard at all   Food Insecurity: No Food Insecurity (1/25/2024)    Hunger Vital Sign     Worried About Running Out of Food in the Last Year: Never true     Ran Out of Food in the Last Year: Never true   Transportation Needs: No Transportation Needs (1/25/2024)    PRAPARE - Transportation     Lack of Transportation (Medical): No     Lack of Transportation (Non-Medical): No   Physical Activity: Unknown (1/25/2024)    Exercise Vital Sign     Days of Exercise per Week: 0 days   Recent Concern: Physical Activity - Inactive (1/25/2024)    Exercise Vital Sign     Days of Exercise per Week: 0 days     Minutes of Exercise per  Session: 30 min   Stress: No Stress Concern Present (1/25/2024)    [facility] [place] [facility] Occupational [facility] - Occupational Stress Questionnaire     Feeling of Stress : Only a little   Housing Stability: Low Risk  (1/25/2024)    Housing Stability Vital Sign     Unable to Pay for Housing in the Last Year: No     Number of Places Lived in the Last Year: 1     Unstable Housing in the Last Year: No       Current Outpatient Medications:     allopurinoL (ZYLOPRIM) 100 MG tablet, Take 1 tablet (100 mg total) by mouth nightly. Take 1 tablet by mouth once daily, Disp: 90 tablet, Rfl: 4    amoxicillin-clavulanate 875-125mg (AUGMENTIN) 875-125 mg per tablet, Take 1 tablet by mouth every 12 (twelve) hours. for 7 days, Disp: 14 tablet, Rfl: 0    atorvastatin (LIPITOR) 40 MG tablet, Take 1 tablet (40 mg total) by mouth every evening., Disp: 90 tablet, Rfl: 4    benazepriL (LOTENSIN) 20 MG tablet, Take 1 tablet (20 mg total) by mouth every evening., Disp: 90 tablet, Rfl: 5    cycloSPORINE (RESTASIS) 0.05 % ophthalmic emulsion, Place 1 drop into both eyes 2 (two) times daily., Disp: 60 each, Rfl: 11    ferrous sulfate (FEOSOL) 325 mg (65 mg iron) Tab tablet, Take 325 mg by mouth once daily., Disp: , Rfl:     GLUCOSAMINE HCL/CHONDR VASQUEZ A NA (GLUCOSAMINE-CHONDROITIN) 750-600 mg Tab, Take 2 tablets by mouth Daily., Disp: , Rfl:     levocetirizine (XYZAL) 5 MG tablet, Take 1 tablet (5 mg total) by mouth every evening., Disp: 30 tablet, Rfl: 11    metoprolol tartrate (LOPRESSOR) 25 MG tablet, Take 1 tablet (25 mg total) by mouth 2 (two) times daily as needed (palpitations)., Disp: 45 tablet, Rfl: 0    modafiniL (PROVIGIL) 100 MG Tab, Take 1 tablet (100 mg total) by mouth every morning., Disp: 60 tablet, Rfl: 2    multivitamin capsule, Take 1 capsule by mouth nightly. , Disp: , Rfl:     rivaroxaban (XARELTO) 20 mg Tab, Take 1 tablet (20 mg total) by mouth once daily., Disp: 90 tablet, Rfl: 3    tamsulosin (FLOMAX) 0.4 mg Cap, TAKE 1  "CAPSULE BY MOUTH AFTER DINNER, Disp: 90 capsule, Rfl: 3    UNABLE TO FIND, Take by mouth every morning. 6000 hydrolyzes collagen, Disp: , Rfl:     vibegron 75 mg Tab, Take 1 tablet (75 mg total) by mouth once daily., Disp: 30 tablet, Rfl: 11    REVIEW OF SYSTEMS:  General: No fevers or chills; ENT: No sore throat; Allergy and Immunology: no persistent infections; Hematological and Lymphatic: No history of bleeding or easy bruising; Endocrine: negative; Respiratory: no cough, shortness of breath, or wheezing; Cardiovascular: no chest pain or dyspnea on exertion; Gastrointestinal: no abdominal pain/back, change in bowel habits, or bloody stools; Genito-Urinary: no dysuria, trouble voiding, or hematuria; Musculoskeletal: edema; Neurological: no TIA or stroke symptoms; Psychiatric: no nervousness, anxiety or depression.    PHYSICAL EXAM:      Pulse: 61         General appearance:  Alert, well-appearing, and in no distress.  Oriented to person, place, and time                    Neurological: Normal speech, no focal findings noted.  RLE with sensation to light touch, LLE with sensation to light touch.            Musculoskeletal: Digits/nail without cyanosis/clubbing.  Strength 5/5 BLE.                    Neck: Supple                  Chest:  No use of accessory muscles               Cardiac: Normal color            Abdomen: Soft, nontender, nondistended      Extremities:   BLE edema    Skin:  RLE no ulcer; LLE no ulcer      RLE no spider veins, LLE no spider veins      RLE no varicose veins, LLE no varicose veins    CEAP 3/3    VCSS 5    LAB RESULTS:  No results found for: "CBC"  Lab Results   Component Value Date    LABPROT 11.9 03/11/2023    INR 1.2 03/11/2023     Lab Results   Component Value Date     10/02/2024    K 4.8 10/02/2024     10/02/2024    CO2 27 10/02/2024     (H) 10/02/2024    BUN 25 (H) 10/02/2024    CREATININE 1.0 10/02/2024    CALCIUM 9.6 10/02/2024    ANIONGAP 6 (L) 10/02/2024    " EGFRNONAA >60.0 07/07/2022     Lab Results   Component Value Date    WBC 7.24 03/05/2024    RBC 4.62 03/05/2024    HGB 13.3 (L) 03/05/2024    HCT 42.1 03/05/2024    MCV 91 03/05/2024    MCH 28.8 03/05/2024    MCHC 31.6 (L) 03/05/2024    RDW 16.6 (H) 03/05/2024     03/05/2024    MPV 11.4 03/05/2024    GRAN 5.6 03/05/2024    GRAN 78.0 (H) 03/05/2024    LYMPH 0.7 (L) 03/05/2024    LYMPH 9.8 (L) 03/05/2024    MONO 0.8 03/05/2024    MONO 11.3 03/05/2024    EOS 0.0 03/05/2024    BASO 0.01 03/05/2024    EOSINOPHIL 0.0 03/05/2024    BASOPHIL 0.1 03/05/2024    DIFFMETHOD Automated 03/05/2024     .  Lab Results   Component Value Date    HGBA1C 5.9 (H) 10/02/2024       IMAGING:  All pertinent imaging has been reviewed and interpreted independently.    Venous US 4/2024 Impression:  FINDINGS:  Evaluation for venous thrombosis     Bilateral thigh veins: The common femoral, femoral, popliteal, and deep femoral veins are patent and free of thrombus. The veins are normally compressible and have normal phasic flow and augmentation response.     Miscellaneous: Enlarged lymph node in the right groin measuring 1.3 cm short axis but demonstrating normal reniform shape and fatty hilum.     Evaluation for venous insufficiency     Reported history of surgical stripping and EVLT to the greater saphenous veins bilaterally.     Right lower extremity:     Elevated reflux time within an accessory greater saphenous vein measuring 992 millisecond at the knee.     The maximum diameter in the right accessory greater saphenous vein is 10.7 mm at the knee.     The maximum diameter in the right small saphenous vein is 8.5 mm.     Left lower extremity:     Elevated reflux time in the left popliteal vein measuring 1256 millisecond.     Elevated reflux times within an accessory greater saphenous vein measuring up to 1660 millisecond.     The maximum diameter in the left accessory greater saphenous vein is 9.5 mm at the saphenofemoral junction.      The maximum diameter in the left small saphenous vein is 3.2 mm.     Impression:     No DVT in either thigh.     History of surgical stripping and EVLT to the greater saphenous veins bilaterally.     Venous insufficiency involving accessory greater saphenous veins bilaterally, as well as the left popliteal vein.     Enlarged but morphologically normal lymph node in the right groin, likely reactive.       IMP/PLAN:  79 y.o. male with   Patient Active Problem List   Diagnosis    HTN (hypertension)    Gout, arthritis    Atrial flutter    PFO (patent foramen ovale)    Umbilical hernia    Asymptomatic varicose veins of both lower extremities    Nuclear sclerosis of both eyes    Left epiretinal membrane    Junctional escape rhythm    SSS (sick sinus syndrome)    Nonrheumatic aortic valve insufficiency    Frequent urination    Junctional bradycardia    Dyslipidemia associated with type 2 diabetes mellitus    Hypertension associated with diabetes    Right inguinal hernia    CKD (chronic kidney disease) stage 3, GFR 30-59 ml/min    Overweight (BMI 25.0-29.9)    Nuclear sclerotic cataract of right eye    Nuclear sclerotic cataract of left eye    AK (actinic keratosis)    History of melanoma    Decreased range of hip movement    Decreased strength    Pain of left upper extremity    Weakness    Stiffness due to immobility    Pelvic floor dysfunction    Basal cell carcinoma (BCC) of skin of left lower extremity including hip    PAF (paroxysmal atrial fibrillation)    Right hip pain    History of skin cancer    OAB (overactive bladder)    Presence of cardiac pacemaker    Familial hypercholesterolemia    Bradycardia    Polymyalgia rheumatica    Glenohumeral arthritis, left    Chronic anticoagulation    S/P ablation of atrial flutter    Venous reflux    Swelling of lower extremity    Venous insufficiency of both lower extremities    Lymphedema of both lower extremities    Nocturia    Urge incontinence    being managed by PCP and  specialists who is here today for evaluation of BLE edema and lymphedema.    -recommend cont compression with Rx stockings, elevation, dietary changes associated with water and sodium intake discussed at length with patient  -Patient is diagnosed with lymphedema and presents with hyperpigmentation of the lower extremity.  Patient has previously attempted compression, elevation, and exercise for at least 4 weeks.  Patient has secondary lymphedema and has tried and failed compression of 20-30 mm Hg, elevation and exercise for >1 month period however symptoms persist.  Basic pump trial performed and discontinued due to sensitivity and pain to static pressure.  Symptoms of swelling, pain and hyperplasia present.  A compression device is recommended to treat current symptoms and prevent disease progression. The patient has tried elevation, exercise and compression and symptoms remain.  The patient has marked hyperkeratosis with hyperplasia and hyperpigmentation. Pt has been compliant with diet/med eval, MLD, skin care  - recommend cont lymphedema clinic therapy recs and pumps  -Exercise   -RTC prn    I spent 12 minutes evaluating this patient and greater than 50% of the time was spent counseling, coordinator care and discussing the plan of care.  All questions were answered and patient stated understanding with agreement with the above treatment plan.    Martell Alonso MD [facility]  Vascular and Endovascular Surgery

## 2024-12-16 NOTE — TELEPHONE ENCOUNTER
12/16/24 @ 11:20 am called patient regarding message in portal I was able to schedule the patient in for in office procedure

## 2024-12-20 ENCOUNTER — PATIENT MESSAGE (OUTPATIENT)
Dept: SPORTS MEDICINE | Facility: CLINIC | Age: 79
End: 2024-12-20
Payer: MEDICARE

## 2025-01-03 ENCOUNTER — PATIENT MESSAGE (OUTPATIENT)
Dept: SPORTS MEDICINE | Facility: CLINIC | Age: 80
End: 2025-01-03
Payer: MEDICARE

## 2025-01-03 ENCOUNTER — PATIENT MESSAGE (OUTPATIENT)
Dept: ADMINISTRATIVE | Facility: OTHER | Age: 80
End: 2025-01-03
Payer: MEDICARE

## 2025-01-07 ENCOUNTER — HOSPITAL ENCOUNTER (OUTPATIENT)
Dept: RADIOLOGY | Facility: HOSPITAL | Age: 80
Discharge: HOME | End: 2025-01-07
Attending: FAMILY MEDICINE
Payer: MEDICARE

## 2025-01-07 ENCOUNTER — OFFICE VISIT (OUTPATIENT)
Dept: FAMILY MEDICINE | Facility: CLINIC | Age: 80
End: 2025-01-07
Attending: FAMILY MEDICINE
Payer: MEDICARE

## 2025-01-07 VITALS
HEART RATE: 60 BPM | HEIGHT: 73 IN | OXYGEN SATURATION: 97 % | BODY MASS INDEX: 25.98 KG/M2 | SYSTOLIC BLOOD PRESSURE: 120 MMHG | DIASTOLIC BLOOD PRESSURE: 72 MMHG | WEIGHT: 196 LBS

## 2025-01-07 DIAGNOSIS — R10.31 RIGHT GROIN PAIN: ICD-10-CM

## 2025-01-07 DIAGNOSIS — R05.8 DRY COUGH: ICD-10-CM

## 2025-01-07 DIAGNOSIS — I48.0 PAF (PAROXYSMAL ATRIAL FIBRILLATION): ICD-10-CM

## 2025-01-07 DIAGNOSIS — I15.2 HYPERTENSION ASSOCIATED WITH DIABETES: Primary | ICD-10-CM

## 2025-01-07 DIAGNOSIS — N40.1 BENIGN PROSTATIC HYPERPLASIA WITH LOWER URINARY TRACT SYMPTOMS, SYMPTOM DETAILS UNSPECIFIED: ICD-10-CM

## 2025-01-07 DIAGNOSIS — E11.9 TYPE 2 DIABETES MELLITUS WITHOUT RETINOPATHY: ICD-10-CM

## 2025-01-07 DIAGNOSIS — E11.59 HYPERTENSION ASSOCIATED WITH DIABETES: Primary | ICD-10-CM

## 2025-01-07 PROCEDURE — 99999 PR PBB SHADOW E&M-EST. PATIENT-LVL IV: CPT | Mod: PBBFAC,,, | Performed by: FAMILY MEDICINE

## 2025-01-07 PROCEDURE — 3072F LOW RISK FOR RETINOPATHY: CPT | Mod: CPTII,S$GLB,, | Performed by: FAMILY MEDICINE

## 2025-01-07 PROCEDURE — 76705 ECHO EXAM OF ABDOMEN: CPT | Mod: 26,,, | Performed by: STUDENT IN AN ORGANIZED HEALTH CARE EDUCATION/TRAINING PROGRAM

## 2025-01-07 PROCEDURE — 3074F SYST BP LT 130 MM HG: CPT | Mod: CPTII,S$GLB,, | Performed by: FAMILY MEDICINE

## 2025-01-07 PROCEDURE — 1101F PT FALLS ASSESS-DOCD LE1/YR: CPT | Mod: CPTII,S$GLB,, | Performed by: FAMILY MEDICINE

## 2025-01-07 PROCEDURE — 1160F RVW MEDS BY RX/DR IN RCRD: CPT | Mod: CPTII,S$GLB,, | Performed by: FAMILY MEDICINE

## 2025-01-07 PROCEDURE — 71046 X-RAY EXAM CHEST 2 VIEWS: CPT | Mod: 26,,, | Performed by: RADIOLOGY

## 2025-01-07 PROCEDURE — 76705 ECHO EXAM OF ABDOMEN: CPT | Mod: TC

## 2025-01-07 PROCEDURE — 3288F FALL RISK ASSESSMENT DOCD: CPT | Mod: CPTII,S$GLB,, | Performed by: FAMILY MEDICINE

## 2025-01-07 PROCEDURE — 99215 OFFICE O/P EST HI 40 MIN: CPT | Mod: S$GLB,,, | Performed by: FAMILY MEDICINE

## 2025-01-07 PROCEDURE — 71046 X-RAY EXAM CHEST 2 VIEWS: CPT | Mod: TC,FY

## 2025-01-07 PROCEDURE — 1159F MED LIST DOCD IN RCRD: CPT | Mod: CPTII,S$GLB,, | Performed by: FAMILY MEDICINE

## 2025-01-07 PROCEDURE — 3078F DIAST BP <80 MM HG: CPT | Mod: CPTII,S$GLB,, | Performed by: FAMILY MEDICINE

## 2025-01-07 RX ORDER — BENZONATATE 200 MG/1
200 CAPSULE ORAL 3 TIMES DAILY PRN
Qty: 30 CAPSULE | Refills: 0 | Status: SHIPPED | OUTPATIENT
Start: 2025-01-07 | End: 2025-01-17

## 2025-01-07 RX ORDER — COFFEE XT/PHOSPHATIDYL SERINE 50 MG-50MG
TABLET,CHEWABLE ORAL
COMMUNITY

## 2025-01-07 NOTE — PROGRESS NOTES
Subjective:       Patient ID: Shannan Norman is a 79 y.o. male.    Chief Complaint: Groin Pain (Lower right abd just top of hip   been doing yoga and worried has a hernia now )    79  yr old pleasant white male with DM II controlled, HTN, HLD, obesity, presents today for his lab work review and 6 month follow up and med refills. C/O RIGHT GROIN PAIN X 1 WEEK AFTER DOING YOGA. HE HAD HERNIA REPAIR MANY YEARS AGO. HE ALSO HAS DRY COUGH X 2-3 DAYS.    BPH - much better  - no dysuria - tried flomax and proscar and nothing helps       DM II - controlled -  diet control -        HGBA1C                   5.9 (H)             10/02/2024                                            - on ACE and ASA - UTD with eye and foot screen      HTN - controlled - oN ACE - compliant - no side effects      HLD - controlled - on statin - compliant - no side effects -      LDLCALC                  61.2 (L)            04/02/2024                                             History as below - reviewed and no changes    HM  -labs UTD  -PSA UTD  -adacel UTD          Follow-up  Associated symptoms include arthralgias, myalgias and a rash. Pertinent negatives include no chest pain, congestion, coughing, diaphoresis, headaches, neck pain, visual change, vomiting or weakness.   Diabetes  He presents for his follow-up diabetic visit. He has type 2 diabetes mellitus. His disease course has been stable. Pertinent negatives for hypoglycemia include no confusion, dizziness, headaches, hunger, mood changes, nervousness/anxiousness, seizures, sleepiness, speech difficulty, sweats or tremors. Pertinent negatives for diabetes include no chest pain, no foot ulcerations, no polydipsia, no polyuria, no visual change, no weakness and no weight loss. Symptoms are stable. Pertinent negatives for diabetic complications include no CVA, PVD or retinopathy. Risk factors for coronary artery disease include diabetes mellitus, dyslipidemia, hypertension and male sex. He  is compliant with treatment all of the time. He is following a generally healthy diet. Meal planning includes avoidance of concentrated sweets. He rarely participates in exercise. An ACE inhibitor/angiotensin II receptor blocker is being taken. He does not see a podiatrist.Eye exam is current.   Hypertension  This is a chronic problem. The current episode started more than 1 month ago. The problem has been gradually improving since onset. The problem is controlled. Pertinent negatives include no chest pain, headaches, malaise/fatigue, neck pain, palpitations, peripheral edema, PND or sweats. There are no associated agents to hypertension. Risk factors for coronary artery disease include diabetes mellitus, dyslipidemia, male gender and obesity. Past treatments include ACE inhibitors. The current treatment provides significant improvement. There are no compliance problems.  There is no history of angina, CAD/MI, CVA, left ventricular hypertrophy, PVD or retinopathy. There is no history of chronic renal disease, coarctation of the aorta, hypercortisolism, hyperparathyroidism, pheochromocytoma, renovascular disease or a thyroid problem.   Hyperlipidemia  This is a chronic problem. The current episode started more than 1 year ago. The problem is controlled. Recent lipid tests were reviewed and are normal. Exacerbating diseases include diabetes and obesity. He has no history of chronic renal disease. Associated symptoms include myalgias. Pertinent negatives include no chest pain or leg pain. The current treatment provides significant improvement of lipids. There are no compliance problems.  Risk factors for coronary artery disease include diabetes mellitus, dyslipidemia, hypertension, male sex and obesity.   Benign Prostatic Hypertrophy  This is a chronic problem. The current episode started more than 1 year ago. The problem has been gradually worsening since onset. Irritative symptoms include nocturia. Irritative  symptoms do not include frequency or urgency. Obstructive symptoms include dribbling, incomplete emptying, an intermittent stream, a slower stream and straining. Pertinent negatives include no hematuria or vomiting. AUA score is 8-19. He is sexually active. Nothing aggravates the symptoms. Past treatments include tamsulosin. The treatment provided significant relief.   Groin Pain  The patient's pertinent negatives include no penile discharge or scrotal swelling. Associated symptoms include a rash. Pertinent negatives include no chest pain, coughing, diarrhea, frequency, headaches, urgency or vomiting.     Review of Systems   Constitutional: Negative.  Negative for activity change, diaphoresis, malaise/fatigue, unexpected weight change and weight loss.   HENT: Negative.  Negative for congestion, ear pain, mouth sores, rhinorrhea and voice change.    Eyes: Negative.  Negative for pain, discharge and visual disturbance.   Respiratory: Negative.  Negative for apnea, cough and wheezing.    Cardiovascular: Negative.  Negative for chest pain, palpitations and PND.   Gastrointestinal: Negative.  Negative for abdominal distention, anal bleeding, diarrhea and vomiting.   Endocrine: Negative.  Negative for cold intolerance, polydipsia and polyuria.   Genitourinary:  Positive for incomplete emptying and nocturia. Negative for decreased urine volume, difficulty urinating, frequency, hematuria, penile discharge, scrotal swelling and urgency.   Musculoskeletal:  Positive for arthralgias and myalgias. Negative for back pain, neck pain and neck stiffness.   Skin:  Positive for rash. Negative for color change.   Allergic/Immunologic: Negative.  Negative for environmental allergies and immunocompromised state.   Neurological: Negative.  Negative for dizziness, tremors, seizures, speech difficulty, weakness, light-headedness and headaches.   Hematological: Negative.    Psychiatric/Behavioral: Negative.  Negative for agitation,  confusion, dysphoric mood and suicidal ideas. The patient is not nervous/anxious.        PMH/PSH/FH/SH/MED/ALLERGY reviewed    Past Medical History:   Diagnosis Date    Allergy     Arthritis     Atrial fibrillation     Atrial flutter 2011    ablation    Basal cell carcinoma     Cancer     Cataract     CKD (chronic kidney disease) stage 3, GFR 30-59 ml/min 8/13/2019    Diabetes mellitus     Dry eye syndrome     Gout, unspecified     High cholesterol     History of shingles     Hypertension     Melanoma     Seizures        Past Surgical History:   Procedure Laterality Date    ABLATION N/A 9/11/2018    Procedure: ABLATION;  Surgeon: Hany Sanchez MD;  Location: [facility] CATH LAB;  Service: Cardiology;  Laterality: N/A;  AFL, ISABELLE, RFA, ELODIA, MAC, DM, 3 PREP    ABLATION OF DYSRHYTHMIC FOCUS      ADENOIDECTOMY      CARDIAC PACEMAKER PLACEMENT      no defib    CATARACT EXTRACTION W/  INTRAOCULAR LENS IMPLANT Right 7/28/2020    Procedure: EXTRACTION, CATARACT, WITH IOL INSERTION;  Surgeon: Andrés Morse MD;  Location: [facility];  Service: Ophthalmology;  Laterality: Right;    CATARACT EXTRACTION W/  INTRAOCULAR LENS IMPLANT Left 8/11/2020    Procedure: EXTRACTION, CATARACT, WITH IOL INSERTION;  Surgeon: Andrés Morse MD;  Location: [facility];  Service: Ophthalmology;  Laterality: Left;    COLONOSCOPY N/A 1/2/2019    Procedure: COLONOSCOPY Suprep;  Surgeon: Cindy Solorzano MD;  Location: [facility] ENDO;  Service: Endoscopy;  Laterality: N/A;    FIXATION OF TENDON Left 3/4/2024    Procedure: BICEPS TENODESIS;  Surgeon: Quinten Dominique MD;  Location: [address];  Service: Orthopedics;  Laterality: Left;  WITH CATHETER    GANGLION CYST EXCISION      left neck    HERNIA REPAIR  2013    umbilical    REVERSE TOTAL SHOULDER ARTHROPLASTY Left 3/4/2024    Procedure: ARTHROPLASTY, SHOULDER, TOTAL, REVERSE;  Surgeon: Quinten Dominique MD;  [facility];  Service: Orthopedics;  Laterality: Left;  WITH  CATHETER    TENDON REPAIR Right     wrist    TONSILLECTOMY      varicose veins      several sx  bilateral    VASECTOMY      VEIN SURGERY         Family History   Problem Relation Name Age of Onset    Diabetes Mother      COPD Father      Heart disease Father      Arthritis Sister Argenis     Heart attack Brother      Heart disease Brother      Diabetes Brother      No Known Problems Maternal Aunt      No Known Problems Maternal Uncle      No Known Problems Paternal Aunt      No Known Problems Paternal Uncle      No Known Problems Maternal Grandmother      No Known Problems Maternal Grandfather      No Known Problems Paternal Grandmother      No Known Problems Paternal Grandfather      No Known Problems Son Shannan     Cancer Sister Tianna         lymph node, breast    No Known Problems Sister Aaliyah     No Known Problems Son Nimesh     Amblyopia Neg Hx      Blindness Neg Hx      Cataracts Neg Hx      Glaucoma Neg Hx      Hypertension Neg Hx      Macular degeneration Neg Hx      Retinal detachment Neg Hx      Strabismus Neg Hx      Stroke Neg Hx      Thyroid disease Neg Hx      Prostate cancer Neg Hx      Kidney disease Neg Hx      Melanoma Neg Hx         Social History     Socioeconomic History    Marital status:    Occupational History     Employer: [employer]   Tobacco Use    Smoking status: Never     Passive exposure: Never    Smokeless tobacco: Never   Substance and Sexual Activity    Alcohol use: Yes     Alcohol/week: 7.0 - 14.0 standard drinks of alcohol     Types: 7 - 14 Glasses of wine per week    Drug use: No    Sexual activity: Yes     Partners: Female     Social Drivers of Health     Financial Resource Strain: Low Risk  (1/25/2024)    Overall Financial Resource Strain (CARDIA)     Difficulty of Paying Living Expenses: Not hard at all   Food Insecurity: No Food Insecurity (1/25/2024)    Hunger Vital Sign     Worried About Running Out of Food in the Last Year: Never true     Ran Out of Food in the  Last Year: Never true   Transportation Needs: No Transportation Needs (1/25/2024)    PRAPARE - Transportation     Lack of Transportation (Medical): No     Lack of Transportation (Non-Medical): No   Physical Activity: Unknown (1/25/2024)    Exercise Vital Sign     Days of Exercise per Week: 0 days   Recent Concern: Physical Activity - Inactive (1/25/2024)    Exercise Vital Sign     Days of Exercise per Week: 0 days     Minutes of Exercise per Session: 30 min   Stress: No Stress Concern Present (1/25/2024)    [place] [employer] - Occupational Stress Questionnaire     Feeling of Stress : Only a little   Housing Stability: Low Risk  (1/25/2024)    Housing Stability Vital Sign     Unable to Pay for Housing in the Last Year: No     Number of Places Lived in the Last Year: 1     Unstable Housing in the Last Year: No       Current Outpatient Medications   Medication Sig Dispense Refill    allopurinoL (ZYLOPRIM) 100 MG tablet Take 1 tablet (100 mg total) by mouth nightly. Take 1 tablet by mouth once daily 90 tablet 4    atorvastatin (LIPITOR) 40 MG tablet Take 1 tablet (40 mg total) by mouth every evening. 90 tablet 4    benazepriL (LOTENSIN) 20 MG tablet Take 1 tablet (20 mg total) by mouth every evening. 90 tablet 5    cycloSPORINE (RESTASIS) 0.05 % ophthalmic emulsion Place 1 drop into both eyes 2 (two) times daily. 60 each 11    ferrous sulfate (FEOSOL) 325 mg (65 mg iron) Tab tablet Take 325 mg by mouth once daily.      GLUCOSAMINE HCL/CHONDR VASQUEZ A NA (GLUCOSAMINE-CHONDROITIN) 750-600 mg Tab Take 2 tablets by mouth Daily.      levocetirizine (XYZAL) 5 MG tablet Take 1 tablet (5 mg total) by mouth every evening. 30 tablet 11    metoprolol tartrate (LOPRESSOR) 25 MG tablet Take 1 tablet (25 mg total) by mouth 2 (two) times daily as needed (palpitations). 45 tablet 0    modafiniL (PROVIGIL) 100 MG Tab Take 1 tablet (100 mg total) by mouth every morning. 60 tablet 2    multivitamin capsule Take 1  capsule by mouth nightly.       rivaroxaban (XARELTO) 20 mg Tab Take 1 tablet (20 mg total) by mouth once daily. 90 tablet 3    tamsulosin (FLOMAX) 0.4 mg Cap TAKE 1 CAPSULE BY MOUTH AFTER DINNER 90 capsule 3    UNABLE TO FIND Take by mouth every morning. 6000 hydrolyzes collagen      vibegron 75 mg Tab Take 1 tablet (75 mg total) by mouth once daily. 30 tablet 11    B6-folic-B12-coffee-phosphatid (NEURIVA PLUS) 0.85 mg-200 mcg-1.2 mcg Chew Take by mouth.      benzonatate (TESSALON) 200 MG capsule Take 1 capsule (200 mg total) by mouth 3 (three) times daily as needed for Cough. 30 capsule 0     No current facility-administered medications for this visit.       Review of patient's allergies indicates:  No Known Allergies      Objective:       Vitals:    01/07/25 1429   BP: 120/72   Pulse: 60       Physical Exam  Constitutional:       Appearance: He is well-developed.   HENT:      Head: Normocephalic and atraumatic.      Right Ear: External ear normal.      Left Ear: External ear normal.      Nose: Nose normal.      Mouth/Throat:      Pharynx: No oropharyngeal exudate.   Eyes:      General: No scleral icterus.        Right eye: No discharge.         Left eye: No discharge.      Conjunctiva/sclera: Conjunctivae normal.      Pupils: Pupils are equal, round, and reactive to light.   Neck:      Thyroid: No thyromegaly.      Vascular: No JVD.      Trachea: No tracheal deviation.   Cardiovascular:      Rate and Rhythm: Normal rate and regular rhythm.      Heart sounds: Normal heart sounds. No murmur heard.     No friction rub. No gallop.   Pulmonary:      Effort: Pulmonary effort is normal. No respiratory distress.      Breath sounds: Normal breath sounds. No stridor. No wheezing or rales.   Chest:      Chest wall: No tenderness.   Abdominal:      General: Bowel sounds are normal. There is no distension.      Palpations: Abdomen is soft. There is no mass.      Tenderness: There is no abdominal tenderness. There is no  guarding or rebound.      Hernia: No hernia is present.   Musculoskeletal:         General: Tenderness (RIGHT GROIN TTP) present. Normal range of motion.      Cervical back: Normal range of motion and neck supple.   Lymphadenopathy:      Cervical: No cervical adenopathy.   Skin:     General: Skin is warm and dry.      Coloration: Skin is not pale.      Findings: Erythema and rash present.      Comments: Erythematous patchy rash in B/L arms    Neurological:      Mental Status: He is alert and oriented to person, place, and time.      Cranial Nerves: No cranial nerve deficit.      Motor: No abnormal muscle tone.      Coordination: Coordination normal.      Deep Tendon Reflexes: Reflexes are normal and symmetric. Reflexes normal.   Psychiatric:         Behavior: Behavior normal.         Thought Content: Thought content normal.         Judgment: Judgment normal.         Protective Sensation (w/ 10 gram monofilament):  Right: Intact  Left: Intact    Visual Inspection:  Normal -  Bilateral    Pedal Pulses:   Right: Present  Left: Present    Posterior tibialis:   Right:Present  Left: Present      Assessment:       1. Hypertension associated with diabetes    2. PAF (paroxysmal atrial fibrillation)    3. Benign prostatic hyperplasia with lower urinary tract symptoms, symptom details unspecified    4. Type 2 diabetes mellitus without retinopathy    5. Right groin pain    6. Dry cough        Plan:           Shannan was seen today for groin pain.    Diagnoses and all orders for this visit:    Hypertension associated with diabetes  -     Cancel: CBC Auto Differential; Future  -     Cancel: Comprehensive Metabolic Panel; Future  -     Cancel: Lipid Panel; Future  -     Cancel: Hemoglobin A1C; Future  -     Cancel: Urinalysis; Future  -     Cancel: Microalbumin/Creatinine Ratio, Urine; Future    PAF (paroxysmal atrial fibrillation)  -     Cancel: CBC Auto Differential; Future  -     Cancel: Comprehensive Metabolic Panel; Future  -      Cancel: Lipid Panel; Future  -     Cancel: Hemoglobin A1C; Future  -     Cancel: Urinalysis; Future  -     Cancel: Microalbumin/Creatinine Ratio, Urine; Future    Benign prostatic hyperplasia with lower urinary tract symptoms, symptom details unspecified  -     Cancel: CBC Auto Differential; Future  -     Cancel: Comprehensive Metabolic Panel; Future  -     Cancel: Lipid Panel; Future  -     Cancel: Hemoglobin A1C; Future  -     Cancel: Urinalysis; Future  -     Cancel: Microalbumin/Creatinine Ratio, Urine; Future    Type 2 diabetes mellitus without retinopathy  -     Cancel: CBC Auto Differential; Future  -     Cancel: Comprehensive Metabolic Panel; Future  -     Cancel: Lipid Panel; Future  -     Cancel: Hemoglobin A1C; Future  -     Cancel: Urinalysis; Future  -     Cancel: Microalbumin/Creatinine Ratio, Urine; Future    Right groin pain  -     US Abdomen Limited_Hernia; Future    Dry cough  -     X-Ray Chest PA And Lateral; Future  -     benzonatate (TESSALON) 200 MG capsule; Take 1 capsule (200 mg total) by mouth 3 (three) times daily as needed for Cough.      Dry COUGH  -TESSALON PERLES PRN    RIGHT GROIN PAIN  -USG TO R/O HERNIA    DM II  -controlled  -labs    HTN  -controlled    HLD  -stable    BPH  -controlled    SSS  -on pacemaker      Spent adequate time in obtaining history and explaining differentials    40 minutes spent during this visit of which greater than 50% devoted to face-face counseling and coordination of care regarding diagnosis and management plan    RTC 6 months or prn

## 2025-01-08 LAB
OHS CV AF BURDEN PERCENT: < 1
OHS CV DC REMOTE DEVICE TYPE: NORMAL
OHS CV ICD SHOCK: NO
OHS CV RV PACING PERCENT: 9 %

## 2025-01-13 ENCOUNTER — PATIENT MESSAGE (OUTPATIENT)
Dept: SPORTS MEDICINE | Facility: CLINIC | Age: 80
End: 2025-01-13
Payer: MEDICARE

## 2025-01-14 ENCOUNTER — HOSPITAL ENCOUNTER (OUTPATIENT)
Dept: RADIOLOGY | Facility: HOSPITAL | Age: 80
Discharge: HOME | End: 2025-01-14
Attending: UROLOGY
Payer: MEDICARE

## 2025-01-14 ENCOUNTER — PROCEDURE VISIT (OUTPATIENT)
Facility: CLINIC | Age: 80
End: 2025-01-14
Payer: MEDICARE

## 2025-01-14 VITALS
BODY MASS INDEX: 26.32 KG/M2 | HEART RATE: 74 BPM | TEMPERATURE: 97 F | SYSTOLIC BLOOD PRESSURE: 146 MMHG | DIASTOLIC BLOOD PRESSURE: 57 MMHG | RESPIRATION RATE: 18 BRPM | WEIGHT: 199.5 LBS

## 2025-01-14 DIAGNOSIS — R35.0 FREQUENT URINATION: ICD-10-CM

## 2025-01-14 PROCEDURE — 51797 INTRAABDOMINAL PRESSURE TEST: CPT | Mod: 26,S$GLB,, | Performed by: UROLOGY

## 2025-01-14 PROCEDURE — 74450 X-RAY URETHRA/BLADDER: CPT | Mod: TC

## 2025-01-14 PROCEDURE — 74455 X-RAY URETHRA/BLADDER: CPT | Mod: 26,S$GLB,, | Performed by: UROLOGY

## 2025-01-14 PROCEDURE — 52000 CYSTOURETHROSCOPY: CPT | Mod: 59,S$GLB,, | Performed by: UROLOGY

## 2025-01-14 PROCEDURE — 51600 INJECTION FOR BLADDER X-RAY: CPT | Mod: 51,S$GLB,, | Performed by: UROLOGY

## 2025-01-14 PROCEDURE — 51728 CYSTOMETROGRAM W/VP: CPT | Mod: 26,S$GLB,, | Performed by: UROLOGY

## 2025-01-14 PROCEDURE — 51741 ELECTRO-UROFLOWMETRY FIRST: CPT | Mod: 26,51,S$GLB, | Performed by: UROLOGY

## 2025-01-14 PROCEDURE — 51784 ANAL/URINARY MUSCLE STUDY: CPT | Mod: 26,51,S$GLB, | Performed by: UROLOGY

## 2025-01-14 RX ORDER — LIDOCAINE HYDROCHLORIDE 20 MG/ML
JELLY TOPICAL
Status: COMPLETED | OUTPATIENT
Start: 2025-01-14 | End: 2025-01-14

## 2025-01-14 RX ADMIN — LIDOCAINE HYDROCHLORIDE: 20 JELLY TOPICAL at 08:01

## 2025-01-14 NOTE — PROCEDURES
Urodynamic Report    [facility] of Urology       Referring Physician:  Thang Polanco MD    YOB: 1945  Date of Exam: 1/14/2025    HPI: This is a 79 y.o.  male patient that is new to me but not new to the system.  This is a past patient of Dr. Gunter, summary:  H/o BPH with LUTs on tamsulosin/finasteride, oxybutynin.    Dr. Phipps recommended limit fluids before bedtime, elevate legs, avoiding bladder irritants in 2020.  Last seen by Dr. Gunter in 2022.  Was off oxybutynin and finasteride, still on tamsulosin.  Established with Dr. Grewal 1/26/24:  Some help with limiting bladder irritants and pelvic floor physical therapy in past.  No UUI or urgency.  Nocturia 4-5, hourly in day.  AUA SS19/6   Started on oxybutynin.     Today patient reports frequent urination. Neurologist switched oxybutynin to gemtesa. Reports that he has been taking gemtesa for about a year.  Reports that he is also taking tamsulosin. AUA SS 11/4  DTF every 2 hours. Denies incontinence during the day.  NTF 4-5 times, urge incontinence at night.   Fluids consist of water, 2 glasses of red wine at dinner.  Reports that he drinks fluids up until bedtime and throughout the night.  Reports that he does not snore.  PVR 85ml not immediately after voiding.    Cystometrogram:    Position: Sitting  Filling Rate: 30 mL/sec   Catheter: 7F  Fluid:Conray       Cath PVR prior: 76 mL Voiding Diary Capacity: Not Available   First Sensation: 47 mL First Desire: 73 mL   Strong Desire: 123 mL Cystometric Capacity: 139 mL       Pdet at custodial: 0 cm H2O Compliance: Normal       Detrusor Overactivity (DO): Absent  Volume first DO: No DO   Max DO Pressure: No DO Urgency Incontinence: Absent        Leak Point Pressure Testing:        Volume Tested: 150 mL  Abdominal Leak Point Pressure: No Leak   Stress Induced DO: Absent          Voiding Study:        Voided Volume: 150 mL PVR: 0 mL   Maximum Flow: 15 mL/sec Average Flow 9 mL/sec   Max Pdet: 53  cmH2O  Pdet at Max Flow: 39 cmH2O   EMG Storage: Normal Recruitment  EMG Voiding: Normal quiescence       Fluroscopic Imaging:        Bladder Contour: Smooth Vesicoureteral Reflux:No VUR   Bladder Neck at Rest: closed Bladder Neck Voiding:Patent       Impression:        Sensation:Early    Capacity: Small    Compliance:Normal    Detrusor Overactivity:Absent    Continence: No Incontinence    Contractility: Normal    Emptying:Satisfactory    Coordination:Coordinated Voiding          Summary:  This study was performed in accordance with the current AUA/SUFU Guideline on Adult Urodynamics. On filling phase he demonstrates early first and strong desire with a small bladder capacity. There is no detrusor overactivity (DO) demonstrated on this exam (though absence of DO on urodynamic evaluation does not exclude it as causative agent of urgency symptoms). Bladder compliance at capacity is normal. On fluoroscopy, the bladder contour is smooth. There is no VUR demonstrated on this exam.     On stress testing, with adequate cough and valsalva effort, there is no RK demonstrated and patient reports no clinical history of RK.    On voiding phase, voiding pressures are within normal range and flow rate is normal. On VCUG, the urethra appears patent throughout voiding. The patient empties completely. The patient reports this is the normal voiding pattern.     Cystoscopy Details: Informed consent was obtained and he was sterily prepped and 1% lidocaine jelly was injected per urethra. A flexible cystoscope was inserted into the bladder via the urethra. There was no evidence of stricture, stenosis, lesions, other obstruction, or diverticulum. Significant findings included  mild bilateral lateral lobe hypertrophy but no median lobe and no obviously obstructing prostatic tissue noted . Cystoscopic examination of the bladder revealed orthotopically positioned, normal bilateral ureteral orifices with clear yellow urine effluxing from  each orifice. All mucosal surfaces were examined with no apparent stones, tumors, foreign bodies, erythema, trabeculation, diverticula, or ulcers. The procedure was concluded without complications. The patient was not administered a post-procedure antibiotic.     No evidence of bladder outlet obstruction, either functional or anatomic. Refractory symptoms with antimuscarinics and beta-3 agonist. Could consider SNM or Botox as well as other medication trials, though all may have lower rates of efficacy in the setting of neurogenic bladder.

## 2025-01-15 ENCOUNTER — OFFICE VISIT (OUTPATIENT)
Dept: SPORTS MEDICINE | Facility: CLINIC | Age: 80
End: 2025-01-15
Payer: MEDICARE

## 2025-01-15 VITALS — TEMPERATURE: 98 F | WEIGHT: 198.88 LBS | HEIGHT: 73 IN | BODY MASS INDEX: 26.36 KG/M2

## 2025-01-15 DIAGNOSIS — M17.0 PRIMARY OSTEOARTHRITIS OF BOTH KNEES: Primary | ICD-10-CM

## 2025-01-15 DIAGNOSIS — M25.562 CHRONIC PAIN OF BOTH KNEES: ICD-10-CM

## 2025-01-15 DIAGNOSIS — R26.89 ANTALGIC GAIT: ICD-10-CM

## 2025-01-15 DIAGNOSIS — M25.561 CHRONIC PAIN OF BOTH KNEES: ICD-10-CM

## 2025-01-15 DIAGNOSIS — G89.29 CHRONIC PAIN OF BOTH KNEES: ICD-10-CM

## 2025-01-15 PROCEDURE — 99214 OFFICE O/P EST MOD 30 MIN: CPT | Mod: 25,S$GLB,, | Performed by: FAMILY MEDICINE

## 2025-01-15 PROCEDURE — 1159F MED LIST DOCD IN RCRD: CPT | Mod: CPTII,S$GLB,, | Performed by: FAMILY MEDICINE

## 2025-01-15 PROCEDURE — 3072F LOW RISK FOR RETINOPATHY: CPT | Mod: CPTII,S$GLB,, | Performed by: FAMILY MEDICINE

## 2025-01-15 PROCEDURE — 1101F PT FALLS ASSESS-DOCD LE1/YR: CPT | Mod: CPTII,S$GLB,, | Performed by: FAMILY MEDICINE

## 2025-01-15 PROCEDURE — 20611 DRAIN/INJ JOINT/BURSA W/US: CPT | Mod: 50,S$GLB,, | Performed by: FAMILY MEDICINE

## 2025-01-15 PROCEDURE — 3288F FALL RISK ASSESSMENT DOCD: CPT | Mod: CPTII,S$GLB,, | Performed by: FAMILY MEDICINE

## 2025-01-15 PROCEDURE — 1160F RVW MEDS BY RX/DR IN RCRD: CPT | Mod: CPTII,S$GLB,, | Performed by: FAMILY MEDICINE

## 2025-01-15 PROCEDURE — 1125F AMNT PAIN NOTED PAIN PRSNT: CPT | Mod: CPTII,S$GLB,, | Performed by: FAMILY MEDICINE

## 2025-01-15 PROCEDURE — 99999 PR PBB SHADOW E&M-EST. PATIENT-LVL III: CPT | Mod: PBBFAC,,, | Performed by: FAMILY MEDICINE

## 2025-01-15 RX ORDER — TRIAMCINOLONE ACETONIDE 40 MG/ML
40 INJECTION, SUSPENSION INTRA-ARTICULAR; INTRAMUSCULAR
Status: DISCONTINUED | OUTPATIENT
Start: 2025-01-15 | End: 2025-01-15 | Stop reason: HOSPADM

## 2025-01-15 RX ADMIN — TRIAMCINOLONE ACETONIDE 40 MG: 40 INJECTION, SUSPENSION INTRA-ARTICULAR; INTRAMUSCULAR at 10:01

## 2025-01-15 NOTE — PROCEDURES
"Large Joint Aspiration/Injection: bilateral knee    Date/Time: 1/15/2025 10:00 AM    Performed by: Kti Potter MD  Authorized by: Kit Potter MD    Consent Done?:  Yes (Verbal)  Indications:  Pain  Site marked: the procedure site was marked    Timeout: prior to procedure the correct patient, procedure, and site was verified    Prep: patient was prepped and draped in usual sterile fashion      Details:  Needle Size:  25 G  Ultrasonic Guidance for needle placement?: Yes    Images are saved and documented.  Approach:  Lateral  Location:  Knee  Laterality:  Bilateral  Site:  Bilateral knee  Medications (Right):  40 mg triamcinolone acetonide 40 mg/mL  Medications (Left):  40 mg triamcinolone acetonide 40 mg/mL  Patient tolerance:  Patient tolerated the procedure well with no immediate complications     Description of ultrasound utilization for needle guidance:   Ultrasound guidance used for needle localization. Images saved and stored for documentation. The SUPRAPATELLAR BURSA / KNEE JOINT was visualized. Dynamic visualization of the 25g x 1.5" needle was continuous throughout the procedure.     "

## 2025-01-15 NOTE — PROGRESS NOTES
History of Present Illness (HPI)  1/15/25  Shannan Norman, a 79 y.o. male, presents today for follow up evaluation of his bilateral knee. At last appointment, 12/9/2024, patient underwent intra-articular knee CSI & pain/symptoms did improve. Pain returned a few weeks ago. Pain is moderate at present & up to mild to moderate  with provacative activity including ADLs. Patient has not been doing physical therapy.    9/10/24  Shannan Norman, a 79 y.o. male, presents today for follow up evaluation of his bilateral knee. At last appointment, 6/5/2024, patient underwent intra-articular knee CSI & pain/symptoms did improve. Pain returned one month ago. Pain is 0/10 at present & up to mild to moderate  with provacative activity including ADLs. Patient has not been doing physical therapy. Patient reports he discontinued taking his celebrex one week ago and has noticed an increase in discomfort when going down steps, though he is not sure if the two are related.    6/5/24  Shannan Norman, a 79 y.o. male, presents today for follow up evaluation of his left knee. At last appointment, 2/27/2024, patient completed 3/3 Gelsyn series & pain/symptoms did improve. Pain returned one month ago. Pain is 0/10 at present & up to mild to moderate  with provacative activity including sitting to standing. Patient reports recent diagnosis of lymphedema.    24.01.22  Last csi+vsi (quite some time ago) worked very well  Requesting f/u evaluation and injection if appropriate  No new sx    Shannan Norman, a 78 y.o. male, presents today for evaluation of his bilateral knee pain.    ---prior  Location: anterior knee  Onset: Chronic   Palliative:    Relative rest   Oral analgesics   Ambulation Assistance -    Bracing: none   Injections   Euflexxa series- 3/18/2019, 3/25/19, 4/1/2019  Provocative:    ADLS   Prolonged ambulation     Prior: none  Progression: constant discomfort  Quality:    pain is a 0 /10 throbbing pain today.Pain is 6 /10 at its  worst.   Radiation: Denies, numbness, tingling, and inability to bear weight.  Severity: per nursing documentation  Timing: intermittent w/ use  Trauma: golf    Review of Systems (ROS)  A 10+ review of systems was performed with pertinent positives and negatives noted above in the history of present illness. Other systems were negative unless otherwise specified.    Physical Examination (PE)  General:  The patient is alert and oriented x 3. Mood is pleasant. Observation of ears, eyes and nose reveal no gross abnormalities. HEENT: NCAT, sclera anicteric.   Lungs: Respirations are equal and unlabored.  Gait is coordinated. Patient can toe walk and heel walk without difficulty.    KNEE EXAMINATION    Observation/Inspection  Gait:   Nonantalgic   Alignment:  Neutral   Scars:   None   Muscle atrophy: Mild  Effusion:  None   Warmth:  None   Discoloration:   none     Tenderness / Crepitus (T / C):         T / C      T / C  Patella   - / -   Lateral joint line   - / -     Peripatellar medial  -  Medial joint line    + / -  Peripatellar lateral -  Medial plica   - / -  Patellar tendon -   Popliteal fossa   - / -  Quad tendon   -   Gastrocnemius   -  Prepatellar Bursa - / -   Quadricep   -  Tibial tubercle  -  Thigh/hamstring  -  Pes anserine/HS -  Fibula    -  ITB   - / -  Tibia     -  Tib/fib joint  - / -  LCL    -    MFC   - / -   MCL: Proximal  -    LFC   - / -   Distal    -          ROM: (* = pain)  PASSIVE   ACTIVE    Left :   5 / 0 / 145   5 / 0 / 145     Right :    5 / 0 / 145   5 / 0 / 145    Patellofemoral examination:  See above noted areas of tenderness.   Patella position    Subluxation / dislocation: Centered        Sup. / Inf;   Normal   Crepitus (PF):    Absent   Patellar Mobility:       Medial-lateral:   Normal    Superior-inferior:  Normal    Inferior tilt   Normal    Patellar tendon:  Normal   Lateral tilt:    Normal   J-sign:     None   Patellofemoral grind:   No pain     Meniscal Signs:     Pain on  terminal extension:  +  Pain on terminal flexion:  +  Luisitos maneuver:  +*  Squat     NT  Thesaly    NT    Ligament Examination:  ACL / Lachman:  WNL  PCL-Post.  drawer: normal 0 to 2mm  MCL- Valgus:  normal 0 to 2mm  LCL- Varus:    normal 0 to 2mm  Pivot shift:  guarding   Dial Test:   difference c/w other side   At 30° flexion: normal (< 5°)    At 90° flexion: normal (< 5°)   Reverse Pivot Shift:   normal (Equal)     Strength: (* = with pain) Painful Side  Quadriceps   5/5  Hamstrin/5    Extremity Neuro-vascular Examination:   Sensation:  Grossly intact to light touch all dermatomal regions.   Motor Function:  Fully intact motor function at hip, knee, foot and ankle    DTRs;  quadriceps and  achilles 2+.  No clonus and downgoing Babinski.    Vascular status:  DP and PT pulses 2+, brisk capillary refill, symmetric.     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, bilat  Knee pain left >>right  #2 RLE lymphedema    Imaging studies reviewed:   X-ray knee, bilateral     Plan  We discussed the importance of appropriate diet, weight, and regular exercise    We discussed options including    Watchful waiting / relative rest    Physical therapy x   Injection therapy Csi iaknee bilateral   Consultation    The patient chooses As above   x = prescribed  CSI = corticosteroid injection  VSI = viscosupplement injection  PRPI = platelet rich plasma injection  ia = intra articular  R = right  L = left  B = bilateral   nfSx = surgical consultation was recommended, but patient is not interested in consultation at this time    Physical Therapy        Formal (fPT), @ [facility] facility p   Formal (fPT), @ [facility] facility        Homegoing (hgPT), per concurrent fPT recommendations    Homegoing (hgPT), per prior fPT recommendations t   Homegoing (hgPT), handout provided        w/  (atPT)    [blank] = not prescribed  x = prescribed  b =  prescribed, and begin as indicated  t = continue as indicated  r = prescribed, and restart as indicated  p = completed prior as indicated  hs = prescribed, and with high school   col = prescribed, and with college or university   nfPT = physical therapy was recommended, but patient is not interested in PT at this time    Activity (e.g. sports, work) restrictions    [blank] = as tolerated  pt = per physical therapist  at = per     Bracing    [blank] = not prescribed  r = recommended, but not fit with at todays visit  f = prescribed and fit with at todays visit  t = continue as indicated  p = prn use on rare, as-needed basis; advised against chronic use    Pain management    [blank] = No prescription necessary. A handout detailing dosing of appropriate   over-the-counter musculoskeletal analgesics was made available to the patient.   m = meloxicam x 14 days  mp = 14 day course of meloxicam prescribed prior    Follow up 12   [blank] = as needed  [number] = in [number] weeks  CSI = for corticosteroid injection  VSI = for viscosupplement injection or injection series  PRP = for platelet rich plasma injection or injection series  MRI = after MRI imaging  ns = should surgical options be deferred (no surgery)  o = appointment offered, deferred by patient    Should symptoms worsen or fail to resolve, consider    Revisiting the above options and / or      Vocation:   ++yoga  former golfer  wife w/ advanced dementia  enjoyed long vacations in his RV  cb re: glucosamine / chondroitin  baseball fan

## 2025-01-19 NOTE — ANESTHESIA PREPROCEDURE EVALUATION
02/06/2018  Shannan Norman is a 72 y.o., male.    Anesthesia Evaluation    I have reviewed the Patient Summary Reports.     I have reviewed the Medications.     Review of Systems  Anesthesia Hx:  History of prior surgery of interest to airway management or planning:  Denies Personal Hx of Anesthesia complications.   Social:  Non-Smoker    Cardiovascular:   Hypertension Dysrhythmias hyperlipidemia EF 60% on 2016 echo  PFO noted   Neurological:   Seizures    Endocrine:   Diabetes        Physical Exam  General:  Well nourished    Airway/Jaw/Neck:  Airway Findings: Mouth Opening: Normal Tongue: Normal  General Airway Assessment: Adult  Mallampati: III  Improves to II with phonation.  TM Distance: Normal, at least 6 cm  Jaw/Neck Findings:  Neck ROM: Normal ROM       Chest/Lungs:  Chest/Lungs Findings: Normal Respiratory Rate     Heart/Vascular:  Heart Findings: Rate: Normal        Mental Status:  Mental Status Findings:  Alert and Oriented         Anesthesia Plan  Type of Anesthesia, risks & benefits discussed:  Anesthesia Type:  general  Patient's Preference: deep sedation with natural airway  Intra-op Monitoring Plan: standard ASA monitors  Intra-op Monitoring Plan Comments:   Post Op Pain Control Plan: IV/PO Opioids PRN  Post Op Pain Control Plan Comments:   Induction:   IV  Beta Blocker:  Patient is not currently on a Beta-Blocker (No further documentation required).       Informed Consent: Patient understands risks and agrees with Anesthesia plan.  Questions answered. Anesthesia consent signed with patient.  ASA Score: 3     Day of Surgery Review of History & Physical:    H&P update referred to the surgeon.     Anesthesia Plan Notes: NPO confirmed.   No history of anesthesia problems.          Ready For Surgery From Anesthesia Perspective.       
Yes

## 2025-02-12 ENCOUNTER — TELEPHONE (OUTPATIENT)
Dept: CARDIOLOGY | Facility: CLINIC | Age: 80
End: 2025-02-12
Payer: MEDICARE

## 2025-02-12 DIAGNOSIS — E78.5 HYPERLIPIDEMIA, UNSPECIFIED HYPERLIPIDEMIA TYPE: Primary | ICD-10-CM

## 2025-02-12 DIAGNOSIS — Z95.0 CARDIAC PACEMAKER IN SITU: ICD-10-CM

## 2025-02-12 DIAGNOSIS — I10 HYPERTENSION, UNSPECIFIED TYPE: ICD-10-CM

## 2025-02-14 ENCOUNTER — TELEPHONE (OUTPATIENT)
Dept: CARDIOLOGY | Facility: CLINIC | Age: 80
End: 2025-02-14
Payer: MEDICARE

## 2025-02-14 NOTE — TELEPHONE ENCOUNTER
Unable to reach patient after attempting to schedule Echo. Left message and number [phone] to self-schedule echo and can get labs the same day at same facility.

## 2025-02-17 ENCOUNTER — PATIENT MESSAGE (OUTPATIENT)
Dept: FAMILY MEDICINE | Facility: CLINIC | Age: 80
End: 2025-02-17
Payer: MEDICARE

## 2025-02-19 VITALS — DIASTOLIC BLOOD PRESSURE: 79 MMHG | SYSTOLIC BLOOD PRESSURE: 129 MMHG

## 2025-02-21 ENCOUNTER — PATIENT MESSAGE (OUTPATIENT)
Dept: CARDIOLOGY | Facility: CLINIC | Age: 80
End: 2025-02-21
Payer: MEDICARE

## 2025-02-21 ENCOUNTER — PATIENT MESSAGE (OUTPATIENT)
Dept: ADMINISTRATIVE | Facility: OTHER | Age: 80
End: 2025-02-21
Payer: MEDICARE

## 2025-02-21 DIAGNOSIS — R05.8 DRY COUGH: ICD-10-CM

## 2025-02-24 ENCOUNTER — HOSPITAL ENCOUNTER (OUTPATIENT)
Dept: CARDIOLOGY | Facility: HOSPITAL | Age: 80
Discharge: HOME | End: 2025-02-24
Attending: INTERNAL MEDICINE
Payer: MEDICARE

## 2025-02-24 VITALS — HEIGHT: 73 IN | WEIGHT: 198 LBS | BODY MASS INDEX: 26.24 KG/M2

## 2025-02-24 DIAGNOSIS — Z95.0 CARDIAC PACEMAKER IN SITU: ICD-10-CM

## 2025-02-24 DIAGNOSIS — I10 HYPERTENSION, UNSPECIFIED TYPE: ICD-10-CM

## 2025-02-24 LAB
AORTIC ROOT ANNULUS: 2.46 CM
APICAL FOUR CHAMBER EJECTION FRACTION: 65 %
APICAL TWO CHAMBER EJECTION FRACTION: 46 %
ASCENDING AORTA: 3.29 CM
AV INDEX (PROSTH): 0.76
AV MEAN GRADIENT: 10 MMHG
AV PEAK GRADIENT: 16 MMHG
AV VALVE AREA BY VELOCITY RATIO: 2.7 CM²
AV VALVE AREA: 3.2 CM²
AV VELOCITY RATIO: 0.65
BSA FOR ECHO PROCEDURE: 2.15 M2
CV ECHO LV RWT: 0.31 CM
DOP CALC AO PEAK VEL: 2 M/S
DOP CALC AO VTI: 35.7 CM
DOP CALC LVOT AREA: 4.2 CM2
DOP CALC LVOT DIAMETER: 2.3 CM
DOP CALC LVOT PEAK VEL: 1.3 M/S
DOP CALC LVOT STROKE VOLUME: 113 CM3
DOP CALC MV VTI: 30 CM
DOP CALCLVOT PEAK VEL VTI: 27.2 CM
E WAVE DECELERATION TIME: 175 MSEC
E/A RATIO: 1.08
E/E' RATIO: 7 M/S
ECHO LV POSTERIOR WALL: 0.9 CM (ref 0.6–1.1)
FRACTIONAL SHORTENING: 34.5 % (ref 28–44)
INTERVENTRICULAR SEPTUM: 0.9 CM (ref 0.6–1.1)
IVC DIAMETER: 2.17 CM
LEFT ATRIUM AREA SYSTOLIC (APICAL 2 CHAMBER): 26.93 CM2
LEFT ATRIUM AREA SYSTOLIC (APICAL 4 CHAMBER): 32.47 CM2
LEFT ATRIUM VOLUME INDEX MOD: 47 ML/M2
LEFT ATRIUM VOLUME MOD: 101 ML
LEFT INTERNAL DIMENSION IN SYSTOLE: 3.8 CM (ref 2.1–4)
LEFT VENTRICLE DIASTOLIC VOLUME INDEX: 76.39 ML/M2
LEFT VENTRICLE DIASTOLIC VOLUME: 163.47 ML
LEFT VENTRICLE END DIASTOLIC VOLUME APICAL 2 CHAMBER: 102.8 ML
LEFT VENTRICLE END DIASTOLIC VOLUME APICAL 4 CHAMBER: 126.4 ML
LEFT VENTRICLE END SYSTOLIC VOLUME APICAL 2 CHAMBER: 83.17 ML
LEFT VENTRICLE END SYSTOLIC VOLUME APICAL 4 CHAMBER: 121.65 ML
LEFT VENTRICLE MASS INDEX: 95.1 G/M2
LEFT VENTRICLE SYSTOLIC VOLUME INDEX: 29.1 ML/M2
LEFT VENTRICLE SYSTOLIC VOLUME: 62.18 ML
LEFT VENTRICULAR INTERNAL DIMENSION IN DIASTOLE: 5.8 CM (ref 3.5–6)
LEFT VENTRICULAR MASS: 203.5 G
LV LATERAL E/E' RATIO: 6.4 M/S
LV SEPTAL E/E' RATIO: 7.7 M/S
LVED V (TEICH): 163.47 ML
LVES V (TEICH): 62.18 ML
LVOT MG: 3.85 MMHG
LVOT MV: 0.94 CM/S
MV PEAK A VEL: 0.71 M/S
MV PEAK E VEL: 0.77 M/S
MV PEAK GRADIENT: 3 MMHG
MV STENOSIS PRESSURE HALF TIME: 50.77 MS
MV VALVE AREA BY CONTINUITY EQUATION: 3.77 CM2
MV VALVE AREA P 1/2 METHOD: 4.33 CM2
OHS CV RV/LV RATIO: 0.67 CM
OHS LV EJECTION FRACTION SIMPSONS BIPLANE MOD: 56 %
PISA MRMAX VEL: 4.88 M/S
PISA TR MAX VEL: 2.4 M/S
PULM VEIN S/D RATIO: 1.13
PV PEAK D VEL: 0.56 M/S
PV PEAK GRADIENT: 3 MMHG
PV PEAK S VEL: 0.63 M/S
PV PEAK VELOCITY: 0.8 M/S
RA PRESSURE ESTIMATED: 3 MMHG
RA VOL SYS: 56.8 ML
RIGHT ATRIAL AREA: 19.8 CM2
RIGHT ATRIUM VOLUME AREA LENGTH APICAL 4 CHAMBER: 55.51 ML
RIGHT VENTRICLE DIASTOLIC BASEL DIMENSION: 3.9 CM
RV TB RVSP: 5 MMHG
SINUS: 3.92 CM
STJ: 3.48 CM
TDI LATERAL: 0.12 M/S
TDI SEPTAL: 0.1 M/S
TDI: 0.11 M/S
TR MAX PG: 23 MMHG
TRICUSPID ANNULAR PLANE SYSTOLIC EXCURSION: 2.5 CM
TV REST PULMONARY ARTERY PRESSURE: 26 MMHG
Z-SCORE OF LEFT VENTRICULAR DIMENSION IN END DIASTOLE: -1.71
Z-SCORE OF LEFT VENTRICULAR DIMENSION IN END SYSTOLE: -0.78

## 2025-02-24 PROCEDURE — 93306 TTE W/DOPPLER COMPLETE: CPT

## 2025-02-24 PROCEDURE — 93306 TTE W/DOPPLER COMPLETE: CPT | Mod: 26,,, | Performed by: STUDENT IN AN ORGANIZED HEALTH CARE EDUCATION/TRAINING PROGRAM

## 2025-02-24 RX ORDER — BENZONATATE 200 MG/1
200 CAPSULE ORAL
Qty: 30 CAPSULE | Refills: 0 | Status: SHIPPED | OUTPATIENT
Start: 2025-02-24

## 2025-02-25 ENCOUNTER — PATIENT MESSAGE (OUTPATIENT)
Dept: CARDIOLOGY | Facility: CLINIC | Age: 80
End: 2025-02-25
Payer: MEDICARE

## 2025-02-26 DIAGNOSIS — I49.5 SSS (SICK SINUS SYNDROME): ICD-10-CM

## 2025-02-26 DIAGNOSIS — Z95.0 CARDIAC PACEMAKER IN SITU: Primary | ICD-10-CM

## 2025-02-27 ENCOUNTER — PATIENT MESSAGE (OUTPATIENT)
Dept: SPORTS MEDICINE | Facility: CLINIC | Age: 80
End: 2025-02-27
Payer: MEDICARE

## 2025-02-27 ENCOUNTER — TELEPHONE (OUTPATIENT)
Dept: SPORTS MEDICINE | Facility: CLINIC | Age: 80
End: 2025-02-27
Payer: MEDICARE

## 2025-03-05 ENCOUNTER — LAB VISIT (OUTPATIENT)
Dept: LAB | Facility: HOSPITAL | Age: 80
End: 2025-03-05
Attending: FAMILY MEDICINE
Payer: MEDICARE

## 2025-03-05 DIAGNOSIS — E11.59 HYPERTENSION ASSOCIATED WITH DIABETES: ICD-10-CM

## 2025-03-05 DIAGNOSIS — E78.5 DYSLIPIDEMIA ASSOCIATED WITH TYPE 2 DIABETES MELLITUS: ICD-10-CM

## 2025-03-05 DIAGNOSIS — E11.9 TYPE 2 DIABETES MELLITUS WITHOUT RETINOPATHY: ICD-10-CM

## 2025-03-05 DIAGNOSIS — I48.0 PAF (PAROXYSMAL ATRIAL FIBRILLATION): ICD-10-CM

## 2025-03-05 DIAGNOSIS — I42.8 NICM (NONISCHEMIC CARDIOMYOPATHY): ICD-10-CM

## 2025-03-05 DIAGNOSIS — E11.69 DYSLIPIDEMIA ASSOCIATED WITH TYPE 2 DIABETES MELLITUS: ICD-10-CM

## 2025-03-05 DIAGNOSIS — N40.1 BENIGN PROSTATIC HYPERPLASIA WITH LOWER URINARY TRACT SYMPTOMS, SYMPTOM DETAILS UNSPECIFIED: ICD-10-CM

## 2025-03-05 DIAGNOSIS — E79.0 HYPERURICEMIA: ICD-10-CM

## 2025-03-05 DIAGNOSIS — I15.2 HYPERTENSION ASSOCIATED WITH DIABETES: ICD-10-CM

## 2025-03-05 LAB
ALBUMIN SERPL BCP-MCNC: 3.5 G/DL (ref 3.5–5.2)
ALBUMIN/CREAT UR: 146.4 UG/MG (ref 0–30)
ALP SERPL-CCNC: 111 U/L (ref 40–150)
ALT SERPL W/O P-5'-P-CCNC: 13 U/L (ref 10–44)
ANION GAP SERPL CALC-SCNC: 10 MMOL/L (ref 8–16)
AST SERPL-CCNC: 48 U/L (ref 10–40)
BASOPHILS # BLD AUTO: 0.05 K/UL (ref 0–0.2)
BASOPHILS NFR BLD: 0.9 % (ref 0–1.9)
BILIRUB SERPL-MCNC: 0.5 MG/DL (ref 0.1–1)
BILIRUB UR QL STRIP: NEGATIVE
BUN SERPL-MCNC: 29 MG/DL (ref 8–23)
CALCIUM SERPL-MCNC: 9.6 MG/DL (ref 8.7–10.5)
CHLORIDE SERPL-SCNC: 106 MMOL/L (ref 95–110)
CHOLEST SERPL-MCNC: 121 MG/DL (ref 120–199)
CHOLEST/HDLC SERPL: 2.5 {RATIO} (ref 2–5)
CLARITY UR REFRACT.AUTO: CLEAR
CO2 SERPL-SCNC: 23 MMOL/L (ref 23–29)
COLOR UR AUTO: YELLOW
CREAT SERPL-MCNC: 1 MG/DL (ref 0.5–1.4)
CREAT UR-MCNC: 69 MG/DL (ref 23–375)
DIFFERENTIAL METHOD BLD: ABNORMAL
EOSINOPHIL # BLD AUTO: 0.1 K/UL (ref 0–0.5)
EOSINOPHIL NFR BLD: 2.2 % (ref 0–8)
ERYTHROCYTE [DISTWIDTH] IN BLOOD BY AUTOMATED COUNT: 15.5 % (ref 11.5–14.5)
EST. GFR  (NO RACE VARIABLE): >60 ML/MIN/1.73 M^2
ESTIMATED AVG GLUCOSE: 126 MG/DL (ref 68–131)
GLUCOSE SERPL-MCNC: 87 MG/DL (ref 70–110)
GLUCOSE UR QL STRIP: NEGATIVE
HBA1C MFR BLD: 6 % (ref 4–5.6)
HCT VFR BLD AUTO: 45.4 % (ref 40–54)
HDLC SERPL-MCNC: 48 MG/DL (ref 40–75)
HDLC SERPL: 39.7 % (ref 20–50)
HGB BLD-MCNC: 14 G/DL (ref 14–18)
HGB UR QL STRIP: NEGATIVE
IMM GRANULOCYTES # BLD AUTO: 0.03 K/UL (ref 0–0.04)
IMM GRANULOCYTES NFR BLD AUTO: 0.5 % (ref 0–0.5)
KETONES UR QL STRIP: NEGATIVE
LDLC SERPL CALC-MCNC: 65.2 MG/DL (ref 63–159)
LEUKOCYTE ESTERASE UR QL STRIP: NEGATIVE
LYMPHOCYTES # BLD AUTO: 1.2 K/UL (ref 1–4.8)
LYMPHOCYTES NFR BLD: 22.3 % (ref 18–48)
MCH RBC QN AUTO: 28.9 PG (ref 27–31)
MCHC RBC AUTO-ENTMCNC: 30.8 G/DL (ref 32–36)
MCV RBC AUTO: 94 FL (ref 82–98)
MICROALBUMIN UR DL<=1MG/L-MCNC: 101 UG/ML
MONOCYTES # BLD AUTO: 0.7 K/UL (ref 0.3–1)
MONOCYTES NFR BLD: 13.4 % (ref 4–15)
NEUTROPHILS # BLD AUTO: 3.3 K/UL (ref 1.8–7.7)
NEUTROPHILS NFR BLD: 60.7 % (ref 38–73)
NITRITE UR QL STRIP: NEGATIVE
NONHDLC SERPL-MCNC: 73 MG/DL
NRBC BLD-RTO: 0 /100 WBC
PH UR STRIP: 6 [PH] (ref 5–8)
PLATELET # BLD AUTO: 250 K/UL (ref 150–450)
PMV BLD AUTO: 11.2 FL (ref 9.2–12.9)
POTASSIUM SERPL-SCNC: 5.1 MMOL/L (ref 3.5–5.1)
PROT SERPL-MCNC: 7.3 G/DL (ref 6–8.4)
PROT UR QL STRIP: NEGATIVE
RBC # BLD AUTO: 4.85 M/UL (ref 4.6–6.2)
SODIUM SERPL-SCNC: 139 MMOL/L (ref 136–145)
SP GR UR STRIP: 1.02 (ref 1–1.03)
TRIGL SERPL-MCNC: 39 MG/DL (ref 30–150)
URN SPEC COLLECT METH UR: NORMAL
WBC # BLD AUTO: 5.46 K/UL (ref 3.9–12.7)

## 2025-03-05 PROCEDURE — 81003 URINALYSIS AUTO W/O SCOPE: CPT | Performed by: FAMILY MEDICINE

## 2025-03-05 PROCEDURE — 80061 LIPID PANEL: CPT | Performed by: FAMILY MEDICINE

## 2025-03-05 PROCEDURE — 85025 COMPLETE CBC W/AUTO DIFF WBC: CPT | Performed by: FAMILY MEDICINE

## 2025-03-05 PROCEDURE — 80053 COMPREHEN METABOLIC PANEL: CPT | Performed by: FAMILY MEDICINE

## 2025-03-05 PROCEDURE — 82570 ASSAY OF URINE CREATININE: CPT | Performed by: FAMILY MEDICINE

## 2025-03-05 PROCEDURE — 36415 COLL VENOUS BLD VENIPUNCTURE: CPT | Mod: PO | Performed by: FAMILY MEDICINE

## 2025-03-05 PROCEDURE — 83036 HEMOGLOBIN GLYCOSYLATED A1C: CPT | Performed by: FAMILY MEDICINE

## 2025-03-07 ENCOUNTER — PATIENT MESSAGE (OUTPATIENT)
Dept: CARDIOLOGY | Facility: CLINIC | Age: 80
End: 2025-03-07
Payer: MEDICARE

## 2025-03-10 ENCOUNTER — OFFICE VISIT (OUTPATIENT)
Dept: PODIATRY | Facility: CLINIC | Age: 80
End: 2025-03-10
Payer: MEDICARE

## 2025-03-10 ENCOUNTER — PATIENT MESSAGE (OUTPATIENT)
Facility: CLINIC | Age: 80
End: 2025-03-10
Payer: MEDICARE

## 2025-03-10 ENCOUNTER — PATIENT MESSAGE (OUTPATIENT)
Dept: OTOLARYNGOLOGY | Facility: CLINIC | Age: 80
End: 2025-03-10
Payer: MEDICARE

## 2025-03-10 VITALS
HEIGHT: 73 IN | BODY MASS INDEX: 26.24 KG/M2 | DIASTOLIC BLOOD PRESSURE: 63 MMHG | WEIGHT: 198 LBS | SYSTOLIC BLOOD PRESSURE: 115 MMHG | HEART RATE: 59 BPM

## 2025-03-10 DIAGNOSIS — I87.2 VENOUS INSUFFICIENCY OF BOTH LOWER EXTREMITIES: ICD-10-CM

## 2025-03-10 DIAGNOSIS — E11.9 ENCOUNTER FOR DIABETIC FOOT EXAM: Primary | ICD-10-CM

## 2025-03-10 PROCEDURE — 3072F LOW RISK FOR RETINOPATHY: CPT | Mod: CPTII,S$GLB,, | Performed by: PODIATRIST

## 2025-03-10 PROCEDURE — 1160F RVW MEDS BY RX/DR IN RCRD: CPT | Mod: CPTII,S$GLB,, | Performed by: PODIATRIST

## 2025-03-10 PROCEDURE — 1126F AMNT PAIN NOTED NONE PRSNT: CPT | Mod: CPTII,S$GLB,, | Performed by: PODIATRIST

## 2025-03-10 PROCEDURE — 3288F FALL RISK ASSESSMENT DOCD: CPT | Mod: CPTII,S$GLB,, | Performed by: PODIATRIST

## 2025-03-10 PROCEDURE — 3078F DIAST BP <80 MM HG: CPT | Mod: CPTII,S$GLB,, | Performed by: PODIATRIST

## 2025-03-10 PROCEDURE — 1101F PT FALLS ASSESS-DOCD LE1/YR: CPT | Mod: CPTII,S$GLB,, | Performed by: PODIATRIST

## 2025-03-10 PROCEDURE — 99213 OFFICE O/P EST LOW 20 MIN: CPT | Mod: S$GLB,,, | Performed by: PODIATRIST

## 2025-03-10 PROCEDURE — 3074F SYST BP LT 130 MM HG: CPT | Mod: CPTII,S$GLB,, | Performed by: PODIATRIST

## 2025-03-10 PROCEDURE — 1159F MED LIST DOCD IN RCRD: CPT | Mod: CPTII,S$GLB,, | Performed by: PODIATRIST

## 2025-03-10 PROCEDURE — 99999 PR PBB SHADOW E&M-EST. PATIENT-LVL III: CPT | Mod: PBBFAC,,, | Performed by: PODIATRIST

## 2025-03-10 NOTE — PROGRESS NOTES
"Subjective:      Patient ID: Shannan Norman is a 79 y.o. male.    Chief Complaint: Diabetic Foot Exam    Follow-up for left 5th metatarsal base avulsion fracture x6 weeks treated with tall cam boot.  Patient has been compliant wearing the boot and reports no discomfort while wearing the boot and minimal discomfort when he takes steps without it at home.  He is avoided the previous activity consisting of yoga which initially caused the injury.  No other complaints noted today.    02/17/2023:  Follow-up for avulsion fracture left 5th metatarsal base times 10 weeks.  Performing protective weight-bearing with tall cam boot.  Reports no pain.  Taking supplemental vitamin-D 3 weekly as prescribed.    08/22/2024:  Returns for annual diabetic foot exam.  Recent excision of a basal cell carcinoma to his left leg and receiving wound care.  Wearing compression stockings 20-30 mm Hg below-knee with a zipper that he purchased from [employer] that feel comfortable.  Overall no pain to his lower extremity bilateral.  No new concerns.    03/10/2025:  Returns for annual foot exam.  Relates that he is moving to [place] to joint a group home and be closer to his son.  Vitals:    03/10/25 1632   BP: 115/63   Pulse: (!) 59   Weight: 89.8 kg (197 lb 15.6 oz)   Height: 6' 1" (1.854 m)   PainSc: 0-No pain      Past Medical History:   Diagnosis Date    Allergy     Arthritis     Atrial fibrillation     Atrial flutter 2011    ablation    Basal cell carcinoma     Cancer     Cataract     CKD (chronic kidney disease) stage 3, GFR 30-59 ml/min 8/13/2019    Diabetes mellitus     Dry eye syndrome     Gout, unspecified     High cholesterol     History of shingles     Hypertension     Melanoma     Seizures        Past Surgical History:   Procedure Laterality Date    ABLATION N/A 9/11/2018    Procedure: ABLATION;  Surgeon: Hany Sanchez MD;  Location: [facility] CATH LAB;  Service: Cardiology;  Laterality: N/A;  AFL, ISABELLE, RFA, ELODIA, MAC, DM, " 3 PREP    ABLATION OF DYSRHYTHMIC FOCUS      ADENOIDECTOMY      CARDIAC PACEMAKER PLACEMENT      no defib    CATARACT EXTRACTION W/  INTRAOCULAR LENS IMPLANT Right 7/28/2020    Procedure: EXTRACTION, CATARACT, WITH IOL INSERTION;  Surgeon: Andrés Morse MD;  Location: [facility];  Service: Ophthalmology;  Laterality: Right;    CATARACT EXTRACTION W/  INTRAOCULAR LENS IMPLANT Left 8/11/2020    Procedure: EXTRACTION, CATARACT, WITH IOL INSERTION;  Surgeon: Andrés Morse MD;  Location: [facility];  Service: Ophthalmology;  Laterality: Left;    COLONOSCOPY N/A 1/2/2019    Procedure: COLONOSCOPY Suprep;  Surgeon: Cindy Solorzano MD;  Location: [facility] ENDO;  Service: Endoscopy;  Laterality: N/A;    FIXATION OF TENDON Left 3/4/2024    Procedure: BICEPS TENODESIS;  Surgeon: Quinten Dominique MD;  Location: [address];  Service: Orthopedics;  Laterality: Left;  WITH CATHETER    GANGLION CYST EXCISION      left neck    HERNIA REPAIR  2013    umbilical    REVERSE TOTAL SHOULDER ARTHROPLASTY Left 3/4/2024    Procedure: ARTHROPLASTY, SHOULDER, TOTAL, REVERSE;  Surgeon: Quinten Dominique MD;  [address];  Service: Orthopedics;  Laterality: Left;  WITH CATHETER    TENDON REPAIR Right     wrist    TONSILLECTOMY      varicose veins      several sx  bilateral    VASECTOMY      VEIN SURGERY         Family History   Problem Relation Name Age of Onset    Diabetes Mother      COPD Father      Heart disease Father      Arthritis Sister Argenis     Heart attack Brother      Heart disease Brother      Diabetes Brother      No Known Problems Maternal Aunt      No Known Problems Maternal Uncle      No Known Problems Paternal Aunt      No Known Problems Paternal Uncle      No Known Problems Maternal Grandmother      No Known Problems Maternal Grandfather      No Known Problems Paternal Grandmother      No Known Problems Paternal Grandfather      No Known Problems Son Shannan     Cancer Sister Tianna         toyin  node, breast    No Known Problems Sister Aaliyah     No Known Problems Son Nimesh     Amblyopia Neg Hx      Blindness Neg Hx      Cataracts Neg Hx      Glaucoma Neg Hx      Hypertension Neg Hx      Macular degeneration Neg Hx      Retinal detachment Neg Hx      Strabismus Neg Hx      Stroke Neg Hx      Thyroid disease Neg Hx      Prostate cancer Neg Hx      Kidney disease Neg Hx      Melanoma Neg Hx         Social History     Socioeconomic History    Marital status:    Occupational History     Employer: [employer]   Tobacco Use    Smoking status: Never     Passive exposure: Never    Smokeless tobacco: Never   Substance and Sexual Activity    Alcohol use: Yes     Alcohol/week: 7.0 - 14.0 standard drinks of alcohol     Types: 7 - 14 Glasses of wine per week    Drug use: No    Sexual activity: Yes     Partners: Female     Social Drivers of Health     Financial Resource Strain: Low Risk  (1/25/2024)    Overall Financial Resource Strain (CARDIA)     Difficulty of Paying Living Expenses: Not hard at all   Food Insecurity: No Food Insecurity (1/25/2024)    Hunger Vital Sign     Worried About Running Out of Food in the Last Year: Never true     Ran Out of Food in the Last Year: Never true   Transportation Needs: No Transportation Needs (1/25/2024)    PRAPARE - Transportation     Lack of Transportation (Medical): No     Lack of Transportation (Non-Medical): No   Physical Activity: Unknown (1/25/2024)    Exercise Vital Sign     Days of Exercise per Week: 0 days   Recent Concern: Physical Activity - Inactive (1/25/2024)    Exercise Vital Sign     Days of Exercise per Week: 0 days     Minutes of Exercise per Session: 30 min   Stress: No Stress Concern Present (1/25/2024)    [employer] [place] [employer] - Occupational Stress Questionnaire     Feeling of Stress : Only a little   Housing Stability: Low Risk  (1/25/2024)    Housing Stability Vital Sign     Unable to Pay for Housing in the Last Year: No      Number of Places Lived in the Last Year: 1     Unstable Housing in the Last Year: No       Current Outpatient Medications   Medication Sig Dispense Refill    allopurinoL (ZYLOPRIM) 100 MG tablet Take 1 tablet (100 mg total) by mouth nightly. Take 1 tablet by mouth once daily 90 tablet 4    atorvastatin (LIPITOR) 40 MG tablet Take 1 tablet (40 mg total) by mouth every evening. 90 tablet 4    B6-folic-B12-coffee-phosphatid (NEURIVA PLUS) 0.85 mg-200 mcg-1.2 mcg Chew Take by mouth.      benazepriL (LOTENSIN) 20 MG tablet Take 1 tablet (20 mg total) by mouth every evening. 90 tablet 5    benzonatate (TESSALON) 200 MG capsule Take 1 capsule by mouth three times daily as needed for cough 30 capsule 0    cycloSPORINE (RESTASIS) 0.05 % ophthalmic emulsion Place 1 drop into both eyes 2 (two) times daily. 60 each 11    ferrous sulfate (FEOSOL) 325 mg (65 mg iron) Tab tablet Take 325 mg by mouth once daily.      GLUCOSAMINE HCL/CHONDR VASQUEZ A NA (GLUCOSAMINE-CHONDROITIN) 750-600 mg Tab Take 2 tablets by mouth Daily.      levocetirizine (XYZAL) 5 MG tablet Take 1 tablet (5 mg total) by mouth every evening. 30 tablet 11    modafiniL (PROVIGIL) 100 MG Tab Take 1 tablet (100 mg total) by mouth every morning. 60 tablet 2    multivitamin capsule Take 1 capsule by mouth nightly.       rivaroxaban (XARELTO) 20 mg Tab Take 1 tablet (20 mg total) by mouth once daily. 90 tablet 3    tamsulosin (FLOMAX) 0.4 mg Cap TAKE 1 CAPSULE BY MOUTH AFTER DINNER 90 capsule 3    UNABLE TO FIND Take by mouth every morning. 6000 hydrolyzes collagen      vibegron 75 mg Tab Take 1 tablet (75 mg total) by mouth once daily. 30 tablet 11    metoprolol tartrate (LOPRESSOR) 25 MG tablet Take 1 tablet (25 mg total) by mouth 2 (two) times daily as needed (palpitations). 45 tablet 0     No current facility-administered medications for this visit.       Review of patient's allergies indicates:  No Known Allergies      Review of Systems   Constitutional: Negative for  chills, fever and malaise/fatigue.   Cardiovascular:  Negative for chest pain, claudication and leg swelling.   Respiratory:  Negative for cough and shortness of breath.    Musculoskeletal:  Negative for back pain, joint pain, muscle cramps and muscle weakness.   Gastrointestinal:  Negative for nausea and vomiting.   Neurological:  Negative for numbness, paresthesias and weakness.   Psychiatric/Behavioral:  Negative for altered mental status.            Objective:      Physical Exam  Constitutional:       General: He is not in acute distress.     Appearance: Normal appearance. He is not ill-appearing.   Cardiovascular:      Pulses:           Dorsalis pedis pulses are 2+ on the right side and 2+ on the left side.        Posterior tibial pulses are 2+ on the right side and 2+ on the left side.      Comments: Mild edema to lower extremity bilateral it is nonpitting with diffuse hemosiderin staining and multiple varicosities.  No hair growth follow extremity.  No rubor on dependency bilateral foot.  Skin temp is warm to foot bilateral  Musculoskeletal:      Comments: No pain with range motion, manual muscle strength testing or palpation to the lower extremity bilateral.  Manual muscle strength testing within normal limits follow extremity.    Flexible cavus foot structure bilateral foot.   Feet:      Right foot:      Protective Sensation: 10 sites tested.  10 sites sensed.      Toenail Condition: Right toenails are abnormally thick and long.      Left foot:      Protective Sensation: 10 sites tested.  10 sites sensed.      Toenail Condition: Left toenails are abnormally thick and long.   Skin:     General: Skin is warm.      Capillary Refill: Capillary refill takes less than 2 seconds.      Findings: No ecchymosis or erythema.      Nails: There is no clubbing.      Comments: Normal skin turgor and elasticity to lower extremity bilateral.    No open wound or maceration to foot bilateral.   Neurological:      Mental  Status: He is alert and oriented to person, place, and time.      Sensory: Sensation is intact.      Motor: Motor function is intact.               Assessment:       Encounter Diagnoses   Name Primary?    Encounter for diabetic foot exam Yes    Venous insufficiency of both lower extremities          Plan:       Shannan was seen today for diabetic foot exam.    Diagnoses and all orders for this visit:    Encounter for diabetic foot exam    Venous insufficiency of both lower extremities      I counseled the patient on his conditions, their implications and medical management.    Comprehensive annual diabetic foot exam completed today.  Patient found to be relatively low risk for diabetic foot complication.  Risks slightly increased secondary to chronic venous insufficiency which is also being managed by vascular surgery and compression socks.    RTC p.r.n. as discussed.    Assisted per Winston Jj DPM PGY 2    A portion of this note was generated by voice recognition software and may contain spelling and grammar errors.  .

## 2025-03-11 DIAGNOSIS — R53.83 FATIGUE, UNSPECIFIED TYPE: ICD-10-CM

## 2025-03-11 DIAGNOSIS — G20.C PARKINSONISM, UNSPECIFIED PARKINSONISM TYPE: ICD-10-CM

## 2025-03-11 RX ORDER — MODAFINIL 100 MG/1
100 TABLET ORAL EVERY MORNING
Qty: 90 TABLET | Refills: 1 | Status: SHIPPED | OUTPATIENT
Start: 2025-03-11

## 2025-03-12 ENCOUNTER — TELEPHONE (OUTPATIENT)
Dept: OPHTHALMOLOGY | Facility: CLINIC | Age: 80
End: 2025-03-12
Payer: MEDICARE

## 2025-03-12 ENCOUNTER — OFFICE VISIT (OUTPATIENT)
Dept: OPTOMETRY | Facility: CLINIC | Age: 80
End: 2025-03-12
Payer: MEDICARE

## 2025-03-12 DIAGNOSIS — H04.123 BILATERAL DRY EYES: ICD-10-CM

## 2025-03-12 DIAGNOSIS — E11.9 TYPE 2 DIABETES MELLITUS WITHOUT RETINOPATHY: Primary | ICD-10-CM

## 2025-03-12 DIAGNOSIS — H26.493 POSTERIOR CAPSULAR OPACIFICATION OF BOTH EYES, OBSCURING VISION: ICD-10-CM

## 2025-03-12 PROCEDURE — 3288F FALL RISK ASSESSMENT DOCD: CPT | Mod: CPTII,S$GLB,, | Performed by: OPTOMETRIST

## 2025-03-12 PROCEDURE — 1126F AMNT PAIN NOTED NONE PRSNT: CPT | Mod: CPTII,S$GLB,, | Performed by: OPTOMETRIST

## 2025-03-12 PROCEDURE — 99999 PR PBB SHADOW E&M-EST. PATIENT-LVL III: CPT | Mod: PBBFAC,,, | Performed by: OPTOMETRIST

## 2025-03-12 PROCEDURE — 1159F MED LIST DOCD IN RCRD: CPT | Mod: CPTII,S$GLB,, | Performed by: OPTOMETRIST

## 2025-03-12 PROCEDURE — 1101F PT FALLS ASSESS-DOCD LE1/YR: CPT | Mod: CPTII,S$GLB,, | Performed by: OPTOMETRIST

## 2025-03-12 PROCEDURE — 99214 OFFICE O/P EST MOD 30 MIN: CPT | Mod: S$GLB,,, | Performed by: OPTOMETRIST

## 2025-03-12 RX ORDER — CYCLOSPORINE 0.5 MG/ML
1 EMULSION OPHTHALMIC 2 TIMES DAILY
Qty: 180 EACH | Refills: 3 | Status: SHIPPED | OUTPATIENT
Start: 2025-03-12 | End: 2026-03-12

## 2025-03-12 NOTE — PROGRESS NOTES
HPI    Pt is here today due to near va being fuzzy. Denies pain/discomfort.   Patient would like to discuss YAG CAP.  DLS: 4/10/2024 Dr. Bills  (-)Flashes   (-)Floaters   (-)Diplopia   (-)Headaches   (-)Itching   (-)Tearing  (-)Burning  (-)Dryness   (-)Photophobia  (-)Glare   (+)Blurred VA: NVO   Past Eye Sx: Cataract with IOL OU  Eye Meds:  Restasis BID OU   Hemoglobin A1C       Date                     Value               Ref Range             Status                03/05/2025               6.0 (H)             4.0 - 5.6 %           Final           Last edited by Debbie Bills, OD on 3/12/2025  2:33 PM.            Assessment /Plan     For exam results, see Encounter Report.    Type 2 diabetes mellitus without retinopathy    Posterior capsular opacification of both eyes, obscuring vision    Bilateral dry eyes  -     cycloSPORINE (RESTASIS) 0.05 % ophthalmic emulsion; Place 1 drop into both eyes 2 (two) times daily.  Dispense: 180 each; Refill: 3      1. No retinopathy noted today.  Continued control with primary care physician and annual comprehensive eye exam.     2. Educated pt on findings. Pt would like to proceed with YAG CAP OU before pt moves to [place] on 4/4/25. Referred pt to Dr. Morse for YAG CAP OU.    3. Continue Restasis bid OU long term    RTC 4/2/25 with Dr. Morse

## 2025-03-12 NOTE — TELEPHONE ENCOUNTER
Dr Bills personally asked if we could squeeze him in for yag cap before he moves out of town. Pt is set for 4/2/25 [facility] at 145, but is aware he will most likely have to wait a while to have it done

## 2025-03-13 ENCOUNTER — OFFICE VISIT (OUTPATIENT)
Dept: CARDIOLOGY | Facility: CLINIC | Age: 80
End: 2025-03-13
Payer: MEDICARE

## 2025-03-13 ENCOUNTER — OFFICE VISIT (OUTPATIENT)
Dept: FAMILY MEDICINE | Facility: CLINIC | Age: 80
End: 2025-03-13
Attending: FAMILY MEDICINE
Payer: MEDICARE

## 2025-03-13 VITALS
OXYGEN SATURATION: 99 % | HEART RATE: 60 BPM | SYSTOLIC BLOOD PRESSURE: 118 MMHG | WEIGHT: 193.81 LBS | DIASTOLIC BLOOD PRESSURE: 66 MMHG | BODY MASS INDEX: 25.69 KG/M2 | HEIGHT: 73 IN

## 2025-03-13 VITALS
BODY MASS INDEX: 25.71 KG/M2 | SYSTOLIC BLOOD PRESSURE: 104 MMHG | WEIGHT: 194 LBS | HEIGHT: 73 IN | HEART RATE: 60 BPM | OXYGEN SATURATION: 96 % | DIASTOLIC BLOOD PRESSURE: 63 MMHG

## 2025-03-13 DIAGNOSIS — E78.01 FAMILIAL HYPERCHOLESTEROLEMIA: ICD-10-CM

## 2025-03-13 DIAGNOSIS — Z86.79 S/P ABLATION OF ATRIAL FLUTTER: ICD-10-CM

## 2025-03-13 DIAGNOSIS — I42.8 NICM (NONISCHEMIC CARDIOMYOPATHY): ICD-10-CM

## 2025-03-13 DIAGNOSIS — I15.2 HYPERTENSION ASSOCIATED WITH DIABETES: ICD-10-CM

## 2025-03-13 DIAGNOSIS — N40.1 BENIGN PROSTATIC HYPERPLASIA WITH LOWER URINARY TRACT SYMPTOMS, SYMPTOM DETAILS UNSPECIFIED: Primary | ICD-10-CM

## 2025-03-13 DIAGNOSIS — E78.5 DYSLIPIDEMIA ASSOCIATED WITH TYPE 2 DIABETES MELLITUS: ICD-10-CM

## 2025-03-13 DIAGNOSIS — I48.0 PAF (PAROXYSMAL ATRIAL FIBRILLATION): ICD-10-CM

## 2025-03-13 DIAGNOSIS — Z98.890 S/P ABLATION OF ATRIAL FLUTTER: ICD-10-CM

## 2025-03-13 DIAGNOSIS — I10 PRIMARY HYPERTENSION: ICD-10-CM

## 2025-03-13 DIAGNOSIS — E11.59 HYPERTENSION ASSOCIATED WITH DIABETES: ICD-10-CM

## 2025-03-13 DIAGNOSIS — E11.9 TYPE 2 DIABETES MELLITUS WITHOUT RETINOPATHY: ICD-10-CM

## 2025-03-13 DIAGNOSIS — I49.5 SSS (SICK SINUS SYNDROME): ICD-10-CM

## 2025-03-13 DIAGNOSIS — I35.1 NONRHEUMATIC AORTIC VALVE INSUFFICIENCY: ICD-10-CM

## 2025-03-13 DIAGNOSIS — E79.0 HYPERURICEMIA: ICD-10-CM

## 2025-03-13 DIAGNOSIS — I87.2 VENOUS INSUFFICIENCY OF BOTH LOWER EXTREMITIES: ICD-10-CM

## 2025-03-13 DIAGNOSIS — E11.69 DYSLIPIDEMIA ASSOCIATED WITH TYPE 2 DIABETES MELLITUS: ICD-10-CM

## 2025-03-13 DIAGNOSIS — I83.93 ASYMPTOMATIC VARICOSE VEINS OF BOTH LOWER EXTREMITIES: Primary | ICD-10-CM

## 2025-03-13 DIAGNOSIS — B02.8 HERPES ZOSTER WITH COMPLICATION: ICD-10-CM

## 2025-03-13 PROCEDURE — 99215 OFFICE O/P EST HI 40 MIN: CPT | Mod: S$GLB,,, | Performed by: FAMILY MEDICINE

## 2025-03-13 PROCEDURE — 3074F SYST BP LT 130 MM HG: CPT | Mod: CPTII,S$GLB,, | Performed by: FAMILY MEDICINE

## 2025-03-13 PROCEDURE — 1160F RVW MEDS BY RX/DR IN RCRD: CPT | Mod: CPTII,S$GLB,, | Performed by: FAMILY MEDICINE

## 2025-03-13 PROCEDURE — 3288F FALL RISK ASSESSMENT DOCD: CPT | Mod: CPTII,S$GLB,, | Performed by: INTERNAL MEDICINE

## 2025-03-13 PROCEDURE — 3078F DIAST BP <80 MM HG: CPT | Mod: CPTII,S$GLB,, | Performed by: FAMILY MEDICINE

## 2025-03-13 PROCEDURE — 1159F MED LIST DOCD IN RCRD: CPT | Mod: CPTII,S$GLB,, | Performed by: FAMILY MEDICINE

## 2025-03-13 PROCEDURE — 99999 PR PBB SHADOW E&M-EST. PATIENT-LVL III: CPT | Mod: PBBFAC,,, | Performed by: INTERNAL MEDICINE

## 2025-03-13 PROCEDURE — 99999 PR PBB SHADOW E&M-EST. PATIENT-LVL IV: CPT | Mod: PBBFAC,,, | Performed by: FAMILY MEDICINE

## 2025-03-13 PROCEDURE — 3078F DIAST BP <80 MM HG: CPT | Mod: CPTII,S$GLB,, | Performed by: INTERNAL MEDICINE

## 2025-03-13 PROCEDURE — 3074F SYST BP LT 130 MM HG: CPT | Mod: CPTII,S$GLB,, | Performed by: INTERNAL MEDICINE

## 2025-03-13 PROCEDURE — 1101F PT FALLS ASSESS-DOCD LE1/YR: CPT | Mod: CPTII,S$GLB,, | Performed by: INTERNAL MEDICINE

## 2025-03-13 PROCEDURE — 99214 OFFICE O/P EST MOD 30 MIN: CPT | Mod: S$GLB,,, | Performed by: INTERNAL MEDICINE

## 2025-03-13 PROCEDURE — 1159F MED LIST DOCD IN RCRD: CPT | Mod: CPTII,S$GLB,, | Performed by: INTERNAL MEDICINE

## 2025-03-13 RX ORDER — GABAPENTIN 100 MG/1
100 CAPSULE ORAL NIGHTLY
Qty: 30 CAPSULE | Refills: 11 | Status: SHIPPED | OUTPATIENT
Start: 2025-03-13 | End: 2026-03-13

## 2025-03-13 RX ORDER — ALLOPURINOL 100 MG/1
100 TABLET ORAL NIGHTLY
Qty: 90 TABLET | Refills: 4 | Status: SHIPPED | OUTPATIENT
Start: 2025-03-13

## 2025-03-13 RX ORDER — ATORVASTATIN CALCIUM 40 MG/1
40 TABLET, FILM COATED ORAL NIGHTLY
Qty: 90 TABLET | Refills: 4 | Status: SHIPPED | OUTPATIENT
Start: 2025-03-13

## 2025-03-13 RX ORDER — BENAZEPRIL HYDROCHLORIDE 20 MG/1
20 TABLET ORAL NIGHTLY
Qty: 90 TABLET | Refills: 5 | Status: SHIPPED | OUTPATIENT
Start: 2025-03-13

## 2025-03-13 RX ORDER — TRIAMCINOLONE ACETONIDE 1 MG/G
CREAM TOPICAL 2 TIMES DAILY
Qty: 30 G | Refills: 2 | Status: SHIPPED | OUTPATIENT
Start: 2025-03-13

## 2025-03-13 RX ORDER — VALACYCLOVIR HYDROCHLORIDE 1 G/1
1000 TABLET, FILM COATED ORAL 3 TIMES DAILY
Qty: 21 TABLET | Refills: 0 | Status: SHIPPED | OUTPATIENT
Start: 2025-03-13 | End: 2025-03-20

## 2025-03-13 RX ORDER — TAMSULOSIN HYDROCHLORIDE 0.4 MG/1
CAPSULE ORAL
Qty: 90 CAPSULE | Refills: 3 | Status: SHIPPED | OUTPATIENT
Start: 2025-03-13

## 2025-03-13 NOTE — PROGRESS NOTES
Subjective:   @Patient ID:  Shannan Norman is a 79 y.o. male who presents for evaluation of abnormal EKG .      HPI:   March 2025: Follow up.  He has been doing well since last visit.  Had shoulder surgery.  Denies any cardiac complaints.  Compliant with compression stockings.  He is moving to [place].  Normal pacemaker function.  Three years left in battery.    February 2024:  follow up.  No significant palpitation lately.  Plans to have left shoulder surgery.  Noticed worsening swelling in the left lower extremity got much better after wearing compression stockings.  Interestingly he would lost 7-8 lb after using the compression stockings.  Denies any chest pain.  Works out regularly without any angina or angina equivalent    6/14/2023:  Since last time he took the Lopressor for 5 times.  After 3-5 minutes his heart rate gets back to the normal rhythm and he feels fine.  He likes the prn regimen.  He is very active.  Works out 6-7 times a week without any chest pain or significant shortness of breath..    3/29/2023: Patient is here for follow up. ER visit 3/11/2023 with palpitations. EKG with atrial flutter. He was rx lopressor prn. He is in SR today. He had to take the lopressor one time only over the last 3 weeks. Not as active as he was after he had stress LE fractures      EKG showed A-paced  Rhythm. Septal infarct. EKGs in 2017 he had the same findings.     Historically: Hx of A. Flutter ablation in 2010, then redo CTI ablation in 2018. S/p PPM in 2018 for SSS and chronotropic incompetence.  He is on Eliquis for OAC and tolerating it well. He f/u with Dr. Sanchez.  He had chronic VV varicose veins s/p surgical stripping and EVLTs by Dr. Lund. He saw Dr. Toth but did not follow up with the recommendation of either stockings or EVLTs.      Prior cardiovascular  Hx  --------------------------------  Echocardiogram February 2025     Left Ventricle: The left ventricle is normal in size. Normal wall  thickness. There is normal systolic function. Quantitated ejection fraction is 56%. Grade I diastolic dysfunction.    Right Ventricle: The right ventricle is normal in size. Systolic function is normal. TAPSE is 2.50 cm. Pacemaker lead present in the ventricle.    Left Atrium: dilated measuring 47 mL/m2    Pulmonic Valve: There is mild regurgitation.    Aorta: Aortic root is the upper limits of normal in size measuring 3.92 cm.    Pulmonary Artery: The estimated pulmonary artery systolic pressure is 26 mmHg.    IVC/SVC: Normal venous pressure at 3 mmHg.       - Echo 9/16/2020  Normal left ventricular systolic function. The estimated ejection fraction is 55-60%.  Normal LV diastolic function.  The estimated PA systolic pressure is 21 mmHg.  No wall motion abnormalities.  Normal right ventricular systolic function.  Normal central venous pressure (3 mmHg    - ECHO 10/2016     1 - Normal left ventricular systolic function (EF 60-65%).     2 - Left ventricular diastolic dysfunction.     3 - Biatrial enlargement.     4 - Normal right ventricular systolic function .     5 - The estimated PA systolic pressure is 25 mmHg.     6 - Trivial aortic regurgitation.     7 - Mild tricuspid regurgitation.     - EKG 9/2020  A. Paced with old septal infarct             Patient Active Problem List    Diagnosis Date Noted    Nocturia 12/11/2024    Urge incontinence 12/11/2024    Swelling of lower extremity 05/31/2024    Venous insufficiency of both lower extremities 05/31/2024    Lymphedema of both lower extremities 05/31/2024    Chronic anticoagulation 04/23/2024    S/P ablation of atrial flutter 04/23/2024    Venous reflux 04/23/2024    Glenohumeral arthritis, left 03/04/2024    Polymyalgia rheumatica 02/12/2024    OAB (overactive bladder) 01/26/2024    History of skin cancer 12/20/2023     Left shoulder, melanoma, 0.15 mm, pT1a s/p WLE 2020  Left anterior leg, BCC, 9/2022 s/p excision  Left neck, BCC, s/p excision 2020  Left mid  nose, BCC, 2019    12/20/2023  - left anterior leg, superficial BCC. S/p ed&c   - right forearm, nodular bcc, s/p excision      Right hip pain 10/05/2023    PAF (paroxysmal atrial fibrillation) 09/01/2023    Basal cell carcinoma (BCC) of skin of left lower extremity including hip 11/17/2022     Treated with excision 11/17/ 2022      Pelvic floor dysfunction 05/13/2022    Pain of left upper extremity 03/17/2022    Weakness 03/17/2022    Stiffness due to immobility 03/17/2022    Decreased range of hip movement 02/25/2022    Decreased strength 02/25/2022    AK (actinic keratosis) 03/12/2021    History of melanoma 03/12/2021    Nuclear sclerotic cataract of left eye 08/11/2020    Nuclear sclerotic cataract of right eye 07/28/2020    CKD (chronic kidney disease) stage 3, GFR 30-59 ml/min 08/13/2019    Overweight (BMI 25.0-29.9) 08/13/2019    Right inguinal hernia 02/27/2019    Presence of cardiac pacemaker 05/17/2018    Dyslipidemia associated with type 2 diabetes mellitus 03/07/2018    Hypertension associated with diabetes 03/07/2018    Junctional bradycardia 02/06/2018    Bradycardia 02/06/2018     Overview:    Status post dual chamber PPM      Frequent urination 08/04/2017     AUA 19/6      Nonrheumatic aortic valve insufficiency 12/01/2016     Trivial      SSS (sick sinus syndrome) 10/24/2016    Junctional escape rhythm 09/12/2016    Nuclear sclerosis of both eyes 10/13/2015    Left epiretinal membrane 10/13/2015    Asymptomatic varicose veins of both lower extremities 07/06/2015         S/p bilateral GSV EVLT [id number]        Umbilical hernia 09/19/2013    PFO (patent foramen ovale) 07/12/2013     On asa 325mg. No hx of CVA        HTN (hypertension) 04/18/2013    Gout, arthritis 04/18/2013    Atrial flutter 04/18/2013     EF 60%, s/p RF ablation in 2010 (Dr. Grady Copeland)      Familial hypercholesterolemia 04/18/2013                    LAST HbA1c  Lab Results   Component Value Date    HGBA1C 6.0 (H) 03/05/2025        Lipid panel  Lab Results   Component Value Date    CHOL 121 03/05/2025    CHOL 111 (L) 04/02/2024    CHOL 136 10/03/2023     Lab Results   Component Value Date    HDL 48 03/05/2025    HDL 40 04/02/2024    HDL 63 10/03/2023     Lab Results   Component Value Date    LDLCALC 65.2 03/05/2025    LDLCALC 61.2 (L) 04/02/2024    LDLCALC 61.4 (L) 10/03/2023     Lab Results   Component Value Date    TRIG 39 03/05/2025    TRIG 49 04/02/2024    TRIG 58 10/03/2023     Lab Results   Component Value Date    CHOLHDL 39.7 03/05/2025    CHOLHDL 36.0 04/02/2024    CHOLHDL 46.3 10/03/2023            Review of Systems   Constitutional: Negative for chills and fever.   HENT:  Negative for hearing loss and nosebleeds.    Eyes:  Negative for blurred vision.   Cardiovascular:  Negative for chest pain, leg swelling and palpitations.   Respiratory:  Negative for hemoptysis and shortness of breath.    Hematologic/Lymphatic: Negative for bleeding problem.   Skin:  Negative for itching.   Musculoskeletal:  Negative for falls.   Gastrointestinal:  Negative for abdominal pain and hematochezia.   Genitourinary:  Negative for hematuria.   Neurological:  Negative for dizziness and loss of balance.   Psychiatric/Behavioral:  Negative for altered mental status and depression.        Objective:   Physical Exam  Constitutional:       Appearance: He is well-developed.   HENT:      Head: Normocephalic and atraumatic.   Eyes:      Conjunctiva/sclera: Conjunctivae normal.   Neck:      Vascular: No carotid bruit or JVD.   Cardiovascular:      Rate and Rhythm: Normal rate and regular rhythm.      Pulses:           Carotid pulses are 2+ on the right side and 2+ on the left side.       Radial pulses are 2+ on the right side and 2+ on the left side.      Heart sounds: Normal heart sounds. No murmur heard.     No friction rub. No gallop.   Pulmonary:      Effort: Pulmonary effort is normal. No respiratory distress.      Breath sounds: No stridor. No wheezing  or rhonchi.   Musculoskeletal:      Cervical back: Neck supple.   Skin:     General: Skin is warm and dry.      Comments: Varicose veins    Neurological:      Mental Status: He is alert and oriented to person, place, and time.   Psychiatric:         Behavior: Behavior normal.         Assessment:     1. Asymptomatic varicose veins of both lower extremities    2. Dyslipidemia associated with type 2 diabetes mellitus    3. Familial hypercholesterolemia    4. Primary hypertension    5. Hypertension associated with diabetes    6. PAF (paroxysmal atrial fibrillation)    7. S/P ablation of atrial flutter    8. SSS (sick sinus syndrome)    9. Venous insufficiency of both lower extremities    10. Nonrheumatic aortic valve insufficiency          Plan:   1. Hypertension  - BP well controlled    2. Paroxysmal atrial flutter status paroxysmal Afib  Follow up with EP team  Continue oral anticoagulation with Xarelto    3. Venous insufficiency   Asymptomatic.  Conservative approach  Compression stockings and leg elevation    4. Reported hx of PFO in prior notes  No indications for intervention         I spent 5-10 minutes asking, assessing, assisting, arranging and advising heart healthy diet improvements. This included low-salt meals, portion control and health food alternatives. I also encourage 30 minutes of moderate exercise 3-4x a week.     [place] is [place]  Follow up with us as needed    Pertinent cardiac images and EKG reviewed independently.    Continue with current medical plan and lifestyle changes.  Return sooner for concerns or questions. If symptoms persist go to the ED  I have reviewed all pertinent data including patient's medical history in detail and updated the computerized patient record.     No orders of the defined types were placed in this encounter.        Follow up as scheduled.     He expressed verbal understanding and agreed with the plan    Patient's Medications   New Prescriptions    No  medications on file   Previous Medications    ALLOPURINOL (ZYLOPRIM) 100 MG TABLET    Take 1 tablet (100 mg total) by mouth nightly. Take 1 tablet by mouth once daily    ATORVASTATIN (LIPITOR) 40 MG TABLET    Take 1 tablet (40 mg total) by mouth every evening.    B6-FOLIC-B12-COFFEE-PHOSPHATID (NEURIVA PLUS) 0.85 MG-200 MCG-1.2 MCG CHEW    Take by mouth.    BENAZEPRIL (LOTENSIN) 20 MG TABLET    Take 1 tablet (20 mg total) by mouth every evening.    CYCLOSPORINE (RESTASIS) 0.05 % OPHTHALMIC EMULSION    Place 1 drop into both eyes 2 (two) times daily.    FERROUS SULFATE (FEOSOL) 325 MG (65 MG IRON) TAB TABLET    Take 325 mg by mouth once daily.    GABAPENTIN (NEURONTIN) 100 MG CAPSULE    Take 1 capsule (100 mg total) by mouth every evening.    GLUCOSAMINE HCL/CHONDR VASQUEZ A NA (GLUCOSAMINE-CHONDROITIN) 750-600 MG TAB    Take 2 tablets by mouth Daily.    LEVOCETIRIZINE (XYZAL) 5 MG TABLET    Take 1 tablet (5 mg total) by mouth every evening.    METOPROLOL TARTRATE (LOPRESSOR) 25 MG TABLET    Take 1 tablet (25 mg total) by mouth 2 (two) times daily as needed (palpitations).    MODAFINIL (PROVIGIL) 100 MG TAB    Take 1 tablet (100 mg total) by mouth every morning.    MULTIVITAMIN CAPSULE    Take 1 capsule by mouth nightly.     RIVAROXABAN (XARELTO) 20 MG TAB    Take 1 tablet (20 mg total) by mouth once daily.    TAMSULOSIN (FLOMAX) 0.4 MG CAP    TAKE 1 CAPSULE BY MOUTH AFTER DINNER    TRIAMCINOLONE ACETONIDE 0.1% (KENALOG) 0.1 % CREAM    Apply topically 2 (two) times daily.    UNABLE TO FIND    Take by mouth every morning. 6000 hydrolyzes collagen    VALACYCLOVIR (VALTREX) 1000 MG TABLET    Take 1 tablet (1,000 mg total) by mouth 3 (three) times daily. for 7 days    VIBEGRON 75 MG TAB    Take 1 tablet (75 mg total) by mouth once daily.   Modified Medications    No medications on file   Discontinued Medications    No medications on file

## 2025-03-13 NOTE — PROGRESS NOTES
Subjective:       Patient ID: Shannan Norman is a 79 y.o. male.    Chief Complaint: Follow-up (Not fasting )    79  yr old pleasant white male with DM II controlled, HTN, HLD, obesity, presents today for his lab work review and 6 month follow up and med refills.    C/o right back pain. Upon exam he has rash and blisters. Onset 1 week ago.    BPH - much better  - no dysuria - tried flomax and proscar and nothing helps       DM II - controlled -  diet control -        HGBA1C                   6.0 (H)             03/05/2025                                           - on ACE and ASA - UTD with eye and foot screen      HTN - controlled - oN ACE - compliant - no side effects      HLD - controlled - on statin - compliant - no side effects -     LDLCALC                  65.2                03/05/2025                                           History as below - reviewed and no changes    HM  -labs UTD  -PSA UTD  -adacel UTD          Follow-up  Associated symptoms include arthralgias, myalgias and a rash. Pertinent negatives include no chest pain, congestion, coughing, diaphoresis, headaches, neck pain, visual change, vomiting or weakness.   Diabetes  He presents for his follow-up diabetic visit. He has type 2 diabetes mellitus. His disease course has been stable. Pertinent negatives for hypoglycemia include no confusion, dizziness, headaches, hunger, mood changes, nervousness/anxiousness, seizures, sleepiness, speech difficulty, sweats or tremors. Pertinent negatives for diabetes include no chest pain, no foot ulcerations, no polydipsia, no polyuria, no visual change, no weakness and no weight loss. Symptoms are stable. Pertinent negatives for diabetic complications include no CVA, PVD or retinopathy. Risk factors for coronary artery disease include diabetes mellitus, dyslipidemia, hypertension and male sex. He is compliant with treatment all of the time. He is following a generally healthy diet. Meal planning includes  avoidance of concentrated sweets. He rarely participates in exercise. An ACE inhibitor/angiotensin II receptor blocker is being taken. He does not see a podiatrist.Eye exam is current.   Hypertension  This is a chronic problem. The current episode started more than 1 month ago. The problem has been gradually improving since onset. The problem is controlled. Pertinent negatives include no chest pain, headaches, malaise/fatigue, neck pain, palpitations, peripheral edema, PND or sweats. There are no associated agents to hypertension. Risk factors for coronary artery disease include diabetes mellitus, dyslipidemia, male gender and obesity. Past treatments include ACE inhibitors. The current treatment provides significant improvement. There are no compliance problems.  There is no history of angina, CAD/MI, CVA, left ventricular hypertrophy, PVD or retinopathy. There is no history of chronic renal disease, coarctation of the aorta, hypercortisolism, hyperparathyroidism, pheochromocytoma, renovascular disease or a thyroid problem.   Hyperlipidemia  This is a chronic problem. The current episode started more than 1 year ago. The problem is controlled. Recent lipid tests were reviewed and are normal. Exacerbating diseases include diabetes and obesity. He has no history of chronic renal disease. Associated symptoms include myalgias. Pertinent negatives include no chest pain or leg pain. The current treatment provides significant improvement of lipids. There are no compliance problems.  Risk factors for coronary artery disease include diabetes mellitus, dyslipidemia, hypertension, male sex and obesity.   Benign Prostatic Hypertrophy  This is a chronic problem. The current episode started more than 1 year ago. The problem has been gradually worsening since onset. Irritative symptoms include nocturia. Irritative symptoms do not include frequency or urgency. Obstructive symptoms include dribbling, incomplete emptying, an  intermittent stream, a slower stream and straining. Pertinent negatives include no hematuria or vomiting. AUA score is 8-19. He is sexually active. Nothing aggravates the symptoms. Past treatments include tamsulosin. The treatment provided significant relief.   Groin Pain  The patient's pertinent negatives include no penile discharge or scrotal swelling. Associated symptoms include a rash. Pertinent negatives include no chest pain, coughing, diarrhea, frequency, headaches, urgency or vomiting. His past medical history is significant for BPH.     Review of Systems   Constitutional: Negative.  Negative for activity change, diaphoresis, malaise/fatigue, unexpected weight change and weight loss.   HENT: Negative.  Negative for congestion, ear pain, mouth sores, rhinorrhea and voice change.    Eyes: Negative.  Negative for pain, discharge and visual disturbance.   Respiratory: Negative.  Negative for apnea, cough and wheezing.    Cardiovascular: Negative.  Negative for chest pain, palpitations and PND.   Gastrointestinal: Negative.  Negative for abdominal distention, anal bleeding, diarrhea and vomiting.   Endocrine: Negative.  Negative for cold intolerance, polydipsia and polyuria.   Genitourinary:  Positive for incomplete emptying and nocturia. Negative for decreased urine volume, difficulty urinating, frequency, hematuria, penile discharge, scrotal swelling and urgency.   Musculoskeletal:  Positive for arthralgias and myalgias. Negative for back pain, neck pain and neck stiffness.   Skin:  Positive for rash. Negative for color change.   Allergic/Immunologic: Negative.  Negative for environmental allergies and immunocompromised state.   Neurological: Negative.  Negative for dizziness, tremors, seizures, speech difficulty, weakness, light-headedness and headaches.   Hematological: Negative.    Psychiatric/Behavioral: Negative.  Negative for agitation, confusion, dysphoric mood and suicidal ideas. The patient is not  nervous/anxious.        PMH/PSH/FH/SH/MED/ALLERGY reviewed    Past Medical History:   Diagnosis Date    Allergy     Arthritis     Atrial fibrillation     Atrial flutter 2011    ablation    Basal cell carcinoma     Cancer     Cataract     CKD (chronic kidney disease) stage 3, GFR 30-59 ml/min 8/13/2019    Diabetes mellitus     Dry eye syndrome     Gout, unspecified     High cholesterol     History of shingles     Hypertension     Melanoma     Seizures        Past Surgical History:   Procedure Laterality Date    ABLATION N/A 9/11/2018    Procedure: ABLATION;  Surgeon: Hany Sanchez MD;  Location: [facility] CATH LAB;  Service: Cardiology;  Laterality: N/A;  AFL, ISABELLE, RFA, ELODIA, MAC, DM, 3 PREP    ABLATION OF DYSRHYTHMIC FOCUS      ADENOIDECTOMY      CARDIAC PACEMAKER PLACEMENT      no defib    CATARACT EXTRACTION W/  INTRAOCULAR LENS IMPLANT Right 7/28/2020    Procedure: EXTRACTION, CATARACT, WITH IOL INSERTION;  Surgeon: Andrés Morse MD;  Location: [facility];  Service: Ophthalmology;  Laterality: Right;    CATARACT EXTRACTION W/  INTRAOCULAR LENS IMPLANT Left 8/11/2020    Procedure: EXTRACTION, CATARACT, WITH IOL INSERTION;  Surgeon: Andrés Morse MD;  Location: [facility];  Service: Ophthalmology;  Laterality: Left;    COLONOSCOPY N/A 1/2/2019    Procedure: COLONOSCOPY Suprep;  Surgeon: Cindy Solorzano MD;  Location: [facility] ENDO;  Service: Endoscopy;  Laterality: N/A;    FIXATION OF TENDON Left 3/4/2024    Procedure: BICEPS TENODESIS;  Surgeon: Quinten Dominique MD;  [address];  Service: Orthopedics;  Laterality: Left;  WITH CATHETER    GANGLION CYST EXCISION      left neck    HERNIA REPAIR  2013    umbilical    REVERSE TOTAL SHOULDER ARTHROPLASTY Left 3/4/2024    Procedure: ARTHROPLASTY, SHOULDER, TOTAL, REVERSE;  Surgeon: Quinten Dominique MD;  [address];  Service: Orthopedics;  Laterality: Left;  WITH CATHETER    TENDON REPAIR Right     wrist    TONSILLECTOMY       varicose veins      several sx  bilateral    VASECTOMY      VEIN SURGERY         Family History   Problem Relation Name Age of Onset    Diabetes Mother      COPD Father      Heart disease Father      Arthritis Sister Argenis     Heart attack Brother      Heart disease Brother      Diabetes Brother      No Known Problems Maternal Aunt      No Known Problems Maternal Uncle      No Known Problems Paternal Aunt      No Known Problems Paternal Uncle      No Known Problems Maternal Grandmother      No Known Problems Maternal Grandfather      No Known Problems Paternal Grandmother      No Known Problems Paternal Grandfather      No Known Problems Son Shannan     Cancer Sister Tianna         lymph node, breast    No Known Problems Sister Aaliyah     No Known Problems Son Nimesh     Amblyopia Neg Hx      Blindness Neg Hx      Cataracts Neg Hx      Glaucoma Neg Hx      Hypertension Neg Hx      Macular degeneration Neg Hx      Retinal detachment Neg Hx      Strabismus Neg Hx      Stroke Neg Hx      Thyroid disease Neg Hx      Prostate cancer Neg Hx      Kidney disease Neg Hx      Melanoma Neg Hx         Social History     Socioeconomic History    Marital status:    Occupational History     Employer: [employer]   Tobacco Use    Smoking status: Never     Passive exposure: Never    Smokeless tobacco: Never   Substance and Sexual Activity    Alcohol use: Yes     Alcohol/week: 7.0 - 14.0 standard drinks of alcohol     Types: 7 - 14 Glasses of wine per week    Drug use: No    Sexual activity: Yes     Partners: Female     Social Drivers of Health     Financial Resource Strain: Low Risk  (3/11/2025)    Overall Financial Resource Strain (CARDIA)     Difficulty of Paying Living Expenses: Not hard at all   Food Insecurity: No Food Insecurity (3/11/2025)    Hunger Vital Sign     Worried About Running Out of Food in the Last Year: Never true     Ran Out of Food in the Last Year: Never true   Transportation Needs: No Transportation  Needs (3/11/2025)    PRAPARE - Transportation     Lack of Transportation (Medical): No     Lack of Transportation (Non-Medical): No   Physical Activity: Insufficiently Active (3/11/2025)    Exercise Vital Sign     Days of Exercise per Week: 3 days     Minutes of Exercise per Session: 30 min   Stress: No Stress Concern Present (3/11/2025)    [employer] [place] [employer] - Occupational Stress Questionnaire     Feeling of Stress : Only a little   Housing Stability: Low Risk  (3/11/2025)    Housing Stability Vital Sign     Unable to Pay for Housing in the Last Year: No     Number of Times Moved in the Last Year: 0     Homeless in the Last Year: No       Current Outpatient Medications   Medication Sig Dispense Refill    B6-folic-B12-coffee-phosphatid (NEURIVA PLUS) 0.85 mg-200 mcg-1.2 mcg Chew Take by mouth.      cycloSPORINE (RESTASIS) 0.05 % ophthalmic emulsion Place 1 drop into both eyes 2 (two) times daily. 180 each 3    ferrous sulfate (FEOSOL) 325 mg (65 mg iron) Tab tablet Take 325 mg by mouth once daily.      GLUCOSAMINE HCL/CHONDR VASQUEZ A NA (GLUCOSAMINE-CHONDROITIN) 750-600 mg Tab Take 2 tablets by mouth Daily.      levocetirizine (XYZAL) 5 MG tablet Take 1 tablet (5 mg total) by mouth every evening. 30 tablet 11    metoprolol tartrate (LOPRESSOR) 25 MG tablet Take 1 tablet (25 mg total) by mouth 2 (two) times daily as needed (palpitations). 45 tablet 0    modafiniL (PROVIGIL) 100 MG Tab Take 1 tablet (100 mg total) by mouth every morning. 90 tablet 1    multivitamin capsule Take 1 capsule by mouth nightly.       UNABLE TO FIND Take by mouth every morning. 6000 hydrolyzes collagen      vibegron 75 mg Tab Take 1 tablet (75 mg total) by mouth once daily. 90 tablet 1    allopurinoL (ZYLOPRIM) 100 MG tablet Take 1 tablet (100 mg total) by mouth nightly. Take 1 tablet by mouth once daily 90 tablet 4    atorvastatin (LIPITOR) 40 MG tablet Take 1 tablet (40 mg total) by mouth every evening. 90 tablet 4     benazepriL (LOTENSIN) 20 MG tablet Take 1 tablet (20 mg total) by mouth every evening. 90 tablet 5    gabapentin (NEURONTIN) 100 MG capsule Take 1 capsule (100 mg total) by mouth every evening. 30 capsule 11    rivaroxaban (XARELTO) 20 mg Tab Take 1 tablet (20 mg total) by mouth once daily. 90 tablet 3    tamsulosin (FLOMAX) 0.4 mg Cap TAKE 1 CAPSULE BY MOUTH AFTER DINNER 90 capsule 3    triamcinolone acetonide 0.1% (KENALOG) 0.1 % cream Apply topically 2 (two) times daily. 30 g 2    valACYclovir (VALTREX) 1000 MG tablet Take 1 tablet (1,000 mg total) by mouth 3 (three) times daily. for 7 days 21 tablet 0     No current facility-administered medications for this visit.       Review of patient's allergies indicates:  No Known Allergies      Objective:       Vitals:    03/13/25 1127   BP: 118/66   Pulse: 60       Physical Exam  Constitutional:       Appearance: He is well-developed.   HENT:      Head: Normocephalic and atraumatic.      Right Ear: External ear normal.      Left Ear: External ear normal.      Nose: Nose normal.      Mouth/Throat:      Pharynx: No oropharyngeal exudate.   Eyes:      General: No scleral icterus.        Right eye: No discharge.         Left eye: No discharge.      Conjunctiva/sclera: Conjunctivae normal.      Pupils: Pupils are equal, round, and reactive to light.   Neck:      Thyroid: No thyromegaly.      Vascular: No JVD.      Trachea: No tracheal deviation.   Cardiovascular:      Rate and Rhythm: Normal rate and regular rhythm.      Heart sounds: Normal heart sounds. No murmur heard.     No friction rub. No gallop.   Pulmonary:      Effort: Pulmonary effort is normal. No respiratory distress.      Breath sounds: Normal breath sounds. No stridor. No wheezing or rales.   Chest:      Chest wall: No tenderness.   Abdominal:      General: Bowel sounds are normal. There is no distension.      Palpations: Abdomen is soft. There is no mass.      Tenderness: There is no abdominal tenderness.  There is no guarding or rebound.      Hernia: No hernia is present.   Musculoskeletal:         General: Tenderness (RIGHT GROIN TTP) present. Normal range of motion.      Cervical back: Normal range of motion and neck supple.   Lymphadenopathy:      Cervical: No cervical adenopathy.   Skin:     General: Skin is warm and dry.      Coloration: Skin is not pale.      Findings: Erythema and rash present.      Comments: Erythematous shingles rash right parathoracic area   Neurological:      Mental Status: He is alert and oriented to person, place, and time.      Cranial Nerves: No cranial nerve deficit.      Motor: No abnormal muscle tone.      Coordination: Coordination normal.      Deep Tendon Reflexes: Reflexes are normal and symmetric. Reflexes normal.   Psychiatric:         Behavior: Behavior normal.         Thought Content: Thought content normal.         Judgment: Judgment normal.         Protective Sensation (w/ 10 gram monofilament):  Right: Intact  Left: Intact    Visual Inspection:  Normal -  Bilateral    Pedal Pulses:   Right: Present  Left: Present    Posterior tibialis:   Right:Present  Left: Present      Assessment:       1. Benign prostatic hyperplasia with lower urinary tract symptoms, symptom details unspecified    2. Type 2 diabetes mellitus without retinopathy    3. Hypertension associated with diabetes    4. PAF (paroxysmal atrial fibrillation)    5. Dyslipidemia associated with type 2 diabetes mellitus    6. NICM (nonischemic cardiomyopathy)    7. Hyperuricemia    8. Herpes zoster with complication        Plan:           Shannan was seen today for follow-up.    Diagnoses and all orders for this visit:    Benign prostatic hyperplasia with lower urinary tract symptoms, symptom details unspecified  -     tamsulosin (FLOMAX) 0.4 mg Cap; TAKE 1 CAPSULE BY MOUTH AFTER DINNER    Type 2 diabetes mellitus without retinopathy    Hypertension associated with diabetes    PAF (paroxysmal atrial fibrillation)  -      rivaroxaban (XARELTO) 20 mg Tab; Take 1 tablet (20 mg total) by mouth once daily.    Dyslipidemia associated with type 2 diabetes mellitus  -     atorvastatin (LIPITOR) 40 MG tablet; Take 1 tablet (40 mg total) by mouth every evening.    NICM (nonischemic cardiomyopathy)  -     benazepriL (LOTENSIN) 20 MG tablet; Take 1 tablet (20 mg total) by mouth every evening.    Hyperuricemia  -     allopurinoL (ZYLOPRIM) 100 MG tablet; Take 1 tablet (100 mg total) by mouth nightly. Take 1 tablet by mouth once daily    Herpes zoster with complication  -     valACYclovir (VALTREX) 1000 MG tablet; Take 1 tablet (1,000 mg total) by mouth 3 (three) times daily. for 7 days  -     triamcinolone acetonide 0.1% (KENALOG) 0.1 % cream; Apply topically 2 (two) times daily.  -     gabapentin (NEURONTIN) 100 MG capsule; Take 1 capsule (100 mg total) by mouth every evening.      Shingles  -valtrex, gabapentin n cream as directed    DM II  -controlled  -labs    HTN  -controlled    HLD  -stable    BPH  -controlled    SSS  -on pacemaker      Spent adequate time in obtaining history and explaining differentials    40 minutes spent during this visit of which greater than 50% devoted to face-face counseling and coordination of care regarding diagnosis and management plan    RTC 6 months or prn

## 2025-03-14 ENCOUNTER — OFFICE VISIT (OUTPATIENT)
Dept: OTOLARYNGOLOGY | Facility: CLINIC | Age: 80
End: 2025-03-14
Payer: MEDICARE

## 2025-03-14 VITALS
HEART RATE: 60 BPM | DIASTOLIC BLOOD PRESSURE: 57 MMHG | BODY MASS INDEX: 26.02 KG/M2 | WEIGHT: 197.19 LBS | SYSTOLIC BLOOD PRESSURE: 107 MMHG

## 2025-03-14 DIAGNOSIS — J31.0 CHRONIC RHINITIS: Chronic | ICD-10-CM

## 2025-03-14 DIAGNOSIS — H90.3 SENSORINEURAL HEARING LOSS (SNHL) OF BOTH EARS: Primary | Chronic | ICD-10-CM

## 2025-03-14 DIAGNOSIS — H69.93 ETD (EUSTACHIAN TUBE DYSFUNCTION), BILATERAL: Chronic | ICD-10-CM

## 2025-03-14 PROCEDURE — 99999 PR PBB SHADOW E&M-EST. PATIENT-LVL III: CPT | Mod: PBBFAC,,, | Performed by: OTOLARYNGOLOGY

## 2025-03-14 RX ORDER — LEVOCETIRIZINE DIHYDROCHLORIDE 5 MG/1
5 TABLET, FILM COATED ORAL NIGHTLY
Qty: 90 TABLET | Refills: 1 | Status: SHIPPED | OUTPATIENT
Start: 2025-03-14 | End: 2026-03-14

## 2025-03-14 NOTE — PROGRESS NOTES
Chief Complaint   Patient presents with    Follow-up      (two nose bleeds accidents ) overall patient has been feeling fine    .     HPI: Shannan Norman is a 79 y.o. male who presents for evaluation of right aural fullness and nosebleeds. He notes that the aural fullness has been present in both ears for several weeks.  [place]essa [place] [facility] [place] which did not seem to improve his symptoms. He does report that it might be slightly improvement since then. He did have audiogram at that visit. See results below.  Additionally,  he reports that he has had nosebleeds for several years.  He states that they are primarily the right side and occur intermittently.  The last episode was approximately 3 days ago.  He notes they last about 5 minutes and stop with pressure.  He is on chronic anticoagulation Eliquis for AFib. Has had nasal cautery in the past about 5 or 6 years ago.     Interval HPI 3/14/2025:  Follow up visit. Mr. Norman relays that he will be moving to [place] to be closer to family and wanted to be seen before he moves.  He reports that his hearing has been stable.  He denies any otalgia, otorrhea, or post-auricular pain. He denies tinnitus. He has been using nasal saline rinses and xyzal.  Denies any recent episodes of epistaxis. He did have a nosebleed several months ago from the right nostril with a sneeze.       Past Medical History:   Diagnosis Date    Allergy     Arthritis     Atrial fibrillation     Atrial flutter 2011    ablation    Basal cell carcinoma     Cancer     Cataract     CKD (chronic kidney disease) stage 3, GFR 30-59 ml/min 8/13/2019    Diabetes mellitus     Dry eye syndrome     Gout, unspecified     High cholesterol     History of shingles     Hypertension     Melanoma     Seizures      Social History     Socioeconomic History    Marital status:    Occupational History     Employer: [employer]   Tobacco Use    Smoking status: Never      Passive exposure: Never    Smokeless tobacco: Never   Substance and Sexual Activity    Alcohol use: Yes     Alcohol/week: 7.0 - 14.0 standard drinks of alcohol     Types: 7 - 14 Glasses of wine per week    Drug use: No    Sexual activity: Yes     Partners: Female     Social Drivers of Health     Financial Resource Strain: Low Risk  (3/11/2025)    Overall Financial Resource Strain (CARDIA)     Difficulty of Paying Living Expenses: Not hard at all   Food Insecurity: No Food Insecurity (3/11/2025)    Hunger Vital Sign     Worried About Running Out of Food in the Last Year: Never true     Ran Out of Food in the Last Year: Never true   Transportation Needs: No Transportation Needs (3/11/2025)    PRAPARE - Transportation     Lack of Transportation (Medical): No     Lack of Transportation (Non-Medical): No   Physical Activity: Insufficiently Active (3/11/2025)    Exercise Vital Sign     Days of Exercise per Week: 3 days     Minutes of Exercise per Session: 30 min   Stress: No Stress Concern Present (3/11/2025)    [employer] - Occupational Stress Questionnaire     Feeling of Stress : Only a little   Housing Stability: Low Risk  (3/11/2025)    Housing Stability Vital Sign     Unable to Pay for Housing in the Last Year: No     Number of Times Moved in the Last Year: 0     Homeless in the Last Year: No     Past Surgical History:   Procedure Laterality Date    ABLATION N/A 9/11/2018    Procedure: ABLATION;  Surgeon: Hany Sanchez MD;  Location: [facility] CATH LAB;  Service: Cardiology;  Laterality: N/A;  AFL, ISABELLE, RFA, ELODIA, MAC, DM, 3 PREP    ABLATION OF DYSRHYTHMIC FOCUS      ADENOIDECTOMY      CARDIAC PACEMAKER PLACEMENT      no defib    CATARACT EXTRACTION W/  INTRAOCULAR LENS IMPLANT Right 7/28/2020    Procedure: EXTRACTION, CATARACT, WITH IOL INSERTION;  Surgeon: Andrés Morse MD;  Location: [facility] OR;  Service: Ophthalmology;  Laterality: Right;    CATARACT EXTRACTION W/  INTRAOCULAR  LENS IMPLANT Left 8/11/2020    Procedure: EXTRACTION, CATARACT, WITH IOL INSERTION;  Surgeon: Andrés Morse MD;  Location: [facility];  Service: Ophthalmology;  Laterality: Left;    COLONOSCOPY N/A 1/2/2019    Procedure: COLONOSCOPY Suprep;  Surgeon: Cindy Solorzano MD;  Location: [facility] ENDO;  Service: Endoscopy;  Laterality: N/A;    FIXATION OF TENDON Left 3/4/2024    Procedure: BICEPS TENODESIS;  Surgeon: Quinten Dominique MD;  [address];  Service: Orthopedics;  Laterality: Left;  WITH CATHETER    GANGLION CYST EXCISION      left neck    HERNIA REPAIR  2013    umbilical    REVERSE TOTAL SHOULDER ARTHROPLASTY Left 3/4/2024    Procedure: ARTHROPLASTY, SHOULDER, TOTAL, REVERSE;  Surgeon: Quinten Dominique MD;  [facility];  Service: Orthopedics;  Laterality: Left;  WITH CATHETER    TENDON REPAIR Right     wrist    TONSILLECTOMY      varicose veins      several sx  bilateral    VASECTOMY      VEIN SURGERY       Family History   Problem Relation Name Age of Onset    Diabetes Mother      COPD Father      Heart disease Father      Arthritis Sister Argenis     Heart attack Brother      Heart disease Brother      Diabetes Brother      No Known Problems Maternal Aunt      No Known Problems Maternal Uncle      No Known Problems Paternal Aunt      No Known Problems Paternal Uncle      No Known Problems Maternal Grandmother      No Known Problems Maternal Grandfather      No Known Problems Paternal Grandmother      No Known Problems Paternal Grandfather      No Known Problems Son Shannan     Cancer Sister Tianna         lymph node, breast    No Known Problems Sister Aaliyah     No Known Problems Son Nimesh     Amblyopia Neg Hx      Blindness Neg Hx      Cataracts Neg Hx      Glaucoma Neg Hx      Hypertension Neg Hx      Macular degeneration Neg Hx      Retinal detachment Neg Hx      Strabismus Neg Hx      Stroke Neg Hx      Thyroid disease Neg Hx      Prostate cancer Neg Hx      Kidney disease  Neg Hx      Melanoma Neg Hx             Review of Systems  General: negative for chills, fever or weight loss  Psychological: negative for mood changes or depression  Ophthalmic: negative for blurry vision, photophobia or eye pain  ENT: see HPI  Respiratory: no cough, shortness of breath, or wheezing  Cardiovascular: no chest pain or dyspnea on exertion  Gastrointestinal: no abdominal pain, change in bowel habits, or black/ bloody stools  Musculoskeletal: negative for gait disturbance or muscular weakness  Neurological: no syncope or seizures; no ataxia  Dermatological: negative for puritis,  rash and jaundice  Hematologic/lymphatic: no easy bruising, no new lumps or bumps      Physical Exam:    Vitals:    03/14/25 1421   BP: (!) 107/57   Pulse: 60             Constitutional: Well appearing / communicating without difficutly.  NAD.  Eyes: EOM I Bilaterally  Head/Face: Normocephalic.  Negative paranasal sinus pressure/tenderness.  Salivary glands WNL.  House Brackmann I Bilaterally.    Right Ear: Auricle normal appearance. External Auditory Canal within normal limits no lesions or masses,TM retracted with limited mobility.   Left Ear: Auricle normal appearance. External Auditory Canal within normal limits no lesions or masses,TM retracted with limited mobility  Nose:  No gross nasal septal deviation. Inferior Turbinates 3+ bilaterally. No septal perforation. No masses/lesions. External nasal skin appears normal without masses/lesions.  Oral Cavity: Gingiva/lips within normal limits.  Dentition/gingiva healthy appearing. Mucus membranes moist. Floor of mouth soft, no masses palpated. Oral Tongue mobile. Hard Palate appears normal.    Oropharynx: Base of tongue appears normal. No masses/lesions noted. Tonsillar fossa/pharyngeal wall without lesions. Posterior oropharynx WNL.  Soft palate without masses. Midline uvula.   Neck/Lymphatic: No LAD I-VI bilaterally.  No thyromegaly.  No masses noted on exam.      Diagnostic  testing:   Audiogram interpreted personally by me and discussed in detail with the patient today. Audiogram results revealed a mild to severe sensorineural hearing loss bilaterally.  Speech reception thresholds were noted at 15 dB in the right ear and 15 dB in the left ear.  Speech discrimination scores were 92% in the right ear and 92% in the left ear.  Tympanometry revealed Type Ad in the right ear and Type A in the left ear.                Assessment:    ICD-10-CM ICD-9-CM    1. Sensorineural hearing loss (SNHL) of both ears  H90.3 389.18       2. Chronic rhinitis  J31.0 472.0       3. ETD (Eustachian tube dysfunction), bilateral  H69.93 381.81           The primary encounter diagnosis was Sensorineural hearing loss (SNHL) of both ears. Diagnoses of Chronic rhinitis and ETD (Eustachian tube dysfunction), bilateral were also pertinent to this visit.      Plan:  No orders of the defined types were placed in this encounter.    Continue saline rinses daily  Continue Xyzal 5 mg PO daily  SNHL: Audiogram results discussed. Recommend amplification when motivated. Continue hearing protection when in noise. Recommended updated audiogram today; however, patient declined.   He has plans to establish with ENT in [place] when he is settled.      Maria Del Rosario Ybarra MD

## 2025-03-16 ENCOUNTER — CLINICAL SUPPORT (OUTPATIENT)
Dept: CARDIOLOGY | Facility: HOSPITAL | Age: 80
End: 2025-03-16
Payer: MEDICARE

## 2025-03-16 ENCOUNTER — CLINICAL SUPPORT (OUTPATIENT)
Dept: CARDIOLOGY | Facility: HOSPITAL | Age: 80
End: 2025-03-16
Attending: INTERNAL MEDICINE
Payer: MEDICARE

## 2025-03-16 DIAGNOSIS — Z95.0 PRESENCE OF CARDIAC PACEMAKER: ICD-10-CM

## 2025-03-16 PROCEDURE — 93296 REM INTERROG EVL PM/IDS: CPT | Performed by: INTERNAL MEDICINE

## 2025-03-16 PROCEDURE — 93294 REM INTERROG EVL PM/LDLS PM: CPT | Mod: ,,, | Performed by: INTERNAL MEDICINE

## 2025-03-19 NOTE — PROGRESS NOTES
History of Present Illness (HPI)  3/19/25  Last injection therapy worked very well  Requesting f/u evaluation and re injection, if appropriate  No new sx    1/15/25  Shannan Norman, a 79 y.o. male, presents today for follow up evaluation of his bilateral knee. At last appointment, 12/9/2024, patient underwent intra-articular knee CSI & pain/symptoms did improve. Pain returned a few weeks ago. Pain is moderate at present & up to mild to moderate  with provacative activity including ADLs. Patient has not been doing physical therapy.    9/10/24  Shannan Norman, a 79 y.o. male, presents today for follow up evaluation of his bilateral knee. At last appointment, 6/5/2024, patient underwent intra-articular knee CSI & pain/symptoms did improve. Pain returned one month ago. Pain is 0/10 at present & up to mild to moderate  with provacative activity including ADLs. Patient has not been doing physical therapy. Patient reports he discontinued taking his celebrex one week ago and has noticed an increase in discomfort when going down steps, though he is not sure if the two are related.    6/5/24  Shannan Norman, a 79 y.o. male, presents today for follow up evaluation of his left knee. At last appointment, 2/27/2024, patient completed 3/3 Gelsyn series & pain/symptoms did improve. Pain returned one month ago. Pain is 0/10 at present & up to mild to moderate  with provacative activity including sitting to standing. Patient reports recent diagnosis of lymphedema.    24.01.22  Last csi+vsi (quite some time ago) worked very well  Requesting f/u evaluation and injection if appropriate  No new sx    Shannan Norman, a 78 y.o. male, presents today for evaluation of his bilateral knee pain.    ---prior  Location: anterior knee  Onset: Chronic   Palliative:    Relative rest   Oral analgesics   Ambulation Assistance -    Bracing: none   Injections   Euflexxa series- 3/18/2019, 3/25/19, 4/1/2019  Provocative:    ADLS   Prolonged  ambulation     Prior: none  Progression: constant discomfort  Quality:    pain is a 0 /10 throbbing pain today.Pain is 6 /10 at its worst.   Radiation: Denies, numbness, tingling, and inability to bear weight.  Severity: per nursing documentation  Timing: intermittent w/ use  Trauma: golf    Review of Systems (ROS)  A 10+ review of systems was performed with pertinent positives and negatives noted above in the history of present illness. Other systems were negative unless otherwise specified.    Physical Examination (PE)  General:  The patient is alert and oriented x 3. Mood is pleasant. Observation of ears, eyes and nose reveal no gross abnormalities. HEENT: NCAT, sclera anicteric.   Lungs: Respirations are equal and unlabored.  Gait is coordinated. Patient can toe walk and heel walk without difficulty.    KNEE EXAMINATION    Observation/Inspection  Gait:   Nonantalgic   Alignment:  Neutral   Scars:   None   Muscle atrophy: Mild  Effusion:  None   Warmth:  None   Discoloration:   none     Tenderness / Crepitus (T / C):         T / C      T / C  Patella   - / -   Lateral joint line   - / -     Peripatellar medial  -  Medial joint line    + / -  Peripatellar lateral -  Medial plica   - / -  Patellar tendon -   Popliteal fossa   - / -  Quad tendon   -   Gastrocnemius   -  Prepatellar Bursa - / -   Quadricep   -  Tibial tubercle  -  Thigh/hamstring  -  Pes anserine/HS -  Fibula    -  ITB   - / -  Tibia     -  Tib/fib joint  - / -  LCL    -    MFC   - / -   MCL: Proximal  -    LFC   - / -   Distal    -          ROM: (* = pain)  PASSIVE   ACTIVE    Left :   5 / 0 / 145   5 / 0 / 145     Right :    5 / 0 / 145   5 / 0 / 145    Patellofemoral examination:  See above noted areas of tenderness.   Patella position    Subluxation / dislocation: Centered        Sup. / Inf;   Normal   Crepitus (PF):    Absent   Patellar Mobility:       Medial-lateral:   Normal    Superior-inferior:  Normal    Inferior tilt   Normal    Patellar  tendon:  Normal   Lateral tilt:    Normal   J-sign:     None   Patellofemoral grind:   No pain     Meniscal Signs:     Pain on terminal extension:  +  Pain on terminal flexion:  +  Luisitos maneuver:  +*  Squat     NT  Thesaly    NT    Ligament Examination:  ACL / Lachman:  WNL  PCL-Post.  drawer: normal 0 to 2mm  MCL- Valgus:  normal 0 to 2mm  LCL- Varus:    normal 0 to 2mm  Pivot shift:  guarding   Dial Test:   difference c/w other side   At 30° flexion: normal (< 5°)    At 90° flexion: normal (< 5°)   Reverse Pivot Shift:   normal (Equal)     Strength: (* = with pain) Painful Side  Quadriceps   5/5  Hamstrin/5    Extremity Neuro-vascular Examination:   Sensation:  Grossly intact to light touch all dermatomal regions.   Motor Function:  Fully intact motor function at hip, knee, foot and ankle    DTRs;  quadriceps and  achilles 2+.  No clonus and downgoing Babinski.    Vascular status:  DP and PT pulses 2+, brisk capillary refill, symmetric.     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    Plan  We discussed the importance of appropriate diet, weight, and regular exercise    We discussed options including    Watchful waiting / relative rest    Physical therapy x   Injection therapy Csi iaknee bilateral   Consultation    The patient chooses As above   x = prescribed  CSI = corticosteroid injection  VSI = viscosupplement injection  PRPI = platelet rich plasma injection  ia = intra articular  R = right  L = left  B = bilateral   nfSx = surgical consultation was recommended, but patient is not interested in consultation at this time    Physical Therapy        Formal (fPT), @ [facility] facility p   Formal (fPT), @ [facility] facility        Homegoing (hgPT), per concurrent fPT recommendations    Homegoing (hgPT), per prior fPT recommendations t    Homegoing (hgPT), handout provided        w/  (atPT)    [blank] = not prescribed  x = prescribed  b = prescribed, and begin as indicated  t = continue as indicated  r = prescribed, and restart as indicated  p = completed prior as indicated  hs = prescribed, and with high school   col = prescribed, and with college or university   nfPT = physical therapy was recommended, but patient is not interested in PT at this time    Activity (e.g. sports, work) restrictions    [blank] = as tolerated  pt = per physical therapist  at = per     Bracing    [blank] = not prescribed  r = recommended, but not fit with at todays visit  f = prescribed and fit with at todays visit  t = continue as indicated  p = prn use on rare, as-needed basis; advised against chronic use    Pain management    [blank] = No prescription necessary. A handout detailing dosing of appropriate   over-the-counter musculoskeletal analgesics was made available to the patient.   m = meloxicam x 14 days  mp = 14 day course of meloxicam prescribed prior    Follow up    [blank] = as needed  [number] = in [number] weeks  CSI = for corticosteroid injection  VSI = for viscosupplement injection or injection series  PRP = for platelet rich plasma injection or injection series  MRI = after MRI imaging  ns = should surgical options be deferred (no surgery)  o = appointment offered, deferred by patient    Should symptoms worsen or fail to resolve, consider    Revisiting the above options and / or      Vocation:   ++yoga  former golfer  wife w/ advanced dementia  enjoyed long vacations in his RV  cb re: glucosamine / chondroitin  baseball fan

## 2025-03-20 ENCOUNTER — OFFICE VISIT (OUTPATIENT)
Dept: SPORTS MEDICINE | Facility: CLINIC | Age: 80
End: 2025-03-20
Payer: MEDICARE

## 2025-03-20 ENCOUNTER — TELEPHONE (OUTPATIENT)
Dept: ELECTROPHYSIOLOGY | Facility: CLINIC | Age: 80
End: 2025-03-20
Payer: MEDICARE

## 2025-03-20 VITALS
BODY MASS INDEX: 26.49 KG/M2 | HEART RATE: 103 BPM | WEIGHT: 199.88 LBS | HEIGHT: 73 IN | DIASTOLIC BLOOD PRESSURE: 72 MMHG | SYSTOLIC BLOOD PRESSURE: 115 MMHG

## 2025-03-20 DIAGNOSIS — M25.561 CHRONIC PAIN OF BOTH KNEES: ICD-10-CM

## 2025-03-20 DIAGNOSIS — R26.89 ANTALGIC GAIT: ICD-10-CM

## 2025-03-20 DIAGNOSIS — M25.562 CHRONIC PAIN OF BOTH KNEES: ICD-10-CM

## 2025-03-20 DIAGNOSIS — G89.29 CHRONIC PAIN OF BOTH KNEES: ICD-10-CM

## 2025-03-20 DIAGNOSIS — M17.0 PRIMARY OSTEOARTHRITIS OF BOTH KNEES: Primary | ICD-10-CM

## 2025-03-20 PROCEDURE — 99999 PR PBB SHADOW E&M-EST. PATIENT-LVL IV: CPT | Mod: PBBFAC,,, | Performed by: FAMILY MEDICINE

## 2025-03-20 RX ORDER — TRIAMCINOLONE ACETONIDE 40 MG/ML
40 INJECTION, SUSPENSION INTRA-ARTICULAR; INTRAMUSCULAR
Status: DISCONTINUED | OUTPATIENT
Start: 2025-03-20 | End: 2025-03-20 | Stop reason: HOSPADM

## 2025-03-20 RX ADMIN — TRIAMCINOLONE ACETONIDE 40 MG: 40 INJECTION, SUSPENSION INTRA-ARTICULAR; INTRAMUSCULAR at 09:03

## 2025-03-20 NOTE — PROCEDURES
"Large Joint Aspiration/Injection: bilateral knee    Date/Time: 3/20/2025 9:30 AM    Performed by: Kit Potter MD  Authorized by: Kit Potter MD    Consent Done?:  Yes (Verbal)  Indications:  Pain  Site marked: the procedure site was marked    Timeout: prior to procedure the correct patient, procedure, and site was verified    Prep: patient was prepped and draped in usual sterile fashion      Details:  Needle Size:  25 G  Ultrasonic Guidance for needle placement?: Yes    Images are saved and documented.  Approach:  Lateral  Location:  Knee  Laterality:  Bilateral  Site:  Bilateral knee  Medications (Right):  40 mg triamcinolone acetonide 40 mg/mL  Medications (Left):  40 mg triamcinolone acetonide 40 mg/mL  Patient tolerance:  Patient tolerated the procedure well with no immediate complications     Description of ultrasound utilization for needle guidance:   Ultrasound guidance used for needle localization. Images saved and stored for documentation. The SUPRAPATELLAR BURSA / KNEE JOINT was visualized. Dynamic visualization of the 25g x 1.5" needle was continuous throughout the procedure.     "

## 2025-03-21 ENCOUNTER — CLINICAL SUPPORT (OUTPATIENT)
Dept: CARDIOLOGY | Facility: CLINIC | Age: 80
End: 2025-03-21
Attending: INTERNAL MEDICINE
Payer: MEDICARE

## 2025-03-21 ENCOUNTER — OFFICE VISIT (OUTPATIENT)
Dept: CARDIOLOGY | Facility: CLINIC | Age: 80
End: 2025-03-21
Payer: MEDICARE

## 2025-03-21 VITALS
HEIGHT: 73 IN | WEIGHT: 200.81 LBS | HEART RATE: 64 BPM | DIASTOLIC BLOOD PRESSURE: 72 MMHG | SYSTOLIC BLOOD PRESSURE: 160 MMHG | BODY MASS INDEX: 26.62 KG/M2

## 2025-03-21 DIAGNOSIS — I49.5 SSS (SICK SINUS SYNDROME): Primary | ICD-10-CM

## 2025-03-21 DIAGNOSIS — I10 PRIMARY HYPERTENSION: ICD-10-CM

## 2025-03-21 DIAGNOSIS — Z95.0 CARDIAC PACEMAKER IN SITU: ICD-10-CM

## 2025-03-21 DIAGNOSIS — I48.0 PAF (PAROXYSMAL ATRIAL FIBRILLATION): ICD-10-CM

## 2025-03-21 DIAGNOSIS — Z95.0 PRESENCE OF CARDIAC PACEMAKER: ICD-10-CM

## 2025-03-21 DIAGNOSIS — I49.5 SSS (SICK SINUS SYNDROME): ICD-10-CM

## 2025-03-21 PROCEDURE — 99999 PR PBB SHADOW E&M-EST. PATIENT-LVL III: CPT | Mod: PBBFAC,,, | Performed by: INTERNAL MEDICINE

## 2025-03-21 PROCEDURE — 93280 PM DEVICE PROGR EVAL DUAL: CPT | Mod: S$GLB,,, | Performed by: INTERNAL MEDICINE

## 2025-03-21 NOTE — PROGRESS NOTES
Subjective   Patient ID:  Shannan Norman is a 79 y.o. male who presents for follow-up of Pacemaker Check      HPI  I had the pleasure of seeing Mr Norman in our electrophysiology clinic in follow-up for his PPM. As you are aware he is an 79 year-old man, former patient of Dr. Sanchez, with SSS s/p dcPPM (2018), type 2 DM, hypertension, and pAF. Had atrial flutter s/p ablation in 2010 and again in 2018. PPM implanted for sinus bradycardia. He is on xarelto for stroke risk reduction. Feeling ok other than issues with arthritis. Moving to [place] next month.    My interpretation of today's in-clinic PPM check is stable lead parameters with 77%RA and 8.5%RV pacing, no arrhythmias. Estimated battery longevity 3.1-3.4 years.    Review of Systems   Constitutional: Negative for fever and malaise/fatigue.   HENT:  Negative for congestion and sore throat.    Eyes:  Negative for blurred vision and visual disturbance.   Cardiovascular:  Negative for chest pain, dyspnea on exertion, irregular heartbeat, near-syncope, palpitations and syncope.   Respiratory:  Negative for cough and shortness of breath.    Hematologic/Lymphatic: Negative for bleeding problem. Does not bruise/bleed easily.   Skin: Negative.    Musculoskeletal: Negative.    Gastrointestinal:  Negative for bloating, abdominal pain, hematochezia and melena.   Neurological:  Negative for focal weakness and weakness.   Psychiatric/Behavioral: Negative.            Objective     Physical Exam  Vitals reviewed.   Constitutional:       General: He is not in acute distress.     Appearance: He is well-developed. He is not diaphoretic.   HENT:      Head: Normocephalic and atraumatic.   Eyes:      General:         Right eye: No discharge.         Left eye: No discharge.      Conjunctiva/sclera: Conjunctivae normal.   Cardiovascular:      Rate and Rhythm: Normal rate and regular rhythm.      Heart sounds: No murmur heard.     No friction rub. No gallop.   Pulmonary:       Effort: Pulmonary effort is normal. No respiratory distress.      Breath sounds: Normal breath sounds. No wheezing or rales.   Abdominal:      General: Bowel sounds are normal. There is no distension.      Palpations: Abdomen is soft.      Tenderness: There is no abdominal tenderness.   Musculoskeletal:      Cervical back: Neck supple.   Skin:     General: Skin is warm and dry.   Neurological:      Mental Status: He is alert and oriented to person, place, and time.   Psychiatric:         Behavior: Behavior normal.         Thought Content: Thought content normal.         Judgment: Judgment normal.            Assessment and Plan     1. SSS (sick sinus syndrome)    2. PAF (paroxysmal atrial fibrillation)    3. Presence of cardiac pacemaker    4. Primary hypertension        Plan:  In summary, Mr Norman is an 79 year-old man, former patient of Dr. Sanchez, with SSS s/p dcPPM (2018), type 2 DM, hypertension, and pAF. DM is diet controlled. No recent AF. Tolerating xarelto, continue. PPM is operating normally. He will establish care with a cardiologist in [place] when he moves.    RTC prn    Thank you for allowing me to participate in the care of this patient. Please do not hesitate to call me with any questions or concerns.    Ankush Sánchez MD, PhD  Cardiac Electrophysiology

## 2025-03-25 LAB
OHS CV AF BURDEN PERCENT: < 1
OHS CV AF BURDEN PERCENT: < 1
OHS CV DC REMOTE DEVICE TYPE: NORMAL
OHS CV DC REMOTE DEVICE TYPE: NORMAL
OHS CV ICD SHOCK: NO
OHS CV RV PACING PERCENT: 8.3 %
OHS CV RV PACING PERCENT: 8.5 %

## 2025-03-31 ENCOUNTER — OFFICE VISIT (OUTPATIENT)
Dept: DERMATOLOGY | Facility: CLINIC | Age: 80
End: 2025-03-31
Payer: MEDICARE

## 2025-03-31 DIAGNOSIS — L82.1 SEBORRHEIC KERATOSES: ICD-10-CM

## 2025-03-31 DIAGNOSIS — Z12.83 SCREENING EXAM FOR SKIN CANCER: ICD-10-CM

## 2025-03-31 DIAGNOSIS — L81.4 LENTIGO: ICD-10-CM

## 2025-03-31 DIAGNOSIS — D22.9 MULTIPLE BENIGN NEVI: ICD-10-CM

## 2025-03-31 DIAGNOSIS — Z85.820 HISTORY OF MALIGNANT MELANOMA: ICD-10-CM

## 2025-03-31 DIAGNOSIS — D18.01 CHERRY ANGIOMA: ICD-10-CM

## 2025-03-31 DIAGNOSIS — L57.0 ACTINIC KERATOSIS: Primary | ICD-10-CM

## 2025-03-31 DIAGNOSIS — Z85.828 HISTORY OF NONMELANOMA SKIN CANCER: ICD-10-CM

## 2025-03-31 PROCEDURE — 17000 DESTRUCT PREMALG LESION: CPT | Mod: S$GLB,,, | Performed by: STUDENT IN AN ORGANIZED HEALTH CARE EDUCATION/TRAINING PROGRAM

## 2025-03-31 PROCEDURE — 17003 DESTRUCT PREMALG LES 2-14: CPT | Mod: S$GLB,,, | Performed by: STUDENT IN AN ORGANIZED HEALTH CARE EDUCATION/TRAINING PROGRAM

## 2025-03-31 PROCEDURE — 1101F PT FALLS ASSESS-DOCD LE1/YR: CPT | Mod: CPTII,S$GLB,, | Performed by: STUDENT IN AN ORGANIZED HEALTH CARE EDUCATION/TRAINING PROGRAM

## 2025-03-31 PROCEDURE — 99213 OFFICE O/P EST LOW 20 MIN: CPT | Mod: 25,S$GLB,, | Performed by: STUDENT IN AN ORGANIZED HEALTH CARE EDUCATION/TRAINING PROGRAM

## 2025-03-31 PROCEDURE — 3288F FALL RISK ASSESSMENT DOCD: CPT | Mod: CPTII,S$GLB,, | Performed by: STUDENT IN AN ORGANIZED HEALTH CARE EDUCATION/TRAINING PROGRAM

## 2025-03-31 PROCEDURE — 99999 PR PBB SHADOW E&M-EST. PATIENT-LVL III: CPT | Mod: PBBFAC,,, | Performed by: STUDENT IN AN ORGANIZED HEALTH CARE EDUCATION/TRAINING PROGRAM

## 2025-03-31 PROCEDURE — 1126F AMNT PAIN NOTED NONE PRSNT: CPT | Mod: CPTII,S$GLB,, | Performed by: STUDENT IN AN ORGANIZED HEALTH CARE EDUCATION/TRAINING PROGRAM

## 2025-03-31 PROCEDURE — 1159F MED LIST DOCD IN RCRD: CPT | Mod: CPTII,S$GLB,, | Performed by: STUDENT IN AN ORGANIZED HEALTH CARE EDUCATION/TRAINING PROGRAM

## 2025-03-31 PROCEDURE — 1160F RVW MEDS BY RX/DR IN RCRD: CPT | Mod: CPTII,S$GLB,, | Performed by: STUDENT IN AN ORGANIZED HEALTH CARE EDUCATION/TRAINING PROGRAM

## 2025-03-31 NOTE — PROGRESS NOTES
Subjective:      Patient ID:  Shannan Norman is a 79 y.o. male who presents for   Chief Complaint   Patient presents with    Skin Check     Pt present for TBSE    Pt has h/o BCC & MM.     This is a high risk patient here to check for the development of new lesions.     Pt denies any new changing, bleeding or growing lesions today.          Problem Noted   History of Skin Cancer 12/20/2023    Left shoulder, melanoma, 0.15 mm, pT1a s/p WLE 2020  Left anterior leg, BCC, 9/2022 s/p excision  Left neck, BCC, s/p excision 2020  Left mid nose, BCC, 2019    12/20/2023  - left anterior leg, superficial BCC. S/p ed&c   - right forearm, nodular bcc, s/p excision          Review of Systems    Objective:   Physical Exam   Constitutional: He appears well-developed and well-nourished. No distress.   Genitourinary:         Musculoskeletal: Lymphadenopathy:      Cervical: No cervical adenopathy.      Upper Body:      Right upper body: No supraclavicular adenopathy.      Left upper body: No supraclavicular adenopathy.      Lower Body: No right inguinal adenopathy. No left inguinal adenopathy.     Lymphadenopathy:     He has no cervical adenopathy. No inguinal adenopathy noted on the right or left side.        Right: No supraclavicular adenopathy present.        Left: No supraclavicular adenopathy present.   Neurological: He is alert and oriented to person, place, and time. He is not disoriented.   Psychiatric: He has a normal mood and affect.   Skin:   Areas Examined (abnormalities noted in diagram):   Scalp / Hair Palpated and Inspected  Head / Face Inspection Performed  Neck Inspection Performed  Chest / Axilla Inspection Performed  Abdomen Inspection Performed  Genitals / Buttocks / Groin Inspection Performed  Back Inspection Performed  RUE Inspected  LUE Inspection Performed  RLE Inspected  LLE Inspection Performed  Nails and Digits Inspection Performed  Gland Inspection Performed                 Diagram Legend      Erythematous scaling macule/papule c/w actinic keratosis       Vascular papule c/w angioma      Pigmented verrucoid papule/plaque c/w seborrheic keratosis      Yellow umbilicated papule c/w sebaceous hyperplasia      Irregularly shaped tan macule c/w lentigo     1-2 mm smooth white papules consistent with Milia      Movable subcutaneous cyst with punctum c/w epidermal inclusion cyst      Subcutaneous movable cyst c/w pilar cyst      Firm pink to brown papule c/w dermatofibroma      Pedunculated fleshy papule(s) c/w skin tag(s)      Evenly pigmented macule c/w junctional nevus     Mildly variegated pigmented, slightly irregular-bordered macule c/w mildly atypical nevus      Flesh colored to evenly pigmented papule c/w intradermal nevus       Pink pearly papule/plaque c/w basal cell carcinoma      Erythematous hyperkeratotic cursted plaque c/w SCC      Surgical scar with no sign of skin cancer recurrence      Open and closed comedones      Inflammatory papules and pustules      Verrucoid papule consistent consistent with wart     Erythematous eczematous patches and plaques     Dystrophic onycholytic nail with subungual debris c/w onychomycosis     Umbilicated papule    Erythematous-base heme-crusted tan verrucoid plaque consistent with inflamed seborrheic keratosis     Erythematous Silvery Scaling Plaque c/w Psoriasis     See annotation      Assessment / Plan:        Actinic keratosis  Cryosurgery Procedure Note    Verbal consent from the patient is obtained including, but not limited to, risk of hypopigmentation/hyperpigmentation, scar, recurrence of lesion. The patient is aware of the precancerous quality and need for treatment of these lesions. Liquid nitrogen cryosurgery is applied to the 7 actinic keratoses, as detailed in the physical exam, to produce a freeze injury. The patient is aware that blisters may form and is instructed on wound care with gentle cleansing and use of vaseline ointment to keep moist until  healed. The patient is supplied a handout on cryosurgery and is instructed to call if lesions do not completely resolve.    Lentigo  Powers angioma  Seborrheic keratoses  Multiple benign nevi  Reassurance given to patient. No treatment is necessary.   Treatment of benign, asymptomatic lesions may be considered cosmetic.    History of nonmelanoma skin cancer  History of malignant melanoma  Screening exam for skin cancer  Area of previous melanoma examined. Site well healed with no signs of recurrence.    Total body skin examination performed today including at least 12 points as noted in physical examination. No lesions suspicious for malignancy noted.    Recommend daily sun protection/avoidance, use of at least SPF 30, broad spectrum sunscreen (OTC drug), skin self examinations, and routine physician surveillance to optimize early detection             Follow up in about 6 months (around 9/30/2025) for TBSE.

## 2025-03-31 NOTE — PATIENT INSTRUCTIONS
CRYOSURGERY      Your doctor has used a method called cryosurgery to treat your skin condition. Cryosurgery refers to the use of very cold substances to treat a variety of skin conditions such as warts, pre-skin cancers, molluscum contagiosum, sun spots, and several benign growths. The substance we use in cryosurgery is liquid nitrogen and is so cold (-195 degrees Celsius) that is burns when administered.     Following treatment in the office, the skin may immediately burn and become red. You may find the area around the lesion is affected as well. It is sometimes necessary to treat not only the lesion, but a small area of the surrounding normal skin to achieve a good response.     A blister, and even a blood filled blister, may form after treatment.   This is a normal response. If the blister is painful, it is acceptable to sterilize a needle and with rubbing alcohol and gently pop the blister. It is important that you gently wash the area with soap and warm water as the blister fluid may contain wart virus if a wart was treated. Do no remove the roof of the blister.     The area treated can take anywhere from 1-3 weeks to heal. Healing time depends on the kind skin lesion treated, the location, and how aggressively the lesion was treated. It is recommended that the areas treated are covered with Vaseline or bacitracin ointment and a band-aid. If a band-aid is not practical, just ointment applied several times per day will do. Keeping these areas moist will speed the healing time.    Treatment with liquid nitrogen can leave a scar. In dark skin, it may be a light or dark scar, in light skin it may be a white or pink scar. These will generally fade with time, but may never go away completely.     If you have any concerns after your treatment, please feel free to call the office.       [address] [place] [address]/ [phone] FAX/ [email or web address]     Sunscreen Guidelines  Sunscreen  protects your skin by absorbing and reflecting ultraviolet rays. All sunscreens have a sun protection factor (SPF) rating that indicates how long a sunscreen will remain effective on the skin.    Why protect your skin?  The sun's rays are composed of many different wavelengths, including UVA, UVB, and visible light that each affect the skin differently.    UVB: sunburn, photoaging, skin cancer (melanoma, basal cell, and squamous cell carcinomas) and modulation of the skin's immune system.    UVA: similar to above but thought to contribute more to aging; at the same dose of UVB it is less powerful however the sun has 10-20 times the levels of UVA as compared with UVB.  Visible light: implicated in causing unwanted darkening of skin, such as melasma and post-inflammatory hyperpigmentation in darker skin types     If I have dark skin, do I need to worry about the sun?    More darkly pigmented skin is more protected against UV-induced skin cancer, sunburn, and photoaging, though may still suffer from sun-related conditions, including melasma, hyperpigmentation, and other dark spots.    Sun avoidance  As a general rule, stay out of the sun as much as possible between 10 a.m. - 4 p.m.    Download the EPA UV index sharona to track the UV index by hour in your zip code.      Which sunscreen should I choose?  The best sunscreen to use varies by individual. The one that feels best on your skin and fits your lifestyle will be the one you will likely use most regularly.   Active ingredients of sunscreen vary by , and may be a chemical (such as avobenzone or oxybenzone) or physical agent (such as zinc oxide or titanium dioxide). I recommend a physical agent.  A water-resistant sunscreen is one that maintains the SPF level after 40 minutes of water exposure. A very water-resistant sunscreen maintains the SPF level after 80 minutes of water exposure.    Sunscreen: this is the last layer in sun protection   Be generous: 1  "shot glass of sunscreen for your body, ½ teaspoon for your face/neck  Reapply every 2 hours  Broad spectrum (provides UVA/UVB protection), SPF 50 or above  Avoid spray sunscreens: less effective and have been found to contain benzene (carcinogen)    Sun protective clothing  Although sunscreen helps minimize sun damage, no sunscreen completely blocks all wavelengths of UV light. Wearing sun protective clothing such as hats, rashguards or swim shirts, and long sleeves and/or pants, as well as avoiding sun exposure from 10 a.m. to 4 p.m. will help protect your skin from overexposure and minimize sun damage. Seek shade.  Long sleeved clothing, hats, and sunglasses: makes sun protection easier, more effective, and can even be more affordable, since sunscreen needs to be reapplied frequently.    Solumbra (www.sunprectautions.Datometry)  [employer] (www.[employer])  [employer] (www.EcoScraps)  Land's end (www.[employer])  Hats from Jenae Karen ([email or web address])    My Favorite Sunscreens:  Physical blockers: Can have a "white case" but in general are more effective  - Face: CeraVe tinted mineral sunscreen, Bare Minerals complexion rescue (20 shades), Elta MD (UV elements, UV physical, UV restore, etc), Tizo ultra zinc tinted, Cetaphil Sheer Mineral Face Liquid Sunscreen  - Body: Blue Lizard, Neutrogena Sheer Zinc, Eucerin Daily Protection, Aveeno Baby   "

## 2025-04-02 ENCOUNTER — PATIENT MESSAGE (OUTPATIENT)
Dept: ADMINISTRATIVE | Facility: HOSPITAL | Age: 80
End: 2025-04-02
Payer: MEDICARE

## 2025-04-02 ENCOUNTER — OFFICE VISIT (OUTPATIENT)
Dept: OPHTHALMOLOGY | Facility: CLINIC | Age: 80
End: 2025-04-02
Payer: MEDICARE

## 2025-04-02 DIAGNOSIS — H26.493 PCO (POSTERIOR CAPSULAR OPACIFICATION), BILATERAL: Primary | ICD-10-CM

## 2025-04-02 DIAGNOSIS — H43.813 VITREOUS DETACHMENT OF BOTH EYES: ICD-10-CM

## 2025-04-02 DIAGNOSIS — E11.9 DM TYPE 2 WITHOUT RETINOPATHY: ICD-10-CM

## 2025-04-02 DIAGNOSIS — H04.123 DRY EYE SYNDROME OF BOTH EYES: ICD-10-CM

## 2025-04-02 DIAGNOSIS — Z96.1 PSEUDOPHAKIA: ICD-10-CM

## 2025-04-02 PROCEDURE — 3288F FALL RISK ASSESSMENT DOCD: CPT | Mod: CPTII,S$GLB,, | Performed by: OPHTHALMOLOGY

## 2025-04-02 PROCEDURE — 2023F DILAT RTA XM W/O RTNOPTHY: CPT | Mod: CPTII,S$GLB,, | Performed by: OPHTHALMOLOGY

## 2025-04-02 PROCEDURE — 92004 COMPRE OPH EXAM NEW PT 1/>: CPT | Mod: 57,S$GLB,, | Performed by: OPHTHALMOLOGY

## 2025-04-02 PROCEDURE — 66821 AFTER CATARACT LASER SURGERY: CPT | Mod: 50,S$GLB,, | Performed by: OPHTHALMOLOGY

## 2025-04-02 PROCEDURE — 1126F AMNT PAIN NOTED NONE PRSNT: CPT | Mod: CPTII,S$GLB,, | Performed by: OPHTHALMOLOGY

## 2025-04-02 PROCEDURE — 99999 PR PBB SHADOW E&M-EST. PATIENT-LVL III: CPT | Mod: PBBFAC,,, | Performed by: OPHTHALMOLOGY

## 2025-04-02 PROCEDURE — 1159F MED LIST DOCD IN RCRD: CPT | Mod: CPTII,S$GLB,, | Performed by: OPHTHALMOLOGY

## 2025-04-02 PROCEDURE — 1160F RVW MEDS BY RX/DR IN RCRD: CPT | Mod: CPTII,S$GLB,, | Performed by: OPHTHALMOLOGY

## 2025-04-02 PROCEDURE — 1101F PT FALLS ASSESS-DOCD LE1/YR: CPT | Mod: CPTII,S$GLB,, | Performed by: OPHTHALMOLOGY

## 2025-04-02 NOTE — PROGRESS NOTES
Subjective:       Patient ID: Shannan Norman is a 79 y.o. male.    Chief Complaint: Laser Treatment (YAG capsulotomy ou)    HPI     Laser Treatment     Additional comments: YAG capsulotomy ou           Comments    C/o blurry VA ou, no pain ou and denies floaters and flashes ou.    Restasis bid ou          Last edited by Belkys Larios on 4/2/2025  1:25 PM.             Assessment:       1. PCO (posterior capsular opacification), bilateral    2. Dry eye syndrome of both eyes    3. Vitreous detachment of both eyes    4. DM type 2 without retinopathy    5. Pseudophakia        Plan:       Visually significant  PCO OS>OD- Pt. Wants Lasers.    ESEQUIEL OU-Doing well.  PVD's OU-Stable.  DM-No NPDR OU.          YAG CAP right eye OD) today. TE=   68 mj, Avg. 2.0 mj, 34 pulses.  YAG CAP left eye (OS) today. TE=   75 mj, Avg. 2.2 mj, 34 pulses.  PF taper OU.  AT's.  Control DM.  RTC 2-3 wks.    YAG laser Capsulotomy Procedures Note:  Informed consent was obtained.   One drop of topical Proparacaine 1% was instilled OU.  YAG laser applied to posterior capsule in cruciate pattern OU.  One drop of Apraclonidine 0.5% and 1 drop of Pred Acetate 1% applied to each eye after laser.  Pt tolerated procedures well. No complications.  Follow up in 2-3 weeks.

## 2025-04-10 ENCOUNTER — OFFICE VISIT (OUTPATIENT)
Dept: SPORTS MEDICINE | Facility: CLINIC | Age: 80
End: 2025-04-10
Payer: MEDICARE

## 2025-04-10 VITALS
HEIGHT: 73 IN | SYSTOLIC BLOOD PRESSURE: 110 MMHG | WEIGHT: 200.5 LBS | DIASTOLIC BLOOD PRESSURE: 67 MMHG | BODY MASS INDEX: 26.57 KG/M2 | HEART RATE: 60 BPM

## 2025-04-10 DIAGNOSIS — M25.552 CHRONIC PAIN OF BOTH HIPS: ICD-10-CM

## 2025-04-10 DIAGNOSIS — M25.551 CHRONIC PAIN OF BOTH HIPS: ICD-10-CM

## 2025-04-10 DIAGNOSIS — R26.89 ANTALGIC GAIT: ICD-10-CM

## 2025-04-10 DIAGNOSIS — G89.29 CHRONIC PAIN OF BOTH HIPS: ICD-10-CM

## 2025-04-10 DIAGNOSIS — M16.0 PRIMARY OSTEOARTHRITIS OF BOTH HIPS: Primary | ICD-10-CM

## 2025-04-10 PROCEDURE — 99999 PR PBB SHADOW E&M-EST. PATIENT-LVL IV: CPT | Mod: PBBFAC,,, | Performed by: FAMILY MEDICINE

## 2025-04-10 RX ORDER — TRIAMCINOLONE ACETONIDE 40 MG/ML
20 INJECTION, SUSPENSION INTRA-ARTICULAR; INTRAMUSCULAR
Status: DISCONTINUED | OUTPATIENT
Start: 2025-04-10 | End: 2025-04-10 | Stop reason: HOSPADM

## 2025-04-10 RX ADMIN — TRIAMCINOLONE ACETONIDE 20 MG: 40 INJECTION, SUSPENSION INTRA-ARTICULAR; INTRAMUSCULAR at 10:04

## 2025-04-10 NOTE — PROCEDURES
"Large Joint Aspiration/Injection: bilateral hip joint    Date/Time: 4/10/2025 10:45 AM    Performed by: Kit Potter MD  Authorized by: Kit Potter MD    Consent Done?:  Yes (Verbal)  Indications:  Pain  Site marked: the procedure site was marked    Timeout: prior to procedure the correct patient, procedure, and site was verified    Prep: patient was prepped and draped in usual sterile fashion    Local anesthesia used?: No      Details:  Needle Size:  22 G  Ultrasonic Guidance for needle placement?: Yes    Images are saved and documented.  Approach:  Anterior  Location:  Hip  Laterality:  Bilateral  Site:  Bilateral hip joint  Medications (Right):  20 mg triamcinolone acetonide 40 mg/mL  Medications (Left):  20 mg triamcinolone acetonide 40 mg/mL  Patient tolerance:  Patient tolerated the procedure well with no immediate complications     Description of ultrasound utilization for needle guidance:   Ultrasound guidance used for needle localization. Images saved and stored for documentation. The hip joint was visualized. Dynamic visualization of the 22g x 3.5" needle was continuous throughout the procedure.    Precautionary:  The patient's femoral artery, vein, and nerve were identified, and visualized throughout the procedure, so as to avoid needle contact with those structures.     "

## 2025-04-10 NOTE — PROGRESS NOTES
HISTORY OF PRESENT ILLNESS  4/10/25  Last injection therapy worked very well  Requesting f/u evaluation and re injection, if appropriate  No new sx    12/9/24  Shannan Norman, a 79 y.o. male, presents today for follow up evaluation of his bilateral hip. At last appointment, 1/18/2024, patient underwent intra-articular hip CSI and lateral glute tendon CSI & pain/symptoms did improve. Pain returned a few weeks ago. Pain is mild to moderate  at present & up to moderate to severe with provacative activity including ADLs and walking. Patient has not been doing physical therapy.    1/18/24  Last csi worked very well  Requesting f/u evaluation and injection if appropriate  No new sx    2/23/22  Shannan Norman, a 79 y.o. male, is here for evaluation of bilateral hip. R>L  ---prior  Location: proximal thigh   Onset: chronic, insidious  Palliative:    relative rest   oral analgesics, OTC    Provocative: prolonged ambulation  Prior: none  Progression: plateau discomfort   Quality: sharp  Radiation: none  Severity: per nursing documentation  Timing: intermittent with use  Trauma: none    Review of systems (ROS):  A 10+ review of systems was performed with pertinent positives and negatives noted above in the history of present illness. Other systems were negative unless otherwise specified.    PHYSICAL EXAMINATION  General:  The patient is alert and oriented x 3.  Mood is pleasant.  Observation of ears, eyes and nose reveal no gross abnormalities.  HEENT: NCAT, sclera nonicteric  Lungs: Respirations are equal and unlabored.   Gait is coordinated. Patient can toe walk and heel walk without difficulty.    HIP/PELVIS EXAMINATION    Observation/Inspection  Gait:   Nonantalgic   Alignment:  Neutral   Scars:   None   Muscle atrophy: None   Effusion:  None   Warmth:  None   Discoloration:   None   Leg lengths:   Equal   Pelvis:    Level     Tenderness/Crepitus (T/C):      T / C  Trochanteric bursa   - / -  Piriformis    - / -  SI  joint    - / -  Psoas tendon   - / -  Rectus insertion  - / -  Adductor insertion  - / -  Pubic symphysis  - / -    ROM: (* = pain)    Flexion:      120 degrees  External rotation:   40 degrees  Internal rotation with axial load:  30 degrees  Internal rotation without axial load:  40 degrees  Abduction:    45 degrees  Adduction:     20 degrees    Special Tests:  Pain w/ forced internal rotation (FADIR):  -   Pain w/ forced external rotation (MARCIE):  -   Circumduction test:     -  Stinchfield test:     -   Log roll:       -   Snapping hip (internal):    -   Sit-up pain:      -   Resisted sit-up pain:     -   Resisted sit-up with adductor contraction pain:  -   Step-down test:     +  Trendelenburg test:     -  Bridge test      +     Extremity Neuro-vascular Examination:   Sensation:  Grossly intact to light touch all dermatomal regions.   Motor Function:  Fully intact motor function at hip, knee, foot and ankle    DTRs;  quadriceps and  achilles 2+.  No clonus and downgoing Babinski.    Vascular status:  DP and PT pulses 2+, brisk capillary refill, symmetric.    Skin:  intact, compartments soft.    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  #2  Tonnis Grade III osteoarthritis of hip, bilat  #3 Concern for Parkinson's, followed by neurology    Imaging studies reviewed:   X-ray pelvis and hip, bilat 24.12    Plan    We discussed options including    Watchful waiting / relative rest    Physical therapy x   Injection therapy Csi iahip bilat   Consultation    The patient chooses As above   x = prescribed  CSI = corticosteroid injection  VSI = viscosupplement injection  PRPI = platelet rich plasma injection  ia = intra articular  R = right  L = left  B = bilateral   nfSx = surgical consultation was recommended, but patient is not interested in consultation at this time    Physical Therapy        Formal (fPT), @ [facility] facility p   Formal  (fPT), @ [facility] facility        Homegoing (hgPT), per concurrent fPT recommendations    Homegoing (hgPT), per prior fPT recommendations t   Homegoing (hgPT), handout provided        w/  (atPT)    [blank] = not prescribed  x = prescribed  b = prescribed, and begin as indicated  t = continue as indicated  r = prescribed, and restart as indicated  p = completed prior as indicated  hs = prescribed, and with high school   col = prescribed, and with college or university   nfPT = physical therapy was recommended, but patient is not interested in PT at this time    Activity (e.g. sports, work) restrictions    [blank] = as tolerated  pt = per physical therapist  at = per   NWB = non weight bearing on affected lower extremity, with crutches assistance for ambulation    Bracing    [blank] = not prescribed  r = recommended, but not fit with at todays visit  f = prescribed and fit with at todays visit  t = continue as indicated  d = d/c  p = as needed  rare = use on rare, as-needed basis; advised against chronic use    Pain management mp   [blank] = No prescription necessary. A handout detailing dosing of appropriate   over-the-counter musculoskeletal analgesics was made available to the patient.   m = meloxicam x 14 days  mp = 14 day course of meloxicam prescribed prior    Follow up    [blank] = as needed  [number] = in [number] weeks  CSI = for corticosteroid injection  VSI = for viscosupplement injection or injection series  PRP = for platelet rich plasma injection or injection series  MRI = after MRI imaging  ns = should surgical options be deferred (no surgery)  o = appointment offered, deferred by patient    Should symptoms worsen or fail to resolve, consider    Revisiting the above options and / or X-ray spine lumbar  AVERY     Vocation:   ++yoga  former golfer  wife w/ advanced dementia  enjoyed long vacations in his RV

## (undated) DEVICE — KIT IRR SUCTION HND PIECE

## (undated) DEVICE — Device

## (undated) DEVICE — COVER OVERHEAD SURG LT BLUE

## (undated) DEVICE — ELECTRODE REM PLYHSV RETURN 9

## (undated) DEVICE — DRAPE LAP TIBURON 77X122IN

## (undated) DEVICE — SEE MEDLINE ITEM 156955

## (undated) DEVICE — CASSETTE INFINITI

## (undated) DEVICE — SUT FIBERWIRE 2 38 IN TAPER

## (undated) DEVICE — GLOVE BIOGEL SKINSENSE PI 7.5

## (undated) DEVICE — PAD SHOULDER CARE POLAR

## (undated) DEVICE — TOWEL OR DISP STRL BLUE 4/PK

## (undated) DEVICE — SEE MEDLINE ITEM 157117

## (undated) DEVICE — SLING SHOT II LARGE

## (undated) DEVICE — SYR SLIP TIP 1CC

## (undated) DEVICE — HOOD T7 W/ PEEL AWAY LENS

## (undated) DEVICE — CUBE COLD THERAPY POLAR CARE

## (undated) DEVICE — PAD ELECTRODE STER 1.5X3

## (undated) DEVICE — DRESSING AQUACEL AG ADV 3.5X6

## (undated) DEVICE — BNDG COFLEX FOAM LF2 ST 4X5YD

## (undated) DEVICE — PENCIL VALLEYLAB TELSCP SMK

## (undated) DEVICE — GAUZE SPONGE PEANUT STRL

## (undated) DEVICE — KIT TOTAL KNEE TKOFG OMC

## (undated) DEVICE — ADHESIVE DERMABOND ADVANCED

## (undated) DEVICE — APPLICATOR CHLORAPREP ORN 26ML

## (undated) DEVICE — SUT VICRYL 3-0 27 SH

## (undated) DEVICE — SOCKINETTE IMPERVIOUS 12X48IN

## (undated) DEVICE — TRAY MINOR ORTHO OMC

## (undated) DEVICE — SUT MONOCRYL 3-0 SH U/D

## (undated) DEVICE — SOL BETADINE 5%

## (undated) DEVICE — SYS CLSR DERMABOND PRINEO 22CM

## (undated) DEVICE — DRAPE STERI-DRAPE 1000 17X11IN

## (undated) DEVICE — PUMP COLD THERAPY

## (undated) DEVICE — TIP IRR FEM CANAL CO-AXIAL

## (undated) DEVICE — SEE MEDLINE ITEM 152622

## (undated) DEVICE — SUT VICRYL PLUS 3-0 SH 18IN

## (undated) DEVICE — TAPE SURG DURAPORE 2 X10YD

## (undated) DEVICE — PRESSURIZER HI VAC HIP KIT

## (undated) DEVICE — SUT VICRYL CTD 2-0 GI 27 SH

## (undated) DEVICE — SUT VICRYL+ 1 CT1 18IN

## (undated) DEVICE — PACK BASIC

## (undated) DEVICE — DRAPE STERI U-SHAPED 47X51IN

## (undated) DEVICE — BLADE SURG BVL ANG COAX 2.4MM

## (undated) DEVICE — TUBING SUC UNIV W/CONN 12FT

## (undated) DEVICE — DRAPE U SPLIT SHEET 54X76IN

## (undated) DEVICE — GUIDE WIRE
Type: IMPLANTABLE DEVICE | Site: SHOULDER | Status: NON-FUNCTIONAL
Brand: TORNIER PERFORM
Removed: 2024-03-04

## (undated) DEVICE — SOL IRR NACL .9% 3000ML

## (undated) DEVICE — STOCKINET 4INX48

## (undated) DEVICE — SEE MEDLINE ITEM 157116

## (undated) DEVICE — SUT SILK 0 SH 30IN BLK BR

## (undated) DEVICE — SOL IRR STRL WATER 500ML

## (undated) DEVICE — GUIDE PIN
Type: IMPLANTABLE DEVICE | Site: SHOULDER | Status: NON-FUNCTIONAL
Brand: TORNIER SIMPLICITI
Removed: 2024-03-04

## (undated) DEVICE — SHEILD & GARTERS FOX METAL EYE

## (undated) DEVICE — BLADE SAG 18.0X1.27X100

## (undated) DEVICE — CORD BIPOLAR 12 FOOT

## (undated) DEVICE — BIT DRILL PERIPH SCREW 3.2MM

## (undated) DEVICE — SUT VICRYL PLUS 2-0 CT1 18

## (undated) DEVICE — SPONGE COTTON TRAY 4X4IN

## (undated) DEVICE — SUT 2/0 30IN SILK BLK BRAI

## (undated) DEVICE — SUT VICRYL PLUS 0 CT1 18IN

## (undated) DEVICE — SUT 0 VICRYL / UR6 (J603)

## (undated) DEVICE — SUT LABRALTAPE 1.5X36 BL/WH

## (undated) DEVICE — DRAIN PENRS STERILE LTX 18X1/2

## (undated) DEVICE — DRAPE THREE-QTR REINF 53X77IN

## (undated) DEVICE — SUT MCRYL PLUS 4-0 PS2 27IN

## (undated) DEVICE — SUT VICRYL PLUS 4-0 PS2 27

## (undated) DEVICE — DRAPE SHOULDER BEACH CHAIR

## (undated) DEVICE — GLOVE PROTEXIS HYDROGEL SZ7

## (undated) DEVICE — PAD SHOULDER POLAR CARE XL

## (undated) DEVICE — NDL MAYO 2

## (undated) DEVICE — TRAY CATH FOL SIL URIMTR 16FR

## (undated) DEVICE — SUT 4/0 18IN ETHILON GRN M

## (undated) DEVICE — DRAPE STERI INSTRUMENT 1018

## (undated) DEVICE — NDL HYPO REG 25G X 1 1/2

## (undated) DEVICE — NDL 22GA X1 1/2 REG BEVEL